# Patient Record
Sex: MALE | Race: WHITE | NOT HISPANIC OR LATINO | Employment: UNEMPLOYED | URBAN - METROPOLITAN AREA
[De-identification: names, ages, dates, MRNs, and addresses within clinical notes are randomized per-mention and may not be internally consistent; named-entity substitution may affect disease eponyms.]

---

## 2017-01-23 ENCOUNTER — APPOINTMENT (OUTPATIENT)
Dept: CT IMAGING | Facility: CLINIC | Age: 37
End: 2017-01-23
Attending: EMERGENCY MEDICINE
Payer: COMMERCIAL

## 2017-01-23 ENCOUNTER — HOSPITAL ENCOUNTER (EMERGENCY)
Facility: CLINIC | Age: 37
Discharge: OTHER INSTITUTION | End: 2017-01-23
Attending: EMERGENCY MEDICINE | Admitting: EMERGENCY MEDICINE
Payer: COMMERCIAL

## 2017-01-23 VITALS
DIASTOLIC BLOOD PRESSURE: 95 MMHG | BODY MASS INDEX: 20.44 KG/M2 | RESPIRATION RATE: 16 BRPM | HEIGHT: 69 IN | WEIGHT: 138 LBS | TEMPERATURE: 97.6 F | SYSTOLIC BLOOD PRESSURE: 122 MMHG | OXYGEN SATURATION: 99 %

## 2017-01-23 DIAGNOSIS — R41.82 ALTERED MENTAL STATUS, UNSPECIFIED ALTERED MENTAL STATUS TYPE: ICD-10-CM

## 2017-01-23 DIAGNOSIS — F19.10 POLYSUBSTANCE ABUSE (H): ICD-10-CM

## 2017-01-23 LAB
ALBUMIN SERPL-MCNC: 3.2 G/DL (ref 3.4–5)
ALP SERPL-CCNC: 84 U/L (ref 40–150)
ALT SERPL W P-5'-P-CCNC: 97 U/L (ref 0–70)
AMPHETAMINES UR QL SCN: ABNORMAL
ANION GAP SERPL CALCULATED.3IONS-SCNC: 4 MMOL/L (ref 3–14)
AST SERPL W P-5'-P-CCNC: 71 U/L (ref 0–45)
BARBITURATES UR QL: ABNORMAL
BASOPHILS # BLD AUTO: 0 10E9/L (ref 0–0.2)
BASOPHILS NFR BLD AUTO: 0.4 %
BENZODIAZ UR QL: ABNORMAL
BILIRUB SERPL-MCNC: 0.3 MG/DL (ref 0.2–1.3)
BUN SERPL-MCNC: 8 MG/DL (ref 7–30)
CALCIUM SERPL-MCNC: 8.8 MG/DL (ref 8.5–10.1)
CANNABINOIDS UR QL SCN: ABNORMAL
CHLORIDE SERPL-SCNC: 102 MMOL/L (ref 94–109)
CO2 SERPL-SCNC: 32 MMOL/L (ref 20–32)
COCAINE UR QL: ABNORMAL
CREAT SERPL-MCNC: 0.91 MG/DL (ref 0.66–1.25)
DIFFERENTIAL METHOD BLD: ABNORMAL
EOSINOPHIL # BLD AUTO: 0.2 10E9/L (ref 0–0.7)
EOSINOPHIL NFR BLD AUTO: 2.6 %
ERYTHROCYTE [DISTWIDTH] IN BLOOD BY AUTOMATED COUNT: 15.1 % (ref 10–15)
ETHANOL SERPL-MCNC: <0.01 G/DL
GFR SERPL CREATININE-BSD FRML MDRD: ABNORMAL ML/MIN/1.7M2
GLUCOSE SERPL-MCNC: 74 MG/DL (ref 70–99)
HCT VFR BLD AUTO: 39.2 % (ref 40–53)
HGB BLD-MCNC: 12.7 G/DL (ref 13.3–17.7)
IMM GRANULOCYTES # BLD: 0.1 10E9/L (ref 0–0.4)
IMM GRANULOCYTES NFR BLD: 0.6 %
LYMPHOCYTES # BLD AUTO: 2.2 10E9/L (ref 0.8–5.3)
LYMPHOCYTES NFR BLD AUTO: 25.8 %
MCH RBC QN AUTO: 26.8 PG (ref 26.5–33)
MCHC RBC AUTO-ENTMCNC: 32.4 G/DL (ref 31.5–36.5)
MCV RBC AUTO: 83 FL (ref 78–100)
MONOCYTES # BLD AUTO: 0.7 10E9/L (ref 0–1.3)
MONOCYTES NFR BLD AUTO: 8.6 %
NEUTROPHILS # BLD AUTO: 5.3 10E9/L (ref 1.6–8.3)
NEUTROPHILS NFR BLD AUTO: 62 %
NRBC # BLD AUTO: 0 10*3/UL
NRBC BLD AUTO-RTO: 0 /100
OPIATES UR QL SCN: ABNORMAL
PCP UR QL SCN: ABNORMAL
PLATELET # BLD AUTO: 275 10E9/L (ref 150–450)
POTASSIUM SERPL-SCNC: 3.8 MMOL/L (ref 3.4–5.3)
PROT SERPL-MCNC: 7.4 G/DL (ref 6.8–8.8)
RBC # BLD AUTO: 4.74 10E12/L (ref 4.4–5.9)
SODIUM SERPL-SCNC: 138 MMOL/L (ref 133–144)
WBC # BLD AUTO: 8.5 10E9/L (ref 4–11)

## 2017-01-23 PROCEDURE — 80307 DRUG TEST PRSMV CHEM ANLYZR: CPT | Performed by: EMERGENCY MEDICINE

## 2017-01-23 PROCEDURE — 85025 COMPLETE CBC W/AUTO DIFF WBC: CPT | Performed by: EMERGENCY MEDICINE

## 2017-01-23 PROCEDURE — 99285 EMERGENCY DEPT VISIT HI MDM: CPT | Mod: 25

## 2017-01-23 PROCEDURE — 72125 CT NECK SPINE W/O DYE: CPT

## 2017-01-23 PROCEDURE — 70450 CT HEAD/BRAIN W/O DYE: CPT

## 2017-01-23 PROCEDURE — 80320 DRUG SCREEN QUANTALCOHOLS: CPT | Performed by: EMERGENCY MEDICINE

## 2017-01-23 PROCEDURE — 80053 COMPREHEN METABOLIC PANEL: CPT | Performed by: EMERGENCY MEDICINE

## 2017-01-23 NOTE — PROGRESS NOTES
I called Lisa at [facility] and left a VM for her to call me re the discharge of this pt.

## 2017-01-23 NOTE — ED NOTES
Bed: [facility]  Expected date:   Expected time:   Means of arrival:   Comments:  419  Poss heroin abuse/hold  1139

## 2017-01-23 NOTE — DISCHARGE INSTRUCTIONS
"You can go to [facility] and get registered for a homeless shelter for tonight.  You need to get there before 5 pm.  [facility]  [address] [address]  "

## 2017-01-23 NOTE — ED PROVIDER NOTES
CHIEF COMPLAINT:  Found down.      HISTORY OF PRESENT ILLNESS:  Bc German is a 36-year-old with a long history of chemical abuse who was found on the bathroom floor passed out apparently with a pipe and foil near him.  He was brought in here.  He thinks he hit his head.  He really is unable to give any history due to his condition.  He has had a history of heroin use, although he said not for the last year.  Meth he initially said last week.  He initially denied cocaine, then said he had used cocaine.  He admits to marijuana use.  He has also had benzodiazepine abuse, although he denies that currently.However, he denied that last time I saw him and pills were missing. He has prescribed benzodiazepines,   Patient has no complaints.  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  He has been living with parents, but they have just signed a restraining order and stated that he cannot come back home.      PAST MEDICAL HISTORY:  Anxiety, chemical dependency.      HABITS:  Tobacco, positive.  Alcohol, denies.  Drug use as above.      SOCIAL HISTORY:  Not currently employed.      MEDICATIONS AND ALLERGIES:  Per review.      REVIEW OF SYSTEMS:  Limited due to status.      PHYSICAL EXAMINATION:   VITAL SIGNS:  Blood pressure 140/108, heart rate 108, respirations 16, O2 sat 98% on room air, temp 97.6.   GENERAL:  Patient has slurred speech.  Drifts off to sleep at times.  He knows the month and year.  He gives varying stories as to medications that have been taken.   HEENT:  Head shows no area of soft tissue swelling.  TMs gray.  Pupils small and equal.  Pharynx normal.   NECK:  Supple, no tenderness.   CARDIOVASCULAR:  S1, S2.  Mildly tachycardic.   LUNGS:  Clear to auscultation.   ABDOMEN:  Soft, nontender.   NEUROLOGIC:  Cranial nerves II-XII intact.  Motor 5/5, reflexes symmetric, Babinskis downgoing, finger-to-nose intact.   PSYCHIATRIC:  He denies  suicidal ideation.      LABORATORY WORK:  CBC normal with white count 8.5, hemoglobin 12.7.  Comprehensive remarkable for ALT elevated at 97, AST at 71.  Alcohol level 0.  Drug screen positive for amphetamines, benzodiazepines, cannabinoids, cocaine and opiates.  CT C-spine shows no acute changes.  CT head shows no acute changes.      EMERGENCY DEPARTMENT COURSE:  I did have the pharmacist look at his last fills.  On , he had alprazolam 2 mg b.i.d., #60, Chantix 1 mg b.i.d., Sonata 10 mg at bedtime, #30, chlorzoxazone 500 mg 1-2 at bedtime p.r.n. sleep, #60, and Adderall 20 mg 2 daily, #60.  Alisha, the care coordinator, has been involved with the parents and disposition is they would like him to go to treatment.  They initially wanted him to go to a place in [place] that they thought was [facility], who would take him tonight and they would fly him down; however, they found out it was not actually associated with [facility] and they now asked Alisha to try and find a placement within the [facility] system, which she is doing.  Barring that, she will try [place] to see if they have a detox inpatient treatment bed, and barring that, she will see if she can find a detox bed at [address] [place] [address] or [facility].  After the patient esa up, I do not think he is holdable unless his condition should change or he says he is suicidal or homicidal.  He certainly has ongoing chemical dependency issues that need addressing.  Patient will be signed out to Dr. Ding for disposition.      ASSESSMENT:  Chemical dependency with acute ingestion.  Unclear all that he took today.      DISPOSITION:  To be determined.         TC DÍAZ MD             D: 2017 14:51   T: 2017 15:18   MT: TS      Name:     AMBREEN PYLE   MRN:      [id number]        Account:      [id number]   :      1980           Visit Date:   2017      Document: [id number]

## 2017-01-23 NOTE — PROGRESS NOTES
I was asked to see this pt and his parents re: discharge planning. I started earlier in the day and this pt's mom wanted this pt to go to a [facility] facility in [place].  I contacted Xuan who was assisting this pt's mother to get this pt to [place].  I found out this place is called [facility] and is not part of [facility]. This pt's mom didn't want this pt to go to [place]. I contacted the  to assist with detox placement for this pt.  Once we got a bed to send this pt to [address] [place] [address] I rec'd a call from [facility] that they can possibly take this pt tonight but they need to talk to the pt first. I am waiting for Lisa from [facility] to call me back so she can assess this pt to get him there tonight.  I have updated this pt's parents on all occurences today.  I did ask this pt if he wanted to go to [facility] to get help.  He slowly nodded yes but added he needed more time. I have updated staff and we will plan for a stretcher ride with a transport hold to [facility] if they accept or to [address] [place] [address] if [facility] declines.

## 2017-01-23 NOTE — PROGRESS NOTES
JAREN  I: JAREN met with CC, Alisha, to discuss possible treatment plan for patient. Patient is a 36 year old male who was admitted to ED for chemical abuse. Prior to hospitalization patient was at home and found unresponsive on floor. Patient and family would like patient to go to  inpatient rehab. CC called [facility] and they may be able to accept patient but they need to get prior auth and bed availability is still unclear. Patient's family is willing to private pay if necessary. SW called [facility] and they stated that patient would need a Rule 25 prior to admitting and they don't anticipate a bed for at least a week. SW called [facility] and they stated patient would need a Rule 25 prior to admitting to their facility. SW called [facility] and they have no beds available. SW called [facility] and they stated they have no beds available. SW and CC await a call from [facility] to confirm whether or not they can accept patient. SW called [facility]. Staff stated SW needed to call [facility]. Sw spoke with Detox and they can accept patient between [phone]. If patient is ready before that staff will need to contact [phone] to inform them. SW and CC will update MD and family.      [facility]: [address] [place] [address]

## 2017-01-24 NOTE — PROGRESS NOTES
With many phone calls this pt's parents have agreed to private pay for [facility].  Lisa will call the bedside RN with the final OK.  This pt will go via stretcher and transport hold. Family has been updated and has been contacted by [facility].

## 2017-01-24 NOTE — ED PROVIDER NOTES
Addendum to Dr. Gann's ED H&P has been dictated by Shaquille Ding MD.  Job # [id number].    Ultimately transferred to [address] [place] [address] by EMS for detox, after extensive efforts to secure chemical dependency treatment at [facility] were unsuccessful.          Shaquille Ding MD  01/23/17 0451

## 2017-01-24 NOTE — ED PROVIDER NOTES
ADDENDUM      This is an addendum to the Emergency Department History and Physical initially documented by my partner, Dr. Savita Gann.  I took over care of cB Pyle from Dr. Gann at approximately 1500 around time of shift change.  I was asked to follow up by reexamining the patient and coordinating disposition with Alisha Peña, the ED care manager who was significantly involved in his care and seeking placement for chemical dependency treatment and/or detox.  Bc had been found down with drug paraphernalia and was suspected of having used multiple drugs in the recent hours.  Testing in the ED was notable primarily for multiple illicit substances detectable in his urine and negative CT imaging of head and neck.      I spoke with Alisha Peña on multiple occasions.  At 1900 approximately, I examined the patient in [facility].  He was sitting upright, oriented with clear speech and ambulatory.  He is interested in help for his drug problem.  Extensive efforts were made by Alisha Peña, who stayed several hours after her shift usually ends in an attempt to secure placement at [facility], though ultimately this could not be secured, and so he was sent instead to [address] [place] [address] for detoxification by EMS.  He was placed on a transport hold for that transfer.  The patient's parents were updated multiple times by Alisha Peña as well as ED nurses by phone.      DIAGNOSIS:  Polysubstance abuse causing altered mental status.          PIETRO TAYLOR MD             D: 2017 21:16   T: 2017 23:16   MT: EM#[id number]      Name:     BC PYLE   MRN:      [id number]        Account:      [id number]   :      1980           Visit Date:   2017      Document: [id number]

## 2017-02-02 DIAGNOSIS — Z79.899 ENCOUNTER FOR LONG-TERM (CURRENT) USE OF MEDICATIONS: Primary | ICD-10-CM

## 2017-02-02 DIAGNOSIS — Z11.3 SCREENING EXAMINATION FOR VENEREAL DISEASE: ICD-10-CM

## 2017-02-02 DIAGNOSIS — Z79.899 ENCOUNTER FOR LONG-TERM (CURRENT) USE OF MEDICATIONS: ICD-10-CM

## 2017-02-02 LAB
ALT SERPL W P-5'-P-CCNC: 135 U/L (ref 0–70)
HBV SURFACE AG SERPL QL IA: NONREACTIVE

## 2017-02-02 PROCEDURE — 36415 COLL VENOUS BLD VENIPUNCTURE: CPT | Performed by: FAMILY MEDICINE

## 2017-02-02 PROCEDURE — 87340 HEPATITIS B SURFACE AG IA: CPT | Performed by: FAMILY MEDICINE

## 2017-02-02 PROCEDURE — 87522 HEPATITIS C REVRS TRNSCRPJ: CPT | Performed by: FAMILY MEDICINE

## 2017-02-02 PROCEDURE — 87389 HIV-1 AG W/HIV-1&-2 AB AG IA: CPT | Performed by: FAMILY MEDICINE

## 2017-02-02 PROCEDURE — 86592 SYPHILIS TEST NON-TREP QUAL: CPT | Performed by: FAMILY MEDICINE

## 2017-02-02 PROCEDURE — 86702 HIV-2 ANTIBODY: CPT | Mod: 59 | Performed by: FAMILY MEDICINE

## 2017-02-02 PROCEDURE — 86701 HIV-1ANTIBODY: CPT | Mod: 59 | Performed by: FAMILY MEDICINE

## 2017-02-02 PROCEDURE — 84460 ALANINE AMINO (ALT) (SGPT): CPT | Performed by: FAMILY MEDICINE

## 2017-02-03 LAB
HIV 1 & 2 AB SERPL IA.RAPID: ABNORMAL
HIV 1 & 2 AB SERPL IA.RAPID: NEGATIVE
HIV 1+2 AB+HIV1 P24 AG SERPL QL IA: ABNORMAL
HIV INTERPRETATION: ABNORMAL
RPR SER QL: NEGATIVE

## 2017-02-04 DIAGNOSIS — Z11.3 SCREEN FOR STD (SEXUALLY TRANSMITTED DISEASE): Primary | ICD-10-CM

## 2017-02-04 DIAGNOSIS — Z79.899 ENCOUNTER FOR LONG-TERM (CURRENT) USE OF MEDICATIONS: ICD-10-CM

## 2017-02-06 LAB
HCV RNA SERPL NAA+PROBE-ACNC: ABNORMAL [IU]/ML
HCV RNA SERPL NAA+PROBE-LOG IU: 7 LOG IU/ML

## 2017-02-23 DIAGNOSIS — B20 HUMAN IMMUNODEFICIENCY VIRUS (HIV) DISEASE (H): Primary | ICD-10-CM

## 2017-02-24 DIAGNOSIS — Z11.3 SCREEN FOR STD (SEXUALLY TRANSMITTED DISEASE): ICD-10-CM

## 2017-02-24 DIAGNOSIS — B20 HUMAN IMMUNODEFICIENCY VIRUS (HIV) DISEASE (H): ICD-10-CM

## 2017-02-24 DIAGNOSIS — Z79.899 ENCOUNTER FOR LONG-TERM (CURRENT) USE OF MEDICATIONS: ICD-10-CM

## 2017-02-24 LAB
ALBUMIN SERPL-MCNC: 4.3 G/DL (ref 3.4–5)
ALP SERPL-CCNC: 96 U/L (ref 40–150)
ALT SERPL W P-5'-P-CCNC: 262 U/L (ref 0–70)
AMYLASE SERPL-CCNC: 74 U/L (ref 30–110)
ANION GAP SERPL CALCULATED.3IONS-SCNC: 7 MMOL/L (ref 3–14)
AST SERPL W P-5'-P-CCNC: 94 U/L (ref 0–45)
BASOPHILS # BLD AUTO: 0.1 10E9/L (ref 0–0.2)
BASOPHILS NFR BLD AUTO: 0.7 %
BILIRUB SERPL-MCNC: 0.3 MG/DL (ref 0.2–1.3)
BUN SERPL-MCNC: 12 MG/DL (ref 7–30)
CALCIUM SERPL-MCNC: 9.2 MG/DL (ref 8.5–10.1)
CD3 CELLS # BLD: 2118 CELLS/UL (ref 603–2990)
CD3 CELLS NFR BLD: 73 % (ref 49–84)
CD3+CD4+ CELLS # BLD: 825 CELLS/UL (ref 441–2156)
CD3+CD4+ CELLS NFR BLD: 28 % (ref 28–63)
CD3+CD4+ CELLS/CD3+CD8+ CLL BLD: 0.68 % (ref 1.4–2.6)
CD3+CD8+ CELLS # BLD: 1211 CELLS/UL (ref 125–1312)
CD3+CD8+ CELLS NFR BLD: 41 % (ref 10–40)
CHLORIDE SERPL-SCNC: 103 MMOL/L (ref 94–109)
CO2 SERPL-SCNC: 28 MMOL/L (ref 20–32)
CREAT SERPL-MCNC: 0.85 MG/DL (ref 0.66–1.25)
DIFFERENTIAL METHOD BLD: ABNORMAL
EOSINOPHIL # BLD AUTO: 0.2 10E9/L (ref 0–0.7)
EOSINOPHIL NFR BLD AUTO: 2.5 %
ERYTHROCYTE [DISTWIDTH] IN BLOOD BY AUTOMATED COUNT: 14.8 % (ref 10–15)
GFR SERPL CREATININE-BSD FRML MDRD: ABNORMAL ML/MIN/1.7M2
GLUCOSE SERPL-MCNC: 97 MG/DL (ref 70–99)
HCT VFR BLD AUTO: 46.6 % (ref 40–53)
HGB BLD-MCNC: 14.9 G/DL (ref 13.3–17.7)
IFC SPECIMEN: ABNORMAL
IMM GRANULOCYTES # BLD: 0.1 10E9/L (ref 0–0.4)
IMM GRANULOCYTES NFR BLD: 0.9 %
IMMUNODEFICIENCY MARKERS SPEC-IMP: ABNORMAL
LIPASE SERPL-CCNC: 222 U/L (ref 73–393)
LYMPHOCYTES # BLD AUTO: 2.8 10E9/L (ref 0.8–5.3)
LYMPHOCYTES NFR BLD AUTO: 36.3 %
MCH RBC QN AUTO: 26.2 PG (ref 26.5–33)
MCHC RBC AUTO-ENTMCNC: 32 G/DL (ref 31.5–36.5)
MCV RBC AUTO: 82 FL (ref 78–100)
MONOCYTES # BLD AUTO: 0.7 10E9/L (ref 0–1.3)
MONOCYTES NFR BLD AUTO: 8.7 %
NEUTROPHILS # BLD AUTO: 3.9 10E9/L (ref 1.6–8.3)
NEUTROPHILS NFR BLD AUTO: 50.9 %
NRBC # BLD AUTO: 0 10*3/UL
NRBC BLD AUTO-RTO: 0 /100
PLATELET # BLD AUTO: 276 10E9/L (ref 150–450)
POTASSIUM SERPL-SCNC: 4 MMOL/L (ref 3.4–5.3)
PROT SERPL-MCNC: 8.8 G/DL (ref 6.8–8.8)
RBC # BLD AUTO: 5.69 10E12/L (ref 4.4–5.9)
SODIUM SERPL-SCNC: 138 MMOL/L (ref 133–144)
WBC # BLD AUTO: 7.6 10E9/L (ref 4–11)

## 2017-02-25 LAB — T PALLIDUM IGG+IGM SER QL: NEGATIVE

## 2017-02-27 ENCOUNTER — TELEPHONE (OUTPATIENT)
Dept: INFECTIOUS DISEASES | Facility: CLINIC | Age: 37
End: 2017-02-27

## 2017-02-27 LAB
CMV IGG SERPL QL IA: 6.9 AI (ref 0–0.8)
HAV IGG SER QL IA: ABNORMAL
HBV CORE AB SERPL QL IA: ABNORMAL
HBV SURFACE AB SERPL IA-ACNC: 47.58 M[IU]/ML
HBV SURFACE AG SERPL QL IA: NONREACTIVE
HCV AB SERPL QL IA: ABNORMAL
T GONDII IGG SER QL: NORMAL IU/ML (ref 0–7.1)

## 2017-02-28 ENCOUNTER — OFFICE VISIT (OUTPATIENT)
Dept: PHARMACY | Facility: CLINIC | Age: 37
End: 2017-02-28
Payer: COMMERCIAL

## 2017-02-28 ENCOUNTER — TELEPHONE (OUTPATIENT)
Dept: INFECTIOUS DISEASES | Facility: CLINIC | Age: 37
End: 2017-02-28

## 2017-02-28 ENCOUNTER — OFFICE VISIT (OUTPATIENT)
Dept: INFECTIOUS DISEASES | Facility: CLINIC | Age: 37
End: 2017-02-28
Attending: INTERNAL MEDICINE
Payer: COMMERCIAL

## 2017-02-28 ENCOUNTER — RESULTS ONLY (OUTPATIENT)
Dept: OTHER | Facility: CLINIC | Age: 37
End: 2017-02-28

## 2017-02-28 VITALS
TEMPERATURE: 98 F | WEIGHT: 151.1 LBS | SYSTOLIC BLOOD PRESSURE: 150 MMHG | HEART RATE: 100 BPM | HEIGHT: 71 IN | DIASTOLIC BLOOD PRESSURE: 100 MMHG | BODY MASS INDEX: 21.15 KG/M2

## 2017-02-28 DIAGNOSIS — Z21 ASYMPTOMATIC HUMAN IMMUNODEFICIENCY VIRUS (HIV) INFECTION STATUS (H): Primary | ICD-10-CM

## 2017-02-28 DIAGNOSIS — F41.1 GAD (GENERALIZED ANXIETY DISORDER): ICD-10-CM

## 2017-02-28 DIAGNOSIS — B20 HUMAN IMMUNODEFICIENCY VIRUS (HIV) DISEASE (H): ICD-10-CM

## 2017-02-28 DIAGNOSIS — Z21 ASYMPTOMATIC HUMAN IMMUNODEFICIENCY VIRUS (HIV) INFECTION STATUS (H): ICD-10-CM

## 2017-02-28 DIAGNOSIS — F19.10 INTRAVENOUS DRUG ABUSE (H): ICD-10-CM

## 2017-02-28 DIAGNOSIS — B17.10 ACUTE HEPATITIS C VIRUS INFECTION WITHOUT HEPATIC COMA: ICD-10-CM

## 2017-02-28 LAB
ALBUMIN UR-MCNC: 30 MG/DL
APPEARANCE UR: ABNORMAL
BACTERIA #/AREA URNS HPF: ABNORMAL /HPF
BILIRUB UR QL STRIP: NEGATIVE
COLOR UR AUTO: YELLOW
GLUCOSE UR STRIP-MCNC: NEGATIVE MG/DL
HGB UR QL STRIP: NEGATIVE
HIV1 RNA # PLAS NAA DL=20: ABNORMAL {COPIES}/ML
HIV1 RNA SERPL NAA+PROBE-LOG#: 5 {LOG_COPIES}/ML
KETONES UR STRIP-MCNC: 5 MG/DL
LEUKOCYTE ESTERASE UR QL STRIP: ABNORMAL
MUCOUS THREADS #/AREA URNS LPF: PRESENT /LPF
NITRATE UR QL: NEGATIVE
PH UR STRIP: 7 PH (ref 5–7)
RBC #/AREA URNS AUTO: 4 /HPF (ref 0–2)
SP GR UR STRIP: 1.02 (ref 1–1.03)
SPERM #/AREA URNS HPF: PRESENT /HPF
SQUAMOUS #/AREA URNS AUTO: <1 /HPF (ref 0–1)
T PALLIDUM IGG+IGM SER QL: NEGATIVE
URN SPEC COLLECT METH UR: ABNORMAL
UROBILINOGEN UR STRIP-MCNC: 0 MG/DL (ref 0–2)
WBC #/AREA URNS AUTO: 20 /HPF (ref 0–2)

## 2017-02-28 PROCEDURE — 87591 N.GONORRHOEAE DNA AMP PROB: CPT | Mod: XU | Performed by: INTERNAL MEDICINE

## 2017-02-28 PROCEDURE — 87086 URINE CULTURE/COLONY COUNT: CPT | Performed by: INTERNAL MEDICINE

## 2017-02-28 PROCEDURE — 99207 ZZC NO CHARGE LOS: CPT | Performed by: PHARMACIST

## 2017-02-28 PROCEDURE — 86780 TREPONEMA PALLIDUM: CPT | Performed by: INTERNAL MEDICINE

## 2017-02-28 PROCEDURE — 87536 HIV-1 QUANT&REVRSE TRNSCRPJ: CPT | Performed by: INTERNAL MEDICINE

## 2017-02-28 PROCEDURE — 36415 COLL VENOUS BLD VENIPUNCTURE: CPT | Performed by: INTERNAL MEDICINE

## 2017-02-28 PROCEDURE — 81381 HLA I TYPING 1 ALLELE HR: CPT | Performed by: INTERNAL MEDICINE

## 2017-02-28 PROCEDURE — 84403 ASSAY OF TOTAL TESTOSTERONE: CPT | Performed by: INTERNAL MEDICINE

## 2017-02-28 PROCEDURE — 87491 CHLMYD TRACH DNA AMP PROBE: CPT | Mod: XU | Performed by: INTERNAL MEDICINE

## 2017-02-28 PROCEDURE — 99213 OFFICE O/P EST LOW 20 MIN: CPT | Mod: ZF

## 2017-02-28 PROCEDURE — 81001 URINALYSIS AUTO W/SCOPE: CPT | Performed by: INTERNAL MEDICINE

## 2017-02-28 ASSESSMENT — PAIN SCALES - GENERAL: PAINLEVEL: NO PAIN (0)

## 2017-02-28 NOTE — PROGRESS NOTES
[facility]  Infectious Disease Clinic Note     Date of Visit:  February 28, 2017  Patient:  Bc German  Medical record number [id number]    History of Present Illness  Bc German is a 36 year old male who is here to establish care. Walt is in treatment at [facility] for intravenous drug use.  He has been using intravenous drugs for many, many years.  He has a long complicated history of multiple infectious complications of injection drug use including osteomyelitis of the vertebral spine, endocarditis and soft tissue abscesses.  He found out that 2 weeks ago on routine testing that he was HIV infected.  He also has hepatitis C.  His last negative HIV test was 6 months prior.  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  He also has a generalized anxiety disorder.  He has an addiction psychiatrist that he has been working who was managing his antidepressants and other medications for addiction.      PAST MEDICAL HISTORY:  Other than noted above is generally benign.       FAMILY HISTORY:  Benign.  His mother lives in [place] and is generally supportive.  His brother is also quite supportive and knows the full extent of his diagnoses.      ALLERGIES:  He has no drug allergies or other allergies.         Problem List  1. Asymptomatic human immunodeficiency virus (HIV) infection status (H)    2. Acute hepatitis C virus infection without hepatic coma    3. Intravenous drug abuse    4. JASSI (generalized anxiety disorder)         Review of Systems  Twelve point review of systems is otherwise normal.    Current Medications  Current Outpatient Prescriptions   Medication     PREGABALIN PO     Levomilnacipran HCl (FETZIMA PO)     No current facility-administered medications for this visit.        Family/Social History  History reviewed. No pertinent family history.    Physical Exam  HEENT: Normal  Neck: Supple  Lungs: CTA  CV: RRR, no gallops,  murmurs  Abdomen: Soft and nontender.  No masses noted.  :nl penis and testes, external rectal nl  Extremities: Normal  Skin: Normal  Neuro: Grossly normal  Lymphadenopathy:  Cervical 2+      Axillary:0     Inguinal:2+    Recent Laboratory Values    Routine Labs  Hemoglobin   Date Value Ref Range Status   02/24/2017 14.9 13.3 - 17.7 g/dL Final     MCV   Date Value Ref Range Status   02/24/2017 82 78 - 100 fl Final     Platelet Count   Date Value Ref Range Status   02/24/2017 276 150 - 450 10e9/L Final     Creatinine   Date Value Ref Range Status   02/24/2017 0.85 0.66 - 1.25 mg/dL Final     AST   Date Value Ref Range Status   02/24/2017 94 (H) 0 - 45 U/L Final     ALT   Date Value Ref Range Status   02/24/2017 262 (H) 0 - 70 U/L Final     Bilirubin Total   Date Value Ref Range Status   02/24/2017 0.3 0.2 - 1.3 mg/dL Final       T Cell Subset:  CD3 Mature T   Date Value Ref Range Status   02/24/2017 73 49 - 84 % Final     CD4 [place] T   Date Value Ref Range Status   02/24/2017 28 28 - 63 % Final     CD8 Suppressor T   Date Value Ref Range Status   02/24/2017 41 (H) 10 - 40 % Final     CD4:CD8 Ratio   Date Value Ref Range Status   02/24/2017 0.68 (L) 1.40 - 2.60 Final     WBC   Date Value Ref Range Status   02/24/2017 7.6 4.0 - 11.0 10e9/L Final     % Lymphocytes   Date Value Ref Range Status   02/24/2017 36.3 % Final     Absolute CD3   Date Value Ref Range Status   02/24/2017 2118 603 - 2990 cells/uL Final     Absolute CD4   Date Value Ref Range Status   02/24/2017 825 441 - 2156 cells/uL Final     Absolute CD8   Date Value Ref Range Status   02/24/2017 1211 125 - 1312 cells/uL Final       HIV-1 RNA Quantitative:  No results found for: HIQT     Assessment and Plan  (Z21) Asymptomatic human immunodeficiency virus (HIV) infection status (H)  (primary encounter diagnosis)  Comment: Walt is early in his HIV infection.  His CD4 count is 845 and a viral load was not done.  We had a lengthy discussion (30 minutes) about  the risks and benefits of antiretroviral therapy.  I stressed that we did not need to start today, but I would like him to will be further out on his recovery before prescribing antiretrovirals.  We discussed what the options were and what we might use.  I told him to come back in 4 weeks when he is through at [facility] and will talk about antiretroviral therapy again at that time.  I have ordered a viral load so that we know where he is with respect to that.  It is entirely possible the viral load will be either undetectable or very low and with a CD4 count of 845 that may put him into the category of long-term nonprogressor.       Plan: Testosterone total, HIV-1 RNA quantitative,         Anti Treponema (VOR6366), Gonorrhea PCR (set at        Throat) (SEZ7669), Gonorrhea PCR (set at Urine)        (MYN3015), Chlamydia PCR (set at Throat)         (SSR373), Chlamydia PCR (set at Urine)         (YUB640), HLA Typing Single Antigen            (B17.10) Acute hepatitis C virus infection without hepatic coma  Plan: US abdomen complete            (F19.10) Intravenous drug use  Plan: in treatment    (F41.1) JASSI (generalized anxiety disorder)    Plan: He will continue current meds as managed by doctor at [facility].

## 2017-02-28 NOTE — MR AVS SNAPSHOT
After Visit Summary   2/28/2017    Bc German    MRN: [id number]           Patient Information     Date Of Birth          1980        Visit Information        Provider Department      2/28/2017 3:30 PM Mackenzie Doherty RPH [facility] and Infectious Diseases [place]         Follow-ups after your visit        Your next 10 appointments already scheduled     Mar 28, 2017  7:30 AM CDT   US ABDOMEN COMPLETE with UCUS2   [facility] US ([facility])    [address]  [address] [facility] [address] [phone]           Please bring a list of your medicines (including vitamins, minerals and over-the-counter drugs). Also, tell your doctor about any allergies you may have. Wear comfortable clothes and leave your valuables at home.  Adults: No eating or drinking for 8 hours before the exam. You may take medicine with a small sip of water.  Children: - Children 6+ years: No food or drink for 6 hours before exam. - Children 1-5 years: No food or drink for 4 hours before exam. - Infants, breast-fed: may have breast milk up to 2 hours before exam. - Infants, formula: may have bottle until 4 hours before exam.  Please call the Imaging Department at your exam site with any questions.            Mar 28, 2017  8:30 AM CDT   (Arrive by 8:15 AM)   Return Visit with Damir Cisneros MD   [facility] and Infectious Diseases ([facility])    [address] [place] [address]  [address] [facility] [address] [phone]            Mar 28, 2017  9:00 AM CDT   (Arrive by 8:45 AM)   SHORT with Mackenzie Doherty RPH   [facility] Infectious Diseases [place] ([facility])    [address] [place] [address]  [address] [facility] [address]                 Who to contact     If you have questions or need follow up information about today's clinic visit or your schedule please contact [facility]  "STREET AND INFECTIOUS DISEASES [facility] directly at No information on file..  Normal or non-critical lab and imaging results will be communicated to you by MyChart, letter or phone within 4 business days after the clinic has received the results. If you do not hear from us within 7 days, please contact the clinic through [employer] or phone. If you have a critical or abnormal lab result, we will notify you by phone as soon as possible.  Submit refill requests through [employer] or call your pharmacy and they will forward the refill request to us. Please allow 3 business days for your refill to be completed.          Additional Information About Your Visit        [employer]EDhar[employer]akos Information     [employer] lets you send messages to your doctor, view your test results, renew your prescriptions, schedule appointments and more. To sign up, go to [email or web address] . Click on \"Log in\" on the left side of the screen, which will take you to the Welcome page. Then click on \"Sign up Now\" on the right side of the page.     You will be asked to enter the access code listed below, as well as some personal information. Please follow the directions to create your username and password.     Your access code is: [email or web address]  Expires: 2017  2:44 PM     Your access code will  in 90 days. If you need help or a new code, please call your [place] clinic or [phone].        Care EveryWhere ID     This is your Care EveryWhere ID. This could be used by other organizations to access your [place] medical records  [id number]         Blood Pressure from Last 3 Encounters:   17 (!) 150/100   17 (!) 122/95   16 (!) 131/95    Weight from Last 3 Encounters:   17 151 lb 1.6 oz (68.5 kg)   17 138 lb (62.6 kg)   16 156 lb (70.8 kg)              Today, you had the following     No orders found for display         Today's Medication Changes          These changes are accurate as of: 17 11:59 PM.  If you have " any questions, ask your nurse or doctor.               Stop taking these medicines if you haven't already. Please contact your care team if you have questions.     ADDERALL XR PO   Stopped by:  Damir Cisneros MD           clonazePAM 0.5 MG tablet   Commonly known as:  klonoPIN   Stopped by:  Damir Cisneros MD           guaiFENesin 20 mg/mL Soln solution   Commonly known as:  ROBITUSSIN   Stopped by:  Damir Cisneros MD           KETOPROFEN PO   Stopped by:  Damir Cisneros MD           VITAMIN D3 PO   Stopped by:  Damir Cisneros MD                    Primary Care Provider Office Phone # Fax #    [facility] [phone]       [address] [place] [address]        Thank you!     Thank you for choosing [facility] AND INFECTIOUS DISEASES [place]  for your care. Our goal is always to provide you with excellent care. Hearing back from our patients is one way we can continue to improve our services. Please take a few minutes to complete the written survey that you may receive in the mail after your visit with us. Thank you!             Your Updated Medication List - Protect others around you: Learn how to safely use, store and throw away your medicines at www.disposemymeds.org.          This list is accurate as of: 2/28/17 11:59 PM.  Always use your most recent med list.                   Brand Name Dispense Instructions for use    FETZIMA PO      Take 80 mg by mouth daily       PREGABALIN PO      Take 50 mg by mouth 3 times daily

## 2017-02-28 NOTE — MR AVS SNAPSHOT
After Visit Summary   2/28/2017    Bc German    MRN: [id number]           Patient Information     Date Of Birth          1980        Visit Information        Provider Department      2/28/2017 8:00 AM Damir Cisneros MD [facility] and Infectious Diseases        Today's Diagnoses     Asymptomatic human immunodeficiency virus (HIV) infection status (H)    -  1    Acute hepatitis C virus infection without hepatic coma        Intravenous drug abuse        JASSI (generalized anxiety disorder)           Follow-ups after your visit        Follow-up notes from your care team     Return in about 4 weeks (around 3/28/2017).      Your next 10 appointments already scheduled     Feb 28, 2017  9:30 AM CST   Lab with  LAB   [facility] ([facility])    [address] [place] [address] [phone]            Mar 28, 2017  7:30 AM CDT   US ABDOMEN COMPLETE with US2   [facility] ([facility])    [address] [place] [address] [phone]           Please bring a list of your medicines (including vitamins, minerals and over-the-counter drugs). Also, tell your doctor about any allergies you may have. Wear comfortable clothes and leave your valuables at home.  Adults: No eating or drinking for 8 hours before the exam. You may take medicine with a small sip of water.  Children: - Children 6+ years: No food or drink for 6 hours before exam. - Children 1-5 years: No food or drink for 4 hours before exam. - Infants, breast-fed: may have breast milk up to 2 hours before exam. - Infants, formula: may have bottle until 4 hours before exam.  Please call the Imaging Department at your exam site with any questions.            Mar 28, 2017  8:30 AM CDT   (Arrive by 8:15 AM)   Return Visit with Damir Cisneros MD   [facility] and Infectious Diseases ([facility]  "Cl[facility] and [facility])    [address]  3rd Floor  [facility] [id number] [phone]              Future tests that were ordered for you today     Open Future Orders        Priority Expected Expires Ordered    Testosterone total Routine  2/28/2018 2/28/2017    Anti Treponema (PHG4484) Routine  2/28/2018 2/28/2017    Gonorrhea PCR (set at Urine) (BZD2371) Routine  2/28/2018 2/28/2017    Chlamydia PCR (set at Urine) (ZUP142) Routine  8/27/2017 2/28/2017    US abdomen complete Routine 5/29/2017 2/28/2018 2/28/2017            Who to contact     If you have questions or need follow up information about today's clinic visit or your schedule please contact [facility] AND INFECTIOUS DISEASES directly at [phone].  Normal or non-critical lab and imaging results will be communicated to you by MyChart, letter or phone within 4 business days after the clinic has received the results. If you do not hear from us within 7 days, please contact the clinic through [employer] or phone. If you have a critical or abnormal lab result, we will notify you by phone as soon as possible.  Submit refill requests through [employer] or call your pharmacy and they will forward the refill request to us. Please allow 3 business days for your refill to be completed.          Additional Information About Your Visit        [employer] Information     [employer] lets you send messages to your doctor, view your test results, renew your prescriptions, schedule appointments and more. To sign up, go to [email or web address] . Click on \"Log in\" on the left side of the screen, which will take you to the Welcome page. Then click on \"Sign up Now\" on the right side of the page.     You will be asked to enter the access code listed below, as well as some personal information. Please follow the directions to create your username and password.     Your access code is: [email or web address]  Expires: 4/23/2017  2:44 PM     Your access code will " " in 90 days. If you need help or a new code, please call your [facility] clinic or [phone].        Care EveryWhere ID     This is your Care EveryWhere ID. This could be used by other organizations to access your [facility] medical records  [id number]        Your Vitals Were     Pulse Temperature Height BMI (Body Mass Index)          100 98  F (36.7  C) (Oral) 1.791 m (5' 10.5\") 21.37 kg/m2         Blood Pressure from Last 3 Encounters:   17 (!) 150/100   17 (!) 122/95   16 (!) 131/95    Weight from Last 3 Encounters:   17 68.5 kg (151 lb 1.6 oz)   17 62.6 kg (138 lb)   16 70.8 kg (156 lb)              We Performed the Following     Chlamydia PCR (set at Throat) ([id number])     Gonorrhea PCR (set at Throat) ([id number])     HIV-1 RNA quantitative     HLA Typing Single Antigen          Today's Medication Changes          These changes are accurate as of: 17  9:21 AM.  If you have any questions, ask your nurse or doctor.               Stop taking these medicines if you haven't already. Please contact your care team if you have questions.     ADDERALL XR PO   Stopped by:  Damir Cisneros MD           clonazePAM 0.5 MG tablet   Commonly known as:  klonoPIN   Stopped by:  Damir Cisneros MD           guaiFENesin 20 mg/mL Soln solution   Commonly known as:  ROBITUSSIN   Stopped by:  Damir Cisneros MD           KETOPROFEN PO   Stopped by:  Damir Cisneros MD           VITAMIN D3 PO   Stopped by:  Damir Cisneros MD                    Primary Care Provider Office Phone # Fax #    [facility] [phone]       [address] [place] [address]        Thank you!     Thank you for choosing [facility] AND INFECTIOUS DISEASES  for your care. Our goal is always to provide you with excellent care. Hearing back from our patients is one way we can continue to improve our services. Please take a few minutes to " complete the written survey that you may receive in the mail after your visit with us. Thank you!             Your Updated Medication List - Protect others around you: Learn how to safely use, store and throw away your medicines at www.disposemymeds.org.          This list is accurate as of: 2/28/17  9:21 AM.  Always use your most recent med list.                   Brand Name Dispense Instructions for use    FETZIMA PO      Take 80 mg by mouth daily       PREGABALIN PO      Take 50 mg by mouth 3 times daily

## 2017-02-28 NOTE — LETTER
2/28/2017       RE: Bc German  [address]   [place] [address]     Dear Colleague,    Thank you for referring your patient, Bc German, to the [facility] AND INFECTIOUS DISEASES at [facility]. Please see a copy of my visit note below.    [facility]  Infectious Disease Clinic Note     Date of Visit:  February 28, 2017  Patient:  Bc German  Medical record number [id number]    History of Present Illness  Bc German is a 36 year old male who is here to establish care. Walt is in treatment at [facility] for intravenous drug use.  He has been using intravenous drugs for many, many years.  He has a long complicated history of multiple infectious complications of injection drug use including osteomyelitis of the vertebral spine, endocarditis and soft tissue abscesses.  He found out that 2 weeks ago on routine testing that he was HIV infected.  He also has hepatitis C.  His last negative HIV test was 6 months prior.  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  He also has a generalized anxiety disorder.  He has an addiction psychiatrist that he has been working who was managing his antidepressants and other medications for addiction.      PAST MEDICAL HISTORY:  Other than noted above is generally benign.       FAMILY HISTORY:  Benign.  His mother lives in [place] and is generally supportive.  His brother is also quite supportive and knows the full extent of his diagnoses.      ALLERGIES:  He has no drug allergies or other allergies.         Problem List  1. Asymptomatic human immunodeficiency virus (HIV) infection status (H)    2. Acute hepatitis C virus infection without hepatic coma    3. Intravenous drug abuse    4. JASSI (generalized anxiety disorder)         Review of Systems  Twelve point review of systems is otherwise normal.    Current Medications  Current  Outpatient Prescriptions   Medication     PREGABALIN PO     Levomilnacipran HCl (FETZIMA PO)     No current facility-administered medications for this visit.        Family/Social History  History reviewed. No pertinent family history.    Physical Exam  HEENT: Normal  Neck: Supple  Lungs: CTA  CV: RRR, no gallops, murmurs  Abdomen: Soft and nontender.  No masses noted.  :nl penis and testes, external rectal nl  Extremities: Normal  Skin: Normal  Neuro: Grossly normal  Lymphadenopathy:  Cervical 2+      Axillary:0     Inguinal:2+    Recent Laboratory Values    Routine Labs  Hemoglobin   Date Value Ref Range Status   02/24/2017 14.9 13.3 - 17.7 g/dL Final     MCV   Date Value Ref Range Status   02/24/2017 82 78 - 100 fl Final     Platelet Count   Date Value Ref Range Status   02/24/2017 276 150 - 450 10e9/L Final     Creatinine   Date Value Ref Range Status   02/24/2017 0.85 0.66 - 1.25 mg/dL Final     AST   Date Value Ref Range Status   02/24/2017 94 (H) 0 - 45 U/L Final     ALT   Date Value Ref Range Status   02/24/2017 262 (H) 0 - 70 U/L Final     Bilirubin Total   Date Value Ref Range Status   02/24/2017 0.3 0.2 - 1.3 mg/dL Final       T Cell Subset:  CD3 Mature T   Date Value Ref Range Status   02/24/2017 73 49 - 84 % Final     CD4 Lackey T   Date Value Ref Range Status   02/24/2017 28 28 - 63 % Final     CD8 Suppressor T   Date Value Ref Range Status   02/24/2017 41 (H) 10 - 40 % Final     CD4:CD8 Ratio   Date Value Ref Range Status   02/24/2017 0.68 (L) 1.40 - 2.60 Final     WBC   Date Value Ref Range Status   02/24/2017 7.6 4.0 - 11.0 10e9/L Final     % Lymphocytes   Date Value Ref Range Status   02/24/2017 36.3 % Final     Absolute CD3   Date Value Ref Range Status   02/24/2017 2118 603 - 2990 cells/uL Final     Absolute CD4   Date Value Ref Range Status   02/24/2017 825 441 - 2156 cells/uL Final     Absolute CD8   Date Value Ref Range Status   02/24/2017 1211 125 - 1312 cells/uL Final       HIV-1 RNA  Quantitative:  No results found for: HIQT     Assessment and Plan  (Z21) Asymptomatic human immunodeficiency virus (HIV) infection status (H)  (primary encounter diagnosis)  Comment: Walt is early in his HIV infection.  His CD4 count is 845 and a viral load was not done.  We had a lengthy discussion (30 minutes) about the risks and benefits of antiretroviral therapy.  I stressed that we did not need to start today, but I would like him to will be further out on his recovery before prescribing antiretrovirals.  We discussed what the options were and what we might use.  I told him to come back in 4 weeks when he is through at [facility] and will talk about antiretroviral therapy again at that time.  I have ordered a viral load so that we know where he is with respect to that.  It is entirely possible the viral load will be either undetectable or very low and with a CD4 count of 845 that may put him into the category of long-term nonprogressor.       Plan: Testosterone total, HIV-1 RNA quantitative,         Anti Treponema (FPN4741), Gonorrhea PCR (set at        Throat) (GRG7753), Gonorrhea PCR (set at Urine)        (ATE3209), Chlamydia PCR (set at Throat)         (KHM748), Chlamydia PCR (set at Urine)         (LDP605), HLA Typing Single Antigen            (B17.10) Acute hepatitis C virus infection without hepatic coma  Plan: US abdomen complete            (F19.10) Intravenous drug use  Plan: in treatment    (F41.1) JASSI (generalized anxiety disorder)    Plan: He will continue current meds as managed by doctor at [facility].    Again, thank you for allowing me to participate in the care of your patient.      Sincerely,    Damir Cisneros MD

## 2017-02-28 NOTE — NURSING NOTE
"Chief Complaint   Patient presents with     Consult     establish care with new B20, tzimmer cma       Initial BP (!) 150/100  Pulse 100  Temp 98  F (36.7  C) (Oral)  Ht 1.791 m (5' 10.5\")  Wt 68.5 kg (151 lb 1.6 oz)  BMI 21.37 kg/m2 Estimated body mass index is 21.37 kg/(m^2) as calculated from the following:    Height as of this encounter: 1.791 m (5' 10.5\").    Weight as of this encounter: 68.5 kg (151 lb 1.6 oz).  Medication Reconciliation: complete    "

## 2017-03-01 LAB
BACTERIA SPEC CULT: NORMAL
C TRACH DNA SPEC QL NAA+PROBE: NORMAL
C TRACH DNA SPEC QL NAA+PROBE: NORMAL
HIV1 RNA # PLAS NAA DL=20: ABNORMAL {COPIES}/ML
HIV1 RNA SERPL NAA+PROBE-LOG#: 4.7 {LOG_COPIES}/ML
HLA SINGLE ANTIGEN ALLELE TYPE: NORMAL
Lab: NORMAL
MICRO REPORT STATUS: NORMAL
N GONORRHOEA DNA SPEC QL NAA+PROBE: NORMAL
N GONORRHOEA DNA SPEC QL NAA+PROBE: NORMAL
SPECIMEN SOURCE: NORMAL
TESTOST SERPL-MCNC: 1394 NG/DL (ref 240–950)

## 2017-03-02 NOTE — PROGRESS NOTES
"Clinical Consult:                                                    Bc German is a 36 year old male coming in for a clinical pharmacist consult.  He was referred to me from Dr. Cisneros.     Reason for Consult: New patient.    Discussion: Bc reports he was diagnosed HIV+ a few weeks ago. Says he is in-patient right now at [facility] for drug/alcohol use. Reports basic questions about HIV, and medication. Says he will be back to see Dr. Cisneros on 03/28 for a possible medication start.    Plan:  1. Discussed basic/general HIV education, CD4 count and viral load numbers, being \"undetectable\" and what they mean.   2. Talked about the importance of strict medication adherence.   3. Pt to return on 03/28 for MD/MTM appointment.     Mackenzie Doherty, TRESSA Pharmacist.   [phone]      "

## 2017-03-03 NOTE — TELEPHONE ENCOUNTER
Called pt and discussed labs from earlier this week. Pt verbalized understanding.  Lacey Watson RN

## 2017-03-03 NOTE — TELEPHONE ENCOUNTER
----- Message from Hayley Matthew sent at 3/2/2017  4:20 PM CST -----  Regarding: Lab results  Contact: [phone]  Pt would like a call back with lab results from 2/28.    Thanks     Hayley MARKS    Please DO NOT send this message and/or reply back to sender.  Call Center Representatives DO NOT respond to messages.

## 2017-03-07 LAB
HLA TYPE SA METHOD: NORMAL
HLA TYPE SA RESULT: NORMAL

## 2017-03-27 ENCOUNTER — TELEPHONE (OUTPATIENT)
Dept: INFECTIOUS DISEASES | Facility: CLINIC | Age: 37
End: 2017-03-27

## 2017-03-28 ENCOUNTER — OFFICE VISIT (OUTPATIENT)
Dept: INFECTIOUS DISEASES | Facility: CLINIC | Age: 37
End: 2017-03-28
Attending: INTERNAL MEDICINE
Payer: COMMERCIAL

## 2017-03-28 ENCOUNTER — OFFICE VISIT (OUTPATIENT)
Dept: PHARMACY | Facility: CLINIC | Age: 37
End: 2017-03-28
Payer: COMMERCIAL

## 2017-03-28 VITALS
DIASTOLIC BLOOD PRESSURE: 101 MMHG | BODY MASS INDEX: 20.65 KG/M2 | WEIGHT: 147.5 LBS | HEART RATE: 112 BPM | TEMPERATURE: 97.5 F | HEIGHT: 71 IN | SYSTOLIC BLOOD PRESSURE: 156 MMHG

## 2017-03-28 DIAGNOSIS — I10 ESSENTIAL HYPERTENSION: ICD-10-CM

## 2017-03-28 DIAGNOSIS — Z21 ASYMPTOMATIC HUMAN IMMUNODEFICIENCY VIRUS (HIV) INFECTION STATUS (H): Primary | ICD-10-CM

## 2017-03-28 DIAGNOSIS — E34.9 HYPOTESTOSTERONEMIA: ICD-10-CM

## 2017-03-28 DIAGNOSIS — F41.8 DEPRESSION WITH ANXIETY: ICD-10-CM

## 2017-03-28 DIAGNOSIS — B20 HUMAN IMMUNODEFICIENCY VIRUS (HIV) DISEASE (H): Primary | ICD-10-CM

## 2017-03-28 PROCEDURE — 99607 MTMS BY PHARM ADDL 15 MIN: CPT | Performed by: PHARMACIST

## 2017-03-28 PROCEDURE — 99605 MTMS BY PHARM NP 15 MIN: CPT | Performed by: PHARMACIST

## 2017-03-28 PROCEDURE — 99213 OFFICE O/P EST LOW 20 MIN: CPT | Mod: ZF

## 2017-03-28 RX ORDER — DEXTROAMPHETAMINE SACCHARATE, AMPHETAMINE ASPARTATE MONOHYDRATE, DEXTROAMPHETAMINE SULFATE AND AMPHETAMINE SULFATE 5; 5; 5; 5 MG/1; MG/1; MG/1; MG/1
20 CAPSULE, EXTENDED RELEASE ORAL 2 TIMES DAILY
COMMUNITY
End: 2017-08-01

## 2017-03-28 RX ORDER — LISINOPRIL/HYDROCHLOROTHIAZIDE 10-12.5 MG
1 TABLET ORAL DAILY
Qty: 30 TABLET | Refills: 3 | Status: SHIPPED | OUTPATIENT
Start: 2017-03-28 | End: 2017-05-23

## 2017-03-28 RX ORDER — TESTOSTERONE CYPIONATE 200 MG/ML
INJECTION, SOLUTION INTRAMUSCULAR
Refills: 5 | COMMUNITY
Start: 2017-03-19 | End: 2017-08-02

## 2017-03-28 ASSESSMENT — PAIN SCALES - GENERAL: PAINLEVEL: NO PAIN (0)

## 2017-03-28 NOTE — MR AVS SNAPSHOT
"              After Visit Summary   3/28/2017    Bc German    MRN: [id number]           Patient Information     Date Of Birth          1980        Visit Information        Provider Department      3/28/2017 9:00 AM Damir Cisneros MD [facility] and Infectious Diseases        Today's Diagnoses     Asymptomatic human immunodeficiency virus (HIV) infection status (H)    -  1    Essential hypertension           Follow-ups after your visit        Follow-up notes from your care team     Return in about 4 weeks (around 4/25/2017).      Your next 10 appointments already scheduled     Apr 11, 2017  3:00 PM CDT   (Arrive by 2:45 PM)   SHORT with Mackenzie Doherty RPH   [facility] and Infectious Diseases [facility] ([facility])    [address]  [address] [place] [address]                 Who to contact     If you have questions or need follow up information about today's clinic visit or your schedule please contact [facility] AND INFECTIOUS DISEASES directly at [phone].  Normal or non-critical lab and imaging results will be communicated to you by [employer], letter or phone within 4 business days after the clinic has received the results. If you do not hear from us within 7 days, please contact the clinic through [employer] or phone. If you have a critical or abnormal lab result, we will notify you by phone as soon as possible.  Submit refill requests through [employer] or call your pharmacy and they will forward the refill request to us. Please allow 3 business days for your refill to be completed.          Additional Information About Your Visit        [employer] Information     [employer] lets you send messages to your doctor, view your test results, renew your prescriptions, schedule appointments and more. To sign up, go to [email or web address] . Click on \"Log in\" on the left side of the screen, which will take you to the Welcome page. " "Then click on \"Sign up Now\" on the right side of the page.     You will be asked to enter the access code listed below, as well as some personal information. Please follow the directions to create your username and password.     Your access code is: [email or web address]  Expires: 2017  3:44 PM     Your access code will  in 90 days. If you need help or a new code, please call your [facility] clinic or [phone].        Care EveryWhere ID     This is your Care EveryWhere ID. This could be used by other organizations to access your [facility] medical records  [id number]        Your Vitals Were     Pulse Temperature Height BMI (Body Mass Index)          112 97.5  F (36.4  C) (Oral) 1.803 m (5' 11\") 20.57 kg/m2         Blood Pressure from Last 3 Encounters:   17 (!) 156/101   17 (!) 150/100   17 (!) 122/95    Weight from Last 3 Encounters:   17 66.9 kg (147 lb 8 oz)   17 68.5 kg (151 lb 1.6 oz)   17 62.6 kg (138 lb)              Today, you had the following     No orders found for display         Today's Medication Changes          These changes are accurate as of: 3/28/17 11:59 PM.  If you have any questions, ask your nurse or doctor.               Start taking these medicines.        Dose/Directions    abacavir-dolutegravir-LamiVUDine 600- MG per tablet   Commonly known as:  TRIUMEQ   Used for:  Asymptomatic human immunodeficiency virus (HIV) infection status (H)   Started by:  Damir Cisneros MD        Dose:  1 tablet   Take 1 tablet by mouth daily   Quantity:  30 tablet   Refills:  3       lisinopril-hydrochlorothiazide 10-12.5 MG per tablet   Commonly known as:  PRINZIDE/ZESTORETIC   Used for:  Essential hypertension   Started by:  Damir Cisneros MD        Dose:  1 tablet   Take 1 tablet by mouth daily   Quantity:  30 tablet   Refills:  3            Where to get your medicines      These medications were sent to [facility] - " [place], [place] - [address]  [address], [facility] [address]    Hours:  TRANSPLANT PHONE NUMBER [phone] Phone:  [phone]     abacavir-dolutegravir-LamiVUDine 600- MG per tablet    lisinopril-hydrochlorothiazide 10-12.5 MG per tablet                Primary Care Provider Office Phone # Fax #    [facility] [phone]       [address] [place] [address]        Thank you!     Thank you for choosing [facility] AND INFECTIOUS DISEASES  for your care. Our goal is always to provide you with excellent care. Hearing back from our patients is one way we can continue to improve our services. Please take a few minutes to complete the written survey that you may receive in the mail after your visit with us. Thank you!             Your Updated Medication List - Protect others around you: Learn how to safely use, store and throw away your medicines at www.disposemymeds.org.          This list is accurate as of: 3/28/17 11:59 PM.  Always use your most recent med list.                   Brand Name Dispense Instructions for use    abacavir-dolutegravir-LamiVUDine 600- MG per tablet    TRIUMEQ    30 tablet    Take 1 tablet by mouth daily       amphetamine-dextroamphetamine 20 MG per 24 hr capsule    ADDERALL XR     Take 20 mg by mouth 2 times daily       FETZIMA PO      Take 80 mg by mouth daily       lisinopril-hydrochlorothiazide 10-12.5 MG per tablet    PRINZIDE/ZESTORETIC    30 tablet    Take 1 tablet by mouth daily       PREGABALIN PO      Take 50 mg by mouth 3 times daily       testosterone cypionate 200 MG/ML injection    DEPOTESTOTERONE CYPIONATE     INJECT 1 ML INTO MUSCLE ONCE WEEKLY TO CORRECT LOW TESTOSTERONE

## 2017-03-28 NOTE — Clinical Note
Pt liver fxn was abnormal. Testosterone level high. Please see note. Thank you! Mackenzie Doherty, TRESSA Pharmacist.  [phone]

## 2017-03-28 NOTE — NURSING NOTE
"Chief Complaint   Patient presents with     RECHECK     follow up with carol IBARRA cma       Initial BP (!) 156/101  Pulse 112  Temp 97.5  F (36.4  C) (Oral)  Ht 1.803 m (5' 11\")  Wt 66.9 kg (147 lb 8 oz)  BMI 20.57 kg/m2 Estimated body mass index is 20.57 kg/(m^2) as calculated from the following:    Height as of this encounter: 1.803 m (5' 11\").    Weight as of this encounter: 66.9 kg (147 lb 8 oz).  Medication Reconciliation: complete    "

## 2017-03-28 NOTE — PROGRESS NOTES
"SUBJECTIVE/OBJECTIVE:                                                    Bc Gemran is a 36 year old male coming in for an initial visit for Medication Therapy Management.  He was referred to me from Dr. Cisneros.     Chief Complaint: Wants to start on HIV meds.  Personal Healthcare Goals: Stay sober, and quit smoking.     Allergies/ADRs: Reviewed in Epic  Tobacco: 0-1 pack per day - is interested in quitting   Alcohol: none  Caffeine: 3 to 4 cups/day of coffee  Activity: goes to the gym and does cardio  PMH: Reviewed in Epic    Medication Adherence: no issues reported    HIV: On 02/24/17 CD4 was 825 (28%), and viral load was 44,715 copies/ml. Patient reports he is happy to be starting Triumeq (abacavir-dolutegravir-lamivudine) 600- mg, once daily. Reports he will take it in the morning.    Hypertension: Current medications include NONE.  Patient reports he will be starting Lisinopril-hydrochlorthiazide 10/12.5 mg, once daily. Reports his blood pressure has been high because he is stressed out. Does not self-monitor BP.    Depression/JASSI generalized anxiety disorder:  Current medications include: Amphetamine-dextroamphetamine 20 mg, twice daily and takes this only as needed. Reports it helps him concentrate. Reports he takes Levomilnacipran (Fetzima) 80 mg, once daily, and takes Pregabalin 50 mg, three times daily. Pt reports that anxiety symptoms are worsened since he is sober. Reports everyday he wishes he would die, but says he would NEVER kill himself, just wishes his \"heart would stop beating or something like that\". Reports he has too many people he would disappoint if he killed himself. Reports he is in a day program for sobriety and mood and feels this is helping. Reports he is working with his MD at [facility].   PHQ-9 SCORE 3/28/2017   Total Score 18     Hypo testosterone: On 02/28/17, Testosterone level was 1394 ng/dl. Reports, because he used all kinds of drugs, every day, for >20 years, he " now has low testosterone. Reports gets Testosterone Cypionate 200mg/ml, injection, once per week.    Current labs include:  BP Readings from Last 3 Encounters:   03/28/17 (!) 156/101   02/28/17 (!) 150/100   01/23/17 (!) 122/95     Liver Function Studies -   Recent Labs   Lab Test  02/24/17   1336   PROTTOTAL  8.8   ALBUMIN  4.3   BILITOTAL  0.3   ALKPHOS  96   AST  94*   ALT  262*         Last Basic Metabolic Panel:  Lab Results   Component Value Date     02/24/2017      Lab Results   Component Value Date    POTASSIUM 4.0 02/24/2017     Lab Results   Component Value Date    CHLORIDE 103 02/24/2017     Lab Results   Component Value Date    BUN 12 02/24/2017     Lab Results   Component Value Date    CR 0.85 02/24/2017     GFR Estimate   Date Value Ref Range Status   02/24/2017 >90  Non  GFR Calc   >60 mL/min/1.7m2 Final   01/23/2017 >90  Non  GFR Calc   >60 mL/min/1.7m2 Final   09/25/2016 >90  Non  GFR Calc   >60 mL/min/1.7m2 Final     GFR Estimate If Black   Date Value Ref Range Status   02/24/2017 >90   GFR Calc   >60 mL/min/1.7m2 Final   01/23/2017 >90   GFR Calc   >60 mL/min/1.7m2 Final   09/25/2016 >90   GFR Calc   >60 mL/min/1.7m2 Final     TSH   Date Value Ref Range Status   05/16/2006 2.30 0.4 - 5.0 mU/L Final   ]      There is no immunization history on file for this patient.  ASSESSMENT:                                                       Current medications were reviewed today.     We did not discuss, however pt's liver fxn results are abnormal. I will send a note to Dr. Cisneros.     Medication Adherence: no issues identified    HIV: Needs improvement. Pt will be starting therapy with Triumeq (abacavir-dolutegravir-lamivudine) 600- mg, once daily, per Dr. Cisneros. Pt is HLA negative, and this is an appropriate 3 drug regimen. Reviewed dosing, what to do if a dose is missed, possible side effects,  including rash, which he was asked to report.    Hypertension: Needs Improvement. Patient is not meeting BP goal of < 140/90mmHg. Per Dr. Cisneros, pt will start Lisinopril-hydrochlorthiazide 10/12.5 mg, once daily. Reviewed dosing and possible side effects. This is an appropriate medication to treat bp.     Depression/JASSI generalized anxiety disorder: Needs Improvement. Pt will meet with [facility] today to get set up with additional therapy/phych if pt agrees. Pt working with MD at [facility].     Hypo testosterone:  Testosterone level high. MD may consider lowering dose/discontinuing medication.       PLAN:                                                      1) Will send doctor Blayne a note regarding pt's liver fxn results.     2) Pt will start Triumeq, once daily.    3) Pt will start Lisinopril-hydrochlorthiazide 10/12.5 mg, once daily.     4) MD may consider lowering dose/stopping Testosterone Cypionate 200mg/ml, injection, once per week.    I spent 60 minutes with this patient today.  I offer these suggestions with the understanding that I don't fully understand Bc's past medical history and the complexity of his health conditions. Bc should make no changes without the approval of his physician. A copy of the visit note was provided to the patient's primary care provider.    Will follow up on 04/11 for a blood pressure check.    The patient was given a summary of these recommendations as an after visit summary.     Mackenzie Doherty, [facility] Pharmacist.   [phone]    Note faxed to Dr. Bowen at [facility]: [phone].    Current Outpatient Prescriptions   Medication     amphetamine-dextroamphetamine (ADDERALL XR) 20 MG per 24 hr capsule     testosterone cypionate (DEPOTESTOTERONE CYPIONATE) 200 MG/ML injection     abacavir-dolutegravir-LamiVUDine (TRIUMEQ) 600- MG per tablet     lisinopril-hydrochlorothiazide (PRINZIDE/ZESTORETIC) 10-12.5 MG per tablet     PREGABALIN PO      Levomilnacipran HCl (FETZIMA PO)     No current facility-administered medications for this visit.

## 2017-03-28 NOTE — LETTER
3/28/2017       RE: Bc German  [address]   [place] [facility] [address]     Dear Colleague,    Thank you for referring your patient, Bc Gemran, to the [facility] AND INFECTIOUS DISEASES at [facility]. Please see a copy of my visit note below.    [facility]  Infectious Disease Clinic Note     Date of Visit:  March 28, 2017  Patient:  Bc German  Medical record number [id number]    History of Present Illness  Bc German is a 36 year old male who returns for routine follow-up.  He was recently discharged from [facility] and is now living in a sober house.  He reports that he is still having feelings of increased anxiety and sleeplessness.  He discussed at length the challenges that he feels he is facing being HIV infected and having hepatitis C.  He did not get his ultrasound but will do that  later today.  His review of systems is otherwise negative.         Problem List  1. Asymptomatic human immunodeficiency virus (HIV) infection status (H)    2. Essential hypertension         Review of Systems  Twelve point review of systems is otherwise normal.    Current Medications  Current Outpatient Prescriptions   Medication     amphetamine-dextroamphetamine (ADDERALL XR) 20 MG per 24 hr capsule     testosterone cypionate (DEPOTESTOTERONE CYPIONATE) 200 MG/ML injection     abacavir-dolutegravir-LamiVUDine (TRIUMEQ) 600- MG per tablet     lisinopril-hydrochlorothiazide (PRINZIDE/ZESTORETIC) 10-12.5 MG per tablet     PREGABALIN PO     Levomilnacipran HCl (FETZIMA PO)     No current facility-administered medications for this visit.        Family/Social History  History reviewed. No pertinent family history.    Physical Exam  HEENT: Normal  Neck: Supple  Lungs: CTA  CV: RRR, no gallops, murmurs  Abdomen: Soft and nontender.  No masses noted.  :ND  Extremities: Normal  Skin: Normal  Neuro: Grossly  normal  Lymphadenopathy:  Cervical 1+     Axillary:1+     Inguinal:1+    Recent Laboratory Values    Routine Labs  Hemoglobin   Date Value Ref Range Status   02/24/2017 14.9 13.3 - 17.7 g/dL Final     MCV   Date Value Ref Range Status   02/24/2017 82 78 - 100 fl Final     Platelet Count   Date Value Ref Range Status   02/24/2017 276 150 - 450 10e9/L Final     Creatinine   Date Value Ref Range Status   02/24/2017 0.85 0.66 - 1.25 mg/dL Final     AST   Date Value Ref Range Status   02/24/2017 94 (H) 0 - 45 U/L Final     ALT   Date Value Ref Range Status   02/24/2017 262 (H) 0 - 70 U/L Final     Bilirubin Total   Date Value Ref Range Status   02/24/2017 0.3 0.2 - 1.3 mg/dL Final       T Cell Subset:  CD3 Mature T   Date Value Ref Range Status   02/24/2017 73 49 - 84 % Final     CD4 [place] T   Date Value Ref Range Status   02/24/2017 28 28 - 63 % Final     CD8 Suppressor T   Date Value Ref Range Status   02/24/2017 41 (H) 10 - 40 % Final     CD4:CD8 Ratio   Date Value Ref Range Status   02/24/2017 0.68 (L) 1.40 - 2.60 Final     WBC   Date Value Ref Range Status   02/24/2017 7.6 4.0 - 11.0 10e9/L Final     % Lymphocytes   Date Value Ref Range Status   02/24/2017 36.3 % Final     Absolute CD3   Date Value Ref Range Status   02/24/2017 2118 603 - 2990 cells/uL Final     Absolute CD4   Date Value Ref Range Status   02/24/2017 825 441 - 2156 cells/uL Final     Absolute CD8   Date Value Ref Range Status   02/24/2017 1211 125 - 1312 cells/uL Final       HIV-1 RNA Quantitative:  HIV-1 RNA Quant Result   Date Value Ref Range Status   02/28/2017 20676 (A) HIVND [Copies]/mL Final     Comment:     The MARLEN AmpliPrep/MARLEN TaqMan HIV-1 test is an FDA-approved in vitro   nucleic   acid amplification test for the quantitation of HIV-1 RNA in human plasma   (EDTA   plasma) using the MARLEN AmpliPrep instrument for automated viral nucleic acid   extraction and the MARLEN TaqMan Analyzer or MARLEN TaqMan for automated Real   Time PCR  amplification and detection of the viral nucleic acid target.   Titer results are reported in copies/ml. This assay is intended for use in   conjunction with clinical presentation and other laboratory markers of   disease   prognosis and for use as an aid in assessing viral response to antiretroviral   treatment as measured by changes in plasma HIV-1 RNA levels. This test should   not be used as a donor screening test to confirm the presence of HIV-1   infection.          Assessment and Plan  (Z21) Asymptomatic human immunodeficiency virus (HIV) infection status (H)  (primary encounter diagnosis)  Comment: Will plan to start triumeq today. We discussed the risk/benefit of ART and also what the potential side effects are. He will return in 1 month and we will get labs just before  Plan: abacavir-dolutegravir-LamiVUDine (TRIUMEQ)         600- MG per tablet            (I10) Essential hypertension  Comment: Will start lisinopril/hctz  Plan: lisinopril-hydrochlorothiazide         (PRINZIDE/ZESTORETIC) 10-12.5 MG per tablet              Again, thank you for allowing me to participate in the care of your patient.      Sincerely,    Damir Cisneros MD

## 2017-03-28 NOTE — MR AVS SNAPSHOT
After Visit Summary   3/28/2017    Bc German    MRN: [id number]           Patient Information     Date Of Birth          1980        Visit Information        Provider Department      3/28/2017 9:00 AM Mackenzie Doherty, [facility] and Infectious Diseases MTM        Care Instructions    Recommendations from today's MTM visit:                                                    MTM (medication therapy management) is a service provided by a clinical pharmacist designed to help you get the most of out of your medicines.   Today we reviewed what your medicines are for, how to know if they are working, that your medicines are safe and how to make your medicine regimen as easy as possible.     1. Start Triumeq, take one pill once daily, take it around the same time every day.    2. Start taking Lisinopril-hydrochorothiazide  10-12.5 mg, once daily in the morning.       Next MTM visit: Blood pressure check on 04/11/17 at 3:00.     To schedule another MTM appointment, please call the clinic directly or you may call the MTM scheduling line at [phone] or toll-free at [phone].     My Clinical Pharmacist's contact information:                                                      It was a pleasure seeing you today!  Please feel free to contact me with any questions or concerns you have.      Mackenzie Doherty, [facility] Pharmacist.   [phone]      You may receive a survey about the MTM services you received.  I would appreciate your feedback to help me serve you better in the future. Please fill it out and return it when you can. Your comments will be anonymous.      My healthcare goals:                                                      Quit smoking, stay sober.               Follow-ups after your visit        Your next 10 appointments already scheduled     Mar 28, 2017  1:00 PM CDT   US ABDOMEN COMPLETE with [facility]   [facility] US ([facility])  "   [address] [address] [phone]           Please bring a list of your medicines (including vitamins, minerals and over-the-counter drugs). Also, tell your doctor about any allergies you may have. Wear comfortable clothes and leave your valuables at home.  Adults: No eating or drinking for 8 hours before the exam. You may take medicine with a small sip of water.  Children: - Children 6+ years: No food or drink for 6 hours before exam. - Children 1-5 years: No food or drink for 4 hours before exam. - Infants, breast-fed: may have breast milk up to 2 hours before exam. - Infants, formula: may have bottle until 4 hours before exam.  Please call the Imaging Department at your exam site with any questions.              Who to contact     If you have questions or need follow up information about today's clinic visit or your schedule please contact [facility] AND INFECTIOUS DISEASES [facility] directly at No information on file..  Normal or non-critical lab and imaging results will be communicated to you by [employer], letter or phone within 4 business days after the clinic has received the results. If you do not hear from us within 7 days, please contact the clinic through [employer] or phone. If you have a critical or abnormal lab result, we will notify you by phone as soon as possible.  Submit refill requests through [employer] or call your pharmacy and they will forward the refill request to us. Please allow 3 business days for your refill to be completed.          Additional Information About Your Visit        [employer] Information     [employer] lets you send messages to your doctor, view your test results, renew your prescriptions, schedule appointments and more. To sign up, go to [email or web address] . Click on \"Log in\" on the left side of the screen, which will take you to the Welcome page. Then click on \"Sign up Now\" on the right side of the page.     You will be asked to " enter the access code listed below, as well as some personal information. Please follow the directions to create your username and password.     Your access code is: [email or web address]  Expires: 2017  3:44 PM     Your access code will  in 90 days. If you need help or a new code, please call your [facility] clinic or [phone].        Care EveryWhere ID     This is your Care EveryWhere ID. This could be used by other organizations to access your [facility] medical records  [id number]         Blood Pressure from Last 3 Encounters:   17 (!) 156/101   17 (!) 150/100   17 (!) 122/95    Weight from Last 3 Encounters:   17 66.9 kg (147 lb 8 oz)   17 68.5 kg (151 lb 1.6 oz)   17 62.6 kg (138 lb)              Today, you had the following     No orders found for display         Today's Medication Changes          These changes are accurate as of: 3/28/17  9:56 AM.  If you have any questions, ask your nurse or doctor.               Start taking these medicines.        Dose/Directions    abacavir-dolutegravir-LamiVUDine 600- MG per tablet   Commonly known as:  TRIUMEQ   Used for:  Asymptomatic human immunodeficiency virus (HIV) infection status (H)   Started by:  Damir Cisneros MD        Dose:  1 tablet   Take 1 tablet by mouth daily   Quantity:  30 tablet   Refills:  3       lisinopril-hydrochlorothiazide 10-12.5 MG per tablet   Commonly known as:  PRINZIDE/ZESTORETIC   Used for:  Essential hypertension   Started by:  Damir Cisneros MD        Dose:  1 tablet   Take 1 tablet by mouth daily   Quantity:  30 tablet   Refills:  3            Where to get your medicines      These medications were sent to [address] [place] [address] [address] [address]    Hours:  TRANSPLANT PHONE NUMBER [phone] Phone:  [phone]     abacavir-dolutegravir-LamiVUDine 600- MG per tablet     lisinopril-hydrochlorothiazide 10-12.5 MG per tablet                Primary Care Provider Office Phone # Fax #    [facility] [phone]       [address] [place] [address]        Thank you!     Thank you for choosing [facility] AND INFECTIOUS DISEASES [facility]  for your care. Our goal is always to provide you with excellent care. Hearing back from our patients is one way we can continue to improve our services. Please take a few minutes to complete the written survey that you may receive in the mail after your visit with us. Thank you!             Your Updated Medication List - Protect others around you: Learn how to safely use, store and throw away your medicines at www.disposemymeds.org.          This list is accurate as of: 3/28/17  9:56 AM.  Always use your most recent med list.                   Brand Name Dispense Instructions for use    abacavir-dolutegravir-LamiVUDine 600- MG per tablet    TRIUMEQ    30 tablet    Take 1 tablet by mouth daily       amphetamine-dextroamphetamine 20 MG per 24 hr capsule    ADDERALL XR     Take 20 mg by mouth 2 times daily       FETZIMA PO      Take 80 mg by mouth daily       lisinopril-hydrochlorothiazide 10-12.5 MG per tablet    PRINZIDE/ZESTORETIC    30 tablet    Take 1 tablet by mouth daily       PREGABALIN PO      Take 50 mg by mouth 3 times daily       testosterone cypionate 200 MG/ML injection    DEPOTESTOTERONE CYPIONATE     INJECT 1 ML INTO MUSCLE ONCE WEEKLY TO CORRECT LOW TESTOSTERONE

## 2017-03-28 NOTE — PATIENT INSTRUCTIONS
Recommendations from today's MTM visit:                                                    MTM (medication therapy management) is a service provided by a clinical pharmacist designed to help you get the most of out of your medicines.   Today we reviewed what your medicines are for, how to know if they are working, that your medicines are safe and how to make your medicine regimen as easy as possible.     1. Start Triumeq, take one pill once daily, take it around the same time every day.    2. Start taking Lisinopril-hydrochorothiazide  10-12.5 mg, once daily in the morning.       Next MTM visit: Blood pressure check on 04/11/17 at 3:00.     To schedule another MTM appointment, please call the clinic directly or you may call the MTM scheduling line at [phone] or toll-free at [phone].     My Clinical Pharmacist's contact information:                                                      It was a pleasure seeing you today!  Please feel free to contact me with any questions or concerns you have.      Mackenzie Doherty, MTM Pharmacist.   [phone]      You may receive a survey about the MTM services you received.  I would appreciate your feedback to help me serve you better in the future. Please fill it out and return it when you can. Your comments will be anonymous.      My healthcare goals:                                                      Quit smoking, stay sober.

## 2017-03-28 NOTE — PROGRESS NOTES
[facility]  Infectious Disease Clinic Note     Date of Visit:  March 28, 2017  Patient:  Bc German  Medical record number [id number]    History of Present Illness  Bc German is a 36 year old male who returns for routine follow-up.  He was recently discharged from [facility] and is now living in a sober house.  He reports that he is still having feelings of increased anxiety and sleeplessness.  He discussed at length the challenges that he feels he is facing being HIV infected and having hepatitis C.  He did not get his ultrasound but will do that  later today.  His review of systems is otherwise negative.         Problem List  1. Asymptomatic human immunodeficiency virus (HIV) infection status (H)    2. Essential hypertension         Review of Systems  Twelve point review of systems is otherwise normal.    Current Medications  Current Outpatient Prescriptions   Medication     amphetamine-dextroamphetamine (ADDERALL XR) 20 MG per 24 hr capsule     testosterone cypionate (DEPOTESTOTERONE CYPIONATE) 200 MG/ML injection     abacavir-dolutegravir-LamiVUDine (TRIUMEQ) 600- MG per tablet     lisinopril-hydrochlorothiazide (PRINZIDE/ZESTORETIC) 10-12.5 MG per tablet     PREGABALIN PO     Levomilnacipran HCl (FETZIMA PO)     No current facility-administered medications for this visit.        Family/Social History  History reviewed. No pertinent family history.    Physical Exam  HEENT: Normal  Neck: Supple  Lungs: CTA  CV: RRR, no gallops, murmurs  Abdomen: Soft and nontender.  No masses noted.  :ND  Extremities: Normal  Skin: Normal  Neuro: Grossly normal  Lymphadenopathy:  Cervical 1+     Axillary:1+     Inguinal:1+    Recent Laboratory Values    Routine Labs  Hemoglobin   Date Value Ref Range Status   02/24/2017 14.9 13.3 - 17.7 g/dL Final     MCV   Date Value Ref Range Status   02/24/2017 82 78 - 100 fl Final     Platelet Count   Date Value Ref Range Status   02/24/2017 276  150 - 450 10e9/L Final     Creatinine   Date Value Ref Range Status   02/24/2017 0.85 0.66 - 1.25 mg/dL Final     AST   Date Value Ref Range Status   02/24/2017 94 (H) 0 - 45 U/L Final     ALT   Date Value Ref Range Status   02/24/2017 262 (H) 0 - 70 U/L Final     Bilirubin Total   Date Value Ref Range Status   02/24/2017 0.3 0.2 - 1.3 mg/dL Final       T Cell Subset:  CD3 Mature T   Date Value Ref Range Status   02/24/2017 73 49 - 84 % Final     CD4 [place] T   Date Value Ref Range Status   02/24/2017 28 28 - 63 % Final     CD8 Suppressor T   Date Value Ref Range Status   02/24/2017 41 (H) 10 - 40 % Final     CD4:CD8 Ratio   Date Value Ref Range Status   02/24/2017 0.68 (L) 1.40 - 2.60 Final     WBC   Date Value Ref Range Status   02/24/2017 7.6 4.0 - 11.0 10e9/L Final     % Lymphocytes   Date Value Ref Range Status   02/24/2017 36.3 % Final     Absolute CD3   Date Value Ref Range Status   02/24/2017 2118 603 - 2990 cells/uL Final     Absolute CD4   Date Value Ref Range Status   02/24/2017 825 441 - 2156 cells/uL Final     Absolute CD8   Date Value Ref Range Status   02/24/2017 1211 125 - 1312 cells/uL Final       HIV-1 RNA Quantitative:  HIV-1 RNA Quant Result   Date Value Ref Range Status   02/28/2017 94349 (A) HIVND [Copies]/mL Final     Comment:     The MARLEN AmpliPrep/MARLEN TaqMan HIV-1 test is an FDA-approved in vitro   nucleic   acid amplification test for the quantitation of HIV-1 RNA in human plasma   (EDTA   plasma) using the MARLEN AmpliPrep instrument for automated viral nucleic acid   extraction and the MARLEN TaqMan Analyzer or MARLEN TaqMan for automated Real   Time PCR amplification and detection of the viral nucleic acid target.   Titer results are reported in copies/ml. This assay is intended for use in   conjunction with clinical presentation and other laboratory markers of   disease   prognosis and for use as an aid in assessing viral response to antiretroviral   treatment as measured by changes in  plasma HIV-1 RNA levels. This test should   not be used as a donor screening test to confirm the presence of HIV-1   infection.          Assessment and Plan  (Z21) Asymptomatic human immunodeficiency virus (HIV) infection status (H)  (primary encounter diagnosis)  Comment: Will plan to start triumeq today. We discussed the risk/benefit of ART and also what the potential side effects are. He will return in 1 month and we will get labs just before  Plan: abacavir-dolutegravir-LamiVUDine (TRIUMEQ)         600- MG per tablet            (I10) Essential hypertension  Comment: Will start lisinopril/hctz  Plan: lisinopril-hydrochlorothiazide         (PRINZIDE/ZESTORETIC) 10-12.5 MG per tablet

## 2017-03-29 ASSESSMENT — PATIENT HEALTH QUESTIONNAIRE - PHQ9: SUM OF ALL RESPONSES TO PHQ QUESTIONS 1-9: 18

## 2017-04-18 NOTE — PROGRESS NOTES
Spoke with Peyman from [facility] HIV surveillance unit and updated on new medication therapy.   Maribell Neff 4/18/2017   [facility] Infection Prevention  [phone]

## 2017-04-20 ENCOUNTER — HOSPITAL ENCOUNTER (EMERGENCY)
Facility: CLINIC | Age: 37
Discharge: HOME | End: 2017-04-20
Attending: EMERGENCY MEDICINE | Admitting: EMERGENCY MEDICINE
Payer: COMMERCIAL

## 2017-04-20 VITALS
TEMPERATURE: 96.5 F | WEIGHT: 140 LBS | BODY MASS INDEX: 20.73 KG/M2 | SYSTOLIC BLOOD PRESSURE: 136 MMHG | HEIGHT: 69 IN | OXYGEN SATURATION: 100 % | DIASTOLIC BLOOD PRESSURE: 91 MMHG | RESPIRATION RATE: 16 BRPM

## 2017-04-20 DIAGNOSIS — R41.82 ALTERED MENTAL STATUS, UNSPECIFIED ALTERED MENTAL STATUS TYPE: ICD-10-CM

## 2017-04-20 DIAGNOSIS — L03.114 CELLULITIS OF LEFT UPPER EXTREMITY: ICD-10-CM

## 2017-04-20 DIAGNOSIS — F19.10 POLYSUBSTANCE ABUSE (H): ICD-10-CM

## 2017-04-20 LAB
ALBUMIN SERPL-MCNC: 4 G/DL (ref 3.4–5)
ALBUMIN UR-MCNC: NEGATIVE MG/DL
ALP SERPL-CCNC: 99 U/L (ref 40–150)
ALT SERPL W P-5'-P-CCNC: 149 U/L (ref 0–70)
AMPHETAMINES UR QL SCN: ABNORMAL
ANION GAP SERPL CALCULATED.3IONS-SCNC: 6 MMOL/L (ref 3–14)
APAP SERPL-MCNC: NORMAL MG/L (ref 10–20)
APPEARANCE UR: CLEAR
AST SERPL W P-5'-P-CCNC: 98 U/L (ref 0–45)
BARBITURATES UR QL: ABNORMAL
BASOPHILS # BLD AUTO: 0 10E9/L (ref 0–0.2)
BASOPHILS NFR BLD AUTO: 0.5 %
BENZODIAZ UR QL: ABNORMAL
BILIRUB SERPL-MCNC: 0.5 MG/DL (ref 0.2–1.3)
BILIRUB UR QL STRIP: NEGATIVE
BUN SERPL-MCNC: 15 MG/DL (ref 7–30)
CALCIUM SERPL-MCNC: 9.7 MG/DL (ref 8.5–10.1)
CANNABINOIDS UR QL SCN: ABNORMAL
CHLORIDE SERPL-SCNC: 101 MMOL/L (ref 94–109)
CO2 SERPL-SCNC: 30 MMOL/L (ref 20–32)
COCAINE UR QL: ABNORMAL
COLOR UR AUTO: ABNORMAL
CREAT SERPL-MCNC: 0.91 MG/DL (ref 0.66–1.25)
DIFFERENTIAL METHOD BLD: ABNORMAL
EOSINOPHIL # BLD AUTO: 0.2 10E9/L (ref 0–0.7)
EOSINOPHIL NFR BLD AUTO: 2 %
ERYTHROCYTE [DISTWIDTH] IN BLOOD BY AUTOMATED COUNT: 15.5 % (ref 10–15)
ETHANOL SERPL-MCNC: <0.01 G/DL
GFR SERPL CREATININE-BSD FRML MDRD: ABNORMAL ML/MIN/1.7M2
GLUCOSE SERPL-MCNC: 98 MG/DL (ref 70–99)
GLUCOSE UR STRIP-MCNC: NEGATIVE MG/DL
HCT VFR BLD AUTO: 41.4 % (ref 40–53)
HGB BLD-MCNC: 13.8 G/DL (ref 13.3–17.7)
HGB UR QL STRIP: NEGATIVE
IMM GRANULOCYTES # BLD: 0 10E9/L (ref 0–0.4)
IMM GRANULOCYTES NFR BLD: 0.5 %
INTERPRETATION ECG - MUSE: NORMAL
KETONES UR STRIP-MCNC: NEGATIVE MG/DL
LEUKOCYTE ESTERASE UR QL STRIP: NEGATIVE
LYMPHOCYTES # BLD AUTO: 1.7 10E9/L (ref 0.8–5.3)
LYMPHOCYTES NFR BLD AUTO: 20.1 %
MAGNESIUM SERPL-MCNC: 2.2 MG/DL (ref 1.6–2.3)
MCH RBC QN AUTO: 27.2 PG (ref 26.5–33)
MCHC RBC AUTO-ENTMCNC: 33.3 G/DL (ref 31.5–36.5)
MCV RBC AUTO: 82 FL (ref 78–100)
MONOCYTES # BLD AUTO: 0.8 10E9/L (ref 0–1.3)
MONOCYTES NFR BLD AUTO: 9.9 %
MUCOUS THREADS #/AREA URNS LPF: PRESENT /LPF
NEUTROPHILS # BLD AUTO: 5.7 10E9/L (ref 1.6–8.3)
NEUTROPHILS NFR BLD AUTO: 67 %
NITRATE UR QL: NEGATIVE
NRBC # BLD AUTO: 0 10*3/UL
NRBC BLD AUTO-RTO: 0 /100
OPIATES UR QL SCN: ABNORMAL
PCP UR QL SCN: ABNORMAL
PH UR STRIP: 7.5 PH (ref 5–7)
PHOSPHATE SERPL-MCNC: 2.9 MG/DL (ref 2.5–4.5)
PLATELET # BLD AUTO: 397 10E9/L (ref 150–450)
POTASSIUM SERPL-SCNC: 3.8 MMOL/L (ref 3.4–5.3)
PROT SERPL-MCNC: 8.7 G/DL (ref 6.8–8.8)
RBC # BLD AUTO: 5.08 10E12/L (ref 4.4–5.9)
RBC #/AREA URNS AUTO: 1 /HPF (ref 0–2)
SALICYLATES SERPL-MCNC: 2 MG/DL
SODIUM SERPL-SCNC: 137 MMOL/L (ref 133–144)
SP GR UR STRIP: 1.01 (ref 1–1.03)
SPERM #/AREA URNS HPF: PRESENT /HPF
URN SPEC COLLECT METH UR: ABNORMAL
UROBILINOGEN UR STRIP-MCNC: NORMAL MG/DL (ref 0–2)
WBC # BLD AUTO: 8.1 10E9/L (ref 4–11)
WBC #/AREA URNS AUTO: 0 /HPF (ref 0–2)

## 2017-04-20 PROCEDURE — 83735 ASSAY OF MAGNESIUM: CPT | Performed by: EMERGENCY MEDICINE

## 2017-04-20 PROCEDURE — 80329 ANALGESICS NON-OPIOID 1 OR 2: CPT | Performed by: EMERGENCY MEDICINE

## 2017-04-20 PROCEDURE — 25000128 H RX IP 250 OP 636: Performed by: EMERGENCY MEDICINE

## 2017-04-20 PROCEDURE — 80053 COMPREHEN METABOLIC PANEL: CPT | Performed by: EMERGENCY MEDICINE

## 2017-04-20 PROCEDURE — 25000134 H RX MED IP 250 OP 636 PS 250: Performed by: EMERGENCY MEDICINE

## 2017-04-20 PROCEDURE — 80320 DRUG SCREEN QUANTALCOHOLS: CPT | Performed by: EMERGENCY MEDICINE

## 2017-04-20 PROCEDURE — 99284 EMERGENCY DEPT VISIT MOD MDM: CPT | Mod: 25

## 2017-04-20 PROCEDURE — 96361 HYDRATE IV INFUSION ADD-ON: CPT

## 2017-04-20 PROCEDURE — 81001 URINALYSIS AUTO W/SCOPE: CPT | Mod: XU | Performed by: EMERGENCY MEDICINE

## 2017-04-20 PROCEDURE — 93005 ELECTROCARDIOGRAM TRACING: CPT

## 2017-04-20 PROCEDURE — 80307 DRUG TEST PRSMV CHEM ANLYZR: CPT | Performed by: EMERGENCY MEDICINE

## 2017-04-20 PROCEDURE — 25000132 ZZH RX MED GY IP 250 OP 250 PS 637: Performed by: EMERGENCY MEDICINE

## 2017-04-20 PROCEDURE — 84100 ASSAY OF PHOSPHORUS: CPT | Performed by: EMERGENCY MEDICINE

## 2017-04-20 PROCEDURE — 96374 THER/PROPH/DIAG INJ IV PUSH: CPT

## 2017-04-20 PROCEDURE — 85025 COMPLETE CBC W/AUTO DIFF WBC: CPT | Performed by: EMERGENCY MEDICINE

## 2017-04-20 RX ORDER — NALOXONE HYDROCHLORIDE 1 MG/ML
1 INJECTION INTRAMUSCULAR; INTRAVENOUS; SUBCUTANEOUS ONCE
Status: COMPLETED | OUTPATIENT
Start: 2017-04-20 | End: 2017-04-20

## 2017-04-20 RX ORDER — SODIUM CHLORIDE 9 MG/ML
1000 INJECTION, SOLUTION INTRAVENOUS CONTINUOUS
Status: DISCONTINUED | OUTPATIENT
Start: 2017-04-20 | End: 2017-04-20 | Stop reason: HOSPADM

## 2017-04-20 RX ORDER — DOXYCYCLINE 100 MG/1
100 CAPSULE ORAL ONCE
Status: COMPLETED | OUTPATIENT
Start: 2017-04-20 | End: 2017-04-20

## 2017-04-20 RX ORDER — DOXYCYCLINE 100 MG/1
100 CAPSULE ORAL 2 TIMES DAILY
Qty: 20 CAPSULE | Refills: 0 | Status: SHIPPED | OUTPATIENT
Start: 2017-04-20 | End: 2017-04-26

## 2017-04-20 RX ADMIN — SODIUM CHLORIDE 1000 ML: 9 INJECTION, SOLUTION INTRAVENOUS at 11:59

## 2017-04-20 RX ADMIN — NALOXONE HYDROCHLORIDE 1 MG: 1 INJECTION PARENTERAL at 11:59

## 2017-04-20 RX ADMIN — DOXYCYCLINE HYCLATE 100 MG: 100 CAPSULE ORAL at 21:18

## 2017-04-20 ASSESSMENT — ENCOUNTER SYMPTOMS
HEADACHES: 0
CONFUSION: 1
AGITATION: 0

## 2017-04-20 NOTE — DISCHARGE INSTRUCTIONS
Discharge Instructions  Cellulitis    Cellulitis is an infection of the skin that occurs when bacteria enter the skin.   Symptoms are generally redness, swelling, warmth and pain.  Your infection appeared to be appropriate to treat at home with antibiotics.  However, sometimes your infection may be worse than it seemed at first, or may worsen with time. If you have new or worse symptoms, you may need to be seen again in the Emergency Department or by your primary doctor.    Return to the Emergency Department if:    The redness, pain, or swelling gets a lot worse.  If the red area was marked, return if it is red beyond the marked area.    You are unable to get your antibiotics, or are vomiting them up or you can t take them.    You are feeling more ill, weak or lightheaded.    You start to run a new fever (temperature >101).    Anything else about the infection worries or concerns you.  Treatment:    Start your antibiotics right away, and take them as prescribed. Be sure to finish the whole prescription, even if you are better.    Apply a heating pad, warm packs, or warm water soaks to the infected area for 15 minutes at a time, at least 3 times a day. Do not use a heating pad on your feet or legs if you have diabetes. Do not sleep with a heating pad on, since this can cause burns or skin injury.    Rest your injured area for at least 1-2 days. After that you may start using your extremity again as long as there is not too much pain.     Raise the injured area above the level of your heart as much as possible in the first 1-2 days.    Tylenol  (acetaminophen), Motrin  (ibuprofen), or Advil  (ibuprofen) may help may help reduce pain and fever and may help you feel more comfortable. Be sure to read and follow the package directions, and ask your doctor if you have questions.    Follow-up with your doctor:    Re-check in clinic within 2-3 days.  Probiotics: If you have been given an antibiotic, you may want to also  "take a probiotic pill or eat yogurt with live cultures. Probiotics have \"good bacteria\" to help your intestines stay healthy. Studies have shown that probiotics help prevent diarrhea and other intestine problems (including C. diff infection) when you take antibiotics. You can buy these without a prescription in the pharmacy section of the store.     If you were given a prescription for medicine here today, be sure to read all of the information (including the package insert) that comes with your prescription.  This will include important information about the medicine, its side effects, and any warnings that you need to know about.  The pharmacist who fills the prescription can provide more information and answer questions you may have about the medicine.  If you have questions or concerns that the pharmacist cannot address, please call or return to the Emergency Department.     Opioid Medication Information    Pain medications are among the most commonly prescribed medicines, so we are including this information for all our patients. If you did not receive pain medication or get a prescription for pain medicine, you can ignore it.     You may have been given a prescription for an opioid (narcotic) pain medicine and/or have received a pain medicine while here in the Emergency Department. These medicines can make you drowsy or impaired. You must not drive, operate dangerous equipment, or engage in any other dangerous activities while taking these medications. If you drive while taking these medications, you could be arrested for DUI, or driving under the influence. Do not drink any alcohol while you are taking these medications.     Opioid pain medications can cause addiction. If you have a history of chemical dependency of any type, you are at a higher risk of becoming addicted to pain medications.  Only take these prescribed medications to treat your pain when all other options have been tried. Take it for as short " a time and as few doses as possible. Store your pain pills in a secure place, as they are frequently stolen and provide a dangerous opportunity for children or visitors in your house to start abusing these powerful medications. We will not replace any lost or stolen medicine.  As soon as your pain is better, you should flush all your remaining medication.     Many prescription pain medications contain Tylenol  (acetaminophen), including Vicodin , Tylenol #3 , Norco , Lortab , and Percocet .  You should not take any extra pills of Tylenol  if you are using these prescription medications or you can get very sick.  Do not ever take more than 3000 mg of acetaminophen in any 24 hour period.    All opioids tend to cause constipation. Drink plenty of water and eat foods that have a lot of fiber, such as fruits, vegetables, prune juice, apple juice and high fiber cereal.  Take a laxative if you don t move your bowels at least every other day. Miralax , Milk of Magnesia, Colace , or Senna  can be used to keep you regular.      Remember that you can always come back to the Emergency Department if you are not able to see your regular doctor in the amount of time listed above, if you get any new symptoms, or if there is anything that worries you.

## 2017-04-20 NOTE — ED AVS SNAPSHOT
Emergency Department    [address] [place] [id number]    Phone:  [phone]    Fax:  [phone]                                       Walt German   MRN: [id number]    Department:   Emergency Department   Date of Visit:  4/20/2017           After Visit Summary Signature Page     I have received my discharge instructions, and my questions have been answered. I have discussed any challenges I see with this plan with the nurse or doctor.    ..........................................................................................................................................  Patient/Patient Representative Signature      ..........................................................................................................................................  Patient Representative Print Name and Relationship to Patient    ..................................................               ................................................  Date                                            Time    ..........................................................................................................................................  Reviewed by Signature/Title    ...................................................              ..............................................  Date                                                            Time

## 2017-04-20 NOTE — ED AVS SNAPSHOT
Emergency Department    [address] [facility] [address]    Phone:  [phone]    Fax:  [phone]                                       Walt German   MRN: [id number]    Department:   Emergency Department   Date of Visit:  4/20/2017           Patient Information     Date Of Birth          1980        Your diagnoses for this visit were:     Drug ingestion, undetermined intent, initial encounter     Cellulitis of left upper extremity     Polysubstance abuse     Altered mental status, unspecified altered mental status type        You were seen by Jared Escalera MD.      Follow-up Information     Schedule an appointment as soon as possible for a visit with [facility].    Contact information:    [address] [address] [phone]          Follow up with  Emergency Department.    Specialty:  EMERGENCY MEDICINE    Why:  If symptoms worsen    Contact information:    [address] [facility] [address] [phone]        Discharge Instructions         Discharge Instructions  Cellulitis    Cellulitis is an infection of the skin that occurs when bacteria enter the skin.   Symptoms are generally redness, swelling, warmth and pain.  Your infection appeared to be appropriate to treat at home with antibiotics.  However, sometimes your infection may be worse than it seemed at first, or may worsen with time. If you have new or worse symptoms, you may need to be seen again in the Emergency Department or by your primary doctor.    Return to the Emergency Department if:    The redness, pain, or swelling gets a lot worse.  If the red area was marked, return if it is red beyond the marked area.    You are unable to get your antibiotics, or are vomiting them up or you can t take them.    You are feeling more ill, weak or lightheaded.    You start to run a new fever (temperature >101).    Anything else about the infection worries or concerns  "you.  Treatment:    Start your antibiotics right away, and take them as prescribed. Be sure to finish the whole prescription, even if you are better.    Apply a heating pad, warm packs, or warm water soaks to the infected area for 15 minutes at a time, at least 3 times a day. Do not use a heating pad on your feet or legs if you have diabetes. Do not sleep with a heating pad on, since this can cause burns or skin injury.    Rest your injured area for at least 1-2 days. After that you may start using your extremity again as long as there is not too much pain.     Raise the injured area above the level of your heart as much as possible in the first 1-2 days.    Tylenol  (acetaminophen), Motrin  (ibuprofen), or Advil  (ibuprofen) may help may help reduce pain and fever and may help you feel more comfortable. Be sure to read and follow the package directions, and ask your doctor if you have questions.    Follow-up with your doctor:    Re-check in clinic within 2-3 days.  Probiotics: If you have been given an antibiotic, you may want to also take a probiotic pill or eat yogurt with live cultures. Probiotics have \"good bacteria\" to help your intestines stay healthy. Studies have shown that probiotics help prevent diarrhea and other intestine problems (including C. diff infection) when you take antibiotics. You can buy these without a prescription in the pharmacy section of the store.     If you were given a prescription for medicine here today, be sure to read all of the information (including the package insert) that comes with your prescription.  This will include important information about the medicine, its side effects, and any warnings that you need to know about.  The pharmacist who fills the prescription can provide more information and answer questions you may have about the medicine.  If you have questions or concerns that the pharmacist cannot address, please call or return to the Emergency Department. "     Opioid Medication Information    Pain medications are among the most commonly prescribed medicines, so we are including this information for all our patients. If you did not receive pain medication or get a prescription for pain medicine, you can ignore it.     You may have been given a prescription for an opioid (narcotic) pain medicine and/or have received a pain medicine while here in the Emergency Department. These medicines can make you drowsy or impaired. You must not drive, operate dangerous equipment, or engage in any other dangerous activities while taking these medications. If you drive while taking these medications, you could be arrested for DUI, or driving under the influence. Do not drink any alcohol while you are taking these medications.     Opioid pain medications can cause addiction. If you have a history of chemical dependency of any type, you are at a higher risk of becoming addicted to pain medications.  Only take these prescribed medications to treat your pain when all other options have been tried. Take it for as short a time and as few doses as possible. Store your pain pills in a secure place, as they are frequently stolen and provide a dangerous opportunity for children or visitors in your house to start abusing these powerful medications. We will not replace any lost or stolen medicine.  As soon as your pain is better, you should flush all your remaining medication.     Many prescription pain medications contain Tylenol  (acetaminophen), including Vicodin , Tylenol #3 , Norco , Lortab , and Percocet .  You should not take any extra pills of Tylenol  if you are using these prescription medications or you can get very sick.  Do not ever take more than 3000 mg of acetaminophen in any 24 hour period.    All opioids tend to cause constipation. Drink plenty of water and eat foods that have a lot of fiber, such as fruits, vegetables, prune juice, apple juice and high fiber cereal.  Take a  laxative if you don t move your bowels at least every other day. Miralax , Milk of Magnesia, Colace , or Senna  can be used to keep you regular.      Remember that you can always come back to the Emergency Department if you are not able to see your regular doctor in the amount of time listed above, if you get any new symptoms, or if there is anything that worries you.        Discharge References/Attachments     DRUG ABUSE (ENGLISH)      24 Hour Appointment Hotline       To make an appointment at any [facility], call [phone] ([phone]). If you don't have a family doctor or clinic, we will help you find one. [facility] clinics are conveniently located to serve the needs of you and your family.             Review of your medicines      START taking        Dose / Directions Last dose taken    doxycycline 100 MG capsule   Commonly known as:  VIBRAMYCIN   Dose:  100 mg   Quantity:  20 capsule        Take 1 capsule (100 mg) by mouth 2 times daily for 10 days   Refills:  0                Prescriptions were sent or printed at these locations (1 Prescription)                   Other Prescriptions                Printed at Department/Unit printer (1 of 1)         doxycycline (VIBRAMYCIN) 100 MG capsule                Procedures and tests performed during your visit     Acetaminophen level    Alcohol ethyl    CBC with platelets differential    Comprehensive metabolic panel    Drug abuse screen 77 urine ([place],[facility])    EKG 12-lead, tracing only    Magnesium    Peripheral IV catheter    Phosphorus    Salicylate level    UA with Microscopic      Orders Needing Specimen Collection     None      Pending Results     No orders found from 4/18/2017 to 4/21/2017.            Pending Culture Results     No orders found from 4/18/2017 to 4/21/2017.            Test Results From Your Hospital Stay        4/20/2017 12:18 PM      Component Results     Component Value Ref Range & Units Status    WBC 8.1 4.0 - 11.0 10e9/L Final     RBC Count 5.08 4.4 - 5.9 10e12/L Final    Hemoglobin 13.8 13.3 - 17.7 g/dL Final    Hematocrit 41.4 40.0 - 53.0 % Final    MCV 82 78 - 100 fl Final    MCH 27.2 26.5 - 33.0 pg Final    MCHC 33.3 31.5 - 36.5 g/dL Final    RDW 15.5 (H) 10.0 - 15.0 % Final    Platelet Count 397 150 - 450 10e9/L Final    Diff Method Automated Method  Final    % Neutrophils 67.0 % Final    % Lymphocytes 20.1 % Final    % Monocytes 9.9 % Final    % Eosinophils 2.0 % Final    % Basophils 0.5 % Final    % Immature Granulocytes 0.5 % Final    Nucleated RBCs 0 0 /100 Final    Absolute Neutrophil 5.7 1.6 - 8.3 10e9/L Final    Absolute Lymphocytes 1.7 0.8 - 5.3 10e9/L Final    Absolute Monocytes 0.8 0.0 - 1.3 10e9/L Final    Absolute Eosinophils 0.2 0.0 - 0.7 10e9/L Final    Absolute Basophils 0.0 0.0 - 0.2 10e9/L Final    Abs Immature Granulocytes 0.0 0 - 0.4 10e9/L Final    Absolute Nucleated RBC 0.0  Final         4/20/2017 12:23 PM      Component Results     Component Value Ref Range & Units Status    Sodium 137 133 - 144 mmol/L Final    Potassium 3.8 3.4 - 5.3 mmol/L Final    Chloride 101 94 - 109 mmol/L Final    Carbon Dioxide 30 20 - 32 mmol/L Final    Anion Gap 6 3 - 14 mmol/L Final    Glucose 98 70 - 99 mg/dL Final    Urea Nitrogen 15 7 - 30 mg/dL Final    Creatinine 0.91 0.66 - 1.25 mg/dL Final    GFR Estimate >90  Non  GFR Calc   >60 mL/min/1.7m2 Final    GFR Estimate If Black >90   GFR Calc   >60 mL/min/1.7m2 Final    Calcium 9.7 8.5 - 10.1 mg/dL Final    Bilirubin Total 0.5 0.2 - 1.3 mg/dL Final    Albumin 4.0 3.4 - 5.0 g/dL Final    Protein Total 8.7 6.8 - 8.8 g/dL Final    Alkaline Phosphatase 99 40 - 150 U/L Final     (H) 0 - 70 U/L Final    AST 98 (H) 0 - 45 U/L Final         4/20/2017 12:21 PM      Component Results     Component Value Ref Range & Units Status    Magnesium 2.2 1.6 - 2.3 mg/dL Final         4/20/2017 12:23 PM      Component Results     Component Value Ref Range & Units  Status    Phosphorus 2.9 2.5 - 4.5 mg/dL Final         4/20/2017 12:43 PM      Component Results     Component Value Ref Range & Units Status    Acetaminophen Level <2  Therapeutic range: 10-20 mg/L   mg/L Final         4/20/2017 12:26 PM      Component Results     Component Value Ref Range & Units Status    Salicylate Level 2 mg/dL Final    Therapeutic:        <20   Anti inflammatory:  15-30           4/20/2017 12:21 PM      Component Results     Component Value Ref Range & Units Status    Ethanol g/dL <0.01 <0.01 g/dL Final         4/20/2017 12:29 PM      Component Results     Component Value Ref Range & Units Status    Amphetamine Qual Urine  NEG Final    Positive   Cutoff for a positive amphetamine is greater than 500 ng/mL. This is an   unconfirmed screening result to be used for medical purposes only.   (A)    Barbiturates Qual Urine  NEG Final    Negative   Cutoff for a negative barbiturate is 200 ng/mL or less.      Benzodiazepine Qual Urine  NEG Final    Positive   Cutoff for a positive benzodiazepine is greater than 200 ng/mL. This is an   unconfirmed screening result to be used for medical purposes only.   (A)    Cannabinoids Qual Urine  NEG Final    Positive   Cutoff for a positive cannabinoid is greater than 50 ng/mL. This is an   unconfirmed screening result to be used for medical purposes only.   (A)    Cocaine Qual Urine  NEG Final    Negative   Cutoff for a negative cocaine is 300 ng/mL or less.      Opiates Qualitative Urine  NEG Final    Positive   Cutoff for a positive opiate is greater than 300 ng/mL. This is an unconfirmed   screening result to be used for medical purposes only.   (A)    PCP Qual Urine  NEG Final    Negative   Cutoff for a negative PCP is 25 ng/mL or less.           4/20/2017 12:13 PM      Component Results     Component Value Ref Range & Units Status    Color Urine Light Yellow  Final    Appearance Urine Clear  Final    Glucose Urine Negative NEG mg/dL Final    Bilirubin Urine  Negative NEG Final    Ketones Urine Negative NEG mg/dL Final    Specific Gravity Urine 1.007 1.003 - 1.035 Final    Blood Urine Negative NEG Final    pH Urine 7.5 (H) 5.0 - 7.0 pH Final    Protein Albumin Urine Negative NEG mg/dL Final    Urobilinogen mg/dL Normal 0.0 - 2.0 mg/dL Final    Nitrite Urine Negative NEG Final    Leukocyte Esterase Urine Negative NEG Final    Source Catheterized Urine  Final    WBC Urine 0 0 - 2 /HPF Final    RBC Urine 1 0 - 2 /HPF Final    Mucous Urine Present (A) NEG /LPF Final    sperm Present (A) NEG /HPF Final                Clinical Quality Measure: Blood Pressure Screening     Your blood pressure was checked while you were in the emergency department today. The last reading we obtained was  BP: (!) 136/91 . Please read the guidelines below about what these numbers mean and what you should do about them.  If your systolic blood pressure (the top number) is less than 120 and your diastolic blood pressure (the bottom number) is less than 80, then your blood pressure is normal. There is nothing more that you need to do about it.  If your systolic blood pressure (the top number) is 120-139 or your diastolic blood pressure (the bottom number) is 80-89, your blood pressure may be higher than it should be. You should have your blood pressure rechecked within a year by a primary care provider.  If your systolic blood pressure (the top number) is 140 or greater or your diastolic blood pressure (the bottom number) is 90 or greater, you may have high blood pressure. High blood pressure is treatable, but if left untreated over time it can put you at risk for heart attack, stroke, or kidney failure. You should have your blood pressure rechecked by a primary care provider within the next 4 weeks.  If your provider in the emergency department today gave you specific instructions to follow-up with your doctor or provider even sooner than that, you should follow that instruction and not wait for up  "to 4 weeks for your follow-up visit.        Thank you for choosing [facility]       Thank you for choosing [facility] for your care. Our goal is always to provide you with excellent care. Hearing back from our patients is one way we can continue to improve our services. Please take a few minutes to complete the written survey that you may receive in the mail after you visit with us. Thank you!        [facility] Information     [facility] lets you send messages to your doctor, view your test results, renew your prescriptions, schedule appointments and more. To sign up, go to [email or web address] . Click on \"Log in\" on the left side of the screen, which will take you to the Welcome page. Then click on \"Sign up Now\" on the right side of the page.     You will be asked to enter the access code listed below, as well as some personal information. Please follow the directions to create your username and password.     Your access code is: [email or web address]  Expires: 2017  9:24 PM     Your access code will  in 90 days. If you need help or a new code, please call your [facility] clinic or [phone].        Care EveryWhere ID     This is your Care EveryWhere ID. This could be used by other organizations to access your [facility] medical records  [id number]        After Visit Summary       This is your record. Keep this with you and show to your community pharmacist(s) and doctor(s) at your next visit.                  "

## 2017-04-20 NOTE — ED PROVIDER NOTES
History     Chief Complaint:  Drug Ingestion      The history is provided by the patient and the EMS personnel. The history is limited by the condition of the patient.      Walt German is a 36 year old male who presents via EMS due to drug ingestion.  EMS was contacted by a bystander due to the patient wandering aimlessly.  On their arrival he was drooling and somnolent but alert and oriented x 3 when forced to concentrate with HR of 106 and stable BP.  He reported to EMS that he walked out of treatment recently and spent last night outside.  He could not provide a date here in the emergency department.  He reports using Xanax yesterday and marijuana today.  He denies other drug ingestion.  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  He has an abrasion to his forehead and reports he got that from walking through some trees.  He denies headache, homicidal or suicidal ideation, or other complaints.     A used needle fell out of the patient's pants when being changed into scrubs.  He denies this was his or that he used anything with it.      Allergies:  No known drug allergies      Medications:    The patient is not currently taking any prescribed medications.     Past Medical History:    Drug abuse  HIV from IV drug use    Past Surgical History:    History reviewed. No pertinent surgical history.     Family History:    History reviewed. No pertinent family history.      Social History:  Presents via EMS   Tobacco use: Unknown  Alcohol use: Unknown  PCP: No primary care provider on file.         Review of Systems   Neurological: Negative for headaches.   Psychiatric/Behavioral: Positive for confusion. Negative for agitation and suicidal ideas.   All other systems reviewed and are negative.      Physical Exam     Patient Vitals for the past 24 hrs:   BP Temp Temp src Heart Rate Resp SpO2 Height Weight   04/20/17 1345 - - - - - 99 % - -   04/20/17 1330 - - - - - 99 % - -   04/20/17  "1315 - - - - - 98 % - -   04/20/17 1300 - - - - - 100 % - -   04/20/17 1245 146/90 - - - - 100 % - -   04/20/17 1230 (!) 139/91 - - - - 100 % - -   04/20/17 1215 (!) 138/101 - - - - 100 % - -   04/20/17 1200 122/85 - - - - 100 % - -   04/20/17 1145 (!) 134/104 - - - - 100 % - -   04/20/17 1134 - - - - - 90 % - -   04/20/17 1130 123/87 - - - - 93 % - -   04/20/17 1122 129/87 96.5  F (35.8  C) Oral 84 16 93 % 1.753 m (5' 9\") 63.5 kg (140 lb)      Physical Exam  Nursing note and vitals reviewed.  Constitutional:  Awake but drooling with slurred speech.  Disheveled and malodorous.    HENT:   Nose:    Nose normal.   Mouth/Throat:   Mucous membranes are normal.   Eyes:    Conjunctivae normal and EOM are normal.      Pupils are equal, round, and reactive to light.   Neck:    Trachea normal.   Cardiovascular:  Normal rate, regular rhythm, normal heart sounds and normal pulses. No murmur heard.  Pulmonary/Chest:  Effort normal and breath sounds normal.   Abdominal:   Soft. Normal appearance and bowel sounds are normal.      There is no tenderness.      There is no rebound and no CVA tenderness.   Musculoskeletal:  Extremities atraumatic x 4.   Lymphadenopathy:  No cervical adenopathy.   Neurological:   Awake but slurring words.  Normal strength.      No cranial nerve deficit or sensory deficit. GCS eye subscore is 4. GCS verbal subscore is 5. GCS motor subscore is 6.   Skin:    Dorsal aspect of left hand erythematous, warm, and swollen.  Diffuse scattered abrasions.      Psychiatric:   Slurring words and slightly obtunded but denies suicidal ideation.      Emergency Department Course   ECG (12:35:13):  Rate 81 bpm. KY interval 122. QRS duration 96. QT/QTc 378/439. P-R-T axes 47 67 39.  NSR.  Nonspecific T wave abnormality.  Abnormal ECG.  Agree with computer interpretation.  Interpreted at 1240 by Jared Escalera MD.     Laboratory:  CBC: WNL (WBC 8.1, HGB 13.8, )   CMP:  (H), AST 98 (H) ow WNL (Creatinine " 0.91)   Magnesium: 2.2  Phosphorous: 2.9    UA: pH 7.5 (H), Mucous present, sperm present, o/w Negative     Acetaminophen level: <2  Salicylate level: 2  EtOH: <0.01  UDS: Amphetamine positive, Benzodiazepine positive, Cannabinoids positive, opiates positive o/w None detected     Interventions:  1159: NS 1L IV Bolus   1159: Narcan 1 mg IV   Doxycycline 100 mg PO    Emergency Department Course:  Past medical records, nursing notes, and vitals reviewed.  1110: I performed an exam of the patient and obtained history as documented above.   1117: Kristin Karma put in place.   1130: Patient not able to be roused to be registered thus Care Everywhere not able to be obtained at this time.    Above workup undertaken.  1205: I rechecked the patient. No change after Narcan.   1330: I rechecked the patient. Patient remains somnolent.  Signed out to my partner Dr. Ochoa pending ultimate disposition.       Impression & Plan    Medical Decision Making:  Walt German is a 36 year old male who came in by EMS for what appears to be altered mental status and likely polysubstance use and abuse.  He clearly was quite altered and under the influence of at least one drug.  I felt it was reasonable to provide him Narcan to see if that might help, but unfortunately it did not.  I proceeded with the above workup here including blood work, EKG, and urine.  His urine drug screen came back with multiple substances including amphetamines, opiates, benzodiazepines, and marijuana.  I also provided him oral Doxycyline, as I believe he likely has a cellulitis to his left hand.  I am concerned that this could be MRSA in addition to the typical pathogens given his picking and IV drug use history.  At this point he is still altered and somnolent. Therefore he cannot be sent to a detox facility or discharged.  I have placed him on a health officer hold.  I will be signing him out to my partner Dr. Ochoa, who will be assuming his care and determining  his appropriate disposition.     Diagnosis:    ICD-10-CM   1. Drug ingestion, undetermined intent, initial encounter T50.904A   2. Cellulitis of left upper extremity L03.114   3. Polysubstance abuse F19.10   4. Altered mental status, unspecified altered mental status type R41.82       Disposition:  Signed out to my partner Dr. Ochoa pending ultimate disposition.     Discharge Medications:  New Prescriptions    DOXYCYCLINE (VIBRAMYCIN) 100 MG CAPSULE    Take 1 capsule (100 mg) by mouth 2 times daily for 10 days         José Gauthier  4/20/2017    EMERGENCY DEPARTMENT    I, José Gauthier, meghann serving as a scribe at 11:21 AM on 4/20/2017 to document services personally performed by Jared Escalera MD based on my observations and the provider's statements to me.       Jared Escalera MD  04/20/17 1969

## 2017-04-20 NOTE — ED NOTES
Bed: ED17  Expected date:   Expected time:   Means of arrival:   Comments:  [facility] [place] - [phone] 36 M substance abuse eta 1100

## 2017-04-21 NOTE — ED NOTES
RN noted HR elevated, patient ambulating in room & urinating in sink. Patient safely got back in to bed and was conversational with RN.     Reports he is here because has had mental issues since a child & his medcations (adderal & something else) make him messed up. He was at [facility] back in January and then living in a Sober house in [place] recently but he left it because it was too strict. He now realizes that it was something he needed & is open to the idea of returning there. Disucssed options with patient for d/c - states he would go to a sober house or visit a friend from [facility] until he can get in.    MD (Gabriela) updated that patient is awake.

## 2017-04-21 NOTE — ED NOTES
ERT brought pt courtesy meal and warm blankets. Pt stated not hungry and requested for belongings back. RN notified.

## 2017-04-26 ENCOUNTER — HOSPITAL ENCOUNTER (EMERGENCY)
Facility: CLINIC | Age: 37
Discharge: HOME | End: 2017-04-26
Attending: EMERGENCY MEDICINE | Admitting: EMERGENCY MEDICINE
Payer: COMMERCIAL

## 2017-04-26 VITALS
HEART RATE: 78 BPM | RESPIRATION RATE: 16 BRPM | OXYGEN SATURATION: 100 % | SYSTOLIC BLOOD PRESSURE: 134 MMHG | DIASTOLIC BLOOD PRESSURE: 94 MMHG | TEMPERATURE: 98.3 F

## 2017-04-26 DIAGNOSIS — Z76.0 ENCOUNTER FOR MEDICATION REFILL: ICD-10-CM

## 2017-04-26 DIAGNOSIS — Z59.00 HOMELESSNESS: ICD-10-CM

## 2017-04-26 DIAGNOSIS — F19.10 POLYSUBSTANCE ABUSE (H): ICD-10-CM

## 2017-04-26 PROCEDURE — 99283 EMERGENCY DEPT VISIT LOW MDM: CPT | Mod: Z6 | Performed by: EMERGENCY MEDICINE

## 2017-04-26 PROCEDURE — 99283 EMERGENCY DEPT VISIT LOW MDM: CPT

## 2017-04-26 SDOH — ECONOMIC STABILITY - HOUSING INSECURITY: HOMELESSNESS UNSPECIFIED: Z59.00

## 2017-04-26 ASSESSMENT — ENCOUNTER SYMPTOMS
COLOR CHANGE: 0
SLEEP DISTURBANCE: 1
NAUSEA: 0
ARTHRALGIAS: 0
HALLUCINATIONS: 0
DYSPHORIC MOOD: 1
DIFFICULTY URINATING: 0
ABDOMINAL PAIN: 0
EYE REDNESS: 0
FEVER: 0
CONFUSION: 0
VOMITING: 0
SORE THROAT: 0
SHORTNESS OF BREATH: 0
HEADACHES: 0
APPETITE CHANGE: 0

## 2017-04-26 NOTE — ED AVS SNAPSHOT
[facility], Emergency Department    [address]    [place] [address]    Phone:  [phone]    Fax:  [phone]                                       Bc German   MRN: [id number]    Department:  [facility], Emergency Department   Date of Visit:  4/26/2017           Patient Information     Date Of Birth          1980        Your diagnoses for this visit were:     Polysubstance abuse     Encounter for medication refill        You were seen by Gardenia Alvarez MD.        Discharge Instructions       You have been seen for a medication refill.  It is critically important that you follow up with your infectious disease clinic to get further refills of your HIV medications.    We do not have any detox beds available here at [facility] right now.     We recommend that you call [facility] in the morning to find out if there are any detox beds.     [facility] Intake:  [phone]          24 Hour Appointment Hotline       To make an appointment at any [facility] clinic, call [phone] ([phone]). If you don't have a family doctor or clinic, we will help you find one. [facility] clinics are conveniently located to serve the needs of you and your family.             Review of your medicines      Our records show that you are taking the medicines listed below. If these are incorrect, please call your family doctor or clinic.        Dose / Directions Last dose taken    amphetamine-dextroamphetamine 20 MG per 24 hr capsule   Commonly known as:  ADDERALL XR   Dose:  20 mg        Take 20 mg by mouth 2 times daily   Refills:  0        FETZIMA PO   Dose:  80 mg        Take 80 mg by mouth daily   Refills:  0        lisinopril-hydrochlorothiazide 10-12.5 MG per tablet   Commonly known as:  PRINZIDE/ZESTORETIC   Dose:  1 tablet   Quantity:  30 tablet        Take 1 tablet by mouth daily   Refills:  3        PREGABALIN PO   Dose:  50 mg        Take 50 mg by mouth 3  times daily   Refills:  0        testosterone cypionate 200 MG/ML injection   Commonly known as:  DEPOTESTOTERONE        INJECT 1 ML INTO MUSCLE ONCE WEEKLY TO CORRECT LOW TESTOSTERONE   Refills:  5          ASK your doctor about these medications        Dose / Directions Last dose taken    * abacavir-dolutegravir-LamiVUDine 600- MG per tablet   Commonly known as:  TRIUMEQ   Dose:  1 tablet   What changed:  Another medication with the same name was added. Make sure you understand how and when to take each.   Quantity:  30 tablet   Ask about: Which instructions should I use?        Take 1 tablet by mouth daily   Refills:  3        * abacavir-dolutegravir-LamiVUDine 600- MG per tablet   Commonly known as:  TRIUMEQ   Dose:  1 tablet   What changed:  You were already taking a medication with the same name, and this prescription was added. Make sure you understand how and when to take each.   Quantity:  7 tablet   Ask about: Which instructions should I use?        Take 1 tablet by mouth daily   Refills:  0        * Notice:  This list has 2 medication(s) that are the same as other medications prescribed for you. Read the directions carefully, and ask your doctor or other care provider to review them with you.            Prescriptions were sent or printed at these locations (1 Prescription)                   Other Prescriptions                Printed at Department/Unit printer (1 of 1)         abacavir-dolutegravir-LamiVUDine (TRIUMEQ) 600- MG per tablet                Orders Needing Specimen Collection     None      Pending Results     No orders found from 4/24/2017 to 4/27/2017.            Pending Culture Results     No orders found from 4/24/2017 to 4/27/2017.            Thank you for choosing [facility]       Thank you for choosing [facility] for your care. Our goal is always to provide you with excellent care. Hearing back from our patients is one way we can continue to improve our services. Please take  "a few minutes to complete the written survey that you may receive in the mail after you visit with us. Thank you!        [facility] Information     [email or web address] lets you send messages to your doctor, view your test results, renew your prescriptions, schedule appointments and more. To sign up, go to [email or web address] . Click on \"Log in\" on the left side of the screen, which will take you to the Welcome page. Then click on \"Sign up Now\" on the right side of the page.     You will be asked to enter the access code listed below, as well as some personal information. Please follow the directions to create your username and password.     Your access code is: [email or web address]  Expires: 2017  7:06 PM     Your access code will  in 90 days. If you need help or a new code, please call your [facility] clinic or [phone].        Care EveryWhere ID     This is your Care EveryWhere ID. This could be used by other organizations to access your [facility] medical records  [id number]        After Visit Summary       This is your record. Keep this with you and show to your community pharmacist(s) and doctor(s) at your next visit.                  "

## 2017-04-26 NOTE — ED AVS SNAPSHOT
[facility], [place], Emergency Department    [address]    [facility] [address]    Phone:  [phone]    Fax:  [phone]                                       Bc German   MRN: [id number]    Department:  [facility], Emergency Department   Date of Visit:  4/26/2017           After Visit Summary Signature Page     I have received my discharge instructions, and my questions have been answered. I have discussed any challenges I see with this plan with the nurse or doctor.    ..........................................................................................................................................  Patient/Patient Representative Signature      ..........................................................................................................................................  Patient Representative Print Name and Relationship to Patient    ..................................................               ................................................  Date                                            Time    ..........................................................................................................................................  Reviewed by Signature/Title    ...................................................              ..............................................  Date                                                            Time

## 2017-04-26 NOTE — ED NOTES
homeless, jan 23-march 20 at [facility], two weeks ago re-lapsed on heroin, meth, marijuana, and alcohol, 7-8 weeks ago he found out that he is HIV positive, needs HIV meds as he lost his, seeking detox

## 2017-04-26 NOTE — ED PROVIDER NOTES
"  History     Chief Complaint   Patient presents with     Addiction Problem     homeless, jan 23-march 20 at [facility], two weeks ago re-lapsed on heroin, 7-8 weeks ago he found out that he is HIV positive, needs HIV meds     HPI  Bc German is a 36 year old male with a history of HIV, hypertension, hepatitis C., depression, and generalized anxiety disorder who presents to the Emergency Department seeking detox from multiple substances in addition to requesting refills of his HIV medications. Patient states that he has been trying to be more compliant with his HIV medication Triumeq which he started a month ago. He took the medication for two weeks but lost the prescription so hasn't taken it for about a week. He states he didn't call his clinic because \"(he) has been passive\" and he \"hasn't dealt with (his) medical issues before\".     Regarding patient's chemical substance use, he has been using heroin, methamphetamines, marijuana and benzodiazapines. He reports using 1/2 gram of IV heroin daily which he has been doing for about 10 years. He has been using methamphetamines for about 15 years at about a gram a day, which he administers via IV or smoking. He smokes \"as much marijuana as possible\" daily. He also takes the benzodiazepines Atavan and Klonopin, using \"whatever is there\" with an average of a few milligrams of each pill which he swallows. He uses these daily as well. He denies using other street drugs. He reports participating in detox/treatment programs in the past with his most recent program taking place this last January at [facility], which he states was a 60 day program. He reports being clean until March 20th when he relapsed. He denies suicidal ideation and states he doesn't currently have a clinic that he goes to for mental health issues. He denies cough, cold, rhinorrhea, sore throat, vomiting or diarrhea but he does state that he has been feeling more weak lately.     PAST MEDICAL " HISTORY  Past Medical History:   Diagnosis Date     Anxiety      Depressive disorder      Drug abuse, IV      Group A streptococcal infection 11/2014    Bacteremia/cellulitis     HCV antibody positive      HIV (human immunodeficiency virus infection) (H)      HIV (human immunodeficiency virus infection) (H)      IVDU (intravenous drug user)      Osteomyelitis of vertebra of lumbosacral region (H) 3/2011    L3, left psoas abscess     PAST SURGICAL HISTORY  Past Surgical History:   Procedure Laterality Date     EYE SURGERY  1 year ago    pins to left eye after socket fracture     ORTHOPEDIC SURGERY      Arm Surgery     TRANSESOPHAGEAL ECHOCARDIOGRAM INTRAOPERATIVE N/A 3/17/2015    Procedure: TRANSESOPHAGEAL ECHOCARDIOGRAM INTRAOPERATIVE;  Surgeon: Generic Anesthesia Provider;  Location: [facility] OR     FAMILY HISTORY  No family history on file.  SOCIAL HISTORY  Social History   Substance Use Topics     Smoking status: Current Every Day Smoker     Smokeless tobacco: Not on file     Alcohol use Yes      Comment: occasional     MEDICATIONS  No current facility-administered medications for this encounter.      Current Outpatient Prescriptions   Medication     abacavir-dolutegravir-LamiVUDine (TRIUMEQ) 600- MG per tablet     amphetamine-dextroamphetamine (ADDERALL XR) 20 MG per 24 hr capsule     testosterone cypionate (DEPOTESTOTERONE CYPIONATE) 200 MG/ML injection     abacavir-dolutegravir-LamiVUDine (TRIUMEQ) 600- MG per tablet     lisinopril-hydrochlorothiazide (PRINZIDE/ZESTORETIC) 10-12.5 MG per tablet     PREGABALIN PO     Levomilnacipran HCl (FETZIMA PO)     ALLERGIES  Allergies   Allergen Reactions     Doxycycline GI Disturbance         I have reviewed the Medications, Allergies, Past Medical and Surgical History, and Social History in the Epic system.    Review of Systems   Constitutional: Negative for appetite change and fever.   HENT: Negative for congestion and sore throat.    Eyes: Negative for redness.    Respiratory: Negative for shortness of breath.    Cardiovascular: Negative for chest pain.   Gastrointestinal: Negative for abdominal pain, nausea and vomiting.   Genitourinary: Negative for difficulty urinating.   Musculoskeletal: Negative for arthralgias.   Skin: Negative for color change.   Neurological: Negative for headaches.   Psychiatric/Behavioral: Positive for dysphoric mood and sleep disturbance. Negative for confusion, hallucinations and suicidal ideas.   All other systems reviewed and are negative.      Physical Exam   BP: 126/79  Heart Rate: 89  Temp: 98.3  F (36.8  C)  Resp: 18  SpO2: 99 %  Physical Exam   Constitutional: No distress.   Adult male, unkempt appearance, malodorous   HENT:   Head: Atraumatic.   Mouth/Throat: Oropharynx is clear and moist. No oropharyngeal exudate.   Eyes: Pupils are equal, round, and reactive to light. No scleral icterus.   Cardiovascular: Normal rate, normal heart sounds and intact distal pulses.    Pulmonary/Chest: Effort normal and breath sounds normal. No respiratory distress. He has no wheezes. He has no rales.   Abdominal: Soft. Bowel sounds are normal. There is no tenderness.   Musculoskeletal: He exhibits no edema or tenderness.   Skin: Skin is warm. No rash noted. He is not diaphoretic.   Multiple injection sites/track marks. No sign of abscess or cellulitis   Psychiatric:   Adult male, unkempt appearance. Reactive affect, tearful at times. Depressed mood. Cooperative. No SI. Insight fair.   Nursing note and vitals reviewed.      ED Course     ED Course   6:49 PM  The patient was seen and examined by Gardenia Alvarez MD in Room 2.     Procedures             Critical Care time:  none               Labs Ordered and Resulted from Time of ED Arrival Up to the Time of Departure from the ED - No data to display         Assessments & Plan (with Medical Decision Making)   This is a 36-year-old HIV positive chemically dependent patient who presents to the Emergency  Department today first of all requesting detox and second of all requesting a refill of his HIV medications. He was recently diagnosed with HIV and is on Triumeq. Patient reports that he lost his medications and has not been compliant with them for a week. I explained to the patient that normally we do not refill HIV medications from the Emergency Department. I will give him one weeks  worth of HIV medications but strongly advised him to follow up with his infectious disease clinic which he has already established. They should be able to help him with additional refills as well as helping with issues such as medication compliance.    With regard to chemical dependency, the patient is using IV heroin, methamphetamines, benzodiazepines, and marijuana. It appears the chemical dependency has been a long standing issue for him and may indeed be the route by which he contracted HIV in the first place. I did check with intake and we do not have a detox bed here today. I will give him chemical dependency resources. I did tell him how to go about getting a bed here at [facility]. I will give him the phone number to [facility] intake and advise that he call in the morning. He should definitely keep calling until he is able to procure a bed.     This part of the medical record was transcribed by Kaitlin Devine Medical Scribe, from a dictation done by Gardenia Alvarez MD.     I have reviewed the nursing notes.    I have reviewed the findings, diagnosis, plan and need for follow up with the patient.    Discharge Medication List as of 4/26/2017  7:13 PM      START taking these medications    Details   !! abacavir-dolutegravir-LamiVUDine (TRIUMEQ) 600- MG per tablet Take 1 tablet by mouth daily, Disp-7 tablet, R-0, Local Print       !! - Potential duplicate medications found. Please discuss with provider.          Final diagnoses:   Polysubstance abuse   Encounter for medication refill   Homelessness   Kaitlin ECHOLS  Nilson, am serving as a trained medical scribe to document services personally performed by Gardenia Alvarez MD, based on the provider's statements to me.   I, Gardenia Alvarez MD, was physically present and have reviewed and verified the accuracy of this note documented by Kaitlin Devine.      4/26/2017   [facility] [place], EMERGENCY DEPARTMENT     Gardenia Alvarez MD  04/27/17 0020

## 2017-04-27 NOTE — DISCHARGE INSTRUCTIONS
You have been seen for a medication refill.  It is critically important that you follow up with your infectious disease clinic to get further refills of your HIV medications.    We do not have any detox beds available here at [facility] right now.     We recommend that you call [facility] Intake in the morning to find out if there are any detox beds.     [facility] Intake:  [phone]

## 2017-05-06 ENCOUNTER — HOSPITAL ENCOUNTER (EMERGENCY)
Facility: CLINIC | Age: 37
Discharge: HOME | End: 2017-05-06
Attending: EMERGENCY MEDICINE | Admitting: EMERGENCY MEDICINE
Payer: COMMERCIAL

## 2017-05-06 VITALS
TEMPERATURE: 96 F | BODY MASS INDEX: 20.31 KG/M2 | DIASTOLIC BLOOD PRESSURE: 86 MMHG | SYSTOLIC BLOOD PRESSURE: 134 MMHG | WEIGHT: 137.5 LBS | HEART RATE: 73 BPM | RESPIRATION RATE: 16 BRPM | OXYGEN SATURATION: 97 %

## 2017-05-06 DIAGNOSIS — F25.9 SCHIZOAFFECTIVE DISORDER (H): ICD-10-CM

## 2017-05-06 DIAGNOSIS — F19.10 POLYSUBSTANCE ABUSE (H): ICD-10-CM

## 2017-05-06 DIAGNOSIS — F32.89 OTHER DEPRESSION: ICD-10-CM

## 2017-05-06 LAB
AMPHETAMINES UR QL SCN: ABNORMAL
BARBITURATES UR QL: ABNORMAL
BENZODIAZ UR QL: ABNORMAL
CANNABINOIDS UR QL SCN: ABNORMAL
COCAINE UR QL: ABNORMAL
ETHANOL UR QL SCN: ABNORMAL
OPIATES UR QL SCN: ABNORMAL

## 2017-05-06 PROCEDURE — 90791 PSYCH DIAGNOSTIC EVALUATION: CPT

## 2017-05-06 PROCEDURE — 99284 EMERGENCY DEPT VISIT MOD MDM: CPT | Mod: Z6 | Performed by: EMERGENCY MEDICINE

## 2017-05-06 PROCEDURE — 99285 EMERGENCY DEPT VISIT HI MDM: CPT | Mod: 25

## 2017-05-06 PROCEDURE — 80320 DRUG SCREEN QUANTALCOHOLS: CPT | Performed by: EMERGENCY MEDICINE

## 2017-05-06 PROCEDURE — 80307 DRUG TEST PRSMV CHEM ANLYZR: CPT | Performed by: EMERGENCY MEDICINE

## 2017-05-06 RX ORDER — PREGABALIN 150 MG/1
300 CAPSULE ORAL 2 TIMES DAILY
Qty: 6 CAPSULE | Refills: 0 | Status: ON HOLD | OUTPATIENT
Start: 2017-05-06 | End: 2017-09-21

## 2017-05-06 ASSESSMENT — ENCOUNTER SYMPTOMS
ABDOMINAL PAIN: 0
FEVER: 0
NECK PAIN: 0
NECK STIFFNESS: 0
POLYDIPSIA: 0
DIFFICULTY URINATING: 0
BACK PAIN: 0
VOMITING: 0
COLOR CHANGE: 0
SLEEP DISTURBANCE: 1
LIGHT-HEADEDNESS: 0
NAUSEA: 0
CHILLS: 0
ARTHRALGIAS: 0
SHORTNESS OF BREATH: 0

## 2017-05-06 NOTE — ED AVS SNAPSHOT
[facility], Emergency Department    [address]    [place] [address]    Phone:  [phone]    Fax:  [phone]                                       Bc German   MRN: [id number]    Department:  [facility], Emergency Department   Date of Visit:  5/6/2017           Patient Information     Date Of Birth          1980        Your diagnoses for this visit were:     Polysubstance abuse     Other depression        You were seen by Farideh Johnson MD and Diaz Tucker MD.        Discharge Instructions         Understanding the Disease of Addiction  What is addiction?  Addiction is a chronic disease of the brain. It affects how your brain learns and functions.  Your genes and your environment can affect your risk for addiction. A family history of addiction also raises your risk, but anyone can have an addiction.  Unfortunately, many people falsely believe that addiction is a moral weakness. They think that people addicted to drugs or alcohol are simply behaving badly or making poor choices.    How does addiction affect my brain?  Whether you start using drugs or alcohol is your choice. But once your brain is exposed to the addictive substance, your brain begins to change. This is especially true if you are vulnerable to addiction. These brain changes override self-control. This happens because the substance overexcites the reward center in the brain. The substance mimics the brain's own natural feel-good chemicals. The brain is rewired into believing that the substance is a good thing and that you need it to survive. So strong is this rewiring that over time, you no longer find pleasure in other things. The addiction trumps all other motivations.  If you continue to use the substance, your brain makes less and less of its own feel-good chemicals. You then must keep using drugs or alcohol to try to make up for the low levels of the brain chemicals. Your brain eventually needs more and  more of the drug to achieve this. You need the drug without regard to the physical, emotional, and social costs.  Can you become addicted to things other than drugs or alcohol?  Addiction can develop in response to other pleasurable activities that stimulate the reward center of the brain. These activities include eating, having sex, gambling, using tobacco, and even using the internet.  Can you get control over a brain disease?  The only way to get over an addiction is to stop using the substance. By not using it, your brain can recover and return to its normal functioning. You can relearn how to find pleasure in other things. But, your brain will always be at risk for addiction. Because addiction is so powerful, you usually will need medical help and social support for long-term success.    0427-8794 [employer]. [address] [place] [address], [place] [address]. All rights reserved. This information is not intended as a substitute for professional medical care. Always follow your healthcare professional's instructions.          Treating Drug Abuse and Addiction  Treatment for addiction to drugs varies with your needs. Some people go through treatment only once. Others return to it off and on throughout their lives.    Recovery is a lifelong process  Recovery begins when you seek help for your drug abuse or addiction. Then, you ll slowly start to build a new life. It may not always be easy. But with the support of others, you can succeed. During recovery, you ll go through three stages. How long each one lasts varies with each person.  Early recovery  During this stage, you ll focus on stopping your drug abuse or addiction. Most likely, you ll receive help from a therapist, addiction counselor, or doctor. You may also go to self-help groups on a regular basis. You ll avoid people or places that might tempt you to use drugs.  Middle recovery  During this time, you ll work on changing your life. You may change  your values, move, or go back to school. You might start new, healthy relationships. And you might end ones that aren t as healthy. You may even try to make up for harm you caused others while using drugs.  Late recovery  This stage will last for the rest of your life. You re feeling stronger and healthier. Now, you may look for a greater sense of purpose. You may focus on the things that matter to you most. These may include your family, your beliefs, or lending a hand to others.  Types of drug treatment    Residential treatment. You live in a drug-free setting with others who have the same problem. Often, your stay in community residential treatment lasts about a month, but it could last up to 6 months. During this time, you see a therapist or addiction counselor.    Outpatient therapy. You see a therapist, or addiction counselor while living your normal life. You may see your therapist by yourself. Or you may be part of a group. In some cases, your family may see your therapist too.    Self-help groups. These offer you support and encouragement. There are also support groups for the loved ones of people addicted to drugs.    Medications. Your treatment may include certain medications, such as methadone, disulfiram, buprenorphine, or naltrexone.    Alternative treatments. These may include acupuncture, hypnosis, or biofeedback. Ask your health care provider about them.  When times get tough  Drug addiction is never really cured. Sometimes, no matter how well you re doing, you may be tempted. If so, you can:    Call your sponsor. This is someone in your self-help group who watches out for you.    Talk to your therapist, health care provider, or someone else you trust.    Make a list of how much you ve achieved.    Find something to distract you. Go to a movie, go out for a walk, or call a friend.    3621-9726 [employer]. [address] [place] [address], [place] [address]. All rights reserved. This information  is not intended as a substitute for professional medical care. Always follow your healthcare professional's instructions.          Future Appointments        Provider Department Dept Phone Center    5/23/2017 11:00 AM Damir Cisneros MD [facility] [phone] [facility]      24 Hour Appointment Hotline       To make an appointment at any [facility], call [phone] ([phone]). If you don't have a family doctor or clinic, we will help you find one. [facility] are conveniently located to serve the needs of you and your family.             Review of your medicines      CONTINUE these medicines which may have CHANGED, or have new prescriptions. If we are uncertain of the size of tablets/capsules you have at home, strength may be listed as something that might have changed.        Dose / Directions Last dose taken    abacavir-dolutegravir-LamiVUDine 600- MG per tablet   Commonly known as:  TRIUMEQ   Dose:  1 tablet   What changed:  Another medication with the same name was removed. Continue taking this medication, and follow the directions you see here.   Quantity:  3 tablet        Take 1 tablet by mouth daily   Refills:  0        levomilnacipran 80 MG 24 hr capsule   Commonly known as:  FETZIMA   Dose:  80 mg   What changed:  medication strength   Quantity:  3 capsule        Take 1 capsule (80 mg) by mouth daily   Refills:  0        pregabalin 150 MG capsule   Commonly known as:  LYRICA   Dose:  300 mg   What changed:  medication strength   Quantity:  6 capsule        Take 2 capsules (300 mg) by mouth 2 times daily   Refills:  0          Our records show that you are taking the medicines listed below. If these are incorrect, please call your family doctor or clinic.        Dose / Directions Last dose taken    amphetamine-dextroamphetamine 20 MG per 24 hr capsule   Commonly known as:  ADDERALL XR   Dose:  20 mg        Take 20 mg by mouth 2 times daily    Refills:  0        lisinopril-hydrochlorothiazide 10-12.5 MG per tablet   Commonly known as:  PRINZIDE/ZESTORETIC   Dose:  1 tablet   Quantity:  30 tablet        Take 1 tablet by mouth daily   Refills:  3        testosterone cypionate 200 MG/ML injection   Commonly known as:  DEPOTESTOTERONE        INJECT 1 ML INTO MUSCLE ONCE WEEKLY TO CORRECT LOW TESTOSTERONE   Refills:  5                Prescriptions were sent or printed at these locations (3 Prescriptions)                   Other Prescriptions                Printed at Department/Unit printer (3 of 3)         abacavir-dolutegravir-LamiVUDine (TRIUMEQ) 600- MG per tablet               levomilnacipran (FETZIMA) 80 MG 24 hr capsule               pregabalin (LYRICA) 150 MG capsule                Procedures and tests performed during your visit     Drug abuse screen 6 urine (tox)      Orders Needing Specimen Collection     None      Pending Results     No orders found from 5/4/2017 to 5/7/2017.            Pending Culture Results     No orders found from 5/4/2017 to 5/7/2017.            Pending Results Instructions     If you had any lab results that were not finalized at the time of your Discharge, you can call the ED Lab Result RN at [phone]. You will be contacted by this team for any positive Lab results or changes in treatment. The nurses are available 7 days a week from 10A to 6:30P.  You can leave a message 24 hours per day and they will return your call.        Thank you for choosing [facility]       Thank you for choosing [facility] for your care. Our goal is always to provide you with excellent care. Hearing back from our patients is one way we can continue to improve our services. Please take a few minutes to complete the written survey that you may receive in the mail after you visit with us. Thank you!        [facility] [employer] Information     [employer] lets you send messages to your doctor, view your test results, renew your prescriptions, schedule  "appointments and more. To sign up, go to [email or web address] . Click on \"Log in\" on the left side of the screen, which will take you to the Welcome page. Then click on \"Sign up Now\" on the right side of the page.     You will be asked to enter the access code listed below, as well as some personal information. Please follow the directions to create your username and password.     Your access code is: [email or web address]  Expires: 2017  7:06 PM     Your access code will  in 90 days. If you need help or a new code, please call your [facility] clinic or [phone].        Care EveryWhere ID     This is your Care EveryWhere ID. This could be used by other organizations to access your [facility] medical records  [id number]        After Visit Summary       This is your record. Keep this with you and show to your community pharmacist(s) and doctor(s) at your next visit.                  "

## 2017-05-06 NOTE — ED AVS SNAPSHOT
[facility], [place], Emergency Department    [address]    [facility] [place] [address]    Phone:  [phone]    Fax:  [phone]                                       Bc German   MRN: [id number]    Department:  [facility], Emergency Department   Date of Visit:  5/6/2017           After Visit Summary Signature Page     I have received my discharge instructions, and my questions have been answered. I have discussed any challenges I see with this plan with the nurse or doctor.    ..........................................................................................................................................  Patient/Patient Representative Signature      ..........................................................................................................................................  Patient Representative Print Name and Relationship to Patient    ..................................................               ................................................  Date                                            Time    ..........................................................................................................................................  Reviewed by Signature/Title    ...................................................              ..............................................  Date                                                            Time

## 2017-05-06 NOTE — DISCHARGE INSTRUCTIONS
Understanding the Disease of Addiction  What is addiction?  Addiction is a chronic disease of the brain. It affects how your brain learns and functions.  Your genes and your environment can affect your risk for addiction. A family history of addiction also raises your risk, but anyone can have an addiction.  Unfortunately, many people falsely believe that addiction is a moral weakness. They think that people addicted to drugs or alcohol are simply behaving badly or making poor choices.    How does addiction affect my brain?  Whether you start using drugs or alcohol is your choice. But once your brain is exposed to the addictive substance, your brain begins to change. This is especially true if you are vulnerable to addiction. These brain changes override self-control. This happens because the substance overexcites the reward center in the brain. The substance mimics the brain's own natural feel-good chemicals. The brain is rewired into believing that the substance is a good thing and that you need it to survive. So strong is this rewiring that over time, you no longer find pleasure in other things. The addiction trumps all other motivations.  If you continue to use the substance, your brain makes less and less of its own feel-good chemicals. You then must keep using drugs or alcohol to try to make up for the low levels of the brain chemicals. Your brain eventually needs more and more of the drug to achieve this. You need the drug without regard to the physical, emotional, and social costs.  Can you become addicted to things other than drugs or alcohol?  Addiction can develop in response to other pleasurable activities that stimulate the reward center of the brain. These activities include eating, having sex, gambling, using tobacco, and even using the internet.  Can you get control over a brain disease?  The only way to get over an addiction is to stop using the substance. By not using it, your brain can recover  and return to its normal functioning. You can relearn how to find pleasure in other things. But, your brain will always be at risk for addiction. Because addiction is so powerful, you usually will need medical help and social support for long-term success.    0879-8393 [employer]. [address] [place] [address]. All rights reserved. This information is not intended as a substitute for professional medical care. Always follow your healthcare professional's instructions.          Treating Drug Abuse and Addiction  Treatment for addiction to drugs varies with your needs. Some people go through treatment only once. Others return to it off and on throughout their lives.    Recovery is a lifelong process  Recovery begins when you seek help for your drug abuse or addiction. Then, you ll slowly start to build a new life. It may not always be easy. But with the support of others, you can succeed. During recovery, you ll go through three stages. How long each one lasts varies with each person.  Early recovery  During this stage, you ll focus on stopping your drug abuse or addiction. Most likely, you ll receive help from a therapist, addiction counselor, or doctor. You may also go to self-help groups on a regular basis. You ll avoid people or places that might tempt you to use drugs.  Middle recovery  During this time, you ll work on changing your life. You may change your values, move, or go back to school. You might start new, healthy relationships. And you might end ones that aren t as healthy. You may even try to make up for harm you caused others while using drugs.  Late recovery  This stage will last for the rest of your life. You re feeling stronger and healthier. Now, you may look for a greater sense of purpose. You may focus on the things that matter to you most. These may include your family, your beliefs, or lending a hand to others.  Types of drug treatment    Residential treatment. You  live in a drug-free setting with others who have the same problem. Often, your stay in community residential treatment lasts about a month, but it could last up to 6 months. During this time, you see a therapist or addiction counselor.    Outpatient therapy. You see a therapist, or addiction counselor while living your normal life. You may see your therapist by yourself. Or you may be part of a group. In some cases, your family may see your therapist too.    Self-help groups. These offer you support and encouragement. There are also support groups for the loved ones of people addicted to drugs.    Medications. Your treatment may include certain medications, such as methadone, disulfiram, buprenorphine, or naltrexone.    Alternative treatments. These may include acupuncture, hypnosis, or biofeedback. Ask your health care provider about them.  When times get tough  Drug addiction is never really cured. Sometimes, no matter how well you re doing, you may be tempted. If so, you can:    Call your sponsor. This is someone in your self-help group who watches out for you.    Talk to your therapist, health care provider, or someone else you trust.    Make a list of how much you ve achieved.    Find something to distract you. Go to a movie, go out for a walk, or call a friend.    5193-9706 [employer]. [address] [place] [address], [place] [address]. All rights reserved. This information is not intended as a substitute for professional medical care. Always follow your healthcare professional's instructions.

## 2017-05-06 NOTE — ED NOTES
On cab arrival, transport plan changed by Dr. Tucker.  Patient will be transported by [facility] on Transport Hold.

## 2017-05-06 NOTE — ED NOTES
Prepared for discharge.  Per MD request, Dr. Tucker,  patient is being transported to [facility] via cab voucher.  Patient has refused discharge VS.

## 2017-05-06 NOTE — ED PROVIDER NOTES
"    [facility] EMERGENCY DEPARTMENT ([facility])    May 6, 2017    [address]   History     Chief Complaint   Patient presents with     Rash     started less than a week ago, pt noted rash upper chest, neck area and upper back. Was recently dx HIV and was started on meds, per pt rash seemed a little bit worse after starting the meds. + MRSA     Suicidal     \"just feels overwhelmed with my new dx of HIV\"     Addiction Problem     using heroin, meth and marijuana. Heroin IV used it last yesterday 10th of a grm. Meth (smoked) last used day before, unable to quantify amt of meth used. Marijuana last used this AM.     Leg Swelling     R lower leg swelling was noted after starting HIV med.      HPI  Bc German is a 36 year old male who presents with depression, suicidal ideation in setting of recent HIV diagnosis, polysubstance IV drug use, rash to upper chest, suicidal thoughts and lower extremity swelling. No pain. No fevers.    When I inquired what the primary reason for emergency department visit is patient stated that he would like to talk to psychiatrist about his mental health. He has passive suicidal thoughts but no suicidal plan. No homicidal ideations.  He denies hallucinations. He does admit to heroin use intravenously. He is not interested in detox.    I have reviewed the Medications, Allergies, Past Medical and Surgical History, and Social History in the Epic system.    Review of Systems   Constitutional: Negative for chills and fever.   HENT: Negative for congestion.    Eyes: Negative for visual disturbance.   Respiratory: Negative for shortness of breath.    Cardiovascular: Negative for chest pain.   Gastrointestinal: Negative for abdominal pain, nausea and vomiting.   Endocrine: Negative for polydipsia and polyuria.   Genitourinary: Negative for difficulty urinating.   Musculoskeletal: Negative for arthralgias, back pain, neck pain and neck stiffness.   Skin: Negative for color change. "   Allergic/Immunologic: Positive for immunocompromised state.   Neurological: Negative for light-headedness.   Psychiatric/Behavioral: Positive for sleep disturbance and suicidal ideas ( passive, no plan).       Physical Exam   BP: 141/80  Pulse: 85  Temp: 97.6  F (36.4  C)  Resp: 16  Weight: 62.4 kg (137 lb 8 oz)  SpO2: 99 %  Physical Exam   Constitutional: He is oriented to person, place, and time. He appears well-developed and well-nourished. No distress.   HENT:   Head: Normocephalic and atraumatic.   Mouth/Throat: Oropharynx is clear and moist. No oropharyngeal exudate.   Eyes: Conjunctivae and EOM are normal. Pupils are equal, round, and reactive to light. No scleral icterus.   Neck: Normal range of motion. Neck supple.   Cardiovascular: Normal rate, normal heart sounds and intact distal pulses.    Pulses:       Dorsalis pedis pulses are 2+ on the right side, and 2+ on the left side.        Posterior tibial pulses are 2+ on the right side, and 2+ on the left side.   Pulmonary/Chest: Effort normal and breath sounds normal. No respiratory distress. He has no wheezes. He has no rales.   Abdominal: Soft. Bowel sounds are normal. There is no tenderness.   Musculoskeletal: Normal range of motion. He exhibits edema ( 1+ b/l LEs). He exhibits no tenderness.   Negative Beatriz's sign. Full range of motion, active and passive, in hips, knees, and ankles without pain. Compartments in right lower extremity are soft. No skin color changes in lower extremities.   Neurological: He is alert and oriented to person, place, and time. No cranial nerve deficit. He exhibits normal muscle tone. Coordination normal.   Skin: Skin is warm. Rash noted. He is not diaphoretic. No erythema.   Diffuse body skin excoriations without any drainage or evidence of erythema or induration.   Psychiatric:   Patient slightly withdrawn with somewhat of a flat affect. No signs of active hallucinations. He does have passive suicidal thoughts, but he  denies a plan. No homicidal thoughts.   Nursing note and vitals reviewed.      ED Course     ED Course     Procedures             Critical Care time:  none               Labs Ordered and Resulted from Time of ED Arrival Up to the Time of Departure from the ED   DRUG ABUSE SCREEN 6 CHEM DEP URINE ([facility]) - Abnormal; Notable for the following:        Result Value    Amphetamine Qual Urine   (*)     Value: Positive   Cutoff for a positive amphetamine is greater than 500 ng/mL. This is an   unconfirmed screening result to be used for medical purposes only.      Cannabinoids Qual Urine   (*)     Value: Positive   Cutoff for a positive cannabinoid is greater than 50 ng/mL. This is an   unconfirmed screening result to be used for medical purposes only.      Opiates Qualitative Urine   (*)     Value: Positive   Cutoff for a positive opiate is greater than 300 ng/mL. This is an unconfirmed   screening result to be used for medical purposes only.      All other components within normal limits            Assessments & Plan (with Medical Decision Making)   36-year-old man presenting with multiple complaints. Differential diagnosis: polysubstance abuse, major depressive disorder, schizophrenia, schizoaffective disorder, dependent edema, skin excoriations, elective cellulitis.    After thorough history and physical examination patient appears to be no acute distress.I do not see any acute medical issues that are life threatening and I do not believe that he is any further medical evaluation. He will be evaluated by a psychiatric .    6:43 AM  Patient will be signed out to my partner pending psychiatric evaluation and disposition.    I have reviewed the nursing notes.    I have reviewed the findings, diagnosis, plan and need for follow up with the patient.    New Prescriptions    No medications on file       Final diagnoses:   Polysubstance abuse       5/6/2017   [facility], [place], EMERGENCY DEPARTMENT     Farideh Johnson,  MD  05/06/17 0644

## 2017-05-23 ENCOUNTER — OFFICE VISIT (OUTPATIENT)
Dept: INFECTIOUS DISEASES | Facility: CLINIC | Age: 37
End: 2017-05-23
Attending: INTERNAL MEDICINE
Payer: COMMERCIAL

## 2017-05-23 VITALS
DIASTOLIC BLOOD PRESSURE: 99 MMHG | SYSTOLIC BLOOD PRESSURE: 135 MMHG | HEIGHT: 70 IN | BODY MASS INDEX: 19.66 KG/M2 | HEART RATE: 112 BPM | TEMPERATURE: 98.1 F | WEIGHT: 137.3 LBS

## 2017-05-23 DIAGNOSIS — Z21 ASYMPTOMATIC HUMAN IMMUNODEFICIENCY VIRUS (HIV) INFECTION STATUS (H): Primary | ICD-10-CM

## 2017-05-23 PROCEDURE — 99213 OFFICE O/P EST LOW 20 MIN: CPT | Mod: ZF

## 2017-05-23 ASSESSMENT — PAIN SCALES - GENERAL: PAINLEVEL: NO PAIN (0)

## 2017-05-23 NOTE — PROGRESS NOTES
"Infectious Disease Clinic  HIV Follow Up Visit    Chief Complaint:  RECHECK (follow up with carol IBARRA cma)    HPI:  Bc German is a 36 year old male who I follow for HIV.  I last saw the patient on 3/28/17 and started him on Triumeq.     Since that time, patient has left the sober house where he was staying after leaving [facility]. States he left because there was too much \"structure\" and \"rules\" and he didn't like the other men staying there. Started smoking marijuana again daily on 4/10 to help cope with his benzodiazepine withdrawal and started to drink alcohol daily about 1 week ago to further help with his \"withdrawal symptoms\". He also endorses IVDU one time in left hand a week ago. Injected meth that way but nothing since then.     Patient has relatively good adherence with the Triumeq and has only missed 2 doses in a row, 2 times. He has felt more fatigued, \"crappy\" and depressed since starting the Triumeq, but he also doesn't know how much of this is related his drug abuse currently. He states he feels uncomfortable \"mentally\". Has issues sleeping, poor motivation, stating \"I don't give a shit anymore\". Denies SI, plan for suicide, HI. Currently staying in homeless shelters and with friends who are trying to get him into the [employer] or established with transitional housing. Has a dry cough and runny nose. Palpitations all the time related to anxiety. Night sweats the first time he took Triumeq that thinks is withdrawal and has since resolved. Chronic nausea that relieved by marijuana. Non-bloody emesis yesterday from drinking. Feels dehydrated on Triumeq.    He has been sexually active with women only engaging in oral and vaginal sex. Endorses intermittent condom usage. Denies dysuria, hematuria, constipation or diarrhea. No concerned about STI today.     He is also no longer taking his BP meds because he is concerned about dehydration with the diuretic and hasn't taken his testosterone in " weeks and is selling the medication.     ROS: Complete 12-point ROS is negative except as noted above.    Past Medical History:  Past Medical History:   Diagnosis Date     Anxiety      Depressive disorder      Drug abuse, IV      Group A streptococcal infection 11/2014    Bacteremia/cellulitis     HCV antibody positive      HIV (human immunodeficiency virus infection) (H)      HIV (human immunodeficiency virus infection) (H)      IVDU (intravenous drug user)      Osteomyelitis of vertebra of lumbosacral region (H) 3/2011    L3, left psoas abscess       Social History:  Social History     Social History     Marital status: Single     Spouse name: N/A     Number of children: N/A     Years of education: N/A     Occupational History     Not on file.     Social History Main Topics     Smoking status: Current Every Day Smoker     Smokeless tobacco: Not on file     Alcohol use Yes      Comment: daily     Drug use: Yes     Special: Methamphetamines, Marijuana, IV      Comment: Heroine--IV, benzo's     Sexual activity: Yes     Partners: Female, Male     Other Topics Concern     Not on file     Social History Narrative    ** Merged History Encounter **            Family Medical History:  Reviewed and unchanged from prior.    Allergies:     Allergies   Allergen Reactions     Doxycycline GI Disturbance       Medications:  Current Outpatient Prescriptions   Medication Sig Dispense Refill     abacavir-dolutegravir-LamiVUDine (TRIUMEQ) 600- MG per tablet Take 1 tablet by mouth daily 3 tablet 0     levomilnacipran (FETZIMA) 80 MG 24 hr capsule Take 1 capsule (80 mg) by mouth daily 3 capsule 0     pregabalin (LYRICA) 150 MG capsule Take 2 capsules (300 mg) by mouth 2 times daily 6 capsule 0     amphetamine-dextroamphetamine (ADDERALL XR) 20 MG per 24 hr capsule Take 20 mg by mouth 2 times daily       testosterone cypionate (DEPOTESTOTERONE CYPIONATE) 200 MG/ML injection Reported on 5/23/2017  5       Immunizations:    There is  "no immunization history on file for this patient.    Exam:  BP (!) 135/99  Pulse 112  Temp 98.1  F (36.7  C) (Oral)  Ht 1.778 m (5' 10\")  Wt 62.3 kg (137 lb 4.8 oz)  BMI 19.7 kg/m2    Wt Readings from Last 2 Encounters:   05/23/17 62.3 kg (137 lb 4.8 oz)   05/06/17 62.4 kg (137 lb 8 oz)   General: Patient is visibly intoxicated, alert, NAD, dysthymic and flat affect  HEENT: NCAT, anicteric sclera, PERRL, EOMI, OP clear and MMM, neck is supple and no LAD  CV: tachycardic, nl S1/S2, no S3/S4 appreciated, no m/r/g; intact & symmetric 2+ pulses (radial)  Lungs: equal b/l air entry, no wheezing/crackles, no cough  Abd: soft, NT, ND, normal bowel sounds, no HSM or masses palpable  Ext: WWP, no BLE edema  Skin: track marks throughout arms bilaterally, several healing excoriations on face and arms. Puncture site on left hand between 2nd and 3rd knuckle is nonerythematous, no fluctuance or warmth.   Neuro: AOx3, CN 2-12 intact b/l, strength is 5/5 in UE and LE b/l, normal sensation throughout.     Labs:  T Cell Subset:  Absolute CD4   Date Value Ref Range Status   02/24/2017 825 441 - 2156 cells/uL Final     CD4 [place] T   Date Value Ref Range Status   02/24/2017 28 28 - 63 % Final     CD8 Suppressor T   Date Value Ref Range Status   02/24/2017 41 (H) 10 - 40 % Final     CD3 Mature T   Date Value Ref Range Status   02/24/2017 73 49 - 84 % Final     CD4:CD8 Ratio   Date Value Ref Range Status   02/24/2017 0.68 (L) 1.40 - 2.60 Final     WBC   Date Value Ref Range Status   04/20/2017 8.1 4.0 - 11.0 10e9/L Final     % Lymphocytes   Date Value Ref Range Status   04/20/2017 20.1 % Final     Absolute CD3   Date Value Ref Range Status   02/24/2017 2118 603 - 2990 cells/uL Final     Absolute CD8   Date Value Ref Range Status   02/24/2017 1211 125 - 1312 cells/uL Final       HIV-1 RNA Quantitative:  HIV-1 RNA Quant Result   Date Value Ref Range Status   02/28/2017 01122 (A) HIVND [Copies]/mL Final     Comment:     The MARLEN " AmpliPrep/MARLEN TaqMan HIV-1 test is an FDA-approved in vitro   nucleic   acid amplification test for the quantitation of HIV-1 RNA in human plasma   (EDTA   plasma) using the MARLEN AmpliPrep instrument for automated viral nucleic acid   extraction and the MARLEN TaqMan Analyzer or MARLEN TaqMan for automated Real   Time PCR amplification and detection of the viral nucleic acid target.   Titer results are reported in copies/ml. This assay is intended for use in   conjunction with clinical presentation and other laboratory markers of   disease   prognosis and for use as an aid in assessing viral response to antiretroviral   treatment as measured by changes in plasma HIV-1 RNA levels. This test should   not be used as a donor screening test to confirm the presence of HIV-1   infection.           Assessment and Plan:  Bc German is a 36 year old male who I follow for HIV.  I last saw the patient on 3/28/17 and started him on Triumeq.    #. HIV  Patient previously had high VL with normal CD4 count. No repeat labs since starting on Triumeq. Went to obtain routine HIV labs today, but patient left prior to getting labs as he had to catch his ride. Patient stated he would follow up next week or so. Counseled patient extensively on the importance of taking his Triumeq everyday no matter what and to call in if he wants to change medications as opposed to just stopping them. He verbalized understanding of this.  -- RTC within 1 month (early in the morning is better)  -- routine HIV labs placed in the system to be drawn whenever the patient comes back in  -- continue triumeq daily    #. Depression/Anxiety  #. Polysubstance Abuse - MJ, Meth, EtOH  #. Homelessness  Since last being seen, patient has relapsed and currently back to using IVDU, marijuana and alcohol and was intoxicated in clinic today. He is currently homeless, relying on shelters and friends. His friends are trying to get him assistance through several HIV/AIDS  charities. Patient would benefit from stable living situation and then getting him set up with MH provider, PCP, and our HIV clinic. He is willing to talk to our  and I will reach out to him for follow up. The patient's cell phone is [phone].     Follow up will be within the next month.     Unable to staff patient with my attending today as he arrived very late to clinic. Was just seen by resident and staffed over the phone with provider who agrees with my assessment and plan as outlined above.     Neno Lopez MD  PGY-2 Internal Medicine  P: [phone]

## 2017-05-23 NOTE — LETTER
"5/23/2017       RE: Bc German  [address]  [place] [address]     Dear Colleague,    Thank you for referring your patient, Bc German, to the [facility] AND INFECTIOUS DISEASES at [facility]. Please see a copy of my visit note below.    Infectious Disease Clinic  HIV Follow Up Visit    Chief Complaint:  RECHECK (follow up with carol IBARRA cma)    HPI:  Bc German is a 36 year old male who I follow for HIV.  I last saw the patient on 3/28/17 and started him on Triumeq.     Since that time, patient has left the sober house where he was staying after leaving [facility]. States he left because there was too much \"structure\" and \"rules\" and he didn't like the other men staying there. Started smoking marijuana again daily on 4/10 to help cope with his benzodiazepine withdrawal and started to drink alcohol daily about 1 week ago to further help with his \"withdrawal symptoms\". He also endorses IVDU one time in left hand a week ago. Injected meth that way but nothing since then.     Patient has relatively good adherence with the Triumeq and has only missed 2 doses in a row, 2 times. He has felt more fatigued, \"crappy\" and depressed since starting the Triumeq, but he also doesn't know how much of this is related his drug abuse currently. He states he feels uncomfortable \"mentally\". Has issues sleeping, poor motivation, stating \"I don't give a shit anymore\". Denies SI, plan for suicide, HI. Currently staying in homeless shelters and with friends who are trying to get him into the [employer] or established with transitional housing. Has a dry cough and runny nose. Palpitations all the time related to anxiety. Night sweats the first time he took Triumeq that thinks is withdrawal and has since resolved. Chronic nausea that relieved by marijuana. Non-bloody emesis yesterday from drinking. Feels dehydrated on Triumeq.    He has been sexually " active with women only engaging in oral and vaginal sex. Endorses intermittent condom usage. Denies dysuria, hematuria, constipation or diarrhea. No concerned about STI today.     He is also no longer taking his BP meds because he is concerned about dehydration with the diuretic and hasn't taken his testosterone in weeks and is selling the medication.     ROS: Complete 12-point ROS is negative except as noted above.    Past Medical History:  Past Medical History:   Diagnosis Date     Anxiety      Depressive disorder      Drug abuse, IV      Group A streptococcal infection 11/2014    Bacteremia/cellulitis     HCV antibody positive      HIV (human immunodeficiency virus infection) (H)      HIV (human immunodeficiency virus infection) (H)      IVDU (intravenous drug user)      Osteomyelitis of vertebra of lumbosacral region (H) 3/2011    L3, left psoas abscess       Social History:  Social History     Social History     Marital status: Single     Spouse name: N/A     Number of children: N/A     Years of education: N/A     Occupational History     Not on file.     Social History Main Topics     Smoking status: Current Every Day Smoker     Smokeless tobacco: Not on file     Alcohol use Yes      Comment: daily     Drug use: Yes     Special: Methamphetamines, Marijuana, IV      Comment: Heroine--IV, benzo's     Sexual activity: Yes     Partners: Female, Male     Other Topics Concern     Not on file     Social History Narrative    ** Merged History Encounter **            Family Medical History:  Reviewed and unchanged from prior.    Allergies:     Allergies   Allergen Reactions     Doxycycline GI Disturbance       Medications:  Current Outpatient Prescriptions   Medication Sig Dispense Refill     abacavir-dolutegravir-LamiVUDine (TRIUMEQ) 600- MG per tablet Take 1 tablet by mouth daily 3 tablet 0     levomilnacipran (FETZIMA) 80 MG 24 hr capsule Take 1 capsule (80 mg) by mouth daily 3 capsule 0     pregabalin  "(LYRICA) 150 MG capsule Take 2 capsules (300 mg) by mouth 2 times daily 6 capsule 0     amphetamine-dextroamphetamine (ADDERALL XR) 20 MG per 24 hr capsule Take 20 mg by mouth 2 times daily       testosterone cypionate (DEPOTESTOTERONE CYPIONATE) 200 MG/ML injection Reported on 5/23/2017  5       Immunizations:    There is no immunization history on file for this patient.    Exam:  BP (!) 135/99  Pulse 112  Temp 98.1  F (36.7  C) (Oral)  Ht 1.778 m (5' 10\")  Wt 62.3 kg (137 lb 4.8 oz)  BMI 19.7 kg/m2    Wt Readings from Last 2 Encounters:   05/23/17 62.3 kg (137 lb 4.8 oz)   05/06/17 62.4 kg (137 lb 8 oz)   General: Patient is visibly intoxicated, alert, NAD, dysthymic and flat affect  HEENT: NCAT, anicteric sclera, PERRL, EOMI, OP clear and MMM, neck is supple and no LAD  CV: tachycardic, nl S1/S2, no S3/S4 appreciated, no m/r/g; intact & symmetric 2+ pulses (radial)  Lungs: equal b/l air entry, no wheezing/crackles, no cough  Abd: soft, NT, ND, normal bowel sounds, no HSM or masses palpable  Ext: WWP, no BLE edema  Skin: track marks throughout arms bilaterally, several healing excoriations on face and arms. Puncture site on left hand between 2nd and 3rd knuckle is nonerythematous, no fluctuance or warmth.   Neuro: AOx3, CN 2-12 intact b/l, strength is 5/5 in UE and LE b/l, normal sensation throughout.     Labs:  T Cell Subset:  Absolute CD4   Date Value Ref Range Status   02/24/2017 825 441 - 2156 cells/uL Final     CD4 [place] T   Date Value Ref Range Status   02/24/2017 28 28 - 63 % Final     CD8 Suppressor T   Date Value Ref Range Status   02/24/2017 41 (H) 10 - 40 % Final     CD3 Mature T   Date Value Ref Range Status   02/24/2017 73 49 - 84 % Final     CD4:CD8 Ratio   Date Value Ref Range Status   02/24/2017 0.68 (L) 1.40 - 2.60 Final     WBC   Date Value Ref Range Status   04/20/2017 8.1 4.0 - 11.0 10e9/L Final     % Lymphocytes   Date Value Ref Range Status   04/20/2017 20.1 % Final     Absolute CD3 "   Date Value Ref Range Status   02/24/2017 2118 603 - 2990 cells/uL Final     Absolute CD8   Date Value Ref Range Status   02/24/2017 1211 125 - 1312 cells/uL Final       HIV-1 RNA Quantitative:  HIV-1 RNA Quant Result   Date Value Ref Range Status   02/28/2017 68937 (A) HIVND [Copies]/mL Final     Comment:     The MARLEN AmpliPrep/MARLEN TaqMan HIV-1 test is an FDA-approved in vitro   nucleic   acid amplification test for the quantitation of HIV-1 RNA in human plasma   (EDTA   plasma) using the MARLEN AmpliPrep instrument for automated viral nucleic acid   extraction and the MARLEN TaqMan Analyzer or MARLEN TaqMan for automated Real   Time PCR amplification and detection of the viral nucleic acid target.   Titer results are reported in copies/ml. This assay is intended for use in   conjunction with clinical presentation and other laboratory markers of   disease   prognosis and for use as an aid in assessing viral response to antiretroviral   treatment as measured by changes in plasma HIV-1 RNA levels. This test should   not be used as a donor screening test to confirm the presence of HIV-1   infection.           Assessment and Plan:  Bc German is a 36 year old male who I follow for HIV.  I last saw the patient on 3/28/17 and started him on Triumeq.    #. HIV  Patient previously had high VL with normal CD4 count. No repeat labs since starting on Triumeq. Went to obtain routine HIV labs today, but patient left prior to getting labs as he had to catch his ride. Patient stated he would follow up next week or so. Counseled patient extensively on the importance of taking his Triumeq everyday no matter what and to call in if he wants to change medications as opposed to just stopping them. He verbalized understanding of this.  -- RTC within 1 month (early in the morning is better)  -- routine HIV labs placed in the system to be drawn whenever the patient comes back in  -- continue triumeq daily    #.  Depression/Anxiety  #. Polysubstance Abuse - MJ, Meth, EtOH  #. Homelessness  Since last being seen, patient has relapsed and currently back to using IVDU, marijuana and alcohol and was intoxicated in clinic today. He is currently homeless, relying on shelters and friends. His friends are trying to get him assistance through several HIV/AIDS charities. Patient would benefit from stable living situation and then getting him set up with MH provider, PCP, and our HIV clinic. He is willing to talk to our  and I will reach out to him for follow up. The patient's cell phone is [phone].     Follow up will be within the next month.     Unable to staff patient with my attending today as he arrived very late to clinic. Was just seen by resident and staffed over the phone with provider who agrees with my assessment and plan as outlined above.     Neno Lopez MD  PGY-2 Internal Medicine  P: [phone]        Damir Cisneros MD

## 2017-05-23 NOTE — MR AVS SNAPSHOT
After Visit Summary   5/23/2017    Bc German    MRN: [id number]           Patient Information     Date Of Birth          1980        Visit Information        Provider Department      5/23/2017 11:00 AM Damir Cisneros MD [facility] and Infectious Diseases        Today's Diagnoses     Asymptomatic human immunodeficiency virus (HIV) infection status (H)    -  1       Follow-ups after your visit        Follow-up notes from your care team     Return in about 1 week (around 5/30/2017) for Physical Exam, Lab Work, Routine Visit.      Your next 10 appointments already scheduled     May 30, 2017 10:00 AM CDT   (Arrive by 9:45 AM)   Return Visit with Damir Cisneros MD   [facility] and Infectious Diseases ([facility])    [address] [place] [address]  [address] [facility] [address] [phone]            May 30, 2017 10:30 AM CDT   Lab with  LAB   [facility] Lab ([facility])    [address] [place] [address]  [address] [facility] [address] [phone]              Who to contact     If you have questions or need follow up information about today's clinic visit or your schedule please contact [facility] AND INFECTIOUS DISEASES directly at [phone].  Normal or non-critical lab and imaging results will be communicated to you by RealRiderhart, letter or phone within 4 business days after the clinic has received the results. If you do not hear from us within 7 days, please contact the clinic through RealRiderhart or phone. If you have a critical or abnormal lab result, we will notify you by phone as soon as possible.  Submit refill requests through Fluidinfo or call your pharmacy and they will forward the refill request to us. Please allow 3 business days for your refill to be completed.          Additional Information About Your Visit        Fluidinfo Information     Fluidinfo lets you send messages to your doctor, view  "your test results, renew your prescriptions, schedule appointments and more. To sign up, go to [email or web address] . Click on \"Log in\" on the left side of the screen, which will take you to the Welcome page. Then click on \"Sign up Now\" on the right side of the page.     You will be asked to enter the access code listed below, as well as some personal information. Please follow the directions to create your username and password.     Your access code is: [email or web address]  Expires: 2017  7:06 PM     Your access code will  in 90 days. If you need help or a new code, please call your [facility] clinic or [phone].        Care EveryWhere ID     This is your Care EveryWhere ID. This could be used by other organizations to access your [facility] medical records  [id number]        Your Vitals Were     Pulse Temperature Height BMI (Body Mass Index)          112 98.1  F (36.7  C) (Oral) 1.778 m (5' 10\") 19.7 kg/m2         Blood Pressure from Last 3 Encounters:   17 (!) 135/99   17 134/86   17 (!) 134/94    Weight from Last 3 Encounters:   17 62.3 kg (137 lb 4.8 oz)   17 62.4 kg (137 lb 8 oz)   17 63.5 kg (140 lb)                 Today's Medication Changes          These changes are accurate as of: 17 11:59 PM.  If you have any questions, ask your nurse or doctor.               Stop taking these medicines if you haven't already. Please contact your care team if you have questions.     lisinopril-hydrochlorothiazide 10-12.5 MG per tablet   Commonly known as:  PRINZIDE/ZESTORETIC   Stopped by:  Damir Cisneros MD                    Primary Care Provider    Physician No Ref-Primary       No address on file        Thank you!     Thank you for choosing [facility] AND INFECTIOUS DISEASES  for your care. Our goal is always to provide you with excellent care. Hearing back from our patients is one way we can continue to improve our services. Please take a few " minutes to complete the written survey that you may receive in the mail after your visit with us. Thank you!             Your Updated Medication List - Protect others around you: Learn how to safely use, store and throw away your medicines at www.disposemymeds.org.          This list is accurate as of: 5/23/17 11:59 PM.  Always use your most recent med list.                   Brand Name Dispense Instructions for use    abacavir-dolutegravir-LamiVUDine 600- MG per tablet    TRIUMEQ    3 tablet    Take 1 tablet by mouth daily       amphetamine-dextroamphetamine 20 MG per 24 hr capsule    ADDERALL XR     Take 20 mg by mouth 2 times daily       levomilnacipran 80 MG 24 hr capsule    FETZIMA    3 capsule    Take 1 capsule (80 mg) by mouth daily       pregabalin 150 MG capsule    LYRICA    6 capsule    Take 2 capsules (300 mg) by mouth 2 times daily       testosterone cypionate 200 MG/ML injection    DEPOTESTOTERONE     Reported on 5/23/2017

## 2017-05-23 NOTE — NURSING NOTE
"Chief Complaint   Patient presents with     RECHECK     follow up with berhane IBARRAimdaniella cma       Initial BP (!) 135/99  Pulse 112  Temp 98.1  F (36.7  C) (Oral)  Ht 1.778 m (5' 10\")  Wt 62.3 kg (137 lb 4.8 oz)  BMI 19.7 kg/m2 Estimated body mass index is 19.7 kg/(m^2) as calculated from the following:    Height as of this encounter: 1.778 m (5' 10\").    Weight as of this encounter: 62.3 kg (137 lb 4.8 oz).  Medication Reconciliation: complete    "

## 2017-05-25 ENCOUNTER — TELEPHONE (OUTPATIENT)
Dept: INFECTIOUS DISEASES | Facility: CLINIC | Age: 37
End: 2017-05-25

## 2017-05-25 NOTE — TELEPHONE ENCOUNTER
Phone call to pt to followup on some issues raised during his recent clinic appt related to chemical/mental hlth care and housing. He describes ongoing stress and is frustrated with his previous mental hlth and cd treatment attempts have not provided stability. I gave him info about case mgmt with [facility]. I encouraged him to utilize the behavioral walk in site at [facility]. I also encouraged him to call or come here to see me anytime.

## 2017-07-24 ENCOUNTER — HOSPITAL ENCOUNTER (EMERGENCY)
Facility: CLINIC | Age: 37
Discharge: HOME | End: 2017-07-24
Attending: EMERGENCY MEDICINE | Admitting: EMERGENCY MEDICINE
Payer: COMMERCIAL

## 2017-07-24 VITALS
RESPIRATION RATE: 16 BRPM | TEMPERATURE: 98.7 F | SYSTOLIC BLOOD PRESSURE: 124 MMHG | WEIGHT: 129.2 LBS | DIASTOLIC BLOOD PRESSURE: 97 MMHG | OXYGEN SATURATION: 100 % | HEART RATE: 103 BPM | BODY MASS INDEX: 18.54 KG/M2

## 2017-07-24 DIAGNOSIS — B20 HUMAN IMMUNODEFICIENCY VIRUS (HIV) DISEASE (H): Primary | ICD-10-CM

## 2017-07-24 DIAGNOSIS — Z21 ASYMPTOMATIC HUMAN IMMUNODEFICIENCY VIRUS (HIV) INFECTION STATUS (H): ICD-10-CM

## 2017-07-24 DIAGNOSIS — Z76.0 ENCOUNTER FOR MEDICATION REFILL: ICD-10-CM

## 2017-07-24 LAB
ALBUMIN SERPL-MCNC: 3.3 G/DL (ref 3.4–5)
ALP SERPL-CCNC: 143 U/L (ref 40–150)
ALT SERPL W P-5'-P-CCNC: 69 U/L (ref 0–70)
ANION GAP SERPL CALCULATED.3IONS-SCNC: 8 MMOL/L (ref 3–14)
ANISOCYTOSIS BLD QL SMEAR: SLIGHT
AST SERPL W P-5'-P-CCNC: 29 U/L (ref 0–45)
BASOPHILS # BLD AUTO: 0.3 10E9/L (ref 0–0.2)
BASOPHILS NFR BLD AUTO: 1.7 %
BILIRUB SERPL-MCNC: 0.3 MG/DL (ref 0.2–1.3)
BUN SERPL-MCNC: 13 MG/DL (ref 7–30)
CALCIUM SERPL-MCNC: 9.6 MG/DL (ref 8.5–10.1)
CD3 CELLS # BLD: 1831 CELLS/UL (ref 603–2990)
CD3 CELLS NFR BLD: 71 % (ref 49–84)
CD3+CD4+ CELLS # BLD: 782 CELLS/UL (ref 441–2156)
CD3+CD4+ CELLS NFR BLD: 30 % (ref 28–63)
CD3+CD4+ CELLS/CD3+CD8+ CLL BLD: 0.81 % (ref 1.4–2.6)
CD3+CD8+ CELLS # BLD: 962 CELLS/UL (ref 125–1312)
CD3+CD8+ CELLS NFR BLD: 37 % (ref 10–40)
CHLORIDE SERPL-SCNC: 103 MMOL/L (ref 94–109)
CO2 SERPL-SCNC: 28 MMOL/L (ref 20–32)
CREAT SERPL-MCNC: 0.67 MG/DL (ref 0.66–1.25)
DIFFERENTIAL METHOD BLD: ABNORMAL
EOSINOPHIL # BLD AUTO: 0 10E9/L (ref 0–0.7)
EOSINOPHIL NFR BLD AUTO: 0 %
ERYTHROCYTE [DISTWIDTH] IN BLOOD BY AUTOMATED COUNT: 17 % (ref 10–15)
GFR SERPL CREATININE-BSD FRML MDRD: ABNORMAL ML/MIN/1.7M2
GLUCOSE SERPL-MCNC: 85 MG/DL (ref 70–99)
HCT VFR BLD AUTO: 45.2 % (ref 40–53)
HGB BLD-MCNC: 14.5 G/DL (ref 13.3–17.7)
IFC SPECIMEN: ABNORMAL
IMMUNODEFICIENCY MARKERS SPEC-IMP: ABNORMAL
LYMPHOCYTES # BLD AUTO: 2.4 10E9/L (ref 0.8–5.3)
LYMPHOCYTES NFR BLD AUTO: 15.8 %
MCH RBC QN AUTO: 27.4 PG (ref 26.5–33)
MCHC RBC AUTO-ENTMCNC: 32.1 G/DL (ref 31.5–36.5)
MCV RBC AUTO: 85 FL (ref 78–100)
METAMYELOCYTES # BLD: 0.1 10E9/L
METAMYELOCYTES NFR BLD MANUAL: 0.9 %
MONOCYTES # BLD AUTO: 1.2 10E9/L (ref 0–1.3)
MONOCYTES NFR BLD AUTO: 7.9 %
MYELOCYTES # BLD: 0.4 10E9/L
MYELOCYTES NFR BLD MANUAL: 2.6 %
NEUTROPHILS # BLD AUTO: 10.7 10E9/L (ref 1.6–8.3)
NEUTROPHILS NFR BLD AUTO: 70.2 %
PLATELET # BLD AUTO: 386 10E9/L (ref 150–450)
POTASSIUM SERPL-SCNC: 4.3 MMOL/L (ref 3.4–5.3)
PROMYELOCYTES # BLD MANUAL: 0.1 10E9/L
PROMYELOCYTES NFR BLD MANUAL: 0.9 %
PROT SERPL-MCNC: 8.6 G/DL (ref 6.8–8.8)
RBC # BLD AUTO: 5.3 10E12/L (ref 4.4–5.9)
SODIUM SERPL-SCNC: 139 MMOL/L (ref 133–144)
WBC # BLD AUTO: 15.2 10E9/L (ref 4–11)

## 2017-07-24 PROCEDURE — 99282 EMERGENCY DEPT VISIT SF MDM: CPT | Performed by: EMERGENCY MEDICINE

## 2017-07-24 PROCEDURE — 99282 EMERGENCY DEPT VISIT SF MDM: CPT | Mod: Z6 | Performed by: EMERGENCY MEDICINE

## 2017-07-24 ASSESSMENT — ENCOUNTER SYMPTOMS
ABDOMINAL PAIN: 0
SLEEP DISTURBANCE: 0
DYSPHORIC MOOD: 0
FEVER: 0
NERVOUS/ANXIOUS: 0
AGITATION: 0
SHORTNESS OF BREATH: 0

## 2017-07-24 NOTE — ED AVS SNAPSHOT
[facility], Emergency Department    [address]    [place] [address]    Phone:  [phone]    Fax:  [phone]                                       Bc German   MRN: [id number]    Department:  [facility], Emergency Department   Date of Visit:  7/24/2017           Patient Information     Date Of Birth          1980        Your diagnoses for this visit were:     Encounter for medication refill        You were seen by Farideh Johnson MD.        Discharge Instructions       Please make an appointment to follow up with [facility] (phone: [phone]) and [facility]iatric Clinic (phone: [phone]) as soon as possible in order to establish primary care, obtain refills for your medications, and for further evaluation.      Future Appointments        Provider Department Dept Phone Center    8/1/2017 10:30 AM Damir Cisneros MD [facility] and Infectious Diseases [phone] [facility]      24 Hour Appointment Hotline       To make an appointment at any [facility], call [phone] ([phone]). If you don't have a family doctor or clinic, we will help you find one. [facility] clinics are conveniently located to serve the needs of you and your family.             Review of your medicines      Our records show that you are taking the medicines listed below. If these are incorrect, please call your family doctor or clinic.        Dose / Directions Last dose taken    abacavir-dolutegravir-LamiVUDine 600- MG per tablet   Commonly known as:  TRIUMEQ   Dose:  1 tablet   Quantity:  30 tablet        Take 1 tablet by mouth daily   Refills:  0        amphetamine-dextroamphetamine 20 MG per 24 hr capsule   Commonly known as:  ADDERALL XR   Dose:  20 mg        Take 20 mg by mouth 2 times daily   Refills:  0        levomilnacipran 80 MG 24 hr capsule   Commonly known as:  FETZIMA   Dose:  80 mg   Quantity:  3 capsule        Take 1 capsule (80 mg) by mouth  "daily   Refills:  0        pregabalin 150 MG capsule   Commonly known as:  LYRICA   Dose:  300 mg   Quantity:  6 capsule        Take 2 capsules (300 mg) by mouth 2 times daily   Refills:  0        testosterone cypionate 200 MG/ML injection   Commonly known as:  DEPOTESTOTERONE        Reported on 5/23/2017   Refills:  5                Orders Needing Specimen Collection     None      Pending Results     Date and Time Order Name Status Description    7/24/2017 1123 HLA SINGLE ANTIGEN ALLELE TYPE In process     7/24/2017 1115 T CELL SUBSET PROFILE In process     7/24/2017 1115 HIV-1 RNA QUANTITATIVE In process             Pending Culture Results     No orders found from 7/22/2017 to 7/25/2017.            Pending Results Instructions     If you had any lab results that were not finalized at the time of your Discharge, you can call the ED Lab Result RN at [phone]. You will be contacted by this team for any positive Lab results or changes in treatment. The nurses are available 7 days a week from 10A to 6:30P.  You can leave a message 24 hours per day and they will return your call.        Thank you for choosing [facility]       Thank you for choosing [facility] for your care. Our goal is always to provide you with excellent care. Hearing back from our patients is one way we can continue to improve our services. Please take a few minutes to complete the written survey that you may receive in the mail after you visit with us. Thank you!        [employer] Information     [employer] lets you send messages to your doctor, view your test results, renew your prescriptions, schedule appointments and more. To sign up, go to [email or web address] . Click on \"Log in\" on the left side of the screen, which will take you to the Welcome page. Then click on \"Sign up Now\" on the right side of the page.     You will be asked to enter the access code listed below, as well as some personal information. Please follow the directions to create " your username and password.     Your access code is: [email or web address]  Expires: 2017  7:06 PM     Your access code will  in 90 days. If you need help or a new code, please call your [facility] clinic or [phone].        Care EveryWhere ID     This is your Care EveryWhere ID. This could be used by other organizations to access your [facility] medical records  [id number]        Equal Access to Services     [facility] CenterSELINA : Hadii aad ku hadasho Soflashali, waaxda luqadaha, qaybta kaalmada adeegyada, tawanda parnell . So [facility] [phone].    ATENCIÓN: Si habla español, tiene a chatterjee disposición servicios gratuitos de asistencia lingüística. Llame al [phone].    We comply with applicable federal civil rights laws and [place] laws. We do not discriminate on the basis of race, color, national origin, age, disability sex, sexual orientation or gender identity.            After Visit Summary       This is your record. Keep this with you and show to your community pharmacist(s) and doctor(s) at your next visit.

## 2017-07-24 NOTE — ED PROVIDER NOTES
History     Chief Complaint   Patient presents with     Medication Refill     pt is now homeless and is unable to refill a vatiety of meds that are rx'ed by dr bossman LOTT  Bc German is a 37 year old male with a history of HIV, drug abuse, depressive disorder, and anxiety who presents to the Emergency Department for a medication refill. Patient reports that his PCP recently had a heart attack and is now on leave, due to this he is unable to get his medications. A  at [facility] told him that he needs to come to the ED in order to receive his refill. He was told that he will need to see a psychiatrist. He denies suicidal ideations. He denies hallucinations. He denies depression. No homicidal thoughts either. Patient was diagnosed with HIV in January. No fevers. No pain.    Past Medical History:   Diagnosis Date     Anxiety      Depressive disorder      Drug abuse, IV      Group A streptococcal infection 11/2014    Bacteremia/cellulitis     HCV antibody positive      HIV (human immunodeficiency virus infection) (H)      HIV (human immunodeficiency virus infection) (H)      IVDU (intravenous drug user)      Osteomyelitis of vertebra of lumbosacral region (H) 3/2011    L3, left psoas abscess       Past Surgical History:   Procedure Laterality Date     EYE SURGERY  1 year ago    pins to left eye after socket fracture     ORTHOPEDIC SURGERY      Arm Surgery     TRANSESOPHAGEAL ECHOCARDIOGRAM INTRAOPERATIVE N/A 3/17/2015    Procedure: TRANSESOPHAGEAL ECHOCARDIOGRAM INTRAOPERATIVE;  Surgeon: Generic Anesthesia Provider;  Location: [facility]       No family history on file.    Social History   Substance Use Topics     Smoking status: Current Every Day Smoker     Smokeless tobacco: Not on file     Alcohol use Yes      Comment: daily       No current facility-administered medications for this encounter.      Current Outpatient Prescriptions   Medication     abacavir-dolutegravir-LamiVUDine (TRIUMEQ)  600- MG per tablet     levomilnacipran (FETZIMA) 80 MG 24 hr capsule     pregabalin (LYRICA) 150 MG capsule     amphetamine-dextroamphetamine (ADDERALL XR) 20 MG per 24 hr capsule     testosterone cypionate (DEPOTESTOTERONE CYPIONATE) 200 MG/ML injection        Allergies   Allergen Reactions     Doxycycline GI Disturbance       I have reviewed the Medications, Allergies, Past Medical and Surgical History, and Social History in the Epic system.    Review of Systems   Constitutional: Negative for fever.   Respiratory: Negative for shortness of breath.    Cardiovascular: Negative for chest pain.   Gastrointestinal: Negative for abdominal pain.   Psychiatric/Behavioral: Negative for agitation, behavioral problems, dysphoric mood, sleep disturbance and suicidal ideas. The patient is not nervous/anxious.    All other systems reviewed and are negative.      Physical Exam   BP: (!) 135/94  Pulse: 110  Temp: 97  F (36.1  C)  Resp: 16  Weight: 58.6 kg (129 lb 3.2 oz)  SpO2: 97 %  Physical Exam   Constitutional: He is oriented to person, place, and time. No distress.   HENT:   Head: Normocephalic and atraumatic.   Eyes: Conjunctivae and EOM are normal. No scleral icterus.   Neck: Normal range of motion.   Cardiovascular: Normal heart sounds and intact distal pulses.    tachycardia   Pulmonary/Chest: Effort normal. No respiratory distress.   Musculoskeletal: Normal range of motion.   Neurological: He is alert and oriented to person, place, and time. No cranial nerve deficit. He exhibits normal muscle tone. Coordination normal.   Skin: Skin is warm. No rash noted. He is not diaphoretic.   Psychiatric: He has a normal mood and affect. His behavior is normal. Judgment and thought content normal.   No suicidal or homicidal ideations. No signs of active hallucinations. Linear thought process. Calm and co-operative.       ED Course   1:59 PM  The patient was seen and examined by Jose Alberto Johnson MD in Room 7.     ED Course      Procedures             Critical Care time:  none               Labs Ordered and Resulted from Time of ED Arrival Up to the Time of Departure from the ED - No data to display         Assessments & Plan (with Medical Decision Making)   37-year-old man presents to the Emergency Department requesting refills on his Lyrica and Adderall. Denies any active suicidal or homicidal thoughts. He denies depression. He says that his primary care physician had a heart attack and is on a medical leave for 6 months.    I informed the patient that I do not feel comfortable prescribing his medications and that I would prefer that he sees a psychiatrist for further evaluation. He agrees to do so as an outpatient. He also wanted the information for a new primary care clinic which I will provide to him. At this point he is stable for discharge.    This part of the document was transcribed by Laura Martinez Medical Scribe.      I have reviewed the nursing notes.    I have reviewed the findings, diagnosis, plan and need for follow up with the patient.    Discharge Medication List as of 7/24/2017  2:48 PM          Final diagnoses:   Encounter for medication refill   ILaura, am serving as a trained medical scribe to document services personally performed by Jose Alberto Johnson MD, based on the provider's statements to me.      Jose Alberto ECHOLS MD, was physically present and have reviewed and verified the accuracy of this note documented by Laura Martinez.       7/24/2017   [facility], [place], EMERGENCY DEPARTMENT     Farideh Johnson MD  07/24/17 0387

## 2017-07-24 NOTE — ED AVS SNAPSHOT
[facility], [place], Emergency Department    [address]    [facility] [address]    Phone:  [phone]    Fax:  [phone]                                       Bc German   MRN: [id number]    Department:  [facility], Emergency Department   Date of Visit:  7/24/2017           After Visit Summary Signature Page     I have received my discharge instructions, and my questions have been answered. I have discussed any challenges I see with this plan with the nurse or doctor.    ..........................................................................................................................................  Patient/Patient Representative Signature      ..........................................................................................................................................  Patient Representative Print Name and Relationship to Patient    ..................................................               ................................................  Date                                            Time    ..........................................................................................................................................  Reviewed by Signature/Title    ...................................................              ..............................................  Date                                                            Time

## 2017-07-25 LAB
HIV1 RNA # PLAS NAA DL=20: ABNORMAL {COPIES}/ML
HIV1 RNA SERPL NAA+PROBE-LOG#: 5.1 {LOG_COPIES}/ML
HLA SINGLE ANTIGEN ALLELE TYPE: NORMAL

## 2017-07-26 ENCOUNTER — TELEPHONE (OUTPATIENT)
Dept: INFECTIOUS DISEASES | Facility: CLINIC | Age: 37
End: 2017-07-26

## 2017-07-26 NOTE — TELEPHONE ENCOUNTER
I informed pt today that I was able to schedule a Psychiatry appt for him with [facility] in [place] for next Wed, Aug 2. He's been unsuccessful finding a Psychiatrist to refill his expried prescriptions  prescribed by his previous Psychiatrist, Dr Wallis who is currently on medical leave. Pt is in agreement with this plan.

## 2017-07-26 NOTE — PROGRESS NOTES
Submitted case report form to MD for HIV labs collected on 07/24/17.  Maribell Neff 7/26/2017   [facility] Infection Prevention  [phone]

## 2017-07-31 ENCOUNTER — HOSPITAL ENCOUNTER (EMERGENCY)
Facility: CLINIC | Age: 37
Discharge: HOME | End: 2017-08-01
Attending: EMERGENCY MEDICINE | Admitting: EMERGENCY MEDICINE
Payer: COMMERCIAL

## 2017-07-31 ENCOUNTER — TELEPHONE (OUTPATIENT)
Dept: INFECTIOUS DISEASES | Facility: CLINIC | Age: 37
End: 2017-07-31

## 2017-07-31 VITALS
RESPIRATION RATE: 18 BRPM | WEIGHT: 129 LBS | BODY MASS INDEX: 18.51 KG/M2 | SYSTOLIC BLOOD PRESSURE: 129 MMHG | DIASTOLIC BLOOD PRESSURE: 85 MMHG | OXYGEN SATURATION: 100 % | TEMPERATURE: 97.8 F

## 2017-07-31 DIAGNOSIS — M54.2 NECK PAIN: ICD-10-CM

## 2017-07-31 DIAGNOSIS — J18.9 PNEUMONIA OF LEFT LUNG DUE TO INFECTIOUS ORGANISM, UNSPECIFIED PART OF LUNG: ICD-10-CM

## 2017-07-31 DIAGNOSIS — S00.03XA CONTUSION OF FACE, SCALP AND NECK, INITIAL ENCOUNTER: ICD-10-CM

## 2017-07-31 DIAGNOSIS — S00.83XA CONTUSION OF FACE, SCALP AND NECK, INITIAL ENCOUNTER: ICD-10-CM

## 2017-07-31 DIAGNOSIS — T07.XXXA ABRASIONS OF MULTIPLE SITES: ICD-10-CM
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
## 2017-07-31 DIAGNOSIS — S10.93XA CONTUSION OF FACE, SCALP AND NECK, INITIAL ENCOUNTER: ICD-10-CM

## 2017-07-31 PROCEDURE — 99285 EMERGENCY DEPT VISIT HI MDM: CPT | Mod: 25

## 2017-07-31 NOTE — ED AVS SNAPSHOT
Emergency Department    [address] [place] [address]    Phone:  [phone]    Fax:  [phone]                                       Bc German   MRN: [id number]    Department:   Emergency Department   Date of Visit:  7/31/2017           After Visit Summary Signature Page     I have received my discharge instructions, and my questions have been answered. I have discussed any challenges I see with this plan with the nurse or doctor.    ..........................................................................................................................................  Patient/Patient Representative Signature      ..........................................................................................................................................  Patient Representative Print Name and Relationship to Patient    ..................................................               ................................................  Date                                            Time    ..........................................................................................................................................  Reviewed by Signature/Title    ...................................................              ..............................................  Date                                                            Time

## 2017-07-31 NOTE — ED AVS SNAPSHOT
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 please call your [place] clinic or [phone].        Care EveryWhere ID     This is your Care EveryWhere ID. This could be used by other organizations to access your [place] medical records  [id number]        Equal Access to Services     [facility] Center[facility] : Hadii babatunde Welch, wajulia cruz, qalennox zhangalparish rodrigues, tawanda kramer. So [facility] [phone].    ATENCIÓN: Si habla español, tiene a chatterjee disposición servicios gratuitos de asistencia lingüística. Eusebio al [phone].    We comply with applicable federal civil rights laws and [place] laws. We do not discriminate on the basis of race, color, national origin, age, disability sex, sexual orientation or gender identity.            After Visit Summary       This is your record. Keep this with you and show to your community pharmacist(s) and doctor(s) at your next visit.                  "

## 2017-08-01 ENCOUNTER — APPOINTMENT (OUTPATIENT)
Dept: GENERAL RADIOLOGY | Facility: CLINIC | Age: 37
End: 2017-08-01
Attending: EMERGENCY MEDICINE
Payer: COMMERCIAL

## 2017-08-01 ENCOUNTER — APPOINTMENT (OUTPATIENT)
Dept: CT IMAGING | Facility: CLINIC | Age: 37
End: 2017-08-01
Attending: EMERGENCY MEDICINE
Payer: COMMERCIAL

## 2017-08-01 ENCOUNTER — OFFICE VISIT (OUTPATIENT)
Dept: INFECTIOUS DISEASES | Facility: CLINIC | Age: 37
End: 2017-08-01
Attending: INTERNAL MEDICINE
Payer: COMMERCIAL

## 2017-08-01 VITALS
SYSTOLIC BLOOD PRESSURE: 153 MMHG | TEMPERATURE: 98.2 F | HEART RATE: 120 BPM | WEIGHT: 131.5 LBS | HEIGHT: 71 IN | DIASTOLIC BLOOD PRESSURE: 94 MMHG | BODY MASS INDEX: 18.41 KG/M2

## 2017-08-01 DIAGNOSIS — Z21 ASYMPTOMATIC HUMAN IMMUNODEFICIENCY VIRUS (HIV) INFECTION STATUS (H): Primary | ICD-10-CM

## 2017-08-01 PROCEDURE — 72040 X-RAY EXAM NECK SPINE 2-3 VW: CPT

## 2017-08-01 PROCEDURE — 99212 OFFICE O/P EST SF 10 MIN: CPT | Mod: ZF

## 2017-08-01 PROCEDURE — 72072 X-RAY EXAM THORAC SPINE 3VWS: CPT

## 2017-08-01 PROCEDURE — 70450 CT HEAD/BRAIN W/O DYE: CPT

## 2017-08-01 PROCEDURE — 70486 CT MAXILLOFACIAL W/O DYE: CPT

## 2017-08-01 PROCEDURE — 71020 XR CHEST 2 VW: CPT

## 2017-08-01 PROCEDURE — 25000132 ZZH RX MED GY IP 250 OP 250 PS 637: Performed by: EMERGENCY MEDICINE

## 2017-08-01 RX ORDER — PREGABALIN 150 MG/1
300 CAPSULE ORAL 2 TIMES DAILY
Qty: 6 CAPSULE | Refills: 0 | Status: CANCELLED | OUTPATIENT
Start: 2017-08-01

## 2017-08-01 RX ORDER — CYCLOBENZAPRINE HCL 10 MG
10 TABLET ORAL ONCE
Status: COMPLETED | OUTPATIENT
Start: 2017-08-01 | End: 2017-08-01

## 2017-08-01 RX ORDER — IBUPROFEN 600 MG/1
600 TABLET, FILM COATED ORAL ONCE
Status: COMPLETED | OUTPATIENT
Start: 2017-08-01 | End: 2017-08-01

## 2017-08-01 RX ORDER — TESTOSTERONE CYPIONATE 200 MG/ML
INJECTION, SOLUTION INTRAMUSCULAR
Refills: 5 | Status: CANCELLED | OUTPATIENT
Start: 2017-08-01

## 2017-08-01 RX ORDER — CYCLOBENZAPRINE HCL 10 MG
10 TABLET ORAL 3 TIMES DAILY PRN
Qty: 15 TABLET | Refills: 0 | Status: SHIPPED | OUTPATIENT
Start: 2017-08-01 | End: 2017-09-09

## 2017-08-01 RX ORDER — DEXTROAMPHETAMINE SACCHARATE, AMPHETAMINE ASPARTATE MONOHYDRATE, DEXTROAMPHETAMINE SULFATE AND AMPHETAMINE SULFATE 5; 5; 5; 5 MG/1; MG/1; MG/1; MG/1
20 CAPSULE, EXTENDED RELEASE ORAL
COMMUNITY
End: 2017-08-02

## 2017-08-01 RX ORDER — IBUPROFEN 600 MG/1
600 TABLET, FILM COATED ORAL EVERY 6 HOURS PRN
Qty: 30 TABLET | Refills: 0 | Status: SHIPPED | OUTPATIENT
Start: 2017-08-01 | End: 2018-05-04

## 2017-08-01 RX ADMIN — IBUPROFEN 600 MG: 600 TABLET ORAL at 00:33

## 2017-08-01 RX ADMIN — AMOXICILLIN AND CLAVULANATE POTASSIUM 1 TABLET: 875; 125 TABLET, FILM COATED ORAL at 02:53

## 2017-08-01 RX ADMIN — CYCLOBENZAPRINE HYDROCHLORIDE 10 MG: 10 TABLET, FILM COATED ORAL at 00:33

## 2017-08-01 ASSESSMENT — ENCOUNTER SYMPTOMS
NECK PAIN: 1
COUGH: 1

## 2017-08-01 ASSESSMENT — PAIN SCALES - GENERAL: PAINLEVEL: SEVERE PAIN (7)
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
## 2017-08-01 NOTE — MR AVS SNAPSHOT
"              After Visit Summary   2017    Bc German    MRN: [id number]           Patient Information     Date Of Birth          1980        Visit Information        Provider Department      2017 10:30 AM Damir Cisneros MD [facility] Infectious Diseases        Today's Diagnoses     Asymptomatic human immunodeficiency virus (HIV) infection status (H)    -  1       Follow-ups after your visit        Who to contact     If you have questions or need follow up information about today's clinic visit or your schedule please contact [facility] AND INFECTIOUS DISEASES directly at [phone].  Normal or non-critical lab and imaging results will be communicated to you by MyChart, letter or phone within 4 business days after the clinic has received the results. If you do not hear from us within 7 days, please contact the clinic through Tachyushart or phone. If you have a critical or abnormal lab result, we will notify you by phone as soon as possible.  Submit refill requests through Findline or call your pharmacy and they will forward the refill request to us. Please allow 3 business days for your refill to be completed.          Additional Information About Your Visit        MyChart Information     Findline lets you send messages to your doctor, view your test results, renew your prescriptions, schedule appointments and more. To sign up, go to [email or web address] . Click on \"Log in\" on the left side of the screen, which will take you to the Welcome page. Then click on \"Sign up Now\" on the right side of the page.     You will be asked to enter the access code listed below, as well as some personal information. Please follow the directions to create your username and password.     Your access code is: [email or web address]  Expires: 10/30/2017  2:49 AM     Your access code will  in 90 days. If you need help or a new code, please call your [place] clinic or [phone].   " "     Care EveryWhere ID     This is your Care EveryWhere ID. This could be used by other organizations to access your [place] medical records  [id number]        Your Vitals Were     Pulse Temperature Height BMI (Body Mass Index)          120 98.2  F (36.8  C) (Oral) 1.803 m (5' 11\") 18.34 kg/m2         Blood Pressure from Last 3 Encounters:   08/02/17 133/89   08/01/17 (!) 153/94   07/31/17 129/85    Weight from Last 3 Encounters:   08/01/17 59.6 kg (131 lb 8 oz)   07/31/17 58.5 kg (129 lb)   07/24/17 58.6 kg (129 lb 3.2 oz)              Today, you had the following     No orders found for display       Primary Care Provider Office Phone # Fax #    Damir Cisneros [phone]       [address] [place] [id number]        Equal Access to Services     [facility] : Hadii babatunde ku hadasho Soomaali, waaxda luqadaha, qaybta kaalmada adeegyada, tawanda parnell . So [facility] [phone].    ATENCIÓN: Si habla español, tiene a chatterjee disposición servicios gratuitos de asistencia lingüística. Llame al [phone].    We comply with applicable federal civil rights laws and [place] laws. We do not discriminate on the basis of race, color, national origin, age, disability sex, sexual orientation or gender identity.            Thank you!     Thank you for choosing [facility] AND INFECTIOUS DISEASES  for your care. Our goal is always to provide you with excellent care. Hearing back from our patients is one way we can continue to improve our services. Please take a few minutes to complete the written survey that you may receive in the mail after your visit with us. Thank you!             Your Updated Medication List - Protect others around you: Learn how to safely use, store and throw away your medicines at www.disposemymeds.org.          This list is accurate as of: 8/1/17 11:59 PM.  Always use your most recent med list.                   Brand Name Dispense " Instructions for use Diagnosis    abacavir-dolutegravir-LamiVUDine 600- MG per tablet    TRIUMEQ    30 tablet    Take 1 tablet by mouth daily    Human immunodeficiency virus (HIV) disease (H)       amoxicillin-clavulanate 875-125 MG per tablet    AUGMENTIN    14 tablet    Take 1 tablet by mouth 2 times daily for 7 days        cyclobenzaprine 10 MG tablet    FLEXERIL    15 tablet    Take 1 tablet (10 mg) by mouth 3 times daily as needed for muscle spasms        ibuprofen 600 MG tablet    ADVIL/MOTRIN    30 tablet    Take 1 tablet (600 mg) by mouth every 6 hours as needed for moderate pain        levomilnacipran 80 MG 24 hr capsule    FETZIMA    3 capsule    Take 1 capsule (80 mg) by mouth daily        pregabalin 150 MG capsule    LYRICA    6 capsule    Take 2 capsules (300 mg) by mouth 2 times daily
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
## 2017-08-01 NOTE — DISCHARGE INSTRUCTIONS
Pneumonia (Adult)  Pneumonia is an infection deep within the lungs. It is in the small air sacs (alveoli). Pneumonia may be caused by a virus or bacteria. Pneumonia caused by bacteria is usually treated with an antibiotic. Severe cases may need to be treated in the hospital. Milder cases can be treated at home. Symptoms usually start to get better during the first 2 days of treatment.    Home care  Follow these guidelines when caring for yourself at home:    Rest at home for the first 2 to 3 days, or until you feel stronger. Don t let yourself get overly tired when you go back to your activities.    Stay away from cigarette smoke - yours or other people s.    You may use acetaminophen or ibuprofen to control fever or pain, unless another medicine was prescribed. If you have chronic liver or kidney disease, talk with your healthcare provider before using these medicines. Also talk with your provider if you ve had a stomach ulcer or gastrointestinal bleeding. Don t give aspirin to anyone younger than 18 years of age who is ill with a fever. It may cause severe liver damage.    Your appetite may be poor, so a light diet is fine.    Drink 6 to 8 glasses of fluids every day to make sure you are getting enough fluids. Beverages can include water, sport drinks, sodas without caffeine, juices, tea, or soup. Fluids will help loosen secretions in the lung. This will make it easier for you to cough up the phlegm (sputum). If you also have heart or kidney disease, check with your healthcare provider before you drink extra fluids.    Take antibiotic medicine prescribed until it is all gone, even if you are feeling better after a few days.  Follow-up care  Follow up with your healthcare provider in the next 2 to 3 days, or as advised. This is to be sure the medicine is helping you get better.  If you are 65 or older, you should get a pneumococcal vaccine and a yearly flu (influenza) shot. You should also get these vaccines if  you have chronic lung disease like asthma, emphysema, or COPD. Recently, a second type of pneumonia vaccine has become available for everyone over 65 years old. This is in addition to the previous vaccine. Ask your provider about this.  When to seek medical advice  Call your healthcare provider right away if any of these occur:    You don t get better within the first 48 hours of treatment    Shortness of breath gets worse    Rapid breathing (more than 25 breaths per minute)    Coughing up blood    Chest pain gets worse with breathing    Fever of 100.4 F (38 C) or higher that doesn t get better with fever medicine    Weakness, dizziness, or fainting that gets worse    Thirst or dry mouth that gets worse    Sinus pain, headache, or a stiff neck    Chest pain not caused by coughing  Date Last Reviewed: 1/1/2017 2000-2017 [employer]. [address] [place] [address], [place] [address]. All rights reserved. This information is not intended as a substitute for professional medical care. Always follow your healthcare professional's instructions.          Bruises (Contusions)    A contusion is a bruise. A bruise happens when a blow to your body doesn't break the skin but does break blood vessels beneath the skin. Blood leaking from the broken vessels causes redness and swelling. As it heals, your bruise is likely to turn colors like purple, green, and yellow. This is normal. The bruise should fade in 2 or 3 weeks.  Factors that make you more likely to bruise  Almost everyone bruises now and then. Certain people do bruise more easily than others. You're more prone to bruising as you get older. That's because blood vessels become more fragile with age. You're also more likely to bruise if you have a clotting disorder such as hemophilia or take medications that reduce clotting, including aspirin, coumadin, newer agents.  When to go to the emergency room (ER)  Bruises almost always heal on their own without special  treatment. But for some people, a bad bruise can be serious. Seek medical care if you:    Have a clotting disorder such as hemophilia.    Have cirrhosis or other serious liver disease.    Take blood-thinning medications such as warfarin (Coumadin).  What to expect in the ER  A doctor will examine your bruise and ask about any health conditions you have. In some cases, you may have a test to check how well your blood clots. Other treatment will depend on your needs.  Follow-up care  Sometimes a bruise gets worse instead of better. It may become larger and more swollen. This can occur when your body walls off a small pool of blood under the skin (hematoma). In very rare cases, your doctor may need to drain excess blood from the area.  Tip:  Apply an ice pack or bag of frozen peas to a bruise (keep a thin cloth between the cold source and your skin). This can help reduce redness and swelling.   Date Last Reviewed: 12/1/2016 2000-2017 [employer]. [address] [place] [address]. All rights reserved. This information is not intended as a substitute for professional medical care. Always follow your healthcare professional's instructions.          Facial Contusion  A contusion is another word for a bruise. It happens when small blood vessels break open and leak blood into the nearby area. A facial contusion can result from a bump, hit, or fall. This may happen during sports or an accident. Symptoms of a contusion often include changes in skin color (bruising), swelling, and pain.   The swelling from the contusion should decrease in a few days. Bruising and pain may take several weeks to go away.   Home care    If you have been prescribed medicines for pain, take them as directed.    To help reduce swelling and pain, wrap a cold pack or bag of frozen peas in a thin towel. Put it on the injured area for up to 20 minutes. Do this a few times a day until the swelling goes down.     If you have  scrapes or cuts on your face requiring stiches or other closures, care for them as directed.    For the next 24 hours (or longer if instructed):    Don t drink alcohol, or use sedatives or medicines that make you sleepy.    Don t drive or operate machinery.    Avoid doing anything strenuous. Don t lift or strain.    Do not return to sports or other activity that could result in another head injury.  Note about concussion  Because the injury was to your head, it is possible that a concussion (mild brain injury) could result. You don't have signs of a concussion at this time. But symptoms can show up later. Be alert for signs and symptoms of a concussion. Seek emergency medical care if any of these develop over the next hours to days:    Headache    Nausea or vomiting    Dizziness    Sensitivity to light or noise    Unusual sleepiness or grogginess    Trouble falling asleep    Personality changes    Vision changes    Memory loss    Confusion    Trouble walking or clumsiness    Loss of consciousness (even for a short time)    Inability to be awakened   Follow-up care  Follow up with your healthcare provider or our staff as directed.  When to seek medical advice  Call your healthcare provider right away if any of these occur:    Swelling or pain that gets worse, not better    New swelling or pain    Warmth or drainage from the swollen area or from cuts or scrapes    Fluid drainage or bleeding from the nose or ears    Fever of 100.4 F (38 C) or higher, or as directed by your healthcare provider  Date Last Reviewed: 5/7/2015 2000-2017 [employer]. [address] [place] [address], [place] [id number]. All rights reserved. This information is not intended as a substitute for professional medical care. Always follow your healthcare professional's instructions.          Neck Pain    There are several possible causes of neck pain when there is no injury:    You can get a minor ligament sprain or muscle strain from a  sudden minor neck movement. Sleeping with your neck in an awkward position can also cause this.    Some people respond to emotional stress by tensing the muscles of their neck, shoulders, and upper back. Chronic spasm in these muscles can cause neck pain and sometimes headaches.    Gradual wear and tear of the joints in the spine can cause degenerative arthritis. This can be a source of occasional or chronic neck pain.    The spinal disks may bulge and put pressure on a nearby spinal nerve. This can happen as a natural result of aging or repeated small injuries to the neck. The spinal disks are the cushions between each spinal bone. This causes tingling, pain, or numbness that spreads from the neck to the shoulder, arm, or hand on one side.  Acute neck pain usually gets better in 1 to 2 weeks. Neck pain related to disk disease, arthritis in the spinal joints, or spinal stenosis can become chronic and last for months or years. Spinal stenosis is narrowing of the spinal canal.  X-rays are usually not ordered for the initial evaluation of neck pain. However, X-rays may be done if you had a forceful physical injury, such as a car accident or fall. If pain continues and doesn t respond to medical treatment, X-rays and other tests may be done at a later time.  Home care    Rest and relax the muscles. Use a comfortable pillow that supports the head. It should also help keep the spine in a neutral position. The position of the head should not be tilted forward or backward. A rolled up towel may help for a custom fit.    Some people find relief with heat. Heat can be applied with either a warm shower or bath or a moist towel heated in the microwave and massage. Others prefer cold packs. You can make an ice pack by filling a plastic bag that seals at the top with ice cubes or crushed ice and then wrapping it with a thin towel. Try both and use the method that feels best for 15 to 20 minutes, several times a day.    Whether  using ice or heat, be careful that you do not injure your skin. Never put ice directly on the skin. Always wrap the ice in a towel or other type of cloth.This is very important, especially in people with poor skin sensations.     Try to reduce your stress level. Emotional stress can lead to neck muscle tension and get in the way of or delay the healing process.    You may use over-the-counter pain medicine to control pain, unless another medicine was prescribed. If you have chronic liver or kidney disease or ever had a stomach ulcer or GI bleeding, talk with your healthcare provider before using these medicines.  Follow-up care  Follow up with your healthcare provider if your symptoms do not show signs of improvement after one week. Physical therapy or further tests may be needed.  If X-rays, CT scans, or MRI scans were taken, you will be told of any new findings that may affect your care.  Call 911  Call 911 if you have:    Sudden weakness or numbness in one or both arms    Neck swelling, difficulty or painful swallowing    Difficulty breathing    Chest pain  When to seek medical advice  Call your healthcare provider right away if any of these occur:    Pain becomes worse or spreads into one or both arm    Increasing headache    Fever of 100.4 F (38 C) or above lasting for 24 to 48 hours  Date Last Reviewed: 7/1/2016 2000-2017 [employer]. [address] [place] [address], [place] [address]. All rights reserved. This information is not intended as a substitute for professional medical care. Always follow your healthcare professional's instructions.          Scalp Contusion  A contusion is another word for bruise. It develops when small blood vessels break open and leak blood into the nearby area. A scalp contusion can result from a bump, hit, or fall. Symptoms can include changes in skin color (bruising). For instance, the skin may turn blue or black. Swelling and pain may also occur.  The swelling from the  contusion should go down in a few days. Bruising and pain may take longer to go away.  Home care  General care    You may use acetaminophen to control pain, unless another pain medicine was prescribed. Don t take aspirin or NSAIDs (nonsteroidal anti-inflammatory drugs) without talking to your provider first. These medicines increase the risk of bleeding.    To help reduce swelling and pain, apply a cold source to the injured area for up to 20 minutes at a time. Do this as often as directed. Use a cold pack or bag of ice wrapped in a thin towel. Never put a cold source directly on your skin.    If you have cuts or scrapes around the site of the contusion, be sure to care for them as directed.  Note about concussion  Because the injury was to your head, it is possible that a concussion (mild brain injury) could result. You don t have symptoms of a concussion at this time. But these can show up later. For this reason, you may be told to watch for symptoms of concussion once you re home. Seek emergency medical care if you have any of the symptoms below over the next hours to days:    Headache    Nausea or vomiting    Dizziness    Sensitivity to light or noise    Unusual sleepiness or grogginess    Trouble falling asleep    Personality changes    Vision changes    Memory loss    Confusion    Trouble walking or clumsiness    Loss of consciousness (even for a short time)    Inability to be awakened  During the time period that you re watching for concussion symptoms:    Don t drink alcohol or use sedatives or medicines that make you sleepy.    Don t drive or operate machinery.    Don t do anything strenuous, such as heavy lifting or straining.    Limit tasks that require concentration. This includes reading, watching TV, using a smartphone or computer, and playing video games.    Don t return to sports, exercise, or other activity that could result in another injury.  Ask your healthcare provider when you can safely resume  these activities.   Follow-up care  Follow up with your healthcare provider, or as directed. If imaging tests were done, they will be reviewed by a doctor. You will be told the results and any new findings that may affect your care.  When to seek medical advice  Call your healthcare provider right away if any of these occur:    Pain that worsens or that can t be relieved with medicines    New or increased swelling or bruising    Fever of 100.4 F (38 C) or higher, or as directed by your provider    Redness, warmth, or drainage from the injured area    Any depression or bony abnormality in the injured area    Fluid drainage or bleeding from the nose or ears  Call 911  Call 911 right away if any of these occur:    Stiff neck    Weakness or numbness in any part of the body    Seizures  Date Last Reviewed: 5/7/2015 2000-2017 [employer]. [address] [place] [address]. All rights reserved. This information is not intended as a substitute for professional medical care. Always follow your healthcare professional's instructions.

## 2017-08-01 NOTE — NURSING NOTE
"Chief Complaint   Patient presents with     RECHECK     follow up B20       Initial BP (!) 153/94  Pulse 120  Temp 98.2  F (36.8  C) (Oral)  Ht 1.803 m (5' 11\")  Wt 59.6 kg (131 lb 8 oz)  BMI 18.34 kg/m2 Estimated body mass index is 18.34 kg/(m^2) as calculated from the following:    Height as of this encounter: 1.803 m (5' 11\").    Weight as of this encounter: 59.6 kg (131 lb 8 oz).  Medication Reconciliation: complete  "

## 2017-08-02 ENCOUNTER — OFFICE VISIT (OUTPATIENT)
Dept: PHARMACY | Facility: CLINIC | Age: 37
End: 2017-08-02
Payer: COMMERCIAL

## 2017-08-02 DIAGNOSIS — F41.8 DEPRESSION WITH ANXIETY: ICD-10-CM

## 2017-08-02 DIAGNOSIS — Z21 ASYMPTOMATIC HUMAN IMMUNODEFICIENCY VIRUS (HIV) INFECTION STATUS (H): Primary | ICD-10-CM

## 2017-08-02 DIAGNOSIS — R79.89 LOW TESTOSTERONE: ICD-10-CM

## 2017-08-02 DIAGNOSIS — J18.9 PNEUMONIA DUE TO INFECTIOUS ORGANISM, UNSPECIFIED LATERALITY, UNSPECIFIED PART OF LUNG: ICD-10-CM

## 2017-08-02 DIAGNOSIS — R52 PAIN: ICD-10-CM

## 2017-08-02 PROCEDURE — 99606 MTMS BY PHARM EST 15 MIN: CPT | Performed by: PHARMACIST

## 2017-08-02 PROCEDURE — 99607 MTMS BY PHARM ADDL 15 MIN: CPT | Performed by: PHARMACIST

## 2017-08-02 NOTE — MR AVS SNAPSHOT
After Visit Summary   8/2/2017    Bc German    MRN: [id number]           Patient Information     Date Of Birth          1980        Visit Information        Provider Department      8/2/2017 12:00 PM Mackenzie Doherty, [facility] Infectious Diseases [facility]        Care Instructions    Recommendations from today's MTM visit:                                                      1) Repeat viral load at the end of August.      Next MTM visit: one month.     To schedule another MTM appointment, please call the clinic directly or you may call the MTM scheduling line at [phone] or toll-free at [phone].     My Clinical Pharmacist's contact information:                                                      It was a pleasure seeing you today!  Please feel free to contact me with any questions or concerns you have.      Mackenzie Doherty, [facility] Pharmacist.   [phone]      You may receive a survey about the MTM services you received.  I would appreciate your feedback to help me serve you better in the future. Please fill it out and return it when you can. Your comments will be anonymous.      My healthcare goals:                                                      Stay sober. Become undetectable and remain that way.                 Follow-ups after your visit        Who to contact     If you have questions or need follow up information about today's clinic visit or your schedule please contact [facility] AND INFECTIOUS DISEASES [facility] directly at No information on file..  Normal or non-critical lab and imaging results will be communicated to you by MyChart, letter or phone within 4 business days after the clinic has received the results. If you do not hear from us within 7 days, please contact the clinic through MyChart or phone. If you have a critical or abnormal lab result, we will notify you by phone as soon as possible.  Submit refill requests through [employer] or  "call your pharmacy and they will forward the refill request to us. Please allow 3 business days for your refill to be completed.          Additional Information About Your Visit        MyChart Information     [employer]. [employer] lets you send messages to your doctor, view your test results, renew your prescriptions, schedule appointments and more. To sign up, go to [email or web address] . Click on \"Log in\" on the left side of the screen, which will take you to the Welcome page. Then click on \"Sign up Now\" on the right side of the page.     You will be asked to enter the access code listed below, as well as some personal information. Please follow the directions to create your username and password.     Your access code is: [email or web address]  Expires: 10/30/2017  2:49 AM     Your access code will  in 90 days. If you need help or a new code, please call your [place] clinic or [phone].        Care EveryWhere ID     This is your Care EveryWhere ID. This could be used by other organizations to access your [place] medical records  [id number]        Your Vitals Were     Pulse                   61            Blood Pressure from Last 3 Encounters:   17 133/89   17 (!) 153/94   17 129/85    Weight from Last 3 Encounters:   17 131 lb 8 oz (59.6 kg)   17 129 lb (58.5 kg)   17 129 lb 3.2 oz (58.6 kg)              Today, you had the following     No orders found for display       Primary Care Provider Office Phone # Fax #    Damir Cisneros [phone]        [facility] [address] [facility] [address]  [facility] [address]        Equal Access to Services     ALMA MERCEDES : Hadii babatunde Welch, patti cruz, nelson zhagnaltawanda bhatia. So [facility] [phone].    ATENCIÓN: Si habla español, tiene a chatterjee disposición servicios gratuitos de asistencia lingüística. Llame al [phone].    We comply with applicable federal civil " rights laws and [place] laws. We do not discriminate on the basis of race, color, national origin, age, disability sex, sexual orientation or gender identity.            Thank you!     Thank you for choosing [facility] AND INFECTIOUS DISEASES [facility]  for your care. Our goal is always to provide you with excellent care. Hearing back from our patients is one way we can continue to improve our services. Please take a few minutes to complete the written survey that you may receive in the mail after your visit with us. Thank you!             Your Updated Medication List - Protect others around you: Learn how to safely use, store and throw away your medicines at www.disposemymeds.org.          This list is accurate as of: 8/2/17 11:59 PM.  Always use your most recent med list.                   Brand Name Dispense Instructions for use Diagnosis    abacavir-dolutegravir-LamiVUDine 600- MG per tablet    TRIUMEQ    30 tablet    Take 1 tablet by mouth daily    Human immunodeficiency virus (HIV) disease (H)       amoxicillin-clavulanate 875-125 MG per tablet    AUGMENTIN    14 tablet    Take 1 tablet by mouth 2 times daily for 7 days        clonazePAM 0.5 MG tablet    klonoPIN    180 tablet    Take 1 tablet (0.5 mg) by mouth 2 times daily as needed for anxiety        cyclobenzaprine 10 MG tablet    FLEXERIL    15 tablet    Take 1 tablet (10 mg) by mouth 3 times daily as needed for muscle spasms        ibuprofen 600 MG tablet    ADVIL/MOTRIN    30 tablet    Take 1 tablet (600 mg) by mouth every 6 hours as needed for moderate pain        levomilnacipran 80 MG 24 hr capsule    FETZIMA    3 capsule    Take 1 capsule (80 mg) by mouth daily        pregabalin 150 MG capsule    LYRICA    6 capsule    Take 2 capsules (300 mg) by mouth 2 times daily
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
## 2017-08-03 VITALS — SYSTOLIC BLOOD PRESSURE: 133 MMHG | HEART RATE: 61 BPM | DIASTOLIC BLOOD PRESSURE: 89 MMHG

## 2017-08-03 RX ORDER — CLONAZEPAM 0.5 MG/1
0.5 TABLET ORAL 2 TIMES DAILY PRN
Qty: 180 TABLET | COMMUNITY
Start: 2017-08-03 | End: 2017-09-09

## 2017-08-03 NOTE — PATIENT INSTRUCTIONS
Recommendations from today's MTM visit:                                                      1) Repeat viral load at the end of August.      Next MTM visit: one month.     To schedule another MTM appointment, please call the clinic directly or you may call the MTM scheduling line at [phone] or toll-free at [phone].     My Clinical Pharmacist's contact information:                                                      It was a pleasure seeing you today!  Please feel free to contact me with any questions or concerns you have.      Mackenzie Doherty, MTM Pharmacist.   [phone]      You may receive a survey about the MTM services you received.  I would appreciate your feedback to help me serve you better in the future. Please fill it out and return it when you can. Your comments will be anonymous.      My healthcare goals:                                                      Stay sober. Become undetectable and remain that way.

## 2017-08-10 ENCOUNTER — HOSPITAL ENCOUNTER (EMERGENCY)
Facility: CLINIC | Age: 37
Discharge: HOME | End: 2017-08-11
Attending: EMERGENCY MEDICINE | Admitting: EMERGENCY MEDICINE
Payer: COMMERCIAL

## 2017-08-10 ENCOUNTER — APPOINTMENT (OUTPATIENT)
Dept: GENERAL RADIOLOGY | Facility: CLINIC | Age: 37
End: 2017-08-10
Attending: EMERGENCY MEDICINE
Payer: COMMERCIAL

## 2017-08-10 DIAGNOSIS — R09.2 RESPIRATORY ARREST (H): ICD-10-CM

## 2017-08-10 DIAGNOSIS — T40.1X1A HEROIN OVERDOSE, ACCIDENTAL OR UNINTENTIONAL, INITIAL ENCOUNTER (H): ICD-10-CM

## 2017-08-10 DIAGNOSIS — Z21 ASYMPTOMATIC HUMAN IMMUNODEFICIENCY VIRUS (HIV) INFECTION STATUS (H): ICD-10-CM

## 2017-08-10 DIAGNOSIS — J69.0 ASPIRATION PNEUMONIA, UNSPECIFIED ASPIRATION PNEUMONIA TYPE, UNSPECIFIED LATERALITY, UNSPECIFIED PART OF LUNG (H): ICD-10-CM

## 2017-08-10 LAB
BASOPHILS # BLD AUTO: 0.1 10E9/L (ref 0–0.2)
BASOPHILS NFR BLD AUTO: 0.3 %
DIFFERENTIAL METHOD BLD: ABNORMAL
EOSINOPHIL # BLD AUTO: 0.6 10E9/L (ref 0–0.7)
EOSINOPHIL NFR BLD AUTO: 3.3 %
ERYTHROCYTE [DISTWIDTH] IN BLOOD BY AUTOMATED COUNT: 15.7 % (ref 10–15)
HCT VFR BLD AUTO: 35.6 % (ref 40–53)
HGB BLD-MCNC: 11.2 G/DL (ref 13.3–17.7)
IMM GRANULOCYTES # BLD: 0.3 10E9/L (ref 0–0.4)
IMM GRANULOCYTES NFR BLD: 1.4 %
LYMPHOCYTES # BLD AUTO: 1.2 10E9/L (ref 0.8–5.3)
LYMPHOCYTES NFR BLD AUTO: 6.6 %
MCH RBC QN AUTO: 27.1 PG (ref 26.5–33)
MCHC RBC AUTO-ENTMCNC: 31.5 G/DL (ref 31.5–36.5)
MCV RBC AUTO: 86 FL (ref 78–100)
MONOCYTES # BLD AUTO: 0.9 10E9/L (ref 0–1.3)
MONOCYTES NFR BLD AUTO: 5 %
NEUTROPHILS # BLD AUTO: 15.3 10E9/L (ref 1.6–8.3)
NEUTROPHILS NFR BLD AUTO: 83.4 %
NRBC # BLD AUTO: 0 10*3/UL
NRBC BLD AUTO-RTO: 0 /100
PLATELET # BLD AUTO: 384 10E9/L (ref 150–450)
RBC # BLD AUTO: 4.13 10E12/L (ref 4.4–5.9)
WBC # BLD AUTO: 18.3 10E9/L (ref 4–11)

## 2017-08-10 PROCEDURE — 40000556 ZZH STATISTIC PERIPHERAL IV START W US GUIDANCE

## 2017-08-10 PROCEDURE — 99285 EMERGENCY DEPT VISIT HI MDM: CPT | Mod: 25 | Performed by: EMERGENCY MEDICINE

## 2017-08-10 PROCEDURE — 40000275 ZZH STATISTIC RCP TIME EA 10 MIN

## 2017-08-10 PROCEDURE — 80053 COMPREHEN METABOLIC PANEL: CPT | Performed by: EMERGENCY MEDICINE

## 2017-08-10 PROCEDURE — 99291 CRITICAL CARE FIRST HOUR: CPT | Mod: Z6 | Performed by: EMERGENCY MEDICINE

## 2017-08-10 PROCEDURE — 85025 COMPLETE CBC W/AUTO DIFF WBC: CPT | Performed by: EMERGENCY MEDICINE

## 2017-08-10 PROCEDURE — 71010 XR CHEST PORT 1 VW: CPT

## 2017-08-10 PROCEDURE — 99207 ZZC APP CREDIT; MD BILLING SHARED VISIT: CPT | Mod: Z6 | Performed by: EMERGENCY MEDICINE

## 2017-08-10 NOTE — ED AVS SNAPSHOT
[facility], [place], Emergency Department    [address] [place] [id number]    Phone:  [phone]                                       Bc German   MRN: [id number]    Department:  [facility], Emergency Department   Date of Visit:  8/10/2017           After Visit Summary Signature Page     I have received my discharge instructions, and my questions have been answered. I have discussed any challenges I see with this plan with the nurse or doctor.    ..........................................................................................................................................  Patient/Patient Representative Signature      ..........................................................................................................................................  Patient Representative Print Name and Relationship to Patient    ..................................................               ................................................  Date                                            Time    ..........................................................................................................................................  Reviewed by Signature/Title    ...................................................              ..............................................  Date                                                            Time

## 2017-08-10 NOTE — ED AVS SNAPSHOT
[facility], Emergency Department    [address] [facility] [address]    Phone:  [phone]                                       Bc German   MRN: [id number]    Department:  [facility], Emergency Department   Date of Visit:  8/10/2017           Patient Information     Date Of Birth          1980        Your diagnoses for this visit were:     Respiratory arrest (H)     Heroin overdose, accidental or unintentional, initial encounter     Aspiration pneumonia, unspecified aspiration pneumonia type, unspecified laterality, unspecified part of lung (H)     Asymptomatic human immunodeficiency virus (HIV) infection status (H)        You were seen by Xuan Patrick MD and Armin Jain MD.        Discharge Instructions       Follow up with your clinic doctor as soon as possible.    Discharge References/Attachments     OVERDOSE, OPIATE (ENGLISH)    ADULT, PNEUMONIA (ENGLISH)      24 Hour Appointment Hotline       To make an appointment at any [facility], call [phone] ([phone]). If you don't have a family doctor or clinic, we will help you find one. [facility] clinics are conveniently located to serve the needs of you and your family.             Review of your medicines      START taking        Dose / Directions Last dose taken    clindamycin 300 MG capsule   Commonly known as:  CLEOCIN   Dose:  600 mg   Quantity:  80 capsule        Take 2 capsules (600 mg) by mouth 4 times daily for 10 days   Refills:  0        levofloxacin 750 MG tablet   Commonly known as:  LEVAQUIN   Dose:  750 mg   Quantity:  7 tablet        Take 1 tablet (750 mg) by mouth daily for 7 days   Refills:  0        naloxone nasal spray   Commonly known as:  NARCAN   Dose:  4 mg   Quantity:  0.2 mL        Spray 1 spray (4 mg) into one nostril alternating nostrils as needed for opioid reversal (every 2-3 minutes until assistance arrives.)   Refills:  11                Prescriptions were sent or printed at  "these locations (3 Prescriptions)                   Other Prescriptions                Printed at Department/Unit printer (3 of 3)         naloxone (NARCAN) nasal spray               levofloxacin (LEVAQUIN) 750 MG tablet               clindamycin (CLEOCIN) 300 MG capsule                Procedures and tests performed during your visit     CBC with platelets differential    Chest  XR, 1 view portable    Comprehensive metabolic panel    Vascular Access Care Adult IP Consult      Orders Needing Specimen Collection     None      Pending Results     Date and Time Order Name Status Description    8/10/2017 2248 Chest  XR, 1 view portable Preliminary             Pending Culture Results     No orders found for last 3 day(s).            Pending Results Instructions     If you had any lab results that were not finalized at the time of your Discharge, you can call the ED Lab Result RN at [phone]. You will be contacted by this team for any positive Lab results or changes in treatment. The nurses are available 7 days a week from 10A to 6:30P.  You can leave a message 24 hours per day and they will return your call.        Thank you for choosing [facility]       Thank you for choosing [facility] for your care. Our goal is always to provide you with excellent care. Hearing back from our patients is one way we can continue to improve our services. Please take a few minutes to complete the written survey that you may receive in the mail after you visit with us. Thank you!        [employer] Information     [employer] lets you send messages to your doctor, view your test results, renew your prescriptions, schedule appointments and more. To sign up, go to [email or web address] . Click on \"Log in\" on the left side of the screen, which will take you to the Welcome page. Then click on \"Sign up Now\" on the right side of the page.     You will be asked to enter the access code listed below, as well as some personal information. Please follow " the directions to create your username and password.     Your access code is: [email or web address]  Expires: 2017  7:33 AM     Your access code will  in 90 days. If you need help or a new code, please call your [facility] clinic or [phone].        Care EveryWhere ID     This is your Care EveryWhere ID. This could be used by other organizations to access your [facility] medical records  [id number]        Equal Access to Services     [facility] HospitalSELINA : Hadii aad ku hadasho Soflashali, waaxda luqadaha, qaybta kaalmada adeegyafrankie, tawanda parnell . So [facility] [phone].    ATENCIÓN: Si habla español, tiene a chatterjee disposición servicios gratuitos de asistencia lingüística. Llame al [phone].    We comply with applicable federal civil rights laws and [place] laws. We do not discriminate on the basis of race, color, national origin, age, disability sex, sexual orientation or gender identity.            After Visit Summary       This is your record. Keep this with you and show to your community pharmacist(s) and doctor(s) at your next visit.

## 2017-08-11 VITALS
DIASTOLIC BLOOD PRESSURE: 77 MMHG | OXYGEN SATURATION: 97 % | SYSTOLIC BLOOD PRESSURE: 116 MMHG | HEART RATE: 95 BPM | RESPIRATION RATE: 16 BRPM | TEMPERATURE: 99.5 F

## 2017-08-11 LAB
ALBUMIN SERPL-MCNC: 2.9 G/DL (ref 3.4–5)
ALP SERPL-CCNC: 113 U/L (ref 40–150)
ALT SERPL W P-5'-P-CCNC: 80 U/L (ref 0–70)
ANION GAP SERPL CALCULATED.3IONS-SCNC: 8 MMOL/L (ref 3–14)
AST SERPL W P-5'-P-CCNC: 58 U/L (ref 0–45)
BILIRUB SERPL-MCNC: 0.2 MG/DL (ref 0.2–1.3)
BUN SERPL-MCNC: 10 MG/DL (ref 7–30)
CALCIUM SERPL-MCNC: 8.7 MG/DL (ref 8.5–10.1)
CHLORIDE SERPL-SCNC: 104 MMOL/L (ref 94–109)
CO2 SERPL-SCNC: 29 MMOL/L (ref 20–32)
CREAT SERPL-MCNC: 0.86 MG/DL (ref 0.66–1.25)
GFR SERPL CREATININE-BSD FRML MDRD: ABNORMAL ML/MIN/1.7M2
GLUCOSE SERPL-MCNC: 176 MG/DL (ref 70–99)
POTASSIUM SERPL-SCNC: 3.3 MMOL/L (ref 3.4–5.3)
PROT SERPL-MCNC: 8 G/DL (ref 6.8–8.8)
SODIUM SERPL-SCNC: 141 MMOL/L (ref 133–144)

## 2017-08-11 PROCEDURE — 25000128 H RX IP 250 OP 636: Performed by: EMERGENCY MEDICINE

## 2017-08-11 PROCEDURE — S0077 INJECTION, CLINDAMYCIN PHOSP: HCPCS | Performed by: EMERGENCY MEDICINE

## 2017-08-11 PROCEDURE — 25000125 ZZHC RX 250: Performed by: EMERGENCY MEDICINE

## 2017-08-11 PROCEDURE — 96368 THER/DIAG CONCURRENT INF: CPT | Performed by: EMERGENCY MEDICINE

## 2017-08-11 PROCEDURE — 96365 THER/PROPH/DIAG IV INF INIT: CPT | Performed by: EMERGENCY MEDICINE

## 2017-08-11 PROCEDURE — 96366 THER/PROPH/DIAG IV INF ADDON: CPT | Performed by: EMERGENCY MEDICINE

## 2017-08-11 RX ORDER — LEVOFLOXACIN 750 MG/1
750 TABLET, FILM COATED ORAL DAILY
Qty: 7 TABLET | Refills: 0 | Status: SHIPPED | OUTPATIENT
Start: 2017-08-11 | End: 2017-08-18

## 2017-08-11 RX ORDER — CLINDAMYCIN HCL 300 MG
600 CAPSULE ORAL 4 TIMES DAILY
Qty: 80 CAPSULE | Refills: 0 | Status: SHIPPED | OUTPATIENT
Start: 2017-08-11 | End: 2017-08-21

## 2017-08-11 RX ORDER — LEVOFLOXACIN 5 MG/ML
750 INJECTION, SOLUTION INTRAVENOUS ONCE
Status: COMPLETED | OUTPATIENT
Start: 2017-08-11 | End: 2017-08-11

## 2017-08-11 RX ORDER — CLINDAMYCIN PHOSPHATE 600 MG/50ML
600 INJECTION, SOLUTION INTRAVENOUS ONCE
Status: COMPLETED | OUTPATIENT
Start: 2017-08-11 | End: 2017-08-11

## 2017-08-11 RX ADMIN — CLINDAMYCIN PHOSPHATE 600 MG: 12 INJECTION, SOLUTION INTRAVENOUS at 01:26

## 2017-08-11 RX ADMIN — LEVOFLOXACIN 750 MG: 5 INJECTION, SOLUTION INTRAVENOUS at 01:06

## 2017-08-11 NOTE — ED PROVIDER NOTES
History     Chief Complaint   Patient presents with     Drug Overdose     The history is provided by a friend. The history is limited by the condition of the patient.     Bc German is a 37 year old male with HIV and hepatitis C was brought in by a friend him unconscious and apneic with reports that he likely overdosed on heroin.  The friend had indicated to the nurse that there wasn t a needle or syringe at the scene.  They stated the patient is homeless, and they came upon him in this condition.  Patient was unable to provide any history.      This part of the medical record was transcribed by Alana Harper Scribshaheed, from a dictation done by Xuan Patrick MD.     PAST MEDICAL HISTORY  History reviewed. No pertinent past medical history.  PAST SURGICAL HISTORY  No past surgical history on file.  FAMILY HISTORY  No family history on file.  SOCIAL HISTORY  Social History   Substance Use Topics     Smoking status: Not on file     Smokeless tobacco: Not on file     Alcohol use Not on file     MEDICATIONS  No current facility-administered medications for this encounter.      Current Outpatient Prescriptions   Medication     naloxone (NARCAN) nasal spray     levofloxacin (LEVAQUIN) 750 MG tablet     clindamycin (CLEOCIN) 300 MG capsule     ALLERGIES  No Known Allergies    I have reviewed the Medications, Allergies, Past Medical and Surgical History, and Social History in the Epic system.    Review of Systems   Unable to perform ROS: Mental status change       Physical Exam   BP: (!) 177/106  Heart Rate: 125  Temp: 99.5  F (37.5  C)  Resp: (!) 6  SpO2: (!) 85 %  Physical Exam   Constitutional: No distress.   HENT:   Head: Atraumatic.   Mouth/Throat: Oropharynx is clear and moist. No oropharyngeal exudate.   Eyes: Pupils are equal, round, and reactive to light. No scleral icterus.   Pinpoint pupils   Cardiovascular: Normal heart sounds and intact distal pulses.    tachy   Pulmonary/Chest:   Apneic. Gurgling  breath sounds with bagging.   Abdominal: Soft. There is no tenderness.   Musculoskeletal: He exhibits no deformity.   Skin: Skin is warm. No rash noted. He is not diaphoretic.       ED Course     ED Course     Procedures             Critical Care time:  was 30 minutes for this patient excluding procedures.               Labs Ordered and Resulted from Time of ED Arrival Up to the Time of Departure from the ED   CBC WITH PLATELETS DIFFERENTIAL - Abnormal; Notable for the following:        Result Value    WBC 18.3 (*)     RBC Count 4.13 (*)     Hemoglobin 11.2 (*)     Hematocrit 35.6 (*)     RDW 15.7 (*)     Absolute Neutrophil 15.3 (*)     All other components within normal limits   COMPREHENSIVE METABOLIC PANEL - Abnormal; Notable for the following:     Potassium 3.3 (*)     Glucose 176 (*)     Albumin 2.9 (*)     ALT 80 (*)     AST 58 (*)     All other components within normal limits            Assessments & Plan (with Medical Decision Making)   Patient was immediately given 4 mg of intranasal Narcan with minimal response.  He was initially completely apneic though had a pulse.  After the intranasal Narcan, we started to bag immediately.  After the Narcan, he was noted to have very minimal respiratory drive, making gas approximately once every 20 seconds.  Immediate IV access was not able to be obtained given the scarring of his blood vessels, so IO was used.  He was given 0.4 mg of Narcan, and after a of couple minutes he did rouse and start breathing independently.  He does admit to using a  heroin tonight.     He was noted to have some gurgling respirations.  It was noted that he did have some small amount of emesis.  Chest x-ray was done which does show some concern for aspiration pneumonia.  The patient tells me that he is currently being treated with Augmentin for pneumonia and has been on this for several days.  He states he still has a few days left.    I did strongly recommend admission to the patient for  close monitoring and antibiotics for his pneumonia.  He declines.  He states he is going to stay in the ER for a little while longer to be monitored, but does not wish to be admitted.  He does verbalize understanding that he had a near death experience tonight and failure to be monitored for an appropriate amount time or be treated appropriately for what appears to be aspiration pneumonia could lead to death or permanent disability.  I agree for him to stay for little bit longer, and I will give him a dose of clindamycin and Levaquin IV. I will give a prescription for this at discharge as well as nasal Narcan.  Patient will be signed off to the oncoming physician at this time with the above plan.    This part of the medical record was transcribed by Mal Shea Medical Scribe, from a dictation done by Xuan Patrick MD.     ADDENDUM: Pt slept the night in the ER, and accepted at shift change sign out in the am. He reports that sputum has turned from brown to yellow overnight. Sats maintained on RA overnight. He reports that he is not a chronic heroin user - this was the first use in a while. He is not suicidal, and he denies that this was a drug overdose. He typically uses marijuana. He is not interested in detox or treatment. He continues to decline admission. Encouraged to use the abx prescribed, fill and carry the nasal narcan, and to f/u asap with PMD.      I have reviewed the nursing notes.    I have reviewed the findings, diagnosis, plan and need for follow up with the patient.    New Prescriptions    CLINDAMYCIN (CLEOCIN) 300 MG CAPSULE    Take 2 capsules (600 mg) by mouth 4 times daily for 10 days    LEVOFLOXACIN (LEVAQUIN) 750 MG TABLET    Take 1 tablet (750 mg) by mouth daily for 7 days    NALOXONE (NARCAN) NASAL SPRAY    Spray 1 spray (4 mg) into one nostril alternating nostrils as needed for opioid reversal (every 2-3 minutes until assistance arrives.)       Final diagnoses:   Respiratory arrest (H)    Heroin overdose, accidental or unintentional, initial encounter   Aspiration pneumonia, unspecified aspiration pneumonia type, unspecified laterality, unspecified part of lung (H)   Asymptomatic human immunodeficiency virus (HIV) infection status (H)       8/10/2017   [facility], [place], EMERGENCY DEPARTMENT     Xuan Patrick MD  08/11/17 0144       Xuan Patrick MD  08/11/17 0770

## 2017-08-11 NOTE — ED NOTES
Patient was signed out to me by Dr. Patrick at 2:00 AM.  Please see their note for full details and history.  Patient has a history of HIV and opiate dependence.  Presents with apnea likely due to opioid overdose.  Given 4 mg of intranasal Narcan in addition to 0.4 mg of interosseous Narcan patient significantly improved.  Did have some concern for possible aspiration pneumonia.  Given IV Levaquin and clindamycin here.  Did offer admission to the patient however refused.  Dr. Patrick did discuss risk of death.  Plan at time of sign out is to monitor in the ER.  Will re-offer admission.  Patient be sent home with oral anabiotic for possible aspiration.  Likely discharge in the morning..       Course in the ED:  Patient slept through the night.  Patient signed back out to Dr. Patrick for repeat evaluation.  Likely will be discharged.      Armin Jain MD  08/11/17 0731

## 2017-08-11 NOTE — ED NOTES
Pt brought in by car to ambulance. Friend states patient overdosed on heroine. Patient was not breathing at time of arrival 4 mg intranasal narcan given with no response. IO placed in right shin, 0.4 mg IV narcan given. Patient responds to voice and is breathing on his own. MD at bedside using BVM

## 2017-08-11 NOTE — ED NOTES
"Pt at one point was crying and stating that he needs help, he wants a normal life, be able to date, and so fourth. But he can't because of the chronicity of his heroine abuse and use. Also stated, \" I am mentally ill with HIV and hep B who wants to date me.\" Talked to him about treatment, But he states, \" treatments are just for money making, they don't  help\". Pt going to back and forth with wanting help and not wanting help.  "

## 2017-08-25 DIAGNOSIS — B20 HUMAN IMMUNODEFICIENCY VIRUS (HIV) DISEASE (H): ICD-10-CM

## 2017-08-25 RX ORDER — ABACAVIR SULFATE, DOLUTEGRAVIR SODIUM, LAMIVUDINE 600; 50; 300 MG/1; MG/1; MG/1
TABLET, FILM COATED ORAL
Qty: 30 TABLET | Refills: 0 | Status: SHIPPED | OUTPATIENT
Start: 2017-08-25 | End: 2017-10-04

## 2017-09-05 ENCOUNTER — TELEPHONE (OUTPATIENT)
Dept: PHARMACY | Facility: CLINIC | Age: 37
End: 2017-09-05

## 2017-09-05 NOTE — TELEPHONE ENCOUNTER
WAYNE for pt to call me. (due for viral load, check on medication adherence).  Mackenzie Doherty, [place] Pharmacist.   [phone]

## 2017-09-07 ENCOUNTER — TELEPHONE (OUTPATIENT)
Dept: PHARMACY | Facility: CLINIC | Age: 37
End: 2017-09-07

## 2017-09-07 NOTE — TELEPHONE ENCOUNTER
"Clinical Consult:                                                    Bc German is a 37 year old male called for a clinical pharmacist consult.     Reason for Consult: Possible side effects.    Discussion: Bc reports he believes he is experiencing side effects from Seroquel. Reports he was just started on this from his new psychiatrist, Dinesh Fischer at [facility]. Reports he was also told to discontinue clonazepam. Bc reports he is feeling very tired, and feels his \"muscles are very weak\". Reports he is having a \"hard time walking\", and feels pain in his hips. Pt reports he is in a safe place at his father's house.     Plan:  1. Reviewed for drug interactions and found that pregabalin (Lyrica) (pt takes a high dose of 300 mg twice daily), in combo with seroquel may increase risk of CNS depression, psychomotor impairment, adverse effects.  2. I called Pt's psychiatrist, at [phone] and left a message on the nurse line.  3. Pt also asked to call to talk to MD.  4. Reminded patient he is due for B20 viral load.     Mackenzie Doherty, [facility] Pharmacist.   [phone]      "

## 2017-09-08 ENCOUNTER — TELEPHONE (OUTPATIENT)
Dept: PHARMACY | Facility: CLINIC | Age: 37
End: 2017-09-08

## 2017-09-08 NOTE — TELEPHONE ENCOUNTER
Bc called to report he spoke to his psychiatrist today who told him to continue taking Seroquel. Bc said the doctor told him there were no drug interactions between Lyrica and Seroquel. Bc reports he would like me to contact Dr. Cisneros to see if he would prescribe clonazepam, which he has been off of and he believes he is having withdrawal symptoms (Pain) from. Pt reports he continues to take Triumeq everyday, with no missed doses and no known side effects.    I explained to Bc that Dr. Cisneros is his ID doctor and does not get involved with psychiatry. Pt is encouraged to continue to speak to Dr. Fischer, continue to build a relationship with him and work on medication changes/adjustments. Pt is instructed to go to the ED if needed.    Mackenzie Doherty, [facility] Pharmacist.   [phone]

## 2017-09-09 ENCOUNTER — HOSPITAL ENCOUNTER (INPATIENT)
Facility: CLINIC | Age: 37
LOS: 11 days | Discharge: NURSING, REHAB OR LONG-TERM CARE | DRG: 471 | End: 2017-09-21
Attending: EMERGENCY MEDICINE | Admitting: NEUROLOGICAL SURGERY
Payer: COMMERCIAL

## 2017-09-09 ENCOUNTER — APPOINTMENT (OUTPATIENT)
Dept: MRI IMAGING | Facility: CLINIC | Age: 37
DRG: 471 | End: 2017-09-09
Attending: EMERGENCY MEDICINE
Payer: COMMERCIAL

## 2017-09-09 DIAGNOSIS — R27.0 ATAXIA: ICD-10-CM

## 2017-09-09 DIAGNOSIS — F51.01 PRIMARY INSOMNIA: Primary | ICD-10-CM

## 2017-09-09 DIAGNOSIS — A41.01 SEPSIS DUE TO METHICILLIN SUSCEPTIBLE STAPHYLOCOCCUS AUREUS (H): ICD-10-CM

## 2017-09-09 DIAGNOSIS — T50.905D MEDICATION SIDE EFFECT, SUBSEQUENT ENCOUNTER: ICD-10-CM

## 2017-09-09 DIAGNOSIS — T50.905A MEDICATION SIDE EFFECT, INITIAL ENCOUNTER: ICD-10-CM

## 2017-09-09 DIAGNOSIS — G06.1 ABSCESS IN EPIDURAL SPACE OF CERVICAL SPINE: ICD-10-CM

## 2017-09-09 DIAGNOSIS — F19.10 INTRAVENOUS DRUG ABUSE (H): ICD-10-CM

## 2017-09-09 DIAGNOSIS — Z21 ASYMPTOMATIC HUMAN IMMUNODEFICIENCY VIRUS (HIV) INFECTION STATUS (H): ICD-10-CM

## 2017-09-09 LAB
ALBUMIN SERPL-MCNC: 3.8 G/DL (ref 3.4–5)
ALBUMIN UR-MCNC: 10 MG/DL
ALP SERPL-CCNC: 105 U/L (ref 40–150)
ALT SERPL W P-5'-P-CCNC: 107 U/L (ref 0–70)
AMPHETAMINES UR QL SCN: POSITIVE
ANION GAP SERPL CALCULATED.3IONS-SCNC: 10 MMOL/L (ref 3–14)
APPEARANCE UR: CLEAR
AST SERPL W P-5'-P-CCNC: 42 U/L (ref 0–45)
BACTERIA #/AREA URNS HPF: ABNORMAL /HPF
BARBITURATES UR QL: NEGATIVE
BASOPHILS # BLD AUTO: 0.1 10E9/L (ref 0–0.2)
BASOPHILS NFR BLD AUTO: 0.9 %
BENZODIAZ UR QL: NEGATIVE
BILIRUB SERPL-MCNC: 0.2 MG/DL (ref 0.2–1.3)
BILIRUB UR QL STRIP: NEGATIVE
BUN SERPL-MCNC: 18 MG/DL (ref 7–30)
CALCIUM SERPL-MCNC: 9.7 MG/DL (ref 8.5–10.1)
CANNABINOIDS UR QL SCN: POSITIVE
CHLORIDE SERPL-SCNC: 107 MMOL/L (ref 94–109)
CK SERPL-CCNC: 97 U/L (ref 30–300)
CO2 SERPL-SCNC: 27 MMOL/L (ref 20–32)
COCAINE UR QL: NEGATIVE
COLOR UR AUTO: YELLOW
CREAT SERPL-MCNC: 0.93 MG/DL (ref 0.66–1.25)
DIFFERENTIAL METHOD BLD: ABNORMAL
EOSINOPHIL # BLD AUTO: 0.1 10E9/L (ref 0–0.7)
EOSINOPHIL NFR BLD AUTO: 0.8 %
ERYTHROCYTE [DISTWIDTH] IN BLOOD BY AUTOMATED COUNT: 15.6 % (ref 10–15)
ETHANOL UR QL SCN: NEGATIVE
GFR SERPL CREATININE-BSD FRML MDRD: >90 ML/MIN/1.7M2
GLUCOSE SERPL-MCNC: 95 MG/DL (ref 70–99)
GLUCOSE UR STRIP-MCNC: NEGATIVE MG/DL
HCT VFR BLD AUTO: 37.6 % (ref 40–53)
HGB BLD-MCNC: 12.1 G/DL (ref 13.3–17.7)
HGB UR QL STRIP: NEGATIVE
KETONES UR STRIP-MCNC: 5 MG/DL
LEUKOCYTE ESTERASE UR QL STRIP: NEGATIVE
LYMPHOCYTES # BLD AUTO: 3.9 10E9/L (ref 0.8–5.3)
LYMPHOCYTES NFR BLD AUTO: 30.2 %
MAGNESIUM SERPL-MCNC: 2.2 MG/DL (ref 1.6–2.3)
MCH RBC QN AUTO: 27.5 PG (ref 26.5–33)
MCHC RBC AUTO-ENTMCNC: 32.2 G/DL (ref 31.5–36.5)
MCV RBC AUTO: 86 FL (ref 78–100)
MONOCYTES # BLD AUTO: 0.6 10E9/L (ref 0–1.3)
MONOCYTES NFR BLD AUTO: 4.3 %
MUCOUS THREADS #/AREA URNS LPF: PRESENT /LPF
MYELOCYTES # BLD: 0.2 10E9/L
MYELOCYTES NFR BLD MANUAL: 1.7 %
NEUTROPHILS # BLD AUTO: 7.9 10E9/L (ref 1.6–8.3)
NEUTROPHILS NFR BLD AUTO: 62.1 %
NITRATE UR QL: NEGATIVE
OPIATES UR QL SCN: POSITIVE
PH UR STRIP: 6 PH (ref 5–7)
PHOSPHATE SERPL-MCNC: 3.5 MG/DL (ref 2.5–4.5)
PLATELET # BLD AUTO: 375 10E9/L (ref 150–450)
PLATELET # BLD EST: NORMAL 10*3/UL
POTASSIUM SERPL-SCNC: 4 MMOL/L (ref 3.4–5.3)
PROT SERPL-MCNC: 9.2 G/DL (ref 6.8–8.8)
RBC # BLD AUTO: 4.4 10E12/L (ref 4.4–5.9)
RBC #/AREA URNS AUTO: 7 /HPF (ref 0–2)
RBC MORPH BLD: NORMAL
SODIUM SERPL-SCNC: 144 MMOL/L (ref 133–144)
SOURCE: ABNORMAL
SP GR UR STRIP: 1.02 (ref 1–1.03)
UROBILINOGEN UR STRIP-MCNC: NORMAL MG/DL (ref 0–2)
WBC # BLD AUTO: 12.8 10E9/L (ref 4–11)
WBC #/AREA URNS AUTO: 2 /HPF (ref 0–2)

## 2017-09-09 PROCEDURE — 25000128 H RX IP 250 OP 636: Performed by: EMERGENCY MEDICINE

## 2017-09-09 PROCEDURE — 70553 MRI BRAIN STEM W/O & W/DYE: CPT

## 2017-09-09 PROCEDURE — 84100 ASSAY OF PHOSPHORUS: CPT | Performed by: EMERGENCY MEDICINE

## 2017-09-09 PROCEDURE — 70549 MR ANGIOGRAPH NECK W/O&W/DYE: CPT

## 2017-09-09 PROCEDURE — 82550 ASSAY OF CK (CPK): CPT | Performed by: EMERGENCY MEDICINE

## 2017-09-09 PROCEDURE — 81001 URINALYSIS AUTO W/SCOPE: CPT | Performed by: EMERGENCY MEDICINE

## 2017-09-09 PROCEDURE — A9585 GADOBUTROL INJECTION: HCPCS | Performed by: EMERGENCY MEDICINE

## 2017-09-09 PROCEDURE — 96361 HYDRATE IV INFUSION ADD-ON: CPT | Performed by: EMERGENCY MEDICINE

## 2017-09-09 PROCEDURE — 70544 MR ANGIOGRAPHY HEAD W/O DYE: CPT | Mod: XS

## 2017-09-09 PROCEDURE — 85025 COMPLETE CBC W/AUTO DIFF WBC: CPT | Performed by: EMERGENCY MEDICINE

## 2017-09-09 PROCEDURE — 80320 DRUG SCREEN QUANTALCOHOLS: CPT | Performed by: EMERGENCY MEDICINE

## 2017-09-09 PROCEDURE — 83735 ASSAY OF MAGNESIUM: CPT | Performed by: EMERGENCY MEDICINE

## 2017-09-09 PROCEDURE — 80053 COMPREHEN METABOLIC PANEL: CPT | Performed by: EMERGENCY MEDICINE

## 2017-09-09 PROCEDURE — 99285 EMERGENCY DEPT VISIT HI MDM: CPT | Mod: Z6 | Performed by: EMERGENCY MEDICINE

## 2017-09-09 PROCEDURE — 96374 THER/PROPH/DIAG INJ IV PUSH: CPT | Mod: 59 | Performed by: EMERGENCY MEDICINE

## 2017-09-09 PROCEDURE — 99285 EMERGENCY DEPT VISIT HI MDM: CPT | Mod: 25 | Performed by: EMERGENCY MEDICINE

## 2017-09-09 PROCEDURE — 80307 DRUG TEST PRSMV CHEM ANLYZR: CPT | Performed by: EMERGENCY MEDICINE

## 2017-09-09 PROCEDURE — 87040 BLOOD CULTURE FOR BACTERIA: CPT | Performed by: EMERGENCY MEDICINE

## 2017-09-09 RX ORDER — KETOROLAC TROMETHAMINE 30 MG/ML
30 INJECTION, SOLUTION INTRAMUSCULAR; INTRAVENOUS ONCE
Status: COMPLETED | OUTPATIENT
Start: 2017-09-09 | End: 2017-09-09

## 2017-09-09 RX ORDER — GADOBUTROL 604.72 MG/ML
7.5 INJECTION INTRAVENOUS ONCE
Status: COMPLETED | OUTPATIENT
Start: 2017-09-09 | End: 2017-09-09

## 2017-09-09 RX ADMIN — GADOBUTROL 6.3 ML: 604.72 INJECTION INTRAVENOUS at 19:34

## 2017-09-09 RX ADMIN — KETOROLAC TROMETHAMINE 30 MG: 30 INJECTION, SOLUTION INTRAMUSCULAR at 20:36

## 2017-09-09 RX ADMIN — SODIUM CHLORIDE 40 ML: 9 INJECTION, SOLUTION INTRAVENOUS at 19:34

## 2017-09-09 ASSESSMENT — ENCOUNTER SYMPTOMS
NECK PAIN: 1
VOMITING: 0
NAUSEA: 1
FEVER: 0
FATIGUE: 1
SHORTNESS OF BREATH: 1
ABDOMINAL PAIN: 0
HEADACHES: 1
DIARRHEA: 0
LIGHT-HEADEDNESS: 1
CONSTIPATION: 0

## 2017-09-09 NOTE — ED NOTES
"Pt has leg weakness and arm weakness for last 2 weeks, also \"muscle tearing\".  Pt gait very unstable.    "
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
## 2017-09-09 NOTE — LETTER
Transition Communication Hand-off for Care Transitions to Next Level of Care Provider    Name: Bc German  MRN #: [id number]  Primary Care Provider: Damir Cisneros     Primary Clinic: [address] [place] [address]  [facility] [address]     Reason for Hospitalization:  Ataxia [R27.0]  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 [G06.1]  Admit Date/Time: 9/9/2017  5:31 PM  Discharge Date:  9/21/17  Payor Source: Payor: [employer] / Plan: [employer] FULLY INSURED / Product Type: HMO /              Reason for Communication Hand-off Referral:     Social Work Services Discharge Note     Patient Name:  Bc German     Anticipated Discharge Date:  9/21/17     Discharge Disposition:   Estates at  [facility]  ([phone])     Following MD:  Facility Assignment     Pre-Admission Screening (PAS) online form has been completed.  The Level of Care (LOC) is:  Determined  Confirmation Code is:  [id number].  Patient/caregiver informed of referral to [facility] Line for Pre-Admission Screening for skilled nursing facility (SNF) placement and to expect a phone call post discharge from SNF.     Additional Services/Equipment Arranged:  Dr. Justin has confirmed readiness for discharge.  Xuan Cárdenas, Admissions for [facility], has confirmed acceptance.  Xuan states that she spoke with Juani at [facility] who will be sending a Suboxone script to her.  Xuan also indicates that pt has an appt at [facility] on 9/22/17.  Xuan indicates that they will accept pt for admit today, however, pt needs to receive today's dose of Suboxone, prior to discharge.  JAREN spoke with pt's floor nurse (Emily) who indicates that pt received Suboxone today at 8:30am.  Per discussion with Emily, pt will receive IV antibiotic today between 2 and 2:30 pm and it will take 1 hour for it to run.  Thus, JAREN arranged for [employer] (Chris [phone]) to provide w/c transport at 4pm.  JAREN phoned nurse Gloria at [facility] to inform of the discharge plan.  Juani  "confirmed pt's 9/22/17, 1pm appt with Dr. Barrientos (JAREN added this appt to the discharge orders.  Juani also indicates that Dr. Barrientos has placed an order for pt to be discharged with a dose of Suboxone that pt can use tomorrow am.  JAREN has informed pt's floor nurse (Emily) of this and Emily indicates that she will get the order to the discharge pharmacy so that the single dose of Suboxone is available by 4pm to be sent with pt.  Pt is also having a picc line placed today and Emily is aware that this needs to be completed prior to 4pm.     Patient / Family response to discharge plan:  Pt voices understanding of the discharge plan and agreement with the discharge plan.  Pt has been informed that his roommate at [facility] has reported inappropriate verbal outbursts.   Pt's response, \"I'm a recovering heroin addict, I've been through worse, sounds like most of my friends, sounds fun.\"     Persons notified of above discharge plan:  Pt, 6A nursing and Dr. Justin.     Staff Discharge Instructions:  Please fax discharge orders and signed hard scripts for any controlled substances (SW will complete this task).  Please print a packet and send with patient.      CTS Handoff completed:  YES     Medicare Notice of Rights provided to the patient/family:  NO, as per Admissions Face Sheet, pt is not on Medicare.     HODAN Wen  Social Work, 6A  Phone:  [phone]  Pager:  [phone]  9/21/2017  "

## 2017-09-09 NOTE — IP AVS SNAPSHOT
"    [address] [facility]: [phone]                                              INTERAGENCY TRANSFER FORM - PHYSICIAN ORDERS   2017                    Hospital Admission Date: 2017  AMBREEN PYLE   : 1980  Sex: Male        Attending Provider: Ramon Justin MD     Allergies:  Doxycycline    Infection:  None   Service:  NEUROSURGERY    Ht:  1.803 m (5' 11\")   Wt:  63.5 kg (140 lb)   Admission Wt:  63.5 kg (140 lb)    BMI:  19.53 kg/m 2   BSA:  1.78 m 2            Patient PCP Information     Provider PCP Type    Damir Cisneros MD General      ED Clinical Impression     Diagnosis Description Comment Added By Time Added    Ataxia [R27.0] Ataxia [R27.0]  Mari York MD 2017 11:30 PM      Hospital Problems as of 2017              Priority Class Noted POA    Balance problem Medium  9/10/2017 Yes    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 Medium  2017 Yes      Non-Hospital Problems as of 2017              Priority Class Noted    Cellulitis Medium  10/23/2014    Sepsis (H) Medium  3/7/2015    JASSI (generalized anxiety disorder) Medium  2015    Non-compliant patient Medium  2015    Intravenous drug abuse Medium  2015    Medication side effect Medium  2016    Asymptomatic human immunodeficiency virus (HIV) infection status (H) Medium  2017      Code Status History     Date Active Date Inactive Code Status Order ID Comments User Context    2017 11:20 AM  Full Code [id number]  Ramon Justin MD Outpatient    9/10/2017  2:01 AM 2017 11:20 AM Full Code [id number]  Joseline Mujica MD Inpatient    2016  3:06 PM 9/10/2017  2:01 AM Full Code [id number]  Alexx Angelo PA-C Outpatient    2016 12:03 AM 2016  3:06 PM Full Code [id number]  Gino Fonseca MD ED    3/25/2015 12:12 PM 2016 12:03 AM Full Code [id number]  Marv Marie APRN CNP Outpatient    3/7/2015  5:55 AM 3/17/2015 12:02 PM Full Code " [id number]  Marilyn Ervin,  Inpatient    10/23/2014 11:19 PM 11/6/2014 11:10 PM Full Code [id number]  Armin Juares PA-C Inpatient         Medication Review      START taking        Dose / Directions Comments    acetaminophen 325 MG tablet   Commonly known as:  TYLENOL   Used for:  Medication side effect, initial encounter        Dose:  650 mg   Take 2 tablets (650 mg) by mouth every 6 hours   Quantity:  100 tablet   Refills:  0        buprenorphine HCl-naloxone HCl 8-2 MG per film   Commonly known as:  SUBOXONE   Used for:  Intravenous drug abuse        Dose:  1 Film   Place 1 Film under the tongue daily   Quantity:  4 each   Refills:  0        ceFAZolin sodium-dextrose 2-4 GM/100ML-% Soln infusion   Commonly known as:  ANCEF   Indication:  Infection of Bone and Bone Marrow   Used for:  Sepsis due to methicillin susceptible Staphylococcus aureus (H)        Dose:  2 g   Inject 100 mLs (2 g) into the vein every 8 hours   Quantity:  9000 mL   Refills:  0        clonazePAM 0.5 MG tablet   Commonly known as:  klonoPIN   Used for:  Ataxia        Dose:  0.5 mg   Take 1 tablet (0.5 mg) by mouth 2 times daily   Quantity:  30 tablet   Refills:  0        lidocaine 5 % Patch   Commonly known as:  LIDODERM        Dose:  1 patch   Place 1 patch onto the skin every 24 hours   Quantity:  30 patch   Refills:  0        melatonin 1 MG Tabs tablet   Used for:  Primary insomnia        Dose:  1 mg   Take 1 tablet (1 mg) by mouth nightly as needed for sleep   Quantity:  30 tablet   Refills:  0          CONTINUE these medications which may have CHANGED, or have new prescriptions. If we are uncertain of the size of tablets/capsules you have at home, strength may be listed as something that might have changed.        Dose / Directions Comments    * pregabalin 150 MG capsule   Commonly known as:  LYRICA   This may have changed:  Another medication with the same name was added. Make sure you understand how and when to take  each.   Used for:  Medication side effect, initial encounter        Dose:  300 mg   Take 2 capsules (300 mg) by mouth 2 times daily   Quantity:  6 capsule   Refills:  0        * pregabalin 150 MG capsule   Commonly known as:  LYRICA   This may have changed:  You were already taking a medication with the same name, and this prescription was added. Make sure you understand how and when to take each.   Used for:  Medication side effect, initial encounter        Dose:  150 mg   Take 1 capsule (150 mg) by mouth 2 times daily   Quantity:  90 capsule   Refills:  0        * Notice:  This list has 2 medication(s) that are the same as other medications prescribed for you. Read the directions carefully, and ask your doctor or other care provider to review them with you.      CONTINUE these medications which have NOT CHANGED        Dose / Directions Comments    ibuprofen 600 MG tablet   Commonly known as:  ADVIL/MOTRIN        Dose:  600 mg   Take 1 tablet (600 mg) by mouth every 6 hours as needed for moderate pain   Quantity:  30 tablet   Refills:  0        levomilnacipran 80 MG 24 hr capsule   Commonly known as:  FETZIMA        Dose:  80 mg   Take 1 capsule (80 mg) by mouth daily   Quantity:  3 capsule   Refills:  0        naloxone nasal spray   Commonly known as:  NARCAN        Dose:  4 mg   Spray 1 spray (4 mg) into one nostril alternating nostrils as needed for opioid reversal (every 2-3 minutes until assistance arrives.)   Quantity:  0.2 mL   Refills:  11        SEROQUEL PO        Refills:  0        TRIUMEQ 600- MG per tablet   Used for:  Human immunodeficiency virus (HIV) disease (H)   Generic drug:  abacavir-dolutegravir-LamiVUDine        TAKE ONE TABLET BY MOUTH EVERY DAY   Quantity:  30 tablet   Refills:  0                  Further instructions from your care team       You have an appointment with Dr. Barrientos at [facility] on 9/22/17 at 1pm.  The clinic is located at [address] [place] [address]  [facility], [place], [place] , phone:  [phone].    Summary of Visit     Reason for your hospital stay       You were admitted with epidural abscess collection in cervical region; you needed to have surgery for that and now continue to be on IV anitibiotic which we need to continue till 10/24             After Care     Activity - Up ad gene           Additional Discharge Instructions       Please follow with Dr Barrientos in 1 week; please follow with ID doctor  in 3 weeks       Advance Diet as Tolerated       Follow this diet upon discharge: Orders Placed This Encounter      Regular Diet Adult       General info for SNF       Length of Stay Estimate: Short Term Care: Estimated # of Days 31-90  Condition at Discharge: Improving  Level of care:skilled   Rehabilitation Potential: Good  Admission H&P remains valid and up-to-date: Yes  Recent Chemotherapy: N/A  Use Nursing Home Standing Orders: Yes       Glucose monitor nursing POCT       Before meals and at bedtime       IV access       PICC.       Intake and output       Every shift       Mantoux instructions       Give two-step Mantoux (PPD) Per Facility Policy Yes             Referrals     Infectious Disease Referral           NEUROSURGERY REFERRAL       Your provider has referred you to: follow up with Carline Dial on 9/27 for wound check  Follow with Dr Marv Ambrose in 6 weeks     Please be aware that coverage of these services is subject to the terms and limitations of your health insurance plan.  Call member services at your health plan with any benefit or coverage questions.      Please bring the following with you to your appointment:    (1) Any X-Rays, CTs or MRIs which have been performed.  Contact the facility where they were done to arrange for  prior to your scheduled appointment.   (2) List of current medications  (3) This referral request   (4) Any documents/labs given to you for this referral       Occupational Therapy Adult Consult        Evaluate and treat as clinically indicated.    Reason:  Evaluate and treat for s/p corpectomy for epidural abscess       Physical Therapy Adult Consult       Evaluate and treat as clinically indicated.    Reason:  Continue s/p cervical epidural abscess and corpectomy             Lab Orders     CBC with platelets differential       Last Lab Result: Hemoglobin (g/dL)       Date                     Value                 09/20/2017               11.3 (L)         ----------   Please perform every week until 10/24       Comprehensive metabolic panel       Will need follow up till 10/24 every week with CBC, CMP             Your next 10 appointments already scheduled     Sep 21, 2017  3:00 PM CDT   IR PICC VASCULAR with [id number]   [facility], [place], Vascular Access ([facility])    [address] [address]              1. You will need to have had a history and physical exam within 7 days of the procedure. 2. Laboratory test are to be obtained by your doctor prior to the exam (CBCP, INR and PTT) 3. Someone will need to drive you to and from the hospital. 4. If you are or may be pregnant, contact your doctor or a Radiology nurse prior to the day of the exam. 5. If you have diabetes, check with your doctor or a Radiology nurse to see if your insulin needs to be adjusted for the exam. 6. If you are taking Coumadin (to thin you blood) please contact your doctor or a Radiology nurse at least 3 days before the exam for special instructions. 7. The day before your exam you may eat your regular diet and are encouraged to drink at least 2 quarts of clear liquids. Drink no alcoholic beverages for 24 hours prior to the exam. 8. Do not eat any solid food or milk products for 6 hours prior to the exam. You may drink clear liquids until 2 hours prior to the exam. Clear liquids include the following: water, Jell-O, clear broth, apple juice or any noncarbonated drink that you can  see through (no pop!) 9. The morning of the exam you may brush your teeth and take medications as directed with a sip of water. 10. Tell the Radiology nurse if you have any allergies.            Oct 03, 2017  9:30 AM CDT   (Arrive by 9:15 AM)   Return Visit with Damir Cisneros MD   [facility] ([facility] [place])    [address] [place] [address]  [address] [facility] [address] [phone]              Follow-Up Appointment Instructions     Future Labs/Procedures    Follow Up and recommended labs and tests     Comments:    Follow up with primary care provider in 1 week.  The following labs/tests are recommended: CBC, CMP IN 1 WEEK EVERY WEEK TILL 10/24  Follow up with infectious disease in 3 weeks      Follow-Up Appointment Instructions     Follow Up and recommended labs and tests       Follow up with primary care provider in 1 week.  The following labs/tests are recommended: CBC, CMP IN 1 WEEK EVERY WEEK TILL 10/24  Follow up with infectious disease in 3 weeks             Statement of Approval     Ordered          09/21/17 1200  I have reviewed and agree with all the recommendations and orders detailed in this document.  EFFECTIVE NOW     Approved and electronically signed by:  Ramon Justin MD

## 2017-09-09 NOTE — IP AVS SNAPSHOT
` ` Patient Information     Patient Name Sex     Bc Pyle ([id number]) Male 1980       Room Bed    6215 6215-02      Patient Demographics     Address Phone    [address] [place] [address] [phone] (Home)  [phone] (Mobile) *Preferred*      Patient Ethnicity & Race     Ethnic Group Patient Race    American White      Emergency Contact(s)     Name Relation Home Work Mobile    BARRINGTON PYLE Father [phone] none [phone]    Ignacia Churchill  Mother [phone]    BARRINGTON PYLE Father [phone]    Barrington Pyle Father [phone]      Documents on File        Status Date Received Description       Documents for the Patient    Face Sheet Received () 11     Insurance Card Received 08/13/10     Face Sheet Received () 08/20/10     Privacy Notice - [place] Received 11     External Medication Information Consent  () 09/29/10     Patient ID Received 11     Consent for Services - Hospital/Clinic  () 09/29/10     Consent for Services - Hospital/Clinic Received () 11     Consent for Services - Hospital/Clinic Received () 10/23/14     Consent for Services - Hospital/Clinic  () 11 CONSENT FOR SERVICE    Consent for Services - Hospital/Clinic Received () 11     HIM OMID Authorization   [employer]    [facility] OMID Authorization   [employer] [facility] OMID Authorization   Bc Pyle    Consent for EHR Access  13 Copied from existing Consent for services - C/HOD collected on 2011    [facility] Specified Other       HIM OMID Authorization - File Only Received 10/24/14 [employer]    HIM OMID Authorization - File Only Received 10/24/14 Bc Pyle    HIM OMID Authorization  14 patient    HIM OMID Authorization - File Only  14 TU PERALES-10/31/14    HIM OMID Authorization  03/26/15 SOAR    HIM OMID Authorization  07/13/15 JOSE &  RENATA    HIM OMID Authorization  () 07/17/15 [employer]LE[employer],LTD    HIM OMID Authorization  07/20/15 [facility]    HIM OMID Authorization  07/22/15 [facility] OMID Authorization  07/24/15 [facility]    External Medication Information Consent Accepted 12/23/15     Consent for Services - Hospital/Clinic Received () 12/23/15     HIM OMID Authorization  16 [employer]    Consent for Services/Privacy Notice - Hospital/Clinic Received 16     HIM OMID Authorization  10/12/16 [facility] OMID Authorization  17     HIM OMID Authorization - File Only  17 SEVERO  17    Consent for Services - [facility]       Consent for EHR Access       Insurance Card       External Medication Information Consent       Patient ID       [facility] Specified Other       Consent for Services/Privacy Notice - Hospital/Clinic       Privacy Notice - [place]       Consent for EHR Access       Insurance Card       External Medication Information Consent       Patient ID       [facility] Specified Other       Consent for Services/Privacy Notice - Hospital/Clinic Received 17     Privacy Notice - [place] Received 17        Documents for the Encounter    CMS IM for Patient Signature       Assessment/Questionnaire  17 MRI HISTORY QUESTIONNAIRE AND CONTRAST NOTICE      Admission Information     Attending Provider Admitting Provider Admission Type Admission Date/Time    Ramon Justin MD [place], Marv Francisco MD Emergency 17  1731    Discharge Date Hospital Service Auth/Cert Status Service Area     [facility]uro[facility] HEALTH SERVICES    Unit Room/Bed Admission Status       UU U6A 6215/6215-02 Admission (Confirmed)       Admission     Complaint    Balance problem, Cervical Abscess, 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, 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      Hospital Account     Name Acct ID Class Status Primary  Coverage    Bc German [id number] Inpatient Open [employer] - [employer]            Guarantor Account (for Hospital Account #[id number])     Name Relation to Pt Service Area Active? Acct Type    Bc German  FCS Yes Personal/Family    Address Phone          [address] [place] [address] [phone](H)              Coverage Information (for Hospital Account #[id number])     F/O Payor/Plan Precert #    [employer] INSURED     Subscriber Subscriber #    Bc German [id number]    Address Phone    [address]  [place] [address] [phone]

## 2017-09-09 NOTE — IP AVS SNAPSHOT
Bc Pyle #[id number] (CSN: [id number])  (37 year old M)  (Adm: 17)     [id number]               [address] [facility]: [phone]            Patient Demographics     Patient Name Sex          Age SSN Address Phone    Bc Pyle Male 1980 (37 year old) [id number] [facility] [address] [phone] (Home)  [phone] (Mobile) *Preferred*      Emergency Contact(s)     Name Relation Home Work Mobile    BARRINGTON PYLE Father [phone] none [phone]    Ignacia Churchill  Mother [phone]    BARRINGTON PYLE Father [phone]    Barrington Pyle Father [phone]      Admission Information     Attending Provider Admitting Provider Admission Type Admission Date/Time    Ramon Justin MD [place], Marv Francisco MD Emergency 17  1731    Discharge Date Hospital Service Auth/Cert Status Service Area     Neurosurgery [facility]    Unit Room/Bed Admission Status        U6A 6215/6215-02 Admission (Confirmed)       Admission     Complaint    Balance problem, Cervical Abscess, 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, 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      Hospital Account     Name Acct ID Class Status Primary Coverage    Bc Pyle [id number] Inpatient Open [employer] - [employer]ED            Guarantor Account (for Hospital Account #[id number])     Name Relation to Pt Service Area Active? Acct Type    Bc Pyle  FCS Yes Personal/Family    Address Phone          [address] [place] [address] [phone](H)              Coverage Information (for Hospital Account #[id number])     F/O Payor/Plan Precert #    [employer] F[employer]Y INSURED     Subscriber Subscriber #    Bc Pyle [id number]    Address Phone    [address]  [place] [address] [phone]                                                 "INTERAGENCY TRANSFER FORM - PHYSICIAN ORDERS   9/9/2017                       [address] [facility] BANK: [phone]            Attending Provider: Ramon Justin MD     Allergies:  Doxycycline    Infection:  None   Service:  NEUROSURGERY    Ht:  1.803 m (5' 11\")   Wt:  63.5 kg (140 lb)   Admission Wt:  63.5 kg (140 lb)    BMI:  19.53 kg/m 2   BSA:  1.78 m 2            ED Clinical Impression     Diagnosis Description Comment Added By Time Added    Ataxia [R27.0] Ataxia [R27.0]  Mari York MD 9/9/2017 11:30 PM      Hospital Problems as of 9/21/2017              Priority Class Noted POA    Balance problem Medium  9/10/2017 Yes    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 Medium  9/11/2017 Yes      Non-Hospital Problems as of 9/21/2017              Priority Class Noted    Cellulitis Medium  10/23/2014    Sepsis (H) Medium  3/7/2015    JASSI (generalized anxiety disorder) Medium  12/23/2015    Non-compliant patient Medium  12/23/2015    Intravenous drug abuse Medium  12/23/2015    Medication side effect Medium  9/25/2016    Asymptomatic human immunodeficiency virus (HIV) infection status (H) Medium  2/28/2017      Code Status History     Date Active Date Inactive Code Status Order ID Comments User Context    9/21/2017 11:20 AM  Full Code [id number]  Ramon Justin MD Outpatient    9/10/2017  2:01 AM 9/21/2017 11:20 AM Full Code [id number]  Joseline Mujica MD Inpatient    9/25/2016  3:06 PM 9/10/2017  2:01 AM Full Code [id number]  Alexx Angelo PA-C Outpatient    9/25/2016 12:03 AM 9/25/2016  3:06 PM Full Code [id number]  Gino Fonseca MD ED    3/25/2015 12:12 PM 9/25/2016 12:03 AM Full Code [id number]  Marv Marie APRN CNP Outpatient    3/7/2015  5:55 AM 3/17/2015 12:02 PM Full Code [id number]  Marilyn Ervin DO Inpatient    10/23/2014 11:19 PM 11/6/2014 11:10 PM Full Code [id number]  Armin Juares PA-C Inpatient      Current Code Status     Date Active Code Status Order " ID Comments User Context       Prior      Summary of Visit     Reason for your hospital stay       You were admitted with epidural abscess collection in cervical region; you needed to have surgery for that and now continue to be on IV anitibiotic which we need to continue till 10/24                Medication Review      START taking        Dose / Directions Comments    acetaminophen 325 MG tablet   Commonly known as:  TYLENOL   Used for:  Medication side effect, initial encounter        Dose:  650 mg   Take 2 tablets (650 mg) by mouth every 6 hours   Quantity:  100 tablet   Refills:  0        buprenorphine HCl-naloxone HCl 8-2 MG per film   Commonly known as:  SUBOXONE   Used for:  Intravenous drug abuse        Dose:  1 Film   Place 1 Film under the tongue daily   Quantity:  4 each   Refills:  0        ceFAZolin sodium-dextrose 2-4 GM/100ML-% Soln infusion   Commonly known as:  ANCEF   Indication:  Infection of Bone and Bone Marrow   Used for:  Sepsis due to methicillin susceptible Staphylococcus aureus (H)        Dose:  2 g   Inject 100 mLs (2 g) into the vein every 8 hours   Quantity:  9000 mL   Refills:  0        clonazePAM 0.5 MG tablet   Commonly known as:  klonoPIN   Used for:  Ataxia        Dose:  0.5 mg   Take 1 tablet (0.5 mg) by mouth 2 times daily   Quantity:  30 tablet   Refills:  0        lidocaine 5 % Patch   Commonly known as:  LIDODERM        Dose:  1 patch   Place 1 patch onto the skin every 24 hours   Quantity:  30 patch   Refills:  0        melatonin 1 MG Tabs tablet   Used for:  Primary insomnia        Dose:  1 mg   Take 1 tablet (1 mg) by mouth nightly as needed for sleep   Quantity:  30 tablet   Refills:  0          CONTINUE these medications which may have CHANGED, or have new prescriptions. If we are uncertain of the size of tablets/capsules you have at home, strength may be listed as something that might have changed.        Dose / Directions Comments    * pregabalin 150 MG capsule    Commonly known as:  LYRICA   This may have changed:  Another medication with the same name was added. Make sure you understand how and when to take each.   Used for:  Medication side effect, initial encounter        Dose:  300 mg   Take 2 capsules (300 mg) by mouth 2 times daily   Quantity:  6 capsule   Refills:  0        * pregabalin 150 MG capsule   Commonly known as:  LYRICA   This may have changed:  You were already taking a medication with the same name, and this prescription was added. Make sure you understand how and when to take each.   Used for:  Medication side effect, initial encounter        Dose:  150 mg   Take 1 capsule (150 mg) by mouth 2 times daily   Quantity:  90 capsule   Refills:  0        * Notice:  This list has 2 medication(s) that are the same as other medications prescribed for you. Read the directions carefully, and ask your doctor or other care provider to review them with you.      CONTINUE these medications which have NOT CHANGED        Dose / Directions Comments    ibuprofen 600 MG tablet   Commonly known as:  ADVIL/MOTRIN        Dose:  600 mg   Take 1 tablet (600 mg) by mouth every 6 hours as needed for moderate pain   Quantity:  30 tablet   Refills:  0        levomilnacipran 80 MG 24 hr capsule   Commonly known as:  FETZIMA        Dose:  80 mg   Take 1 capsule (80 mg) by mouth daily   Quantity:  3 capsule   Refills:  0        naloxone nasal spray   Commonly known as:  NARCAN        Dose:  4 mg   Spray 1 spray (4 mg) into one nostril alternating nostrils as needed for opioid reversal (every 2-3 minutes until assistance arrives.)   Quantity:  0.2 mL   Refills:  11        SEROQUEL PO        Refills:  0        TRIUMEQ 600- MG per tablet   Used for:  Human immunodeficiency virus (HIV) disease (H)   Generic drug:  abacavir-dolutegravir-LamiVUDine        TAKE ONE TABLET BY MOUTH EVERY DAY   Quantity:  30 tablet   Refills:  0                After Care     Activity - Up ad gene            Additional Discharge Instructions       Please follow with Dr Barrientos in 1 week; please follow with ID doctor  in 3 weeks       Advance Diet as Tolerated       Follow this diet upon discharge: Orders Placed This Encounter      Regular Diet Adult       General info for SNF       Length of Stay Estimate: Short Term Care: Estimated # of Days 31-90  Condition at Discharge: Improving  Level of care:skilled   Rehabilitation Potential: Good  Admission H&P remains valid and up-to-date: Yes  Recent Chemotherapy: N/A  Use Nursing Home Standing Orders: Yes       Glucose monitor nursing POCT       Before meals and at bedtime       IV access       PICC.       Intake and output       Every shift       Mantoux instructions       Give two-step Mantoux (PPD) Per Facility Policy Yes               Further instructions from your care team       You have an appointment with Dr. Barrientos at [facility] on 9/22/17 at 1pm.  The clinic is located at [address] [place] [address] , phone:  [phone].    Lab Orders     CBC with platelets differential       Last Lab Result: Hemoglobin (g/dL)       Date                     Value                 09/20/2017               11.3 (L)         ----------   Please perform every week until 10/24       CRP inflammation       Continue weekly till 10/24       Comprehensive metabolic panel       Will need follow up till 10/24 every week with CBC, CMP             Referrals     Infectious Disease Referral           NEUROSURGERY REFERRAL       Your provider has referred you to: follow up with Carline Dial on 9/27 for wound check  Follow with Dr Marv Ambrose in 6 weeks     Please be aware that coverage of these services is subject to the terms and limitations of your health insurance plan.  Call member services at your health plan with any benefit or coverage questions.      Please bring the following with you to your appointment:    (1) Any X-Rays, CTs or MRIs which  "have been performed.  Contact the facility where they were done to arrange for  prior to your scheduled appointment.   (2) List of current medications  (3) This referral request   (4) Any documents/labs given to you for this referral       Occupational Therapy Adult Consult       Evaluate and treat as clinically indicated.    Reason:  Evaluate and treat for s/p corpectomy for epidural abscess       Physical Therapy Adult Consult       Evaluate and treat as clinically indicated.    Reason:  Continue s/p cervical epidural abscess and corpectomy             Follow-Up Appointment Instructions     Follow Up and recommended labs and tests       Follow up with primary care provider in 1 week.  The following labs/tests are recommended: CBC, CMP IN 1 WEEK EVERY WEEK TILL 10/24  Follow up with infectious disease in 3 weeks             Your next 10 appointments already scheduled     Oct 03, 2017  9:30 AM CDT   (Arrive by 9:15 AM)   Return Visit with Damir Cisneros MD   [facility] ([facility] [place] [facility])    [address] [place] [address]  3rd [facility] [address] [phone]              Statement of Approval     Ordered          09/21/17 1200  I have reviewed and agree with all the recommendations and orders detailed in this document.  EFFECTIVE NOW     Approved and electronically signed by:  Ramon Justin MD                                                 INTERAGENCY TRANSFER FORM - NURSING   9/9/2017                       [address] [facility] BANK: [phone]            Attending Provider: Ramon Justin MD     Allergies:  Doxycycline    Infection:  None   Service:  NEUROSURGERY    Ht:  1.803 m (5' 11\")   Wt:  63.5 kg (140 lb)   Admission Wt:  63.5 kg (140 lb)    BMI:  19.53 kg/m 2   BSA:  1.78 m 2            Advance Directives        Does patient have a scanned Advance Directive/ACP document in EPIC?           No        Immunizations     None    " "  ASSESSMENT     Discharge Profile Flowsheet     EXPECTED DISCHARGE     Full except areas not inspected   Hip, left;Spine;Hip, right;Buttock, left;Buttock, right;Sacrum;Coccyx 09/21/17 0847    Expected Discharge Date  -- (awaiting placement) 09/21/17 0446   Skin WDL  ex 09/21/17 0847    DISCHARGE NEEDS ASSESSMENT     Skin Color/Characteristics  bruised (ecchymotic) 09/21/17 0847    Concerns Comments  abx regimen- IV drug user 09/12/17 1448   Skin Temperature  warm 09/21/17 0847    Equipment Used at Home  none 03/17/15 1537   Skin Moisture  dry 09/21/17 0847    GASTROINTESTINAL (ADULT,PEDIATRIC,OB)     Skin Elasticity  quick return to original state 09/20/17 0117    GI WDL  WDL 09/21/17 0847   Skin Integrity  abrasion(s);incision(s);drain/device(s);scab(s);scar(s);skin tear(s) 09/21/17 0847    Last Bowel Movement  09/19/17 09/20/17 0749   Inspection under devices  Full 09/21/17 0847    Passing flatus  yes 09/21/17 0948   SAFETY      COMMUNICATION ASSESSMENT     Safety WDL  WDL 09/21/17 0847    Patient's communication style  spoken language (English or Bilingual) 09/09/17 1732   Safety Factors  ID band on;upper side rails raised x 2;call light in reach;wheels locked;bed in low position 09/21/17 0847    SKIN     All Alarms  none present 09/20/17 0117    Inspection of bony prominences  Full except (identify areas not inspected) 09/21/17 0847                      Assessment WDL (Within Defined Limits) Definitions           Safety WDL     Effective: 09/28/15    Row Information: <b>WDL Definition:</b> Bed in low position, wheels locked; call light in reach; upper side rails up x 2; ID band on<br> <font color=\"gray\"><i>Item=AS safety wdl>>List=AS safety wdl>>Version=F14</i></font>      Skin WDL     Effective: 09/28/15    Row Information: <b>WDL Definition:</b> Warm; dry; intact; elastic; without discoloration; pressure points without redness<br> <font color=\"gray\"><i>Item=AS skin wdl>>List=AS skin " "wdl>>Version=F14</i></font>      Vitals     Vital Signs Flowsheet     VITAL SIGNS     Functioning  can do most things, but pain gets in the way of some 09/21/17 0846    Temp  96.3  F (35.7  C) 09/21/17 1245   Sleep  normal sleep 09/21/17 0846    Temp src  Oral 09/21/17 1245   ANALGESIA SIDE EFFECTS MONITORING      Resp  16 09/21/17 1245   Side Effects Monitoring: Respiratory Quality  R 09/21/17 0846    Pulse  128 09/19/17 1128   Side Effects Monitoring: Respiratory Depth  N 09/21/17 0846    Heart Rate  105 09/21/17 1245   Side Effects Monitoring: Sedation Level  1 09/21/17 0846    Pulse/Heart Rate Source  Monitor 09/20/17 2256   HEIGHT AND WEIGHT      BP  156/80 09/21/17 1245   Height  1.803 m (5' 11\") 09/09/17 1735    BP Location  Left arm 09/20/17 2256   Height Method  Stated 09/09/17 1735    Patient Position  Lying 09/11/17 0023   Weight  63.5 kg (140 lb) 09/09/17 1735    OXYGEN THERAPY     BSA (Calculated - sq m)  1.78 09/09/17 1735    SpO2  96 % 09/21/17 1245   BMI (Calculated)  19.57 09/09/17 1735    O2 Device  None (Room air) 09/21/17 1245   JUAN COMA SCALE      Oxygen Delivery  6 LPM 09/11/17 0023   Best Eye Response  4-->(E4) spontaneous 09/21/17 0847    RESPIRATORY MONITORING     Best Motor Response  6-->(M6) obeys commands 09/21/17 0847    Respiratory Monitoring (EtCO2)  51 mmHg 09/11/17 0125   Best Verbal Response  5-->(V5) oriented 09/21/17 0847    Integrated Pulmonary Index (IPI)  7 09/11/17 0125   [place] Coma Scale Score  15 09/21/17 0847    PAIN/COMFORT     POSITIONING      Patient Currently in Pain  yes 09/21/17 1209   Body Position  independently positioning 09/21/17 0948    Preferred Pain Scale  CAPA (Clinically Aligned Pain Assessment) ([facility], [facility] and [facility] Adults Only) 09/21/17 1209   Head of Bed (HOB)  HOB at 30-45 degrees 09/21/17 0948    Pain Location  Neck 09/21/17 1209   Chair  Upright in chair 09/21/17 0847    Pain Orientation  Anterior 09/21/17 1209   Positioning/Transfer Devices "  pillows;in use 09/21/17 0948    Pain Descriptors  Aching 09/21/17 1209   DAILY CARE      Pain Management Interventions  analgesia administered;pain plan reviewed with patient/caregiver;relaxation techniques promoted;unnecessary movement minimized 09/18/17 0850   Activity Management  activity encouraged;activity adjusted per tolerance 09/21/17 0948    Pain Intervention(s)  Medication (See eMAR) 09/21/17 1209   Activity Assistance Provided  assistance, stand-by 09/21/17 0948    Response to Interventions  Decrease in pain 09/21/17 0846   ECG      CLINICALLY ALIGNED PAIN ASSESSMENT (CAPA) ([facility], [facility] AND [facility] ADULTS ONLY)     ECG Rhythm  Sinus tachycardia 09/11/17 0023    Comfort  tolerable with discomfort 09/21/17 0846   Ectopy  None 09/11/17 0023    Change in Pain  about the same 09/21/17 0846   Lead Monitored  Lead II;V 5 09/11/17 0023    Pain Control  partially effective 09/21/17 0846                 Patient Lines/Drains/Airways Status    Active LINES/DRAINS/AIRWAYS     Name: Placement date: Placement time: Site: Days: Last dressing change:    Incision/Surgical Site 09/10/17 Right Neck 09/10/17   2029    10             Patient Lines/Drains/Airways Status    Active PICC/CVC     Name: Placement date: Placement time: Site: Days: Additional Info Last dressing change:    PICC Double Lumen 09/21/17 Left Basilic 09/21/17   1500   Basilic   less than 1 External Cath Length (cm): 1 cm   09/21/17 1500 (0.85 hrs)         Size (Fr): 5 Fr            Orientation: Left            Extremity Circumference (cm): 26 cm            Catheter Brand: Kiddify            Dressing Intervention: Chlorhexidine patch;Transparent;Securing device;New dressing            Description: Valved;Power PICC            Total Catheter Length (cm) Trimmed: 42 cm            Site Prep: Chlorhexidine;Alcohol            Local Anesthetic: Injectable            Inserted by: NEO Arenas, RN [facility]            Insertion attempts with  ultrasound: 1            Patient Tolerance: Tolerated well            Placement Verification: Blood Return;Flouroscopy            Difficulty with threading line: No            Tip location: SVC            Full barrier precautions done: Yes            Consent Signed: Yes            Time Out performed: Yes            Lot #: [id number]            Use for : Access. PICC is okay to use.               Intake/Output Detail Report     Date Intake     Output   Net    Shift P.O. I.V. IV Piggyback Total Urine Blood Total       Yadira 09/20/17 0700 - 09/20/17 1459 360 20 -- 380 -- -- -- 380    Noc 09/20/17 1500 - 09/20/17 2359 -- -- -- -- -- -- -- 0    Day 09/21/17 0000 - 09/21/17 0659 -- -- -- -- -- -- -- 0    Yadira 09/21/17 0700 - 09/21/17 1459 -- -- -- -- -- -- -- 0    Noc 09/21/17 1500 - 09/21/17 2359 -- -- -- -- -- -- -- 0      Last Void/BM       Most Recent Value    Urine Occurrence 1 at 09/20/2017 1300    Stool Occurrence       Case Management/Discharge Planning     Case Management/Discharge Planning Flowsheet     LIVING ENVIRONMENT     Equipment Used at Home  none 03/17/15 1537    Lives With  parent(s);sibling(s) (currently living with family, formally homeless) 09/12/17 1443   / CAREGIVER      Living Arrangements  condominium 09/12/17 1443   Filed Complexity Screen Score  9 09/12/17 1450    COPING/STRESS     ABUSE RISK SCREEN      Major Change/Loss/Stressor  denies;chemical dependency/abuse 09/10/17 0241   QUESTION TO PATIENT:  Has a member of your family or a partner(now or in the past) intimidated, hurt, manipulated, or controlled you in any way?  patient declined to answer or is unable to answer 09/09/17 1739    EXPECTED DISCHARGE     QUESTION TO PATIENT: Do you feel safe going back to the place where you are living?  patient declined to answer or is unable to answer 09/09/17 1739    Expected Discharge Date  -- (awaiting placement) 09/21/17 5621   (R) MENTAL HEALTH SUICIDE RISK      ASSESSMENT/CONCERNS TO BE ADDRESSED      Are you depressed or being treated for depression?  No 09/10/17 0235    Concerns Comments  abx regimen- IV drug user 09/12/17 1448   HOMICIDE RISK      DISCHARGE PLANNING     Feels Like Hurting Others  no 09/09/17 1739                  [address] [facility] BANK: [phone]            Medication Administration Report for Bc German as of 09/21/17 1551   Legend:    Given Hold Not Given Due Canceled Entry Other Actions    Time Time (Time) Time  Time-Action       Inactive    Active    Linked        Medications 09/15/17 09/16/17 09/17/17 09/18/17 09/19/17 09/20/17 09/21/17    abacavir-dolutegravir-LamiVUDine (TRIUMEQ) 600- MG per tablet 1 tablet  Dose: 1 tablet Freq: DAILY Route: PO  Indications of Use: HUMAN IMMUNODEFICIENCY VIRUS DISEASE  Start: 09/10/17 1100    0850 (1 tablet)-Given        0805 (1 tablet)-Given        0835 (1 tablet)-Given        0750 (1 tablet)-Given        0851 (1 tablet)-Given        0738 (1 tablet)-Given        0840 (1 tablet)-Given           acetaminophen (TYLENOL) tablet 650 mg  Dose: 650 mg Freq: EVERY 6 HOURS Route: PO  Start: 09/20/17 1545   Admin Instructions: Maximum acetaminophen dose from all sources = 75 mg/kg/day not to exceed 4 grams/day.          1709 (650 mg)-Given       2350 (650 mg)-Given        0623 (650 mg)-Given       1209 (650 mg)-Given       [ ] 1800           benzocaine-menthol (CEPACOL) 15-3.6 MG lozenge 1-2 lozenge  Dose: 1-2 lozenge Freq: EVERY 1 HOUR PRN Route: BU  PRN Reason: sore throat  Start: 09/11/17 0218   Admin Instructions: For sore throat without fever.     1505 (1 lozenge)-Given       1712 (1 lozenge)-Given                 buprenorphine HCl-naloxone HCl (SUBOXONE) 8-2 MG per film 1 Film  Dose: 1 Film Freq: DAILY Route: SL  Start: 09/16/17 0800     0804 (1 Film)-Given        0835 (1 Film)-Given        0803 (1 Film)-Given        0851 (1 Film)-Given        0739 (1 Film)-Given        0839 (1 Film)-Given           ceFAZolin sodium-dextrose (ANCEF)  infusion 2 g  Dose: 2 g Freq: EVERY 8 HOURS Route: IV  Indications of Use: OSTEOMYELITIS  Start: 09/13/17 1430    0555 (2 g)-Given       1505 (2 g)-Given       2202 (2 g)-Given        0545 (2 g)-Given       1530 (2 g)-Given       2314 (2 g)-Given        0533 (2 g)-Given       1344 (2 g)-Given       2230 (2 g)-Given        0616 (2 g)-Given       1440 (2 g)-Given       2214 (2 g)-Given        0616 (2 g)-Given       1341 (2 g)-Given       2133 (2 g)-Given        0615 (2 g)-Given       1455 (2 g)-Given       2241 (2 g)-Given        0623 (2 g)-Given       1522 (2 g)-Given       [ ] 2230           clonazePAM (klonoPIN) tablet 0.5 mg  Dose: 0.5 mg Freq: 2 TIMES DAILY Route: PO  Start: 09/18/17 2000       1933 (0.5 mg)-Given        0851 (0.5 mg)-Given       2015 (0.5 mg)-Given        0738 (0.5 mg)-Given       2107 (0.5 mg)-Given        0839 (0.5 mg)-Given       [ ] 2000           cloNIDine (CATAPRES) tablet 0.1 mg  Dose: 0.1 mg Freq: ONCE PRN Route: PO  PRN Comment: anxiety  Start: 09/15/17 2129              heparin lock flush 10 UNIT/ML injection 2-5 mL  Dose: 2-5 mL Freq: ONCE PRN Route: IK  PRN Reason: line flush  PRN Comment: for locking each dormant lumen with line placement  Start: 09/20/17 1423   Admin Instructions: May repeat x 1               hydrALAZINE (APRESOLINE) injection 10-20 mg  Dose: 10-20 mg Freq: EVERY 30 MIN PRN Route: IV  PRN Reason: high blood pressure  Start: 09/11/17 0218   Admin Instructions: IF Heart Rate less than 60 initiate hydrALAZINE (APRESOLINE) for hypertension. For Systolic Blood Pressure greater than 170 mmHg. Give 10 mg, wait 30 minutes. If not effective then repeat 10 mg. Wait 30 minutes. If not effective then give 20 mg. If still not effective then start niCARdipine (CARDENE) IV infusion IF ORDERED. Notify provider within 1 hour if Blood Pressure parameters are not met.               labetalol (NORMODYNE/TRANDATE) injection 10-40 mg  Dose: 10-40 mg Freq: EVERY 10 MIN PRN Route: IV  PRN  Reason: high blood pressure  Start: 09/11/17 0218   Admin Instructions: IF Heart Rate 60 beats per minute or greater initiate labetalol (NORMODYNE,TRANDATE) for hypertension. For Systolic Blood Pressure greater than 170 mmHg. Hold if Heart Rate less than 60 beats per minute. Give dose over 1-2 minutes. Increase or repeat the dose if Blood Pressure goal not met. Give 10 mg, wait 10 minutes.  If not effective then give 20 mg. Wait 10 minutes.  If not effective then give 40 mg. If still not effective then start niCARdipine (CARDENE) IV infusion IF ORDERED. Notify provider within 1 hour if Blood Pressure parameters are not met.               levomilnacipran (FETZIMA ER) 24 hr capsule 80 mg  Dose: 80 mg Freq: DAILY Route: PO  Start: 09/10/17 1100   Admin Instructions: Do not open, chew or crush capsule.     0851 (80 mg)-Given        0805 (80 mg)-Given        0835 (80 mg)-Given        0750 (80 mg)-Given        0851 (80 mg)-Given        0739 (80 mg)-Given        0840 (80 mg)-Given           lidocaine (LIDODERM) 5 % Patch 1-3 patch  Dose: 1-3 patch Freq: EVERY 24 HOURS 0800 Route: TD  Start: 09/13/17 0800   Admin Instructions: Apply patch over slower cervical and upper thoracic region. To prevent lidocaine toxicity, patient should be patch free for 12 hrs daily. Patches may be cut to smaller size prior to removing release liner.  NEVER APPLY HEAT OVER PATCH which will increase absorption and may lead to risk of local anesthetic toxicity. Do not apply over area where liposomal bupivacaine was injected for 96 hours post injection.     (0852)-Not Given        (0813)-Not Given        (0831)-Not Given        (0751)-Not Given        (1243)-Not Given        (0739)-Not Given        (0840)-Not Given           lidocaine (LIDODERM) patch in PLACE  Freq: EVERY 8 HOURS Route: TD  Start: 09/10/17 1145   Admin Instructions: Chart every shift, confirming that patch is still in place on patient (no barcode scan needed). See patch order for  dose information.  NEVER APPLY HEAT OVER PATCH which will increase absorption and may lead to risk of local anesthetic toxicity. Do not apply over area where liposomal bupivacaine injected for 96 hours.            (1154)-Not Given       1900 ( )-Negative        0345 ( )-Negative              2000 ( )-Negative        0313 ( )-Negative              1947 ( )-Negative        0600 ( )-Negative       (1256)-Not Given [C]                             (2014)-Not Given [C]               (1106)-Not Given                      1049 ( )-Negative       [ ] 1945           lidocaine (LIDODERM) patch REMOVAL  Freq: EVERY 24 HOURS 2000 Route: TD  Start: 09/10/17 2000   Admin Instructions: Patient should have a 12 hour patch free interval     1957 ( )-Negative        2001 ( )-Negative        1947 ( )-Negative                2014 ( )-Patch Removed                [ ] 2000           lidocaine (LMX4) kit  Freq: ONCE PRN Route: Top  PRN Reason: moderate pain  PRN Comment: for local anesthetic during PICC insertion  Start: 09/20/17 1423   Admin Instructions: Apply to PICC Insertion Site. Apply 30 minutes prior to procedure. MAX Dose: 2.5 gm  (1/2 of 5 gm tube)                 lidocaine (LMX4) kit  Freq: ONCE PRN Route: Top  PRN Reason: moderate pain  PRN Comment: for local anesthetic during PICC insertion  Start: 09/18/17 1346   Admin Instructions: Apply to PICC Insertion Site. Apply 30 minutes prior to procedure. MAX Dose: 2.5 gm  (1/2 of 5 gm tube)                 lidocaine 1 % 0.5-5 mL  Dose: 0.5-5 mL Freq: ONCE PRN Route: OTHER  PRN Comment: mild pain For local anesthetic during PICC insertion.  Start: 09/20/17 1423   Admin Instructions: Give Sub-Q/Intradermal.  Give in divided doses as needed.               lidocaine 1 % 0.5-5 mL  Dose: 0.5-5 mL Freq: ONCE PRN Route: OTHER  PRN Comment: mild pain For local anesthetic during PICC insertion.  Start: 09/18/17 1346   Admin Instructions: Give Sub-Q/Intradermal.  Give in divided doses as  needed.               magnesium sulfate 4 g in 100 mL sterile water (premade)  Dose: 4 g Freq: EVERY 4 HOURS PRN Route: IV  PRN Reason: magnesium supplementation  Start: 09/11/17 0935   Admin Instructions: For serum Mg++ less than 1.6 mg/dL  Give 4 g and recheck magnesium level 2 hours after dose, and next AM.               melatonin tablet 1 mg  Dose: 1 mg Freq: AT BEDTIME PRN Route: PO  PRN Reason: sleep  Start: 09/19/17 2311 4923 (1 mg)-Given            naloxone (NARCAN) injection 0.1-0.4 mg  Dose: 0.1-0.4 mg Freq: EVERY 2 MIN PRN Route: IV  PRN Reason: opioid reversal  Start: 09/11/17 0218   Admin Instructions: For respiratory rate LESS than or EQUAL to 8.  Partial reversal dose:  0.1 mg titrated q 2 minutes for Analgesia Side Effects Monitoring Sedation Level of 3 (frequently drowsy, arousable, drifts to sleep during conversation).Full reversal dose:  0.4 mg bolus for Analgesia Side Effects Monitoring Sedation Level of 4 (somnolent, minimal or no response to stimulation).               ondansetron (ZOFRAN-ODT) ODT tab 4 mg  Dose: 4 mg Freq: EVERY 6 HOURS PRN Route: PO  PRN Reasons: nausea,vomiting  Start: 09/11/17 0218   Admin Instructions: This is Step 1 of nausea and vomiting management.  If nausea not resolved in 15 minutes, go to Step 2 prochlorperazine (COMPAZINE). Do not push through foil backing. Peel back foil and gently remove. Place on tongue immediately. Administration with liquid unnecessary              Or  ondansetron (ZOFRAN) injection 4 mg  Dose: 4 mg Freq: EVERY 6 HOURS PRN Route: IV  PRN Reasons: nausea,vomiting  Start: 09/11/17 0218   Admin Instructions: This is Step 1 of nausea and vomiting management.  If nausea not resolved in 15 minutes, go to Step 2 prochlorperazine (COMPAZINE).  Irritant.               polyethylene glycol (MIRALAX/GLYCOLAX) Packet 17 g  Dose: 17 g Freq: DAILY Route: PO  Start: 09/11/17 1645   Admin Instructions: 1 Packet = 17 grams. Mixed prescribed dose in 8  ounces of water. Follow with 8 oz. of water.     (0851)-Not Given        (0813)-Not Given        (0831)-Not Given        (0751)-Not Given        (0853)-Not Given        (0739)-Not Given        (0840)-Not Given           potassium chloride (KLOR-CON) Packet 20-40 mEq  Dose: 20-40 mEq Freq: EVERY 2 HOURS PRN Route: ORAL OR FEED  PRN Reason: potassium supplementation  Start: 09/11/17 0935   Admin Instructions: Use if unable to tolerate tablets.  If Serum K+ 3.0-3.3, dose = 60 mEq po total dose (40 mEq x1 followed in 2 hours by 20 mEq x1). Recheck K+ level 4 hours after dose and the next AM.  If Serum K+ 2.5-2.9, dose = 80 mEq po total dose (40 mEq Q2H x2). Recheck K+ level 4 hours after dose and the next AM.  If Serum K+ less than 2.5, See IV order.  Dissolve packet contents in 4-8 ounces of cold water or juice.               potassium chloride 10 mEq in 100 mL intermittent infusion  Dose: 10 mEq Freq: EVERY 1 HOUR PRN Route: IV  PRN Reason: potassium supplementation  Start: 09/11/17 0935   Admin Instructions: Infuse via PERIPHERAL LINE or CENTRAL LINE. Use for central line replacement if patient weight less than 65 kg, if patient is on TPN with high potassium content or if unit does not stock 20 mEq bags.   If Serum K+ 3.0-3.3, dose = 10 mEq/hr x4 doses (40 mEq IV total dose). Recheck K+ level 2 hours after dose and the next AM.   If Serum K+ less than 3.0, dose = 10 mEq/hr x6 doses (60 mEq IV total dose). Recheck K+ level 2 hours after dose and the next AM.               potassium chloride 10 mEq in 100 mL intermittent infusion with 10 mg lidocaine  Dose: 10 mEq Freq: EVERY 1 HOUR PRN Route: IV  PRN Reason: potassium supplementation  Start: 09/11/17 0935   Admin Instructions: Infuse via PERIPHERAL LINE. Use potassium with lidocaine for pain with peripheral administration.  If Serum K+ 3.0-3.3, dose = 10 mEq/hr x4 doses (40 mEq IV total dose). Recheck K+ level 2 hours after dose and the next AM.  If Serum K+ less than  3.0, dose = 10 mEq/hr x6 doses (60 mEq IV total dose). Recheck K+ level 2 hours after dose and the next AM.               potassium chloride SA (K-DUR/KLOR-CON M) CR tablet 20-40 mEq  Dose: 20-40 mEq Freq: EVERY 2 HOURS PRN Route: PO  PRN Reason: potassium supplementation  Start: 09/11/17 0935   Admin Instructions: Use if able to take PO.   If Serum K+ 3.0-3.3, dose = 60 mEq po total dose (40 mEq x1 followed in 2 hours by 20 mEq x1). Recheck K+ level 4 hours after dose and the next AM.  If Serum K+ 2.5-2.9, dose = 80 mEq po total dose (40 mEq Q2H x2). Recheck K+ level 4 hours after dose and the next AM.  If Serum K+ less than 2.5, See IV order.  DO NOT CRUSH.               potassium phosphate 15 mmol in D5W 250 mL intermittent infusion  Dose: 15 mmol Freq: DAILY PRN Route: IV  PRN Reason: phosphorous supplementation  Start: 09/11/17 0935   Admin Instructions: For serum phosphorus level 2-2.4  Do not infuse Phosphorus in the same line as TPN.   Give 15 mmol and recheck phosphorus level next AM.               potassium phosphate 20 mmol in D5W 250 mL intermittent infusion  Dose: 20 mmol Freq: EVERY 6 HOURS PRN Route: IV  PRN Reason: phosphorous supplementation  Start: 09/11/17 0935   Admin Instructions: For serum phosphorus level 1.1-1.9  For CENTRAL Line ONLY  Do not infuse Phosphorus in the same line as TPN.   Give 20 mmol and recheck phosphorus level 2 hours after last dose and next AM.               potassium phosphate 20 mmol in D5W 500 mL intermittent infusion  Dose: 20 mmol Freq: EVERY 6 HOURS PRN Route: IV  PRN Reason: phosphorous supplementation  Start: 09/11/17 0935   Admin Instructions: For serum phosphorus level 1.1-1.9  For Peripheral Line  Do not infuse Phosphorus in the same line as TPN.   Give 20 mmol and recheck phosphorus level 2 hours after last dose and next AM.               potassium phosphate 25 mmol in D5W 500 mL intermittent infusion  Dose: 25 mmol Freq: EVERY 8 HOURS PRN Route: IV  PRN  Reason: phosphorous supplementation  Start: 09/11/17 0935   Admin Instructions: For serum phosphorus level less than 1.1  Do not infuse Phosphorus in the same line as TPN.   Give 25 mmol and recheck phosphorus level 2 hours after last dose and next AM.               pregabalin (LYRICA) capsule 150 mg  Dose: 150 mg Freq: 2 TIMES DAILY Route: PO  Start: 09/18/17 2000       1932 (150 mg)-Given        0851 (150 mg)-Given       2015 (150 mg)-Given        0738 (150 mg)-Given       2107 (150 mg)-Given        0839 (150 mg)-Given       [ ] 2000           prochlorperazine (COMPAZINE) injection 5-10 mg  Dose: 5-10 mg Freq: EVERY 6 HOURS PRN Route: IV  PRN Reasons: nausea,vomiting  Start: 09/11/17 0218   Admin Instructions: This is Step 2 of nausea and vomiting management.   If nausea not resolved in 15 minutes, give metoclopramide (REGLAN), if ordered (step 3 of nausea and vomiting management)              Or  prochlorperazine (COMPAZINE) tablet 5-10 mg  Dose: 5-10 mg Freq: EVERY 6 HOURS PRN Route: PO  PRN Reasons: nausea,vomiting  Start: 09/11/17 0218   Admin Instructions: This is Step 2 of nausea and vomiting management.   If nausea not resolved in 15 minutes, give metoclopramide (REGLAN), if ordered (step 3 of nausea and vomiting management)               senna-docusate (SENOKOT-S;PERICOLACE) 8.6-50 MG per tablet 1-2 tablet  Dose: 1-2 tablet Freq: 2 TIMES DAILY Route: PO  Start: 09/11/17 0800   Admin Instructions: Start with 1 tablet PO BID, If no bowel movement in 24 hours, increase to 2 tablets PO BID.  Hold for loose stools.     (0851)-Not Given       (1958)-Not Given        0805 (2 tablet)-Given       (2001)-Not Given        0835 (2 tablet)-Given       (1947)-Not Given        (0751)-Not Given       (1932)-Not Given        (1243)-Not Given       (2014)-Not Given        (0739)-Not Given       (1909)-Not Given        0840 (1 tablet)-Given       [ ] 2000           sodium chloride (PF) 0.9% PF flush 3 mL  Dose: 3 mL Freq:  EVERY 8 HOURS Route: IK  Start: 09/11/17 0230   Admin Instructions: And Q1H PRN, to lock peripheral IV dormant line.     0408 (3 mL)-Given       (1154)-Not Given       (1758)-Not Given [C]        0230 (3 mL)-Given       0814 (3 mL)-Given       (1547)-Not Given        (0020)-Not Given       0830 (3 mL)-Given       1608 (3 mL)-Given        0022 (3 mL)-Given       (0751)-Not Given       1605 (3 mL)-Given        0020 (3 mL)-Given       (0800)-Not Given [C]       1613 (3 mL)-Given        0003 (3 mL)-Given       0742 (3 mL)-Given       0752 (20 mL)-Given       1709 (3 mL)-Given       2350 (3 mL)-Given        0841 (3 mL)-Given       [ ] 1615           sodium chloride (PF) 0.9% PF flush 3 mL  Dose: 3 mL Freq: EVERY 1 HOUR PRN Route: IK  PRN Reason: line flush  PRN Comment: for peripheral IV flush post IV meds  Start: 09/11/17 0218     0731 (20 mL)-Given          0747 (20 mL)-Given             sodium chloride (PF) 0.9% PF flush 5-50 mL  Dose: 5-50 mL Freq: ONCE PRN Route: IK  PRN Reason: line flush  PRN Comment: to flush each lumen with line placement  Start: 09/20/17 1423   Admin Instructions: May repeat x 1               sodium chloride (PF) 0.9% PF flush 5-50 mL  Dose: 5-50 mL Freq: ONCE PRN Route: IK  PRN Reason: line flush  PRN Comment: to flush each lumen with line placement  Start: 09/18/17 1346   Admin Instructions: May repeat x 1               traZODone (DESYREL) tablet 50 mg  Dose: 50 mg Freq: AT BEDTIME Route: PO  Start: 09/11/17 2200    2156 (50 mg)-Given        2149 (50 mg)-Given        2230 (50 mg)-Given        2214 (50 mg)-Given        2133 (50 mg)-Given        2107 (50 mg)-Given        [ ] 2200          Future Medications  Medications 09/15/17 09/16/17 09/17/17 09/18/17 09/19/17 09/20/17 09/21/17       influenza quadrivalent (PF) vacc age 3 yrs and older (FLUZONE or Flulaval) injection 0.5 mL  Dose: 0.5 mL Freq: PRIOR TO DISCHARGE Route: IM  Start: 09/22/17 1000   Admin Instructions: Administer when afebrile  (less than 100.4 F) x 24 hours, or Prior to Discharge. If not administering when scheduled , change the due time by following the instructions in the reference link below. If patient refuses vaccine, chart as Vaccine Refused.              Completed Medications  Medications 09/15/17 09/16/17 09/17/17 09/18/17 09/19/17 09/20/17 09/21/17         Dose: 2-5 mL Freq: ONCE PRN Route: IK  PRN Reason: line flush  PRN Comment: for locking each dormant lumen with line placement  Start: 09/18/17 1346   End: 09/19/17 0748   Admin Instructions: May repeat x 1         0748 (5 mL)-Given            Discontinued Medications  Medications 09/15/17 09/16/17 09/17/17 09/18/17 09/19/17 09/20/17 09/21/17         Dose: 325 mg Freq: EVERY 6 HOURS PRN Route: PO  PRN Reasons: mild pain,fever  Start: 09/19/17 1149   End: 09/19/17 1444   Admin Instructions: Maximum acetaminophen dose from all sources = 75 mg/kg/day not to exceed 4 grams/day.         1227 (325 mg)-Given       1444-Med Discontinued           Dose: 650 mg Freq: EVERY 6 HOURS PRN Route: PO  PRN Reasons: mild pain,fever  Start: 09/19/17 1444   End: 09/20/17 1533   Admin Instructions: Maximum acetaminophen dose from all sources = 75 mg/kg/day not to exceed 4 grams/day.         2154 (650 mg)-Given        1533-Med Discontinued                 INTERAGENCY TRANSFER FORM - NOTES (H&P, Discharge Summary, Consults, Procedures, Therapies)   9/9/2017                       [address] [facility] BANK: [phone]               History & Physicals      H&P by Marcelo Lara MD at 9/10/2017  1:55 PM     Author:  Marcelo Lara MD Service:  General Medicine Author Type:  Resident    Filed:  9/10/2017 10:46 PM Date of Service:  9/10/2017  1:55 PM Creation Time:  9/10/2017  1:55 PM    Status:  Attested :  Marcelo Lara MD (Resident)    Cosigner:  Armin Stiles MD at 9/19/2017  7:40 AM        Attestation signed by Armin Stiles MD at 9/19/2017  7:40 AM  (Updated)        Attestation:  Physician Attestation   I, Armin Stiles, saw this patient with the resident and agree with the resident s findings and plan of care as documented in the resident s note.      I personally reviewed vital signs, medications, labs and imaging.    Key findings: 37 year old man with history of HIV and IV drug use who initially presented with increasing weakness and difficulty with balance. Emergent MRI obtained and concerning for vertebral osteomyelitis vs epidural abscess. Will obtain emergent Neurosurgery Consultation. Appreciate assistance. Will likely also discuss case with Infectious Disease. Hold on antibiotics pending surgical plan. Family history reviewed with patient and non-contributory to current hospitalization.     Armin Stiles  Date of Service (when I saw the patient): 9/10/17                                 History and Physical  Internal Medicine      Date of Service: 09/10/17  Date of Admission: 2017  Patient Name: Bc German  : 1980  MRN: [id number]  Att Prov:[IE1.1] Bk Stiles[IE1.2]      Chief complaint:[IE1.1]   Weakness, ataxia, neck pain[IE1.3]      History of Present Illness:   Bc German is a 37 year old year old with PMH[IE1.1] HIV, IVDU, anxiety/depression[IE1.4] who presents with[IE1.1] weakness, ataxia, neck pain.     Patient was seen at [facility] for complaints of weakness and ataxia. Neurology was consulted and patient with MR cervical spine notable for probable disc space infection with epidural effacement of spinal cord at C6-C7 and was transferred to here for further management and neurosurgical evaluation.    On interview, patient was found to be afebrile and hemodynamically stable.     Patient reports 3 months of progressive numbness, tingling, loss of sensation, and weakness of bilateral upper and lower extremities.  Patient reports he initially noticed numbness and tingling of legs and tripping and difficulty of walking.  "He endorse loss of sensation of toes and falling a few times. This progressed to hand numbness and tingling and eventually hand weakness. He reports difficulty opening hands, getting phone out of his pocket.  He reports cervical spine pain for the past one and a half weeks.  He first noticed it when he woke up sleeping on his side due to pain and this is unusual for him. He endorses difficulty with other activities of daily living such as showering.  Patient is homeless at baseline and sometimes lives with his dad, however has been living with his dad this past week due to difficulty functio ng on his own due to his symptoms.     This week he reports 2 separate episodes of bladder incontinence.  Denies incontinence of stools. He reports increased appeptite and feels as though  his stomach is \"getting fat\" over the last 8-9 days. He also has reduced ability to bear down appropriately and so does not feel like he can empty completely when he defecates. Reports decreased sensation from level of mid abdomen down with \"weird sensation\" near R testicle that feels as though his \"undewear is bunching up.\"   Denies saddle anesthesia.[IE1.3]     Endorses fevers/chills. No chest pain/SOB/difficulty breathing.[IE1.5] Has longstanding history of nausea, denies vomiting. Endorses longstanding anxiety. Reports drinks and uses illicit drugs to mask anxiety.[IE1.3]       Review of Symptoms:     Comprehensive 10 point review of systems was negative unless otherwise noted in the HPI.     Past Medical History:[IE1.1]     Past Medical History:   Diagnosis Date     Anxiety      Depressive disorder      Drug abuse, IV      Group A streptococcal infection 11/2014    Bacteremia/cellulitis     HCV antibody positive      HIV (human immunodeficiency virus infection) (H)      HIV (human immunodeficiency virus infection) (H)      HIV (human immunodeficiency virus infection) (H)      IVDU (intravenous drug user)      Osteomyelitis of vertebra of " "lumbosacral region (H) 3/2011    L3, left psoas abscess       Past Surgical History:   Procedure Laterality Date     EYE SURGERY  1 year ago    pins to left eye after socket fracture     ORTHOPEDIC SURGERY      Arm Surgery     TRANSESOPHAGEAL ECHOCARDIOGRAM INTRAOPERATIVE N/A 3/17/2015    Procedure: TRANSESOPHAGEAL ECHOCARDIOGRAM INTRAOPERATIVE;  Surgeon: Generic Anesthesia Provider;  Location: [facility] OR[IE1.6]      Allergies:[IE1.1]     Allergies   Allergen Reactions     Doxycycline GI Disturbance[IE1.6]      Outpatient Medications:[IE1.1]       No current facility-administered medications on file prior to encounter.   Current Outpatient Prescriptions on File Prior to Encounter:  TRIUMEQ 600- MG per tablet TAKE ONE TABLET BY MOUTH EVERY DAY   levomilnacipran (FETZIMA) 80 MG 24 hr capsule Take 1 capsule (80 mg) by mouth daily   pregabalin (LYRICA) 150 MG capsule Take 2 capsules (300 mg) by mouth 2 times daily   naloxone (NARCAN) nasal spray Spray 1 spray (4 mg) into one nostril alternating nostrils as needed for opioid reversal (every 2-3 minutes until assistance arrives.)   ibuprofen (ADVIL/MOTRIN) 600 MG tablet Take 1 tablet (600 mg) by mouth every 6 hours as needed for moderate pain[IE1.6]        Family History:[IE1.1]   No family history on file.[IE1.6]     Social History:   Tobacco Use:[IE1.1] Endorses[IE1.3]  Illicit Drug Use:[IE1.1] Endorses meth, cocaine, marijuana. 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 rate 16, height 1.803 m (5' 11\"), weight 63.5 kg (140 lb), SpO2 100 %.[IE1.6]  Temp (24hrs), Av.4  F (36.3  C), Min:96.4  F (35.8  C), Max:98.5  F (36.9 "  C)    Gen: In no acute distress. Patient[IE1.1] sitting up in bed with cervical collar in place.[IE1.3]   HEENT: No scleral icterus, Moist mucous membranes.   CV: Normal rate, regular rhythm. S1/S2 normal.    Resp: Clear to auscultation bilaterally.[IE1.1] Good air movement.[IE1.3]   Abdomen: Soft, non tender abdomen, normal active bowel sounds   Extremities: No peripheral edema, warm, capillary refill < 2 seconds  Skin:[IE1.1] Dry and warm. Multiple tattoos.[IE1.3]   Neuro:[IE1.1]   Alert and oriented[IE1.3]   Pupils are equal and reactive to light[IE1.1] but slightly dilated,[IE1.3] EOMI,[IE1.1] no nystagmus[IE1.3]  Tongue protrudes midline   Shoulder shrug symmetric BL with 5/5 strength   5/5 strength in the proximal[IE1.1] upper extremities, 4/5 distal upper extremities  5/5 strength bilateral lower extremities   Intact but slow rapid alternating movements, proprioception intact, negative Romberg   Wandering, unsteady gait, unable to walk in straight line[IE1.3]      Data:     CMP[IE1.1]  Recent Labs  Lab 09/09/17  1859      POTASSIUM 4.0   CHLORIDE 107   CO2 27   ANIONGAP 10   GLC 95   BUN 18   CR 0.93   GFRESTIMATED >90   GFRESTBLACK >90   SANDRA 9.7   MAG 2.2   PHOS 3.5   PROTTOTAL 9.2*   ALBUMIN 3.8   BILITOTAL 0.2   ALKPHOS 105   AST 42   *[IE1.6]     CBC[IE1.1]  Recent Labs  Lab 09/09/17  1859   WBC 12.8*   RBC 4.40   HGB 12.1*   HCT 37.6*   MCV 86   MCH 27.5   MCHC 32.2   RDW 15.6*   [IE1.6]     I[IE1.3]maging:    MRI brain: The brain parenchyma, ventricles and subarachnoid spaces  appear normal.  There is no evidence of hemorrhage, mass, acute  infarct, or anomaly.  There are no gadolinium enhancing lesions.     The facial structures appear normal. The arteries at the base of the  brain and the dural venous sinuses appear patent.     MRI angio of brain: The visualized portions of the distal internal carotid and  vertebral arteries, the basilar artery, Ramona of Oneil, and the  proximal  anterior, middle and posterior cerebral arteries all appear  normal.  There is no evidence of aneurysm or vascular stenosis or  Occlusion.    MRI angio of neck:  Normal MR angiogram of the neck.    MRI cervical spine: 1. Pathologic process C6-C7 and intervening disc space and anterior  epidural involvement. This results in effacement of the spinal cord at  this level 2.[Critical Result: Probable disc space infection with epidural  effacement of the spinal cord at C6-C7.]    MRI thoracic spine: Thoracic spine MR is normal. Vertebral bodies are normal.  Disc spaces are normal. Alignment is normal. Thoracic spinal cord  appears normal with the conus at T12-L1.    MRI lumbar: 1. Chronic deformity at the L2-L3 level which was the site of disc  space infection most recently evaluated on 3/7/2015. No phlegmon or  paraspinous abscess is identified at this time. Findings may represent  the chronic residual of the prior disc space infection. A new  infection at the same level cannot be entirely excluded.   2. No disc protrusions at the remaining levels. No central or lateral  stenosis.     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[IE1.1]   Patient with history and physical concerning for spinal cord compression with MRI evidence of discitis with concern for epidural abscess[IE1.3]   -[IE1.1] Urgent neurosurgery consultation   - Consider infectious disease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  31    BUN 13  --  12   CR 0.70 0.73 0.74   ANIONGAP 5  --  6   SANDRA 9.5  --  9.2   GLC 85  --  104*   ALBUMIN 3.1*  --   --    PROTTOTAL 8.0  --   --    BILITOTAL 0.3  --   --    ALKPHOS 113  --   --    ALT 96*  --   --    *  --   --[SS1.1]           Revision History        User Key Date/Time User Provider Type Action    > SS1.1 9/20/2017  6:09 PM Ramon Justin MD Physician Sign                  Procedure Notes     No notes of this type exist for this encounter.      Progress Notes - Therapies (Notes from 09/18/17 through 09/21/17)     No notes of this type exist for this encounter.                                          INTERAGENCY TRANSFER FORM - LAB / IMAGING / EKG / EMG RESULTS   9/9/2017                       [facility]: [phone]            Unresulted Labs (24h ago through future)    Start       Ordered    09/21/17 0000  CBC with platelets differential  Routine     Comments:  Last Lab Result: Hemoglobin (g/dL)       Date                     Value                 09/20/2017               11.3 (L)         ----------    09/21/17 1120    09/21/17 0000  Comprehensive metabolic panel  Routine     Comments:  Will need follow up till 10/24 every week with CBC, CMP    09/21/17 1120    09/21/17 0000  CRP inflammation  Routine     Comments:  Continue weekly till 10/24    09/21/17 1433    09/13/17 0600  Platelet count  (Pharmacological Prophylaxis - enoxaparin (LOVENOX) *Use only if creatinine clearance is greater than 30 mL/min)  EVERY THREE DAYS,   Routine     Comments:  Repeat every 3 days while on VTE prophylaxis.  Notify provider and hold enoxaparin if platelet count falls by 50% of baseline. If no result is listed, this lab has not been done the past 365 days. LATEST LAB RESULT: Platelet Count (10e9/L)       Date                     Value                 09/09/2017               375              ----------    09/10/17 0201    09/13/17 0000  Creatinine  (Pharmacological Prophylaxis - enoxaparin  "(LOVENOX) *Use only if creatinine clearance is greater than 30 mL/min)  EVERY THREE DAYS,   Routine     Comments:  Repeat every 3 days while on VTE prophylaxis.    09/10/17 0201    Unscheduled  Magnesium  (Magnesium Replacement -  Adult - \"Standard\" - Replacement for all levels less than 1.6 mg/dL )  CONDITIONAL (SPECIFY),   Routine     Comments:  Obtain Magnesium Level for these conditions:  *IF no magnesium result within 24 hrs before initiation of order set, draw magnesium level with next lab collect.    *2 HOURS AFTER last magnesium replacement dose when magnesium replacement given for level less than 1.6   *Next morning after magnesium dose.     Repeat Magnesium Replacement if necessary.    09/11/17 0935    Unscheduled  Phosphorus  (POTASSIUM Phosphate - \"Standard\" - Replacement for levels less than or equal to 2.4 mg/dL )  CONDITIONAL (SPECIFY),   Routine     Comments:  Obtain Phosphorus Level for these conditions:  *IF no phosphorus result within 24 hrs before initiation of order set, draw phosphorus level with next lab collect.    *2 HOURS AFTER last phosphorus replacement dose for levels less than 2.0.  *Next morning after phosphorus dose.     Repeat Phosphorus Replacement if necessary.    09/11/17 0935    Unscheduled  Potassium  (Potassium Replacement - \"Standard\" - For K levels less than 3.4 mmol/L - UU,UR,UA,RH,SH,PH,WY )  CONDITIONAL (SPECIFY),   Routine     Comments:  Obtain Potassium Level for these conditions:  *IF no potassium result within 24 hours before initiation of order set, draw potassium level with next lab collect.    *2 HOURS AFTER last IV potassium replacement dose and 4 hours after an oral replacement dose.  *Next morning after potassium dose.     Repeat Potassium Replacement if necessary.    09/11/17 0935         Lab Results - 3 Days      Comprehensive metabolic panel [[id number]] (Abnormal)  Resulted: 09/20/17 0925, Result status: Final result    Ordering provider: Ramon Justin MD  " 09/20/17 0001 Resulting lab: [facility]    Specimen Information    Type Source Collected On   Blood  09/20/17 0755          Components       Value Reference Range Flag Lab   Sodium 135 133 - 144 mmol/L  51   Potassium 3.9 3.4 - 5.3 mmol/L  51   Chloride 99 94 - 109 mmol/L  51   Carbon Dioxide 31 20 - 32 mmol/L  51   Anion Gap 5 3 - 14 mmol/L  51   Glucose 85 70 - 99 mg/dL  51   Urea Nitrogen 13 7 - 30 mg/dL  51   Creatinine 0.70 0.66 - 1.25 mg/dL  51   GFR Estimate >90 >60 mL/min/1.7m2  51   Comment:  Non  GFR Calc   GFR Estimate If Black >90 >60 mL/min/1.7m2  51   Comment:  African American GFR Calc   Calcium 9.5 8.5 - 10.1 mg/dL  51   Bilirubin Total 0.3 0.2 - 1.3 mg/dL  51   Albumin 3.1 3.4 - 5.0 g/dL L 51   Protein Total 8.0 6.8 - 8.8 g/dL  51   Alkaline Phosphatase 113 40 - 150 U/L  51   ALT 96 0 - 70 U/L H 51    0 - 45 U/L H 51            CBC (AM Draw) [[id number]] (Abnormal)  Resulted: 09/20/17 0908, Result status: Final result    Ordering provider: Ramon Justin MD  09/20/17 0001 Resulting lab: [facility]    Specimen Information    Type Source Collected On   Blood  09/20/17 0755          Components       Value Reference Range Flag Lab   WBC 8.0 4.0 - 11.0 10e9/L  51   RBC Count 4.15 4.4 - 5.9 10e12/L L 51   Hemoglobin 11.3 13.3 - 17.7 g/dL L 51   Hematocrit 36.5 40.0 - 53.0 % L 51   MCV 88 78 - 100 fl  51   MCH 27.2 26.5 - 33.0 pg  51   MCHC 31.0 31.5 - 36.5 g/dL L 51   RDW 15.0 10.0 - 15.0 %  51   Platelet Count 412 150 - 450 10e9/L  51            Platelet count [[id number]]  Resulted: 09/19/17 0847, Result status: Final result    Ordering provider: Joseline Mujica MD  09/19/17 0000 Resulting lab: [facility]    Specimen Information    Type Source Collected On   Blood  09/19/17 0752          Components       Value Reference Range Flag Lab   Platelet Count 385 150 - 450 10e9/L   51            Creatinine [[id number]]  Resulted: 09/19/17 0835, Result status: Final result    Ordering provider: Joseline Mujica MD  09/18/17 1800 Resulting lab: [facility]    Specimen Information    Type Source Collected On   Blood  09/19/17 0752          Components       Value Reference Range Flag Lab   Creatinine 0.73 0.66 - 1.25 mg/dL  51   GFR Estimate >90 >60 mL/min/1.7m2  51   Comment:  Non  GFR Calc   GFR Estimate If Black >90 >60 mL/min/1.7m2  51   Comment:   GFR Calc            Anaerobic bacterial culture [[id number]]  Resulted: 09/18/17 1539, Result status: Final result    Ordering provider: Marv Ambrose MD  09/10/17 2115 Resulting lab: [facility]    Specimen Information    Type Source Collected On   Abscess Other 09/10/17 2110          Components       Value Reference Range Flag Lab   Specimen Description Cervical Abscess EPIDURAL PHLEGMON SPECIMEN 1      Special Requests Received in anaerobic tubes.   75   Culture Micro No anaerobes isolated   225            Anaerobic bacterial culture [[id number]]  Resulted: 09/18/17 1539, Result status: Final result    Ordering provider: Marv Ambrose MD  09/10/17 2204 Resulting lab: [facility]    Specimen Information    Type Source Collected On   Tissue Other 09/10/17 2201          Components       Value Reference Range Flag Lab   Specimen Description Tissue VERTEBRAL BODY SPECIMEN 2      Special Requests Received in anaerobic tubes.   75   Culture Micro No anaerobes isolated   225            Fungus Culture, non-blood [[id number]]  Resulted: 09/18/17 1426, Result status: Preliminary result    Ordering provider: Marv Ambrose MD  09/10/17 2115 Resulting lab: [facility]    Specimen Information    Type Source Collected On   Abscess Other 09/10/17 2110          Components        Value Reference Range Flag Lab   Specimen Description Cervical Abscess EPIDURAL PHLEGMON SPECIMEN 1      Culture Micro Culture negative after 1 week   75            Fungus Culture, non-blood [[id number]]  Resulted: 09/18/17 1426, Result status: Preliminary result    Ordering provider: Marv Ambrose MD  09/10/17 2204 Resulting lab: [facility]    Specimen Information    Type Source Collected On   Tissue Other 09/10/17 2201          Components       Value Reference Range Flag Lab   Specimen Description Tissue VERTEBRAL BODY SPECIMEN 2      Culture Micro Culture negative after 1 week   75            Basic metabolic panel [[id number]] (Abnormal)  Resulted: 09/18/17 0813, Result status: Final result    Ordering provider: Joseline Mujica MD  09/18/17 0000 Resulting lab: [facility]    Specimen Information    Type Source Collected On   Blood  09/18/17 0728          Components       Value Reference Range Flag Lab   Sodium 139 133 - 144 mmol/L  51   Potassium 3.5 3.4 - 5.3 mmol/L  51   Chloride 102 94 - 109 mmol/L  51   Carbon Dioxide 31 20 - 32 mmol/L  51   Anion Gap 6 3 - 14 mmol/L  51   Glucose 104 70 - 99 mg/dL H 51   Urea Nitrogen 12 7 - 30 mg/dL  51   Creatinine 0.74 0.66 - 1.25 mg/dL  51   GFR Estimate >90 >60 mL/min/1.7m2  51   Comment:  Non  GFR Calc   GFR Estimate If Black >90 >60 mL/min/1.7m2  51   Comment:  African American GFR Calc   Calcium 9.2 8.5 - 10.1 mg/dL  51            CBC with platelets [[id number]] (Abnormal)  Resulted: 09/18/17 0756, Result status: Final result    Ordering provider: Joseline Mujica MD  09/18/17 0000 Resulting lab: [facility]    Specimen Information    Type Source Collected On   Blood  09/18/17 0728          Components       Value Reference Range Flag Lab   WBC 7.5 4.0 - 11.0 10e9/L  51   RBC Count 3.86 4.4 - 5.9 10e12/L L 51   Hemoglobin 10.5 13.3 -  17.7 g/dL L 51   Hematocrit 33.9 40.0 - 53.0 % L 51   MCV 88 78 - 100 fl  51   MCH 27.2 26.5 - 33.0 pg  51   MCHC 31.0 31.5 - 36.5 g/dL L 51   RDW 15.0 10.0 - 15.0 %  51   Platelet Count 376 150 - 450 10e9/L  51            Blood culture [[id number]]  Resulted: 09/18/17 0427, Result status: Final result    Ordering provider: Joseline Mujica MD  09/12/17 1638 Resulting lab: [facility]    Specimen Information    Type Source Collected On   Blood  09/12/17 1654   Comment:  White port          Components       Value Reference Range Flag Lab   Specimen Description Blood White port      Culture Micro No growth   75            Blood culture [[id number]]  Resulted: 09/18/17 0427, Result status: Final result    Ordering provider: Joseline Mujica MD  09/12/17 1638 Resulting lab: [facility]    Specimen Information    Type Source Collected On   Blood  09/12/17 1701   Comment:  Left Hand          Components       Value Reference Range Flag Lab   Specimen Description Blood Left Hand      Culture Micro No growth   75            Testing Performed By     Lab - Abbreviation Name Director Address Valid Date Range    51 - Unknown [facility] Unknown [address] [facility] [address] 12/31/14 1010 - Present    75 - Unknown [facility] Unknown [address] [facility] [address] 01/15/15 1019 - Present    225 - Unknown [facility] Unknown [address] [facility] [address] 12/19/14 0954 - Present               Imaging Results - 3 Days      IR PICC Vascular [[id number]]  Resulted: 09/21/17 1514, Result status: In process    Ordering provider: Ramon Justin MD  09/21/17 1227 Performed: 09/21/17 1514 - 09/21/17 1514    Resulting lab: [facility]             Testing Performed By     Lab - Abbreviation Name Director Address Valid Date Range    178 - [facility]  Unknown Unknown 12 1135 - Present               ECG/EMG Results      Echocardiogram Complete [[id number]]  Resulted: 17 0934, Result status: Edited Result - FINAL    Ordering provider: Lee Jain MD  17 1805 Resulted by: RAINE Patten MD    Performed: 17 1100 - 17 1101 Resulting lab: [facility]    Narrative:       [id number]  [id number]  [id number]  [id number]^LORENA^LEE^LIZZIE           [facility],[place]  Echocardiography Laboratory  [address] [place] [id number]     Name: AMBREEN PYLE  MRN: [id number]  : 1980  Study Date: 2017 09:34 AM  Age: 37 yrs  Gender: Male  Patient Location: Replaced by [facility]  Reason For Study: Endocarditis  Ordering Physician: LEE JAIN  Performed By: RUPINDER Perkins     BSA: 1.8 m2  Height: 71 in  Weight: 140 lb  BP: 119/78 mmHg  _____________________________________________________________________________  __        Procedure  Complete Portable Echo Adult. Technically difficult study.  _____________________________________________________________________________  __        Interpretation Summary  No vegetation or mass identified, however this does not exclude endocarditis.  Technically difficult study.  There has been no change.  _____________________________________________________________________________  __        Left Ventricle  Global and regional left ventricular function is normal with an EF of 60-65%.  Left ventricular wall thickness is normal. Left ventricular size is normal.  Normal left ventricular filling for age. No regional wall motion abnormalities  are seen.     Right Ventricle  Right ventricular function, chamber size, wall motion, and thickness are  normal.     Atria  Both atria appear normal. The atrial septum is intact as assessed by color  Doppler .        Mitral Valve  The mitral valve is normal.     Aortic Valve  Aortic valve is normal in structure and function.     Tricuspid  Valve  The tricuspid valve is normal. Trace tricuspid insufficiency is present.  Pulmonary artery systolic pressure cannot be assessed.     Pulmonic Valve  The pulmonic valve is normal. RVOT echodensity remain unchanged. Trace  pulmonic insufficiency is present.     Vessels  The aorta root is normal. The pulmonary artery is normal. The inferior vena  cava is normal.     Pericardium  No pericardial effusion is present.        Compared to Previous Study  There has been no change.  _____________________________________________________________________________  __     MMode/2D Measurements & Calculations  IVSd: 0.76 cm  LVIDd: 4.7 cm  LVIDs: 3.2 cm  LVPWd: 0.86 cm  FS: 32.7 %  EDV(Teich): 101.3 ml  ESV(Teich): 39.4 ml  LV mass(C)d: 123.6 grams  LV mass(C)dI: 68.2 grams/m2  Ao root diam: 3.2 cm  asc Aorta Diam: 3.1 cm  LVOT diam: 2.3 cm  LVOT area: 4.2 cm2  LA Volume (BP): 32.1 ml  LA Volume Index (BP): 17.7 ml/m2           Doppler Measurements & Calculations  MV E max moises: 82.9 cm/sec  MV A max moises: 75.7 cm/sec  MV E/A: 1.1  MV dec time: 0.12 sec  PA V2 max: 116.0 cm/sec  PA max P.4 mmHg  PA acc time: 0.09 sec  Lateral E/e': 7.6  Medial E/e': 8.6           _____________________________________________________________________________  __           Report approved by: Lili Barrett 2017 11:14 AM       1    Type Source Collected On     17 0934          View Image (below)              Encounter-Level Documents:     There are no encounter-level documents.      Order-Level Documents:     There are no order-level documents.

## 2017-09-09 NOTE — IP AVS SNAPSHOT
MRN:[id number]                      After Visit Summary   9/9/2017    Bc German    MRN: [id number]           Thank you!     Thank you for choosing [facility] for your care. Our goal is always to provide you with excellent care. Hearing back from our patients is one way we can continue to improve our services. Please take a few minutes to complete the written survey that you may receive in the mail after you visit with us. Thank you!        Patient Information     Date Of Birth          1980        Designated Caregiver       Most Recent Value    Caregiver    Will someone help with your care after discharge? yes    Name of designated caregiver Barrington [Father]    Phone number of caregiver [phone]    Caregiver address [address], [place], [place]      About your hospital stay     You were admitted on:  September 10, 2017 You last received care in the:  [address] [facility]    You were discharged on:  September 21, 2017        Reason for your hospital stay       You were admitted with epidural abscess collection in cervical region; you needed to have surgery for that and now continue to be on IV anitibiotic which we need to continue till 10/24                  Who to Call     For medical emergencies, please call 911.  For non-urgent questions about your medical care, please call your primary care provider or clinic, [phone]  For questions related to your surgery, please call your surgery clinic        Attending Provider     Provider Specialty    Mari York MD Emergency Medicine    [facility]    Linsey, Armin Wilde MD Internal Medicine    Halie, Marv Francisco MD Neurosurgery    [facility], Joseline Langford MD Internal Medicine    Ramon Justin MD Internal Medicine       Primary Care Provider Office Phone # Fax #    Damir SHANTELL Cisneros [phone]      After Care Instructions     Activity - Up ad gene            Additional Discharge Instructions       Please follow with Dr Barrientos in 1 week; please follow with ID doctor  in 3 weeks            Advance Diet as Tolerated       Follow this diet upon discharge: Orders Placed This Encounter      Regular Diet Adult            General info for SNF       Length of Stay Estimate: Short Term Care: Estimated # of Days 31-90  Condition at Discharge: Improving  Level of care:skilled   Rehabilitation Potential: Good  Admission H&P remains valid and up-to-date: Yes  Recent Chemotherapy: N/A  Use Nursing Home Standing Orders: Yes            Glucose monitor nursing POCT       Before meals and at bedtime            IV access       PICC.            Intake and output       Every shift            Mantoux instructions       Give two-step Mantoux (PPD) Per Facility Policy Yes                  Follow-up Appointments     Follow Up and recommended labs and tests       Follow up with primary care provider in 1 week.  The following labs/tests are recommended: CBC, CMP IN 1 WEEK EVERY WEEK TILL 10/24  Follow up with infectious disease in 3 weeks                  Your next 10 appointments already scheduled     Oct 03, 2017  9:30 AM CDT   (Arrive by 9:15 AM)   Return Visit with Damir Cisneros MD   [facility] ([facility])    [address] [place] [id number] [phone]              Additional Services     Infectious Disease Referral           NEUROSURGERY REFERRAL       Your provider has referred you to: follow up with Carline Dial on 9/27 for wound check  Follow with Dr Marv Ambrose in 6 weeks     Please be aware that coverage of these services is subject to the terms and limitations of your health insurance plan.  Call member services at your health plan with any benefit or coverage questions.      Please bring the following with you to your appointment:    (1) Any X-Rays, CTs or MRIs which have been  performed.  Contact the facility where they were done to arrange for  prior to your scheduled appointment.   (2) List of current medications  (3) This referral request   (4) Any documents/labs given to you for this referral            Occupational Therapy Adult Consult       Evaluate and treat as clinically indicated.    Reason:  Evaluate and treat for s/p corpectomy for epidural abscess            Physical Therapy Adult Consult       Evaluate and treat as clinically indicated.    Reason:  Continue s/p cervical epidural abscess and corpectomy                  Future tests that were ordered for you     CBC with platelets differential       Last Lab Result: Hemoglobin (g/dL)       Date                     Value                 09/20/2017               11.3 (L)         ----------            CRP inflammation       Continue weekly till 10/24            Comprehensive metabolic panel       Will need follow up till 10/24 every week with CBC, CMP                  Further instructions from your care team       You have an appointment with Dr. Barrientos at [facility] on 9/22/17 at 1pm.  The clinic is located at [address] [place] [address] , phone:  [phone].    Pending Results     Date and Time Order Name Status Description    9/21/2017 1227 IR PICC Vascular In process     9/10/2017 2204 Fungus Culture, non-blood Preliminary     9/10/2017 2201 AFB Culture Non Blood Preliminary     9/10/2017 2115 Fungus Culture, non-blood Preliminary     9/10/2017 2110 AFB Culture Non Blood Preliminary             Statement of Approval     Ordered          09/21/17 1200  I have reviewed and agree with all the recommendations and orders detailed in this document.  EFFECTIVE NOW     Approved and electronically signed by:  Ramon Justin MD             Admission Information     Date & Time Provider Department Dept. Phone    9/9/2017 Ramon Justin MD [address] [facility] [phone]  "     Your Vitals Were     Blood Pressure Pulse Temperature Respirations Height Weight    156/80 128 96.3  F (35.7  C) (Oral) 16 1.803 m (5' 11\") 63.5 kg (140 lb)    Pulse Oximetry BMI (Body Mass Index)                96% 19.53 kg/m2          [facility] Information     [employer] lets you send messages to your doctor, view your test results, renew your prescriptions, schedule appointments and more. To sign up, go to [email or web address] . Click on \"Log in\" on the left side of the screen, which will take you to the Welcome page. Then click on \"Sign up Now\" on the right side of the page.     You will be asked to enter the access code listed below, as well as some personal information. Please follow the directions to create your username and password.     Your access code is: [email or web address]  Expires: 10/30/2017  2:49 AM     Your access code will  in 90 days. If you need help or a new code, please call your [place] clinic or [phone].        Care EveryWhere ID     This is your Care EveryWhere ID. This could be used by other organizations to access your [place] medical records  [id number]        Equal Access to Services     ALMA MERCEDES AH: Félix Welch, wavanessada anthony, qaybta kaalmada adekamida, tawanda kramer. So [facility] [phone].    ATENCIÓN: Si habla español, tiene a chatterjee disposición servicios gratuitos de asistencia lingüística. Llame al [phone].    We comply with applicable federal civil rights laws and [place] laws. We do not discriminate on the basis of race, color, national origin, age, disability sex, sexual orientation or gender identity.               Review of your medicines      START taking        Dose / Directions    acetaminophen 325 MG tablet   Commonly known as:  TYLENOL   Used for:  Medication side effect, initial encounter        Dose:  650 mg   Take 2 tablets (650 mg) by mouth every 6 hours   Quantity:  100 tablet   Refills:  0       " buprenorphine HCl-naloxone HCl 8-2 MG per film   Commonly known as:  SUBOXONE   Used for:  Intravenous drug abuse        Dose:  1 Film   Place 1 Film under the tongue daily   Quantity:  4 each   Refills:  0       ceFAZolin sodium-dextrose 2-4 GM/100ML-% Soln infusion   Commonly known as:  ANCEF   Indication:  Infection of Bone and Bone Marrow   Used for:  Sepsis due to methicillin susceptible Staphylococcus aureus (H)        Dose:  2 g   Inject 100 mLs (2 g) into the vein every 8 hours   Quantity:  9000 mL   Refills:  0       clonazePAM 0.5 MG tablet   Commonly known as:  klonoPIN   Used for:  Ataxia        Dose:  0.5 mg   Take 1 tablet (0.5 mg) by mouth 2 times daily   Quantity:  30 tablet   Refills:  0       lidocaine 5 % Patch   Commonly known as:  LIDODERM        Dose:  1 patch   Place 1 patch onto the skin every 24 hours   Quantity:  30 patch   Refills:  0       melatonin 1 MG Tabs tablet   Used for:  Primary insomnia        Dose:  1 mg   Take 1 tablet (1 mg) by mouth nightly as needed for sleep   Quantity:  30 tablet   Refills:  0         CONTINUE these medicines which may have CHANGED, or have new prescriptions. If we are uncertain of the size of tablets/capsules you have at home, strength may be listed as something that might have changed.        Dose / Directions    * pregabalin 150 MG capsule   Commonly known as:  LYRICA   This may have changed:  Another medication with the same name was added. Make sure you understand how and when to take each.   Used for:  Medication side effect, initial encounter        Dose:  300 mg   Take 2 capsules (300 mg) by mouth 2 times daily   Quantity:  6 capsule   Refills:  0       * pregabalin 150 MG capsule   Commonly known as:  LYRICA   This may have changed:  You were already taking a medication with the same name, and this prescription was added. Make sure you understand how and when to take each.   Used for:  Medication side effect, initial encounter        Dose:  150 mg    Take 1 capsule (150 mg) by mouth 2 times daily   Quantity:  90 capsule   Refills:  0       * Notice:  This list has 2 medication(s) that are the same as other medications prescribed for you. Read the directions carefully, and ask your doctor or other care provider to review them with you.      CONTINUE these medicines which have NOT CHANGED        Dose / Directions    ibuprofen 600 MG tablet   Commonly known as:  ADVIL/MOTRIN        Dose:  600 mg   Take 1 tablet (600 mg) by mouth every 6 hours as needed for moderate pain   Quantity:  30 tablet   Refills:  0       levomilnacipran 80 MG 24 hr capsule   Commonly known as:  FETZIMA        Dose:  80 mg   Take 1 capsule (80 mg) by mouth daily   Quantity:  3 capsule   Refills:  0       naloxone nasal spray   Commonly known as:  NARCAN        Dose:  4 mg   Spray 1 spray (4 mg) into one nostril alternating nostrils as needed for opioid reversal (every 2-3 minutes until assistance arrives.)   Quantity:  0.2 mL   Refills:  11       SEROQUEL PO        Refills:  0       TRIUMEQ 600- MG per tablet   Used for:  Human immunodeficiency virus (HIV) disease (H)   Generic drug:  abacavir-dolutegravir-LamiVUDine        TAKE ONE TABLET BY MOUTH EVERY DAY   Quantity:  30 tablet   Refills:  0            Where to get your medicines      Some of these will need a paper prescription and others can be bought over the counter. Ask your nurse if you have questions.     Bring a paper prescription for each of these medications     buprenorphine HCl-naloxone HCl 8-2 MG per film    clonazePAM 0.5 MG tablet    pregabalin 150 MG capsule       You don't need a prescription for these medications     acetaminophen 325 MG tablet    ceFAZolin sodium-dextrose 2-4 GM/100ML-% Soln infusion    lidocaine 5 % Patch    melatonin 1 MG Tabs tablet         Information about where to get these medications is not yet available     ! Ask your nurse or doctor about these medications     pregabalin 150 MG capsule                ANTIBIOTIC INSTRUCTION     You've Been Prescribed an Antibiotic - Now What?  Your healthcare team thinks that you or your loved one might have an infection. Some infections can be treated with antibiotics, which are powerful, life-saving drugs. Like all medications, antibiotics have side effects and should only be used when necessary. There are some important things you should know about your antibiotic treatment.      Your healthcare team may run tests before you start taking an antibiotic.    Your team may take samples (e.g., from your blood, urine or other areas) to run tests to look for bacteria. These test can be important to determine if you need an antibiotic at all and, if you do, which antibiotic will work best.      Within a few days, your healthcare team might change or even stop your antibiotic.    Your team may start you on an antibiotic while they are working to find out what is making you sick.    Your team might change your antibiotic because test results show that a different antibiotic would be better to treat your infection.    In some cases, once your team has more information, they learn that you do not need an antibiotic at all. They may find out that you don't have an infection, or that the antibiotic you're taking won't work against your infection. For example, an infection caused by a virus can't be treated with antibiotics. Staying on an antibiotic when you don't need it is more likely to be harmful than helpful.      You may experience side effects from your antibiotic.    Like all medications, antibiotics have side effects. Some of these can be serious.    Let you healthcare team know if you have any known allergies when you are admitted to the hospital.    One significant side effect of nearly all antibiotics is the risk of severe and sometimes deadly diarrhea caused by Clostridium difficile (C. Difficile). This occurs when a person takes antibiotics because some good germs  are destroyed. Antibiotic use allows C. diificile to take over, putting patients at high risk for this serious infection.    As a patient or caregiver, it is important to understand your or your loved one's antibiotic treatment. It is especially important for caregivers to speak up when patients can't speak for themselves. Here are some important questions to ask your healthcare team.    What infection is this antibiotic treating and how do you know I have that infection?    What side effects might occur from this antibiotic?    How long will I need to take this antibiotic?    Is it safe to take this antibiotic with other medications or supplements (e.g., vitamins) that I am taking?     Are there any special directions I need to know about taking this antibiotic? For example, should I take it with food?    How will I be monitored to know whether my infection is responding to the antibiotic?    What tests may help to make sure the right antibiotic is prescribed for me?      Information provided by:  www.cdc.gov/getsmart  U.S. Department of Health and Human Services  Centers for disease Control and Prevention  National Center for Emerging and Zoonotic Infectious Diseases  Division of Healthcare Quality Promotion         Protect others around you: Learn how to safely use, store and throw away your medicines at www.disposemymeds.org.             Medication List: This is a list of all your medications and when to take them. Check marks below indicate your daily home schedule. Keep this list as a reference.      Medications           Morning Afternoon Evening Bedtime As Needed    acetaminophen 325 MG tablet   Commonly known as:  TYLENOL   Take 2 tablets (650 mg) by mouth every 6 hours   Last time this was given:  650 mg on 9/21/2017 12:09 PM                                buprenorphine HCl-naloxone HCl 8-2 MG per film   Commonly known as:  SUBOXONE   Place 1 Film under the tongue daily   Last time this was given:  1 Film  on 9/21/2017  8:39 AM                                ceFAZolin sodium-dextrose 2-4 GM/100ML-% Soln infusion   Commonly known as:  ANCEF   Inject 100 mLs (2 g) into the vein every 8 hours   Last time this was given:  2 g on 9/21/2017  3:22 PM                                clonazePAM 0.5 MG tablet   Commonly known as:  klonoPIN   Take 1 tablet (0.5 mg) by mouth 2 times daily   Last time this was given:  0.5 mg on 9/21/2017  8:39 AM                                ibuprofen 600 MG tablet   Commonly known as:  ADVIL/MOTRIN   Take 1 tablet (600 mg) by mouth every 6 hours as needed for moderate pain                                levomilnacipran 80 MG 24 hr capsule   Commonly known as:  FETZIMA   Take 1 capsule (80 mg) by mouth daily   Last time this was given:  80 mg on 9/21/2017  8:40 AM                                lidocaine 5 % Patch   Commonly known as:  LIDODERM   Place 1 patch onto the skin every 24 hours   Last time this was given:  2 patches on 9/13/2017  8:36 AM                                melatonin 1 MG Tabs tablet   Take 1 tablet (1 mg) by mouth nightly as needed for sleep   Last time this was given:  1 mg on 9/20/2017 11:53 PM                                naloxone nasal spray   Commonly known as:  NARCAN   Spray 1 spray (4 mg) into one nostril alternating nostrils as needed for opioid reversal (every 2-3 minutes until assistance arrives.)                                * pregabalin 150 MG capsule   Commonly known as:  LYRICA   Take 2 capsules (300 mg) by mouth 2 times daily   Last time this was given:  150 mg on 9/21/2017  8:39 AM                                * pregabalin 150 MG capsule   Commonly known as:  LYRICA   Take 1 capsule (150 mg) by mouth 2 times daily   Last time this was given:  150 mg on 9/21/2017  8:39 AM                                SEROQUEL PO                                TRIUMEQ 600- MG per tablet   TAKE ONE TABLET BY MOUTH EVERY DAY   Last time this was given:  1 tablet  on 9/21/2017  8:40 AM   Generic drug:  abacavir-dolutegravir-LamiVUDine                                * Notice:  This list has 2 medication(s) that are the same as other medications prescribed for you. Read the directions carefully, and ask your doctor or other care provider to review them with you.

## 2017-09-09 NOTE — IP AVS SNAPSHOT
[address] [address]    Phone:  [phone]                                       After Visit Summary   9/9/2017    Bc German    MRN: [id number]           After Visit Summary Signature Page     I have received my discharge instructions, and my questions have been answered. I have discussed any challenges I see with this plan with the nurse or doctor.    ..........................................................................................................................................  Patient/Patient Representative Signature      ..........................................................................................................................................  Patient Representative Print Name and Relationship to Patient    ..................................................               ................................................  Date                                            Time    ..........................................................................................................................................  Reviewed by Signature/Title    ...................................................              ..............................................  Date                                                            Time

## 2017-09-10 ENCOUNTER — APPOINTMENT (OUTPATIENT)
Dept: PHYSICAL THERAPY | Facility: CLINIC | Age: 37
DRG: 471 | End: 2017-09-10
Attending: HOSPITALIST
Payer: COMMERCIAL

## 2017-09-10 ENCOUNTER — APPOINTMENT (OUTPATIENT)
Dept: GENERAL RADIOLOGY | Facility: CLINIC | Age: 37
DRG: 471 | End: 2017-09-10
Attending: NEUROLOGICAL SURGERY
Payer: COMMERCIAL

## 2017-09-10 ENCOUNTER — APPOINTMENT (OUTPATIENT)
Dept: MRI IMAGING | Facility: CLINIC | Age: 37
DRG: 471 | End: 2017-09-10
Attending: INTERNAL MEDICINE
Payer: COMMERCIAL

## 2017-09-10 ENCOUNTER — APPOINTMENT (OUTPATIENT)
Dept: MRI IMAGING | Facility: CLINIC | Age: 37
DRG: 471 | End: 2017-09-10
Attending: HOSPITALIST
Payer: COMMERCIAL

## 2017-09-10 ENCOUNTER — APPOINTMENT (OUTPATIENT)
Dept: CT IMAGING | Facility: CLINIC | Age: 37
DRG: 471 | End: 2017-09-10
Attending: INTERNAL MEDICINE
Payer: COMMERCIAL

## 2017-09-10 ENCOUNTER — ANESTHESIA EVENT (OUTPATIENT)
Dept: SURGERY | Facility: CLINIC | Age: 37
DRG: 471 | End: 2017-09-10
Payer: COMMERCIAL

## 2017-09-10 ENCOUNTER — ANESTHESIA (OUTPATIENT)
Dept: SURGERY | Facility: CLINIC | Age: 37
DRG: 471 | End: 2017-09-10
Payer: COMMERCIAL

## 2017-09-10 PROBLEM — R26.89 BALANCE PROBLEM: Status: ACTIVE | Noted: 2017-09-10

## 2017-09-10 LAB
ALBUMIN SERPL-MCNC: 3.1 G/DL (ref 3.4–5)
ALP SERPL-CCNC: 88 U/L (ref 40–150)
ALT SERPL W P-5'-P-CCNC: 87 U/L (ref 0–70)
ANION GAP SERPL CALCULATED.3IONS-SCNC: 8 MMOL/L (ref 3–14)
AST SERPL W P-5'-P-CCNC: 42 U/L (ref 0–45)
BACTERIA SPEC CULT: NORMAL
BILIRUB SERPL-MCNC: 0.3 MG/DL (ref 0.2–1.3)
BUN SERPL-MCNC: 17 MG/DL (ref 7–30)
CALCIUM SERPL-MCNC: 8.7 MG/DL (ref 8.5–10.1)
CHLORIDE SERPL-SCNC: 105 MMOL/L (ref 94–109)
CO2 SERPL-SCNC: 24 MMOL/L (ref 20–32)
CREAT SERPL-MCNC: 0.76 MG/DL (ref 0.66–1.25)
CRP SERPL-MCNC: 47 MG/L (ref 0–8)
ERYTHROCYTE [DISTWIDTH] IN BLOOD BY AUTOMATED COUNT: 16 % (ref 10–15)
ERYTHROCYTE [SEDIMENTATION RATE] IN BLOOD BY WESTERGREN METHOD: 46 MM/H (ref 0–15)
GFR SERPL CREATININE-BSD FRML MDRD: >90 ML/MIN/1.7M2
GLUCOSE SERPL-MCNC: 98 MG/DL (ref 70–99)
HCT VFR BLD AUTO: 36.1 % (ref 40–53)
HGB BLD-MCNC: 11.7 G/DL (ref 13.3–17.7)
LACTATE BLD-SCNC: 1 MMOL/L (ref 0.7–2)
MCH RBC QN AUTO: 27.8 PG (ref 26.5–33)
MCHC RBC AUTO-ENTMCNC: 32.4 G/DL (ref 31.5–36.5)
MCV RBC AUTO: 86 FL (ref 78–100)
PLATELET # BLD AUTO: 412 10E9/L (ref 150–450)
POTASSIUM SERPL-SCNC: 3.8 MMOL/L (ref 3.4–5.3)
PROT SERPL-MCNC: 7.8 G/DL (ref 6.8–8.8)
RADIOLOGIST FLAGS: ABNORMAL
RADIOLOGIST FLAGS: ABNORMAL
RBC # BLD AUTO: 4.21 10E12/L (ref 4.4–5.9)
SODIUM SERPL-SCNC: 137 MMOL/L (ref 133–144)
SPECIMEN SOURCE: NORMAL
WBC # BLD AUTO: 12.7 10E9/L (ref 4–11)

## 2017-09-10 PROCEDURE — 25000128 H RX IP 250 OP 636: Performed by: ANESTHESIOLOGY

## 2017-09-10 PROCEDURE — 40000193 ZZH STATISTIC PT WARD VISIT: Performed by: PHYSICAL THERAPIST

## 2017-09-10 PROCEDURE — 0RB50ZZ EXCISION OF CERVICOTHORACIC VERTEBRAL DISC, OPEN APPROACH: ICD-10-PCS | Performed by: NEUROLOGICAL SURGERY

## 2017-09-10 PROCEDURE — 87070 CULTURE OTHR SPECIMN AEROBIC: CPT | Performed by: NEUROLOGICAL SURGERY

## 2017-09-10 PROCEDURE — 87186 SC STD MICRODIL/AGAR DIL: CPT | Performed by: STUDENT IN AN ORGANIZED HEALTH CARE EDUCATION/TRAINING PROGRAM

## 2017-09-10 PROCEDURE — 27210995 ZZH RX 272: Performed by: NEUROLOGICAL SURGERY

## 2017-09-10 PROCEDURE — 92200047 ZZHC NEURO MONITORING SERVICE, UP TO 7 HOURS (T1FEE): Performed by: NEUROLOGICAL SURGERY

## 2017-09-10 PROCEDURE — 85027 COMPLETE CBC AUTOMATED: CPT | Performed by: HOSPITALIST

## 2017-09-10 PROCEDURE — 37000008 ZZH ANESTHESIA TECHNICAL FEE, 1ST 30 MIN: Performed by: NEUROLOGICAL SURGERY

## 2017-09-10 PROCEDURE — 97116 GAIT TRAINING THERAPY: CPT | Mod: GP | Performed by: PHYSICAL THERAPIST

## 2017-09-10 PROCEDURE — 87800 DETECT AGNT MULT DNA DIREC: CPT | Performed by: STUDENT IN AN ORGANIZED HEALTH CARE EDUCATION/TRAINING PROGRAM

## 2017-09-10 PROCEDURE — 25000132 ZZH RX MED GY IP 250 OP 250 PS 637: Performed by: HOSPITALIST

## 2017-09-10 PROCEDURE — 37000009 ZZH ANESTHESIA TECHNICAL FEE, EACH ADDTL 15 MIN: Performed by: NEUROLOGICAL SURGERY

## 2017-09-10 PROCEDURE — 87206 SMEAR FLUORESCENT/ACID STAI: CPT | Performed by: NEUROLOGICAL SURGERY

## 2017-09-10 PROCEDURE — 80053 COMPREHEN METABOLIC PANEL: CPT | Performed by: HOSPITALIST

## 2017-09-10 PROCEDURE — 72156 MRI NECK SPINE W/O & W/DYE: CPT

## 2017-09-10 PROCEDURE — 87077 CULTURE AEROBIC IDENTIFY: CPT | Performed by: STUDENT IN AN ORGANIZED HEALTH CARE EDUCATION/TRAINING PROGRAM

## 2017-09-10 PROCEDURE — 40000277 XR SURGERY CARM FLUORO LESS THAN 5 MIN W STILLS: Mod: TC

## 2017-09-10 PROCEDURE — 0RB30ZZ EXCISION OF CERVICAL VERTEBRAL DISC, OPEN APPROACH: ICD-10-PCS | Performed by: NEUROLOGICAL SURGERY

## 2017-09-10 PROCEDURE — 72157 MRI CHEST SPINE W/O & W/DYE: CPT

## 2017-09-10 PROCEDURE — 72158 MRI LUMBAR SPINE W/O & W/DYE: CPT

## 2017-09-10 PROCEDURE — 87116 MYCOBACTERIA CULTURE: CPT | Performed by: NEUROLOGICAL SURGERY

## 2017-09-10 PROCEDURE — 25000128 H RX IP 250 OP 636: Performed by: STUDENT IN AN ORGANIZED HEALTH CARE EDUCATION/TRAINING PROGRAM

## 2017-09-10 PROCEDURE — 99207 ZZC MOONLIGHTING INDICATOR: CPT | Performed by: HOSPITALIST

## 2017-09-10 PROCEDURE — 87040 BLOOD CULTURE FOR BACTERIA: CPT | Performed by: STUDENT IN AN ORGANIZED HEALTH CARE EDUCATION/TRAINING PROGRAM

## 2017-09-10 PROCEDURE — 87186 SC STD MICRODIL/AGAR DIL: CPT | Performed by: NEUROLOGICAL SURGERY

## 2017-09-10 PROCEDURE — 97162 PT EVAL MOD COMPLEX 30 MIN: CPT | Mod: GP | Performed by: PHYSICAL THERAPIST

## 2017-09-10 PROCEDURE — 85652 RBC SED RATE AUTOMATED: CPT | Performed by: HOSPITALIST

## 2017-09-10 PROCEDURE — A9585 GADOBUTROL INJECTION: HCPCS | Performed by: INTERNAL MEDICINE

## 2017-09-10 PROCEDURE — 25000565 ZZH ISOFLURANE, EA 15 MIN: Performed by: NEUROLOGICAL SURGERY

## 2017-09-10 PROCEDURE — 36000074 ZZH SURGERY LEVEL 6 1ST 30 MIN - UMMC: Performed by: NEUROLOGICAL SURGERY

## 2017-09-10 PROCEDURE — 99223 1ST HOSP IP/OBS HIGH 75: CPT | Mod: AI | Performed by: INTERNAL MEDICINE

## 2017-09-10 PROCEDURE — 25000125 ZZHC RX 250: Performed by: ANESTHESIOLOGY

## 2017-09-10 PROCEDURE — 25000128 H RX IP 250 OP 636

## 2017-09-10 PROCEDURE — 83605 ASSAY OF LACTIC ACID: CPT | Performed by: INTERNAL MEDICINE

## 2017-09-10 PROCEDURE — 0RG20A0 FUSION OF 2 OR MORE CERVICAL VERTEBRAL JOINTS WITH INTERBODY FUSION DEVICE, ANTERIOR APPROACH, ANTERIOR COLUMN, OPEN APPROACH: ICD-10-PCS | Performed by: NEUROLOGICAL SURGERY

## 2017-09-10 PROCEDURE — 0RG40A0 FUSION OF CERVICOTHORACIC VERTEBRAL JOINT WITH INTERBODY FUSION DEVICE, ANTERIOR APPROACH, ANTERIOR COLUMN, OPEN APPROACH: ICD-10-PCS | Performed by: NEUROLOGICAL SURGERY

## 2017-09-10 PROCEDURE — 25000128 H RX IP 250 OP 636: Performed by: INTERNAL MEDICINE

## 2017-09-10 PROCEDURE — 87015 SPECIMEN INFECT AGNT CONCNTJ: CPT | Performed by: NEUROLOGICAL SURGERY

## 2017-09-10 PROCEDURE — 25000125 ZZHC RX 250: Performed by: NEUROLOGICAL SURGERY

## 2017-09-10 PROCEDURE — 25000132 ZZH RX MED GY IP 250 OP 250 PS 637: Performed by: INTERNAL MEDICINE

## 2017-09-10 PROCEDURE — 36415 COLL VENOUS BLD VENIPUNCTURE: CPT | Performed by: INTERNAL MEDICINE

## 2017-09-10 PROCEDURE — 12000001 ZZH R&B MED SURG/OB UMMC

## 2017-09-10 PROCEDURE — 36000076 ZZH SURGERY LEVEL 6 EA 15 ADDTL MIN - UMMC: Performed by: NEUROLOGICAL SURGERY

## 2017-09-10 PROCEDURE — 72125 CT NECK SPINE W/O DYE: CPT

## 2017-09-10 PROCEDURE — 87205 SMEAR GRAM STAIN: CPT | Performed by: NEUROLOGICAL SURGERY

## 2017-09-10 PROCEDURE — 27210794 ZZH OR GENERAL SUPPLY STERILE: Performed by: NEUROLOGICAL SURGERY

## 2017-09-10 PROCEDURE — 86140 C-REACTIVE PROTEIN: CPT | Performed by: HOSPITALIST

## 2017-09-10 PROCEDURE — 87077 CULTURE AEROBIC IDENTIFY: CPT | Performed by: NEUROLOGICAL SURGERY

## 2017-09-10 PROCEDURE — C1713 ANCHOR/SCREW BN/BN,TIS/BN: HCPCS | Performed by: NEUROLOGICAL SURGERY

## 2017-09-10 PROCEDURE — 87176 TISSUE HOMOGENIZATION CULTR: CPT | Performed by: NEUROLOGICAL SURGERY

## 2017-09-10 PROCEDURE — 27211024 ZZHC OR SUPPLY OTHER OPNP: Performed by: NEUROLOGICAL SURGERY

## 2017-09-10 PROCEDURE — 87075 CULTR BACTERIA EXCEPT BLOOD: CPT | Performed by: NEUROLOGICAL SURGERY

## 2017-09-10 PROCEDURE — 25000128 H RX IP 250 OP 636: Performed by: HOSPITALIST

## 2017-09-10 PROCEDURE — 4A11X4G MONITORING OF PERIPHERAL NERVOUS ELECTRICAL ACTIVITY, INTRAOPERATIVE, EXTERNAL APPROACH: ICD-10-PCS | Performed by: NEUROLOGICAL SURGERY

## 2017-09-10 PROCEDURE — 71000014 ZZH RECOVERY PHASE 1 LEVEL 2 FIRST HR: Performed by: NEUROLOGICAL SURGERY

## 2017-09-10 PROCEDURE — 87102 FUNGUS ISOLATION CULTURE: CPT | Performed by: NEUROLOGICAL SURGERY

## 2017-09-10 PROCEDURE — 25000125 ZZHC RX 250

## 2017-09-10 DEVICE — IMPLANTABLE DEVICE: Type: IMPLANTABLE DEVICE | Site: SPINE CERVICAL | Status: FUNCTIONAL

## 2017-09-10 RX ORDER — EPHEDRINE SULFATE 50 MG/ML
INJECTION, SOLUTION INTRAMUSCULAR; INTRAVENOUS; SUBCUTANEOUS PRN
Status: DISCONTINUED | OUTPATIENT
Start: 2017-09-10 | End: 2017-09-11

## 2017-09-10 RX ORDER — GADOBUTROL 604.72 MG/ML
7.5 INJECTION INTRAVENOUS ONCE
Status: COMPLETED | OUTPATIENT
Start: 2017-09-10 | End: 2017-09-10

## 2017-09-10 RX ORDER — NICOTINE 21 MG/24HR
1 PATCH, TRANSDERMAL 24 HOURS TRANSDERMAL DAILY
Status: DISCONTINUED | OUTPATIENT
Start: 2017-09-10 | End: 2017-09-11

## 2017-09-10 RX ORDER — PROPOFOL 10 MG/ML
INJECTION, EMULSION INTRAVENOUS PRN
Status: DISCONTINUED | OUTPATIENT
Start: 2017-09-10 | End: 2017-09-11

## 2017-09-10 RX ORDER — FENTANYL CITRATE 50 UG/ML
INJECTION, SOLUTION INTRAMUSCULAR; INTRAVENOUS PRN
Status: DISCONTINUED | OUTPATIENT
Start: 2017-09-10 | End: 2017-09-11

## 2017-09-10 RX ORDER — ONDANSETRON 4 MG/1
4 TABLET, ORALLY DISINTEGRATING ORAL EVERY 6 HOURS PRN
Status: DISCONTINUED | OUTPATIENT
Start: 2017-09-10 | End: 2017-09-11

## 2017-09-10 RX ORDER — SODIUM CHLORIDE, SODIUM LACTATE, POTASSIUM CHLORIDE, CALCIUM CHLORIDE 600; 310; 30; 20 MG/100ML; MG/100ML; MG/100ML; MG/100ML
INJECTION, SOLUTION INTRAVENOUS CONTINUOUS PRN
Status: DISCONTINUED | OUTPATIENT
Start: 2017-09-10 | End: 2017-09-11

## 2017-09-10 RX ORDER — LORAZEPAM 2 MG/ML
0.5 INJECTION INTRAMUSCULAR EVERY 4 HOURS PRN
Status: DISCONTINUED | OUTPATIENT
Start: 2017-09-10 | End: 2017-09-11

## 2017-09-10 RX ORDER — ONDANSETRON 2 MG/ML
4 INJECTION INTRAMUSCULAR; INTRAVENOUS EVERY 6 HOURS PRN
Status: DISCONTINUED | OUTPATIENT
Start: 2017-09-10 | End: 2017-09-11

## 2017-09-10 RX ORDER — ONDANSETRON 2 MG/ML
INJECTION INTRAMUSCULAR; INTRAVENOUS PRN
Status: DISCONTINUED | OUTPATIENT
Start: 2017-09-10 | End: 2017-09-11

## 2017-09-10 RX ORDER — PREGABALIN 75 MG/1
75 CAPSULE ORAL 2 TIMES DAILY
Status: DISCONTINUED | OUTPATIENT
Start: 2017-09-10 | End: 2017-09-10

## 2017-09-10 RX ORDER — LIDOCAINE HYDROCHLORIDE 20 MG/ML
INJECTION, SOLUTION INFILTRATION; PERINEURAL PRN
Status: DISCONTINUED | OUTPATIENT
Start: 2017-09-10 | End: 2017-09-11

## 2017-09-10 RX ORDER — ACETAMINOPHEN 325 MG/1
650 TABLET ORAL EVERY 4 HOURS PRN
Status: DISCONTINUED | OUTPATIENT
Start: 2017-09-10 | End: 2017-09-12

## 2017-09-10 RX ORDER — NALOXONE HYDROCHLORIDE 0.4 MG/ML
.1-.4 INJECTION, SOLUTION INTRAMUSCULAR; INTRAVENOUS; SUBCUTANEOUS
Status: DISCONTINUED | OUTPATIENT
Start: 2017-09-10 | End: 2017-09-11

## 2017-09-10 RX ORDER — HYDROMORPHONE HYDROCHLORIDE 1 MG/ML
0.5 INJECTION, SOLUTION INTRAMUSCULAR; INTRAVENOUS; SUBCUTANEOUS ONCE
Status: COMPLETED | OUTPATIENT
Start: 2017-09-10 | End: 2017-09-10

## 2017-09-10 RX ORDER — LIDOCAINE 50 MG/G
1 PATCH TOPICAL
Status: DISCONTINUED | OUTPATIENT
Start: 2017-09-10 | End: 2017-09-12

## 2017-09-10 RX ORDER — REMIFENTANIL HYDROCHLORIDE 1 MG/ML
2000 INJECTION, POWDER, LYOPHILIZED, FOR SOLUTION INTRAVENOUS ONCE
Status: DISCONTINUED | OUTPATIENT
Start: 2017-09-10 | End: 2017-09-11

## 2017-09-10 RX ORDER — CEFAZOLIN SODIUM 1 G/3ML
INJECTION, POWDER, FOR SOLUTION INTRAMUSCULAR; INTRAVENOUS PRN
Status: DISCONTINUED | OUTPATIENT
Start: 2017-09-10 | End: 2017-09-11

## 2017-09-10 RX ADMIN — Medication 100 MG: at 18:54

## 2017-09-10 RX ADMIN — HYDROMORPHONE HYDROCHLORIDE 1 MG: 1 INJECTION, SOLUTION INTRAMUSCULAR; INTRAVENOUS; SUBCUTANEOUS at 23:46

## 2017-09-10 RX ADMIN — PREGABALIN 75 MG: 75 CAPSULE ORAL at 10:41

## 2017-09-10 RX ADMIN — Medication 20 MG: at 21:36

## 2017-09-10 RX ADMIN — LEVOMILNACIPRAN HYDROCHLORIDE 80 MG: 80 CAPSULE, EXTENDED RELEASE ORAL at 11:41

## 2017-09-10 RX ADMIN — ENOXAPARIN SODIUM 40 MG: 40 INJECTION SUBCUTANEOUS at 02:27

## 2017-09-10 RX ADMIN — Medication 15 MG: at 19:40

## 2017-09-10 RX ADMIN — PROPOFOL 50 MG: 10 INJECTION, EMULSION INTRAVENOUS at 20:07

## 2017-09-10 RX ADMIN — PROPOFOL 50 MG: 10 INJECTION, EMULSION INTRAVENOUS at 21:29

## 2017-09-10 RX ADMIN — PHENYLEPHRINE HYDROCHLORIDE 50 MCG: 10 INJECTION, SOLUTION INTRAMUSCULAR; INTRAVENOUS; SUBCUTANEOUS at 22:07

## 2017-09-10 RX ADMIN — PHENYLEPHRINE HYDROCHLORIDE 100 MCG: 10 INJECTION, SOLUTION INTRAMUSCULAR; INTRAVENOUS; SUBCUTANEOUS at 18:50

## 2017-09-10 RX ADMIN — ACETAMINOPHEN 650 MG: 325 TABLET ORAL at 04:39

## 2017-09-10 RX ADMIN — LORAZEPAM 0.5 MG: 2 INJECTION INTRAMUSCULAR; INTRAVENOUS at 17:08

## 2017-09-10 RX ADMIN — PHENYLEPHRINE HYDROCHLORIDE 50 MCG: 10 INJECTION, SOLUTION INTRAMUSCULAR; INTRAVENOUS; SUBCUTANEOUS at 21:40

## 2017-09-10 RX ADMIN — FENTANYL CITRATE 100 MCG: 50 INJECTION, SOLUTION INTRAMUSCULAR; INTRAVENOUS at 18:25

## 2017-09-10 RX ADMIN — CEFAZOLIN 2 G: 1 INJECTION, POWDER, FOR SOLUTION INTRAMUSCULAR; INTRAVENOUS at 19:52

## 2017-09-10 RX ADMIN — NICOTINE 1 PATCH: 21 PATCH, EXTENDED RELEASE TRANSDERMAL at 14:32

## 2017-09-10 RX ADMIN — Medication 80 MG: at 19:11

## 2017-09-10 RX ADMIN — PHENYLEPHRINE HYDROCHLORIDE 50 MCG: 10 INJECTION, SOLUTION INTRAMUSCULAR; INTRAVENOUS; SUBCUTANEOUS at 21:18

## 2017-09-10 RX ADMIN — PHENYLEPHRINE HYDROCHLORIDE 200 MCG: 10 INJECTION, SOLUTION INTRAMUSCULAR; INTRAVENOUS; SUBCUTANEOUS at 18:25

## 2017-09-10 RX ADMIN — PHENYLEPHRINE HYDROCHLORIDE 100 MCG: 10 INJECTION, SOLUTION INTRAMUSCULAR; INTRAVENOUS; SUBCUTANEOUS at 20:46

## 2017-09-10 RX ADMIN — LIDOCAINE HYDROCHLORIDE 100 MG: 20 INJECTION, SOLUTION INFILTRATION; PERINEURAL at 18:25

## 2017-09-10 RX ADMIN — FENTANYL CITRATE 50 MCG: 50 INJECTION, SOLUTION INTRAMUSCULAR; INTRAVENOUS at 18:50

## 2017-09-10 RX ADMIN — HYDROMORPHONE HYDROCHLORIDE 0.5 MG: 1 INJECTION, SOLUTION INTRAMUSCULAR; INTRAVENOUS; SUBCUTANEOUS at 14:33

## 2017-09-10 RX ADMIN — Medication 100 MG: at 18:25

## 2017-09-10 RX ADMIN — CEFAZOLIN 1 G: 1 INJECTION, POWDER, FOR SOLUTION INTRAMUSCULAR; INTRAVENOUS at 21:51

## 2017-09-10 RX ADMIN — MIDAZOLAM HYDROCHLORIDE 2 MG: 1 INJECTION, SOLUTION INTRAMUSCULAR; INTRAVENOUS at 18:17

## 2017-09-10 RX ADMIN — PROPOFOL 50 MG: 10 INJECTION, EMULSION INTRAVENOUS at 20:15

## 2017-09-10 RX ADMIN — PROPOFOL 50 MG: 10 INJECTION, EMULSION INTRAVENOUS at 21:14

## 2017-09-10 RX ADMIN — FENTANYL CITRATE 50 MCG: 50 INJECTION, SOLUTION INTRAMUSCULAR; INTRAVENOUS at 20:07

## 2017-09-10 RX ADMIN — ONDANSETRON 4 MG: 2 INJECTION INTRAMUSCULAR; INTRAVENOUS at 23:54

## 2017-09-10 RX ADMIN — GADOBUTROL 6.3 ML: 604.72 INJECTION INTRAVENOUS at 12:59

## 2017-09-10 RX ADMIN — ABACAVIR SULFATE, DOLUTEGRAVIR SODIUM, LAMIVUDINE 1 TABLET: 600; 50; 300 TABLET, FILM COATED ORAL at 11:41

## 2017-09-10 RX ADMIN — REMIFENTANIL HYDROCHLORIDE 0.1 MCG/KG/MIN: 1 INJECTION, POWDER, LYOPHILIZED, FOR SOLUTION INTRAVENOUS at 18:58

## 2017-09-10 RX ADMIN — SODIUM CHLORIDE, POTASSIUM CHLORIDE, SODIUM LACTATE AND CALCIUM CHLORIDE: 600; 310; 30; 20 INJECTION, SOLUTION INTRAVENOUS at 18:11

## 2017-09-10 ASSESSMENT — ACTIVITIES OF DAILY LIVING (ADL)
RETIRED_COMMUNICATION: 0-->UNDERSTANDS/COMMUNICATES WITHOUT DIFFICULTY
TOILETING: 0-->INDEPENDENT
TRANSFERRING: 0-->INDEPENDENT
COGNITION: 0 - NO COGNITION ISSUES REPORTED
AMBULATION: 0-->INDEPENDENT
DRESS: 0-->INDEPENDENT
SWALLOWING: 0-->SWALLOWS FOODS/LIQUIDS WITHOUT DIFFICULTY
FALL_HISTORY_WITHIN_LAST_SIX_MONTHS: YES
NUMBER_OF_TIMES_PATIENT_HAS_FALLEN_WITHIN_LAST_SIX_MONTHS: 3
BATHING: 0-->INDEPENDENT
RETIRED_EATING: 0-->INDEPENDENT
WHICH_OF_THE_ABOVE_FUNCTIONAL_RISKS_HAD_A_RECENT_ONSET_OR_CHANGE?: AMBULATION;TRANSFERRING;TOILETING;BATHING;DRESSING

## 2017-09-10 NOTE — PLAN OF CARE
Problem: Goal Outcome Summary  Goal: Goal Outcome Summary  Outcome: No Change  [id number]:     Pt here for numbness/weakness in bilateral extremities and below waist, changes in gait. Pt admit from [place]. Hx HIV, IV drug use. Pt A&Ox4, anxious, VSS on RA, c/o pain in upper arms. PRN IV Ativan given x1 for anxiety related to procedure. [place] J collar on. Neuro consulted - plan for CT and immediate surgery related to epidural abscess/cervical discitis. Pt NPO since 1130. R PIV SL. Up assist x1 for unsteady gait. Nicotine patch in place on R deltoid.      Plan: CT, immediate surgery, transfer to [facility]A/post-op.      Continue to monitor and follow POC

## 2017-09-10 NOTE — PROGRESS NOTES
D/w Radiologist regarding MRI abnormalities at C6-7 and L2-3 levels  -informed Neurology and Medicine teams   - placed [place] J collar.  - need to be seen by neurosurgery after going to [place].  -will keep NPO until seen by neurosurgery.    Claude Quintana MD  Hospitalist
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
## 2017-09-10 NOTE — H&P
"  History and Physical  Internal Medicine      Date of Service: 09/10/17  Date of Admission: 2017  Patient Name: Bc German  : 1980  MRN: [id number]  Att Prov: Bk Stiles      Chief complaint:   Weakness, ataxia, neck pain      History of Present Illness:   Bc German is a 37 year old year old with PMH HIV, IVDU, anxiety/depression who presents with weakness, ataxia, neck pain.     Patient was seen at [facility] for complaints of weakness and ataxia. Neurology was consulted and patient with MR cervical spine notable for probable disc space infection with epidural effacement of spinal cord at C6-C7 and was transferred to here for further management and neurosurgical evaluation.    On interview, patient was found to be afebrile and hemodynamically stable.     Patient reports 3 months of progressive numbness, tingling, loss of sensation, and weakness of bilateral upper and lower extremities.  Patient reports he initially noticed numbness and tingling of legs and tripping and difficulty of walking. He endorse loss of sensation of toes and falling a few times. This progressed to hand numbness and tingling and eventually hand weakness. He reports difficulty opening hands, getting phone out of his pocket.  He reports cervical spine pain for the past one and a half weeks.  He first noticed it when he woke up sleeping on his side due to pain and this is unusual for him. He endorses difficulty with other activities of daily living such as showering.  Patient is homeless at baseline and sometimes lives with his dad, however has been living with his dad this past week due to difficulty functio ng on his own due to his symptoms.     This week he reports 2 separate episodes of bladder incontinence.  Denies incontinence of stools. He reports increased appeptite and feels as though  his stomach is \"getting fat\" over the last 8-9 days. He also has reduced ability to bear down appropriately and so does not feel " "like he can empty completely when he defecates. Reports decreased sensation from level of mid abdomen down with \"weird sensation\" near R testicle that feels as though his \"undewear is bunching up.\"   Denies saddle anesthesia.     Endorses fevers/chills. No chest pain/SOB/difficulty breathing. Has longstanding history of nausea, denies vomiting. Endorses longstanding anxiety. Reports drinks and uses illicit drugs to mask anxiety.       Review of Symptoms:     Comprehensive 10 point review of systems was negative unless otherwise noted in the HPI.     Past Medical History:     Past Medical History:   Diagnosis Date     Anxiety      Depressive disorder      Drug abuse, IV      Group A streptococcal infection 11/2014    Bacteremia/cellulitis     HCV antibody positive      HIV (human immunodeficiency virus infection) (H)      HIV (human immunodeficiency virus infection) (H)      HIV (human immunodeficiency virus infection) (H)      IVDU (intravenous drug user)      Osteomyelitis of vertebra of lumbosacral region (H) 3/2011    L3, left psoas abscess       Past Surgical History:   Procedure Laterality Date     EYE SURGERY  1 year ago    pins to left eye after socket fracture     ORTHOPEDIC SURGERY      Arm Surgery     TRANSESOPHAGEAL ECHOCARDIOGRAM INTRAOPERATIVE N/A 3/17/2015    Procedure: TRANSESOPHAGEAL ECHOCARDIOGRAM INTRAOPERATIVE;  Surgeon: Generic Anesthesia Provider;  Location: [facility] OR      Allergies:     Allergies   Allergen Reactions     Doxycycline GI Disturbance      Outpatient Medications:       No current facility-administered medications on file prior to encounter.   Current Outpatient Prescriptions on File Prior to Encounter:  TRIUMEQ 600- MG per tablet TAKE ONE TABLET BY MOUTH EVERY DAY   levomilnacipran (FETZIMA) 80 MG 24 hr capsule Take 1 capsule (80 mg) by mouth daily   pregabalin (LYRICA) 150 MG capsule Take 2 capsules (300 mg) by mouth 2 times daily   naloxone (NARCAN) nasal spray Spray 1 spray (4 " "mg) into one nostril alternating nostrils as needed for opioid reversal (every 2-3 minutes until assistance arrives.)   ibuprofen (ADVIL/MOTRIN) 600 MG tablet Take 1 tablet (600 mg) by mouth every 6 hours as needed for moderate pain        Family History:   No family history on file.     Social History:   Tobacco Use: Endorses  Illicit Drug Use: Endorses meth, cocaine, marijuana. 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 rate 16, height 1.803 m (5' 11\"), weight 63.5 kg (140 lb), SpO2 100 %.  Temp (24hrs), Av.4  F (36.3  C), Min:96.4  F (35.8  C), Max:98.5  F (36.9  C)    Gen: In no acute distress. Patient sitting up in bed with cervical collar in place.   HEENT: No scleral icterus, Moist mucous membranes.   CV: Normal rate, regular rhythm. S1/S2 normal.    Resp: Clear to auscultation bilaterally. Good air movement.   Abdomen: Soft, non tender abdomen, normal active bowel sounds   Extremities: No peripheral edema, warm, capillary refill < 2 seconds  Skin: Dry and warm. Multiple tattoos.   Neuro:   Alert and oriented   Pupils are equal and reactive to light but slightly dilated, EOMI, no nystagmus  Tongue protrudes midline   Shoulder shrug symmetric BL with 5/5 strength   5/5 strength in the proximal upper extremities, 4/5 distal upper extremities  5/5 strength bilateral lower extremities   Intact but slow rapid alternating movements, proprioception intact, negative Romberg   Wandering, unsteady gait, unable to walk in straight line      Data:     CMP  Recent Labs  Lab 17  1859      POTASSIUM 4.0   CHLORIDE 107   CO2 27   ANIONGAP 10   GLC 95   BUN 18   CR 0.93   GFRESTIMATED >90   GFRESTBLACK >90   SANDRA 9.7   MAG 2.2   PHOS 3.5 "   PROTTOTAL 9.2*   ALBUMIN 3.8   BILITOTAL 0.2   ALKPHOS 105   AST 42   *     CBC  Recent Labs  Lab 09/09/17  1859   WBC 12.8*   RBC 4.40   HGB 12.1*   HCT 37.6*   MCV 86   MCH 27.5   MCHC 32.2   RDW 15.6*        Imaging:    MRI brain: The brain parenchyma, ventricles and subarachnoid spaces  appear normal.  There is no evidence of hemorrhage, mass, acute  infarct, or anomaly.  There are no gadolinium enhancing lesions.     The facial structures appear normal. The arteries at the base of the  brain and the dural venous sinuses appear patent.     MRI angio of brain: The visualized portions of the distal internal carotid and  vertebral arteries, the basilar artery, [place], and the  proximal anterior, middle and posterior cerebral arteries all appear  normal.  There is no evidence of aneurysm or vascular stenosis or  Occlusion.    MRI angio of neck:  Normal MR angiogram of the neck.    MRI cervical spine: 1. Pathologic process C6-C7 and intervening disc space and anterior  epidural involvement. This results in effacement of the spinal cord at  this level 2.[Critical Result: Probable disc space infection with epidural  effacement of the spinal cord at C6-C7.]    MRI thoracic spine: Thoracic spine MR is normal. Vertebral bodies are normal.  Disc spaces are normal. Alignment is normal. Thoracic spinal cord  appears normal with the conus at T12-L1.    MRI lumbar: 1. Chronic deformity at the L2-L3 level which was the site of disc  space infection most recently evaluated on 3/7/2015. No phlegmon or  paraspinous abscess is identified at this time. Findings may represent  the chronic residual of the prior disc space infection. A new  infection at the same level cannot be entirely excluded.   2. No disc protrusions at the remaining levels. No central or lateral  stenosis.     Assessment/Plan:     Bc German is a 37 year old year old with PMH HIV, IVDU, anxiety/depression who presents with weakness,  ataxia, neck pain and MRI concerning for discitis/epidural abscess at level of C6-C7.     # Vertebral discitis w/epidural abscess   Patient with history and physical concerning for spinal cord compression with MRI evidence of discitis with concern for epidural abscess   - Urgent neurosurgery consultation   - Consider infectious disease consult  - Consider antibiotics pending spinal fluid collection/cultures   - Folate, B12, Lyme, HIV RNA pending  - Blood cultures pending     # HIV on HAART  Most recent labs on 7/27/2017 show CD4 count 784 and elevated viral load  - Continue abacavir/dolutegranvir/lamivudine  - Infectious disease consult    # Anxiety  # Depression   # Substance use  Utox positive for opiods, amphetamine, marijuana, patient endorses drug use due to longstanding anxiety and depression  - Continue fetzima  - Hold seroquel   - Hold lyrica   - Consider chem dep consultant    DIET: NPO pending surgery evaluation   PPX: DVT: None pending surgery   Bowel: PRN Bowel reg   DISPO: Pending stabilization, likely > 3 or 4 days   CODE:  Full Code    Patient seen and discussed with Dr. Stiles who agrees with the plan detailed above. Please see attending addendum for changes to plan.     Shereen Lara  Internal Medicine PGY1  Pager: [phone]

## 2017-09-10 NOTE — H&P
[facility], [place]    Internal Medicine History and Physical - [facility] Service       Date of Admission:  9/9/2017    Chief Complaint   Gait instability, neck pain, extremity dysesthesias    History is obtained from the patient and chart review    History of Present Illness   Bc German is a 37 year old male w/ hx of HIV+, polysubstance abuse, IVDU, depression/anxiety, previous osteomyelitis L3 w/ psoas abscess admitted on 9/9/2017 with a 3 week history of progressive gait instability, upper and lower extremity dysesthesias, neck pain.     Gait instability started 3 weeks ago, with LE dysesthesias, subsequently developed UE dysesthesias, upper extremity weakness and poor dexterity. Reports a couple episodes of tripping and falling, instability in the shower, trouble rising from a seated position, and [place] to the [place]. His upper extremities burn, he is unable to get his cell phone out of his pocket or open a soda can. Neck pain started 1.5 weeks ago with difficulty sleeping, and sleeping on side to compensate.     Urinary incontinence x2 over 1 week. Incomplete evacuation of bowels, no incontinence. Increased appetite x1 week with more abdominal girth. Some f/c symptoms.    Anxiety, often self treated with alcohol and drugs.   Nausea he associates with his HAART medication.    Review of Systems   Denies CP, SOB, abd pain.  Comprehensive 10 point review of systems was negative unless otherwise noted in the HPI.    Past Medical History    I have reviewed this patient's medical history and updated it with pertinent information if needed.   Past Medical History:   Diagnosis Date     Anxiety      Depressive disorder      Drug abuse, IV      Group A streptococcal infection 11/2014    Bacteremia/cellulitis     HCV antibody positive      HIV (human immunodeficiency virus infection) (H)      HIV  (human immunodeficiency virus infection) (H)      HIV (human immunodeficiency virus infection) (H)      IVDU (intravenous drug user)      Osteomyelitis of vertebra of lumbosacral region (H) 3/2011    L3, left psoas abscess       Past Surgical History   I have reviewed this patient's surgical history and updated it with pertinent information if needed.  Past Surgical History:   Procedure Laterality Date     EYE SURGERY  1 year ago    pins to left eye after socket fracture     ORTHOPEDIC SURGERY      Arm Surgery     TRANSESOPHAGEAL ECHOCARDIOGRAM INTRAOPERATIVE N/A 3/17/2015    Procedure: TRANSESOPHAGEAL ECHOCARDIOGRAM INTRAOPERATIVE;  Surgeon: Generic Anesthesia Provider;  Location:  OR        Social History   - Homeless, but living with his dad for the last week.  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    Prior to Admission Medications   Prior to Admission Medications   Prescriptions Last Dose Informant Patient Reported? Taking?   QUEtiapine Fumarate (SEROQUEL PO) Past Week at Unknown time  Yes Yes   TRIUMEQ 600- MG per tablet 9/9/2017 at Unknown time  No Yes   Sig: TAKE ONE TABLET BY MOUTH EVERY DAY   ibuprofen (ADVIL/MOTRIN) 600 MG tablet More than a month at Unknown time  No No   Sig: Take 1 tablet (600 mg) by mouth every 6 hours as needed for moderate pain   levomilnacipran (FETZIMA) 80 MG 24 hr capsule 9/9/2017 at Unknown time  No Yes   Sig: Take 1 capsule (80 mg) by mouth daily   naloxone (NARCAN) nasal spray Unknown at Unknown time  No No   Sig: Spray 1 spray (4 mg) into one nostril alternating nostrils as needed for opioid reversal (every 2-3 minutes until assistance arrives.)   pregabalin (LYRICA) 150 MG capsule 9/8/2017 at Unknown time  No Yes   Sig: Take 2 capsules (300 mg) by mouth 2 times daily      Facility-Administered Medications: None      Allergies   Allergies   Allergen Reactions     Doxycycline GI Disturbance       Physical Exam   Vital Signs: Temp: 99.3  F (37.4  C) Temp src: Oral BP: 131/81 Pulse: 102 Heart Rate: 88 Resp: 18 SpO2: 100 % O2 Device: None (Room air)    Weight: 140 lbs 0 oz    Physical Exam   Vital Signs: Temp: 99.3  F (37.4  C) Temp src: Oral BP: 131/81 Pulse: 102 Heart Rate: 88 Resp: 18 SpO2: 100 % O2 Device: None (Room air)    Weight: 140 lbs 0 oz  General Appearance: NAD, sitting upright comfortably on edge of bed.  HEENT: Wearing neck brace, PERRL but dilated 6mm under room lighting, EOMI, facial muscular symmetry, good sensation, uvula midline, speech clear and coherent. No presence of oral thrush.  Respiratory: Bilateral breath sounds upper and lower lobes  Cardiovascular: Mildly tachycardic, regular rhythm, S1,S2 no murmurs  GI: Abdomen distended, non-tender to palpation  Skin: multiple tattoos, acne, scars on arms, not diaphoretic  Neuro: Strength:  4+/5 bilaterally, triceps 4/5 Lt, 4+/5 Rt. Hip flexion 4/5, distal LE strength intact. Rapid finger tapping slowed, no other dysdiadochokinesia. Stiff gait.     Assessment & Plan   Bc German is a 37 year old male w/ hx of HIV+, polysubstance abuse, IVDU, depression/anxiety, previous osteomyelitis L3 w/ psoas abscess admitted on 9/9/2017 with a 3 week history of progressive gait instability, upper and lower extremity dysesthesias, neck pain. MRI neck shows C6-7 contrast enhancing lesion.    #Epidural abscess  #Osteomyelitis  3 week history of progressive gait instability, UE and LE dysesthesias, neck pain IVDU, HIV+ status, with contrast enhancing lesion C6-7 on MRI concern for infection.   - Neurosurgery consult, they rec surgery  - ID consult in AM  - Pending blood cultures    # HIV  CD4 count 784 on 7/24/2017.  viral load [id number] elevated)  - Continue abacavir/dolutegranvir/lamivudine- Infectious disease consult    # Substance use  Utox positive for opiods,  amphetamine, marijuana  Chem dep consultant    # Depression  - Continue fetzima  - Hold seroquel    Diet: NPO per Anesthesia Guidelines for Procedure/Surgery Except for: Meds  Fluids: None  DVT Prophylaxis: VTE Prophylaxis contraindicated due to surgery  Code Status: Full Code    Disposition Plan   Expected discharge: 4 - 7 days; recommended to prior living arrangement once post-operatively stable.     Entered: Rafa Fan 09/10/2017, 5:25 PM   Information in the above section will display in the discharge planner report.    Scribed by Rafa Fan, MS3, for attending Dr. Linsey Fan  [address]   Pager: [phone]  Please see sticky note for cross cover information

## 2017-09-10 NOTE — PLAN OF CARE
Problem: Goal Outcome Summary  Goal: Goal Outcome Summary  PT: Evaluation complete, treatment initiated. Pt demonstrated bed mobility IND, STS with SBA. Pt unsteady with static tandem stance and romberg EC, requiring intermittent min A to prevent LOB. Pt amb with and without AD; demonstrated improved stability, endurance, and independence with FWW. Pt diaphoretic and reporting fatigue following 200 ft amb with FWW, required CGA-min A for stability and demonstrating ataxic gait pattern. Currently recommend disch to rehab vs CD, pending progress with therapy may be appropriate for disch home with AD.

## 2017-09-10 NOTE — PLAN OF CARE
Problem: Goal Outcome Summary  Goal: Goal Outcome Summary  OT: Attempted OT, but patient reports he is in too much pain and he is going to try to sleep and meditate to help the pain and requests OT hold off until tomorrow. MD just ordered pain patch for him, so RN will try that to help decrease pain. MRI also ordered for later today to help determine cause of extremity pain/weakness/sensory changes. He did participate in PT earlier today.
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
## 2017-09-10 NOTE — PLAN OF CARE
Pt. transferred from ED at 0200. VS stable; orientated patient to room and call light system. Patient has belongings with him at bedside. Bed alarm on. Continue to monitor.

## 2017-09-10 NOTE — PROGRESS NOTES
I reviewed MRI C/T/L spine as well as the read:  At C6-7, there is abnormal signal at the disk space with extension into the epidural space which is abuting the cord at that level and is contrast enhancing with possibly slightly increased cord signal near that level.     This is concerning for possible vertebral osteomyelitis with epidural abscess.    I did speak with the hospitalist taking care of this patient and he is transferring to the [facility] for urgent neurosurgerical consultation.

## 2017-09-10 NOTE — ED NOTES
Pt to be admitted to 10A. Report called to DREA Renner. House Officer speaking with pt. Will be transported by ERT when MD is finished.

## 2017-09-10 NOTE — PLAN OF CARE
Problem: Goal Outcome Summary  Goal: Goal Outcome Summary  Outcome: No Change  VSS. Alert and oriented. LS clear. BS active. Pt c/o pain in neck. Numbness and tingling in extremities, and numbness from waist down. Ambulating with SBA and walker/gait belt. Tolerated lunch. Pt had MRI done. IV dilaudid dose given x1 prior to dc via [facility]. Nicotine patch on right arm. PIV SL. Pt has mi[place] j collar on. Pt transferred to  via [facility] at 1445 via stretcher.

## 2017-09-10 NOTE — PLAN OF CARE
Problem: Goal Outcome Summary  Goal: Goal Outcome Summary  Pt A/O X 4. VSS. Sleep: awake with muscle pain in neck, PRN Tylenol given and hot packs applied. Lungs- clear/equal bilaterally. IS encouraged.  PP2+ DP2+. CMS and Neuro's are intact. Patient reports numbness and tingling in all extremities- bilateral hands, bilateral feet and bilateral lower legs. Denies nausea, shortness of breath, and chest pain. Voids spontaneously without difficulty in the urinal. Is on a regular diet, no meal this shift. Bowels- audible/active in all four quadrants, is passing flatus, last BM 9/8, ambulated to bathroom and attempted to have BM this AM without success. Pt up assist of 1 with gait belt, very unsteady gait. PIV is patent in the right arm and saline locked. Bed alarm on for safety overnight. Pt is able to make needs known and the call light is within the pt's reach. Continue to monitor.

## 2017-09-10 NOTE — PROGRESS NOTES
Consult note dictated. Dictation #[id number]    37 year-old male with IV drug use, HIV, Hep C, previous lumbar osteomyelitis presenting for myelopathy and increasing UMN signs found on imaging to have cervical stenosis with compression of spinal cord at C6-C7 level secondary to epidural phlegmon and abscess at this level. Plan for surgery this evening after obtaining labs, CT Cspine.     Parrish Hitchcock, Neurosurgery, PGY-4  Please contact the neurosurgery resident on call with questions by dialing [phone], then entering [phone] when prompted

## 2017-09-11 ENCOUNTER — APPOINTMENT (OUTPATIENT)
Dept: PHYSICAL THERAPY | Facility: CLINIC | Age: 37
DRG: 471 | End: 2017-09-11
Attending: INTERNAL MEDICINE
Payer: COMMERCIAL

## 2017-09-11 ENCOUNTER — APPOINTMENT (OUTPATIENT)
Dept: GENERAL RADIOLOGY | Facility: CLINIC | Age: 37
DRG: 471 | End: 2017-09-11
Attending: INTERNAL MEDICINE
Payer: COMMERCIAL

## 2017-09-11 ENCOUNTER — APPOINTMENT (OUTPATIENT)
Dept: CT IMAGING | Facility: CLINIC | Age: 37
DRG: 471 | End: 2017-09-11
Attending: STUDENT IN AN ORGANIZED HEALTH CARE EDUCATION/TRAINING PROGRAM
Payer: COMMERCIAL

## 2017-09-11 PROBLEM — G06.1 ABSCESS IN EPIDURAL SPACE OF CERVICAL SPINE: Status: ACTIVE | Noted: 2017-09-11

## 2017-09-11 LAB
ANION GAP SERPL CALCULATED.3IONS-SCNC: 5 MMOL/L (ref 3–14)
B BURGDOR IGG+IGM SER QL: 0.06 (ref 0–0.89)
BASOPHILS # BLD AUTO: 0 10E9/L (ref 0–0.2)
BASOPHILS NFR BLD AUTO: 0.2 %
BUN SERPL-MCNC: 12 MG/DL (ref 7–30)
CALCIUM SERPL-MCNC: 8.6 MG/DL (ref 8.5–10.1)
CHLORIDE SERPL-SCNC: 101 MMOL/L (ref 94–109)
CO2 SERPL-SCNC: 28 MMOL/L (ref 20–32)
CREAT SERPL-MCNC: 0.66 MG/DL (ref 0.66–1.25)
DIFFERENTIAL METHOD BLD: ABNORMAL
EOSINOPHIL # BLD AUTO: 0.1 10E9/L (ref 0–0.7)
EOSINOPHIL NFR BLD AUTO: 0.7 %
ERYTHROCYTE [DISTWIDTH] IN BLOOD BY AUTOMATED COUNT: 16 % (ref 10–15)
FOLATE SERPL-MCNC: 18.4 NG/ML
GFR SERPL CREATININE-BSD FRML MDRD: >90 ML/MIN/1.7M2
GLUCOSE SERPL-MCNC: 148 MG/DL (ref 70–99)
GRAM STN SPEC: ABNORMAL
GRAM STN SPEC: ABNORMAL
GRAM STN SPEC: NORMAL
GRAM STN SPEC: NORMAL
HCT VFR BLD AUTO: 34.2 % (ref 40–53)
HGB BLD-MCNC: 10.9 G/DL (ref 13.3–17.7)
IMM GRANULOCYTES # BLD: 0.1 10E9/L (ref 0–0.4)
IMM GRANULOCYTES NFR BLD: 1.1 %
LYMPHOCYTES # BLD AUTO: 1.5 10E9/L (ref 0.8–5.3)
LYMPHOCYTES NFR BLD AUTO: 14.3 %
MCH RBC QN AUTO: 27.5 PG (ref 26.5–33)
MCHC RBC AUTO-ENTMCNC: 31.9 G/DL (ref 31.5–36.5)
MCV RBC AUTO: 86 FL (ref 78–100)
MONOCYTES # BLD AUTO: 0.7 10E9/L (ref 0–1.3)
MONOCYTES NFR BLD AUTO: 6.5 %
NEUTROPHILS # BLD AUTO: 8.3 10E9/L (ref 1.6–8.3)
NEUTROPHILS NFR BLD AUTO: 77.2 %
NRBC # BLD AUTO: 0 10*3/UL
NRBC BLD AUTO-RTO: 0 /100
PLATELET # BLD AUTO: 315 10E9/L (ref 150–450)
POTASSIUM SERPL-SCNC: 3.3 MMOL/L (ref 3.4–5.3)
POTASSIUM SERPL-SCNC: 3.8 MMOL/L (ref 3.4–5.3)
RBC # BLD AUTO: 3.96 10E12/L (ref 4.4–5.9)
SODIUM SERPL-SCNC: 134 MMOL/L (ref 133–144)
SPECIMEN SOURCE: ABNORMAL
SPECIMEN SOURCE: NORMAL
VIT B12 SERPL-MCNC: 483 PG/ML (ref 193–986)
WBC # BLD AUTO: 10.7 10E9/L (ref 4–11)

## 2017-09-11 PROCEDURE — 97530 THERAPEUTIC ACTIVITIES: CPT | Mod: GP

## 2017-09-11 PROCEDURE — 25000132 ZZH RX MED GY IP 250 OP 250 PS 637: Performed by: HOSPITALIST

## 2017-09-11 PROCEDURE — 40000193 ZZH STATISTIC PT WARD VISIT

## 2017-09-11 PROCEDURE — 25000128 H RX IP 250 OP 636: Performed by: ANESTHESIOLOGY

## 2017-09-11 PROCEDURE — 25000128 H RX IP 250 OP 636: Performed by: NEUROLOGICAL SURGERY

## 2017-09-11 PROCEDURE — 86618 LYME DISEASE ANTIBODY: CPT | Performed by: HOSPITALIST

## 2017-09-11 PROCEDURE — 72125 CT NECK SPINE W/O DYE: CPT

## 2017-09-11 PROCEDURE — 99222 1ST HOSP IP/OBS MODERATE 55: CPT | Performed by: PSYCHIATRY & NEUROLOGY

## 2017-09-11 PROCEDURE — 87536 HIV-1 QUANT&REVRSE TRNSCRPJ: CPT | Performed by: HOSPITALIST

## 2017-09-11 PROCEDURE — 85025 COMPLETE CBC W/AUTO DIFF WBC: CPT | Performed by: STUDENT IN AN ORGANIZED HEALTH CARE EDUCATION/TRAINING PROGRAM

## 2017-09-11 PROCEDURE — 84132 ASSAY OF SERUM POTASSIUM: CPT | Performed by: INTERNAL MEDICINE

## 2017-09-11 PROCEDURE — 82607 VITAMIN B-12: CPT | Performed by: HOSPITALIST

## 2017-09-11 PROCEDURE — 82746 ASSAY OF FOLIC ACID SERUM: CPT | Performed by: HOSPITALIST

## 2017-09-11 PROCEDURE — 72050 X-RAY EXAM NECK SPINE 4/5VWS: CPT

## 2017-09-11 PROCEDURE — 80048 BASIC METABOLIC PNL TOTAL CA: CPT | Performed by: STUDENT IN AN ORGANIZED HEALTH CARE EDUCATION/TRAINING PROGRAM

## 2017-09-11 PROCEDURE — 25000132 ZZH RX MED GY IP 250 OP 250 PS 637: Performed by: INTERNAL MEDICINE

## 2017-09-11 PROCEDURE — 36592 COLLECT BLOOD FROM PICC: CPT | Performed by: HOSPITALIST

## 2017-09-11 PROCEDURE — 25000132 ZZH RX MED GY IP 250 OP 250 PS 637: Performed by: STUDENT IN AN ORGANIZED HEALTH CARE EDUCATION/TRAINING PROGRAM

## 2017-09-11 PROCEDURE — 97116 GAIT TRAINING THERAPY: CPT | Mod: GP

## 2017-09-11 PROCEDURE — 25000132 ZZH RX MED GY IP 250 OP 250 PS 637: Performed by: NURSE PRACTITIONER

## 2017-09-11 PROCEDURE — 12000008 ZZH R&B INTERMEDIATE UMMC

## 2017-09-11 PROCEDURE — 25000128 H RX IP 250 OP 636: Performed by: STUDENT IN AN ORGANIZED HEALTH CARE EDUCATION/TRAINING PROGRAM

## 2017-09-11 PROCEDURE — 36592 COLLECT BLOOD FROM PICC: CPT | Performed by: INTERNAL MEDICINE

## 2017-09-11 RX ORDER — HYDROMORPHONE HYDROCHLORIDE 2 MG/1
2-4 TABLET ORAL
Status: DISCONTINUED | OUTPATIENT
Start: 2017-09-11 | End: 2017-09-12

## 2017-09-11 RX ORDER — POTASSIUM CHLORIDE 29.8 MG/ML
20 INJECTION INTRAVENOUS
Status: DISCONTINUED | OUTPATIENT
Start: 2017-09-11 | End: 2017-09-15

## 2017-09-11 RX ORDER — LABETALOL HYDROCHLORIDE 5 MG/ML
10-40 INJECTION, SOLUTION INTRAVENOUS EVERY 10 MIN PRN
Status: DISCONTINUED | OUTPATIENT
Start: 2017-09-11 | End: 2017-09-21 | Stop reason: HOSPADM

## 2017-09-11 RX ORDER — AMOXICILLIN 250 MG
1-2 CAPSULE ORAL 2 TIMES DAILY
Status: DISCONTINUED | OUTPATIENT
Start: 2017-09-11 | End: 2017-09-21 | Stop reason: HOSPADM

## 2017-09-11 RX ORDER — POTASSIUM CHLORIDE 1.5 G/1.58G
20-40 POWDER, FOR SOLUTION ORAL
Status: DISCONTINUED | OUTPATIENT
Start: 2017-09-11 | End: 2017-09-21 | Stop reason: HOSPADM

## 2017-09-11 RX ORDER — TRAZODONE HYDROCHLORIDE 50 MG/1
50 TABLET, FILM COATED ORAL AT BEDTIME
Status: DISCONTINUED | OUTPATIENT
Start: 2017-09-11 | End: 2017-09-21 | Stop reason: HOSPADM

## 2017-09-11 RX ORDER — LABETALOL HYDROCHLORIDE 5 MG/ML
10 INJECTION, SOLUTION INTRAVENOUS
Status: DISCONTINUED | OUTPATIENT
Start: 2017-09-11 | End: 2017-09-11 | Stop reason: HOSPADM

## 2017-09-11 RX ORDER — OXYCODONE HYDROCHLORIDE 5 MG/1
5-10 TABLET ORAL
Status: DISCONTINUED | OUTPATIENT
Start: 2017-09-11 | End: 2017-09-11

## 2017-09-11 RX ORDER — ONDANSETRON 4 MG/1
4 TABLET, ORALLY DISINTEGRATING ORAL EVERY 30 MIN PRN
Status: DISCONTINUED | OUTPATIENT
Start: 2017-09-11 | End: 2017-09-11 | Stop reason: HOSPADM

## 2017-09-11 RX ORDER — HYDROMORPHONE HYDROCHLORIDE 1 MG/ML
.5-1 INJECTION, SOLUTION INTRAMUSCULAR; INTRAVENOUS; SUBCUTANEOUS
Status: DISCONTINUED | OUTPATIENT
Start: 2017-09-11 | End: 2017-09-11

## 2017-09-11 RX ORDER — PROCHLORPERAZINE MALEATE 5 MG
5-10 TABLET ORAL EVERY 6 HOURS PRN
Status: DISCONTINUED | OUTPATIENT
Start: 2017-09-11 | End: 2017-09-21 | Stop reason: HOSPADM

## 2017-09-11 RX ORDER — LIDOCAINE 40 MG/G
CREAM TOPICAL
Status: DISCONTINUED | OUTPATIENT
Start: 2017-09-11 | End: 2017-09-11

## 2017-09-11 RX ORDER — PIPERACILLIN SODIUM, TAZOBACTAM SODIUM 3; .375 G/15ML; G/15ML
3.38 INJECTION, POWDER, LYOPHILIZED, FOR SOLUTION INTRAVENOUS EVERY 6 HOURS
Status: DISCONTINUED | OUTPATIENT
Start: 2017-09-11 | End: 2017-09-11

## 2017-09-11 RX ORDER — ONDANSETRON 2 MG/ML
4 INJECTION INTRAMUSCULAR; INTRAVENOUS EVERY 30 MIN PRN
Status: DISCONTINUED | OUTPATIENT
Start: 2017-09-11 | End: 2017-09-11 | Stop reason: HOSPADM

## 2017-09-11 RX ORDER — FENTANYL CITRATE 50 UG/ML
25-50 INJECTION, SOLUTION INTRAMUSCULAR; INTRAVENOUS
Status: DISCONTINUED | OUTPATIENT
Start: 2017-09-11 | End: 2017-09-11 | Stop reason: HOSPADM

## 2017-09-11 RX ORDER — ALBUTEROL SULFATE 0.83 MG/ML
2.5 SOLUTION RESPIRATORY (INHALATION) EVERY 4 HOURS PRN
Status: DISCONTINUED | OUTPATIENT
Start: 2017-09-11 | End: 2017-09-11 | Stop reason: HOSPADM

## 2017-09-11 RX ORDER — CALCIUM CARBONATE 500 MG/1
1-2 TABLET, CHEWABLE ORAL 4 TIMES DAILY PRN
Status: DISCONTINUED | OUTPATIENT
Start: 2017-09-11 | End: 2017-09-11

## 2017-09-11 RX ORDER — POTASSIUM CHLORIDE 7.45 MG/ML
10 INJECTION INTRAVENOUS
Status: DISCONTINUED | OUTPATIENT
Start: 2017-09-11 | End: 2017-09-21 | Stop reason: HOSPADM

## 2017-09-11 RX ORDER — NALOXONE HYDROCHLORIDE 0.4 MG/ML
.1-.4 INJECTION, SOLUTION INTRAMUSCULAR; INTRAVENOUS; SUBCUTANEOUS
Status: DISCONTINUED | OUTPATIENT
Start: 2017-09-11 | End: 2017-09-21 | Stop reason: HOSPADM

## 2017-09-11 RX ORDER — ACETAMINOPHEN 325 MG/1
975 TABLET ORAL EVERY 8 HOURS
Status: DISPENSED | OUTPATIENT
Start: 2017-09-11 | End: 2017-09-14

## 2017-09-11 RX ORDER — POTASSIUM CL/LIDO/0.9 % NACL 10MEQ/0.1L
10 INTRAVENOUS SOLUTION, PIGGYBACK (ML) INTRAVENOUS
Status: DISCONTINUED | OUTPATIENT
Start: 2017-09-11 | End: 2017-09-21 | Stop reason: HOSPADM

## 2017-09-11 RX ORDER — VANCOMYCIN HYDROCHLORIDE 1 G/200ML
1000 INJECTION, SOLUTION INTRAVENOUS EVERY 12 HOURS
Status: DISCONTINUED | OUTPATIENT
Start: 2017-09-11 | End: 2017-09-11

## 2017-09-11 RX ORDER — VANCOMYCIN HYDROCHLORIDE 1 G/200ML
1000 INJECTION, SOLUTION INTRAVENOUS EVERY 24 HOURS
Status: DISCONTINUED | OUTPATIENT
Start: 2017-09-11 | End: 2017-09-11

## 2017-09-11 RX ORDER — HYDRALAZINE HYDROCHLORIDE 20 MG/ML
2.5-5 INJECTION INTRAMUSCULAR; INTRAVENOUS EVERY 10 MIN PRN
Status: DISCONTINUED | OUTPATIENT
Start: 2017-09-11 | End: 2017-09-11 | Stop reason: HOSPADM

## 2017-09-11 RX ORDER — ACETAMINOPHEN 325 MG/1
650 TABLET ORAL EVERY 4 HOURS PRN
Status: DISCONTINUED | OUTPATIENT
Start: 2017-09-14 | End: 2017-09-11

## 2017-09-11 RX ORDER — SODIUM CHLORIDE, SODIUM LACTATE, POTASSIUM CHLORIDE, CALCIUM CHLORIDE 600; 310; 30; 20 MG/100ML; MG/100ML; MG/100ML; MG/100ML
INJECTION, SOLUTION INTRAVENOUS CONTINUOUS
Status: DISCONTINUED | OUTPATIENT
Start: 2017-09-11 | End: 2017-09-11 | Stop reason: HOSPADM

## 2017-09-11 RX ORDER — TRAZODONE HYDROCHLORIDE 50 MG/1
50 TABLET, FILM COATED ORAL
Status: DISCONTINUED | OUTPATIENT
Start: 2017-09-11 | End: 2017-09-11

## 2017-09-11 RX ORDER — POLYETHYLENE GLYCOL 3350 17 G/17G
17 POWDER, FOR SOLUTION ORAL DAILY
Status: DISCONTINUED | OUTPATIENT
Start: 2017-09-11 | End: 2017-09-21 | Stop reason: HOSPADM

## 2017-09-11 RX ORDER — MAGNESIUM SULFATE HEPTAHYDRATE 40 MG/ML
4 INJECTION, SOLUTION INTRAVENOUS EVERY 4 HOURS PRN
Status: DISCONTINUED | OUTPATIENT
Start: 2017-09-11 | End: 2017-09-21 | Stop reason: HOSPADM

## 2017-09-11 RX ORDER — HYDRALAZINE HYDROCHLORIDE 20 MG/ML
10-20 INJECTION INTRAMUSCULAR; INTRAVENOUS EVERY 30 MIN PRN
Status: DISCONTINUED | OUTPATIENT
Start: 2017-09-11 | End: 2017-09-21 | Stop reason: HOSPADM

## 2017-09-11 RX ORDER — POTASSIUM CHLORIDE 750 MG/1
20-40 TABLET, EXTENDED RELEASE ORAL
Status: DISCONTINUED | OUTPATIENT
Start: 2017-09-11 | End: 2017-09-21 | Stop reason: HOSPADM

## 2017-09-11 RX ORDER — SODIUM CHLORIDE 9 MG/ML
INJECTION, SOLUTION INTRAVENOUS CONTINUOUS
Status: DISCONTINUED | OUTPATIENT
Start: 2017-09-11 | End: 2017-09-12

## 2017-09-11 RX ORDER — PREGABALIN 75 MG/1
300 CAPSULE ORAL 2 TIMES DAILY
Status: DISCONTINUED | OUTPATIENT
Start: 2017-09-11 | End: 2017-09-18

## 2017-09-11 RX ORDER — ONDANSETRON 2 MG/ML
4 INJECTION INTRAMUSCULAR; INTRAVENOUS EVERY 6 HOURS PRN
Status: DISCONTINUED | OUTPATIENT
Start: 2017-09-11 | End: 2017-09-21 | Stop reason: HOSPADM

## 2017-09-11 RX ORDER — ONDANSETRON 4 MG/1
4 TABLET, ORALLY DISINTEGRATING ORAL EVERY 6 HOURS PRN
Status: DISCONTINUED | OUTPATIENT
Start: 2017-09-11 | End: 2017-09-21 | Stop reason: HOSPADM

## 2017-09-11 RX ADMIN — LORAZEPAM 0.5 MG: 2 INJECTION INTRAMUSCULAR; INTRAVENOUS at 16:11

## 2017-09-11 RX ADMIN — HYDROMORPHONE HYDROCHLORIDE 4 MG: 2 TABLET ORAL at 18:27

## 2017-09-11 RX ADMIN — LEVOMILNACIPRAN HYDROCHLORIDE 80 MG: 80 CAPSULE, EXTENDED RELEASE ORAL at 12:40

## 2017-09-11 RX ADMIN — HYDROMORPHONE HYDROCHLORIDE 0.5 MG: 1 INJECTION, SOLUTION INTRAMUSCULAR; INTRAVENOUS; SUBCUTANEOUS at 01:05

## 2017-09-11 RX ADMIN — POTASSIUM CHLORIDE 40 MEQ: 1.5 POWDER, FOR SOLUTION ORAL at 10:33

## 2017-09-11 RX ADMIN — PREGABALIN 300 MG: 75 CAPSULE ORAL at 20:22

## 2017-09-11 RX ADMIN — LIDOCAINE 1 PATCH: 50 PATCH CUTANEOUS at 07:53

## 2017-09-11 RX ADMIN — ACETAMINOPHEN 975 MG: 325 TABLET ORAL at 18:27

## 2017-09-11 RX ADMIN — TRAZODONE HYDROCHLORIDE 50 MG: 50 TABLET ORAL at 22:17

## 2017-09-11 RX ADMIN — HYDROMORPHONE HYDROCHLORIDE 1 MG: 1 INJECTION, SOLUTION INTRAMUSCULAR; INTRAVENOUS; SUBCUTANEOUS at 08:43

## 2017-09-11 RX ADMIN — SENNOSIDES AND DOCUSATE SODIUM 2 TABLET: 8.6; 5 TABLET ORAL at 20:20

## 2017-09-11 RX ADMIN — ABACAVIR SULFATE, DOLUTEGRAVIR SODIUM, LAMIVUDINE 1 TABLET: 600; 50; 300 TABLET, FILM COATED ORAL at 12:01

## 2017-09-11 RX ADMIN — LORAZEPAM 0.5 MG: 2 INJECTION INTRAMUSCULAR; INTRAVENOUS at 04:14

## 2017-09-11 RX ADMIN — OXYCODONE HYDROCHLORIDE 10 MG: 5 TABLET ORAL at 06:43

## 2017-09-11 RX ADMIN — VANCOMYCIN HYDROCHLORIDE 1250 MG: 10 INJECTION, POWDER, LYOPHILIZED, FOR SOLUTION INTRAVENOUS at 12:01

## 2017-09-11 RX ADMIN — LORAZEPAM 0.5 MG: 2 INJECTION INTRAMUSCULAR; INTRAVENOUS at 10:28

## 2017-09-11 RX ADMIN — SENNOSIDES AND DOCUSATE SODIUM 2 TABLET: 8.6; 5 TABLET ORAL at 07:53

## 2017-09-11 RX ADMIN — HYDROMORPHONE HYDROCHLORIDE 1 MG: 1 INJECTION, SOLUTION INTRAMUSCULAR; INTRAVENOUS; SUBCUTANEOUS at 13:38

## 2017-09-11 RX ADMIN — HYDROMORPHONE HYDROCHLORIDE 4 MG: 2 TABLET ORAL at 22:17

## 2017-09-11 RX ADMIN — FENTANYL CITRATE 25 MCG: 50 INJECTION, SOLUTION INTRAMUSCULAR; INTRAVENOUS at 00:35

## 2017-09-11 RX ADMIN — OXYCODONE HYDROCHLORIDE 10 MG: 5 TABLET ORAL at 16:12

## 2017-09-11 RX ADMIN — HYDROMORPHONE HYDROCHLORIDE 4 MG: 2 TABLET ORAL at 20:22

## 2017-09-11 RX ADMIN — POLYETHYLENE GLYCOL 3350 17 G: 17 POWDER, FOR SOLUTION ORAL at 18:28

## 2017-09-11 RX ADMIN — HYDROMORPHONE HYDROCHLORIDE 0.5 MG: 1 INJECTION, SOLUTION INTRAMUSCULAR; INTRAVENOUS; SUBCUTANEOUS at 03:04

## 2017-09-11 RX ADMIN — OXYCODONE HYDROCHLORIDE 10 MG: 5 TABLET ORAL at 12:51

## 2017-09-11 RX ADMIN — FENTANYL CITRATE 25 MCG: 50 INJECTION, SOLUTION INTRAMUSCULAR; INTRAVENOUS at 00:42

## 2017-09-11 RX ADMIN — VANCOMYCIN HYDROCHLORIDE 1250 MG: 10 INJECTION, POWDER, LYOPHILIZED, FOR SOLUTION INTRAVENOUS at 22:18

## 2017-09-11 RX ADMIN — POTASSIUM CHLORIDE 20 MEQ: 1.5 POWDER, FOR SOLUTION ORAL at 12:51

## 2017-09-11 RX ADMIN — ACETAMINOPHEN 975 MG: 325 TABLET ORAL at 12:00

## 2017-09-11 ASSESSMENT — LIFESTYLE VARIABLES: TOBACCO_USE: 1

## 2017-09-11 ASSESSMENT — VISUAL ACUITY
OU: NORMAL ACUITY
OU: NORMAL ACUITY

## 2017-09-11 NOTE — ANESTHESIA PROCEDURE NOTES
Central Line Procedure Note  Staff:     Anesthesiologist:  VIVIANE CARRION    Resident/CRNA:  HELEN WOODS    Central line placed by Resident/CRNA in the presence of a teaching physician    Location: In OR after induction  Procedure Start/Stop Times:     patient identified, IV checked, site marked, risks and benefits discussed, informed consent, monitors and equipment checked, pre-op evaluation and at physician/surgeon's request      Correct Patient: Yes      Correct Position: Yes      Correct Site: Yes      Correct Procedure: Yes      Correct Laterality:  Yes    Site Marked:  Yes  Line Placement:     Procedure:  Central Line    Insertion laterality:  Left    Insertion site:  Internal Jugular    Position:  Trendelenburg      Maximal Sterile Barriers: All elements of maximal sterile barrier technique followed      (Maximal sterile barriers include:   Sterile gown, Sterile Gloves, Mask, Cap, Whole body draped, hand hygiene and acceptable skin prep).Skin Prep: Chloraprep         Injection Technique:  Ultrasound guided    Sterile Ultrasound Technique:  Sterile probe cover and Sterile gel    Vein evaluated via U/S for patency/adequacy of catheter insertion and is adequate.  Using realtime U/S imaging the vein was punctured, and needle was observed entering vein on U/S      Permanent Image entered into patient's record      Catheter size:  7 Fr, 3 lumen, 20 cm    : 18.    Cath secured with: suture      Dressing:  Tegaderm and Biopatch    Complications:  None obvious    Blood aspirated all lumens: Yes      All Lumens Flushed: Yes      Verification method:  Placement to be verified post-op

## 2017-09-11 NOTE — ANESTHESIA POSTPROCEDURE EVALUATION
Patient: Bc German    Procedure(s):  Stealth C6-C7 Anterior Cervical Corpectomy and C5-T1 Fusion - Wound Class: I-Clean    Diagnosis:Cervical Abscess  Diagnosis Additional Information: No value filed.    Anesthesia Type:  General, ETT    Note:  Anesthesia Post Evaluation    Patient location during evaluation: PACU  Patient participation: Able to fully participate in evaluation  Level of consciousness: awake and alert  Pain control: improving with prn medications.  Airway patency: patent  Cardiovascular status: blood pressure returned to baseline  Respiratory status: acceptable  Hydration status: acceptable  PONV: none             Last vitals:  Vitals:    09/11/17 0030 09/11/17 0045 09/11/17 0100   BP: 133/86 133/86 138/89   Pulse:      Resp: 14 16 12   Temp: 36.4  C (97.6  F)  36.6  C (97.8  F)   SpO2: 99% 97% 98%         Electronically Signed By: Keiry Hamilton MD  September 11, 2017  1:24 AM

## 2017-09-11 NOTE — ANESTHESIA PROCEDURE NOTES
Arterial Line Procedure Note  Staff:     Anesthesiologist:  ELENA SHERMAN    Resident/CRNA:  HELEN WOODS    Arterial line performed by resident/CRNA in presence of a teaching physician    Location: In OR After Induction  Procedure Start/Stop Times:     patient identified, IV checked, site marked, risks and benefits discussed, informed consent, monitors and equipment checked, pre-op evaluation and at physician/surgeon's request      Correct Patient: Yes      Correct Position: Yes      Correct Site: Yes      Correct Procedure: Yes      Correct Laterality:  Yes    Site Marked:  Yes  Line Placement:     Procedure:  Arterial Line    Insertion Site:  Radial    Insertion laterality:  Left    Skin Prep: Chloraprep      Patient Prep: patient draped, mask, sterile gloves, hat and hand hygiene      Local skin infiltration:  1% lidocaine    Ultrasound Guided?: No      Catheter size:  20 gauge, Quick cath    Cath secured with: anchor securement device      Cath secured with comment:  Benzoin    Dressing:  Tegaderm    Complications:  None obvious    Arterial waveform: Yes      IBP within 10% of NIBP: Yes

## 2017-09-11 NOTE — PLAN OF CARE
Problem: Goal Outcome Summary  Goal: Goal Outcome Summary  Outcome: No Change  POD #1 C5-T1 anterior fusion and corpectomy, drainage of epidural abscess. C collar on at all times. Incision LISSETH. AOX4. VSS. Refusing capnography. Pt up w A1 walker, GB. Pain control has been an issue; being followed by pain team. Getting Lidocaine patches, Prn Oxy and Dilaudid. Pt has central and PIV. MIVFs TKO between abx. Poor appetite. Voiding spont, low PVRs. No BM. Xray and CT done. Worked w PT. Potassium (3.3) replaced. Redraw scheduled for 1700. C/t monitor.

## 2017-09-11 NOTE — ANESTHESIA PREPROCEDURE EVALUATION
Anesthesia Evaluation     . Pt has had prior anesthetic.     No history of anesthetic complications          ROS/MED HX    ENT/Pulmonary:     (+)tobacco use, , . .    Neurologic:     (+)Spinal cord injury     Cardiovascular:     (+) ----. : . . . :. . Previous cardiac testing Echodate:results:date: results: date: results: date: results:          METS/Exercise Tolerance:     Hematologic:         Musculoskeletal:         GI/Hepatic:     (+) liver disease,       Renal/Genitourinary:         Endo:         Psychiatric:     (+) psychiatric history       Infectious Disease:   (+) HIV,       Malignancy:         Other:    (+) other significant disability                    Physical Exam      Airway   Mallampati: II  TM distance: >3 FB  Neck ROM: full    Dental   (+) missing    Cardiovascular   Rhythm and rate: regular      Pulmonary    breath sounds clear to auscultation                    Anesthesia Plan      History & Physical Review  History and physical reviewed and following examination; no interval change.    ASA Status:  3 emergent.    NPO Status:  > 8 hours    Plan for General and ETT with Intravenous induction. Maintenance will be Balanced.    PONV prophylaxis:  Ondansetron (or other 5HT-3) and Dexamethasone or Solumedrol  Additional equipment: 2nd IV, Arterial Line and Videolaryngoscope      Postoperative Care  Postoperative pain management:  IV analgesics.      Consents  Anesthetic plan, risks, benefits and alternatives discussed with:  Patient..

## 2017-09-11 NOTE — PHARMACY-VANCOMYCIN DOSING SERVICE
Pharmacy Vancomycin Initial Note  Date of Service 2017  Patient's  1980  37 year old, male    Indication: Osteomyelitis/Cervical Spine Osteomyelitis    Current estimated CrCl = Estimated Creatinine Clearance: 140.3 mL/min (based on Cr of 0.66).    Creatinine for last 3 days  2017:  6:59 PM Creatinine 0.93 mg/dL  9/10/2017:  7:46 PM Creatinine 0.76 mg/dL  2017:  7:03 AM Creatinine 0.66 mg/dL    Recent Vancomycin Level(s) for last 3 days  No results found for requested labs within last 72 hours.      Vancomycin IV Administrations (past 72 hours)      No vancomycin orders with administrations in past 72 hours.                Nephrotoxins and other renal medications (Future)    Start     Dose/Rate Route Frequency Ordered Stop    17 1100  vancomycin (VANCOCIN) 1,250 mg in NaCl 0.9 % 250 mL intermittent infusion      1,250 mg Intravenous EVERY 12 HOURS 17 1038            Contrast Orders - past 72 hours (72h ago through future)    Start     Dose/Rate Route Frequency Ordered Stop    09/10/17 1230  gadobutrol (GADAVIST) injection 7.5 mL      7.5 mL Intravenous ONCE 09/10/17 1227 09/10/17 1259    17 1917  gadobutrol (GADAVIST) injection 7.5 mL      7.5 mL Intravenous ONCE 17 1917 17 1934                Plan:  1.  Start vancomycin  1250 mg IV q12h.   2.  Goal Trough Level: 15-20 mg/L   3.  Pharmacy will check trough levels as appropriate in 1-3 Days.    4.  Serum creatinine levels will be ordered daily for the first week of therapy and at least twice weekly for subsequent weeks.    5.  Clayton method utilized to dose vancomycin therapy: Method 2      Jonnie Obrien, PharmD, BCPS  2017

## 2017-09-11 NOTE — PROGRESS NOTES
[facility], [place]   Neurosurgery Progress Note:    Assessment: Bc German is a 37 year old male postoperative day # 0 from C5-T1 anterior instrumented fusion and C6/7 corpectomy for vertebral osteomyelitis and epidural abscess causing cervical spinal cord compression.     Plan:  - Serial neuro exams  - Pain control, consider pain consult today given extensive history of opioid abuse  - Post-op upright cervical Xrays  - Cervical collar on at all times, will consider  brace   - Vanc/Zosyn, narrow as able pending OR cultures  - PTA anti-retrovirals   - CT C/T-spine  - HOB > 30 degrees  - Advance diet as tolerates  - Bowel regimen  - PRN antiemetics  - IVF until taking adequate PO  - PT/OT  - SCDs for DVT ppx  - Dispo: Stable on 6A pending therapy evaluation     Discussed with Neurosurgery chief, who agrees.    Interval History: mild neck pain in post-operative period.      Objective:   Temp:  [97.4  F (36.3  C)-99.3  F (37.4  C)] 97.8  F (36.6  C)  Pulse:  [102] 102  Heart Rate:  [] 99  Resp:  [12-18] 12  BP: (124-138)/(79-91) 132/87  MAP:  [94 mmHg-98 mmHg] 94 mmHg  Arterial Line BP: (139-147)/(67-73) 139/67  SpO2:  [97 %-100 %] 97 %  I/O last 3 completed shifts:  In: 1440 [P.O.:240; I.V.:1200]  Out: 655 [Urine:625; Blood:30]    Gen: mild discomfort, cervical collar in place   Wound: right neck incision c/d/i, no significant erythema, swelling or drainage  Neurologic:  - Alert & Oriented to person, place, time, and situation  - Follows commands briskly  - Speech fluent, spontaneous. No aphasia or dysarthria.  - No gaze preference. No apparent hemineglect.  - PERRL, EOMI  - Strong eye closure, jaw clench, and cheek puff  - Face symmetric with sensation intact to light touch  - Palate elevates symmetrically, uvula midline, tongue protrudes midline  - Trapezii and sternocleidomastoid muscles 5/5 bilaterally  - No pronator drift     Del Tr Bi WE WF Gr   R 5 5 5 5 5 5   L 5 5 5  5 5 5    HF KE KF DF PF EHL   R 5 5 5 5 5 5   L 5 5 5 5 5 5     Reflexes 2+ throughout except for 3+ b/l patella and continuous clonus in LLE, upgoing toes b/l   Sensation intact and symmetric to light touch throughout      Please contact neurosurgery resident on call with questions.    Dial * * [phone], enter [phone] when prompted.

## 2017-09-11 NOTE — PLAN OF CARE
"Problem: Goal Outcome Summary  Goal: Goal Outcome Summary  Outcome: No Change  POD #1 C5-T1 anterior fusion and corpectomy, drainage of epidural abscess.  Pt arrived to 6A from PACU at 0145. Sleepy but oriented x4, able to make needs known and follow all commands. Neuros WNL with strengths 4/5 in BLE. Denies N/T. VSS, 98% RA. Capnography off part of the night, as pt removed it and stated he could not tolerate \"that piece under my nose.\" Charge nurse aware. He also stated that he has \"had terrible anxiety his whole life.\" He stated that he needed a break from the pneumo boots and had removed his c-collar. Educated pt on the importance of SCD's and that he could have them off for a while, but that the c-collar needed to be put back on. Ativan 0.5mg given and pt agreed to wear collar. Anterior neck incision LISSETH with durmabond. Pain controlled with Dilaudid IV. Marcos in place, offered to remove to decrease anxiety, but pt stated he would like to wait until morning. Left neck central line and RUE PIV x1 in place. IVF via c-line infusing without difficulty.  Plan: Continue to monitor, per orders pt is to wear Stebbins J collar, so he will need to be fitted by orthotics.     Addendum: Per pt request, please note he has significant claustrophobia-- he would prefer curtain in his room open if he does not have a room mate. Neck brace is difficult for him to tolerate, but is doing his best. Also marcos was removed at 0645 without difficulty. Urinal at bedside.      "

## 2017-09-11 NOTE — ANESTHESIA CARE TRANSFER NOTE
Patient: Bc German    Procedure(s):  Stealth C6-C7 Anterior Cervical Corpectomy and C5-T1 Fusion - Wound Class: I-Clean    Diagnosis: Cervical Abscess  Diagnosis Additional Information: No value filed.    Anesthesia Type:   No value filed.     Note:  Airway :Face Mask  Patient transferred to:PACU  Comments: Patient transferred to PACU breathing spontaneously. Appears comfortable. HDS in PACU, report given to RN.       Vitals: (Last set prior to Anesthesia Care Transfer)    CRNA VITALS  9/10/2017 2336 - 9/11/2017 0013      9/11/2017             Pulse: 117    ART BP: 152/76    ART Mean: 102    SpO2: 100 %    Resp Rate (observed): (!)  2                Electronically Signed By: Mari Blakely MD  September 11, 2017  12:13 AM
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
## 2017-09-11 NOTE — PROGRESS NOTES
S: order received to see patient on u6A for eval/fitting of a cervical thoracic orthosis () as ordered by Dr Olvera.  O/G: support, stabilize and immobilize spine  A: patient seen for eval.  Patient was fit with an [place] .  Patient was logrolled and posterior section placed behind the patient.  Anterior section was then placed on patient and adjusted to achieve a customized/optimal fit.   Instructed on donning, doffing, use and care.  Patient was provided with extra pad set as well as directions that came with the brace.    P: provided our number to contact if any issues arise    [facility]  Instruction Sheet

## 2017-09-11 NOTE — PLAN OF CARE
Problem: Goal Outcome Summary  Goal: Goal Outcome Summary  PT 6A: Barriers to independence include pain with movement limiting independent mobility and unsteady gait 2/2 weakness, decreased coordination, and impaired sensation. Pt amb 200 ft with FWW CGA with no LOB noted. Cervical collar must be on at all times: pt was found to have doffed upon start of therapy.     Rec: TCU to improve strength, coordination, and gait.

## 2017-09-11 NOTE — CONSULTS
Patient seen and chart reviewed. Note dictated (initial)   RECOMMENDATIONS:  Continue Fetzima 80 mg per day   Start trazodone 50 mg HS, increase to 100 to 150 mg as needed for sleep  When done with post op opioids, he should be transitioned to Suboxone. Dr Khari Barrientos from [facility] is an resource in the regard. I have spoken with him and he will be following up with Mr Greman.   page me at [phone] as needed

## 2017-09-11 NOTE — BRIEF OP NOTE
Brief Op Note  Pre-operative diagnosis: Cervical osteomyelitis with epidural abscess and cervical myelopathy  Post-operative diagnosis: same   Procedure:   1. C6 and C7 corpectomy  2. C5-T1 anterior instrumented fusion  3. Use of neuromonitoring  4. Use of intraoperative microscope  5. Use of intraoperative fluoroscopy  Surgeon:  MD Halie  Assistant(s): MD Jovanna; MD Diana   Anesthesia: GETA  EBL: 30 cc  Fluids: 1500 cc crystalloid  UOP: 700 cc  Drains: none   Specimens: phlegmon for micro  Implants:   Medtronic ACDF C5-T1  [place] Vision Elite  - Self tapping fixed screws on bottom (16 mm, 15 mm on right)  - Self tapping variable screws on top (16 mm)  - Altitude [id number] cage 13x (32-46)  - 55 mm plate  Findings:   Phlegmon anterior to dura  Complications: None.  Condition: stable   Post op location:PACU  Comments: See dictated report for full details.    Plan:  - admit to 6 A post op  - XR c spine tomorrow  - C collar on at all times  - Broad spectrum antibiotics  - OK for diet after bedside swallow  - PT/OT  - OOB with assist  - Pain control   Page [phone] with questions

## 2017-09-11 NOTE — PLAN OF CARE
Disposition Plan     Neurosurgery Discharge Planning:    (Please see the Neurosurgery Daily Progress Note for a detailed Plan of Care)    The patient has met the following criteria in preparation for discharge:    1) Voiding without difficulty and no urinary retention -- No   Date Tirado Removed: 9/11/2017   PLAN: Obtain Post-Void Residuals    2) Passing Flatus or had a Bowel Movement -- No   PLAN: Continue Bowel Regimen w/ Senna and Miralax    3) Pain is well-controlled with Oral Analagesia -- No   PLAN: Pain Management Consultation Ordered    4) Eating and Drinking without difficulty -- No   PLAN: Reports difficulty and pain w/ swallowing; Possible Swallow Evaluation    5) Neurologically Stable -- No   PLAN: Improved LUE strength post-operatively; Obtain CT Cervical Spine-T3 today; Cervical Collar at all times; ID Consultation for antibiotics for cervical osteomyelitis (Recommendations pending)    6) Surgical Drains Removed -- N/A    7) Multidisciplinary Therapy Assessments and Recommendations have been completed --  No   Physical Therapy Recommendation: Pending   Occupational Therapy Recommendation: Pending   Speech Therapy Recommendation: Pending    8) Anticipated Date of Discharge and Disposition: Anticipate readiness for D/C 9/14/2017        Entered: Kim Curtis 09/11/2017, 10:17 AM

## 2017-09-11 NOTE — PLAN OF CARE
Problem: Goal Outcome Summary  Goal: Goal Outcome Summary  OT 6A: Cancel - pt anxious upon arrival, refusing therapy and diaphoretic. Systolic , RN notified. Will reschedule for 9/12/17.
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
## 2017-09-12 ENCOUNTER — APPOINTMENT (OUTPATIENT)
Dept: OCCUPATIONAL THERAPY | Facility: CLINIC | Age: 37
DRG: 471 | End: 2017-09-12
Attending: INTERNAL MEDICINE
Payer: COMMERCIAL

## 2017-09-12 ENCOUNTER — APPOINTMENT (OUTPATIENT)
Dept: PHYSICAL THERAPY | Facility: CLINIC | Age: 37
DRG: 471 | End: 2017-09-12
Attending: INTERNAL MEDICINE
Payer: COMMERCIAL

## 2017-09-12 LAB
GLUCOSE BLDC GLUCOMTR-MCNC: 105 MG/DL (ref 70–99)
GLUCOSE BLDC GLUCOMTR-MCNC: 147 MG/DL (ref 70–99)
HIV1 RNA # PLAS NAA DL=20: NORMAL {COPIES}/ML
HIV1 RNA SERPL NAA+PROBE-LOG#: NORMAL {LOG_COPIES}/ML

## 2017-09-12 PROCEDURE — 97116 GAIT TRAINING THERAPY: CPT | Mod: GP

## 2017-09-12 PROCEDURE — 97165 OT EVAL LOW COMPLEX 30 MIN: CPT | Mod: GO | Performed by: OCCUPATIONAL THERAPIST

## 2017-09-12 PROCEDURE — 40000133 ZZH STATISTIC OT WARD VISIT: Performed by: OCCUPATIONAL THERAPIST

## 2017-09-12 PROCEDURE — 25000132 ZZH RX MED GY IP 250 OP 250 PS 637: Performed by: NURSE PRACTITIONER

## 2017-09-12 PROCEDURE — 97535 SELF CARE MNGMENT TRAINING: CPT | Mod: GO | Performed by: OCCUPATIONAL THERAPIST

## 2017-09-12 PROCEDURE — 87040 BLOOD CULTURE FOR BACTERIA: CPT | Performed by: HOSPITALIST

## 2017-09-12 PROCEDURE — 25000128 H RX IP 250 OP 636: Performed by: NEUROLOGICAL SURGERY

## 2017-09-12 PROCEDURE — 25000132 ZZH RX MED GY IP 250 OP 250 PS 637: Performed by: STUDENT IN AN ORGANIZED HEALTH CARE EDUCATION/TRAINING PROGRAM

## 2017-09-12 PROCEDURE — 12000008 ZZH R&B INTERMEDIATE UMMC

## 2017-09-12 PROCEDURE — 00000146 ZZHCL STATISTIC GLUCOSE BY METER IP

## 2017-09-12 PROCEDURE — 25000132 ZZH RX MED GY IP 250 OP 250 PS 637: Performed by: INTERNAL MEDICINE

## 2017-09-12 PROCEDURE — 99207 ZZC NO CHARGE FOLLOW UP PS: CPT

## 2017-09-12 PROCEDURE — 25000128 H RX IP 250 OP 636: Performed by: STUDENT IN AN ORGANIZED HEALTH CARE EDUCATION/TRAINING PROGRAM

## 2017-09-12 PROCEDURE — 99232 SBSQ HOSP IP/OBS MODERATE 35: CPT | Performed by: HOSPITALIST

## 2017-09-12 PROCEDURE — 40000193 ZZH STATISTIC PT WARD VISIT

## 2017-09-12 PROCEDURE — 97530 THERAPEUTIC ACTIVITIES: CPT | Mod: GP

## 2017-09-12 PROCEDURE — 36592 COLLECT BLOOD FROM PICC: CPT | Performed by: HOSPITALIST

## 2017-09-12 PROCEDURE — 25000132 ZZH RX MED GY IP 250 OP 250 PS 637: Performed by: HOSPITALIST

## 2017-09-12 RX ORDER — LIDOCAINE 50 MG/G
1-3 PATCH TOPICAL
Status: DISCONTINUED | OUTPATIENT
Start: 2017-09-13 | End: 2017-09-21 | Stop reason: HOSPADM

## 2017-09-12 RX ORDER — HYDROMORPHONE HYDROCHLORIDE 2 MG/1
2-4 TABLET ORAL
Status: DISCONTINUED | OUTPATIENT
Start: 2017-09-12 | End: 2017-09-14

## 2017-09-12 RX ADMIN — HYDROMORPHONE HYDROCHLORIDE 4 MG: 2 TABLET ORAL at 10:05

## 2017-09-12 RX ADMIN — HYDROMORPHONE HYDROCHLORIDE 4 MG: 2 TABLET ORAL at 16:02

## 2017-09-12 RX ADMIN — ACETAMINOPHEN 975 MG: 325 TABLET ORAL at 03:09

## 2017-09-12 RX ADMIN — LIDOCAINE 1 PATCH: 50 PATCH CUTANEOUS at 07:53

## 2017-09-12 RX ADMIN — ACETAMINOPHEN 975 MG: 325 TABLET ORAL at 18:57

## 2017-09-12 RX ADMIN — HYDROMORPHONE HYDROCHLORIDE 4 MG: 2 TABLET ORAL at 13:24

## 2017-09-12 RX ADMIN — HYDROMORPHONE HYDROCHLORIDE 4 MG: 2 TABLET ORAL at 18:57

## 2017-09-12 RX ADMIN — SODIUM CHLORIDE 1000 ML: 9 INJECTION, SOLUTION INTRAVENOUS at 07:09

## 2017-09-12 RX ADMIN — ACETAMINOPHEN 975 MG: 325 TABLET ORAL at 10:04

## 2017-09-12 RX ADMIN — PREGABALIN 300 MG: 75 CAPSULE ORAL at 20:27

## 2017-09-12 RX ADMIN — HYDROMORPHONE HYDROCHLORIDE 4 MG: 2 TABLET ORAL at 22:48

## 2017-09-12 RX ADMIN — VANCOMYCIN HYDROCHLORIDE 1250 MG: 10 INJECTION, POWDER, LYOPHILIZED, FOR SOLUTION INTRAVENOUS at 10:55

## 2017-09-12 RX ADMIN — POLYETHYLENE GLYCOL 3350 17 G: 17 POWDER, FOR SOLUTION ORAL at 07:53

## 2017-09-12 RX ADMIN — PREGABALIN 300 MG: 75 CAPSULE ORAL at 07:52

## 2017-09-12 RX ADMIN — ACETAMINOPHEN 650 MG: 325 TABLET ORAL at 13:24

## 2017-09-12 RX ADMIN — HYDROMORPHONE HYDROCHLORIDE 2 MG: 2 TABLET ORAL at 07:07

## 2017-09-12 RX ADMIN — ABACAVIR SULFATE, DOLUTEGRAVIR SODIUM, LAMIVUDINE 1 TABLET: 600; 50; 300 TABLET, FILM COATED ORAL at 07:54

## 2017-09-12 RX ADMIN — SENNOSIDES AND DOCUSATE SODIUM 2 TABLET: 8.6; 5 TABLET ORAL at 20:28

## 2017-09-12 RX ADMIN — LEVOMILNACIPRAN HYDROCHLORIDE 80 MG: 80 CAPSULE, EXTENDED RELEASE ORAL at 11:54

## 2017-09-12 RX ADMIN — HYDROMORPHONE HYDROCHLORIDE 4 MG: 2 TABLET ORAL at 03:09

## 2017-09-12 RX ADMIN — VANCOMYCIN HYDROCHLORIDE 1250 MG: 10 INJECTION, POWDER, LYOPHILIZED, FOR SOLUTION INTRAVENOUS at 22:48

## 2017-09-12 RX ADMIN — TRAZODONE HYDROCHLORIDE 50 MG: 50 TABLET ORAL at 21:49

## 2017-09-12 RX ADMIN — SENNOSIDES AND DOCUSATE SODIUM 2 TABLET: 8.6; 5 TABLET ORAL at 07:54

## 2017-09-12 ASSESSMENT — VISUAL ACUITY
OU: NORMAL ACUITY

## 2017-09-12 ASSESSMENT — ACTIVITIES OF DAILY LIVING (ADL): PREVIOUS_RESPONSIBILITIES: MEAL PREP;LAUNDRY;MEDICATION MANAGEMENT;FINANCES

## 2017-09-12 NOTE — PLAN OF CARE
Problem: Goal Outcome Summary  Goal: Goal Outcome Summary  OT 6A: OT evaluation completed and treatment initiated. Educated pt on precautions and need to wear  collar. Pt agreeable to don collar for all activity, stating claustrophobia affecting ability to wear collar at rest. Pt performed toilet transfer grab bars and SBA. Pt stood at sink SBA to complete g/h tasks ~10 minutes. To increase functional endurance pt ambulated ~200ft SBA and FWW. Pt completed LB dressing SBA within precautions. Pt limited by pain and post surgical precautions.     REC: Home with assist from family as needed and for donning collar

## 2017-09-12 NOTE — PLAN OF CARE
"Problem: Goal Outcome Summary  Goal: Goal Outcome Summary  Outcome: No Change  Pt POD #1 C5-T1 anterior fusion w/corpectomy, vs ex HTN (w/in parameters), tachy (MDs aware), neuros include: a/o x4, 5/5 throughout w/generalized weakness d/t pain, otherwise intact. R anterior neck incision is LISSETH w/no drainage, swelling and redness (MD notified), PIV running @ 100ml/hr (d/t poor PO intake), voiding spont, no BM, hypoactive bowel sounds, passing a little gas. Pt is up AO1 w/GB and walker, C-collar is ordered to be on @ all times, but pt intermittently refusing d/t \"clausterphobia\" RN provided extensive education, NSG NP came on spoke w/pt, but behavior has yet to change. He went down to his CT, lab called and stated pt positive for staphlococcus aureus but not positive for MRSA (MD notified). Pt is on a regular diet w/poor PO intake d/t pain and difficulty swallowing, no evidence of coughing/clearing throat, but takes pills 1-2 @ time and w/plenty of water. He has refused to get cleaned up, pt is very sweaty, would like to shower tomorrow (if possible), refusing capnography as well, education provided, willing to keep continuous pulse ox. Continue to monitor per MD orders.       "

## 2017-09-12 NOTE — PROGRESS NOTES
09/12/17 1251   Quick Adds   Type of Visit Initial Occupational Therapy Evaluation   Living Environment   Lives With parent(s);sibling(s)  (currently living with family, formally homeless)   Living Arrangements condominium   Home Accessibility bed and bath on same level;grab bars present (toilet)   Number of Stairs to Enter Home 0   Number of Stairs Within Home 0   Living Environment Comment Living enviornment based on father's home   Functional Level Prior   Ambulation 0-->independent   Transferring 0-->independent   Toileting 0-->independent   Bathing 0-->independent   Dressing 0-->independent   Eating 0-->independent   Communication 0-->understands/communicates without difficulty   Swallowing 0-->swallows foods/liquids without difficulty   Cognition 0 - no cognition issues reported   Fall history within last six months no   Which of the above functional risks had a recent onset or change? ambulation;transferring;toileting;bathing;dressing;swallowing   General Information   Onset of Illness/Injury or Date of Surgery - Date 09/09/17   Referring Physician Terence Ortiz MD   Additional Occupational Profile Info/Pertinent History of Current Problem Bc German is a 37 year old male postoperative day # 1 from C5-T1 anterior instrumented fusion and C6/7 corpectomy for vertebral osteomyelitis and epidural abscess causing cervical spinal cord compression.   Precautions/Limitations fall precautions;spinal precautions   Cognitive Status Examination   Orientation orientation to person, place and time   Level of Consciousness alert   Able to Follow Commands WNL/WFL   Personal Safety (Cognitive) WNL/WFL   Visual Perception   Visual Perception No deficits were identified   Range of Motion (ROM)   ROM Comment WFL shoulder flexion, internal/external rotation, elbow flexion, and grasp   Transfer Skill: Sit to Stand   Level of [place]: Sit/Stand stand-by assist   Physical Assist/Nonphysical Assist: Sit/Stand  "supervision;verbal cues   Assistive Device for Transfer: Sit/Stand standard walker   Transfer Skill: Toilet Transfer   Level of [place]: Toilet stand-by assist   Physical Assist/Nonphysical Assist: Toilet supervision   Assistive Device standard walker;grab bars   Balance   Sitting Balance: Static good balance   Sit-to-Stand Balance good balance   Standing Balance: Dynamic good balance   Lower Body Dressing   Level of [place]: Dress Lower Body stand-by assist   Physical Assist/Nonphysical Assist: Dress Lower Body verbal cues;supervision   Toileting   Level of [place]: Toilet stand-by assist   Physical Assist/Nonphysical Assist: Toilet supervision   Assistive Device grab bars;standard walker   Instrumental Activities of Daily Living (IADL)   Previous Responsibilities meal prep;laundry;medication management;finances   Activities of Daily Living Analysis   Impairments Contributing to Impaired Activities of Daily Living balance impaired;pain;post surgical precautions;strength decreased   General Therapy Interventions   Planned Therapy Interventions ADL retraining;IADL retraining;balance training;strengthening   Clinical Impression   Criteria for Skilled Therapeutic Interventions Met yes, treatment indicated   OT Diagnosis decreased IND with ADLs   Influenced by the following impairments pain, medical complexity, decreased activity tolerance   Assessment of Occupational Performance 1-3 Performance Deficits   Identified Performance Deficits bathing, functional transfers, home management    Clinical Decision Making (Complexity) Low complexity   Therapy Frequency daily   Predicted Duration of Therapy Intervention (days/wks) 1 week   Anticipated Equipment Needs at Discharge (TBD)   Anticipated Discharge Disposition Home with Assist   Risks and Benefits of Treatment have been explained. Yes   Patient, Family & other staff in agreement with plan of care Yes   [facility] AM-PAC  \"6 Clicks\" Daily Activity " Inpatient Short Form   1. Putting on and taking off regular lower body clothing? 4 - None   2. Bathing (including washing, rinsing, drying)? 3 - A Little   3. Toileting, which includes using toilet, bedpan or urinal? 3 - A Little   4. Putting on and taking off regular upper body clothing? 4 - None   5. Taking care of personal grooming such as brushing teeth? 4 - None   6. Eating meals? 4 - None   Daily Activity Raw Score (Score out of 24.Lower scores equate to lower levels of function) 22   Total Evaluation Time   Total Evaluation Time (Minutes) 10

## 2017-09-12 NOTE — PLAN OF CARE
Problem: Goal Outcome Summary  Goal: Goal Outcome Summary  Outcome: No Change  POD #2 C5-T1 anterior fusion and corpectomy, drainage of epidural abscess.   Alert oriented x4, calm and cooperative. Able to make needs known and follow all commands. Neuros WNL with strengths 4/5 in BLE. Denies N/T. VSS, continues to refuse SCD's and [place] collar.  and pt agreed to wear collar. Anterior neck incision LISSETH with durmabond, reddened around site. Pain controlled with Dilaudid 4mg po and scheduled Tylenol. Left neck central line and RUE PIV x1 in place. IVF via c-line infusing at TKO without difficulty.  Plan: Continue to monitor pain control and enc increased po intake as pt states he did not eat anything yesterday.

## 2017-09-12 NOTE — PROGRESS NOTES
[facility] [place]    Hospitalist Progress Note    Date of Service (when I saw the patient): 09/12/2017    Assessment & Plan       1. Cervical osteomyelitis with Epidural abscess with compressive phlegmon and cervical myelopathy, s/p C6 and C7 corpectomy, C5-T1 anterior instrumented fusion     - Ho IV drug use, substance abuse, HIV positive  - vertebra tissue and abscess - positive for Staph aureus   - sensi pending      2. Bacteremia     - Staph aureus and Stre vestibularis  9/9 and 9/10  - repeat BC twice,   - cardiology consult for EDUARDO      3. HIV  (136057 (7/24/17) and CD4 782 (7/24/17)     - non compliant in the past, sharing IV needles.   - continue Triumeq   - HIV viral load undetectable, CD4 pending      4. History of osteomyelitis L3 and psoas abscess in the past      5. Hepatitis C       6. History of polysubstance abuse  - heroin, marijuana, alcohol, tobacco       7. Homelessness       8. Depression/anxiety     - seeing a psychiatrist     DVT Prophylaxis: SCDs    Code Status: Full Code    Disposition: Expected discharge in 2 days once infection under better control.    Joseline Mujica MD    Interval History : His balance and weakness are improving. Pian is controlled. No fevers.     -Data reviewed today: I reviewed all new labs and imaging results over the last 24 hours. I personally reviewed no images or EKG's today.    Physical Exam   Temp: 95.5  F (35.3  C) Temp src: Oral BP: 119/78   Heart Rate: 122 Resp: 16 SpO2: 98 % O2 Device: None (Room air)    Vitals:    09/09/17 1732   Weight: 63.5 kg (140 lb)     Vital Signs with Ranges  Temp:  [95.5  F (35.3  C)-99.6  F (37.6  C)] 95.5  F (35.3  C)  Heart Rate:  [107-122] 122  Resp:  [16-18] 16  BP: (119-140)/(70-89) 119/78  SpO2:  [95 %-98 %] 98 %  I/O last 3 completed shifts:  In: 960 [P.O.:480; I.V.:480]  Out: 1000 [Urine:1000]    Constitutional: Awake, alert, cooperative, no apparent distress  Respiratory: Clear to  auscultation bilaterally, no crackles or wheezing  Cardiovascular: Regular rate and rhythm, normal S1 and S2, and no murmur noted  GI: Normal bowel sounds, soft, non-distended, non-tender  Skin/Integumen: No rashes, no cyanosis, no edema    Medications        [START ON 9/13/2017] lidocaine  1-3 patch Transdermal Q24H     sodium chloride (PF)  3 mL Intracatheter Q8H     acetaminophen  975 mg Oral Q8H     senna-docusate  1-2 tablet Oral BID     vancomycin (VANCOCIN) IV  1,250 mg Intravenous Q12H     polyethylene glycol  17 g Oral Daily     pregabalin  300 mg Oral BID     traZODone  50 mg Oral At Bedtime     abacavir-dolutegravir-LamiVUDine  1 tablet Oral Daily     levomilnacipran  80 mg Oral Daily     lidocaine   Transdermal Q24h     lidocaine   Transdermal Q8H       Data     Recent Labs  Lab 09/11/17  1700 09/11/17  0703 09/10/17  1946 09/09/17  1859   WBC  --  10.7 12.7* 12.8*   HGB  --  10.9* 11.7* 12.1*   MCV  --  86 86 86   PLT  --  315 412 375   NA  --  134 137 144   POTASSIUM 3.8 3.3* 3.8 4.0   CHLORIDE  --  101 105 107   CO2  --  28 24 27   BUN  --  12 17 18   CR  --  0.66 0.76 0.93   ANIONGAP  --  5 8 10   SANDRA  --  8.6 8.7 9.7   GLC  --  148* 98 95   ALBUMIN  --   --  3.1* 3.8   PROTTOTAL  --   --  7.8 9.2*   BILITOTAL  --   --  0.3 0.2   ALKPHOS  --   --  88 105   ALT  --   --  87* 107*   AST  --   --  42 42       Recent Results (from the past 24 hour(s))   CT Cervical Spine w/o Contrast    Narrative    CT CERVICAL SPINE W/O CONTRAST 9/11/2017 6:28 PM    Provided History: post-op s/p C5-T1 fusion w/ C6/7 corpectomy; include  down to T3    Comparison: 9/10/2017 and earlier 9/11/2017    Technique: Using multidetector thin collimation helical acquisition  technique, axial, coronal and sagittal CT images through the cervical  spine were obtained without intravenous contrast.     Findings:  Postoperative changes of C6 and C7 corpectomy with anterior approach  spinal fusion hardware involving C5 and T1. Cage  device within the  space previously occupied by C6 and C7. Patency of the spinal canal is  difficult to evaluate given the adjacent metallic artifact. No  radiographic findings to suggest hardware failure. No acute fracture  or subluxation. No significant prevertebral edema. Expected soft  tissue gas anterior to the hardware from C5-T1.      Fluid within the azygous fissure. Lung apices are otherwise  unremarkable. Mucosal debris within the trachea. No abnormality of the  paraspinous soft tissues.      Impression    Impression:   New postoperative changes of C6-7 corpectomy with cage  device and anterior approach spinal fusion procedure C5 through T1.    I have personally reviewed the examination and initial interpretation  and I agree with the findings.    JULY AMEZCUA MD

## 2017-09-12 NOTE — CONSULTS
ADDENDUM:  As I was editing my consult, it occurred to me that there may be a better option than trazodone for sleep  cyproheptadine 4 mg; 1-2 HS may help with PTSD related nightmares.   Another option (with more data) is a trial of prazosin 1 mg at bedtime for 3 nights, then to 2-3 as tolerated. Although this is not as sedating as cyproheptadine   page me at [phone] as needed

## 2017-09-12 NOTE — PLAN OF CARE
Problem: Goal Outcome Summary  Goal: Goal Outcome Summary  PT 6A: Pt amb 100 & 250 feet with FWW CGA with improved gait mechanics. Pt completed 3 stairs x3 CGA with step-to pattern using strong R LE. Pt improving independence with functional mobility but continues to have balance and strength deficits, as well as impulsivity limiting total independence . Reminded pt of cervical precautions and discussed safety with all movement.     Rec: Home with family assist and Outpatient PT for strengthening, gait, and balance.
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
## 2017-09-12 NOTE — PROGRESS NOTES
Patient: Bc German  Date of Service: September 12, 2017 Admission Date:9/9/2017   2 Days Post-Op     Chief Pain Endorsement: neck incisional pain    Recommendations were discussed and relayed to Dr. Mujica  Plan was reviewed by the Inpatient Pain Service and staff attending, Dr. Nix      1. Change hydromorphone to 2-4 mg orally Q 3 hours PRN    Plan to taper off completely prior to discharge and start Suboxone per Psych  2. Continue Lyrica 300 mg orally BID  3. Continue acetaminophen 975 mg orally Q 8 hours  4. Discontinue acetaminophen 650 mg Q 4 hours PRN order  5. Increase Lidoderm 5% patches to 1-3 patch(es) transdermal every 24 hours (12 hours on and 12 hours off). Place around and not over incision  6. Chemical dependency, anxiety, and depression management per Psych  7. Bowel regimen per primary team to prevent opioid induced constipation.    Pain Service will Continue to follow at this time.     Thank you for consulting the Inpatient Pain Management Service. The above recommendations are to be acted upon at the primary team s discretion.     To reach us:  Mon - Friday 8 AM - 3 PM: Pager [phone] (Text Page)  After hours, weekends and holidays: Primary service should call [phone] for the on-call pain specialist        1. S/p C5-T1 anterior instrumentated fusion and C6/C7 corpectomy for vertebral osteomyelitis and epidural abscess causing cervical spinal cord compression  2. History of HIV infection  3. History of MDD and anxiety  4. History of IV drug abuse (heroin), occasional methamphetamine and cannabis use    -- Outpatient opioid requirements prior to admission: None. IV heroin use    Primary Care Provider: Damir Cinseros  Chronic Pain Provider: None    Interval History:  Bc German was seen today (September 12, 2017) and he reports that his pain was well controlled overnight and was able to sleep sitting up without his C-collar. C-collar causes him significant claustrophobia  and anxiety. He also reports bilateral numbness and tingling in his fingers and toes, which have improved since restarting his home Lyrica.      His last bowel movement was 3 days ago and it was Normal.     CAPA (Clinically Aligned Pain Assessment):    Comfort (How is your pain?): Tolerable with discomfort and Comfortably manageable  Change in Pain (Since your last medication/intervention?): Getting worse  Pain Control (How are your pain treatments working?):  Partially effective control  Functioning (Are you able to do activities to get better?) : Can do most things, but pain gets in the way of some   Sleep (Does your pain management allow you to sleep or rest?): Awake with occasional pain      FUNCTIONAL STATUS:  Change:      improving  Oral intake:     Regular (poor intake due to difficulty swallowing. Able to tolerate oral medications okay)  Activity level:     Ambulating in darling  Mood:      anxiety and adjusting to situation     -- Inpatient Medications Related to Pain Management:   Medications related to Pain Management (Future)    Start     Dose/Rate Route Frequency Ordered Stop    09/11/17 2000  pregabalin (LYRICA) capsule 300 mg      300 mg Oral 2 TIMES DAILY 09/11/17 1634      09/11/17 1645  polyethylene glycol (MIRALAX/GLYCOLAX) Packet 17 g      17 g Oral DAILY 09/11/17 1631      09/11/17 1631  HYDROmorphone (DILAUDID) tablet 2-4 mg      2-4 mg Oral EVERY 2 HOURS PRN 09/11/17 1634      09/11/17 0800  senna-docusate (SENOKOT-S;PERICOLACE) 8.6-50 MG per tablet 1-2 tablet      1-2 tablet Oral 2 TIMES DAILY 09/11/17 0218      09/11/17 0230  acetaminophen (TYLENOL) tablet 975 mg      975 mg Oral EVERY 8 HOURS 09/11/17 0218 09/14/17 0229    09/10/17 1145  lidocaine (LIDODERM) 5 % Patch 1 patch      1 patch Transdermal EVERY 24 HOURS 0800 09/10/17 1131      09/10/17 1145  lidocaine (LIDODERM) patch in PLACE       Transdermal EVERY 8 HOURS 09/10/17 1131      09/10/17 0201  acetaminophen (TYLENOL) tablet 650 mg       650 mg Oral EVERY 4 HOURS PRN 09/10/17 0201            LAB DATA:    Recent Labs  Lab 09/11/17  0703 09/10/17  1946 09/09/17  1859   CR 0.66 0.76 0.93   WBC 10.7 12.7* 12.8*   HGB 10.9* 11.7* 12.1*   AST  --  42 42   ALT  --  87* 107*         ----------------------------------------------------------------------------------  Conchis Flores [facility]  Inpatient Pain Service     To reach us:  Mon - Friday 8 AM - 3 PM: Pager [phone] (Text Page)  After hours, weekends and holidays: Primary service should call [phone] for the on-call pain specialist    Helpful Resources:  Getting Rid of Unwanted Medications (printable PDF for patients)   Opioid Overdose Prevention Toolkit (printable PDF for patients)

## 2017-09-12 NOTE — PLAN OF CARE
Problem: Goal Outcome Summary  Goal: Goal Outcome Summary  Outcome: Improving  Pt is A&O X4. VS WNL. POD #2 C5-T1 anterior fusion and corpectomy. Neck incision approximated, LISSETH. Brace on all the time, so far pt is compliant with brace. Pain managed with Dilaudid PO and Tylenol. On a regular diet,  good appetite. Transfer SBA, ambulated and sat in the chair. Bathroom voiding spontaneously and last BM 3 days ago, senna and miralax given with no result yet. Calm and cooperative. When medically stable, pt will be D/C to rehab. Triple lumen TKO

## 2017-09-12 NOTE — PROGRESS NOTES
"  [facility] ID SERVICE: PROGRESS NOTE  Bc German : 1980 Sex: male:   Medical record number [id number] Attending Physician: Joseline Mujica*  Date of Service: 2017         Assessment and Recommendations:   ASSESSMENT:  1. Cervical osteomyelitis with Epidural abscess with compressive phlegmon and cervical myelopathy. -   - IV drug use   - s/p C6 and C7 corpectomy  - C5-T1 anterior instrumented fusion   - vertebra tissue and abscess - positive for Staph aureus . sensi pending     2. Bacteremia   - Staph aureus and Stre vestibularis   and 9/10     3. HIV  (736821 (17) and CD4 782 (17)   - admits to missing a few doses , non compliant, sharing IV needles.   - strongly advised him to be compliant with HIV medication   - continue Triumeq   - recheck HIV, CD4      4. History of osteomyelitis L3 and psoas abscess     5. Hepatitis C      6. History of polysubstance abuse  - heroin, marijuana, alcohol, tobacco      7. Homelessness      8. Depression/anxiety   - seeing a psychiatrist      RECOMMENDATION:  1. Continue  IV Vancomycin.   2. Repeat 2 blood cultures today . Follow-up on blood cultures.   3. Recommend EDUARDO to evaluate for endocarditis   4. Social service consult     Plan discussed with patient     ID will continue to follow.   Suyz Roblero MD, M.Med.Sc.  Pager: [phone]     SUBJECTIVE:  Feels better, neck pain 6/10. Dexterity is better. No fever, chills, diarrhea. Appetite is ok. Breathing is fine.    A five-point review of systems was obtained and was negative with the exception of that which is described above.  Allergies   Allergen Reactions     Doxycycline GI Disturbance     CURRENT ANTI-INFECTIVES:   IV Vancomycin      EXAMINATION: (Recommend ? 5 systems)   Vital Signs: /78 (BP Location: Right arm)  Pulse 102  Temp 95.5  F (35.3  C) (Oral)  Resp 16  Ht 1.803 m (5' 11\")  Wt 63.5 kg (140 lb)  SpO2 98%  BMI 19.53 kg/m2   GENERAL:  Sitting in chair, " cervical collar, alert, oriented   HEENT:  Head is normocephalic, atraumatic , EOM intact NATALIA  EYES:  Eyes have anicteric sclerae without conjunctival injection or stigmata of endocarditis.    ENT:  Oropharynx is moist without exudates or ulcers. Tongue is midline  NECK:  Cervical collar +  LUNGS:  Clear to auscultation bilateral.   CARDIOVASCULAR:  Regular rate and rhythm with no murmurs, gallops or rubs.  ABDOMEN:  Normal bowel sounds, soft, nontender. No appreciable hepatosplenomegaly  SKIN:  No acute rashes.   No stigmata of endocarditis. Tattoos+   NEUROLOGIC:  Normal speech, moving all extremities dexterity - improving . Right handed   Extremities: no edema     NEW DATA/RESULTS:     Culture Micro   Date Value Ref Range Status   09/10/2017 Culture negative after 1 day  Preliminary   09/10/2017 Moderate growth  Staphylococcus aureus   (A)  Preliminary   09/10/2017 Susceptibility testing in progress  Preliminary   09/10/2017 PENDING  Preliminary   09/10/2017 Moderate growth  Staphylococcus aureus   (A)  Preliminary   09/10/2017 Susceptibility testing in progress  Preliminary   09/10/2017 PENDING  Preliminary   09/10/2017 Culture negative after 1 day  Preliminary       Recent Labs   Lab Test  09/10/17   1946  03/23/15   0837  03/19/15   0640  03/17/15   0748  03/16/15   0707  03/14/15   0905   CRP  47.0*  53.0*  50.0*  71.0*  82.0*  95.0*     Recent Labs   Lab Test  09/11/17   0703  09/10/17   1946  09/09/17   1859  08/10/17   2323  07/24/17   1154  04/20/17   1140   WBC  10.7  12.7*  12.8*  18.3*  15.2*  8.1     Recent Labs   Lab Test  09/11/17   0703  09/10/17   1946 09/09/17   1859  08/10/17   2315   CR  0.66  0.76  0.93  0.86   GFRESTIMATED  >90  >90  >90  >90  Non African American GFR Calc         Hematology Studies  Recent Labs   Lab Test  09/11/17   0703  09/10/17   1946  09/09/17   1859  08/10/17   2323  07/24/17   1154  04/20/17   1140  02/24/17   1336   WBC  10.7  12.7*  12.8*  18.3*  15.2*  8.1  7.6    ANEU  8.3   --   7.9  15.3*  10.7*  5.7  3.9   AEOS  0.1   --   0.1  0.6  0.0  0.2  0.2   HCT  34.2*  36.1*  37.6*  35.6*  45.2  41.4  46.6   PLT  315  412  375  384  386  397  276       Metabolic  Recent Labs   Lab Test  09/11/17   0703  09/10/17   1946  09/09/17   1859   NA  134  137  144   BUN  12  17  18   CO2  28  24  27   CR  0.66  0.76  0.93   GFRESTIMATED  >90  >90  >90       Hepatic Studies  Recent Labs   Lab Test  09/10/17   1946  09/09/17   1859  08/10/17   2315   BILITOTAL  0.3  0.2  0.2   ALKPHOS  88  105  113   ALBUMIN  3.1*  3.8  2.9*   AST  42  42  58*   ALT  87*  107*  80*     Immunologlobulins  Recent Labs   Lab Test  09/10/17   1946  03/09/15   0536  03/08/15   1235   SED  46*  72*  63*     Imaging:  CT cervical spine 9/10/17  . Soft tissue thickening of the prevertebral space at the C6-7 level with erosion of the endplates. Findings are better demonstrated on MR earlier 9/10/2017 and concerning for discitis/osteomyelitis.  2. No definite fracture identified.      [Urgent Result: Fracture of the right C6 lamina]    After staff reviewed the images, it was felt that there was no fracture of the right C6 lamina and rather there was volume averaging with the C7 lamina giving the impression of a pseudofracture. The image in question is series 3, image 45.     Finding was identified on 9/10/2017 6:29 PM.      MRI brain w,wo contrast 9/9/17   IMPRESSION: Normal MRI of the brain.     MRA brain wo contrast 9/9/17  IMPRESSION:  Normal MR angiogram of the head.     MRI cervcial spine 9/9/17   IMPRESSION:  1. Pathologic process C6-C7 and intervening disc space and anterior epidural involvement. This results in effacement of the spinal cord at  this level.  2.  [Critical Result: Probable disc space infection with epidural effacement of the spinal cord at C6-C7.]  Finding was identified on 9/10/2017 2:01 PM.      MRI thoracic spine 9/10/17   Thoracic spine MR is normal. Vertebral bodies are normal. Disc  spaces are normal. Alignment is normal. Thoracic spinal cord  appears normal with the conus at T12-L1.    IMPRESSION: Normal MR scan of the thoracic spine including gadolinium-enhanced study.

## 2017-09-12 NOTE — PROGRESS NOTES
[facility], [place]   Neurosurgery Progress Note:    Assessment: Bc German is a 37 year old male postoperative day # 1 from C5-T1 anterior instrumented fusion and C6/7 corpectomy for vertebral osteomyelitis and epidural abscess causing cervical spinal cord compression.     Plan:  - Serial neuro exams  - Appreciate pain and psychiatry recommendations   - Trazodone HS   - Post-op upright cervical Xrays and CT C/T-spine completed  -  on at all times   - Vanc for S. Aureus positive cultures form vertebral body and epidural phlegmon   - PTA anti-retrovirals   - Regular diet   - Bowel regimen  - PRN antiemetics  - IVF until taking adequate PO  - PT/OT  - SCDs for DVT ppx  - Transfer back to Medicine team today   - Dispo: Stable on 6A pending therapy evaluation     Discussed with Neurosurgery chief, who agrees.    Interval History: intermittent neck pain improved with pain medication.  Refuses to wear  as it provokes his claustrophobia.     Objective:   Temp:  [97.2  F (36.2  C)-99.6  F (37.6  C)] 97.2  F (36.2  C)  Heart Rate:  [] 109  Resp:  [14-18] 18  BP: (128-149)/(85-95) 128/89  SpO2:  [95 %-99 %] 96 %  I/O last 3 completed shifts:  In: 630 [I.V.:630]  Out: 2425 [Urine:2425]    Gen: mild discomfort, cervical collar in place   Wound: right neck incision c/d/i, no significant erythema, small brittany-incisional indurated mass  Neurologic:  - Alert & Oriented to person, place, time, and situation  - Follows commands briskly  - Speech fluent, spontaneous. No aphasia or dysarthria.  - No gaze preference. No apparent hemineglect.  - PERRL, EOMI  - Strong eye closure, jaw clench, and cheek puff  - Face symmetric with sensation intact to light touch  - Palate elevates symmetrically, uvula midline, tongue protrudes midline  - Trapezii and sternocleidomastoid muscles 5/5 bilaterally     Del Tr Bi WE WF Gr   R 5 4- 5 5 5 5   L 5 4- 5 5 5 5    HF KE KF DF PF EHL   R 5 5 5 5 5 5   L 5 5  5 5 5 5     Reflexes 2+ throughout and 3-4 beats of clonus in LLE, upgoing toes b/l   Sensation intact and symmetric to light touch throughout    Please contact neurosurgery resident on call with questions.    Dial * [phone], enter [phone] when prompted.

## 2017-09-13 ENCOUNTER — APPOINTMENT (OUTPATIENT)
Dept: CARDIOLOGY | Facility: CLINIC | Age: 37
DRG: 471 | End: 2017-09-13
Attending: HOSPITALIST
Payer: COMMERCIAL

## 2017-09-13 ENCOUNTER — APPOINTMENT (OUTPATIENT)
Dept: OCCUPATIONAL THERAPY | Facility: CLINIC | Age: 37
DRG: 471 | End: 2017-09-13
Attending: INTERNAL MEDICINE
Payer: COMMERCIAL

## 2017-09-13 ENCOUNTER — APPOINTMENT (OUTPATIENT)
Dept: SPEECH THERAPY | Facility: CLINIC | Age: 37
DRG: 471 | End: 2017-09-13
Attending: HOSPITALIST
Payer: COMMERCIAL

## 2017-09-13 ENCOUNTER — APPOINTMENT (OUTPATIENT)
Dept: PHYSICAL THERAPY | Facility: CLINIC | Age: 37
DRG: 471 | End: 2017-09-13
Attending: INTERNAL MEDICINE
Payer: COMMERCIAL

## 2017-09-13 LAB
ACID FAST STN SPEC QL: NORMAL
ANION GAP SERPL CALCULATED.3IONS-SCNC: 5 MMOL/L (ref 3–14)
BACTERIA SPEC CULT: ABNORMAL
BACTERIA SPEC CULT: ABNORMAL
BUN SERPL-MCNC: 9 MG/DL (ref 7–30)
CALCIUM SERPL-MCNC: 9.7 MG/DL (ref 8.5–10.1)
CHLORIDE SERPL-SCNC: 100 MMOL/L (ref 94–109)
CO2 SERPL-SCNC: 30 MMOL/L (ref 20–32)
CREAT SERPL-MCNC: 0.8 MG/DL (ref 0.66–1.25)
ERYTHROCYTE [DISTWIDTH] IN BLOOD BY AUTOMATED COUNT: 15.6 % (ref 10–15)
GFR SERPL CREATININE-BSD FRML MDRD: >90 ML/MIN/1.7M2
GLUCOSE SERPL-MCNC: 115 MG/DL (ref 70–99)
HCT VFR BLD AUTO: 36.2 % (ref 40–53)
HGB BLD-MCNC: 11.2 G/DL (ref 13.3–17.7)
LACTATE BLD-SCNC: 0.7 MMOL/L (ref 0.7–2)
MAGNESIUM SERPL-MCNC: 1.9 MG/DL (ref 1.6–2.3)
MCH RBC QN AUTO: 27.3 PG (ref 26.5–33)
MCHC RBC AUTO-ENTMCNC: 30.9 G/DL (ref 31.5–36.5)
MCV RBC AUTO: 88 FL (ref 78–100)
PLATELET # BLD AUTO: 363 10E9/L (ref 150–450)
POTASSIUM SERPL-SCNC: 3.4 MMOL/L (ref 3.4–5.3)
RBC # BLD AUTO: 4.1 10E12/L (ref 4.4–5.9)
SODIUM SERPL-SCNC: 135 MMOL/L (ref 133–144)
SPECIMEN SOURCE: ABNORMAL
SPECIMEN SOURCE: ABNORMAL
SPECIMEN SOURCE: NORMAL
SPECIMEN SOURCE: NORMAL
VANCOMYCIN SERPL-MCNC: 9.6 MG/L
WBC # BLD AUTO: 12.4 10E9/L (ref 4–11)

## 2017-09-13 PROCEDURE — 93005 ELECTROCARDIOGRAM TRACING: CPT

## 2017-09-13 PROCEDURE — 36592 COLLECT BLOOD FROM PICC: CPT

## 2017-09-13 PROCEDURE — 93306 TTE W/DOPPLER COMPLETE: CPT

## 2017-09-13 PROCEDURE — 25000128 H RX IP 250 OP 636: Performed by: NEUROLOGICAL SURGERY

## 2017-09-13 PROCEDURE — 85027 COMPLETE CBC AUTOMATED: CPT | Performed by: HOSPITALIST

## 2017-09-13 PROCEDURE — 97110 THERAPEUTIC EXERCISES: CPT | Mod: GP

## 2017-09-13 PROCEDURE — 25000132 ZZH RX MED GY IP 250 OP 250 PS 637: Performed by: STUDENT IN AN ORGANIZED HEALTH CARE EDUCATION/TRAINING PROGRAM

## 2017-09-13 PROCEDURE — 93010 ELECTROCARDIOGRAM REPORT: CPT | Performed by: INTERNAL MEDICINE

## 2017-09-13 PROCEDURE — 83605 ASSAY OF LACTIC ACID: CPT | Performed by: INTERNAL MEDICINE

## 2017-09-13 PROCEDURE — 97535 SELF CARE MNGMENT TRAINING: CPT | Mod: GO | Performed by: OCCUPATIONAL THERAPIST

## 2017-09-13 PROCEDURE — 97116 GAIT TRAINING THERAPY: CPT | Mod: GP

## 2017-09-13 PROCEDURE — 83735 ASSAY OF MAGNESIUM: CPT | Performed by: HOSPITALIST

## 2017-09-13 PROCEDURE — 99232 SBSQ HOSP IP/OBS MODERATE 35: CPT | Performed by: HOSPITALIST

## 2017-09-13 PROCEDURE — 97530 THERAPEUTIC ACTIVITIES: CPT | Mod: GP

## 2017-09-13 PROCEDURE — 40000193 ZZH STATISTIC PT WARD VISIT

## 2017-09-13 PROCEDURE — 80202 ASSAY OF VANCOMYCIN: CPT | Performed by: HOSPITALIST

## 2017-09-13 PROCEDURE — 99207 ZZC NO CHARGE FOLLOW UP PS: CPT

## 2017-09-13 PROCEDURE — 36592 COLLECT BLOOD FROM PICC: CPT | Performed by: HOSPITALIST

## 2017-09-13 PROCEDURE — 40000225 ZZH STATISTIC SLP WARD VISIT

## 2017-09-13 PROCEDURE — 83605 ASSAY OF LACTIC ACID: CPT

## 2017-09-13 PROCEDURE — 40000133 ZZH STATISTIC OT WARD VISIT: Performed by: OCCUPATIONAL THERAPIST

## 2017-09-13 PROCEDURE — 25000132 ZZH RX MED GY IP 250 OP 250 PS 637: Performed by: HOSPITALIST

## 2017-09-13 PROCEDURE — 12000008 ZZH R&B INTERMEDIATE UMMC

## 2017-09-13 PROCEDURE — 93306 TTE W/DOPPLER COMPLETE: CPT | Mod: 26 | Performed by: INTERNAL MEDICINE

## 2017-09-13 PROCEDURE — 80048 BASIC METABOLIC PNL TOTAL CA: CPT | Performed by: HOSPITALIST

## 2017-09-13 PROCEDURE — 25000128 H RX IP 250 OP 636: Performed by: HOSPITALIST

## 2017-09-13 PROCEDURE — 25000132 ZZH RX MED GY IP 250 OP 250 PS 637: Performed by: NURSE PRACTITIONER

## 2017-09-13 PROCEDURE — 92526 ORAL FUNCTION THERAPY: CPT | Mod: GN

## 2017-09-13 PROCEDURE — 92610 EVALUATE SWALLOWING FUNCTION: CPT | Mod: GN

## 2017-09-13 RX ORDER — CEFAZOLIN SODIUM 2 G/100ML
2 INJECTION, SOLUTION INTRAVENOUS EVERY 8 HOURS
Status: DISCONTINUED | OUTPATIENT
Start: 2017-09-13 | End: 2017-09-21 | Stop reason: HOSPADM

## 2017-09-13 RX ORDER — VANCOMYCIN HYDROCHLORIDE 1 G/200ML
1000 INJECTION, SOLUTION INTRAVENOUS EVERY 8 HOURS
Status: DISCONTINUED | OUTPATIENT
Start: 2017-09-13 | End: 2017-09-13

## 2017-09-13 RX ADMIN — ACETAMINOPHEN 975 MG: 325 TABLET ORAL at 08:35

## 2017-09-13 RX ADMIN — HYDROMORPHONE HYDROCHLORIDE 2 MG: 2 TABLET ORAL at 22:48

## 2017-09-13 RX ADMIN — LIDOCAINE 2 PATCH: 50 PATCH CUTANEOUS at 08:36

## 2017-09-13 RX ADMIN — HYDROMORPHONE HYDROCHLORIDE 4 MG: 2 TABLET ORAL at 16:39

## 2017-09-13 RX ADMIN — CEFAZOLIN SODIUM 2 G: 2 INJECTION, SOLUTION INTRAVENOUS at 16:26

## 2017-09-13 RX ADMIN — SENNOSIDES AND DOCUSATE SODIUM 2 TABLET: 8.6; 5 TABLET ORAL at 08:31

## 2017-09-13 RX ADMIN — PREGABALIN 300 MG: 75 CAPSULE ORAL at 20:54

## 2017-09-13 RX ADMIN — TRAZODONE HYDROCHLORIDE 50 MG: 50 TABLET ORAL at 22:48

## 2017-09-13 RX ADMIN — ABACAVIR SULFATE, DOLUTEGRAVIR SODIUM, LAMIVUDINE 1 TABLET: 600; 50; 300 TABLET, FILM COATED ORAL at 08:31

## 2017-09-13 RX ADMIN — ACETAMINOPHEN 975 MG: 325 TABLET ORAL at 17:54

## 2017-09-13 RX ADMIN — CEFAZOLIN SODIUM 2 G: 2 INJECTION, SOLUTION INTRAVENOUS at 22:44

## 2017-09-13 RX ADMIN — HYDROMORPHONE HYDROCHLORIDE 2 MG: 2 TABLET ORAL at 20:54

## 2017-09-13 RX ADMIN — SENNOSIDES AND DOCUSATE SODIUM 2 TABLET: 8.6; 5 TABLET ORAL at 20:54

## 2017-09-13 RX ADMIN — PREGABALIN 300 MG: 75 CAPSULE ORAL at 08:34

## 2017-09-13 RX ADMIN — HYDROMORPHONE HYDROCHLORIDE 2 MG: 2 TABLET ORAL at 11:48

## 2017-09-13 RX ADMIN — VANCOMYCIN HYDROCHLORIDE 1250 MG: 10 INJECTION, POWDER, LYOPHILIZED, FOR SOLUTION INTRAVENOUS at 11:44

## 2017-09-13 RX ADMIN — LEVOMILNACIPRAN HYDROCHLORIDE 80 MG: 80 CAPSULE, EXTENDED RELEASE ORAL at 08:31

## 2017-09-13 ASSESSMENT — VISUAL ACUITY
OU: NORMAL ACUITY

## 2017-09-13 NOTE — PHARMACY-VANCOMYCIN DOSING SERVICE
Pharmacy Vancomycin Note  Date of Service 2017  Patient's  1980   37 year old, male    Indication: Osteomyelitis/Cervical Spine Osteomyelitis  Goal Trough Level: 15-20 mg/L  Day of Therapy: 3  Current Vancomycin regimen:  1250 mg IV q12h    Current estimated CrCl = Estimated Creatinine Clearance: 115.8 mL/min (based on Cr of 0.8).    Creatinine for last 3 days  9/10/2017:  7:46 PM Creatinine 0.76 mg/dL  2017:  7:03 AM Creatinine 0.66 mg/dL  2017:  9:17 AM Creatinine 0.80 mg/dL    Recent Vancomycin Levels (past 3 days)  2017: 10:10 AM Vancomycin Level 9.6 mg/L (11.4 hr trough)    Vancomycin IV Administrations (past 72 hours)                   vancomycin (VANCOCIN) 1,250 mg in NaCl 0.9 % 250 mL intermittent infusion (mg) 1,250 mg New Bag 17 1144     1,250 mg New Bag 17 2248     1,250 mg New Bag  1055     1,250 mg New Bag 17 2218     1,250 mg New Bag  1201                Nephrotoxins and other renal medications     None             Contrast Orders - past 72 hours     None          Interpretation of levels and current regimen:  Trough level is  Subtherapeutic.    Has serum creatinine changed > 50% in last 72 hours: No    Urine output:  good urine output    Renal Function: Stable    Plan:  1.  Per ID recs, will d/c vancomycin and start Ancef 2 g IV every 8 hours for MSSA.  2.  Pharmacy will not follow vancomycin levels any longer upon d/c of vancomycin therapy.      Jonnie Obrien, PharmD, BCPS  2017            .

## 2017-09-13 NOTE — PLAN OF CARE
Problem: Goal Outcome Summary  Goal: Goal Outcome Summary  OT 6A: Educated pt on spinal precautions, safe home set up and energy conservation strategies to ensure safety with ADLs. Pt demonstrating good insight into current limitations and need for assist.     REC: Home with assist as needed for higher level ADLs

## 2017-09-13 NOTE — PLAN OF CARE
Problem: Goal Outcome Summary  Goal: Goal Outcome Summary  Outcome: No Change  POD #3 C5-T1 anterior fusion and corpectomy, drainage of epidural abscess.   Alert oriented x4, annoyed with staff and refused neuros tonight. Able to make needs known and follow all commands. Neuros WNL with strengths 4/5 in BLE. Reports tingling in B fingertips. VSS, wearing Vista collar. Anterior neck incision LISSETH with durmabond, reddened around site. Pain controlled with Dilaudid 4mg po and scheduled Tylenol. Left neck central line and RUE PIV x1 in place. IVF via c-line infusing at TKO without difficulty.  Plan: Continue to monitor.

## 2017-09-13 NOTE — PROGRESS NOTES
[facility], [place]    Hospitalist Progress Note    Date of Service (when I saw the patient): 09/13/2017    Assessment & Plan       1. Cervical osteomyelitis with Epidural abscess with compressive phlegmon and cervical myelopathy, s/p C6 and C7 corpectomy, C5-T1 anterior instrumented fusion     - Ho IV drug use, substance abuse, HIV positive  - vertebra tissue and abscess - positive for Staph aureus   - Its MSSA. Blood culture grew Strep  Vestibularis too.   - PT, OT, has brace in place     2. Bacteremia     - Staph aureus and Stre vestibularis  9/9 and 9/10  - FU repeat BC twice,   - cardiology consult for EDUARDO  - Neurosurgery was okay for him to do neck flexion to a degree required for intubation per report from the nursing. I paged Dr Miguel Ortiz but I did not hear from him directly.   - FU Surface ECHO  - He has Left IJ central line.   - Home Ab infusion may not accept him with PICC line.     3. HIV  (241667 (7/24/17) and CD4 782 (7/24/17)     - non compliant in the past, sharing IV needles.   - continue Triumeq   - HIV viral load undetectable, CD4 pending      4. History of osteomyelitis L3 and psoas abscess in the past      5. Hepatitis C       6. History of polysubstance abuse  - heroin, marijuana, alcohol, tobacco   - plan tos start suboxone for treatment.       7. Homelessness        8. Depression/anxiety     - seeing a psychiatrist     9. Tachycardia is chronic in nature. He is making good urine. BP is good.He is not in sepsis. No need to draw a LA      DVT Prophylaxis: SCDs    Code Status: Full Code    Disposition: Expected discharge in 2 days once infection under better control.    Joseline Mujica MD    Interval History :  No fevers. Pain is controlled. Ataxia is better.      -Data reviewed today: I reviewed all new labs and imaging results over the last 24 hours. I personally reviewed no images or EKG's today.    Physical Exam   Temp: 97.5  F (36.4  C) Temp src: Oral BP:  133/88   Heart Rate: 118 Resp: 16 SpO2: 95 % O2 Device: None (Room air)    Vitals:    09/09/17 1732   Weight: 63.5 kg (140 lb)     Vital Signs with Ranges  Temp:  [95.5  F (35.3  C)-99.6  F (37.6  C)] 97.5  F (36.4  C)  Heart Rate:  [109-130] 118  Resp:  [16] 16  BP: (119-133)/(73-88) 133/88  SpO2:  [95 %-98 %] 95 %  I/O last 3 completed shifts:  In: 960 [P.O.:960]  Out: 3050 [Urine:3050]    Constitutional: Awake, alert, cooperative, no apparent distress  Respiratory: Clear to auscultation bilaterally, no crackles or wheezing  Cardiovascular: Regular rate and rhythm, normal S1 and S2, and no murmur noted  GI: Normal bowel sounds, soft, non-distended, non-tender  Skin/Integumen: No rashes, no cyanosis, no edema    Medications        lidocaine  1-3 patch Transdermal Q24H     sodium chloride (PF)  3 mL Intracatheter Q8H     acetaminophen  975 mg Oral Q8H     senna-docusate  1-2 tablet Oral BID     vancomycin (VANCOCIN) IV  1,250 mg Intravenous Q12H     polyethylene glycol  17 g Oral Daily     pregabalin  300 mg Oral BID     traZODone  50 mg Oral At Bedtime     abacavir-dolutegravir-LamiVUDine  1 tablet Oral Daily     levomilnacipran  80 mg Oral Daily     lidocaine   Transdermal Q24h     lidocaine   Transdermal Q8H       Data     Recent Labs  Lab 09/13/17  0917 09/11/17  1700 09/11/17  0703 09/10/17  1946 09/09/17  1859   WBC 12.4*  --  10.7 12.7* 12.8*   HGB 11.2*  --  10.9* 11.7* 12.1*   MCV 88  --  86 86 86     --  315 412 375     --  134 137 144   POTASSIUM 3.4 3.8 3.3* 3.8 4.0   CHLORIDE 100  --  101 105 107   CO2 30  --  28 24 27   BUN 9  --  12 17 18   CR 0.80  --  0.66 0.76 0.93   ANIONGAP 5  --  5 8 10   SANDRA 9.7  --  8.6 8.7 9.7   *  --  148* 98 95   ALBUMIN  --   --   --  3.1* 3.8   PROTTOTAL  --   --   --  7.8 9.2*   BILITOTAL  --   --   --  0.3 0.2   ALKPHOS  --   --   --  88 105   ALT  --   --   --  87* 107*   AST  --   --   --  42 42       No results found for this or any previous visit  (from the past 24 hour(s)).

## 2017-09-13 NOTE — PLAN OF CARE
Problem: Goal Outcome Summary  Goal: Goal Outcome Summary  PT 6A: Pt functional mobility improving but continues to display strength and balance deficits, as well as impulsivity that limits his ability to be safe with independent mobility. Pt amb 120 feet with FWW CGA. Pt amb 280 feet w/o AD and Min Assist of 1 for balance. Completed standing LE exercise in gym, sit-to-stand x10, and navigated 3 stairs w/ L railing with CGA. Pt should use FWW when up walking with nursing.     Rec: Home with assist (24 hour supervision) from family and Outpatient PT services

## 2017-09-13 NOTE — PLAN OF CARE
Problem: Goal Outcome Summary  Goal: Goal Outcome Summary  Clinical swallow evaluation completed per MD order. Pt presents with minimal pharyngeal dysphagia in the setting of recent C5-T1 anterior instrumented fusion and C6/7 corpectomy. Pt initially demonstrating s/sx of aspiration with large, subsequent sips of thin liquids. following cues to take small, single sips of thin liquids tolerated without overt s/sx of aspiration. No difficulties observed with purees or solid textures, though Pt reports that chewing more advanced textures is difficult due to some missing dentition. Recommend continue regular diet with thin liquids as tolerated. Pt to sit upright for all PO intake, take small bites/sips and alternate consistencies. Pt is able to independently implement trained strategies. No ongoing ST needs indicated at this time. Will sign off

## 2017-09-13 NOTE — PLAN OF CARE
Problem: Goal Outcome Summary  Goal: Goal Outcome Summary  POD#3 C5   T1 anterior fusion and corpectomy, drainage of epidural abscess. Vista collar on at all times. AxOx4. Neuros intact, strengths 4-5/5 in BLE; reports tingling in bilat fingertips at times. VSS, except HR tachy. Pt c/o shoulder, and neck pain relieved w/oral oxy and Tylenol.  Voiding spont. No BM. Anterior neck incision LISSETH w/durmabond, reddened around site. L Neck Triple Lumen Central Line, dressing changed this evening; running TKO. Up SBA + gb. Continue to monitor and follow POC

## 2017-09-13 NOTE — PLAN OF CARE
Problem: Goal Outcome Summary  Goal: Goal Outcome Summary  Outcome: No Change  POD# 2 C5-T1 anterior fusion and corpectomy. VSS. A&Ox4. Neuros include generalized weakness, numbness and tingling in fingertips at baseline and in toes intermittently. Neck incision is approximated, LISSETH, CDI. Pt is to wear cervical collar at all times, pt has been agreeable to wear. Pain in throat and neck controlled with PRN dilaudid PO and tylenol. Central line saline locked. On regular diet with fair appetite, pt reports it hurts to swallow. Voiding spontaneously via bedside urinal, no BM this shift. Up with A1, GB, and walker. Worked with PT this shift, also took walk in halls x1. Uses call light appropriately. Will continue to monitor and follow POC.     Orders in for pt to be NPO at midnight.

## 2017-09-13 NOTE — PROGRESS NOTES
[facility] ID SERVICE: PROGRESS NOTE  Bc German : 1980 Sex: male:   Medical record number [id number] Attending Physician: Joseline Mujica*  Date of Service: 2017         Assessment and Recommendations:   ASSESSMENT:  1. Cervical osteomyelitis with Epidural abscess with compressive phlegmon and cervical myelopathy. -   - IV drug use   - s/p C6 and C7 corpectomy - C5-T1 anterior instrumented fusion (9/10/17)   - vertebra tissue and abscess - positive for Staph aureus MSSA    2. Bacteremia   - Staph aureus and Strep vestibularis   and 9/10  - repeat blood cx  - negative x 2 so far      3. HIV  (531772 (17) and CD4 782 (17)   - admits to missing a few doses , non compliant, sharing IV needles.   - strongly advised him to be compliant with HIV medication   - HIV - ND , CD4 - pending 17   - continue Triumeq      4. History of osteomyelitis L3 and psoas abscess     5. Hepatitis C      6. History of polysubstance abuse  - heroin, marijuana, alcohol, tobacco      7. Homelessness      8. Depression/anxiety   - seeing a psychiatrist      RECOMMENDATION:  1. Switch to cefazolin 2 gram IV q8 hr . History of IV drug abuse. Social service consult. May need temporary placement for IV antibiotic . Duration 6 weeks.   2. Repeat 2 blood  - negative so far . Follow-up on blood cultures.   3. Recommend EDUARDO to evaluate for endocarditis . TTE will be done today   4. Social service consult     Plan discussed with patient     ID will continue to follow.   Suzy Roblero MD, M.Med.Sc.  Pager: [phone]     SUBJECTIVE:  Feels better, neck pain is better 5/10. Dexterity is better. No fever, chills, diarrhea. Appetite is ok. Breathing is fine. sorethroat is better     A five-point review of systems was obtained and was negative with the exception of that which is described above.  Allergies   Allergen Reactions     Doxycycline GI Disturbance     CURRENT ANTI-INFECTIVES:   IV  "Vancomycin      EXAMINATION:   Vital Signs: /88  Pulse 102  Temp 97.5  F (36.4  C) (Oral)  Resp 16  Ht 1.803 m (5' 11\")  Wt 63.5 kg (140 lb)  SpO2 95%  BMI 19.53 kg/m2   GENERAL:  Sitting in chair, cervical collar, alert, oriented   HEENT:  Head is normocephalic, atraumatic , EOM intact NATALIA  EYES:  Eyes have anicteric sclerae without conjunctival injection or stigmata of endocarditis.    ENT:  Oropharynx is moist without exudates or ulcers. Tongue is midline  NECK:  Cervical collar +  LUNGS:  Clear to auscultation bilateral.   CARDIOVASCULAR:  Regular rate and rhythm with no murmurs, gallops or rubs.  ABDOMEN:  Normal bowel sounds, soft, nontender. No appreciable hepatosplenomegaly  SKIN:  No acute rashes.   No stigmata of endocarditis. Tattoos+   NEUROLOGIC:  Normal speech, moving all extremities dexterity - improving . Right handed   Extremities: no edema     NEW DATA/RESULTS:     Culture Micro   Date Value Ref Range Status   09/12/2017 No growth after 9 hours  Preliminary   09/12/2017 No growth after 9 hours  Preliminary   09/10/2017 Culture negative monitoring continues  Preliminary   09/10/2017 Culture negative after 2 days  Preliminary   09/10/2017 Moderate growth  Staphylococcus aureus   (A)  Final       Recent Labs   Lab Test  09/10/17   1946  03/23/15   0837  03/19/15   0640  03/17/15   0748  03/16/15   0707  03/14/15   0905   CRP  47.0*  53.0*  50.0*  71.0*  82.0*  95.0*     Recent Labs   Lab Test  09/13/17 0917 09/11/17   0703  09/10/17   1946  09/09/17   1859  08/10/17   2323  07/24/17   1154   WBC  12.4*  10.7  12.7*  12.8*  18.3*  15.2*     Recent Labs   Lab Test  09/13/17 0917 09/11/17   0703  09/10/17   1946  09/09/17   1859   CR  0.80  0.66  0.76  0.93   GFRESTIMATED  >90  >90  >90  >90       Hematology Studies  Recent Labs   Lab Test  09/13/17 0917 09/11/17   0703  09/10/17   1946  09/09/17   1859  08/10/17   2323  07/24/17   1154  04/20/17   1140  02/24/17   1336   WBC  " 12.4*  10.7  12.7*  12.8*  18.3*  15.2*  8.1  7.6   ANEU   --   8.3   --   7.9  15.3*  10.7*  5.7  3.9   AEOS   --   0.1   --   0.1  0.6  0.0  0.2  0.2   HCT  36.2*  34.2*  36.1*  37.6*  35.6*  45.2  41.4  46.6   PLT  363  315  412  375  384  386  397  276       Metabolic  Recent Labs   Lab Test  09/13/17 0917 09/11/17   0703  09/10/17   1946   NA  135  134  137   BUN  9  12  17   CO2  30  28  24   CR  0.80  0.66  0.76   GFRESTIMATED  >90  >90  >90       Hepatic Studies  Recent Labs   Lab Test  09/10/17   1946  09/09/17   1859  08/10/17   2315   BILITOTAL  0.3  0.2  0.2   ALKPHOS  88  105  113   ALBUMIN  3.1*  3.8  2.9*   AST  42  42  58*   ALT  87*  107*  80*     Immunologlobulins  Recent Labs   Lab Test  09/10/17   1946  03/09/15   0536  03/08/15   1235   SED  46*  72*  63*     Imaging:  CT cervical spine 9/10/17  . Soft tissue thickening of the prevertebral space at the C6-7 level with erosion of the endplates. Findings are better demonstrated on MR earlier 9/10/2017 and concerning for discitis/osteomyelitis.  2. No definite fracture identified.      [Urgent Result: Fracture of the right C6 lamina]    After staff reviewed the images, it was felt that there was no fracture of the right C6 lamina and rather there was volume averaging with the C7 lamina giving the impression of a pseudofracture. The image in question is series 3, image 45.     Finding was identified on 9/10/2017 6:29 PM.      MRI brain w,wo contrast 9/9/17   IMPRESSION: Normal MRI of the brain.     MRA brain wo contrast 9/9/17  IMPRESSION:  Normal MR angiogram of the head.     MRI cervcial spine 9/9/17   IMPRESSION:  1. Pathologic process C6-C7 and intervening disc space and anterior epidural involvement. This results in effacement of the spinal cord at  this level.  2.  [Critical Result: Probable disc space infection with epidural effacement of the spinal cord at C6-C7.]  Finding was identified on 9/10/2017 2:01 PM.      MRI thoracic spine  9/10/17   Thoracic spine MR is normal. Vertebral bodies are normal. Disc spaces are normal. Alignment is normal. Thoracic spinal cord  appears normal with the conus at T12-L1.    IMPRESSION: Normal MR scan of the thoracic spine including gadolinium-enhanced study.

## 2017-09-13 NOTE — CONSULTS
Cardiology Consultation     Bc German MRN# [id number]       Date of Admission:  9/9/2017    Reason for consult: Potential Endocarditis    HPI: Bc German is a 37 year old male with history including HIV, Hepatitis C, substance abuse with heroin, IVDU who presented with weakness, neck pain found to have diskitis and cervical spine abscess for which he underwent fusion on 9/11/2017 by neurosurgery.  He has since grown MSSA from the tissue, as well as Strep from a blood culture and staph from a blood culture that is mecA negative.  He is currently on vancomycin with ID following for his history of HIV and this new bacteremia, potentially polymicrobial with staph and strep and potentially concerning for endocarditis in the setting of IVDU.  He currently denies any fevers, chills, nausea, vomiting, chest pain, shortness of breath, abdominal pain or palpitations.    Past Medical History:  Past Medical History:   Diagnosis Date     Anxiety      Depressive disorder      Drug abuse, IV      Group A streptococcal infection 11/2014    Bacteremia/cellulitis     HCV antibody positive      HIV (human immunodeficiency virus infection) (H)      HIV (human immunodeficiency virus infection) (H)      HIV (human immunodeficiency virus infection) (H)      IVDU (intravenous drug user)      Osteomyelitis of vertebra of lumbosacral region (H) 3/2011    L3, left psoas abscess       Past Surgical History:  Past Surgical History:   Procedure Laterality Date     EYE SURGERY  1 year ago    pins to left eye after socket fracture     OPTICAL TRACKING SYSTEM FUSION CERVICAL ANTERIOR ONE LEVEL Right 9/10/2017    Procedure: OPTICAL TRACKING SYSTEM FUSION CERVICAL ANTERIOR ONE LEVEL;  Stealth C6-C7 Anterior Cervical Corpectomy and C5-T1 Fusion;  Surgeon: Marv Ambrose MD;  Location:  OR     ORTHOPEDIC SURGERY      Arm Surgery     TRANSESOPHAGEAL ECHOCARDIOGRAM INTRAOPERATIVE N/A 3/17/2015    Procedure:  TRANSESOPHAGEAL ECHOCARDIOGRAM INTRAOPERATIVE;  Surgeon: Generic Anesthesia Provider;  Location: [facility]       Allergies:     Allergies   Allergen Reactions     Doxycycline GI Disturbance       Medications:    No current facility-administered medications on file prior to encounter.   Current Outpatient Prescriptions on File Prior to Encounter:  TRIUMEQ 600- MG per tablet TAKE ONE TABLET BY MOUTH EVERY DAY   levomilnacipran (FETZIMA) 80 MG 24 hr capsule Take 1 capsule (80 mg) by mouth daily   pregabalin (LYRICA) 150 MG capsule Take 2 capsules (300 mg) by mouth 2 times daily   naloxone (NARCAN) nasal spray Spray 1 spray (4 mg) into one nostril alternating nostrils as needed for opioid reversal (every 2-3 minutes until assistance arrives.)   ibuprofen (ADVIL/MOTRIN) 600 MG tablet Take 1 tablet (600 mg) by mouth every 6 hours as needed for moderate pain       Social History:  Social History     Social History     Marital status: Single     Spouse name: N/A     Number of children: N/A     Years of education: N/A     Occupational History     Not on file.     Social History Main Topics     Smoking status: Current Every Day Smoker     Smokeless tobacco: Never Used     Alcohol use Yes      Comment: last drank was 4 days ago     Drug use: Yes     Special: Marijuana, IV      Comment: Heroine--IV, benzo's     Sexual activity: Yes     Partners: Female, Male     Other Topics Concern     Not on file     Social History Narrative    ** Merged History Encounter **         ** Merged History Encounter **            Family History:  Negative for family history of early coronary disease    ROS:  12 point review obtained and negative except as above in HPI    Exam:  Temp:  [95.5  F (35.3  C)-99.6  F (37.6  C)] 98  F (36.7  C)  Heart Rate:  [109-130] 112  Resp:  [16] 16  BP: (119-131)/(73-78) 120/73  SpO2:  [95 %-98 %] 95 %  General: Alert, interactive, NAD  Eyes: sclera anicteric, EOMI  Neck: JVP without edema, carotid 2+  bilaterally  Cardiovascular: regular rate and rhythm, normal S1 and S2, no murmurs, gallops, or rubs  Resp: clear to auscultation bilaterally, no rales, wheezes, or rhonchi  GI: Soft, nontender, nondistended. +BS.  No HSM or masses, no rebound or guarding.  Extremities: without edema, no cyanosis or clubbing, dorsalis pedis and posterior tibialis pulses 2+ bilaterally  Skin: Warm and dry, no jaundice or rash  Neuro: CN 2-12 intact, moves all extremities equally  Psych: Alert & oriented x 3    Data:  CMP  Recent Labs  Lab 09/11/17  1700 09/11/17  0703 09/10/17  1946 09/09/17  1859   NA  --  134 137 144   POTASSIUM 3.8 3.3* 3.8 4.0   CHLORIDE  --  101 105 107   CO2  --  28 24 27   ANIONGAP  --  5 8 10   GLC  --  148* 98 95   BUN  --  12 17 18   CR  --  0.66 0.76 0.93   GFRESTIMATED  --  >90 >90 >90   GFRESTBLACK  --  >90 >90 >90   SANDRA  --  8.6 8.7 9.7   MAG  --   --   --  2.2   PHOS  --   --   --  3.5   PROTTOTAL  --   --  7.8 9.2*   ALBUMIN  --   --  3.1* 3.8   BILITOTAL  --   --  0.3 0.2   ALKPHOS  --   --  88 105   AST  --   --  42 42   ALT  --   --  87* 107*     CBC  Recent Labs  Lab 09/11/17  0703 09/10/17  1946 09/09/17  1859   WBC 10.7 12.7* 12.8*   RBC 3.96* 4.21* 4.40   HGB 10.9* 11.7* 12.1*   HCT 34.2* 36.1* 37.6*   MCV 86 86 86   MCH 27.5 27.8 27.5   MCHC 31.9 32.4 32.2   RDW 16.0* 16.0* 15.6*    412 375       No results found for: TROPI, TROPONIN, TROPR, TROPN      ECG:  Inferior q waves in II, III, aVF, no ST changes    Transthoracic echocardiogram:   Pending    Coronary angiogram:  Not obtained    Assessment:   This is a 37 year old male with HIV, IVDU with heroin who presented to the hospital on 9/10 with weakness, falls, bowel dysfunction and neck pain.  He was found on imaging to have vertebral osteomyelitis and epidural abscess causing spinal cord compression for which he underwent C5-T1 fusion with C6/7 corpectomy.    Recommendations:  - Plan for a transthoracic echocardiogram this AM.  Given  IVDU tricuspid associated endocarditis is possible and could be seen on transthoracic echocardiogram  - If negative, consider transesophageal echocardiogram.  Would need neurosurgery to discuss how much neck flexion they would allow for performing a transesophageal echocardiogram  - ID management of long-term antibiotics and HIV medications  - Substance abuse counseling/social service consultation      If TTE is negative and it would , neurosurgery okay with doing EDUARDO given recent neck surgery.  Will defer to primary to order if they feel it is clinically indicated.    Patient seen and discussed with Dr. Tidwell.  We will sign off.    Lee Jain MD  Cardiology Fellow

## 2017-09-13 NOTE — PROGRESS NOTES
Patient: Bc German  Date of Service: September 13, 2017 Admission Date:9/9/2017   3 Days Post-Op     Chief Pain Endorsement: incisional pain (neck)    Recommendations were discussed and relayed to Dr. Mujica  Plan was reviewed by the Inpatient Pain Service and staff attending, Dr. Berry      1. Change hydromorphone to 2-4 mg orally Q 4 hours PRN. Use 2 mg dose first    9/14: Change to hydromorphone 1-2 mg orally Q 4 hours PRN, then discontinue upon discharge    Plan to taper off completely prior to discharge and start Suboxone per Psych recommendations (Pt to follow-up with Dr. Khari Barrientos outpatient)  2. Increase acetaminophen to 1000 mg orally QID    Change to acetaminophen 1000 mg orally TID x 1 week after discharge, then change to     Acetaminophen 650 mg orally Q 6 hours PRN  3. Continue pregabalin 300 mg orally BID (this is his home dose)  4. Continue Lidoderm 5% patches, 1-3 patch(es) transdermal every 24 hours (12 hours on and 12 hours off) around the incision and not over the incision    If patient continues to find benefit from this, continue x 1 week upon discharge, then change to PRN  5. Bowel regimen per primary team to prevent opioid induced constipation.  6. Chemical dependency, anxiety, and depression management per Psych    Pain Service will Continue to follow at this time.     Thank you for consulting the Inpatient Pain Management Service. The above recommendations are to be acted upon at the primary team s discretion.     To reach us:  Mon - Friday 8 AM - 3 PM: Pager [phone] (Text Page)  After hours, weekends and holidays: Primary service should call [phone] for the on-call pain specialist      1. S/p C5-T1 anterior instrumentated fusion and C6/C7 corpectomy for vertebral osteomyelitis and epidural abscess causing cervical spinal cord compression  2. History of HIV infection  3. History of MDD and anxiety  4. History of IV drug abuse (heroin), occasional methamphetamine and  cannabis use     -- Outpatient opioid requirements prior to admission: None. IV heroin use     Primary Care Provider: Damir Cisneros  Chronic Pain Provider: None    Interval History:  Bc German was seen today (September 13, 2017) and he reports that his pain is much improved but still painful with movement and activity. He is motivated to use less of the hydromorphone and focus more on adjuvants. Patient didn't request any PRN pain medication from 10pm last night to ~12pm today. Plan is to taper him off hydromorphone completely before he discharge. Per primary team, he'll likely discharge in 2 days.     His last bowel movement was 4 and it was Normal.     CAPA (Clinically Aligned Pain Assessment):    Comfort (How is your pain?): Tolerable with discomfort  Change in Pain (Since your last medication/intervention?): Getting better  Pain Control (How are your pain treatments working?):  Partially effective control  Functioning (Are you able to do activities to get better?) : Can do most things, but pain gets in the way of some   Sleep (Does your pain management allow you to sleep or rest?): Normal sleep      FUNCTIONAL STATUS:  Change:      improving  Oral intake:     Regular  Activity level:     Ambulating in darling  Mood:      happy, elevated and adjusting to situation     -- Inpatient Medications Related to Pain Management:   Medications related to Pain Management (Future)    Start     Dose/Rate Route Frequency Ordered Stop    09/13/17 0800  lidocaine (LIDODERM) 5 % Patch 1-3 patch      1-3 patch Transdermal EVERY 24 HOURS 0800 09/12/17 1638      09/12/17 1645  HYDROmorphone (DILAUDID) tablet 2-4 mg      2-4 mg Oral EVERY 3 HOURS PRN 09/12/17 1636      09/11/17 2000  pregabalin (LYRICA) capsule 300 mg      300 mg Oral 2 TIMES DAILY 09/11/17 1634      09/11/17 1645  polyethylene glycol (MIRALAX/GLYCOLAX) Packet 17 g      17 g Oral DAILY 09/11/17 1631      09/11/17 0800  senna-docusate (SENOKOT-S;PERICOLACE)  8.6-50 MG per tablet 1-2 tablet      1-2 tablet Oral 2 TIMES DAILY 09/11/17 0218 09/11/17 0230  acetaminophen (TYLENOL) tablet 975 mg      975 mg Oral EVERY 8 HOURS 09/11/17 0218 09/14/17 0229    09/10/17 1145  lidocaine (LIDODERM) patch in PLACE       Transdermal EVERY 8 HOURS 09/10/17 1131            LAB DATA:    Recent Labs  Lab 09/11/17  0703 09/10/17  1946 09/09/17  1859   CR 0.66 0.76 0.93   WBC 10.7 12.7* 12.8*   HGB 10.9* 11.7* 12.1*   AST  --  42 42   ALT  --  87* 107*         ----------------------------------------------------------------------------------  Conchis Flores [facility]  Inpatient Pain Service     To reach us:  Mon - Friday 8 AM - 3 PM: Pager [phone] (Text Page)  After hours, weekends and holidays: Primary service should call [phone] for the on-call pain specialist    Helpful Resources:  Getting Rid of Unwanted Medications (printable PDF for patients)   Opioid Overdose Prevention Toolkit (printable PDF for patients)

## 2017-09-13 NOTE — PROGRESS NOTES
[facility], [place]   Neurosurgery Progress Note:    Assessment: Bc German is a 37 year old male postoperative day # 2 from C5-T1 anterior instrumented fusion and C6/7 corpectomy for vertebral osteomyelitis and epidural abscess causing cervical spinal cord compression.     Plan:  - Serial neuro exams  - Post-op upright cervical Xrays and CT C/T-spine completed  -  on at all times   - Remainder of cares per primary team      Discussed with Neurosurgery chief, who agrees.    Interval History: agreeable to wearing .  Pain better controlled.      Objective:   Temp:  [95.5  F (35.3  C)-99.6  F (37.6  C)] 99.6  F (37.6  C)  Heart Rate:  [107-130] 109  Resp:  [16] 16  BP: (119-131)/(70-78) 131/76  SpO2:  [96 %-98 %] 98 %  I/O last 3 completed shifts:  In: 1440 [P.O.:960; I.V.:480]  Out: 1200 [Urine:1200]    Gen: mild discomfort,  in place   Wound: right neck incision c/d/i, no significant erythema, small brittany-incisional indurated mass  Neurologic:  - Alert & Oriented to person, place, time, and situation  - Follows commands briskly  - Speech fluent, spontaneous. No aphasia or dysarthria.  - No gaze preference. No apparent hemineglect.  - PERRL, EOMI  - Strong eye closure, jaw clench, and cheek puff  - Face symmetric with sensation intact to light touch  - Palate elevates symmetrically, uvula midline, tongue protrudes midline  - Trapezii and sternocleidomastoid muscles 5/5 bilaterally     Del Tr Bi WE WF Gr   R 5 4- 5 5 5 5   L 5 4- 5 5 5 5    HF KE KF DF PF EHL   R 5 5 5 5 5 5   L 5 5 5 5 5 5     Reflexes 2+ throughout and 3-4 beats of clonus in LLE, upgoing toes on right, mute toes on left  Sensation intact and symmetric to light touch throughout    Please contact neurosurgery resident on call with questions.    Dial * * [phone], enter [phone] when prompted.

## 2017-09-13 NOTE — PROGRESS NOTES
09/13/17 1445   General Information   Onset Date 09/09/17   Start of Care Date 09/13/17   Referring Physician Joseline Mujica MD   Patient Profile Review/OT: Additional Occupational Profile Info See Profile for full history and prior level of function   Patient/Family Goals Statement Pt wants to be able to eat and drink without difficulty   Swallowing Evaluation Bedside swallow evaluation   Behaviorial Observations WFL (within functional limits)  (Pt pleasant and cooperative)   Mode of current nutrition Oral diet   Type of oral diet Regular;Thin liquid   Respiratory Status Room air   Comments Orders received for swallow evaluation. Pt with with PMH HIV, IVDU, anxiety/depression who presents with weakness, ataxia, neck pain.  Is now postoperative day # 2 from C5-T1 anterior instrumented fusion and C6/7 corpectomy for vertebral osteomyelitis and epidural abscess causing cervical spinal cord compression.    Clinical Swallow Evaluation   Oral Musculature generally intact   Structural Abnormalities none present   Dentition present and adequate  (missing some teeth (molars))   Mucosal Quality good   Mandibular Strength and Mobility intact   Oral Labial Strength and Mobility WFL   Lingual Strength and Mobility WFL   Velar Elevation intact   Buccal Strength and Mobility intact   Laryngeal Function Cough;Throat clear;Swallow;Voicing initiated   Additional Documentation Yes   Swallow Eval   Feeding Assistance no assistance needed   Clinical Swallow Eval: Thin Liquid Texture Trial   Mode of Presentation, Thin Liquids straw;self-fed   Volume of Liquid or Food Presented 8oz   Oral Phase of Swallow WFL   Pharyngeal Phase of Swallow coughing/choking  (coughing initially due to large, subsequent sips)   Diagnostic Statement Pt tolerated small, single sips without overt s/sx of aspiration   Clinical Swallow Eval: Puree Solid Texture Trial   Mode of Presentation, Puree spoon;self-fed   Volume of Puree Presented tsp bites  x7   Oral Phase, Puree WFL   Pharyngeal Phase, Puree intact  (no overt s/sx of aspiration observed)   Diagnostic Statement swallow functional for puree textures   Clinical Swallow Eval: Solid Food Texture Trial   Mode of Presentation, Solid self-fed   Volume of Solid Food Presented 2 manoj crackers   Oral Phase, Solid WFL  (mastication/bolus manipulation adequate)   Pharyngeal Phase, Solid intact  (no s/sx of aspiration observed)   Diagnostic Statement swallow functional for solid textures   Swallow Compensations   Swallow Compensations Pacing;Reduce amounts   Esophageal Phase of Swallow   Patient reports or presents with symptoms of esophageal dysphagia No   Swallow Eval: Clinical Impressions   Skilled Criteria for Therapy Intervention No problems identified which require skilled intervention   Functional Assessment Scale (FAS) 6   Treatment Diagnosis minimal dysphagia    Diet texture recommendations Regular diet;Thin liquids   Recommended Feeding/Eating Techniques alternate between small bites and sips of food/liquid;maintain upright posture during/after eating for 30 mins;small sips/bites   Demonstrates Need for Referral to Another Service occupational therapy;physical therapy   Predicted Duration of Therapy Intervention (days/wks) evaluation only   Anticipated Discharge Disposition (will defer to PT/OT; no ongoing ST needs)   Risks and Benefits of Treatment have been explained. Yes   Patient, family and/or staff in agreement with Plan of Care Yes   Clinical Impression Comments Clinical swallow evaluation completed per MD order. Pt presents with minimal pharyngeal dysphagia in the setting of recent C5-T1 anterior instrumented fusion and C6/7 corpectomy. Pt initially demonstrating s/sx of aspiration with large, subsequent sips of thin liquids. following cues to take small, single sips of thin liquids tolerated without overt s/sx of aspiration. No difficulties observed with purees or solid textures, though Pt  reports that chewing more advanced textures is difficult due to some missing dentition. Recommend continue regular diet with thin liquids as tolerated. Pt to sit upright for all PO intake, take small bites/sips and alternate consistencies. Pt is able to independently implement trained strategies. No ongoing ST needs indicated at this time. Will sign off   Total Evaluation Time   Total Evaluation Time (Minutes) 18

## 2017-09-14 ENCOUNTER — APPOINTMENT (OUTPATIENT)
Dept: PHYSICAL THERAPY | Facility: CLINIC | Age: 37
DRG: 471 | End: 2017-09-14
Attending: INTERNAL MEDICINE
Payer: COMMERCIAL

## 2017-09-14 LAB
BACTERIA SPEC CULT: ABNORMAL
LACTATE BLD-SCNC: 1.1 MMOL/L (ref 0.7–2)
Lab: ABNORMAL
SPECIMEN SOURCE: ABNORMAL

## 2017-09-14 PROCEDURE — 25000132 ZZH RX MED GY IP 250 OP 250 PS 637: Performed by: STUDENT IN AN ORGANIZED HEALTH CARE EDUCATION/TRAINING PROGRAM

## 2017-09-14 PROCEDURE — 25000132 ZZH RX MED GY IP 250 OP 250 PS 637: Performed by: HOSPITALIST

## 2017-09-14 PROCEDURE — 97110 THERAPEUTIC EXERCISES: CPT | Mod: GP | Performed by: PHYSICAL THERAPIST

## 2017-09-14 PROCEDURE — 97116 GAIT TRAINING THERAPY: CPT | Mod: GP | Performed by: PHYSICAL THERAPIST

## 2017-09-14 PROCEDURE — 25000132 ZZH RX MED GY IP 250 OP 250 PS 637: Performed by: NURSE PRACTITIONER

## 2017-09-14 PROCEDURE — 12000008 ZZH R&B INTERMEDIATE UMMC

## 2017-09-14 PROCEDURE — 40000193 ZZH STATISTIC PT WARD VISIT: Performed by: PHYSICAL THERAPIST

## 2017-09-14 PROCEDURE — 99207 ZZC NO CHARGE SIGN-OFF PS: CPT

## 2017-09-14 PROCEDURE — 97530 THERAPEUTIC ACTIVITIES: CPT | Mod: GP | Performed by: PHYSICAL THERAPIST

## 2017-09-14 PROCEDURE — 36592 COLLECT BLOOD FROM PICC: CPT | Performed by: INTERNAL MEDICINE

## 2017-09-14 PROCEDURE — 99232 SBSQ HOSP IP/OBS MODERATE 35: CPT | Performed by: HOSPITALIST

## 2017-09-14 PROCEDURE — 99221 1ST HOSP IP/OBS SF/LOW 40: CPT | Mod: GC | Performed by: INTERNAL MEDICINE

## 2017-09-14 PROCEDURE — 83605 ASSAY OF LACTIC ACID: CPT | Performed by: INTERNAL MEDICINE

## 2017-09-14 PROCEDURE — 25000128 H RX IP 250 OP 636: Performed by: HOSPITALIST

## 2017-09-14 RX ORDER — HYDROMORPHONE HYDROCHLORIDE 2 MG/1
2 TABLET ORAL ONCE
Status: COMPLETED | OUTPATIENT
Start: 2017-09-14 | End: 2017-09-14

## 2017-09-14 RX ORDER — HYDROMORPHONE HYDROCHLORIDE 2 MG/1
2 TABLET ORAL EVERY 6 HOURS PRN
Status: DISCONTINUED | OUTPATIENT
Start: 2017-09-14 | End: 2017-09-14

## 2017-09-14 RX ORDER — ACETAMINOPHEN 325 MG/1
975 TABLET ORAL 3 TIMES DAILY
Status: DISPENSED | OUTPATIENT
Start: 2017-09-14 | End: 2017-09-17

## 2017-09-14 RX ORDER — CYCLOBENZAPRINE HCL 10 MG
10 TABLET ORAL 3 TIMES DAILY
Status: DISCONTINUED | OUTPATIENT
Start: 2017-09-14 | End: 2017-09-15

## 2017-09-14 RX ADMIN — HYDROMORPHONE HYDROCHLORIDE 4 MG: 2 TABLET ORAL at 06:04

## 2017-09-14 RX ADMIN — SENNOSIDES AND DOCUSATE SODIUM 2 TABLET: 8.6; 5 TABLET ORAL at 09:24

## 2017-09-14 RX ADMIN — LEVOMILNACIPRAN HYDROCHLORIDE 80 MG: 80 CAPSULE, EXTENDED RELEASE ORAL at 09:24

## 2017-09-14 RX ADMIN — TRAZODONE HYDROCHLORIDE 50 MG: 50 TABLET ORAL at 22:02

## 2017-09-14 RX ADMIN — HYDROMORPHONE HYDROCHLORIDE 2 MG: 2 TABLET ORAL at 12:27

## 2017-09-14 RX ADMIN — CEFAZOLIN SODIUM 2 G: 2 INJECTION, SOLUTION INTRAVENOUS at 22:02

## 2017-09-14 RX ADMIN — PREGABALIN 300 MG: 75 CAPSULE ORAL at 19:38

## 2017-09-14 RX ADMIN — ABACAVIR SULFATE, DOLUTEGRAVIR SODIUM, LAMIVUDINE 1 TABLET: 600; 50; 300 TABLET, FILM COATED ORAL at 09:25

## 2017-09-14 RX ADMIN — CEFAZOLIN SODIUM 2 G: 2 INJECTION, SOLUTION INTRAVENOUS at 06:04

## 2017-09-14 RX ADMIN — PREGABALIN 300 MG: 75 CAPSULE ORAL at 09:24

## 2017-09-14 RX ADMIN — HYDROMORPHONE HYDROCHLORIDE 4 MG: 2 TABLET ORAL at 02:14

## 2017-09-14 RX ADMIN — ACETAMINOPHEN 975 MG: 325 TABLET ORAL at 16:11

## 2017-09-14 RX ADMIN — CEFAZOLIN SODIUM 2 G: 2 INJECTION, SOLUTION INTRAVENOUS at 16:11

## 2017-09-14 RX ADMIN — ACETAMINOPHEN 975 MG: 325 TABLET ORAL at 19:38

## 2017-09-14 RX ADMIN — CYCLOBENZAPRINE HYDROCHLORIDE 10 MG: 10 TABLET, FILM COATED ORAL at 19:38

## 2017-09-14 ASSESSMENT — VISUAL ACUITY
OU: NORMAL ACUITY

## 2017-09-14 NOTE — PLAN OF CARE
Problem: Goal Outcome Summary  Goal: Goal Outcome Summary  OT/6A: Cancel- Pt declining attempts, requesting to rest

## 2017-09-14 NOTE — PROGRESS NOTES
Patient: Bc German  Date of Service: September 14, 2017 Admission Date:9/9/2017   4 Days Post-Op     Chief Pain Endorsement: incisional pain (neck)    Recommendations were discussed and relayed to Dr. Mujica  Plan was reviewed by the Inpatient Pain Service and staff attending, Dr. Marv Berry      1. Decrease HYDROmorphone to 2mg by mouth every 4 hour as needed, then discontinue upon discharge    Plan to taper off completley prior to discharge and start suboxone per psych recommendations (pt to follow up with Dr. Khari Barrientos outpatient)  2. Continue pregabalin 300mg by mouth BID (home regimen)  3. Continue Lidoderm 5% patches, 1-3 patches TD every 24 hours (12 hours on and 12 hours off)     At discharge, continue for 1 week then change to as needed x 4 days then discontinue.   4. Chemical dependency, anxiety and depression management per Psych.   5. Bowel regimen per primary team to prevent opioid induced constipation.    Pain Service will Sign Off at this time.     Thank you for consulting the Inpatient Pain Management Service. The above recommendations are to be acted upon at the primary team s discretion.     To reach us:  Mon - Friday 8 AM - 3 PM: Pager [phone] (Text Page)  After hours, weekends and holidays: Primary service should call [phone] for the on-call pain specialist        1. S/p C5-T1 anterior instrumentated fusion and C6/C7 corpectomy for vertebral osteomyelitis and epidural abscess causing cervical spinal cord compression  2. History of HIV infection  3. History of MDD and anxiety  4. History of IV drug abuse (heroin), occasional methamphetamine and cannabis use      -- Outpatient opioid requirements prior to admission: None. IV heroin use      Primary Care Provider: Damir Cisneros  Chronic Pain Provider: None    Interval History:  Patient was not seen today.     -- Inpatient Medications Related to Pain Management:   Medications related to Pain Management (Future)    Start      Dose/Rate Route Frequency Ordered Stop    09/13/17 0800  lidocaine (LIDODERM) 5 % Patch 1-3 patch      1-3 patch Transdermal EVERY 24 HOURS 0800 09/12/17 1638      09/12/17 1645  HYDROmorphone (DILAUDID) tablet 2-4 mg      2-4 mg Oral EVERY 3 HOURS PRN 09/12/17 1636      09/11/17 2000  pregabalin (LYRICA) capsule 300 mg      300 mg Oral 2 TIMES DAILY 09/11/17 1634      09/11/17 1645  polyethylene glycol (MIRALAX/GLYCOLAX) Packet 17 g      17 g Oral DAILY 09/11/17 1631 09/11/17 0800  senna-docusate (SENOKOT-S;PERICOLACE) 8.6-50 MG per tablet 1-2 tablet      1-2 tablet Oral 2 TIMES DAILY 09/11/17 0218      09/10/17 1145  lidocaine (LIDODERM) patch in PLACE       Transdermal EVERY 8 HOURS 09/10/17 1131            LAB DATA:    Recent Labs  Lab 09/13/17 0917 09/11/17  0703 09/10/17  1946 09/09/17  1859   CR 0.80 0.66 0.76 0.93   WBC 12.4* 10.7 12.7* 12.8*   HGB 11.2* 10.9* 11.7* 12.1*   AST  --   --  42 42   ALT  --   --  87* 107*         ----------------------------------------------------------------------------------  Fidencio Odonnell, Pharm.D, [facility]  Inpatient Pain Service     To reach us:  Mon - Friday 8 AM - 3 PM: Pager [phone] (Text Page)  After hours, weekends and holidays: Primary service should call [phone] for the on-call pain specialist    Helpful Resources:  Getting Rid of Unwanted Medications (printable PDF for patients)   Opioid Overdose Prevention Toolkit (printable PDF for patients)

## 2017-09-14 NOTE — PLAN OF CARE
Problem: Fall Risk (Adult)  Goal: Identify Related Risk Factors and Signs and Symptoms  Related risk factors and signs and symptoms are identified upon initiation of Human Response Clinical Practice Guideline (CPG)   Outcome: No Change  VSS. A&Ox4. Neuros unchanged with n/t to bilateral hands and BLE, decreased sensation in thighs. Pain managed with prn dilaudid. Neck incision intact, c-collar on at all time. Voiding via urinal. BS+. Triple lumen CVC . Up SBA/GB. Reg. diet. Cont to monitor and with POC.

## 2017-09-14 NOTE — PROGRESS NOTES
[facility], [place]    Hospitalist Progress Note    Date of Service (when I saw the patient): 09/14/2017    Assessment & Plan       1. Cervical osteomyelitis with Epidural abscess with compressive phlegmon and cervical myelopathy, s/p C6 and C7 corpectomy, C5-T1 anterior instrumented fusion     - Ho IV drug use, substance abuse, HIV positive  - vertebra tissue and abscess - positive for Staph aureus   - Its MSSA. Blood culture grew Strep  Vestibularis too.   - PT, OT, has brace in place  - pain control: Avodi use of dilaudid. Only sparingly we will give dilaudid. Start on flexeril for muscle spasms. Has tyleno and lyrica on board as well. No NSIDs. Can use lidocaine patches on the normal skin around the incision to block the nerves.  - plan to start him on Suboxone at DC      2. MSSA Bacteremia     - Staph aureus and Stre vestibularis  9/9 and 9/10  - FU repeat BC  - TTE negative for endocarditis.   - Neurosurgery was okay for him to do neck flexion to a degree required for intubation per report from the nursing. I paged Dr Miguel Ortiz but I did not hear from him directly.   - He has Left IJ central line.   - Home Ab infusion may not accept him with PICC line.   - plan for TCU with PICC and IV Ab per ID recs. Likely Ancef    3. HIV  (547346 (7/24/17) and CD4 782 (7/24/17)     - non compliant in the past, sharing IV needles.   - continue Triumeq   - HIV viral load undetectable, CD4 pending      4. History of osteomyelitis L3 and psoas abscess in the past      5. Hepatitis C       6. History of polysubstance abuse  - heroin, marijuana, alcohol, tobacco   - plan tos start suboxone for treatment.       7. Homelessness        8. Depression/anxiety     - seeing a psychiatrist     9. Tachycardia is chronic in nature. He is making good urine. BP is good.He is not in sepsis. No need to draw a LA      DVT Prophylaxis: SCDs    Code Status: Full Code    Disposition: Expected discharge in 2 days  once infection under better control.    Joseline Mujica MD    Interval History :   Feels better. Reports muscle spasms. No fevers. Doing okay otherwise.      -Data reviewed today: I reviewed all new labs and imaging results over the last 24 hours. I personally reviewed no images or EKG's today.    Physical Exam   Temp: 96.8  F (36  C) Temp src: Oral BP: 128/82   Heart Rate: 98 Resp: 16 SpO2: 98 % O2 Device: None (Room air)    Vitals:    09/09/17 1732   Weight: 63.5 kg (140 lb)     Vital Signs with Ranges  Temp:  [96.8  F (36  C)-98.6  F (37  C)] 96.8  F (36  C)  Heart Rate:  [] 98  Resp:  [16] 16  BP: (128-139)/(82-94) 128/82  SpO2:  [95 %-99 %] 98 %  I/O last 3 completed shifts:  In: 480 [P.O.:480]  Out: 2000 [Urine:2000]    Constitutional: Awake, alert, cooperative, no apparent distress  Respiratory: Clear to auscultation bilaterally, no crackles or wheezing  Cardiovascular: Regular rate and rhythm, normal S1 and S2, and no murmur noted  GI: Normal bowel sounds, soft, non-distended, non-tender  Skin/Integumen: No rashes, no cyanosis, no edema    Medications        acetaminophen  975 mg Oral TID     cyclobenzaprine  10 mg Oral TID     ceFAZolin  2 g Intravenous Q8H     lidocaine  1-3 patch Transdermal Q24H     sodium chloride (PF)  3 mL Intracatheter Q8H     senna-docusate  1-2 tablet Oral BID     polyethylene glycol  17 g Oral Daily     pregabalin  300 mg Oral BID     traZODone  50 mg Oral At Bedtime     abacavir-dolutegravir-LamiVUDine  1 tablet Oral Daily     levomilnacipran  80 mg Oral Daily     lidocaine   Transdermal Q24h     lidocaine   Transdermal Q8H       Data     Recent Labs  Lab 09/13/17  0917 09/11/17  1700 09/11/17  0703 09/10/17  1946 09/09/17  1859   WBC 12.4*  --  10.7 12.7* 12.8*   HGB 11.2*  --  10.9* 11.7* 12.1*   MCV 88  --  86 86 86     --  315 412 375     --  134 137 144   POTASSIUM 3.4 3.8 3.3* 3.8 4.0   CHLORIDE 100  --  101 105 107   CO2 30  --  28 24 27   BUN 9  --   12 17 18   CR 0.80  --  0.66 0.76 0.93   ANIONGAP 5  --  5 8 10   SANDRA 9.7  --  8.6 8.7 9.7   *  --  148* 98 95   ALBUMIN  --   --   --  3.1* 3.8   PROTTOTAL  --   --   --  7.8 9.2*   BILITOTAL  --   --   --  0.3 0.2   ALKPHOS  --   --   --  88 105   ALT  --   --   --  87* 107*   AST  --   --   --  42 42       No results found for this or any previous visit (from the past 24 hour(s)).

## 2017-09-14 NOTE — PLAN OF CARE
Problem: Goal Outcome Summary  Goal: Goal Outcome Summary  POD#4 C5   T1 anterior fusion and corpectomy, drainage of epidural abscess. collar on at all times. AxOx4. Neuros intact, strengths 4-5/5 in BLE; reports tingling in bilat fingertips at times. VSS, except HR tachy. Pt c/o shoulder, and neck pain relieved w/oral dilaudid and scheduled Tylenol; pt requested to be switched to a muscle relaxant, starting on flexeril 3x/day.  Voiding spont. Anterior neck incision LISSETH w/durmabond, reddened around site. L Neck Triple Lumen Central Line, dressing intact. Up SBA + gb. Continue to monitor and follow POC

## 2017-09-14 NOTE — PLAN OF CARE
Problem: Goal Outcome Summary  Goal: Goal Outcome Summary  PT 6A: Pt ambulates 1000ft without assistive device and CGA, pt experiences a few minor losses of balance needing min assist to correct. Pt needing cues for safety at times, easily distracted. Pt completes stairs and repeated sit to stand to improve eccentric control.  Anticipate discharge home with 24 hr assist when medically appropriate.

## 2017-09-15 ENCOUNTER — APPOINTMENT (OUTPATIENT)
Dept: PHYSICAL THERAPY | Facility: CLINIC | Age: 37
DRG: 471 | End: 2017-09-15
Attending: INTERNAL MEDICINE
Payer: COMMERCIAL

## 2017-09-15 ENCOUNTER — APPOINTMENT (OUTPATIENT)
Dept: OCCUPATIONAL THERAPY | Facility: CLINIC | Age: 37
DRG: 471 | End: 2017-09-15
Attending: INTERNAL MEDICINE
Payer: COMMERCIAL

## 2017-09-15 LAB
BACTERIA SPEC CULT: NO GROWTH
INTERPRETATION ECG - MUSE: NORMAL
Lab: NORMAL
SPECIMEN SOURCE: NORMAL

## 2017-09-15 PROCEDURE — 12000001 ZZH R&B MED SURG/OB UMMC

## 2017-09-15 PROCEDURE — 97116 GAIT TRAINING THERAPY: CPT | Mod: GP | Performed by: REHABILITATION PRACTITIONER

## 2017-09-15 PROCEDURE — 25000132 ZZH RX MED GY IP 250 OP 250 PS 637: Performed by: HOSPITALIST

## 2017-09-15 PROCEDURE — 25000132 ZZH RX MED GY IP 250 OP 250 PS 637: Performed by: NURSE PRACTITIONER

## 2017-09-15 PROCEDURE — 97530 THERAPEUTIC ACTIVITIES: CPT | Mod: GP | Performed by: REHABILITATION PRACTITIONER

## 2017-09-15 PROCEDURE — 97530 THERAPEUTIC ACTIVITIES: CPT | Mod: GO | Performed by: OCCUPATIONAL THERAPIST

## 2017-09-15 PROCEDURE — 25000132 ZZH RX MED GY IP 250 OP 250 PS 637: Performed by: STUDENT IN AN ORGANIZED HEALTH CARE EDUCATION/TRAINING PROGRAM

## 2017-09-15 PROCEDURE — 99232 SBSQ HOSP IP/OBS MODERATE 35: CPT | Performed by: HOSPITALIST

## 2017-09-15 PROCEDURE — 25000132 ZZH RX MED GY IP 250 OP 250 PS 637: Performed by: INTERNAL MEDICINE

## 2017-09-15 PROCEDURE — 97535 SELF CARE MNGMENT TRAINING: CPT | Mod: GO | Performed by: OCCUPATIONAL THERAPIST

## 2017-09-15 PROCEDURE — 25000128 H RX IP 250 OP 636: Performed by: HOSPITALIST

## 2017-09-15 PROCEDURE — 40000193 ZZH STATISTIC PT WARD VISIT: Performed by: REHABILITATION PRACTITIONER

## 2017-09-15 PROCEDURE — 40000133 ZZH STATISTIC OT WARD VISIT: Performed by: OCCUPATIONAL THERAPIST

## 2017-09-15 RX ORDER — CLONIDINE HYDROCHLORIDE 0.1 MG/1
0.1 TABLET ORAL ONCE
Status: DISCONTINUED | OUTPATIENT
Start: 2017-09-15 | End: 2017-09-15

## 2017-09-15 RX ORDER — BUPRENORPHINE AND NALOXONE 4; 1 MG/1; MG/1
1 FILM, SOLUBLE BUCCAL; SUBLINGUAL DAILY
Status: DISCONTINUED | OUTPATIENT
Start: 2017-09-15 | End: 2017-09-15

## 2017-09-15 RX ORDER — CLONIDINE HYDROCHLORIDE 0.1 MG/1
0.1 TABLET ORAL
Status: DISCONTINUED | OUTPATIENT
Start: 2017-09-15 | End: 2017-09-21 | Stop reason: HOSPADM

## 2017-09-15 RX ORDER — BUPRENORPHINE AND NALOXONE 4; 1 MG/1; MG/1
1 FILM, SOLUBLE BUCCAL; SUBLINGUAL ONCE
Status: COMPLETED | OUTPATIENT
Start: 2017-09-15 | End: 2017-09-15

## 2017-09-15 RX ORDER — BUPRENORPHINE AND NALOXONE 8; 2 MG/1; MG/1
1 FILM, SOLUBLE BUCCAL; SUBLINGUAL DAILY
Status: DISCONTINUED | OUTPATIENT
Start: 2017-09-16 | End: 2017-09-21 | Stop reason: HOSPADM

## 2017-09-15 RX ORDER — CYCLOBENZAPRINE HCL 10 MG
10 TABLET ORAL 3 TIMES DAILY
Status: DISCONTINUED | OUTPATIENT
Start: 2017-09-15 | End: 2017-09-18

## 2017-09-15 RX ADMIN — CEFAZOLIN SODIUM 2 G: 2 INJECTION, SOLUTION INTRAVENOUS at 22:02

## 2017-09-15 RX ADMIN — CEFAZOLIN SODIUM 2 G: 2 INJECTION, SOLUTION INTRAVENOUS at 05:55

## 2017-09-15 RX ADMIN — ACETAMINOPHEN 975 MG: 325 TABLET ORAL at 08:51

## 2017-09-15 RX ADMIN — BENZOCAINE AND MENTHOL 1 LOZENGE: 15; 3.6 LOZENGE ORAL at 15:05

## 2017-09-15 RX ADMIN — PREGABALIN 300 MG: 75 CAPSULE ORAL at 08:50

## 2017-09-15 RX ADMIN — CYCLOBENZAPRINE HYDROCHLORIDE 10 MG: 10 TABLET, FILM COATED ORAL at 13:43

## 2017-09-15 RX ADMIN — PREGABALIN 300 MG: 75 CAPSULE ORAL at 19:55

## 2017-09-15 RX ADMIN — CYCLOBENZAPRINE HYDROCHLORIDE 10 MG: 10 TABLET, FILM COATED ORAL at 08:53

## 2017-09-15 RX ADMIN — ACETAMINOPHEN 975 MG: 325 TABLET ORAL at 19:54

## 2017-09-15 RX ADMIN — BENZOCAINE AND MENTHOL 1 LOZENGE: 15; 3.6 LOZENGE ORAL at 17:12

## 2017-09-15 RX ADMIN — TRAZODONE HYDROCHLORIDE 50 MG: 50 TABLET ORAL at 21:56

## 2017-09-15 RX ADMIN — LEVOMILNACIPRAN HYDROCHLORIDE 80 MG: 80 CAPSULE, EXTENDED RELEASE ORAL at 08:51

## 2017-09-15 RX ADMIN — CYCLOBENZAPRINE HYDROCHLORIDE 10 MG: 10 TABLET, FILM COATED ORAL at 19:54

## 2017-09-15 RX ADMIN — CEFAZOLIN SODIUM 2 G: 2 INJECTION, SOLUTION INTRAVENOUS at 15:05

## 2017-09-15 RX ADMIN — ACETAMINOPHEN 975 MG: 325 TABLET ORAL at 13:43

## 2017-09-15 RX ADMIN — ABACAVIR SULFATE, DOLUTEGRAVIR SODIUM, LAMIVUDINE 1 TABLET: 600; 50; 300 TABLET, FILM COATED ORAL at 08:50

## 2017-09-15 RX ADMIN — BUPRENORPHINE HYDROCHLORIDE, NALOXONE HYDROCHLORIDE 1 FILM: 4; 1 FILM, SOLUBLE BUCCAL; SUBLINGUAL at 17:57

## 2017-09-15 ASSESSMENT — VISUAL ACUITY
OU: NORMAL ACUITY

## 2017-09-15 ASSESSMENT — PAIN DESCRIPTION - DESCRIPTORS: DESCRIPTORS: DISCOMFORT

## 2017-09-15 NOTE — PROGRESS NOTES
[facility]: PROGRESS NOTE  Bc German : 1980 Sex: male:   Medical record number [id number] Attending Physician: Joseline Mujica*  Date of Service: September 15, 2017         Assessment and Recommendations:   ASSESSMENT:  1. Cervical osteomyelitis with Epidural abscess with compressive phlegmon and cervical myelopathy. -   - IV drug use   - s/p C6 and C7 corpectomy - C5-T1 anterior instrumented fusion (9/10/17)   - vertebra tissue and abscess - positive for Staph aureus MSSA    2. Bacteremia   - Staph aureus and Strep vestibularis   and 9/10  - repeat blood cx  - negative x 2 so far   - TTE 17  - no vegetation seen      3. HIV  562482 (17) and CD4 782 (17)   - admits to missing a few doses , non compliant, sharing IV needles.   - strongly advised him to be compliant with HIV medication   - HIV - ND , CD4 - pending 17   - continue Triumeq      4. History of osteomyelitis L3 and psoas abscess     5. Hepatitis C      6. History of polysubstance abuse  - heroin, marijuana, alcohol, tobacco      7. Homelessness      8. Depression/anxiety   - seeing a psychiatrist      RECOMMENDATION:  1.continue cefazolin 2 gram IV q8 hr . History of IV drug abuse. Social service consult. May need temporary placement for IV antibiotic . Duration 6 weeks.   2. Repeat 2 blood  - negative so far . Follow-up on blood cultures.   3. Social service consult for placement  4. Advise on compliance with medications, stop alcohol, subtance abuse and IV drugs use      Plan discussed with patient and Dr Mujica    ID will continue to follow.   Suyz Roblero MD, M.Med.Sc.  Pager: [phone]     SUBJECTIVE:  Feels better, neck pain is better , ambulating well,  Dexterity is better. No fever, chills, diarrhea. Appetite is ok. Breathing is fine.    A five-point review of systems was obtained and was negative with the exception of that which is described above.  Allergies   Allergen Reactions  "    Doxycycline GI Disturbance     CURRENT ANTI-INFECTIVES:   IV Vancomycin      EXAMINATION:   Vital Signs: BP (!) 137/94 (BP Location: Right arm)  Pulse 117  Temp 97.4  F (36.3  C) (Oral)  Resp 16  Ht 1.803 m (5' 11\")  Wt 63.5 kg (140 lb)  SpO2 100%  BMI 19.53 kg/m2   GENERAL:  Sitting in chair, cervical collar, alert, oriented   HEENT:  Head is normocephalic, atraumatic , EOM intact NATALIA  EYES:  Eyes have anicteric sclerae without conjunctival injection or stigmata of endocarditis.    ENT:  Oropharynx is moist without exudates or ulcers. Tongue is midline  NECK:  Cervical collar +  LUNGS:  Clear to auscultation bilateral.   CARDIOVASCULAR:  Regular rate and rhythm with no murmurs, gallops or rubs.  ABDOMEN:  Normal bowel sounds, soft, nontender. No appreciable hepatosplenomegaly  SKIN:  No acute rashes.   No stigmata of endocarditis. Tattoos+   NEUROLOGIC:  Normal speech, moving all extremities dexterity - improving . Right handed   Extremities: no edema     NEW DATA/RESULTS:     Culture Micro   Date Value Ref Range Status   09/12/2017 No growth after 3 days  Preliminary   09/12/2017 No growth after 3 days  Preliminary   09/10/2017 Culture negative monitoring continues  Preliminary   09/10/2017 Culture negative after 2 days  Preliminary   09/10/2017 Moderate growth  Staphylococcus aureus   (A)  Final       Recent Labs   Lab Test  09/10/17   1946  03/23/15   0837  03/19/15   0640  03/17/15   0748  03/16/15   0707  03/14/15   0905   CRP  47.0*  53.0*  50.0*  71.0*  82.0*  95.0*     Recent Labs   Lab Test  09/13/17   0917  09/11/17   0703  09/10/17   1946  09/09/17   1859  08/10/17   2323  07/24/17   1154   WBC  12.4*  10.7  12.7*  12.8*  18.3*  15.2*     Recent Labs   Lab Test  09/13/17   0917  09/11/17   0703  09/10/17   1946  09/09/17   1859   CR  0.80  0.66  0.76  0.93   GFRESTIMATED  >90  >90  >90  >90       Hematology Studies  Recent Labs   Lab Test  09/13/17   0917  09/11/17   0703  09/10/17   1946  " 09/09/17   1859  08/10/17   2323  07/24/17   1154  04/20/17   1140  02/24/17   1336   WBC  12.4*  10.7  12.7*  12.8*  18.3*  15.2*  8.1  7.6   ANEU   --   8.3   --   7.9  15.3*  10.7*  5.7  3.9   AEOS   --   0.1   --   0.1  0.6  0.0  0.2  0.2   HCT  36.2*  34.2*  36.1*  37.6*  35.6*  45.2  41.4  46.6   PLT  363  315  412  375  384  386  397  276       Metabolic  Recent Labs   Lab Test  09/13/17 0917 09/11/17   0703  09/10/17   1946   NA  135  134  137   BUN  9  12  17   CO2  30  28  24   CR  0.80  0.66  0.76   GFRESTIMATED  >90  >90  >90       Hepatic Studies  Recent Labs   Lab Test  09/10/17   1946  09/09/17   1859  08/10/17   2315   BILITOTAL  0.3  0.2  0.2   ALKPHOS  88  105  113   ALBUMIN  3.1*  3.8  2.9*   AST  42  42  58*   ALT  87*  107*  80*     Immunologlobulins  Recent Labs   Lab Test  09/10/17   1946  03/09/15   0536  03/08/15   1235   SED  46*  72*  63*     Imaging:  CT cervical spine 9/10/17  . Soft tissue thickening of the prevertebral space at the C6-7 level with erosion of the endplates. Findings are better demonstrated on MR earlier 9/10/2017 and concerning for discitis/osteomyelitis.  2. No definite fracture identified.      [Urgent Result: Fracture of the right C6 lamina]    After staff reviewed the images, it was felt that there was no fracture of the right C6 lamina and rather there was volume averaging with the C7 lamina giving the impression of a pseudofracture. The image in question is series 3, image 45.     Finding was identified on 9/10/2017 6:29 PM.      MRI brain w,wo contrast 9/9/17   IMPRESSION: Normal MRI of the brain.     MRA brain wo contrast 9/9/17  IMPRESSION:  Normal MR angiogram of the head.     MRI cervcial spine 9/9/17   IMPRESSION:  1. Pathologic process C6-C7 and intervening disc space and anterior epidural involvement. This results in effacement of the spinal cord at  this level.  2.  [Critical Result: Probable disc space infection with epidural effacement of the spinal  cord at C6-C7.]  Finding was identified on 9/10/2017 2:01 PM.      MRI thoracic spine 9/10/17   Thoracic spine MR is normal. Vertebral bodies are normal. Disc spaces are normal. Alignment is normal. Thoracic spinal cord  appears normal with the conus at T12-L1.    IMPRESSION: Normal MR scan of the thoracic spine including gadolinium-enhanced study.                        TTE 9/13/17   Interpretation Summary  No vegetation or mass identified, however this does not exclude endocarditis. Technically difficult study. There has been no change.

## 2017-09-15 NOTE — PROGRESS NOTES
[facility], [place]   Neurosurgery Progress Note:    Assessment: Bc German is a 37 year old male postoperative day # 4 from C5-T1 anterior instrumented fusion and C6/7 corpectomy for vertebral osteomyelitis and epidural abscess causing cervical spinal cord compression.     Plan:  - Serial neuro exams  - Post-op upright cervical Xrays and CT C/T-spine completed  -  on at all times   - Follow up in Neurosurgery clinic in 2 weeks with Denise Dial for wound check  - Follow up in 6 weeks with Dr. Marv Ambrose in Neurosurgery clinic with MRI with and without contrast of the cervical spine and upright AP/Lateral Xrays of the cervical spine  - Will sign off at this time    Discussed with Neurosurgery chief, who agrees.    Interval History: pain is controlled.  Walking around the unit with therapies.      Objective:   Temp:  [96.8  F (36  C)-99.9  F (37.7  C)] 98.3  F (36.8  C)  Pulse:  [117] 117  Heart Rate:  [] 107  Resp:  [16] 16  BP: (128-156)/() 138/97  SpO2:  [95 %-100 %] 98 %  I/O last 3 completed shifts:  In: 240 [P.O.:240]  Out: 1575 [Urine:1575]    Gen: sitting in bed in no acute distress, C-collar in place   Wound: right neck incision c/d/i, no significant erythema, swelling or drainage   Neurologic:  - Alert & Oriented to person, place, time, and situation  - Follows commands briskly  - Speech fluent, spontaneous. No aphasia or dysarthria.  - No gaze preference. No apparent hemineglect.  - PERRL, EOMI  - Strong eye closure, jaw clench, and cheek puff  - Face symmetric with sensation intact to light touch  - Palate elevates symmetrically, uvula midline, tongue protrudes midline  - Trapezii and sternocleidomastoid muscles 5/5 bilaterally     Del Tr Bi WE WF Gr   R 5 5 5 5 5 5   L 5 5 5 5 5 5    HF KE KF DF PF EHL   R 5 5 5 5 5 5   L 5 5 5 5 5 5     Reflexes 2+ throughout and 3-4 beats of clonus in LLE, upgoing toes on right, mute toes on left  Sensation intact and  symmetric to light touch throughout    Please contact neurosurgery resident on call with questions.    Dial * * [phone], enter [phone] when prompted.

## 2017-09-15 NOTE — PROGRESS NOTES
CC: suboxone initiation    HPI:  37 year old male with history of HIV, IV opioid use disorder, amphetamine use disorder,  Active hepatitis C, Anxiety, Depression,  Admitted with sepsis and Cervical osteomyelitis with Epidural abscess with compressive phlegmon and cervical myelopathy, s/p C6 and C7 corpectomy, C5-T1 anterior instrumented fusion.  Has an extensive history of treatments for opioid use disorder with methadone and suboxone.      Motivated and interested in starting suboxone.      Opiate use history: has been using intravenous heroin for the last 10 years, but in recent months has been trying to avoid IV use but had a relapse and using this several weeks ago.  He has extensive use of cannabis, alcohol and tobacco.   Has been in treatments many times, most recently at [facility] last year.  Has been on suboxone in the past, usually at 16 mg.  Previously has also been in methadone programs.  Last relapse while on suboxone had to do with triggers that include being around people using.      Withdrawal history: many times    ROS: has muscle pains involving shoulders from his brace.  Denies diarrhea or constipation.  Complete ROS otherwise negative    PMHx  Reviewed, includes opioid use disorder, amphetamine use d/o, anxiety, depression    Meds:  reviewed    SHx  Lives with his dad, reports strong relationship. His father does not use.    FHx:  reviewed    Exam:  VSS  Gen: NAD  HEENT: PERRL, no sclera icterus  CV: RRR s1s2 no m  Lungs; CTA B  Ext: no edema    A/P:  38 yo Patient PMHx of depression and anxiety as well as history IV opiate dependence currently admitted with serious bacterial infection, being seen for suboxone induction.     Suboxone plan:  Suboxone dose:  -Start at low dose suboxone with 4 mg film; will uptitrate tomorrow to 8 mg film; further changes as needed, will reassess on Monday.    -Talked extensively that the patient abstain from alcohol, benzos while on suboxone which can cause  overdose.  -Other substances: has used meth in the past, will monitor in clinic once discharged  -Checked CMP and hepatitis labs, normal  - suboxone film, to prevent acute withdrawal from any potential opioids that could still be in her system.     If after starting the suboxone, the patient wishes to discontinue it, please page me at [phone] or contact me via suboxone clinic coordinator Juani Peguero, [phone] for taper plan.     Anxiety:  Avoid benzos: if continued anxiety: consider: atarax 25 mg TID prn, gabapentin 300 mg TID prn.    Hepatitis C: would benefit from treatment once stable for several mo on suboxone.    Disposition: Will need several weeks of IV antibiotics, and also appears to need rehab.  Would be a good candidate for TCU if available once stable on suboxone (1-2 days)   If suboxone is working well for the patient, outpatient follow up plan must be in place prior to discharge.  he is interested in following up at the [facility] ([address] [facility], [phone]) for outpatient suboxone treatment.  - His discharge will need to be coordinated with the clinic, in order for outpatient prior authorization to be completed.  Several days prior to discharge, please contact suboxone clinic coordinator Juani Peguero, [phone] to coordinate.      Additional meds:   Constipation:marcelino rowe MD  Internal Medicine/Pediatrics Hospitalist & Staff Physician   of Internal Medicine and Pediatrics  [facility]  Pager: [phone]    9/18 addendum:   Discussed with primary team, psych.  0.5 mg clonazepam BID with close monitoring is appropriate.  Would avoid short acting benzos.  If needed, can establish care with psychiatry at [facility] at discharge (I will help coordinate).  Khari Barrientos md

## 2017-09-15 NOTE — PROGRESS NOTES
[facility]: PROGRESS NOTE  Bc German : 1980 Sex: male:   Medical record number [id number] Attending Physician: Joseline Mujica*  Date of Service: 2017         Assessment and Recommendations:   ASSESSMENT:  1. Cervical osteomyelitis with Epidural abscess with compressive phlegmon and cervical myelopathy. -   - IV drug use   - s/p C6 and C7 corpectomy - C5-T1 anterior instrumented fusion (9/10/17)   - vertebra tissue and abscess - positive for Staph aureus MSSA    2. Bacteremia   - Staph aureus and Strep vestibularis   and 9/10  - repeat blood cx  - negative x 2 so far   - TTE 17  - no vegetation seen      3. HIV  (041205 (17) and CD4 782 (17)   - admits to missing a few doses , non compliant, sharing IV needles.   - strongly advised him to be compliant with HIV medication   - HIV - ND , CD4 - pending 17   - continue Triumeq      4. History of osteomyelitis L3 and psoas abscess     5. Hepatitis C      6. History of polysubstance abuse  - heroin, marijuana, alcohol, tobacco      7. Homelessness      8. Depression/anxiety   - seeing a psychiatrist      RECOMMENDATION:  1.continue cefazolin 2 gram IV q8 hr . History of IV drug abuse. Social service consult. May need temporary placement for IV antibiotic . Duration 6 weeks.   2. Repeat 2 blood  - negative so far . Follow-up on blood cultures.   3. Social service consult for placement  4. Advise on compliance with medications, stop alcohol, subtance abuse and IV drugs use      Plan discussed with patient and Dr Mujica    ID will continue to follow.   Suzy Roblero MD, M.Med.Sc.  Pager: [phone]     SUBJECTIVE:  Feels better, neck pain is better , ambulating well,  Dexterity is better. No fever, chills, diarrhea. Appetite is ok. Breathing is fine.    A five-point review of systems was obtained and was negative with the exception of that which is described above.  Allergies   Allergen  "Reactions     Doxycycline GI Disturbance     CURRENT ANTI-INFECTIVES:   IV Vancomycin      EXAMINATION:   Vital Signs: BP (!) 138/97 (BP Location: Right arm)  Pulse 117  Temp 98.3  F (36.8  C) (Oral)  Resp 16  Ht 1.803 m (5' 11\")  Wt 63.5 kg (140 lb)  SpO2 98%  BMI 19.53 kg/m2   GENERAL:  Sitting in chair, cervical collar, alert, oriented   HEENT:  Head is normocephalic, atraumatic , EOM intact NATALIA  EYES:  Eyes have anicteric sclerae without conjunctival injection or stigmata of endocarditis.    ENT:  Oropharynx is moist without exudates or ulcers. Tongue is midline  NECK:  Cervical collar +  LUNGS:  Clear to auscultation bilateral.   CARDIOVASCULAR:  Regular rate and rhythm with no murmurs, gallops or rubs.  ABDOMEN:  Normal bowel sounds, soft, nontender. No appreciable hepatosplenomegaly  SKIN:  No acute rashes.   No stigmata of endocarditis. Tattoos+   NEUROLOGIC:  Normal speech, moving all extremities dexterity - improving . Right handed   Extremities: no edema     NEW DATA/RESULTS:     Culture Micro   Date Value Ref Range Status   09/12/2017 No growth after 2 days  Preliminary   09/12/2017 No growth after 2 days  Preliminary   09/10/2017 Culture negative monitoring continues  Preliminary   09/10/2017 Culture negative after 2 days  Preliminary   09/10/2017 Moderate growth  Staphylococcus aureus   (A)  Final       Recent Labs   Lab Test  09/10/17   1946  03/23/15   0837  03/19/15   0640  03/17/15   0748  03/16/15   0707  03/14/15   0905   CRP  47.0*  53.0*  50.0*  71.0*  82.0*  95.0*     Recent Labs   Lab Test  09/13/17   0917  09/11/17   0703  09/10/17   1946  09/09/17   1859  08/10/17   2323  07/24/17   1154   WBC  12.4*  10.7  12.7*  12.8*  18.3*  15.2*     Recent Labs   Lab Test  09/13/17   0917  09/11/17   0703  09/10/17   1946  09/09/17   1859   CR  0.80  0.66  0.76  0.93   GFRESTIMATED  >90  >90  >90  >90       Hematology Studies  Recent Labs   Lab Test  09/13/17   0917  09/11/17   0703  " 09/10/17   1946  09/09/17   1859  08/10/17   2323  07/24/17   1154  04/20/17   1140  02/24/17   1336   WBC  12.4*  10.7  12.7*  12.8*  18.3*  15.2*  8.1  7.6   ANEU   --   8.3   --   7.9  15.3*  10.7*  5.7  3.9   AEOS   --   0.1   --   0.1  0.6  0.0  0.2  0.2   HCT  36.2*  34.2*  36.1*  37.6*  35.6*  45.2  41.4  46.6   PLT  363  315  412  375  384  386  397  276       Metabolic  Recent Labs   Lab Test  09/13/17 0917 09/11/17   0703  09/10/17   1946   NA  135  134  137   BUN  9  12  17   CO2  30  28  24   CR  0.80  0.66  0.76   GFRESTIMATED  >90  >90  >90       Hepatic Studies  Recent Labs   Lab Test  09/10/17   1946  09/09/17   1859  08/10/17   2315   BILITOTAL  0.3  0.2  0.2   ALKPHOS  88  105  113   ALBUMIN  3.1*  3.8  2.9*   AST  42  42  58*   ALT  87*  107*  80*     Immunologlobulins  Recent Labs   Lab Test  09/10/17   1946  03/09/15   0536  03/08/15   1235   SED  46*  72*  63*     Imaging:  CT cervical spine 9/10/17  . Soft tissue thickening of the prevertebral space at the C6-7 level with erosion of the endplates. Findings are better demonstrated on MR earlier 9/10/2017 and concerning for discitis/osteomyelitis.  2. No definite fracture identified.      [Urgent Result: Fracture of the right C6 lamina]    After staff reviewed the images, it was felt that there was no fracture of the right C6 lamina and rather there was volume averaging with the C7 lamina giving the impression of a pseudofracture. The image in question is series 3, image 45.     Finding was identified on 9/10/2017 6:29 PM.      MRI brain w,wo contrast 9/9/17   IMPRESSION: Normal MRI of the brain.     MRA brain wo contrast 9/9/17  IMPRESSION:  Normal MR angiogram of the head.     MRI cervcial spine 9/9/17   IMPRESSION:  1. Pathologic process C6-C7 and intervening disc space and anterior epidural involvement. This results in effacement of the spinal cord at  this level.  2.  [Critical Result: Probable disc space infection with epidural  effacement of the spinal cord at C6-C7.]  Finding was identified on 9/10/2017 2:01 PM.      MRI thoracic spine 9/10/17   Thoracic spine MR is normal. Vertebral bodies are normal. Disc spaces are normal. Alignment is normal. Thoracic spinal cord  appears normal with the conus at T12-L1.    IMPRESSION: Normal MR scan of the thoracic spine including gadolinium-enhanced study.                        TTE 9/13/17   Interpretation Summary  No vegetation or mass identified, however this does not exclude endocarditis. Technically difficult study. There has been no change.

## 2017-09-15 NOTE — PLAN OF CARE
Problem: Goal Outcome Summary  Goal: Goal Outcome Summary  PT - per plan established by the Physical Therapist, according to functional mobility the  discharge recommendation is TCU for cont skilled PT to progress safe functional mobility. Pt is limited by general weakness and fatigue.   Discharge Planner PT   Patient plan for discharge: home  Current status: see above  Barriers to return to prior living situation: weakness and fatigue.   Recommendations for discharge: TCU  Rationale for recommendations: pt is not safe for functional mobility due to weakness and fatigue. Pt benefit from skilled PT to progress pt to return to prior living.        Entered by: John Curtis 09/15/2017 12:04 PM

## 2017-09-15 NOTE — PLAN OF CARE
Problem: Goal Outcome Summary  Goal: Goal Outcome Summary  OT/6A: Pt with good participation during OT session.  To increase functional endurance for ADLs/IADLs, pt completing standing BUE aerobic conditioning exercise within precautions x14 minutes. Pt with no increase in pain with UE ROM. Pt ambulating 500 feet with SBA and use of fww with one seated rest break. While ambulating, educated in energy conservation techniques and gradual increase in activity to prevent extreme fatigue. Pt compliant with collar and following all precautions this session.     REC: TCU to increase strength and endurance for ADL/IADL performance

## 2017-09-15 NOTE — PLAN OF CARE
Problem: Mobility, Physical Impaired (Adult)  Goal: Identify Related Risk Factors and Signs and Symptoms  Related risk factors and signs and symptoms are identified upon initiation of Human Response Clinical Practice Guideline (CPG)   Outcome: No Change  VSS. A&Ox4. Neuros unchanged with n/t to bilateral hands and BLE, decreased sensation in thighs. Pain managed with flexeril. Neck incision intact, c-collar on at all time. Voiding via urinal. BS+. Triple lumen CVC . Up SBA/GB. Reg. diet. Cont to monitor and with POC.

## 2017-09-15 NOTE — PROGRESS NOTES
[facility], [place]    Hospitalist Progress Note    Date of Service (when I saw the patient): 09/15/2017    Assessment & Plan       1. Cervical osteomyelitis with Epidural abscess with compressive phlegmon and cervical myelopathy, s/p C6 and C7 corpectomy, C5-T1 anterior instrumented fusion     - Ho IV drug use, substance abuse, HIV positive  - vertebra tissue and abscess - positive for Staph aureus   - Its MSSA. Blood culture grew Strep  Vestibularis too.   - PT, OT, has brace in place  - pain control: Avodi use of dilaudid. Only sparingly we will give dilaudid. Start on flexeril for muscle spasms. Has tyleno and lyrica on board as well. No NSIDs. Can use lidocaine patches on the normal skin around the incision to block the nerves.  - plan to start him on Suboxone at DC   - heat pad     2. MSSA Bacteremia     - Staph aureus and Stre vestibularis  9/9 and 9/10  - FU repeat BC  - TTE negative for endocarditis.   - Neurosurgery was okay for him to do neck flexion to a degree required for intubation per report from the nursing. I paged Dr Miguel Ortiz but I did not hear from him directly.   - He has Left IJ central line.   - Home Ab infusion may not accept him with PICC line.   - plan for TCU with PICC and IV Ab per ID recs. Likely Ancef    3. HIV  (472349 (7/24/17) and CD4 782 (7/24/17)     - non compliant in the past, sharing IV needles.   - continue Triumeq   - HIV viral load undetectable, CD4 pending      4. History of osteomyelitis L3 and psoas abscess in the past      5. Hepatitis C       6. History of polysubstance abuse  - heroin, marijuana, alcohol, tobacco   - plan tos start suboxone for treatment.       7. Homelessness        8. Depression/anxiety     - seeing a psychiatrist     9. Tachycardia is chronic in nature. He is making good urine. BP is good.He is not in sepsis. No need to draw a LA      DVT Prophylaxis: SCDs    Code Status: Full Code    Disposition: Expected discharge  in 2 days once infection under better control.    Joseline Mujica MD    Interval History :    Improving every day. Pain is controlled. Co neck muscle being sore. Some spasms    -Data reviewed today: I reviewed all new labs and imaging results over the last 24 hours. I personally reviewed no images or EKG's today.    Physical Exam   Temp: 97.4  F (36.3  C) Temp src: Oral BP: (!) 137/94 Pulse: 117 Heart Rate: 91 Resp: 16 SpO2: 100 % O2 Device: None (Room air)    Vitals:    09/09/17 1732   Weight: 63.5 kg (140 lb)     Vital Signs with Ranges  Temp:  [96.8  F (36  C)-99.9  F (37.7  C)] 97.4  F (36.3  C)  Pulse:  [117] 117  Heart Rate:  [] 91  Resp:  [16] 16  BP: (121-156)/() 137/94  SpO2:  [98 %-100 %] 100 %  I/O last 3 completed shifts:  In: 660 [P.O.:660]  Out: 1375 [Urine:1375]    Constitutional: Awake, alert, cooperative, no apparent distress  Respiratory: Clear to auscultation bilaterally, no crackles or wheezing  Cardiovascular: Regular rate and rhythm, normal S1 and S2, and no murmur noted  GI: Normal bowel sounds, soft, non-distended, non-tender  Skin/Integumen: No rashes, no cyanosis, no edema    Medications        cyclobenzaprine  10 mg Oral TID     acetaminophen  975 mg Oral TID     ceFAZolin  2 g Intravenous Q8H     lidocaine  1-3 patch Transdermal Q24H     sodium chloride (PF)  3 mL Intracatheter Q8H     senna-docusate  1-2 tablet Oral BID     polyethylene glycol  17 g Oral Daily     pregabalin  300 mg Oral BID     traZODone  50 mg Oral At Bedtime     abacavir-dolutegravir-LamiVUDine  1 tablet Oral Daily     levomilnacipran  80 mg Oral Daily     lidocaine   Transdermal Q24h     lidocaine   Transdermal Q8H       Data     Recent Labs  Lab 09/13/17  0917 09/11/17  1700 09/11/17  0703 09/10/17  1946 09/09/17  1859   WBC 12.4*  --  10.7 12.7* 12.8*   HGB 11.2*  --  10.9* 11.7* 12.1*   MCV 88  --  86 86 86     --  315 412 375     --  134 137 144   POTASSIUM 3.4 3.8 3.3* 3.8 4.0    CHLORIDE 100  --  101 105 107   CO2 30  --  28 24 27   BUN 9  --  12 17 18   CR 0.80  --  0.66 0.76 0.93   ANIONGAP 5  --  5 8 10   SANDRA 9.7  --  8.6 8.7 9.7   *  --  148* 98 95   ALBUMIN  --   --   --  3.1* 3.8   PROTTOTAL  --   --   --  7.8 9.2*   BILITOTAL  --   --   --  0.3 0.2   ALKPHOS  --   --   --  88 105   ALT  --   --   --  87* 107*   AST  --   --   --  42 42       No results found for this or any previous visit (from the past 24 hour(s)).

## 2017-09-16 ENCOUNTER — APPOINTMENT (OUTPATIENT)
Dept: OCCUPATIONAL THERAPY | Facility: CLINIC | Age: 37
DRG: 471 | End: 2017-09-16
Attending: INTERNAL MEDICINE
Payer: COMMERCIAL

## 2017-09-16 LAB
CREAT SERPL-MCNC: 0.86 MG/DL (ref 0.66–1.25)
GFR SERPL CREATININE-BSD FRML MDRD: >90 ML/MIN/1.7M2
PLATELET # BLD AUTO: 386 10E9/L (ref 150–450)

## 2017-09-16 PROCEDURE — 82565 ASSAY OF CREATININE: CPT | Performed by: STUDENT IN AN ORGANIZED HEALTH CARE EDUCATION/TRAINING PROGRAM

## 2017-09-16 PROCEDURE — 25000128 H RX IP 250 OP 636: Performed by: HOSPITALIST

## 2017-09-16 PROCEDURE — 25000132 ZZH RX MED GY IP 250 OP 250 PS 637: Performed by: INTERNAL MEDICINE

## 2017-09-16 PROCEDURE — 85049 AUTOMATED PLATELET COUNT: CPT | Performed by: STUDENT IN AN ORGANIZED HEALTH CARE EDUCATION/TRAINING PROGRAM

## 2017-09-16 PROCEDURE — 97535 SELF CARE MNGMENT TRAINING: CPT | Mod: GO | Performed by: OCCUPATIONAL THERAPIST

## 2017-09-16 PROCEDURE — 40000133 ZZH STATISTIC OT WARD VISIT: Performed by: OCCUPATIONAL THERAPIST

## 2017-09-16 PROCEDURE — 25000132 ZZH RX MED GY IP 250 OP 250 PS 637: Performed by: STUDENT IN AN ORGANIZED HEALTH CARE EDUCATION/TRAINING PROGRAM

## 2017-09-16 PROCEDURE — 99232 SBSQ HOSP IP/OBS MODERATE 35: CPT | Performed by: HOSPITALIST

## 2017-09-16 PROCEDURE — 25000132 ZZH RX MED GY IP 250 OP 250 PS 637: Performed by: HOSPITALIST

## 2017-09-16 PROCEDURE — 25000132 ZZH RX MED GY IP 250 OP 250 PS 637: Performed by: NURSE PRACTITIONER

## 2017-09-16 PROCEDURE — 36415 COLL VENOUS BLD VENIPUNCTURE: CPT | Performed by: STUDENT IN AN ORGANIZED HEALTH CARE EDUCATION/TRAINING PROGRAM

## 2017-09-16 PROCEDURE — 97530 THERAPEUTIC ACTIVITIES: CPT | Mod: GO | Performed by: OCCUPATIONAL THERAPIST

## 2017-09-16 PROCEDURE — 12000001 ZZH R&B MED SURG/OB UMMC

## 2017-09-16 RX ADMIN — TRAZODONE HYDROCHLORIDE 50 MG: 50 TABLET ORAL at 21:49

## 2017-09-16 RX ADMIN — CEFAZOLIN SODIUM 2 G: 2 INJECTION, SOLUTION INTRAVENOUS at 05:45

## 2017-09-16 RX ADMIN — PREGABALIN 300 MG: 75 CAPSULE ORAL at 20:00

## 2017-09-16 RX ADMIN — CYCLOBENZAPRINE HYDROCHLORIDE 10 MG: 10 TABLET, FILM COATED ORAL at 08:05

## 2017-09-16 RX ADMIN — ABACAVIR SULFATE, DOLUTEGRAVIR SODIUM, LAMIVUDINE 1 TABLET: 600; 50; 300 TABLET, FILM COATED ORAL at 08:05

## 2017-09-16 RX ADMIN — CYCLOBENZAPRINE HYDROCHLORIDE 10 MG: 10 TABLET, FILM COATED ORAL at 13:43

## 2017-09-16 RX ADMIN — PREGABALIN 300 MG: 75 CAPSULE ORAL at 08:04

## 2017-09-16 RX ADMIN — SENNOSIDES AND DOCUSATE SODIUM 2 TABLET: 8.6; 5 TABLET ORAL at 08:05

## 2017-09-16 RX ADMIN — CEFAZOLIN SODIUM 2 G: 2 INJECTION, SOLUTION INTRAVENOUS at 15:30

## 2017-09-16 RX ADMIN — LEVOMILNACIPRAN HYDROCHLORIDE 80 MG: 80 CAPSULE, EXTENDED RELEASE ORAL at 08:05

## 2017-09-16 RX ADMIN — CYCLOBENZAPRINE HYDROCHLORIDE 10 MG: 10 TABLET, FILM COATED ORAL at 20:00

## 2017-09-16 RX ADMIN — BUPRENORPHINE HYDROCHLORIDE, NALOXONE HYDROCHLORIDE 1 FILM: 8; 2 FILM, SOLUBLE BUCCAL; SUBLINGUAL at 08:04

## 2017-09-16 RX ADMIN — CEFAZOLIN SODIUM 2 G: 2 INJECTION, SOLUTION INTRAVENOUS at 23:14

## 2017-09-16 ASSESSMENT — VISUAL ACUITY
OU: NORMAL ACUITY

## 2017-09-16 NOTE — PLAN OF CARE
Problem: Goal Outcome Summary  Goal: Goal Outcome Summary  PT 6A: Attempted to see pt this afternoon. Pt declined due to being too tired to participate.  Was upset this morning and wanted to get his anxiety under control this afternoon.  Pt scheduled next on 9/17/17.

## 2017-09-16 NOTE — PLAN OF CARE
Problem: Goal Outcome Summary  Goal: Goal Outcome Summary  Pt is A&O X4. POD#5 from C5 - T1 anterior fusion and corpectomy, drainage of epidural abscess. C-Collar on all the time, skin under the collar intact. Anterior neck incision with durmarbond LISSETH, no drainage noted. Neuro unchanged. Pain managed with Tylenol, subboxone and flexiril. On a regular diet, good appetite. Transfer SBA, ambulated with a walker. Urinal voiding spontaneously. Pt requested to have a counsellor for his depression, MD notified. Calm and cooperative. CVC triple lumen patent. When medically stable, pt will be D/C to TCU. Will continue to monitor and follow POC.

## 2017-09-16 NOTE — PROGRESS NOTES
[facility], [place]    Hospitalist Progress Note    Date of Service (when I saw the patient): 09/16/2017    Assessment & Plan       1. Cervical osteomyelitis with Epidural abscess with compressive phlegmon and cervical myelopathy, s/p C6 and C7 corpectomy, C5-T1 anterior instrumented fusion     - Ho IV drug use, substance abuse, HIV positive  - vertebra tissue and abscess - positive for Staph aureus   - Its MSSA. Blood culture grew Strep  Vestibularis too.   - PT, OT, has brace in place  - pain control: Avodi use of dilaudid. Only sparingly we will give dilaudid. Start on flexeril for muscle spasms. Has tyleno and lyrica on board as well. No NSIDs. Can use lidocaine patches on the normal skin around the incision to block the nerves.  - plan to start him on Suboxone at DC   - heat pad     2. MSSA Bacteremia     - Staph aureus and Stre vestibularis  9/9 and 9/10  - FU repeat BC  - TTE negative for endocarditis.   - Neurosurgery was okay for him to do neck flexion to a degree required for intubation per report from the nursing. I paged Dr Miguel Ortiz but I did not hear from him directly.   - He has Left IJ central line.   - Home Ab infusion may not accept him with PICC line.   - plan for TCU with PICC and IV Ab per ID recs. Likely Ancef    3. HIV  (273041 (7/24/17) and CD4 782 (7/24/17)     - non compliant in the past, sharing IV needles.   - continue Triumeq   - HIV viral load undetectable, CD4 pending      4. History of osteomyelitis L3 and psoas abscess in the past      5. Hepatitis C       6. History of polysubstance abuse  - heroin, marijuana, alcohol, tobacco   - plan tos start suboxone for treatment.       7. Homelessness        8. Depression/anxiety     - seeing a psychiatrist   - seen by psychiatry today, they feel that adding clonazepam may help. But he is on suboxone, we will leave to Dr Iliana Lucia to say okay for adding clonazepam    9. Tachycardia is chronic in nature. He is  making good urine. BP is good.He is not in sepsis. No need to draw a LA      DVT Prophylaxis: SCDs    Code Status: Full Code    Disposition: Expected discharge in 2 days once infection under better control.    Joseline Mujica MD    Interval History :    Reports that he is anxious and may slip back to drug use if he does not get something for anxiety. He does not want to kill himself though.       -Data reviewed today: I reviewed all new labs and imaging results over the last 24 hours. I personally reviewed no images or EKG's today.    Physical Exam   Temp: 99.3  F (37.4  C) Temp src: Oral BP: (!) 133/96 Pulse: 110   Resp: 18 SpO2: 97 % O2 Device: None (Room air)    Vitals:    09/09/17 1732   Weight: 63.5 kg (140 lb)     Vital Signs with Ranges  Temp:  [95.7  F (35.4  C)-99.3  F (37.4  C)] 99.3  F (37.4  C)  Pulse:  [] 110  Resp:  [16-18] 18  BP: (123-145)/(78-98) 133/96  SpO2:  [97 %-99 %] 97 %  I/O last 3 completed shifts:  In: 1100 [P.O.:1080; I.V.:20]  Out: 2000 [Urine:2000]    Constitutional: Awake, alert, cooperative, no apparent distress  Respiratory: Clear to auscultation bilaterally, no crackles or wheezing  Cardiovascular: Regular rate and rhythm, normal S1 and S2, and no murmur noted  GI: Normal bowel sounds, soft, non-distended, non-tender  Skin/Integumen: No rashes, no cyanosis, no edema    Medications        cyclobenzaprine  10 mg Oral TID     buprenorphine HCl-naloxone HCl  1 Film Sublingual Daily     acetaminophen  975 mg Oral TID     ceFAZolin  2 g Intravenous Q8H     lidocaine  1-3 patch Transdermal Q24H     sodium chloride (PF)  3 mL Intracatheter Q8H     senna-docusate  1-2 tablet Oral BID     polyethylene glycol  17 g Oral Daily     pregabalin  300 mg Oral BID     traZODone  50 mg Oral At Bedtime     abacavir-dolutegravir-LamiVUDine  1 tablet Oral Daily     levomilnacipran  80 mg Oral Daily     lidocaine   Transdermal Q24h     lidocaine   Transdermal Q8H       Data     Recent Labs  Lab  09/16/17  0736 09/13/17 0917 09/11/17  1700 09/11/17  0703 09/10/17  1946 09/09/17  1859   WBC  --  12.4*  --  10.7 12.7* 12.8*   HGB  --  11.2*  --  10.9* 11.7* 12.1*   MCV  --  88  --  86 86 86    363  --  315 412 375   NA  --  135  --  134 137 144   POTASSIUM  --  3.4 3.8 3.3* 3.8 4.0   CHLORIDE  --  100  --  101 105 107   CO2  --  30  --  28 24 27   BUN  --  9  --  12 17 18   CR 0.86 0.80  --  0.66 0.76 0.93   ANIONGAP  --  5  --  5 8 10   SANDRA  --  9.7  --  8.6 8.7 9.7   GLC  --  115*  --  148* 98 95   ALBUMIN  --   --   --   --  3.1* 3.8   PROTTOTAL  --   --   --   --  7.8 9.2*   BILITOTAL  --   --   --   --  0.3 0.2   ALKPHOS  --   --   --   --  88 105   ALT  --   --   --   --  87* 107*   AST  --   --   --   --  42 42       No results found for this or any previous visit (from the past 24 hour(s)).

## 2017-09-16 NOTE — CONSULTS
Dr Mujica approached me for an informal consultation. Mr Maxi had mentioned that he would not feel safe leaving the hospital at this time.   Denies suicidal thoughts, but would be afraid of relapse into drug use of he does not have something for anxiety. Mentions being tapered off Xanax with Klonopin. Was doing well on 0.5 mg BID.   I don't see a problem with using Klonopin 0.5 mg BID (but would d/c Flexeril, a bit redundant if on benzodiazepine), but will need to defer to Dr Barrientos since he is prescribing the Suboxone.   page me at [phone] as needed

## 2017-09-16 NOTE — PLAN OF CARE
Problem: Goal Outcome Summary  Goal: Goal Outcome Summary  OT 6A: Currently recommend discharge to TCU once medically appropriate to progress safety and independence with mobility and self cares.  Pt is progressing well with therapy regarding activity tolerance, ambulation remains mildly unsteady though beginning to want to leave walker behind.  Defer to PT for further gait assessment.  Initiated education on bathing strategies though did not complete shower this day.  Pt ambulated to gift/coffee shop in hospital lobby with SBA-CGA and FWW; became agitated when he was unable to purchase his desired items.  Increased impulsivity with agitation, makes statements regarding being fearful of what he will do and how he will cope once d/c'd from the hospital, lacks coping strategies and poor anger management.  Expresses feeling overwhelmed by his anxiety and depression and does not know how to cope outside of using drugs.  Pt is receptive to recommendation for additional psych visit; pt also states health psych may be helpful.

## 2017-09-16 NOTE — PLAN OF CARE
Problem: Goal Outcome Summary  Goal: Goal Outcome Summary  Outcome: No Change  POD#6 from C5 - T1 anterior fusion and corpectomy, drainage of epidural abscess. C-Collar on all the time, skin under the collar intact. Anterior neck incision LISSETH, no drainage noted. VSS, ex tachycardic at times. A&Ox4. Neuro's include numbness/ingling in bilaeral fingertips and bilateral feet w/ movement. Pain managed with subboxone and flexiril. Refusing Tylenol. On a regular diet, good appetite. Transfer SBA, ambulated with a walker. Urinal voiding spontaneously. Pt requested to have a counsellor for his depression, MD notified. Social work consult placed and pt is scared about what he will do after discharge, pt want to join a support/therapy group. Calm and cooperative. CVC triple lumen patent; SL. When medically stable, pt will be D/C to TCU. Will continue to monitor and follow POC.

## 2017-09-16 NOTE — PROGRESS NOTES
Problem: Goal Outcome Summary  Goal: Goal Outcome Summary  [id number] shift  VSS, afebrile, on room air.  A&O X4. Denies nausea. Denies need for pain medication. C-Collar on all the time, skin under the collar intact. Anterior neck incision  LISSETH, no drainage noted. Neuro unchanged. Slept most of night. Transfer SBA, ambulated with a walker. Voiding good volumes.  Last bowel movement 9/14.  Calm and cooperative. CVC triple lumen patent.  Will continue to monitor and follow POC.

## 2017-09-17 PROCEDURE — 25000132 ZZH RX MED GY IP 250 OP 250 PS 637: Performed by: NURSE PRACTITIONER

## 2017-09-17 PROCEDURE — 25000128 H RX IP 250 OP 636: Performed by: HOSPITALIST

## 2017-09-17 PROCEDURE — 25000132 ZZH RX MED GY IP 250 OP 250 PS 637: Performed by: INTERNAL MEDICINE

## 2017-09-17 PROCEDURE — 99232 SBSQ HOSP IP/OBS MODERATE 35: CPT | Performed by: HOSPITALIST

## 2017-09-17 PROCEDURE — 12000001 ZZH R&B MED SURG/OB UMMC

## 2017-09-17 PROCEDURE — 25000132 ZZH RX MED GY IP 250 OP 250 PS 637: Performed by: STUDENT IN AN ORGANIZED HEALTH CARE EDUCATION/TRAINING PROGRAM

## 2017-09-17 PROCEDURE — 25000132 ZZH RX MED GY IP 250 OP 250 PS 637: Performed by: HOSPITALIST

## 2017-09-17 RX ADMIN — CEFAZOLIN SODIUM 2 G: 2 INJECTION, SOLUTION INTRAVENOUS at 22:30

## 2017-09-17 RX ADMIN — PREGABALIN 300 MG: 75 CAPSULE ORAL at 19:46

## 2017-09-17 RX ADMIN — CYCLOBENZAPRINE HYDROCHLORIDE 10 MG: 10 TABLET, FILM COATED ORAL at 13:43

## 2017-09-17 RX ADMIN — PREGABALIN 300 MG: 75 CAPSULE ORAL at 08:35

## 2017-09-17 RX ADMIN — CEFAZOLIN SODIUM 2 G: 2 INJECTION, SOLUTION INTRAVENOUS at 05:33

## 2017-09-17 RX ADMIN — TRAZODONE HYDROCHLORIDE 50 MG: 50 TABLET ORAL at 22:30

## 2017-09-17 RX ADMIN — BUPRENORPHINE HYDROCHLORIDE, NALOXONE HYDROCHLORIDE 1 FILM: 8; 2 FILM, SOLUBLE BUCCAL; SUBLINGUAL at 08:35

## 2017-09-17 RX ADMIN — CYCLOBENZAPRINE HYDROCHLORIDE 10 MG: 10 TABLET, FILM COATED ORAL at 19:47

## 2017-09-17 RX ADMIN — CEFAZOLIN SODIUM 2 G: 2 INJECTION, SOLUTION INTRAVENOUS at 13:44

## 2017-09-17 RX ADMIN — ABACAVIR SULFATE, DOLUTEGRAVIR SODIUM, LAMIVUDINE 1 TABLET: 600; 50; 300 TABLET, FILM COATED ORAL at 08:35

## 2017-09-17 RX ADMIN — CYCLOBENZAPRINE HYDROCHLORIDE 10 MG: 10 TABLET, FILM COATED ORAL at 08:35

## 2017-09-17 RX ADMIN — SENNOSIDES AND DOCUSATE SODIUM 2 TABLET: 8.6; 5 TABLET ORAL at 08:35

## 2017-09-17 RX ADMIN — LEVOMILNACIPRAN HYDROCHLORIDE 80 MG: 80 CAPSULE, EXTENDED RELEASE ORAL at 08:35

## 2017-09-17 ASSESSMENT — VISUAL ACUITY
OU: NORMAL ACUITY

## 2017-09-17 NOTE — PROGRESS NOTES
[facility]: PROGRESS NOTE  Bc German : 1980 Sex: male:   Medical record number [id number] Attending Physician: Joseline Mujica*  Date of Service: 2017         Assessment and Recommendations:     Bc German is a 37 year old male with history of HIV on Triumeq ( abacavir/dolutegravir/lamivudine) with HIV 740553 (17) and CD4 782 (17) , hepatitis C, poly subtance abuse, IV drug ( heroin) use, history of osteomyelitis L3 and psoas abscess, homeless, depression, anxiety, tobacco, alcohol use, admitted on 9/10/10 with progressive gait unsteadiness , loss of dexterity , falls and neck pain since 1.5 weeks prior to admission    ASSESSMENT:  1. Cervical osteomyelitis with Epidural abscess with compressive phlegmon and cervical myelopathy. -   - IV drug use   - s/p C6 and C7 corpectomy - C5-T1 anterior instrumented fusion (9/10/17)   - vertebra tissue and abscess - positive for Staph aureus MSSA    2. Bacteremia   - Staph aureus and Strep vestibularis   and 9/10  - repeat blood cx  - negative x 2 so far   - TTE 17  - no vegetation seen      3. HIV  196239 (17) and CD4 782 (17)   - admits to missing a few doses , non compliant, sharing IV needles.   - strongly advised him to be compliant with HIV medication   - HIV - ND 17   - continue Triumeq      4. History of osteomyelitis L3 and psoas abscess     5. Hepatitis C      6. History of polysubstance abuse  - heroin, marijuana, alcohol, tobacco      7. Homelessness      8. Depression/anxiety   - seeing a psychiatrist      RECOMMENDATION:  1.continue cefazolin 2 gram IV q8 hr . History of IV drug abuse. Social service consult. May need temporary placement for IV antibiotic . Duration 6 weeks ( from 17)   2. Repeat 2 blood  - negative    3. Social service consult for placement  4. Advise on compliance with medications, stop alcohol, subtance abuse and IV drugs use   5. Psychiatry  "consult   6. Consider Removing left IJ central line and place PICC      ID will continue to follow this patient. This patient will be transferred to the [facility] ID Service on Monday for ongoing ID services. Please contact the [facility] ID Service faculty for any further questions on this patient after 8AM Monday morning.     Suzy Roblero MD, M.Med.Sc.  Pager: [phone]     SUBJECTIVE:  Feels better, neck pain is better , ambulating well,  Dexterity is better. No fever, chills, diarrhea. Appetite is ok. Breathing is fine. Calm today.       Allergies   Allergen Reactions     Doxycycline GI Disturbance     CURRENT ANTI-INFECTIVES:   IV Vancomycin      EXAMINATION:   Vital Signs: /82 (BP Location: Right arm)  Pulse 99  Temp 97  F (36.1  C) (Oral)  Resp 16  Ht 1.803 m (5' 11\")  Wt 63.5 kg (140 lb)  SpO2 99%  BMI 19.53 kg/m2   GENERAL:  Sitting in chair, cervical collar, alert, oriented , seems upset with a gift card and his antianxiety meds    HEENT:  Head is normocephalic, atraumatic , EOM intact NATALIA  EYES:  Eyes have anicteric sclerae without conjunctival injection or stigmata of endocarditis.    ENT:  Oropharynx is moist without exudates or ulcers. Tongue is midline  NECK:  Cervical collar +  LUNGS:  Clear to auscultation bilateral.   CARDIOVASCULAR:  Regular rate and rhythm with no murmurs, gallops or rubs.  ABDOMEN:  Normal bowel sounds, soft, nontender. No appreciable hepatosplenomegaly  SKIN:  No acute rashes.   No stigmata of endocarditis. Tattoos+   NEUROLOGIC:  Normal speech, moving all extremities dexterity - improving . Right handed   Extremities: no edema     NEW DATA/RESULTS:     Culture Micro   Date Value Ref Range Status   09/12/2017 No growth after 5 days  Preliminary   09/12/2017 No growth after 5 days  Preliminary   09/10/2017 Culture negative monitoring continues  Preliminary   09/10/2017 Culture negative after 4 days  Preliminary   09/10/2017 Moderate growth  Staphylococcus " aureus   (A)  Final       Recent Labs   Lab Test  09/10/17   1946  03/23/15   0837  03/19/15   0640  03/17/15   0748  03/16/15   0707  03/14/15   0905   CRP  47.0*  53.0*  50.0*  71.0*  82.0*  95.0*     Recent Labs   Lab Test  09/13/17 0917 09/11/17   0703  09/10/17   1946  09/09/17   1859  08/10/17   2323  07/24/17   1154   WBC  12.4*  10.7  12.7*  12.8*  18.3*  15.2*     Recent Labs   Lab Test  09/16/17   0736  09/13/17   0917  09/11/17   0703  09/10/17   1946   CR  0.86  0.80  0.66  0.76   GFRESTIMATED  >90  >90  >90  >90       Hematology Studies  Recent Labs   Lab Test  09/16/17   0736  09/13/17   0917  09/11/17   0703  09/10/17   1946  09/09/17   1859  08/10/17   2323  07/24/17   1154  04/20/17   1140  02/24/17   1336   WBC   --   12.4*  10.7  12.7*  12.8*  18.3*  15.2*  8.1  7.6   ANEU   --    --   8.3   --   7.9  15.3*  10.7*  5.7  3.9   AEOS   --    --   0.1   --   0.1  0.6  0.0  0.2  0.2   HCT   --   36.2*  34.2*  36.1*  37.6*  35.6*  45.2  41.4  46.6   PLT  386  363  315  412  375  384  386  397  276       Metabolic  Recent Labs   Lab Test  09/16/17   0736  09/13/17   0917  09/11/17   0703  09/10/17   1946   NA   --   135  134  137   BUN   --   9  12  17   CO2   --   30  28  24   CR  0.86  0.80  0.66  0.76   GFRESTIMATED  >90  >90  >90  >90       Hepatic Studies  Recent Labs   Lab Test  09/10/17   1946  09/09/17   1859  08/10/17   2315   BILITOTAL  0.3  0.2  0.2   ALKPHOS  88  105  113   ALBUMIN  3.1*  3.8  2.9*   AST  42  42  58*   ALT  87*  107*  80*     Immunologlobulins  Recent Labs   Lab Test  09/10/17   1946  03/09/15   0536  03/08/15   1235   SED  46*  72*  63*     Imaging:  CT cervical spine 9/10/17  . Soft tissue thickening of the prevertebral space at the C6-7 level with erosion of the endplates. Findings are better demonstrated on MR earlier 9/10/2017 and concerning for discitis/osteomyelitis.  2. No definite fracture identified.      [Urgent Result: Fracture of the right C6 lamina]    After  staff reviewed the images, it was felt that there was no fracture of the right C6 lamina and rather there was volume averaging with the C7 lamina giving the impression of a pseudofracture. The image in question is series 3, image 45.     Finding was identified on 9/10/2017 6:29 PM.      MRI brain w,wo contrast 9/9/17   IMPRESSION: Normal MRI of the brain.     MRA brain wo contrast 9/9/17  IMPRESSION:  Normal MR angiogram of the head.     MRI cervcial spine 9/9/17   IMPRESSION:  1. Pathologic process C6-C7 and intervening disc space and anterior epidural involvement. This results in effacement of the spinal cord at  this level.  2.  [Critical Result: Probable disc space infection with epidural effacement of the spinal cord at C6-C7.]  Finding was identified on 9/10/2017 2:01 PM.      MRI thoracic spine 9/10/17   Thoracic spine MR is normal. Vertebral bodies are normal. Disc spaces are normal. Alignment is normal. Thoracic spinal cord  appears normal with the conus at T12-L1.    IMPRESSION: Normal MR scan of the thoracic spine including gadolinium-enhanced study.                        TTE 9/13/17   Interpretation Summary  No vegetation or mass identified, however this does not exclude endocarditis. Technically difficult study. There has been no change.

## 2017-09-17 NOTE — PROGRESS NOTES
Social Work: Assessment with Discharge Plan    Patient Name:  Bc German  :  1980  Age:  37 year old  MRN:  [id number]  Risk/Complexity Score:  Filed Complexity Screen Score: 9  Completed assessment with:  Patient and chart review    Presenting Information   Reason for Referral:  Discharge plan and Substance abuse concerns  Date of Intake:  2017  Referral Source:  Patient and Chart Review  Decision Maker:  Patient  Alternate Decision Maker: Father  Health Care Directive:  Declined completing  Living Situation:  Homeless- however, patient was residing at his father's home prior to hospitalization and reported he is able to return to his father's home after a TCU stay.  Previous Functional Status:  Independent  Patient and family understanding of hospitalization:  Yes  Cultural/Language/Spiritual Considerations:  None identified at this time.  Adjustment to Illness:  Patient appears to have concerns regarding relapse and mental health. Patient requested to see psychiatry today regarding anxiety.     Physical Health  Reason for Admission:    1. Ataxia    2. Asymptomatic human immunodeficiency virus (HIV) infection status (H)      Services Needed/Recommended:  TCU    Mental Health/Chemical Dependency  Diagnosis:  Anxiety and Substance Use. Patient reported he has a history of heroin use (most recent use 5 days prior to hospitalization) and alcohol abuse.   Support/Services in Place:  Patient reported he has been to chemical dependency treatment 7 times in the past and does not feel it has been useful. Patient reported he recently started seeing an EMDR therapist, Hope Jacobs in [place] ([phone]) weekly for his mental health. Patient declined any other services at this time.   Services Needed/Recommended:  None identified at this time.     Support System  Significant relationship at present time:  Father and Brother  Family of origin is available for support:  Yes  Other support  available:  Family  Gaps in support system:  None identified  Patient is caregiver to:  None     Provider Information   Primary Care Physician:  Damir Cisneros   [phone]   Clinic:  [address] [place] [address] 250 / [facility] [address]      :  N/A    Financial   Income Source:  Patient is unemployed  Financial Concerns:  None identified  Insurance:    Payor/Plan Subscriber Name Rel Member # Group #   [employer] - [facility]* AMBREEN PYLE  [id number]      [address]       Discharge Plan   Patient and family discharge goal:  Patient would like to go to a TCU prior to returning to his father's home.   Provided education on discharge plan:  YES  Patient agreeable to discharge plan:  YES  A list of Medicare Certified Facilities was provided to the patient and/or family to encourage patient choice. Patient's choices for facility are:  [facility]  Will NH provide Skilled rehabilitation or complex medical:  YES  General information regarding anticipated insurance coverage and possible out of pocket cost was discussed. Patient and patient's family are aware patient may incur the cost of transportation to the facility, pending insurance payment: YES  Barriers to discharge:  History of homelessness, substance abuse history    Discharge Recommendations   Anticipated Disposition:  Facility:  TBD  Transportation Needs:  Other:  TBD  Name of Transportation Company and Phone:  n/a    Additional comments   Met with patient. Patient was open about his history of substance and alcohol abuse and stated he was living in a sober house until March of this year and then became homeless. Patient reported he is able to stay at his father's home as long as he is sober, which is patient's goal. Faxed referral to [facility]. Weekday SW to follow up.    Janeen Augustin [facility]  Weekend   [phone]

## 2017-09-17 NOTE — PROGRESS NOTES
[facility] ID SERVICE: PROGRESS NOTE  Bc German : 1980 Sex: male:   Medical record number [id number] Attending Physician: Joseline Mujica*  Date of Service: 2017         Assessment and Recommendations:   ASSESSMENT:  1. Cervical osteomyelitis with Epidural abscess with compressive phlegmon and cervical myelopathy. -   - IV drug use   - s/p C6 and C7 corpectomy - C5-T1 anterior instrumented fusion (9/10/17)   - vertebra tissue and abscess - positive for Staph aureus MSSA    2. Bacteremia   - Staph aureus and Strep vestibularis   and 9/10  - repeat blood cx  - negative x 2 so far   - TTE 17  - no vegetation seen      3. HIV  502113 (17) and CD4 782 (17)   - admits to missing a few doses , non compliant, sharing IV needles.   - strongly advised him to be compliant with HIV medication   - HIV - ND 17   - continue Triumeq      4. History of osteomyelitis L3 and psoas abscess     5. Hepatitis C      6. History of polysubstance abuse  - heroin, marijuana, alcohol, tobacco      7. Homelessness      8. Depression/anxiety   - seeing a psychiatrist      RECOMMENDATION:  1.continue cefazolin 2 gram IV q8 hr . History of IV drug abuse. Social service consult. May need temporary placement for IV antibiotic . Duration 6 weeks.   2. Repeat 2 blood  - negative    3. Social service consult for placement  4. Advise on compliance with medications, stop alcohol, subtance abuse and IV drugs use   5. Psychiatry consult      ID will continue to follow.     Suzy Roblero MD, M.Med.Sc.  Pager: [phone]     SUBJECTIVE:  Feels better, neck pain is better , ambulating well,  Dexterity is better. No fever, chills, diarrhea. Appetite is ok. Breathing is fine. Feels upset about a gift card. Feels he should get his anti anxiety meds     A five-point review of systems was obtained and was negative with the exception of that which is described above.  Allergies   Allergen  "Reactions     Doxycycline GI Disturbance     CURRENT ANTI-INFECTIVES:   IV Vancomycin      EXAMINATION:   Vital Signs: BP (!) 136/94 (BP Location: Right arm)  Pulse 110  Temp 98.5  F (36.9  C) (Oral)  Resp 16  Ht 1.803 m (5' 11\")  Wt 63.5 kg (140 lb)  SpO2 99%  BMI 19.53 kg/m2   GENERAL:  Sitting in chair, cervical collar, alert, oriented , seems upset with a gift card and his antianxiety meds    HEENT:  Head is normocephalic, atraumatic , EOM intact NATALIA  EYES:  Eyes have anicteric sclerae without conjunctival injection or stigmata of endocarditis.    ENT:  Oropharynx is moist without exudates or ulcers. Tongue is midline  NECK:  Cervical collar +  LUNGS:  Clear to auscultation bilateral.   CARDIOVASCULAR:  Regular rate and rhythm with no murmurs, gallops or rubs.  ABDOMEN:  Normal bowel sounds, soft, nontender. No appreciable hepatosplenomegaly  SKIN:  No acute rashes.   No stigmata of endocarditis. Tattoos+   NEUROLOGIC:  Normal speech, moving all extremities dexterity - improving . Right handed   Extremities: no edema     NEW DATA/RESULTS:     Culture Micro   Date Value Ref Range Status   09/12/2017 No growth after 4 days  Preliminary   09/12/2017 No growth after 4 days  Preliminary   09/10/2017 Culture negative monitoring continues  Preliminary   09/10/2017 Culture negative after 4 days  Preliminary   09/10/2017 Moderate growth  Staphylococcus aureus   (A)  Final       Recent Labs   Lab Test  09/10/17   1946  03/23/15   0837  03/19/15   0640  03/17/15   0748  03/16/15   0707  03/14/15   0905   CRP  47.0*  53.0*  50.0*  71.0*  82.0*  95.0*     Recent Labs   Lab Test  09/13/17   0917  09/11/17   0703  09/10/17   1946  09/09/17   1859  08/10/17   2323  07/24/17   1154   WBC  12.4*  10.7  12.7*  12.8*  18.3*  15.2*     Recent Labs   Lab Test  09/16/17   0736  09/13/17   0917  09/11/17   0703  09/10/17   1946   CR  0.86  0.80  0.66  0.76   GFRESTIMATED  >90  >90  >90  >90       Hematology Studies  Recent " Labs   Lab Test  09/16/17 0736  09/13/17   0917  09/11/17   0703  09/10/17   1946 09/09/17   1859  08/10/17   2323  07/24/17   1154  04/20/17   1140  02/24/17   1336   WBC   --   12.4*  10.7  12.7*  12.8*  18.3*  15.2*  8.1  7.6   ANEU   --    --   8.3   --   7.9  15.3*  10.7*  5.7  3.9   AEOS   --    --   0.1   --   0.1  0.6  0.0  0.2  0.2   HCT   --   36.2*  34.2*  36.1*  37.6*  35.6*  45.2  41.4  46.6   PLT  386  363  315  412  375  384  386  397  276       Metabolic  Recent Labs   Lab Test  09/16/17 0736 09/13/17 0917 09/11/17 0703  09/10/17   1946   NA   --   135  134  137   BUN   --   9  12  17   CO2   --   30  28  24   CR  0.86  0.80  0.66  0.76   GFRESTIMATED  >90  >90  >90  >90       Hepatic Studies  Recent Labs   Lab Test  09/10/17   1946  09/09/17   1859  08/10/17   2315   BILITOTAL  0.3  0.2  0.2   ALKPHOS  88  105  113   ALBUMIN  3.1*  3.8  2.9*   AST  42  42  58*   ALT  87*  107*  80*     Immunologlobulins  Recent Labs   Lab Test  09/10/17   1946  03/09/15   0536  03/08/15   1235   SED  46*  72*  63*     Imaging:  CT cervical spine 9/10/17  . Soft tissue thickening of the prevertebral space at the C6-7 level with erosion of the endplates. Findings are better demonstrated on MR earlier 9/10/2017 and concerning for discitis/osteomyelitis.  2. No definite fracture identified.      [Urgent Result: Fracture of the right C6 lamina]    After staff reviewed the images, it was felt that there was no fracture of the right C6 lamina and rather there was volume averaging with the C7 lamina giving the impression of a pseudofracture. The image in question is series 3, image 45.     Finding was identified on 9/10/2017 6:29 PM.      MRI brain w,wo contrast 9/9/17   IMPRESSION: Normal MRI of the brain.     MRA brain wo contrast 9/9/17  IMPRESSION:  Normal MR angiogram of the head.     MRI cervcial spine 9/9/17   IMPRESSION:  1. Pathologic process C6-C7 and intervening disc space and anterior epidural  involvement. This results in effacement of the spinal cord at  this level.  2.  [Critical Result: Probable disc space infection with epidural effacement of the spinal cord at C6-C7.]  Finding was identified on 9/10/2017 2:01 PM.      MRI thoracic spine 9/10/17   Thoracic spine MR is normal. Vertebral bodies are normal. Disc spaces are normal. Alignment is normal. Thoracic spinal cord  appears normal with the conus at T12-L1.    IMPRESSION: Normal MR scan of the thoracic spine including gadolinium-enhanced study.                        TTE 9/13/17   Interpretation Summary  No vegetation or mass identified, however this does not exclude endocarditis. Technically difficult study. There has been no change.

## 2017-09-17 NOTE — PLAN OF CARE
Problem: Goal Outcome Summary  Goal: Goal Outcome Summary  Outcome: Improving  Pt is A&O X4. POD6 from C5 - T1 anterior fusion and corpectomy, drainage of epidural abscess. C-Collar on all the time, skin under the collar checked. Anterior neck incision with durmarbond LISSETH, no drainage noted. Neuro unchanged. Pain managed with Suboxone and flexiril, Pt refused Tylenol. On a regular diet, good appetite. Transfer SBA, ambulated with a walker. Urinal voiding spontaneously and last BM today. Pt requested to have a psychiatrist for his depression, MD is aware. Calm and cooperative. CVC triple lumen SL. When medically stable, pt will be D/C to TCU. Will continue to monitor and follow POC.

## 2017-09-17 NOTE — PROGRESS NOTES
[facility] [place]    Hospitalist Progress Note    Date of Service (when I saw the patient): 09/17/2017    Assessment & Plan       1. Cervical osteomyelitis with Epidural abscess with compressive phlegmon and cervical myelopathy, s/p C6 and C7 corpectomy, C5-T1 anterior instrumented fusion     - Ho IV drug use, substance abuse, HIV positive  - vertebra tissue and abscess - positive for Staph aureus   - Its MSSA. Blood culture grew Strep  Vestibularis too.   - PT, OT, has brace in place  - pain control: Avodi use of dilaudid. Only sparingly we will give dilaudid. Start on flexeril for muscle spasms. Has tyleno and lyrica on board as well. No NSIDs. Can use lidocaine patches on the normal skin around the incision to block the nerves.  - plan to start him on Suboxone at DC   - heat pad     2. MSSA Bacteremia     - Staph aureus and Stre vestibularis  9/9 and 9/10  - FU repeat BC  - TTE negative for endocarditis.   - Neurosurgery was okay for him to do neck flexion to a degree required for intubation per report from the nursing. I paged Dr Miguel Ortiz but I did not hear from him directly.   - He has Left IJ central line.   - Home Ab infusion may not accept him with PICC line.   - plan for TCU with PICC and IV Ab per ID recs. Likely An[facility]    3. HIV  (959060 (7/24/17) and CD4 782 (7/24/17)     - non compliant in the past, sharing IV needles.   - continue Triumeq   - HIV viral load undetectable, CD4 pending      4. History of osteomyelitis L3 and psoas abscess in the past      5. Hepatitis C       6. History of polysubstance abuse  - heroin, marijuana, alcohol, tobacco   - plan tos start suboxone for treatment.       7. Homelessness        8. Depression/anxiety     - seeing a psychiatrist   - seen by psychiatry, they feel that adding clonazepam may be okay as patient is asking about it. Every day he asks for new drug. But he is on suboxone, we will leave to Dr Iliana Lucia to say okay for  adding clonazepam    9. Tachycardia is chronic in nature. He is making good urine. BP is good.He is not in sepsis. No need to draw a LA      DVT Prophylaxis: SCDs    Code Status: Full Code    Disposition: Expected discharge tomorrow when TCU bed available for placement.     Joseline Mujica MD    Interval History :    Over all better, reports anxiety    -Data reviewed today: I reviewed all new labs and imaging results over the last 24 hours. I personally reviewed no images or EKG's today.    Physical Exam   Temp: 96.4  F (35.8  C) Temp src: Oral BP: (!) 137/96 Pulse: 116 Heart Rate: 90 Resp: 18 SpO2: 100 % O2 Device: None (Room air)    Vitals:    09/09/17 1732   Weight: 63.5 kg (140 lb)     Vital Signs with Ranges  Temp:  [95.5  F (35.3  C)-98.5  F (36.9  C)] 96.4  F (35.8  C)  Pulse:  [116] 116  Heart Rate:  [] 90  Resp:  [16-18] 18  BP: (118-137)/(81-96) 137/96  SpO2:  [98 %-100 %] 100 %  I/O last 3 completed shifts:  In: 1660 [P.O.:1440; I.V.:220]  Out: 1750 [Urine:1750]    Constitutional: Awake, alert, cooperative, no apparent distress  Respiratory: Clear to auscultation bilaterally, no crackles or wheezing  Cardiovascular: Regular rate and rhythm, normal S1 and S2, and no murmur noted  GI: Normal bowel sounds, soft, non-distended, non-tender  Skin/Integumen: No rashes, no cyanosis, no edema    Medications        cyclobenzaprine  10 mg Oral TID     buprenorphine HCl-naloxone HCl  1 Film Sublingual Daily     ceFAZolin  2 g Intravenous Q8H     lidocaine  1-3 patch Transdermal Q24H     sodium chloride (PF)  3 mL Intracatheter Q8H     senna-docusate  1-2 tablet Oral BID     polyethylene glycol  17 g Oral Daily     pregabalin  300 mg Oral BID     traZODone  50 mg Oral At Bedtime     abacavir-dolutegravir-LamiVUDine  1 tablet Oral Daily     levomilnacipran  80 mg Oral Daily     lidocaine   Transdermal Q24h     lidocaine   Transdermal Q8H       Data     Recent Labs  Lab 09/16/17  0736 09/13/17  0917  09/11/17  1700 09/11/17  0703 09/10/17  1946   WBC  --  12.4*  --  10.7 12.7*   HGB  --  11.2*  --  10.9* 11.7*   MCV  --  88  --  86 86    363  --  315 412   NA  --  135  --  134 137   POTASSIUM  --  3.4 3.8 3.3* 3.8   CHLORIDE  --  100  --  101 105   CO2  --  30  --  28 24   BUN  --  9  --  12 17   CR 0.86 0.80  --  0.66 0.76   ANIONGAP  --  5  --  5 8   SANDRA  --  9.7  --  8.6 8.7   GLC  --  115*  --  148* 98   ALBUMIN  --   --   --   --  3.1*   PROTTOTAL  --   --   --   --  7.8   BILITOTAL  --   --   --   --  0.3   ALKPHOS  --   --   --   --  88   ALT  --   --   --   --  87*   AST  --   --   --   --  42     No results found for this or any previous visit (from the past 24 hour(s)).

## 2017-09-17 NOTE — PLAN OF CARE
Problem: Goal Outcome Summary  Goal: Goal Outcome Summary  Outcome: No Change  Pt here s/p C5-T1 anterior instrumented fusion and C6/7 corpectomy for vertebral osteomyelitis and epidural abscess. VSS ex tachycardic at times, RA. Denies pain. Neuros intact ex baseline n/t fingers and feet. Voiding spontaneously via urinal. Reports having a BM 2 days ago. Tolerating regular diet. Up with SBA. C-collar on at all times. Pt showered this shift. TL IJ SL between abx. Continue to monitor. Pt requesting to talk with psych regarding depression, MDs aware. Pt anxious this afternoon, Dr. Barrientos to see pt tomorrow and update on POC. SW working on TCU placement. Cont to monitor and follow POC.

## 2017-09-17 NOTE — PLAN OF CARE
Problem: Goal Outcome Summary  Goal: Goal Outcome Summary  Outcome: No Change  s/p C5-T1 anterior instrumented fusion and C6/7 corpectomy for vertebral osteomyelitis and epidural abscess. VSS. Denies pain. Neuros intact. Voiding spontaneously via urinal. Tolerating regular diet. Up with SBA. C-collar on at all times. TL IJ SL between abx. Continue to monitor. Pt requesting to talk with psych regarding depression, MDs aware.

## 2017-09-17 NOTE — PLAN OF CARE
Problem: Goal Outcome Summary  Goal: Goal Outcome Summary  PT 6A: Attempted to see pt this afternoon.  Pt declined due to increased anxiety/depression today and feels he needs time to calm down. Pt scheduled next on 9/18/17.

## 2017-09-18 ENCOUNTER — APPOINTMENT (OUTPATIENT)
Dept: PHYSICAL THERAPY | Facility: CLINIC | Age: 37
DRG: 471 | End: 2017-09-18
Attending: INTERNAL MEDICINE
Payer: COMMERCIAL

## 2017-09-18 ENCOUNTER — APPOINTMENT (OUTPATIENT)
Dept: OCCUPATIONAL THERAPY | Facility: CLINIC | Age: 37
DRG: 471 | End: 2017-09-18
Attending: INTERNAL MEDICINE
Payer: COMMERCIAL

## 2017-09-18 LAB
ANION GAP SERPL CALCULATED.3IONS-SCNC: 6 MMOL/L (ref 3–14)
BACTERIA SPEC CULT: NO GROWTH
BACTERIA SPEC CULT: NO GROWTH
BACTERIA SPEC CULT: NORMAL
BACTERIA SPEC CULT: NORMAL
BUN SERPL-MCNC: 12 MG/DL (ref 7–30)
CALCIUM SERPL-MCNC: 9.2 MG/DL (ref 8.5–10.1)
CHLORIDE SERPL-SCNC: 102 MMOL/L (ref 94–109)
CO2 SERPL-SCNC: 31 MMOL/L (ref 20–32)
CREAT SERPL-MCNC: 0.74 MG/DL (ref 0.66–1.25)
ERYTHROCYTE [DISTWIDTH] IN BLOOD BY AUTOMATED COUNT: 15 % (ref 10–15)
GFR SERPL CREATININE-BSD FRML MDRD: >90 ML/MIN/1.7M2
GLUCOSE SERPL-MCNC: 104 MG/DL (ref 70–99)
HCT VFR BLD AUTO: 33.9 % (ref 40–53)
HGB BLD-MCNC: 10.5 G/DL (ref 13.3–17.7)
Lab: NORMAL
Lab: NORMAL
MCH RBC QN AUTO: 27.2 PG (ref 26.5–33)
MCHC RBC AUTO-ENTMCNC: 31 G/DL (ref 31.5–36.5)
MCV RBC AUTO: 88 FL (ref 78–100)
PLATELET # BLD AUTO: 376 10E9/L (ref 150–450)
POTASSIUM SERPL-SCNC: 3.5 MMOL/L (ref 3.4–5.3)
RBC # BLD AUTO: 3.86 10E12/L (ref 4.4–5.9)
SODIUM SERPL-SCNC: 139 MMOL/L (ref 133–144)
SPECIMEN SOURCE: NORMAL
WBC # BLD AUTO: 7.5 10E9/L (ref 4–11)

## 2017-09-18 PROCEDURE — 25000132 ZZH RX MED GY IP 250 OP 250 PS 637: Performed by: HOSPITALIST

## 2017-09-18 PROCEDURE — 12000008 ZZH R&B INTERMEDIATE UMMC

## 2017-09-18 PROCEDURE — 36592 COLLECT BLOOD FROM PICC: CPT | Performed by: HOSPITALIST

## 2017-09-18 PROCEDURE — 80048 BASIC METABOLIC PNL TOTAL CA: CPT | Performed by: HOSPITALIST

## 2017-09-18 PROCEDURE — 97530 THERAPEUTIC ACTIVITIES: CPT | Mod: GP

## 2017-09-18 PROCEDURE — 97530 THERAPEUTIC ACTIVITIES: CPT | Mod: GO

## 2017-09-18 PROCEDURE — 99232 SBSQ HOSP IP/OBS MODERATE 35: CPT | Performed by: HOSPITALIST

## 2017-09-18 PROCEDURE — 85027 COMPLETE CBC AUTOMATED: CPT | Performed by: HOSPITALIST

## 2017-09-18 PROCEDURE — 97110 THERAPEUTIC EXERCISES: CPT | Mod: GP

## 2017-09-18 PROCEDURE — 97116 GAIT TRAINING THERAPY: CPT | Mod: GP

## 2017-09-18 PROCEDURE — 40000133 ZZH STATISTIC OT WARD VISIT

## 2017-09-18 PROCEDURE — 25000132 ZZH RX MED GY IP 250 OP 250 PS 637: Performed by: NURSE PRACTITIONER

## 2017-09-18 PROCEDURE — 25000128 H RX IP 250 OP 636: Performed by: HOSPITALIST

## 2017-09-18 PROCEDURE — 25000132 ZZH RX MED GY IP 250 OP 250 PS 637: Performed by: INTERNAL MEDICINE

## 2017-09-18 PROCEDURE — 40000193 ZZH STATISTIC PT WARD VISIT

## 2017-09-18 RX ORDER — CLONAZEPAM 0.5 MG/1
0.5 TABLET ORAL 2 TIMES DAILY
Status: DISCONTINUED | OUTPATIENT
Start: 2017-09-18 | End: 2017-09-21 | Stop reason: HOSPADM

## 2017-09-18 RX ORDER — PREGABALIN 75 MG/1
150 CAPSULE ORAL 2 TIMES DAILY
Status: DISCONTINUED | OUTPATIENT
Start: 2017-09-18 | End: 2017-09-21 | Stop reason: HOSPADM

## 2017-09-18 RX ORDER — LIDOCAINE 40 MG/G
CREAM TOPICAL
Status: DISCONTINUED | OUTPATIENT
Start: 2017-09-18 | End: 2017-09-21 | Stop reason: HOSPADM

## 2017-09-18 RX ORDER — HEPARIN SODIUM,PORCINE 10 UNIT/ML
2-5 VIAL (ML) INTRAVENOUS
Status: COMPLETED | OUTPATIENT
Start: 2017-09-18 | End: 2017-09-19

## 2017-09-18 RX ADMIN — PREGABALIN 150 MG: 75 CAPSULE ORAL at 19:32

## 2017-09-18 RX ADMIN — CEFAZOLIN SODIUM 2 G: 2 INJECTION, SOLUTION INTRAVENOUS at 14:40

## 2017-09-18 RX ADMIN — CEFAZOLIN SODIUM 2 G: 2 INJECTION, SOLUTION INTRAVENOUS at 06:16

## 2017-09-18 RX ADMIN — CEFAZOLIN SODIUM 2 G: 2 INJECTION, SOLUTION INTRAVENOUS at 22:14

## 2017-09-18 RX ADMIN — ABACAVIR SULFATE, DOLUTEGRAVIR SODIUM, LAMIVUDINE 1 TABLET: 600; 50; 300 TABLET, FILM COATED ORAL at 07:50

## 2017-09-18 RX ADMIN — LEVOMILNACIPRAN HYDROCHLORIDE 80 MG: 80 CAPSULE, EXTENDED RELEASE ORAL at 07:50

## 2017-09-18 RX ADMIN — PREGABALIN 300 MG: 75 CAPSULE ORAL at 07:49

## 2017-09-18 RX ADMIN — CYCLOBENZAPRINE HYDROCHLORIDE 10 MG: 10 TABLET, FILM COATED ORAL at 07:50

## 2017-09-18 RX ADMIN — BUPRENORPHINE HYDROCHLORIDE, NALOXONE HYDROCHLORIDE 1 FILM: 8; 2 FILM, SOLUBLE BUCCAL; SUBLINGUAL at 08:03

## 2017-09-18 RX ADMIN — CLONAZEPAM 0.5 MG: 0.5 TABLET ORAL at 19:33

## 2017-09-18 RX ADMIN — TRAZODONE HYDROCHLORIDE 50 MG: 50 TABLET ORAL at 22:14

## 2017-09-18 ASSESSMENT — VISUAL ACUITY
OU: NORMAL ACUITY

## 2017-09-18 NOTE — PLAN OF CARE
Problem: Goal Outcome Summary  Goal: Goal Outcome Summary  Outcome: No Change  [id number]: No change from previous note.
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
## 2017-09-18 NOTE — PROGRESS NOTES
[facility], [place]    Hospitalist Progress Note    Date of Service (when I saw the patient): 09/18/2017    Assessment & Plan     Bc German is a 37 year old year old with PMH HIV, IVDU, anxiety/depression who presents with weakness, ataxia, neck pain.   Patient was seen at [facility] initially. MR cervical spine notable for Epidural abscess with compressive phlegmon and cervical myelopathy, s/p C6 and C7 corpectomy, C5-T1 anterior instrumented fusion  On 9/10.     1. Cervical osteomyelitis with Epidural abscess with compressive phlegmon and cervical myelopathy, s/p C6 and C7 corpectomy, C5-T1 anterior instrumented fusion     - POD 8. Doing well. Ataxia and weakness has improved significantly  - Ho IV drug use, substance abuse, HIV positive   - vertebra tissue and abscess - positive for Staph aureus (MSSA)  - Blood culture 9/10 grew MSSA and Strep  Vestibularis and staph Epidermidis. Operative culture grew MSSA. Negative BC from 9/12.    -initially treated with Vanc and zosyn, now switched to Cefazolin. Plan to continue for total 6 week of IV AB ie till Oct 24 per ID with weekly CBC and CMPs. Needs PICC line. Currently has central line. PICC ordered.   - pain control:  Weaned off dilaudid. Weaned off flexeril. Has tyleno. Back off  On lyrica (300 mg BID to 150 mg BiD). No NSIDs. Can use lidocaine patches on the normal skin around the incision to block the nerves.  - started him on Suboxone (opiod dependence) and Clonazepma (anxiety)  - heat pad PRN  - Difficult placement due to ho IV drug abuse. Current plan is to go for TCU with PICC placement.      2. MSSA Bacteremia     - Staph aureus and Stre vestibularis and Staph Epidermidis from  9/9 and 9/10  - FU repeat BC form 9/12 negative.   - TTE negative for endocarditis. We did not pursue TTE  - He has Left IJ central line for now. PICC ordered.     3. HIV  (354105 (7/24/17) and CD4 782 (7/24/17)     - non compliant in the past,  sharing IV needles.   - continue Triumeq   - HIV viral load undetectable during this admit, CD4  784 in July  - FU with DR Presley      4. History of osteomyelitis L3 and psoas abscess in the past      5. Hepatitis C       6. History of polysubstance abuse  - heroin, marijuana, alcohol, tobacco   - plan tos start suboxone for treatment.       7. Homelessness        8. Depression/anxiety   - seeing a psychiatrist   - seen by psychiatry, they feel that adding clonazepam may be okay. Every day he asks for new drug. But he is on suboxone,  Dr Iliana Lucia is okay adding clonazepam too    9. Tachycardia is chronic in nature. He is making good urine. BP is good.He is not in sepsis. No need to draw a LA      DVT Prophylaxis: SCDs    Code Status: Full Code    Disposition: Expected discharge when we have a safe disposition plan for IV antibiotics.  Looking for TCU bed for IV antibiotics and PT/OT    Joseline Mujica MD    Interval History :    Over all better, pain is controlled well    -Data reviewed today: I reviewed all new labs and imaging results over the last 24 hours. I personally reviewed no images or EKG's today.    Physical Exam   Temp: 98.1  F (36.7  C) Temp src: Oral BP: (!) 131/94 Pulse: 92 Heart Rate: 100 Resp: 16 SpO2: 96 % O2 Device: None (Room air)    Vitals:    09/09/17 1732   Weight: 63.5 kg (140 lb)     Vital Signs with Ranges  Temp:  [95.3  F (35.2  C)-98.1  F (36.7  C)] 98.1  F (36.7  C)  Pulse:  [] 92  Heart Rate:  [100-125] 100  Resp:  [16] 16  BP: (112-152)/() 131/94  SpO2:  [96 %-100 %] 96 %  I/O last 3 completed shifts:  In: 600 [P.O.:600]  Out: 700 [Urine:700]    Constitutional: Awake, alert, cooperative, no apparent distress  Respiratory: Clear to auscultation bilaterally, no crackles or wheezing  Cardiovascular: Regular rate and rhythm, normal S1 and S2, and no murmur noted  GI: Normal bowel sounds, soft, non-distended, non-tender  Skin/Integumen: No rashes, no cyanosis, no  edema    Medications        pregabalin  150 mg Oral BID     clonazePAM  0.5 mg Oral BID     buprenorphine HCl-naloxone HCl  1 Film Sublingual Daily     ceFAZolin  2 g Intravenous Q8H     lidocaine  1-3 patch Transdermal Q24H     sodium chloride (PF)  3 mL Intracatheter Q8H     senna-docusate  1-2 tablet Oral BID     polyethylene glycol  17 g Oral Daily     traZODone  50 mg Oral At Bedtime     abacavir-dolutegravir-LamiVUDine  1 tablet Oral Daily     levomilnacipran  80 mg Oral Daily     lidocaine   Transdermal Q24h     lidocaine   Transdermal Q8H     Data     Recent Labs  Lab 09/18/17  0728 09/16/17  0736 09/13/17  0917   WBC 7.5  --  12.4*   HGB 10.5*  --  11.2*   MCV 88  --  88    386 363     --  135   POTASSIUM 3.5  --  3.4   CHLORIDE 102  --  100   CO2 31  --  30   BUN 12  --  9   CR 0.74 0.86 0.80   ANIONGAP 6  --  5   SANDRA 9.2  --  9.7   *  --  115*     No results found for this or any previous visit (from the past 24 hour(s)).

## 2017-09-18 NOTE — PROGRESS NOTES
[facility] ID SERVICE: PROGRESS NOTE  Bc German : 1980 Sex: male:   Medical record number [id number] Attending Physician: Joseline Mjuica*  Date of Service: 2017         Assessment and Recommendations:     Bc German is a 37 year old male with history of HIV on Triumeq ( abacavir/dolutegravir/lamivudine) with HIV 679629 (17) and CD4 782 (17) , hepatitis C, poly subtance abuse, IV drug ( heroin) use, history of osteomyelitis L3 and psoas abscess, homeless, depression, anxiety, tobacco, alcohol use, admitted on 9/10/10 with progressive gait unsteadiness , loss of dexterity , falls and neck pain since 1.5 weeks prior to admission    ASSESSMENT:  1. Cervical osteomyelitis with Epidural abscess with compressive phlegmon and cervical myelopathy. -   - IV drug use   - s/p C6 and C7 corpectomy - C5-T1 anterior instrumented fusion (9/10/17)   - vertebra tissue and abscess - positive for Staph aureus MSSA    2. Bacteremia   - Staph aureus and Strep vestibularis   and 9/10  - repeat blood cx  - negative x 2   - TTE 17  - no vegetation seen      3. HIV  268182 (17) and CD4 782 (17)   - admits to missing a few doses , non compliant, sharing IV needles.   - strongly advised him to be compliant with HIV medication   - HIV - ND 17   - continue Triumeq      4. History of osteomyelitis L3 and psoas abscess     5. Hepatitis C      6. History of polysubstance abuse  - heroin, marijuana, alcohol, tobacco      7. Homelessness      8. Depression/anxiety   - seeing a psychiatrist      RECOMMENDATION:  1.continue cefazolin 2 gram IV q8 hr . History of IV drug abuse. Social service consult. May need temporary placement for IV antibiotic . Duration 6 weeks ( from 17) till Oct 24   - weekly lab: BMP, CRP   2. Repeat 2 blood  - negative    3. Social service consult for placement  4. Advise on compliance with medications, stop alcohol, subtance abuse and IV  "drugs use   5. Psychiatry consult   6. Consider Removing left IJ central line and place PICC     Follow-up with his Dr - Dr Cobb in 2-3 weeks, f/u with me if Dr Cobb is not available   Discussed plan with Dr Mujica     ID will sign off . Please call with questions.     Suzy Roblero MD, M.Med.Sc.  Pager: [phone]     SUBJECTIVE:  Feels better, no pain, ambulating well,  Dexterity is better. No fever, chills, diarrhea. Appetite is ok. Breathing is fine. Calm today.       Allergies   Allergen Reactions     Doxycycline GI Disturbance     CURRENT ANTI-INFECTIVES:   IV Cefazolin      EXAMINATION:   Vital Signs: BP (!) 136/103  Pulse 92  Temp 95.3  F (35.2  C) (Oral)  Resp 16  Ht 1.803 m (5' 11\")  Wt 63.5 kg (140 lb)  SpO2 100%  BMI 19.53 kg/m2   GENERAL:  no acute distress,a lert , oriented  HEENT:  Head is normocephalic, atraumatic , EOM intact NATALIA  EYES:  Eyes have anicteric sclerae without conjunctival injection or stigmata of endocarditis.    ENT:  Oropharynx is moist without exudates or ulcers. Tongue is midline  NECK:  Cervical collar +R IJ   LUNGS:  Clear to auscultation bilateral.   CARDIOVASCULAR:  Regular rate and rhythm with no murmurs, gallops or rubs.  ABDOMEN:  Normal bowel sounds, soft, nontender. No appreciable hepatosplenomegaly  SKIN:  No acute rashes.   No stigmata of endocarditis. Tattoos+   NEUROLOGIC:  Normal speech, moving all extremities dexterity - improving . Right handed   Extremities: no edema     NEW DATA/RESULTS:     Culture Micro   Date Value Ref Range Status   09/12/2017 No growth  Final   09/12/2017 No growth  Final   09/10/2017 Culture negative monitoring continues  Preliminary   09/10/2017 Culture negative after 4 days  Preliminary   09/10/2017 Moderate growth  Staphylococcus aureus   (A)  Final       Recent Labs   Lab Test  09/10/17   1946  03/23/15   0837  03/19/15   0640  03/17/15   0748  03/16/15   0707  03/14/15   0905   CRP  47.0*  53.0*  50.0*  71.0*  82.0*  95.0* "     Recent Labs   Lab Test  09/18/17 0728  09/13/17   0917  09/11/17   0703  09/10/17   1946  09/09/17   1859  08/10/17   2323   WBC  7.5  12.4*  10.7  12.7*  12.8*  18.3*     Recent Labs   Lab Test  09/18/17   0728 09/16/17   0736  09/13/17   0917  09/11/17   0703   CR  0.74  0.86  0.80  0.66   GFRESTIMATED  >90  >90  >90  >90       Hematology Studies  Recent Labs   Lab Test  09/18/17 0728 09/16/17   0736  09/13/17   0917 09/11/17   0703  09/10/17   1946  09/09/17   1859  08/10/17   2323  07/24/17   1154  04/20/17   1140  02/24/17   1336   WBC  7.5   --   12.4*  10.7  12.7*  12.8*  18.3*  15.2*  8.1  7.6   ANEU   --    --    --   8.3   --   7.9  15.3*  10.7*  5.7  3.9   AEOS   --    --    --   0.1   --   0.1  0.6  0.0  0.2  0.2   HCT  33.9*   --   36.2*  34.2*  36.1*  37.6*  35.6*  45.2  41.4  46.6   PLT  376  386  363  315  412  375  384  386  397  276       Metabolic  Recent Labs   Lab Test  09/18/17 0728 09/16/17 0736  09/13/17 0917 09/11/17   0703   NA  139   --   135  134   BUN  12   --   9  12   CO2  31   --   30  28   CR  0.74  0.86  0.80  0.66   GFRESTIMATED  >90  >90  >90  >90       Hepatic Studies  Recent Labs   Lab Test  09/10/17   1946  09/09/17   1859  08/10/17   2315   BILITOTAL  0.3  0.2  0.2   ALKPHOS  88  105  113   ALBUMIN  3.1*  3.8  2.9*   AST  42  42  58*   ALT  87*  107*  80*     Immunologlobulins  Recent Labs   Lab Test  09/10/17   1946  03/09/15   0536  03/08/15   1235   SED  46*  72*  63*     Imaging:  CT cervical spine 9/10/17  . Soft tissue thickening of the prevertebral space at the C6-7 level with erosion of the endplates. Findings are better demonstrated on MR earlier 9/10/2017 and concerning for discitis/osteomyelitis.  2. No definite fracture identified.      [Urgent Result: Fracture of the right C6 lamina]    After staff reviewed the images, it was felt that there was no fracture of the right C6 lamina and rather there was volume averaging with the C7 lamina giving  the impression of a pseudofracture. The image in question is series 3, image 45.     Finding was identified on 9/10/2017 6:29 PM.      MRI brain w,wo contrast 9/9/17   IMPRESSION: Normal MRI of the brain.     MRA brain wo contrast 9/9/17  IMPRESSION:  Normal MR angiogram of the head.     MRI cervcial spine 9/9/17   IMPRESSION:  1. Pathologic process C6-C7 and intervening disc space and anterior epidural involvement. This results in effacement of the spinal cord at  this level.  2.  [Critical Result: Probable disc space infection with epidural effacement of the spinal cord at C6-C7.]  Finding was identified on 9/10/2017 2:01 PM.      MRI thoracic spine 9/10/17   Thoracic spine MR is normal. Vertebral bodies are normal. Disc spaces are normal. Alignment is normal. Thoracic spinal cord  appears normal with the conus at T12-L1.    IMPRESSION: Normal MR scan of the thoracic spine including gadolinium-enhanced study.                        TTE 9/13/17   Interpretation Summary  No vegetation or mass identified, however this does not exclude endocarditis. Technically difficult study. There has been no change.

## 2017-09-18 NOTE — PLAN OF CARE
Problem: Goal Outcome Summary  Goal: Goal Outcome Summary  OT 6A: Patient and OT engaging in journaling and self-reflection to address underlying anxiety that patient perceives is impacting his performance in his daily activities. Patient very motivated to improve upon his addiction and manage his anxiety. Patient verbalizes interest in improving multiple areas including med management and other health choices. OT to continue to follow for anxiety management/coping and ADLs within precautions. Recommend: TCU to address balance deficits and safety concerns.

## 2017-09-18 NOTE — PLAN OF CARE
Problem: Goal Outcome Summary  Goal: Goal Outcome Summary  s/p C5-T1 anterior instrumented fusion and C6/7 corpectomy for vertebral osteomyelitis and epidural abscess. VSS. Denies pain. Neuros intact. Voiding spontaneously via urinal. Tolerating regular diet. Up with SBA. C-collar on at all times. TL IJ SL between abx. Continue to monitor. Pt had informal psych consult, would benefit from a formal consult or chem dep consult.

## 2017-09-18 NOTE — PLAN OF CARE
Problem: Goal Outcome Summary  Goal: Goal Outcome Summary  PT 6A: Pt continues to display deficits in balance, coordination, and strength limiting independence with functional mobility. Pt amb 180 ft w/o AD CGA but after losing balance, requested to amb w/ FWW SBA for 430 ft. Recommended using walker with all amb for stability.     Rec: TCU stay before going home to increase strength, balance, and coordination.

## 2017-09-18 NOTE — PLAN OF CARE
Problem: Goal Outcome Summary  Goal: Goal Outcome Summary  Outcome: Improving  [id number]  Pt s/p C6-7 corpectomy w/C5-T1 anterior fusion and instrumentation. VS ex HTN (pt refusing PRN BP meds, attributing it to anxiety/pain) and tachycardic. Neuros include: a/o x4, slight generalized weakness, N/T in bilater hands/feet. Pt up ad gene only in room, no bed alarm per pt request, RN provided education and pt understands risk but said being able to go and get up independently in room will help w/anxiety, otherwise SBA in hallways. C-collar on @ all times, pt has been compliant throughout shift, pain relatively managed w/scheduled pain meds, regular diet, voiding spont, had BM last evening, passing gas. Pt's incision LISSETH, no drainage/swelling, slight redness. Pt continues to have significant anxiety, would like to speak w/psychologist, hospitalist talked w/pt about medication changes. Continue to monitor per MD orders.

## 2017-09-18 NOTE — PROGRESS NOTES
Opioid Maintenance Therapy Follow up:  Suboxone follow up:    Addended initial note regarding benzodiazepines.  MPMP reviewed.  Due to long history of benzo scripts for anxiety, and recent transition as an outpatien from xanax to clonazepam, agree with psychiatry that  0.5 mg clonazepam BID with close monitoring is appropriate.  Would avoid short acting benzos.  If needed, can establish care with psychiatry at [facility] at discharge (I will help coordinate after discharge).  Khari Barrientos md

## 2017-09-19 ENCOUNTER — APPOINTMENT (OUTPATIENT)
Dept: PHYSICAL THERAPY | Facility: CLINIC | Age: 37
DRG: 471 | End: 2017-09-19
Attending: INTERNAL MEDICINE
Payer: COMMERCIAL

## 2017-09-19 ENCOUNTER — APPOINTMENT (OUTPATIENT)
Dept: OCCUPATIONAL THERAPY | Facility: CLINIC | Age: 37
DRG: 471 | End: 2017-09-19
Attending: INTERNAL MEDICINE
Payer: COMMERCIAL

## 2017-09-19 LAB
CREAT SERPL-MCNC: 0.73 MG/DL (ref 0.66–1.25)
GFR SERPL CREATININE-BSD FRML MDRD: >90 ML/MIN/1.7M2
PLATELET # BLD AUTO: 385 10E9/L (ref 150–450)

## 2017-09-19 PROCEDURE — 82565 ASSAY OF CREATININE: CPT | Performed by: STUDENT IN AN ORGANIZED HEALTH CARE EDUCATION/TRAINING PROGRAM

## 2017-09-19 PROCEDURE — 97535 SELF CARE MNGMENT TRAINING: CPT | Mod: GO

## 2017-09-19 PROCEDURE — 40000133 ZZH STATISTIC OT WARD VISIT

## 2017-09-19 PROCEDURE — 40000193 ZZH STATISTIC PT WARD VISIT

## 2017-09-19 PROCEDURE — 25000132 ZZH RX MED GY IP 250 OP 250 PS 637: Performed by: INTERNAL MEDICINE

## 2017-09-19 PROCEDURE — 25000132 ZZH RX MED GY IP 250 OP 250 PS 637: Performed by: NURSE PRACTITIONER

## 2017-09-19 PROCEDURE — 97530 THERAPEUTIC ACTIVITIES: CPT | Mod: GO

## 2017-09-19 PROCEDURE — 25000132 ZZH RX MED GY IP 250 OP 250 PS 637: Performed by: HOSPITALIST

## 2017-09-19 PROCEDURE — 85049 AUTOMATED PLATELET COUNT: CPT | Performed by: STUDENT IN AN ORGANIZED HEALTH CARE EDUCATION/TRAINING PROGRAM

## 2017-09-19 PROCEDURE — 99233 SBSQ HOSP IP/OBS HIGH 50: CPT | Performed by: INTERNAL MEDICINE

## 2017-09-19 PROCEDURE — 97530 THERAPEUTIC ACTIVITIES: CPT | Mod: GP

## 2017-09-19 PROCEDURE — 97116 GAIT TRAINING THERAPY: CPT | Mod: GP

## 2017-09-19 PROCEDURE — 12000008 ZZH R&B INTERMEDIATE UMMC

## 2017-09-19 PROCEDURE — 25000128 H RX IP 250 OP 636: Performed by: HOSPITALIST

## 2017-09-19 PROCEDURE — 36592 COLLECT BLOOD FROM PICC: CPT | Performed by: STUDENT IN AN ORGANIZED HEALTH CARE EDUCATION/TRAINING PROGRAM

## 2017-09-19 RX ORDER — ACETAMINOPHEN 325 MG/1
650 TABLET ORAL EVERY 6 HOURS PRN
Status: DISCONTINUED | OUTPATIENT
Start: 2017-09-19 | End: 2017-09-20

## 2017-09-19 RX ORDER — ACETAMINOPHEN 325 MG/1
325 TABLET ORAL EVERY 6 HOURS PRN
Status: DISCONTINUED | OUTPATIENT
Start: 2017-09-19 | End: 2017-09-19

## 2017-09-19 RX ADMIN — CLONAZEPAM 0.5 MG: 0.5 TABLET ORAL at 08:51

## 2017-09-19 RX ADMIN — CEFAZOLIN SODIUM 2 G: 2 INJECTION, SOLUTION INTRAVENOUS at 06:16

## 2017-09-19 RX ADMIN — CEFAZOLIN SODIUM 2 G: 2 INJECTION, SOLUTION INTRAVENOUS at 13:41

## 2017-09-19 RX ADMIN — TRAZODONE HYDROCHLORIDE 50 MG: 50 TABLET ORAL at 21:33

## 2017-09-19 RX ADMIN — SODIUM CHLORIDE, PRESERVATIVE FREE 5 ML: 5 INJECTION INTRAVENOUS at 07:48

## 2017-09-19 RX ADMIN — CLONAZEPAM 0.5 MG: 0.5 TABLET ORAL at 20:15

## 2017-09-19 RX ADMIN — ACETAMINOPHEN 325 MG: 325 TABLET, FILM COATED ORAL at 12:27

## 2017-09-19 RX ADMIN — ABACAVIR SULFATE, DOLUTEGRAVIR SODIUM, LAMIVUDINE 1 TABLET: 600; 50; 300 TABLET, FILM COATED ORAL at 08:51

## 2017-09-19 RX ADMIN — LEVOMILNACIPRAN HYDROCHLORIDE 80 MG: 80 CAPSULE, EXTENDED RELEASE ORAL at 08:51

## 2017-09-19 RX ADMIN — BUPRENORPHINE HYDROCHLORIDE, NALOXONE HYDROCHLORIDE 1 FILM: 8; 2 FILM, SOLUBLE BUCCAL; SUBLINGUAL at 08:51

## 2017-09-19 RX ADMIN — PREGABALIN 150 MG: 75 CAPSULE ORAL at 08:51

## 2017-09-19 RX ADMIN — CEFAZOLIN SODIUM 2 G: 2 INJECTION, SOLUTION INTRAVENOUS at 21:33

## 2017-09-19 RX ADMIN — PREGABALIN 150 MG: 75 CAPSULE ORAL at 20:15

## 2017-09-19 RX ADMIN — ACETAMINOPHEN 650 MG: 325 TABLET, FILM COATED ORAL at 21:54

## 2017-09-19 ASSESSMENT — VISUAL ACUITY
OU: NORMAL ACUITY

## 2017-09-19 ASSESSMENT — PAIN DESCRIPTION - DESCRIPTORS: DESCRIPTORS: ACHING

## 2017-09-19 NOTE — PROGRESS NOTES
[facility], [place]    Internal Medicine Progress Note - Gold Service      Assessment & Plan   Bc German is a 37 year old year old with PMH HIV, IVDU, anxiety/depression who presents with weakness, ataxia, neck pain.   Patient was seen at [facility] initially. MR cervical spine notable for Epidural abscess with compressive phlegmon and cervical myelopathy, s/p C6 and C7 corpectomy, C5-T1 anterior instrumented fusion  On 9/10.        1. Cervical osteomyelitis with Epidural abscess with compressive phlegmon and cervical myelopathy, s/p C6 and C7 corpectomy, C5-T1 anterior instrumented fusion      - POD 9. Doing well. Ataxia and weakness has improved significantly  - Ho IV drug use, substance abuse, HIV positive   - vertebra tissue and abscess - positive for Staph aureus (MSSA)  - Blood culture 9/10 grew MSSA and Strep  Vestibularis and staph Epidermidis. Operative culture grew MSSA. Negative BC from 9/12.    -initially treated with Vanc and zosyn, now switched to Cefazolin. Plan to continue for total 6 week of IV AB ie till Oct 24 per ID with weekly CBC and CMPs. Needs PICC line. Currently has central line. PICC ordered.   - pain control:  Weaned off dilaudid. Weaned off flexeril. Has tyleno. Back off  On lyrica (300 mg BID to 150 mg BiD). No NSIDs. Can use lidocaine patches on the normal skin around the incision to block the nerves.  - started him on Suboxone (opiod dependence) and Clonazepma (anxiety)  - heat pad PRN  - Difficult placement due to ho IV drug abuse. Current plan is to go for TCU with PICC placement.       2. MSSA Bacteremia      - Staph aureus and Stre vestibularis and Staph Epidermidis from  9/9 and 9/10  - FU repeat BC form 9/12 negative.   - TTE negative for endocarditis. We did not pursue TTE  - He has Left IJ central line for now. PICC ordered.      3. HIV  (077144 (7/24/17) and CD4 782 (7/24/17)      - non compliant in the past, sharing IV needles.   - continue  Triumeq   - HIV viral load undetectable during this admit, CD4  784 in July  - FU with Dr Presley      4. History of osteomyelitis L3 and psoas abscess in the past      5. Hepatitis C       6. History of polysubstance abuse  - heroin, marijuana, alcohol, tobacco   - plan tos start suboxone for treatment.       7. Homelessness       8. Depression/anxiety   - seeing a psychiatrist   - seen by psychiatry, they feel that adding clonazepam may be okay. Every day he asks for new drug. But he is on suboxone,  Dr Iliana Lucia is okay adding clonazepam too     9. Tachycardia is chronic in nature. He is making good urine. BP is good.He is not in sepsis. No need to draw a LA        DVT Prophylaxis: SCDs     Code Status: Full Code     Diet: Regular Diet Adult  Fluids: oral intake   DVT Prophylaxis: SCD  Code Status: Full Code    Disposition Plan   Expected discharge: > 7 days, recommended to transitional care unit once   Pending finding a TCU; difficult placement due to previous h/o drug use      Entered: Ramon Justin 09/19/2017, 2:35 PM   Information in the above section will display in the discharge planner report.      The patient's care was discussed with the Bedside Nurse.    Ramon Justin  Internal Medicine Staff Hospitalist Service  [facility]  Pager: [phone]  Please see sticky note for cross cover information    Interval History   No overnight events  Complaining of headache but denies any other symptoms  Shows motivation to get off opiates and want to be in subxone  Feels like his legs are still weak but denies dizziness or chest pain      Data reviewed today: I reviewed all medications, new labs and imaging results over the last 24 hours. I personally reviewed no images or EKG's today.    Physical Exam   Vital Signs: Temp: 98.5  F (36.9  C) Temp src: Oral BP: (!) 144/100 Pulse: 128 Heart Rate: 119 Resp: 18 SpO2: 94 % O2 Device: None (Room air)    Weight: 140 lbs 0 oz  General Appearance: Looks  comfortable on exam  HEENT: presence of left IJ and collar in place, decrease ROM  Respiratory: b/l equal breath sounds with no added sounds   Cardiovascular: s1s2 with no murmur   GI: soft, non tender, bowel sounds noted   Neuro: AAOx3, b/l UE and LE-5/5         Data   Data     Recent Labs  Lab 09/19/17  0752 09/18/17  0728 09/16/17  0736 09/13/17  0917   WBC  --  7.5  --  12.4*   HGB  --  10.5*  --  11.2*   MCV  --  88  --  88    376 386 363   NA  --  139  --  135   POTASSIUM  --  3.5  --  3.4   CHLORIDE  --  102  --  100   CO2  --  31  --  30   BUN  --  12  --  9   CR 0.73 0.74 0.86 0.80   ANIONGAP  --  6  --  5   SANDRA  --  9.2  --  9.7   GLC  --  104*  --  115*

## 2017-09-19 NOTE — PROGRESS NOTES
Social Work Services Progress Note    Hospital Day: 11  Date of Initial Social Work Evaluation:  9/18/17  Collaborated with:  SNF Admissions Coordinator's    Data:  Pt is awaiting discharge.  Pt will require IV antibiotic for an anticipated 5 more weeks.  JAREN is attempting to secure placement.    Intervention:  JAREN placed follow up calls to Admissions Coordinators at the following facilities:  1.  [facility] - spoke with Beverly in Admissions who indicates that they will not consider pt for admit due to recent IV drug use and need for IV antibiotics.  2.  [facility] - left a message for Admissions to call  3.  [facility] - spoke with Donna in Admissions who indicates that they will not consider pt for admit due to recent IV drug use and need for IV antibiotics.  4.  [facility] - spoke with Tonie in Admission who is open to assessing pt.  Referred pt and faxed assessment materials.  5.  [facility] - left a message for Admissions to call  6.  [facility] - spoke with Nona in Admissions who indicates that they will not consider pt for admit due to recent IV drug use and need for IV antibiotics.  Nona indicates that pt would need to be 6 months clean before they would consider pt for admit.  7.  [facility] - spoke with Candis in Admissions who indicates that they will not consider pt for admit due to recent IV drug use and need for IV antibiotics.    JAREN phoned [facility] and left a message for Admissions to call to discuss a referral.    Spoke with Ad[facility] at [facility] ([facility]) and higher level of review has been completed.  Per [facility], pt is again declined for admit ([facility] is aware that Dr. Barrientos started pt on Suboxone)     Text paged Dr. Mujica to call for an update, await return call.  Updated Gold 1 RN Care Coordinator.       Assessment:  Pt is agreeable to placement , however, at this time, there is no accepting facility.  Thus far, there are 11 [facility]'s  who have indicated that they will not consider pt for admit due to recent IV drug use and need for IV antibiotics.    Plan:    Anticipated Disposition:  SW is attempting to secure placement    Barriers to d/c plan:   recent IV drug use and need for IV antibiotics.    Follow Up:  Await decisions from [facility] and [facility] who are both assessing.  Await return calls from [facility] and [facility].    HODAN Wen  Social Work, 6A  Phone:  [phone]  Pager:  [phone]  9/19/2017

## 2017-09-19 NOTE — PLAN OF CARE
Problem: Individualization  Goal: Patient Preferences  Outcome: No Change  Hypertensive but within parameters, tachy at times. Headache controlled with tylenol. Neuros unchanged; tingling in fingers and toes. Anterior neck incision LISSETH, no drainage noted. Cervical collar on at all times. Good po intake. Voiding spontaneously, had BM yesterday. Up indep in room, SBA in halls. Unsure of DC plan. Refusing PICC until he knows her will be accepted into a rehab facility.

## 2017-09-19 NOTE — PLAN OF CARE
Problem: Goal Outcome Summary  Goal: Goal Outcome Summary  OT 6A: Patient seen for bathing, requires continuous cues for performing bathing tasks safely and within spinal precautions with brace. Patient engaging in brace training to increase adherence to spinal precautions. Patient continues to require safety training, continuing to recommend TCU stay at this time.

## 2017-09-19 NOTE — PROGRESS NOTES
Vascular Access Services Notes:    PICC insertion is placed on-hold as requested by 6A RN (Annita). Patient would like to talk with MD. RN will notify VAS if PICC is still needed.        Darius Darden, BSN, RN [facility]

## 2017-09-19 NOTE — PLAN OF CARE
Problem: Goal Outcome Summary  Goal: Goal Outcome Summary  Outcome: No Change  Pt s/p C6-7 corpectomy w/C5-T1 anterior fusion and instrumentation. Incision w/ liquid bandage, LISSETH, no drainage/swelling, slight redness. VSS ex HTN (pt refusing PRN BP meds, attributing it to anxiety/pain) and tachycardic. Neuros A&Ox4, slight generalized weakness and ataxia, N/T in bilateral hands/feet. Pt up ad gene only in room, SBA,GB in hallway. C-collar on @ all times. Denies need for PRN pain meds. Regular diet good intake. Voiding spont, had BM last evening, passing gas. L neck IJ triple lumen SL. Pt refused PICC this evening, agreeable to talk about it again tomorrow, PICC team will follow-up. Pt continues to have significant anxiety. Continue to monitor per MD orders.

## 2017-09-19 NOTE — PLAN OF CARE
Problem: Goal Outcome Summary  Goal: Goal Outcome Summary  Outcome: Improving  Patient is POD 9 C6 and C7 corpectomy, C5-T1 anterior instrumented fusion. VSS, intermittently tachy (up to 117 overnight). A&Ox4. Neuros intact other than numbness & tingling in fingers and feet. Anterior incision, approximated & closed with liquid bandage. C collar on at all times   patient states MD said he could have collar off while in bed, charge nurse notified. Up independently in room, SBA in halls. Has triple lumen in IJ in left side of neck. Attempted to place PICC yesterday, patient refused; hopefully he will be more agreeable today. Denied pain overnight. Will continue to monitor.

## 2017-09-19 NOTE — PLAN OF CARE
Problem: Goal Outcome Summary  Goal: Goal Outcome Summary  PT 6A: Discharge Planner PT   Patient plan for discharge: Pt wishes to go to TCU but does not think any facility will accept him.  Current status: Pt is SBA for all transfers and bed mobility. Pt amb distances of 500 feet+ with FWW SBA. Ok to amb with IV pole CGA. Pt slowly improving LE strength, balance, and activity tolerance.   Recommendations for discharge: TCU to improve strength, balance, and functional mobility necessary for independence.  Rationale for recommendations: Pt continues to display impulsivity and lack of judgement, decreased dynamic balance, decreased LE strength, and impaired gait without AD.       Entered by: Srinivasan Ge 09/19/2017 3:06 PM

## 2017-09-19 NOTE — PROGRESS NOTES
Social Work Services Progress Note     Hospital Day: 11  Date of Initial Social Work Evaluation:  9/18/17  Collaborated with:  SNF Admissions Coordinator's     Data:  Pt is awaiting discharge.  Pt will require IV antibiotic for an anticipated 5 more weeks.  SW is attempting to secure placement.     Intervention:   SW received calls from the Admissions Coordinators at [facility] and [facility].  Both Facility's have assessed and declined pt for admit.        Assessment:  Pt is agreeable to placement , however, at this time, there is no accepting facility.  Thus far, there are 13 SNF's who have indicated that they will not consider pt for admit due to recent IV drug use and need for IV antibiotics.     Plan:    Anticipated Disposition:  SW is attempting to secure placement    Barriers to d/c plan:   recent IV drug use and need for IV antibiotics.    Follow Up:    Await return calls from [facility] and [facility].     HODAN Wen  Social Work, 6A  Phone:  [phone]  Pager:  [phone]  9/19/2017

## 2017-09-20 ENCOUNTER — APPOINTMENT (OUTPATIENT)
Dept: OCCUPATIONAL THERAPY | Facility: CLINIC | Age: 37
DRG: 471 | End: 2017-09-20
Attending: INTERNAL MEDICINE
Payer: COMMERCIAL

## 2017-09-20 ENCOUNTER — APPOINTMENT (OUTPATIENT)
Dept: PHYSICAL THERAPY | Facility: CLINIC | Age: 37
DRG: 471 | End: 2017-09-20
Attending: INTERNAL MEDICINE
Payer: COMMERCIAL

## 2017-09-20 LAB
ALBUMIN SERPL-MCNC: 3.1 G/DL (ref 3.4–5)
ALP SERPL-CCNC: 113 U/L (ref 40–150)
ALT SERPL W P-5'-P-CCNC: 96 U/L (ref 0–70)
ANION GAP SERPL CALCULATED.3IONS-SCNC: 5 MMOL/L (ref 3–14)
AST SERPL W P-5'-P-CCNC: 120 U/L (ref 0–45)
BILIRUB SERPL-MCNC: 0.3 MG/DL (ref 0.2–1.3)
BUN SERPL-MCNC: 13 MG/DL (ref 7–30)
CALCIUM SERPL-MCNC: 9.5 MG/DL (ref 8.5–10.1)
CHLORIDE SERPL-SCNC: 99 MMOL/L (ref 94–109)
CO2 SERPL-SCNC: 31 MMOL/L (ref 20–32)
CREAT SERPL-MCNC: 0.7 MG/DL (ref 0.66–1.25)
ERYTHROCYTE [DISTWIDTH] IN BLOOD BY AUTOMATED COUNT: 15 % (ref 10–15)
GFR SERPL CREATININE-BSD FRML MDRD: >90 ML/MIN/1.7M2
GLUCOSE SERPL-MCNC: 85 MG/DL (ref 70–99)
HCT VFR BLD AUTO: 36.5 % (ref 40–53)
HGB BLD-MCNC: 11.3 G/DL (ref 13.3–17.7)
MCH RBC QN AUTO: 27.2 PG (ref 26.5–33)
MCHC RBC AUTO-ENTMCNC: 31 G/DL (ref 31.5–36.5)
MCV RBC AUTO: 88 FL (ref 78–100)
PLATELET # BLD AUTO: 412 10E9/L (ref 150–450)
POTASSIUM SERPL-SCNC: 3.9 MMOL/L (ref 3.4–5.3)
PROT SERPL-MCNC: 8 G/DL (ref 6.8–8.8)
RBC # BLD AUTO: 4.15 10E12/L (ref 4.4–5.9)
SODIUM SERPL-SCNC: 135 MMOL/L (ref 133–144)
WBC # BLD AUTO: 8 10E9/L (ref 4–11)

## 2017-09-20 PROCEDURE — 85027 COMPLETE CBC AUTOMATED: CPT | Performed by: INTERNAL MEDICINE

## 2017-09-20 PROCEDURE — 80053 COMPREHEN METABOLIC PANEL: CPT | Performed by: INTERNAL MEDICINE

## 2017-09-20 PROCEDURE — 25000132 ZZH RX MED GY IP 250 OP 250 PS 637: Performed by: INTERNAL MEDICINE

## 2017-09-20 PROCEDURE — 97530 THERAPEUTIC ACTIVITIES: CPT | Mod: GO

## 2017-09-20 PROCEDURE — 40000133 ZZH STATISTIC OT WARD VISIT

## 2017-09-20 PROCEDURE — 12000008 ZZH R&B INTERMEDIATE UMMC

## 2017-09-20 PROCEDURE — 40000193 ZZH STATISTIC PT WARD VISIT

## 2017-09-20 PROCEDURE — 25000132 ZZH RX MED GY IP 250 OP 250 PS 637: Performed by: NURSE PRACTITIONER

## 2017-09-20 PROCEDURE — 25000132 ZZH RX MED GY IP 250 OP 250 PS 637: Performed by: HOSPITALIST

## 2017-09-20 PROCEDURE — 97116 GAIT TRAINING THERAPY: CPT | Mod: GP

## 2017-09-20 PROCEDURE — 36592 COLLECT BLOOD FROM PICC: CPT | Performed by: INTERNAL MEDICINE

## 2017-09-20 PROCEDURE — 25000128 H RX IP 250 OP 636: Performed by: HOSPITALIST

## 2017-09-20 PROCEDURE — 99233 SBSQ HOSP IP/OBS HIGH 50: CPT | Performed by: INTERNAL MEDICINE

## 2017-09-20 PROCEDURE — 97535 SELF CARE MNGMENT TRAINING: CPT | Mod: GO

## 2017-09-20 RX ORDER — LIDOCAINE 40 MG/G
CREAM TOPICAL
Status: DISCONTINUED | OUTPATIENT
Start: 2017-09-20 | End: 2017-09-21 | Stop reason: HOSPADM

## 2017-09-20 RX ORDER — HEPARIN SODIUM,PORCINE 10 UNIT/ML
2-5 VIAL (ML) INTRAVENOUS
Status: DISCONTINUED | OUTPATIENT
Start: 2017-09-20 | End: 2017-09-21 | Stop reason: HOSPADM

## 2017-09-20 RX ORDER — ACETAMINOPHEN 325 MG/1
650 TABLET ORAL EVERY 6 HOURS
Status: DISCONTINUED | OUTPATIENT
Start: 2017-09-20 | End: 2017-09-21 | Stop reason: HOSPADM

## 2017-09-20 RX ADMIN — PREGABALIN 150 MG: 75 CAPSULE ORAL at 21:07

## 2017-09-20 RX ADMIN — PREGABALIN 150 MG: 75 CAPSULE ORAL at 07:38

## 2017-09-20 RX ADMIN — LEVOMILNACIPRAN HYDROCHLORIDE 80 MG: 80 CAPSULE, EXTENDED RELEASE ORAL at 07:39

## 2017-09-20 RX ADMIN — CEFAZOLIN SODIUM 2 G: 2 INJECTION, SOLUTION INTRAVENOUS at 22:41

## 2017-09-20 RX ADMIN — ABACAVIR SULFATE, DOLUTEGRAVIR SODIUM, LAMIVUDINE 1 TABLET: 600; 50; 300 TABLET, FILM COATED ORAL at 07:38

## 2017-09-20 RX ADMIN — TRAZODONE HYDROCHLORIDE 50 MG: 50 TABLET ORAL at 21:07

## 2017-09-20 RX ADMIN — BUPRENORPHINE HYDROCHLORIDE, NALOXONE HYDROCHLORIDE 1 FILM: 8; 2 FILM, SOLUBLE BUCCAL; SUBLINGUAL at 07:39

## 2017-09-20 RX ADMIN — CLONAZEPAM 0.5 MG: 0.5 TABLET ORAL at 07:38

## 2017-09-20 RX ADMIN — ACETAMINOPHEN 650 MG: 325 TABLET, FILM COATED ORAL at 23:50

## 2017-09-20 RX ADMIN — CEFAZOLIN SODIUM 2 G: 2 INJECTION, SOLUTION INTRAVENOUS at 06:15

## 2017-09-20 RX ADMIN — Medication 1 MG: at 23:53

## 2017-09-20 RX ADMIN — CLONAZEPAM 0.5 MG: 0.5 TABLET ORAL at 21:07

## 2017-09-20 RX ADMIN — ACETAMINOPHEN 650 MG: 325 TABLET, FILM COATED ORAL at 17:09

## 2017-09-20 RX ADMIN — CEFAZOLIN SODIUM 2 G: 2 INJECTION, SOLUTION INTRAVENOUS at 14:55

## 2017-09-20 ASSESSMENT — VISUAL ACUITY
OU: NORMAL ACUITY
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
## 2017-09-20 NOTE — PROGRESS NOTES
[facility] [place]    Internal Medicine Progress Note - Gold Service      Assessment & Plan   Bc German is a 37 year old year old with PMH HIV, IVDU, anxiety/depression who presents with weakness, ataxia, neck pain.   Patient was seen at [facility] initially. MR cervical spine notable for Epidural abscess with compressive phlegmon and cervical myelopathy, s/p C6 and C7 corpectomy, C5-T1 anterior instrumented fusion  On 9/10.        1. Cervical osteomyelitis with Epidural abscess with compressive phlegmon and cervical myelopathy, s/p C6 and C7 corpectomy, C5-T1 anterior instrumented fusion      - POD 10. Doing well. Ataxia and weakness has improved significantly  - Ho IV drug use, substance abuse, HIV positive   - vertebra tissue and abscess - positive for Staph aureus (MSSA)  - Blood culture 9/10 grew MSSA and Strep  Vestibularis and staph Epidermidis. Operative culture grew MSSA. Negative BC from 9/12.    -initially treated with Vanc and zosyn, now switched to Cefazolin. Plan to continue for total 6 week of IV AB ie till Oct 24 per ID with weekly CBC and CMPs. Needs PICC line. D/c central line. PICC ordered.   - pain control:  Weaned off dilaudid. Weaned off flexeril. Has tyleno. Back off  On lyrica (300 mg BID to 150 mg BiD). No NSIDs. Can use lidocaine patches on the normal skin around the incision to block the nerves.  - started him on Suboxone (opiod dependence) and Clonazepam (anxiety)  - heat pad PRN  - Difficult placement due to ho IV drug abuse. Current plan is to go for TCU with PICC placement.   -pt was requesting to increase suboxone today to 16 mg- had further discussion about this and acceptable for non medicine based management like upper body exercises with OT and if continues to be problem until Monday will increase dose then       2. MSSA Bacteremia      - Staph aureus and Stre vestibularis and Staph Epidermidis from  9/9 and 9/10  - FU repeat BC form 9/12  negative.   - TTE negative for endocarditis. We did not pursue TTE  - He has  PICC ordered and discontinue central line      3. HIV  (073975 (7/24/17) and CD4 782 (7/24/17)      - non compliant in the past, sharing IV needles.   - continue Triumeq   - HIV viral load undetectable during this admit, CD4  784 in July  - FU with Dr Presley      4. History of osteomyelitis L3 and psoas abscess in the past      5. Hepatitis C       6. History of polysubstance abuse  - heroin, marijuana, alcohol, tobacco   - plan tos start suboxone for treatment.       7. Homelessness       8. Depression/anxiety   - seeing a psychiatrist   - seen by psychiatry, they feel that adding clonazepam may be okay. Every day he asks for new drug. But he is on suboxone,  Dr Iliana Lucia is okay adding clonazepam too     9. Tachycardia is chronic in nature. He is making good urine. BP is good.He is not in sepsis. No need to draw a LA        DVT Prophylaxis: SCDs     Code Status: Full Code     Diet: Regular Diet Adult  Fluids: oral intake   DVT Prophylaxis: SCD  Code Status: Full Code    Disposition Plan   Expected discharge: > 7 days, recommended to transitional care unit once   Pending finding a TCU; difficult placement due to previous h/o drug use      Entered: Ramon Justin 09/20/2017, 6:03 PM   Information in the above section will display in the discharge planner report.      The patient's care was discussed with the Bedside Nurse.    Ramon Justin  Internal Medicine Staff Hospitalist Service  [facility]  Pager: [phone]  Please see sticky note for cross cover information    Interval History   No overnight events  Complaining of back pain and feels like he needs increased dose of suboxone    Data reviewed today: I reviewed all medications, new labs and imaging results over the last 24 hours. I personally reviewed no images or EKG's today.    Physical Exam   Vital Signs: Temp: 96.4  F (35.8  C) Temp src: Oral BP: 122/86   Heart  Rate: 107 Resp: 16 SpO2: 96 % O2 Device: None (Room air)    Weight: 140 lbs 0 oz  General Appearance: Looks comfortable on exam  HEENT: presence of left IJ and collar in place, decrease ROM  Respiratory: b/l equal breath sounds with no added sounds   Cardiovascular: s1s2 with no murmur   GI: soft, non tender, bowel sounds noted   Neuro: AAOx3, b/l UE and LE-5/5         Data   Data     Recent Labs  Lab 09/20/17  0755 09/19/17  0752 09/18/17  0728   WBC 8.0  --  7.5   HGB 11.3*  --  10.5*   MCV 88  --  88    385 376     --  139   POTASSIUM 3.9  --  3.5   CHLORIDE 99  --  102   CO2 31  --  31   BUN 13  --  12   CR 0.70 0.73 0.74   ANIONGAP 5  --  6   SANDRA 9.5  --  9.2   GLC 85  --  104*   ALBUMIN 3.1*  --   --    PROTTOTAL 8.0  --   --    BILITOTAL 0.3  --   --    ALKPHOS 113  --   --    ALT 96*  --   --    *  --   --

## 2017-09-20 NOTE — PLAN OF CARE
Problem: Goal Outcome Summary  Goal: Goal Outcome Summary  Outcome: No Change  VSS, neuros intact, A&Ox4, CO HA, gave PRN Tylenol. Triple lumen IJ SL between IV ABX. Up Ind in room, SBA in hallway. Voiding spontaneously, no BM this shift. Reg diet. Aspen Collar on at all times. R Neck incision approximated with Liquid bandage. No drainage. CDI. Awaiting placement to TCU. Continue with POC.

## 2017-09-20 NOTE — PLAN OF CARE
Problem: Goal Outcome Summary  Goal: Goal Outcome Summary  PT 6A: Discharge Planner PT   Patient plan for discharge: TCU stay  Current status: Pt amb 700 feet with IV Pole SBA. Pt improving balance with walking and completed side stepping and backwards walking at hand-rail. Pt ok to amb within room with IV Pole Mod(I). Ok to walk in halls with nursing with IV Pole and SBA of 1, gait belt on.     Barriers to return to prior living situation: Deficits in strength and balance, impulsivity and poor judgement limiting safe independence.  Recommendations for discharge: TCU for continued strengthening, balance, and gait training.  Rationale for recommendations: Pt continues to improve with walking but is limited by impaired balance/strength and judgement that limits his safety with functional mobility and his ability to adhere to his precautions.       Entered by: Srinivasan Ge 09/20/2017 9:57 AM

## 2017-09-20 NOTE — PLAN OF CARE
Problem: Goal Outcome Summary  Goal: Goal Outcome Summary  Discharge Planner OT   Patient plan for discharge: TCU  Current status: Patient re-educated on brace wearing and appropriate donning/doffing and uses of brace within precautions. OT also providing safety education and reiteration of PT safety guidelines for ambulation in room and in hallways. OT providing patient with resource for anxiety/stress management strategies, patient very grateful.  Barriers to return to prior living situation: Post-surgical precautions, mental health  Recommendations for discharge: TCU vs 24 hour supervision at home pending placement regarding IV abx ashley  Rationale for recommendations: Safety concerns, altered balance for continued work with PT       Entered by: Nabila Omalley 09/20/2017 10:54 AM

## 2017-09-20 NOTE — PROGRESS NOTES
Social Work Services Progress Note    Hospital Day: 12  Date of Initial Social Work Evaluation:  9/18/17  Collaborated with:  Admissions at hospitals at [facility] (Xuan [phone])    Data:  Pt is awaiting discharge.    Intervention:  JAREN  Received a call from Xuan Cárdenas, Admissions at [facility].  Xuan indicated that they may accept pt for admit.  Xuan inquired as to if pt is on isolation. JAREN informed Xuan that pt is not on isolation.  Xuan indicated that the available room would share with a roommate who can be verbally aggressive and has verbal out bursts that can be inappropriate.  Xuan indicates that she wants to ensure that pt is made aware of this.  Xuan than indicated that she will need to follow up in regards to the Suboxone before making a final decision.   JAREN phoneAndrés Barrientos's nurse, Juani, at [facility] ([phone]) who indicated that she would phone Xuan Cárdenas in regards to the Suboxone.      Assessment:  [facility] may approve pt for admit.    Plan:    Anticipated Disposition:SNF placement for IV antibiotic    Barriers to d/c plan:  Await final decision from [facility]    Follow Up:  JAREN will continue to follow.    HODAN Wen  Social Work, 6A  Phone:  [phone]  Pager:  [phone]  9/21/2017

## 2017-09-20 NOTE — PROGRESS NOTES
Social Work Services Progress Note    Hospital Day: 12  Date of Initial Social Work Evaluation:  9/18/17  Collaborated with:  SNF Admissions Coordinator    Data:  Pt is awaiting discharge.   SW will require IV antibiotic x 5 weeks.    Intervention:  SW received 9/19/17 follow up calls from Admissions Coordinators at the following facility's:  1.  [facility] at [facility] ([place]), they will likely not consider pt for admit stating that within the neighborhood, there is easy access to drugs and that they have a lot of pt's with picc lines within the facility.  2.  [facility] ([place]) - will not consider pt for admit.    SW phoned [facility] at [facility] and left a message for Admissions to call (no response from previous message).    Assessment: Ptt is agreeable to short term placement, however, there is no accepting SNF.    Plan:    Anticipated Disposition: It pt cannot discharge to home, pt may need to remain hospitalized throughout duration of IV antibiotic as at this time, there is no accepting SNF.    Barriers to d/c plan:  History of IV drug use (recent).    Follow Up:  SW will continue to follow.    HODAN Wen  Social Work, 6A  Phone:  [phone]  Pager:  [phone]  9/20/2017

## 2017-09-21 ENCOUNTER — HOSPITAL ENCOUNTER (INPATIENT)
Dept: VASCULAR ULTRASOUND | Facility: CLINIC | Age: 37
DRG: 471 | End: 2017-09-21
Attending: INTERNAL MEDICINE
Payer: COMMERCIAL

## 2017-09-21 ENCOUNTER — APPOINTMENT (OUTPATIENT)
Dept: PHYSICAL THERAPY | Facility: CLINIC | Age: 37
DRG: 471 | End: 2017-09-21
Attending: INTERNAL MEDICINE
Payer: COMMERCIAL

## 2017-09-21 VITALS
DIASTOLIC BLOOD PRESSURE: 80 MMHG | HEART RATE: 128 BPM | HEIGHT: 71 IN | BODY MASS INDEX: 19.6 KG/M2 | RESPIRATION RATE: 16 BRPM | SYSTOLIC BLOOD PRESSURE: 156 MMHG | TEMPERATURE: 96.3 F | WEIGHT: 140 LBS | OXYGEN SATURATION: 96 %

## 2017-09-21 PROCEDURE — 97116 GAIT TRAINING THERAPY: CPT | Mod: GP

## 2017-09-21 PROCEDURE — 90686 IIV4 VACC NO PRSV 0.5 ML IM: CPT | Performed by: INTERNAL MEDICINE

## 2017-09-21 PROCEDURE — 97112 NEUROMUSCULAR REEDUCATION: CPT | Mod: GP

## 2017-09-21 PROCEDURE — 25000132 ZZH RX MED GY IP 250 OP 250 PS 637: Performed by: HOSPITALIST

## 2017-09-21 PROCEDURE — 25000128 H RX IP 250 OP 636: Performed by: INTERNAL MEDICINE

## 2017-09-21 PROCEDURE — 25000128 H RX IP 250 OP 636: Performed by: HOSPITALIST

## 2017-09-21 PROCEDURE — 25000132 ZZH RX MED GY IP 250 OP 250 PS 637: Performed by: STUDENT IN AN ORGANIZED HEALTH CARE EDUCATION/TRAINING PROGRAM

## 2017-09-21 PROCEDURE — 25000132 ZZH RX MED GY IP 250 OP 250 PS 637: Performed by: INTERNAL MEDICINE

## 2017-09-21 PROCEDURE — 36569 INSJ PICC 5 YR+ W/O IMAGING: CPT

## 2017-09-21 PROCEDURE — 97530 THERAPEUTIC ACTIVITIES: CPT | Mod: GP

## 2017-09-21 PROCEDURE — 40000193 ZZH STATISTIC PT WARD VISIT

## 2017-09-21 PROCEDURE — C1751 CATH, INF, PER/CENT/MIDLINE: HCPCS

## 2017-09-21 RX ORDER — BUPRENORPHINE AND NALOXONE 8; 2 MG/1; MG/1
1 FILM, SOLUBLE BUCCAL; SUBLINGUAL DAILY
Qty: 30 EACH | Status: SHIPPED | DISCHARGE
Start: 2017-09-21 | End: 2017-09-21

## 2017-09-21 RX ORDER — ACETAMINOPHEN 325 MG/1
650 TABLET ORAL EVERY 6 HOURS
Qty: 100 TABLET | Refills: 0 | DISCHARGE
Start: 2017-09-21 | End: 2018-05-04

## 2017-09-21 RX ORDER — CLONAZEPAM 0.5 MG/1
0.5 TABLET ORAL 2 TIMES DAILY
Qty: 180 TABLET | Refills: 0 | Status: SHIPPED | DISCHARGE
Start: 2017-09-21 | End: 2017-09-21

## 2017-09-21 RX ORDER — LIDOCAINE 50 MG/G
1 PATCH TOPICAL EVERY 24 HOURS
Qty: 30 PATCH | Refills: 0 | Status: ON HOLD | DISCHARGE
Start: 2017-09-21 | End: 2017-10-12

## 2017-09-21 RX ORDER — BUPRENORPHINE AND NALOXONE 8; 2 MG/1; MG/1
1 FILM, SOLUBLE BUCCAL; SUBLINGUAL DAILY
Qty: 1 EACH | Refills: 0 | Status: SHIPPED | OUTPATIENT
Start: 2017-09-21 | End: 2017-09-21

## 2017-09-21 RX ORDER — CLONAZEPAM 0.5 MG/1
0.5 TABLET ORAL 2 TIMES DAILY
Qty: 30 TABLET | Refills: 0 | Status: SHIPPED | OUTPATIENT
Start: 2017-09-21 | End: 2018-05-04

## 2017-09-21 RX ORDER — BUPRENORPHINE AND NALOXONE 8; 2 MG/1; MG/1
1 FILM, SOLUBLE BUCCAL; SUBLINGUAL DAILY
Qty: 4 EACH | Refills: 0 | Status: SHIPPED | OUTPATIENT
Start: 2017-09-21 | End: 2018-05-04

## 2017-09-21 RX ORDER — BUPRENORPHINE AND NALOXONE 8; 2 MG/1; MG/1
1 FILM, SOLUBLE BUCCAL; SUBLINGUAL DAILY
Qty: 2 EACH | Refills: 0 | Status: SHIPPED | OUTPATIENT
Start: 2017-09-21 | End: 2017-09-21

## 2017-09-21 RX ORDER — TRAZODONE HYDROCHLORIDE 50 MG/1
50 TABLET, FILM COATED ORAL AT BEDTIME
Qty: 90 TABLET | Refills: 0 | DISCHARGE
Start: 2017-09-21 | End: 2017-09-21

## 2017-09-21 RX ORDER — PREGABALIN 150 MG/1
300 CAPSULE ORAL 2 TIMES DAILY
Qty: 6 CAPSULE | Refills: 0 | Status: ON HOLD | DISCHARGE
Start: 2017-09-21 | End: 2017-10-12

## 2017-09-21 RX ORDER — PREGABALIN 150 MG/1
150 CAPSULE ORAL 2 TIMES DAILY
Qty: 90 CAPSULE | Refills: 0 | Status: SHIPPED | OUTPATIENT
Start: 2017-09-21 | End: 2018-05-04

## 2017-09-21 RX ORDER — CEFAZOLIN SODIUM 2 G/100ML
2 INJECTION, SOLUTION INTRAVENOUS EVERY 8 HOURS
Qty: 9000 ML | Refills: 0 | DISCHARGE
Start: 2017-09-21 | End: 2017-10-21

## 2017-09-21 RX ADMIN — INFLUENZA A VIRUS A/MICHIGAN/45/2015 X-275 (H1N1) ANTIGEN (FORMALDEHYDE INACTIVATED), INFLUENZA A VIRUS A/HONG KONG/4801/2014 X-263B (H3N2) ANTIGEN (FORMALDEHYDE INACTIVATED), INFLUENZA B VIRUS B/PHUKET/3073/2013 ANTIGEN (FORMALDEHYDE INACTIVATED), AND INFLUENZA B VIRUS B/BRISBANE/60/2008 ANTIGEN (FORMALDEHYDE INACTIVATED) 0.5 ML: 15; 15; 15; 15 INJECTION, SUSPENSION INTRAMUSCULAR at 16:16

## 2017-09-21 RX ADMIN — ACETAMINOPHEN 650 MG: 325 TABLET, FILM COATED ORAL at 12:09

## 2017-09-21 RX ADMIN — PREGABALIN 150 MG: 75 CAPSULE ORAL at 08:39

## 2017-09-21 RX ADMIN — CEFAZOLIN SODIUM 2 G: 2 INJECTION, SOLUTION INTRAVENOUS at 15:22

## 2017-09-21 RX ADMIN — CLONAZEPAM 0.5 MG: 0.5 TABLET ORAL at 08:39

## 2017-09-21 RX ADMIN — CEFAZOLIN SODIUM 2 G: 2 INJECTION, SOLUTION INTRAVENOUS at 06:23

## 2017-09-21 RX ADMIN — LEVOMILNACIPRAN HYDROCHLORIDE 80 MG: 80 CAPSULE, EXTENDED RELEASE ORAL at 08:40

## 2017-09-21 RX ADMIN — BUPRENORPHINE HYDROCHLORIDE, NALOXONE HYDROCHLORIDE 1 FILM: 8; 2 FILM, SOLUBLE BUCCAL; SUBLINGUAL at 08:39

## 2017-09-21 RX ADMIN — ACETAMINOPHEN 650 MG: 325 TABLET, FILM COATED ORAL at 06:23

## 2017-09-21 RX ADMIN — ABACAVIR SULFATE, DOLUTEGRAVIR SODIUM, LAMIVUDINE 1 TABLET: 600; 50; 300 TABLET, FILM COATED ORAL at 08:40

## 2017-09-21 RX ADMIN — SENNOSIDES AND DOCUSATE SODIUM 1 TABLET: 8.6; 5 TABLET ORAL at 08:40

## 2017-09-21 ASSESSMENT — VISUAL ACUITY
OU: NORMAL ACUITY

## 2017-09-21 NOTE — PLAN OF CARE
Problem: Goal Outcome Summary  Goal: Goal Outcome Summary  Pt is A&O X4. POD#11 C6-C7 Corpectomy, C5-7 anterior instrumentation fusion. VS WNL except Tachycardia (baseline) Neuro unchanged: numbness and tingling Right fingertips and right toes. Triple lumen removed per ordered, pt tolerated. Pain managed with Tylenol, Suboxone and Lyrica. Awaiting for PICC placement, RN PICC was notified that pt is D/C at 4pm. Will continue to monitor and follow POC.  PS@ 1430, Pt went down for PICC Placement Antibiotic started and suboxone given to next RN.

## 2017-09-21 NOTE — PLAN OF CARE
Problem: Goal Outcome Summary  Goal: Goal Outcome Summary  Pt POD#10 C6-C7 corpectomy, C5-7 anterior instrumentation fusion. VSS. Neuros unchanged with mild tingling in R fingertips, R toes, pt states this is improving. C-collar on at all times.  brace on when HOB >30 or pt OOB. Pt up with SBA. Pain controlled with subaxone, tylenol, lyrica. Klonopin for anxiety. Voiding spontaneously with good UOP. L neck TLIJ, dressing changed this shift. Pt calls appropriately. IV SL between ABX. Reg diet, tolerating well. Cont with POC.

## 2017-09-21 NOTE — PLAN OF CARE
Problem: Goal Outcome Summary  Goal: Goal Outcome Summary  Pt POD#11 C6-C7 corpectomy, C5-7 anterior instrumentation fusion. VSS. Neuros unchanged with mild tingling in R fingertips, R toes, pt states this is improving. C-collar on at all times.  brace on when HOB >30 or pt OOB. Up with SBA. Pain controlled with subaxone, tylenol, lyrica. Klonopin for anxiety. Voiding spontaneously with good UOP. L neck TLIJ, dressing changed on evening shift last night. Pt calls appropriately. Reg diet, tolerating well. Cont with POC.

## 2017-09-21 NOTE — PROGRESS NOTES
Social Work Services Discharge Note      Patient Name:  Bc German     Anticipated Discharge Date:  9/21/17    Discharge Disposition:   [facility] at  [facility]  ([phone])    Following MD:  Facility Assignment     Pre-Admission Screening (PAS) online form has been completed.  The Level of Care (LOC) is:  Determined  Confirmation Code is:  [id number].  Patient/caregiver informed of referral to [facility] for Pre-Admission Screening for skilled nursing facility (SNF) placement and to expect a phone call post discharge from SNF.     Additional Services/Equipment Arranged:  Dr. Justin has confirmed readiness for discharge.  Xuan Cárdenas, Admissions for [facility], has confirmed acceptance.  Xuan states that she spoke with Juani at [facility] who will be sending a Suboxone script to her.  Xuan also indicates that pt has an appt at [facility] on 9/22/17.  Xuan indicates that they will accept pt for admit today, however, pt needs to receive today's dose of Suboxone, prior to discharge.  JAREN spoke with pt's floor nurse (Emily) who indicates that pt received Suboxone today at 8:30am.  Per discussion with [facility], pt will receive IV antibiotic today between 2 and 2:30 pm and it will take 1 hour for it to run.  Thus, JAREN arranged for [facility] ([facility] [phone]) to provide w/c transport at 4pm.  JAREN phoned nurse Juani at [facility] to inform of the discharge plan.  Juani confirmed pt's 9/22/17, 1pm appt with Dr. Barrientos (JAREN added this appt to the discharge orders.  Juani also indicates that Dr. Barrientos has placed an order for pt to be discharged with a dose of Suboxone that pt can use tomorrow am.  JAREN has informed pt's floor nurse (Emily) of this and Emily indicates that she will get the order to the discharge pharmacy so that the single dose of Suboxone is available by 4pm to be sent with pt.  Pt is also having a picc line placed today and Emily is aware that this needs to be completed prior to 4pm.     Patient / Family response  "to discharge plan:  Pt voices understanding of the discharge plan and agreement with the discharge plan.  Pt has been informed that his roommate at [facility] has reported inappropriate verbal outbursts.   Pt's response, \"I'm a recovering heroin addict, I've been through worse, sounds like most of my friends, sounds fun.\"     Persons notified of above discharge plan:  Pt, 6A nursing and Dr. Justin.    Staff Discharge Instructions:  Please fax discharge orders and signed hard scripts for any controlled substances (SW will complete this task).  Please print a packet and send with patient.     CTS Handoff completed:  YES    Medicare Notice of Rights provided to the patient/family:  NO, as per Admissions Face Sheet, pt is not on Medicare.    HODAN Wen  Social Work, 6A  Phone:  [phone]  Pager:  [phone]  9/21/2017        "

## 2017-09-21 NOTE — PLAN OF CARE
Problem: Goal Outcome Summary  Goal: Goal Outcome Summary  6A-PT: Pt performing functional transfers with supervision-SBA. He ambulated 500'+ without AD and contact-guard - stand-by A. Pt continues to display diogenes knee hyperext/dec quad control. Pt perseverating throughout session on inconsistencies with medical care - provided support and offered assistance in clarifying questions.To promote improvement in dynamic stability, pt completed dynamic balance drills. Also completed LE strengthening in seated and standing. Pt continues to be limited by dec balance and activity tolerance.      Continue to rec [facility]CU at [facility] for further progression of strength, activity tolerance and balance.  Encourage pt to ambulate 3x day outside of room with staff.

## 2017-09-21 NOTE — PROGRESS NOTES
Pt received last dose of Ancef before discharge. DL PICC sl'd. Flu shot administered to R deltoid. Subaxone prescriptions picked up from pharmacy and placed in packet sent with [facility] transport. Report given to nurse at [facility]. Pt left in wheelchair around 1620, accompanied by dad; took all belongings with.

## 2017-09-21 NOTE — PLAN OF CARE
Problem: Goal Outcome Summary  Goal: Goal Outcome Summary  OT 6A: Cancel. OT attempting to see patient earlier this day, patient feeling overwhelmed about discharge location and having PICC placed. Patient then leaving floor, unable to see patient prior to d/c to TCU for IV abx.      Occupational Therapy Discharge Summary     Reason for therapy discharge:    Discharged to transitional care facility.     Progress towards therapy goal(s). See goals on Care Plan in [facility] electronic health record for goal details.  Goals partially met.  Barriers to achieving goals:   discharge from facility.     Therapy recommendation(s):    Continued therapy is recommended.  Rationale/Recommendations:  Patient would benefit from conitnued PT at TCU to address balance deficits..

## 2017-09-21 NOTE — PROGRESS NOTES
Suboxone Follow up:    Per report, being discharged to TCU this afternoon.    Has follow up appointment with me at [facility] tomorrow at 1 pm.    Will Prescribe 4 films of suboxone, to last through Monday, just in case the ride from TCU tomorrow falls through.    Khari Barrientos MD  Internal Medicine/Pediatrics Hospitalist & Staff Physician   of Internal Medicine and Pediatrics  [facility]  Pager: [phone]

## 2017-09-21 NOTE — PLAN OF CARE
Problem: Goal Outcome Summary  Goal: Goal Outcome Summary  Physical Therapy Discharge Summary     Reason for therapy discharge:    Discharged to transitional care facility.     Progress towards therapy goal(s). See goals on Care Plan in [facility] electronic health record for goal details.  Goals not met.  Barriers to achieving goals:   discharge from facility.     Therapy recommendation(s):    Continued therapy is recommended.  Rationale/Recommendations:  for continued progression of functional mobility..

## 2017-09-21 NOTE — DISCHARGE SUMMARY
[facility], [place]    Internal Medicine Discharge Summary- Gold Service    Date of Admission:  9/9/2017  Date of Discharge:  9/21/2017  Discharging Provider: Ramon Justin  Discharge Team: Gold 1    Discharge Diagnoses   Cervical osteomyelitis with epidural abscess with compressive phlegmon and cervical myelopathy s/p C6 and C7 corpectomy, C5-T1 anterior instrumentation fusion    MSSA bacteremia  HIV  History of osteomyelitis, L3 and psoas abscess in the past  Depression/anxiety  Tachycardia    Follow-ups Needed After Discharge   Follow up with Dr Barrientos tomorrow; follow up CBC, BMP every week till 10/24   Follow up with ID in  3 weeks  Follow up with neurosurgery in 1 week and then again in 6 week with repeat MRI [facility]     Hospital Course   Bc German is a 37 year old year old with PMH HIV, IVDU, anxiety/depression who presents with weakness, ataxia, neck pain.   Patient was seen at [facility] initially. MR cervical spine notable for Epidural abscess with compressive phlegmon and cervical myelopathy, s/p C6 and C7 corpectomy, C5-T1 anterior instrumented fusion  On 9/10.   Continue to be on IV antibiotics-cefazolin 2gm TID till 10/24       1. Cervical osteomyelitis with Epidural abscess with compressive phlegmon and cervical myelopathy, s/p C6 and C7 corpectomy, C5-T1 anterior instrumented fusion   He was found to have epidural abscess with compression and cervical myelopathy. He had c6 and c7 corpectomy , c5-t1 anterior instrumentation. This was done on 9/10. Then has been doing well    Has been followed by neurosurgery  -plan to follow up with neurosurgery clinic in 2 weeks with Denise Dial for wound check  And follow up in 6 weeks with Dr Marv Ambrose in Neurosurgery clinic with MRI with and without contrast of the cervical spine     Also was being treated with IV antibiotics-  Grew staph aureus and staph vestibularis and vertebral tissue positive for staph aureus  MSSA  Continue with IV cefazolin for every 8 hrs to continue till 10/24  Will need follow up ID in 3 weeks and continue to follow CMP, CBC, CRP weekly  Blood culture on 9/12 was negative    2. MSSA Bacteremia       - Staph aureus and Stre vestibularis and Staph Epidermidis from  9/9 and 9/10  - FU repeat BC form 9/12 negative.   - TTE negative for endocarditis. We did not pursue EDUARDO  -rest management as above      3. HIV  (237067 (7/24/17) and CD4 782 (7/24/17)       - non compliant in the past, sharing IV needles.   - continue Triumeq   - HIV viral load undetectable during this admit, CD4  784 in July  - FU with Dr Presley and continue same regimen on d/c       4. History of osteomyelitis L3 and psoas abscess in the past      5. Hepatitis C       6. History of polysubstance abuse  - heroin, marijuana, alcohol, tobacco   - suboxone treament has been started as an inpatient  -suboxone 8-2mg per day ; will be following with Dr Barrientos tomorrow  -provided with 4 films of suboxone on discharge   -chronic pain management with tylenol pritesh, lyrica (300mg ) which was reduced from 600 mg , lidocaine patches  Clonazepam BID started for anxiety         7. Homelessness       8. Depression/anxiety   - seeing a psychiatrist   - seen by psychiatry, they feel that adding clonazepam may be okay. -this was prescribed on discharge   Also for insomnia prescribing melatonin   Discontinuing trazodone on d/c    Consultations This Hospital Stay   PHYSICAL THERAPY ADULT IP CONSULT  OCCUPATIONAL THERAPY ADULT IP CONSULT  NEUROLOGY GENERAL ADULT IP CONSULT  CHEMICAL DEPENDENCY IP CONSULT  NEUROSURGERY ADULT IP CONSULT  INFECTIOUS DISEASE GENERAL ADULT IP CONSULT  PHYSICAL THERAPY ADULT IP CONSULT  OCCUPATIONAL THERAPY ADULT IP CONSULT  PAIN MANAGEMENT ADULT IP CONSULT  PHARMACY TO DOSE VANCO  PHARMACY TO DOSE VANCO  ORTHOSIS CERVICAL COLLAR IP CONSULT  CARDIOLOGY GENERAL ADULT IP CONSULT  SPEECH LANGUAGE PATH ADULT IP CONSULT  VASCULAR ACCESS  CARE ADULT IP CONSULT  SOCIAL WORK IP CONSULT  VASCULAR ACCESS ADULT IP CONSULT  VASCULAR ACCESS ADULT IP CONSULT  PHYSICAL THERAPY ADULT IP CONSULT  OCCUPATIONAL THERAPY ADULT IP CONSULT   Code Status   Full Code    Time Spent on this Encounter   I, Ramon Justin, personally saw the patient today and spent greater than 30 minutes discharging this patient.       chinmay Contreraskota  Internal Medicine Staff Hospitalist Service  [facility]  Pager: [phone]  ______________________________________________________________________    Physical Exam   Vital Signs: Temp: 96.3  F (35.7  C) Temp src: Oral BP: 156/80   Heart Rate: 105 Resp: 16 SpO2: 96 % O2 Device: None (Room air)    Weight: 140 lbs 0 oz    General Appearance: Pt looks comfortable on exam   Respiratory:b/l equal breath sounds with no added sounds  Cardiovascular: s1s2 with no murmur   GI: soft, non tender, bowel sounds noted   Skin: no rash  Neuro: AAOx3, b/l UE and LE-5/5    Significant Results and Procedures   Most Recent 3 CBC's:  Recent Labs   Lab Test  09/20/17   0755  09/19/17   0752  09/18/17   0728   09/13/17   0917   WBC  8.0   --   7.5   --   12.4*   HGB  11.3*   --   10.5*   --   11.2*   MCV  88   --   88   --   88   PLT  412  385  376   < >  363    < > = values in this interval not displayed.       Pending Results   Unresulted Labs Ordered in the Past 30 Days of this Admission     Date and Time Order Name Status Description    9/10/2017 2204 Fungus Culture, non-blood Preliminary     9/10/2017 2201 AFB Culture Non Blood Preliminary     9/10/2017 2115 Fungus Culture, non-blood Preliminary     9/10/2017 2110 AFB Culture Non Blood Preliminary              Primary Care Physician   Damir Cisneros    Discharge Disposition   Discharged to short-term care facility  Condition at discharge: Stable    Discharge Orders     CBC with platelets differential   Last Lab Result: Hemoglobin (g/dL)      Date                     Value                 09/20/2017               11.3 (L)         ----------     Comprehensive metabolic panel   Will need follow up till 10/24 every week with CBC, CMP     Infectious Disease Referral     NEUROSURGERY REFERRAL     General info for SNF   Length of Stay Estimate: Short Term Care: Estimated # of Days 31-90  Condition at Discharge: Improving  Level of care:skilled   Rehabilitation Potential: Good  Admission H&P remains valid and up-to-date: Yes  Recent Chemotherapy: N/A  Use Nursing Home Standing Orders: Yes     Mantoux instructions   Give two-step Mantoux (PPD) Per Facility Policy Yes     Reason for your hospital stay   You were admitted with epidural abscess collection in cervical region; you needed to have surgery for that and now continue to be on IV anitibiotic which we need to continue till 10/24     Glucose monitor nursing POCT   Before meals and at bedtime     Intake and output   Every shift     IV access   PICC.     Follow Up and recommended labs and tests   Follow up with primary care provider in 1 week.  The following labs/tests are recommended: CBC, CMP IN 1 WEEK EVERY WEEK TILL 10/24  Follow up with infectious disease in 3 weeks     Additional Discharge Instructions   Please follow with Dr Barrientos in 1 week; please follow with ID doctor  in 3 weeks     Activity - Up ad gene     Full Code     Physical Therapy Adult Consult   Evaluate and treat as clinically indicated.    Reason:  Continue s/p cervical epidural abscess and corpectomy     Occupational Therapy Adult Consult   Evaluate and treat as clinically indicated.    Reason:  Evaluate and treat for s/p corpectomy for epidural abscess     Advance Diet as Tolerated   Follow this diet upon discharge: Orders Placed This Encounter     Regular Diet Adult       Discharge Medications   Current Discharge Medication List      START taking these medications    Details   melatonin 1 MG TABS tablet Take 1 tablet (1 mg) by mouth nightly as needed for sleep  Qty: 30 tablet,  Refills: 0    Associated Diagnoses: Primary insomnia      acetaminophen (TYLENOL) 325 MG tablet Take 2 tablets (650 mg) by mouth every 6 hours  Qty: 100 tablet, Refills: 0    Associated Diagnoses: Medication side effect, initial encounter      ceFAZolin sodium-dextrose (ANCEF) 2-4 GM/100ML-% SOLN infusion Inject 100 mLs (2 g) into the vein every 8 hours  Qty: 9000 mL, Refills: 0    Associated Diagnoses: Sepsis due to methicillin susceptible Staphylococcus aureus (H)      lidocaine (LIDODERM) 5 % Patch Place 1 patch onto the skin every 24 hours  Qty: 30 patch, Refills: 0    Associated Diagnoses: 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      clonazePAM (KLONOPIN) 0.5 MG tablet Take 1 tablet (0.5 mg) by mouth 2 times daily  Qty: 30 tablet, Refills: 0    Associated Diagnoses: Ataxia      buprenorphine HCl-naloxone HCl (SUBOXONE) 8-2 MG per film Place 1 Film under the tongue daily  Qty: 4 each, Refills: 0    Associated Diagnoses: Intravenous drug abuse         CONTINUE these medications which have CHANGED    Details   !! pregabalin (LYRICA) 150 MG capsule Take 2 capsules (300 mg) by mouth 2 times daily  Qty: 6 capsule, Refills: 0    Associated Diagnoses: Medication side effect, initial encounter      !! pregabalin (LYRICA) 150 MG capsule Take 1 capsule (150 mg) by mouth 2 times daily  Qty: 90 capsule, Refills: 0    Associated Diagnoses: Medication side effect, initial encounter       !! - Potential duplicate medications found. Please discuss with provider.      CONTINUE these medications which have NOT CHANGED    Details   QUEtiapine Fumarate (SEROQUEL PO)       TRIUMEQ 600- MG per tablet TAKE ONE TABLET BY MOUTH EVERY DAY  Qty: 30 tablet, Refills: 0    Associated Diagnoses: Human immunodeficiency virus (HIV) disease (H)      levomilnacipran (FETZIMA) 80 MG 24 hr capsule Take 1 capsule (80 mg) by mouth daily  Qty: 3 capsule, Refills: 0      naloxone (NARCAN) nasal spray Spray 1 spray (4 mg) into one nostril  alternating nostrils as needed for opioid reversal (every 2-3 minutes until assistance arrives.)  Qty: 0.2 mL, Refills: 11      ibuprofen (ADVIL/MOTRIN) 600 MG tablet Take 1 tablet (600 mg) by mouth every 6 hours as needed for moderate pain  Qty: 30 tablet, Refills: 0           Allergies   Allergies   Allergen Reactions     Doxycycline GI Disturbance

## 2017-09-21 NOTE — DISCHARGE INSTRUCTIONS
You have an appointment with Dr. Barrientos at [facility] on 9/22/17 at 1pm.  The clinic is located at [address] [place] [address], [place], [place] , phone:  [phone].

## 2017-09-21 NOTE — PROGRESS NOTES
"Social Work Services Progress Note    Hospital Day: 13  Date of Initial Social Work Evaluation:  9/18/17  Collaborated with:  Pt, pt's father    Data:  Pt is discharging to [facility] today at 4pm.      Intervention:  Met with pt and father.  Father inquires about the rating of [facility], information provided.  Father inquires as to why pt can't remain hospitalized \"like he did last time\" for the duration of his IV antibiotics.  Father states that \"they\" couldn't find a facility to accept pt last time so pt was allowed to remain in the hospital.  SW explained that pt is (per MD) medically ready for discharge, that their is an accepting facility and that insurance doesn't allow for pt's who are medically ready for discharge to remain in the hospital when there is an accepting facility that has indicated that they can meet with pt's needs.  SW explained that unfortunately, pt's history of IV drug use and need for IV antibiotics limits his choices as many facility's will not accept pt's with this scenario.    Assessment: Pt continues to voice agreement and satisfaction with the discharge plan.    Plan:    Anticipated Disposition:  Discharge to [facility], today at 4pm.    Barriers to d/c plan:  None identified at this time.    Follow Up:  SW available should add'l needs arise.    HODAN Wen  Social Work, 6A  Phone:  [phone]  Pager:  [phone]  9/21/2017        "

## 2017-09-22 ENCOUNTER — CARE COORDINATION (OUTPATIENT)
Dept: CARE COORDINATION | Facility: CLINIC | Age: 37
End: 2017-09-22

## 2017-09-22 NOTE — PROGRESS NOTES
"[facility]  \"Hello, my name is Dinah Portillo , and I am calling from the [facility].  I want to check in and see how you are doing, after leaving the hospital.  You may also receive a call from your Care Coordinator (care team), but I want to make sure you don t have any urgent needs.  I have a couple questions to review with you:     Post-Discharge Outreach                                                    Bc German is a 37 year old male     Follow-up Appointments           Follow Up and recommended labs and tests         Follow up with primary care provider in 1 week.  The following labs/tests are recommended: CBC, CMP IN 1 WEEK EVERY WEEK TILL 10/24  Follow up with infectious disease in 3 weeks                        Your next 10 appointments already scheduled            Oct 03, 2017  9:30 AM CDT   (Arrive by 9:15 AM)   Return Visit with Damir Cisneros MD   [facility] ([facility])     [address]  3rd Floor  [place] [address] [phone]              Care Team:    Patient Care Team       Relationship Specialty Notifications Start End    Damir Cisneros MD PCP - General Internal Medicine  8/10/17     Comment:  Merged    Phone: [phone] Fax: [phone]         [address] [place] [address]    Arya, [facility]     4/20/17     Comment:  Per pt (Merged)    Phone: [phone] Fax: [phone]         [address] [place] [address]    Damir Cisneros MD   Internal Medicine  8/10/17     Comment:  Merged    Phone: [phone] Fax: [phone]         [address] [place] [address]            Transition of Care Review                                                      Did you have a surgery or procedure during your hospital visit? Yes   If yes, do you have any of the following:     Signs of infection:  NO    Pain: "  Yes     Pain Scale (0-10) 4/10     Location: neck    Wound/incision concerns? no    Do you have all of your medications/refills?  Yes    Are you having any side effects or questions about your medication(s)? No    Do you have any new or worsening symptoms?  No    Do you have any future appointments scheduled?   Yes             Plan                                                      Thanks for your time.  Your Care Coordinator may follow-up within the next couple days.  In the meantime if you have questions, concerns or problems call your care team.        Dinah Portillo

## 2017-10-04 ENCOUNTER — TELEPHONE (OUTPATIENT)
Dept: PHARMACY | Facility: CLINIC | Age: 37
End: 2017-10-04

## 2017-10-04 DIAGNOSIS — B20 HUMAN IMMUNODEFICIENCY VIRUS (HIV) DISEASE (H): ICD-10-CM

## 2017-10-04 RX ORDER — ABACAVIR SULFATE, DOLUTEGRAVIR SODIUM, LAMIVUDINE 600; 50; 300 MG/1; MG/1; MG/1
TABLET, FILM COATED ORAL
Qty: 30 TABLET | Refills: 3 | Status: SHIPPED | OUTPATIENT
Start: 2017-10-04 | End: 2018-03-08

## 2017-10-04 NOTE — TELEPHONE ENCOUNTER
"Clinical Consult:                                                    Bc German is a 37 year old male called for a clinical pharmacist consult.     Reason for Consult: Medication adherence.    Discussion: Anibal reports he is still at the nursing home. Reports he missed 3 days of Triumeq because the nursing home staff, \"don't know what they're doing\". Reports his father brought him a supply from home and is taking it now, and wants to know if the pharmacy can mail more to his father's house and he will bring them to him. Reports he missed his appointment with Dr. Cisneros because the nursing home staff called the cab for the wrong time. Reports he is happy is last viral load lab showed he was undetectable.     Plan:  1. Encouraged continued medication adherence.  2. Pharmacy to mail meds to father's address today.  3. Appointments for MD and [facility] made for 10/24.    Mackenzie Doherty, TRES[facility] Pharmacist.   [phone]      "

## 2017-10-07 ENCOUNTER — HOSPITAL ENCOUNTER (EMERGENCY)
Facility: CLINIC | Age: 37
Discharge: HOME | End: 2017-10-07
Attending: EMERGENCY MEDICINE | Admitting: EMERGENCY MEDICINE
Payer: COMMERCIAL

## 2017-10-07 ENCOUNTER — APPOINTMENT (OUTPATIENT)
Dept: GENERAL RADIOLOGY | Facility: CLINIC | Age: 37
End: 2017-10-07
Attending: EMERGENCY MEDICINE
Payer: COMMERCIAL

## 2017-10-07 VITALS
BODY MASS INDEX: 21.33 KG/M2 | SYSTOLIC BLOOD PRESSURE: 142 MMHG | WEIGHT: 149 LBS | OXYGEN SATURATION: 99 % | TEMPERATURE: 98.7 F | RESPIRATION RATE: 16 BRPM | DIASTOLIC BLOOD PRESSURE: 88 MMHG | HEIGHT: 70 IN | HEART RATE: 128 BPM

## 2017-10-07 DIAGNOSIS — T82.528A: ICD-10-CM

## 2017-10-07 DIAGNOSIS — F41.9 ANXIETY: ICD-10-CM

## 2017-10-07 DIAGNOSIS — Z78.9 LACK OF INTRAVENOUS ACCESS: ICD-10-CM

## 2017-10-07 PROCEDURE — 71010 XR CHEST PORT 1 VW: CPT

## 2017-10-07 PROCEDURE — 25000132 ZZH RX MED GY IP 250 OP 250 PS 637: Performed by: EMERGENCY MEDICINE

## 2017-10-07 PROCEDURE — 99284 EMERGENCY DEPT VISIT MOD MDM: CPT | Mod: Z6 | Performed by: EMERGENCY MEDICINE

## 2017-10-07 PROCEDURE — 25000128 H RX IP 250 OP 636: Performed by: EMERGENCY MEDICINE

## 2017-10-07 PROCEDURE — 36569 INSJ PICC 5 YR+ W/O IMAGING: CPT

## 2017-10-07 PROCEDURE — 27210208 ZZH KIT VALVED DOUBLE LUMEN

## 2017-10-07 PROCEDURE — 25000125 ZZHC RX 250: Performed by: EMERGENCY MEDICINE

## 2017-10-07 PROCEDURE — 99284 EMERGENCY DEPT VISIT MOD MDM: CPT | Mod: 25 | Performed by: EMERGENCY MEDICINE

## 2017-10-07 RX ORDER — HEPARIN SODIUM,PORCINE 10 UNIT/ML
2-5 VIAL (ML) INTRAVENOUS
Status: COMPLETED | OUTPATIENT
Start: 2017-10-07 | End: 2017-10-07

## 2017-10-07 RX ORDER — LIDOCAINE 40 MG/G
CREAM TOPICAL
Status: DISCONTINUED | OUTPATIENT
Start: 2017-10-07 | End: 2017-10-07 | Stop reason: HOSPADM

## 2017-10-07 RX ORDER — CLONIDINE HYDROCHLORIDE 0.1 MG/1
0.1 TABLET ORAL AT BEDTIME
Qty: 10 TABLET | Refills: 1 | Status: SHIPPED | OUTPATIENT
Start: 2017-10-07 | End: 2017-10-24

## 2017-10-07 RX ORDER — TRAZODONE HYDROCHLORIDE 50 MG/1
50 TABLET, FILM COATED ORAL AT BEDTIME
Qty: 10 TABLET | Refills: 0 | Status: SHIPPED | OUTPATIENT
Start: 2017-10-07 | End: 2018-05-04

## 2017-10-07 RX ORDER — HYDROXYZINE HYDROCHLORIDE 25 MG/1
25 TABLET, FILM COATED ORAL ONCE
Status: COMPLETED | OUTPATIENT
Start: 2017-10-07 | End: 2017-10-07

## 2017-10-07 RX ADMIN — LIDOCAINE HYDROCHLORIDE 3 ML: 10 INJECTION, SOLUTION EPIDURAL; INFILTRATION; INTRACAUDAL; PERINEURAL at 17:49

## 2017-10-07 RX ADMIN — HYDROXYZINE HYDROCHLORIDE 25 MG: 25 TABLET ORAL at 18:44

## 2017-10-07 RX ADMIN — SODIUM CHLORIDE, PRESERVATIVE FREE 5 ML: 5 INJECTION INTRAVENOUS at 19:42

## 2017-10-07 ASSESSMENT — ENCOUNTER SYMPTOMS
WEAKNESS: 0
NUMBNESS: 0
FEVER: 0

## 2017-10-07 NOTE — ED NOTES
Pt's Picc line came out today while walking outside. The picc line got caught on a tree branch. Pt did bring the line with.

## 2017-10-07 NOTE — ED AVS SNAPSHOT
[facility], Emergency Department    [address] [address]    Phone:  [phone]                                       Bc German   MRN: [id number]    Department:  [facility], Emergency Department   Date of Visit:  10/7/2017           Patient Information     Date Of Birth          1980        Your diagnoses for this visit were:     Lack of intravenous access     Anxiety        You were seen by Isael Ness MD.        Discharge Instructions       Please make an appointment to follow up with [facility] (phone: [phone]) as soon as possible to establish a primary care clinic and go over your medicines.      Future Appointments        Provider Department Dept Phone Center    10/24/2017 9:00 AM Damir Cisneros MD [facility] [phone] [facility]    11/20/2017 8:00 AM Marv Ambrose MD [facility] [phone] [facility]      24 Hour Appointment Hotline       To make an appointment at any [facility], call [phone] ([phone]). If you don't have a family doctor or clinic, we will help you find one. [place] clinics are conveniently located to serve the needs of you and your family.             Review of your medicines      START taking        Dose / Directions Last dose taken    cloNIDine 0.1 MG tablet   Commonly known as:  CATAPRES   Dose:  0.1 mg   Quantity:  10 tablet        Take 1 tablet (0.1 mg) by mouth At Bedtime   Refills:  1        traZODone 50 MG tablet   Commonly known as:  DESYREL   Dose:  50 mg   Quantity:  10 tablet        Take 1 tablet (50 mg) by mouth At Bedtime   Refills:  0          Our records show that you are taking the medicines listed below. If these are incorrect, please call your family doctor or clinic.        Dose / Directions Last dose taken    acetaminophen 325 MG tablet   Commonly known as:  TYLENOL   Dose:  650 mg   Quantity:  100 tablet        Take 2  tablets (650 mg) by mouth every 6 hours   Refills:  0        buprenorphine HCl-naloxone HCl 8-2 MG per film   Commonly known as:  SUBOXONE   Dose:  1 Film   Quantity:  4 each        Place 1 Film under the tongue daily   Refills:  0        ceFAZolin sodium-dextrose 2-4 GM/100ML-% Soln infusion   Commonly known as:  ANCEF   Dose:  2 g   Indication:  Infection of Bone and Bone Marrow   Quantity:  9000 mL        Inject 100 mLs (2 g) into the vein every 8 hours   Refills:  0        clonazePAM 0.5 MG tablet   Commonly known as:  klonoPIN   Dose:  0.5 mg   Quantity:  30 tablet        Take 1 tablet (0.5 mg) by mouth 2 times daily   Refills:  0        ibuprofen 600 MG tablet   Commonly known as:  ADVIL/MOTRIN   Dose:  600 mg   Quantity:  30 tablet        Take 1 tablet (600 mg) by mouth every 6 hours as needed for moderate pain   Refills:  0        levomilnacipran 80 MG 24 hr capsule   Commonly known as:  FETZIMA   Dose:  80 mg   Quantity:  3 capsule        Take 1 capsule (80 mg) by mouth daily   Refills:  0        lidocaine 5 % Patch   Commonly known as:  LIDODERM   Dose:  1 patch   Quantity:  30 patch        Place 1 patch onto the skin every 24 hours   Refills:  0        melatonin 1 MG Tabs tablet   Dose:  1 mg   Quantity:  30 tablet        Take 1 tablet (1 mg) by mouth nightly as needed for sleep   Refills:  0        naloxone nasal spray   Commonly known as:  NARCAN   Dose:  4 mg   Quantity:  0.2 mL        Spray 1 spray (4 mg) into one nostril alternating nostrils as needed for opioid reversal (every 2-3 minutes until assistance arrives.)   Refills:  11        * pregabalin 150 MG capsule   Commonly known as:  LYRICA   Dose:  300 mg   Quantity:  6 capsule        Take 2 capsules (300 mg) by mouth 2 times daily   Refills:  0        * pregabalin 150 MG capsule   Commonly known as:  LYRICA   Dose:  150 mg   Quantity:  90 capsule        Take 1 capsule (150 mg) by mouth 2 times daily   Refills:  0        SEROQUEL PO         Refills:  0        TRIUMEQ 600- MG per tablet   Quantity:  30 tablet   Generic drug:  abacavir-dolutegravir-LamiVUDine        TAKE ONE TABLET BY MOUTH EVERY DAY   Refills:  3        * Notice:  This list has 2 medication(s) that are the same as other medications prescribed for you. Read the directions carefully, and ask your doctor or other care provider to review them with you.            Prescriptions were sent or printed at these locations (2 Prescriptions)                   Other Prescriptions                Printed at Department/Unit printer (2 of 2)         cloNIDine (CATAPRES) 0.1 MG tablet               traZODone (DESYREL) 50 MG tablet                Procedures and tests performed during your visit     XR Chest Port 1 View      Orders Needing Specimen Collection     None      Pending Results     Date and Time Order Name Status Description    10/7/2017 1913 XR Chest Port 1 View Preliminary             Pending Culture Results     No orders found from 10/5/2017 to 10/8/2017.            Pending Results Instructions     If you had any lab results that were not finalized at the time of your Discharge, you can call the ED Lab Result RN at [phone]. You will be contacted by this team for any positive Lab results or changes in treatment. The nurses are available 7 days a week from 10A to 6:30P.  You can leave a message 24 hours per day and they will return your call.        Thank you for choosing [facility]       Thank you for choosing [facility] for your care. Our goal is always to provide you with excellent care. Hearing back from our patients is one way we can continue to improve our services. Please take a few minutes to complete the written survey that you may receive in the mail after you visit with us. Thank you!        "[employer]hart Information     [employer] lets you send messages to your doctor, view your test results, renew your prescriptions, schedule appointments and more. To sign up, go to  [email or web address] . Click on \"Log in\" on the left side of the screen, which will take you to the Welcome page. Then click on \"Sign up Now\" on the right side of the page.     You will be asked to enter the access code listed below, as well as some personal information. Please follow the directions to create your username and password.     Your access code is: [email or web address]  Expires: 10/30/2017  2:49 AM     Your access code will  in 90 days. If you need help or a new code, please call your [place] clinic or [phone].        Care EveryWhere ID     This is your Care EveryWhere ID. This could be used by other organizations to access your [place] medical records  [id number]        Equal Access to Services     ALMA MERCEDES : Félix Welch, patti cruz, nelson rodrigues, tawanda parnell . [facility] [phone].    ATENCIÓN: Si habla español, tiene a chatterjee disposición servicios gratuitos de asistencia lingüística. Llame al [phone].    We comply with applicable federal civil rights laws and [place] laws. We do not discriminate on the basis of race, color, national origin, age, disability, sex, sexual orientation, or gender identity.            After Visit Summary       This is your record. Keep this with you and show to your community pharmacist(s) and doctor(s) at your next visit.                  "

## 2017-10-07 NOTE — ED AVS SNAPSHOT
[facility] [place], Emergency Department    [address] [place] [address]    Phone:  [phone]                                       Bc German   MRN: [id number]    Department:  [facility], Emergency Department   Date of Visit:  10/7/2017           After Visit Summary Signature Page     I have received my discharge instructions, and my questions have been answered. I have discussed any challenges I see with this plan with the nurse or doctor.    ..........................................................................................................................................  Patient/Patient Representative Signature      ..........................................................................................................................................  Patient Representative Print Name and Relationship to Patient    ..................................................               ................................................  Date                                            Time    ..........................................................................................................................................  Reviewed by Signature/Title    ...................................................              ..............................................  Date                                                            Time

## 2017-10-07 NOTE — ED PROVIDER NOTES
History     Chief Complaint   Patient presents with     Vascular Access Problem     HPI  Bc German is a 37 year old male with a history of PMH HIV, IVDU, anxiety/depression who presents with a vascular access problem. Patient is s/p C6 and C7 corpectomy, C5-T1 anterior instrumented fusion on 9/10/17. Patient had a PICC line placed in the left arm on 9/22/17 for MSSA treatment with IV antibiotics-cefazolin until 10/24/17. Today, patient reports that he got his PICC line caught on a tree and pulled the line out. He also notes that he missed his afternoon dose of antibiotics because of this. He denies new numbness or weakness. He denies recent fevers. Patient reports that he was at [facility] in January and has used substances 4 times since, and has been occasionally drinking alcohol since then as well. He admits injecting substances into his PICC line in 2011, but has not done so since.    Past Medical History:   Diagnosis Date     Anxiety      Depressive disorder      Drug abuse, IV      Group A streptococcal infection 11/2014    Bacteremia/cellulitis     HCV antibody positive      HIV (human immunodeficiency virus infection) (H)      HIV (human immunodeficiency virus infection) (H)      HIV (human immunodeficiency virus infection) (H)      IVDU (intravenous drug user)      Osteomyelitis of vertebra of lumbosacral region (H) 3/2011    L3, left psoas abscess       Past Surgical History:   Procedure Laterality Date     EYE SURGERY  1 year ago    pins to left eye after socket fracture     OPTICAL TRACKING SYSTEM FUSION CERVICAL ANTERIOR ONE LEVEL Right 9/10/2017    Procedure: OPTICAL TRACKING SYSTEM FUSION CERVICAL ANTERIOR ONE LEVEL;  Stealth C6-C7 Anterior Cervical Corpectomy and C5-T1 Fusion;  Surgeon: Marv Ambrose MD;  Location: [facility] OR     ORTHOPEDIC SURGERY      Arm Surgery     PICC INSERTION Left 09/21/2017    5fr DL BioFlo PICC, 42cm (1cm external) in the L basilic vein w/ tip in  "the low SVC.     TRANSESOPHAGEAL ECHOCARDIOGRAM INTRAOPERATIVE N/A 3/17/2015    Procedure: TRANSESOPHAGEAL ECHOCARDIOGRAM INTRAOPERATIVE;  Surgeon: Generic Anesthesia Provider;  Location: [facility] OR       No family history on file.    Social History   Substance Use Topics     Smoking status: Current Every Day Smoker     Types: Cigarettes     Smokeless tobacco: Never Used     Alcohol use No       Current Facility-Administered Medications   Medication     lidocaine (LMX4) kit     sodium chloride (PF) 0.9% PF flush 10-20 mL     sodium chloride (PF) 0.9% PF flush 10 mL     Current Outpatient Prescriptions   Medication     cloNIDine (CATAPRES) 0.1 MG tablet     traZODone (DESYREL) 50 MG tablet     TRIUMEQ 600- MG per tablet     melatonin 1 MG TABS tablet     acetaminophen (TYLENOL) 325 MG tablet     pregabalin (LYRICA) 150 MG capsule     ceFAZolin sodium-dextrose (ANCEF) 2-4 GM/100ML-% SOLN infusion     lidocaine (LIDODERM) 5 % Patch     clonazePAM (KLONOPIN) 0.5 MG tablet     pregabalin (LYRICA) 150 MG capsule     buprenorphine HCl-naloxone HCl (SUBOXONE) 8-2 MG per film     QUEtiapine Fumarate (SEROQUEL PO)     naloxone (NARCAN) nasal spray     ibuprofen (ADVIL/MOTRIN) 600 MG tablet     levomilnacipran (FETZIMA) 80 MG 24 hr capsule        Allergies   Allergen Reactions     Doxycycline GI Disturbance         I have reviewed the Medications, Allergies, Past Medical and Surgical History, and Social History in the Epic system.    Review of Systems   Constitutional: Negative for fever.   Neurological: Negative for weakness and numbness.       Physical Exam   BP: 142/88  Pulse: 128  Temp: 98.7  F (37.1  C)  Resp: 16  Height: 177.8 cm (5' 10\")  Weight: 67.6 kg (149 lb)  SpO2: 99 %  Physical Exam   Constitutional: No distress.   HENT:   Head: Atraumatic.   Mouth/Throat: Oropharynx is clear and moist. No oropharyngeal exudate.   Eyes: Pupils are equal, round, and reactive to light. No scleral icterus.   Cardiovascular: Normal " heart sounds and intact distal pulses.    Pulmonary/Chest: Breath sounds normal. No respiratory distress. He has no decreased breath sounds. He has no wheezes. He has no rales.   Abdominal: Soft. Bowel sounds are normal. There is no tenderness.   Musculoskeletal: He exhibits no edema or tenderness.        Arms:  Skin: Skin is warm. No rash noted. He is not diaphoretic.       ED Course   4:50 PM  The patient was seen and examined by Rip Ness MD in [facility].   ED Course     Procedures             Critical Care time:  none             Labs Ordered and Resulted from Time of ED Arrival Up to the Time of Departure from the ED - No data to display         Assessments & Plan (with Medical Decision Making)   The patient had a PICC line which pulled out accidentally.  We were able to have one reestablished.  Postplacement chest x-ray appears to have good position.  He understands that if he uses his PICC line for anything except for the prescribed medications that he will die.  He and his mother were very concerned about his ongoing itching as well as anxiety.  He denies any recent drug use or symptoms of withdrawal.  He states that he is not sleeping well at night and his current residence.  I spoke with the doctor on in the [facility] center and he recommended clonidine 0.1 mg at night as well as trazodone  mg.  The patient states that both of these worked quite well for the past.  He would benefit from having a primary care doctor.  I recommended he establish care with this [facility] family medicine clinic.    I have reviewed the nursing notes.    I have reviewed the findings, diagnosis, plan and need for follow up with the patient.    Discharge Medication List as of 10/7/2017  7:33 PM      START taking these medications    Details   cloNIDine (CATAPRES) 0.1 MG tablet Take 1 tablet (0.1 mg) by mouth At Bedtime, Disp-10 tablet, R-1, Local Print      traZODone (DESYREL) 50 MG tablet Take 1 tablet (50 mg)  by mouth At Bedtime, Disp-10 tablet, R-0, Local Print             Final diagnoses:   Lack of intravenous access   Anxiety   I, Laura Martinez, am serving as a trained medical scribe to document services personally performed by Rip Jain MD, based on the provider's statements to me.      I, Rip Jain MD, was physically present and have reviewed and verified the accuracy of this note documented by Laura Martinez.       10/7/2017   [facility] [place], EMERGENCY DEPARTMENT     Isael Ness MD  10/07/17 2021

## 2017-10-08 NOTE — DISCHARGE INSTRUCTIONS
Please make an appointment to follow up with [facility] (phone: [phone]) as soon as possible to establish a primary care clinic and go over your medicines.

## 2017-10-09 ENCOUNTER — HOSPITAL ENCOUNTER (INPATIENT)
Facility: CLINIC | Age: 37
LOS: 2 days | Discharge: NURSING, REHAB OR LONG-TERM CARE | DRG: 091 | End: 2017-10-12
Attending: EMERGENCY MEDICINE | Admitting: INTERNAL MEDICINE
Payer: COMMERCIAL

## 2017-10-09 ENCOUNTER — APPOINTMENT (OUTPATIENT)
Dept: GENERAL RADIOLOGY | Facility: CLINIC | Age: 37
DRG: 091 | End: 2017-10-09
Attending: EMERGENCY MEDICINE
Payer: COMMERCIAL

## 2017-10-09 DIAGNOSIS — F43.10 PTSD (POST-TRAUMATIC STRESS DISORDER): Primary | ICD-10-CM

## 2017-10-09 DIAGNOSIS — Z45.2 NEEDS PERIPHERALLY INSERTED CENTRAL CATHETER (PICC): ICD-10-CM

## 2017-10-09 DIAGNOSIS — T82.524A DISPLACEMENT OF INFUSION CATHETER, INITIAL ENCOUNTER (H): ICD-10-CM

## 2017-10-09 DIAGNOSIS — F33.2 SEVERE EPISODE OF RECURRENT MAJOR DEPRESSIVE DISORDER, WITHOUT PSYCHOTIC FEATURES (H): ICD-10-CM
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
## 2017-10-09 DIAGNOSIS — R44.3 HALLUCINATIONS: ICD-10-CM

## 2017-10-09 DIAGNOSIS — F12.10 CANNABIS ABUSE, CONTINUOUS: ICD-10-CM

## 2017-10-09 LAB
ALBUMIN SERPL-MCNC: 3.7 G/DL (ref 3.4–5)
ALP SERPL-CCNC: 132 U/L (ref 40–150)
ALT SERPL W P-5'-P-CCNC: 147 U/L (ref 0–70)
AMMONIA PLAS-SCNC: 29 UMOL/L (ref 10–50)
ANION GAP SERPL CALCULATED.3IONS-SCNC: 5 MMOL/L (ref 3–14)
AST SERPL W P-5'-P-CCNC: 192 U/L (ref 0–45)
BASOPHILS # BLD AUTO: 0 10E9/L (ref 0–0.2)
BASOPHILS NFR BLD AUTO: 0.4 %
BILIRUB SERPL-MCNC: 0.6 MG/DL (ref 0.2–1.3)
BUN SERPL-MCNC: 19 MG/DL (ref 7–30)
CALCIUM SERPL-MCNC: 9 MG/DL (ref 8.5–10.1)
CHLORIDE SERPL-SCNC: 104 MMOL/L (ref 94–109)
CO2 BLDCOV-SCNC: 29 MMOL/L (ref 21–28)
CO2 SERPL-SCNC: 29 MMOL/L (ref 20–32)
CREAT SERPL-MCNC: 0.93 MG/DL (ref 0.66–1.25)
CRP SERPL-MCNC: 44 MG/L (ref 0–8)
DIFFERENTIAL METHOD BLD: ABNORMAL
DIFFERENTIAL METHOD BLD: NORMAL
EOSINOPHIL # BLD AUTO: 0.4 10E9/L (ref 0–0.7)
EOSINOPHIL NFR BLD AUTO: 4.1 %
ERYTHROCYTE [DISTWIDTH] IN BLOOD BY AUTOMATED COUNT: 15.2 % (ref 10–15)
ERYTHROCYTE [DISTWIDTH] IN BLOOD BY AUTOMATED COUNT: NORMAL % (ref 10–15)
ERYTHROCYTE [SEDIMENTATION RATE] IN BLOOD BY WESTERGREN METHOD: NORMAL MM/H (ref 0–15)
GFR SERPL CREATININE-BSD FRML MDRD: >90 ML/MIN/1.7M2
GLUCOSE SERPL-MCNC: 110 MG/DL (ref 70–99)
HCT VFR BLD AUTO: 36.3 % (ref 40–53)
HCT VFR BLD AUTO: NORMAL % (ref 40–53)
HGB BLD-MCNC: 11.8 G/DL (ref 13.3–17.7)
HGB BLD-MCNC: NORMAL G/DL (ref 13.3–17.7)
IMM GRANULOCYTES # BLD: 0.1 10E9/L (ref 0–0.4)
IMM GRANULOCYTES NFR BLD: 1.4 %
INR PPP: 1.01 (ref 0.86–1.14)
LACTATE BLD-SCNC: 0.6 MMOL/L (ref 0.7–2.1)
LYMPHOCYTES # BLD AUTO: 3.4 10E9/L (ref 0.8–5.3)
LYMPHOCYTES NFR BLD AUTO: 37.4 %
MCH RBC QN AUTO: 27.1 PG (ref 26.5–33)
MCH RBC QN AUTO: NORMAL PG (ref 26.5–33)
MCHC RBC AUTO-ENTMCNC: 32.5 G/DL (ref 31.5–36.5)
MCHC RBC AUTO-ENTMCNC: NORMAL G/DL (ref 31.5–36.5)
MCV RBC AUTO: 83 FL (ref 78–100)
MCV RBC AUTO: NORMAL FL (ref 78–100)
MONOCYTES # BLD AUTO: 1 10E9/L (ref 0–1.3)
MONOCYTES NFR BLD AUTO: 11.2 %
NEUTROPHILS # BLD AUTO: 4.1 10E9/L (ref 1.6–8.3)
NEUTROPHILS NFR BLD AUTO: 45.5 %
NRBC # BLD AUTO: 0 10*3/UL
NRBC BLD AUTO-RTO: 0 /100
PCO2 BLDV: 48 MM HG (ref 40–50)
PH BLDV: 7.38 PH (ref 7.32–7.43)
PLATELET # BLD AUTO: 208 10E9/L (ref 150–450)
PLATELET # BLD AUTO: NORMAL 10E9/L (ref 150–450)
PO2 BLDV: 31 MM HG (ref 25–47)
POTASSIUM SERPL-SCNC: 3.7 MMOL/L (ref 3.4–5.3)
PROT SERPL-MCNC: 8.5 G/DL (ref 6.8–8.8)
RBC # BLD AUTO: 4.35 10E12/L (ref 4.4–5.9)
RBC # BLD AUTO: NORMAL 10E12/L (ref 4.4–5.9)
SAO2 % BLDV FROM PO2: 58 %
SODIUM SERPL-SCNC: 138 MMOL/L (ref 133–144)
TROPONIN I SERPL-MCNC: <0.015 UG/L (ref 0–0.04)
TSH SERPL DL<=0.005 MIU/L-ACNC: 1.88 MU/L (ref 0.4–4)
WBC # BLD AUTO: 9 10E9/L (ref 4–11)
WBC # BLD AUTO: NORMAL 10E9/L (ref 4–11)

## 2017-10-09 PROCEDURE — 82140 ASSAY OF AMMONIA: CPT | Performed by: EMERGENCY MEDICINE

## 2017-10-09 PROCEDURE — 86140 C-REACTIVE PROTEIN: CPT | Performed by: EMERGENCY MEDICINE

## 2017-10-09 PROCEDURE — 36415 COLL VENOUS BLD VENIPUNCTURE: CPT | Performed by: EMERGENCY MEDICINE

## 2017-10-09 PROCEDURE — 40000986 XR CHEST 1 VW

## 2017-10-09 PROCEDURE — 87181 SC STD AGAR DILUTION PER AGT: CPT | Performed by: EMERGENCY MEDICINE

## 2017-10-09 PROCEDURE — 87077 CULTURE AEROBIC IDENTIFY: CPT | Performed by: EMERGENCY MEDICINE

## 2017-10-09 PROCEDURE — 84443 ASSAY THYROID STIM HORMONE: CPT | Performed by: EMERGENCY MEDICINE

## 2017-10-09 PROCEDURE — 85652 RBC SED RATE AUTOMATED: CPT | Performed by: EMERGENCY MEDICINE

## 2017-10-09 PROCEDURE — 83605 ASSAY OF LACTIC ACID: CPT

## 2017-10-09 PROCEDURE — 85025 COMPLETE CBC W/AUTO DIFF WBC: CPT | Performed by: EMERGENCY MEDICINE

## 2017-10-09 PROCEDURE — 84484 ASSAY OF TROPONIN QUANT: CPT | Performed by: EMERGENCY MEDICINE

## 2017-10-09 PROCEDURE — 99285 EMERGENCY DEPT VISIT HI MDM: CPT | Mod: 25 | Performed by: EMERGENCY MEDICINE

## 2017-10-09 PROCEDURE — 80053 COMPREHEN METABOLIC PANEL: CPT | Performed by: EMERGENCY MEDICINE

## 2017-10-09 PROCEDURE — 87800 DETECT AGNT MULT DNA DIREC: CPT | Performed by: EMERGENCY MEDICINE

## 2017-10-09 PROCEDURE — 87040 BLOOD CULTURE FOR BACTERIA: CPT | Performed by: EMERGENCY MEDICINE

## 2017-10-09 PROCEDURE — 99285 EMERGENCY DEPT VISIT HI MDM: CPT | Mod: Z6 | Performed by: EMERGENCY MEDICINE

## 2017-10-09 PROCEDURE — 82803 BLOOD GASES ANY COMBINATION: CPT

## 2017-10-09 RX ORDER — HYDROXYZINE HYDROCHLORIDE 25 MG/1
25 TABLET, FILM COATED ORAL ONCE
Status: COMPLETED | OUTPATIENT
Start: 2017-10-09 | End: 2017-10-10

## 2017-10-09 RX ORDER — SODIUM CHLORIDE 9 MG/ML
1000 INJECTION, SOLUTION INTRAVENOUS CONTINUOUS
Status: DISCONTINUED | OUTPATIENT
Start: 2017-10-09 | End: 2017-10-10

## 2017-10-09 RX ORDER — CEFAZOLIN SODIUM 1 G/3ML
1 INJECTION, POWDER, FOR SOLUTION INTRAMUSCULAR; INTRAVENOUS ONCE
Status: COMPLETED | OUTPATIENT
Start: 2017-10-10 | End: 2017-10-10

## 2017-10-09 ASSESSMENT — ENCOUNTER SYMPTOMS
LIGHT-HEADEDNESS: 0
WEAKNESS: 0
DIZZINESS: 0
ADENOPATHY: 0
POLYDIPSIA: 0
CHILLS: 0
COUGH: 0
PALPITATIONS: 0
HEMATURIA: 0
SPEECH DIFFICULTY: 1
DIAPHORESIS: 0
FEVER: 0
SORE THROAT: 0
VOICE CHANGE: 0
BLOOD IN STOOL: 0
BRUISES/BLEEDS EASILY: 0
SHORTNESS OF BREATH: 0
DIARRHEA: 0
CONSTIPATION: 0
FATIGUE: 1
COLOR CHANGE: 0
VOMITING: 0
EYE PAIN: 0
TROUBLE SWALLOWING: 0
NERVOUS/ANXIOUS: 1
NUMBNESS: 0
DYSURIA: 0
DYSPHORIC MOOD: 0
HALLUCINATIONS: 1
FREQUENCY: 0
ABDOMINAL PAIN: 0
HEADACHES: 0
NAUSEA: 0
BACK PAIN: 0
NECK PAIN: 0

## 2017-10-09 NOTE — IP AVS SNAPSHOT
Bc German #[id number] (CSN: [id number])  (37 year old M)  (Adm: 10/09/17)     [id number]               [facility] [address]: [phone]            Patient Demographics     Patient Name Sex          Age SSN Address Phone    Bc German Male 1980 (37 year old) [id number] [facility]   [address]  [facility] [address] [phone] (Home)  [phone] (Mobile) *Preferred*      Emergency Contact(s)     Name Relation Home Work Mobile    MAG GERMAN Father [phone] none [phone]    Ignacia Churchill  Mother [phone]      Admission Information     Attending Provider Admitting Provider Admission Type Admission Date/Time    Claude Quintana MD Velaga, Rahul, MD Elective 10/09/17  1925    Discharge Date Hospital Service Auth/Cert Status Service Area     Internal Medicine Incomplete [facility]    Unit Room/Bed Admission Status       [facility] 1028/1028-02 Admission (Confirmed)       Admission     Complaint    Osteomyelitis (H), Osteomyelitis (H)      Hospital Account     Name Acct ID Class Status Primary Coverage    Bc German [id number] Inpatient Open [employer] - [employer] FULLY INSURED            Guarantor Account (for Hospital Account #[id number])     Name Relation to Pt Service Area Active? Acct Type    Bc German  FCS Yes Personal/Family    Address Phone          [facility]   [address] [place] [address] [phone](H)              Coverage Information (for Hospital Account #[id number])     F/O Payor/Plan Precert #    [employer] FULLY INSURED     Subscriber Subscriber #    Bc German [id number]    Address Phone    [address]  [place] [address] [phone]                                                INTERAGENCY TRANSFER FORM - PHYSICIAN ORDERS   10/9/2017                       [facility] [address]: [phone]            Attending Provider: Claude Quintana MD     Allergies:   "Doxycycline    Infection:  None   Service:  INTERNAL MED    Ht:  1.778 m (5' 10\")   Wt:  61.4 kg (135 lb 4.8 oz)   Admission Wt:  61.4 kg (135 lb 4.8 oz)    BMI:  19.41 kg/m 2   BSA:  1.74 m 2            ED Clinical Impression     Diagnosis Description Comment Added By Time Added    Needs peripherally inserted central catheter (PICC) [Z45.2] Needs peripherally inserted central catheter (PICC) [Z45.2]  Criselda Murcia MD 10/10/2017 12:08 AM    Hallucinations [R44.3] Hallucinations [R44.3]  Criselda Murcia MD 10/10/2017 12:08 AM      Hospital Problems as of 10/12/2017              Priority Class Noted POA    Osteomyelitis (H) Medium  10/10/2017 Yes      Non-Hospital Problems as of 10/12/2017              Priority Class Noted    Cellulitis Medium  10/23/2014    Sepsis (H) Medium  3/7/2015    JASSI (generalized anxiety disorder) Medium  12/23/2015    Non-compliant patient Medium  12/23/2015    Intravenous drug abuse Medium  12/23/2015    Medication side effect Medium  9/25/2016    Asymptomatic human immunodeficiency virus (HIV) infection status (H) Medium  2/28/2017    Balance problem Medium  9/10/2017    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 Medium  9/11/2017      Code Status History     Date Active Date Inactive Code Status Order ID Comments User Context    9/21/2017 11:20 AM 10/10/2017  5:50 AM Full Code [id number]  Ramon Justin MD Outpatient    9/10/2017  2:01 AM 9/21/2017 11:20 AM Full Code [id number]  Joseline Mujica MD Inpatient    9/25/2016  3:06 PM 9/10/2017  2:01 AM Full Code [id number]  Alexx Angelo PA-C Outpatient    9/25/2016 12:03 AM 9/25/2016  3:06 PM Full Code [id number]  Gino Fonseca MD ED    3/25/2015 12:12 PM 9/25/2016 12:03 AM Full Code [id number]  Marv Marie APRN CNP Outpatient    3/7/2015  5:55 AM 3/17/2015 12:02 PM Full Code [id number]  Marilyn Ervin, DO Inpatient    10/23/2014 11:19 PM 11/6/2014 11:10 PM Full Code [id number]  Armin Juares PA-C " Inpatient      Current Code Status     Date Active Code Status Order ID Comments User Context       10/10/2017  5:50 AM Full Code [id number]  Claude Quintana MD Inpatient       Summary of Visit     Reason for your hospital stay       Treatment Acute altered mental status that has since resolved. Also ongoing treatment of cervical osteomyelitis with IV abx's                Medication Review      CONTINUE these medications which may have CHANGED, or have new prescriptions. If we are uncertain of the size of tablets/capsules you have at home, strength may be listed as something that might have changed.        Dose / Directions Comments    pregabalin 150 MG capsule   Commonly known as:  LYRICA   This may have changed:  Another medication with the same name was removed. Continue taking this medication, and follow the directions you see here.   Used for:  Medication side effect, initial encounter        Dose:  150 mg   Take 1 capsule (150 mg) by mouth 2 times daily   Quantity:  90 capsule   Refills:  0        QUEtiapine 50 MG tablet   Commonly known as:  SEROquel   This may have changed:    - medication strength  - how much to take  - when to take this   Used for:  PTSD (post-traumatic stress disorder), Severe episode of recurrent major depressive disorder, without psychotic features (H)        Dose:  50 mg   Take 1 tablet (50 mg) by mouth 2 times daily   Quantity:  120 tablet   Refills:  0          CONTINUE these medications which have NOT CHANGED        Dose / Directions Comments    acetaminophen 325 MG tablet   Commonly known as:  TYLENOL   Used for:  Medication side effect, initial encounter        Dose:  650 mg   Take 2 tablets (650 mg) by mouth every 6 hours   Quantity:  100 tablet   Refills:  0        buprenorphine HCl-naloxone HCl 8-2 MG per film   Commonly known as:  SUBOXONE   Used for:  Intravenous drug abuse        Dose:  1 Film   Place 1 Film under the tongue daily   Quantity:  4 each   Refills:  0         ceFAZolin sodium-dextrose 2-4 GM/100ML-% Soln infusion   Commonly known as:  ANCEF   Indication:  Infection of Bone and Bone Marrow   Used for:  Sepsis due to methicillin susceptible Staphylococcus aureus (H)        Dose:  2 g   Inject 100 mLs (2 g) into the vein every 8 hours   Quantity:  9000 mL   Refills:  0        clonazePAM 0.5 MG tablet   Commonly known as:  klonoPIN   Used for:  Ataxia        Dose:  0.5 mg   Take 1 tablet (0.5 mg) by mouth 2 times daily   Quantity:  30 tablet   Refills:  0        cloNIDine 0.1 MG tablet   Commonly known as:  CATAPRES        Dose:  0.1 mg   Take 1 tablet (0.1 mg) by mouth At Bedtime   Quantity:  10 tablet   Refills:  1        ibuprofen 600 MG tablet   Commonly known as:  ADVIL/MOTRIN        Dose:  600 mg   Take 1 tablet (600 mg) by mouth every 6 hours as needed for moderate pain   Quantity:  30 tablet   Refills:  0        levomilnacipran 80 MG 24 hr capsule   Commonly known as:  FETZIMA        Dose:  80 mg   Take 1 capsule (80 mg) by mouth daily   Quantity:  3 capsule   Refills:  0        melatonin 1 MG Tabs tablet   Used for:  Primary insomnia        Dose:  1 mg   Take 1 tablet (1 mg) by mouth nightly as needed for sleep   Quantity:  30 tablet   Refills:  0        naloxone nasal spray   Commonly known as:  NARCAN        Dose:  4 mg   Spray 1 spray (4 mg) into one nostril alternating nostrils as needed for opioid reversal (every 2-3 minutes until assistance arrives.)   Quantity:  0.2 mL   Refills:  11        traZODone 50 MG tablet   Commonly known as:  DESYREL        Dose:  50 mg   Take 1 tablet (50 mg) by mouth At Bedtime   Quantity:  10 tablet   Refills:  0        TRIUMEQ 600- MG per tablet   Used for:  Human immunodeficiency virus (HIV) disease (H)   Generic drug:  abacavir-dolutegravir-LamiVUDine        TAKE ONE TABLET BY MOUTH EVERY DAY   Quantity:  30 tablet   Refills:  3          STOP taking     lidocaine 5 % Patch   Commonly known as:  LIDODERM                  "  After Care     Activity - Up ad gene           Advance Diet as Tolerated       Follow this diet upon discharge: Orders Placed This Encounter      Advance Diet as Tolerated: Regular Diet Adult       General info for SNF       Length of Stay Estimate: Short Term Care: Estimated # of Days <30  Condition at Discharge: Stable  Level of care:skilled   Rehabilitation Potential: Good  Admission H&P remains valid and up-to-date: Yes  Recent Chemotherapy: N/A  Use Nursing Home Standing Orders: Yes       Mantoux instructions       Give two-step Mantoux (PPD) Per Facility Policy Yes             Your next 10 appointments already scheduled     Oct 24, 2017  9:00 AM CDT   (Arrive by 8:45 AM)   Return Visit with Damir Cisneros MD   [facility] ([facility])    [address] [place] [address] [id number] [phone]            Nov 27, 2017  8:00 AM CST   (Arrive by 7:45 AM)   Return Visit with Marv Ambrose MD   [facility] ([facility])    [address] [place] [phone] [phone]              Statement of Approval     Ordered          10/12/17 1240  I have reviewed and agree with all the recommendations and orders detailed in this document.  EFFECTIVE NOW     Approved and electronically signed by:  Judah Mcneil PA-C                                                 INTERAGENCY TRANSFER FORM - NURSING   10/9/2017                       [facility] UN[address] 10[address]: [phone]            Attending Provider: Claude Quintana MD     Allergies:  Doxycycline    Infection:  None   Service:  INTERNAL MED    Ht:  1.778 m (5' 10\")   Wt:  61.4 kg (135 lb 4.8 oz)   Admission Wt:  61.4 kg (135 lb 4.8 oz)    BMI:  19.41 kg/m 2   BSA:  1.74 m 2            Advance Directives        Does patient have a scanned Advance Directive/ACP document in EPIC?           No        Immunizations     Name Date   " "   Influenza Vaccine IM 3yrs+ 4 Valent IIV4 09/21/17       ASSESSMENT     Discharge Profile Flowsheet     EXPECTED DISCHARGE     SKIN      Expected Discharge Date  10/11/17 10/10/17 1612   Inspection of bony prominences  Full except (identify areas not inspected) 10/11/17 2250    DISCHARGE NEEDS ASSESSMENT     Full except areas not inspected   Scapula, left;Scapula, right;Buttock, left;Buttock, right;Sacrum;Coccyx 10/11/17 2250    Concerns To Be Addressed  substance/tobacco abuse/use concerns 10/10/17 1612   Inspection under devices  Full 10/11/17 2250    Concerns Comments  abx regimen- IV drug user 09/12/17 1448   Skin WDL  color;characteristics 10/11/17 2250    Patient/family verbalizes understanding of discharge plan recommendations?  Yes 10/10/17 1612   Skin Color/Characteristics  bruised (ecchymotic) 10/11/17 2250    Medical Team notified of plan?  yes 10/10/17 1612   Skin Temperature  warm 10/11/17 2250    Equipment Used at Home  none 03/17/15 1537   Skin Moisture  dry 10/11/17 2250    GASTROINTESTINAL (ADULT,PEDIATRIC,OB)     Skin Elasticity  quick return to original state 10/10/17 1740    GI WDL  WDL 10/11/17 2250   Skin Integrity  scab(s) 10/11/17 2250    Last Bowel Movement  10/11/17 10/11/17 2250   SAFETY      Passing flatus  yes 10/11/17 2250   Safety WDL  WDL 10/11/17 2250    COMMUNICATION ASSESSMENT     All Alarms  none present 10/10/17 0437    Patient's communication style  spoken language (English or Bilingual) 10/09/17 1709                      Assessment WDL (Within Defined Limits) Definitions           Safety WDL     Effective: 09/28/15    Row Information: <b>WDL Definition:</b> Bed in low position, wheels locked; call light in reach; upper side rails up x 2; ID band on<br> <font color=\"gray\"><i>Item=AS safety wdl>>List=AS safety wdl>>Version=F14</i></font>      Skin WDL     Effective: 09/28/15    Row Information: <b>WDL Definition:</b> Warm; dry; intact; elastic; without discoloration; pressure " "points without redness<br> <font color=\"gray\"><i>Item=AS skin wdl>>List=AS skin wdl>>Version=F14</i></font>      Vitals     Vital Signs Flowsheet     VITAL SIGNS     HEIGHT AND WEIGHT      Temp  96.9  F (36.1  C) 10/12/17 0657   Weight  61.4 kg (135 lb 4.8 oz) 10/10/17 0437    Temp src  Oral 10/12/17 0657   RESPIRATORY MONITORING      Resp  16 10/12/17 0657   Respiratory Monitoring (EtCO2)  98 mmHg 10/10/17 0021    Pulse  82 10/11/17 0746   JUAN COMA SCALE      Heart Rate  80 10/12/17 0657   Best Eye Response  4-->(E4) spontaneous 10/11/17 1524    Pulse/Heart Rate Source  Monitor 10/12/17 0657   Best Motor Response  6-->(M6) obeys commands 10/11/17 1524    BP  113/55 10/12/17 0657   Best Verbal Response  5-->(V5) oriented 10/11/17 1524    BP Location  Left arm 10/12/17 0657   [place] Coma Scale Score  15 10/11/17 1524    OXYGEN THERAPY     POSITIONING      SpO2  97 % 10/12/17 0657   Body Position  independently positioning 10/12/17 0153    O2 Device  None (Room air) 10/12/17 0657   Head of Bed (HOB)  HOB at 20-30 degrees 10/12/17 0153    PAIN/COMFORT     DAILY CARE      Patient Currently in Pain  denies 10/12/17 0152   Activity Management  activity adjusted per tolerance 10/12/17 0153    Preferred Pain Scale  CAPA (Clinically Aligned Pain Assessment) ([facility] Adults Only) 10/12/17 0152   Activity Assistance Provided  independent 10/12/17 0153    CLINICALLY ALIGNED PAIN ASSESSMENT (CAPA) ([facility] ADULTS ONLY)     ANALGESIA SIDE EFFECTS MONITORING      Comfort  negligible pain 10/11/17 0001   Side Effects Monitoring: Respiratory Quality  R 10/11/17 1342    Change in Pain  about the same 10/10/17 0247   Side Effects Monitoring: Respiratory Depth  N 10/11/17 1342    Functioning  can do most things, but pain gets in the way of some 10/10/17 0247   Side Effects Monitoring: Sedation Level  1 10/11/17 1342    Sleep  normal sleep 10/10/17 0247                 Patient " Lines/Drains/Airways Status    Active LINES/DRAINS/AIRWAYS     Name: Placement date: Placement time: Site: Days: Last dressing change:    Wound 10/10/17 Face Other (comment) sores 10/10/17   0434   Face   2     Wound 10/10/17 Upper Back Other (comment) sores 10/10/17   0436   Back   2     Incision/Surgical Site 09/10/17 Right Neck 09/10/17   2029    31             Patient Lines/Drains/Airways Status    Active PICC/CVC     Name: Placement date: Placement time: Site: Days: Additional Info Last dressing change:    PICC Single Lumen 10/12/17 Left Basilic 10/12/17   1030   Basilic   less than 1 External Cath Length (cm): 0 cm            Size (Fr): 4 Fr            Orientation: Left            Extremity Circumference (cm): 26.5 cm            Catheter Brand: Bard            Dressing Intervention: Chlorhexidine patch;Securing device;Transparent            Description: Valved;Power PICC            Total Catheter Length (cm) Trimmed: 41 cm            Site Prep: Chlorhexidine            Local Anesthetic: Injectable            Inserted by: Neel SHEPARD            Insertion attempts with ultrasound: 1            Patient Tolerance: Tolerated well            Placement Verification: Other (Comment)            Difficulty with threading line: No            Tip location: SVC/RA Junction            Full barrier precautions done: Yes            Consent Signed: Yes            Time Out performed: Yes            Lot #: [id number]            Use for : IV antibiotics               Intake/Output Detail Report     Date Intake     Output   Net    Shift P.O. I.V. IV Piggyback Total Urine Blood Total       Day 10/11/17 0000 - 10/11/17 0659 -- -- -- -- 300 -- 300 -300    Yadira 10/11/17 0700 - 10/11/17 1459 -- 600 -- 600 -- -- -- 600    Noc 10/11/17 1500 - 10/11/17 2359 -- -- -- -- 700 -- 700 -700    Day 10/12/17 0000 - 10/12/17 0659 -- -- -- -- -- -- -- 0    Yadira 10/12/17 0700 - 10/12/17 1459 -- -- -- -- -- -- -- 0      Last Void/BM       Most Recent  Value    Urine Occurrence 1 at 10/11/2017 0729    Stool Occurrence 1 at 10/11/2017 0729      Case Management/Discharge Planning     Case Management/Discharge Planning Flowsheet     LIVING ENVIRONMENT     DISCHARGE PLANNING      Lives With  other (see comments) (TCU) 10/10/17 0452   Patient/family verbalizes understanding of discharge plan recommendations?  Yes 10/10/17 1612    Living Arrangements  condominium 09/12/17 1443   Medical Team notified of plan?  yes 10/10/17 1612    COPING/STRESS     Equipment Used at Home  none 03/17/15 1537    Major Change/Loss/Stressor  hospitalization;illness;chemical dependency/abuse;mental health condition 10/10/17 1612   MH/BH CAREGIVER      Patient Personal Strengths  expressive of emotions;expressive of needs;patient reports none;strong support system;successful coping history 10/10/17 1612   Filed Complexity Screen Score  9 10/10/17 1612    Sources Of Support  other family members;parent(s) 10/10/17 1612   ABUSE RISK SCREEN      Reaction To Health Status  accepting;adjusting;hopeful 10/10/17 1612   QUESTION TO PATIENT:  Has a member of your family or a partner(now or in the past) intimidated, hurt, manipulated, or controlled you in any way?  no 10/09/17 1712    Understanding Of Condition And Treatment  adequate understanding of medical condition;adequate understanding of treatment 10/10/17 1612   QUESTION TO PATIENT: Do you feel safe going back to the place where you are living?  yes 10/09/17 1712    EXPECTED DISCHARGE     OBSERVATION: Is there reason to believe there has been maltreatment of a vulnerable adult (ie. Physical/Sexual/Emotional abuse, self neglect, lack of adequate food, shelter, medical care, or financial exploitation)?  no 10/09/17 1712    Expected Discharge Date  10/11/17 10/10/17 1612   (R) MENTAL HEALTH SUICIDE RISK      ASSESSMENT/CONCERNS TO BE ADDRESSED     Are you depressed or being treated for depression?  Yes 10/10/17 0454    Concerns To Be Addressed   substance/tobacco abuse/use concerns 10/10/17 1612   HOMICIDE RISK      Concerns Comments  abx regimen- IV drug user 09/12/17 1448   Feels Like Hurting Others  no 10/09/17 1712                  [facility] UNIT 10A: [phone]            Medication Administration Report for Bc German as of 10/12/17 1249   Legend:    Given Hold Not Given Due Canceled Entry Other Actions    Time Time (Time) Time  Time-Action       Inactive    Active    Linked        Medications 10/06/17 10/07/17 10/08/17 10/09/17 10/10/17 10/11/17 10/12/17    abacavir-dolutegravir-LamiVUDine (TRIUMEQ) 600- MG per tablet 1 tablet  Dose: 1 tablet Freq: DAILY Route: PO  Indications of Use: HUMAN IMMUNODEFICIENCY VIRUS DISEASE  Start: 10/10/17 0800        0958 (1 tablet)-Given        0948 (1 tablet)-Given        0905 (1 tablet)-Given           buprenorphine HCl-naloxone HCl (SUBOXONE) 8-2 MG per film 1 Film  Dose: 1 Film Freq: DAILY Route: SL  Start: 10/10/17 0800        0959 (1 Film)-Given        0948 (1 Film)-Given        0905 (1 Film)-Given           ceFAZolin sodium-dextrose (ANCEF) infusion 2 g  Dose: 2 g Freq: EVERY 8 HOURS Route: IV  Indications of Use: OSTEOMYELITIS  Start: 10/10/17 0600        1000 (2 g)-Given              1826 (2 g)-Given        0222 (2 g)-Given       1021 (2 g)-Given       1738 (2 g)-Given        0232 (2 g)-Given       1135 (2 g)-Given       [ ] 1800           clonazePAM (klonoPIN) tablet 0.5 mg  Dose: 0.5 mg Freq: 2 TIMES DAILY Route: PO  Start: 10/10/17 0800        0959 (0.5 mg)-Given       2004 (0.5 mg)-Given        0949 (0.5 mg)-Given       2051 (0.5 mg)-Given        0906 (0.5 mg)-Given       [ ] 2000           cloNIDine (CATAPRES) half-tab 0.1 mg  Dose: 0.1 mg Freq: AT BEDTIME Route: PO  Start: 10/10/17 2200   Admin Instructions: Hold if SBP less than 110         2004 (0.1 mg)-Given               2127-Hold        [ ] 2200           ibuprofen (ADVIL/MOTRIN) tablet 600 mg  Dose: 600 mg Freq: EVERY 6 HOURS PRN Route:  PO  PRN Reason: moderate pain  Start: 10/10/17 0543              levomilnacipran (FETZIMA ER) 24 hr capsule 80 mg  Dose: 80 mg Freq: DAILY Route: PO  Start: 10/10/17 0800   Admin Instructions: Do not open, chew or crush capsule.         0959 (80 mg)-Given        0949 (80 mg)-Given        0906 (80 mg)-Given           melatonin tablet 1 mg  Dose: 1 mg Freq: AT BEDTIME PRN Route: PO  PRN Reason: sleep  Start: 10/10/17 0543              NaCl 0.9 % infusion  Start: 10/12/17 1123   End: 10/12/17 2329   Admin Instructions: Dayami Cox : cabinet override           [ ] 1567 8709-Med Discontinued       naloxone (NARCAN) injection 0.1-0.4 mg  Dose: 0.1-0.4 mg Freq: EVERY 2 MIN PRN Route: IV  PRN Reason: opioid reversal  Start: 10/10/17 0544   Admin Instructions: For respiratory rate LESS than or EQUAL to 8.  Partial reversal dose:  0.1 mg titrated q 2 minutes for Analgesia Side Effects Monitoring Sedation Level of 3 (frequently drowsy, arousable, drifts to sleep during conversation).Full reversal dose:  0.4 mg bolus for Analgesia Side Effects Monitoring Sedation Level of 4 (somnolent, minimal or no response to stimulation).               ondansetron (ZOFRAN-ODT) ODT tab 4 mg  Dose: 4 mg Freq: EVERY 6 HOURS PRN Route: PO  PRN Reasons: nausea,vomiting  Start: 10/10/17 0549   Admin Instructions: This is Step 1 of nausea and vomiting management.  If nausea not resolved in 15 minutes, go to Step 2 prochlorperazine (COMPAZINE). Do not push through foil backing. Peel back foil and gently remove. Place on tongue immediately. Administration with liquid unnecessary              Or  ondansetron (ZOFRAN) injection 4 mg  Dose: 4 mg Freq: EVERY 6 HOURS PRN Route: IV  PRN Reasons: nausea,vomiting  Start: 10/10/17 0549   Admin Instructions: This is Step 1 of nausea and vomiting management.  If nausea not resolved in 15 minutes, go to Step 2 prochlorperazine (COMPAZINE).  Irritant.               potassium chloride (KLOR-CON) Packet  20-40 mEq  Dose: 20-40 mEq Freq: EVERY 2 HOURS PRN Route: ORAL OR FEED  PRN Reason: potassium supplementation  Start: 10/10/17 0549   Admin Instructions: Use if unable to tolerate tablets.  If Serum K+ 3.0-3.3, dose = 60 mEq po total dose (40 mEq x1 followed in 2 hours by 20 mEq x1). Recheck K+ level 4 hours after dose and the next AM.  If Serum K+ 2.5-2.9, dose = 80 mEq po total dose (40 mEq Q2H x2). Recheck K+ level 4 hours after dose and the next AM.  If Serum K+ less than 2.5, See IV order.  Dissolve packet contents in 4-8 ounces of cold water or juice.               potassium chloride 10 mEq in 100 mL intermittent infusion  Dose: 10 mEq Freq: EVERY 1 HOUR PRN Route: IV  PRN Reason: potassium supplementation  Start: 10/10/17 0549   Admin Instructions: Infuse via PERIPHERAL LINE or CENTRAL LINE. Use for central line replacement if patient weight less than 65 kg, if patient is on TPN with high potassium content or if unit does not stock 20 mEq bags.   If Serum K+ 3.0-3.3, dose = 10 mEq/hr x4 doses (40 mEq IV total dose). Recheck K+ level 2 hours after dose and the next AM.   If Serum K+ less than 3.0, dose = 10 mEq/hr x6 doses (60 mEq IV total dose). Recheck K+ level 2 hours after dose and the next AM.               potassium chloride 10 mEq in 100 mL intermittent infusion with 10 mg lidocaine  Dose: 10 mEq Freq: EVERY 1 HOUR PRN Route: IV  PRN Reason: potassium supplementation  Start: 10/10/17 0549   Admin Instructions: Infuse via PERIPHERAL LINE. Use potassium with lidocaine for pain with peripheral administration.  If Serum K+ 3.0-3.3, dose = 10 mEq/hr x4 doses (40 mEq IV total dose). Recheck K+ level 2 hours after dose and the next AM.  If Serum K+ less than 3.0, dose = 10 mEq/hr x6 doses (60 mEq IV total dose). Recheck K+ level 2 hours after dose and the next AM.               potassium chloride 20 mEq in 50 mL intermittent infusion  Dose: 20 mEq Freq: EVERY 1 HOUR PRN Route: IV  PRN Reason: potassium  supplementation  Start: 10/10/17 0549   Admin Instructions: Infuse via CENTRAL LINE Only. May need EKG if less than 65 kg or on TPN - Max rate is 0.3 mEq/kg/hr for patients not on EKG monitoring.   If Serum K+ 3.0-3.3, dose = 20 mEq/hr x2 doses (40 mEq IV total dose). Recheck K+ level 2 hours after dose and the next AM.  If Serum K+ less than 3.0, dose = 20 mEq/hr x3 doses (60 mEq IV total dose). Recheck K+ level 2 hours after dose and the next AM.               potassium chloride SA (K-DUR/KLOR-CON M) CR tablet 20-40 mEq  Dose: 20-40 mEq Freq: EVERY 2 HOURS PRN Route: PO  PRN Reason: potassium supplementation  Start: 10/10/17 0549   Admin Instructions: Use if able to take PO.   If Serum K+ 3.0-3.3, dose = 60 mEq po total dose (40 mEq x1 followed in 2 hours by 20 mEq x1). Recheck K+ level 4 hours after dose and the next AM.  If Serum K+ 2.5-2.9, dose = 80 mEq po total dose (40 mEq Q2H x2). Recheck K+ level 4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                Social Work: Assessment with Discharge Plan    Patient Name:  Bc Pyle  :   1980  Age:  37 year old  MRN:  [id number]  Risk/Complexity Score:  Filed Complexity Screen Score: 9  Completed assessment with:  Pt, 10A IDT, Xuan Cárdenas [facility] Liaison [phone]    Presenting Information   Reason for Referral:  Discharge plan  Date of Intake:  October 10, 2017  Referral Source:  Physician  Decision Maker:  pt  Alternate Decision Maker:  father    Living Situation:  Long Term Care:  [facility] [phone]  Previous Functional Status:  Pt was in TCU at [facility] for medical monitoring  Patient and family understanding of hospitalization:  Seeking treatment for AMS and issues with PICC line  Cultural/Language/Spiritual Considerations:  , IVDU, mental health issues  Adjustment to Illness:   Accepting, wanting to return to [facility] when medically stable.    Physical Health  Reason for Admission:[MK1.1]    1. Needs peripherally inserted central catheter (PICC)    2. Hallucinations[MK1.2]      Services Needed/Recommended:  TCU    Mental Health/Chemical Dependency  Diagnosis:  Anxiety, Depression, IVDU, Insomnia  Support/Services in Place:  Medication management  Services Needed/Recommended:  Further support with behavioral health    Support System  Significant relationship at present time:   [facility]  Family of origin is available for support:  yes  Other support available:  Community support through [facility]  Gaps in support system:  None noted  Patient is caregiver to:  None     Provider Information   Primary Care Physician:  Damir Cisneros   [phone]   Clinic:  [address] [place] [address] 250 / [facility] [address]      :      Financial   Income Source:  [place]  Financial Concerns:  None noted  Insurance:    Payor/Plan Subscriber Name Rel Member # Group #   [employer] [facility]* AMBREEN PYLE  [id number]       [id number]       Discharge Plan   Patient and family discharge goal:  Return to [facility]  Provided education on  "discharge plan:  YES  Patient agreeable to discharge plan:  YES  A list of Medicare Certified Facilities was provided to the patient and/or family to encourage patient choice. Patient's choices for facility are:  Pt has bed hold at [facility]  Will NH provide Skilled rehabilitation or complex medical:  YES  General information regarding anticipated insurance coverage and possible out of pocket cost was discussed. Patient and patient's family are aware patient may incur the cost of transportation to the facility, pending insurance payment: YES  Barriers to discharge:  Medical stability, 72 hour hold    Discharge Recommendations   Anticipated Disposition:  return to [facility] when medically stable  Transportation Needs:  Other:  to be determined  Name of Transportation Company and Phone:  To be determined    Additional comments   Writer introduced role/reason for visit. Pt states he likes staying at [facility], said 6 A SW had told  Him she did not know what it is like but that it ended up being a good place. Pt provided verbal consent for writer to speak w/his father.  Pt is hoping to return to [facility] upon DC from hospital. Writer spoke w/Abdelrahman Cárdenas [facility] Liaison, who confirmed that pt has Bed hold at facility and that facility is willing to accept pt upon DC from hospital. Writer will con't to provide updates to Xuan on further DC planning. Writer spoke w/father Abiodun. Abiodun states preference for pt to remain hospitalized and plan b for to return to [facility]. Abiodun states pt and family have had some adjustment issues. Abiodun prefers pt to have a safe environment and manage medications. Abiodun believes pt is receptive to strategy of \"taking the lead\" and being able to make his own decisions.     Writer and Abiodun discussed difficulty with finding alternate placement as Abiodun is requesting (but only if pt agrees). JAREN offered support, reassurance and validation. SW con't to " follow[MK1.1]       Revision History        User Key Date/Time User Provider Type Action    > MK1.2 10/10/2017  4:32 PM Xuan Palacios LSW  Sign     MK1.1 10/10/2017  4:12 PM Xuan Palacios LSW              Consults by Pham Dash MD at 10/10/2017  1:00 PM     Author:  Pham Dash MD Service:  Mental Health Author Type:  Physician    Filed:  10/10/2017  2:50 PM Date of Service:  10/10/2017  1:00 PM Creation Time:  10/10/2017  2:49 PM    Status:  Signed :  Pham Dash MD (Physician)     Consult Orders:    1. Psychiatry IP Consult: hallucinations; Consultant may enter orders: Yes; Patient to be seen: Routine; Call back #: [phone] [[id number]] ordered by Claude Quintana MD at 10/10/17 0550                Pt seen for 60 min psych consult[SV1.1]     Revision History        User Key Date/Time User Provider Type Action    > SV1.1 10/10/2017  2:50 PM Pham Dash MD Physician Sign                     Progress Notes - Physician (Notes for yesterday and today)      ED Provider Notes by Criselda Murcia MD at 10/9/2017  4:53 PM     Author:  Criselda Murcia MD Service:  Emergency Medicine Author Type:  Physician    Filed:  10/11/2017  6:33 AM Date of Service:  10/9/2017  4:53 PM Creation Time:  10/9/2017  7:33 PM    Status:  Signed :  Criselda Murcia MD (Physician)           History[AB1.1]     Chief Complaint   Patient presents with     Vascular Access Problem[AB1.2]     The history is provided by the patient and a parent (father).[AB1.3]     Bc German is a 37 year old male with a history of HIV positive, IVDU, anxiety, depression, s/p C6 and C7 corpectomy, C5-T1 anterior instrumented fusion (09/10/17) who presents for evaluation of a vascular access problem. Per chart review, patient had a PICC line placed in the LUE on 09/22/17 for an MSSA infection w/IV cefazolin infusions scheduled through 10/24/17. Of note, patient was seen  "and evaluated here in the Emergency Department two days ago for PICC line dysfunction as well, at which time he reported getting his PICC line caught on a tree causing it to dislodge. His PICC line was replaced at that time. Additionally, per chart review, patient has admitted previously to injecting illicit substances into prior PICC lines in 2011, but has denied abusing his current PICC line.     Tonight, patient returns with request that his PICC line placement be checked as he was instructed to present here by his TCU. Patient presents with his father who reports the patient's TCU is concerned that the patient's PICC line is not functioning properly as they have only been able to access one of the two lines. Here, patient's PICC line seems to be showing approximately 2 cm. Patient states he did not snag his PICC line on anything and is unsure how his PICC line became dislodged today.      Additionally, patient complains he has felt \"sluggish\" since his surgery one month ago and is concerned that he has developed some sort of neurological condition because he has had slower speech and incoordination. He also complains of blurred vision, reports \"I know my nails are dirty, but I can see them\" while attempting to file his nails down on exam. He denies fever or chills.      Further, patient complains of increased anxiety over the past 2-3 weeks with increasing sensation that a dog may be attacking him. He describes today he was \"begging\" his roommate to help him while he was completely buried under the covers of his bed because he felt the \"pause of a dog getting ready to attack\". He attributes his fear of dogs to a childhood trauma during which he was bit and dragged by a neighbor's large black lab. He denies other auditory hallucinations such as human voices talking to him, or visual hallucinations. He denies suicidal or homicidal ideations. He has tried detraction techniques he was taught while at [facility] " "previously, but reports these have not been working for him over the past 2-3 weeks for his anxiety. He does smoke marijuana daily. He also drinks alcohol when he \"has money to drink\".[AB1.1] Additionally, patient reports history of body dysmorphia and states it is often difficult for him to leave his house because \"I don't want people to see me\". He is prescribed Fetzima for depression, Klonopin for anxiety, and did start suboxone five weeks ago. Patient states he is compliant with all of these medications as well as his HIV medications.[AB1.3] He does admit to using \"a couple\" hits of methamphetamine yesterday.[AB1.4]     Per ED  who spoke with the patient's TCU staff, at baseline the patient is alert and oriented \"you couldn't pick him from a line up if you tried\". TCU staff report they have never seen the patient act they way he was today, report he has never endorsed hallucinations to them in the past.[AB1.5] They reportedly allow the pt to go outside to smoke with the other residents, and pt can check out. They report that they have been ensuring that he has been present for his scheduled treatments.[SB1.1]     No other symptoms or complaints at this time. Please see ROS for further details.    I have reviewed the Medications, Allergies, Past Medical and Surgical History, and Social History in the Epic system.[AB1.1]  Past Medical History:   Diagnosis Date     Anxiety      Depressive disorder      Drug abuse, IV      Group A streptococcal infection 11/2014    Bacteremia/cellulitis     HCV antibody positive      HIV (human immunodeficiency virus infection) (H)      HIV (human immunodeficiency virus infection) (H)      HIV (human immunodeficiency virus infection) (H)      IVDU (intravenous drug user)      Osteomyelitis of vertebra of lumbosacral region (H) 3/2011    L3, left psoas abscess       Past Surgical History:   Procedure Laterality Date     EYE SURGERY  1 year ago    pins to left eye after " socket fracture     OPTICAL TRACKING SYSTEM FUSION CERVICAL ANTERIOR ONE LEVEL Right 9/10/2017    Procedure: OPTICAL TRACKING SYSTEM FUSION CERVICAL ANTERIOR ONE LEVEL;  Stealth C6-C7 Anterior Cervical Corpectomy and C5-T1 Fusion;  Surgeon: Marv Ambrose MD;  Location: [facility] OR     ORTHOPEDIC SURGERY      Arm Surgery     PICC INSERTION Left 09/21/2017    5fr DL BioFlo PICC, 42cm (1cm external) in the L basilic vein w/ tip in the low SVC.     TRANSESOPHAGEAL ECHOCARDIOGRAM INTRAOPERATIVE N/A 3/17/2015    Procedure: TRANSESOPHAGEAL ECHOCARDIOGRAM INTRAOPERATIVE;  Surgeon: Generic Anesthesia Provider;  Location: [facility] OR       No family history on file.    Social History   Substance Use Topics     Smoking status: Current Every Day Smoker     Types: Cigarettes     Smokeless tobacco: Never Used     Alcohol use No       Current Facility-Administered Medications   Medication     0.9% sodium chloride BOLUS    Followed by     0.9% sodium chloride infusion     hydrOXYzine (ATARAX) tablet 25 mg     ceFAZolin (ANCEF) 1 g vial to attach to  ml bag for ADULT or 50 ml bag for PEDS     Current Outpatient Prescriptions   Medication     cloNIDine (CATAPRES) 0.1 MG tablet     traZODone (DESYREL) 50 MG tablet     TRIUMEQ 600- MG per tablet     melatonin 1 MG TABS tablet     acetaminophen (TYLENOL) 325 MG tablet     pregabalin (LYRICA) 150 MG capsule     ceFAZolin sodium-dextrose (ANCEF) 2-4 GM/100ML-% SOLN infusion     lidocaine (LIDODERM) 5 % Patch     clonazePAM (KLONOPIN) 0.5 MG tablet     pregabalin (LYRICA) 150 MG capsule     buprenorphine HCl-naloxone HCl (SUBOXONE) 8-2 MG per film     QUEtiapine Fumarate (SEROQUEL PO)     naloxone (NARCAN) nasal spray     ibuprofen (ADVIL/MOTRIN) 600 MG tablet     levomilnacipran (FETZIMA) 80 MG 24 hr capsule        Allergies   Allergen Reactions     Doxycycline GI Disturbance[AB1.2]       Review of Systems   Constitutional: Positive for fatigue. Negative for chills, diaphoresis  "and fever.   HENT: Negative for ear pain, sore throat, tinnitus, trouble swallowing and voice change.    Eyes: Positive for visual disturbance (blurred). Negative for pain.   Respiratory: Negative for cough and shortness of breath.    Cardiovascular: Negative for chest pain, palpitations and leg swelling.   Gastrointestinal: Negative for abdominal pain, blood in stool, constipation, diarrhea, nausea and vomiting.   Endocrine: Negative for polydipsia and polyuria.   Genitourinary: Negative for dysuria, frequency, hematuria and urgency.   Musculoskeletal: Negative for back pain and neck pain.   Skin: Negative for color change and rash.        PICC line dysfunction   Allergic/Immunologic: Negative for immunocompromised state.   Neurological: Positive for speech difficulty (\"slowed\"). Negative for dizziness, weakness, light-headedness, numbness and headaches.   Hematological: Negative for adenopathy. Does not bruise/bleed easily.   Psychiatric/Behavioral: Positive for hallucinations (auditory & tactile ). Negative for dysphoric mood. The patient is nervous/anxious.[AB1.3]        Physical Exam[AB1.1]   BP: (!) 142/92  Pulse: 111  Temp: 96.7  F (35.9  C)  Resp: 16  SpO2: 99 %[AB1.2]     Physical Exam  CONSTITUTIONAL: Well-developed and well-nourished. Awake and alert. Non-toxic appearance. No acute distress.   HENT:   - Head: Normocephalic and atraumatic.   - Ears: Hearing and external ear grossly normal.   - Nose: Nose normal. No rhinorrhea. No epistaxis.   - Mouth/Throat: Oropharynx is clear and MMM  EYES: Conjunctivae and lids are normal. No scleral icterus.   NECK: Normal range of motion and phonation normal. Neck supple.  No tracheal deviation, no stridor. No edema or erythema noted.  CARDIOVASCULAR: Normal rate, regular rhythm and no appreciable abnormal heart sounds.  PULMONARY/CHEST: Effort normal. No accessory muscle usage or stridor. No respiratory distress.  No appreciable abnormal breath sounds.  ABDOMEN: " Soft, non-distended. No tenderness. No rigidity, rebound or guarding.   MUSCULOSKELETAL: Extremities warm and seemingly well perfused. No edema or calf tenderness.  NEUROLOGIC: Awake, alert.[AB1.1] Affect is off, appears almost slightly delirousH[SB1.1]e displays no atrophy and no tremor.  Normal tone. No seizure activity. Coordination normal. GCS 15  SKIN: Skin is warm and dry.[AB1.1] Multiple areas of dried skin lesions.[SB1.1]   PSYCHIATRIC:[AB1.1] Denies SI/HI. Does have different affect, seems somewhat fearful or anxious, intermittently almost agitated, but calms. Does not appear to be responding to any internal stimuli currently[SB1.1]    ED Course[AB1.1]     ED Course[AB1.2]     Procedures       7:33 PM  The patient was seen and examined by Dr. Murcia in Room [facility].[AB1.1]        Labs Ordered and Resulted from Time of ED Arrival Up to the Time of Departure from the ED   COMPREHENSIVE METABOLIC PANEL - Abnormal; Notable for the following:        Result Value    Glucose 110 (*)      (*)      (*)     All other components within normal limits   CRP INFLAMMATION - Abnormal; Notable for the following:     CRP Inflammation 44.0 (*)     All other components within normal limits   CBC WITH PLATELETS DIFFERENTIAL - Abnormal; Notable for the following:     RBC Count 4.35 (*)     Hemoglobin 11.8 (*)     Hematocrit 36.3 (*)     RDW 15.2 (*)     All other components within normal limits   ISTAT  GASES LACTATE NITIN POCT - Abnormal; Notable for the following:     Bicarbonate Venous 29 (*)     Lactic Acid 0.6 (*)     All other components within normal limits   CBC WITH PLATELETS DIFFERENTIAL   INR   AMMONIA   TSH WITH FREE T4 REFLEX   ERYTHROCYTE SEDIMENTATION RATE AUTO   TROPONIN I   ERYTHROCYTE SEDIMENTATION RATE AUTO   ROUTINE [facility] WITH MICROSCOPIC REFLEX TO CULTURE   DRUG ABUSE SCREEN 6 CHEM DEP URINE ([facility])   ISTAT CG4 GASES LACTATE NITIN NURSING POCT   BLOOD CULTURE   BLOOD CULTURE[AB1.2]            Assessments  "& Plan (with Medical Decision Making)[AB1.1]   IMPRESSION: 37-year-old male, with PMH notable for HIV, IV drug use, relatively recent cervical osteomyelitis and epidural abscess who is S/P operative intervention, previous lumbosacral osteomyelitis with psoas abscess, Hx of medical noncompliance, depression, and anxiety, et al. per patient he has been having at least monthly auditory hallucinations, but has reportedly never voiced this to the TCU per their report.  - Admitted 9/9 - 9/21: Cervical osteomyelitis without epidural abscess with compression phlegmon and cervical myelopathy protheses S/P C6 and C7 corpectomy, C5-T1 anterior instrumentation fusion, & MSSA bacteremia; started on suboxone and discharged with plan for IV Cefazolin via PICC until 10/24  - Seen in ED 10/7: Reportedly accidentally had his PICC line withdrawn, was replaced. Pt started on clonidine and trazodone for anxiety and sleep disturbance  - Pt does admit to daily marijuana use (including today), denies recent Etoh or other drugs. Per EMR has hx of heroin, marijuana, EtOH and tobacco    Patient presents today to confirm appropriate positioning of his LUE indwelling PICC line, which was reportedly accidently pulled out, at least a couple of centimeters, which is consistent w/ current exam and no other acute findings in that regard.     Perhaps of more concern is the pt's presenting appearance/behavior/mental status, and the pt himself voices concern for feeling more tired, and not himself, also voicing some severe paranoia and significant fearful auditory and tactile hallucinations, though thankfully is not having any SI or HI as described further above in HPI/ROS.  - With patient's history of daily marijuana use, perhaps I could describe some of his symptoms but not all.  Furthermore, when we discuss his mental status and these hallucinations with his usual TCU staff there report that this is new for them and that normally you \"wouldn't be " "able to pick him out of a line up\" with regards to any abnormal behavior or mental status and so this would be atypical for him.[SB1.2] He appears as though someone who has used meth, which could support some of the skin findings/scabs.[SB1.1] He is awake and alert, there does appear somewhat delirious[SB1.2] or anxious[SB1.1].  He moves his head and neck freely without any meningeal findings.    PLAN:[SB1.2] Labs, CXR to evaluate PICC position, JOAQUIN et al.[SB1.1]    RESULTS:  - Labs:[SB1.2] Blood cultures pending[SB1.1]  --- See ED Course section above for particular pertinent findings and comments  - Urine:[SB1.2] No sample yet provided[SB1.1]  - Imaging: Images and written preliminary reports reviewed by myself and revealed:  --- CXR:[SB1.2] HIgh SVC.[SB1.1]     INTERVENTIONS:   -[SB1.2] PICC Removed[SB1.3] for infectious/contamination concerns[SB1.1], PIV placed  - IV cephazolin  - PO hydroxyzine  - 72 Hr hold. Pt now admitting to meth use as well.[SB1.3]     RE-EVALUATION:  See ED Course section above for particular pertinent findings and comments  - The patient's symptoms were[SB1.2]  Grossly unchanged from previous  - Pt now admits to also using methamphetamines[SB1.1]  - Patient observed for[SB1.2] multiple[SB1.1] hours with multiple repeat exams and remains stable.    DISCUSSIONS:  - w/[SB1.2] IM[SB1.3]:[SB1.2] They will admit for further evaluation/management[SB1.3]  - w/ Patient: I have reviewed the available findings, plan with the patient.[SB1.2] He expressed understanding and agreement with this plan. All questions answered to the best of our ability at this time.   --- Pts father updated with his permission, as pt reports, \"He's my best friend.\"[SB1.3]     DISPOSITION/PLANNING:  - FINAL IMPRESSION:[SB1.2] Ongoing drug use in setting of recent significant infections/bacteremia requiring ongoing IV Abx and spinal surgeries that were 2/2 to drug use.[SB1.3]   - DISPOSITION:[SB1.2] Admit to [facility] " for further evaluation/management  --- Needs ongoing IV Abx for 2 more weeks as per previous hospital admission.[SB1.3]     ______________________________________________________________________________    - I have reviewed the available nursing notes.[SB1.2]      New Prescriptions    No medications on file       Final diagnoses:   None[AB1.2]   IKeyanna, am serving as a trained medical scribe to document services personally performed by Criselda Murcia MD, based on the provider's statements to me.   Criselda ECHOLS MD, was physically present and have reviewed and verified the accuracy of this note documented by Keyanna Marrero.      10/9/2017   [facility], [place], EMERGENCY DEPARTMENT[AB1.1]     Criselda Murcia MD  10/11/17 0633  [SB1.4]     Revision History        User Key Date/Time User Provider Type Action    > SB1.4 10/11/2017  6:33 AM Criselda Murcia MD Physician Sign     SB1.1 10/11/2017  6:14 AM Criselda Murcia MD Physician      SB1.3 10/10/2017  5:26 AM Criselda Murcia MD Physician Share     AB1.2 10/10/2017 12:06 AM Keyanna Marrero Share     AB1.4 10/10/2017 12:05 AM Keyanna Marrero      SB1.2 10/9/2017  8:57 PM Criselda Murcia MD Physician Share     AB1.5 10/9/2017  8:50 PM Keyanna Marrero Share     AB1.3 10/9/2017  8:25 PM Keyanna Marrero Share     AB1.1 10/9/2017  7:56 PM Keyanna Marrero Share                     Procedure Notes      Procedures by Tamie Huff, RN at 10/12/2017 10:30 AM     Author:  Tamie Huff, RN Service:  Infusion Services Author Type:  Registered Nurse    Filed:  10/12/2017 11:18 AM Date of Service:  10/12/2017 10:30 AM Creation Time:  10/12/2017 11:15 AM    Status:  Signed :  Tamie Huff RN (Registered Nurse)     Procedure Orders:    1. Vascular Access Adult IP Consult [[id number]] ordered by Judah Mcneil PA-C at 10/12/17 0823                Reviewed picc procedure with patient, he recently had one  "placed.  #4 single lumen solo picc placed in left basilic vein with 1 attempt.  Per 3CG technology tip terminates at the svc/ra junction.  Report called to Neli SHEPARD that picc line is good to use.  Flushing orders released from epic.    [MM1.1]     Revision History        User Key Date/Time User Provider Type Action    > MM1.1 10/12/2017 11:18 AM Tamie Huff RN Registered Nurse Sign                  Progress Notes - Therapies (Notes from 10/09/17 through 10/12/17)     No notes of this type exist for this encounter.                                          INTERAGENCY TRANSFER FORM - LAB / IMAGING / EKG / EMG RESULTS   10/9/2017                       [facility] [address]: [phone]            Unresulted Labs (24h ago through future)    Start       Ordered    10/14/17 0600  Creatinine  (Pharmacological Prophylaxis - enoxaparin (LOVENOX) *Use only if creatinine clearance is greater than 30 mL/min)  EVERY THREE DAYS,   Routine     Comments:  Last Lab Result: Creatinine (mg/dL)       Date                     Value                 10/11/2017               0.75             ----------    10/11/17 0855    10/14/17 0600  Platelet count  (Pharmacological Prophylaxis - enoxaparin (LOVENOX) *Use only if creatinine clearance is greater than 30 mL/min)  EVERY THREE DAYS,   Routine     Comments:  If no result is listed, this lab has not been done the past 365 days. LATEST LAB RESULT: Platelet Count (10e9/L)       Date                     Value                 10/11/2017               171              ----------    10/11/17 0858    Unscheduled  Potassium  (Potassium Replacement - \"Standard\" - For K levels less than 3.4 mmol/L - [facility] )  CONDITIONAL (SPECIFY),   Routine     Comments:  Obtain Potassium Level for these conditions:  *IF no potassium result within 24 hours before initiation of order set, draw potassium level with next lab collect.    *2 HOURS AFTER last IV potassium replacement dose and 4 hours after an " oral replacement dose.  *Next morning after potassium dose.     Repeat Potassium Replacement if necessary.    10/10/17 0550         Lab Results - 3 Days      Blood culture [[id number]] (Abnormal)  Resulted: 10/12/17 1041, Result status: Final result    Ordering provider: Criselda Murcia MD  10/09/17 1949 Resulting lab: MICRO RAPID TESTING LAB    Specimen Information    Type Source Collected On   Blood Purple Port 10/09/17 2015   Comment:  PICC          Components       Value Reference Range Flag Lab   Specimen Description Blood PICC      Culture Micro --  A 226   Result:         Cultured on the 1st day of incubation:  Bacillus species, not anthracis nor cereus group     Culture Micro --   226   Result:         Critical Value/Significant Value, preliminary result only, called to and read back by  Tonie Phan RN @163 10/10/17..     Culture Micro --   226   Result:         (Note)  NEGATIVE for the following: Staphylococcus spp., Staph aureus, Staph  epidermidis, Staph lugdunensis, Streptococcus spp., Strep pneumoniae,  Strep pyogenes, Strep agalactiae, Strep anginosus group, Enterococcus  faecalis, Enterococcus faecium, and Listeria spp. by [employer]  multiplex nucleic acid test. Final identification and antimicrobial  susceptibility testing will be verified by standard methods.    Critical Value/Significant Value called to and read back by Ambika Phan RN [id number] @ 1933 10/10/17                 Blood culture [[id number]]  Resulted: 10/12/17 0704, Result status: Preliminary result    Ordering provider: Judah Mcneil PA-C  10/10/17 1752 Resulting lab: INFECTIOUS DISEASE DIAGNOSTIC LABORATORY    Specimen Information    Type Source Collected On   Blood  10/10/17 1849   Comment:  HAND          Components       Value Reference Range Flag Lab   Specimen Description Blood HAND      Special Requests Aerobic and anaerobic bottles received   13   Culture Micro No growth after 2 days   225            Blood culture  [[id number]]  Resulted: 10/12/17 0704, Result status: Preliminary result    Ordering provider: Judah Mcneil PA-C  10/10/17 1759 Resulting lab: INFECTIOUS DISEASE DI[facility]NOSTIC LABORATORY    Specimen Information    Type Source Collected On   Blood  10/10/17 1901   Comment:  Right Arm          Components       Value Reference Range Flag Lab   Specimen Description Blood Right Arm      Special Requests Aerobic and anaerobic bottles received   13   Culture Micro No growth after 2 days   225            Blood culture [[id number]]  Resulted: 10/12/17 0703, Result status: Preliminary result    Ordering provider: Criselda Murcia MD  10/09/17 1949 Resulting lab: INFECTIOUS DISEASE DIAGNOSTIC LABORATORY    Specimen Information    Type Source Collected On   Blood White Port 10/09/17 2035   Comment:  White port          Components       Value Reference Range Flag Lab   Specimen Description Blood White port      Culture Micro No growth after 3 days   225            Drug screen comprehensive blood ([facility]) [[id number]]  Resulted: 10/11/17 0839, Result status: Final result    Ordering provider: Claude Quintana MD  10/10/17 0654 Resulting lab: [facility]    Specimen Information    Type Source Collected On     10/10/17 0654          Components       Value Reference Range Flag Lab   Acetaminophen Qual Negative NEG^Negative  51   Amobarbital Qual Negative NEG^Negative  51   Barbital Qual Negative NEG^Negative  51   Butabarbital Qual Negative NEG^Negative  51   Butalbital Qual Negative NEG^Negative  51   Caffeine Qual Negative NEG^Negative  51   Carbamazepine Qual Negative NEG^Negative  51   Carisoprodol Qual Negative NEG^Negative  51   Chlorpropamide Qual Negative NEG^Negative  51   Ethclorvynol Qual Negative NEG^Negative  51   Ethinamate Qual Negative NEG^Negative  51   Ethosuximide Qual Negative NEG^Negative  51   Ethotoin Qual Negative NEG^Negative  51   Glutethimide Qual Negative NEG^Negative  51    Ibuprofen Qual Negative NEG^Negative  51   Mephenytoin Qual Negative NEG^Negative  51   Mephobarbital Qual Negative NEG^Negative  51   Meprobamate Qual Negative NEG^Negative  51   Methaqualone Qual Negative NEG^Negative  51   Metharbital Qual Negative NEG^Negative  51   Methsuximide Qual Negative NEG^Negative  51   Methyprylon Qual Negative NEG^Negative  51   Pentobarbital Qual Negative NEG^Negative  51   Phenacetin Qual Negative NEG^Negative  51   Phenobarbital Qual Negative NEG^Negative  51   Phensuximide Qual Negative NEG^Negative  51   Phenytoin Qual Negative NEG^Negative  51   Primidone Qual Negative NEG^Negative  51   Salicylate Qual Negative NEG^Negative  51   Secobarbital Qual Negative NEG^Negative  51   Talbutal Qual Negative NEG^Negative  51   Theophylline Qual Negative NEG^Negative  51   Thiopental Qual Negative NEG^Negative  51   Trimethadidone Qual Negative NEG^Negative  51   Tybamate Qual Negative NEG^Negative  51   Valproic Acid Qual Negative NEG^Negative  51   Comment:         This test was developed and its performance characteristics determined by the   [facility],  Special Chemistry Laboratory. It has   not been cleared or approved by the FDA. The laboratory is regulated under   CLIA as qualified to perform high-complexity testing. This test is used for   clinical purposes. It should not be regarded as investigational or for   research.              Comprehensive metabolic panel [[id number]] (Abnormal)  Resulted: 10/11/17 0812, Result status: Final result    Ordering provider: Claude Quintana MD  10/11/17 0000 Resulting lab: [facility]    Specimen Information    Type Source Collected On   Blood  10/11/17 0745          Components       Value Reference Range Flag Lab   Sodium 143 133 - 144 mmol/L  13   Potassium 4.1 3.4 - 5.3 mmol/L  13   Chloride 114 94 - 109 mmol/L H 13   Carbon Dioxide 26 20 - 32 mmol/L  13   Anion Gap 3 3 - 14 mmol/L  13    Glucose 88 70 - 99 mg/dL  13   Urea Nitrogen 9 7 - 30 mg/dL  13   Creatinine 0.75 0.66 - 1.25 mg/dL  13   GFR Estimate >90 >60 mL/min/1.7m2  13   Comment:  Non  GFR Calc   GFR Estimate If Black >90 >60 mL/min/1.7m2  13   Comment:  African American GFR Calc   Calcium 8.1 8.5 - 10.1 mg/dL L 13   Bilirubin Total 0.3 0.2 - 1.3 mg/dL  13   Albumin 2.7 3.4 - 5.0 g/dL L 13   Protein Total 6.8 6.8 - 8.8 g/dL  13   Alkaline Phosphatase 107 40 - 150 U/L  13    0 - 70 U/L H 13    0 - 45 U/L H 13            CBC with platelets differential [[id number]] (Abnormal)  Resulted: 10/11/17 0805, Result status: Final result    Ordering provider: Claude Quintana MD  10/11/17 0000 Resulting lab: [facility]    Specimen Information    Type Source Collected On   Blood  10/11/17 0745          Components       Value Reference Range Flag Lab   WBC 3.7 4.0 - 11.0 10e9/L L 13   RBC Count 4.21 4.4 - 5.9 10e12/L L 13   Hemoglobin 11.3 13.3 - 17.7 g/dL L 13   Hematocrit 36.1 40.0 - 53.0 % L 13   MCV 86 78 - 100 fl  13   MCH 26.8 26.5 - 33.0 pg  13   MCHC 31.3 31.5 - 36.5 g/dL L 13   RDW 14.9 10.0 - 15.0 %  13   Platelet Count 171 150 - 450 10e9/L  13   Diff Method Automated Method   13   % Neutrophils 24.2 %  13   % Lymphocytes 53.1 %  13   % Monocytes 15.0 %  13   % Eosinophils 5.9 %  13   % Basophils 0.5 %  13   % Immature Granulocytes 1.3 %  13   Nucleated RBCs 0 0 /100  13   Absolute Neutrophil 0.9 1.6 - 8.3 10e9/L L 13   Absolute Lymphocytes 2.0 0.8 - 5.3 10e9/L  13   Absolute Monocytes 0.6 0.0 - 1.3 10e9/L  13   Absolute Eosinophils 0.2 0.0 - 0.7 10e9/L  13   Absolute Basophils 0.0 0.0 - 0.2 10e9/L  13   Abs Immature Granulocytes 0.1 0 - 0.4 10e9/L  13   Absolute Nucleated RBC 0.0   13            UA reflex to Microscopic and Culture [[id number]] (Abnormal)  Resulted: 10/10/17 1317, Result status: Final result    Ordering provider: Claude Quintana MD  10/10/17 6797 Resulting lab:  [facility]    Specimen Information    Type Source Collected On   Urine  10/10/17 1150          Components       Value Reference Range Flag Lab   Color Urine Duplicate request   13   Appearance Urine Duplicate request   13   Glucose Urine Duplicate request NEG^Negative mg/dL A 13   Bilirubin Urine Duplicate request NEG^Negative A 13   Ketones Urine Duplicate request NEG^Negative mg/dL A 13   Specific [place]o Urine Duplicate request 1.003 - 1.035  13   Blood Urine Duplicate request NEG^Negative A 13   pH Urine Duplicate request 5.0 - 7.0 pH  13   Protein Albumin Urine Duplicate request NEG^Negative mg/dL A 13   Urobilinogen mg/dL Duplicate request 0.0 - 2.0 mg/dL  13   Nitrite Urine Duplicate request NEG^Negative A 13   Leukocyte Esterase Urine Duplicate request NEG^Negative A 13   Source Urine   13            Drug abuse screen 1 urine (ED) [[id number]] (Abnormal)  Resulted: 10/10/17 1252, Result status: Final result    Ordering provider: Claude Quintana MD  10/10/17 0551 Resulting lab: [facility]    Specimen Information    Type Source Collected On   Urine  10/10/17 1150          Components       Value Reference Range Flag Lab   Amphetamine Qual Urine Positive NEG^Negative A 153   Comment:         Cutoff for a positive amphetamine is greater than 500 ng/mL. This is an   unconfirmed screening result to be used for medical purposes only.     Barbiturates Qual Urine Negative NEG^Negative  153   Comment:  Cutoff for a negative barbiturate is 200 ng/mL or less.   Benzodiazepine Qual Urine Negative NEG^Negative  153   Comment:  Cutoff for a negative benzodiazepine is 200 ng/mL or less.   Cannabinoids Qual Urine Positive NEG^Negative A 153   Comment:         Cutoff for a positive cannabinoid is greater than 50 ng/mL. This is an   unconfirmed screening result to be used for medical purposes only.     Cocaine Qual Urine Negative NEG^Negative  153   Comment:  Cutoff for a  negative cocaine is 300 ng/mL or less.   Opiates Qualitative Urine Negative NEG^Negative  153   Comment:  Cutoff for a negative opiate is 300 ng/mL or less.            UA with Microscopic reflex to Culture [[id number]] (Abnormal)  Resulted: 10/10/17 1222, Result status: Final result    Ordering provider: Criselda Murcia MD  10/09/17 1949 Resulting lab: [facility]    Specimen Information    Type Source Collected On   Urine  10/10/17 1150          Components       Value Reference Range Flag Lab   Color Urine Yellow   13   Appearance Urine Clear   13   Glucose Urine Negative NEG^Negative mg/dL  13   Bilirubin Urine Negative NEG^Negative  13   Ketones Urine 10 NEG^Negative mg/dL A 13   Specific Gravity Urine 1.013 1.003 - 1.035  13   Blood Urine Negative NEG^Negative  13   pH Urine 6.0 5.0 - 7.0 pH  13   Protein Albumin Urine Negative NEG^Negative mg/dL  13   Urobilinogen mg/dL Normal 0.0 - 2.0 mg/dL  13   Nitrite Urine Negative NEG^Negative  13   Leukocyte Esterase Urine Negative NEG^Negative  13   Source Urine   13   WBC Urine 3 0 - 2 /HPF H 13   RBC Urine 2 0 - 2 /HPF  13   Bacteria Urine Few NEG^Negative /HPF A 13   Squamous Epithelial /HPF Urine <1 0 - 1 /HPF  13   Mucous Urine Present NEG^Negative /LPF A 13            Creatinine [[id number]]  Resulted: 10/10/17 0729, Result status: Final result    Ordering provider: Claude Quintana MD  10/10/17 0550 Resulting lab: [facility]    Specimen Information    Type Source Collected On   Blood  10/10/17 0654          Components       Value Reference Range Flag Lab   Creatinine 0.78 0.66 - 1.25 mg/dL  FrStHsLb   GFR Estimate >90 >60 mL/min/1.7m2  FrStHsLb   Comment:  Non  GFR Calc   GFR Estimate If Black >90 >60 mL/min/1.7m2  FrStHsLb   Comment:   GFR Calc            Platelet count [[id number]]  Resulted: 10/10/17 0717, Result status: Final result    Ordering provider: Claude Quintana MD  10/10/17  0550 Resulting lab: [facility]    Specimen Information    Type Source Collected On   Blood  10/10/17 0654          Components       Value Reference Range Flag Lab   Platelet Count 204 150 - 450 10e9/L  13            Erythrocyte sedimentation rate auto [[id number]] (Abnormal)  Resulted: 10/10/17 0136, Result status: Final result    Ordering provider: Criselda Murcia MD  10/09/17 2149 Resulting lab: [facility]    Specimen Information    Type Source Collected On     10/09/17 2149          Components       Value Reference Range Flag Lab   Sed Rate 29 0 - 15 mm/h H 51            CBC with platelets differential [[id number]] (Abnormal)  Resulted: 10/09/17 2213, Result status: Final result    Ordering provider: Criselda Murcia MD  10/09/17 2138 Resulting lab: [facility]    Specimen Information    Type Source Collected On   Blood  10/09/17 2149          Components       Value Reference Range Flag Lab   WBC 9.0 4.0 - 11.0 10e9/L  51   RBC Count 4.35 4.4 - 5.9 10e12/L L 51   Hemoglobin 11.8 13.3 - 17.7 g/dL L 51   Hematocrit 36.3 40.0 - 53.0 % L 51   MCV 83 78 - 100 fl  51   MCH 27.1 26.5 - 33.0 pg  51   MCHC 32.5 31.5 - 36.5 g/dL  51   RDW 15.2 10.0 - 15.0 % H 51   Platelet Count 208 150 - 450 10e9/L  51   Diff Method Automated Method   51   % Neutrophils 45.5 %  51   % Lymphocytes 37.4 %  51   % Monocytes 11.2 %  51   % Eosinophils 4.1 %  51   % Basophils 0.4 %  51   % Immature Granulocytes 1.4 %  51   Nucleated RBCs 0 0 /100  51   Absolute Neutrophil 4.1 1.6 - 8.3 10e9/L  51   Absolute Lymphocytes 3.4 0.8 - 5.3 10e9/L  51   Absolute Monocytes 1.0 0.0 - 1.3 10e9/L  51   Absolute Eosinophils 0.4 0.0 - 0.7 10e9/L  51   Absolute Basophils 0.0 0.0 - 0.2 10e9/L  51   Abs Immature Granulocytes 0.1 0 - 0.4 10e9/L  51   Absolute Nucleated RBC 0.0   51            CBC with platelets differential [[id number]]  Resulted: 10/09/17 2119,  Result status: Final result    Ordering provider: Criselda Murcia MD  10/09/17 1949 Resulting lab: [facility]    Specimen Information    Type Source Collected On   Blood  10/09/17 2015          Components       Value Reference Range Flag Lab   WBC KUMAR BEAVER 2115 BY MJK 4.0 - 11.0 10e9/L  51   RBC Count KUMAR U 2115 BY MJK 4.4 - 5.9 10e12/L  51   Hemoglobin KUMAR ER 2115 BY MJK 13.3 - 17.7 g/dL  51   Hematocrit KUMAR ER 2115 BY MJK 40.0 - 53.0 %  51   MCV KUMAR [facility] 2115 BY MJK 78 - 100 fl  51   MCH KUMAR [facility] 2115 BY MJK 26.5 - 33.0 pg  51   MCHC KUMAR [facility] 2115 BY MJK 31.5 - 36.5 g/dL  51   RDW KUMAR [facility] 2115 BY MJK 10.0 - 15.0 %  51   Platelet Count KUMAR UER 2115 BY  - 450 10e9/L  51   Diff Method KUMAR UER 2115 BY MJK   51            Erythrocyte sedimentation rate auto [[id number]]  Resulted: 10/09/17 2119, Result status: Final result    Ordering provider: Criselda Murcia MD  10/09/17 1949 Resulting lab: [facility]    Specimen Information    Type Source Collected On   Blood  10/09/17 2015          Components       Value Reference Range Flag Lab   Sed Rate KUMAR GUADALUPE 2115 BY MJK 0 - 15 mm/h  51            TSH with free T4 reflex [[id number]]  Resulted: 10/09/17 2107, Result status: Final result    Ordering provider: Criselda Murcia MD  10/09/17 1949 Resulting lab: [facility]    Specimen Information    Type Source Collected On   Blood  10/09/17 2015          Components       Value Reference Range Flag Lab   TSH 1.88 0.40 - 4.00 mU/L  51            Comprehensive metabolic panel [[id number]] (Abnormal)  Resulted: 10/09/17 2102, Result status: Final result    Ordering provider: Criselda Murcia MD  10/09/17 1949 Resulting lab: [facility]    Specimen Information    Type Source Collected On   Blood  10/09/17 2015          Components       Value Reference  Range Flag Lab   Sodium 138 133 - 144 mmol/L  51   Potassium 3.7 3.4 - 5.3 mmol/L  51   Chloride 104 94 - 109 mmol/L  51   Carbon Dioxide 29 20 - 32 mmol/L  51   Anion Gap 5 3 - 14 mmol/L  51   Glucose 110 70 - 99 mg/dL H 51   Urea Nitrogen 19 7 - 30 mg/dL  51   Creatinine 0.93 0.66 - 1.25 mg/dL  51   GFR Estimate >90 >60 mL/min/1.7m2  51   Comment:  Non  GFR Calc   GFR Estimate If Black >90 >60 mL/min/1.7m2  51   Comment:  African American GFR Calc   Calcium 9.0 8.5 - 10.1 mg/dL  51   Bilirubin Total 0.6 0.2 - 1.3 mg/dL  51   Albumin 3.7 3.4 - 5.0 g/dL  51   Protein Total 8.5 6.8 - 8.8 g/dL  51   Alkaline Phosphatase 132 40 - 150 U/L  51    0 - 70 U/L H 51    0 - 45 U/L H 51            CRP inflammation [[id number]] (Abnormal)  Resulted: 10/09/17 2102, Result status: Final result    Ordering provider: Criselda Murcia MD  10/09/17 1949 Resulting lab: [facility]    Specimen Information    Type Source Collected On   Blood  10/09/17 2015          Components       Value Reference Range Flag Lab   CRP Inflammation 44.0 0.0 - 8.0 mg/L H 51            Troponin I [[id number]]  Resulted: 10/09/17 2102, Result status: Final result    Ordering provider: Criselda Murcia MD  10/09/17 1949 Resulting lab: [facility]    Specimen Information    Type Source Collected On   Blood  10/09/17 2015          Components       Value Reference Range Flag Lab   Troponin I ES <0.015 0.000 - 0.045 ug/L  51   Comment:         The 99th percentile for upper reference range is 0.045 ug/L.  Troponin values   in the range of 0.045 - 0.120 ug/L may be associated with risks of adverse   clinical events.              INR [[id number]]  Resulted: 10/09/17 2100, Result status: Final result    Ordering provider: Criselda Murcia MD  10/09/17 1949 Resulting lab: [facility]    Specimen Information    Type Source Collected On   Blood   10/09/17 2015          Components       Value Reference Range Flag Lab   INR 1.01 0.86 - 1.14  51            Ammonia [[id number]]  Resulted: 10/09/17 2056, Result status: Final result    Ordering provider: Criselda Murcia MD  10/09/17 1949 Resulting lab: [facility]    Specimen Information    Type Source Collected On   Blood  10/09/17 2015          Components       Value Reference Range Flag Lab   Ammonia 29 10 - 50 umol/L  51            Lactic acid whole blood [[id number]]  Resulted: 10/09/17 2045, Result status: In process    Ordering provider: Criselda Murcia MD  10/09/17 2040 Resulting lab: [facility]    Specimen Information    Type Source Collected On     10/09/17 2040            Testing Performed By     Lab - Abbreviation Name Director Address Valid Date Range    13 - Unknown [facility] Unknown [address] [facility] [address] 01/15/15 0916 - Present    14 - [facility] Unknown [address] [facility] [address] 05/08/15 1057 - Present    45 - [id number] [facility] Unknown Unknown 01/28/02 0000 - Present    51 - Unknown [facility] Unknown [address] [facility] [address] 12/31/14 1010 - Present    153 - Unknown [facility] Unknown Unknown 04/28/11 1033 - Present    225 - Unknown INFECTIOUS DISEASE DIAGNOSTIC LABORATORY Unknown [address] [facility] [address] 12/19/14 0954 - Present    226 - Unknown MICRO RAPID TESTING LAB Unknown [address] [facility] [address] 12/19/14 0955 - Present               Imaging Results - 3 Days      XR Chest 1 View [[id number]]  Resulted: 10/09/17 2036, Result status: Final result    Ordering provider: Curry Lizarraga MD  10/09/17 1753 Resulted by: Faustina Hutton MD Evelsizer, Andrew    Performed: 10/09/17 1956 - 10/09/17 2007 Resulting lab: RADIOLOGY RESULTS    Narrative:       Exam:  XR CHEST 1 VW, 10/9/2017 8:13  PM    History: PICC placement    Comparison:  Chest radiograph from 10/7/2017.    Findings: PA view of the chest. Left upper extremity PICC tip projects  over the high SVC. The cardiomediastinal silhouette is within normal  limits. There is no pleural effusion or pneumothorax. Incidental right  azygos fissure. No focal pulmonary opacities. The visualized upper  abdomen is unremarkable. Anterior cervical plate and screw fusion  hardware.      Impression:       Impression:    1. Left upper extremity PICC tip projects over the high SVC.  2. No acute cardiopulmonary abnormality.    I have personally reviewed the examination and initial interpretation  and I agree with the findings.    MARIAM MACDONALD MD      Testing Performed By     Lab - Abbreviation Name Director Address Valid Date Range    104 - Rad Rslts RADIOLOGY RESULTS Unknown Unknown 02/16/05 1553 - Present            Encounter-Level Documents:     There are no encounter-level documents.      Order-Level Documents:     There are no order-level documents.

## 2017-10-09 NOTE — LETTER
Transition Communication Hand-off for Care Transitions to Next Level of Care Provider    Name: Bc German  MRN #: [id number]  Primary Care Provider: Damir Cisneros     Primary Clinic: [address] [place] [address]  [facility] [address]     Reason for Hospitalization:  Hallucinations [R44.3]  Needs peripherally inserted central catheter (PICC) [Z45.2]  Admit Date/Time: 10/9/2017  7:25 PM  Discharge Date: 10/12/17  Payor Source: Payor: [employer] / Plan: [employer] FULLY [employer]URED / Product Type: HMO /     Reason for Communication Hand-off Referral: Other DC plan    Discharge Plan: to return to [facility]. While there, will be followed by ZEFERINO Faye [phone]  Discharge Plan:       Most Recent Value    Concerns To Be Addressed substance/tobacco abuse/use concerns           Concern for non-adherence with plan of care:   Y/N Y  Discharge Needs Assessment:  Follow up w/Dr Khari Barrientos at [facility] clinic for subaxone maintenance    Follow-up plan:  Future Appointments  Date Time Provider Department Center   10/24/2017 9:00 AM Damir Cisneros MD [facility]   11/27/2017 8:00 AM Marv Ambrose MD [facility]       Any outstanding tests or procedures:              BRITTON Galvin  8A/10A Ortho/Med/Surg and [facility]  [address] [address]    [phone]  [email or web address]      AVS/Discharge Summary is the source of truth; this is a helpful guide for improved communication of patient story

## 2017-10-09 NOTE — ED NOTES
cB is currently residing in a TCU and receiving IV antibiotics via his PICC. Today, after he showered, the dressing was loose and the PICC was pulled and there appears to be some extra line out at the insertion site. The TCU needs confirmation that the PICC can be used. Dad also wants the patient to be seen for paranoia and anxiety.

## 2017-10-10 PROBLEM — M86.9 OSTEOMYELITIS (H): Status: ACTIVE | Noted: 2017-10-10

## 2017-10-10 LAB
ALBUMIN UR-MCNC: ABNORMAL MG/DL
ALBUMIN UR-MCNC: NEGATIVE MG/DL
AMPHETAMINES UR QL SCN: POSITIVE
APPEARANCE UR: ABNORMAL
APPEARANCE UR: CLEAR
BACTERIA #/AREA URNS HPF: ABNORMAL /HPF
BARBITURATES UR QL: NEGATIVE
BENZODIAZ UR QL: NEGATIVE
BILIRUB UR QL STRIP: ABNORMAL
BILIRUB UR QL STRIP: NEGATIVE
CANNABINOIDS UR QL SCN: POSITIVE
COCAINE UR QL: NEGATIVE
COLOR UR AUTO: ABNORMAL
COLOR UR AUTO: YELLOW
CREAT SERPL-MCNC: 0.78 MG/DL (ref 0.66–1.25)
ERYTHROCYTE [SEDIMENTATION RATE] IN BLOOD BY WESTERGREN METHOD: 29 MM/H (ref 0–15)
FUNGUS SPEC CULT: NORMAL
FUNGUS SPEC CULT: NORMAL
GFR SERPL CREATININE-BSD FRML MDRD: >90 ML/MIN/1.7M2
GLUCOSE UR STRIP-MCNC: ABNORMAL MG/DL
GLUCOSE UR STRIP-MCNC: NEGATIVE MG/DL
HGB UR QL STRIP: ABNORMAL
HGB UR QL STRIP: NEGATIVE
INTERPRETATION ECG - MUSE: NORMAL
KETONES UR STRIP-MCNC: 10 MG/DL
KETONES UR STRIP-MCNC: ABNORMAL MG/DL
LEUKOCYTE ESTERASE UR QL STRIP: ABNORMAL
LEUKOCYTE ESTERASE UR QL STRIP: NEGATIVE
MUCOUS THREADS #/AREA URNS LPF: PRESENT /LPF
NITRATE UR QL: ABNORMAL
NITRATE UR QL: NEGATIVE
OPIATES UR QL SCN: NEGATIVE
PH UR STRIP: 6 PH (ref 5–7)
PH UR STRIP: ABNORMAL PH (ref 5–7)
PLATELET # BLD AUTO: 204 10E9/L (ref 150–450)
RBC #/AREA URNS AUTO: 2 /HPF (ref 0–2)
SOURCE: ABNORMAL
SOURCE: ABNORMAL
SP GR UR STRIP: 1.01 (ref 1–1.03)
SP GR UR STRIP: ABNORMAL (ref 1–1.03)
SPECIMEN SOURCE: NORMAL
SPECIMEN SOURCE: NORMAL
SQUAMOUS #/AREA URNS AUTO: <1 /HPF (ref 0–1)
UROBILINOGEN UR STRIP-MCNC: ABNORMAL MG/DL (ref 0–2)
UROBILINOGEN UR STRIP-MCNC: NORMAL MG/DL (ref 0–2)
WBC #/AREA URNS AUTO: 3 /HPF (ref 0–2)

## 2017-10-10 PROCEDURE — 87040 BLOOD CULTURE FOR BACTERIA: CPT | Performed by: PHYSICIAN ASSISTANT

## 2017-10-10 PROCEDURE — 36415 COLL VENOUS BLD VENIPUNCTURE: CPT | Performed by: PHYSICIAN ASSISTANT

## 2017-10-10 PROCEDURE — 80307 DRUG TEST PRSMV CHEM ANLYZR: CPT | Performed by: INTERNAL MEDICINE

## 2017-10-10 PROCEDURE — 93010 ELECTROCARDIOGRAM REPORT: CPT | Performed by: INTERNAL MEDICINE

## 2017-10-10 PROCEDURE — 99207 ZZC MOONLIGHTING INDICATOR: CPT | Performed by: INTERNAL MEDICINE

## 2017-10-10 PROCEDURE — 81001 URINALYSIS AUTO W/SCOPE: CPT | Performed by: EMERGENCY MEDICINE

## 2017-10-10 PROCEDURE — 36415 COLL VENOUS BLD VENIPUNCTURE: CPT | Performed by: INTERNAL MEDICINE

## 2017-10-10 PROCEDURE — 85049 AUTOMATED PLATELET COUNT: CPT | Performed by: INTERNAL MEDICINE

## 2017-10-10 PROCEDURE — 25000132 ZZH RX MED GY IP 250 OP 250 PS 637: Performed by: EMERGENCY MEDICINE

## 2017-10-10 PROCEDURE — 93005 ELECTROCARDIOGRAM TRACING: CPT

## 2017-10-10 PROCEDURE — 99232 SBSQ HOSP IP/OBS MODERATE 35: CPT | Performed by: PHYSICIAN ASSISTANT

## 2017-10-10 PROCEDURE — 96365 THER/PROPH/DIAG IV INF INIT: CPT | Performed by: EMERGENCY MEDICINE

## 2017-10-10 PROCEDURE — 25000132 ZZH RX MED GY IP 250 OP 250 PS 637: Performed by: PHYSICIAN ASSISTANT

## 2017-10-10 PROCEDURE — 12000001 ZZH R&B MED SURG/OB UMMC

## 2017-10-10 PROCEDURE — 99221 1ST HOSP IP/OBS SF/LOW 40: CPT | Performed by: PSYCHIATRY & NEUROLOGY

## 2017-10-10 PROCEDURE — 25000132 ZZH RX MED GY IP 250 OP 250 PS 637: Performed by: INTERNAL MEDICINE

## 2017-10-10 PROCEDURE — 80307 DRUG TEST PRSMV CHEM ANLYZR: CPT | Performed by: EMERGENCY MEDICINE

## 2017-10-10 PROCEDURE — 25000128 H RX IP 250 OP 636: Performed by: EMERGENCY MEDICINE

## 2017-10-10 PROCEDURE — 82565 ASSAY OF CREATININE: CPT | Performed by: INTERNAL MEDICINE

## 2017-10-10 PROCEDURE — 25000128 H RX IP 250 OP 636: Performed by: INTERNAL MEDICINE

## 2017-10-10 RX ORDER — POTASSIUM CHLORIDE 750 MG/1
20-40 TABLET, EXTENDED RELEASE ORAL
Status: DISCONTINUED | OUTPATIENT
Start: 2017-10-10 | End: 2017-10-12 | Stop reason: HOSPADM

## 2017-10-10 RX ORDER — POTASSIUM CHLORIDE 1.5 G/1.58G
20-40 POWDER, FOR SOLUTION ORAL
Status: DISCONTINUED | OUTPATIENT
Start: 2017-10-10 | End: 2017-10-12 | Stop reason: HOSPADM

## 2017-10-10 RX ORDER — BUPRENORPHINE AND NALOXONE 8; 2 MG/1; MG/1
1 FILM, SOLUBLE BUCCAL; SUBLINGUAL DAILY
Status: DISCONTINUED | OUTPATIENT
Start: 2017-10-10 | End: 2017-10-12 | Stop reason: HOSPADM

## 2017-10-10 RX ORDER — POTASSIUM CL/LIDO/0.9 % NACL 10MEQ/0.1L
10 INTRAVENOUS SOLUTION, PIGGYBACK (ML) INTRAVENOUS
Status: DISCONTINUED | OUTPATIENT
Start: 2017-10-10 | End: 2017-10-12 | Stop reason: HOSPADM

## 2017-10-10 RX ORDER — QUETIAPINE FUMARATE 50 MG/1
50 TABLET, FILM COATED ORAL 2 TIMES DAILY
Status: DISCONTINUED | OUTPATIENT
Start: 2017-10-10 | End: 2017-10-12 | Stop reason: HOSPADM

## 2017-10-10 RX ORDER — ONDANSETRON 2 MG/ML
4 INJECTION INTRAMUSCULAR; INTRAVENOUS EVERY 6 HOURS PRN
Status: DISCONTINUED | OUTPATIENT
Start: 2017-10-10 | End: 2017-10-12 | Stop reason: HOSPADM

## 2017-10-10 RX ORDER — IBUPROFEN 600 MG/1
600 TABLET, FILM COATED ORAL EVERY 6 HOURS PRN
Status: DISCONTINUED | OUTPATIENT
Start: 2017-10-10 | End: 2017-10-12 | Stop reason: HOSPADM

## 2017-10-10 RX ORDER — TRAZODONE HYDROCHLORIDE 50 MG/1
50 TABLET, FILM COATED ORAL AT BEDTIME
Status: DISCONTINUED | OUTPATIENT
Start: 2017-10-10 | End: 2017-10-10

## 2017-10-10 RX ORDER — AMOXICILLIN 250 MG
1-2 CAPSULE ORAL
Status: DISCONTINUED | OUTPATIENT
Start: 2017-10-10 | End: 2017-10-12 | Stop reason: HOSPADM

## 2017-10-10 RX ORDER — CLONIDINE HYDROCHLORIDE 0.1 MG/1
0.1 TABLET ORAL AT BEDTIME
Status: DISCONTINUED | OUTPATIENT
Start: 2017-10-10 | End: 2017-10-12 | Stop reason: HOSPADM

## 2017-10-10 RX ORDER — POTASSIUM CHLORIDE 29.8 MG/ML
20 INJECTION INTRAVENOUS
Status: DISCONTINUED | OUTPATIENT
Start: 2017-10-10 | End: 2017-10-12 | Stop reason: HOSPADM

## 2017-10-10 RX ORDER — CEFAZOLIN SODIUM 2 G/100ML
2 INJECTION, SOLUTION INTRAVENOUS EVERY 8 HOURS
Status: DISCONTINUED | OUTPATIENT
Start: 2017-10-10 | End: 2017-10-12 | Stop reason: HOSPADM

## 2017-10-10 RX ORDER — ONDANSETRON 4 MG/1
4 TABLET, ORALLY DISINTEGRATING ORAL EVERY 6 HOURS PRN
Status: DISCONTINUED | OUTPATIENT
Start: 2017-10-10 | End: 2017-10-12 | Stop reason: HOSPADM

## 2017-10-10 RX ORDER — PREGABALIN 75 MG/1
150 CAPSULE ORAL 2 TIMES DAILY
Status: DISCONTINUED | OUTPATIENT
Start: 2017-10-11 | End: 2017-10-12 | Stop reason: HOSPADM

## 2017-10-10 RX ORDER — ACETAMINOPHEN 325 MG/1
650 TABLET ORAL EVERY 6 HOURS
Status: DISCONTINUED | OUTPATIENT
Start: 2017-10-10 | End: 2017-10-11

## 2017-10-10 RX ORDER — CLONAZEPAM 0.5 MG/1
0.5 TABLET ORAL 2 TIMES DAILY
Status: DISCONTINUED | OUTPATIENT
Start: 2017-10-10 | End: 2017-10-12 | Stop reason: HOSPADM

## 2017-10-10 RX ORDER — PREGABALIN 75 MG/1
150 CAPSULE ORAL 2 TIMES DAILY
Status: DISCONTINUED | OUTPATIENT
Start: 2017-10-10 | End: 2017-10-10

## 2017-10-10 RX ORDER — NALOXONE HYDROCHLORIDE 0.4 MG/ML
.1-.4 INJECTION, SOLUTION INTRAMUSCULAR; INTRAVENOUS; SUBCUTANEOUS
Status: DISCONTINUED | OUTPATIENT
Start: 2017-10-10 | End: 2017-10-12 | Stop reason: HOSPADM

## 2017-10-10 RX ORDER — SODIUM CHLORIDE 9 MG/ML
INJECTION, SOLUTION INTRAVENOUS CONTINUOUS
Status: DISCONTINUED | OUTPATIENT
Start: 2017-10-10 | End: 2017-10-12

## 2017-10-10 RX ORDER — POTASSIUM CHLORIDE 7.45 MG/ML
10 INJECTION INTRAVENOUS
Status: DISCONTINUED | OUTPATIENT
Start: 2017-10-10 | End: 2017-10-12 | Stop reason: HOSPADM

## 2017-10-10 RX ADMIN — SODIUM CHLORIDE: 9 INJECTION, SOLUTION INTRAVENOUS at 20:04

## 2017-10-10 RX ADMIN — ABACAVIR SULFATE, DOLUTEGRAVIR SODIUM, LAMIVUDINE 1 TABLET: 600; 50; 300 TABLET, FILM COATED ORAL at 09:58

## 2017-10-10 RX ADMIN — SODIUM CHLORIDE 1000 ML: 9 INJECTION, SOLUTION INTRAVENOUS at 02:19

## 2017-10-10 RX ADMIN — HYDROXYZINE HYDROCHLORIDE 25 MG: 25 TABLET ORAL at 02:18

## 2017-10-10 RX ADMIN — QUETIAPINE FUMARATE 50 MG: 50 TABLET, FILM COATED ORAL at 20:04

## 2017-10-10 RX ADMIN — CEFAZOLIN SODIUM 2 G: 2 INJECTION, SOLUTION INTRAVENOUS at 10:00

## 2017-10-10 RX ADMIN — CLONAZEPAM 0.5 MG: 0.5 TABLET ORAL at 20:04

## 2017-10-10 RX ADMIN — SODIUM CHLORIDE: 9 INJECTION, SOLUTION INTRAVENOUS at 10:00

## 2017-10-10 RX ADMIN — CEFAZOLIN SODIUM 2 G: 2 INJECTION, SOLUTION INTRAVENOUS at 18:26

## 2017-10-10 RX ADMIN — CEFAZOLIN 1 G: 330 INJECTION, POWDER, FOR SOLUTION INTRAMUSCULAR; INTRAVENOUS at 00:08

## 2017-10-10 RX ADMIN — CLONAZEPAM 0.5 MG: 0.5 TABLET ORAL at 09:59

## 2017-10-10 RX ADMIN — ENOXAPARIN SODIUM 40 MG: 40 INJECTION SUBCUTANEOUS at 07:18

## 2017-10-10 RX ADMIN — ACETAMINOPHEN 650 MG: 325 TABLET, FILM COATED ORAL at 23:51

## 2017-10-10 RX ADMIN — Medication 0.1 MG: at 20:04

## 2017-10-10 RX ADMIN — BUPRENORPHINE HYDROCHLORIDE, NALOXONE HYDROCHLORIDE 1 FILM: 8; 2 FILM, SOLUBLE BUCCAL; SUBLINGUAL at 09:59

## 2017-10-10 RX ADMIN — ACETAMINOPHEN 650 MG: 325 TABLET, FILM COATED ORAL at 09:59

## 2017-10-10 RX ADMIN — LEVOMILNACIPRAN HYDROCHLORIDE 80 MG: 80 CAPSULE, EXTENDED RELEASE ORAL at 09:59

## 2017-10-10 ASSESSMENT — ACTIVITIES OF DAILY LIVING (ADL)
COGNITION: 0 - NO COGNITION ISSUES REPORTED
ADLS_ACUITY_SCORE: 10
FALL_HISTORY_WITHIN_LAST_SIX_MONTHS: NO
BATHING: 0-->INDEPENDENT
TOILETING: 0-->INDEPENDENT
ADLS_ACUITY_SCORE: 10
RETIRED_EATING: 0-->INDEPENDENT
AMBULATION: 0-->INDEPENDENT
TRANSFERRING: 0-->INDEPENDENT
SWALLOWING: 0-->SWALLOWS FOODS/LIQUIDS WITHOUT DIFFICULTY
DRESS: 0-->INDEPENDENT
ADLS_ACUITY_SCORE: 10
RETIRED_COMMUNICATION: 0-->UNDERSTANDS/COMMUNICATES WITHOUT DIFFICULTY

## 2017-10-10 NOTE — PROGRESS NOTES
"Emergency Social Work Services Note    Date of  Intervention: 10/09/17  Last Emergency Department Visit:  10/7/17  Care Plan:  None  Collaborated with:  ED MD, Nursing Facility and chart review    Data:  Bc is a 37 year old male with a history of PMH HIV, IVDU, anxiety/depression who presents with a vascular access problem. He is unsure of what caused this. Two days ago he came in after his PICC line got caught in a tree. SW consulted to ask NH what their impression is.     Intervention:  JAREN spoke to nurse Daniels at [facility] [phone] and advised ED MD of his impression.     Assessment:  Nurse jeremiah advised he does not typically work with Bc but works with him occassionally and was working with him today. He states he typically acts \"normal;\" \"you wouldn't be able to pick him out from others.\" Today he wasn't making sense of things and endorsed hallucinations about a dog. He was scratching himself and under the covers of his bed. He admitted to smoking marijuana with the other residents. Jeremiah states at the [facility] is allowed to come and go as he pleases, and typically leaves to smoke outside with the residents. He advised he is at the home enough to meet the therapy requirements they have. Jeremiah advised they have no concerns taking him back.     Plan:    Anticipated Disposition:  Facility:  [facility] the [facility] [phone]    Barriers to d/c plan:  Pending medical and potentially psych clearance.    Follow Up:  TBD    ED MD notified of information from [place] campus.    Rebecca MARLEY, [facility]  ED   ED Pager: [phone]  On-call/After hours Pager: [phone]    "

## 2017-10-10 NOTE — PLAN OF CARE
"Pt. Arrived to the unit at 0420 via ED transport from [facility]. VSS. Denies SOB, CP, N/V/D. BM 10/9 per pt. Passing gas. Pt. Presents with moderate tremors and restlessness. Flight of ideas; hallucinations and delusions present. Pt. Has garbled illogical speech. Pt. Appears paranoid and states quote \"I need my phone there is some weird stuff going on around here.\" Pt. Arrived to the unit with a switch blade that was confiscated and given to hospital security. Pt. Belongings were removed from the room and placed in 72 hr hold storage area for the unit. Pt. Cell phone remains in his possession at his bedside. Pt. Oriented to the room. Bedside attendant present at the bedside 1:1. Scabs and sores are on pt. Upper back, chest, shoulders, and left face. Pt. Stated he had frost bite last winter. Denies pain. PIV infusing  mL/hr. Tolerating reg diet. Up with standby assist pt is unsteady on his feet with generalized weakness. Continue to monitor.   "

## 2017-10-10 NOTE — PROGRESS NOTES
"[facility] [place]    Internal Medicine Progress Note - Gold Service      Assessment & Plan   Bc German is a 37 year old year old with PMH HIV, IVDU, anxiety/depression, recent diagnosis of osteomyelitis with epidural abscess s/p spinal surgery ,now on IV ABx who  presents with weakness, ataxia, hallucinations and on going drug use at TCU.     Altered metal status with hallucination, hx of polysubstance dependence:  Etiology of AMS likely 2/2 substance abuse. Pt admits to recently smoking marijuana at his TCU but denies \"harder\" drug use. Denies injecting any drugs thru his PICC line. UDS ordered and pending. Doubt related to PTA use of Lyrica. Pt admits to past CD treatment and past meth, alcohol and opioid dependence, and he remains on Suboxone started approximately 5 weeks ago. Followed by Dr. Barrientos for outpatient Suboxone maintenance. Discharged from this past September hospitalization on Klonopin 0.5 mg BID as recommended by psychiatry. Denies head trauma and fever. Denies pain and other acute physical concerns at this time. Denies SI and HI. Denies currently having hallucinations. Pt otherwise currently poor historian at this time as he is making bizarre statements and hard to follow. No obvious medical cause for pt's decompensated mental state. Of note, brought to ED by his Dad out of concern for ongoing drug use despite his recent surgeries and was therefore put on 72 hold in ED.   - Psychiatry consulted and appreciate recommendations.   - F/u results of UDS   - Add-on to blood already drawn a comprehensive drug screen.   - Continue 72 hold and bedside sitter.   - Continue Suboxone and clonazepam for now until seen by psychiatry.      Cervical MSSA osteomyelitis and bacteremia: S/p C6 and C7 corpectomy, C5-T1 anterior instrumented fusion, 9/10, by neurosurgery during recent hospitalization (9/9-9/21) 2/2 cervical myelopathy and osteomyelitis w/ epidural abscess and " compressive phlegmon. Tissue cultures and BCs grew MSSA and started on IV cefazolin q8hrs to continue until 10/24 via PICC line, which was removed in ED. BCs x 2 obtained and NGTD.   - Continue cefazolin 2 g q8hrs   - F/u results of BCs     HIV+, HCV+:  Acquired from past IVDU. Followed by ID at [facility], Dr. Cisneros regarding his HIV and most recent viral count very high within the context of homeless and medication non-adherence. Most recent CD4 count, however, 782. Also with untreated Hep C and LFTs on admission mildly elevated but appear to be at BL of ~100-200: , .  - Continue Trimeq one tab daily.   - FU with Dr Presley after discharge       Depression, anxiety, and insomina:  PTA on Fetzima, and Klonopin, and melatonin for treatment, respectively.   - Continue PTA regimen until seen by psychiatry.      Full code  Regular diet  Lovenox      Disposition Plan   Expected discharge: possibly until IV abx complete (10/24) since pt likely using PICC line as access for illicit drug use, recommended to [facility] treatment facility once antibiotic plan established.     Entered: Judah Zapien Tarun 10/10/2017, 11:41 AM   Information in the above section will display in the discharge planner report.      The patient's care was discussed with the Attending Physician, Dr. Delgadillo and Bedside Nurse.    Noah Mcneil PA-C  Internal Medicine Hospitalist   [facility]  [phone]     Interval History   Pt is unreliable historian at this time as he appears to be under the influence of a particular substance however, pt denies recent use other than marijuana at his TCU.       Data reviewed today: I reviewed all medications, new labs and imaging results over the last 24 hours.    Physical Exam   Vital Signs: Temp: 97.1  F (36.2  C) Temp src: Axillary BP: 109/82 Pulse: 111 Heart Rate: 86 Resp: 16 SpO2: 99 % O2 Device: None (Room air)    Weight: 135 lbs 4.8 oz  GEN: In NAD  HEENT: NCAT; PERRL; sclerae  non-icteric  LUNGS: CTAB  CV: RRR  ABD: +BSs; SNTND  EXT: No BLE edema  SKIN: No acute rashes noted on exposed areas. Many scabbed over lesions on face without signs of infection.  NEURO: AAOx3; CNs grossly intact; No acute focal deficits noted.          Data   CMP  Recent Labs  Lab 10/10/17  0654 10/09/17  2015   NA  --  138   POTASSIUM  --  3.7   CHLORIDE  --  104   CO2  --  29   ANIONGAP  --  5   GLC  --  110*   BUN  --  19   CR 0.78 0.93   GFRESTIMATED >90 >90   GFRESTBLACK >90 >90   SANDRA  --  9.0   PROTTOTAL  --  8.5   ALBUMIN  --  3.7   BILITOTAL  --  0.6   ALKPHOS  --  132   AST  --  192*   ALT  --  147*     CBC  Recent Labs  Lab 10/10/17  0654 10/09/17  2149 10/09/17  2015   WBC  --  9.0 KUMAR UER 2115 BY [employer]   RBC  --  4.35* KUMAR UER 2115 BY [employer]   HGB  --  11.8* KUMAR UER 2115 BY [employer]   HCT  --  36.3* KUMAR UER 2115 BY [employer]   MCV  --  83 KUMAR UER 2115 BY [employer]   MCH  --  27.1 KUMAR UER 2115 BY [employer]   MCHC  --  32.5 KUMAR UER 2115 BY [employer]   RDW  --  15.2* KUMAR UER 2115 BY [employer]    208 KUMAR UER 2115 BY [employer]     INR  Recent Labs  Lab 10/09/17  2015   INR 1.01

## 2017-10-10 NOTE — PLAN OF CARE
Problem: Opioid Dependence/Withdrawal (Adult)  Goal: Identify Related Risk Factors and Signs and Symptoms  Related risk factors and signs and symptoms are identified upon initiation of Human Response Clinical Practice Guideline (CPG).   Pt woke, more alert. Ate 100% of meal. No hallucinations but pt is paranoid. See epic for urine tox screen. Voiding OK. No signs of withdrawal.

## 2017-10-10 NOTE — H&P
History and Physical     Bc German MRN# [id number]   YOB: 1980 Age: 37 year old      Date of Admission:  10/9/2017    Primary care provider: Damir Cisneros          Assessment and Plan:    Bc German is a 37 year old year old with PMH HIV, IVDU, anxiety/depression, recent diagnosis of osteomyelitis with epidural abscess s/p spinal surgery ,now on IV ABx who  presents with weakness, ataxia, hallucinations and on going drug use at TCU.   Altered metal status with hallucination:    Etiology is not clear . This could be due to his ongoing drug use as he is still actively smoking and using meth or could be due to sie effect of Lyrica.  He was discharge home to TCU with 300 mg BID dose, but his home medications also show additional 150 mg BID. So not sure if he is taking 450 mg BID at this time.  -Lyrica has side effect of blurred vision and impaired coordination.  -will hold lyrica for now , resume at 150 mg BID from tomorrow   -consult psychiatry to evaluate him.  - consider CT hear if his mental status does not improve     On going drug abuse at TCU:    urine drug screen ordered    patient admits using meth and smoking marijuana , denies injection any drugs through PICC line.  I do not think he is  Safe to return to the same TCU now  He was PUT on hold as his father was also concern for his drug use at TCU despite having recent surgery and osteomyelitis.  Consult psych/ SW in morning        bacteremia and Cervical osteomyelitis with Epidural abscess with compressive phlegmon and cervical myelopathy, s/p C6 and C7 corpectomy, C5-T1 anterior instrumented fusion   He was found to have epidural abscess with compression and cervical myelopathy. He had c6 and c7 corpectomy , c5-t1 anterior instrumentation. This was done on 9/10.    - followed by neurosurgery during previous hospitalization   -plan to follow up with neurosurgery clinic in 2 weeks with Denise Dial   And follow up in 6 weeks  with Dr Marv Ambrose in Neurosurgery clinic with MRI with and without contrast of the cervical spine    - Grew staph aureus and staph vestibularis and vertebral tissue positive for staph aureus MSSA  Continue with IV cefazolin for every 8 hrs to continue till 10/24          HIV  ([id number] (7/24/17) and CD4 782 (7/24/17)       - on Triumeq-continue the same    - HIV viral load undetectable during this admit, CD4  784 in July  - FU with Dr Presley after discharge        History of osteomyelitis L3 and psoas abscess in the past       Hepatitis C       History of polysubstance abuse  - heroin, marijuana, alcohol, tobacco   - suboxone treament has been started 5 weeks back , resumed the same dose for now   -suboxone 8-2mg per day ;  [facility]   -chronic pain management with tylenol pritesh, ,hold Lyrica and Lidocain patches in view of altered metal status   Clonazepam BID started for anxiety   - check EKG for QTc    Depression/anxiety   On Klonopin    insomnia - on melatonin   Discontinued  trazodone during last hospitalization      Full code  Regular diet    Claude Quintana MD  Hospitalist                 Chief Complaint:   Hallucinations, change in metal status ,PICC line malfunction          History of Present Illness:   Bc German is a 37 year old male with a history of HIV positive, IVDU, anxiety, depression, s/p C6 and C7 corpectomy, C5-T1 anterior instrumented fusion (09/10/17) who presents  to ED for evaluation of a vascular access problem.patient had a PICC line placed in the LUE on 09/22/17 for an MSSA infection w/IV cefazolin infusions scheduled through 10/24/17. Patient was seen and evaluated in the Emergency Department two days ago for PICC line dysfunction as well, at which time he reported getting his PICC line caught on a tree causing it to dislodge. His PICC line was replaced at that time.patient returns to ED with his father with request that his PICC line placement be checked as he was  "instructed to present here by his TCU.   patient complains he has felt \"sluggish\" since his surgery one month ago and is concerned that he has developed some sort of neurological condition because he has had slower speech and incoordination. He also complains of blurred vision.patient complains of increased anxiety over the past 2-3 weeks with increasing sensation that a dog may be attacking him. He describes today he was \"begging\" his roommate to help him while he was completely buried under the covers of his bed because he felt the \"paws of a dog getting ready to attack\". He attributes his fear of dogs to a childhood trauma during which he was bit and dragged by a neighbor's large black lab. He says that he can feel the dog's breath near his face though he was under blankets.  He admits smoking marijuana daily and  using \"a couple\" hits of methamphetamine yesterday.   Says people at his TCU smokes marijuana near [facility]. Says it is very odd,thoug he liked it.  He was started on suboxone five weeks ago.    ED  who spoke with the patient's TCU staff, at baseline the patient is alert and oriented \"you couldn't pick him from a line up if you tried\". TCU staff report they have never seen the patient act the way he was today, report he has never endorsed hallucinations to them in the past.   He denies any fever or chills .                    Past Medical History:     Past Medical History:   Diagnosis Date     Anxiety      Depressive disorder      Drug abuse, IV      Group A streptococcal infection 11/2014    Bacteremia/cellulitis     HCV antibody positive      HIV (human immunodeficiency virus infection) (H)      HIV (human immunodeficiency virus infection) (H)      HIV (human immunodeficiency virus infection) (H)      IVDU (intravenous drug user)      Osteomyelitis of vertebra of lumbosacral region (H) 3/2011    L3, left psoas abscess             Past Surgical History:     Past Surgical History: "   Procedure Laterality Date     EYE SURGERY  1 year ago    pins to left eye after socket fracture     OPTICAL TRACKING SYSTEM FUSION CERVICAL ANTERIOR ONE LEVEL Right 9/10/2017    Procedure: OPTICAL TRACKING SYSTEM FUSION CERVICAL ANTERIOR ONE LEVEL;  Stealth C6-C7 Anterior Cervical Corpectomy and C5-T1 Fusion;  Surgeon: Marv Ambrose MD;  Location:  OR     ORTHOPEDIC SURGERY      Arm Surgery     PICC INSERTION Left 09/21/2017    5fr DL BioFlo PICC, 42cm (1cm external) in the L basilic vein w/ tip in the low SVC.     TRANSESOPHAGEAL ECHOCARDIOGRAM INTRAOPERATIVE N/A 3/17/2015    Procedure: TRANSESOPHAGEAL ECHOCARDIOGRAM INTRAOPERATIVE;  Surgeon: Generic Anesthesia Provider;  Location: [facility]             Social History:     Social History   Substance Use Topics     Smoking status: Current Every Day Smoker     Types: Cigarettes     Smokeless tobacco: Never Used     Alcohol use No             Family History:   No family history on file.          Immunizations:     Immunization History   Administered Date(s) Administered     Influenza Vaccine IM 3yrs+ 4 Valent IIV4 09/21/2017            Allergies:     Allergies   Allergen Reactions     Doxycycline GI Disturbance             Medications:     Prescriptions Prior to Admission   Medication Sig Dispense Refill Last Dose     cloNIDine (CATAPRES) 0.1 MG tablet Take 1 tablet (0.1 mg) by mouth At Bedtime 10 tablet 1      traZODone (DESYREL) 50 MG tablet Take 1 tablet (50 mg) by mouth At Bedtime 10 tablet 0      TRIUMEQ 600- MG per tablet TAKE ONE TABLET BY MOUTH EVERY DAY 30 tablet 3      melatonin 1 MG TABS tablet Take 1 tablet (1 mg) by mouth nightly as needed for sleep 30 tablet 0      acetaminophen (TYLENOL) 325 MG tablet Take 2 tablets (650 mg) by mouth every 6 hours 100 tablet 0      pregabalin (LYRICA) 150 MG capsule Take 2 capsules (300 mg) by mouth 2 times daily 6 capsule 0      ceFAZolin sodium-dextrose (ANCEF) 2-4 GM/100ML-% SOLN infusion Inject 100  mLs (2 g) into the vein every 8 hours 9000 mL 0      lidocaine (LIDODERM) 5 % Patch Place 1 patch onto the skin every 24 hours 30 patch 0      clonazePAM (KLONOPIN) 0.5 MG tablet Take 1 tablet (0.5 mg) by mouth 2 times daily 30 tablet 0      pregabalin (LYRICA) 150 MG capsule Take 1 capsule (150 mg) by mouth 2 times daily 90 capsule 0      buprenorphine HCl-naloxone HCl (SUBOXONE) 8-2 MG per film Place 1 Film under the tongue daily 4 each 0      QUEtiapine Fumarate (SEROQUEL PO)    Past Week at Unknown time     naloxone (NARCAN) nasal spray Spray 1 spray (4 mg) into one nostril alternating nostrils as needed for opioid reversal (every 2-3 minutes until assistance arrives.) 0.2 mL 11 Unknown at Unknown time     ibuprofen (ADVIL/MOTRIN) 600 MG tablet Take 1 tablet (600 mg) by mouth every 6 hours as needed for moderate pain 30 tablet 0 More than a month at Unknown time     levomilnacipran (FETZIMA) 80 MG 24 hr capsule Take 1 capsule (80 mg) by mouth daily 3 capsule 0 9/9/2017 at Unknown time             Review of Systems:   The 10 point Review of Systems is negative other than noted in the HPI           Physical Exam:   BP (!) 138/93  Pulse 111  Temp 98.9  F (37.2  C) (Oral)  Resp 16  Wt 61.4 kg (135 lb 4.8 oz)  SpO2 99%  BMI 19.41 kg/m2  Gen- resting , not in distress   HEENT- NAD, NATALIA, reports blurred vision   Neck- supple, no JVD elevation, no thyromegaly  CVS- I+II+ no m/r/g  CHEST: Appears symmetrical  RES- CTABL, No wheeze, No rhonchi  GI- Abdomen soft, no tenderness . Non distended, BS+ no organomegaly   Ext- no edema, peripheral pulse intact,  CNS- did not test gait, slowed speech,  Oriented to placer person and time ,  Moving all extremities   SKIN: scratch marks and 2x2 cm superficial wounds noted over rt.shoulder posteriorly and over spine ( upper thoracic)              Data:     BMP  Recent Labs  Lab 10/09/17  2015      POTASSIUM 3.7   CHLORIDE 104   SANDRA 9.0   CO2 29   BUN 19   CR 0.93   GLC  110*     CBC  Recent Labs  Lab 10/09/17  2149 10/09/17  2015   WBC 9.0 KUMAR UER 2115 BY [facility]   RBC 4.35* KUMAR UER 2115 BY K   HGB 11.8* KUMAR UER 2115 BY K   HCT 36.3* KUMAR UER 2115 BY [facility]   MCV 83 KUMAR UER 2115 BY K   MCH 27.1 KUMAR UER 2115 BY K   MCHC 32.5 KUMAR UER 2115 BY K   RDW 15.2* KUMAR UER 2115 BY [facility]    KUMAR UER 2115 BY K     INR  Recent Labs  Lab 10/09/17  2015   INR 1.01     LFTs  Recent Labs  Lab 10/09/17  2015   ALKPHOS 132   *   *   BILITOTAL 0.6   PROTTOTAL 8.5   ALBUMIN 3.7      PANCNo lab results found in last 7 days.  CRP  Recent Labs  Lab 10/09/17  2015   CRP 44.0*       Results for orders placed or performed during the hospital encounter of 10/09/17   XR Chest 1 View    Narrative    Exam:  XR CHEST 1 VW, 10/9/2017 8:13 PM    History: PICC placement    Comparison:  Chest radiograph from 10/7/2017.    Findings: PA view of the chest. Left upper extremity PICC tip projects  over the high SVC. The cardiomediastinal silhouette is within normal  limits. There is no pleural effusion or pneumothorax. Incidental right  azygos fissure. No focal pulmonary opacities. The visualized upper  abdomen is unremarkable. Anterior cervical plate and screw fusion  hardware.      Impression    Impression:    1. Left upper extremity PICC tip projects over the high SVC.  2. No acute cardiopulmonary abnormality.    I have personally reviewed the examination and initial interpretation  and I agree with the findings.    MD Claude BETTS MD  Hospitalist - Internal Medicine
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
## 2017-10-10 NOTE — ED NOTES
Pt is placed on 72 hr hold by the MD. Rights read to the pt and pt vocalized understanding. A copy of 72hr hold and rights given to the pt.

## 2017-10-10 NOTE — PROVIDER NOTIFICATION
Text message sent to PEDRITO Lux culture  from  PICC on the 1st day of incubation:   Gram positive rods
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
## 2017-10-10 NOTE — ED PROVIDER NOTES
"  History     Chief Complaint   Patient presents with     Vascular Access Problem     The history is provided by the patient and a parent (father).     Bc German is a 37 year old male with a history of HIV positive, IVDU, anxiety, depression, s/p C6 and C7 corpectomy, C5-T1 anterior instrumented fusion (09/10/17) who presents for evaluation of a vascular access problem. Per chart review, patient had a PICC line placed in the LUE on 09/22/17 for an MSSA infection w/IV cefazolin infusions scheduled through 10/24/17. Of note, patient was seen and evaluated here in the Emergency Department two days ago for PICC line dysfunction as well, at which time he reported getting his PICC line caught on a tree causing it to dislodge. His PICC line was replaced at that time. Additionally, per chart review, patient has admitted previously to injecting illicit substances into prior PICC lines in 2011, but has denied abusing his current PICC line.     Tonight, patient returns with request that his PICC line placement be checked as he was instructed to present here by his TCU. Patient presents with his father who reports the patient's TCU is concerned that the patient's PICC line is not functioning properly as they have only been able to access one of the two lines. Here, patient's PICC line seems to be showing approximately 2 cm. Patient states he did not snag his PICC line on anything and is unsure how his PICC line became dislodged today.      Additionally, patient complains he has felt \"sluggish\" since his surgery one month ago and is concerned that he has developed some sort of neurological condition because he has had slower speech and incoordination. He also complains of blurred vision, reports \"I know my nails are dirty, but I can see them\" while attempting to file his nails down on exam. He denies fever or chills.      Further, patient complains of increased anxiety over the past 2-3 weeks with increasing sensation that " "a dog may be attacking him. He describes today he was \"begging\" his roommate to help him while he was completely buried under the covers of his bed because he felt the \"pause of a dog getting ready to attack\". He attributes his fear of dogs to a childhood trauma during which he was bit and dragged by a neighbor's large black lab. He denies other auditory hallucinations such as human voices talking to him, or visual hallucinations. He denies suicidal or homicidal ideations. He has tried detraction techniques he was taught while at [employer] previously, but reports these have not been working for him over the past 2-3 weeks for his anxiety. He does smoke marijuana daily. He also drinks alcohol when he \"has money to drink\". Additionally, patient reports history of body dysmorphia and states it is often difficult for him to leave his house because \"I don't want people to see me\". He is prescribed Fetzima for depression, Klonopin for anxiety, and did start suboxone five weeks ago. Patient states he is compliant with all of these medications as well as his HIV medications. He does admit to using \"a couple\" hits of methamphetamine yesterday.     Per ED  who spoke with the patient's TCU staff, at baseline the patient is alert and oriented \"you couldn't pick him from a line up if you tried\". TCU staff report they have never seen the patient act they way he was today, report he has never endorsed hallucinations to them in the past. They reportedly allow the pt to go outside to smoke with the other residents, and pt can check out. They report that they have been ensuring that he has been present for his scheduled treatments.     No other symptoms or complaints at this time. Please see ROS for further details.    I have reviewed the Medications, Allergies, Past Medical and Surgical History, and Social History in the [facility] system.  Past Medical History:   Diagnosis Date     Anxiety      Depressive disorder      Drug " abuse, IV      Group A streptococcal infection 11/2014    Bacteremia/cellulitis     HCV antibody positive      HIV (human immunodeficiency virus infection) (H)      HIV (human immunodeficiency virus infection) (H)      HIV (human immunodeficiency virus infection) (H)      IVDU (intravenous drug user)      Osteomyelitis of vertebra of lumbosacral region (H) 3/2011    L3, left psoas abscess       Past Surgical History:   Procedure Laterality Date     EYE SURGERY  1 year ago    pins to left eye after socket fracture     OPTICAL TRACKING SYSTEM FUSION CERVICAL ANTERIOR ONE LEVEL Right 9/10/2017    Procedure: OPTICAL TRACKING SYSTEM FUSION CERVICAL ANTERIOR ONE LEVEL;  Stealth C6-C7 Anterior Cervical Corpectomy and C5-T1 Fusion;  Surgeon: Marv Ambrose MD;  Location: [facility] OR     ORTHOPEDIC SURGERY      Arm Surgery     PICC INSERTION Left 09/21/2017    5fr DL BioFlo PICC, 42cm (1cm external) in the L basilic vein w/ tip in the low SVC.     TRANSESOPHAGEAL ECHOCARDIOGRAM INTRAOPERATIVE N/A 3/17/2015    Procedure: TRANSESOPHAGEAL ECHOCARDIOGRAM INTRAOPERATIVE;  Surgeon: Generic Anesthesia Provider;  Location:  OR       No family history on file.    Social History   Substance Use Topics     Smoking status: Current Every Day Smoker     Types: Cigarettes     Smokeless tobacco: Never Used     Alcohol use No       Current Facility-Administered Medications   Medication     0.9% sodium chloride BOLUS    Followed by     0.9% sodium chloride infusion     hydrOXYzine (ATARAX) tablet 25 mg     ceFAZolin (ANCEF) 1 g vial to attach to  ml bag for ADULT or 50 ml bag for PEDS     Current Outpatient Prescriptions   Medication     cloNIDine (CATAPRES) 0.1 MG tablet     traZODone (DESYREL) 50 MG tablet     TRIUMEQ 600- MG per tablet     melatonin 1 MG TABS tablet     acetaminophen (TYLENOL) 325 MG tablet     pregabalin (LYRICA) 150 MG capsule     ceFAZolin sodium-dextrose (ANCEF) 2-4 GM/100ML-% SOLN infusion      "lidocaine (LIDODERM) 5 % Patch     clonazePAM (KLONOPIN) 0.5 MG tablet     pregabalin (LYRICA) 150 MG capsule     buprenorphine HCl-naloxone HCl (SUBOXONE) 8-2 MG per film     QUEtiapine Fumarate (SEROQUEL PO)     naloxone (NARCAN) nasal spray     ibuprofen (ADVIL/MOTRIN) 600 MG tablet     levomilnacipran (FETZIMA) 80 MG 24 hr capsule        Allergies   Allergen Reactions     Doxycycline GI Disturbance       Review of Systems   Constitutional: Positive for fatigue. Negative for chills, diaphoresis and fever.   HENT: Negative for ear pain, sore throat, tinnitus, trouble swallowing and voice change.    Eyes: Positive for visual disturbance (blurred). Negative for pain.   Respiratory: Negative for cough and shortness of breath.    Cardiovascular: Negative for chest pain, palpitations and leg swelling.   Gastrointestinal: Negative for abdominal pain, blood in stool, constipation, diarrhea, nausea and vomiting.   Endocrine: Negative for polydipsia and polyuria.   Genitourinary: Negative for dysuria, frequency, hematuria and urgency.   Musculoskeletal: Negative for back pain and neck pain.   Skin: Negative for color change and rash.        PICC line dysfunction   Allergic/Immunologic: Negative for immunocompromised state.   Neurological: Positive for speech difficulty (\"slowed\"). Negative for dizziness, weakness, light-headedness, numbness and headaches.   Hematological: Negative for adenopathy. Does not bruise/bleed easily.   Psychiatric/Behavioral: Positive for hallucinations (auditory & tactile ). Negative for dysphoric mood. The patient is nervous/anxious.        Physical Exam   BP: (!) 142/92  Pulse: 111  Temp: 96.7  F (35.9  C)  Resp: 16  SpO2: 99 %     Physical Exam  CONSTITUTIONAL: Well-developed and well-nourished. Awake and alert. Non-toxic appearance. No acute distress.   HENT:   - Head: Normocephalic and atraumatic.   - Ears: Hearing and external ear grossly normal.   - Nose: Nose normal. No rhinorrhea. No " epistaxis.   - Mouth/Throat: Oropharynx is clear and MMM  EYES: Conjunctivae and lids are normal. No scleral icterus.   NECK: Normal range of motion and phonation normal. Neck supple.  No tracheal deviation, no stridor. No edema or erythema noted.  CARDIOVASCULAR: Normal rate, regular rhythm and no appreciable abnormal heart sounds.  PULMONARY/CHEST: Effort normal. No accessory muscle usage or stridor. No respiratory distress.  No appreciable abnormal breath sounds.  ABDOMEN: Soft, non-distended. No tenderness. No rigidity, rebound or guarding.   MUSCULOSKELETAL: Extremities warm and seemingly well perfused. No edema or calf tenderness.  NEUROLOGIC: Awake, alert. Affect is off, appears almost slightly delirousHe displays no atrophy and no tremor.  Normal tone. No seizure activity. Coordination normal. GCS 15  SKIN: Skin is warm and dry. Multiple areas of dried skin lesions.   PSYCHIATRIC: Denies SI/HI. Does have different affect, seems somewhat fearful or anxious, intermittently almost agitated, but calms. Does not appear to be responding to any internal stimuli currently    ED Course     ED Course     Procedures       7:33 PM  The patient was seen and examined by Dr. Murcia in Room [facility].        Labs Ordered and Resulted from Time of ED Arrival Up to the Time of Departure from the ED   COMPREHENSIVE METABOLIC PANEL - Abnormal; Notable for the following:        Result Value    Glucose 110 (*)      (*)      (*)     All other components within normal limits   CRP INFLAMMATION - Abnormal; Notable for the following:     CRP Inflammation 44.0 (*)     All other components within normal limits   CBC WITH PLATELETS DIFFERENTIAL - Abnormal; Notable for the following:     RBC Count 4.35 (*)     Hemoglobin 11.8 (*)     Hematocrit 36.3 (*)     RDW 15.2 (*)     All other components within normal limits   ISTAT  GASES LACTATE NITIN POCT - Abnormal; Notable for the following:     Bicarbonate Venous 29 (*)     Lactic  Acid 0.6 (*)     All other components within normal limits   CBC WITH PLATELETS DIFFERENTIAL   INR   AMMONIA   TSH WITH FREE T4 REFLEX   ERYTHROCYTE SEDIMENTATION RATE AUTO   TROPONIN I   ERYTHROCYTE SEDIMENTATION RATE AUTO   ROUTINE UA WITH MICROSCOPIC REFLEX TO CULTURE   DRUG ABUSE SCREEN 6 CHEM DEP URINE ([facility])   ISTAT CG4 GASES LACTATE NITIN NURSING POCT   BLOOD CULTURE   BLOOD CULTURE            Assessments & Plan (with Medical Decision Making)   IMPRESSION: 37-year-old male, with PMH notable for HIV, IV drug use, relatively recent cervical osteomyelitis and epidural abscess who is S/P operative intervention, previous lumbosacral osteomyelitis with psoas abscess, Hx of medical noncompliance, depression, and anxiety, et al. per patient he has been having at least monthly auditory hallucinations, but has reportedly never voiced this to the TCU per their report.  - Admitted 9/9 - 9/21: Cervical osteomyelitis without epidural abscess with compression phlegmon and cervical myelopathy protheses S/P C6 and C7 corpectomy, C5-T1 anterior instrumentation fusion, & MSSA bacteremia; started on suboxone and discharged with plan for IV Cefazolin via PICC until 10/24  - Seen in ED 10/7: Reportedly accidentally had his PICC line withdrawn, was replaced. Pt started on clonidine and trazodone for anxiety and sleep disturbance  - Pt does admit to daily marijuana use (including today), denies recent Etoh or other drugs. Per EMR has hx of heroin, marijuana, EtOH and tobacco    Patient presents today to confirm appropriate positioning of his LUE indwelling PICC line, which was reportedly accidently pulled out, at least a couple of centimeters, which is consistent w/ current exam and no other acute findings in that regard.     Perhaps of more concern is the pt's presenting appearance/behavior/mental status, and the pt himself voices concern for feeling more tired, and not himself, also voicing some severe paranoia and significant  "fearful auditory and tactile hallucinations, though thankfully is not having any SI or HI as described further above in HPI/ROS.  - With patient's history of daily marijuana use, perhaps I could describe some of his symptoms but not all.  Furthermore, when we discuss his mental status and these hallucinations with his usual TCU staff there report that this is new for them and that normally you \"wouldn't be able to pick him out of a line up\" with regards to any abnormal behavior or mental status and so this would be atypical for him. He appears as though someone who has used meth, which could support some of the skin findings/scabs. He is awake and alert, there does appear somewhat delirious or anxious.  He moves his head and neck freely without any meningeal findings.    PLAN: Labs, CXR to evaluate PICC position, JOAQUIN et al.    RESULTS:  - Labs: Blood cultures pending  --- See ED Course section above for particular pertinent findings and comments  - Urine: No sample yet provided  - Imaging: Images and written preliminary reports reviewed by myself and revealed:  --- CXR: HIgh SVC.     INTERVENTIONS:   - PICC Removed for infectious/contamination concerns, PIV placed  - IV cephazolin  - PO hydroxyzine  - 72 Hr hold. Pt now admitting to meth use as well.     RE-EVALUATION:  See ED Course section above for particular pertinent findings and comments  - The patient's symptoms were  Grossly unchanged from previous  - Pt now admits to also using methamphetamines  - Patient observed for multiple hours with multiple repeat exams and remains stable.    DISCUSSIONS:  - w/ IM: They will admit for further evaluation/management  - w/ Patient: I have reviewed the available findings, plan with the patient. He expressed understanding and agreement with this plan. All questions answered to the best of our ability at this time.   --- Pts father updated with his permission, as pt reports, \"He's my best friend.\" "     DISPOSITION/PLANNING:  - FINAL IMPRESSION: Ongoing drug use in setting of recent significant infections/bacteremia requiring ongoing IV Abx and spinal surgeries that were 2/2 to drug use.   - DISPOSITION: Admit to [facility] for further evaluation/management  --- Needs ongoing IV Abx for 2 more weeks as per previous hospital admission.     ______________________________________________________________________________    - I have reviewed the available nursing notes.      New Prescriptions    No medications on file       Final diagnoses:   None   Keyanna ECHOLS, am serving as a trained medical scribe to document services personally performed by Criselda Murcia MD, based on the provider's statements to me.   Criselda ECHOLS MD, was physically present and have reviewed and verified the accuracy of this note documented by Keyanna Marrero.      10/9/2017   [facility], [place], EMERGENCY DEPARTMENT     Criselda Murcia MD  10/11/17 0633

## 2017-10-10 NOTE — PROGRESS NOTES
Pt arrived via stretcher and 2 paramedics from [facility]. Pt was able to slide self over into bed.  Pt has a peripheral IV 20 g in right forearm. Vitals are stable. Tele monitor placed. Pt has scabbed over sores on chest, upper back, shoulders, and mostly left side of face. Pt is on a 72 hour hold and a 1:1 sitter was placed. Explained call light and use of bed and TV. Awaiting orders from the house physician, Dr. Quintana. Cont to assess.

## 2017-10-10 NOTE — CONSULTS
Social Work: Assessment with Discharge Plan    Patient Name:  Bc German  :  1980  Age:  37 year old  MRN:  [id number]  Risk/Complexity Score:  Filed Complexity Screen Score: 9  Completed assessment with:  Pt, 10A IDT, Xuan Cárdenas [place] Liaison [phone]    Presenting Information   Reason for Referral:  Discharge plan  Date of Intake:  October 10, 2017  Referral Source:  Physician  Decision Maker:  pt  Alternate Decision Maker:  father    Living Situation:  Long Term Care:  [facility] [phone]  Previous Functional Status:  Pt was in TCU at [facility] for medical monitoring  Patient and family understanding of hospitalization:  Seeking treatment for AMS and issues with PICC line  Cultural/Language/Spiritual Considerations:  , IVDU, mental health issues  Adjustment to Illness:   Accepting, wanting to return to [facility] when medically stable.    Physical Health  Reason for Admission:    1. Needs peripherally inserted central catheter (PICC)    2. Hallucinations      Services Needed/Recommended:  TCU    Mental Health/Chemical Dependency  Diagnosis:  Anxiety, Depression, IVDU, Insomnia  Support/Services in Place:  Medication management  Services Needed/Recommended:  Further support with behavioral health    Support System  Significant relationship at present time:   [facility]  Family of origin is available for support:  yes  Other support available:  Community support through [facility]  Gaps in support system:  None noted  Patient is caregiver to:  None     Provider Information   Primary Care Physician:  Damir Cisneros   [phone]   Clinic:  [address] [place] [address] 250 / [facility] [address]      :      Financial   Income Source:  [place]  Financial Concerns:  None noted  Insurance:    Payor/Plan Subscriber Name Rel Member # Group #   [employer] [facility]* BC GERMAN  [id number]      [address]       Discharge Plan   Patient and family  "discharge goal:  Return to [facility]  Provided education on discharge plan:  YES  Patient agreeable to discharge plan:  YES  A list of Medicare Certified Facilities was provided to the patient and/or family to encourage patient choice. Patient's choices for facility are:  Pt has bed hold at [facility]  Will NH provide Skilled rehabilitation or complex medical:  YES  General information regarding anticipated insurance coverage and possible out of pocket cost was discussed. Patient and patient's family are aware patient may incur the cost of transportation to the facility, pending insurance payment: YES  Barriers to discharge:  Medical stability, 72 hour hold    Discharge Recommendations   Anticipated Disposition:  return to [facility] when medically stable  Transportation Needs:  Other:  to be determined  Name of Transportation Company and Phone:  To be determined    Additional comments   Writer introduced role/reason for visit. Pt states he likes staying at [facility], said 6 A SW had told  Him she did not know what it is like but that it ended up being a good place. Pt provided verbal consent for writer to speak w/his father.  Pt is hoping to return to [facility] upon DC from hospital. Writer spoke w/Abdelrahman Cárdenas [facility] Liaison, who confirmed that pt has Bed hold at facility and that facility is willing to accept pt upon DC from hospital. Writer will con't to provide updates to Xuan on further DC planning. Writer spoke w/father Abiodun. Abiodun states preference for pt to remain hospitalized and plan b for to return to [facility]. Abiodun states pt and family have had some adjustment issues. Abiodun prefers pt to have a safe environment and manage medications. Abiodun believes pt is receptive to strategy of \"taking the lead\" and being able to make his own decisions.     Writer and Abiodun discussed difficulty with finding alternate placement as Abiodun is requesting (but only if pt agrees). JAREN " offered support, reassurance and validation. SW con't to follow

## 2017-10-11 LAB
ACETAMINOPHEN QUAL: NEGATIVE
ALBUMIN SERPL-MCNC: 2.7 G/DL (ref 3.4–5)
ALP SERPL-CCNC: 107 U/L (ref 40–150)
ALT SERPL W P-5'-P-CCNC: 138 U/L (ref 0–70)
AMOBARBITAL QUAL: NEGATIVE
ANION GAP SERPL CALCULATED.3IONS-SCNC: 3 MMOL/L (ref 3–14)
AST SERPL W P-5'-P-CCNC: 170 U/L (ref 0–45)
BARBITAL QUAL: NEGATIVE
BASOPHILS # BLD AUTO: 0 10E9/L (ref 0–0.2)
BASOPHILS NFR BLD AUTO: 0.5 %
BILIRUB SERPL-MCNC: 0.3 MG/DL (ref 0.2–1.3)
BUN SERPL-MCNC: 9 MG/DL (ref 7–30)
BUTABARBITAL QUAL: NEGATIVE
BUTALBITAL QUAL: NEGATIVE
CAFFEINE QUAL: NEGATIVE
CALCIUM SERPL-MCNC: 8.1 MG/DL (ref 8.5–10.1)
CARBAMAZEPINE QUAL: NEGATIVE
CARISOPRODOL QUAL: NEGATIVE
CHLORIDE SERPL-SCNC: 114 MMOL/L (ref 94–109)
CHLORPROPAMIDE UR-MCNC: NEGATIVE UG/ML
CO2 SERPL-SCNC: 26 MMOL/L (ref 20–32)
CREAT SERPL-MCNC: 0.75 MG/DL (ref 0.66–1.25)
DIFFERENTIAL METHOD BLD: ABNORMAL
DRUGS SERPL SCN: NEGATIVE
EOSINOPHIL # BLD AUTO: 0.2 10E9/L (ref 0–0.7)
EOSINOPHIL NFR BLD AUTO: 5.9 %
ERYTHROCYTE [DISTWIDTH] IN BLOOD BY AUTOMATED COUNT: 14.9 % (ref 10–15)
ETHCLORVYNOL QUAL: NEGATIVE
ETHINAMATE QUAL: NEGATIVE
ETHOSUXIMIDE QUAL: NEGATIVE
ETHOTOIN QUAL: NEGATIVE
GFR SERPL CREATININE-BSD FRML MDRD: >90 ML/MIN/1.7M2
GLUCOSE SERPL-MCNC: 88 MG/DL (ref 70–99)
GLUTETHIMIDE QUAL: NEGATIVE
HCT VFR BLD AUTO: 36.1 % (ref 40–53)
HGB BLD-MCNC: 11.3 G/DL (ref 13.3–17.7)
IBUPROFEN QUAL: NEGATIVE
IMM GRANULOCYTES # BLD: 0.1 10E9/L (ref 0–0.4)
IMM GRANULOCYTES NFR BLD: 1.3 %
LYMPHOCYTES # BLD AUTO: 2 10E9/L (ref 0.8–5.3)
LYMPHOCYTES NFR BLD AUTO: 53.1 %
MCH RBC QN AUTO: 26.8 PG (ref 26.5–33)
MCHC RBC AUTO-ENTMCNC: 31.3 G/DL (ref 31.5–36.5)
MCV RBC AUTO: 86 FL (ref 78–100)
MEPHENYTOIN QUAL: NEGATIVE
MEPHOBARBITAL QUAL: NEGATIVE
MEPROBAMATE QUAL: NEGATIVE
METHAQUALONE QUAL: NEGATIVE
METHARBITAL QUAL: NEGATIVE
METHSUXIMIDE QUAL: NEGATIVE
METHYPRYLON QUAL: NEGATIVE
MONOCYTES # BLD AUTO: 0.6 10E9/L (ref 0–1.3)
MONOCYTES NFR BLD AUTO: 15 %
NEUTROPHILS # BLD AUTO: 0.9 10E9/L (ref 1.6–8.3)
NEUTROPHILS NFR BLD AUTO: 24.2 %
NRBC # BLD AUTO: 0 10*3/UL
NRBC BLD AUTO-RTO: 0 /100
PENTOBARBITAL QUAL: NEGATIVE
PHENACETIN QUAL: NEGATIVE
PHENOBARBITAL QUAL: NEGATIVE
PHENSUXIMIDE QUAL: NEGATIVE
PHENYTOIN QUAL: NEGATIVE
PLATELET # BLD AUTO: 171 10E9/L (ref 150–450)
POTASSIUM SERPL-SCNC: 4.1 MMOL/L (ref 3.4–5.3)
PRIMIDONE QUAL: NEGATIVE
PROT SERPL-MCNC: 6.8 G/DL (ref 6.8–8.8)
RBC # BLD AUTO: 4.21 10E12/L (ref 4.4–5.9)
SALICYLATE QUAL: NEGATIVE
SECOBARBITAL QUAL: NEGATIVE
SODIUM SERPL-SCNC: 143 MMOL/L (ref 133–144)
TALBUTAL QUAL: NEGATIVE
THEOPHYLLINE QUAL: NEGATIVE
THIOPENTAL QUAL: NEGATIVE
TYBAMATE QUAL: NEGATIVE
VALPROIC ACID QUAL: NEGATIVE
WBC # BLD AUTO: 3.7 10E9/L (ref 4–11)

## 2017-10-11 PROCEDURE — 80053 COMPREHEN METABOLIC PANEL: CPT | Performed by: INTERNAL MEDICINE

## 2017-10-11 PROCEDURE — 99232 SBSQ HOSP IP/OBS MODERATE 35: CPT | Performed by: PHYSICIAN ASSISTANT

## 2017-10-11 PROCEDURE — 25000128 H RX IP 250 OP 636: Performed by: INTERNAL MEDICINE

## 2017-10-11 PROCEDURE — 85025 COMPLETE CBC W/AUTO DIFF WBC: CPT | Performed by: INTERNAL MEDICINE

## 2017-10-11 PROCEDURE — 25000132 ZZH RX MED GY IP 250 OP 250 PS 637: Performed by: INTERNAL MEDICINE

## 2017-10-11 PROCEDURE — 25000132 ZZH RX MED GY IP 250 OP 250 PS 637: Performed by: PHYSICIAN ASSISTANT

## 2017-10-11 PROCEDURE — 36416 COLLJ CAPILLARY BLOOD SPEC: CPT | Performed by: INTERNAL MEDICINE

## 2017-10-11 PROCEDURE — 12000001 ZZH R&B MED SURG/OB UMMC

## 2017-10-11 RX ADMIN — ACETAMINOPHEN 650 MG: 325 TABLET, FILM COATED ORAL at 09:50

## 2017-10-11 RX ADMIN — ENOXAPARIN SODIUM 40 MG: 40 INJECTION SUBCUTANEOUS at 09:47

## 2017-10-11 RX ADMIN — CEFAZOLIN SODIUM 2 G: 2 INJECTION, SOLUTION INTRAVENOUS at 17:38

## 2017-10-11 RX ADMIN — CLONAZEPAM 0.5 MG: 0.5 TABLET ORAL at 20:51

## 2017-10-11 RX ADMIN — CEFAZOLIN SODIUM 2 G: 2 INJECTION, SOLUTION INTRAVENOUS at 02:22

## 2017-10-11 RX ADMIN — SODIUM CHLORIDE: 9 INJECTION, SOLUTION INTRAVENOUS at 04:23

## 2017-10-11 RX ADMIN — BUPRENORPHINE HYDROCHLORIDE, NALOXONE HYDROCHLORIDE 1 FILM: 8; 2 FILM, SOLUBLE BUCCAL; SUBLINGUAL at 09:48

## 2017-10-11 RX ADMIN — QUETIAPINE FUMARATE 50 MG: 50 TABLET, FILM COATED ORAL at 20:50

## 2017-10-11 RX ADMIN — LEVOMILNACIPRAN HYDROCHLORIDE 80 MG: 80 CAPSULE, EXTENDED RELEASE ORAL at 09:49

## 2017-10-11 RX ADMIN — ABACAVIR SULFATE, DOLUTEGRAVIR SODIUM, LAMIVUDINE 1 TABLET: 600; 50; 300 TABLET, FILM COATED ORAL at 09:48

## 2017-10-11 RX ADMIN — CLONAZEPAM 0.5 MG: 0.5 TABLET ORAL at 09:49

## 2017-10-11 RX ADMIN — CEFAZOLIN SODIUM 2 G: 2 INJECTION, SOLUTION INTRAVENOUS at 10:21

## 2017-10-11 RX ADMIN — PREGABALIN 150 MG: 75 CAPSULE ORAL at 20:50

## 2017-10-11 RX ADMIN — PREGABALIN 150 MG: 75 CAPSULE ORAL at 09:49

## 2017-10-11 RX ADMIN — QUETIAPINE FUMARATE 50 MG: 50 TABLET, FILM COATED ORAL at 09:50

## 2017-10-11 ASSESSMENT — ACTIVITIES OF DAILY LIVING (ADL)
ADLS_ACUITY_SCORE: 10

## 2017-10-11 NOTE — PLAN OF CARE
Problem: Opioid Dependence/Withdrawal (Adult)  Goal: Identify Related Risk Factors and Signs and Symptoms  Related risk factors and signs and symptoms are identified upon initiation of Human Response Clinical Practice Guideline (CPG).   Outcome: Improving  VSS.  72 hour hold discontinued.  Patient IV is saline locked.  Sitter was discontinued.

## 2017-10-11 NOTE — PROGRESS NOTES
"[facility], [place]    Internal Medicine Progress Note      Assessment & Plan   Bc German is a 37 year old year old with PMH HIV, IVDU, anxiety/depression, recent diagnosis of osteomyelitis with epidural abscess s/p spinal surgery ,now on IV ABx who  presents with weakness, ataxia, hallucinations and on going drug use at TCU.     AMS, polysubstance dependence, depression:  Etiology of AMS likely 2/2 substance abuse. Resolved into today. Denies head trauma and fever. Denies pain and other acute physical concerns at this time. Denies SI and HI. Denies currently having hallucinations that he had on admission. No obvious medical cause for pt's decompensated mental state. Pt admits to recently smoking marijuana at his TCU but denies \"harder\" drug use. Denies injecting any drugs thru his PICC line. UDS ordered and positive for methamphetamine and TCH. Psychiatry saw pt yesterday and admitted to recent meth use. Continues to deny SI or HI. Pt admits to past CD treatment and past meth, alcohol and opioid dependence, and he remains on Suboxone started approximately 5 weeks ago. Followed by Dr. Barrientos for outpatient Suboxone maintenance. Discharged from this past September hospitalization on Klonopin 0.5 mg BID as recommended by psychiatry. Of note, brought to ED by his Dad out of concern for ongoing drug use despite his recent surgeries and placed on 72 hr hold, however, pt now agreeable to stay voluntarily.   - Psychiatry consulted and appreciate recommendations.   - Start Seroquel 50 mg BID.   - Continue Fetzima 80 mg daily.   - Continue Suboxone and clonazepam for now until seen by Dr. Barrientos after discharge for possible taper.      Cervical MSSA osteomyelitis and bacteremia: S/p C6 and C7 corpectomy, C5-T1 anterior instrumented fusion, 9/10, by neurosurgery during recent hospitalization (9/9-9/21) 2/2 cervical myelopathy and osteomyelitis w/ epidural abscess and compressive phlegmon. " Tissue cultures and BCs grew MSSA and started on IV cefazolin q8hrs to continue until 10/24 via PICC line. Last night 1 of 2 BC + for bacillus species not cereus or anthracis. The + BC was obtained from most recent PICC, which was removed in ED prior to transfer. Repeat BCs NGTD. Pt remains afebrile with no leukocytosis. Likely contaminant.   - Had curbside chat with Dr. Dowell with ID today and appreciated advice. +1 or 2 BCs from 10/9 likely contaminant and ok to replace PICC tomorrow if repeat peripheral BCs 10/10 continue with NG.  - Continue cefazolin 2 g IV q8hrs via PIV in interim. Last dose 10/24.     HIV+, HCV+:  Acquired from past IVDU. Followed by ID at [facility], Dr. Cisneros regarding his HIV and most recent viral count very high within the context of homeless and medication non-adherence. Most recent CD4 count, however, 782. Also with untreated Hep C and LFTs on admission mildly elevated but appear to be at BL of ~100-200: , .  - Continue Trimeq one tab daily.   - FU with Dr Presley after discharge       Full code  Regular diet  Lovenox      Disposition Plan   Expected discharge: possibly until IV abx complete (10/24) since pt likely using PICC line as access for illicit drug use, recommended to vs CD treatment facility once antibiotic plan established.     Entered: Judah Mcneil 10/11/2017, 4:21 PM   Information in the above section will display in the discharge planner report.      The patient's care was discussed with the Attending Physician, Dr. Delgadillo and Bedside Nurse.    Noah Mcneil PA-C  Internal Medicine Hospitalist   [facility]  [phone]     Interval History   No acute events overnight. Denies acute physical concerns at this time.       Data reviewed today: I reviewed all medications, new labs and imaging results over the last 24 hours.    Physical Exam   Vital Signs: Temp: 97.8  F (36.6  C) Temp src: Oral BP: 92/53 Pulse: 82 Heart Rate: 93 Resp: 18  SpO2: 98 % O2 Device: None (Room air)    Weight: 135 lbs 4.8 oz  GEN: In NAD  HEENT: NCAT; PERRL; sclerae non-icteric  LUNGS: CTAB  CV: RRR  ABD: +BSs; SNTND  EXT: No BLE edema  SKIN: No acute rashes noted on exposed areas. Many scabbed over lesions on face without signs of infection.  NEURO: AAOx3; CNs grossly intact; No acute focal deficits noted.          Data   CMP    Recent Labs  Lab 10/11/17  0745 10/10/17  0654 10/09/17  2015     --  138   POTASSIUM 4.1  --  3.7   CHLORIDE 114*  --  104   CO2 26  --  29   ANIONGAP 3  --  5   GLC 88  --  110*   BUN 9  --  19   CR 0.75 0.78 0.93   GFRESTIMATED >90 >90 >90   GFRESTBLACK >90 >90 >90   SANDRA 8.1*  --  9.0   PROTTOTAL 6.8  --  8.5   ALBUMIN 2.7*  --  3.7   BILITOTAL 0.3  --  0.6   ALKPHOS 107  --  132   *  --  192*   *  --  147*     CBC    Recent Labs  Lab 10/11/17  0745 10/10/17  0654 10/09/17  2149 10/09/17  2015   WBC 3.7*  --  9.0 [facility] BY K   RBC 4.21*  --  4.35* [facility] BY K   HGB 11.3*  --  11.8* [facility] BY K   HCT 36.1*  --  36.3* [facility] BY K   MCV 86  --  83 [facility] BY [facility]   MCH 26.8  --  27.1 [facility] BY [facility]   MCHC 31.3*  --  32.5 [facility] BY [facility]   RDW 14.9  --  15.2* [facility] BY K    204 208 [facility] 2[facility] BY K     INR    Recent Labs  Lab 10/09/17  2015   INR 1.01       Lab Results   Component Value Date    CRP 44.0 10/09/2017    CRP 47.0 09/10/2017

## 2017-10-11 NOTE — PLAN OF CARE
Problem: Opioid Dependence/Withdrawal (Adult)  Goal: Identify Related Risk Factors and Signs and Symptoms  Related risk factors and signs and symptoms are identified upon initiation of Human Response Clinical Practice Guideline (CPG).   Outcome: No Change  VSS and afebrile. Alert and oriented x4. Calm and cooperated.  No hallucination.  Denies nausea, SOB or CP.   on legal 72hr hold.  Bedside attendant monitoring pt in room.  Denies any pain.  Up independently uses bathroom.    Voiding spontaneously.  Abdomen soft and non-tender.  Last BM yesterday.  Good appetite with regular diet.    BC x2 done. Positive BC from PICC ( gram positive rods,still in process of culture- final result is pending)  IV hydration on ,ongoing IV abxs q8hrs.  Will continue to monior

## 2017-10-11 NOTE — PROGRESS NOTES
Social Work Services Progress Note    Hospital Day: 3    Collaborated with:  VIRGIL Duron, JOSE Zamora, Xuan NICHOLSON, [place] Liaison    Data:  DC plan    Intervention:  Writer attempted to meet w/pt and discuss if he would like referrals to other facilities. Pt was sleeping and did not waken to writer' voice or knock.  Writer did speak w/Veronica Castle Liaison. [facility] facility is the only option for pt within [facility] as pt has been there and other facilities will decline him.     Assessment:  pt has safe plan for DC and [facility]. has support and involvement of his family    Plan:    Anticipated Disposition:  Facility:  [facility] when medically stable.    Barriers to d/c plan:  + blood cultures    Follow Up:  JAREN con't to follow

## 2017-10-12 VITALS
HEART RATE: 82 BPM | RESPIRATION RATE: 16 BRPM | WEIGHT: 135.3 LBS | OXYGEN SATURATION: 97 % | TEMPERATURE: 96.9 F | DIASTOLIC BLOOD PRESSURE: 55 MMHG | BODY MASS INDEX: 19.41 KG/M2 | SYSTOLIC BLOOD PRESSURE: 113 MMHG

## 2017-10-12 LAB
BACTERIA SPEC CULT: ABNORMAL
SPECIMEN SOURCE: ABNORMAL

## 2017-10-12 PROCEDURE — 36569 INSJ PICC 5 YR+ W/O IMAGING: CPT

## 2017-10-12 PROCEDURE — 25000125 ZZHC RX 250: Performed by: PHYSICIAN ASSISTANT

## 2017-10-12 PROCEDURE — 25000128 H RX IP 250 OP 636: Performed by: INTERNAL MEDICINE

## 2017-10-12 PROCEDURE — 99239 HOSP IP/OBS DSCHRG MGMT >30: CPT | Performed by: PHYSICIAN ASSISTANT

## 2017-10-12 PROCEDURE — 25000132 ZZH RX MED GY IP 250 OP 250 PS 637: Performed by: PHYSICIAN ASSISTANT

## 2017-10-12 PROCEDURE — 25000132 ZZH RX MED GY IP 250 OP 250 PS 637: Performed by: INTERNAL MEDICINE

## 2017-10-12 RX ORDER — LIDOCAINE 40 MG/G
CREAM TOPICAL
Status: DISCONTINUED | OUTPATIENT
Start: 2017-10-12 | End: 2017-10-12 | Stop reason: ALTCHOICE

## 2017-10-12 RX ORDER — SODIUM CHLORIDE 9 MG/ML
INJECTION, SOLUTION INTRAVENOUS
Status: DISCONTINUED
Start: 2017-10-12 | End: 2017-10-12 | Stop reason: HOSPADM

## 2017-10-12 RX ORDER — HEPARIN SODIUM,PORCINE 10 UNIT/ML
2-5 VIAL (ML) INTRAVENOUS
Status: DISCONTINUED | OUTPATIENT
Start: 2017-10-12 | End: 2017-10-12 | Stop reason: ALTCHOICE

## 2017-10-12 RX ORDER — QUETIAPINE FUMARATE 50 MG/1
50 TABLET, FILM COATED ORAL 2 TIMES DAILY
Qty: 120 TABLET | DISCHARGE
Start: 2017-10-12 | End: 2018-05-04

## 2017-10-12 RX ADMIN — BUPRENORPHINE HYDROCHLORIDE, NALOXONE HYDROCHLORIDE 1 FILM: 8; 2 FILM, SOLUBLE BUCCAL; SUBLINGUAL at 09:05

## 2017-10-12 RX ADMIN — CLONAZEPAM 0.5 MG: 0.5 TABLET ORAL at 09:06

## 2017-10-12 RX ADMIN — CEFAZOLIN SODIUM 2 G: 2 INJECTION, SOLUTION INTRAVENOUS at 02:32

## 2017-10-12 RX ADMIN — QUETIAPINE FUMARATE 50 MG: 50 TABLET, FILM COATED ORAL at 09:05

## 2017-10-12 RX ADMIN — LEVOMILNACIPRAN HYDROCHLORIDE 80 MG: 80 CAPSULE, EXTENDED RELEASE ORAL at 09:06

## 2017-10-12 RX ADMIN — PREGABALIN 150 MG: 75 CAPSULE ORAL at 09:06

## 2017-10-12 RX ADMIN — ENOXAPARIN SODIUM 40 MG: 40 INJECTION SUBCUTANEOUS at 09:06

## 2017-10-12 RX ADMIN — Medication 1 ML: at 10:30

## 2017-10-12 RX ADMIN — ABACAVIR SULFATE, DOLUTEGRAVIR SODIUM, LAMIVUDINE 1 TABLET: 600; 50; 300 TABLET, FILM COATED ORAL at 09:05

## 2017-10-12 RX ADMIN — CEFAZOLIN SODIUM 2 G: 2 INJECTION, SOLUTION INTRAVENOUS at 11:35

## 2017-10-12 ASSESSMENT — ACTIVITIES OF DAILY LIVING (ADL)
ADLS_ACUITY_SCORE: 10

## 2017-10-12 NOTE — PROCEDURES
Reviewed picc procedure with patient, he recently had one placed.  #4 single lumen solo picc placed in left basilic vein with 1 attempt.  Per 3CG technology tip terminates at the svc/ra junction.  Report called to Neli SHEPARD that picc line is good to use.  Flushing orders released from [facility].

## 2017-10-12 NOTE — DISCHARGE SUMMARY
"[facility]    Internal Medicine Discharge Summary    Date of Admission:  10/9/2017  Date of Discharge:  10/12/2017  Discharging Provider: Noah Mcneil PA-C, Dr. Elie MD  Discharge Team: Hospitalist Service, [facility], [facility]    Discharge Diagnoses   Toxic encephalopathy due to methamphetamine, resolved.   Polysubstance dependence  Depression  Cervical MSSA osteomyelitis and bactermia  HIV+  HCV+    Follow-ups Needed After Discharge   Follow up with your family physician within 2 weeks for hospital follow up and to obtain repeat labs including CBC and CMP.     Hospital Course   Bc German is a 37 year old year old with PMH HIV, IVDU, anxiety/depression, recent diagnosis of osteomyelitis with epidural abscess s/p spinal surgery, now on IV ABx admitted to medicine for AMS noted while at his TCU.      Toxic encephalopathy due to methamphetamine, polysubstance dependence, depression:  Etiology of AMS likely 2/2 substance abuse. Resolved 10/11. Denied head trauma and fever. Denied pain and other acute physical concerns at this time. Denies currently having hallucinations that he had on admission. No obvious medical cause for pt's decompensated mental state. Pt admits to recently smoking marijuana at his TCU but denies \"harder\" drug use. Denies injecting any drugs thru his PICC line. UDS ordered and positive for methamphetamine and TCH. Psychiatry saw pt and he admitted to recent meth use. Continues to deny SI or HI. Pt admits to past CD treatment and past meth, alcohol and opioid dependence, and he remains on Suboxone started approximately 5 weeks ago. Followed by Dr. Barrientos for outpatient Suboxone maintenance. Discharged from this past September hospitalization on Klonopin 0.5 mg BID as recommended by psychiatry. Was on 72 hold on admission but this has been discontinued. Restarted on Seroquel 50 mg BID. Also continues on PTA Fetzima 80 mg daily.        Cervical MSSA " osteomyelitis and bacteremia: S/p C6 and C7 corpectomy, C5-T1 anterior instrumented fusion, 9/10, by neurosurgery during recent hospitalization (9/9-9/21) 2/2 cervical myelopathy and osteomyelitis w/ epidural abscess and compressive phlegmon. Tissue cultures and BCs grew MSSA and started on IV cefazolin q8hrs to continue until 10/24 via PICC line. 1 of 2 BC's from 10/9 + for bacillus species not cereus or anthracis. + BC was obtained from most recent PICC, which was removed in ED prior to transfer. Repeat BCs NGTD. Pt remains afebrile with no leukocytosis. Had curbside chat with ID (appreciated rec's) and likely contaminant. PICC line re-inserted this am.  - Continue cefazolin 2 g IV q8hrs via new PICC. Last dose 10/24.      HIV+, HCV+:  Acquired from past IVDU. [facility]. Cisneros regarding his HIV and most recent viral count very high within the context of homeless and medication non-adherence. Most recent CD4 count, however, 782. Also with untreated Hep C and LFTs on admission mildly elevated but appear to be at BL of ~100-200:  (147),  (192).  - Continue Trimeq one tab daily.   - FU with Dr Presley after discharge within two weeks for hospital follow up and to obtain repeat labs including CBC and CMP.       Consultations This Hospital Stay   VASCULAR ACCESS CARE ADULT IP CONSULT  SOCIAL WORK IP CONSULT  PSYCHIATRY IP CONSULT    Code Status   Full Code    Time Spent on this Encounter   I, Judah Mcneil, personally saw the patient today and spent greater than 30 minutes discharging this patient.     Noah Mcneil PA-C  Internal Medicine Hospitalist   [facility]  [phone]   ______________________________________________________________________    Physical Exam   Vital Signs: Temp: 96.9  F (36.1  C) Temp src: Oral BP: 113/55   Heart Rate: 80 Resp: 16 SpO2: 97 % O2 Device: None (Room air)    Weight: 135 lbs 4.8 oz  GEN: In NAD  HEENT: NCAT; PERRL; sclerae  non-icteric  LUNGS: CTAB  CV: RRR  ABD: +BSs; SNTND  EXT: No BLE edema; LUE PICC intact without signs of infection; Several facial scabbed over ulcerations without signs of infection.   SKIN: No acute rashes noted on other exposed areas.  NEURO: AAOx3; CNs grossly intact; No acute focal deficits noted.        Significant Results and Procedures   CMP  Recent Labs  Lab 10/11/17  0745 10/10/17  0654 10/09/17  2015     --  138   POTASSIUM 4.1  --  3.7   CHLORIDE 114*  --  104   CO2 26  --  29   ANIONGAP 3  --  5   GLC 88  --  110*   BUN 9  --  19   CR 0.75 0.78 0.93   GFRESTIMATED >90 >90 >90   GFRESTBLACK >90 >90 >90   SANDRA 8.1*  --  9.0   PROTTOTAL 6.8  --  8.5   ALBUMIN 2.7*  --  3.7   BILITOTAL 0.3  --  0.6   ALKPHOS 107  --  132   *  --  192*   *  --  147*     CBC  Recent Labs  Lab 10/11/17  0745 10/10/17  0654 10/09/17  2149 10/09/17  2015   WBC 3.7*  --  9.0 [facility] 2[facility] BY K   RBC 4.21*  --  4.35* [facility] BY [facility]   HGB 11.3*  --  11.8* [facility] BY [facility]   HCT 36.1*  --  36.3* [facility] BY [facility]   MCV 86  --  83 [facility] BY [facility]   MCH 26.8  --  27.1 [facility] BY [facility]   MCHC 31.3*  --  32.5 [facility] BY [facility]   RDW 14.9  --  15.2* [facility] BY [facility]    204 208 [facility] 2[facility] BY K     INR  Recent Labs  Lab 10/09/17  2015   INR 1.01       Pending Results   These results will be followed up by PCP  Unresulted Labs Ordered in the Past 30 Days of this Admission     Date and Time Order Name Status Description    10/10/2017 1759 Blood culture Preliminary     10/10/2017 1759 Blood culture Preliminary     10/9/2017 1949 Blood culture Preliminary     9/10/2017 2201 AFB Culture Non Blood Preliminary     9/10/2017 2110 AFB Culture Non Blood Preliminary              Primary Care Physician   Damir Cisneros    Discharge Disposition   Discharged back to [facility] at [facility]  Condition at discharge: Stable    Discharge Orders     Reason for  your hospital stay   Treatment Acute altered mental status that has since resolved. Also ongoing treatment of cervical osteomyelitis with IV abx's     Activity - Up ad gene     General info for SNF   Length of Stay Estimate: Short Term Care: Estimated # of Days <30  Condition at Discharge: Stable  Level of care:skilled   Rehabilitation Potential: Good  Admission H&P remains valid and up-to-date: Yes  Recent Chemotherapy: N/A  Use Nursing Home Standing Orders: Yes     Mantoux instructions   Give two-step Mantoux (PPD) Per Facility Policy Yes     Follow Up and recommended labs and tests   Follow up with primary care provider within 2 weeks.  The following labs/tests are recommended: CBC and CMP.     Advance Diet as Tolerated   Follow this diet upon discharge: Orders Placed This Encounter     Advance Diet as Tolerated: Regular Diet Adult       Discharge Medications   Discharge Medication List as of 10/12/2017 12:49 PM      CONTINUE these medications which have CHANGED    Details   QUEtiapine (SEROQUEL) 50 MG tablet Take 1 tablet (50 mg) by mouth 2 times daily, Disp-120 tablet, Transitional         CONTINUE these medications which have NOT CHANGED    Details   cloNIDine (CATAPRES) 0.1 MG tablet Take 1 tablet (0.1 mg) by mouth At Bedtime, Disp-10 tablet, R-1, Local Print      traZODone (DESYREL) 50 MG tablet Take 1 tablet (50 mg) by mouth At Bedtime, Disp-10 tablet, R-0, Local Print      TRIUMEQ 600- MG per tablet TAKE ONE TABLET BY MOUTH EVERY DAY, Disp-30 tablet, R-3, E-Prescribe      melatonin 1 MG TABS tablet Take 1 tablet (1 mg) by mouth nightly as needed for sleep, Disp-30 tablet, R-0, Transitional      acetaminophen (TYLENOL) 325 MG tablet Take 2 tablets (650 mg) by mouth every 6 hours, Disp-100 tablet, R-0, Transitional      ceFAZolin sodium-dextrose (ANCEF) 2-4 GM/100ML-% SOLN infusion Inject 100 mLs (2 g) into the vein every 8 hours, Disp-9000 mL, R-0, Transitional      clonazePAM (KLONOPIN) 0.5 MG tablet  Take 1 tablet (0.5 mg) by mouth 2 times daily, Disp-30 tablet, R-0, Local Print      pregabalin (LYRICA) 150 MG capsule Take 1 capsule (150 mg) by mouth 2 times daily, Disp-90 capsule, R-0, Local Print      buprenorphine HCl-naloxone HCl (SUBOXONE) 8-2 MG per film Place 1 Film under the tongue daily, Disp-4 each, R-0, Local Print      naloxone (NARCAN) nasal spray Spray 1 spray (4 mg) into one nostril alternating nostrils as needed for opioid reversal (every 2-3 minutes until assistance arrives.), Disp-0.2 mL, R-11, Local Print      ibuprofen (ADVIL/MOTRIN) 600 MG tablet Take 1 tablet (600 mg) by mouth every 6 hours as needed for moderate pain, Disp-30 tablet, R-0, Local Print      levomilnacipran (FETZIMA) 80 MG 24 hr capsule Take 1 capsule (80 mg) by mouth daily, 80 mg, Oral, DAILY Starting 5/6/2017, Until Discontinued, Disp-3 capsule, R-0, Local Print         STOP taking these medications       lidocaine (LIDODERM) 5 % Patch Comments:   Reason for Stopping:             Allergies   Allergies   Allergen Reactions     Doxycycline GI Disturbance

## 2017-10-12 NOTE — PLAN OF CARE
Problem: Opioid Dependence/Withdrawal (Adult)  Goal: Identify Related Risk Factors and Signs and Symptoms  Related risk factors and signs and symptoms are identified upon initiation of Human Response Clinical Practice Guideline (CPG).     Alert and oriented X4. No hallucinations and did not exhibit any paranoid thinking this shift. Up most of the night watching TV. Vitals are stable. Catapres help due to SBP less that 110. 72hr hold discontinued. Up independently in room. Lung sounds clear. Bowel sounds normoactive. Voiding spontaneously without difficulty- uses urinal at bedside. Tolerating regular diet. IV abx administered and PIV saline locked. Able to make needs known. Will continue to monitor.

## 2017-10-12 NOTE — PROGRESS NOTES
"Social Work Services Discharge Note      Patient Name:  Bc German     Anticipated Discharge Date:  10/12/17    Discharge Disposition:   TCU:  [facility] [phone] F: [phone]    Following MD:  NP: Ani Faye [phone]     Pre-Admission Screening (PAS) online form has been completed.  The Level of Care (LOC) is:  Determined  Confirmation Code is:  Not needed, pt is returning to facility  Patient/caregiver informed of referral to [facility] for Pre-Admission Screening for skilled nursing facility (SNF) placement and to expect a phone call post discharge from SNF.     Additional Services/Equipment Arranged:  [employer]     Patient / Family response to discharge plan:  agreeable. \"I like my roommate\"     Persons notified of above discharge plan:  Pt, JOSE Zamora, [facility] Liaison, 10A Charge RN, pt's bedside RN    Staff Discharge Instructions:  Please fax discharge orders and signed hard scripts for any controlled substances.  Please print a packet and send with patient.     CTS Handoff completed:  YES    Medicare Notice of Rights provided to the patient/family:  Pt does not have Medicare    "

## 2017-10-15 LAB
BACTERIA SPEC CULT: NO GROWTH
SPECIMEN SOURCE: NORMAL

## 2017-10-16 LAB
BACTERIA SPEC CULT: NO GROWTH
BACTERIA SPEC CULT: NO GROWTH
Lab: NORMAL
Lab: NORMAL
SPECIMEN SOURCE: NORMAL
SPECIMEN SOURCE: NORMAL

## 2017-10-19 ENCOUNTER — TRANSFERRED RECORDS (OUTPATIENT)
Dept: HEALTH INFORMATION MANAGEMENT | Facility: CLINIC | Age: 37
End: 2017-10-19

## 2017-10-24 ENCOUNTER — OFFICE VISIT (OUTPATIENT)
Dept: INFECTIOUS DISEASES | Facility: CLINIC | Age: 37
End: 2017-10-24
Attending: INTERNAL MEDICINE
Payer: COMMERCIAL

## 2017-10-24 ENCOUNTER — OFFICE VISIT (OUTPATIENT)
Dept: PHARMACY | Facility: CLINIC | Age: 37
End: 2017-10-24
Payer: COMMERCIAL

## 2017-10-24 VITALS
WEIGHT: 147.3 LBS | SYSTOLIC BLOOD PRESSURE: 139 MMHG | BODY MASS INDEX: 20.62 KG/M2 | HEART RATE: 92 BPM | DIASTOLIC BLOOD PRESSURE: 92 MMHG | HEIGHT: 71 IN

## 2017-10-24 DIAGNOSIS — Z21 ASYMPTOMATIC HUMAN IMMUNODEFICIENCY VIRUS (HIV) INFECTION STATUS (H): Primary | ICD-10-CM

## 2017-10-24 DIAGNOSIS — M86.00 ACUTE HEMATOGENOUS OSTEOMYELITIS, UNSPECIFIED SITE (H): ICD-10-CM

## 2017-10-24 DIAGNOSIS — F41.8 DEPRESSION WITH ANXIETY: ICD-10-CM

## 2017-10-24 DIAGNOSIS — F90.9 ATTENTION DEFICIT HYPERACTIVITY DISORDER (ADHD), UNSPECIFIED ADHD TYPE: ICD-10-CM

## 2017-10-24 DIAGNOSIS — F19.10 DRUG ABUSE (H): ICD-10-CM

## 2017-10-24 PROCEDURE — 99215 OFFICE O/P EST HI 40 MIN: CPT | Mod: ZF

## 2017-10-24 PROCEDURE — 99606 MTMS BY PHARM EST 15 MIN: CPT | Performed by: PHARMACIST

## 2017-10-24 PROCEDURE — 99607 MTMS BY PHARM ADDL 15 MIN: CPT | Performed by: PHARMACIST

## 2017-10-24 RX ORDER — SULFAMETHOXAZOLE/TRIMETHOPRIM 800-160 MG
2 TABLET ORAL 2 TIMES DAILY
Qty: 112 TABLET | Refills: 0 | Status: SHIPPED | OUTPATIENT
Start: 2017-10-24 | End: 2017-10-24

## 2017-10-24 RX ORDER — SULFAMETHOXAZOLE/TRIMETHOPRIM 800-160 MG
1 TABLET ORAL 2 TIMES DAILY
Qty: 60 TABLET | Refills: 0 | Status: SHIPPED | OUTPATIENT
Start: 2017-10-24 | End: 2018-05-04

## 2017-10-24 ASSESSMENT — PAIN SCALES - GENERAL: PAINLEVEL: NO PAIN (0)

## 2017-10-24 NOTE — PROGRESS NOTES
SUBJECTIVE/OBJECTIVE:                Bc German is a 37 year old male coming in for a follow-up visit for Medication Therapy Management.  He was referred to me from Dr. Cisneros. Bc is accompanied today by his father, [place].     Chief Complaint: Follow up from our visit on 08/02/17.  No complaints, except he is leaving the nursing home and is trying to find housing  Personal Healthcare Goals: stay healthy and slow down.   Tobacco: 0-1 pack per day - is interested in quittingTobacco Cessation Action Plan: after more stability will try and quit.   Alcohol: not currently using    Medication Adherence: no issues reported    HIV: On 07/24/17 CD4 was 782 (30%), and viral load was 117,179 copies/ml. Patient reports takes, Triumeq every day since July 28th, with no missed doses. Patient reports tolerates medication well.     Depression/Anxiety/ADHD:  Current medications include: Levomilnacipran ER 80 mg, once daily, clonazepam 0.5 mg, twice daily as needed. Reports he stopped taking Adderall XR 20 mg. Reports was started on Quetiapine 50 mg, twice daily and Trazodone 50 mg at bedtime and is not sure whether he likes the way it makes him feel, but he will continue on it and will follow up with psychiatry. Pt reports that depression symptoms are unchanged.  PHQ-9 SCORE 3/28/2017   Total Score 18     Drug abuse: Reports takes Suboxone 8 mg in the morning and 4 mg in the evening and feels this is helping. Reports he is only smoking pot, and is not using any other drugs.     Acute hematogenous osteomyelitis, unspecified site: Reports will no longer be taking IV antibiotics and will start taking Bactrim DS, twice daily for 3 weeks.       Current labs include:  BP Readings from Last 3 Encounters:   10/24/17 (!) 139/92   10/12/17 113/55   10/07/17 142/88       Liver Function Studies -   Recent Labs   Lab Test  10/11/17   0745   PROTTOTAL  6.8   ALBUMIN  2.7*   BILITOTAL  0.3   ALKPHOS  107   AST  170*   ALT  138*          Last Basic Metabolic Panel:  Lab Results   Component Value Date     10/11/2017      Lab Results   Component Value Date    POTASSIUM 4.1 10/11/2017     Lab Results   Component Value Date    CHLORIDE 114 10/11/2017     Lab Results   Component Value Date    BUN 9 10/11/2017     Lab Results   Component Value Date    CR 0.75 10/11/2017     GFR Estimate   Date Value Ref Range Status   10/11/2017 >90 >60 mL/min/1.7m2 Final     Comment:     Non  GFR Calc   10/10/2017 >90 >60 mL/min/1.7m2 Final     Comment:     Non  GFR Calc   10/09/2017 >90 >60 mL/min/1.7m2 Final     Comment:     Non  GFR Calc     GFR Estimate If Black   Date Value Ref Range Status   10/11/2017 >90 >60 mL/min/1.7m2 Final     Comment:      GFR Calc   10/10/2017 >90 >60 mL/min/1.7m2 Final     Comment:      GFR Calc   10/09/2017 >90 >60 mL/min/1.7m2 Final     Comment:      GFR Calc     TSH   Date Value Ref Range Status   10/09/2017 1.88 0.40 - 4.00 mU/L Final   ]    Most Recent Immunizations   Administered Date(s) Administered     Influenza Vaccine IM 3yrs+ 4 Valent IIV4 09/21/2017       ASSESSMENT:              Current medications were reviewed today as discussed above.        Medication Adherence: no issues identified    HIV: CD4 appears to be stable, and viral load is, detectable. Repeat B20 labs today. Continue current medication.     Depression/Anxiety/ADHD:  Unimproved. Pt following up with psychiatry and has recently started new medications. Continue current medications.     Drug abuse: Per patient account, this is stable. Continue current medication.     Acute hematogenous osteomyelitis, unspecified site: Pt will start Bactrim today, per Dr. Cobian. Reviewed dosing instructions/possible side effects.      PLAN:                  1) Continue mediations as prescribed.    2) B20 labs today.    3) Start Bactrim DS, one tablet twice daily, for a  total of 3 weeks.    I spent 30 minutes with this patient today. I offer these suggestions for consideration by the ID doctor. A copy of the visit note was provided to the patient's ID provider.     Will follow up in 3 months.    The patient was mailed a summary of these recommendations as an after visit summary.    Mackenzie Doherty, [place] Pharmacist.   [phone]

## 2017-10-24 NOTE — PROGRESS NOTES
[facility]  Infectious Disease Clinic Note     Date of Visit:  10/24/2017   Patient:  Bc German  Medical record number [id number]    History of Present Illness  Bc German is a 37 year old male who returns for follow-up.  Walt was admitted from 10/9-12 at the  for AMS thought to be 2/2 substance abuse that was noted at his TCU. He was at TCU for IV antibiotics for recent diagnosis of osteomyelitis with epidural abscess s/p spinal surgery. He says after his surgery, he's had to work hard to gain back his strength. He says the TCU has been a negative and stressful experience. He has been on antibiotics  cefazolin 2 g IV q8hrs via new PICC. Last dose scheduled for today, 10/24.    Reports muscle and joint aches at night. Fatigued. Night sweats (better than at the time of his admission). He says triumeq makes his skin dry, but he feels his klonopin distracts him from the itchyness and helps him tolerate the triumeq. Has not been using IV drugs, though he has been smoking marijuana.     He just got approved for housing. He's with dad today and they request to speak to .   Problem List  No diagnosis found.     Review of Systems  Twelve point review of systems is otherwise normal.    Current Medications  Current Outpatient Prescriptions   Medication     QUEtiapine (SEROQUEL) 50 MG tablet     traZODone (DESYREL) 50 MG tablet     TRIUMEQ 600- MG per tablet     clonazePAM (KLONOPIN) 0.5 MG tablet     pregabalin (LYRICA) 150 MG capsule     buprenorphine HCl-naloxone HCl (SUBOXONE) 8-2 MG per film     levomilnacipran (FETZIMA) 80 MG 24 hr capsule     acetaminophen (TYLENOL) 325 MG tablet     ibuprofen (ADVIL/MOTRIN) 600 MG tablet     No current facility-administered medications for this visit.        Family/Social History  History reviewed. No pertinent family history.    Physical Exam  HEENT: Normal  Neck: Supple  Lungs: CTA  CV: RRR, no gallops, murmurs appreciated,  no LE edema  Abdomen: Soft and nontender.  No masses noted.  Extremities: Normal  Skin: Normal  Neuro: Grossly normal       Recent Laboratory Values    Routine Labs  Hemoglobin   Date Value Ref Range Status   10/11/2017 11.3 (L) 13.3 - 17.7 g/dL Final     MCV   Date Value Ref Range Status   10/11/2017 86 78 - 100 fl Final     Platelet Count   Date Value Ref Range Status   10/11/2017 171 150 - 450 10e9/L Final     Creatinine   Date Value Ref Range Status   10/11/2017 0.75 0.66 - 1.25 mg/dL Final     AST   Date Value Ref Range Status   10/11/2017 170 (H) 0 - 45 U/L Final     ALT   Date Value Ref Range Status   10/11/2017 138 (H) 0 - 70 U/L Final     Bilirubin Total   Date Value Ref Range Status   10/11/2017 0.3 0.2 - 1.3 mg/dL Final       T Cell Subset:  CD3 Mature T   Date Value Ref Range Status   07/24/2017 71 49 - 84 % Final     CD4 [place] T   Date Value Ref Range Status   07/24/2017 30 28 - 63 % Final     CD8 Suppressor T   Date Value Ref Range Status   07/24/2017 37 10 - 40 % Final     CD4:CD8 Ratio   Date Value Ref Range Status   07/24/2017 0.81 (L) 1.40 - 2.60 Final     WBC   Date Value Ref Range Status   10/11/2017 3.7 (L) 4.0 - 11.0 10e9/L Final     % Lymphocytes   Date Value Ref Range Status   10/11/2017 53.1 % Final     Absolute CD3   Date Value Ref Range Status   07/24/2017 1831 603 - 2990 cells/uL Final     Absolute CD4   Date Value Ref Range Status   07/24/2017 782 441 - 2156 cells/uL Final     Absolute CD8   Date Value Ref Range Status   07/24/2017 962 125 - 1312 cells/uL Final       HIV-1 RNA Quantitative:  HIV-1 RNA Quant Result   Date Value Ref Range Status   09/11/2017 HIV-1 RNA Not Detected HIVND^HIV-1 RNA Not Detected [Copies]/mL Final     Comment:     The MARLEN AmpliPrep/MARLEN TaqMan HIV-1 test is an FDA-approved in vitro   nucleic acid amplification test for the quantitation of HIV-1 RNA in human   plasma (EDTA plasma) using the MARLEN AmpliPrep instrument for automated viral   nucleic acid  extraction and the Vendavo Analyzer or MARLEN TaqMan for   automated Real Time PCR amplification and detection of the viral nucleic acid   target.  Titer results are reported in copies/ml. This assay is intended for use in   conjunction with clinical presentation and other laboratory markers of disease   prognosis and for use as an aid in assessing viral response to antiretroviral   treatment as measured by changes in plasma HIV-1 RNA levels. This test should   not be used as a donor screening test to confirm the presence of HIV-1   infection.          Assessment and Plan  1. Cervical MSSA osteomyelitis and bacteremia: S/p C6 and C7 corpectomy, C5-T1 anterior instrumented fusion, 9/10, by neurosurgery during recent hospitalization (9/9-9/21) 2/2 cervical myelopathy and osteomyelitis w/ epidural abscess and compressive phlegmon. Tissue cultures and BCs grew MSSA and started on IV cefazolin q8hrs to continue until 10/24 via PICC line.Completing 6 weeks of IV antibiotics today. Given residual night sweats, and still elevated CRP, will plan for longer course of antibiotics with oral antibiotics.  - PICC line pulled today  - start Bactrim DS BID until seen in ~3 weeks. At that point will recheck CRP and reassess.    2.   Asymptomatic human immunodeficiency virus (HIV) infection status (H) - HIV with very high viral load now not detectable on therapy again.   We discussed the importance of adherence and the risks of not taking the mediciation every day as prescribed and he reassured that he was 100% compliant      Plan: Will see back in 3 weeks to followup osteomyelitis.    This patient was seen and staffed with Dr. Cisneros.     Lucero Lewis MD  Medicine/Dermatology, PGY-5  (p) [phone]

## 2017-10-24 NOTE — MR AVS SNAPSHOT
After Visit Summary   10/24/2017    Bc German    MRN: [id number]           Patient Information     Date Of Birth          1980        Visit Information        Provider Department      10/24/2017 9:30 AM Mackenzie Doherty [facility] and Infectious Diseases MTM        Today's Diagnoses     Asymptomatic human immunodeficiency virus (HIV) infection status (H)    -  1    Depression with anxiety        Attention deficit hyperactivity disorder (ADHD), unspecified ADHD type        Drug abuse        Acute hematogenous osteomyelitis, unspecified site (H)          Care Instructions    Recommendations from today's MTM visit:                                                      1) Continue mediations as prescribed.    2) B20 labs today.    3) Start Bactrim DS, one tablet twice daily, for a total of 3 weeks.      Next MTM visit: 3 Months    To schedule another MTM appointment, please call the clinic directly or you may call the MTM scheduling line at [phone] or toll-free at [phone].     My Clinical Pharmacist's contact information:                                                      It was a pleasure seeing you today!  Please feel free to contact me with any questions or concerns you have.      Mackenzie Doherty, [facility] Pharmacist.   [phone]      You may receive a survey about the MTM services you received.  I would appreciate your feedback to help me serve you better in the future. Please fill it out and return it when you can. Your comments will be anonymous.      My healthcare goals:                                                      Stay healthy and slow down.                 Follow-ups after your visit        Your next 10 appointments already scheduled     Nov 27, 2017  8:00 AM CST   (Arrive by 7:45 AM)   Return Visit with Marv Ambrose MD   [facility])    [address] [place] [id number]  "  [phone]              Who to contact     If you have questions or need follow up information about today's clinic visit or your schedule please contact [facility] directly at No information on file..  Normal or non-critical lab and imaging results will be communicated to you by MyChart, letter or phone within 4 business days after the clinic has received the results. If you do not hear from us within 7 days, please contact the clinic through MyChart or phone. If you have a critical or abnormal lab result, we will notify you by phone as soon as possible.  Submit refill requests through OpenStudy or call your pharmacy and they will forward the refill request to us. Please allow 3 business days for your refill to be completed.          Additional Information About Your Visit        [employer] Information     OpenStudy lets you send messages to your doctor, view your test results, renew your prescriptions, schedule appointments and more. To sign up, go to [email or web address] . Click on \"Log in\" on the left side of the screen, which will take you to the Welcome page. Then click on \"Sign up Now\" on the right side of the page.     You will be asked to enter the access code listed below, as well as some personal information. Please follow the directions to create your username and password.     Your access code is: [email or web address]  Expires: 10/30/2017  2:49 AM     Your access code will  in 90 days. If you need help or a new code, please call your [facility] clinic or [phone].        Care EveryWhere ID     This is your Care EveryWhere ID. This could be used by other organizations to access your [facility] medical records  [id number]         Blood Pressure from Last 3 Encounters:   10/24/17 (!) 139/92   10/12/17 113/55   10/07/17 142/88    Weight from Last 3 Encounters:   10/24/17 147 lb 4.8 oz (66.8 kg)   10/10/17 135 lb 4.8 oz (61.4 kg)   10/07/17 149 lb (67.6 kg)         "      Today, you had the following     No orders found for display         Today's Medication Changes          These changes are accurate as of: 10/24/17 11:59 PM.  If you have any questions, ask your nurse or doctor.               Start taking these medicines.        Dose/Directions    sulfamethoxazole-trimethoprim 800-160 MG per tablet   Commonly known as:  BACTRIM DS/SEPTRA DS   Used for:  Asymptomatic human immunodeficiency virus (HIV) infection status (H), Acute hematogenous osteomyelitis, unspecified site (H)   Started by:  Damir Cisneros MD        Dose:  1 tablet   Take 1 tablet by mouth 2 times daily   Quantity:  60 tablet   Refills:  0         Stop taking these medicines if you haven't already. Please contact your care team if you have questions.     cloNIDine 0.1 MG tablet   Commonly known as:  CATAPRES   Stopped by:  Damir Cisneros MD           melatonin 1 MG Tabs tablet   Stopped by:  Damir Cisneros MD           naloxone nasal spray   Commonly known as:  NARCAN   Stopped by:  Damir Cisneros MD                Where to get your medicines      These medications were sent to [place] [id number] [facility] [phone] [facility] [phone] [address]    Hours:  TRANSPLANT PHONE NUMBER [phone] Phone:  [phone]     sulfamethoxazole-trimethoprim 800-160 MG per tablet                Primary Care Provider Office Phone # Fax #    Damir Cisneros [phone]       [address] [place] [address] [facility] [address]  [facility] [address]        Equal Access to Services     ALMA MERCEDES AH: Félix milligano Sobernie, waaxda luqadaha, qaybta kaalmada adeegyafrankie, tawanda kramer. So [facility] [phone].    ATENCIÓN: Si habla español, tiene a chatterjee disposición servicios gratuitos de asistencia lingüística. Llame al [phone].    We comply with applicable federal civil rights laws and [place] laws. We do not  discriminate on the basis of race, color, national origin, age, disability, sex, sexual orientation, or gender identity.            Thank you!     Thank you for choosing [facility] [place]  for your care. Our goal is always to provide you with excellent care. Hearing back from our patients is one way we can continue to improve our services. Please take a few minutes to complete the written survey that you may receive in the mail after your visit with us. Thank you!             Your Updated Medication List - Protect others around you: Learn how to safely use, store and throw away your medicines at www.disposemymeds.org.          This list is accurate as of: 10/24/17 11:59 PM.  Always use your most recent med list.                   Brand Name Dispense Instructions for use Diagnosis    acetaminophen 325 MG tablet    TYLENOL    100 tablet    Take 2 tablets (650 mg) by mouth every 6 hours    Medication side effect, initial encounter       buprenorphine HCl-naloxone HCl 8-2 MG per film    SUBOXONE    4 each    Place 1 Film under the tongue daily    Intravenous drug abuse       clonazePAM 0.5 MG tablet    klonoPIN    30 tablet    Take 1 tablet (0.5 mg) by mouth 2 times daily    Ataxia       ibuprofen 600 MG tablet    ADVIL/MOTRIN    30 tablet    Take 1 tablet (600 mg) by mouth every 6 hours as needed for moderate pain        levomilnacipran 80 MG 24 hr capsule    FETZIMA    3 capsule    Take 1 capsule (80 mg) by mouth daily        pregabalin 150 MG capsule    LYRICA    90 capsule    Take 1 capsule (150 mg) by mouth 2 times daily    Medication side effect, initial encounter       QUEtiapine 50 MG tablet    SEROquel    120 tablet    Take 1 tablet (50 mg) by mouth 2 times daily    PTSD (post-traumatic stress disorder), Severe episode of recurrent major depressive disorder, without psychotic features (H)       sulfamethoxazole-trimethoprim 800-160 MG per tablet    BACTRIM DS/SEPTRA DS    60  tablet    Take 1 tablet by mouth 2 times daily    Asymptomatic human immunodeficiency virus (HIV) infection status (H), Acute hematogenous osteomyelitis, unspecified site (H)       traZODone 50 MG tablet    DESYREL    10 tablet    Take 1 tablet (50 mg) by mouth At Bedtime        TRIUMEQ 600- MG per tablet   Generic drug:  abacavir-dolutegravir-LamiVUDine     30 tablet    TAKE ONE TABLET BY MOUTH EVERY DAY    Human immunodeficiency virus (HIV) disease (H)

## 2017-10-24 NOTE — NURSING NOTE
"Chief Complaint   Patient presents with     RECHECK     follow up with hospital visit, carol salcedo       Initial BP (!) 139/92  Pulse 92  Ht 1.803 m (5' 11\")  Wt 66.8 kg (147 lb 4.8 oz)  BMI 20.54 kg/m2 Estimated body mass index is 20.54 kg/(m^2) as calculated from the following:    Height as of this encounter: 1.803 m (5' 11\").    Weight as of this encounter: 66.8 kg (147 lb 4.8 oz).  Medication Reconciliation: complete    "

## 2017-10-24 NOTE — LETTER
10/24/2017       RE: Bc Whitten  [address]  [facility] [address]     Dear Colleague,    Thank you for referring your patient, Bc German, to the [facility] AND INFECTIOUS DISEASES at General acute hospital. Please see a copy of my visit note below.    [facility]  Infectious Disease Clinic Note     Date of Visit:  10/24/2017   Patient:  Bc German  Medical record number [id number]    History of Present Illness  Bc German is a 37 year old male who returns for follow-up.  Walt was admitted from 10/9-12 at the  for AMS thought to be 2/2 substance abuse that was noted at his TCU. He was at TCU for IV antibiotics for recent diagnosis of osteomyelitis with epidural abscess s/p spinal surgery. He says after his surgery, he's had to work hard to gain back his strength. He says the TCU has been a negative and stressful experience. He has been on antibiotics  cefazolin 2 g IV q8hrs via new PICC. Last dose scheduled for today, 10/24.    Reports muscle and joint aches at night. Fatigued. Night sweats (better than at the time of his admission). He says triumeq makes his skin dry, but he feels his klonopin distracts him from the itchyness and helps him tolerate the triumeq. Has not been using IV drugs, though he has been smoking marijuana.     He just got approved for housing. He's with dad today and they request to speak to .   Problem List  No diagnosis found.     Review of Systems  Twelve point review of systems is otherwise normal.    Current Medications  Current Outpatient Prescriptions   Medication     QUEtiapine (SEROQUEL) 50 MG tablet     traZODone (DESYREL) 50 MG tablet     TRIUMEQ 600- MG per tablet     clonazePAM (KLONOPIN) 0.5 MG tablet     pregabalin (LYRICA) 150 MG capsule     buprenorphine HCl-naloxone HCl (SUBOXONE) 8-2 MG per film     levomilnacipran (FETZIMA) 80 MG 24 hr capsule      acetaminophen (TYLENOL) 325 MG tablet     ibuprofen (ADVIL/MOTRIN) 600 MG tablet     No current facility-administered medications for this visit.        Family/Social History  History reviewed. No pertinent family history.    Physical Exam  HEENT: Normal  Neck: Supple  Lungs: CTA  CV: RRR, no gallops, murmurs appreciated, no LE edema  Abdomen: Soft and nontender.  No masses noted.  Extremities: Normal  Skin: Normal  Neuro: Grossly normal       Recent Laboratory Values    Routine Labs  Hemoglobin   Date Value Ref Range Status   10/11/2017 11.3 (L) 13.3 - 17.7 g/dL Final     MCV   Date Value Ref Range Status   10/11/2017 86 78 - 100 fl Final     Platelet Count   Date Value Ref Range Status   10/11/2017 171 150 - 450 10e9/L Final     Creatinine   Date Value Ref Range Status   10/11/2017 0.75 0.66 - 1.25 mg/dL Final     AST   Date Value Ref Range Status   10/11/2017 170 (H) 0 - 45 U/L Final     ALT   Date Value Ref Range Status   10/11/2017 138 (H) 0 - 70 U/L Final     Bilirubin Total   Date Value Ref Range Status   10/11/2017 0.3 0.2 - 1.3 mg/dL Final       T Cell Subset:  CD3 Mature T   Date Value Ref Range Status   07/24/2017 71 49 - 84 % Final     CD4 [place] T   Date Value Ref Range Status   07/24/2017 30 28 - 63 % Final     CD8 Suppressor T   Date Value Ref Range Status   07/24/2017 37 10 - 40 % Final     CD4:CD8 Ratio   Date Value Ref Range Status   07/24/2017 0.81 (L) 1.40 - 2.60 Final     WBC   Date Value Ref Range Status   10/11/2017 3.7 (L) 4.0 - 11.0 10e9/L Final     % Lymphocytes   Date Value Ref Range Status   10/11/2017 53.1 % Final     Absolute CD3   Date Value Ref Range Status   07/24/2017 1831 603 - 2990 cells/uL Final     Absolute CD4   Date Value Ref Range Status   07/24/2017 782 441 - 2156 cells/uL Final     Absolute CD8   Date Value Ref Range Status   07/24/2017 962 125 - 1312 cells/uL Final       HIV-1 RNA Quantitative:  HIV-1 RNA Quant Result   Date Value Ref Range Status   09/11/2017 HIV-1 RNA  Not Detected HIVND^HIV-1 RNA Not Detected [Copies]/mL Final     Comment:     The MARLEN AmpliPrep/MARLEN TaqMan HIV-1 test is an FDA-approved in vitro   nucleic acid amplification test for the quantitation of HIV-1 RNA in human   plasma (EDTA plasma) using the MARLEN AmpliPrep instrument for automated viral   nucleic acid extraction and the MARLEN TaqMan Analyzer or MARLEN TaqMan for   automated Real Time PCR amplification and detection of the viral nucleic acid   target.  Titer results are reported in copies/ml. This assay is intended for use in   conjunction with clinical presentation and other laboratory markers of disease   prognosis and for use as an aid in assessing viral response to antiretroviral   treatment as measured by changes in plasma HIV-1 RNA levels. This test should   not be used as a donor screening test to confirm the presence of HIV-1   infection.          Assessment and Plan  1. Cervical MSSA osteomyelitis and bacteremia: S/p C6 and C7 corpectomy, C5-T1 anterior instrumented fusion, 9/10, by neurosurgery during recent hospitalization (9/9-9/21) 2/2 cervical myelopathy and osteomyelitis w/ epidural abscess and compressive phlegmon. Tissue cultures and BCs grew MSSA and started on IV cefazolin q8hrs to continue until 10/24 via PICC line.Completing 6 weeks of IV antibiotics today. Given residual night sweats, and still elevated CRP, will plan for longer course of antibiotics with oral antibiotics.  - PICC line pulled today  - start Bactrim DS BID until seen in ~3 weeks. At that point will recheck CRP and reassess.    2.   Asymptomatic human immunodeficiency virus (HIV) infection status (H) - HIV with very high viral load now not detectable on therapy again.   We discussed the importance of adherence and the risks of not taking the mediciation every day as prescribed and he reassured that he was 100% compliant      Plan: Will see back in 3 weeks to followup osteomyelitis.    This patient was seen and  staffed with Dr. Cisneros.     Lucero Lewis MD  Medicine/Dermatology, PGY-5  (p) [phone]

## 2017-10-24 NOTE — NURSING NOTE
PICC line pulled without complications, occlusive dressing and pressure wrap (coban) applied, site assessed within normal limits, no bleeding or infection observed, catheter intact, Pt left in supine position for 15 min following removal. Pt education given along with home care instructions. Pt left in care of father.

## 2017-10-24 NOTE — MR AVS SNAPSHOT
"              After Visit Summary   10/24/2017    Bc German    MRN: [id number]           Patient Information     Date Of Birth          1980        Visit Information        Provider Department      10/24/2017 9:00 AM Damir Cisneros MD [facility] and Infectious Diseases        Today's Diagnoses     Asymptomatic human immunodeficiency virus (HIV) infection status (H)    -  1    Acute hematogenous osteomyelitis, unspecified site (H)           Follow-ups after your visit        Your next 10 appointments already scheduled     Nov 27, 2017  8:00 AM CST   (Arrive by 7:45 AM)   Return Visit with Marv Ambrose MD   [facility] ([facility])    [address]  3rd Floor  [facility] [address] [phone]              Who to contact     If you have questions or need follow up information about today's clinic visit or your schedule please contact [facility] AND INFECTIOUS DISEASES directly at [phone].  Normal or non-critical lab and imaging results will be communicated to you by [employer], letter or phone within 4 business days after the clinic has received the results. If you do not hear from us within 7 days, please contact the clinic through [employer] or phone. If you have a critical or abnormal lab result, we will notify you by phone as soon as possible.  Submit refill requests through [employer] or call your pharmacy and they will forward the refill request to us. Please allow 3 business days for your refill to be completed.          Additional Information About Your Visit        [employer] Information     [employer] lets you send messages to your doctor, view your test results, renew your prescriptions, schedule appointments and more. To sign up, go to [email or web address] . Click on \"Log in\" on the left side of the screen, which will take you to the Welcome page. Then click on \"Sign up Now\" on the right side of the page. " "    You will be asked to enter the access code listed below, as well as some personal information. Please follow the directions to create your username and password.     Your access code is: [email or web address]  Expires: 10/30/2017  2:49 AM     Your access code will  in 90 days. If you need help or a new code, please call your [facility] clinic or [phone].        Care EveryWhere ID     This is your Care EveryWhere ID. This could be used by other organizations to access your [facility] medical records  [id number]        Your Vitals Were     Pulse Height BMI (Body Mass Index)             92 1.803 m (5' 11\") 20.54 kg/m2          Blood Pressure from Last 3 Encounters:   10/24/17 (!) 139/92   10/12/17 113/55   10/07/17 142/88    Weight from Last 3 Encounters:   10/24/17 66.8 kg (147 lb 4.8 oz)   10/10/17 61.4 kg (135 lb 4.8 oz)   10/07/17 67.6 kg (149 lb)                 Today's Medication Changes          These changes are accurate as of: 10/24/17 11:59 PM.  If you have any questions, ask your nurse or doctor.               Start taking these medicines.        Dose/Directions    sulfamethoxazole-trimethoprim 800-160 MG per tablet   Commonly known as:  BACTRIM DS/SEPTRA DS   Used for:  Asymptomatic human immunodeficiency virus (HIV) infection status (H), Acute hematogenous osteomyelitis, unspecified site (H)   Started by:  Damir Cisneros MD        Dose:  1 tablet   Take 1 tablet by mouth 2 times daily   Quantity:  60 tablet   Refills:  0         Stop taking these medicines if you haven't already. Please contact your care team if you have questions.     cloNIDine 0.1 MG tablet   Commonly known as:  CATAPRES   Stopped by:  Damir Cisneros MD           melatonin 1 MG Tabs tablet   Stopped by:  Damir Cisneros MD           naloxone nasal spray   Commonly known as:  NARCAN   Stopped by:  Damir Cisneros MD                Where to get your medicines      These medications were sent to [facility] " [facility] [place], [place] - [address]  [address], [facility] [address]    Hours:  TRANSPLANT PHONE NUMBER [phone] Phone:  [phone]     sulfamethoxazole-trimethoprim 800-160 MG per tablet                Primary Care Provider Office Phone # Fax #    Damir Cisneros -[phone]       [address] [place] [address] [address] [facility] [address]  [facility] [address]        Equal Access to Services     ALMA MERCEDES : Hadii aad ku hadasho Soomaali, waaxda luqadaha, qaybta kaalmada adeegyada, waxay idiin hayaan adeeg kharash lamillicent kramer. [facility] [phone].    ATENCIÓN: Si habla español, tiene a chatterjee disposición servicios gratuitos de asistencia lingüística. [facility] [phone].    We comply with applicable federal civil rights laws and [place] laws. We do not discriminate on the basis of race, color, national origin, age, disability, sex, sexual orientation, or gender identity.            Thank you!     Thank you for choosing [facility] AND INFECTIOUS DISEASES  for your care. Our goal is always to provide you with excellent care. Hearing back from our patients is one way we can continue to improve our services. Please take a few minutes to complete the written survey that you may receive in the mail after your visit with us. Thank you!             Your Updated Medication List - Protect others around you: Learn how to safely use, store and throw away your medicines at www.disposemymeds.org.          This list is accurate as of: 10/24/17 11:59 PM.  Always use your most recent med list.                   Brand Name Dispense Instructions for use Diagnosis    acetaminophen 325 MG tablet    TYLENOL    100 tablet    Take 2 tablets (650 mg) by mouth every 6 hours    Medication side effect, initial encounter       buprenorphine HCl-naloxone HCl 8-2 MG per film    SUBOXONE    4 each    Place 1 Film under the tongue daily    Intravenous drug abuse       clonazePAM 0.5 MG tablet     klonoPIN    30 tablet    Take 1 tablet (0.5 mg) by mouth 2 times daily    Ataxia       ibuprofen 600 MG tablet    ADVIL/MOTRIN    30 tablet    Take 1 tablet (600 mg) by mouth every 6 hours as needed for moderate pain        levomilnacipran 80 MG 24 hr capsule    FETZIMA    3 capsule    Take 1 capsule (80 mg) by mouth daily        pregabalin 150 MG capsule    LYRICA    90 capsule    Take 1 capsule (150 mg) by mouth 2 times daily    Medication side effect, initial encounter       QUEtiapine 50 MG tablet    SEROquel    120 tablet    Take 1 tablet (50 mg) by mouth 2 times daily    PTSD (post-traumatic stress disorder), Severe episode of recurrent major depressive disorder, without psychotic features (H)       sulfamethoxazole-trimethoprim 800-160 MG per tablet    BACTRIM DS/SEPTRA DS    60 tablet    Take 1 tablet by mouth 2 times daily    Asymptomatic human immunodeficiency virus (HIV) infection status (H), Acute hematogenous osteomyelitis, unspecified site (H)       traZODone 50 MG tablet    DESYREL    10 tablet    Take 1 tablet (50 mg) by mouth At Bedtime        TRIUMEQ 600- MG per tablet   Generic drug:  abacavir-dolutegravir-LamiVUDine     30 tablet    TAKE ONE TABLET BY MOUTH EVERY DAY    Human immunodeficiency virus (HIV) disease (H)

## 2017-10-27 NOTE — PATIENT INSTRUCTIONS
Recommendations from today's MTM visit:                                                      1) Continue mediations as prescribed.    2) B20 labs today.    3) Start Bactrim DS, one tablet twice daily, for a total of 3 weeks.      Next MTM visit: 3 Months    To schedule another MTM appointment, please call the clinic directly or you may call the MTM scheduling line at [phone] or toll-free at [phone].     My Clinical Pharmacist's contact information:                                                      It was a pleasure seeing you today!  Please feel free to contact me with any questions or concerns you have.      Macknezie Doherty, MTM Pharmacist.   [phone]      You may receive a survey about the MTM services you received.  I would appreciate your feedback to help me serve you better in the future. Please fill it out and return it when you can. Your comments will be anonymous.      My healthcare goals:                                                      Stay healthy and slow down.

## 2017-10-31 ENCOUNTER — TELEPHONE (OUTPATIENT)
Dept: INFECTIOUS DISEASES | Facility: CLINIC | Age: 37
End: 2017-10-31

## 2017-10-31 NOTE — TELEPHONE ENCOUNTER
Janay (nurse, [facility]) called re: Bactrim. Pt reporting RXN to med: elevated rash on face, neck. Denies itching, burning, SOB/trouble swallowing/breathing, swelling of face/hands. Pt otherwise asymptomatic. Stopped taking after AM dose on 10/29/17, taking since 10/25/17. Please advise at [phone]. Sent to [facility].

## 2017-11-01 NOTE — TELEPHONE ENCOUNTER
"Hope Millard MD Danielson, Megan, DREA; Irais Rosenberg MD                   I think that we could place him on clindamycin 450 MG PO QID X 2 weeks.   You could send that prescription to the pharmacy of his choice.   Thanks,   Hope            Previous Messages       ----- Message -----      From: Lacey Watson RN      Sent: 10/30/2017   4:14 PM        To: Irais Rosenberg MD, *   Subject: Alternative abx's                                 Hi Dr. Millard and Dr. Rosenberg- Dr. Cisneros started this pt on bactrim after his IV course of cefazolin was complete. However, he has developed a rash. Since Dr. Cisneros doesn't have Epic access, he was wondering if either of you could look at the sensitivities and recommend an alternative abx for an additional 2 wks of therapy. Thank you for your assistance! -Lacey     \"1. Cervical MSSA osteomyelitis and bacteremia: S/p C6 and C7 corpectomy, C5-T1 anterior instrumented fusion, 9/10, by neurosurgery during recent hospitalization (9/9-9/21) 2/2 cervical myelopathy and osteomyelitis w/ epidural abscess and compressive phlegmon. Tissue cultures and BCs grew MSSA and started on IV cefazolin q8hrs to continue until 10/24 via PICC line.Completing 6 weeks of IV antibiotics today. Given residual night sweats, and still elevated CRP, will plan for longer course of antibiotics with oral antibiotics.   - PICC line pulled today   - start Bactrim DS BID until seen in ~3 weeks. At that point will recheck CRP and reassess.\"            "

## 2017-11-01 NOTE — TELEPHONE ENCOUNTER
Called RN at the [facility] at [facility]. Instructed her to stop pt's bactrim and start clindamycin 450mg PO QID x2 weeks. Order read back to writer.  Lacey Watson RN

## 2017-11-06 LAB
MYCOBACTERIUM SPEC CULT: NORMAL
SPECIMEN SOURCE: NORMAL
SPECIMEN SOURCE: NORMAL

## 2017-11-20 ENCOUNTER — TELEPHONE (OUTPATIENT)
Dept: INFECTIOUS DISEASES | Facility: CLINIC | Age: 37
End: 2017-11-20

## 2017-11-20 NOTE — TELEPHONE ENCOUNTER
Pt called today concerned that he needed to be seen by Dr. Cisneros before Thanksgiving to follow up re: osteomyelitis. Discussed with Dr. Cisneros who said he can be seen as next available, which would be in January. He would like pt to get CRP done and depending on CRP result, pt may be able to stop PO abx. Called pt, LVM, and relayed message from Dr. Cisneros, instructed pt to get blood drawn for CPR and offered him appt on 1/16/18.  Lacey Watson RN

## 2017-11-21 ENCOUNTER — TELEPHONE (OUTPATIENT)
Dept: INFECTIOUS DISEASES | Facility: CLINIC | Age: 37
End: 2017-11-21

## 2017-11-21 DIAGNOSIS — Z21 ASYMPTOMATIC HUMAN IMMUNODEFICIENCY VIRUS (HIV) INFECTION STATUS (H): ICD-10-CM

## 2017-11-21 DIAGNOSIS — M86.00 ACUTE HEMATOGENOUS OSTEOMYELITIS, UNSPECIFIED SITE (H): ICD-10-CM

## 2017-11-21 LAB
ALBUMIN SERPL-MCNC: 4.2 G/DL (ref 3.4–5)
ALP SERPL-CCNC: 187 U/L (ref 40–150)
ALT SERPL W P-5'-P-CCNC: 702 U/L (ref 0–70)
ANION GAP SERPL CALCULATED.3IONS-SCNC: 6 MMOL/L (ref 3–14)
AST SERPL W P-5'-P-CCNC: 409 U/L (ref 0–45)
BASOPHILS # BLD AUTO: 0 10E9/L (ref 0–0.2)
BASOPHILS NFR BLD AUTO: 0.5 %
BILIRUB SERPL-MCNC: 0.5 MG/DL (ref 0.2–1.3)
BUN SERPL-MCNC: 11 MG/DL (ref 7–30)
CALCIUM SERPL-MCNC: 10.1 MG/DL (ref 8.5–10.1)
CD3 CELLS # BLD: 2110 CELLS/UL (ref 603–2990)
CD3 CELLS NFR BLD: 72 % (ref 49–84)
CD3+CD4+ CELLS # BLD: 934 CELLS/UL (ref 441–2156)
CD3+CD4+ CELLS NFR BLD: 32 % (ref 28–63)
CD3+CD4+ CELLS/CD3+CD8+ CLL BLD: 0.89 % (ref 1.4–2.6)
CD3+CD8+ CELLS # BLD: 1062 CELLS/UL (ref 125–1312)
CD3+CD8+ CELLS NFR BLD: 36 % (ref 10–40)
CHLORIDE SERPL-SCNC: 102 MMOL/L (ref 94–109)
CO2 SERPL-SCNC: 30 MMOL/L (ref 20–32)
CREAT SERPL-MCNC: 0.8 MG/DL (ref 0.66–1.25)
CRP SERPL-MCNC: 3.1 MG/L (ref 0–8)
DIFFERENTIAL METHOD BLD: ABNORMAL
EOSINOPHIL # BLD AUTO: 0.2 10E9/L (ref 0–0.7)
EOSINOPHIL NFR BLD AUTO: 3 %
ERYTHROCYTE [DISTWIDTH] IN BLOOD BY AUTOMATED COUNT: 15.5 % (ref 10–15)
GFR SERPL CREATININE-BSD FRML MDRD: >90 ML/MIN/1.7M2
GLUCOSE SERPL-MCNC: 84 MG/DL (ref 70–99)
HCT VFR BLD AUTO: 47.6 % (ref 40–53)
HGB BLD-MCNC: 14.9 G/DL (ref 13.3–17.7)
IFC SPECIMEN: ABNORMAL
IMM GRANULOCYTES # BLD: 0 10E9/L (ref 0–0.4)
IMM GRANULOCYTES NFR BLD: 0.3 %
LYMPHOCYTES # BLD AUTO: 2.7 10E9/L (ref 0.8–5.3)
LYMPHOCYTES NFR BLD AUTO: 43.7 %
MCH RBC QN AUTO: 26.6 PG (ref 26.5–33)
MCHC RBC AUTO-ENTMCNC: 31.3 G/DL (ref 31.5–36.5)
MCV RBC AUTO: 85 FL (ref 78–100)
MONOCYTES # BLD AUTO: 0.5 10E9/L (ref 0–1.3)
MONOCYTES NFR BLD AUTO: 8.2 %
NEUTROPHILS # BLD AUTO: 2.8 10E9/L (ref 1.6–8.3)
NEUTROPHILS NFR BLD AUTO: 44.3 %
NRBC # BLD AUTO: 0 10*3/UL
NRBC BLD AUTO-RTO: 0 /100
PLATELET # BLD AUTO: 303 10E9/L (ref 150–450)
POTASSIUM SERPL-SCNC: 3.8 MMOL/L (ref 3.4–5.3)
PROT SERPL-MCNC: 8.8 G/DL (ref 6.8–8.8)
RBC # BLD AUTO: 5.6 10E12/L (ref 4.4–5.9)
SODIUM SERPL-SCNC: 138 MMOL/L (ref 133–144)
WBC # BLD AUTO: 6.2 10E9/L (ref 4–11)

## 2017-11-22 LAB
HIV1 RNA # PLAS NAA DL=20: 46 {COPIES}/ML
HIV1 RNA SERPL NAA+PROBE-LOG#: 1.7 {LOG_COPIES}/ML

## 2017-11-22 NOTE — NURSING NOTE
Per Dr. Cisneros, pt needs CRP checked for osteomyelitis. Called and LVM with pt and encouraged him to come in for lab draw.  Lacey Watson RN

## 2017-11-27 DIAGNOSIS — Z21 HIV (HUMAN IMMUNODEFICIENCY VIRUS INFECTION) (H): Primary | ICD-10-CM

## 2017-11-27 NOTE — NURSING NOTE
Per Dr. Cisneros, pt needs CMP redrawn due to elevated liver enzymes. Will notify pt to have these redrawn today.  Lacey Watson RN

## 2017-11-27 NOTE — NURSING NOTE
Pt is currently not answering phone and not able to leave a voicemail. Will continue trying to reach pt.  Lacey Watson RN

## 2017-11-28 ENCOUNTER — TELEPHONE (OUTPATIENT)
Dept: INFECTIOUS DISEASES | Facility: CLINIC | Age: 37
End: 2017-11-28

## 2017-11-28 NOTE — TELEPHONE ENCOUNTER
Tried to reach pt today to ask him to come to lab to redo LFT's however no answer and unable to leave VM

## 2017-11-29 NOTE — TELEPHONE ENCOUNTER
DRAE Perez got a hold of pt through his father's number. Informed pt that he needs to get his liver enzymes rechecked soon. Pt agreed with plan. Pt said he would walk in to the lab.  Lacey Watson RN

## 2018-01-24 ENCOUNTER — TELEPHONE (OUTPATIENT)
Dept: PHARMACY | Facility: CLINIC | Age: 38
End: 2018-01-24

## 2018-01-24 NOTE — TELEPHONE ENCOUNTER
Called patient for refill reminder.  Pt has a deductible and a Program H application is being emailed to him today. He reports to have 10 days of medication left. We will recall him on 01/30/1  Mackenzie Doherty, [facility] Pharmacist.   [phone]

## 2018-01-29 ENCOUNTER — APPOINTMENT (OUTPATIENT)
Dept: CT IMAGING | Facility: CLINIC | Age: 38
End: 2018-01-29
Attending: EMERGENCY MEDICINE
Payer: COMMERCIAL

## 2018-01-29 ENCOUNTER — HOSPITAL ENCOUNTER (EMERGENCY)
Facility: CLINIC | Age: 38
Discharge: HOME | End: 2018-01-29
Attending: EMERGENCY MEDICINE | Admitting: EMERGENCY MEDICINE
Payer: COMMERCIAL

## 2018-01-29 VITALS
SYSTOLIC BLOOD PRESSURE: 142 MMHG | WEIGHT: 131 LBS | BODY MASS INDEX: 24.73 KG/M2 | DIASTOLIC BLOOD PRESSURE: 104 MMHG | TEMPERATURE: 98.1 F | OXYGEN SATURATION: 100 % | HEART RATE: 90 BPM | RESPIRATION RATE: 16 BRPM | HEIGHT: 61 IN

## 2018-01-29 DIAGNOSIS — F19.10 POLYSUBSTANCE ABUSE (H): ICD-10-CM

## 2018-01-29 DIAGNOSIS — S09.90XA CLOSED HEAD INJURY, INITIAL ENCOUNTER: ICD-10-CM

## 2018-01-29 DIAGNOSIS — F65.51: ICD-10-CM

## 2018-01-29 DIAGNOSIS — T14.8XXA ABRASION: ICD-10-CM

## 2018-01-29 LAB
AMPHETAMINES UR QL SCN: POSITIVE
ANION GAP SERPL CALCULATED.3IONS-SCNC: 5 MMOL/L (ref 3–14)
BARBITURATES UR QL: NEGATIVE
BASOPHILS # BLD AUTO: 0 10E9/L (ref 0–0.2)
BASOPHILS NFR BLD AUTO: 0.3 %
BENZODIAZ UR QL: POSITIVE
BUN SERPL-MCNC: 16 MG/DL (ref 7–30)
CALCIUM SERPL-MCNC: 9.4 MG/DL (ref 8.5–10.1)
CANNABINOIDS UR QL SCN: POSITIVE
CHLORIDE SERPL-SCNC: 104 MMOL/L (ref 94–109)
CO2 SERPL-SCNC: 27 MMOL/L (ref 20–32)
COCAINE UR QL: NEGATIVE
CREAT SERPL-MCNC: 0.77 MG/DL (ref 0.66–1.25)
DIFFERENTIAL METHOD BLD: ABNORMAL
EOSINOPHIL # BLD AUTO: 0.2 10E9/L (ref 0–0.7)
EOSINOPHIL NFR BLD AUTO: 2.2 %
ERYTHROCYTE [DISTWIDTH] IN BLOOD BY AUTOMATED COUNT: 16.9 % (ref 10–15)
GFR SERPL CREATININE-BSD FRML MDRD: >90 ML/MIN/1.7M2
GLUCOSE SERPL-MCNC: 83 MG/DL (ref 70–99)
HCT VFR BLD AUTO: 45.1 % (ref 40–53)
HGB BLD-MCNC: 15.6 G/DL (ref 13.3–17.7)
IMM GRANULOCYTES # BLD: 0 10E9/L (ref 0–0.4)
IMM GRANULOCYTES NFR BLD: 0.3 %
LYMPHOCYTES # BLD AUTO: 3.7 10E9/L (ref 0.8–5.3)
LYMPHOCYTES NFR BLD AUTO: 39.9 %
MCH RBC QN AUTO: 29.3 PG (ref 26.5–33)
MCHC RBC AUTO-ENTMCNC: 34.6 G/DL (ref 31.5–36.5)
MCV RBC AUTO: 85 FL (ref 78–100)
MONOCYTES # BLD AUTO: 1 10E9/L (ref 0–1.3)
MONOCYTES NFR BLD AUTO: 10.6 %
NEUTROPHILS # BLD AUTO: 4.3 10E9/L (ref 1.6–8.3)
NEUTROPHILS NFR BLD AUTO: 46.7 %
NRBC # BLD AUTO: 0 10*3/UL
NRBC BLD AUTO-RTO: 0 /100
OPIATES UR QL SCN: POSITIVE
PCP UR QL SCN: NEGATIVE
PLATELET # BLD AUTO: 249 10E9/L (ref 150–450)
POTASSIUM SERPL-SCNC: 4.2 MMOL/L (ref 3.4–5.3)
RBC # BLD AUTO: 5.32 10E12/L (ref 4.4–5.9)
SODIUM SERPL-SCNC: 136 MMOL/L (ref 133–144)
WBC # BLD AUTO: 9.3 10E9/L (ref 4–11)

## 2018-01-29 PROCEDURE — 85025 COMPLETE CBC W/AUTO DIFF WBC: CPT | Performed by: EMERGENCY MEDICINE

## 2018-01-29 PROCEDURE — 99284 EMERGENCY DEPT VISIT MOD MDM: CPT | Mod: 25

## 2018-01-29 PROCEDURE — 80048 BASIC METABOLIC PNL TOTAL CA: CPT | Performed by: EMERGENCY MEDICINE

## 2018-01-29 PROCEDURE — 70450 CT HEAD/BRAIN W/O DYE: CPT

## 2018-01-29 PROCEDURE — 80307 DRUG TEST PRSMV CHEM ANLYZR: CPT | Performed by: EMERGENCY MEDICINE

## 2018-01-29 RX ORDER — SULFAMETHOXAZOLE/TRIMETHOPRIM 800-160 MG
1 TABLET ORAL 2 TIMES DAILY
Qty: 20 TABLET | Refills: 0 | Status: SHIPPED | OUTPATIENT
Start: 2018-01-29 | End: 2018-02-08

## 2018-01-29 ASSESSMENT — ENCOUNTER SYMPTOMS
DYSPHORIC MOOD: 1
CONFUSION: 1
WOUND: 1
DECREASED CONCENTRATION: 1

## 2018-01-29 NOTE — ED AVS SNAPSHOT
Emergency Department    [address] [place] [address]    Phone:  [phone]    Fax:  [phone]                                       Bc German   MRN: [id number]    Department:   Emergency Department   Date of Visit:  1/29/2018           After Visit Summary Signature Page     I have received my discharge instructions, and my questions have been answered. I have discussed any challenges I see with this plan with the nurse or doctor.    ..........................................................................................................................................  Patient/Patient Representative Signature      ..........................................................................................................................................  Patient Representative Print Name and Relationship to Patient    ..................................................               ................................................  Date                                            Time    ..........................................................................................................................................  Reviewed by Signature/Title    ...................................................              ..............................................  Date                                                            Time

## 2018-01-29 NOTE — ED AVS SNAPSHOT
Emergency Department    [address] [facility] [address]    Phone:  [phone]    Fax:  [phone]                                       Bc German   MRN: [id number]    Department:   Emergency Department   Date of Visit:  1/29/2018           Patient Information     Date Of Birth          1980        Your diagnoses for this visit were:     Polysubstance abuse     Abrasion     Closed head injury, initial encounter     Sexual masochism disorder with asphyxiophilia        You were seen by Eliezer Yarbrough MD.      Follow-up Information     Follow up with Damir Cisneros MD.    Specialty:  Internal Medicine    Contact information:    [address] [facility] [address]  [facility] [address] [phone]          Discharge Instructions         Alcohol Addiction  Are you addicted to alcohol?    You may be addicted to alcohol if your drinking harms yourself or others or leads to other problems with your daily life.  The medical term for this is alcohol use disorder. Your healthcare provider may diagnose you with this disorder if you have at least two of the following problems within the span of a year:    You drink alcohol in larger amounts or for a longer period than you intended.    You frequently want to cut down or control alcohol use, or have frequently failed efforts to do so.    You spend a lot of time getting alcohol, using alcohol, or recovering from alcohol use.    You crave or have a strong desire or urge to drink alcohol.    Ongoing alcohol use makes it hard for you to be responsible at work, school, or home.    You continue to use alcohol even though you have had problems in relationships or social settings because of it.    You give up or miss important social, work, or recreational activities because of alcohol use.    You drink alcohol in situations in which it is physically unsafe, such as drinking then driving while intoxicated.    You continue to drink alcohol despite knowing  it has caused physical or emotional problems.    You need more and more alcohol to get the same effects.    You hide how much alcohol you drink from family and friends.    You have withdrawal symptoms or use alcohol to avoid withdrawal symptoms.  Date Last Reviewed: 2/1/2017 2000-2017 [employer]. [address] [place] [id number]. All rights reserved. This information is not intended as a substitute for professional medical care. Always follow your healthcare professional's instructions.        Wound Care    You have a break in the skin. This wound may be because of an injury. Or it may be the result of surgery. Closing the wound helps stop bleeding, protects the wound from infection, and speeds healing. The type of closure that is used depends on the size and location of the wound. Choices include stitches (sutures), strips of surgical tape, skin glue, or staples.  Home care  Your healthcare provider may prescribe medicines for pain. Or he or she may suggest an over-the-counter (OTC) pain reliever, such as ibuprofen. If you have chronic kidney disease, talk with your provider before taking any OTC medicines. Also talk with your provider if you've had a stomach ulcer or gastrointestinal bleeding. In certain cases, antibiotics may be prescribed to help prevent infection. If antibiotics are prescribed, take them exactly as directed for as long as directed. Do not stop taking your antibiotics until they are all gone, even if you feel better.  General care    Follow the healthcare provider s instructions on how to care for the wound.    Wash your hands with soap and warm water before and after caring for the wound. This helps prevent infection.    If a bandage was applied, change it once a day or as directed. If at any time the bandage becomes wet or dirty, replace it with a new one.    Unless told otherwise, avoid soaking the wound in water. Take showers or sponge baths instead of tub baths.  Don't scrub or pick at the wound.    Don't go swimming.    If you have a bandage and it gets wet, use a clean cloth to gently pat the wound dry. Then replace the bandage with a dry one.    Don't scratch, rub, or pick at the area.    Watch for the signs of infection listed below. Any wound can get infected, even if you are taking antibiotics. Seek care right away if you see any possible signs of infection.  Care for specific closures    Sutures. You may want to clean the wound daily after the first 2 to 3 days. To do this, remove the bandage and gently wash the area with soap and warm water. After cleaning, apply a thin layer of antibiotic ointment if recommended. Then apply a new bandage. Sutures on the outside of the skin usually need to be removed by your healthcare provider.    Surgical tape. Keep the area dry. If it gets wet, blot it dry with a towel. Surgical tape closures usually fall off within 7 to 10 days. If they have not fallen off after 10 days, you can remove them yourself. To remove the tape, use mineral oil or petroleum jelly on a cotton ball to gently rub the adhesive.    Skin glue. You may shower or bathe as usual, but do not use soaps, lotions, or ointments on the wound area. Do not scrub the wound. After bathing, pat the wound dry with a soft towel. Do not apply liquids like peroxide, ointments, or creams to the wound while the strips or film is in place. Don't scratch, rub, or pick at the strips or film. Don't put tape directly over the strips or film. Skin adhesive film will fall off naturally in 5 to 10 days. If it does not peel off in 10 days, gently rub petroleum jelly or an ointment onto the film.    Mahin. Take showers or sponge baths. Don't take tub baths. Don't use lotions on the wound area. The area may be cleaned with soap and water 2 to 3 days after the wound was stapled. Don't scrub the wound. Pat it dry with a clean soft cloth or towel. You can use antibiotic ointment if your  provider tells you to. Staples will need to be removed by your healthcare provider in 10 to 14 days.  Follow-up care  Follow up with your healthcare provider, or as directed. If you have sutures or staples, return for their removal as directed.  When to seek medical advice  Call your healthcare provider right away if you have signs of infection:    Fever of 100.4 F (38 C) or higher, or as directed by your healthcare provider    Increasing pain in the wound    Increasing redness or swelling    Pus or bad-smelling drainage from the wound  Also call your provider right away if any of these occur:    Wound bleeds more than a small amount or won t stop bleeding    Wound edges come apart    Numbness or weakness in the wound area that doesn t go away  Date Last Reviewed: 1/7/2016 2000-2017 [employer] [address] [place] [address]. All rights reserved. This information is not intended as a substitute for professional medical care. Always follow your healthcare professional's instructions.          24 Hour Appointment Hotline       To make an appointment at any [facility], call [phone] ([phone]). If you don't have a family doctor or clinic, we will help you find one. [facility] clinics are conveniently located to serve the needs of you and your family.             Review of your medicines      CONTINUE these medicines which may have CHANGED, or have new prescriptions. If we are uncertain of the size of tablets/capsules you have at home, strength may be listed as something that might have changed.        Dose / Directions Last dose taken    * sulfamethoxazole-trimethoprim 800-160 MG per tablet   Commonly known as:  BACTRIM DS/SEPTRA DS   Dose:  1 tablet   What changed:  Another medication with the same name was added. Make sure you understand how and when to take each.   Quantity:  60 tablet        Take 1 tablet by mouth 2 times daily   Refills:  0        *  sulfamethoxazole-trimethoprim 800-160 MG per tablet   Commonly known as:  BACTRIM DS   Dose:  1 tablet   What changed:  You were already taking a medication with the same name, and this prescription was added. Make sure you understand how and when to take each.   Quantity:  20 tablet        Take 1 tablet by mouth 2 times daily for 10 days   Refills:  0        * Notice:  This list has 2 medication(s) that are the same as other medications prescribed for you. Read the directions carefully, and ask your doctor or other care provider to review them with you.      Our records show that you are taking the medicines listed below. If these are incorrect, please call your family doctor or clinic.        Dose / Directions Last dose taken    acetaminophen 325 MG tablet   Commonly known as:  TYLENOL   Dose:  650 mg   Quantity:  100 tablet        Take 2 tablets (650 mg) by mouth every 6 hours   Refills:  0        buprenorphine HCl-naloxone HCl 8-2 MG per film   Commonly known as:  SUBOXONE   Dose:  1 Film   Quantity:  4 each        Place 1 Film under the tongue daily   Refills:  0        clonazePAM 0.5 MG tablet   Commonly known as:  klonoPIN   Dose:  0.5 mg   Quantity:  30 tablet        Take 1 tablet (0.5 mg) by mouth 2 times daily   Refills:  0        ibuprofen 600 MG tablet   Commonly known as:  ADVIL/MOTRIN   Dose:  600 mg   Quantity:  30 tablet        Take 1 tablet (600 mg) by mouth every 6 hours as needed for moderate pain   Refills:  0        levomilnacipran 80 MG 24 hr capsule   Commonly known as:  FETZIMA   Dose:  80 mg   Quantity:  3 capsule        Take 1 capsule (80 mg) by mouth daily   Refills:  0        pregabalin 150 MG capsule   Commonly known as:  LYRICA   Dose:  150 mg   Quantity:  90 capsule        Take 1 capsule (150 mg) by mouth 2 times daily   Refills:  0        QUEtiapine 50 MG tablet   Commonly known as:  SEROquel   Dose:  50 mg   Quantity:  120 tablet        Take 1 tablet (50 mg) by mouth 2 times daily    Refills:  0        traZODone 50 MG tablet   Commonly known as:  DESYREL   Dose:  50 mg   Quantity:  10 tablet        Take 1 tablet (50 mg) by mouth At Bedtime   Refills:  0        TRIUMEQ 600- MG per tablet   Quantity:  30 tablet   Generic drug:  abacavir-dolutegravir-LamiVUDine        TAKE ONE TABLET BY MOUTH EVERY DAY   Refills:  3                Prescriptions were sent or printed at these locations (1 Prescription)                   Other Prescriptions                Printed at Department/Unit printer (1 of 1)         sulfamethoxazole-trimethoprim (BACTRIM DS) 800-160 MG per tablet                Procedures and tests performed during your visit     Basic metabolic panel    CBC with platelets + differential    CT Head w/o Contrast    Drug abuse screen 77 urine ([place], [place], [facility])      Orders Needing Specimen Collection     None      Pending Results     Date and Time Order Name Status Description    1/29/2018 1922 CT Head w/o Contrast Preliminary             Pending Culture Results     No orders found from 1/27/2018 to 1/30/2018.            Pending Results Instructions     If you had any lab results that were not finalized at the time of your Discharge, you can call the ED Lab Result RN at [phone]. You will be contacted by this team for any positive Lab results or changes in treatment. The nurses are available 7 days a week from 10A to 6:30P.  You can leave a message 24 hours per day and they will return your call.        Test Results From Your Hospital Stay        1/29/2018  7:49 PM      Narrative     CT SCAN OF THE HEAD WITHOUT CONTRAST   1/29/2018 7:45 PM     HISTORY: trauma, confusion;     TECHNIQUE:  Axial images of the head and coronal reformations without  IV contrast material. Radiation dose for this scan was reduced using  automated exposure control, adjustment of the mA and/or kV according  to patient size, or iterative reconstruction technique.    COMPARISON: MR scan dated 9/9/2017.    FINDINGS:   The ventricles are normal in size, shape and configuration.   The brain parenchyma and subarachnoid spaces are normal. There is no  evidence of intracranial hemorrhage, mass, acute infarct or anomaly.  The visualized portions of the sinuses and mastoids appear normal.  Mild soft tissue swelling over the right frontal region. No  intracranial hemorrhage or skull fractures.        Impression     IMPRESSION: No bleed or skull fractures are identified.           1/29/2018  7:57 PM      Component Results     Component Value Ref Range & Units Status    WBC 9.3 4.0 - 11.0 10e9/L Final    RBC Count 5.32 4.4 - 5.9 10e12/L Final    Hemoglobin 15.6 13.3 - 17.7 g/dL Final    Hematocrit 45.1 40.0 - 53.0 % Final    MCV 85 78 - 100 fl Final    MCH 29.3 26.5 - 33.0 pg Final    MCHC 34.6 31.5 - 36.5 g/dL Final    RDW 16.9 (H) 10.0 - 15.0 % Final    Platelet Count 249 150 - 450 10e9/L Final    Diff Method Automated Method  Final    % Neutrophils 46.7 % Final    % Lymphocytes 39.9 % Final    % Monocytes 10.6 % Final    % Eosinophils 2.2 % Final    % Basophils 0.3 % Final    % Immature Granulocytes 0.3 % Final    Nucleated RBCs 0 0 /100 Final    Absolute Neutrophil 4.3 1.6 - 8.3 10e9/L Final    Absolute Lymphocytes 3.7 0.8 - 5.3 10e9/L Final    Absolute Monocytes 1.0 0.0 - 1.3 10e9/L Final    Absolute Eosinophils 0.2 0.0 - 0.7 10e9/L Final    Absolute Basophils 0.0 0.0 - 0.2 10e9/L Final    Abs Immature Granulocytes 0.0 0 - 0.4 10e9/L Final    Absolute Nucleated RBC 0.0  Final         1/29/2018  8:13 PM      Component Results     Component Value Ref Range & Units Status    Sodium 136 133 - 144 mmol/L Final    Potassium 4.2 3.4 - 5.3 mmol/L Final    Specimen slightly hemolyzed, potassium may be falsely elevated    Chloride 104 94 - 109 mmol/L Final    Carbon Dioxide 27 20 - 32 mmol/L Final    Anion Gap 5 3 - 14 mmol/L Final    Glucose 83 70 - 99 mg/dL Final    Urea Nitrogen 16 7 - 30 mg/dL Final    Creatinine 0.77 0.66 - 1.25 mg/dL  Final    GFR Estimate >90 >60 mL/min/1.7m2 Final    Non  GFR Calc    GFR Estimate If Black >90 >60 mL/min/1.7m2 Final    African American GFR Calc    Calcium 9.4 8.5 - 10.1 mg/dL Final         1/29/2018  8:13 PM      Component Results     Component Value Ref Range & Units Status    Amphetamine Qual Urine Positive (A) NEG^Negative Final    Cutoff for a positive amphetamine is greater than 500 ng/mL. This is an   unconfirmed screening result to be used for medical purposes only.      Barbiturates Qual Urine Negative NEG^Negative Final    Cutoff for a negative barbiturate is 200 ng/mL or less.    Benzodiazepine Qual Urine Positive (A) NEG^Negative Final    Cutoff for a positive benzodiazepine is greater than 200 ng/mL. This is an   unconfirmed screening result to be used for medical purposes only.      Cannabinoids Qual Urine Positive (A) NEG^Negative Final    Cutoff for a positive cannabinoid is greater than 50 ng/mL. This is an   unconfirmed screening result to be used for medical purposes only.      Cocaine Qual Urine Negative NEG^Negative Final    Cutoff for a negative cocaine is 300 ng/mL or less.    Opiates Qualitative Urine Positive (A) NEG^Negative Final    Cutoff for a positive opiate is greater than 300 ng/mL. This is an unconfirmed   screening result to be used for medical purposes only.      PCP Qual Urine Negative NEG^Negative Final    Cutoff for a negative PCP is 25 ng/mL or less.                Clinical Quality Measure: Blood Pressure Screening     Your blood pressure was checked while you were in the emergency department today. The last reading we obtained was  BP: (!) 168/115 . Please read the guidelines below about what these numbers mean and what you should do about them.  If your systolic blood pressure (the top number) is less than 120 and your diastolic blood pressure (the bottom number) is less than 80, then your blood pressure is normal. There is nothing more that you need to do  "about it.  If your systolic blood pressure (the top number) is 120-139 or your diastolic blood pressure (the bottom number) is 80-89, your blood pressure may be higher than it should be. You should have your blood pressure rechecked within a year by a primary care provider.  If your systolic blood pressure (the top number) is 140 or greater or your diastolic blood pressure (the bottom number) is 90 or greater, you may have high blood pressure. High blood pressure is treatable, but if left untreated over time it can put you at risk for heart attack, stroke, or kidney failure. You should have your blood pressure rechecked by a primary care provider within the next 4 weeks.  If your provider in the emergency department today gave you specific instructions to follow-up with your doctor or provider even sooner than that, you should follow that instruction and not wait for up to 4 weeks for your follow-up visit.        Thank you for choosing [facility]       Thank you for choosing [facility] for your care. Our goal is always to provide you with excellent care. Hearing back from our patients is one way we can continue to improve our services. Please take a few minutes to complete the written survey that you may receive in the mail after you visit with us. Thank you!        [facility] Information     [employer] lets you send messages to your doctor, view your test results, renew your prescriptions, schedule appointments and more. To sign up, go to [email or web address] . Click on \"Log in\" on the left side of the screen, which will take you to the Welcome page. Then click on \"Sign up Now\" on the right side of the page.     You will be asked to enter the access code listed below, as well as some personal information. Please follow the directions to create your username and password.     Your access code is: [email or web address]  Expires: 2018  8:31 PM     Your access code will  in 90 days. If you need help or a new code, please " call your [facility] clinic or [phone].        Care EveryWhere ID     This is your Care EveryWhere ID. This could be used by other organizations to access your [facility] medical records  [id number]        Equal Access to Services     ALMA MERCEDES : Félix Welch, waaxda luqadaha, qaybta kaalmada lilia, tawanda kramer. [facility] [phone].    ATENCIÓN: Si habla español, tiene a chatterjee disposición servicios gratuitos de asistencia lingüística. Llame al [phone].    We comply with applicable federal civil rights laws and [place] laws. We do not discriminate on the basis of race, color, national origin, age, disability, sex, sexual orientation, or gender identity.            After Visit Summary       This is your record. Keep this with you and show to your community pharmacist(s) and doctor(s) at your next visit.

## 2018-01-30 NOTE — DISCHARGE INSTRUCTIONS
Alcohol Addiction  Are you addicted to alcohol?    You may be addicted to alcohol if your drinking harms yourself or others or leads to other problems with your daily life.  The medical term for this is alcohol use disorder. Your healthcare provider may diagnose you with this disorder if you have at least two of the following problems within the span of a year:    You drink alcohol in larger amounts or for a longer period than you intended.    You frequently want to cut down or control alcohol use, or have frequently failed efforts to do so.    You spend a lot of time getting alcohol, using alcohol, or recovering from alcohol use.    You crave or have a strong desire or urge to drink alcohol.    Ongoing alcohol use makes it hard for you to be responsible at work, school, or home.    You continue to use alcohol even though you have had problems in relationships or social settings because of it.    You give up or miss important social, work, or recreational activities because of alcohol use.    You drink alcohol in situations in which it is physically unsafe, such as drinking then driving while intoxicated.    You continue to drink alcohol despite knowing it has caused physical or emotional problems.    You need more and more alcohol to get the same effects.    You hide how much alcohol you drink from family and friends.    You have withdrawal symptoms or use alcohol to avoid withdrawal symptoms.  Date Last Reviewed: 2/1/2017 2000-2017 [employer]. [address] [place] [address]. All rights reserved. This information is not intended as a substitute for professional medical care. Always follow your healthcare professional's instructions.        Wound Care    You have a break in the skin. This wound may be because of an injury. Or it may be the result of surgery. Closing the wound helps stop bleeding, protects the wound from infection, and speeds healing. The type of closure that is used  depends on the size and location of the wound. Choices include stitches (sutures), strips of surgical tape, skin glue, or staples.  Home care  Your healthcare provider may prescribe medicines for pain. Or he or she may suggest an over-the-counter (OTC) pain reliever, such as ibuprofen. If you have chronic kidney disease, talk with your provider before taking any OTC medicines. Also talk with your provider if you've had a stomach ulcer or gastrointestinal bleeding. In certain cases, antibiotics may be prescribed to help prevent infection. If antibiotics are prescribed, take them exactly as directed for as long as directed. Do not stop taking your antibiotics until they are all gone, even if you feel better.  General care    Follow the healthcare provider s instructions on how to care for the wound.    Wash your hands with soap and warm water before and after caring for the wound. This helps prevent infection.    If a bandage was applied, change it once a day or as directed. If at any time the bandage becomes wet or dirty, replace it with a new one.    Unless told otherwise, avoid soaking the wound in water. Take showers or sponge baths instead of tub baths. Don't scrub or pick at the wound.    Don't go swimming.    If you have a bandage and it gets wet, use a clean cloth to gently pat the wound dry. Then replace the bandage with a dry one.    Don't scratch, rub, or pick at the area.    Watch for the signs of infection listed below. Any wound can get infected, even if you are taking antibiotics. Seek care right away if you see any possible signs of infection.  Care for specific closures    Sutures. You may want to clean the wound daily after the first 2 to 3 days. To do this, remove the bandage and gently wash the area with soap and warm water. After cleaning, apply a thin layer of antibiotic ointment if recommended. Then apply a new bandage. Sutures on the outside of the skin usually need to be removed by your  healthcare provider.    Surgical tape. Keep the area dry. If it gets wet, blot it dry with a towel. Surgical tape closures usually fall off within 7 to 10 days. If they have not fallen off after 10 days, you can remove them yourself. To remove the tape, use mineral oil or petroleum jelly on a cotton ball to gently rub the adhesive.    Skin glue. You may shower or bathe as usual, but do not use soaps, lotions, or ointments on the wound area. Do not scrub the wound. After bathing, pat the wound dry with a soft towel. Do not apply liquids like peroxide, ointments, or creams to the wound while the strips or film is in place. Don't scratch, rub, or pick at the strips or film. Don't put tape directly over the strips or film. Skin adhesive film will fall off naturally in 5 to 10 days. If it does not peel off in 10 days, gently rub petroleum jelly or an ointment onto the film.    Mahin. Take showers or sponge baths. Don't take tub baths. Don't use lotions on the wound area. The area may be cleaned with soap and water 2 to 3 days after the wound was stapled. Don't scrub the wound. Pat it dry with a clean soft cloth or towel. You can use antibiotic ointment if your provider tells you to. Staples will need to be removed by your healthcare provider in 10 to 14 days.  Follow-up care  Follow up with your healthcare provider, or as directed. If you have sutures or staples, return for their removal as directed.  When to seek medical advice  Call your healthcare provider right away if you have signs of infection:    Fever of 100.4 F (38 C) or higher, or as directed by your healthcare provider    Increasing pain in the wound    Increasing redness or swelling    Pus or bad-smelling drainage from the wound  Also call your provider right away if any of these occur:    Wound bleeds more than a small amount or won t stop bleeding    Wound edges come apart    Numbness or weakness in the wound area that doesn t go away  Date Last  Reviewed: 1/7/2016 2000-2017 [email or web address]. [address] [place] [address], [place] [address]. All rights reserved. This information is not intended as a substitute for professional medical care. Always follow your healthcare professional's instructions.
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
## 2018-02-07 ENCOUNTER — TELEPHONE (OUTPATIENT)
Dept: PHARMACY | Facility: CLINIC | Age: 38
End: 2018-02-07

## 2018-02-07 NOTE — TELEPHONE ENCOUNTER
Father called to or refill for the patient. Has 7 days left.   Will mail 1 prescriptions address has been verified.     Follow up: 03/07/18    Ceci Chau, [facility]  [phone]

## 2018-03-07 ENCOUNTER — TELEPHONE (OUTPATIENT)
Dept: PHARMACY | Facility: CLINIC | Age: 38
End: 2018-03-07

## 2018-03-07 NOTE — TELEPHONE ENCOUNTER
Attempted to contact the patient to for refill reminder call,  Ceci texted patient.     Mackenzie Doherty, [facility] Pharmacist.   [phone]

## 2018-03-08 ENCOUNTER — TELEPHONE (OUTPATIENT)
Dept: PHARMACY | Facility: CLINIC | Age: 38
End: 2018-03-08

## 2018-03-08 DIAGNOSIS — B20 HUMAN IMMUNODEFICIENCY VIRUS (HIV) DISEASE (H): ICD-10-CM

## 2018-03-08 NOTE — TELEPHONE ENCOUNTER
Called patient for refill reminder.    Will mail 2 prescriptions address has been verified.     2 month of on time refill.    Follow up: 04/06/18    Ceci Chau, [facility]  [phone]

## 2018-03-12 RX ORDER — ABACAVIR SULFATE, DOLUTEGRAVIR SODIUM, LAMIVUDINE 600; 50; 300 MG/1; MG/1; MG/1
TABLET, FILM COATED ORAL
Qty: 90 TABLET | Refills: 0 | Status: SHIPPED | OUTPATIENT
Start: 2018-03-12 | End: 2018-05-04

## 2018-04-06 ENCOUNTER — TELEPHONE (OUTPATIENT)
Dept: PHARMACY | Facility: CLINIC | Age: 38
End: 2018-04-06

## 2018-04-06 NOTE — TELEPHONE ENCOUNTER
Attempted to contact the patient to for refill reminder call,  left message on voicemail  Sent message to Ceci to send a text message to Walt as corey    Follow up date: 4/10/18    Roxane

## 2018-04-10 ENCOUNTER — TELEPHONE (OUTPATIENT)
Dept: PHARMACY | Facility: CLINIC | Age: 38
End: 2018-04-10

## 2018-04-10 NOTE — TELEPHONE ENCOUNTER
Attempted to contact the patient to for refill reminder call,  left message on voicemail    TRESSA Hednrickson Pharmacist.   [phone]

## 2018-05-04 ENCOUNTER — HOSPITAL ENCOUNTER (INPATIENT)
Facility: CLINIC | Age: 38
LOS: 10 days | Discharge: HOME | DRG: 603 | End: 2018-05-14
Attending: EMERGENCY MEDICINE | Admitting: INTERNAL MEDICINE
Payer: COMMERCIAL

## 2018-05-04 ENCOUNTER — APPOINTMENT (OUTPATIENT)
Dept: GENERAL RADIOLOGY | Facility: CLINIC | Age: 38
DRG: 603 | End: 2018-05-04
Attending: ORTHOPAEDIC SURGERY
Payer: COMMERCIAL

## 2018-05-04 DIAGNOSIS — L03.114 CELLULITIS OF LEFT UPPER EXTREMITY: ICD-10-CM

## 2018-05-04 DIAGNOSIS — B20 HIV DISEASE (H): ICD-10-CM

## 2018-05-04 DIAGNOSIS — F41.1 GAD (GENERALIZED ANXIETY DISORDER): Primary | ICD-10-CM

## 2018-05-04 DIAGNOSIS — F19.90 IV DRUG USER: ICD-10-CM

## 2018-05-04 DIAGNOSIS — M70.22 OLECRANON BURSITIS OF LEFT ELBOW: ICD-10-CM

## 2018-05-04 LAB
ALBUMIN SERPL-MCNC: 3.3 G/DL (ref 3.4–5)
ALP SERPL-CCNC: 103 U/L (ref 40–150)
ALT SERPL W P-5'-P-CCNC: 142 U/L (ref 0–70)
ANION GAP SERPL CALCULATED.3IONS-SCNC: 6 MMOL/L (ref 3–14)
AST SERPL W P-5'-P-CCNC: 74 U/L (ref 0–45)
BASOPHILS # BLD AUTO: 0 10E9/L (ref 0–0.2)
BASOPHILS NFR BLD AUTO: 0.2 %
BILIRUB SERPL-MCNC: 0.4 MG/DL (ref 0.2–1.3)
BUN SERPL-MCNC: 7 MG/DL (ref 7–30)
CALCIUM SERPL-MCNC: 9.4 MG/DL (ref 8.5–10.1)
CHLORIDE SERPL-SCNC: 99 MMOL/L (ref 94–109)
CO2 SERPL-SCNC: 33 MMOL/L (ref 20–32)
CREAT SERPL-MCNC: 0.71 MG/DL (ref 0.66–1.25)
DIFFERENTIAL METHOD BLD: ABNORMAL
EOSINOPHIL # BLD AUTO: 0.2 10E9/L (ref 0–0.7)
EOSINOPHIL NFR BLD AUTO: 1.5 %
ERYTHROCYTE [DISTWIDTH] IN BLOOD BY AUTOMATED COUNT: 13.2 % (ref 10–15)
GFR SERPL CREATININE-BSD FRML MDRD: >90 ML/MIN/1.7M2
GLUCOSE SERPL-MCNC: 78 MG/DL (ref 70–99)
HCT VFR BLD AUTO: 40.4 % (ref 40–53)
HGB BLD-MCNC: 13.4 G/DL (ref 13.3–17.7)
IMM GRANULOCYTES # BLD: 0.1 10E9/L (ref 0–0.4)
IMM GRANULOCYTES NFR BLD: 0.6 %
LACTATE BLD-SCNC: 1.1 MMOL/L (ref 0.7–2)
LIPASE SERPL-CCNC: 93 U/L (ref 73–393)
LYMPHOCYTES # BLD AUTO: 2.4 10E9/L (ref 0.8–5.3)
LYMPHOCYTES NFR BLD AUTO: 18 %
MCH RBC QN AUTO: 29.4 PG (ref 26.5–33)
MCHC RBC AUTO-ENTMCNC: 33.2 G/DL (ref 31.5–36.5)
MCV RBC AUTO: 89 FL (ref 78–100)
MONOCYTES # BLD AUTO: 1.2 10E9/L (ref 0–1.3)
MONOCYTES NFR BLD AUTO: 8.8 %
NEUTROPHILS # BLD AUTO: 9.6 10E9/L (ref 1.6–8.3)
NEUTROPHILS NFR BLD AUTO: 70.9 %
NRBC # BLD AUTO: 0 10*3/UL
NRBC BLD AUTO-RTO: 0 /100
PLATELET # BLD AUTO: 318 10E9/L (ref 150–450)
POTASSIUM SERPL-SCNC: 3.2 MMOL/L (ref 3.4–5.3)
PROT SERPL-MCNC: 8.6 G/DL (ref 6.8–8.8)
RBC # BLD AUTO: 4.56 10E12/L (ref 4.4–5.9)
SODIUM SERPL-SCNC: 138 MMOL/L (ref 133–144)
WBC # BLD AUTO: 13.6 10E9/L (ref 4–11)

## 2018-05-04 PROCEDURE — 99222 1ST HOSP IP/OBS MODERATE 55: CPT | Mod: AI | Performed by: INTERNAL MEDICINE

## 2018-05-04 PROCEDURE — 25000128 H RX IP 250 OP 636: Performed by: INTERNAL MEDICINE

## 2018-05-04 PROCEDURE — 83605 ASSAY OF LACTIC ACID: CPT | Performed by: EMERGENCY MEDICINE

## 2018-05-04 PROCEDURE — 99285 EMERGENCY DEPT VISIT HI MDM: CPT | Mod: 25

## 2018-05-04 PROCEDURE — 25000128 H RX IP 250 OP 636: Performed by: EMERGENCY MEDICINE

## 2018-05-04 PROCEDURE — 80053 COMPREHEN METABOLIC PANEL: CPT | Performed by: EMERGENCY MEDICINE

## 2018-05-04 PROCEDURE — 83690 ASSAY OF LIPASE: CPT | Performed by: EMERGENCY MEDICINE

## 2018-05-04 PROCEDURE — 25000128 H RX IP 250 OP 636

## 2018-05-04 PROCEDURE — 96366 THER/PROPH/DIAG IV INF ADDON: CPT

## 2018-05-04 PROCEDURE — 29105 APPLICATION LONG ARM SPLINT: CPT | Mod: LT

## 2018-05-04 PROCEDURE — 25000132 ZZH RX MED GY IP 250 OP 250 PS 637: Performed by: EMERGENCY MEDICINE

## 2018-05-04 PROCEDURE — 85025 COMPLETE CBC W/AUTO DIFF WBC: CPT | Performed by: EMERGENCY MEDICINE

## 2018-05-04 PROCEDURE — 25000132 ZZH RX MED GY IP 250 OP 250 PS 637: Performed by: INTERNAL MEDICINE

## 2018-05-04 PROCEDURE — 12000000 ZZH R&B MED SURG/OB

## 2018-05-04 PROCEDURE — 96365 THER/PROPH/DIAG IV INF INIT: CPT

## 2018-05-04 PROCEDURE — 25000125 ZZHC RX 250: Performed by: EMERGENCY MEDICINE

## 2018-05-04 PROCEDURE — 87040 BLOOD CULTURE FOR BACTERIA: CPT | Performed by: EMERGENCY MEDICINE

## 2018-05-04 PROCEDURE — 73070 X-RAY EXAM OF ELBOW: CPT | Mod: LT

## 2018-05-04 PROCEDURE — 99207 ZZC CDG-MDM COMPONENT: MEETS LOW - DOWN CODED: CPT | Performed by: INTERNAL MEDICINE

## 2018-05-04 RX ORDER — BUPRENORPHINE AND NALOXONE 8; 2 MG/1; MG/1
1 FILM, SOLUBLE BUCCAL; SUBLINGUAL DAILY PRN
Status: ON HOLD | COMMUNITY
End: 2021-12-30

## 2018-05-04 RX ORDER — POTASSIUM CL/LIDO/0.9 % NACL 10MEQ/0.1L
10 INTRAVENOUS SOLUTION, PIGGYBACK (ML) INTRAVENOUS
Status: DISCONTINUED | OUTPATIENT
Start: 2018-05-04 | End: 2018-05-14 | Stop reason: HOSPADM

## 2018-05-04 RX ORDER — CLONAZEPAM 1 MG/1
1 TABLET ORAL DAILY
Status: DISCONTINUED | OUTPATIENT
Start: 2018-05-04 | End: 2018-05-14 | Stop reason: HOSPADM

## 2018-05-04 RX ORDER — SODIUM CHLORIDE 9 MG/ML
1000 INJECTION, SOLUTION INTRAVENOUS CONTINUOUS
Status: DISCONTINUED | OUTPATIENT
Start: 2018-05-04 | End: 2018-05-04

## 2018-05-04 RX ORDER — IBUPROFEN 600 MG/1
600 TABLET, FILM COATED ORAL ONCE
Status: COMPLETED | OUTPATIENT
Start: 2018-05-04 | End: 2018-05-04

## 2018-05-04 RX ORDER — PROMETHAZINE HYDROCHLORIDE 25 MG/1
25 TABLET ORAL AT BEDTIME
COMMUNITY
End: 2021-11-15

## 2018-05-04 RX ORDER — POTASSIUM CHLORIDE 29.8 MG/ML
20 INJECTION INTRAVENOUS
Status: DISCONTINUED | OUTPATIENT
Start: 2018-05-04 | End: 2018-05-14 | Stop reason: HOSPADM

## 2018-05-04 RX ORDER — POTASSIUM CHLORIDE 7.45 MG/ML
10 INJECTION INTRAVENOUS
Status: DISCONTINUED | OUTPATIENT
Start: 2018-05-04 | End: 2018-05-14 | Stop reason: HOSPADM

## 2018-05-04 RX ORDER — PROMETHAZINE HYDROCHLORIDE 25 MG/1
25 TABLET ORAL AT BEDTIME
Status: DISCONTINUED | OUTPATIENT
Start: 2018-05-04 | End: 2018-05-14 | Stop reason: HOSPADM

## 2018-05-04 RX ORDER — CEFAZOLIN SODIUM 1 G/50ML
1250 SOLUTION INTRAVENOUS ONCE
Status: COMPLETED | OUTPATIENT
Start: 2018-05-04 | End: 2018-05-04

## 2018-05-04 RX ORDER — IBUPROFEN 600 MG/1
600 TABLET, FILM COATED ORAL ONCE
Status: DISCONTINUED | OUTPATIENT
Start: 2018-05-04 | End: 2018-05-04

## 2018-05-04 RX ORDER — POTASSIUM CHLORIDE 1500 MG/1
20-40 TABLET, EXTENDED RELEASE ORAL
Status: DISCONTINUED | OUTPATIENT
Start: 2018-05-04 | End: 2018-05-14 | Stop reason: HOSPADM

## 2018-05-04 RX ORDER — CEFTRIAXONE 2 G/1
2 INJECTION, POWDER, FOR SOLUTION INTRAMUSCULAR; INTRAVENOUS ONCE
Status: COMPLETED | OUTPATIENT
Start: 2018-05-04 | End: 2018-05-04

## 2018-05-04 RX ORDER — MODAFINIL 100 MG/1
100 TABLET ORAL DAILY
Status: DISCONTINUED | OUTPATIENT
Start: 2018-05-05 | End: 2018-05-14 | Stop reason: HOSPADM

## 2018-05-04 RX ORDER — BUPRENORPHINE AND NALOXONE 8; 2 MG/1; MG/1
1 FILM, SOLUBLE BUCCAL; SUBLINGUAL 2 TIMES DAILY
Status: DISCONTINUED | OUTPATIENT
Start: 2018-05-04 | End: 2018-05-14 | Stop reason: HOSPADM

## 2018-05-04 RX ORDER — TESTOSTERONE CYPIONATE 200 MG/ML
200 INJECTION, SOLUTION INTRAMUSCULAR
COMMUNITY

## 2018-05-04 RX ORDER — CEFTRIAXONE 2 G/1
2 INJECTION, POWDER, FOR SOLUTION INTRAMUSCULAR; INTRAVENOUS EVERY 24 HOURS
Status: DISCONTINUED | OUTPATIENT
Start: 2018-05-05 | End: 2018-05-13

## 2018-05-04 RX ORDER — POTASSIUM CHLORIDE 1.5 G/1.58G
20-40 POWDER, FOR SOLUTION ORAL
Status: DISCONTINUED | OUTPATIENT
Start: 2018-05-04 | End: 2018-05-14 | Stop reason: HOSPADM

## 2018-05-04 RX ORDER — ACETAMINOPHEN 325 MG/1
650 TABLET ORAL EVERY 4 HOURS PRN
Status: DISCONTINUED | OUTPATIENT
Start: 2018-05-04 | End: 2018-05-14 | Stop reason: HOSPADM

## 2018-05-04 RX ORDER — PREGABALIN 75 MG/1
150 CAPSULE ORAL 2 TIMES DAILY
Status: DISCONTINUED | OUTPATIENT
Start: 2018-05-04 | End: 2018-05-14 | Stop reason: HOSPADM

## 2018-05-04 RX ORDER — VANCOMYCIN HYDROCHLORIDE 1 G/200ML
1000 INJECTION, SOLUTION INTRAVENOUS EVERY 12 HOURS
Status: DISCONTINUED | OUTPATIENT
Start: 2018-05-05 | End: 2018-05-06 | Stop reason: DRUGHIGH

## 2018-05-04 RX ORDER — NALOXONE HYDROCHLORIDE 0.4 MG/ML
.1-.4 INJECTION, SOLUTION INTRAMUSCULAR; INTRAVENOUS; SUBCUTANEOUS
Status: DISCONTINUED | OUTPATIENT
Start: 2018-05-04 | End: 2018-05-14 | Stop reason: HOSPADM

## 2018-05-04 RX ORDER — SODIUM CHLORIDE 9 MG/ML
INJECTION, SOLUTION INTRAVENOUS CONTINUOUS
Status: DISCONTINUED | OUTPATIENT
Start: 2018-05-04 | End: 2018-05-04

## 2018-05-04 RX ORDER — SODIUM CHLORIDE AND POTASSIUM CHLORIDE 150; 900 MG/100ML; MG/100ML
INJECTION, SOLUTION INTRAVENOUS CONTINUOUS
Status: DISCONTINUED | OUTPATIENT
Start: 2018-05-04 | End: 2018-05-06

## 2018-05-04 RX ADMIN — POTASSIUM CHLORIDE AND SODIUM CHLORIDE: 900; 150 INJECTION, SOLUTION INTRAVENOUS at 21:43

## 2018-05-04 RX ADMIN — VANCOMYCIN HYDROCHLORIDE 1250 MG: 5 INJECTION, POWDER, LYOPHILIZED, FOR SOLUTION INTRAVENOUS at 17:43

## 2018-05-04 RX ADMIN — POTASSIUM CHLORIDE 20 MEQ: 1500 TABLET, EXTENDED RELEASE ORAL at 21:44

## 2018-05-04 RX ADMIN — CLONAZEPAM 1 MG: 1 TABLET ORAL at 19:40

## 2018-05-04 RX ADMIN — BUPRENORPHINE HYDROCHLORIDE, NALOXONE HYDROCHLORIDE 1 FILM: 8; 2 FILM, SOLUBLE BUCCAL; SUBLINGUAL at 21:44

## 2018-05-04 RX ADMIN — PREGABALIN 150 MG: 75 CAPSULE ORAL at 21:44

## 2018-05-04 RX ADMIN — IBUPROFEN 600 MG: 600 TABLET ORAL at 17:45

## 2018-05-04 RX ADMIN — PROMETHAZINE HYDROCHLORIDE 25 MG: 25 TABLET ORAL at 21:50

## 2018-05-04 RX ADMIN — CEFTRIAXONE 2 G: 2 INJECTION, POWDER, FOR SOLUTION INTRAMUSCULAR; INTRAVENOUS at 17:05

## 2018-05-04 RX ADMIN — SODIUM CHLORIDE 1000 ML: 9 INJECTION, SOLUTION INTRAVENOUS at 17:06

## 2018-05-04 RX ADMIN — POTASSIUM CHLORIDE 40 MEQ: 1500 TABLET, EXTENDED RELEASE ORAL at 19:40

## 2018-05-04 RX ADMIN — LIDOCAINE HYDROCHLORIDE 10 ML: 20 SOLUTION ORAL; TOPICAL at 17:46

## 2018-05-04 RX ADMIN — ABACAVIR SULFATE, DOLUTEGRAVIR SODIUM, LAMIVUDINE 1 TABLET: 600; 50; 300 TABLET, FILM COATED ORAL at 21:44

## 2018-05-04 ASSESSMENT — ENCOUNTER SYMPTOMS
DIARRHEA: 0
ARTHRALGIAS: 1
VOMITING: 0
NAUSEA: 0
FEVER: 0

## 2018-05-04 ASSESSMENT — ACTIVITIES OF DAILY LIVING (ADL)
TRANSFERRING: 0-->INDEPENDENT
COGNITION: 0 - NO COGNITION ISSUES REPORTED
TOILETING: 0-->INDEPENDENT
AMBULATION: 0-->INDEPENDENT
SWALLOWING: 0-->SWALLOWS FOODS/LIQUIDS WITHOUT DIFFICULTY
DRESS: 0-->INDEPENDENT
NUMBER_OF_TIMES_PATIENT_HAS_FALLEN_WITHIN_LAST_SIX_MONTHS: 2
RETIRED_COMMUNICATION: 0-->UNDERSTANDS/COMMUNICATES WITHOUT DIFFICULTY
RETIRED_EATING: 0-->INDEPENDENT
FALL_HISTORY_WITHIN_LAST_SIX_MONTHS: YES
BATHING: 0-->INDEPENDENT

## 2018-05-04 NOTE — IP AVS SNAPSHOT
MRN:[id number]                      After Visit Summary   5/4/2018    Bc German    MRN: [id number]           Thank you!     Thank you for choosing [facility] for your care. Our goal is always to provide you with excellent care. Hearing back from our patients is one way we can continue to improve our services. Please take a few minutes to complete the written survey that you may receive in the mail after you visit with us. Thank you!        Patient Information     Date Of Birth          1980        About your hospital stay     You were admitted on:  May 4, 2018 You last received care in the:  [facility] [address] [facility]    You were discharged on:  May 14, 2018        Reason for your hospital stay       Management of the infection in your left elbow and arm.                  Who to Call     For medical emergencies, please call 911.  For non-urgent questions about your medical care, please call your primary care provider or clinic, [phone]          Attending Provider     Provider Specialty    Jared Escalera MD Emergency Medicine    PerryMari whitten MD Internal Medicine       Primary Care Provider Office Phone # Fax #    Khari Barrientos [phone]       When to contact your care team       Call your primary doctor if you have any of the following: increased swelling or increased pain or increasing redness.                  After Care Instructions     Activity       Your activity upon discharge: activity as tolerated            Diet       Follow this diet upon discharge: Regular            Discharge Instructions       Strongly consider drug use cessation/ Reassess need for suboxone, emphasised compliance to medications and follow up.            Wound care and dressings       Instructions to care for your wound at home: ice to area for comfort.                  Follow-up Appointments     Follow-up and recommended labs and tests        Follow up with  "Dr Barrientos as scheduled on 18 at 1:40pm for hospital follow up.            Follow-up and recommended labs and tests        Follow up with your PCP as scheduled.  Follow ALT / AST levels in 5 - 7 days or earlier if symptomatic                  Further instructions from your care team       Cigarettes/lighter and Wallet in security, Phone/clothes with patient.     Pending Results     No orders found from 2018 to 2018.            Statement of Approval     Ordered          18 1007  I have reviewed and agree with all the recommendations and orders detailed in this document.  EFFECTIVE NOW     Approved and electronically signed by:  Ale Bolanos MD             Admission Information     Date & Time Provider Department Dept. Phone    2018 Mari Hylton MD [facility] [address] [facility] [phone]      Your Vitals Were     Blood Pressure Pulse Temperature Respirations Height Weight    129/88 (BP Location: Left arm) 77 97.8  F (36.6  C) (Oral) 16 1.778 m (5' 10\") 64 kg (141 lb 1.5 oz)    Pulse Oximetry BMI (Body Mass Index)                98% 20.24 kg/m2          MyChart Information     [employer] lets you send messages to your doctor, view your test results, renew your prescriptions, schedule appointments and more. To sign up, go to [email or web address] . Click on \"Log in\" on the left side of the screen, which will take you to the Welcome page. Then click on \"Sign up Now\" on the right side of the page.     You will be asked to enter the access code listed below, as well as some personal information. Please follow the directions to create your username and password.     Your access code is: [email or web address]  Expires: 2018 10:55 AM     Your access code will  in 90 days. If you need help or a new code, please call your [facility] clinic or [phone].        Care EveryWhere ID     This is your Care EveryWhere ID. This could be used by other organizations to access " your [facility] medical records  [id number]        Equal Access to Services     ALMA MERCEDES : Hadii aad ku hadnataliiajere Welch, wavanessada anthony, qaaveryta kaalleyfrankie teaguehipolitofrankie, tawanda griffinlaithana paula kramer. So [facility] [phone].    ATENCIÓN: Si habla español, tiene a chatterjee disposición servicios gratuitos de asistencia lingüística. Llame al [phone].    We comply with applicable federal civil rights laws and [place] laws. We do not discriminate on the basis of race, color, national origin, age, disability, sex, sexual orientation, or gender identity.               Review of your medicines      START taking        Dose / Directions    cephalexin 250 MG capsule   Commonly known as:  KEFLEX   Indication:  Infection of the Skin and/or Related Soft Tissue   Used for:  Cellulitis of left upper extremity, Olecranon bursitis of left elbow        Dose:  250 mg   Take 1 capsule (250 mg) by mouth every 6 hours for 14 days   Quantity:  56 capsule   Refills:  0       minocycline 100 MG capsule   Commonly known as:  MINOCIN/DYNACIN   Indication:  Infection of the Skin and/or Related Soft Tissue   Used for:  Cellulitis of left upper extremity, Olecranon bursitis of left elbow        Dose:  100 mg   Take 1 capsule (100 mg) by mouth every 12 hours for 14 days   Quantity:  28 capsule   Refills:  0         CONTINUE these medicines which may have CHANGED, or have new prescriptions. If we are uncertain of the size of tablets/capsules you have at home, strength may be listed as something that might have changed.        Dose / Directions    abacavir-dolutegravir-LamiVUDine 600- MG per tablet   Commonly known as:  TRIUMEQ   This may have changed:  medication strength   Used for:  HIV disease (H)        Dose:  1 tablet   Take 1 tablet by mouth daily   Quantity:  5 tablet   Refills:  0       clonazePAM 1 MG tablet   Commonly known as:  klonoPIN   This may have changed:  medication strength   Used for:  JASSI (generalized anxiety  disorder)        Dose:  1 mg   Take 1 tablet (1 mg) by mouth daily for 5 days   Quantity:  5 tablet   Refills:  0       levomilnacipran 40 MG 24 hr capsule   Commonly known as:  FETZIMA   This may have changed:  medication strength   Used for:  HIV disease (H)        Dose:  40 mg   Take 1 capsule (40 mg) by mouth daily for 5 days   Quantity:  5 capsule   Refills:  0       modafinil 100 MG tablet   Commonly known as:  PROVIGIL   This may have changed:  how much to take   Used for:  JASSI (generalized anxiety disorder)        Dose:  50 mg   Take 0.5 tablets (50 mg) by mouth daily for 5 days   Quantity:  3 tablet   Refills:  0       pregabalin 150 MG capsule   Commonly known as:  LYRICA   This may have changed:  medication strength   Used for:  JASSI (generalized anxiety disorder)        Dose:  150 mg   Take 1 capsule (150 mg) by mouth 2 times daily for 5 days   Quantity:  10 capsule   Refills:  0         CONTINUE these medicines which have NOT CHANGED        Dose / Directions    buprenorphine HCl-naloxone HCl 8-2 MG per film   Commonly known as:  SUBOXONE        Dose:  1 Film   Place 1 Film under the tongue 2 times daily   Refills:  0       promethazine 25 MG tablet   Commonly known as:  PHENERGAN        Dose:  25 mg   Take 25 mg by mouth At Bedtime   Refills:  0       testosterone cypionate 200 MG/ML injection   Commonly known as:  DEPOTESTOTERONE        Dose:  0.0015 mg   Inject 0.0015 mg into the muscle once a week Inject 0.3 mL IM weekly   Refills:  0            Where to get your medicines      These medications were sent to [facility] - [address]  [address], [place] [address]     Phone:  [phone]     abacavir-dolutegravir-LamiVUDine 600- MG per tablet    cephalexin 250 MG capsule    levomilnacipran 40 MG 24 hr capsule    minocycline 100 MG capsule         Some of these will need a paper prescription and others can be bought over the counter. Ask your nurse  if you have questions.     Bring a paper prescription for each of these medications     clonazePAM 1 MG tablet    modafinil 100 MG tablet    pregabalin 150 MG capsule                Protect others around you: Learn how to safely use, store and throw away your medicines at www.disposemymeds.org.        ANTIBIOTIC INSTRUCTION     You've Been Prescribed an Antibiotic - Now What?  Your healthcare team thinks that you or your loved one might have an infection. Some infections can be treated with antibiotics, which are powerful, life-saving drugs. Like all medications, antibiotics have side effects and should only be used when necessary. There are some important things you should know about your antibiotic treatment.      Your healthcare team may run tests before you start taking an antibiotic.    Your team may take samples (e.g., from your blood, urine or other areas) to run tests to look for bacteria. These test can be important to determine if you need an antibiotic at all and, if you do, which antibiotic will work best.      Within a few days, your healthcare team might change or even stop your antibiotic.    Your team may start you on an antibiotic while they are working to find out what is making you sick.    Your team might change your antibiotic because test results show that a different antibiotic would be better to treat your infection.    In some cases, once your team has more information, they learn that you do not need an antibiotic at all. They may find out that you don't have an infection, or that the antibiotic you're taking won't work against your infection. For example, an infection caused by a virus can't be treated with antibiotics. Staying on an antibiotic when you don't need it is more likely to be harmful than helpful.      You may experience side effects from your antibiotic.    Like all medications, antibiotics have side effects. Some of these can be serious.    Let you healthcare team know if you  have any known allergies when you are admitted to the hospital.    One significant side effect of nearly all antibiotics is the risk of severe and sometimes deadly diarrhea caused by Clostridium difficile (C. Difficile). This occurs when a person takes antibiotics because some good germs are destroyed. Antibiotic use allows C. diificile to take over, putting patients at high risk for this serious infection.    As a patient or caregiver, it is important to understand your or your loved one's antibiotic treatment. It is especially important for caregivers to speak up when patients can't speak for themselves. Here are some important questions to ask your healthcare team.    What infection is this antibiotic treating and how do you know I have that infection?    What side effects might occur from this antibiotic?    How long will I need to take this antibiotic?    Is it safe to take this antibiotic with other medications or supplements (e.g., vitamins) that I am taking?     Are there any special directions I need to know about taking this antibiotic? For example, should I take it with food?    How will I be monitored to know whether my infection is responding to the antibiotic?    What tests may help to make sure the right antibiotic is prescribed for me?      Information provided by:  www.cdc.gov/getsmart  U.S. Department of Health and Human Services  Centers for disease Control and Prevention  National Center for Emerging and Zoonotic Infectious Diseases  Division of Healthcare Quality Promotion             Medication List: This is a list of all your medications and when to take them. Check marks below indicate your daily home schedule. Keep this list as a reference.      Medications           Morning Afternoon Evening Bedtime As Needed    abacavir-dolutegravir-LamiVUDine 600- MG per tablet   Commonly known as:  TRIUMEQ   Take 1 tablet by mouth daily   Last time this was given:  1 tablet on 5/14/2018  9:53 AM                                    buprenorphine HCl-naloxone HCl 8-2 MG per film   Commonly known as:  SUBOXONE   Place 1 Film under the tongue 2 times daily   Last time this was given:  1 Film on 5/14/2018  8:29 AM                                      cephalexin 250 MG capsule   Commonly known as:  KEFLEX   Take 1 capsule (250 mg) by mouth every 6 hours for 14 days   Last time this was given:  250 mg on 5/14/2018  8:28 AM                                clonazePAM 1 MG tablet   Commonly known as:  klonoPIN   Take 1 tablet (1 mg) by mouth daily for 5 days   Last time this was given:  1 mg on 5/14/2018  8:28 AM            By mouth, daily, for 5 days.                       levomilnacipran 40 MG 24 hr capsule   Commonly known as:  FETZIMA   Take 1 capsule (40 mg) by mouth daily for 5 days   Last time this was given:  40 mg on 5/14/2018  8:29 AM            By mouth, daily, for 5 days.                       minocycline 100 MG capsule   Commonly known as:  MINOCIN/DYNACIN   Take 1 capsule (100 mg) by mouth every 12 hours for 14 days   Last time this was given:  100 mg on 5/14/2018  9:52 AM                                modafinil 100 MG tablet   Commonly known as:  PROVIGIL   Take 0.5 tablets (50 mg) by mouth daily for 5 days   Last time this was given:  100 mg on 5/14/2018  8:29 AM            By mouth, daily, for 5 days.                       pregabalin 150 MG capsule   Commonly known as:  LYRICA   Take 1 capsule (150 mg) by mouth 2 times daily for 5 days   Last time this was given:  150 mg on 5/14/2018  8:28 AM            By mouth, 2 times daily, for 5 days.           By mouth, 2 times daily, for 5 days.               promethazine 25 MG tablet   Commonly known as:  PHENERGAN   Take 25 mg by mouth At Bedtime   Last time this was given:  25 mg on 5/13/2018  9:00 PM                                   testosterone cypionate 200 MG/ML injection   Commonly known as:  DEPOTESTOTERONE   Inject 0.0015 mg into the muscle once a week  Inject 0.3 mL IM weekly

## 2018-05-04 NOTE — PROGRESS NOTES
RECEIVING UNIT ED HANDOFF REVIEW    ED Nurse Handoff Report was reviewed by: Gadiel Cueto on May 4, 2018 at 6:13 PM

## 2018-05-04 NOTE — IP AVS SNAPSHOT
[facility] [address] [facility]    [address] [place] [address]    Phone:  [phone]                                       After Visit Summary   5/4/2018    Bc German    MRN: [id number]           After Visit Summary Signature Page     I have received my discharge instructions, and my questions have been answered. I have discussed any challenges I see with this plan with the nurse or doctor.    ..........................................................................................................................................  Patient/Patient Representative Signature      ..........................................................................................................................................  Patient Representative Print Name and Relationship to Patient    ..................................................               ................................................  Date                                            Time    ..........................................................................................................................................  Reviewed by Signature/Title    ...................................................              ..............................................  Date                                                            Time

## 2018-05-04 NOTE — ED NOTES
"[facility]  ED Nurse Handoff Report    ED Chief complaint: Arm Pain (left arm pain redness and swelling increasing over the past week. Has not checked temp at home. No nausea or vomiting)      ED Diagnosis:   Final diagnoses:   Cellulitis of left upper extremity   Olecranon bursitis of left elbow   HIV disease (H)   IV drug user       Code Status: Full Code    Allergies: No Known Allergies    Activity level - Baseline/Home:  Independent    Activity Level - Current:   Independent     Needed?: No    Isolation: No  Infection: Not Applicable    Bariatric?: No    Vital Signs:   Vitals:    05/04/18 1613 05/04/18 1708   BP: 133/86 133/85   Pulse: 91 89   Resp: 16 22   Temp: 98.2  F (36.8  C) 97.9  F (36.6  C)   TempSrc: Temporal Oral   SpO2: 99% 100%   Weight: 59 kg (130 lb)    Height: 1.778 m (5' 10\")        Cardiac Rhythm: ,   Cardiac  Cardiac Rhythm: Normal sinus rhythm    Pain level: 0-10 Pain Scale: 1    Is this patient confused?: No     Patient Report: Initial Complaint: left arm pain  Focused Assessment: left arm pain, swelling, redness and warmth  Tests Performed:   Results for orders placed or performed during the hospital encounter of 05/04/18   CBC with platelets differential   Result Value Ref Range    WBC 13.6 (H) 4.0 - 11.0 10e9/L    RBC Count 4.56 4.4 - 5.9 10e12/L    Hemoglobin 13.4 13.3 - 17.7 g/dL    Hematocrit 40.4 40.0 - 53.0 %    MCV 89 78 - 100 fl    MCH 29.4 26.5 - 33.0 pg    MCHC 33.2 31.5 - 36.5 g/dL    RDW 13.2 10.0 - 15.0 %    Platelet Count 318 150 - 450 10e9/L    Diff Method Automated Method     % Neutrophils 70.9 %    % Lymphocytes 18.0 %    % Monocytes 8.8 %    % Eosinophils 1.5 %    % Basophils 0.2 %    % Immature Granulocytes 0.6 %    Nucleated RBCs 0 0 /100    Absolute Neutrophil 9.6 (H) 1.6 - 8.3 10e9/L    Absolute Lymphocytes 2.4 0.8 - 5.3 10e9/L    Absolute Monocytes 1.2 0.0 - 1.3 10e9/L    Absolute Eosinophils 0.2 0.0 - 0.7 10e9/L    Absolute Basophils 0.0 " 0.0 - 0.2 10e9/L    Abs Immature Granulocytes 0.1 0 - 0.4 10e9/L    Absolute Nucleated RBC 0.0    Comprehensive metabolic panel   Result Value Ref Range    Sodium 138 133 - 144 mmol/L    Potassium 3.2 (L) 3.4 - 5.3 mmol/L    Chloride 99 94 - 109 mmol/L    Carbon Dioxide 33 (H) 20 - 32 mmol/L    Anion Gap 6 3 - 14 mmol/L    Glucose 78 70 - 99 mg/dL    Urea Nitrogen 7 7 - 30 mg/dL    Creatinine 0.71 0.66 - 1.25 mg/dL    GFR Estimate >90 >60 mL/min/1.7m2    GFR Estimate If Black >90 >60 mL/min/1.7m2    Calcium 9.4 8.5 - 10.1 mg/dL    Bilirubin Total 0.4 0.2 - 1.3 mg/dL    Albumin 3.3 (L) 3.4 - 5.0 g/dL    Protein Total 8.6 6.8 - 8.8 g/dL    Alkaline Phosphatase 103 40 - 150 U/L     (H) 0 - 70 U/L    AST 74 (H) 0 - 45 U/L   Lipase   Result Value Ref Range    Lipase 93 73 - 393 U/L   Lactic acid whole blood   Result Value Ref Range    Lactic Acid 1.1 0.7 - 2.0 mmol/L     Abnormal Results: see above  Treatments provided: see MAR    Family Comments: N/A- father took patient's medications home with him    OBS brochure/video discussed/provided to patient: N/A    ED Medications:   Medications   0.9% sodium chloride BOLUS (1,000 mLs Intravenous New Bag 5/4/18 1706)     Followed by   sodium chloride 0.9% infusion (not administered)   cefTRIAXone (ROCEPHIN) 2 g vial to attach to  ml bag for ADULTS or NS 50 ml bag for PEDS (2 g Intravenous New Bag 5/4/18 1705)   vancomycin (VANCOCIN) 1,250 mg in sodium chloride 0.9 % 250 mL intermittent infusion (not administered)       Drips infusing?:  Yes    For the majority of the shift this patient was Green.   Interventions performed were N/A.    Severe Sepsis OR Septic Shock Diagnosis Present: No      ED NURSE PHONE NUMBER: [phone]

## 2018-05-04 NOTE — PHARMACY-ADMISSION MEDICATION HISTORY
Admission medication history interview status for the 5/4/2018  admission is complete. See EPIC admission navigator for prior to admission medications     Medication history source reliability:Moderate    Actions taken by pharmacist (provider contacted, etc):  Spoke w/ patient and called [facility] pharmacy to verify dosages.      Additional medication history information not noted on PTA med list :   - Patient reports that he is taking Lyrica 200 mg PO BID; however, [facility] states that they last filled a prescription for Lyrica 150 mg PO BID on 4/4/18.    Medication reconciliation/reorder completed by provider prior to medication history? No    Time spent in this activity: 30 minutes    Prior to Admission medications    Medication Sig Last Dose Taking? Auth Provider   Abacavir-Dolutegravir-Lamivud (TRIUMEQ PO) Take 1 tablet by mouth daily 4/28/2018 at am Yes Unknown, Entered By History   buprenorphine HCl-naloxone HCl (SUBOXONE) 8-2 MG per film Place 1 Film under the tongue 2 times daily 5/3/2018 at pm Yes Unknown, Entered By History   CLONAZEPAM PO Take 1 mg by mouth daily 5/3/2018 Yes Unknown, Entered By History   Levomilnacipran HCl (FETZIMA PO) Take 40 mg by mouth daily 5/3/2018 at am Yes Unknown, Entered By History   MODAFINIL PO Take 100 mg by mouth daily ~ 3 weeks ago Yes Unknown, Entered By History   Pregabalin (LYRICA PO) Take 150 mg by mouth 2 times daily 5/3/2018 at pm Yes Unknown, Entered By History   promethazine (PHENERGAN) 25 MG tablet Take 25 mg by mouth At Bedtime 5/3/2018 at hs Yes Unknown, Entered By History   testosterone cypionate (DEPOTESTOTERONE) 200 MG/ML injection Inject 0.0015 mg into the muscle once a week Inject 0.3 mL IM weekly ~ 5 days ago Yes Unknown, Entered By History     Cheri Kelsey, PharmD, BCPS

## 2018-05-04 NOTE — ED PROVIDER NOTES
"  History     Chief Complaint:  Arm pain    HPI   Bc German is a right hand dominant 37 year old male with a medical history of polysubstance abuse, IV drug use, tobacco use and HIV who presents with left arm pain. Patient reports an onset of left arm pain for the last 3-4 days, which has persisted and increased in pain. No recent injuries or trauma. With patient's history IV drug use, he states he typically uses his right arm. He also reports an onset of abdominal pain 1 day ago. Patient thinks it is secondary to hunger, and states that \"food doesn't taste right.\" Of note, patient also has not been taking his HIV medication for the last week.  Denies nausea, vomiting, diarrhea, fever or any other complaints.     Allergies:  No known drug allergies     Medications:    Suboxone  Klonopin  Fetzima  Lyrica   Seroquel  Desyrel  Triumeq     Past Medical History:    Anxiety  Depressive disorder  Drug use, IV  HIV  Osteomyelitis  Substance abuse  Sepsis    Past Surgical History:    Eye surgery  Optical tracking system fusion  Orthopedic surgery  PICC insertion    Family History:    History reviewed. No pertinent family history.      Social History:  Smoking status: Current every day smoker  Alcohol use: Yes   Marital Status:  Single [1]     Review of Systems   Constitutional: Negative for fever.   Gastrointestinal: Negative for diarrhea, nausea and vomiting.   Musculoskeletal: Positive for arthralgias (Left arm pain).   All other systems reviewed and are negative.    Physical Exam   Patient Vitals for the past 24 hrs:   BP Temp Temp src Pulse Heart Rate Resp SpO2 Height Weight   05/04/18 1708 133/85 97.9  F (36.6  C) Oral 89 - 22 100 % - -   05/04/18 1613 133/86 98.2  F (36.8  C) Temporal 91 91 16 99 % 1.778 m (5' 10\") 59 kg (130 lb)      Physical Exam  Nursing note and vitals reviewed.  Constitutional:  Oriented to person, place, and time. Cooperative.   HENT:   Nose:    Nose normal.   Mouth/Throat:   Mucous " membranes are normal. Poor dentition.   Eyes:    Conjunctivae normal and EOM are normal.      Pupils are equal, round, and reactive to light.   Neck:    Trachea normal.   Cardiovascular:  Normal rate, regular rhythm, normal heart sounds and normal pulses. No murmur heard.  Pulmonary/Chest:  Effort normal and breath sounds normal.   Abdominal:   Soft. Normal appearance and bowel sounds are normal.      Tenderness to palpation in periumbilical region.     There is no rebound and no CVA tenderness.   Musculoskeletal:  Extremities atraumatic x 4.   Lymphadenopathy:  No cervical adenopathy.   Neurological:   Alert and oriented to person, place, and time. Normal strength.      No cranial nerve deficit or sensory deficit. GCS eye subscore is 4. GCS verbal subscore is 5. GCS motor subscore is 6.   Skin:    Swelling and fluctuance to left elbow, with significant erythema, warmth, and induration to the entire ulnar aspect of the left upper arm and forearm. Track marks present in the right upper extremity.   Psychiatric:   Normal mood and affect.     Emergency Department Course   Laboratory:  CBC: WBC 13.6 (H) WNL (HGB 13.4, )    CMP: Potassium 3.2 (L), Carbon dioxide 33 (H), Albumin 3.3 (L),  (H), AST 74 (H)WNL (Creatinine 0.71)   Lipase: 93  Lactic acid whole blood (1655): 1.1  Blood culture: Pending.     Procedures:     Narrative:        Splint was applied and after placement I checked and adjusted the fit to    ensure proper positioning. Patient was more comfortable with splint in    place. Sensation and circulation are intact after splint placement.     Interventions:  1705: Rocephin 2 g IV  1706: NS 1L IV Bolus   1745: Advil/Motrin 600 mg  1746: Xylocaine 10 mLs Mouth/Throat  1836: Vancocin 1250 mL IV    Emergency Department Course:  Past medical records, nursing notes, and vitals reviewed.  1624: I performed an exam of the patient and obtained history, as documented above.      1655: IV inserted and blood  drawn for basic laboratory. Lactic acid obtained. Results as noted above.      1705: Rocephin 2g IV given.     1734: I was updated by nurse, patient now has tooth pain and is requesting ibuprofen.     Patient was placed in a splint per the above procedure note.    1734: I discussed the case with Dr. Mari Hylton, hospitalist service who accepts the patient for further care, monitoring and treatment.    1836: Patient given 1250 mL IV Vancocin.     Impression & Plan    Medical Decision Making:  This is a 37-year-old male with a complicated past medical history, who came in with left arm swelling, pain, and redness.  There are no track marks present in his left upper extremity, however that was 1 of my concerns as a potential source or cause of his infection.  It appears that this is cellulitis in addition to olecranon bursitis.  I do not believe there is anything to drain at this time.  I am providing him IV ceftriaxone and vancomycin.  He does not appear to be septic, but I do feel that he warrants admission to the hospital for IV antibiotics, as well as orthopedic consultation.  With regards to his abdominal pain, I am not finding anything significant, which is reassuring.  Therefore do not feel that he warrants any imaging of his abdomen at this time.  We are also placing him in a long-arm splint to immobilize the arm for now.  I spoke with Mari Hylton MD, who will be admitting him.    Diagnosis:    ICD-10-CM   1. Cellulitis of left upper extremity L03.114   2. Olecranon bursitis of left elbow M70.22   3. HIV disease (H) B20   4. IV drug user F19.90     Disposition:  Admitted to the hospitalist service.    Manisha Harris  5/4/2018    EMERGENCY DEPARTMENT  I, Manisha Harris, am serving as a scribe at 4:24 PM on 5/4/2018 to document services personally performed by Jared Escalera MD based on my observations and the provider's statements to me.       Jared Escalera MD  05/04/18 [id number]

## 2018-05-05 LAB
ANION GAP SERPL CALCULATED.3IONS-SCNC: 4 MMOL/L (ref 3–14)
BUN SERPL-MCNC: 9 MG/DL (ref 7–30)
CALCIUM SERPL-MCNC: 8.7 MG/DL (ref 8.5–10.1)
CD3 CELLS # BLD: NORMAL CELLS/UL (ref 603–2990)
CD3 CELLS NFR BLD: NORMAL % (ref 49–84)
CD3+CD4+ CELLS # BLD: NORMAL CELLS/UL (ref 441–2156)
CD3+CD4+ CELLS NFR BLD: NORMAL % (ref 28–63)
CD3+CD4+ CELLS/CD3+CD8+ CLL BLD: NORMAL % (ref 1.4–2.6)
CD3+CD8+ CELLS # BLD: NORMAL CELLS/UL (ref 125–1312)
CD3+CD8+ CELLS NFR BLD: NORMAL % (ref 10–40)
CHLORIDE SERPL-SCNC: 109 MMOL/L (ref 94–109)
CO2 SERPL-SCNC: 29 MMOL/L (ref 20–32)
CREAT SERPL-MCNC: 0.67 MG/DL (ref 0.66–1.25)
CREAT SERPL-MCNC: 0.7 MG/DL (ref 0.66–1.25)
CRP SERPL-MCNC: 99 MG/L (ref 0–8)
ERYTHROCYTE [SEDIMENTATION RATE] IN BLOOD BY WESTERGREN METHOD: 49 MM/H (ref 0–15)
GFR SERPL CREATININE-BSD FRML MDRD: >90 ML/MIN/1.7M2
GFR SERPL CREATININE-BSD FRML MDRD: >90 ML/MIN/1.7M2
GLUCOSE SERPL-MCNC: 124 MG/DL (ref 70–99)
IFC SPECIMEN: NORMAL
IMMUNODEFICIENCY MARKERS SPEC-IMP: NORMAL
MAGNESIUM SERPL-MCNC: 2 MG/DL (ref 1.6–2.3)
POTASSIUM SERPL-SCNC: 3.9 MMOL/L (ref 3.4–5.3)
POTASSIUM SERPL-SCNC: 3.9 MMOL/L (ref 3.4–5.3)
SODIUM SERPL-SCNC: 142 MMOL/L (ref 133–144)

## 2018-05-05 PROCEDURE — 87040 BLOOD CULTURE FOR BACTERIA: CPT | Performed by: INTERNAL MEDICINE

## 2018-05-05 PROCEDURE — 80048 BASIC METABOLIC PNL TOTAL CA: CPT | Performed by: INTERNAL MEDICINE

## 2018-05-05 PROCEDURE — 83735 ASSAY OF MAGNESIUM: CPT | Performed by: INTERNAL MEDICINE

## 2018-05-05 PROCEDURE — 36415 COLL VENOUS BLD VENIPUNCTURE: CPT | Performed by: INTERNAL MEDICINE

## 2018-05-05 PROCEDURE — 85652 RBC SED RATE AUTOMATED: CPT | Performed by: INTERNAL MEDICINE

## 2018-05-05 PROCEDURE — 25000132 ZZH RX MED GY IP 250 OP 250 PS 637: Performed by: INTERNAL MEDICINE

## 2018-05-05 PROCEDURE — 99232 SBSQ HOSP IP/OBS MODERATE 35: CPT | Performed by: INTERNAL MEDICINE

## 2018-05-05 PROCEDURE — 25000128 H RX IP 250 OP 636: Performed by: INTERNAL MEDICINE

## 2018-05-05 PROCEDURE — 86359 T CELLS TOTAL COUNT: CPT | Performed by: INTERNAL MEDICINE

## 2018-05-05 PROCEDURE — 82565 ASSAY OF CREATININE: CPT | Performed by: INTERNAL MEDICINE

## 2018-05-05 PROCEDURE — 84132 ASSAY OF SERUM POTASSIUM: CPT | Performed by: INTERNAL MEDICINE

## 2018-05-05 PROCEDURE — 12000000 ZZH R&B MED SURG/OB

## 2018-05-05 PROCEDURE — 86360 T CELL ABSOLUTE COUNT/RATIO: CPT | Performed by: INTERNAL MEDICINE

## 2018-05-05 PROCEDURE — 86140 C-REACTIVE PROTEIN: CPT | Performed by: INTERNAL MEDICINE

## 2018-05-05 RX ADMIN — PREGABALIN 150 MG: 75 CAPSULE ORAL at 22:31

## 2018-05-05 RX ADMIN — VANCOMYCIN HYDROCHLORIDE 1000 MG: 1 INJECTION, SOLUTION INTRAVENOUS at 19:14

## 2018-05-05 RX ADMIN — CEFTRIAXONE SODIUM 2 G: 2 INJECTION, POWDER, FOR SOLUTION INTRAMUSCULAR; INTRAVENOUS at 17:48

## 2018-05-05 RX ADMIN — POTASSIUM CHLORIDE AND SODIUM CHLORIDE: 900; 150 INJECTION, SOLUTION INTRAVENOUS at 14:25

## 2018-05-05 RX ADMIN — ABACAVIR SULFATE, DOLUTEGRAVIR SODIUM, LAMIVUDINE 1 TABLET: 600; 50; 300 TABLET, FILM COATED ORAL at 10:06

## 2018-05-05 RX ADMIN — MODAFINIL 100 MG: 100 TABLET ORAL at 09:54

## 2018-05-05 RX ADMIN — CLONAZEPAM 1 MG: 1 TABLET ORAL at 09:54

## 2018-05-05 RX ADMIN — BUPRENORPHINE HYDROCHLORIDE, NALOXONE HYDROCHLORIDE 1 FILM: 8; 2 FILM, SOLUBLE BUCCAL; SUBLINGUAL at 22:31

## 2018-05-05 RX ADMIN — PROMETHAZINE HYDROCHLORIDE 25 MG: 25 TABLET ORAL at 22:30

## 2018-05-05 RX ADMIN — BUPRENORPHINE HYDROCHLORIDE, NALOXONE HYDROCHLORIDE 1 FILM: 8; 2 FILM, SOLUBLE BUCCAL; SUBLINGUAL at 09:53

## 2018-05-05 RX ADMIN — PREGABALIN 150 MG: 75 CAPSULE ORAL at 09:54

## 2018-05-05 RX ADMIN — LEVOMILNACIPRAN HYDROCHLORIDE 40 MG: 40 CAPSULE, EXTENDED RELEASE ORAL at 09:54

## 2018-05-05 RX ADMIN — VANCOMYCIN HYDROCHLORIDE 1000 MG: 1 INJECTION, SOLUTION INTRAVENOUS at 06:17

## 2018-05-05 NOTE — PROVIDER NOTIFICATION
MD Notification    Notified Person: MD    Notified Person Name: Dr. Chavez    Notification Date/Time: 5/5/18 @0347    Notification Interaction: Phone call    Purpose of Notification: Lab unable to process T-cell subset profile orders (only M-F), test cancelled, will need to be reordered for Monday 5/7/18.    Orders Received: T-cell subset profile draw on 5/7/18.    Comments:

## 2018-05-05 NOTE — H&P
Admitted:     05/04/2018      PRIMARY CARE PHYSICIAN:  Dr. Barrientos from [facility].      PRIMARY INFECTIOUS DISEASE:  Dr. Damir Cisneros from Infectious Disease Clinic at the [facility].      CHIEF COMPLAINT:  Left arm redness and swelling.      HISTORY OF PRESENT ILLNESS:  Bc German is a right hand dominant 37-year-old male with a medical history of HIV positivity, substance use, IV drug use, ongoing tobacco use, who also has a history of osteomyelitis.  He says that he has had left arm swelling and erythema for the last 3 or 4 days, although he told the ER physician he had pain.  When I discussed it with him, he said there was no pain, but because of the erythema and swelling, the patient came to the emergency room.  He denies any recent injuries or trauma.  The patient has a history of IV drug use, but says he typically uses his right arm.  He also feels like he has had some abdominal pain around 1 day ago but thinks this was secondary to hunger.  He feels like he is under a lot of stress lately with anxiety and depression.  Last night he had some chills and night sweats.      The patient has a long history of drug abuse.  He has been to treatment 7 times, [facility] twice, [facility] once, [facility] twice and also [facility].  He has a contract with his psychiatrist and his sister and is currently on Suboxone.  Apparently if he does not pass a drug test at the end of the month, he has to go back to treatment.  The patient on Care Everywhere was seen by [facility] on 10/24/2017.  He had a recent diagnosis of osteomyelitis with an epidural abscess, status post spinal surgery.  He was hospitalized at the [facility] 10/9-12/2017 for this that was thought secondary to substance use.  He was treated with cefazolin 2 grams IV q.8h.  The last dose was supposed to be that day in clinic; however, he completed 6 weeks of IV antibiotic therapy on that  day, but given his residual night sweats and an elevated CRP, they planned another 3 weeks of oral Bactrim twice a day.      The patient also says that he has not taking his HIV medications since Saturday.  Apparently he did not have his mailbox key and his HIV medications are delivered by mail.  Per his infectious disease office visit from 10/24/2017, the patient initially had a very high viral load, but now is not detectable, but they did discuss the importance with the patient of adherence and the risk of not taking the medication as prescribed.  The patient had assured the Infectious Disease doctor that he was 100% compliant.      PAST MEDICAL HISTORY:   1.  History of anxiety, depression and PTSD, followed by Dr. Savita Hassan, psychiatrist.   2.  History of IV drug use with heroin.  He has also injected crystal meth.   3.  Smoking of meth.  The patient says he last used on 05/01/2018.  He has been through treatment as above in the history of present illness 7 times.    4.  History of HIV as above.  The patient is followed in the Infectious Disease Clinic at the [facility].   5.  History of epidural osteomyelitis.   6.  History of sepsis.      PAST SURGICAL HISTORY:   1.  History of eye surgery.   2.  History of orthopedic surgery.   3.  History of a PICC which has been removed.   4.  History of optical tracking system fusion.   5.  History of C6-C7 corpectomy C5 through T1 anterior instrumentation fusion 09/10/2017 by Neurosurgery during recent hospitalization 09/09-9/21/2017 secondary to cervical myelopathy and osteomyelitis with epidural abscess and compressive phlegmon.  Tissue cultures grew MSSA.      ALLERGIES:  NO KNOWN DRUG ALLERGIES.      MEDICATIONS ON ADMISSION:   1.  Triumeq p.o. 1 tablet daily.   2.  Suboxone 8/2mg per film, 1 film under the tongue twice a day.   3.  Clonazepam 1 mg daily.   4.  Fetzima 40 mg daily.   5.  Modafinil 100 mg daily.   6.  Lyrica 150 mg twice a day.   7.   Phenergan 25 mg at bedtime.   8.  Testosterone cypionate 0.0015 mg IM once a week.      FAMILY HISTORY:  Father is obese and has type 2 diabetes.  Mother is alive and well.  One brother age 39.      SOCIAL HISTORY:  The patient is currently living in an apartment through [employer].  He has lost his keys twice.  This is why he has had trouble getting his HIV medications.  He now can get into his apartment but says he does not have a mailbox key.   He smokes every day.  He uses alcohol, also now smoking crystal meth, which he admits that he does once to twice a week, last use on 05/01/2018.      REVIEW OF SYSTEMS:   CONSTITUTIONAL:  The patient had night sweats last night.  Rest of the 10-point review of systems is negative except as in history of present illness.      PHYSICAL EXAMINATION:   VITAL SIGNS:  Blood pressure 133/86, pulse of 89, respiratory rate 16, temperature of 98.2.   HEENT:  Pupils are equal.  Extraocular movements are intact.  Pharynx without erythema.   NECK:  Supple.   CHEST:  Clear to auscultation.   CARDIOVASCULAR:  Regular rate and rhythm, normal S1, S2.   ABDOMEN:  Positive bowel sounds, soft and nontender.   EXTREMITIES:  No edema.   SKIN:  He has multiple areas where he has picked on his legs, his arms.  He has scars from picking on his back.  His left arm is swollen.  There is also erythema above and below his elbow.  He is able to flex and extend his elbow.  Olecranon process appears to have some soft tissue swelling.  There is some warmth and induration of the entire ulnar aspect of the left upper arm and forearm.  Track marks are present in the right upper extremity.      LABORATORY DATA:  Sodium 138, potassium 3.2, chloride 99, bicarbonate 33, BUN 7, creatinine 0.71, albumin 3.3, ALT of 142; it was 187 on 11/21/2017, and his AST is 74.  It was 409 on 11/21/2017.   Lactic acid 1.1, lipase 93.  Glucose 78.  White count 13.6, hemoglobin 13.4, platelet count of 318.  Blood  cultures were done, 1 set.       ASSESSMENT AND PLAN:  Bc German is a 37-year-old male with a complicated past medical history.  He has ongoing substance use, history of IV drug use, including heroin, ongoing tobacco use and has HIV.  He has not been taking his HIV medications since Saturday because of inability to get into his mailbox.  He now presents with 3-4 days of left arm swelling and erythema.  The patient denied pain to me, but he did tell the ER physician he had pain.  In addition, he has had a little bit of a periumbilical abdominal pain, but he is passing gas and normal bowel movements.  His abdomen on exam is soft and nontender.  He has had no vomiting, diarrhea.      1.  Left arm cellulitis in HIV positive male.  His white count is elevated.  He did have night sweats last night.  The patient was started on ceftriaxone and vancomycin in the emergency room and would like to continue this.  Blood culture only one set was drawn due to the fact that the patient is very difficult to get blood from due to his IV drug use.  Would like to have Infectious Disease and Orthopedics see this in the morning.   2.  History of HIV positivity.  The patient will be continued on his usual HIV medications.   3.  Hypokalemia.  The patient will be put on a potassium protocol.   4.  Ongoing drug use.  The patient is followed in a contract with his psychiatrist.  He is currently on Suboxone and this will be continued.   5.  History of anxiety, depression and post-traumatic stress disorder.  The patient is followed closely by his psychiatrist, Dr. Savita Hassan.  Will continue him on his usual medications.   6.  History of osteomyelitis.  He has been seen at the [facility].     7.  Deep venous thrombosis prophylaxis: The patient will be on SCDs.   8.  CODE STATUS:  Full code.      DISPOSITION:  The patient will be admitted under inpatient status.         HELEN SIMON MD              D: 2018   T: 2018   MT: KALANI      Name:     AMBREEN PYLE   MRN:      [id number]        Account:      [id number]   :      1980        Admitted:     2018                   Document: [id number]

## 2018-05-05 NOTE — CONSULTS
"BRIEF NUTRITION ASSESSMENT      REASON FOR ASSESSMENT:  Nutrition Admission Screen - Unintentional weight loss of 10# or more in past 2 months    NUTRITION HISTORY:  Visited with pt this morning  Was chatty, but pt a bit hard to keep on track  States that he does his own shopping and cooking  \"When I take testosterone I feel better and eat better\" (pt trying to explain various vial sizes of testosterone that his mom gives him)  Notes that he has never been a big eater - \"even if I play 3 sets of tennis and am dropping dead from exercise, I am not that hungry\"  Reports that his mom is a very healthy eater - \"she only buys organic and follows the Andrew Weil diet\"  He does note that he made an agreement with his mom and psychiatrist to stay clean from smoking meth for the next month - \"otherwise I have to go to treatment, and that is the last thing I want to do\"    CURRENT DIET AND INTAKE:  Diet:  Regular              Pt reports eating well this morning  Breakfast - omelet, cereal, pineapple, OJ, deli sandwich  \"I ate more than 75% of my breakfast\"  He is agreeable to taking a high nino/pro nutrition shake for added cals  \"Can you order me a shake right now?\"    ANTHROPOMETRICS:  Height: 5'10\"  Weight: (5/5) 63.8 kg  BMI: 20.1 kg/m2  IBW:  75.4 kg  Weight Status: Normal BMI  %IBW: 85%  Weight History: wt appears stable over the past year  Wt Readings from Last 10 Encounters:   05/05/18 63.8 kg (140 lb 10.5 oz)   01/29/18 59.4 kg (131 lb)   10/24/17 66.8 kg (147 lb 4.8 oz)   10/10/17 61.4 kg (135 lb 4.8 oz)   10/07/17 67.6 kg (149 lb)   09/09/17 63.5 kg (140 lb)   08/01/17 59.6 kg (131 lb 8 oz)   07/31/17 58.5 kg (129 lb)   07/24/17 58.6 kg (129 lb 3.2 oz)   05/23/17 62.3 kg (137 lb 4.8 oz)         LABS:  Labs noted    MALNUTRITION:  Patient does not meet two of the following criteria necessary for diagnosing malnutrition: significant weight loss, reduced intake, subcutaneous fat loss, muscle loss or fluid " retention    NUTRITION INTERVENTION:  Nutrition Diagnosis:  No nutrition diagnosis at this time.    Implementation:  Nutrition Education ---> Encouraged pt to eat small, nutrient dense meals to gain wt  Will send a PLUS2 milkshake at 10am and 2pm (each provides 625 cals, 19 gm pro)    FOLLOW UP/MONITORING:   Will re-evaluate in 7 - 10 days, or sooner, if re-consulted.

## 2018-05-05 NOTE — PROGRESS NOTES
"Writer read out patient's medications (x2) at bedside prior to administering this AM, pt acknowledged meds and agreeable. Student nurse present in room with writer. Immediately upon taking medications pt states, \"I didn't get my Modafinil.\" Writer showed pt empty medication packaging sitting on tray table explaining that pt got all medications but pt insistent stating, \"there were only 4 pills in the cup, but that's fine.\" Writer and student nurse left room and upon return for further cares 5 minutes later, pt states, \"you know what? You probably did give it to me, I'm just so out of it right now so I probably didn't see it sitting underneath another pill or something.\" Writer later notified by charge nurse that pt continues to verbalize concerns of not receiving medications this AM and that staff are taking his medications. Charge nurse spoke with pt and offered to notify supervisor but pt declined stating, \"no I don't want you to do that, it's fine.\" Nursing supervisor notified and approached pt to discuss concerns. Recommend 2 nurses at all times with medication administration and take pills one at a time. Nursing will continue to monitor.  "

## 2018-05-05 NOTE — CONSULTS
Consult Date:  05/05/2018      LOCATION:  Room 627, [facility].        REFERRING PHYSICIAN:  Dr. Bernard.        IMPRESSION:   1.  A 37-year-old male with acute left arm cellulitis and olecranon bursa infection, almost certainly the primary focus here is left olecranon bursa which has significant fluid present, probably a Staph aureus infection, mild systemic symptoms, blood cultures pending.   2.  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   3.  IV drug user, currently active.   4.  Chronic HIV infection, previously noncompliant, but in recent months compliant on meds, undetectable virus, but not checked in 6 months.      RECOMMENDATIONS:   1.  Check HIV numbers.   2.  Probably not resistant organism, but continue vancomycin and ceftriaxone for now.   3.  Follow the arm closely.  If not clinically improving, further imaging, orthopedic consultation, etc.  This will likely narrow down to being primarily an olecranon bursa infection; given he is an IV drug user, would think of soft tissue abscess or an injection site, but he insists he has not been injecting in this area, and the clinical appearance is compatible with a conventional olecranon bursa infection.      HISTORY:  This 37-year-old male is seen in consultation due to left arm cellulitis.  The patient has a complex history of long-term IV drug use with associated HIV infection.  His HIV has been relatively better controlled recently and has been undetectable and states he has been compliant with his current therapy.  He had a major IV drug use-related Staph aureus bacteremia last fall that resulted in an epidural abscess and required surgical intervention in his cervical and thoracic spine area.  That area got an extended course of antibiotics and resolved and has not been a problem since.  He  is actively using recently, but has not injected in the left arm site where he has had erythema and some mild systemic symptoms develop in the last several days.  He first noticed it starting in the elbow area and that area remains swollen.  He has normal range of motion of the elbow itself.  He does feel relatively better currently.  He has not had major fever since admission.      PAST MEDICAL HISTORY:  The prior major diskitis.  The IV drug use.  Long-term history of HIV infection.      ALLERGIES:  NO ANTIMICROBIAL ALLERGIES.       SOCIAL AND FAMILY HISTORY:  No recent travel or exposures, active ongoing IV drug use, primarily methamphetamine.      REVIEW OF SYSTEMS:  Largely as above, mild systemic symptoms that are improved.  Some pain in the arm, but is not particularly severe.      PHYSICAL EXAMINATION:   GENERAL:  The patient appears his stated age, does not particularly toxic or ill.   VITAL SIGNS:  Currently within normal limits.   HEENT:  No thrush or oropharyngeal lesions.  Pupils reactive.   NECK:  Supple and nontender, no lymphadenopathy.   HEART:  Regular rhythm, no murmur.   LUNGS:  Clear bilaterally.   ABDOMEN:  Soft and nontender.   EXTREMITIES:  The left arm has redness and swelling throughout the forearm up to the bursa area.  The bursa has significant fluid present.  This is almost certainly the main focus of infection, no obvious fluctuance at the other sites or entry site.      LABORATORY:  Cultures pending.  HIV numbers pending.  White blood cell count 13,600.    Inflammatory markers pending.      Thank you much for the consultation.  I will follow the patient with you.         LAKESHA MONROE MD             D: 2018   T: 2018   MT: KALANI      Name:     AMBREEN PYLE   MRN:      [id number]        Account:       [id number]   :      1980           Consult Date:  2018      Document: [id number]

## 2018-05-05 NOTE — PROGRESS NOTES
With the assistance of the nursing supervisor Marian we showed this pt what the meds that he is questioning look like.  We removed the meds so pt could see them and spoke with pt.  Pt will now take his meds one at a time so he can identify them prior to taking them.

## 2018-05-05 NOTE — CONSULTS
ID consult dictated IMP 1 36 yo male complex hx , but appears to have conventional L olecranon bursa infection, some systemic sxs    REC inappropriately 1 BC, get second, inflamm  Markers, follow elbow, same antibiotics, get HIV monitoring labs

## 2018-05-05 NOTE — CONSULTS
[facility] Orthopedic Consultation    Bc German MRN# [id number]   Age: 37 year old YOB: 1980     Date of Admission:  5/4/2018    Reason for consult: Left elbow cellulitis       Requesting physician: Dr. Hylton       Level of consult: Consult, follow and place orders           Assessment and Plan:   Assessment:   1. Left elbow cellulitis  2. Olecranon Bursitis.  No sign of septic olecranon bursitis. Cellulitis appears to be improving with IV abx      Plan:   - Recommend IV abx and ID consult for L elbow cellulitis  - with regards to bursitis, continue to monitor for resolution  - RICE therapy for L elbow  - No surgical intervention warranted at this time           Chief Complaint:   L elbow erythema and swelling         History of Present Illness:   This patient is a 37 year old male who presents with the following condition requiring a hospital admission:    38 yo RHD M who is an IV drug user, HIV positive and has a h/o osteomyelitis who noticed 2-3 days ago that he developed swelling and erythema about his left elbow. He denies using IV drugs in his L elbow (usually uses in his R). He denies any numbness/tingling. +erythema and swelling. +ttp over elbow. No loss of range of motion. +night sweats 2 nights ago but denies fevers or chills. He also notes some swelling over his olecranon.     He also notes that since starting IV abx the erythema is significantly improving. No radiating pain.          Past Medical History:     Past Medical History:   Diagnosis Date     Anxiety      Depressive disorder      Drug abuse, IV      Group A streptococcal infection 11/2014    Bacteremia/cellulitis     HCV antibody positive      HIV (human immunodeficiency virus infection) (H)      HIV (human immunodeficiency virus infection) (H)      HIV (human immunodeficiency virus infection) (H)      IVDU (intravenous drug user)      Osteomyelitis of vertebra of lumbosacral region (H) 3/2011    L3, left psoas  abscess     Substance abuse              Past Surgical History:     Past Surgical History:   Procedure Laterality Date     EYE SURGERY  1 year ago    pins to left eye after socket fracture     OPTICAL TRACKING SYSTEM FUSION CERVICAL ANTERIOR ONE LEVEL Right 9/10/2017    Procedure: OPTICAL TRACKING SYSTEM FUSION CERVICAL ANTERIOR ONE LEVEL;  Stealth C6-C7 Anterior Cervical Corpectomy and C5-T1 Fusion;  Surgeon: Marv Ambrose MD;  Location: [facility] OR     ORTHOPEDIC SURGERY      Arm Surgery     PICC INSERTION Left 09/21/2017    5fr DL BioFlo PICC, 42cm (1cm external) in the L basilic vein w/ tip in the low SVC.     TRANSESOPHAGEAL ECHOCARDIOGRAM INTRAOPERATIVE N/A 3/17/2015    Procedure: TRANSESOPHAGEAL ECHOCARDIOGRAM INTRAOPERATIVE;  Surgeon: Generic Anesthesia Provider;  Location: [facility] OR             Social History:     Social History   Substance Use Topics     Smoking status: Current Every Day Smoker     Packs/day: 0.25     Types: Cigarettes     Smokeless tobacco: Never Used     Alcohol use Yes             Family History:   No family history on file.          Immunizations:     VACCINE/DOSE   Diptheria   DPT   DTAP   HBIG   Hepatitis A   Hepatitis B   HIB   Influenza   Measles   Meningococcal   MMR   Mumps   Pneumococcal   Polio   Rubella   Small Pox   TDAP   Varicella   Zoster             Allergies:   No Known Allergies          Medications:     Current Facility-Administered Medications   Medication     0.9% sodium chloride + KCl 20 mEq/L infusion     abacavir-dolutegravir-LamiVUDine (TRIUMEQ) 600- MG per tablet 1 tablet     acetaminophen (TYLENOL) tablet 650 mg     buprenorphine HCl-naloxone HCl (SUBOXONE) 8-2 MG per film 1 Film     cefTRIAXone (ROCEPHIN) 2 g vial to attach to  ml bag for ADULTS or NS 50 ml bag for PEDS     clonazePAM (klonoPIN) tablet 1 mg     levomilnacipran (FETZIMA ER) 24 hr capsule 40 mg     melatonin tablet 1 mg     modafinil (PROVIGIL) tablet 100 mg     naloxone (NARCAN)  "injection 0.1-0.4 mg     potassium chloride (KLOR-CON) Packet 20-40 mEq     potassium chloride 10 mEq in 100 mL intermittent infusion with 10 mg lidocaine     potassium chloride 10 mEq in 100 mL sterile water intermittent infusion (premix)     potassium chloride 20 mEq in 50 mL intermittent infusion     potassium chloride SA (K-DUR/KLOR-CON M) CR tablet 20-40 mEq     pregabalin (LYRICA) capsule 150 mg     promethazine (PHENERGAN) tablet 25 mg     vancomycin (VANCOCIN) 1000 mg in dextrose 5% 200 mL PREMIX             Review of Systems:   All review of systems was performed and was negative except as per hpi            Physical Exam:   All vitals have been reviewed  Patient Vitals for the past 24 hrs:   BP Temp Temp src Pulse Heart Rate Resp SpO2 Height Weight   05/05/18 0750 121/84 97.4  F (36.3  C) Oral - 85 18 100 % - -   05/05/18 0616 - - - - - - - - 63.8 kg (140 lb 10.5 oz)   05/05/18 0235 118/83 97.7  F (36.5  C) Oral 84 - 18 98 % - -   05/04/18 1902 139/49 97.9  F (36.6  C) Oral 86 86 18 100 % - -   05/04/18 1708 133/85 97.9  F (36.6  C) Oral 89 - 22 100 % - -   05/04/18 1613 133/86 98.2  F (36.8  C) Temporal 91 91 16 99 % 1.778 m (5' 10\") 59 kg (130 lb)       Intake/Output Summary (Last 24 hours) at 05/05/18 1240  Last data filed at 05/05/18 0948   Gross per 24 hour   Intake              741 ml   Output                0 ml   Net              741 ml     NAD  nonlabored breathing  No peripharal edema distally  Skin intact except as noted below  White sclera  LUE:  ROM   Sup/pro 80/80  +erythema diffusely about the elbow and forearm  1+ pitting edema about the forearm  Hand wwp  +AIN/PIN/ulnar motor  Sensation intact in m/r/u dist      Imaging:  3 views of the left elbow were reviewed and demonstrate no fracture, dislocation, or subluxation. +soft tissue swelling            Data:   All laboratory data reviewed  Results for orders placed or performed during the hospital encounter of 05/04/18   XR Elbow Left " 2 Views    Narrative    ELBOW TWO VIEWS LEFT  5/4/2018 8:07 PM     HISTORY: Elbow pain;     COMPARISON: None.    FINDINGS:There is normal osseous alignment.  No fractures are  identified.  Soft tissue swelling is seen over the olecranon.      Impression    IMPRESSION:   1. Osseous structures appear intact.  2. Soft tissue swelling over the olecranon    INDIANA OSORIO MD   CBC with platelets differential   Result Value Ref Range    WBC 13.6 (H) 4.0 - 11.0 10e9/L    RBC Count 4.56 4.4 - 5.9 10e12/L    Hemoglobin 13.4 13.3 - 17.7 g/dL    Hematocrit 40.4 40.0 - 53.0 %    MCV 89 78 - 100 fl    MCH 29.4 26.5 - 33.0 pg    MCHC 33.2 31.5 - 36.5 g/dL    RDW 13.2 10.0 - 15.0 %    Platelet Count 318 150 - 450 10e9/L    Diff Method Automated Method     % Neutrophils 70.9 %    % Lymphocytes 18.0 %    % Monocytes 8.8 %    % Eosinophils 1.5 %    % Basophils 0.2 %    % Immature Granulocytes 0.6 %    Nucleated RBCs 0 0 /100    Absolute Neutrophil 9.6 (H) 1.6 - 8.3 10e9/L    Absolute Lymphocytes 2.4 0.8 - 5.3 10e9/L    Absolute Monocytes 1.2 0.0 - 1.3 10e9/L    Absolute Eosinophils 0.2 0.0 - 0.7 10e9/L    Absolute Basophils 0.0 0.0 - 0.2 10e9/L    Abs Immature Granulocytes 0.1 0 - 0.4 10e9/L    Absolute Nucleated RBC 0.0    Comprehensive metabolic panel   Result Value Ref Range    Sodium 138 133 - 144 mmol/L    Potassium 3.2 (L) 3.4 - 5.3 mmol/L    Chloride 99 94 - 109 mmol/L    Carbon Dioxide 33 (H) 20 - 32 mmol/L    Anion Gap 6 3 - 14 mmol/L    Glucose 78 70 - 99 mg/dL    Urea Nitrogen 7 7 - 30 mg/dL    Creatinine 0.71 0.66 - 1.25 mg/dL    GFR Estimate >90 >60 mL/min/1.7m2    GFR Estimate If Black >90 >60 mL/min/1.7m2    Calcium 9.4 8.5 - 10.1 mg/dL    Bilirubin Total 0.4 0.2 - 1.3 mg/dL    Albumin 3.3 (L) 3.4 - 5.0 g/dL    Protein Total 8.6 6.8 - 8.8 g/dL    Alkaline Phosphatase 103 40 - 150 U/L     (H) 0 - 70 U/L    AST 74 (H) 0 - 45 U/L   Lipase   Result Value Ref Range    Lipase 93 73 - 393 U/L   Lactic acid whole blood    Result Value Ref Range    Lactic Acid 1.1 0.7 - 2.0 mmol/L   Potassium   Result Value Ref Range    Potassium 3.9 3.4 - 5.3 mmol/L   Basic metabolic panel   Result Value Ref Range    Sodium 142 133 - 144 mmol/L    Potassium 3.9 3.4 - 5.3 mmol/L    Chloride 109 94 - 109 mmol/L    Carbon Dioxide 29 20 - 32 mmol/L    Anion Gap 4 3 - 14 mmol/L    Glucose 124 (H) 70 - 99 mg/dL    Urea Nitrogen 9 7 - 30 mg/dL    Creatinine 0.67 0.66 - 1.25 mg/dL    GFR Estimate >90 >60 mL/min/1.7m2    GFR Estimate If Black >90 >60 mL/min/1.7m2    Calcium 8.7 8.5 - 10.1 mg/dL   Magnesium   Result Value Ref Range    Magnesium 2.0 1.6 - 2.3 mg/dL   Blood culture   Result Value Ref Range    Specimen Description Blood Right Arm     Special Requests Aerobic and anaerobic bottles received     Culture Micro No growth after 11 hours            Jordan Ramos MD

## 2018-05-05 NOTE — PLAN OF CARE
Problem: Patient Care Overview  Goal: Plan of Care/Patient Progress Review  Outcome: No Change  Pt sleepy/lethargic most of shift, arouses spontaneously, oriented x4. VSS on RA. Denies pain. LUE redness/swelling noted, hot and firm to touch. No N/T. Maintains AROM. Up independently in room. Voiding adequately. Regular diet with good appetite. Continues on IV Abx. ID following. IV access lost, pending new placement. Flying squad paged. Nursing will continue to monitor.

## 2018-05-05 NOTE — PROGRESS NOTES
[facility]  Hospitalist Progress Note  Laure Bernard MD    Assessment & Plan   Bc German is a 37-year-old male with a PMHx significant for HIV, polysubstance abuse, including heroin, anxiety/depression/PTSD, osteomyelitis of lumbosacral vertebra, and active tobacco use who presented with 3-4 days of left arm swelling, erythema, and pain. He was admitted for further management.      Left olecranon bursitis/left forearm cellulitis:  Temp 98.2 and elevated WBC of 13.6 on admission. X-ray showed soft tissue swelling over the olecranon. Started on IV vancomycin and ceftriaxone upon admission.    Recent Labs  Lab 05/04/18  1655   WBC 13.6*     -Cont IV vancomycin and ceftriaxone  -PRN acetaminophen available  -Follow on blood cultures (NGTD)  -Monitor fever curve and CBC  -Follow up on CRP  -Ortho consult as patient might need I&D.  -ID is following and appreciate input  .   History of HIV:  Not been taking his HIV medication since 04/28 bacause of inability to get into his mailbox.    -Cont on PTA Triumeq  -HIV monitoring labs ordered per ID    Hypokalemia.    K 3.2 on admission. Replaced per protocol.    Substance abuse.    The patient is followed in a contract with his psychiatrist.  He is currently on Suboxone which will be continued.     Anxiety/depression/PTSD.   The patient is followed closely by psychiatrist, Dr. Savita Hassan.   -Cont PTA clonazepam and Lyrica      # Pain Assessment:  Current Pain Score 5/5/2018   Patient currently in pain? denies   Pain score (0-10) -   Pain location -   Pain descriptors -   Bc plasencia pain level was assessed and he currently denies pain.        Active Diet Order      Combination Diet Regular Diet Adult      DVT prophylaxis: SCDs, ambulation  CODE STATUS:  Full code.   Disposition: Pending ongoing management and hospital course. Anticipate 1-2 more days of inpatient management.    D/W RN.    Interval History   Patient continues to have swelling and  redness of left arm/forearm. He reports no fever, chills, cp, sob, or other complaints.    Data reviewed today: I reviewed all new labs and imaging over the last 24 hours.     Physical Exam   Temp: 97.4  F (36.3  C) Temp src: Oral BP: 121/84 Pulse: 84 Heart Rate: 85 Resp: 18 SpO2: 100 % O2 Device: None (Room air)    Vitals:    05/04/18 1613 05/05/18 0616   Weight: 59 kg (130 lb) 63.8 kg (140 lb 10.5 oz)     Vital Signs with Ranges  Temp:  [97.4  F (36.3  C)-98.2  F (36.8  C)] 97.4  F (36.3  C)  Pulse:  [84-91] 84  Heart Rate:  [85-91] 85  Resp:  [16-22] 18  BP: (118-139)/(49-86) 121/84  SpO2:  [98 %-100 %] 100 %  I/O's Last 24 hours       Constitutional: Comfortable, no acute distress  HEENT: No conjunctival pallor.  Neurologic: Awake, alert, fully oriented  Neck: Supple, no elevated JVP  Respiratory: Clear to auscultation  Cardiovascular: Normal S1 and S2. Regular rhythm and rate  GI: Abdomen soft, non tender, non distended  Extremities: Left lower arm, elbow, and forearm swelling  Skin/integument: Left upper extremity erythme    Medications   All medications were reviewed.    0.9% sodium chloride + KCl 20 mEq/L 75 mL/hr at 05/04/18 2143       abacavir-dolutegravir-LamiVUDine  1 tablet Oral Daily     buprenorphine HCl-naloxone HCl  1 Film Sublingual BID     cefTRIAXone  2 g Intravenous Q24H     clonazePAM (klonoPIN) tablet 1 mg  1 mg Oral Daily     levomilnacipran  40 mg Oral Daily     modafinil (PROVIGIL) tablet 100 mg  100 mg Oral Daily     pregabalin (LYRICA) capsule 150 mg  150 mg Oral BID     promethazine  25 mg Oral At Bedtime     vancomycin (VANCOCIN) IV  1,000 mg Intravenous Q12H        Data     Recent Labs  Lab 05/05/18  0200 05/04/18  1655   WBC  --  13.6*   HGB  --  13.4   MCV  --  89   PLT  --  318   NA  --  138   POTASSIUM 3.9 3.2*   CHLORIDE  --  99   CO2  --  33*   BUN  --  7   CR  --  0.71   ANIONGAP  --  6   SANDRA  --  9.4   GLC  --  78   ALBUMIN  --  3.3*   PROTTOTAL  --  8.6   BILITOTAL  --  0.4    ALKPHOS  --  103   ALT  --  142*   AST  --  74*   LIPASE  --  93       Recent Results (from the past 24 hour(s))   XR Elbow Left 2 Views    Narrative    ELBOW TWO VIEWS LEFT  5/4/2018 8:07 PM     HISTORY: Elbow pain;     COMPARISON: None.    FINDINGS:There is normal osseous alignment.  No fractures are  identified.  Soft tissue swelling is seen over the olecranon.      Impression    IMPRESSION:   1. Osseous structures appear intact.  2. Soft tissue swelling over the olecranon    INDIANA OSORIO MD

## 2018-05-05 NOTE — PLAN OF CARE
Problem: Patient Care Overview  Goal: Plan of Care/Patient Progress Review  Outcome: Improving  A&Ox4, VSS on RA. Independent, regular diet. 0200 K recheck 3.9 after K replacement. L arm cellulitis hot, red taught  with marked borders. On IV vanco. Fluids 0.9 NS with 20 KCl running. Ortho, ID following. Will continue to monitor.

## 2018-05-05 NOTE — PLAN OF CARE
Admission    Patient arrives to room 627 via cart from ED.  Care plan note:   Pt is A&Ox4, up independently, VSS on RA, denied pain, RUE red/hot/taught/edematous, boarders marked. HIV +, IV drug abuser. Xray done to L elbow, Ortho and ID to consult tomorrow. K 3.2, Replaced recheck 0200. Tolerating regular diet, IVF infusing @ 75ml/hr. D/c pending progress.    Inpatient nursing criteria listed below were met:    PCD's Documented: Yes  Skin issues/needs documented :Yes  Isolation education started/completed NA  Fall Prevention: Care plan updated, Education given and documented NA  Care Plan initiated: Yes  Home medications documented in belongings flowsheet: NA  Patient belongings documented in belongings flowsheet: Yes  Reminder note (belongings/ medications) placed in discharge instructions:Yes  Admission profile/ required documentation complete: Yes

## 2018-05-05 NOTE — PHARMACY-VANCOMYCIN DOSING SERVICE
Pharmacy Vancomycin Initial Note  Date of Service May 4, 2018  Patient's  1980  37 year old, male    Indication: Skin and Soft Tissue Infection    Current estimated CrCl = Estimated Creatinine Clearance: 121.2 mL/min (based on Cr of 0.71).    Creatinine for last 3 days  2018:  4:55 PM Creatinine 0.71 mg/dL    Recent Vancomycin Level(s) for last 3 days  No results found for requested labs within last 72 hours.      Vancomycin IV Administrations (past 72 hours)                   vancomycin (VANCOCIN) 1,250 mg in sodium chloride 0.9 % 250 mL intermittent infusion (mg) 1,250 mg New Bag 18 1743                Nephrotoxins and other renal medications (Future)    Start     Dose/Rate Route Frequency Ordered Stop    18 0600  vancomycin (VANCOCIN) 1000 mg in dextrose 5% 200 mL PREMIX      1,000 mg  200 mL/hr over 1 Hours Intravenous EVERY 12 HOURS 18 1942            Contrast Orders - past 72 hours     None                Plan:  1.  Start vancomycin 1250mg loading dose in ED then   1000 mg IV q12h.   2.  Goal Trough Level: 10-15 mg/L   3.  Pharmacy will check trough levels as appropriate in 1-3 Days.    4. Serum creatinine levels will be ordered a minimum of twice weekly.    5. [place] method utilized to dose vancomycin therapy: Method 1    Luis Sabillon

## 2018-05-06 LAB
CREAT SERPL-MCNC: 0.78 MG/DL (ref 0.66–1.25)
ERYTHROCYTE [DISTWIDTH] IN BLOOD BY AUTOMATED COUNT: 13.1 % (ref 10–15)
GFR SERPL CREATININE-BSD FRML MDRD: >90 ML/MIN/1.7M2
HCT VFR BLD AUTO: 38.4 % (ref 40–53)
HGB BLD-MCNC: 12.6 G/DL (ref 13.3–17.7)
MCH RBC QN AUTO: 28.9 PG (ref 26.5–33)
MCHC RBC AUTO-ENTMCNC: 32.8 G/DL (ref 31.5–36.5)
MCV RBC AUTO: 88 FL (ref 78–100)
PLATELET # BLD AUTO: 348 10E9/L (ref 150–450)
RBC # BLD AUTO: 4.36 10E12/L (ref 4.4–5.9)
VANCOMYCIN SERPL-MCNC: 9.7 MG/L
WBC # BLD AUTO: 9.3 10E9/L (ref 4–11)

## 2018-05-06 PROCEDURE — 87536 HIV-1 QUANT&REVRSE TRNSCRPJ: CPT | Performed by: INTERNAL MEDICINE

## 2018-05-06 PROCEDURE — 80202 ASSAY OF VANCOMYCIN: CPT | Performed by: INTERNAL MEDICINE

## 2018-05-06 PROCEDURE — 25000132 ZZH RX MED GY IP 250 OP 250 PS 637: Performed by: INTERNAL MEDICINE

## 2018-05-06 PROCEDURE — 36415 COLL VENOUS BLD VENIPUNCTURE: CPT | Performed by: INTERNAL MEDICINE

## 2018-05-06 PROCEDURE — 99232 SBSQ HOSP IP/OBS MODERATE 35: CPT | Performed by: INTERNAL MEDICINE

## 2018-05-06 PROCEDURE — 12000000 ZZH R&B MED SURG/OB

## 2018-05-06 PROCEDURE — 85027 COMPLETE CBC AUTOMATED: CPT | Performed by: INTERNAL MEDICINE

## 2018-05-06 PROCEDURE — 25000128 H RX IP 250 OP 636: Performed by: INTERNAL MEDICINE

## 2018-05-06 PROCEDURE — 82565 ASSAY OF CREATININE: CPT | Performed by: INTERNAL MEDICINE

## 2018-05-06 RX ORDER — POLYETHYLENE GLYCOL 3350 17 G/17G
17 POWDER, FOR SOLUTION ORAL DAILY
Status: DISCONTINUED | OUTPATIENT
Start: 2018-05-06 | End: 2018-05-14 | Stop reason: HOSPADM

## 2018-05-06 RX ORDER — CEFAZOLIN SODIUM 1 G/50ML
1250 SOLUTION INTRAVENOUS EVERY 12 HOURS
Status: DISCONTINUED | OUTPATIENT
Start: 2018-05-06 | End: 2018-05-08 | Stop reason: DRUGHIGH

## 2018-05-06 RX ADMIN — PREGABALIN 150 MG: 75 CAPSULE ORAL at 20:23

## 2018-05-06 RX ADMIN — BUPRENORPHINE HYDROCHLORIDE, NALOXONE HYDROCHLORIDE 1 FILM: 8; 2 FILM, SOLUBLE BUCCAL; SUBLINGUAL at 20:23

## 2018-05-06 RX ADMIN — ACETAMINOPHEN 650 MG: 325 TABLET ORAL at 20:22

## 2018-05-06 RX ADMIN — PROMETHAZINE HYDROCHLORIDE 25 MG: 25 TABLET ORAL at 21:45

## 2018-05-06 RX ADMIN — VANCOMYCIN HYDROCHLORIDE 1250 MG: 5 INJECTION, POWDER, LYOPHILIZED, FOR SOLUTION INTRAVENOUS at 06:56

## 2018-05-06 RX ADMIN — POLYETHYLENE GLYCOL 3350 17 G: 17 POWDER, FOR SOLUTION ORAL at 15:30

## 2018-05-06 RX ADMIN — CLONAZEPAM 1 MG: 1 TABLET ORAL at 08:25

## 2018-05-06 RX ADMIN — CEFTRIAXONE SODIUM 2 G: 2 INJECTION, POWDER, FOR SOLUTION INTRAMUSCULAR; INTRAVENOUS at 16:05

## 2018-05-06 RX ADMIN — BUPRENORPHINE HYDROCHLORIDE, NALOXONE HYDROCHLORIDE 1 FILM: 8; 2 FILM, SOLUBLE BUCCAL; SUBLINGUAL at 08:25

## 2018-05-06 RX ADMIN — PREGABALIN 150 MG: 75 CAPSULE ORAL at 08:25

## 2018-05-06 RX ADMIN — VANCOMYCIN HYDROCHLORIDE 1250 MG: 5 INJECTION, POWDER, LYOPHILIZED, FOR SOLUTION INTRAVENOUS at 17:46

## 2018-05-06 RX ADMIN — LEVOMILNACIPRAN HYDROCHLORIDE 40 MG: 40 CAPSULE, EXTENDED RELEASE ORAL at 08:25

## 2018-05-06 RX ADMIN — ABACAVIR SULFATE, DOLUTEGRAVIR SODIUM, LAMIVUDINE 1 TABLET: 600; 50; 300 TABLET, FILM COATED ORAL at 08:25

## 2018-05-06 RX ADMIN — MODAFINIL 100 MG: 100 TABLET ORAL at 08:25

## 2018-05-06 NOTE — PLAN OF CARE
Problem: Patient Care Overview  Goal: Plan of Care/Patient Progress Review  Outcome: Improving  A&Ox4.  Independent.  Tolerates regular diet.  VSS on RA.  Flying squad placed new PIV to right bicep.  Running IVF, received both IV abx.  LUE/elbow cellulitis warm to touch, 3+ edema, no ROM impairment associated.  Denies pain.  Refuses PCD's.  ID consulted.  Discharge pending.

## 2018-05-06 NOTE — PHARMACY-VANCOMYCIN DOSING SERVICE
Pharmacy Vancomycin Note  Date of Service May 6, 2018  Patient's  1980   37 year old, male    Indication: Skin and Soft Tissue Infection  Goal Trough Level: 10-15 mg/L  Day of Therapy: 3    Current Vancomycin regimen:  1000 mg IV q12h    Current estimated CrCl = Estimated Creatinine Clearance: 121 mL/min (based on Cr of 0.78).    Creatinine for last 3 days  2018:  4:55 PM Creatinine 0.71 mg/dL  2018:  9:40 AM Creatinine 0.67 mg/dL;  2:25 PM Creatinine 0.70 mg/dL  2018:  4:50 AM Creatinine 0.78 mg/dL    Recent Vancomycin Levels (past 3 days)  2018:  4:50 AM Vancomycin Level 9.7 mg/L    Vancomycin IV Administrations (past 72 hours)                   vancomycin (VANCOCIN) 1000 mg in dextrose 5% 200 mL PREMIX (mg) 1,000 mg New Bag 18 1914     1,000 mg New Bag  0617    vancomycin (VANCOCIN) 1,250 mg in sodium chloride 0.9 % 250 mL intermittent infusion (mg) 1,250 mg New Bag 18 1743                Nephrotoxins and other renal medications (Future)    Start     Dose/Rate Route Frequency Ordered Stop    18 0540  vancomycin (VANCOCIN) 1,250 mg in sodium chloride 0.9 % 250 mL intermittent infusion      1,250 mg  over 90 Minutes Intravenous EVERY 12 HOURS 18 0540               Contrast Orders - past 72 hours     None          Interpretation of levels and current regimen:  Trough level is  Subtherapeutic    Has serum creatinine changed > 50% in last 72 hours: No    Urine output:  unable to determine    Renal Function: Stable    Plan:  1.  Increase Dose to \1250mg q12h  2.  Pharmacy will check trough levels as appropriate in 1-3 Days.    3. Serum creatinine levels will be ordered daily for the first week of therapy and at least twice weekly for subsequent weeks.      Chris Hernandez        .

## 2018-05-06 NOTE — PROGRESS NOTES
Medications administered per writer with 2nd staff nurse present. Medications read out to pt and pt acknowledged and agreeable. Given to pt one at a time, and pt verbalized satisfaction with receiving all meds.

## 2018-05-06 NOTE — PROGRESS NOTES
"S:  No acute events overnight. Pain well controlled. Continues to have erythema about LUE. On IV abx    O:  /73 (BP Location: Left arm)  Pulse 97  Temp 97.7  F (36.5  C) (Oral)  Resp 16  Ht 1.778 m (5' 10\")  Wt 64.7 kg (142 lb 10.2 oz)  SpO2 99%  BMI 20.47 kg/m2      LUE:  Erythema about the olecranon and forearm  +olecranon bursitis, not ttp  +2+ pitting edema in forearm  ROM 5-120 painless  NVI distally    A/P:  37 year old male with LUE cellulitis, olecranon bursitis    - continue IV abx per ID  - will continue to follow for improvement of symptoms  - elevate LUE  - ice                  "

## 2018-05-06 NOTE — PROGRESS NOTES
[facility]  Infectious Disease Progress Note          Assessment and Plan:   IMPRESSION:   1.  A 37-year-old male with acute left arm cellulitis and olecranon bursa infection, almost certainly the primary focus here is left olecranon bursa which has significant fluid present, probably a Staph aureus infection, mild systemic symptoms, blood cultures pending.   2.  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   3.  IV drug user, currently active.   4.  Chronic HIV infection, previously noncompliant, but in recent months compliant on meds, undetectable virus, but not checked in 6 months.       RECOMMENDATIONS:   1.  Pending HIV numbers. Continue HAART meds  2.  Probably not resistant organism, but continue vancomycin and ceftriaxone for now.   3.  Follow the arm closely.  If not clinically improving,? further imaging, orthopedic following.  This will likely narrow down to being primarily an olecranon bursa infection; given he is an IV drug user, would think of soft tissue abscess or an injection site, but he  has not been injecting in this area, and the clinical appearance is compatible with a conventional olecranon bursa infection.         Interval History:   no new complaints and doing well; no cp, sob, n/v/d, or abd pain. Some pain BC neg T down              Medications:       abacavir-dolutegravir-LamiVUDine  1 tablet Oral Daily     buprenorphine HCl-naloxone HCl  1 Film Sublingual BID     cefTRIAXone  2 g Intravenous Q24H     clonazePAM (klonoPIN) tablet 1 mg  1 mg Oral Daily     levomilnacipran  40 mg Oral Daily     modafinil (PROVIGIL) tablet 100 mg  100 mg Oral Daily     pregabalin (LYRICA) capsule 150 mg  150 mg Oral BID     promethazine  25 mg Oral At Bedtime     sodium chloride (PF)  3 mL Intracatheter Q8H     vancomycin (VANCOCIN) IV   "1,250 mg Intravenous Q12H                  Physical Exam:   Blood pressure (!) 139/92, pulse 97, temperature 98.3  F (36.8  C), temperature source Axillary, resp. rate 16, height 1.778 m (5' 10\"), weight 64.7 kg (142 lb 10.2 oz), SpO2 100 %.  Wt Readings from Last 2 Encounters:   05/06/18 64.7 kg (142 lb 10.2 oz)   01/29/18 59.4 kg (131 lb)     Vital Signs with Ranges  Temp:  [98.2  F (36.8  C)-98.5  F (36.9  C)] 98.3  F (36.8  C)  Pulse:  [97] 97  Heart Rate:  [100-102] 102  Resp:  [16-20] 16  BP: (127-139)/(83-92) 139/92  SpO2:  [97 %-100 %] 100 %    Constitutional: Awake, alert, cooperative, no apparent distress   Lungs: Clear to auscultation bilaterally, no crackles or wheezing   Cardiovascular: Regular rate and rhythm, normal S1 and S2, and no murmur noted   Abdomen: Normal bowel sounds, soft, non-distended, non-tender   Skin: No rashes, no cyanosis, no edema   Other: Arm about same          Data:   All microbiology laboratory data reviewed.  Recent Labs   Lab Test  05/06/18   0450  05/04/18   1655 01/29/18   1938   WBC  9.3  13.6*  9.3   HGB  12.6*  13.4  15.6   HCT  38.4*  40.4  45.1   MCV  88  89  85   PLT  348  318  249     Recent Labs   Lab Test  05/06/18   0450  05/05/18   1425  05/05/18   0940   CR  0.78  0.70  0.67     Recent Labs   Lab Test  05/05/18   1425   SED  49*     Recent Labs   Lab Test  05/05/18   1425  05/04/18   1655  10/10/17   1901  10/10/17   1849  10/09/17   2035  10/09/17   2015  09/12/17   1701  09/12/17   1654  09/10/17   2242   CULT  No growth after 12 hours  No growth after 2 days  No growth  No growth  No growth  Cultured on the 1st day of incubation:  Bacillus species, not anthracis nor cereus group  *  Critical Value/Significant Value, preliminary result only, called to and read back by  Tonie Phan RN @0526 10/10/17..    (Note)  NEGATIVE for the following: Staphylococcus spp., Staph aureus, Staph  epidermidis, Staph lugdunensis, Streptococcus spp., Strep pneumoniae,  Strep " pyogenes, Strep agalactiae, Strep anginosus group, Enterococcus  faecalis, Enterococcus faecium, and Listeria spp. by [employer]  multiplex nucleic acid test. Final identification and antimicrobial  susceptibility testing will be verified by standard methods.    Critical Value/Significant Value called to and read back by Ambika Phan RN [id number] @ 1933 10/10/17 CS      No growth  No growth  Culture negative after 29 days  No anaerobes isolated  Moderate growth  Staphylococcus aureus  *

## 2018-05-06 NOTE — PLAN OF CARE
Problem: Patient Care Overview  Goal: Plan of Care/Patient Progress Review  Outcome: Improving  A&Ox4, VSS on RA. Independent, regular diet. L arm cellulitis hot, red taught, receding within marked borders. On IV vanco. Fluids 0.9 NS with 20 KCl running at 75. Will continue to monitor.

## 2018-05-06 NOTE — PROGRESS NOTES
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 He was admitted for further management.      Left olecranon bursitis/left elbow-forearm cellulitis:  Temp 98.2 and elevated WBC of 13.6 on admission. CRP 99. X-ray showed soft tissue swelling over the olecranon. Started on IV vancomycin and ceftriaxone upon admission. Patient was evaluated by ortho and no surgical intervention recommended for now.    Recent Labs  Lab 05/06/18  0450 05/04/18  1655   WBC 9.3 13.6*     -No significant clinical improvement.   -Cont IV vancomycin and ceftriaxone  -PRN acetaminophen available  -Follow on blood cultures (NGTD)  -Monitor fever curve, CBC, and area of erythema  -Monitor cr while on vancomycin  -ID and ortho are following and appreciate input  .   History of HIV:  Not been taking his HIV medication since 04/28 bacause of inability to get into his mailbox.    -Cont on PTA Triumeq  -Follow up on HIV monitoring labs     Hypokalemia.    K 3.2 on admission. Replaced per protocol.    Substance abuse.    The patient is followed in a contract with his psychiatrist.   -Cont PTA Suboxone     Anxiety/depression/PTSD.   The patient is followed closely by psychiatrist, Dr. Savita Hassan.   -Cont PTA clonazepam and Lyrica    # Pain Assessment:  Current Pain Score 5/6/2018   Patient currently in pain? yes   Pain score (0-10) 5   Pain location Arm   Pain descriptors -   - Bc is experiencing pain due to cellulitis. Pain management was discussed and the plan was created in a collaborative fashion.  Bc's response to the current recommendations: engaged  - Please see the plan  for pain management as documented above    Active Diet Order      Combination Diet Regular Diet Adult    DVT prophylaxis: SCDs, ambulation  CODE STATUS:  Full code.   Disposition: Pending ongoing management and hospital course. Anticipate 2-3 more days of inpatient management.    D/W RN.    Interval History   Patient continues to have swelling and redness of left elbow/forearm. He reports no fever, chills, cp, sob, n/v/diarrhea, or other complaints.    Data reviewed today: I reviewed all new labs and imaging over the last 24 hours.     Physical Exam   Temp: 98.3  F (36.8  C) Temp src: Axillary BP: (!) 139/92 Pulse: 97 Heart Rate: 102 Resp: 16 SpO2: 100 % O2 Device: None (Room air)    Vitals:    05/04/18 1613 05/05/18 0616 05/06/18 0519   Weight: 59 kg (130 lb) 63.8 kg (140 lb 10.5 oz) 64.7 kg (142 lb 10.2 oz)     Vital Signs with Ranges  Temp:  [98.2  F (36.8  C)-98.5  F (36.9  C)] 98.3  F (36.8  C)  Pulse:  [97] 97  Heart Rate:  [100-102] 102  Resp:  [16-20] 16  BP: (127-139)/(83-92) 139/92  SpO2:  [97 %-100 %] 100 %  I/O's Last 24 hours  I/O last 3 completed shifts:  In: 1465 [P.O.:120; I.V.:1345]  Out: -     Constitutional: Comfortable, no acute distress  HEENT: No conjunctival pallor.  Neurologic: Awake, alert, fully oriented  Neck: Supple, no elevated JVP  Respiratory: Clear to auscultation  Cardiovascular: Normal S1 and S2. Regular rhythm and rate  GI: Abdomen soft, non tender, non distended  Extremities: Left lower arm, elbow, and forearm swelling  Skin/integument: Left upper extremity erythema    Medications   All medications were reviewed.    0.9% sodium chloride + KCl 20 mEq/L 75 mL/hr at 05/05/18 1747       abacavir-dolutegravir-LamiVUDine  1 tablet Oral Daily     buprenorphine HCl-naloxone HCl  1 Film Sublingual BID     cefTRIAXone  2 g Intravenous Q24H     clonazePAM (klonoPIN) tablet 1 mg  1 mg Oral Daily     levomilnacipran  40 mg Oral Daily     modafinil (PROVIGIL) tablet 100 mg  100 mg Oral Daily      pregabalin (LYRICA) capsule 150 mg  150 mg Oral BID     promethazine  25 mg Oral At Bedtime     sodium chloride (PF)  3 mL Intracatheter Q8H     vancomycin (VANCOCIN) IV  1,250 mg Intravenous Q12H        Data     Recent Labs  Lab 05/06/18  0450 05/05/18  1425 05/05/18  0940 05/05/18  0200 05/04/18  1655   WBC 9.3  --   --   --  13.6*   HGB 12.6*  --   --   --  13.4   MCV 88  --   --   --  89     --   --   --  318   NA  --   --  142  --  138   POTASSIUM  --   --  3.9 3.9 3.2*   CHLORIDE  --   --  109  --  99   CO2  --   --  29  --  33*   BUN  --   --  9  --  7   CR 0.78 0.70 0.67  --  0.71   ANIONGAP  --   --  4  --  6   SANDRA  --   --  8.7  --  9.4   GLC  --   --  124*  --  78   ALBUMIN  --   --   --   --  3.3*   PROTTOTAL  --   --   --   --  8.6   BILITOTAL  --   --   --   --  0.4   ALKPHOS  --   --   --   --  103   ALT  --   --   --   --  142*   AST  --   --   --   --  74*   LIPASE  --   --   --   --  93       No results found for this or any previous visit (from the past 24 hour(s)).

## 2018-05-07 LAB
CD3 CELLS # BLD: 2158 CELLS/UL (ref 603–2990)
CD3 CELLS NFR BLD: 73 % (ref 49–84)
CD3+CD4+ CELLS # BLD: 1124 CELLS/UL (ref 441–2156)
CD3+CD4+ CELLS NFR BLD: 38 % (ref 28–63)
CD3+CD4+ CELLS/CD3+CD8+ CLL BLD: 1.15 % (ref 1.4–2.6)
CD3+CD8+ CELLS # BLD: 988 CELLS/UL (ref 125–1312)
CD3+CD8+ CELLS NFR BLD: 33 % (ref 10–40)
CREAT SERPL-MCNC: 0.77 MG/DL (ref 0.66–1.25)
GFR SERPL CREATININE-BSD FRML MDRD: >90 ML/MIN/1.7M2
IFC SPECIMEN: ABNORMAL

## 2018-05-07 PROCEDURE — 99232 SBSQ HOSP IP/OBS MODERATE 35: CPT | Performed by: INTERNAL MEDICINE

## 2018-05-07 PROCEDURE — 25000132 ZZH RX MED GY IP 250 OP 250 PS 637: Performed by: INTERNAL MEDICINE

## 2018-05-07 PROCEDURE — 12000000 ZZH R&B MED SURG/OB

## 2018-05-07 PROCEDURE — 86360 T CELL ABSOLUTE COUNT/RATIO: CPT | Performed by: INTERNAL MEDICINE

## 2018-05-07 PROCEDURE — 25000128 H RX IP 250 OP 636: Performed by: INTERNAL MEDICINE

## 2018-05-07 PROCEDURE — 86359 T CELLS TOTAL COUNT: CPT | Performed by: INTERNAL MEDICINE

## 2018-05-07 PROCEDURE — 82565 ASSAY OF CREATININE: CPT | Performed by: INTERNAL MEDICINE

## 2018-05-07 PROCEDURE — 36415 COLL VENOUS BLD VENIPUNCTURE: CPT | Performed by: INTERNAL MEDICINE

## 2018-05-07 RX ORDER — HYDROXYZINE HYDROCHLORIDE 25 MG/1
25 TABLET, FILM COATED ORAL EVERY 6 HOURS PRN
Status: DISCONTINUED | OUTPATIENT
Start: 2018-05-07 | End: 2018-05-14 | Stop reason: HOSPADM

## 2018-05-07 RX ORDER — IBUPROFEN 600 MG/1
600 TABLET, FILM COATED ORAL EVERY 6 HOURS PRN
Status: DISCONTINUED | OUTPATIENT
Start: 2018-05-07 | End: 2018-05-14 | Stop reason: HOSPADM

## 2018-05-07 RX ADMIN — ACETAMINOPHEN 650 MG: 325 TABLET ORAL at 15:19

## 2018-05-07 RX ADMIN — VANCOMYCIN HYDROCHLORIDE 1250 MG: 5 INJECTION, POWDER, LYOPHILIZED, FOR SOLUTION INTRAVENOUS at 06:06

## 2018-05-07 RX ADMIN — CEFTRIAXONE SODIUM 2 G: 2 INJECTION, POWDER, FOR SOLUTION INTRAMUSCULAR; INTRAVENOUS at 17:34

## 2018-05-07 RX ADMIN — VANCOMYCIN HYDROCHLORIDE 1250 MG: 5 INJECTION, POWDER, LYOPHILIZED, FOR SOLUTION INTRAVENOUS at 18:27

## 2018-05-07 RX ADMIN — BUPRENORPHINE HYDROCHLORIDE, NALOXONE HYDROCHLORIDE 1 FILM: 8; 2 FILM, SOLUBLE BUCCAL; SUBLINGUAL at 21:28

## 2018-05-07 RX ADMIN — MODAFINIL 100 MG: 100 TABLET ORAL at 08:57

## 2018-05-07 RX ADMIN — BUPRENORPHINE HYDROCHLORIDE, NALOXONE HYDROCHLORIDE 1 FILM: 8; 2 FILM, SOLUBLE BUCCAL; SUBLINGUAL at 08:58

## 2018-05-07 RX ADMIN — LEVOMILNACIPRAN HYDROCHLORIDE 40 MG: 40 CAPSULE, EXTENDED RELEASE ORAL at 08:57

## 2018-05-07 RX ADMIN — PREGABALIN 150 MG: 75 CAPSULE ORAL at 08:56

## 2018-05-07 RX ADMIN — POLYETHYLENE GLYCOL 3350 17 G: 17 POWDER, FOR SOLUTION ORAL at 08:55

## 2018-05-07 RX ADMIN — PROMETHAZINE HYDROCHLORIDE 25 MG: 25 TABLET ORAL at 21:28

## 2018-05-07 RX ADMIN — ABACAVIR SULFATE, DOLUTEGRAVIR SODIUM, LAMIVUDINE 1 TABLET: 600; 50; 300 TABLET, FILM COATED ORAL at 08:57

## 2018-05-07 RX ADMIN — CLONAZEPAM 1 MG: 1 TABLET ORAL at 08:57

## 2018-05-07 RX ADMIN — PREGABALIN 150 MG: 75 CAPSULE ORAL at 21:27

## 2018-05-07 RX ADMIN — HYDROXYZINE HYDROCHLORIDE 25 MG: 25 TABLET ORAL at 10:49

## 2018-05-07 RX ADMIN — IBUPROFEN 600 MG: 600 TABLET ORAL at 21:28

## 2018-05-07 NOTE — PLAN OF CARE
Problem: Patient Care Overview  Goal: Plan of Care/Patient Progress Review  Outcome: No Change  RN [id number]  Patient is alert and oriented.  Up independently in room.  Complains of left arm pain, prn Tylenol and scheduled Lyrica given with relief.  Left elbow red, warm and edematous.  Patient denies numbness and tingling.  IV antibiotics per orders.

## 2018-05-07 NOTE — PROGRESS NOTES
[facility]    Hospitalist Progress Note    Date of Service (when I saw the patient): 05/07/2018    Assessment & Plan   Bc German is a 37 year old male smoker with hx of HIV, active IVDU, opioid dependence on suboxone maintenance, hx of Staph aureus diskitis/epidural abscess requiring surgical drainage, and depression/anxiety who presented on 5/4/2018 with progressive left elbow pain, swelling, and redness, and was admitted for olecranon bursitis with associated cellulitis    Acute left olecranon bursitis with LUE cellulitis, Improved  Leukocytosis to 13.6 present on arrival. Left elbow XR on arrival showed soft tissue swelling over the olecranon. Low suspicion for septic arthritis per Ortho and ID who have been involved in his care. On admission, he was initiated on vancomycin and ceftriaxone.   - Continues on vancomycin and ceftriaxone  - RICE to left arm, Tylenol prn  - Ortho and ID following    HIV  Previously non-compliant with HAART, but reportedly compliant over the past few months.   - Continues on HAART  - CD4, viral load pending  - ID following    Polysubstance abuse with active IV drug use  Chronic pain syndrome with history of opioid dependence  - Continues on PTA suboxone and pregabalin    Major recurrent depressive with anxiety  History of PTSD  [PTA: clonazepam 1 mg daily, modafinil 100 mg daily] Follows closely with his psychiatrist, Savita Hassan  - Continues on PTA meds    # Pain Assessment:  Current Pain Score 5/7/2018   Patient currently in pain? yes   Pain score (0-10) -   Pain location Arm   Pain descriptors -   - Bc is experiencing pain due to left olecranon bursitis. Pain management was discussed and the plan was created in a collaborative fashion.  Bc's response to the current recommendations: compliant  - Pharmacologic adjuvants: Acetaminophen  - Non-pharmacologic adjuvants: Ice    FEN: Regular diet  DVT Prophylaxis: Pneumatic Compression Devices  Code Status:  Full Code    Disposition: Expected discharge once cleared by Ortho and ID, ?Thurs-Fri.    Xuan Devine    Interval History   No events overnight. Believes the redness and swelling over his left forearm and elbow is receding. Pain adequately controlled. Has some itching. ID/Ortho recs reviewed  - Continue IV antibiotics and RICE  - Hydroxyzine PRN for itching    -Data reviewed today: I reviewed all new labs and imaging results over the last 24 hours. I personally reviewed no images or EKG's today.    Physical Exam   Temp: 97.9  F (36.6  C) Temp src: Oral BP: (!) 126/92   Heart Rate: 107 Resp: 16 SpO2: 98 % O2 Device: None (Room air)    Vitals:    05/05/18 0616 05/06/18 0519 05/07/18 0434   Weight: 63.8 kg (140 lb 10.5 oz) 64.7 kg (142 lb 10.2 oz) 62.3 kg (137 lb 5.6 oz)     Vital Signs with Ranges  Temp:  [97.5  F (36.4  C)-99  F (37.2  C)] 97.9  F (36.6  C)  Heart Rate:  [] 107  Resp:  [16] 16  BP: (126-145)/(73-94) 126/92  SpO2:  [93 %-99 %] 98 %  I/O last 3 completed shifts:  In: 880 [P.O.:630; I.V.:250]  Out: -     Constitutional: Appears comfortable, NAD  Respiratory: Breathing non-labored. Lungs CTAB - no wheezes, crackles, or rhonchi  Cardiovascular: Heart RRR, no m/r/g. No pedal edema  GI: +BS, abd soft/NT  Skin/Integumen: +left forearm redness and swelling  Neuro: Alert and appropriate, WESLEY  Psych: Calm and cooperative    Medications       abacavir-dolutegravir-LamiVUDine  1 tablet Oral Daily     buprenorphine HCl-naloxone HCl  1 Film Sublingual BID     cefTRIAXone  2 g Intravenous Q24H     clonazePAM (klonoPIN) tablet 1 mg  1 mg Oral Daily     levomilnacipran  40 mg Oral Daily     modafinil (PROVIGIL) tablet 100 mg  100 mg Oral Daily     polyethylene glycol  17 g Oral Daily     pregabalin (LYRICA) capsule 150 mg  150 mg Oral BID     promethazine  25 mg Oral At Bedtime     sodium chloride (PF)  3 mL Intracatheter Q8H     vancomycin (VANCOCIN) IV  1,250 mg Intravenous Q12H     Data     Recent  Labs  Lab 05/07/18  0917 05/06/18  0450 05/05/18  1425 05/05/18  0940 05/05/18  0200 05/04/18  1655   WBC  --  9.3  --   --   --  13.6*   HGB  --  12.6*  --   --   --  13.4   MCV  --  88  --   --   --  89   PLT  --  348  --   --   --  318   NA  --   --   --  142  --  138   POTASSIUM  --   --   --  3.9 3.9 3.2*   CHLORIDE  --   --   --  109  --  99   CO2  --   --   --  29  --  33*   BUN  --   --   --  9  --  7   CR 0.77 0.78 0.70 0.67  --  0.71   ANIONGAP  --   --   --  4  --  6   SANDRA  --   --   --  8.7  --  9.4   GLC  --   --   --  124*  --  78   ALBUMIN  --   --   --   --   --  3.3*   PROTTOTAL  --   --   --   --   --  8.6   BILITOTAL  --   --   --   --   --  0.4   ALKPHOS  --   --   --   --   --  103   ALT  --   --   --   --   --  142*   AST  --   --   --   --   --  74*   LIPASE  --   --   --   --   --  93       No results found for this or any previous visit (from the past 24 hour(s)).

## 2018-05-07 NOTE — PROVIDER NOTIFICATION
MD Notification    Notified Person: MD    Notified Person Name: Dr. Devine    Notification Date/Time: 5/7/18 7904    Notification Interaction: Webpaged    Purpose of Notification: Patient reporting LUE burning pain proximal to and including site of cellulitis.  He would like PRN Ibuprofen ordered to alternate with PRN Tylenol if possible.    Orders Received:    Comments:

## 2018-05-07 NOTE — PROGRESS NOTES
Right bicep Vancomycin infiltration site from end of NOC/early AM does not show any redness, irritation, or necrosis apparent at this time.  Will continue to monitor.

## 2018-05-07 NOTE — PROGRESS NOTES
[facility]  Infectious Disease Progress Note          Assessment and Plan:   IMPRESSION:   1.  A 37-year-old male with acute left arm cellulitis and olecranon bursa infection, almost certainly the primary focus here is left olecranon bursa which has significant fluid present, probably a Staph aureus infection, mild systemic symptoms, blood cultures pending.   2.  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   3.  IV drug user, currently active.   4.  Chronic HIV infection, previously noncompliant, but in recent months compliant on meds, undetectable virus, but not checked in 6 months.       RECOMMENDATIONS:   1.  Pending HIV numbers. Continue HAART meds  2.  Probably not resistant organism, but continue vancomycin and ceftriaxone for now.   3.  Follow the arm closely.  If not clinically improving,? further imaging, orthopedic following.  This will likely narrow down to being primarily an olecranon bursa infection; given he is an IV drug user, would think of soft tissue abscess or an injection site, but he  has not been injecting in this area, and the clinical appearance is compatible with a conventional olecranon bursa infection.   4 Improved, but slowly, conventional approach to this would likely include at least some outpt Iv tx, not really viable here, continue IV inpt, ortho following        Interval History:   no new complaints and doing well; no cp, sob, n/v/d, or abd pain. Some pain BC neg T down              Medications:       abacavir-dolutegravir-LamiVUDine  1 tablet Oral Daily     buprenorphine HCl-naloxone HCl  1 Film Sublingual BID     cefTRIAXone  2 g Intravenous Q24H     clonazePAM (klonoPIN) tablet 1 mg  1 mg Oral Daily     levomilnacipran  40 mg Oral Daily     modafinil (PROVIGIL) tablet 100 mg  100 mg Oral Daily      "polyethylene glycol  17 g Oral Daily     pregabalin (LYRICA) capsule 150 mg  150 mg Oral BID     promethazine  25 mg Oral At Bedtime     sodium chloride (PF)  3 mL Intracatheter Q8H     vancomycin (VANCOCIN) IV  1,250 mg Intravenous Q12H                  Physical Exam:   Blood pressure 145/90, pulse 97, temperature 97.5  F (36.4  C), temperature source Oral, resp. rate 16, height 1.778 m (5' 10\"), weight 62.3 kg (137 lb 5.6 oz), SpO2 98 %.  Wt Readings from Last 2 Encounters:   05/07/18 62.3 kg (137 lb 5.6 oz)   01/29/18 59.4 kg (131 lb)     Vital Signs with Ranges  Temp:  [97.5  F (36.4  C)-99  F (37.2  C)] 97.5  F (36.4  C)  Heart Rate:  [85-98] 85  Resp:  [16] 16  BP: (126-145)/(73-94) 145/90  SpO2:  [93 %-99 %] 98 %    Constitutional: Awake, alert, cooperative, no apparent distress   Lungs: Clear to auscultation bilaterally, no crackles or wheezing   Cardiovascular: Regular rate and rhythm, normal S1 and S2, and no murmur noted   Abdomen: Normal bowel sounds, soft, non-distended, non-tender   Skin: No rashes, no cyanosis, no edema   Other: Arm sl better some bursa fluid          Data:   All microbiology laboratory data reviewed.  Recent Labs   Lab Test  05/06/18   0450  05/04/18   1655  01/29/18   1938   WBC  9.3  13.6*  9.3   HGB  12.6*  13.4  15.6   HCT  38.4*  40.4  45.1   MCV  88  89  85   PLT  348  318  249     Recent Labs   Lab Test  05/06/18   0450  05/05/18   1425  05/05/18   0940   CR  0.78  0.70  0.67     Recent Labs   Lab Test  05/05/18   1425   SED  49*     Recent Labs   Lab Test  05/05/18   1425  05/04/18   1655  10/10/17   1901  10/10/17   1849  10/09/17   2035  10/09/17   2015  09/12/17   1701  09/12/17   1654  09/10/17   2242   CULT  No growth after 2 days  No growth after 3 days  No growth  No growth  No growth  Cultured on the 1st day of incubation:  Bacillus species, not anthracis nor cereus group  *  Critical Value/Significant Value, preliminary result only, called to and read back by  Tonie " Oh, RN @1639 10/10/17.DH.    (Note)  NEGATIVE for the following: Staphylococcus spp., Staph aureus, Staph  epidermidis, Staph lugdunensis, Streptococcus spp., Strep pneumoniae,  Strep pyogenes, Strep agalactiae, Strep anginosus group, Enterococcus  faecalis, Enterococcus faecium, and Listeria spp. by Liztic  multiplex nucleic acid test. Final identification and antimicrobial  susceptibility testing will be verified by standard methods.    Critical Value/Significant Value called to and read back by Ambika Phan RN [id number] @ 1933 10/10/17 CS      No growth  No growth  Culture negative after 29 days  No anaerobes isolated  Moderate growth  Staphylococcus aureus  *

## 2018-05-07 NOTE — PLAN OF CARE
Problem: Patient Care Overview  Goal: Plan of Care/Patient Progress Review  Outcome: Improving  A&Ox4. VSS on RA. Pt verbalized frustration with not being able to smoke and feeling restless because of that. Talked to pt and is now cooperative with cares. Independent. Regular diet. L arm cellulitis receding within borders, 2-3+ edema, red and warm to touch, on IV vanco. Will continue to monitor.

## 2018-05-07 NOTE — PROGRESS NOTES
Orthopedic Surgery  Bchilario German  2018  Admit Date:  2018  Left UE cellulitis with olecranon bursitis    Patient resting comfortably in bed.    Pain controlled.  Tolerating oral intake.    Denies nausea or vomiting  Denies chest pain or shortness of breath  No events overnight.   States that the swelling in the arm is improved as well as the pain    Alert and orient to person, place, and time.  Vital Sign Ranges  Temperature Temp  Av  F (36.7  C)  Min: 97.5  F (36.4  C)  Max: 99  F (37.2  C)   Blood pressure Systolic (24hrs), Av , Min:126 , Max:145        Diastolic (24hrs), Av, Min:73, Max:94      Pulse No Data Recorded   Respirations Resp  Av  Min: 16  Max: 16   Pulse oximetry SpO2  Av %  Min: 93 %  Max: 99 %       Erythema about the olecranon and forearm  +olecranon bursitis, not ttp  +2+ pitting edema in forearm  ROM 5-120 painless  NVI distally    Labs:  Recent Labs   Lab Test  18   0940  18   0200  18   1655   POTASSIUM  3.9  3.9  3.2*     Recent Labs   Lab Test  18   0450  18   1655  18   1938   HGB  12.6*  13.4  15.6     Recent Labs   Lab Test  10/09/17   2015  03/12/15   1620  03/06/15   2343   INR  1.01  1.92*  1.69*     Recent Labs   Lab Test  18   0450  18   1655  18   1938   PLT  348  318  249       A/P  1. Plan   Continue IV antibiotics   RICE the left arm   Continue current pain regiment.   We will continue to follow    Tonie Berumen PA-C

## 2018-05-07 NOTE — PLAN OF CARE
Problem: Patient Care Overview  Goal: Plan of Care/Patient Progress Review  Outcome: Improving  Patient A&Ox4.  Calls appropriately, pleasant and calm throughout shift.  Up independently in room, ambulated in hallways.  Vitals stable.  C/o minimal pain left arm-red, warm to touch, swollen.  Improving per patient.  ID following-on IV Vancomycin and IV Rocephin.  New IV placed this shift.  C/o itching of left arm PRN Atarax given.  Continue to monitor

## 2018-05-08 LAB
ANION GAP SERPL CALCULATED.3IONS-SCNC: 5 MMOL/L (ref 3–14)
BUN SERPL-MCNC: 17 MG/DL (ref 7–30)
CALCIUM SERPL-MCNC: 9 MG/DL (ref 8.5–10.1)
CHLORIDE SERPL-SCNC: 104 MMOL/L (ref 94–109)
CO2 SERPL-SCNC: 30 MMOL/L (ref 20–32)
CREAT SERPL-MCNC: 0.73 MG/DL (ref 0.66–1.25)
ERYTHROCYTE [DISTWIDTH] IN BLOOD BY AUTOMATED COUNT: 13.1 % (ref 10–15)
GFR SERPL CREATININE-BSD FRML MDRD: >90 ML/MIN/1.7M2
GLUCOSE SERPL-MCNC: 106 MG/DL (ref 70–99)
HCT VFR BLD AUTO: 40 % (ref 40–53)
HGB BLD-MCNC: 13.1 G/DL (ref 13.3–17.7)
MCH RBC QN AUTO: 29.1 PG (ref 26.5–33)
MCHC RBC AUTO-ENTMCNC: 32.8 G/DL (ref 31.5–36.5)
MCV RBC AUTO: 89 FL (ref 78–100)
PLATELET # BLD AUTO: 390 10E9/L (ref 150–450)
POTASSIUM SERPL-SCNC: 4.1 MMOL/L (ref 3.4–5.3)
RBC # BLD AUTO: 4.5 10E12/L (ref 4.4–5.9)
SODIUM SERPL-SCNC: 139 MMOL/L (ref 133–144)
VANCOMYCIN SERPL-MCNC: 16.2 MG/L
WBC # BLD AUTO: 8.5 10E9/L (ref 4–11)

## 2018-05-08 PROCEDURE — 25000125 ZZHC RX 250: Performed by: INTERNAL MEDICINE

## 2018-05-08 PROCEDURE — 80048 BASIC METABOLIC PNL TOTAL CA: CPT | Performed by: INTERNAL MEDICINE

## 2018-05-08 PROCEDURE — 36415 COLL VENOUS BLD VENIPUNCTURE: CPT | Performed by: INTERNAL MEDICINE

## 2018-05-08 PROCEDURE — 12000000 ZZH R&B MED SURG/OB

## 2018-05-08 PROCEDURE — 25000132 ZZH RX MED GY IP 250 OP 250 PS 637: Performed by: INTERNAL MEDICINE

## 2018-05-08 PROCEDURE — 99232 SBSQ HOSP IP/OBS MODERATE 35: CPT | Performed by: INTERNAL MEDICINE

## 2018-05-08 PROCEDURE — 80202 ASSAY OF VANCOMYCIN: CPT | Performed by: INTERNAL MEDICINE

## 2018-05-08 PROCEDURE — 40000141 ZZH STATISTIC PERIPHERAL IV START W/O US GUIDANCE

## 2018-05-08 PROCEDURE — 25000128 H RX IP 250 OP 636: Performed by: INTERNAL MEDICINE

## 2018-05-08 PROCEDURE — 85027 COMPLETE CBC AUTOMATED: CPT | Performed by: INTERNAL MEDICINE

## 2018-05-08 RX ORDER — VANCOMYCIN HYDROCHLORIDE 1 G/200ML
1000 INJECTION, SOLUTION INTRAVENOUS EVERY 12 HOURS
Status: DISCONTINUED | OUTPATIENT
Start: 2018-05-08 | End: 2018-05-08

## 2018-05-08 RX ORDER — VANCOMYCIN HYDROCHLORIDE 1 G/200ML
1000 INJECTION, SOLUTION INTRAVENOUS EVERY 12 HOURS
Status: DISCONTINUED | OUTPATIENT
Start: 2018-05-08 | End: 2018-05-13

## 2018-05-08 RX ORDER — HEPARIN SODIUM,PORCINE 10 UNIT/ML
2-5 VIAL (ML) INTRAVENOUS
Status: DISCONTINUED | OUTPATIENT
Start: 2018-05-08 | End: 2018-05-14 | Stop reason: HOSPADM

## 2018-05-08 RX ADMIN — PROMETHAZINE HYDROCHLORIDE 25 MG: 25 TABLET ORAL at 22:18

## 2018-05-08 RX ADMIN — CLONAZEPAM 1 MG: 1 TABLET ORAL at 08:03

## 2018-05-08 RX ADMIN — Medication: at 08:10

## 2018-05-08 RX ADMIN — CEFTRIAXONE SODIUM 2 G: 2 INJECTION, POWDER, FOR SOLUTION INTRAMUSCULAR; INTRAVENOUS at 17:34

## 2018-05-08 RX ADMIN — PREGABALIN 150 MG: 75 CAPSULE ORAL at 22:18

## 2018-05-08 RX ADMIN — MODAFINIL 100 MG: 100 TABLET ORAL at 08:04

## 2018-05-08 RX ADMIN — IBUPROFEN 600 MG: 600 TABLET ORAL at 08:10

## 2018-05-08 RX ADMIN — POLYETHYLENE GLYCOL 3350 17 G: 17 POWDER, FOR SOLUTION ORAL at 08:03

## 2018-05-08 RX ADMIN — LEVOMILNACIPRAN HYDROCHLORIDE 40 MG: 40 CAPSULE, EXTENDED RELEASE ORAL at 08:03

## 2018-05-08 RX ADMIN — BUPRENORPHINE HYDROCHLORIDE, NALOXONE HYDROCHLORIDE 1 FILM: 8; 2 FILM, SOLUBLE BUCCAL; SUBLINGUAL at 08:03

## 2018-05-08 RX ADMIN — VANCOMYCIN HYDROCHLORIDE 1000 MG: 1 INJECTION, SOLUTION INTRAVENOUS at 16:03

## 2018-05-08 RX ADMIN — PREGABALIN 150 MG: 75 CAPSULE ORAL at 08:03

## 2018-05-08 RX ADMIN — BUPRENORPHINE HYDROCHLORIDE, NALOXONE HYDROCHLORIDE 1 FILM: 8; 2 FILM, SOLUBLE BUCCAL; SUBLINGUAL at 22:18

## 2018-05-08 RX ADMIN — ABACAVIR SULFATE, DOLUTEGRAVIR SODIUM, LAMIVUDINE 1 TABLET: 600; 50; 300 TABLET, FILM COATED ORAL at 08:27

## 2018-05-08 RX ADMIN — VANCOMYCIN HYDROCHLORIDE 1000 MG: 1 INJECTION, SOLUTION INTRAVENOUS at 08:27

## 2018-05-08 NOTE — PROGRESS NOTES
[facility]    Hospitalist Progress Note    Date of Service (when I saw the patient): 05/08/2018    Assessment & Plan   Bc German is a 37 year old male smoker with hx of HIV, active IVDU, opioid dependence on suboxone maintenance, hx of Staph aureus diskitis/epidural abscess requiring surgical drainage, and depression/anxiety who presented on 5/4/2018 with progressive left elbow pain, swelling, and redness, and was admitted for olecranon bursitis with associated cellulitis    Acute left olecranon bursitis with LUE cellulitis, Improved  Leukocytosis to 13.6 present on arrival. Left elbow XR on arrival showed soft tissue swelling over the olecranon. Low suspicion for septic arthritis per Ortho and ID who have been involved in his care. On admission, he was initiated on vancomycin and ceftriaxone.   - Continues on vancomycin and ceftriaxone, 5/4 to present  - RICE to left arm, Tylenol/ibuprofen prn  - Ortho and ID following    HIV  Previously non-compliant with HAART, but reportedly compliant over the past few months. CD4 count 1124  - Continues on HAART  - Viral load pending  - ID following    Polysubstance abuse with active IV drug use  Chronic pain syndrome with history of opioid dependence  - Continues on PTA suboxone and pregabalin    Major recurrent depressive with anxiety  History of PTSD  [PTA: clonazepam 1 mg daily, modafinil 100 mg daily] Follows closely with his psychiatrist, Savita Hassan  - Continues on PTA meds    # Pain Assessment:  Current Pain Score 5/8/2018   Patient currently in pain? -   Pain score (0-10) 2   Pain location -   Pain descriptors -   - Bc is experiencing pain due to left olecranon bursitis. Pain management was discussed and the plan was created in a collaborative fashion.  Bc's response to the current recommendations: compliant  - Pharmacologic adjuvants: Acetaminophen  - Non-pharmacologic adjuvants: Ice    FEN: Regular diet  DVT Prophylaxis: Pneumatic  Compression Devices  Code Status: Full Code    Disposition: Expected discharge once cleared by Ortho and ID. Patient has appointment with his suboxone provider on Thurs at 1:30p. If OK with ID and Ortho, we can probably discharge on Thurs to his appointment    Xuan Devine    Interval History   No events overnight. Believes the redness and swelling over his left forearm and elbow continues to improve, albeit slow. Pain adequately controlled. Hopes to discharge by mid-morning Thurs so that he can make it to his Suboxone appointment at 1:30  - No change in care plan by me    -Data reviewed today: I reviewed all new labs and imaging results over the last 24 hours. I personally reviewed no images or EKG's today.    Physical Exam   Temp: 97.4  F (36.3  C) Temp src: Oral BP: 106/66 Pulse: 86 Heart Rate: 85 Resp: 16 SpO2: 96 % O2 Device: None (Room air)    Vitals:    05/06/18 0519 05/07/18 0434 05/08/18 0700   Weight: 64.7 kg (142 lb 10.2 oz) 62.3 kg (137 lb 5.6 oz) 62.8 kg (138 lb 7.2 oz)     Vital Signs with Ranges  Temp:  [97.4  F (36.3  C)-98.3  F (36.8  C)] 97.4  F (36.3  C)  Pulse:  [] 86  Heart Rate:  [] 85  Resp:  [15-16] 16  BP: (106-135)/() 106/66  SpO2:  [96 %-100 %] 96 %  I/O last 3 completed shifts:  In: 1198 [P.O.:800; I.V.:398]  Out: -     Constitutional: Appears comfortable, NAD  Respiratory: Breathing non-labored. Lungs CTAB - no wheezes, crackles, or rhonchi  Cardiovascular: Heart RRR, no m/r/g. No pedal edema  GI: +BS, abd soft/NT  Skin/Integumen: +left forearm redness and swelling, somewhat improved, but slow  Neuro: Alert and appropriate, WESLEY  Psych: Calm and cooperative    Medications       abacavir-dolutegravir-LamiVUDine  1 tablet Oral Daily     buprenorphine HCl-naloxone HCl  1 Film Sublingual BID     cefTRIAXone  2 g Intravenous Q24H     clonazePAM (klonoPIN) tablet 1 mg  1 mg Oral Daily     levomilnacipran  40 mg Oral Daily     modafinil (PROVIGIL) tablet 100 mg  100 mg  Oral Daily     polyethylene glycol  17 g Oral Daily     pregabalin (LYRICA) capsule 150 mg  150 mg Oral BID     promethazine  25 mg Oral At Bedtime     sodium chloride (PF)  3 mL Intracatheter Q8H     vancomycin (VANCOCIN) IV  1,000 mg Intravenous Q12H     Data     Recent Labs  Lab 05/08/18  0455 05/07/18  0917 05/06/18  0450  05/05/18  0940 05/05/18  0200 05/04/18  1655   WBC 8.5  --  9.3  --   --   --  13.6*   HGB 13.1*  --  12.6*  --   --   --  13.4   MCV 89  --  88  --   --   --  89     --  348  --   --   --  318     --   --   --  142  --  138   POTASSIUM 4.1  --   --   --  3.9 3.9 3.2*   CHLORIDE 104  --   --   --  109  --  99   CO2 30  --   --   --  29  --  33*   BUN 17  --   --   --  9  --  7   CR 0.73 0.77 0.78  < > 0.67  --  0.71   ANIONGAP 5  --   --   --  4  --  6   SANDRA 9.0  --   --   --  8.7  --  9.4   *  --   --   --  124*  --  78   ALBUMIN  --   --   --   --   --   --  3.3*   PROTTOTAL  --   --   --   --   --   --  8.6   BILITOTAL  --   --   --   --   --   --  0.4   ALKPHOS  --   --   --   --   --   --  103   ALT  --   --   --   --   --   --  142*   AST  --   --   --   --   --   --  74*   LIPASE  --   --   --   --   --   --  93   < > = values in this interval not displayed.    No results found for this or any previous visit (from the past 24 hour(s)).

## 2018-05-08 NOTE — PROGRESS NOTES
Nursing note  Pt is a very difficult iv start. Three iv nurses tried with US this morning and CNRA also tried but all unsuccessful. There is an order for midline or PICC but unfortunately pt have no vein on the RUE, the writer can only find good vein for midline or PICC on the LUE. Due to the infection on the Left posterior elbow, it is not safe to place midline or PICC, since pt might be discharging in 2-3 days MD (Kathy) was notified about the situation and he said is ok to place a peripheral iv on the LUE. 22 gauge was then placed on the LUE.

## 2018-05-08 NOTE — PLAN OF CARE
"Pt was heard yelling in her room, stating to people who were not present in her room, \"Get the hell away from me! You're the one who is keeping me in this place!\" Writer approached pt, and pt stated, \"They're keeping me here! [employer] is keeping me here.\" Writer offered prn medications, and she declined. Writer asked pt if she feels her medications are working for her, and she stated \"no.\" However, pt took all HS medications without issue. Pt remained pleasant and calm for the remainder of the evening.    Offered to help trim pt fingernails, and she stated, \"No, not now. My toenails need to be done too.\"     No additional concerns at this time. Will continue to monitor and assess.  " Problem: Patient Care Overview  Goal: Plan of Care/Patient Progress Review  Outcome: No Change  [id number] Patient lethargic, oriented x4 flat affect. VSS on RA. Patient denies SOB, nausea, numbness/tingling. LUE red, +2 edema, boarders marked, no change on shift. Up independently. Per patient voiding adequately. IV abx given. Tolerating regular diet. Plan; possible d/c home when cleared by ortho.

## 2018-05-08 NOTE — PROGRESS NOTES
[facility]  Infectious Disease Progress Note          Assessment and Plan:   IMPRESSION:   1.  A 37-year-old male with acute left arm cellulitis and olecranon bursa infection, almost certainly the primary focus here is left olecranon bursa which has significant fluid present, probably a Staph aureus infection, mild systemic symptoms, blood cultures pending.   2.  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   3.  IV drug user, currently active.   4.  Chronic HIV infection, previously noncompliant, but in recent months compliant on meds, undetectable virus, but not checked in 6 months. Tcells are in nl range       RECOMMENDATIONS:   1.  Excellent HIV numbers. Continue HAART meds  2.  Probably not resistant organism, but continue vancomycin and ceftriaxone for now.   3.  Follow the arm closely. Very slow improving,? further imaging, orthopedic following.  This will likely narrow down to being primarily an olecranon bursa infection; given he is an IV drug user, would think of soft tissue abscess or an injection site, but he  has not been injecting in this area, and the clinical appearance is compatible with a conventional olecranon bursa infection.   4 Improved, but slowly, conventional approach to this would likely include at least some outpt Iv tx, not really viable here, continue IV inpt, ortho following, ? image        Interval History:   no new complaints and doing well; no cp, sob, n/v/d, or abd pain. Some pain BC neg T down              Medications:       abacavir-dolutegravir-LamiVUDine  1 tablet Oral Daily     buprenorphine HCl-naloxone HCl  1 Film Sublingual BID     cefTRIAXone  2 g Intravenous Q24H     clonazePAM (klonoPIN) tablet 1 mg  1 mg Oral Daily     levomilnacipran  40 mg Oral Daily     modafinil (PROVIGIL) tablet 100 mg  100 mg  "Oral Daily     polyethylene glycol  17 g Oral Daily     pregabalin (LYRICA) capsule 150 mg  150 mg Oral BID     promethazine  25 mg Oral At Bedtime     sodium chloride (PF)  3 mL Intracatheter Q8H     vancomycin (VANCOCIN) IV  1,000 mg Intravenous Q12H                  Physical Exam:   Blood pressure (!) 132/103, pulse 95, temperature 97.4  F (36.3  C), temperature source Oral, resp. rate 16, height 1.778 m (5' 10\"), weight 62.8 kg (138 lb 7.2 oz), SpO2 96 %.  Wt Readings from Last 2 Encounters:   05/08/18 62.8 kg (138 lb 7.2 oz)   01/29/18 59.4 kg (131 lb)     Vital Signs with Ranges  Temp:  [97.4  F (36.3  C)-98.3  F (36.8  C)] 97.4  F (36.3  C)  Pulse:  [] 95  Heart Rate:  [] 85  Resp:  [15-16] 16  BP: (111-135)/() 132/103  SpO2:  [96 %-100 %] 96 %    Constitutional: Awake, alert, cooperative, no apparent distress   Lungs: Clear to auscultation bilaterally, no crackles or wheezing   Cardiovascular: Regular rate and rhythm, normal S1 and S2, and no murmur noted   Abdomen: Normal bowel sounds, soft, non-distended, non-tender   Skin: No rashes, no cyanosis, no edema   Other: Arm sl better some bursa fluid          Data:   All microbiology laboratory data reviewed.  Recent Labs   Lab Test  05/08/18   0455  05/06/18   0450  05/04/18   1655   WBC  8.5  9.3  13.6*   HGB  13.1*  12.6*  13.4   HCT  40.0  38.4*  40.4   MCV  89  88  89   PLT  390  348  318     Recent Labs   Lab Test  05/08/18   0455  05/07/18   0917  05/06/18   0450   CR  0.73  0.77  0.78     Recent Labs   Lab Test  05/05/18   1425   SED  49*     Recent Labs   Lab Test  05/05/18   1425  05/04/18   1655  10/10/17   1901  10/10/17   1849  10/09/17   2035  10/09/17   2015  09/12/17   1701  09/12/17   1654  09/10/17   2242   CULT  No growth after 3 days  No growth after 4 days  No growth  No growth  No growth  Cultured on the 1st day of incubation:  Bacillus species, not anthracis nor cereus group  *  Critical Value/Significant Value, " preliminary result only, called to and read back by  Tonie Phan, RN @7591 10/10/17.DH.    (Note)  NEGATIVE for the following: Staphylococcus spp., Staph aureus, Staph  epidermidis, Staph lugdunensis, Streptococcus spp., Strep pneumoniae,  Strep pyogenes, Strep agalactiae, Strep anginosus group, Enterococcus  faecalis, Enterococcus faecium, and Listeria spp. by [employer]  multiplex nucleic acid test. Final identification and antimicrobial  susceptibility testing will be verified by standard methods.    Critical Value/Significant Value called to and read back by Ambika Phan RN UR10A @ 1933 10/10/17 CS      No growth  No growth  Culture negative after 29 days  No anaerobes isolated  Moderate growth  Staphylococcus aureus  *

## 2018-05-08 NOTE — PROVIDER NOTIFICATION
MD Notification    Notified Person: MD    Notified Person Name: Kathy    Notification Date/Time: 5/8/2018 1137      Notification Interaction: phone paged    Purpose of Notification: IV kept infiltrating, IV team unable to establish access. Wondering if midline or PICC will be a better choice.     Orders Received: pending.     Comments:

## 2018-05-08 NOTE — PLAN OF CARE
Problem: Patient Care Overview  Goal: Plan of Care/Patient Progress Review  Outcome: No Change  Care Plan Summary Note: vss, alert x4, moderate toothache, Ibuprofen and oral gel given with relief. IND in room. Good appetite, voiding well. Left arm still red, but not exceeding the boarder. PIV on right arm infiltrated, IV team unable to establish access. Pending midline or PICC per ID decision. Monitor.

## 2018-05-08 NOTE — PLAN OF CARE
Problem: Patient Care Overview  Goal: Plan of Care/Patient Progress Review  Outcome: Improving  A&Ox4.  Independent.  Walked halls x1.  Tolerates regular diet.  VSS on RA.  PRN Tylenol and Ibuprofen x1 for LUE pain, both effective.  LUE cellulitis appears improved.  Borders had faded, so were drawn again.  Site remains warm and swollen, but no reports of itching or extreme discomfort.  Received IV Rocephin and Vanco, no infiltration this shift.  IV to OTILIO BANERJEE'ulices.  Discharge pending.

## 2018-05-08 NOTE — PHARMACY-VANCOMYCIN DOSING SERVICE
Pharmacy Vancomycin Note  Date of Service May 8, 2018  Patient's  1980   37 year old, male    Indication: Skin and Soft Tissue Infection  Goal Trough Level: 10-15 mg/L  Day of Therapy: 5  Current Vancomycin regimen:  1250 mg IV q12h    Current estimated CrCl = Estimated Creatinine Clearance: 124.5 mL/min (based on Cr of 0.73).    Creatinine for last 3 days  2018:  9:40 AM Creatinine 0.67 mg/dL;  2:25 PM Creatinine 0.70 mg/dL  2018:  4:50 AM Creatinine 0.78 mg/dL  2018:  9:17 AM Creatinine 0.77 mg/dL  2018:  4:55 AM Creatinine 0.73 mg/dL    Recent Vancomycin Levels (past 3 days)  2018:  4:50 AM Vancomycin Level 9.7 mg/L  2018:  4:55 AM Vancomycin Level 16.2 mg/L    Vancomycin IV Administrations (past 72 hours)                   vancomycin (VANCOCIN) 1,250 mg in sodium chloride 0.9 % 250 mL intermittent infusion (mg) 1,250 mg New Bag 18 1827     1,250 mg New Bag  0606     1,250 mg New Bag 18 1746     1,250 mg New Bag  0656                Nephrotoxins and other renal medications (Future)    Start     Dose/Rate Route Frequency Ordered Stop    18 0900  vancomycin (VANCOCIN) 1000 mg in dextrose 5% 200 mL PREMIX      1,000 mg  200 mL/hr over 1 Hours Intravenous EVERY 12 HOURS 18 0602      18 1552  ibuprofen (ADVIL/MOTRIN) tablet 600 mg      600 mg Oral EVERY 6 HOURS PRN 18 1552               Contrast Orders - past 72 hours     None          Interpretation of levels and current regimen:  Trough level is  Supratherapeutic    Has serum creatinine changed > 50% in last 72 hours: No    Urine output:      Renal Function: Stable    Plan:  1.  Decrease Dose to 1000mg q12h  2.  Pharmacy will check trough levels as appropriate in 1-3 Days.    3. Serum creatinine levels will be ordered daily for the first week of therapy and at least twice weekly for subsequent weeks.      Chris Hernandez        .

## 2018-05-08 NOTE — PROGRESS NOTES
Orthopedic Surgery  Bc German  2018  Admit Date:  2018  Left UE cellulitis with olecranon bursitis     Patient resting comfortably in bed.    Pain controlled.  Tolerating oral intake.    Denies nausea or vomiting  Denies chest pain or shortness of breath  No events overnight.   States that the swelling in the arm is improved as well as the pain     Alert and orient to person, place, and time.  Vital Sign Ranges  Temperature Temp  Av.9  F (36.6  C)  Min: 97.4  F (36.3  C)  Max: 98.3  F (36.8  C)   Blood pressure Systolic (24hrs), Av , Min:111 , Max:135        Diastolic (24hrs), Av, Min:72, Max:103      Pulse Pulse  Av  Min: 95  Max: 113   Respirations Resp  Avg: 15.8  Min: 15  Max: 16   Pulse oximetry SpO2  Av.3 %  Min: 96 %  Max: 100 %       Erythema about the olecranon and forearm - rescinding from drawn boarders  +olecranon bursitis, not ttp  +2+ pitting edema in forearm  ROM 5-120 painless  NVI distally       Labs:  Recent Labs   Lab Test  18   0455  18   0940  18   0200   POTASSIUM  4.1  3.9  3.9     Recent Labs   Lab Test  18   0455  18   0450  18   1655   HGB  13.1*  12.6*  13.4     Recent Labs   Lab Test  10/09/17   2015  03/12/15   1620  03/06/15   2343   INR  1.01  1.92*  1.69*     Recent Labs   Lab Test  18   0455  18   0450  18   1655   PLT  390  348  318     A/P  1. Plan                        Continue IV antibiotics                        RICE the left arm                        Continue current pain regiment.                        We will continue to follow    Tonie Berumen PA-C

## 2018-05-09 LAB
CREAT SERPL-MCNC: 0.76 MG/DL (ref 0.66–1.25)
GFR SERPL CREATININE-BSD FRML MDRD: >90 ML/MIN/1.7M2

## 2018-05-09 PROCEDURE — 25000128 H RX IP 250 OP 636: Performed by: INTERNAL MEDICINE

## 2018-05-09 PROCEDURE — 94660 CPAP INITIATION&MGMT: CPT

## 2018-05-09 PROCEDURE — 25000132 ZZH RX MED GY IP 250 OP 250 PS 637: Performed by: INTERNAL MEDICINE

## 2018-05-09 PROCEDURE — 99232 SBSQ HOSP IP/OBS MODERATE 35: CPT | Performed by: INTERNAL MEDICINE

## 2018-05-09 PROCEDURE — 40000275 ZZH STATISTIC RCP TIME EA 10 MIN

## 2018-05-09 PROCEDURE — 82565 ASSAY OF CREATININE: CPT | Performed by: INTERNAL MEDICINE

## 2018-05-09 PROCEDURE — 12000000 ZZH R&B MED SURG/OB

## 2018-05-09 PROCEDURE — 36415 COLL VENOUS BLD VENIPUNCTURE: CPT | Performed by: INTERNAL MEDICINE

## 2018-05-09 RX ADMIN — PREGABALIN 150 MG: 75 CAPSULE ORAL at 21:28

## 2018-05-09 RX ADMIN — LEVOMILNACIPRAN HYDROCHLORIDE 40 MG: 40 CAPSULE, EXTENDED RELEASE ORAL at 08:31

## 2018-05-09 RX ADMIN — ABACAVIR SULFATE, DOLUTEGRAVIR SODIUM, LAMIVUDINE 1 TABLET: 600; 50; 300 TABLET, FILM COATED ORAL at 11:20

## 2018-05-09 RX ADMIN — Medication: at 02:44

## 2018-05-09 RX ADMIN — CLONAZEPAM 1 MG: 1 TABLET ORAL at 08:31

## 2018-05-09 RX ADMIN — BUPRENORPHINE HYDROCHLORIDE, NALOXONE HYDROCHLORIDE 1 FILM: 8; 2 FILM, SOLUBLE BUCCAL; SUBLINGUAL at 21:29

## 2018-05-09 RX ADMIN — POLYETHYLENE GLYCOL 3350 17 G: 17 POWDER, FOR SOLUTION ORAL at 08:32

## 2018-05-09 RX ADMIN — CEFTRIAXONE SODIUM 2 G: 2 INJECTION, POWDER, FOR SOLUTION INTRAMUSCULAR; INTRAVENOUS at 16:38

## 2018-05-09 RX ADMIN — BUPRENORPHINE HYDROCHLORIDE, NALOXONE HYDROCHLORIDE 1 FILM: 8; 2 FILM, SOLUBLE BUCCAL; SUBLINGUAL at 08:31

## 2018-05-09 RX ADMIN — PREGABALIN 150 MG: 75 CAPSULE ORAL at 08:31

## 2018-05-09 RX ADMIN — MODAFINIL 100 MG: 100 TABLET ORAL at 08:31

## 2018-05-09 RX ADMIN — VANCOMYCIN HYDROCHLORIDE 1000 MG: 1 INJECTION, SOLUTION INTRAVENOUS at 03:50

## 2018-05-09 RX ADMIN — PROMETHAZINE HYDROCHLORIDE 25 MG: 25 TABLET ORAL at 21:28

## 2018-05-09 RX ADMIN — ACETAMINOPHEN 650 MG: 325 TABLET ORAL at 02:44

## 2018-05-09 RX ADMIN — VANCOMYCIN HYDROCHLORIDE 1000 MG: 1 INJECTION, SOLUTION INTRAVENOUS at 17:44

## 2018-05-09 NOTE — PROGRESS NOTES
[facility]  Infectious Disease Progress Note          Assessment and Plan:   IMPRESSION:   1.  A 37-year-old male with acute left arm cellulitis and olecranon bursa infection, almost certainly the primary focus here is left olecranon bursa which has significant fluid present, probably a Staph aureus infection, mild systemic symptoms, blood cultures pending.   2.  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   3.  IV drug user, currently active.   4.  Chronic HIV infection, previously noncompliant, but in recent months compliant on meds, undetectable virus, but not checked in 6 months. Tcells are in nl range       RECOMMENDATIONS:   1.  Excellent HIV numbers. Continue HAART meds  2.  Probably not resistant organism, but continue vancomycin and ceftriaxone for now.   3.  Follow the arm closely. Very slow improving,? further imaging, orthopedic following.  This will likely narrow down to being primarily an olecranon bursa infection; given he is an IV drug user, would think of soft tissue abscess or an injection site, but he  has not been injecting in this area, and the clinical appearance is compatible with a conventional olecranon bursa infection.   4 Improved, but slowly, conventional approach to this would likely include at least some outpt Iv tx, not really viable here, continue IV inpt, ortho following, ? Image at some point        Interval History:   no new complaints and doing well; no cp, sob, n/v/d, or abd pain. Some pain BC neg T down              Medications:       abacavir-dolutegravir-LamiVUDine  1 tablet Oral Daily     buprenorphine HCl-naloxone HCl  1 Film Sublingual BID     cefTRIAXone  2 g Intravenous Q24H     clonazePAM (klonoPIN) tablet 1 mg  1 mg Oral Daily     levomilnacipran  40 mg Oral Daily     modafinil (PROVIGIL) tablet  "100 mg  100 mg Oral Daily     polyethylene glycol  17 g Oral Daily     pregabalin (LYRICA) capsule 150 mg  150 mg Oral BID     promethazine  25 mg Oral At Bedtime     sodium chloride (PF)  10 mL Intracatheter Q7 Days     sodium chloride (PF)  3 mL Intracatheter Q8H     vancomycin (VANCOCIN) IV  1,000 mg Intravenous Q12H                  Physical Exam:   Blood pressure 114/74, pulse 86, temperature 97.9  F (36.6  C), temperature source Oral, resp. rate 18, height 1.778 m (5' 10\"), weight 62.7 kg (138 lb 3.7 oz), SpO2 97 %.  Wt Readings from Last 2 Encounters:   05/09/18 62.7 kg (138 lb 3.7 oz)   01/29/18 59.4 kg (131 lb)     Vital Signs with Ranges  Temp:  [97.7  F (36.5  C)-98.5  F (36.9  C)] 97.9  F (36.6  C)  Heart Rate:  [] 81  Resp:  [16-18] 18  BP: (114-142)/(74-92) 114/74  FiO2 (%):  [30 %] 30 %  SpO2:  [93 %-100 %] 97 %    Constitutional: Awake, alert, cooperative, no apparent distress   Lungs: Clear to auscultation bilaterally, no crackles or wheezing   Cardiovascular: Regular rate and rhythm, normal S1 and S2, and no murmur noted   Abdomen: Normal bowel sounds, soft, non-distended, non-tender   Skin: No rashes, no cyanosis, no edema   Other: Arm sl better some bursa fluid          Data:   All microbiology laboratory data reviewed.  Recent Labs   Lab Test  05/08/18   0455  05/06/18   0450  05/04/18   1655   WBC  8.5  9.3  13.6*   HGB  13.1*  12.6*  13.4   HCT  40.0  38.4*  40.4   MCV  89  88  89   PLT  390  348  318     Recent Labs   Lab Test  05/09/18   0826  05/08/18   0455  05/07/18   0917   CR  0.76  0.73  0.77     Recent Labs   Lab Test  05/05/18   1425   SED  49*     Recent Labs   Lab Test  05/05/18   1425  05/04/18   1655  10/10/17   1901  10/10/17   1849  10/09/17   2035  10/09/17   2015  09/12/17   1701  09/12/17   1654  09/10/17   2242   CULT  No growth after 4 days  No growth after 5 days  No growth  No growth  No growth  Cultured on the 1st day of incubation:  Bacillus species, not " anthracis nor cereus group  *  Critical Value/Significant Value, preliminary result only, called to and read back by  Tonie Phan, RN @3369 10/10/17.DH.    (Note)  NEGATIVE for the following: Staphylococcus spp., Staph aureus, Staph  epidermidis, Staph lugdunensis, Streptococcus spp., Strep pneumoniae,  Strep pyogenes, Strep agalactiae, Strep anginosus group, Enterococcus  faecalis, Enterococcus faecium, and Listeria spp. by [employer]  multiplex nucleic acid test. Final identification and antimicrobial  susceptibility testing will be verified by standard methods.    Critical Value/Significant Value called to and read back by Ambika Phan RN UR10A @ 1933 10/10/17 CS      No growth  No growth  Culture negative after 29 days  No anaerobes isolated  Moderate growth  Staphylococcus aureus  *

## 2018-05-09 NOTE — PLAN OF CARE
Problem: Patient Care Overview  Goal: Plan of Care/Patient Progress Review  Outcome: No Change  Vss, AOx4. L elbow is red, marked. Pain at 2/10, declines pain med. Tolerating regular diet well, no nausea. Up independently. Denies any numbness or tingling.

## 2018-05-09 NOTE — PROGRESS NOTES
[facility]    Hospitalist Progress Note    Assessment & Plan   Bc German is a 37 year old male with PMHx of HIV, active IV drug use, opioid dependence on suboxone, hx of staph aureus discitis and epidural abscess requiring surgical drainage, tobacco use, depression and anxiety who was admitted on 5/4/2018 with left elbow pain, swelling and erythema dt olecranon bursitis with cellulitis.     Acute left olecranon bursitis with LUE cellulitis: Improved  WBC 13.6 on admission. Xray L elbow showed soft tissue swelling over the olecranon. Low suspicion for septic arthritis per Ortho and ID who have been involved in his care. On admission, he was initiated on vancomycin and ceftriaxone. Blood cultures from admission remain negative.   -- conts on Vancomycin and Rocephin  -- cont RICE to left arm, has prns available for pain including Tylenol and Ibuprofen  -- ortho and ID following     HIV  Previously non-compliant with HAART, but reportedly compliant over the past few months. CD4 count 1124.  -- conts on HAART  -- viral load pending  -- ID following     Polysubstance abuse with active IV drug use  Chronic pain syndrome with history of opioid dependence  -- conts on PTA suboxone and pregabalin     Major recurrent depressive with anxiety  History of PTSD  [PTA: clonazepam 1 mg daily, modafinil 100 mg daily] Follows closely with his psychiatrist, Savita Hassan  -- conts on PTA meds    Pain Assessment:  Current Pain Score 5/9/2018   Patient currently in pain? denies   Pain score (0-10) -   Pain location -   Pain descriptors -   - Bc is experiencing pain due to left olecranon bursitis. Pain management was discussed and the plan was created in a collaborative fashion.  Bc's response to the current recommendations: compliant  - Pharmacologic adjuvants: Acetaminophen    FEN: no IVFs, lytes stable, regular diet  DVT Prophylaxis: PCDs  Code Status: Full Code    Disposition: Discharge pending plans  for antibiotics (IV abx unlikely option in this setting with hx of IV drug use) -- await recs per ortho and ID. Patient has appointment with his suboxone provider on Thurs 5/10 at 1330. If okay with ID and Ortho, consider discharge on Thurs to allow him to get to this appointment.    Elizabeth Griffin    Interval History   Seen this morning. Feeling well. No specific complaints. Pain okay -- swelling much improved from admission. Eating/drinking okay. Denies cp/sob/cough, abd pain/n/v.     -Data reviewed today: I reviewed all new labs and imaging results over the last 24 hours. I personally reviewed no images or EKG's today.    Physical Exam   Temp: 97.9  F (36.6  C) Temp src: Oral BP: 114/74   Heart Rate: 81 Resp: 18 SpO2: 97 % O2 Device: None (Room air)    Vitals:    05/07/18 0434 05/08/18 0700 05/09/18 0500   Weight: 62.3 kg (137 lb 5.6 oz) 62.8 kg (138 lb 7.2 oz) 62.7 kg (138 lb 3.7 oz)     Vital Signs with Ranges  Temp:  [97.7  F (36.5  C)-98.5  F (36.9  C)] 97.9  F (36.6  C)  Heart Rate:  [] 81  Resp:  [16-18] 18  BP: (114-142)/(74-92) 114/74  FiO2 (%):  [30 %] 30 %  SpO2:  [93 %-100 %] 97 %  I/O last 3 completed shifts:  In: 394.2 [P.O.:315; I.V.:79.2]  Out: -     Constitutional: Resting comfortably, alert and conversing appropriately, NAD  Respiratory: CTAB, no wheeze/rales/rhonchi, no increased work of breathing  Cardiovascular: HRRR, no MGR, no LE edema  GI: S, NT, ND, +BS  Skin/Integumen: +warmth/erythema over L elbow extending distally down arm w/o expansion beyond border demarcated on admission, no fluctuance, no tenderness to palpation  Other:      Medications       abacavir-dolutegravir-LamiVUDine  1 tablet Oral Daily     buprenorphine HCl-naloxone HCl  1 Film Sublingual BID     cefTRIAXone  2 g Intravenous Q24H     clonazePAM (klonoPIN) tablet 1 mg  1 mg Oral Daily     levomilnacipran  40 mg Oral Daily     modafinil (PROVIGIL) tablet 100 mg  100 mg Oral Daily     polyethylene glycol  17 g  Oral Daily     pregabalin (LYRICA) capsule 150 mg  150 mg Oral BID     promethazine  25 mg Oral At Bedtime     sodium chloride (PF)  10 mL Intracatheter Q7 Days     sodium chloride (PF)  3 mL Intracatheter Q8H     vancomycin (VANCOCIN) IV  1,000 mg Intravenous Q12H       Data     Recent Labs  Lab 05/09/18  0826 05/08/18  0455 05/07/18  0917 05/06/18  0450  05/05/18  0940 05/05/18  0200 05/04/18  1655   WBC  --  8.5  --  9.3  --   --   --  13.6*   HGB  --  13.1*  --  12.6*  --   --   --  13.4   MCV  --  89  --  88  --   --   --  89   PLT  --  390  --  348  --   --   --  318   NA  --  139  --   --   --  142  --  138   POTASSIUM  --  4.1  --   --   --  3.9 3.9 3.2*   CHLORIDE  --  104  --   --   --  109  --  99   CO2  --  30  --   --   --  29  --  33*   BUN  --  17  --   --   --  9  --  7   CR 0.76 0.73 0.77 0.78  < > 0.67  --  0.71   ANIONGAP  --  5  --   --   --  4  --  6   SANDRA  --  9.0  --   --   --  8.7  --  9.4   GLC  --  106*  --   --   --  124*  --  78   ALBUMIN  --   --   --   --   --   --   --  3.3*   PROTTOTAL  --   --   --   --   --   --   --  8.6   BILITOTAL  --   --   --   --   --   --   --  0.4   ALKPHOS  --   --   --   --   --   --   --  103   ALT  --   --   --   --   --   --   --  142*   AST  --   --   --   --   --   --   --  74*   LIPASE  --   --   --   --   --   --   --  93   < > = values in this interval not displayed.    No results found for this or any previous visit (from the past 24 hour(s)).

## 2018-05-09 NOTE — PLAN OF CARE
Problem: Patient Care Overview  Goal: Plan of Care/Patient Progress Review  Outcome: Improving  A&O x 4, up independent in the room, vss,  on Ra,no c/o of pain,left elbow bursitis, red and swollen, redness with in the markings, improving per patient, ID following, continues on IV Vanco and Rocephin, good appetite, possible d/c tomorrow on abx.

## 2018-05-09 NOTE — PROGRESS NOTES
Orthopedic Surgery  Bc German  2018  Admit Date:  2018  Left UE cellulitis with olecranon bursitis      Patient walking comfortably in hallways  Pain controlled.  Tolerating oral intake.    Denies nausea or vomiting  Denies chest pain or shortness of breath  No events overnight.   Swelling in the arm continues to improve       Alert and orient to person, place, and time.  Vital Sign Ranges  Temperature Temp  Av  F (36.7  C)  Min: 97.7  F (36.5  C)  Max: 98.5  F (36.9  C)   Blood pressure Systolic (24hrs), Av , Min:106 , Max:142        Diastolic (24hrs), Av, Min:66, Max:92      Pulse Pulse  Av  Min: 86  Max: 86   Respirations Resp  Av  Min: 16  Max: 18   Pulse oximetry SpO2  Av.5 %  Min: 93 %  Max: 100 %       Erythema about the olecranon and forearm - stable within drawn boarders  +olecranon bursitis, not ttp  1+ edema in forearm  ROM 5-120 painless  NVI distally      Labs:  Recent Labs   Lab Test  18   0455  18   0940  18   0200   POTASSIUM  4.1  3.9  3.9     Recent Labs   Lab Test  18   0455  18   0450  18   1655   HGB  13.1*  12.6*  13.4     Recent Labs   Lab Test  10/09/17   2015  03/12/15   1620  03/06/15   2343   INR  1.01  1.92*  1.69*     Recent Labs   Lab Test  18   0455  18   0450  18   1655   PLT  390  348  318          A/P  1. Plan                        Continue IV antibiotics                        RICE the left arm                        Continue current pain regiment.                        We will continue to follow    Tonie Berumen PA-C

## 2018-05-09 NOTE — PLAN OF CARE
Problem: Patient Care Overview  Goal: Plan of Care/Patient Progress Review  Outcome: No Change  A&Ox4. VSS. Afebrile. Pt. c/o mouth/tooth pain, tylenol and benzocaine given for relief. Pt. denies nausea. Pt. continues on IV antibiotics. New PIV placed overnight. Left arm red and +2 edema. Borders marked and unchanged. Good appetite. Pt. ate a popsicle and peanut butter toast overnight. Voiding spontaneously. No BM. Up independently ambulating to the bathroom. No acute events. Pt. slept comfortably between cares. Will continue POC.

## 2018-05-10 LAB
BACTERIA SPEC CULT: NO GROWTH
CREAT SERPL-MCNC: 0.74 MG/DL (ref 0.66–1.25)
GFR SERPL CREATININE-BSD FRML MDRD: >90 ML/MIN/1.7M2
Lab: NORMAL
SPECIMEN SOURCE: NORMAL
VANCOMYCIN SERPL-MCNC: 10.9 MG/L

## 2018-05-10 PROCEDURE — 25000132 ZZH RX MED GY IP 250 OP 250 PS 637: Performed by: INTERNAL MEDICINE

## 2018-05-10 PROCEDURE — 25000128 H RX IP 250 OP 636: Performed by: INTERNAL MEDICINE

## 2018-05-10 PROCEDURE — 80202 ASSAY OF VANCOMYCIN: CPT | Performed by: INTERNAL MEDICINE

## 2018-05-10 PROCEDURE — 36415 COLL VENOUS BLD VENIPUNCTURE: CPT | Performed by: INTERNAL MEDICINE

## 2018-05-10 PROCEDURE — 82565 ASSAY OF CREATININE: CPT | Performed by: INTERNAL MEDICINE

## 2018-05-10 PROCEDURE — 12000000 ZZH R&B MED SURG/OB

## 2018-05-10 PROCEDURE — 99232 SBSQ HOSP IP/OBS MODERATE 35: CPT | Performed by: INTERNAL MEDICINE

## 2018-05-10 RX ADMIN — CEFTRIAXONE SODIUM 2 G: 2 INJECTION, POWDER, FOR SOLUTION INTRAMUSCULAR; INTRAVENOUS at 17:02

## 2018-05-10 RX ADMIN — ABACAVIR SULFATE, DOLUTEGRAVIR SODIUM, LAMIVUDINE 1 TABLET: 600; 50; 300 TABLET, FILM COATED ORAL at 08:30

## 2018-05-10 RX ADMIN — VANCOMYCIN HYDROCHLORIDE 1000 MG: 1 INJECTION, SOLUTION INTRAVENOUS at 06:02

## 2018-05-10 RX ADMIN — CLONAZEPAM 1 MG: 1 TABLET ORAL at 08:30

## 2018-05-10 RX ADMIN — MODAFINIL 100 MG: 100 TABLET ORAL at 08:27

## 2018-05-10 RX ADMIN — POLYETHYLENE GLYCOL 3350 17 G: 17 POWDER, FOR SOLUTION ORAL at 08:27

## 2018-05-10 RX ADMIN — PREGABALIN 150 MG: 75 CAPSULE ORAL at 22:04

## 2018-05-10 RX ADMIN — BUPRENORPHINE HYDROCHLORIDE, NALOXONE HYDROCHLORIDE 1 FILM: 8; 2 FILM, SOLUBLE BUCCAL; SUBLINGUAL at 22:05

## 2018-05-10 RX ADMIN — PREGABALIN 150 MG: 75 CAPSULE ORAL at 08:28

## 2018-05-10 RX ADMIN — PROMETHAZINE HYDROCHLORIDE 25 MG: 25 TABLET ORAL at 22:04

## 2018-05-10 RX ADMIN — VANCOMYCIN HYDROCHLORIDE 1000 MG: 1 INJECTION, SOLUTION INTRAVENOUS at 17:48

## 2018-05-10 RX ADMIN — BUPRENORPHINE HYDROCHLORIDE, NALOXONE HYDROCHLORIDE 1 FILM: 8; 2 FILM, SOLUBLE BUCCAL; SUBLINGUAL at 08:30

## 2018-05-10 RX ADMIN — LEVOMILNACIPRAN HYDROCHLORIDE 40 MG: 40 CAPSULE, EXTENDED RELEASE ORAL at 08:27

## 2018-05-10 NOTE — PROGRESS NOTES
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     Acute left olecranon bursitis with LUE cellulitis: Improved  WBC 13.6 on admission. Xray L elbow showed soft tissue swelling over the olecranon. Low suspicion for septic arthritis per Ortho and ID who have been involved in his care. On admission, he was initiated on vancomycin and ceftriaxone. Blood cultures from admission remain negative.   -- conts on Vancomycin and Rocephin -- per ID assessment today, recommend few additional days of IV antibiotics  -- conservative management recommended per ortho -- cont RICE, has prns available for pain including Tylenol and Ibuprofen     HIV  Previously non-compliant with HAART, but reportedly compliant over the past few months. CD4 count 1124.  -- conts on HAART, viral load pending     Polysubstance abuse with active IV drug use  Chronic pain syndrome with history of opioid dependence  -- conts on PTA suboxone and pregabalin  -- was due to follow up with suboxone provider (Dr. Barrientos) on 5/10 -- hospitalist spoke with Dr. Barrientos on 5/10, discussed that he will need to remain hospitalized for IV abx for a few more days. Dr. Barrientos will call in script for Suboxone to the discharge pharmacy here, will provide enough meds to get him through to his next appointment which is scheduled for 5/18     Major recurrent depressive with anxiety  History of PTSD  [PTA: clonazepam 1 mg daily, modafinil 100 mg daily] Follows closely with his psychiatrist, Savita Hassan  -- conts on PTA meds    Pain Assessment:  Current Pain Score 5/10/2018   Patient currently in pain? denies   Pain score  (0-10) -   Pain location -   Pain descriptors -   - Bc is experiencing pain due to left olecranon bursitis. Pain management was discussed and the plan was created in a collaborative fashion.  Bc's response to the current recommendations: compliant  - Pharmacologic adjuvants: Acetaminophen    FEN: no IVFs, lytes stable, regular diet  DVT Prophylaxis: PCDs  Code Status: Full Code    Disposition: ID recommending few additional days of IV antibiotic therapy. Discharge pending their recommendations on when to switch to po abx, suspect 2-3d at least. Note plans for follow up with Suboxone provider as above.      Elizabeth Griffin    Interval History   Seen this morning. Feeling well. No specific complaints. Pain controlled. Eating/drinking okay. Denies cp/sob/cough, abd pain/n/v.      -Data reviewed today: I reviewed all new labs and imaging results over the last 24 hours. I personally reviewed no images or EKG's today.    Physical Exam   Temp: 98.3  F (36.8  C) Temp src: Oral BP: 123/78   Heart Rate: 88 Resp: 18 SpO2: 99 % O2 Device: None (Room air)    Vitals:    05/08/18 0700 05/09/18 0500 05/10/18 0621   Weight: 62.8 kg (138 lb 7.2 oz) 62.7 kg (138 lb 3.7 oz) 62.4 kg (137 lb 9.1 oz)     Vital Signs with Ranges  Temp:  [98.3  F (36.8  C)-98.5  F (36.9  C)] 98.3  F (36.8  C)  Heart Rate:  [] 88  Resp:  [18] 18  BP: (123-136)/(78-80) 123/78  SpO2:  [97 %-99 %] 99 %       Constitutional: Resting comfortably, alert and conversing appropriately, NAD  Respiratory: CTAB, no wheeze/rales/rhonchi, no increased work of breathing  Cardiovascular: HRRR, no MGR, no LE edema  GI: S, NT, ND, +BS  Skin/Integumen: +warmth/erythema over L elbow extending distally down arm, +bursitis, no fluctuance, no tenderness to palpation  Other:      Medications       abacavir-dolutegravir-LamiVUDine  1 tablet Oral Daily     buprenorphine HCl-naloxone HCl  1 Film Sublingual BID     cefTRIAXone  2 g Intravenous Q24H      clonazePAM (klonoPIN) tablet 1 mg  1 mg Oral Daily     levomilnacipran  40 mg Oral Daily     modafinil (PROVIGIL) tablet 100 mg  100 mg Oral Daily     polyethylene glycol  17 g Oral Daily     pregabalin (LYRICA) capsule 150 mg  150 mg Oral BID     promethazine  25 mg Oral At Bedtime     sodium chloride (PF)  10 mL Intracatheter Q7 Days     sodium chloride (PF)  3 mL Intracatheter Q8H     vancomycin (VANCOCIN) IV  1,000 mg Intravenous Q12H       Data     Recent Labs  Lab 05/10/18  0804 05/09/18  0826 05/08/18  0455  05/06/18  0450  05/05/18  0940 05/05/18  0200 05/04/18  1655   WBC  --   --  8.5  --  9.3  --   --   --  13.6*   HGB  --   --  13.1*  --  12.6*  --   --   --  13.4   MCV  --   --  89  --  88  --   --   --  89   PLT  --   --  390  --  348  --   --   --  318   NA  --   --  139  --   --   --  142  --  138   POTASSIUM  --   --  4.1  --   --   --  3.9 3.9 3.2*   CHLORIDE  --   --  104  --   --   --  109  --  99   CO2  --   --  30  --   --   --  29  --  33*   BUN  --   --  17  --   --   --  9  --  7   CR 0.74 0.76 0.73  < > 0.78  < > 0.67  --  0.71   ANIONGAP  --   --  5  --   --   --  4  --  6   SANDRA  --   --  9.0  --   --   --  8.7  --  9.4   GLC  --   --  106*  --   --   --  124*  --  78   ALBUMIN  --   --   --   --   --   --   --   --  3.3*   PROTTOTAL  --   --   --   --   --   --   --   --  8.6   BILITOTAL  --   --   --   --   --   --   --   --  0.4   ALKPHOS  --   --   --   --   --   --   --   --  103   ALT  --   --   --   --   --   --   --   --  142*   AST  --   --   --   --   --   --   --   --  74*   LIPASE  --   --   --   --   --   --   --   --  93   < > = values in this interval not displayed.    No results found for this or any previous visit (from the past 24 hour(s)).

## 2018-05-10 NOTE — PHARMACY-VANCOMYCIN DOSING SERVICE
Pharmacy Vancomycin Note  Date of Service May 10, 2018  Patient's  1980   37 year old, male    Indication: Skin and Soft Tissue Infection  Goal Trough Level: 10-15 mg/L  Day of Therapy: 7  Current Vancomycin regimen:  1000 mg IV q12h    Current estimated CrCl = Estimated Creatinine Clearance: 123 mL/min (based on Cr of 0.74).    Creatinine for last 3 days  2018:  4:55 AM Creatinine 0.73 mg/dL  2018:  8:26 AM Creatinine 0.76 mg/dL  5/10/2018:  8:04 AM Creatinine 0.74 mg/dL    Recent Vancomycin Levels (past 3 days)  2018:  4:55 AM Vancomycin Level 16.2 mg/L  5/10/2018:  5:00 PM Vancomycin Level 10.9 mg/L    Vancomycin IV Administrations (past 72 hours)                   vancomycin (VANCOCIN) 1000 mg in dextrose 5% 200 mL PREMIX (mg) 1,000 mg New Bag 05/10/18 1748     1,000 mg New Bag  0602     1,000 mg New Bag 18 1744     1,000 mg New Bag  0350     1,000 mg New Bag 18 1603                Nephrotoxins and other renal medications (Future)    Start     Dose/Rate Route Frequency Ordered Stop    18 1600  vancomycin (VANCOCIN) 1000 mg in dextrose 5% 200 mL PREMIX      1,000 mg  200 mL/hr over 1 Hours Intravenous EVERY 12 HOURS 18 1443      18 1552  ibuprofen (ADVIL/MOTRIN) tablet 600 mg      600 mg Oral EVERY 6 HOURS PRN 18 1552               Contrast Orders - past 72 hours     None          Interpretation of levels and current regimen:  Trough level is  Therapeutic    Has serum creatinine changed > 50% in last 72 hours: No    Urine output:  unable to determine    Renal Function: Stable    Plan:  1.  Continue Current Dose  2.  Pharmacy will check trough levels as appropriate in 3-5 Days.    3. Serum creatinine levels will be ordered every other day for at least 10 days while on concomitant nephrotoxins.      Luis Sabillon        .

## 2018-05-10 NOTE — PROGRESS NOTES
[facility]  Infectious Disease Progress Note          Assessment and Plan:   IMPRESSION:   1.  A 37-year-old male with acute left arm cellulitis and olecranon bursa infection, almost certainly the primary focus here is left olecranon bursa which has significant fluid present, probably a Staph aureus infection, mild systemic symptoms, blood cultures pending.   2.  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   3.  IV drug user, currently active.   4.  Chronic HIV infection, previously noncompliant, but in recent months compliant on meds, undetectable virus, but not checked in 6 months. Tcells are in nl range       RECOMMENDATIONS:   1.  Excellent HIV numbers. Continue HAART meds  2.  Probably not resistant organism, but continue vancomycin and ceftriaxone for now.   3.  Follow the arm closely. Very slow improving,? further imaging, orthopedic following.  This will likely narrow down to being primarily an olecranon bursa infection; given he is an IV drug user, would think of soft tissue abscess or an injection site, but he  has not been injecting in this area, and the clinical appearance is compatible with a conventional olecranon bursa infection.   4 Improved, but slowly, conventional approach to this would likely include at least some outpt Iv tx, not really viable here, continue IV inpt, looks like he needs another few days of IV rx.        Interval History:   Afebrile.  Feels that he is making slow progress with the elbow.  Accompanying arm cellulitis looks to be resolving.              Medications:       abacavir-dolutegravir-LamiVUDine  1 tablet Oral Daily     buprenorphine HCl-naloxone HCl  1 Film Sublingual BID     cefTRIAXone  2 g Intravenous Q24H     clonazePAM (klonoPIN) tablet 1 mg  1 mg Oral Daily     levomilnacipran  40 mg Oral Daily  "    modafinil (PROVIGIL) tablet 100 mg  100 mg Oral Daily     polyethylene glycol  17 g Oral Daily     pregabalin (LYRICA) capsule 150 mg  150 mg Oral BID     promethazine  25 mg Oral At Bedtime     sodium chloride (PF)  10 mL Intracatheter Q7 Days     sodium chloride (PF)  3 mL Intracatheter Q8H     vancomycin (VANCOCIN) IV  1,000 mg Intravenous Q12H                  Physical Exam:   Blood pressure 123/78, pulse 86, temperature 98.3  F (36.8  C), temperature source Oral, resp. rate 18, height 1.778 m (5' 10\"), weight 62.4 kg (137 lb 9.1 oz), SpO2 99 %.  Wt Readings from Last 2 Encounters:   05/10/18 62.4 kg (137 lb 9.1 oz)   01/29/18 59.4 kg (131 lb)     Vital Signs with Ranges  Temp:  [98.3  F (36.8  C)-98.5  F (36.9  C)] 98.3  F (36.8  C)  Heart Rate:  [] 88  Resp:  [18] 18  BP: (123-136)/(78-80) 123/78  SpO2:  [97 %-99 %] 99 %    Constitutional: Awake, alert, cooperative, no apparent distress   Lungs: Clear to auscultation bilaterally, no crackles or wheezing   Cardiovascular: Regular rate and rhythm, normal S1 and S2, and no murmur noted   Abdomen: Normal bowel sounds, soft, non-distended, non-tender   Skin: No rashes, no cyanosis, no edema   Other: Arm sl better some bursa fluid.  Erythema concentrated over olecranon.          Data:   All microbiology laboratory data reviewed.  Recent Labs   Lab Test  05/08/18   0455  05/06/18   0450  05/04/18   1655   WBC  8.5  9.3  13.6*   HGB  13.1*  12.6*  13.4   HCT  40.0  38.4*  40.4   MCV  89  88  89   PLT  390  348  318     Recent Labs   Lab Test  05/10/18   0804  05/09/18   0826  05/08/18   0455   CR  0.74  0.76  0.73     Recent Labs   Lab Test  05/05/18   1425   SED  49*     Recent Labs   Lab Test  05/05/18   1425  05/04/18   1655  10/10/17   1901  10/10/17   1849  10/09/17   2035  10/09/17   2015  09/12/17   1701  09/12/17   1654  09/10/17   2242   CULT  No growth after 5 days  No growth  No growth  No growth  No growth  Cultured on the 1st day of " incubation:  Bacillus species, not anthracis nor cereus group  *  Critical Value/Significant Value, preliminary result only, called to and read back by  Tonie Phan, RN @[id number] 10/10/17..    (Note)  NEGATIVE for the following: Staphylococcus spp., Staph aureus, Staph  epidermidis, Staph lugdunensis, Streptococcus spp., Strep pneumoniae,  Strep pyogenes, Strep agalactiae, Strep anginosus group, Enterococcus  faecalis, Enterococcus faecium, and Listeria spp. by [employer]  multiplex nucleic acid test. Final identification and antimicrobial  susceptibility testing will be verified by standard methods.    Critical Value/Significant Value called to and read back by Ambika Phan RN [id number] @ 1933 10/10/17 CS      No growth  No growth  Culture negative after 29 days  No anaerobes isolated  Moderate growth  Staphylococcus aureus  *

## 2018-05-10 NOTE — PLAN OF CARE
Problem: Patient Care Overview  Goal: Plan of Care/Patient Progress Review  Outcome: No Change  A&O x 4, up independent in the room, vss,  on Ra,no c/o of pain,left elbow bursitis, red and swollen, redness with in the markings, improving per patient, ID following, continues on IV Vanco and Rocephin, good appetite, possible d/c tomorrow on abx.

## 2018-05-10 NOTE — PROGRESS NOTES
Orthopedic Surgery  Bc German  5/10/2018  Admit Date:  2018  Left UE cellulitis with olecranon bursitis      Patient resting in bed.  Pain controlled.  Tolerating oral intake.    Denies nausea or vomiting  Denies chest pain or shortness of breath  No events overnight.       Alert and orient to person, place, and time.  Vital Sign Ranges  Temperature Temp  Av.5  F (36.9  C)  Min: 98.3  F (36.8  C)  Max: 98.5  F (36.9  C)   Blood pressure Systolic (24hrs), Av , Min:123 , Max:136        Diastolic (24hrs), Av, Min:78, Max:80      Pulse No Data Recorded   Respirations Resp  Av  Min: 18  Max: 18   Pulse oximetry SpO2  Av.7 %  Min: 97 %  Max: 99 %       Erythema about the olecranon and forearm - stable within drawn boarders  +olecranon bursitis, not ttp  1+ edema in forearm, also stable  ROM 5-120 painless  NVI distally       Labs:  Recent Labs   Lab Test  18   0455  18   0940  18   0200   POTASSIUM  4.1  3.9  3.9     Recent Labs   Lab Test  18   0455  18   0450  18   1655   HGB  13.1*  12.6*  13.4     Recent Labs   Lab Test  10/09/17   2015  03/12/15   1620  03/06/15   2343   INR  1.01  1.92*  1.69*     Recent Labs   Lab Test  18   0455  18   0450  18   1655   PLT  390  348  318       A/P  1. Plan                        Continue IV antibiotics                        RICE the left arm                        Continue current pain regiment.                        OK for patient to be d/c'd for outpatient appointment today per ortho with plan of abx regimen     Tonie Berumen PA-C

## 2018-05-10 NOTE — PLAN OF CARE
Problem: Patient Care Overview  Goal: Plan of Care/Patient Progress Review  Outcome: Improving  A&O x 4, up independent in the room, vss, no c/o of pain, left arm redness and swelling improvingID and ortho following, continues on IV Vanco and Rocephin, possible d/c in 2-3 days.

## 2018-05-11 ENCOUNTER — ANESTHESIA EVENT (OUTPATIENT)
Dept: MEDSURG UNIT | Facility: CLINIC | Age: 38
DRG: 603 | End: 2018-05-11
Payer: COMMERCIAL

## 2018-05-11 ENCOUNTER — ANESTHESIA (OUTPATIENT)
Dept: MEDSURG UNIT | Facility: CLINIC | Age: 38
DRG: 603 | End: 2018-05-11
Payer: COMMERCIAL

## 2018-05-11 LAB
BACTERIA SPEC CULT: NO GROWTH
HIV1 RNA # PLAS NAA DL=20: 60 {COPIES}/ML
HIV1 RNA SERPL NAA+PROBE-LOG#: 1.8 {LOG_COPIES}/ML
Lab: NORMAL
SPECIMEN SOURCE: NORMAL

## 2018-05-11 PROCEDURE — 37000011 ZZH ANESTHESIA WARD SERVICE: Performed by: NURSE ANESTHETIST, CERTIFIED REGISTERED

## 2018-05-11 PROCEDURE — 12000000 ZZH R&B MED SURG/OB

## 2018-05-11 PROCEDURE — 3E03329 INTRODUCTION OF OTHER ANTI-INFECTIVE INTO PERIPHERAL VEIN, PERCUTANEOUS APPROACH: ICD-10-PCS | Performed by: INTERNAL MEDICINE

## 2018-05-11 PROCEDURE — 25000128 H RX IP 250 OP 636: Performed by: INTERNAL MEDICINE

## 2018-05-11 PROCEDURE — 40000671 ZZH STATISTIC ANESTHESIA CASE

## 2018-05-11 PROCEDURE — 25000132 ZZH RX MED GY IP 250 OP 250 PS 637: Performed by: INTERNAL MEDICINE

## 2018-05-11 PROCEDURE — 99231 SBSQ HOSP IP/OBS SF/LOW 25: CPT | Performed by: INTERNAL MEDICINE

## 2018-05-11 RX ADMIN — BUPRENORPHINE HYDROCHLORIDE, NALOXONE HYDROCHLORIDE 1 FILM: 8; 2 FILM, SOLUBLE BUCCAL; SUBLINGUAL at 21:28

## 2018-05-11 RX ADMIN — BUPRENORPHINE HYDROCHLORIDE, NALOXONE HYDROCHLORIDE 1 FILM: 8; 2 FILM, SOLUBLE BUCCAL; SUBLINGUAL at 09:11

## 2018-05-11 RX ADMIN — VANCOMYCIN HYDROCHLORIDE 1000 MG: 1 INJECTION, SOLUTION INTRAVENOUS at 22:24

## 2018-05-11 RX ADMIN — LEVOMILNACIPRAN HYDROCHLORIDE 40 MG: 40 CAPSULE, EXTENDED RELEASE ORAL at 09:11

## 2018-05-11 RX ADMIN — MODAFINIL 100 MG: 100 TABLET ORAL at 09:11

## 2018-05-11 RX ADMIN — PREGABALIN 150 MG: 75 CAPSULE ORAL at 21:28

## 2018-05-11 RX ADMIN — CEFTRIAXONE SODIUM 2 G: 2 INJECTION, POWDER, FOR SOLUTION INTRAMUSCULAR; INTRAVENOUS at 17:57

## 2018-05-11 RX ADMIN — VANCOMYCIN HYDROCHLORIDE 1000 MG: 1 INJECTION, SOLUTION INTRAVENOUS at 05:49

## 2018-05-11 RX ADMIN — CLONAZEPAM 1 MG: 1 TABLET ORAL at 09:11

## 2018-05-11 RX ADMIN — ABACAVIR SULFATE, DOLUTEGRAVIR SODIUM, LAMIVUDINE 1 TABLET: 600; 50; 300 TABLET, FILM COATED ORAL at 09:11

## 2018-05-11 RX ADMIN — PROMETHAZINE HYDROCHLORIDE 25 MG: 25 TABLET ORAL at 21:28

## 2018-05-11 RX ADMIN — PREGABALIN 150 MG: 75 CAPSULE ORAL at 09:11

## 2018-05-11 NOTE — PLAN OF CARE
Problem: Patient Care Overview  Goal: Plan of Care/Patient Progress Review  Outcome: No Change  Pt a/o x4. Independent to BR. VSS on RA. Denies pain. Tolerating regular diet, adequate PO intake. Redness to left arm improving, +2 mild edema observed. D/C pending 2-3 days, nursing will continue to monitor.

## 2018-05-11 NOTE — PROGRESS NOTES
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     Acute left olecranon bursitis with LUE cellulitis: Improved  WBC 13.6 on admission. Xray L elbow showed soft tissue swelling over the olecranon. Low suspicion for septic arthritis per Ortho and ID who have been involved in his care. On admission, he was initiated on vancomycin and ceftriaxone. Blood cultures from admission remain negative.   -- on Vancomycin and Rocephin   -- per ID will cont IV antibiotics for now, has had noted clinical improvement this stay, anticipate transition to po in next 1-2d  -- conservative management recommended per ortho -- cont RICE, has prns available for pain including Tylenol and Ibuprofen     HIV  Previously non-compliant with HAART, but reportedly compliant over the past few months. CD4 count 1124.  -- conts on HAART, viral load pending     Polysubstance abuse with active IV drug use  Chronic pain syndrome with history of opioid dependence  -- conts on PTA suboxone and pregabalin  -- was due to follow up with suboxone provider (Dr. Barrientos) on 5/10 -- hospitalist spoke with Dr. Barrientos on 5/10, discussed that he will need to remain hospitalized for IV abx for a few more days. Dr. Barrientos called in script for Suboxone to the discharge pharmacy here and has provided enough meds to get him through to his next appointment which is scheduled for 5/18     Major recurrent depressive with anxiety  History of PTSD  [PTA: clonazepam 1 mg daily, modafinil 100 mg daily] Follows closely with his psychiatrist, Savita Hassan  -- conts on PTA meds    Pain Assessment:  Current Pain Score 5/11/2018    Patient currently in pain? denies   Pain score (0-10) -   Pain location -   Pain descriptors -   - Bc is experiencing pain due to left olecranon bursitis. Pain management was discussed and the plan was created in a collaborative fashion.  Bc's response to the current recommendations: compliant  - Pharmacologic adjuvants: Acetaminophen    FEN: no IVFs, lytes stable, regular diet  DVT Prophylaxis: PCDs  Code Status: Full Code    Disposition: ID recommending few additional days of IV antibiotic therapy. Discharge pending their recommendations on when to switch to po abx, suspect 2d. Note plans for follow up with Suboxone provider on 5/18 as above.      Elizabeth Griffin    Interval History   Seen this morning. Feeling well. No specific complaints. Pain controlled. Eating/drinking okay. Denies cp/sob/cough, abd pain/n/v.  Noted improvement in bursitis and LUE swelling/erythema    -Data reviewed today: I reviewed all new labs and imaging results over the last 24 hours. I personally reviewed no images or EKG's today.    Physical Exam   Temp: 98.4  F (36.9  C) Temp src: Oral BP: 127/87 Pulse: 92 Heart Rate: 73 Resp: 18 SpO2: 99 % O2 Device: None (Room air)    Vitals:    05/09/18 0500 05/10/18 0621 05/11/18 0551   Weight: 62.7 kg (138 lb 3.7 oz) 62.4 kg (137 lb 9.1 oz) 65.1 kg (143 lb 8.3 oz)     Vital Signs with Ranges  Temp:  [97.5  F (36.4  C)-98.5  F (36.9  C)] 98.4  F (36.9  C)  Pulse:  [92-96] 92  Heart Rate:  [73-98] 73  Resp:  [18] 18  BP: (114-129)/(70-91) 127/87  SpO2:  [98 %-100 %] 99 %  I/O last 3 completed shifts:  In: 660 [P.O.:660]  Out: -     Constitutional: Resting comfortably, alert and conversing appropriately, NAD  Respiratory: CTAB, no wheeze/rales/rhonchi, no increased work of breathing  Cardiovascular: HRRR, no MGR, no LE edema  GI: S, NT, ND, +BS  Skin/Integumen: +warmth/erythema over L elbow extending distally down arm with noted improvement in recent days, +bursitis also  improved, no fluctuance, no tenderness to palpation  Other:      Medications       abacavir-dolutegravir-LamiVUDine  1 tablet Oral Daily     buprenorphine HCl-naloxone HCl  1 Film Sublingual BID     cefTRIAXone  2 g Intravenous Q24H     clonazePAM (klonoPIN) tablet 1 mg  1 mg Oral Daily     levomilnacipran  40 mg Oral Daily     modafinil (PROVIGIL) tablet 100 mg  100 mg Oral Daily     polyethylene glycol  17 g Oral Daily     pregabalin (LYRICA) capsule 150 mg  150 mg Oral BID     promethazine  25 mg Oral At Bedtime     sodium chloride (PF)  10 mL Intracatheter Q7 Days     sodium chloride (PF)  3 mL Intracatheter Q8H     vancomycin (VANCOCIN) IV  1,000 mg Intravenous Q12H       Data     Recent Labs  Lab 05/10/18  0804 05/09/18  0826 05/08/18  0455  05/06/18  0450  05/05/18  0940 05/05/18  0200 05/04/18  1655   WBC  --   --  8.5  --  9.3  --   --   --  13.6*   HGB  --   --  13.1*  --  12.6*  --   --   --  13.4   MCV  --   --  89  --  88  --   --   --  89   PLT  --   --  390  --  348  --   --   --  318   NA  --   --  139  --   --   --  142  --  138   POTASSIUM  --   --  4.1  --   --   --  3.9 3.9 3.2*   CHLORIDE  --   --  104  --   --   --  109  --  99   CO2  --   --  30  --   --   --  29  --  33*   BUN  --   --  17  --   --   --  9  --  7   CR 0.74 0.76 0.73  < > 0.78  < > 0.67  --  0.71   ANIONGAP  --   --  5  --   --   --  4  --  6   SANDRA  --   --  9.0  --   --   --  8.7  --  9.4   GLC  --   --  106*  --   --   --  124*  --  78   ALBUMIN  --   --   --   --   --   --   --   --  3.3*   PROTTOTAL  --   --   --   --   --   --   --   --  8.6   BILITOTAL  --   --   --   --   --   --   --   --  0.4   ALKPHOS  --   --   --   --   --   --   --   --  103   ALT  --   --   --   --   --   --   --   --  142*   AST  --   --   --   --   --   --   --   --  74*   LIPASE  --   --   --   --   --   --   --   --  93   < > = values in this interval not displayed.    No results found for this or any previous visit (from the past 24  hour(s)).

## 2018-05-11 NOTE — PLAN OF CARE
Problem: Patient Care Overview  Goal: Plan of Care/Patient Progress Review  Outcome: No Change  Pt A&Ox4.  VSS on RA.  Denies pain.  CMS intact.  Up independently.  IV SL.  Voiding adequately.  Father visited at bedside.  Left arm redness and swelling.  Possible discharge in 2-3 days.  Nursing to continue to monitor.

## 2018-05-11 NOTE — PROGRESS NOTES
SP[facility]UAL [facility] SERVICES Progress Note  [facility]    Brief intro visit with pt, who was lying in bed when I arrived.  He politely declined offer to visit, saying he was trying to sleep.  I invited him to contact us through his nurse if he feels like having a visit in the future.  SH team will remain available and gladly respond if needs for support arise.                                                                                                                                               Nilam Harvey M.A.  Staff   Pager [phone]  Phone [phone]

## 2018-05-11 NOTE — PROGRESS NOTES
Orthopedic Surgery  Bc German  2018  Admit Date:  2018  Left UE cellulitis with olecranon bursitis      Patient resting in bed.  Pain controlled.  Tolerating oral intake.    Denies nausea or vomiting  Denies chest pain or shortness of breath  No events overnight.       Alert and orient to person, place, and time.  Vital Sign Ranges  Temperature Temp  Av.1  F (36.7  C)  Min: 97.5  F (36.4  C)  Max: 98.5  F (36.9  C)   Blood pressure Systolic (24hrs), Av , Min:114 , Max:129        Diastolic (24hrs), Av, Min:70, Max:91      Pulse Pulse  Av  Min: 92  Max: 96   Respirations Resp  Av  Min: 18  Max: 18   Pulse oximetry SpO2  Av.3 %  Min: 98 %  Max: 100 %       Erythema about the olecranon and forearm - improving   +olecranon bursitis, not ttp  1+ edema in forearm, also stable  No fluctuance noted in olecranon bursa  ROM 5-120 painless  NVI distally     Labs:  Recent Labs   Lab Test  18   0455  18   0940  18   0200   POTASSIUM  4.1  3.9  3.9     Recent Labs   Lab Test  18   0455  18   0450  18   1655   HGB  13.1*  12.6*  13.4     Recent Labs   Lab Test  10/09/17   2015  03/12/15   1620  03/06/15   2343   INR  1.01  1.92*  1.69*     Recent Labs   Lab Test  18   0455  18   0450  18   1655   PLT  390  348  318          A/P  1. Plan                        Continue IV antibiotics                        RICE the left arm                        Continue current pain regiment.    Tonie Berumen PA-C

## 2018-05-11 NOTE — PLAN OF CARE
Problem: Patient Care Overview  Goal: Plan of Care/Patient Progress Review  Outcome: No Change  Care Plan Summary Note: vss, alert x4, denied pain. +BS, lungs clear, on RA. PIV patent and SL. Voiding and eating well. IND in room. Left arm redness improving, 1-2+edema, red blanchable. Continue with currently IV abx regiment, ID following. Pending d/c to home this weekend.

## 2018-05-11 NOTE — PROGRESS NOTES
[facility]  Infectious Disease Progress Note          Assessment and Plan:   IMPRESSION:   1.  A 37-year-old male with acute left arm cellulitis and olecranon bursa infection, almost certainly the primary focus here is left olecranon bursa which has significant fluid present, probably a Staph aureus infection, mild systemic symptoms, blood cultures pending.   2.  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   3.  IV drug user, currently active.   4.  Chronic HIV infection, previously noncompliant, but in recent months compliant on meds, undetectable virus, but not checked in 6 months. Tcells are in nl range       RECOMMENDATIONS:   1.  Excellent HIV numbers. Continue HAART meds  2.  Probably not resistant organism, but continue vancomycin and ceftriaxone for now.   3.  Follow the arm closely. Very slow improving,? further imaging, orthopedic following.  This will likely narrow down to being primarily an olecranon bursa infection; given he is an IV drug user, would think of soft tissue abscess or an injection site, but he  has not been injecting in this area, and the clinical appearance is compatible with a conventional olecranon bursa infection.   4 Improved, but slowly, conventional approach to this would likely include at least some outpt Iv tx, not really viable here, continue IV inpt, looks like he needs another 1-2 days of IV rx.        Interval History:   Afebrile.  Feels that he is making slow, steady progress with the elbow.  Accompanying arm cellulitis looks to be resolving.              Medications:       abacavir-dolutegravir-LamiVUDine  1 tablet Oral Daily     buprenorphine HCl-naloxone HCl  1 Film Sublingual BID     cefTRIAXone  2 g Intravenous Q24H     clonazePAM (klonoPIN) tablet 1 mg  1 mg Oral Daily     levomilnacipran  40 mg  "Oral Daily     modafinil (PROVIGIL) tablet 100 mg  100 mg Oral Daily     polyethylene glycol  17 g Oral Daily     pregabalin (LYRICA) capsule 150 mg  150 mg Oral BID     promethazine  25 mg Oral At Bedtime     sodium chloride (PF)  10 mL Intracatheter Q7 Days     sodium chloride (PF)  3 mL Intracatheter Q8H     vancomycin (VANCOCIN) IV  1,000 mg Intravenous Q12H                  Physical Exam:   Blood pressure (!) 129/91, pulse 96, temperature 98.4  F (36.9  C), temperature source Oral, resp. rate 18, height 1.778 m (5' 10\"), weight 65.1 kg (143 lb 8.3 oz), SpO2 99 %.  Wt Readings from Last 2 Encounters:   05/11/18 65.1 kg (143 lb 8.3 oz)   01/29/18 59.4 kg (131 lb)     Vital Signs with Ranges  Temp:  [97.5  F (36.4  C)-98.5  F (36.9  C)] 98.4  F (36.9  C)  Pulse:  [96] 96  Heart Rate:  [73-98] 73  Resp:  [18] 18  BP: (114-129)/(70-91) 129/91  SpO2:  [98 %-100 %] 99 %    Constitutional: Awake, alert, cooperative, no apparent distress   Lungs: Clear to auscultation bilaterally, no crackles or wheezing   Cardiovascular: Regular rate and rhythm, normal S1 and S2, and no murmur noted   Abdomen: Normal bowel sounds, soft, non-distended, non-tender   Skin: No rashes, no cyanosis, no edema   Other: Arm sl better some bursa fluid.  Erythema concentrated over olecranon.          Data:   All microbiology laboratory data reviewed.  Recent Labs   Lab Test  05/08/18   0455  05/06/18   0450  05/04/18   1655   WBC  8.5  9.3  13.6*   HGB  13.1*  12.6*  13.4   HCT  40.0  38.4*  40.4   MCV  89  88  89   PLT  390  348  318     Recent Labs   Lab Test  05/10/18   0804  05/09/18   0826  05/08/18   0455   CR  0.74  0.76  0.73     Recent Labs   Lab Test  05/05/18   1425   SED  49*     Recent Labs   Lab Test  05/05/18   1425  05/04/18   1655  10/10/17   1901  10/10/17   1849  10/09/17   2035  10/09/17   2015  09/12/17   1701  09/12/17   1654  09/10/17   2242   CULT  No growth  No growth  No growth  No growth  No growth  Cultured on the 1st " day of incubation:  Bacillus species, not anthracis nor cereus group  *  Critical Value/Significant Value, preliminary result only, called to and read back by  Tonie Phan, RN @[id number] 10/10/17..    (Note)  NEGATIVE for the following: Staphylococcus spp., Staph aureus, Staph  epidermidis, Staph lugdunensis, Streptococcus spp., Strep pneumoniae,  Strep pyogenes, Strep agalactiae, Strep anginosus group, Enterococcus  faecalis, Enterococcus faecium, and Listeria spp. by [employer]  multiplex nucleic acid test. Final identification and antimicrobial  susceptibility testing will be verified by standard methods.    Critical Value/Significant Value called to and read back by Ambika Phan RN [id number] @ 1933 10/10/17 CS      No growth  No growth  Culture negative after 29 days  No anaerobes isolated  Moderate growth  Staphylococcus aureus  *

## 2018-05-12 PROCEDURE — 12000000 ZZH R&B MED SURG/OB

## 2018-05-12 PROCEDURE — 99232 SBSQ HOSP IP/OBS MODERATE 35: CPT | Performed by: HOSPITALIST

## 2018-05-12 PROCEDURE — 25000128 H RX IP 250 OP 636: Performed by: INTERNAL MEDICINE

## 2018-05-12 PROCEDURE — 25000132 ZZH RX MED GY IP 250 OP 250 PS 637: Performed by: INTERNAL MEDICINE

## 2018-05-12 RX ADMIN — BUPRENORPHINE HYDROCHLORIDE, NALOXONE HYDROCHLORIDE 1 FILM: 8; 2 FILM, SOLUBLE BUCCAL; SUBLINGUAL at 08:37

## 2018-05-12 RX ADMIN — ABACAVIR SULFATE, DOLUTEGRAVIR SODIUM, LAMIVUDINE 1 TABLET: 600; 50; 300 TABLET, FILM COATED ORAL at 08:37

## 2018-05-12 RX ADMIN — VANCOMYCIN HYDROCHLORIDE 1000 MG: 1 INJECTION, SOLUTION INTRAVENOUS at 22:41

## 2018-05-12 RX ADMIN — MODAFINIL 100 MG: 100 TABLET ORAL at 08:37

## 2018-05-12 RX ADMIN — PROMETHAZINE HYDROCHLORIDE 25 MG: 25 TABLET ORAL at 22:41

## 2018-05-12 RX ADMIN — BUPRENORPHINE HYDROCHLORIDE, NALOXONE HYDROCHLORIDE 1 FILM: 8; 2 FILM, SOLUBLE BUCCAL; SUBLINGUAL at 22:41

## 2018-05-12 RX ADMIN — LEVOMILNACIPRAN HYDROCHLORIDE 40 MG: 40 CAPSULE, EXTENDED RELEASE ORAL at 08:37

## 2018-05-12 RX ADMIN — PREGABALIN 150 MG: 75 CAPSULE ORAL at 08:37

## 2018-05-12 RX ADMIN — CEFTRIAXONE SODIUM 2 G: 2 INJECTION, POWDER, FOR SOLUTION INTRAMUSCULAR; INTRAVENOUS at 17:10

## 2018-05-12 RX ADMIN — POLYETHYLENE GLYCOL 3350 17 G: 17 POWDER, FOR SOLUTION ORAL at 08:39

## 2018-05-12 RX ADMIN — VANCOMYCIN HYDROCHLORIDE 1000 MG: 1 INJECTION, SOLUTION INTRAVENOUS at 09:46

## 2018-05-12 RX ADMIN — CLONAZEPAM 1 MG: 1 TABLET ORAL at 08:37

## 2018-05-12 RX ADMIN — PREGABALIN 150 MG: 75 CAPSULE ORAL at 22:41

## 2018-05-12 NOTE — ANESTHESIA CARE TRANSFER NOTE
Patient: Bc German    * No procedures listed *    Diagnosis: * No pre-op diagnosis entered *  Diagnosis Additional Information: No value filed.    Anesthesia Type:   No value filed.     Note:  Airway :Room Air  Destination: pt not transferred.   Comments: Pt's RN present throughout IV placement. Report given.       Vitals: (Last set prior to Anesthesia Care Transfer)              Electronically Signed By: MEDHAT Rubio CRNA  May 11, 2018  9:45 PM

## 2018-05-12 NOTE — PLAN OF CARE
Problem: Patient Care Overview  Goal: Plan of Care/Patient Progress Review  Outcome: Improving  Care Plan Summary Note: vss, alert x4, denied pain. +BS, lungs clear, on RA. IND in room. PIV patent and SL. Tolerated vanco IV well. Left elbow red, blanchable, trace edema, but improving. Continue with IV abx for another day. Possibly d/c in am. Monitor.

## 2018-05-12 NOTE — PLAN OF CARE
Problem: Patient Care Overview  Goal: Plan of Care/Patient Progress Review  Outcome: Improving  Patient A/OX4. Up independently in room. Denies pain. VSS on room air. Regular diet tolerating with good appetite. L. Elbow redness present but improving +1 edema. Positive for HIV with a HX of IV drug abuse. Receiving Receiving antivirals and suboxone x2 daily. ID following. Plan to continue IV antibiotics and possible D/C home sometime this weekend.

## 2018-05-12 NOTE — PROGRESS NOTES
[facility]  Infectious Disease Progress Note          Assessment and Plan:   IMPRESSION:   1.  A 37-year-old male with acute left arm cellulitis and olecranon bursa infection, almost certainly the primary focus here is left olecranon bursa which has significant fluid present, probably a Staph aureus infection, mild systemic symptoms, blood cultures pending.   2.  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   3.  IV drug user, currently active.   4.  Chronic HIV infection, previously noncompliant, but in recent months compliant on meds, undetectable virus, but not checked in 6 months. Tcells are in nl range       RECOMMENDATIONS:   1.  Excellent HIV numbers. Continue HAART meds  2.  Probably not resistant organism, but continue vancomycin and ceftriaxone for now.   3.  Follow the arm closely. Very slow improving,? further imaging, orthopedic following.  This will likely narrow down to being primarily an olecranon bursa infection; given he is an IV drug user, would think of soft tissue abscess or an injection site, but he  has not been injecting in this area, and the clinical appearance is compatible with a conventional olecranon bursa infection.   4 Improved, but slowly, conventional approach to this would likely include at least some outpt Iv tx, not really viable here, continue IV inpt, looks like he needs another day of IV rx        Interval History:   Afebrile.  Feels that he is making slow, steady progress with the elbow.  Accompanying arm cellulitis looks to be resolving.              Medications:       abacavir-dolutegravir-LamiVUDine  1 tablet Oral Daily     buprenorphine HCl-naloxone HCl  1 Film Sublingual BID     cefTRIAXone  2 g Intravenous Q24H     clonazePAM (klonoPIN) tablet 1 mg  1 mg Oral Daily     levomilnacipran  40 mg Oral  "Daily     modafinil (PROVIGIL) tablet 100 mg  100 mg Oral Daily     polyethylene glycol  17 g Oral Daily     pregabalin (LYRICA) capsule 150 mg  150 mg Oral BID     promethazine  25 mg Oral At Bedtime     sodium chloride (PF)  10 mL Intracatheter Q7 Days     sodium chloride (PF)  3 mL Intracatheter Q8H     vancomycin (VANCOCIN) IV  1,000 mg Intravenous Q12H                  Physical Exam:   Blood pressure (!) 131/92, pulse 87, temperature 97.4  F (36.3  C), temperature source Axillary, resp. rate 16, height 1.778 m (5' 10\"), weight 64.8 kg (142 lb 13.7 oz), SpO2 100 %.  Wt Readings from Last 2 Encounters:   05/12/18 64.8 kg (142 lb 13.7 oz)   01/29/18 59.4 kg (131 lb)     Vital Signs with Ranges  Temp:  [97.4  F (36.3  C)-97.5  F (36.4  C)] 97.4  F (36.3  C)  Pulse:  [87] 87  Heart Rate:  [] 90  Resp:  [16] 16  BP: (122-131)/(82-92) 131/92  SpO2:  [100 %] 100 %    Constitutional: Awake, alert, cooperative, no apparent distress   Lungs: Clear to auscultation bilaterally, no crackles or wheezing   Cardiovascular: Regular rate and rhythm, normal S1 and S2, and no murmur noted   Abdomen: Normal bowel sounds, soft, non-distended, non-tender   Skin: No rashes, no cyanosis, no edema   Other: Arm sl better some bursa fluid.  Erythema concentrated over olecranon.          Data:   All microbiology laboratory data reviewed.  Recent Labs   Lab Test  05/08/18   0455  05/06/18   0450  05/04/18   1655   WBC  8.5  9.3  13.6*   HGB  13.1*  12.6*  13.4   HCT  40.0  38.4*  40.4   MCV  89  88  89   PLT  390  348  318     Recent Labs   Lab Test  05/10/18   0804  05/09/18   0826  05/08/18   0455   CR  0.74  0.76  0.73     Recent Labs   Lab Test  05/05/18   1425   SED  49*     Recent Labs   Lab Test  05/05/18   1425  05/04/18   1655  10/10/17   1901  10/10/17   1849  10/09/17   2035  10/09/17   2015  09/12/17   1701  09/12/17   1654  09/10/17   2242   CULT  No growth  No growth  No growth  No growth  No growth  Cultured on the 1st " day of incubation:  Bacillus species, not anthracis nor cereus group  *  Critical Value/Significant Value, preliminary result only, called to and read back by  Tonie Phan, RN @0678 10/10/17..    (Note)  NEGATIVE for the following: Staphylococcus spp., Staph aureus, Staph  epidermidis, Staph lugdunensis, Streptococcus spp., Strep pneumoniae,  Strep pyogenes, Strep agalactiae, Strep anginosus group, Enterococcus  faecalis, Enterococcus faecium, and Listeria spp. by [employer]  multiplex nucleic acid test. Final identification and antimicrobial  susceptibility testing will be verified by standard methods.    Critical Value/Significant Value called to and read back by Ambika Phan RN UR10A @ 1933 10/10/17 CS      No growth  No growth  Culture negative after 29 days  No anaerobes isolated  Moderate growth  Staphylococcus aureus  *

## 2018-05-12 NOTE — PLAN OF CARE
Problem: Patient Care Overview  Goal: Plan of Care/Patient Progress Review  Outcome: Improving  Pt is A&O x4. VSS on RA. Denies pain. LS dm/clear bilaterally. Up independently. Reg diet with good appetite. +2 edema and redness on Left arm. New PIV site on left upper arm. Rescheduled Vanco. ID following. Will continue to monitor.   Full code

## 2018-05-12 NOTE — PROGRESS NOTES
[facility]  Hospitalist Progress Note   05/12/2018          Assessment and Plan:       Bc German is a 37 year old male with medical his of HIV, active IV drug use, opioid dependence on suboxone, 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      Acute left olecranon bursitis with LUE cellulitis: Improved  WBC 13.6 on admission. Xray Left elbow showed soft tissue swelling over the olecranon. Low suspicion for septic arthritis per Ortho and ID who have been involved in his care. On admission, he was initiated on vancomycin and ceftriaxone. Blood cultures from admission remain negative.   on Vancomycin and Rocephin   per ID will continnue IV antibiotics for now, has had noted clinical improvement this stay, anticipate transition to po in next 1-2 days  monitor WBC count, CRP, renal function a.m.  conservative management recommended per ortho -- cont RICE, has prns available for pain including Tylenol and Ibuprofen      HIV  Previously non-compliant with HAART, but reportedly compliant over the past few months. CD4 count 1124.  Continue on HAART, viral load 60      Polysubstance abuse with active IV drug use  Chronic pain syndrome with history of opioid dependence  continue on PTA suboxone and pregabalin  was due to follow up with suboxone provider (Dr. Barrientos) on 5/10 -- hospitalist spoke with Dr. Barrientos on 5/10, discussed that he will need to remain hospitalized for IV abx for a few more days. Dr. Barrientos called in script for Suboxone to the discharge pharmacy here and has provided enough meds to get him through to his next appointment which is scheduled for 5/18      Major recurrent depressive with anxiety  History of PTSD  [PTA: clonazepam 1 mg daily, modafinil 100 mg daily] Follows closely with his psychiatrist, Savita  Mo  continue on PTA meds    # Pain Assessment:  Current Pain Score 5/12/2018   Patient currently in pain? denies   Pain score (0-10) -   Pain location -   Pain descriptors -   - Bc is experiencing pain due to bursitis. Pain management was discussed and the plan was created in a collaborative fashion.  Bc's response to the current recommendations: engaged  - Pharmacologic adjuvants: Acetaminophen    Active Diet Order      Combination Diet Regular Diet Adult      Diet    DVT Prophylaxis:  pneumatic compression device  Code Status: Full Code  Disposition: Expected discharge in 1 to 2 days pending clinical improvement    Patient, interdisciplinary team involved in care and agrees with plan.  Total time - Greater than 25 min. More than 50% of time spent in direct patient care, care coordination and formalizing plan of care.     Ale Bolanos MD        Interval History:      patient appears comfortable, sitting up in the bed.  Admits to pain over his left elbow area.  Tolerating oral diet, has been afebrile overnight.         Physical Exam:        Physical Exam   Temp:  [97.4  F (36.3  C)-97.5  F (36.4  C)] 97.4  F (36.3  C)  Pulse:  [87] 87  Heart Rate:  [] 90  Resp:  [16] 16  BP: (122-131)/(82-92) 131/92  SpO2:  [100 %] 100 %    Intake/Output Summary (Last 24 hours) at 05/12/18 1518  Last data filed at 05/12/18 0946   Gross per 24 hour   Intake             1470 ml   Output                0 ml   Net             1470 ml     Admission Weight: 59 kg (130 lb)  Current Weight: 64.8 kg (142 lb 13.7 oz)    PHYSICAL EXAM  GENERAL: Patient is in no distress. Alert and oriented.  HEART: Regular rate and rhythm. S1S2. No murmurs  LUNGS: Clear to auscultation bilaterally. No expiratory wheeze.  ABDOMEN: Soft, no abdominal tenderness, bowel sounds heard   EXTREMITIES: No pedal edema. 2+ peripheral pulses.  Left elbow area diffuse erythema, no open drainage. Tenderness and warmth present  SKIN: Warm, dry. No  rash or bruising.  PSYCHIATRY Cooperative       Medications:          abacavir-dolutegravir-LamiVUDine  1 tablet Oral Daily     buprenorphine HCl-naloxone HCl  1 Film Sublingual BID     cefTRIAXone  2 g Intravenous Q24H     clonazePAM (klonoPIN) tablet 1 mg  1 mg Oral Daily     levomilnacipran  40 mg Oral Daily     modafinil (PROVIGIL) tablet 100 mg  100 mg Oral Daily     polyethylene glycol  17 g Oral Daily     pregabalin (LYRICA) capsule 150 mg  150 mg Oral BID     promethazine  25 mg Oral At Bedtime     sodium chloride (PF)  10 mL Intracatheter Q7 Days     sodium chloride (PF)  3 mL Intracatheter Q8H     vancomycin (VANCOCIN) IV  1,000 mg Intravenous Q12H     acetaminophen, benzocaine, heparin lock flush, hydrOXYzine, ibuprofen, lidocaine (buffered or not buffered), melatonin, naloxone, potassium chloride, potassium chloride with lidocaine, potassium chloride, potassium chloride, potassium chloride, sodium chloride (PF), sodium chloride (PF), sodium chloride (PF)         Data:      All new lab and imaging data was reviewed.

## 2018-05-13 LAB
ALBUMIN SERPL-MCNC: 3.4 G/DL (ref 3.4–5)
ALP SERPL-CCNC: 114 U/L (ref 40–150)
ALT SERPL W P-5'-P-CCNC: 273 U/L (ref 0–70)
ANION GAP SERPL CALCULATED.3IONS-SCNC: 3 MMOL/L (ref 3–14)
AST SERPL W P-5'-P-CCNC: 168 U/L (ref 0–45)
BILIRUB SERPL-MCNC: 0.2 MG/DL (ref 0.2–1.3)
BUN SERPL-MCNC: 16 MG/DL (ref 7–30)
CALCIUM SERPL-MCNC: 9.3 MG/DL (ref 8.5–10.1)
CHLORIDE SERPL-SCNC: 101 MMOL/L (ref 94–109)
CK SERPL-CCNC: 129 U/L (ref 30–300)
CO2 SERPL-SCNC: 34 MMOL/L (ref 20–32)
CREAT SERPL-MCNC: 0.81 MG/DL (ref 0.66–1.25)
CRP SERPL-MCNC: 15.2 MG/L (ref 0–8)
ERYTHROCYTE [DISTWIDTH] IN BLOOD BY AUTOMATED COUNT: 13.3 % (ref 10–15)
GFR SERPL CREATININE-BSD FRML MDRD: >90 ML/MIN/1.7M2
GLUCOSE SERPL-MCNC: 109 MG/DL (ref 70–99)
HCT VFR BLD AUTO: 41.8 % (ref 40–53)
HGB BLD-MCNC: 13.6 G/DL (ref 13.3–17.7)
MCH RBC QN AUTO: 28.9 PG (ref 26.5–33)
MCHC RBC AUTO-ENTMCNC: 32.5 G/DL (ref 31.5–36.5)
MCV RBC AUTO: 89 FL (ref 78–100)
PLATELET # BLD AUTO: 388 10E9/L (ref 150–450)
POTASSIUM SERPL-SCNC: 4 MMOL/L (ref 3.4–5.3)
PROT SERPL-MCNC: 8.5 G/DL (ref 6.8–8.8)
RBC # BLD AUTO: 4.71 10E12/L (ref 4.4–5.9)
SODIUM SERPL-SCNC: 138 MMOL/L (ref 133–144)
WBC # BLD AUTO: 7.8 10E9/L (ref 4–11)

## 2018-05-13 PROCEDURE — 99232 SBSQ HOSP IP/OBS MODERATE 35: CPT | Performed by: HOSPITALIST

## 2018-05-13 PROCEDURE — 80053 COMPREHEN METABOLIC PANEL: CPT | Performed by: HOSPITALIST

## 2018-05-13 PROCEDURE — 82550 ASSAY OF CK (CPK): CPT | Performed by: HOSPITALIST

## 2018-05-13 PROCEDURE — 12000000 ZZH R&B MED SURG/OB

## 2018-05-13 PROCEDURE — 25000128 H RX IP 250 OP 636: Performed by: INTERNAL MEDICINE

## 2018-05-13 PROCEDURE — 25000132 ZZH RX MED GY IP 250 OP 250 PS 637: Performed by: INTERNAL MEDICINE

## 2018-05-13 PROCEDURE — 85027 COMPLETE CBC AUTOMATED: CPT | Performed by: HOSPITALIST

## 2018-05-13 PROCEDURE — 99207 ZZC CDG-MDM COMPONENT: MEETS LOW - DOWN CODED: CPT | Performed by: HOSPITALIST

## 2018-05-13 PROCEDURE — 86140 C-REACTIVE PROTEIN: CPT | Performed by: HOSPITALIST

## 2018-05-13 PROCEDURE — 36415 COLL VENOUS BLD VENIPUNCTURE: CPT | Performed by: HOSPITALIST

## 2018-05-13 RX ADMIN — CEPHALEXIN 250 MG: 250 CAPSULE ORAL at 21:00

## 2018-05-13 RX ADMIN — ABACAVIR SULFATE, DOLUTEGRAVIR SODIUM, LAMIVUDINE 1 TABLET: 600; 50; 300 TABLET, FILM COATED ORAL at 08:34

## 2018-05-13 RX ADMIN — PREGABALIN 150 MG: 75 CAPSULE ORAL at 20:59

## 2018-05-13 RX ADMIN — CEPHALEXIN 250 MG: 250 CAPSULE ORAL at 14:33

## 2018-05-13 RX ADMIN — LEVOMILNACIPRAN HYDROCHLORIDE 40 MG: 40 CAPSULE, EXTENDED RELEASE ORAL at 08:25

## 2018-05-13 RX ADMIN — MODAFINIL 100 MG: 100 TABLET ORAL at 08:25

## 2018-05-13 RX ADMIN — PREGABALIN 150 MG: 75 CAPSULE ORAL at 08:25

## 2018-05-13 RX ADMIN — VANCOMYCIN HYDROCHLORIDE 1000 MG: 1 INJECTION, SOLUTION INTRAVENOUS at 09:37

## 2018-05-13 RX ADMIN — CLONAZEPAM 1 MG: 1 TABLET ORAL at 08:25

## 2018-05-13 RX ADMIN — PROMETHAZINE HYDROCHLORIDE 25 MG: 25 TABLET ORAL at 21:00

## 2018-05-13 RX ADMIN — BUPRENORPHINE HYDROCHLORIDE, NALOXONE HYDROCHLORIDE 1 FILM: 8; 2 FILM, SOLUBLE BUCCAL; SUBLINGUAL at 20:59

## 2018-05-13 RX ADMIN — POLYETHYLENE GLYCOL 3350 17 G: 17 POWDER, FOR SOLUTION ORAL at 08:25

## 2018-05-13 RX ADMIN — BUPRENORPHINE HYDROCHLORIDE, NALOXONE HYDROCHLORIDE 1 FILM: 8; 2 FILM, SOLUBLE BUCCAL; SUBLINGUAL at 08:25

## 2018-05-13 NOTE — PHARMACY
Pharmacy was consulted to adjust HAART medications due to deranged liver function tests.    Elevated , .     Current HAART is the combination product Triumeq (Abacavir, Dolutegravir, Lamivudine). Abacavir dose adjustment for hepatic impairment is based on Gamble Score. In mild impairment (Gamble A), a dose decrease to Abacavir 200mg BID could be considered. However, transient elevations in transaminases are expected in chronic HBV. Patient did have similar elevation in LFTs last November which resolved. Currently < 5x ULN. Recommend rechecking LFTs for trend and more through hepatic evaluation if suspect irreversible liver impairment. No dose adjustment needed at this time.    Jessica Powers, PharmD

## 2018-05-13 NOTE — PLAN OF CARE
Problem: Patient Care Overview  Goal: Plan of Care/Patient Progress Review  Outcome: Improving  Patient A/OX4. Calm and cooperative. Slept good throughout the night. Up independently in room. VSS on room air. Denies any pain. Regular diet tolerating with good appetite. Left elbow improving, skin pink with mild swelling. No tenderness noted. Patient HIV positive, receiving antivirals daily. ID following. L.PIV patent and saline locked. On IV vanco and rocephin. Plan D/C home within the next day upon MD approval. Nursing continue to monitor.

## 2018-05-13 NOTE — PLAN OF CARE
Problem: Patient Care Overview  Goal: Plan of Care/Patient Progress Review  Outcome: No Change  Care Plan Summary Note: vss, alert x4, denied pain. +BS, lungs clear, on RA. IND in room. Good appetite, voiding well. PIV patent and SL. Left elbow pink, blanchable. Pending d/c to home in am pending liver funciton test. Cleared by ID, started on oral abx, first dose at 1440. Monitor.

## 2018-05-13 NOTE — PROGRESS NOTES
[facility]  Infectious Disease Progress Note          Assessment and Plan:   IMPRESSION:   1.  A 37-year-old male with acute left arm cellulitis and olecranon bursa infection, almost certainly the primary focus here is left olecranon bursa which has significant fluid present, probably a Staph aureus infection, mild systemic symptoms, blood cultures pending.   2.  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   3.  IV drug user, currently active.   4.  Chronic HIV infection, previously noncompliant, but in recent months compliant on meds, undetectable virus, but not checked in 6 months. Tcells are in nl range       RECOMMENDATIONS:   1.  Excellent HIV numbers. Continue HAART meds  2.  Probably not resistant organism, but continue vancomycin and ceftriaxone for now.   3.  Follow the arm closely. Very slow improving,? further imaging, orthopedic following.  This will likely narrow down to being primarily an olecranon bursa infection; given he is an IV drug user, would think of soft tissue abscess or an injection site, but he  has not been injecting in this area, and the clinical appearance is compatible with a conventional olecranon bursa infection.   4 Improved, but slowly, conventional approach to this would likely include at least some outpt Iv tx, not really viable here, switch to oral keflex for 10 days and ok to d/c        Interval History:   Afebrile.  Feels that he is making slow, steady progress with the elbow.  Accompanying arm cellulitis looks to be resolving.              Medications:       abacavir-dolutegravir-LamiVUDine  1 tablet Oral Daily     buprenorphine HCl-naloxone HCl  1 Film Sublingual BID     cefTRIAXone  2 g Intravenous Q24H     clonazePAM (klonoPIN) tablet 1 mg  1 mg Oral Daily     levomilnacipran  40 mg Oral Daily      "modafinil (PROVIGIL) tablet 100 mg  100 mg Oral Daily     polyethylene glycol  17 g Oral Daily     pregabalin (LYRICA) capsule 150 mg  150 mg Oral BID     promethazine  25 mg Oral At Bedtime     sodium chloride (PF)  10 mL Intracatheter Q7 Days     sodium chloride (PF)  3 mL Intracatheter Q8H     vancomycin (VANCOCIN) IV  1,000 mg Intravenous Q12H                  Physical Exam:   Blood pressure 120/69, pulse 77, temperature 97.9  F (36.6  C), temperature source Oral, resp. rate 16, height 1.778 m (5' 10\"), weight 64 kg (141 lb 1.5 oz), SpO2 99 %.  Wt Readings from Last 2 Encounters:   05/13/18 64 kg (141 lb 1.5 oz)   01/29/18 59.4 kg (131 lb)     Vital Signs with Ranges  Temp:  [97.2  F (36.2  C)-97.9  F (36.6  C)] 97.9  F (36.6  C)  Pulse:  [77] 77  Heart Rate:  [80-99] 80  Resp:  [16] 16  BP: (118-127)/(69-85) 120/69  SpO2:  [98 %-99 %] 99 %    Constitutional: Awake, alert, cooperative, no apparent distress   Lungs: Clear to auscultation bilaterally, no crackles or wheezing   Cardiovascular: Regular rate and rhythm, normal S1 and S2, and no murmur noted   Abdomen: Normal bowel sounds, soft, non-distended, non-tender   Skin: No rashes, no cyanosis, no edema   Other: Arm sl better some bursa fluid.  Erythema concentrated over olecranon.          Data:   All microbiology laboratory data reviewed.  Recent Labs   Lab Test  05/13/18   0955  05/08/18   0455  05/06/18   0450   WBC  7.8  8.5  9.3   HGB  13.6  13.1*  12.6*   HCT  41.8  40.0  38.4*   MCV  89  89  88   PLT  388  390  348     Recent Labs   Lab Test  05/10/18   0804  05/09/18   0826  05/08/18   0455   CR  0.74  0.76  0.73     Recent Labs   Lab Test  05/05/18   1425   SED  49*     Recent Labs   Lab Test  05/05/18   1425  05/04/18   1655  10/10/17   1901  10/10/17   1849  10/09/17   2035  10/09/17   2015  09/12/17   1701  09/12/17 1654  09/10/17   2242   CULT  No growth  No growth  No growth  No growth  No growth  Cultured on the 1st day of " incubation:  Bacillus species, not anthracis nor cereus group  *  Critical Value/Significant Value, preliminary result only, called to and read back by  Tonie Phan, RN @[id number] 10/10/17..    (Note)  NEGATIVE for the following: Staphylococcus spp., Staph aureus, Staph  epidermidis, Staph lugdunensis, Streptococcus spp., Strep pneumoniae,  Strep pyogenes, Strep agalactiae, Strep anginosus group, Enterococcus  faecalis, Enterococcus faecium, and Listeria spp. by [employer]  multiplex nucleic acid test. Final identification and antimicrobial  susceptibility testing will be verified by standard methods.    Critical Value/Significant Value called to and read back by Ambika Phan RN [id number] @ 1933 10/10/17 CS      No growth  No growth  Culture negative after 29 days  No anaerobes isolated  Moderate growth  Staphylococcus aureus  *

## 2018-05-13 NOTE — PROGRESS NOTES
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      Acute left olecranon bursitis with LUE cellulitis: Improving  WBC 13.6 on admission. Xray Left elbow showed soft tissue swelling over the olecranon. Low suspicion for septic arthritis per Ortho and ID who have been involved in his care.   On admission, he was initiated on vancomycin and ceftriaxone, which has been continued.   Blood cultures from admission remain negative. WBC 7.8, CRP 15.2, .  Per ID will continnue IV antibiotics for now, has had noted clinical improvement this stay, anticipate transition to po in next 1-2 days  conservative management recommended per ortho -- cont RICE, has prns available for pain including Tylenol and Ibuprofen    Deranged liver enzymes likely from infection/medications.  Patient denies any abdominal pain, tolerating oral diet, no nausea no vomiting.  patient has a ALT of 273, .  Discontinue Tylenol, pharmacy to adjust dose of medications for deranged liver function.  Monitor LFTs closely. Will check GGT.  Will consider ultrasound of abdomen if worsening liver enzymes.    HIV  Previously non-compliant with HAART, but reportedly compliant over the past few months.  CD4 count 1124. viral load 60  Continue on HAART, will hold of worsening liver function tests.  Requested pharmacy to adjust dose of medication for deranged liver function      Polysubstance abuse with active IV drug use  Chronic pain syndrome with history of opioid dependence  continue on PTA suboxone and pregabalin, monitor liver function test  was due to follow up  with suboxone provider (Dr. Barrientos) on 5/10 -- hospitalist spoke with Dr. Barrientos on 5/10, discussed that he will need to remain hospitalized for IV abx for a few more days. Dr. Barrientos called in script for Suboxone to the discharge pharmacy here and has provided enough meds to get him through to his next appointment which is scheduled for 5/18      Major recurrent depressive with anxiety  History of PTSD  [PTA: clonazepam 1 mg daily, modafinil 100 mg daily] Follows closely with his psychiatrist, Savita Hassan  continue on PTA meds, monitor liver function test    # Pain Assessment:  Current Pain Score 5/13/2018   Patient currently in pain? denies   Pain score (0-10) -   Pain location -   Pain descriptors -   - Bc is experiencing pain due to bursitis. Pain management was discussed and the plan was created in a collaborative fashion.  Bc's response to the current recommendations: engaged  - Pharmacologic adjuvants: tramadol PRN.    Active Diet Order      Combination Diet Regular Diet Adult      Diet    DVT Prophylaxis:  pneumatic compression device  Code Status: Full Code  Disposition: Expected discharge in 1 to 2 days pending clinical improvement    Patient, interdisciplinary team involved in care and agrees with plan.  Total time - Greater than 25 min. More than 50% of time spent in direct patient care, care coordination and formalizing plan of care.     Ale Bolanos MD        Interval History:      Patient appears comfortable, sitting up in the bed.  Admits to pain over his left elbow area.  Tolerating oral diet, has been afebrile overnight.         Physical Exam:        Physical Exam   Temp:  [97.2  F (36.2  C)-97.9  F (36.6  C)] 97.9  F (36.6  C)  Pulse:  [77] 77  Heart Rate:  [80-99] 80  Resp:  [16] 16  BP: (118-127)/(69-85) 120/69  SpO2:  [98 %-99 %] 99 %    Intake/Output Summary (Last 24 hours) at 05/12/18 1518  Last data filed at 05/12/18 0946   Gross per 24 hour   Intake             1470 ml   Output                 0 ml   Net             1470 ml     Admission Weight: 59 kg (130 lb)  Current Weight: 64.8 kg (142 lb 13.7 oz)    PHYSICAL EXAM  GENERAL: Patient is in no distress. Alert and oriented.  HEART: Regular rate and rhythm. S1S2. No murmurs  LUNGS: Clear to auscultation bilaterally. No expiratory wheeze.  ABDOMEN: Soft, no abdominal tenderness, bowel sounds heard   EXTREMITIES: No pedal edema. 2+ peripheral pulses.  Left elbow area diffuse erythema, no open drainage. Tenderness and warmth slightly improved compared to yesterday  SKIN: Warm, dry. No rash or bruising.  PSYCHIATRY Cooperative       Medications:          abacavir-dolutegravir-LamiVUDine  1 tablet Oral Daily     buprenorphine HCl-naloxone HCl  1 Film Sublingual BID     cefTRIAXone  2 g Intravenous Q24H     clonazePAM (klonoPIN) tablet 1 mg  1 mg Oral Daily     levomilnacipran  40 mg Oral Daily     modafinil (PROVIGIL) tablet 100 mg  100 mg Oral Daily     polyethylene glycol  17 g Oral Daily     pregabalin (LYRICA) capsule 150 mg  150 mg Oral BID     promethazine  25 mg Oral At Bedtime     sodium chloride (PF)  10 mL Intracatheter Q7 Days     sodium chloride (PF)  3 mL Intracatheter Q8H     vancomycin (VANCOCIN) IV  1,000 mg Intravenous Q12H     acetaminophen, benzocaine, heparin lock flush, hydrOXYzine, ibuprofen, lidocaine (buffered or not buffered), melatonin, naloxone, potassium chloride, potassium chloride with lidocaine, potassium chloride, potassium chloride, potassium chloride, sodium chloride (PF), sodium chloride (PF), sodium chloride (PF)         Data:      All new lab and imaging data was reviewed.

## 2018-05-13 NOTE — PLAN OF CARE
Problem: Patient Care Overview  Goal: Plan of Care/Patient Progress Review  Outcome: Improving  A&O x4. VSS on RA. Denies pain. LS clear bilaterally. Up independently in room and darling multiple times. Reg diet with good appetite. Affected elbow improving with less redness, discomfort, and edema +1.  L PIV intact and patent. On IV Vanco and Ceftriaxone. ID following. Took belongings from security and sent them home with mum. Expected d/c in 1-2 pending clinical improvement.   Full code

## 2018-05-14 VITALS
RESPIRATION RATE: 16 BRPM | BODY MASS INDEX: 20.2 KG/M2 | DIASTOLIC BLOOD PRESSURE: 88 MMHG | TEMPERATURE: 97.8 F | HEIGHT: 70 IN | OXYGEN SATURATION: 98 % | WEIGHT: 141.09 LBS | SYSTOLIC BLOOD PRESSURE: 129 MMHG | HEART RATE: 77 BPM

## 2018-05-14 LAB
ALBUMIN SERPL-MCNC: 3.2 G/DL (ref 3.4–5)
ALP SERPL-CCNC: 109 U/L (ref 40–150)
ALT SERPL W P-5'-P-CCNC: 288 U/L (ref 0–70)
AST SERPL W P-5'-P-CCNC: 173 U/L (ref 0–45)
BILIRUB DIRECT SERPL-MCNC: <0.1 MG/DL (ref 0–0.2)
BILIRUB SERPL-MCNC: 0.2 MG/DL (ref 0.2–1.3)
PROT SERPL-MCNC: 8.2 G/DL (ref 6.8–8.8)

## 2018-05-14 PROCEDURE — 25000132 ZZH RX MED GY IP 250 OP 250 PS 637: Performed by: INTERNAL MEDICINE

## 2018-05-14 PROCEDURE — 36415 COLL VENOUS BLD VENIPUNCTURE: CPT | Performed by: HOSPITALIST

## 2018-05-14 PROCEDURE — 80076 HEPATIC FUNCTION PANEL: CPT | Performed by: HOSPITALIST

## 2018-05-14 PROCEDURE — 99239 HOSP IP/OBS DSCHRG MGMT >30: CPT | Performed by: HOSPITALIST

## 2018-05-14 RX ORDER — PREGABALIN 150 MG/1
150 CAPSULE ORAL 2 TIMES DAILY
Qty: 10 CAPSULE | Refills: 0 | Status: SHIPPED | OUTPATIENT
Start: 2018-05-14 | End: 2021-11-15

## 2018-05-14 RX ORDER — MINOCYCLINE HYDROCHLORIDE 100 MG/1
100 CAPSULE ORAL EVERY 12 HOURS
Qty: 28 CAPSULE | Refills: 0 | Status: SHIPPED | OUTPATIENT
Start: 2018-05-14 | End: 2018-05-28

## 2018-05-14 RX ORDER — MODAFINIL 100 MG/1
50 TABLET ORAL DAILY
Qty: 3 TABLET | Refills: 0 | Status: SHIPPED | OUTPATIENT
Start: 2018-05-14 | End: 2018-05-19

## 2018-05-14 RX ORDER — CLONAZEPAM 1 MG/1
1 TABLET ORAL DAILY
Qty: 5 TABLET | Refills: 0 | Status: SHIPPED | OUTPATIENT
Start: 2018-05-14 | End: 2019-09-13

## 2018-05-14 RX ORDER — MINOCYCLINE HYDROCHLORIDE 100 MG/1
100 CAPSULE ORAL EVERY 12 HOURS SCHEDULED
Status: DISCONTINUED | OUTPATIENT
Start: 2018-05-14 | End: 2018-05-14

## 2018-05-14 RX ORDER — MINOCYCLINE HYDROCHLORIDE 100 MG/1
100 CAPSULE ORAL EVERY 12 HOURS SCHEDULED
Status: DISCONTINUED | OUTPATIENT
Start: 2018-05-14 | End: 2018-05-14 | Stop reason: HOSPADM

## 2018-05-14 RX ADMIN — ABACAVIR SULFATE, DOLUTEGRAVIR SODIUM, LAMIVUDINE 1 TABLET: 600; 50; 300 TABLET, FILM COATED ORAL at 09:53

## 2018-05-14 RX ADMIN — CLONAZEPAM 1 MG: 1 TABLET ORAL at 08:28

## 2018-05-14 RX ADMIN — PREGABALIN 150 MG: 75 CAPSULE ORAL at 08:28

## 2018-05-14 RX ADMIN — MINOCYCLINE HYDROCHLORIDE 100 MG: 100 CAPSULE ORAL at 09:52

## 2018-05-14 RX ADMIN — CEPHALEXIN 250 MG: 250 CAPSULE ORAL at 02:19

## 2018-05-14 RX ADMIN — LEVOMILNACIPRAN HYDROCHLORIDE 40 MG: 40 CAPSULE, EXTENDED RELEASE ORAL at 08:29

## 2018-05-14 RX ADMIN — CEPHALEXIN 250 MG: 250 CAPSULE ORAL at 08:28

## 2018-05-14 RX ADMIN — BUPRENORPHINE HYDROCHLORIDE, NALOXONE HYDROCHLORIDE 1 FILM: 8; 2 FILM, SOLUBLE BUCCAL; SUBLINGUAL at 08:29

## 2018-05-14 RX ADMIN — MODAFINIL 100 MG: 100 TABLET ORAL at 08:29

## 2018-05-14 NOTE — PLAN OF CARE
Problem: Patient Care Overview  Goal: Plan of Care/Patient Progress Review  Outcome: Improving   Pt A&O x4. VSS on RA. LS clear bilaterally. Reg diet with good appetite. Didn't order dinner. Have been snacking on jello and pudding. Up independently in room and darling multiple times. Left elbow improving with less swelling and redness. Dry skin peeling off from affected site. On oral Keflex. HIV education done and handout provided. Possible d/c tomorrow pending on liver function test results. Will continue to monitor.   Full code

## 2018-05-14 NOTE — PROGRESS NOTES
[facility]  Infectious Disease Progress Note          Assessment and Plan:   IMPRESSION:   1.  A 37-year-old male with acute left arm cellulitis and olecranon bursa infection, almost certainly the primary focus here is left olecranon bursa which has significant fluid present, probably a Staph aureus infection, mild systemic symptoms, blood cultures pending.   2.  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   3.  IV drug user, currently active.   4.  Chronic HIV infection, previously noncompliant, but in recent months compliant on meds, undetectable virus, but not checked in 6 months. Tcells are in nl range   5 LFTs up, likely part of known active hep C , doubt new acute issue      RECOMMENDATIONS:   1.  Excellent HIV numbers. Continue HAART meds  2.  LFTs noted doubt new acute issue   .    3 Improved, but slowly, conventional approach to this would likely include at least some outpt Iv tx, not really viable here, switch to oral, I would do both  keflex and minocin for 14 days and ok to d/c, he can call us if antibiotic issue, if arm worsens should return with MRI and surgery eval next steps        Interval History:   Afebrile.  Feels that he is making slow, steady progress with the elbow.  Accompanying arm cellulitis looks to be resolving.              Medications:       abacavir-dolutegravir-LamiVUDine  1 tablet Oral Daily     buprenorphine HCl-naloxone HCl  1 Film Sublingual BID     cephalexin  250 mg Oral Q6H TALHA     clonazePAM (klonoPIN) tablet 1 mg  1 mg Oral Daily     levomilnacipran  40 mg Oral Daily     modafinil (PROVIGIL) tablet 100 mg  100 mg Oral Daily     polyethylene glycol  17 g Oral Daily     pregabalin (LYRICA) capsule 150 mg  150 mg Oral BID     promethazine  25 mg Oral At Bedtime     sodium chloride (PF)  10 mL Intracatheter  "Q7 Days                  Physical Exam:   Blood pressure 125/88, pulse 77, temperature 97.7  F (36.5  C), temperature source Oral, resp. rate 18, height 1.778 m (5' 10\"), weight 64 kg (141 lb 1.5 oz), SpO2 97 %.  Wt Readings from Last 2 Encounters:   05/14/18 64 kg (141 lb 1.5 oz)   01/29/18 59.4 kg (131 lb)     Vital Signs with Ranges  Temp:  [97.7  F (36.5  C)-98.6  F (37  C)] 97.7  F (36.5  C)  Heart Rate:  [] 72  Resp:  [18] 18  BP: (124-125)/(73-88) 125/88  SpO2:  [97 %-98 %] 97 %    Constitutional: Awake, alert, cooperative, no apparent distress   Lungs: Clear to auscultation bilaterally, no crackles or wheezing   Cardiovascular: Regular rate and rhythm, normal S1 and S2, and no murmur noted   Abdomen: Normal bowel sounds, soft, non-distended, non-tender   Skin: No rashes, no cyanosis, no edema   Other: Arm sl better some bursa fluid.  Erythema concentrated over olecranon.          Data:   All microbiology laboratory data reviewed.  Recent Labs   Lab Test  05/13/18   0955  05/08/18   0455  05/06/18   0450   WBC  7.8  8.5  9.3   HGB  13.6  13.1*  12.6*   HCT  41.8  40.0  38.4*   MCV  89  89  88   PLT  388  390  348     Recent Labs   Lab Test  05/13/18   0955  05/10/18   0804  05/09/18   0826   CR  0.81  0.74  0.76     Recent Labs   Lab Test  05/05/18   1425   SED  49*     Recent Labs   Lab Test  05/05/18   1425  05/04/18   1655  10/10/17   1901  10/10/17   1849  10/09/17   2035  10/09/17   2015  09/12/17   1701  09/12/17   1654  09/10/17   2242   CULT  No growth  No growth  No growth  No growth  No growth  Cultured on the 1st day of incubation:  Bacillus species, not anthracis nor cereus group  *  Critical Value/Significant Value, preliminary result only, called to and read back by  Tonie Phan RN @5692 10/10/17..    (Note)  NEGATIVE for the following: Staphylococcus spp., Staph aureus, Staph  epidermidis, Staph lugdunensis, Streptococcus spp., Strep pneumoniae,  Strep pyogenes, Strep agalactiae, Strep " anginosus group, Enterococcus  faecalis, Enterococcus faecium, and Listeria spp. by [employer]  multiplex nucleic acid test. Final identification and antimicrobial  susceptibility testing will be verified by standard methods.    Critical Value/Significant Value called to and read back by Ambika Phan RN [id number] @ 1933 10/10/17 CS      No growth  No growth  Culture negative after 29 days  No anaerobes isolated  Moderate growth  Staphylococcus aureus  *

## 2018-05-14 NOTE — PLAN OF CARE
Problem: Patient Care Overview  Goal: Plan of Care/Patient Progress Review  Outcome: Improving  Patient A/OX4. Calm and cooperative throughout shift. Up independently in room. VSS on room air. Denies pain . Regular diet tolerating with good appetite.  Left elbow improving, skin pink with mild swelling. Full range of motion and no tenderness noted. On oral Keflex. ID has approved patient ready for D/C. D/C most likely today pending liver function tests. Nursing continue to monitor.

## 2018-05-14 NOTE — DISCHARGE SUMMARY
Discharge Summary  Hospitalist    Date of Admission:  5/4/2018  Date of Discharge:  5/14/2018  Discharging Provider: Ale Bolanos MD    Primary Care Physician   Khari Barrientos  Primary Care Provider Phone Number: [phone]  Primary Care Provider Fax Number: [phone]    PRINCIPAL DIAGNOSIS  Acute left olecranon bursitis with LUE cellulitis: Improving   Deranged liver enzymes likely from infection/medications.  Polysubstance abuse with active IV drug use  Chronic pain syndrome with history of opioid dependence  Major recurrent depressive with anxiety  History of PTSD    Past Medical History:   Diagnosis Date     Anxiety      Depressive disorder      Drug abuse, IV      Group A streptococcal infection 11/2014    Bacteremia/cellulitis     HCV antibody positive      HIV (human immunodeficiency virus infection) (H)      HIV (human immunodeficiency virus infection) (H)      HIV (human immunodeficiency virus infection) (H)      IVDU (intravenous drug user)      Osteomyelitis of vertebra of lumbosacral region (H) 3/2011    L3, left psoas abscess     Substance abuse        History of Present Illness   Bc German is an 37 year old male who presented with left elbow pain, swelling and erythema     Hospital Course   Bc German is a 37 year old male with medical his of HIV, active IV drug use, opioid dependence on suboxone, 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       Acute left olecranon bursitis with LUE cellulitis: Improving  WBC 13.6 on admission. Xray Left elbow showed soft tissue swelling over the olecranon.   Low suspicion for septic arthritis per Ortho and ID who have been involved in his care.   On admission, initiated on vancomycin and ceftriaxone [05/04] which has been continued till 05/13.  Blood cultures from admission  remain negative. WBC 7.8, CRP 15.2, .  Per ID - switch to oral Keflex/minocycline for another two weeks. Return to clinic back if worsening erythema or pain, then will need to consider MRI/surgical assessment.  Conservative management recommended per ortho -- cont RICE, pain control     Acute on chronic deranged liver enzymes likely from cellulitis/chronic hepatitis C/medications.  Patient denies any abdominal pain, tolerating oral diet, no nausea no vomiting.  ALT of 273>288, >173  Discontinued Tylenol, adjusted medications for hepatic function.  Will need to follow liver function tests closely in one week. Consider ultrasound of abdomen if worsening liver enzymes.  On reviewing patient's chart has had deranged liver function tests since 2010.    HIV  Previously non-compliant with HAART, but reportedly compliant over the past few months.  CD4 count 1124. viral load 60  Continue on HAART, monitor liver function tests  prescriptions refilled until PCP appointment.    Polysubstance abuse with active IV drug use  Chronic pain syndrome with history of opioid dependence  Continued on PTA suboxone and pregabalin, monitor liver function test  Was due to follow up with suboxone provider (Dr. Barrientos) on 5/10 -- hospitalist spoke with Dr. Barrientos on 5/10,  Dr. Barrientos has called in script for Suboxone to the discharge pharmacy here and has provided enough meds to get him through to his next appointment which is scheduled for 5/18  prescriptions for pregabalin/Klonopin/Modafinil until PCP appointment refilled.  Counseled patient to consider abstinence from drug use, social work assistance risk requested     Major recurrent depressive with anxiety  History of PTSD  PTA: clonazepam 1 mg daily, modafinil 100 mg daily   Follows closely with his psychiatrist, Savita Hassan  adjusted home medications for deranged liver function, monitor liver function tests on PCP visit within one week.    # Discharge Pain Plan:   - Patient  currently has NO PAIN and is not being prescribed pain medications on discharge.    Ale Bolanos MD    Pending Results   Unresulted Labs Ordered in the Past 30 Days of this Admission     No orders found from 3/5/2018 to 5/5/2018.             Physical Exam   Vitals:    05/12/18 0646 05/13/18 0646 05/14/18 0726   Weight: 64.8 kg (142 lb 13.7 oz) 64 kg (141 lb 1.5 oz) 64 kg (141 lb 1.5 oz)     Vital Signs with Ranges  Temp:  [97.7  F (36.5  C)-98.6  F (37  C)] 97.8  F (36.6  C)  Heart Rate:  [] 97  Resp:  [16-18] 16  BP: (124-129)/(73-88) 129/88  SpO2:  [97 %-98 %] 98 %  I/O last 3 completed shifts:  In: 320 [P.O.:120; I.V.:200]  Out: -   PHYSICAL EXAM  GENERAL: Patient is in no distress. Alert and oriented.  HEART: Regular rate and rhythm. S1S2. No murmurs  LUNGS: Clear to auscultation bilaterally. No expiratory wheeze.  ABDOMEN: Soft, no abdominal tenderness, bowel sounds heard   EXTREMITIES: No pedal edema. 2+ peripheral pulses.  Left elbow area diffuse erythema, no open drainage. Tenderness and warmth slightly improved compared to yesterday  SKIN: Warm, dry. No rash or bruising.  PSYCHIATRY Cooperative    )Consultations This Hospital Stay   PHARMACY TO DOSE VANCO  PHARMACY TO DOSE VANCO  INFECTIOUS DISEASES IP CONSULT  ORTHOPEDIC SURGERY IP CONSULT  VASCULAR ACCESS ADULT IP CONSULT  PHARMACY IP CONSULT    Time Spent on this Encounter   I, Ale Bolanos, personally saw the patient today and spent greater than 30 minutes discharging this patient.  More than 50% of time spent in direct patient care, care coordination, patient/caregiver counseling, and formalizing plan of care.    Discharge Orders     Follow-up and recommended labs and tests    Follow up with Dr Barrientos as scheduled on Friday 5/18/18 at 1:40pm for hospital follow up.     Reason for your hospital stay   Management of the infection in your left elbow and arm.     Activity   Your activity upon discharge: activity as tolerated     Follow-up and  recommended labs and tests    Follow up with your PCP as scheduled.  Follow ALT / AST levels in 5 - 7 days or earlier if symptomatic     When to contact your care team   Call your primary doctor if you have any of the following: increased swelling or increased pain or increasing redness.     Wound care and dressings   Instructions to care for your wound at home: ice to area for comfort.     Discharge Instructions   Strongly consider drug use cessation/ Reassess need for suboxone, emphasised compliance to medications and follow up.     Full Code     Diet   Follow this diet upon discharge: Regular         Discharge Medications   Current Discharge Medication List      START taking these medications    Details   cephalexin (KEFLEX) 250 MG capsule Take 1 capsule (250 mg) by mouth every 6 hours for 14 days  Qty: 56 capsule, Refills: 0    Associated Diagnoses: Cellulitis of left upper extremity; Olecranon bursitis of left elbow      minocycline (MINOCIN/DYNACIN) 100 MG capsule Take 1 capsule (100 mg) by mouth every 12 hours for 14 days  Qty: 28 capsule, Refills: 0    Associated Diagnoses: Cellulitis of left upper extremity; Olecranon bursitis of left elbow         CONTINUE these medications which have CHANGED    Details   abacavir-dolutegravir-LamiVUDine (TRIUMEQ) 600- MG per tablet Take 1 tablet by mouth daily  Qty: 5 tablet, Refills: 0    Associated Diagnoses: HIV disease (H)      clonazePAM (KLONOPIN) 1 MG tablet Take 1 tablet (1 mg) by mouth daily for 5 days  Qty: 5 tablet, Refills: 0    Associated Diagnoses: JASSI (generalized anxiety disorder)      levomilnacipran (FETZIMA) 40 MG 24 hr capsule Take 1 capsule (40 mg) by mouth daily for 5 days  Qty: 5 capsule, Refills: 0    Associated Diagnoses: HIV disease (H)      modafinil (PROVIGIL) 100 MG tablet Take 0.5 tablets (50 mg) by mouth daily for 5 days  Qty: 3 tablet, Refills: 0    Associated Diagnoses: JASSI (generalized anxiety disorder)      pregabalin (LYRICA) 150  MG capsule Take 1 capsule (150 mg) by mouth 2 times daily for 5 days  Qty: 10 capsule, Refills: 0    Associated Diagnoses: JASSI (generalized anxiety disorder)         CONTINUE these medications which have NOT CHANGED    Details   buprenorphine HCl-naloxone HCl (SUBOXONE) 8-2 MG per film Place 1 Film under the tongue 2 times daily      promethazine (PHENERGAN) 25 MG tablet Take 25 mg by mouth At Bedtime      testosterone cypionate (DEPOTESTOTERONE) 200 MG/ML injection Inject 0.0015 mg into the muscle once a week Inject 0.3 mL IM weekly           Allergies   No Known Allergies    Discharge Disposition   Discharged to home  Condition at discharge: Stable    DATA  Most Recent 3 CBC's:  Recent Labs   Lab Test  05/13/18   0955  05/08/18   0455  05/06/18   0450   WBC  7.8  8.5  9.3   HGB  13.6  13.1*  12.6*   MCV  89  89  88   PLT  388  390  348      Most Recent 3 BMP's:  Recent Labs   Lab Test  05/13/18   0955  05/10/18   0804  05/09/18   0826  05/08/18   0455   05/05/18   0940   NA  138   --    --   139   --   142   POTASSIUM  4.0   --    --   4.1   --   3.9   CHLORIDE  101   --    --   104   --   109   CO2  34*   --    --   30   --   29   BUN  16   --    --   17   --   9   CR  0.81  0.74  0.76  0.73   < >  0.67   ANIONGAP  3   --    --   5   --   4   SANDRA  9.3   --    --   9.0   --   8.7   GLC  109*   --    --   106*   --   124*    < > = values in this interval not displayed.     Most Recent 2 LFT's:  Recent Labs   Lab Test  05/14/18   0848  05/13/18   0955   AST  173*  168*   ALT  288*  273*   ALKPHOS  109  114   BILITOTAL  0.2  0.2     Most Recent INR's and Anticoagulation Dosing History:  Anticoagulation Dose History     Recent Dosing and Labs Latest Ref Rng & Units 11/3/2014 11/4/2014 11/5/2014 11/6/2014 3/6/2015 3/12/2015 10/9/2017    Warfarin 7.5 mg - - - 15 mg - - - -    Warfarin 10 mg - 10 mg 10 mg - - - - -    INR 0.86 - 1.14 1.35(H) 1.70(H) 1.82(H) 2.77(H) 1.69(H) 1.92(H) 1.01    INR Point of Care 0.86 - 1.14 -  - - - - - -        Most Recent 3 Troponin's:  Recent Labs   Lab Test  10/09/17   2015   TROPI  <0.015     Most Recent Cholesterol Panel:No lab results found.  Most Recent 6 Bacteria Isolates From Any Culture (See EPIC Reports for Culture Details):  Recent Labs   Lab Test  05/05/18   1425  05/04/18   1655  10/10/17   1901  10/10/17   1849  10/09/17   2035  10/09/17   2015   CULT  No growth  No growth  No growth  No growth  No growth  Cultured on the 1st day of incubation:  Bacillus species, not anthracis nor cereus group  *  Critical Value/Significant Value, preliminary result only, called to and read back by  Tonie Phan RN @5074 10/10/17.DH.    (Note)  NEGATIVE for the following: Staphylococcus spp., Staph aureus, Staph  epidermidis, Staph lugdunensis, Streptococcus spp., Strep pneumoniae,  Strep pyogenes, Strep agalactiae, Strep anginosus group, Enterococcus  faecalis, Enterococcus faecium, and Listeria spp. by [employer]  multiplex nucleic acid test. Final identification and antimicrobial  susceptibility testing will be verified by standard methods.    Critical Value/Significant Value called to and read back by Ambika Phan RN [id number] @ 1933 10/10/17 CS         Most Recent TSH, T4 and A1c Labs:  Recent Labs   Lab Test  10/09/17   2015   TSH  1.88     Results for orders placed or performed during the hospital encounter of 05/04/18   XR Elbow Left 2 Views    Narrative    ELBOW TWO VIEWS LEFT  5/4/2018 8:07 PM     HISTORY: Elbow pain;     COMPARISON: None.    FINDINGS:There is normal osseous alignment.  No fractures are  identified.  Soft tissue swelling is seen over the olecranon.      Impression    IMPRESSION:   1. Osseous structures appear intact.  2. Soft tissue swelling over the olecranon    INDIANA OSORIO MD

## 2018-05-14 NOTE — PROGRESS NOTES
Orthopedic Surgery  Bc German  2018  Admit Date:  2018  Left UE cellulitis with olecranon bursitis      Patient resting in bed.  Pain controlled.  Tolerating oral intake.    Denies nausea or vomiting  Denies chest pain or shortness of breath  No events overnight.       Alert and orient to person, place, and time.  Vital Sign Ranges  Temperature Temp  Av.2  F (36.8  C)  Min: 97.7  F (36.5  C)  Max: 98.6  F (37  C)   Blood pressure Systolic (24hrs), Av , Min:124 , Max:125        Diastolic (24hrs), Av, Min:73, Max:88      Pulse No Data Recorded   Respirations Resp  Av  Min: 18  Max: 18   Pulse oximetry SpO2  Av.5 %  Min: 97 %  Max: 98 %       Erythema about the olecranon and forearm - improving   No TTP  Edema in forearm significantly improved  No fluctuance noted in olecranon bursa  ROM 0-120 painless  NVI distally    Labs:  Recent Labs   Lab Test  18   0955  18   0455  18   0940   POTASSIUM  4.0  4.1  3.9     Recent Labs   Lab Test  18   0955  18   0455  18   0450   HGB  13.6  13.1*  12.6*     Recent Labs   Lab Test  10/09/17   2015  03/12/15   1620  03/06/15   2343   INR  1.01  1.92*  1.69*     Recent Labs   Lab Test  18   0955  18   0455  18   0450   PLT  388  390  348       A/P  1. Plan                        Continue antibiotics per ID                        RICE the left arm                        Continue current pain regiment.    Tonie Berumen PA-C

## 2018-05-14 NOTE — PLAN OF CARE
"Problem: Patient Care Overview  Goal: Plan of Care/Patient Progress Review  Outcome: Adequate for Discharge Date Met: 05/14/18  Discharge    Patient discharged to home via wheelchair with \"step-force\" volunteer.  A/Ox4, VSS, denies pain. Discharge orders received. Medications filled.  Patient has Triumec refill at home that came via mail.  Fetzima cannot be filled until 5/22/18, patient states that he has coverage until then and will be seeing MD on 5/18/18 for follow up.  AVS reviewed, follow up appointments reviewed, questions answered.  Patient verbalizes understanding, denies additional questions.    Listed belongings gathered and returned to patient. Yes  Care Plan and Patient education resolved: Yes  Prescriptions if needed, hard copies sent with patient  Yes  Home and hospital acquired medications returned to patient: NA  Medication Bin checked and emptied on discharge Yes  Follow up appointment made for patient: Yes      "

## 2018-05-15 ENCOUNTER — TELEPHONE (OUTPATIENT)
Dept: FAMILY MEDICINE | Facility: CLINIC | Age: 38
End: 2018-05-15

## 2018-05-15 NOTE — TELEPHONE ENCOUNTER
Patient's last visit at our clinic was 12/23/15 with retired provider Dr. Castañeda, appears he has new PCP listed on chart (Khari Barrientos?).    According to the 5/4/18 ED to hosp admit notes, Khari Barrientos manages patient's suboxone.       Patient reportedly has visit with Khari Barrientos on 5/18; see plan for PCP follow up:     When to contact your care team   Call your primary doctor if you have any of the following: increased swelling or increased pain or increasing redness.     Major recurrent depressive with anxiety  History of PTSD  PTA: clonazepam 1 mg daily, modafinil 100 mg daily   Follows closely with his psychiatrist, Savita Hassan  adjusted home medications for deranged liver function, monitor liver function tests on PCP visit within one week.      Plan to call patient later in the day when he is more likely awake.    Hope Dubose RN  [facility]

## 2018-05-15 NOTE — TELEPHONE ENCOUNTER
ED / Discharge Outreach Protocol    Patient Contact    Attempt # 1    Was call answered?  No.  Left message on voicemail with information to call me back at [facility] RN line, [phone].  Hope Dubose, DREA  [facility]

## 2018-05-15 NOTE — TELEPHONE ENCOUNTER
This patient was discharged from [facility] on 05/14/2018.    Discharge Diagnosis: Cisco (Generalized Anxiety Disorder), Cellulitis Of Left Upper Extremity    Follow-up instructions: Follow up with Dr Barrientos as scheduled on Friday 5/18/18 at 1:40pm for hospital follow up.    Follow up with your PCP as scheduled.  Follow ALT / AST levels in 5 - 7 days or earlier if symptomatic    A follow-up visit has not been scheduled.      Please follow-up with patient.

## 2018-05-17 NOTE — TELEPHONE ENCOUNTER
Called patient at [phone] (home) none (work)Left message to return phone call to triage line at [phone].  Camilla Augustin RN [facility] Triage.

## 2018-05-18 NOTE — TELEPHONE ENCOUNTER
Attempted to call patient at the home/mobile number listed in Epic, no answer, mailbox is full, unable to leave a message.    No other number listed.   Will need to try another time.    Hope Dubose RN  [facility]

## 2018-05-21 NOTE — TELEPHONE ENCOUNTER
Patient has not returned call to triage.  Routed to TC for letter.  Camilla Augustin, RN [facility] Triage.

## 2018-05-24 ENCOUNTER — TELEPHONE (OUTPATIENT)
Dept: PHARMACY | Facility: CLINIC | Age: 38
End: 2018-05-24

## 2018-09-10 DIAGNOSIS — B20 HIV DISEASE (H): ICD-10-CM

## 2018-09-12 RX ORDER — ABACAVIR SULFATE, DOLUTEGRAVIR SODIUM, LAMIVUDINE 600; 50; 300 MG/1; MG/1; MG/1
TABLET, FILM COATED ORAL
Qty: 30 TABLET | Refills: 0 | Status: SHIPPED | OUTPATIENT
Start: 2018-09-12 | End: 2018-12-09

## 2018-11-09 ENCOUNTER — HOSPITAL ENCOUNTER (EMERGENCY)
Facility: CLINIC | Age: 38
Discharge: HOME | End: 2018-11-09
Attending: EMERGENCY MEDICINE | Admitting: EMERGENCY MEDICINE
Payer: COMMERCIAL

## 2018-11-09 VITALS
HEIGHT: 71 IN | WEIGHT: 135 LBS | TEMPERATURE: 98.5 F | SYSTOLIC BLOOD PRESSURE: 111 MMHG | RESPIRATION RATE: 16 BRPM | OXYGEN SATURATION: 98 % | DIASTOLIC BLOOD PRESSURE: 70 MMHG | BODY MASS INDEX: 18.9 KG/M2

## 2018-11-09 DIAGNOSIS — T69.9XXA COLD EXPOSURE, INITIAL ENCOUNTER: ICD-10-CM

## 2018-11-09 PROCEDURE — 99285 EMERGENCY DEPT VISIT HI MDM: CPT

## 2018-11-09 ASSESSMENT — ENCOUNTER SYMPTOMS: FATIGUE: 1

## 2018-11-09 NOTE — ED PROVIDER NOTES
Patient now is awake and alert.  Able to ambulate on his own.  Not suicidal.  He is homeless so we got him a list of shelters for him to call to get a place to stay.  We also were able to find a coat in the Lost and found to give him because it is cold outside.  He was encouraged to follow-up with the clinic doctor.     Fern Brooks MD  11/09/18 0373

## 2018-11-09 NOTE — ED AVS SNAPSHOT
Emergency Department    [address] [place] [address]    Phone:  [phone]    Fax:  [phone]                                       Bc German   MRN: [id number]    Department:   Emergency Department   Date of Visit:  11/9/2018           After Visit Summary Signature Page     I have received my discharge instructions, and my questions have been answered. I have discussed any challenges I see with this plan with the nurse or doctor.    ..........................................................................................................................................  Patient/Patient Representative Signature      ..........................................................................................................................................  Patient Representative Print Name and Relationship to Patient    ..................................................               ................................................  Date                                   Time    ..........................................................................................................................................  Reviewed by Signature/Title    ...................................................              ..............................................  Date                                               Time          22EPIC Rev 08/18

## 2018-11-09 NOTE — ED AVS SNAPSHOT
Emergency Department    [address] [facility] [address]    Phone:  [phone]    Fax:  [phone]                                       Bc German   MRN: [id number]    Department:   Emergency Department   Date of Visit:  11/9/2018           Patient Information     Date Of Birth          1980        Your diagnoses for this visit were:     Cold exposure, initial encounter        You were seen by Otf Hoffman MD and Fern Brooks MD.      Follow-up Information     Follow up with  Emergency Department.    Specialty:  EMERGENCY MEDICINE    Why:  As needed    Contact information:    [address] [facility] [address] [phone]        Discharge Instructions       Please utilize the provided resources for shelters to help ensure that you have adequate protection from the weather.  Please return to the ER for any medical concerns.    24 Hour Appointment Hotline       To make an appointment at any [facility], call [phone] ([phone]). If you don't have a family doctor or clinic, we will help you find one. [facility] clinics are conveniently located to serve the needs of you and your family.             Review of your medicines      Our records show that you are taking the medicines listed below. If these are incorrect, please call your family doctor or clinic.        Dose / Directions Last dose taken    buprenorphine HCl-naloxone HCl 8-2 MG per film   Commonly known as:  SUBOXONE   Dose:  1 Film        Place 1 Film under the tongue 2 times daily   Refills:  0        clonazePAM 1 MG tablet   Commonly known as:  klonoPIN   Dose:  1 mg   Quantity:  5 tablet        Take 1 tablet (1 mg) by mouth daily for 5 days   Refills:  0        pregabalin 150 MG capsule   Commonly known as:  LYRICA   Dose:  150 mg   Quantity:  10 capsule        Take 1 capsule (150 mg) by mouth 2 times daily for 5 days   Refills:  0        promethazine 25 MG tablet    Commonly known as:  PHENERGAN   Dose:  25 mg        Take 25 mg by mouth At Bedtime   Refills:  0        testosterone cypionate 200 MG/ML injection   Commonly known as:  DEPOTESTOSTERONE   Dose:  0.0015 mg        Inject 0.0015 mg into the muscle once a week Inject 0.3 mL IM weekly   Refills:  0        TRIUMEQ 600- MG per tablet   Quantity:  30 tablet   Generic drug:  abacavir-dolutegravir-LamiVUDine        TAKE 1 TABLET BY MOUTH EVERY DAY   Refills:  0                Orders Needing Specimen Collection     None      Pending Results     No orders found from 11/7/2018 to 11/10/2018.            Pending Culture Results     No orders found from 11/7/2018 to 11/10/2018.            Pending Results Instructions     If you had any lab results that were not finalized at the time of your Discharge, you can call the ED Lab Result RN at [phone]. You will be contacted by this team for any positive Lab results or changes in treatment. The nurses are available 7 days a week from 10A to 6:30P.  You can leave a message 24 hours per day and they will return your call.        Test Results From Your Hospital Stay               Clinical Quality Measure: Blood Pressure Screening     Your blood pressure was checked while you were in the emergency department today. The last reading we obtained was  BP: 111/70 . Please read the guidelines below about what these numbers mean and what you should do about them.  If your systolic blood pressure (the top number) is less than 120 and your diastolic blood pressure (the bottom number) is less than 80, then your blood pressure is normal. There is nothing more that you need to do about it.  If your systolic blood pressure (the top number) is 120-139 or your diastolic blood pressure (the bottom number) is 80-89, your blood pressure may be higher than it should be. You should have your blood pressure rechecked within a year by a primary care provider.  If your systolic blood pressure (the top  "number) is 140 or greater or your diastolic blood pressure (the bottom number) is 90 or greater, you may have high blood pressure. High blood pressure is treatable, but if left untreated over time it can put you at risk for heart attack, stroke, or kidney failure. You should have your blood pressure rechecked by a primary care provider within the next 4 weeks.  If your provider in the emergency department today gave you specific instructions to follow-up with your doctor or provider even sooner than that, you should follow that instruction and not wait for up to 4 weeks for your follow-up visit.        Thank you for choosing [facility]       Thank you for choosing [facility] for your care. Our goal is always to provide you with excellent care. Hearing back from our patients is one way we can continue to improve our services. Please take a few minutes to complete the written survey that you may receive in the mail after you visit with us. Thank you!        [employer] Information     [employer] lets you send messages to your doctor, view your test results, renew your prescriptions, schedule appointments and more. To sign up, go to [email or web address] . Click on \"Log in\" on the left side of the screen, which will take you to the Welcome page. Then click on \"Sign up Now\" on the right side of the page.     You will be asked to enter the access code listed below, as well as some personal information. Please follow the directions to create your username and password.     Your access code is: [email or web address]  Expires: 2019  9:27 AM     Your access code will  in 90 days. If you need help or a new code, please call your [facility] clinic or [phone].        Care EveryWhere ID     This is your Care EveryWhere ID. This could be used by other organizations to access your [facility] medical records  [id number]        Equal Access to Services     ALMA MERCEDES AH: patti Sanchez, nelson spears " tawanda rodrigues ah. So [facility] [phone].    ATENCIÓN: Si habla español, tiene a chatterjee disposición servicios gratuitos de asistencia lingüística. Llame al [phone].    We comply with applicable federal civil rights laws and [place] laws. We do not discriminate on the basis of race, color, national origin, age, disability, sex, sexual orientation, or gender identity.            After Visit Summary       This is your record. Keep this with you and show to your community pharmacist(s) and doctor(s) at your next visit.

## 2018-11-09 NOTE — DISCHARGE INSTRUCTIONS
Please utilize the provided resources for shelters to help ensure that you have adequate protection from the weather.  Please return to the ER for any medical concerns.

## 2018-11-09 NOTE — ED NOTES
Bed: ED02  Expected date:   Expected time:   Means of arrival:   Comments:  U[address]- 38M- cold exposure

## 2018-11-09 NOTE — ED PROVIDER NOTES
"  History     Chief Complaint:    Cold Exposure       HPI   Bc German is a 38 year old male with a history of IV drug abuse, HCV antibody positive, and HIV who presents to the ED via EMS for evaluation of cold exposure. The patient states that he has been outside since 1400 yesterday and didn't have anywhere to go. He states he is tired and feels \"crappy\" from walking for the past two days as he hasn't really had a place to stay other than a \"trap house.\" He admits to using meth yesterday. He states he takes his prescribed medications. His blood sugar was 69 per EMS.     Allergies:  The patient has no known drug allergies.    Medications:    Suboxone  Klonopin  Lyrica  Phenergan  Depotestosterone  Triumeq     Past Medical History:    Anxiety  Depressive disorder  IV drug abuse  HCV antibody positive  HIV   Osteomyelitis of vertebra of lumbosacral region    Past Surgical History:    Pins to left eye socket fracture  Optical tracking system fusion cervical anterior one level  Arm surgery  PICC insertion  Transesophageal echocardiogram intraoperative    Family History:    No past pertinent family history.    Social History:  Current every day smoker.  Positive for alcohol use.   Admits to meth use.   Marital Status:  Single [1]     Review of Systems   Constitutional: Positive for fatigue.       Physical Exam   First Vitals:  BP: (!) 128/91  Heart Rate: 110  Temp: 98.5  F (36.9  C)  Resp: 20  Height: 180.3 cm (5' 11\")  Weight: 61.2 kg (135 lb)  SpO2: 95 %      Physical Exam  General: Appears well-developed and well-nourished. Appears fatigued  Head: No signs of trauma.   CV: Normal rate and regular rhythm.    Resp: Effort normal and breath sounds normal. No respiratory distress.   GI: Soft. There is no tenderness.  No rebound or guarding.  Normal bowel sounds.   MSK: Normal range of motion. no edema.   Neuro: The patient is alert and oriented to person, place, and time.  Strength in upper/lower extremities normal " and symmetrical.   Sensation normal. Speech normal.  GCS 15  Skin: Skin is warm and dry. No rash noted.   Psych: normal mood and affect. behavior is normal.         Emergency Department Course   Emergency Department Course:  Nursing notes and vitals reviewed. (1367) I performed an exam of the patient as documented above.     Patient given a box  lunch.     I rechecked the patient and discussed the results of his workup thus far.     Patient signed out to Dr. Brooks with discharge pending in the morning.     Impression & Plan    Medical Decision Making:  Bc German is a 38-year-old gentleman who was brought in by EMS for cold exposure.  He apparently been walking around outside including that was inadequate for the weather.  He seemed somewhat somnolent so EMS has been called and brought him to the emergency department.  Patient has a history of significant drug abuse and did admit to recent drug use.  He was alert although is somewhat somnolent.  He did have decision-making capacity.  He was very respectful and appropriate and was given food.  Patient was signed out to Dr. Brooks with the plan for discharge in the morning and information for resources for shelter.    Diagnosis:    ICD-10-CM    1. Cold exposure, initial encounter T69.9XXA        Disposition:  Signed out to Dr. Brooks for discharge in the morning.       Scribe Disclosure:  I,  Neno Michaud, am serving as a scribe on 11/9/2018 at 5:26 AM to personally document services performed by Otf Hoffman MD based on my observations and the provider's statements to me.          Neno Michaud  11/9/2018    EMERGENCY DEPARTMENT       Otf Hoffman MD  12/17/18 0631

## 2018-11-09 NOTE — ED NOTES
Pt fell asleep while eating sandwich. Food removed from patient's reach. Pt sleeping at this time.

## 2018-11-09 NOTE — ED NOTES
Tried to rouse patient up. Patient continues to immediately drift off to sleep. Sat patient on edge of bed and asked him to put his shoes on. Patient reached for sock and slowly drifted back to sleep while bending forward.

## 2018-11-09 NOTE — ED NOTES
Sandra QUISPE placed patient on a  hold prior to arrival to ED. Per MD, pt does not to be on a hold at this time as he is voluntary.

## 2018-12-09 DIAGNOSIS — B20 HIV DISEASE (H): ICD-10-CM

## 2019-09-13 ENCOUNTER — HOSPITAL ENCOUNTER (EMERGENCY)
Facility: CLINIC | Age: 39
Discharge: HOME | End: 2019-09-13
Attending: EMERGENCY MEDICINE | Admitting: EMERGENCY MEDICINE
Payer: COMMERCIAL

## 2019-09-13 VITALS
RESPIRATION RATE: 16 BRPM | WEIGHT: 127.3 LBS | DIASTOLIC BLOOD PRESSURE: 92 MMHG | TEMPERATURE: 97.3 F | OXYGEN SATURATION: 97 % | HEART RATE: 114 BPM | BODY MASS INDEX: 17.75 KG/M2 | SYSTOLIC BLOOD PRESSURE: 133 MMHG

## 2019-09-13 DIAGNOSIS — L03.90 CELLULITIS, UNSPECIFIED CELLULITIS SITE: ICD-10-CM

## 2019-09-13 DIAGNOSIS — R52 PAIN: ICD-10-CM

## 2019-09-13 PROCEDURE — 99283 EMERGENCY DEPT VISIT LOW MDM: CPT | Mod: Z6 | Performed by: EMERGENCY MEDICINE

## 2019-09-13 PROCEDURE — 25000132 ZZH RX MED GY IP 250 OP 250 PS 637: Performed by: EMERGENCY MEDICINE

## 2019-09-13 PROCEDURE — 99283 EMERGENCY DEPT VISIT LOW MDM: CPT | Performed by: EMERGENCY MEDICINE

## 2019-09-13 RX ORDER — SULFAMETHOXAZOLE/TRIMETHOPRIM 800-160 MG
1 TABLET ORAL 2 TIMES DAILY
Qty: 14 TABLET | Refills: 0 | Status: SHIPPED | OUTPATIENT
Start: 2019-09-13 | End: 2019-09-20

## 2019-09-13 RX ORDER — ACETAMINOPHEN 325 MG/1
650 TABLET ORAL EVERY 4 HOURS PRN
Status: DISCONTINUED | OUTPATIENT
Start: 2019-09-13 | End: 2019-09-13

## 2019-09-13 RX ORDER — LORAZEPAM 2 MG/1
2 TABLET ORAL 3 TIMES DAILY PRN
COMMUNITY
End: 2021-07-20

## 2019-09-13 RX ORDER — IBUPROFEN 600 MG/1
600 TABLET, FILM COATED ORAL ONCE
Status: COMPLETED | OUTPATIENT
Start: 2019-09-13 | End: 2019-09-13

## 2019-09-13 RX ADMIN — IBUPROFEN 600 MG: 600 TABLET, FILM COATED ORAL at 05:07

## 2019-09-13 ASSESSMENT — ENCOUNTER SYMPTOMS
WOUND: 1
ARTHRALGIAS: 1
MYALGIAS: 1
SORE THROAT: 0
NAUSEA: 0
CHILLS: 0
FEVER: 0
HEADACHES: 0
DIARRHEA: 0
VOMITING: 0
NECK PAIN: 1
SHORTNESS OF BREATH: 0

## 2019-09-13 NOTE — DISCHARGE INSTRUCTIONS
Please make an appointment to follow up with Your Primary Care Provider as soon as possible to discuss your pain control.    Take your antibiotics for your hand infection

## 2019-09-13 NOTE — ED AVS SNAPSHOT
[facility], [place], Emergency Department  [address]  [facility] [place] [address]  Phone:  [phone]  Fax:  [phone]                                    Bc German   MRN: [id number]    Department:  [facility], Emergency Department   Date of Visit:  9/13/2019           After Visit Summary Signature Page    I have received my discharge instructions, and my questions have been answered. I have discussed any challenges I see with this plan with the nurse or doctor.    ..........................................................................................................................................  Patient/Patient Representative Signature      ..........................................................................................................................................  Patient Representative Print Name and Relationship to Patient    ..................................................               ................................................  Date                                   Time    ..........................................................................................................................................  Reviewed by Signature/Title    ...................................................              ..............................................  Date                                               Time          22EPIC Rev 08/18

## 2019-09-13 NOTE — ED NOTES
"Pt states he has generalized chronic pain. States he usually uses marijuana and ibuprofen for pain. States he does not want pain meds. States he does not know why he stopped in. States he was \"walking by [facility], and decided to stop in.\"  "

## 2019-09-13 NOTE — ED PROVIDER NOTES
History     Chief Complaint   Patient presents with     Muscle Pain     Generalized pain.     HPI  Bc German is a 39 year old male who has a past medical history of anxiety, depression, HIV presenting with diffuse pain.  The patient has had pain especially in his neck for several years.  He takes ibuprofen for this.  He is here because he was walking by the hospital and decided to stop and to see if you get help with this pain.  He has been interested in medical marijuana in addition to methadone.  Denies suicidal or homicidal ideation.  Denies drug use today.  He occasionally injects meth.  He says he picks his hands, symptoms consider infection.  He would like some ibuprofen here in emergency department.  Denies fevers, chills, chest pain or shortness of breath.    I have reviewed the Medications, Allergies, Past Medical and Surgical History, and Social History in the Epic system.    Review of Systems   Constitutional: Negative for chills and fever.   HENT: Negative for sore throat.    Eyes: Negative for visual disturbance.   Respiratory: Negative for shortness of breath.    Cardiovascular: Negative for chest pain.   Gastrointestinal: Negative for diarrhea, nausea and vomiting.   Musculoskeletal: Positive for arthralgias, myalgias and neck pain (chronic).   Skin: Positive for rash and wound.   Neurological: Negative for headaches.   Psychiatric/Behavioral: Negative for self-injury and suicidal ideas.   All other systems reviewed and are negative.      Physical Exam   BP: (!) 140/104  Pulse: 106  Temp: 97.3  F (36.3  C)  Resp: 16  Weight: 57.7 kg (127 lb 4.8 oz)  SpO2: 97 %      Physical Exam  Physical Exam   Constitutional: oriented to person, place, and time. appears well-developed and well-nourished.   HENT:   Head: Normocephalic and atraumatic.   Neck: Normal range of motion.   Pulmonary/Chest: Effort normal. No respiratory distress.   Cardiac: No murmurs, rubs, gallops. RRR.  Abdominal: Abdomen soft,  nontender, nondistended. No rebound tenderness.  MSK: Long bones without deformity or evidence of trauma  Neurological: alert and oriented to person, place, and time.   Skin: Multiple superficial excoriations over his arms and legs.  On the dorsal surface of his left hand there is a circumferential area of erythema which blanches and is warm to the touch, no fluctuance.  No drainage.  Nontender to palpation..   Psychiatric:  normal mood and affect.  behavior is normal. Thought content normal.     ED Course        Procedures          Labs Ordered and Resulted from Time of ED Arrival Up to the Time of Departure from the ED - No data to display         Assessments & Plan (with Medical Decision Making)   MDM   Patient is presenting with request for pain control.  This pain has been chronic for him and has been going on for years.  Denies any recent trauma or falls.  He states that he was walking by the hospital and decided to stop in.  He is requesting medical marijuana and methadone, discussed that I do not provide this.  He does have a referral to pain clinic but he is hesitant to go.  Discussed that this is the next best step for him.  He appears quite well and nontoxic.  He does occasionally use meth and injects this, does appear that he may have a cellulitis on his left hand so we will treat with Bactrim  due to likelihood of staph and IV drug use.  No evidence of abscess. Patient declined tylenol/ibuprofen rx but would like ibuprofen while he is here. Patient will be discharged and recommended following up with his primary care provider.    I have reviewed the nursing notes.    I have reviewed the findings, diagnosis, plan and need for follow up with the patient.    New Prescriptions    SULFAMETHOXAZOLE-TRIMETHOPRIM (BACTRIM DS) 800-160 MG TABLET    Take 1 tablet by mouth 2 times daily for 7 days       Final diagnoses:   Cellulitis, unspecified cellulitis site   Pain       9/13/2019   [facility], [place], EMERGENCY  [facility]     Judah Quinones MD  09/13/19 3310

## 2019-09-27 NOTE — DISCHARGE INSTRUCTIONS
Patient:   TRAMAINE CABELLO            MRN: [id number]            FIN: [id number]              Age:   65 years     Sex:  FEMALE     :  [birth year]   Associated Diagnoses:   None   Author:   STANLEY WARD   (+) 500 cc   intubated  thycardic   dobutamine gtt lasix gtt milrinone gtt  octreotide gtt   PC 14/RR 18/ 50/ 5 ABg with alkalosis  Chets x ray stabel left pneumo small apical diffzue hazy opacities     Health Status   Allergies:    Allergies (1) Active Reaction  Bee Stings None Documented    Current medications: Medications (55) Active  Scheduled: (17)  AcetaZOLAMIDE 500 mg inj SDV  250 mg, IV Push, Daily  Albuterol 0.083% 2.5 mg/3 mL nebulizer soln  2.5 mg 3 mL, Nebulizer, Q6H  ALPRAZolam 0.25 mg tab  0.25 mg 1 tab, Oral, QAM & HS  Chlorhexidine gluconate 2% topical CLOTH 2s  6 pad, Topical, Daily  Docusate sodium 100 mg/10 mL oral liquid repack  100 mg 10 mL, Oral, Q Bedtime  ganciclovir  300 mg, IVPB, Q12H  HydrALAZINE 50 mg tab  50 mg 1 tab, Oral, Q8H  Insulin human lispro 10 unit/0.1 mL inj  2-12 units, Subcutaneous, Q6H  levETIRAcetam  500 mg 5 mL, IVPB, Q12H  Levothyroxine 100 mcg inj  30 mcg, Slow IV Push, Daily [before lunch]  meropenem  500 mg, IVPB, Q6H  Multivitamin with minerals chew tab  1 tab, Chewed, Daily  NitroGLYCERIN 2% ointment 1 gm UD  1 inch, Topical, Q8H  Pantoprazole 40 mg inj [RESTRICTED]  40 mg, Slow IV Push, BID  potassium CHLORIDE-sterile water  40 mEq 100 mL, IVPB, Once (scheduled)  Sodium chloride PF 0.9% flush inj 10 mL  10 mL, Flush, Q12H  Sucralfate 1,000 mg/10 mL oral susp repack  1,000 mg 10 mL, NG-tube, QID [before meals & HS]  Continuous: (15)  AMIODArone 450 mg [1 mg/min] + Dextrose 5% 250 mL  250 mL, IV, 33.33 mL/hr  dexMEDETomidine 400 mcg [0.4 mcg/kg/hr] + Sodium Chloride 0.9% 100 mL  100 mL, IV, 8.3 mL/hr  DOBUTamine 500 mg [2.5 mcg/kg/min] + premixed in Dextrose 5% 250 mL  250 mL, IV, 6.23 mL/hr  fat emulsion 20% 150 mL  150 mL, IVPB, 30  Please make an appointment to follow up with [facility] (phone: [phone]) and [facility] (phone: [phone]) as soon as possible in order to establish primary care, obtain refills for your medications, and for further evaluation.     mL/hr  furosemide 250 mg [40 mg/hr] + Sodium Chloride 0.9% 125 mL  125 mL, IV, 20 mL/hr  milrinone 20 mg [0.375 mcg/kg/min] + premixed in Dextrose 5% 100 mL  100 mL, IV, 9.34 mL/hr  niCARdipine 125 mg [5 mg/hr] + Sodium Chloride 0.9% 250 mL  250 mL, IV, 10 mL/hr  NORepinephrine 32 mg [3 mcg/min] + premixed in Sodium Chloride 0.9% 250 mL  250 mL, IV, 1.41 mL/hr  octreotide 1,000 mcg [50 mcg/hr] + Dextrose 5% 100 mL  100 mL, IV, 5 mL/hr  Sodium Chloride 0.9% 1,000 mL  1,000 mL, IV, 10 mL/hr  Sodium Chloride 0.9% 1,000 mL  1,000 mL, IV, 20 mL/hr  Sodium Chloride 0.9% 1,000 mL  1,000 mL, IV, 20 mL/hr  Sodium Chloride 0.9% 1,000 mL  1,000 mL, IV, 3 mL/hr  TPN adult custom CENTRAL LINE 2,000 mL + insulin regular 14 unit  2,000 mL, IV, 83.33 mL/hr  TPN adult custom CENTRAL LINE 2,000 mL + insulin regular 14 unit  2,000 mL, IV, 83.33 mL/hr  PRN: (23)  Acetaminophen 325 mg tab  650 mg 2 tab, Oral, Q6H  Acetaminophen 650 mg suppos  650 mg 1 suppository, Rectal, Q6H  albumin human 25%  25 gm 100 mL, IVPB, As Directed PRN  Albuterol 0.083% 2.5 mg/3 mL nebulizer soln  2.5 mg 3 mL, Nebulizer, Q6H  Bisacodyl 10 mg suppos  10 mg 1 suppository, Rectal, As Directed PRN  calcium gluconate 10%  1,000 mg 10 mL, IVPB, As Directed PRN  Dextrose (glucose) 40% 15 gm/37.5 gm oral gel UD  15 gm, Oral, As Directed PRN  Dextrose (glucose) 50% 25 gm/50 mL syringe  12.5 gm 25 mL, IV Push, As Directed PRN  FentaNYL 100 mcg/2 mL inj SDV  25 mcg 0.5 mL, IV Push, Q1H  Fleet adult 7-19 gm enema 133 mL  133 mL, Rectal, As Directed PRN  Glucagon 1 mg/1 mL emergency kit SDV  1 mg 1 mL, IM, As Directed PRN  HydrALAZINE 20 mg/1 mL inj SDV  10 mg 0.5 mL, Slow IV Push, Q6H  Hydrocodone-acetaminophen  mg tab  1 tab, Oral, Q4H  Hydrocodone-acetaminophen 5-325 mg tab  1 tab, Oral, Q4H  Lidocaine PF 2% 100 mg/5 mL inj  83 mg 4.15 mL, IV Push, Q5 Minutes  magnesium sulfate-sterile water  2 gm 50 mL, IVPB, As Directed PRN  Midazolam PF 2 mg/2 mL inj SDV  1  mg 1 mL, Slow IV Push, Q1H  Milk of magnesia 8% 30 mL oral susp UD  30 mL, Oral, QID  Ondansetron 4 mg/2 mL inj SDV  4 mg 2 mL, Slow IV Push, Q6H  Potassium CHLORIDE 20 mEq/15 mL oral liquid repack  40 mEq 30 mL, Oral, As Directed PRN  potassium CHLORIDE-sterile water  40 mEq 100 mL, IVPB, As Directed PRN  Sodium chloride PF 0.9% flush inj 10 mL  10 mL, Flush, As Directed PRN  Sodium chloride PF 0.9% flush inj 10 mL  20 mL, Flush, As Directed PRN       Objective   Intake and Output   I & O between:  21-AUG-2019 16:48 TO 22-AUG-2019 16:48  Med Dosing Weight:  83  kg   13-AUG-2019  24 Hour Intake:   6081.96  ( 73.28 mL/kg )  24 Hour Output:   5520.00           24 Hour Urine/Stool Output:   0.0  24 Hour Balance:   561.96           24 Hour Urine Output:   4140.00  ( 2.08 mL/kg/hr )     VS/Measurements     Vitals between:   21-AUG-2019 16:48:53   TO   22-AUG-2019 16:48:53                   LAST RESULT MINIMUM MAXIMUM  Heart Rate 122 111 155  Respiratory Rate 19 17 34  A Line Systolic 101 80 120  A Line Diastolic 84 59 86  A Line Mean 90 72 96  SpO2                    98 93 100  FiO2                    0.50 0.50 0.7    Lines and Tubes:    LINES  Arterial Line Right, Radial   Charted: 08/22/19 08:00  Inserted: 07/30/19   Days Since Insertion: 23 days  Central Catheter Nontunneled PICC, Nontunneled Triple Lumen Solo Power Right, Basilic   Charted: 08/22/19 08:00  Inserted: 08/20/19   Days Since Insertion: 2 days  Indication of Use: Fluid/Blood Products, Limited Peripheral Access, IV Drugs, Limited Peripheral Access  Peripheral Intravenous Hand Right   Gauge: 22   Charted: 08/22/19 08:00  Inserted: 08/18/19   Days Since Insertion: 4 days  Indication of Use: Drip  DRAINS AND TUBES  Chest Tubes Left Upper   Charted: 08/22/19 08:00  Inserted: 08/15/19   Days Since Insertion: 7 days  Gastric Tubes Nasogastric Right Nare   Tube Size:    Gastric Tube Level:    Charted: 08/22/19 08:00  Inserted: 08/05/19   Days Since Insertion: 17  days  Usage: Clamped  Gastric Tubes Nasogastric Idalmis Sump Right Nare   Tube Size:    Gastric Tube Level:    Charted: 08/22/19 08:00  Inserted: 08/13/19   Days Since Insertion: 9 days  Usage: Clamped  Urinary Catheter   Type:    Cath Size:    Charted: 08/22/19 08:00  Inserted: 08/19/19   Days Since Insertion: 3 days  Usage: Critically Ill Patient Requiring Accurate Output   .    General:  No acute distress, Not alert and oriented.    Eye:  Pupils are equal, round and reactive to light.    Neck:  No carotid bruit, No jugular venous distention.    Respiratory:  Lungs are clear to auscultation, Respirations are non-labored, Breath sounds are equal.   intubated    Cardiovascular:  Normal rate, Regular rhythm, Good pulses equal in all extremities.   Gastrointestinal:  Soft, Non-tender, Normal bowel sounds.    Integumentary:  Warm, Dry.      Results Review   Labs between:  21-AUG-2019 16:48 to 22-AUG-2019 16:48  CBC:                 WBC  HgB  Hct  Plt  MCV  RDW   22-AUG-2019   (L) 7.7  (L) 23.8         22-AUG-2019 (H) 14.7  (L) 8.8  (L) 26.0  (L) 117  88.7  (H) 16.0   21-AUG-2019   (L) 9.4  (L) 28.4         BMP:                 Na  Cl  BUN  Glu   22-AUG-2019                                     K  CO2  Cr  Ca                              4.0         BMP:                 Na  Cl  BUN  Glu   22-AUG-2019 137  98  (H) 49  (H) 175                              K  CO2  Cr  Ca                              3.9  32  0.59  (L) 8.1   BMP:                 Na  Cl  BUN  Glu   21-AUG-2019                                     K  CO2  Cr  Ca                              5.0         BMP:                 Na  Cl  BUN  Glu   21-AUG-2019                                     K  CO2  Cr  Ca                              3.6         CMP:                 AST  ALT  AlkPhos  Bili  Albumin   22-AUG-2019 36  34  (H) 269  (H) 2.8  (L) 2.1   Other Chem:             Mg  Phos  Triglycerides  GGTP  DirectBili                           1.9  2.8         POC GLU:                  Latest Result  Latest Date  Minimum  Min Date  Maximum  Max Date                             (H) 183  22-AUG-2019 (H) 183  22-AUG-2019 (H) 165  21-AUG-2019  COAG:                 INR  PT  PTT  Ddimer  Fibrinogen    22-AUG-2019 0.9  10.1  28       Blood Gas:            Ph  PCO2  PO2  BiCarb  BaseExcess   Arterial:  22-AUG-2019 (H) 7.49  40  (L) 62  (H) 31  (H) 7                              Ionized Ca  Na  K  HgB  Lactic Acid                              (L) 1.09  (L) 133  3.8  (L) 8.2  1.1   22-AUG-2019 (H) 7.52  38  (L) 63  (H) 31  (H) 8                              Ionized Ca  Na  K  HgB  Lactic Acid                              (L) 1.06  (L) 134  4.1  (L) 8.0  1.1   22-AUG-2019 (H) 7.53  38  (H) 132  (H) 32  (H) 8                              Ionized Ca  Na  K  HgB  Lactic Acid                              (L) 1.12  (L) 133  4.5  (L) 8.3  1.3   21-AUG-2019 (H) 7.50  44  (L) 51  (H) 34  (H) 10                              Ionized Ca  Na  K  HgB  Lactic Acid                              (L) 1.14  135  (H) 5.3  (L) 9.4  (H) 1.8                        Impression and Plan   Patient is a 65 y o lady with past medical history of  ICM/ CHF with HTn and HLD who was transferred from [facility] with NSTEMi ad cardiogenic shock as well as respiratoryf ailure due to PEA arrest on 7/30  #  Acute respiratory failure,7/30 -  extubated 8/5 -> reintubated for OR 8/13  # Abnormal chest imaging  # Hemoptysis -> DDx bland hemorrhage vs oral source, ANCA and anti-GBM negative -> improved/resolved  # Cardiogenic shock [facility], off oxygenator 7/31  # Acute on chronic systolic CHF  # Acute kidney failure s/p CRRT, off now since 7/22  # New CVA  8/21 CT head was done and shows L insular & L frontal and R frontal CVA that was not present on the last CT head    # right side flacid   # Recurrent/persistent GI bleed  # Ischemic colitis c/b perforated cecum now s/p ex lap w/ extended R hemicolectomy w/ end ileostomy and  cholecystectomy 8/13  # Gangrenous cholecystitis  # Anemia s/p acute blood loss  # Thrombocytopenia, ?consumptive  # Meningioma w/ vasogenic edema -> s/p dexamethasone taper per Neuro, off steroids  # Hx tobacco use  # Deconditioning  # extremeties ischemic - vascular to be consulted  - Continue vent support not a weaning candidate   - Bronchodilators and bronchial hygiene per protocol  - Cardiac and volume status optimization per CV surgery/heart failure  - Remains dependent on L CMag stabilization of her cardiac status  - Antibiotics per ID,   - Post-op care and ostomy management per trauma surgery  - Glycemic control  - Optimize nutrition, on TPN  - DVT and GI prophylaxis per CV surgery  - Pre-procedure pulmonary evaluation -> high risk for post-op respiratory failure at this time given critical illness w/ recent prolonged mechanical ventilation now back on vent  - Prognosis guarded  Palliative meeting 8/16 full code still   massive transfusion 8/17   CCT of 35 mins were spent reviewing the chart, the labs and the coordination of care. This time was excluding any teaching or procedures.  Continue ICU care   8/22/2019   Krystle Kelley M.D.  Pulmonary and Critical Care Attending

## 2021-04-29 NOTE — PLAN OF CARE
"Babita is a 38 year old who is being evaluated via a billable video visit.      How would you like to obtain your AVS? MyChart  If the video visit is dropped, the invitation should be resent by: Text to cell phone: [phone]  Will anyone else be joining your video visit? No    Video Start Time: 1:35 PM  Video-Visit Details    Type of service:  Video Visit    Video End Time:1:56 PM    Originating Location (pt. Location): Home    Distant Location (provider location):  [facility] [place]     Platform used for Video Visit: [facility]         Dear Dr. Pascual,    I had the pleasure of meeting with your patient Babita Rivas in our weight loss surgery office.  This patient is a 38 year old female who has been undergoing our thorough preoperative screening process in anticipation of potential bariatric surgery.    At initial evaluation we recorded Babita Rivas's Height: 157.5 cm (5' 2\"), Initial Weight (lbs): 290 lbs and Initial BMI: 49.78.  The patient has been unsuccessful with other methods of permanent weight loss and suffers from multiple weight related medical conditions.  Due to lack of success in achieving weight loss through other methods, she is interested in undergoing bariatric surgery.    PREVIOUS WEIGHT LOSS ATTEMPTS:     2/9/2021   I have tried the following methods to lose weight Watching portions or calories, Exercise, Slimfast, Prescription Medications       CO-MORBIDITIES OF OBESITY INCLUDE:     2/9/2021   I have the following health issues associated with obesity: Asthma     Has been interested in surgery for a long time.    Some difficulty working; problems with back pain.    Works as CNA and some troubles with lifting.    Has also had more problems with weight affecting ability to do every day activities at home and has affected family life in some ways.    VITALS:  Ht 1.575 m (5' 2\")   BMI 53.96 kg/m      PE:  GENERAL: Alert and oriented x3. " Problem: Goal Outcome Summary  Goal: Goal Outcome Summary  Pt is A&O X4. VS WNL.  s/p C6 and C7 corpectomy - C5-T1 anterior instrumented fusion  Neuro unchanged: numbness and tingling bilateral fingers and bilateral feet.  Anterior neck  Incision with durmabond intact LISSETH. Pt was complied with C-Collar until 2100, stated MD told him it s OK to remove the C-collor while lying in bed, charge nurse notified. On a regular diet, good appetite. Transfer SBA, ambulated hallway and sat in the chair. Pt would benefit from psychiatric follow up due to depression and anxiety.  Calm and a flat effect. Triple lumen IJ SL. Awaiting for TCU placement. Will continue to monitor and follow POC.       NAD  HEENT exam: Sclerae not icteric. Hearing good. Head normocephalic and atraumatic.   Rest of exam deferred due to virtual visit      In summary, she has undergone an appropriate medical evaluation, dietitian evaluation, as well as psychologic screening. The patient appears to be an appropriate candidate for bariatric surgery.    In the office today, I discussed the laparoscopic gastric sleeve surgery.  Risks, benefits and anticipated outcomes were outlined including the risk of death, staple line leak (1-2%), PE, DVT, ulcer, worsening GERD, N/V, stricture, hernia, wound infection, weight regain, and vitamin deficiencies. This patient has a good chance of sustaining permanent weight loss due to this procedure.  This should also allow improvement if not resolution of his/her weight related medical conditions.    At present we are going to present your patient's file for prior authorization to insurance.  Pending prior authorization, I anticipate a surgical date in the near future.  We will keep you updated on any progress.  If you have questions regarding care please feel free to contact me.  Total time spent in the clinic was 25 minutes with greater than 50% in face-to-face consultation.        Sincerely,    Ky Fitzpatrick MD    Please route or send letter to:  Primary Care Provider (PCP) and Referring Provider

## 2021-05-09 ENCOUNTER — APPOINTMENT (OUTPATIENT)
Dept: CT IMAGING | Facility: CLINIC | Age: 41
End: 2021-05-09
Attending: FAMILY MEDICINE
Payer: COMMERCIAL

## 2021-05-09 ENCOUNTER — HOSPITAL ENCOUNTER (EMERGENCY)
Facility: CLINIC | Age: 41
Discharge: HOME | End: 2021-05-09
Attending: FAMILY MEDICINE | Admitting: FAMILY MEDICINE
Payer: COMMERCIAL

## 2021-05-09 VITALS
TEMPERATURE: 96.5 F | BODY MASS INDEX: 17.9 KG/M2 | SYSTOLIC BLOOD PRESSURE: 114 MMHG | HEART RATE: 73 BPM | RESPIRATION RATE: 16 BRPM | DIASTOLIC BLOOD PRESSURE: 74 MMHG | OXYGEN SATURATION: 100 % | WEIGHT: 125 LBS | HEIGHT: 70 IN

## 2021-05-09 DIAGNOSIS — R53.83 OTHER FATIGUE: ICD-10-CM

## 2021-05-09 DIAGNOSIS — R82.5 POSITIVE URINE DRUG SCREEN: ICD-10-CM

## 2021-05-09 LAB
ALBUMIN SERPL-MCNC: 3.5 G/DL (ref 3.4–5)
ALBUMIN UR-MCNC: NEGATIVE MG/DL
ALCOHOL BREATH TEST: 0.01 (ref 0–0.01)
ALP SERPL-CCNC: 97 U/L (ref 40–150)
ALT SERPL W P-5'-P-CCNC: 114 U/L (ref 0–70)
AMMONIA PLAS-SCNC: 31 UMOL/L (ref 10–50)
AMPHETAMINES UR QL SCN: POSITIVE
ANION GAP SERPL CALCULATED.3IONS-SCNC: 2 MMOL/L (ref 3–14)
APAP SERPL-MCNC: <2 MG/L (ref 10–20)
APPEARANCE UR: CLEAR
APTT PPP: 33 SEC (ref 22–37)
AST SERPL W P-5'-P-CCNC: 81 U/L (ref 0–45)
BARBITURATES UR QL: NEGATIVE
BASOPHILS # BLD AUTO: 0 10E9/L (ref 0–0.2)
BASOPHILS NFR BLD AUTO: 0.7 %
BENZODIAZ UR QL: POSITIVE
BILIRUB SERPL-MCNC: 0.2 MG/DL (ref 0.2–1.3)
BILIRUB UR QL STRIP: NEGATIVE
BUN SERPL-MCNC: 12 MG/DL (ref 7–30)
CALCIUM SERPL-MCNC: 9.4 MG/DL (ref 8.5–10.1)
CANNABINOIDS UR QL SCN: POSITIVE
CHLORIDE SERPL-SCNC: 100 MMOL/L (ref 94–109)
CO2 SERPL-SCNC: 35 MMOL/L (ref 20–32)
COCAINE UR QL: NEGATIVE
COLOR UR AUTO: YELLOW
CREAT SERPL-MCNC: 0.67 MG/DL (ref 0.66–1.25)
DIFFERENTIAL METHOD BLD: NORMAL
EOSINOPHIL # BLD AUTO: 0.3 10E9/L (ref 0–0.7)
EOSINOPHIL NFR BLD AUTO: 5.5 %
ERYTHROCYTE [DISTWIDTH] IN BLOOD BY AUTOMATED COUNT: 13.2 % (ref 10–15)
ETHANOL SERPL-MCNC: <0.01 G/DL
ETHANOL UR QL SCN: NEGATIVE
GFR SERPL CREATININE-BSD FRML MDRD: >90 ML/MIN/{1.73_M2}
GLUCOSE SERPL-MCNC: 99 MG/DL (ref 70–99)
GLUCOSE UR STRIP-MCNC: NEGATIVE MG/DL
HCT VFR BLD AUTO: 42 % (ref 40–53)
HGB BLD-MCNC: 13.3 G/DL (ref 13.3–17.7)
HGB UR QL STRIP: NEGATIVE
IMM GRANULOCYTES # BLD: 0 10E9/L (ref 0–0.4)
IMM GRANULOCYTES NFR BLD: 0.7 %
INR PPP: 0.96 (ref 0.86–1.14)
INTERPRETATION ECG - MUSE: NORMAL
KETONES UR STRIP-MCNC: NEGATIVE MG/DL
LEUKOCYTE ESTERASE UR QL STRIP: NEGATIVE
LYMPHOCYTES # BLD AUTO: 1.8 10E9/L (ref 0.8–5.3)
LYMPHOCYTES NFR BLD AUTO: 33.5 %
MAGNESIUM SERPL-MCNC: 2 MG/DL (ref 1.6–2.3)
MCH RBC QN AUTO: 27.5 PG (ref 26.5–33)
MCHC RBC AUTO-ENTMCNC: 31.7 G/DL (ref 31.5–36.5)
MCV RBC AUTO: 87 FL (ref 78–100)
MONOCYTES # BLD AUTO: 0.5 10E9/L (ref 0–1.3)
MONOCYTES NFR BLD AUTO: 8.7 %
NEUTROPHILS # BLD AUTO: 2.8 10E9/L (ref 1.6–8.3)
NEUTROPHILS NFR BLD AUTO: 50.9 %
NITRATE UR QL: NEGATIVE
NRBC # BLD AUTO: 0 10*3/UL
NRBC BLD AUTO-RTO: 0 /100
OPIATES UR QL SCN: POSITIVE
PH UR STRIP: 6 PH (ref 5–7)
PLATELET # BLD AUTO: 354 10E9/L (ref 150–450)
POTASSIUM SERPL-SCNC: 3.6 MMOL/L (ref 3.4–5.3)
PROT SERPL-MCNC: 8.2 G/DL (ref 6.8–8.8)
RBC # BLD AUTO: 4.84 10E12/L (ref 4.4–5.9)
SALICYLATES SERPL-MCNC: <2 MG/DL
SODIUM SERPL-SCNC: 137 MMOL/L (ref 133–144)
SOURCE: NORMAL
SP GR UR STRIP: 1.02 (ref 1–1.03)
TROPONIN I SERPL-MCNC: <0.015 UG/L (ref 0–0.04)
TSH SERPL DL<=0.005 MIU/L-ACNC: 2.95 MU/L (ref 0.4–4)
UROBILINOGEN UR STRIP-MCNC: NORMAL MG/DL (ref 0–2)
WBC # BLD AUTO: 5.5 10E9/L (ref 4–11)

## 2021-05-09 PROCEDURE — 93005 ELECTROCARDIOGRAM TRACING: CPT | Performed by: FAMILY MEDICINE

## 2021-05-09 PROCEDURE — 82077 ASSAY SPEC XCP UR&BREATH IA: CPT | Performed by: FAMILY MEDICINE

## 2021-05-09 PROCEDURE — 82075 ASSAY OF BREATH ETHANOL: CPT | Performed by: FAMILY MEDICINE

## 2021-05-09 PROCEDURE — 70450 CT HEAD/BRAIN W/O DYE: CPT | Mod: 26 | Performed by: RADIOLOGY

## 2021-05-09 PROCEDURE — 99285 EMERGENCY DEPT VISIT HI MDM: CPT | Mod: 25 | Performed by: FAMILY MEDICINE

## 2021-05-09 PROCEDURE — 70450 CT HEAD/BRAIN W/O DYE: CPT

## 2021-05-09 PROCEDURE — 96360 HYDRATION IV INFUSION INIT: CPT | Performed by: FAMILY MEDICINE

## 2021-05-09 PROCEDURE — 85610 PROTHROMBIN TIME: CPT | Performed by: FAMILY MEDICINE

## 2021-05-09 PROCEDURE — 93010 ELECTROCARDIOGRAM REPORT: CPT | Performed by: FAMILY MEDICINE

## 2021-05-09 PROCEDURE — 84484 ASSAY OF TROPONIN QUANT: CPT | Performed by: FAMILY MEDICINE

## 2021-05-09 PROCEDURE — 85025 COMPLETE CBC W/AUTO DIFF WBC: CPT | Performed by: FAMILY MEDICINE

## 2021-05-09 PROCEDURE — 80053 COMPREHEN METABOLIC PANEL: CPT | Performed by: FAMILY MEDICINE

## 2021-05-09 PROCEDURE — 96361 HYDRATE IV INFUSION ADD-ON: CPT | Performed by: FAMILY MEDICINE

## 2021-05-09 PROCEDURE — 80307 DRUG TEST PRSMV CHEM ANLYZR: CPT | Performed by: FAMILY MEDICINE

## 2021-05-09 PROCEDURE — 80143 DRUG ASSAY ACETAMINOPHEN: CPT | Performed by: FAMILY MEDICINE

## 2021-05-09 PROCEDURE — 80320 DRUG SCREEN QUANTALCOHOLS: CPT | Performed by: FAMILY MEDICINE

## 2021-05-09 PROCEDURE — 81003 URINALYSIS AUTO W/O SCOPE: CPT | Performed by: FAMILY MEDICINE

## 2021-05-09 PROCEDURE — 85730 THROMBOPLASTIN TIME PARTIAL: CPT | Performed by: FAMILY MEDICINE

## 2021-05-09 PROCEDURE — 80179 DRUG ASSAY SALICYLATE: CPT | Performed by: FAMILY MEDICINE

## 2021-05-09 PROCEDURE — 84443 ASSAY THYROID STIM HORMONE: CPT | Performed by: FAMILY MEDICINE

## 2021-05-09 PROCEDURE — 258N000003 HC RX IP 258 OP 636: Performed by: FAMILY MEDICINE

## 2021-05-09 PROCEDURE — 83735 ASSAY OF MAGNESIUM: CPT | Performed by: FAMILY MEDICINE

## 2021-05-09 PROCEDURE — 82140 ASSAY OF AMMONIA: CPT | Performed by: FAMILY MEDICINE

## 2021-05-09 RX ORDER — SODIUM CHLORIDE 9 MG/ML
INJECTION, SOLUTION INTRAVENOUS CONTINUOUS
Status: DISCONTINUED | OUTPATIENT
Start: 2021-05-09 | End: 2021-05-09 | Stop reason: HOSPADM

## 2021-05-09 RX ORDER — LIDOCAINE 40 MG/G
CREAM TOPICAL
Status: DISCONTINUED | OUTPATIENT
Start: 2021-05-09 | End: 2021-05-09 | Stop reason: HOSPADM

## 2021-05-09 RX ADMIN — SODIUM CHLORIDE 1000 ML: 9 INJECTION, SOLUTION INTRAVENOUS at 14:58

## 2021-05-09 ASSESSMENT — ENCOUNTER SYMPTOMS
ACTIVITY CHANGE: 1
CONFUSION: 0
DECREASED CONCENTRATION: 1
ABDOMINAL PAIN: 0
WOUND: 0
NECK STIFFNESS: 0
VOICE CHANGE: 0
VOMITING: 0
NAUSEA: 0
BLOOD IN STOOL: 0
HEADACHES: 0
ARTHRALGIAS: 0
SHORTNESS OF BREATH: 0
COUGH: 0
COLOR CHANGE: 0
CHILLS: 0
HALLUCINATIONS: 0
FATIGUE: 1
TROUBLE SWALLOWING: 0
WEAKNESS: 1
DIFFICULTY URINATING: 0
BRUISES/BLEEDS EASILY: 0
SEIZURES: 0
FEVER: 0
EYE REDNESS: 0
DYSPHORIC MOOD: 1

## 2021-05-09 ASSESSMENT — MIFFLIN-ST. JEOR: SCORE: 1493.25

## 2021-05-09 NOTE — DISCHARGE INSTRUCTIONS
TODAY'S VISIT:  You were seen today for fatigue  -   - If you had any labs or imaging/radiology tests performed today, you should also discuss these tests with your usual provider.     FOLLOW-UP:  Please make an appointment to follow up with:  - Your Primary Care Provider. If you do not have a PCP, please call the Primary Care Center (phone: [phone] for an appointment    - Have your provider review the results from today's visit with you again to make sure no further follow-up or additional testing is needed based on those results.     RETURN TO THE EMERGENCY DEPARTMENT  Return to the Emergency Department at any time for any new or worsening symptoms or any concerns.

## 2021-05-09 NOTE — ED PROVIDER NOTES
"    [place] EMERGENCY DEPARTMENT ([facility])  5/09/21    History     Chief Complaint   Patient presents with     Fatigue     The history is provided by the patient and medical records.     Bc German is a 40 year old male with a history of HIV and IV drug abuse who presents to the Emergency Department via EMS with fatigue. Per EMS, patient was found \"passed out\" on a park bench. Patient reports he is feeling fatigued and tired. He reports drinking alcohol. He denies drug ingestion, SI, or HI. Patient wants to rest and eat. He is vague about his current living situation. Patient denies trauma, seizures, or headache. Patient may have seizure history but none currently. No hallucinations.  No reports of fever.  Denies trauma etc. patient denies any other IV or oral drug use.  Other history is limited because of fatigue.      Past Medical History  Past Medical History:   Diagnosis Date     Anxiety      Depressive disorder      Drug abuse, IV (H)      Group A streptococcal infection 11/2014    Bacteremia/cellulitis     HCV antibody positive      HIV (human immunodeficiency virus infection) (H)      HIV (human immunodeficiency virus infection) (H)      HIV (human immunodeficiency virus infection) (H)      IVDU (intravenous drug user)      Osteomyelitis of vertebra of lumbosacral region (H) 3/2011    L3, left psoas abscess     Substance abuse (H)      Past Surgical History:   Procedure Laterality Date     EYE SURGERY  1 year ago    pins to left eye after socket fracture     OPTICAL TRACKING SYSTEM FUSION CERVICAL ANTERIOR ONE LEVEL Right 9/10/2017    Procedure: OPTICAL TRACKING SYSTEM FUSION CERVICAL ANTERIOR ONE LEVEL;  Stealth C6-C7 Anterior Cervical Corpectomy and C5-T1 Fusion;  Surgeon: Marv Ambrose MD;  Location: [facility] OR     ORTHOPEDIC SURGERY      Arm Surgery     PICC INSERTION Left 09/21/2017    5fr DL BioFlo PICC, 42cm (1cm external) in the L basilic vein w/ tip in the low SVC.     " TRANSESOPHAGEAL ECHOCARDIOGRAM INTRAOPERATIVE N/A 3/17/2015    Procedure: TRANSESOPHAGEAL ECHOCARDIOGRAM INTRAOPERATIVE;  Surgeon: Generic Anesthesia Provider;  Location: [facility] OR     abacavir-dolutegravir-LamiVUDine (TRIUMEQ) 600- MG per tablet  buprenorphine HCl-naloxone HCl (SUBOXONE) 8-2 MG per film  LORazepam (ATIVAN) 2 MG tablet  pregabalin (LYRICA) 150 MG capsule  promethazine (PHENERGAN) 25 MG tablet  testosterone cypionate (DEPOTESTOTERONE) 200 MG/ML injection      No Known Allergies  Family History  History reviewed. No pertinent family history.  Social History   Social History     Tobacco Use     Smoking status: Current Every Day Smoker     Packs/day: 0.25     Types: Cigarettes     Smokeless tobacco: Never Used   Substance Use Topics     Alcohol use: Yes     Drug use: Yes     Types: Marijuana     Comment: Heroine--IV, benzo's      Past medical history, past surgical history, medications, allergies, family history, and social history were reviewed with the patient. No additional pertinent items.       Review of Systems   Constitutional: Positive for activity change and fatigue. Negative for chills and fever.   HENT: Negative for congestion, nosebleeds, trouble swallowing and voice change.    Eyes: Negative for redness and visual disturbance.   Respiratory: Negative for cough and shortness of breath.    Cardiovascular: Negative for chest pain and leg swelling.   Gastrointestinal: Negative for abdominal pain, blood in stool, nausea and vomiting.   Genitourinary: Negative for difficulty urinating.   Musculoskeletal: Negative for arthralgias and neck stiffness.   Skin: Negative for color change, rash and wound.   Allergic/Immunologic: Immunocompromised state: HIV hx.   Neurological: Positive for weakness (general). Negative for seizures and headaches.   Hematological: Does not bruise/bleed easily.   Psychiatric/Behavioral: Positive for decreased concentration and dysphoric mood. Negative for confusion,  "hallucinations and suicidal ideas.   All other systems reviewed and are negative.    A complete review of systems was performed with pertinent positives and negatives noted in the HPI, and all other systems negative.    Physical Exam   BP: (!) 117/94  Pulse: 69  Temp: 96.5  F (35.8  C)  Resp: 16  Height: 177.8 cm (5' 10\")  Weight: 56.7 kg (125 lb)  SpO2: 98 %  Physical Exam  Vitals signs and nursing note reviewed.   Constitutional:       General: He is in acute distress.      Appearance: He is well-developed. He is not toxic-appearing or diaphoretic.      Comments: Patient brought in by paramedics.  Patient is tired primarily does open his eyes nothing neurologically focal denying any complaints airway is intact.  Patient would just like to sleep and try something to eat currently.  No signs of any trauma.   HENT:      Head: Normocephalic and atraumatic.      Right Ear: External ear normal.      Left Ear: External ear normal.      Nose: No rhinorrhea.      Mouth/Throat:      Pharynx: Oropharynx is clear. No posterior oropharyngeal erythema.   Eyes:      General: No scleral icterus.     Extraocular Movements: Extraocular movements intact.      Conjunctiva/sclera: Conjunctivae normal.      Pupils: Pupils are equal, round, and reactive to light.      Comments: Pupils are both equal and reactive or not miotic.   Neck:      Musculoskeletal: Normal range of motion and neck supple.   Cardiovascular:      Rate and Rhythm: Normal rate.   Pulmonary:      Effort: No respiratory distress.      Breath sounds: No stridor. No wheezing.   Abdominal:      Palpations: There is no mass.      Tenderness: There is no abdominal tenderness. There is no guarding.   Musculoskeletal:         General: No swelling, tenderness or signs of injury.   Skin:     General: Skin is warm and dry.      Capillary Refill: Capillary refill takes less than 2 seconds.      Coloration: Skin is not jaundiced or pale.      Findings: No rash.   Neurological:     "  General: No focal deficit present.      Mental Status: He is alert.      Comments: Patient extremely tired but is oriented to person place time slow speaking but no dysarthria no focal findings seen on examination moving all extremities.  No facial dissymmetry.   Psychiatric:      Comments: Flat affect but no delusional thought pattern etc.         ED Course         Initially in the ER patient did not want further work-up he desponded to rest and eat.  Patient's vital signs are stable despite no neuro focal findings appear to be under the influence at this point.  Breathalyzer although week was 0.08.  Urinalysis ordered and pending at this point.  Patient observed and then rechecked still very sleepy but responsive to voice airway protected.  Patient did agree at this point we placed an IV ordered a liter of normal saline labs are drawn and reviewed.  EKG done revealing sinus rhythm nonspecific changes noted.  I did order head CT patient declined currently at this point.  No indications for chest x-ray at this point no sign of any respiratory distress chest pain etc.  Labs drawn and reviewed.  Patient CBC within normal limits.  Slight elevation of liver function test.  Other chemistries evaluate is notable also.  Patient's drug screen was positive for benzos along with THC and opiates and amphetamine  Urinalysis did not show any infection otherwise.    At this point patient signed out to evening physician to reassess once patient had all the labs back and reassess his alertness at this point more likely his symptoms not appear to be focal and are more likely reflective of polysubstance ingestion.    Procedures             EKG Interpretation:      Interpreted by Tyree Mojica MD  Time reviewed: [id number]  Symptoms at time of EKG: lethargy   Rhythm: normal sinus   Rate: normal  Axis: normal  Ectopy: none  Conduction: normal  ST Segments/ T Waves: Nonspecific ST-T wave changes  Q Waves: none  Comparison to prior: No  old EKG available    Clinical Impression: nsr with nonspec st changes             Results for orders placed or performed during the hospital encounter of 05/09/21   CT Head w/o Contrast     Status: None (Preliminary result)    Impression    RESIDENT PRELIMINARY INTERPRETATION  Impression: No acute intracranial pathology.   Drug abuse screen 6 urine (chem dep) ([facility])     Status: Abnormal   Result Value Ref Range    Amphetamine Qual Urine Positive (A) NEG^Negative    Barbiturates Qual Urine Negative NEG^Negative    Benzodiazepine Qual Urine Positive (A) NEG^Negative    Cannabinoids Qual Urine Positive (A) NEG^Negative    Cocaine Qual Urine Negative NEG^Negative    Ethanol Qual Urine Negative NEG^Negative    Opiates Qualitative Urine Positive (A) NEG^Negative   UA reflex to Microscopic and Culture     Status: None    Specimen: Urine Midstream; Catheterized Urine   Result Value Ref Range    Color Urine Yellow     Appearance Urine Clear     Glucose Urine Negative NEG^Negative mg/dL    Bilirubin Urine Negative NEG^Negative    Ketones Urine Negative NEG^Negative mg/dL    Specific Gravity Urine 1.018 1.003 - 1.035    Blood Urine Negative NEG^Negative    pH Urine 6.0 5.0 - 7.0 pH    Protein Albumin Urine Negative NEG^Negative mg/dL    Urobilinogen mg/dL Normal 0.0 - 2.0 mg/dL    Nitrite Urine Negative NEG^Negative    Leukocyte Esterase Urine Negative NEG^Negative    Source Catheterized Urine    CBC with platelets differential     Status: None   Result Value Ref Range    WBC 5.5 4.0 - 11.0 10e9/L    RBC Count 4.84 4.4 - 5.9 10e12/L    Hemoglobin 13.3 13.3 - 17.7 g/dL    Hematocrit 42.0 40.0 - 53.0 %    MCV 87 78 - 100 fl    MCH 27.5 26.5 - 33.0 pg    MCHC 31.7 31.5 - 36.5 g/dL    RDW 13.2 10.0 - 15.0 %    Platelet Count 354 150 - 450 10e9/L    Diff Method Automated Method     % Neutrophils 50.9 %    % Lymphocytes 33.5 %    % Monocytes 8.7 %    % Eosinophils 5.5 %    % Basophils 0.7 %    % Immature Granulocytes 0.7 %     Nucleated RBCs 0 0 /100    Absolute Neutrophil 2.8 1.6 - 8.3 10e9/L    Absolute Lymphocytes 1.8 0.8 - 5.3 10e9/L    Absolute Monocytes 0.5 0.0 - 1.3 10e9/L    Absolute Eosinophils 0.3 0.0 - 0.7 10e9/L    Absolute Basophils 0.0 0.0 - 0.2 10e9/L    Abs Immature Granulocytes 0.0 0 - 0.4 10e9/L    Absolute Nucleated RBC 0.0    INR     Status: None   Result Value Ref Range    INR 0.96 0.86 - 1.14   Partial thromboplastin time     Status: None   Result Value Ref Range    PTT 33 22 - 37 sec   Comprehensive metabolic panel     Status: Abnormal   Result Value Ref Range    Sodium 137 133 - 144 mmol/L    Potassium 3.6 3.4 - 5.3 mmol/L    Chloride 100 94 - 109 mmol/L    Carbon Dioxide 35 (H) 20 - 32 mmol/L    Anion Gap 2 (L) 3 - 14 mmol/L    Glucose 99 70 - 99 mg/dL    Urea Nitrogen 12 7 - 30 mg/dL    Creatinine 0.67 0.66 - 1.25 mg/dL    GFR Estimate >90 >60 mL/min/[1.73_m2]    GFR Estimate If Black >90 >60 mL/min/[1.73_m2]    Calcium 9.4 8.5 - 10.1 mg/dL    Bilirubin Total 0.2 0.2 - 1.3 mg/dL    Albumin 3.5 3.4 - 5.0 g/dL    Protein Total 8.2 6.8 - 8.8 g/dL    Alkaline Phosphatase 97 40 - 150 U/L     (H) 0 - 70 U/L    AST 81 (H) 0 - 45 U/L   Magnesium     Status: None   Result Value Ref Range    Magnesium 2.0 1.6 - 2.3 mg/dL   Troponin I     Status: None   Result Value Ref Range    Troponin I ES <0.015 0.000 - 0.045 ug/L   TSH     Status: None   Result Value Ref Range    TSH 2.95 0.40 - 4.00 mU/L   Alcohol ethyl     Status: None   Result Value Ref Range    Ethanol g/dL <0.01 <0.01 g/dL   Salicylate level     Status: None   Result Value Ref Range    Salicylate Level <2 mg/dL   Acetaminophen level     Status: None   Result Value Ref Range    Acetaminophen Level <2 mg/L   Ammonia (on ice)     Status: None   Result Value Ref Range    Ammonia 31 10 - 50 umol/L   EKG 12 lead     Status: None (Preliminary result)   Result Value Ref Range    Interpretation ECG Click View Image link to view waveform and result    Alcohol breath  test POCT     Status: Normal   Result Value Ref Range    Alcohol Breath Test 0.008 0.00 - 0.01     Medications   lidocaine 1 % 0.1-1 mL (has no administration in time range)   lidocaine (LMX4) cream (has no administration in time range)   sodium chloride (PF) 0.9% PF flush 3 mL (3 mLs Intracatheter Not Given 5/9/21 1705)   sodium chloride (PF) 0.9% PF flush 3 mL (has no administration in time range)   0.9% sodium chloride BOLUS (0 mLs Intravenous Stopped 5/9/21 1705)     Followed by   sodium chloride 0.9% infusion ( Intravenous Not Given 5/9/21 1706)        Assessments & Plan (with Medical Decision Making)  40-year-old male history of IV drug use HIV presented the ER after being found less responsive/sleeping on a park bench.  No obvious signs of trauma.  Patient brought in the ER for evaluation vitally was stable protecting airway but very tired and groggy without neuro focal findings.  No sign of infection etc.  No trauma is noted patient denied any complaints stated he might be drinking some alcohol initial breathalyzer is 0.08.  Drug screen ordered etc. patient blood sugar was within normal limits per paramedics.  Patient here in the ER initially was observed continue to be somewhat lethargic just globally then we ordered CBC chemistries EKG head CT etc.  Drug screen positive for benzodiazepine along with amphetamine opiates and THC.  Other labs have been stable without signs of any infection etc.  EKG nonspecific changes.  Here in the ER will continue to observe patient and presumption at this point that patient is lethargic somewhat altered mental status with slowly improving due to polysubstance ingestion.  Aspirin Tylenol alcohol levels were negative.  Signed out to evening physician.  Ammonia 31.  Troponin negative also.         I have reviewed the nursing notes. I have reviewed the findings, diagnosis, plan and need for follow up with the patient.    Discharge Medication List as of 5/9/2021  5:07 PM           Final diagnoses:   Positive urine drug screen   Other fatigue     I, Qing Madison, am serving as a trained medical scribe to document services personally performed by Tyree Mojica MD, based on the provider's statements to me.      ITyree MD, was physically present and have reviewed and verified the accuracy of this note documented by Qing Madison.     --  Tyree Mojica MD  [facility] EMERGENCY DEPARTMENT  5/9/2021    This note was created at least in part by the use of dragon voice dictation system. Inadvertent typographical errors may still exist.  Tyree Mojica MD.    Patient evaluated in the emergency department during the COVID-19 pandemic period. Careful attention to patients safety was addressed throughout the evaluation. Evaluation and treatment management was initiated with disposition made efficiently and appropriate as possible to minimize any risk of potential exposure to patient during this evaluation.       Tyree Mojica MD  05/09/21 4822

## 2021-05-09 NOTE — ED NOTES
"     Emergency Department Patient Sign-out       Brief HPI:  This is a 40 year old male signed out to me by Dr. Mojica .  See initial ED Provider note for details of the presentation.            Significant Events prior to my assuming care: The pt is a 41 yo male with a PMH of HIV and IV drug abuse who presents with fatigue and AMS.  Pt has been found passed out on a park bench. On arrival here, he stated that he wanted to rest and eat. He admitted to ETOH use, denied recent drug use. No hallucinations, SI or HI. No trauma reported. No fevers. Pt is awaiting labs, head CT, and to be given time to clear any ingested substances. If workup negative, pt to be discharged when clinically sober.       Exam:   Patient Vitals for the past 24 hrs:   BP Temp Temp src Pulse Resp SpO2 Height Weight   05/09/21 1400 98/71 -- -- 76 -- 96 % -- --   05/09/21 1300 -- -- -- 74 -- 96 % -- --   05/09/21 1242 -- -- -- 77 -- 99 % -- --   05/09/21 1241 91/66 -- -- 74 -- -- -- --   05/09/21 1042 -- -- -- -- -- 100 % -- --   05/09/21 1039 -- -- -- -- -- -- 1.778 m (5' 10\") 56.7 kg (125 lb)   05/09/21 1036 (!) 117/94 96.5  F (35.8  C) Oral 69 16 98 % -- --           ED RESULTS:   Results for orders placed or performed during the hospital encounter of 05/09/21 (from the past 24 hour(s))   Alcohol breath test POCT     Status: Normal    Collection Time: 05/09/21 11:16 AM   Result Value Ref Range    Alcohol Breath Test 0.008 0.00 - 0.01   Drug abuse screen 6 urine (chem dep) ([facility])     Status: Abnormal    Collection Time: 05/09/21  2:24 PM   Result Value Ref Range    Amphetamine Qual Urine Positive (A) NEG^Negative    Barbiturates Qual Urine Negative NEG^Negative    Benzodiazepine Qual Urine Positive (A) NEG^Negative    Cannabinoids Qual Urine Positive (A) NEG^Negative    Cocaine Qual Urine Negative NEG^Negative    Ethanol Qual Urine Negative NEG^Negative    Opiates Qualitative Urine Positive (A) NEG^Negative   UA reflex to Microscopic and Culture  "    Status: None    Collection Time: 05/09/21  2:24 PM    Specimen: Urine Midstream; Catheterized Urine   Result Value Ref Range    Color Urine Yellow     Appearance Urine Clear     Glucose Urine Negative NEG^Negative mg/dL    Bilirubin Urine Negative NEG^Negative    Ketones Urine Negative NEG^Negative mg/dL    Specific Gravity Urine 1.018 1.003 - 1.035    Blood Urine Negative NEG^Negative    pH Urine 6.0 5.0 - 7.0 pH    Protein Albumin Urine Negative NEG^Negative mg/dL    Urobilinogen mg/dL Normal 0.0 - 2.0 mg/dL    Nitrite Urine Negative NEG^Negative    Leukocyte Esterase Urine Negative NEG^Negative    Source Catheterized Urine    EKG 12 lead     Status: None (Preliminary result)    Collection Time: 05/09/21  2:27 PM   Result Value Ref Range    Interpretation ECG Click View Image link to view waveform and result    CBC with platelets differential     Status: None    Collection Time: 05/09/21  2:50 PM   Result Value Ref Range    WBC 5.5 4.0 - 11.0 10e9/L    RBC Count 4.84 4.4 - 5.9 10e12/L    Hemoglobin 13.3 13.3 - 17.7 g/dL    Hematocrit 42.0 40.0 - 53.0 %    MCV 87 78 - 100 fl    MCH 27.5 26.5 - 33.0 pg    MCHC 31.7 31.5 - 36.5 g/dL    RDW 13.2 10.0 - 15.0 %    Platelet Count 354 150 - 450 10e9/L    Diff Method Automated Method     % Neutrophils 50.9 %    % Lymphocytes 33.5 %    % Monocytes 8.7 %    % Eosinophils 5.5 %    % Basophils 0.7 %    % Immature Granulocytes 0.7 %    Nucleated RBCs 0 0 /100    Absolute Neutrophil 2.8 1.6 - 8.3 10e9/L    Absolute Lymphocytes 1.8 0.8 - 5.3 10e9/L    Absolute Monocytes 0.5 0.0 - 1.3 10e9/L    Absolute Eosinophils 0.3 0.0 - 0.7 10e9/L    Absolute Basophils 0.0 0.0 - 0.2 10e9/L    Abs Immature Granulocytes 0.0 0 - 0.4 10e9/L    Absolute Nucleated RBC 0.0    INR     Status: None    Collection Time: 05/09/21  2:50 PM   Result Value Ref Range    INR 0.96 0.86 - 1.14   Partial thromboplastin time     Status: None    Collection Time: 05/09/21  2:50 PM   Result Value Ref Range    PTT  33 22 - 37 sec   Comprehensive metabolic panel     Status: Abnormal    Collection Time: 05/09/21  2:50 PM   Result Value Ref Range    Sodium 137 133 - 144 mmol/L    Potassium 3.6 3.4 - 5.3 mmol/L    Chloride 100 94 - 109 mmol/L    Carbon Dioxide 35 (H) 20 - 32 mmol/L    Anion Gap 2 (L) 3 - 14 mmol/L    Glucose 99 70 - 99 mg/dL    Urea Nitrogen 12 7 - 30 mg/dL    Creatinine 0.67 0.66 - 1.25 mg/dL    GFR Estimate >90 >60 mL/min/[1.73_m2]    GFR Estimate If Black >90 >60 mL/min/[1.73_m2]    Calcium 9.4 8.5 - 10.1 mg/dL    Bilirubin Total 0.2 0.2 - 1.3 mg/dL    Albumin 3.5 3.4 - 5.0 g/dL    Protein Total 8.2 6.8 - 8.8 g/dL    Alkaline Phosphatase 97 40 - 150 U/L     (H) 0 - 70 U/L    AST 81 (H) 0 - 45 U/L   Magnesium     Status: None    Collection Time: 05/09/21  2:50 PM   Result Value Ref Range    Magnesium 2.0 1.6 - 2.3 mg/dL   Troponin I     Status: None    Collection Time: 05/09/21  2:50 PM   Result Value Ref Range    Troponin I ES <0.015 0.000 - 0.045 ug/L   TSH     Status: None    Collection Time: 05/09/21  2:50 PM   Result Value Ref Range    TSH 2.95 0.40 - 4.00 mU/L   Alcohol ethyl     Status: None    Collection Time: 05/09/21  2:50 PM   Result Value Ref Range    Ethanol g/dL <0.01 <0.01 g/dL   Salicylate level     Status: None    Collection Time: 05/09/21  2:50 PM   Result Value Ref Range    Salicylate Level <2 mg/dL   Acetaminophen level     Status: None    Collection Time: 05/09/21  2:50 PM   Result Value Ref Range    Acetaminophen Level <2 mg/L   Ammonia (on ice)     Status: None    Collection Time: 05/09/21  2:50 PM   Result Value Ref Range    Ammonia 31 10 - 50 umol/L   CT Head w/o Contrast     Status: None (Preliminary result)    Collection Time: 05/09/21  4:34 PM    Impression    RESIDENT PRELIMINARY INTERPRETATION  Impression: No acute intracranial pathology.       ED MEDICATIONS:   Medications   lidocaine 1 % 0.1-1 mL (has no administration in time range)   lidocaine (LMX4) cream (has no  administration in time range)   sodium chloride (PF) 0.9% PF flush 3 mL (3 mLs Intracatheter Not Given 5/9/21 1705)   sodium chloride (PF) 0.9% PF flush 3 mL (has no administration in time range)   0.9% sodium chloride BOLUS (0 mLs Intravenous Stopped 5/9/21 1705)     Followed by   sodium chloride 0.9% infusion ( Intravenous Not Given 5/9/21 1706)         Impression:    ICD-10-CM    1. Positive urine drug screen  R82.5 Acetaminophen level   2. Other fatigue  R53.83        Plan:    Pending studies include CT, labs.    UDS is positive for amphetamines, benzodiazepines, cannabinoids, opiates.  Remainder of laboratory work-up is unremarkable.    CT shows no acute disease process.    Patient has a sober ride home, his dad will come pick him up from the emergency department and stay with him until he returns to his baseline.  Patient to be discharged home. Advised to follow up with PCP within 1 week. To return to ER immediately with any new/worsening symptoms.     MD Vijay Canada Michelle C, MD  05/09/21 1406

## 2021-05-09 NOTE — ED TRIAGE NOTES
Per EMS: 'passed out' on park bench. Ambulatory into ED. Pt denies drug ingestion, reports ETOH today. Pt complains of past episodes of forgetfulness/disorientation. Oriented, answering questions appropriately. Pmh of meth/marijuana use.

## 2021-07-19 PROCEDURE — 96375 TX/PRO/DX INJ NEW DRUG ADDON: CPT

## 2021-07-19 PROCEDURE — 99285 EMERGENCY DEPT VISIT HI MDM: CPT | Mod: 25

## 2021-07-19 PROCEDURE — 96365 THER/PROPH/DIAG IV INF INIT: CPT

## 2021-07-20 ENCOUNTER — APPOINTMENT (OUTPATIENT)
Dept: ULTRASOUND IMAGING | Facility: CLINIC | Age: 41
End: 2021-07-20
Attending: EMERGENCY MEDICINE
Payer: COMMERCIAL

## 2021-07-20 ENCOUNTER — HOSPITAL ENCOUNTER (EMERGENCY)
Facility: CLINIC | Age: 41
Discharge: HOME | End: 2021-07-20
Attending: EMERGENCY MEDICINE | Admitting: EMERGENCY MEDICINE
Payer: COMMERCIAL

## 2021-07-20 VITALS
OXYGEN SATURATION: 98 % | TEMPERATURE: 98.3 F | RESPIRATION RATE: 9 BRPM | WEIGHT: 120 LBS | HEART RATE: 91 BPM | DIASTOLIC BLOOD PRESSURE: 86 MMHG | BODY MASS INDEX: 17.22 KG/M2 | SYSTOLIC BLOOD PRESSURE: 121 MMHG

## 2021-07-20 DIAGNOSIS — F41.9 ANXIETY: ICD-10-CM

## 2021-07-20 DIAGNOSIS — L03.113 CELLULITIS OF RIGHT UPPER EXTREMITY: ICD-10-CM

## 2021-07-20 LAB
ALBUMIN SERPL-MCNC: 3.7 G/DL (ref 3.4–5)
ALP SERPL-CCNC: 117 U/L (ref 40–150)
ALT SERPL W P-5'-P-CCNC: 151 U/L (ref 0–70)
ANION GAP SERPL CALCULATED.3IONS-SCNC: 10 MMOL/L (ref 3–14)
AST SERPL W P-5'-P-CCNC: 80 U/L (ref 0–45)
BASOPHILS # BLD AUTO: 0.1 10E3/UL (ref 0–0.2)
BASOPHILS NFR BLD AUTO: 1 %
BILIRUB SERPL-MCNC: 0.4 MG/DL (ref 0.2–1.3)
BUN SERPL-MCNC: 12 MG/DL (ref 7–30)
CALCIUM SERPL-MCNC: 10.1 MG/DL (ref 8.5–10.1)
CHLORIDE BLD-SCNC: 100 MMOL/L (ref 94–109)
CO2 SERPL-SCNC: 25 MMOL/L (ref 20–32)
CREAT SERPL-MCNC: 0.86 MG/DL (ref 0.66–1.25)
EOSINOPHIL # BLD AUTO: 0 10E3/UL (ref 0–0.7)
EOSINOPHIL NFR BLD AUTO: 0 %
ERYTHROCYTE [DISTWIDTH] IN BLOOD BY AUTOMATED COUNT: 13.9 % (ref 10–15)
GFR SERPL CREATININE-BSD FRML MDRD: >90 ML/MIN/1.73M2
GLUCOSE BLD-MCNC: 101 MG/DL (ref 70–99)
HCT VFR BLD AUTO: 45.9 % (ref 40–53)
HGB BLD-MCNC: 15 G/DL (ref 13.3–17.7)
HOLD SPECIMEN: NORMAL
HOLD SPECIMEN: NORMAL
IMM GRANULOCYTES # BLD: 0.2 10E3/UL
IMM GRANULOCYTES NFR BLD: 1 %
LACTATE SERPL-SCNC: 1 MMOL/L (ref 0.7–2)
LACTATE SERPL-SCNC: 2.1 MMOL/L (ref 0.7–2)
LYMPHOCYTES # BLD AUTO: 2 10E3/UL (ref 0.8–5.3)
LYMPHOCYTES NFR BLD AUTO: 15 %
MCH RBC QN AUTO: 27.5 PG (ref 26.5–33)
MCHC RBC AUTO-ENTMCNC: 32.7 G/DL (ref 31.5–36.5)
MCV RBC AUTO: 84 FL (ref 78–100)
MONOCYTES # BLD AUTO: 0.5 10E3/UL (ref 0–1.3)
MONOCYTES NFR BLD AUTO: 4 %
NEUTROPHILS # BLD AUTO: 10.6 10E3/UL (ref 1.6–8.3)
NEUTROPHILS NFR BLD AUTO: 79 %
NRBC # BLD AUTO: 0 10E3/UL
NRBC BLD AUTO-RTO: 0 /100
PLATELET # BLD AUTO: 491 10E3/UL (ref 150–450)
POTASSIUM BLD-SCNC: 4.2 MMOL/L (ref 3.4–5.3)
PROT SERPL-MCNC: 9.2 G/DL (ref 6.8–8.8)
RBC # BLD AUTO: 5.46 10E6/UL (ref 4.4–5.9)
SODIUM SERPL-SCNC: 135 MMOL/L (ref 133–144)
WBC # BLD AUTO: 13.3 10E3/UL (ref 4–11)

## 2021-07-20 PROCEDURE — 93971 EXTREMITY STUDY: CPT | Mod: RT

## 2021-07-20 PROCEDURE — 85025 COMPLETE CBC W/AUTO DIFF WBC: CPT | Performed by: EMERGENCY MEDICINE

## 2021-07-20 PROCEDURE — 36592 COLLECT BLOOD FROM PICC: CPT | Performed by: EMERGENCY MEDICINE

## 2021-07-20 PROCEDURE — 90471 IMMUNIZATION ADMIN: CPT | Performed by: EMERGENCY MEDICINE

## 2021-07-20 PROCEDURE — 258N000003 HC RX IP 258 OP 636: Performed by: EMERGENCY MEDICINE

## 2021-07-20 PROCEDURE — 87040 BLOOD CULTURE FOR BACTERIA: CPT | Performed by: EMERGENCY MEDICINE

## 2021-07-20 PROCEDURE — 36415 COLL VENOUS BLD VENIPUNCTURE: CPT | Performed by: EMERGENCY MEDICINE

## 2021-07-20 PROCEDURE — 80053 COMPREHEN METABOLIC PANEL: CPT | Performed by: EMERGENCY MEDICINE

## 2021-07-20 PROCEDURE — 83605 ASSAY OF LACTIC ACID: CPT | Performed by: EMERGENCY MEDICINE

## 2021-07-20 PROCEDURE — 82040 ASSAY OF SERUM ALBUMIN: CPT | Performed by: EMERGENCY MEDICINE

## 2021-07-20 PROCEDURE — 90715 TDAP VACCINE 7 YRS/> IM: CPT | Performed by: EMERGENCY MEDICINE

## 2021-07-20 PROCEDURE — 250N000011 HC RX IP 250 OP 636: Performed by: EMERGENCY MEDICINE

## 2021-07-20 RX ORDER — LORAZEPAM 2 MG/ML
0.5 INJECTION INTRAMUSCULAR ONCE
Status: COMPLETED | OUTPATIENT
Start: 2021-07-20 | End: 2021-07-20

## 2021-07-20 RX ORDER — LORAZEPAM 0.5 MG/1
1 TABLET ORAL EVERY 8 HOURS PRN
Qty: 12 TABLET | Refills: 0 | Status: ON HOLD | OUTPATIENT
Start: 2021-07-20 | End: 2021-12-30

## 2021-07-20 RX ORDER — CLINDAMYCIN HCL 150 MG
450 CAPSULE ORAL 3 TIMES DAILY
Qty: 63 CAPSULE | Refills: 0 | Status: SHIPPED | OUTPATIENT
Start: 2021-07-20 | End: 2021-07-27

## 2021-07-20 RX ORDER — CLINDAMYCIN PHOSPHATE 900 MG/50ML
900 INJECTION, SOLUTION INTRAVENOUS ONCE
Status: COMPLETED | OUTPATIENT
Start: 2021-07-20 | End: 2021-07-20

## 2021-07-20 RX ADMIN — SODIUM CHLORIDE 1000 ML: 9 INJECTION, SOLUTION INTRAVENOUS at 02:13

## 2021-07-20 RX ADMIN — LORAZEPAM 0.5 MG: 2 INJECTION INTRAMUSCULAR; INTRAVENOUS at 02:19

## 2021-07-20 RX ADMIN — CLOSTRIDIUM TETANI TOXOID ANTIGEN (FORMALDEHYDE INACTIVATED), CORYNEBACTERIUM DIPHTHERIAE TOXOID ANTIGEN (FORMALDEHYDE INACTIVATED), BORDETELLA PERTUSSIS TOXOID ANTIGEN (GLUTARALDEHYDE INACTIVATED), BORDETELLA PERTUSSIS FILAMENTOUS HEMAGGLUTININ ANTIGEN (FORMALDEHYDE INACTIVATED), BORDETELLA PERTUSSIS PERTACTIN ANTIGEN, AND BORDETELLA PERTUSSIS FIMBRIAE 2/3 ANTIGEN 0.5 ML: 5; 2; 2.5; 5; 3; 5 INJECTION, SUSPENSION INTRAMUSCULAR at 02:36

## 2021-07-20 RX ADMIN — CLINDAMYCIN PHOSPHATE 900 MG: 900 INJECTION, SOLUTION INTRAVENOUS at 02:36

## 2021-07-20 ASSESSMENT — ENCOUNTER SYMPTOMS
WOUND: 1
NERVOUS/ANXIOUS: 1

## 2021-07-20 NOTE — DISCHARGE INSTRUCTIONS
Next dose of antibiotic at 10am  Follow up with primary care provider for recheck of arm  I placed an order for mental health and primary care provider

## 2021-07-20 NOTE — ED TRIAGE NOTES
"Pt presents to ED via Triage for evaluation of wound check. Pt is an IV meth user. Large ulcer on right arm. Pt reports ulcer started a week ago. Pt reports chills at home. Pt reports today just before 8pm pt's jaw \"locked up\" and was unable to speak. Pt brought in by father.    "

## 2021-07-20 NOTE — ED PROVIDER NOTES
"  History   Chief Complaint:  Wound Check       The history is limited by the condition of the patient.      Bc German is a 41 year old male with history of intravenous drug abuse, anxiety who presents with a wound on his right arm. The patient's father brought him into the emergency department because the patient was spastic, anxious, and his jaw was \"locked up\" and is unable to speak. The patient reports that he injected Desomorphine 3-4 days ago. Has had no fever or chills. Did \"anette\" an abscess on own a few days ago on right arm.      Review of Systems   Skin: Positive for wound.   Psychiatric/Behavioral: The patient is nervous/anxious.    All other systems reviewed and are negative.      Allergies:  The patient has no known allergies.     Medications:  Triumeq  Suboxone  Ativan  Lyrica  Phenergan  Depotestosterone  Adderall      Past Medical History:    Anxiety  Drug abuse  Group A streptococcal infection  HCV antibody positive  HIV  IVDU  Osteomyelitis  Sepsis    Past Surgical History:    Eye surgery  Arm surgery  PICC insurtion  Transesophageal echocardiogram     Social History:  Patient presents with his father  IV drug abuser    Physical Exam     Patient Vitals for the past 24 hrs:   BP Temp Temp src Pulse Resp SpO2 Weight   07/20/21 0330 121/86 -- -- 91 9 98 % --   07/20/21 0230 127/83 -- -- 76 -- 94 % --   07/20/21 0216 (!) 125/99 -- -- 104 19 98 % --   07/19/21 2100 (!) 132/92 98.3  F (36.8  C) Temporal 114 20 97 % 54.4 kg (120 lb)       Physical Exam  General: Sitting up in bed  Eyes:  The pupils are equal and round    Conjunctivae and sclerae are normal  ENT:    Mouth closed at times but then mouth will open up and stay open  Neck:  Normal range of motion  CV:  Regular rate, regular rhythm     Skin warm and well perfused   Resp:  Non labored breathing on room air    No tachypnea    No cough heard  MS:  Mild swelling on right forearm. Full ROM of UE  Skin:  Mild erythema on proximal right forearm " "with scab. No drainage  Neuro:   Awake, alert.      Speech is normal and fluent.    Face is symmetric.     Moves all extremities equally  Psych:  Appears anxious    Emergency Department Course     Imaging:  US Upper Extremity Non Vascular Right  1.  No deep venous thrombosis in the right upper extremity.  As per radiology    Laboratory:  CBC: WBC 13.3 (H), HGB 15.0,  (H)  CMP: Glucose 101 (H), AST 80 (H),  (H), Protein total 9.2 (H) o/w WNL (Creatinine 0.86)     Lactic acid (result time 0140) 2.1 (H)     Blood culture peripheral blood pending      Emergency Department Course:    Reviewed:  I reviewed nursing notes, vitals and past medical history    Assessments:  0205 I obtained history and examined the patient as noted above.     Interventions:  0213 NS 1L IV  0219 Ativan 0.5 mg IV  0236 Cleocin 900 mg IV  0236 Adacel 0.5 ml Intramuscular    Disposition:  The patient was discharged to home.       Impression & Plan     Medical Decision Making:  Bc German is a 41 year old male who presented to the ED with wound check.  Has evidence of mild cellulitis in the right upper extremity.  There is some scarring and scab on area where he lanced what sounds like an abscess a few days ago.  He also reports that his jaw has \"locked up\" today.  He can close his mouth but then during my exam, he will open his mouth and will stay open.  Improved with Ativan here in the emergency department.  No DVT or thrombophlebitis on ultrasound.  There is no abscess.  Doubt tetanus infection.  Patient looks well has had no fever or chills.  The cellulitis is mild.  I do not think he requires hospitalization.  His lactate was minimally elevated and resolved with fluids.  Will do oral clindamycin for home given his IV drug use history.  Discussed risk of C. difficile with him.  He is also concerned about Ativan withdrawal as he was on Ativan but then recently ran out now does not have a provider to fill this as psychiatry " clinic closed.  Placed referral for mental health as well as primary care provider.  He says that he is also starting to get on Suboxone through a provider at a clinic.    Covid-19  Bc German was evaluated during a global COVID-19 pandemic, which necessitated consideration that the patient might be at risk for infection with the SARS-CoV-2 virus that causes COVID-19.   Applicable protocols for evaluation were followed during the patient's care.       Diagnosis:    ICD-10-CM    1. Cellulitis of right upper extremity  L03.113 Primary Care Referral   2. Anxiety  F41.9 MENTAL HEALTH REFERRAL  - Adult; Outpatient Treatment, Psychiatry; Individual/Couples/Family/Group Therapy/Health Psychology[place] [facility] [place] [facility] [phone]; Psychiatry and Psychotherapy (Individual/Couple/Family Therapy); [place]...       Discharge Medications:  Discharge Medication List as of 7/20/2021  4:00 AM      START taking these medications    Details   clindamycin (CLEOCIN) 150 MG capsule Take 3 capsules (450 mg) by mouth 3 times daily for 7 days, Disp-63 capsule, R-0, E-Prescribe             Scribe Disclosure:  SINDHU ECHOLS SOPHIA, am serving as a scribe at 2:06 AM on 7/20/2021 to document services personally performed by Camilla Gardiner MD based on my observations and the provider's statements to me.            Camilla Gardiner MD  07/20/21 0265

## 2021-07-25 LAB
BACTERIA BLD CULT: NO GROWTH
BACTERIA BLD CULT: NO GROWTH

## 2021-11-14 ENCOUNTER — HOSPITAL ENCOUNTER (EMERGENCY)
Facility: CLINIC | Age: 41
Discharge: HOME | End: 2021-11-14
Attending: EMERGENCY MEDICINE | Admitting: EMERGENCY MEDICINE
Payer: COMMERCIAL

## 2021-11-14 ENCOUNTER — APPOINTMENT (OUTPATIENT)
Dept: ULTRASOUND IMAGING | Facility: CLINIC | Age: 41
End: 2021-11-14
Attending: EMERGENCY MEDICINE
Payer: COMMERCIAL

## 2021-11-14 ENCOUNTER — APPOINTMENT (OUTPATIENT)
Dept: CT IMAGING | Facility: CLINIC | Age: 41
End: 2021-11-14
Attending: EMERGENCY MEDICINE
Payer: COMMERCIAL

## 2021-11-14 VITALS
OXYGEN SATURATION: 100 % | HEIGHT: 70 IN | WEIGHT: 130 LBS | SYSTOLIC BLOOD PRESSURE: 122 MMHG | DIASTOLIC BLOOD PRESSURE: 78 MMHG | BODY MASS INDEX: 18.61 KG/M2 | RESPIRATION RATE: 16 BRPM | HEART RATE: 140 BPM | TEMPERATURE: 98.5 F

## 2021-11-14 DIAGNOSIS — S31.31XA SCROTAL LACERATION, INITIAL ENCOUNTER: ICD-10-CM

## 2021-11-14 LAB
ANION GAP SERPL CALCULATED.3IONS-SCNC: <1 MMOL/L (ref 3–14)
BASOPHILS # BLD AUTO: 0 10E3/UL (ref 0–0.2)
BASOPHILS NFR BLD AUTO: 0 %
BUN SERPL-MCNC: 12 MG/DL (ref 7–30)
CALCIUM SERPL-MCNC: 9 MG/DL (ref 8.5–10.1)
CHLORIDE BLD-SCNC: 96 MMOL/L (ref 94–109)
CO2 SERPL-SCNC: 32 MMOL/L (ref 20–32)
CREAT SERPL-MCNC: 0.8 MG/DL (ref 0.66–1.25)
EOSINOPHIL # BLD AUTO: 0.1 10E3/UL (ref 0–0.7)
EOSINOPHIL NFR BLD AUTO: 1 %
ERYTHROCYTE [DISTWIDTH] IN BLOOD BY AUTOMATED COUNT: 12.9 % (ref 10–15)
GFR SERPL CREATININE-BSD FRML MDRD: >90 ML/MIN/1.73M2
GLUCOSE BLD-MCNC: 119 MG/DL (ref 70–99)
HCT VFR BLD AUTO: 37.1 % (ref 40–53)
HGB BLD-MCNC: 12.5 G/DL (ref 13.3–17.7)
HOLD SPECIMEN: NORMAL
IMM GRANULOCYTES # BLD: 0.1 10E3/UL
IMM GRANULOCYTES NFR BLD: 1 %
LACTATE SERPL-SCNC: 1.3 MMOL/L (ref 0.7–2)
LYMPHOCYTES # BLD AUTO: 1 10E3/UL (ref 0.8–5.3)
LYMPHOCYTES NFR BLD AUTO: 7 %
MCH RBC QN AUTO: 27.6 PG (ref 26.5–33)
MCHC RBC AUTO-ENTMCNC: 33.7 G/DL (ref 31.5–36.5)
MCV RBC AUTO: 82 FL (ref 78–100)
MONOCYTES # BLD AUTO: 0.8 10E3/UL (ref 0–1.3)
MONOCYTES NFR BLD AUTO: 6 %
NEUTROPHILS # BLD AUTO: 11.2 10E3/UL (ref 1.6–8.3)
NEUTROPHILS NFR BLD AUTO: 85 %
NRBC # BLD AUTO: 0 10E3/UL
NRBC BLD AUTO-RTO: 0 /100
PLATELET # BLD AUTO: 250 10E3/UL (ref 150–450)
POTASSIUM BLD-SCNC: 4.4 MMOL/L (ref 3.4–5.3)
RBC # BLD AUTO: 4.53 10E6/UL (ref 4.4–5.9)
SODIUM SERPL-SCNC: 128 MMOL/L (ref 133–144)
WBC # BLD AUTO: 13.1 10E3/UL (ref 4–11)

## 2021-11-14 PROCEDURE — 83605 ASSAY OF LACTIC ACID: CPT | Performed by: NURSE PRACTITIONER

## 2021-11-14 PROCEDURE — 250N000011 HC RX IP 250 OP 636: Performed by: EMERGENCY MEDICINE

## 2021-11-14 PROCEDURE — 80048 BASIC METABOLIC PNL TOTAL CA: CPT | Performed by: NURSE PRACTITIONER

## 2021-11-14 PROCEDURE — 85025 COMPLETE CBC W/AUTO DIFF WBC: CPT | Performed by: NURSE PRACTITIONER

## 2021-11-14 PROCEDURE — 250N000009 HC RX 250: Performed by: EMERGENCY MEDICINE

## 2021-11-14 PROCEDURE — 96365 THER/PROPH/DIAG IV INF INIT: CPT

## 2021-11-14 PROCEDURE — 96375 TX/PRO/DX INJ NEW DRUG ADDON: CPT

## 2021-11-14 PROCEDURE — 93005 ELECTROCARDIOGRAM TRACING: CPT

## 2021-11-14 PROCEDURE — 36415 COLL VENOUS BLD VENIPUNCTURE: CPT | Performed by: NURSE PRACTITIONER

## 2021-11-14 PROCEDURE — 258N000003 HC RX IP 258 OP 636: Performed by: NURSE PRACTITIONER

## 2021-11-14 PROCEDURE — 12002 RPR S/N/AX/GEN/TRNK2.6-7.5CM: CPT

## 2021-11-14 PROCEDURE — 250N000011 HC RX IP 250 OP 636: Performed by: NURSE PRACTITIONER

## 2021-11-14 PROCEDURE — 70450 CT HEAD/BRAIN W/O DYE: CPT

## 2021-11-14 PROCEDURE — 87186 SC STD MICRODIL/AGAR DIL: CPT | Performed by: NURSE PRACTITIONER

## 2021-11-14 PROCEDURE — 271N000002 HC RX 271: Performed by: EMERGENCY MEDICINE

## 2021-11-14 PROCEDURE — 250N000011 HC RX IP 250 OP 636

## 2021-11-14 PROCEDURE — 76870 US EXAM SCROTUM: CPT

## 2021-11-14 PROCEDURE — 99285 EMERGENCY DEPT VISIT HI MDM: CPT | Mod: 25

## 2021-11-14 PROCEDURE — 87149 DNA/RNA DIRECT PROBE: CPT | Performed by: NURSE PRACTITIONER

## 2021-11-14 RX ORDER — METHYLCELLULOSE 4000CPS 30 %
POWDER (GRAM) MISCELLANEOUS ONCE
Status: COMPLETED | OUTPATIENT
Start: 2021-11-14 | End: 2021-11-14

## 2021-11-14 RX ORDER — CEPHALEXIN 500 MG/1
500 CAPSULE ORAL 4 TIMES DAILY
Qty: 28 CAPSULE | Refills: 0 | Status: ON HOLD | OUTPATIENT
Start: 2021-11-14 | End: 2021-12-30

## 2021-11-14 RX ORDER — PIPERACILLIN SODIUM, TAZOBACTAM SODIUM 3; .375 G/15ML; G/15ML
3.38 INJECTION, POWDER, LYOPHILIZED, FOR SOLUTION INTRAVENOUS ONCE
Status: COMPLETED | OUTPATIENT
Start: 2021-11-14 | End: 2021-11-14

## 2021-11-14 RX ORDER — ACETAMINOPHEN 325 MG/1
650 TABLET ORAL ONCE
Status: DISCONTINUED | OUTPATIENT
Start: 2021-11-14 | End: 2021-11-14 | Stop reason: CLARIF

## 2021-11-14 RX ORDER — ONDANSETRON 2 MG/ML
4 INJECTION INTRAMUSCULAR; INTRAVENOUS ONCE
Status: COMPLETED | OUTPATIENT
Start: 2021-11-14 | End: 2021-11-14

## 2021-11-14 RX ORDER — ONDANSETRON 2 MG/ML
INJECTION INTRAMUSCULAR; INTRAVENOUS
Status: COMPLETED
Start: 2021-11-14 | End: 2021-11-14

## 2021-11-14 RX ORDER — LORAZEPAM 2 MG/ML
1 INJECTION INTRAMUSCULAR ONCE
Status: COMPLETED | OUTPATIENT
Start: 2021-11-14 | End: 2021-11-14

## 2021-11-14 RX ADMIN — PIPERACILLIN SODIUM AND TAZOBACTAM SODIUM 3.38 G: 3; .375 INJECTION, POWDER, LYOPHILIZED, FOR SOLUTION INTRAVENOUS at 17:43

## 2021-11-14 RX ADMIN — ONDANSETRON 4 MG: 2 INJECTION INTRAMUSCULAR; INTRAVENOUS at 20:31

## 2021-11-14 RX ADMIN — LORAZEPAM 1 MG: 2 INJECTION INTRAMUSCULAR; INTRAVENOUS at 19:20

## 2021-11-14 RX ADMIN — MIDAZOLAM HYDROCHLORIDE 2.5 MG: 1 INJECTION, SOLUTION INTRAMUSCULAR; INTRAVENOUS at 20:32

## 2021-11-14 RX ADMIN — SODIUM CHLORIDE 1000 ML: 9 INJECTION, SOLUTION INTRAVENOUS at 17:40

## 2021-11-14 RX ADMIN — EPINEPHRINE BITARTRATE 3 ML: 1 POWDER at 19:28

## 2021-11-14 RX ADMIN — Medication 150 MG: at 19:29

## 2021-11-14 ASSESSMENT — ENCOUNTER SYMPTOMS
HEADACHES: 1
BACK PAIN: 0
ABDOMINAL PAIN: 0
WOUND: 1

## 2021-11-14 ASSESSMENT — MIFFLIN-ST. JEOR: SCORE: 1510.93

## 2021-11-14 NOTE — ED PROVIDER NOTES
History   Chief Complaint:  Groin Pain       HPI   Bc German is a 41 year old male with history of HIV and IV drug use who presents with groin pain. The patient reports that he went to the bathroom last night at around 2330. He last remembers sitting on the toilet returning text messages before waking up to find himself lying in his bathtub, bleeding from his left groin area and scrotum. His father found him in the tub. Here in the ED, the patient says that he is currently experiencing a headache. However, he does not know whether he hit his head during last night's incident. The patient denies any other pain. He denies using drugs before the fall last night, but does admit to using meth two days ago.  No chest pain and no shortness of breath. No fevers.    Patient's last tetanus shot was administered 7/2021    Review of Systems   Constitutional: Negative for fever.   Respiratory: Negative for shortness of breath.    Cardiovascular: Negative for chest pain.   Gastrointestinal: Negative for abdominal pain.   Musculoskeletal: Negative for back pain.   Skin: Positive for wound.   Neurological: Positive for headaches.   All other systems reviewed and are negative.        Allergies:  No known drug allergies     Medications:  Triumeq  Suboxone  Ativan  Lyrica  Phenergan  Testosterone    Past Medical History:     Anxiety  Depressive disorder  IV drug abuse  Group A streptococcal infection  HCV anbtibody positive   HIV  Osteomyelitis of vertebra of lumbosacral region      Past Surgical History:    Eye surgery  Arm surgery  Transesophageal echocardiogram     Family History:    No known family history     Social History:  Arrives via triage  Unaccompanied in the ED     Physical Exam     Patient Vitals for the past 24 hrs:   BP Temp Pulse Resp SpO2 Height Weight   11/14/21 2133 -- -- -- -- 100 % -- --   11/14/21 2056 -- -- -- -- 100 % -- --   11/14/21 2035 122/78 -- (!) 140 -- 100 % -- --   11/14/21 2030 122/80 -- (!)  "133 -- 98 % -- --   11/14/21 1944 -- -- -- -- 100 % -- --   11/14/21 1854 136/82 -- (!) 132 -- -- -- --   11/14/21 1745 -- -- -- -- 100 % -- --   11/14/21 1615 131/78 98.5  F (36.9  C) (!) 141 16 -- 1.778 m (5' 10\") 59 kg (130 lb)       Physical Exam  Head:  No scalp contusions  Eyes:  The pupils are equal and round    Conjunctivae and sclerae are normal  ENT:    The nose is normal    Pinnae are normal    The oropharynx is normal  Neck:  Normal range of motion    There is no rigidity noted    No neck tenderness  CV:  Tachycardic and regular     No edema  Resp:  Lungs are clear    Non-labored    No rales    No wheezing   GI:  Abdomen is soft and non-tender, there is no rigidity    No distension    No rebound tenderness   MS:  Normal muscular tone    No asymmetric leg swelling  Skin:  Wounds throughout upper extremities noted  Neuro:   Awake, alert.      Speech is normal and fluent.    Face is symmetric.     Moves all extremities    Anxious appearing    Emergency Department Course     ECG:  Taken 1907, Read 1912  Sinus tachycardia  Otherwise normal ECG  Rate 133 bpm. ND interval 120. QRS duration 74. QT/QTc 286/425. P-R-T axes 47. 74 Interpreted at 61 by Jeremie Prabhakar MD.    Imaging:    US Testicular & Scrotum w Doppler Ltd  1.  Small right hydrocele otherwise normal. No evidence for testicular fracture.    CT Head w/o Contrast  1.  No CT findings of acute intracranial process.    Reads per radiology     Laboratory:    BMP: sodium 128 (L), anion gap <1 (L), glucose 119 (H) o/w WNL (Creatinine 0.80)     Lactic acid (result time 1751) 1.3    CBC: WBC 13.1 (H), HGB 12.5 (L),     Blood cultures pending x2      Laceration Repair        LACERATION:  A subcutaneous clean 4 cm laceration.      LOCATION:  Left side of scrotum      ANESTHESIA:  LET & 0.25% bupivacaine      PREPARATION:  Irrigation with Normal Saline      DEBRIDEMENT:  no debridement and wound explored, no foreign body found      CLOSURE:  Wound " was closed with One Layer.  Skin closed with 10 x 4.0 Ethylon using interrupted sutures.    Emergency Department Course:  Reviewed:  I reviewed nursing notes, vitals, past medical history and Care Everywhere    Assessments:  1718 I obtained history and examined the patient as noted above.   2038 I repaired the patient's laceration.  2242 I rechecked the patient.    Consults:  2241 I discussed the patient with Urology     Interventions:  1740 Normal Saline 1000 mL IV  1743 Zosyn 100 ml IV  1920 Ativan  2031 Zofran 4 mg IV  2032 Versed 2.5 mg IV     Disposition:  The patient was discharged to home.     Impression & Plan     Medical Decision Making:  Bc German is a 41 year old male with history of HIV who presents to the Emergency Department with a wound on his scrotum. He is not sure what happened. Last night he somehow injured his scrotum and has an open wound there. He has recently used methamphetamine and is quite anxious appearing here.  He is tachycardic and has HR of ~130. ECG shows sinus tachycardia without ischemic changes. No CP or shortness of breath.  On evaluation here there does not appear to be any violation of the subcutaneous layers of the scrotum. The wound was cleansed and anesthetized and repaired. He feels like he hit his head and he had a prolonged episode of loss of consciousness and notes that in the past he has had to catch himself when slipping getting into the tub and had to grab onto the shower to keep himself up. He thinks that this is what happened last night, so CT scan of his head and was negative. He had the wound repaired on his scrotum and he tolerated this fairly well, but did receive some Versed for sedation due to extreme anxiety. He is up to date on his tetanus. He was given antibiotics and discharged home. Plan for follow up with Urology in clinic for recheck.  Urology on call recommended supportive underwear in addition to antibiotics.  US of the scrotum was negative and did  not reveal testicular fracture.     Diagnosis:    ICD-10-CM    1. Scrotal laceration, initial encounter  S31.31XA        Discharge Medications:  New Prescriptions    CEPHALEXIN (KEFLEX) 500 MG CAPSULE    Take 1 capsule (500 mg) by mouth 4 times daily for 7 days     Scribe Disclosure:  I, Km Pacheco, am serving as a scribe at 5:21 PM on 11/14/2021 to document services personally performed by Jeremie Prabhakar MD based on my observations and the provider's statements to me.             Jeremie Prabhakar MD  11/15/21 0035

## 2021-11-14 NOTE — ED TRIAGE NOTES
Went to bathroom in the middle of night and remembered returning phone messages sitting on the toilet, next woke up inside bathtub bleeding from left groin area and scrotum. Admitted of using Meth the night before.

## 2021-11-15 ENCOUNTER — HOSPITAL ENCOUNTER (INPATIENT)
Facility: CLINIC | Age: 41
LOS: 44 days | Discharge: HOME | DRG: 871 | End: 2021-12-30
Attending: EMERGENCY MEDICINE | Admitting: INTERNAL MEDICINE
Payer: COMMERCIAL

## 2021-11-15 DIAGNOSIS — F19.10 DRUG ABUSE (H): ICD-10-CM

## 2021-11-15 DIAGNOSIS — R78.81 GRAM-POSITIVE BACTEREMIA: ICD-10-CM

## 2021-11-15 DIAGNOSIS — R21 GROIN RASH: ICD-10-CM

## 2021-11-15 DIAGNOSIS — F19.10 INTRAVENOUS DRUG ABUSE (H): ICD-10-CM

## 2021-11-15 DIAGNOSIS — F41.1 GAD (GENERALIZED ANXIETY DISORDER): ICD-10-CM

## 2021-11-15 DIAGNOSIS — I07.1 SEVERE TRICUSPID REGURGITATION: ICD-10-CM

## 2021-11-15 DIAGNOSIS — R33.8 ACUTE RETENTION OF URINE: ICD-10-CM

## 2021-11-15 DIAGNOSIS — I33.0 ACUTE BACTERIAL ENDOCARDITIS: Primary | ICD-10-CM

## 2021-11-15 DIAGNOSIS — B20 HUMAN IMMUNODEFICIENCY VIRUS (HIV) DISEASE (H): ICD-10-CM

## 2021-11-15 LAB
ATRIAL RATE - MUSE: 133 BPM
DIASTOLIC BLOOD PRESSURE - MUSE: NORMAL MMHG
ENTEROCOCCUS FAECALIS: NOT DETECTED
ENTEROCOCCUS FAECIUM: NOT DETECTED
INTERPRETATION ECG - MUSE: NORMAL
LISTERIA SPECIES (DETECTED/NOT DETECTED): NOT DETECTED
P AXIS - MUSE: 47 DEGREES
PR INTERVAL - MUSE: 120 MS
QRS DURATION - MUSE: 74 MS
QT - MUSE: 286 MS
QTC - MUSE: 425 MS
R AXIS - MUSE: 74 DEGREES
STAPHYLOCOCCUS AUREUS: DETECTED
STAPHYLOCOCCUS EPIDERMIDIS: NOT DETECTED
STAPHYLOCOCCUS LUGDUNENSIS: NOT DETECTED
STREPTOCOCCUS AGALACTIAE: NOT DETECTED
STREPTOCOCCUS ANGINOSUS GROUP: NOT DETECTED
STREPTOCOCCUS PNEUMONIAE: NOT DETECTED
STREPTOCOCCUS PYOGENES: NOT DETECTED
STREPTOCOCCUS SPECIES: NOT DETECTED
SYSTOLIC BLOOD PRESSURE - MUSE: NORMAL MMHG
T AXIS - MUSE: 61 DEGREES
VENTRICULAR RATE- MUSE: 133 BPM

## 2021-11-15 PROCEDURE — 99291 CRITICAL CARE FIRST HOUR: CPT | Mod: 25

## 2021-11-15 PROCEDURE — C9803 HOPD COVID-19 SPEC COLLECT: HCPCS

## 2021-11-15 PROCEDURE — 96365 THER/PROPH/DIAG IV INF INIT: CPT

## 2021-11-15 PROCEDURE — 96366 THER/PROPH/DIAG IV INF ADDON: CPT

## 2021-11-15 PROCEDURE — 96368 THER/DIAG CONCURRENT INF: CPT

## 2021-11-15 PROCEDURE — 96367 TX/PROPH/DG ADDL SEQ IV INF: CPT

## 2021-11-15 RX ORDER — CARISOPRODOL 350 MG/1
87.5 TABLET ORAL DAILY
Status: ON HOLD | COMMUNITY
End: 2021-12-30

## 2021-11-15 RX ORDER — PREGABALIN 150 MG/1
150 CAPSULE ORAL DAILY
COMMUNITY
End: 2024-02-29

## 2021-11-15 RX ORDER — PIPERACILLIN SODIUM, TAZOBACTAM SODIUM 4; .5 G/20ML; G/20ML
4.5 INJECTION, POWDER, LYOPHILIZED, FOR SOLUTION INTRAVENOUS ONCE
Status: COMPLETED | OUTPATIENT
Start: 2021-11-15 | End: 2021-11-16

## 2021-11-15 RX ORDER — IBUPROFEN 600 MG/1
600 TABLET, FILM COATED ORAL
Status: COMPLETED | OUTPATIENT
Start: 2021-11-15 | End: 2021-11-16

## 2021-11-15 RX ORDER — ASPIRIN 81 MG/1
81 TABLET ORAL DAILY
Status: ON HOLD | COMMUNITY
End: 2022-12-12

## 2021-11-15 RX ORDER — LEVOMILNACIPRAN HYDROCHLORIDE 120 MG/1
120 CAPSULE, EXTENDED RELEASE ORAL DAILY
COMMUNITY
Start: 2021-10-26 | End: 2022-02-06

## 2021-11-15 RX ORDER — ACETAMINOPHEN 325 MG/1
650 TABLET ORAL
Status: DISCONTINUED | OUTPATIENT
Start: 2021-11-15 | End: 2021-11-16

## 2021-11-15 ASSESSMENT — ENCOUNTER SYMPTOMS
FEVER: 1
FEVER: 0
COUGH: 1
CHILLS: 1
SHORTNESS OF BREATH: 0

## 2021-11-15 ASSESSMENT — MIFFLIN-ST. JEOR: SCORE: 1515.46

## 2021-11-15 NOTE — ED PROVIDER NOTES
Call from microbiology lab that the patient's second blood culture came back positive with similar results from the first blood culture.  I just spoke with the patient on the phone and he is coming into the ED for repeat evaluation.     Marco Velasquez, DO  11/15/21 1033

## 2021-11-15 NOTE — ED PROVIDER NOTES
Received notification from the lab that the patient's blood culture came back for gram-positive cocci in clusters.  In chart review, the patient has a history of HIV and IV drug use.  He admitted to using meth a few days ago.  He was found to be tachycardic and had a laceration to the scrotum.  Because of the tachycardia, labs and blood cultures were obtained with his history.  With this positive blood culture, I called the patient to review this with him.  He reports no fevers and feeling well.  I discussed this could be contaminant but also discussed that certainly could be positive for bacteremia and given his history and prior presentation, particularly with the tachycardia, I recommended he present immediately to the emergency department again for repeat evaluation.    Suggested labs to be repeated: blood cultures, CBC, BMP, lactate and others as appropriate.  The patient agreed to return to the emergency department.     Marco Velasquez,   11/15/21 1006

## 2021-11-15 NOTE — ED PROVIDER NOTES
Had not seen patient presents to the emergency department so called him again.  He reports that he is still feeling fine and at home.  I discussed with him that his second blood culture came back positive and emphasized the importance of coming into the emergency department immediately.  Discussed 2 positive blood cultures likely represents bacteremia and people can get sick, septic and die suddenly from this.  The patient expressed verbal understanding and agreement.  He agrees to present to the emergency department asap.     Marco Velasquez, DO  11/15/21 9314

## 2021-11-16 ENCOUNTER — APPOINTMENT (OUTPATIENT)
Dept: GENERAL RADIOLOGY | Facility: CLINIC | Age: 41
DRG: 871 | End: 2021-11-16
Attending: EMERGENCY MEDICINE
Payer: COMMERCIAL

## 2021-11-16 PROBLEM — R78.81 GRAM-POSITIVE BACTEREMIA: Status: ACTIVE | Noted: 2021-11-16

## 2021-11-16 PROBLEM — B20 HUMAN IMMUNODEFICIENCY VIRUS (HIV) DISEASE (H): Status: ACTIVE | Noted: 2021-11-16

## 2021-11-16 PROBLEM — F19.10 DRUG ABUSE (H): Status: ACTIVE | Noted: 2021-11-16

## 2021-11-16 LAB
ALBUMIN SERPL-MCNC: 2.7 G/DL (ref 3.4–5)
ALBUMIN UR-MCNC: NEGATIVE MG/DL
ALP SERPL-CCNC: 103 U/L (ref 40–150)
ALT SERPL W P-5'-P-CCNC: 61 U/L (ref 0–70)
ANION GAP SERPL CALCULATED.3IONS-SCNC: 4 MMOL/L (ref 3–14)
APPEARANCE UR: CLEAR
AST SERPL W P-5'-P-CCNC: 38 U/L (ref 0–45)
BASOPHILS # BLD AUTO: 0 10E3/UL (ref 0–0.2)
BASOPHILS NFR BLD AUTO: 0 %
BILIRUB SERPL-MCNC: 0.4 MG/DL (ref 0.2–1.3)
BILIRUB UR QL STRIP: NEGATIVE
BUN SERPL-MCNC: 11 MG/DL (ref 7–30)
CALCIUM SERPL-MCNC: 8.5 MG/DL (ref 8.5–10.1)
CD3 CELLS # BLD: 374 CELLS/UL (ref 603–2990)
CD3 CELLS NFR BLD: 67 % (ref 49–84)
CD3+CD4+ CELLS # BLD: 206 CELLS/UL (ref 441–2156)
CD3+CD4+ CELLS NFR BLD: 37 % (ref 28–63)
CD3+CD4+ CELLS/CD3+CD8+ CLL BLD: 1.37 % (ref 1.4–2.6)
CD3+CD8+ CELLS # BLD: 151 CELLS/UL (ref 125–1312)
CD3+CD8+ CELLS NFR BLD: 27 % (ref 10–40)
CHLORIDE BLD-SCNC: 97 MMOL/L (ref 94–109)
CO2 SERPL-SCNC: 29 MMOL/L (ref 20–32)
COLOR UR AUTO: ABNORMAL
CREAT SERPL-MCNC: 0.71 MG/DL (ref 0.66–1.25)
CRP SERPL-MCNC: 169 MG/L (ref 0–8)
EOSINOPHIL # BLD AUTO: 0.1 10E3/UL (ref 0–0.7)
EOSINOPHIL NFR BLD AUTO: 1 %
ERYTHROCYTE [DISTWIDTH] IN BLOOD BY AUTOMATED COUNT: 13.1 % (ref 10–15)
ERYTHROCYTE [SEDIMENTATION RATE] IN BLOOD BY WESTERGREN METHOD: 39 MM/HR (ref 0–15)
FLUAV RNA SPEC QL NAA+PROBE: NEGATIVE
FLUBV RNA RESP QL NAA+PROBE: NEGATIVE
GFR SERPL CREATININE-BSD FRML MDRD: >90 ML/MIN/1.73M2
GLUCOSE BLD-MCNC: 104 MG/DL (ref 70–99)
GLUCOSE BLDC GLUCOMTR-MCNC: 94 MG/DL (ref 70–99)
GLUCOSE UR STRIP-MCNC: NEGATIVE MG/DL
HCO3 BLDV-SCNC: 31 MMOL/L (ref 21–28)
HCT VFR BLD AUTO: 34.4 % (ref 40–53)
HGB BLD-MCNC: 11.4 G/DL (ref 13.3–17.7)
HGB UR QL STRIP: NEGATIVE
IMM GRANULOCYTES # BLD: 0.1 10E3/UL
IMM GRANULOCYTES NFR BLD: 1 %
KETONES UR STRIP-MCNC: NEGATIVE MG/DL
LACTATE BLD-SCNC: 1.8 MMOL/L
LEUKOCYTE ESTERASE UR QL STRIP: NEGATIVE
LYMPHOCYTES # BLD AUTO: 1 10E3/UL (ref 0.8–5.3)
LYMPHOCYTES NFR BLD AUTO: 7 %
MCH RBC QN AUTO: 27.7 PG (ref 26.5–33)
MCHC RBC AUTO-ENTMCNC: 33.1 G/DL (ref 31.5–36.5)
MCV RBC AUTO: 84 FL (ref 78–100)
MONOCYTES # BLD AUTO: 0.6 10E3/UL (ref 0–1.3)
MONOCYTES NFR BLD AUTO: 4 %
NEUTROPHILS # BLD AUTO: 12.9 10E3/UL (ref 1.6–8.3)
NEUTROPHILS NFR BLD AUTO: 87 %
NITRATE UR QL: NEGATIVE
NRBC # BLD AUTO: 0 10E3/UL
NRBC BLD AUTO-RTO: 0 /100
PCO2 BLDV: 50 MM HG (ref 40–50)
PH BLDV: 7.39 [PH] (ref 7.32–7.43)
PH UR STRIP: 7.5 [PH] (ref 5–7)
PLATELET # BLD AUTO: 187 10E3/UL (ref 150–450)
PO2 BLDV: 29 MM HG (ref 25–47)
POTASSIUM BLD-SCNC: 3.5 MMOL/L (ref 3.4–5.3)
PROCALCITONIN SERPL-MCNC: 1.03 NG/ML
PROT SERPL-MCNC: 7.3 G/DL (ref 6.8–8.8)
RBC # BLD AUTO: 4.11 10E6/UL (ref 4.4–5.9)
RBC URINE: <1 /HPF
SAO2 % BLDV: 54 % (ref 94–100)
SARS-COV-2 RNA RESP QL NAA+PROBE: NEGATIVE
SODIUM SERPL-SCNC: 130 MMOL/L (ref 133–144)
SP GR UR STRIP: 1.01 (ref 1–1.03)
T CELL COMMENT: ABNORMAL
UROBILINOGEN UR STRIP-MCNC: NORMAL MG/DL
WBC # BLD AUTO: 14.7 10E3/UL (ref 4–11)
WBC URINE: 1 /HPF

## 2021-11-16 PROCEDURE — 80053 COMPREHEN METABOLIC PANEL: CPT | Performed by: EMERGENCY MEDICINE

## 2021-11-16 PROCEDURE — 250N000011 HC RX IP 250 OP 636: Performed by: EMERGENCY MEDICINE

## 2021-11-16 PROCEDURE — 85652 RBC SED RATE AUTOMATED: CPT | Performed by: INTERNAL MEDICINE

## 2021-11-16 PROCEDURE — 83605 ASSAY OF LACTIC ACID: CPT

## 2021-11-16 PROCEDURE — 86140 C-REACTIVE PROTEIN: CPT | Performed by: INTERNAL MEDICINE

## 2021-11-16 PROCEDURE — 258N000003 HC RX IP 258 OP 636: Performed by: HOSPITALIST

## 2021-11-16 PROCEDURE — 82803 BLOOD GASES ANY COMBINATION: CPT

## 2021-11-16 PROCEDURE — 250N000011 HC RX IP 250 OP 636: Performed by: HOSPITALIST

## 2021-11-16 PROCEDURE — 85025 COMPLETE CBC W/AUTO DIFF WBC: CPT | Performed by: EMERGENCY MEDICINE

## 2021-11-16 PROCEDURE — 36415 COLL VENOUS BLD VENIPUNCTURE: CPT | Performed by: SPECIALIST

## 2021-11-16 PROCEDURE — 84145 PROCALCITONIN (PCT): CPT | Performed by: EMERGENCY MEDICINE

## 2021-11-16 PROCEDURE — 87636 SARSCOV2 & INF A&B AMP PRB: CPT | Performed by: EMERGENCY MEDICINE

## 2021-11-16 PROCEDURE — 36415 COLL VENOUS BLD VENIPUNCTURE: CPT | Performed by: EMERGENCY MEDICINE

## 2021-11-16 PROCEDURE — 87086 URINE CULTURE/COLONY COUNT: CPT | Performed by: EMERGENCY MEDICINE

## 2021-11-16 PROCEDURE — 250N000009 HC RX 250: Performed by: SPECIALIST

## 2021-11-16 PROCEDURE — 87077 CULTURE AEROBIC IDENTIFY: CPT | Performed by: EMERGENCY MEDICINE

## 2021-11-16 PROCEDURE — 71046 X-RAY EXAM CHEST 2 VIEWS: CPT

## 2021-11-16 PROCEDURE — 258N000003 HC RX IP 258 OP 636: Performed by: EMERGENCY MEDICINE

## 2021-11-16 PROCEDURE — 250N000011 HC RX IP 250 OP 636: Performed by: INTERNAL MEDICINE

## 2021-11-16 PROCEDURE — 258N000003 HC RX IP 258 OP 636: Performed by: STUDENT IN AN ORGANIZED HEALTH CARE EDUCATION/TRAINING PROGRAM

## 2021-11-16 PROCEDURE — 250N000013 HC RX MED GY IP 250 OP 250 PS 637: Performed by: HOSPITALIST

## 2021-11-16 PROCEDURE — 250N000013 HC RX MED GY IP 250 OP 250 PS 637: Performed by: INTERNAL MEDICINE

## 2021-11-16 PROCEDURE — 99223 1ST HOSP IP/OBS HIGH 75: CPT | Mod: AI | Performed by: INTERNAL MEDICINE

## 2021-11-16 PROCEDURE — 999N000128 HC STATISTIC PERIPHERAL IV START W/O US GUIDANCE

## 2021-11-16 PROCEDURE — 258N000003 HC RX IP 258 OP 636: Performed by: INTERNAL MEDICINE

## 2021-11-16 PROCEDURE — 86360 T CELL ABSOLUTE COUNT/RATIO: CPT | Performed by: SPECIALIST

## 2021-11-16 PROCEDURE — 250N000013 HC RX MED GY IP 250 OP 250 PS 637: Performed by: SPECIALIST

## 2021-11-16 PROCEDURE — 250N000013 HC RX MED GY IP 250 OP 250 PS 637: Performed by: EMERGENCY MEDICINE

## 2021-11-16 PROCEDURE — 120N000013 HC R&B IMCU

## 2021-11-16 PROCEDURE — 81001 URINALYSIS AUTO W/SCOPE: CPT | Performed by: EMERGENCY MEDICINE

## 2021-11-16 PROCEDURE — 250N000013 HC RX MED GY IP 250 OP 250 PS 637: Performed by: STUDENT IN AN ORGANIZED HEALTH CARE EDUCATION/TRAINING PROGRAM

## 2021-11-16 PROCEDURE — 87536 HIV-1 QUANT&REVRSE TRNSCRPJ: CPT | Performed by: SPECIALIST

## 2021-11-16 RX ORDER — ACETAMINOPHEN 325 MG/1
650 TABLET ORAL EVERY 6 HOURS PRN
Status: DISCONTINUED | OUTPATIENT
Start: 2021-11-16 | End: 2021-12-28

## 2021-11-16 RX ORDER — PIPERACILLIN SODIUM, TAZOBACTAM SODIUM 3; .375 G/15ML; G/15ML
3.38 INJECTION, POWDER, LYOPHILIZED, FOR SOLUTION INTRAVENOUS EVERY 6 HOURS
Status: DISCONTINUED | OUTPATIENT
Start: 2021-11-16 | End: 2021-11-16

## 2021-11-16 RX ORDER — DIAZEPAM 5 MG
5-10 TABLET ORAL EVERY 6 HOURS PRN
Status: DISCONTINUED | OUTPATIENT
Start: 2021-11-16 | End: 2021-11-20

## 2021-11-16 RX ORDER — ONDANSETRON 4 MG/1
4 TABLET, ORALLY DISINTEGRATING ORAL EVERY 6 HOURS PRN
Status: DISCONTINUED | OUTPATIENT
Start: 2021-11-16 | End: 2021-12-30 | Stop reason: HOSPADM

## 2021-11-16 RX ORDER — DIAZEPAM 5 MG
5-10 TABLET ORAL ONCE
Status: COMPLETED | OUTPATIENT
Start: 2021-11-16 | End: 2021-11-16

## 2021-11-16 RX ORDER — NAFCILLIN SODIUM 2 G/8ML
2 INJECTION, POWDER, FOR SOLUTION INTRAMUSCULAR; INTRAVENOUS EVERY 4 HOURS
Status: DISCONTINUED | OUTPATIENT
Start: 2021-11-16 | End: 2021-11-18

## 2021-11-16 RX ORDER — ACETAMINOPHEN 650 MG/1
650 SUPPOSITORY RECTAL EVERY 6 HOURS PRN
Status: DISCONTINUED | OUTPATIENT
Start: 2021-11-16 | End: 2021-12-28

## 2021-11-16 RX ORDER — PREGABALIN 150 MG/1
150 CAPSULE ORAL 2 TIMES DAILY
Status: DISCONTINUED | OUTPATIENT
Start: 2021-11-17 | End: 2021-11-25

## 2021-11-16 RX ORDER — LORAZEPAM 0.5 MG/1
1 TABLET ORAL EVERY 8 HOURS PRN
Status: DISCONTINUED | OUTPATIENT
Start: 2021-11-16 | End: 2021-11-20

## 2021-11-16 RX ORDER — PROCHLORPERAZINE 25 MG
25 SUPPOSITORY, RECTAL RECTAL EVERY 12 HOURS PRN
Status: DISCONTINUED | OUTPATIENT
Start: 2021-11-16 | End: 2021-12-20

## 2021-11-16 RX ORDER — BUPRENORPHINE AND NALOXONE 8; 2 MG/1; MG/1
1 FILM, SOLUBLE BUCCAL; SUBLINGUAL DAILY
Status: DISCONTINUED | OUTPATIENT
Start: 2021-11-17 | End: 2021-11-18

## 2021-11-16 RX ORDER — LIDOCAINE 40 MG/G
CREAM TOPICAL
Status: DISCONTINUED | OUTPATIENT
Start: 2021-11-16 | End: 2021-12-30 | Stop reason: HOSPADM

## 2021-11-16 RX ORDER — NALOXONE HYDROCHLORIDE 1 MG/ML
1 INJECTION INTRAMUSCULAR; INTRAVENOUS; SUBCUTANEOUS ONCE
Status: COMPLETED | OUTPATIENT
Start: 2021-11-16 | End: 2021-11-16

## 2021-11-16 RX ORDER — ASPIRIN 81 MG/1
81 TABLET ORAL DAILY
Status: DISCONTINUED | OUTPATIENT
Start: 2021-11-17 | End: 2021-12-30 | Stop reason: HOSPADM

## 2021-11-16 RX ORDER — OXYCODONE HYDROCHLORIDE 5 MG/1
5 TABLET ORAL EVERY 4 HOURS PRN
Status: DISCONTINUED | OUTPATIENT
Start: 2021-11-16 | End: 2021-11-22

## 2021-11-16 RX ORDER — ONDANSETRON 2 MG/ML
4 INJECTION INTRAMUSCULAR; INTRAVENOUS EVERY 6 HOURS PRN
Status: DISCONTINUED | OUTPATIENT
Start: 2021-11-16 | End: 2021-12-30 | Stop reason: HOSPADM

## 2021-11-16 RX ORDER — BISACODYL 10 MG
10 SUPPOSITORY, RECTAL RECTAL DAILY PRN
Status: DISCONTINUED | OUTPATIENT
Start: 2021-11-16 | End: 2021-12-30 | Stop reason: HOSPADM

## 2021-11-16 RX ORDER — PROCHLORPERAZINE MALEATE 5 MG
10 TABLET ORAL EVERY 6 HOURS PRN
Status: DISCONTINUED | OUTPATIENT
Start: 2021-11-16 | End: 2021-12-20

## 2021-11-16 RX ORDER — POLYETHYLENE GLYCOL 3350 17 G/17G
17 POWDER, FOR SOLUTION ORAL DAILY PRN
Status: DISCONTINUED | OUTPATIENT
Start: 2021-11-16 | End: 2021-11-29

## 2021-11-16 RX ORDER — SODIUM CHLORIDE AND POTASSIUM CHLORIDE 150; 900 MG/100ML; MG/100ML
INJECTION, SOLUTION INTRAVENOUS CONTINUOUS
Status: DISCONTINUED | OUTPATIENT
Start: 2021-11-16 | End: 2021-11-18

## 2021-11-16 RX ORDER — NITROGLYCERIN 0.4 MG/1
0.4 TABLET SUBLINGUAL EVERY 5 MIN PRN
Status: DISCONTINUED | OUTPATIENT
Start: 2021-11-16 | End: 2021-12-30 | Stop reason: HOSPADM

## 2021-11-16 RX ORDER — LIDOCAINE 40 MG/G
CREAM TOPICAL
Status: DISCONTINUED | OUTPATIENT
Start: 2021-11-16 | End: 2021-11-24

## 2021-11-16 RX ADMIN — SODIUM CHLORIDE 1000 ML: 9 INJECTION, SOLUTION INTRAVENOUS at 06:43

## 2021-11-16 RX ADMIN — SODIUM CHLORIDE, POTASSIUM CHLORIDE, SODIUM LACTATE AND CALCIUM CHLORIDE 1000 ML: 600; 310; 30; 20 INJECTION, SOLUTION INTRAVENOUS at 22:50

## 2021-11-16 RX ADMIN — NALOXONE HYDROCHLORIDE 1 MG: 1 INJECTION PARENTERAL at 10:25

## 2021-11-16 RX ADMIN — ACETAMINOPHEN 650 MG: 325 TABLET, FILM COATED ORAL at 00:36

## 2021-11-16 RX ADMIN — POTASSIUM CHLORIDE AND SODIUM CHLORIDE: 900; 150 INJECTION, SOLUTION INTRAVENOUS at 15:39

## 2021-11-16 RX ADMIN — OXYCODONE HYDROCHLORIDE 5 MG: 5 TABLET ORAL at 20:57

## 2021-11-16 RX ADMIN — PIPERACILLIN SODIUM AND TAZOBACTAM SODIUM 4.5 G: 4; .5 INJECTION, POWDER, LYOPHILIZED, FOR SOLUTION INTRAVENOUS at 00:16

## 2021-11-16 RX ADMIN — SODIUM CHLORIDE 500 ML: 9 INJECTION, SOLUTION INTRAVENOUS at 18:30

## 2021-11-16 RX ADMIN — POTASSIUM CHLORIDE AND SODIUM CHLORIDE: 900; 150 INJECTION, SOLUTION INTRAVENOUS at 19:17

## 2021-11-16 RX ADMIN — ABACAVIR SULFATE, DOLUTEGRAVIR SODIUM, LAMIVUDINE 1 TABLET: 600; 50; 300 TABLET, FILM COATED ORAL at 15:14

## 2021-11-16 RX ADMIN — VANCOMYCIN HYDROCHLORIDE 1500 MG: 5 INJECTION, POWDER, LYOPHILIZED, FOR SOLUTION INTRAVENOUS at 02:24

## 2021-11-16 RX ADMIN — LORAZEPAM 1 MG: 0.5 TABLET ORAL at 14:50

## 2021-11-16 RX ADMIN — POTASSIUM CHLORIDE AND SODIUM CHLORIDE: 900; 150 INJECTION, SOLUTION INTRAVENOUS at 06:43

## 2021-11-16 RX ADMIN — DIAZEPAM 10 MG: 5 TABLET ORAL at 03:00

## 2021-11-16 RX ADMIN — NAFCILLIN 2 G: 2 POWDER, FOR SOLUTION INTRAMUSCULAR; INTRAVENOUS at 23:49

## 2021-11-16 RX ADMIN — IBUPROFEN 600 MG: 600 TABLET ORAL at 00:36

## 2021-11-16 RX ADMIN — PIPERACILLIN SODIUM AND TAZOBACTAM SODIUM 3.38 G: 3; .375 INJECTION, POWDER, LYOPHILIZED, FOR SOLUTION INTRAVENOUS at 07:59

## 2021-11-16 RX ADMIN — NAFCILLIN 2 G: 2 POWDER, FOR SOLUTION INTRAMUSCULAR; INTRAVENOUS at 10:43

## 2021-11-16 RX ADMIN — ACETAMINOPHEN 650 MG: 325 TABLET, FILM COATED ORAL at 20:22

## 2021-11-16 RX ADMIN — SODIUM CHLORIDE, POTASSIUM CHLORIDE, SODIUM LACTATE AND CALCIUM CHLORIDE 1686 ML: 600; 310; 30; 20 INJECTION, SOLUTION INTRAVENOUS at 00:37

## 2021-11-16 RX ADMIN — NAFCILLIN 2 G: 2 POWDER, FOR SOLUTION INTRAMUSCULAR; INTRAVENOUS at 19:20

## 2021-11-16 ASSESSMENT — ACTIVITIES OF DAILY LIVING (ADL)
ADLS_ACUITY_SCORE: 12

## 2021-11-16 NOTE — PROVIDER NOTIFICATION
MD Notification    Notified Person: MD    Notified Person Name:   Apoorva MD    Notification Date/Time:  11/16/21 5192    Notification Interaction:  Lab called with critical- Positive blood cultures gram positive cocci and clusters, please advise.    Also, FYI-  Low BP 88/64, IV occluded- fluids pushed.      Thanks,  Patricia RN *[phone]  Purpose of Notification: critical lab    Orders Received:    Comments: Awaiting IV placement from IV team.  Once placed, verbal order for 500 ml 0.9% NS bolus over 1 hour.

## 2021-11-16 NOTE — H&P
[facility]  History and Physical   Hospitalist  Marisol Ernst MD       Bc German MRN# [id number]   YOB: 1980 Age: 41 year old      Date of Admission:  11/15/2021         Assessment and Plan:   Bc German is a 41 year old male with history of chronic opioid dependence, history of polysubstance IV drug abuse, history of endocarditis treated with IV antibiotics, history of lumbar spine L3 osteomyelitis and history of cervical osteomyelitis with cervical myelopathy needing surgery presented to the emergency room yesterday after an episode of syncope.  He is also having cough fevers chills, noted to be tachycardic in the emergency room had a left-sided scrotal laceration which needed to be sutured in the emergency room.  His blood cultures 2 out of 2 returned positive for gram-positive cocci in clusters in the setting of IV drug use.  His chest x-ray showed nodular and patchy opacity at the lateral right base may be explained by infectious/inflammatory pneumonitis      Sepsis secondary to staph bacteremia with possible pneumonia, endocarditis, recurrent osteomyelitis in the differential with his history of IV drug use;  Admit him to the hospital under inpatient status  Monitor him on telemetry  Continue IV Zosyn and vancomycin started in the emergency room  Follow-up on repeat blood cultures  N.p.o.  for possible procedures including EDUARDO  Formal ID consultation will be requested and appreciate assistance  Follow-up on CBC    History of opiate dependence  History of IV drug use;  Patient tells me he last used IV drugs 1 week ago  He also has multiple areas of skin excoriations from picking of skin as well as from IV drug use which could be the source of infection as well  Patient might benefit from chemical dependency consultation during this hospitalization after recovery from the sepsis  Continue on chronic Suboxone    History of HIV positive status and hepatitis C;  Continue  PTA meds once reviewed by pharmacy    Left scrotal laceration secondary to fall and possible syncope;  Status post suturing on 11/14/2021  Needs suture removal in 1 week  Wound is healing well currently    DVT prophylaxis; PCD's and ambulation as tolerated  IV fluids; normal saline with 20 of K at 100 mL/h  Diet; n.p.o. for possible procedures  Tirado catheter; not needed currently    Admit as inpatient    Marisol Ernst MD   Pager  [phone]                       Code Status:   Full Code         Primary Care Physician:   Khari Barrientos [phone]         Chief Complaint:   Fevers, chills, cough and gram-positive bacteremia in a patient with history of HIV and IV drug use    History is obtained from the patient, discussion with ED physician and review of medical records         History of Present Illness:   Bc German is a 41 year old male with history of chronic opioid dependence, history of polysubstance IV drug abuse, history of endocarditis treated with IV antibiotics, history of lumbar spine L3 osteomyelitis and history of cervical osteomyelitis with cervical myelopathy needing surgery presented to the emergency room yesterday after an episode of syncope.  Patient was sitting on the toilet and was looking at his phone and doing text messaging and the next thing he remembers his dad found him in the bathtub and he was bleeding from his scrotum.  His father noted him to be febrile with temperature of 100.2 he was brought into the emergency room at [facility].  He was noted to be tachycardic with heart rate into the 130s.  Patient had a significant laceration on his scrotum which was sutured.  And ultrasound of scrotum looked okay blood cultures x2 obtained and he was discharged home with oral Keflex.  Today his blood cultures from yesterday 2 out of 2 returned positive for gram-positive cocci in clusters so he was called to return to the emergency room.  Patient tells me that he has been coughing today.  Has  been having headaches and chills low-grade fever since yesterday.  Patient used IV drugs 1 week ago.  He smoked meth  this morning as per the patient.  In the emergency room he was evaluated by Dr. Dimas Martin.  On vital signs patient is febrile with temperature 101.9  F pulse rate is at 145 1 initially in the emergency room blood pressure 117/72 respirate of 20-30 he was satting 87% on room air.  Patient is diaphoretic sitting up in bed.  He regularly takes his medications but missed his HIV medication for the last couple of days.  His lab work CBC showed elevated WBC count at 14.7, hemoglobin mildly low at 11.4 platelet count at 187.  Glucose at 104 BMP is normal except a mildly low sodium at 130 all his LFTs are normal except a mildly low albumin at 2.7.  Lactate level is normal at 1.8.  Procalcitonin is at 1.03.  CRP returned elevated at 169.  Patient was given lactated Ringer's at 30 mL/kg today and a dose of vancomycin and Zosyn ordered and a request to hospitalization was made under hospitalist service.  Patient pulled out IV and currently RN is having hard time getting another peripheral IV and and they are currently working on it           Past Medical History:     Past Medical History:   Diagnosis Date     Anxiety      Depressive disorder      Drug abuse, IV (H)      Group A streptococcal infection 11/2014    Bacteremia/cellulitis     HCV antibody positive      HIV (human immunodeficiency virus infection) (H)      HIV (human immunodeficiency virus infection) (H)      HIV (human immunodeficiency virus infection) (H)      IVDU (intravenous drug user)      Osteomyelitis of vertebra of lumbosacral region (H) 3/2011    L3, left psoas abscess     Substance abuse (H)    Cervical osteomyelitis with epidural abscess with compressive phlegmon and cervical myelopathy s/p C6 and C7 corpectomy, C5-T1 anterior instrumentation fusion in 9/2017      H/o MSSA bacteremia  HIV  History of osteomyelitis, L3 and psoas  abscess in the past  Depression/anxiety  Tachycardia             Past Surgical History:     Past Surgical History:   Procedure Laterality Date     EYE SURGERY  1 year ago    pins to left eye after socket fracture     OPTICAL TRACKING SYSTEM FUSION CERVICAL ANTERIOR ONE LEVEL Right 9/10/2017    Procedure: OPTICAL TRACKING SYSTEM FUSION CERVICAL ANTERIOR ONE LEVEL;  Stealth C6-C7 Anterior Cervical Corpectomy and C5-T1 Fusion;  Surgeon: Marv Ambrose MD;  Location: [facility] OR     ORTHOPEDIC SURGERY      Arm Surgery     PICC INSERTION Left 09/21/2017    5fr DL BioFlo PICC, 42cm (1cm external) in the L basilic vein w/ tip in the low SVC.     TRANSESOPHAGEAL ECHOCARDIOGRAM INTRAOPERATIVE N/A 3/17/2015    Procedure: TRANSESOPHAGEAL ECHOCARDIOGRAM INTRAOPERATIVE;  Surgeon: Generic Anesthesia Provider;  Location: [facility] OR              Home Medications:     Prior to Admission medications    Medication Sig Last Dose Taking? Auth Provider   abacavir-dolutegravir-LamiVUDine (TRIUMEQ) 600- MG per tablet Take 1 tablet by mouth daily Please call [phone] & make appointment for further refills. Past Week at Unknown time Yes Damir Cisneros MD   aspirin 81 MG EC tablet Take 81 mg by mouth daily Past Week at Unknown time Yes Unknown, Entered By History   buprenorphine HCl-naloxone HCl (SUBOXONE) 8-2 MG per film Place 1 Film under the tongue daily as needed   at prn Yes Unknown, Entered By History   carisoprodol (SOMA) 350 MG tablet Take 87.5 mg by mouth daily Tapering off medication, current dose 1/4 tablet Past Week at Unknown time Yes Unknown, Entered By History   FETZIMA 120 MG 24 hr capsule Take 120 mg by mouth daily Past Week at Unknown time Yes Unknown, Entered By History   LORazepam (ATIVAN) 0.5 MG tablet Take 2 tablets (1 mg) by mouth every 8 hours as needed for anxiety  at prn Yes Camilla Gardiner MD   pregabalin (LYRICA) 150 MG capsule Take 150 mg by mouth 2 times daily Past Week at Unknown time Yes  Unknown, Entered By History   testosterone cypionate (DEPOTESTOTERONE) 200 MG/ML injection Inject 0.3 mLs into the muscle once a week Inject 0.3 mL IM every 77 days Past Month at Unknown time Yes Unknown, Entered By History   cephALEXin (KEFLEX) 500 MG capsule Take 1 capsule (500 mg) by mouth 4 times daily for 7 days  at not started yet  Jeremie Prabhakar MD            Allergies:   No Known Allergies         Social History:     Social History     Tobacco Use     Smoking status: Current Every Day Smoker     Packs/day: 0.25     Types: Cigarettes     Smokeless tobacco: Never Used   Substance Use Topics     Alcohol use: Yes     Patient currently lives with his dad  Used IV methamphetamines 1 week ago.  He smoked methamphetamines this morning  He also does  vaping of cigarettes several times a day.  He currently denies any alcohol use.  He denies any smoking of cigarettes currently.             Family History:   Father has type 2 diabetes mellitus and gout.  Mother has thyroid issues           Review of Systems:       The 10 point Review of Systems is negative other than noted in the HPI           Physical Exam:   Blood pressure 117/72, pulse (!) 145, temperature (!) 101.9  F (38.8  C), temperature source Oral, resp. rate 20, height 1.829 m (6'), weight 56.2 kg (124 lb), SpO2 97 %.  124 lbs 0 oz    Constitutional: Alert, awake not in any distress, patient appears ill and diaphoretic   Psych: Mood and affect are normal    Neuro: Cranial nerves 2-12 are intact, muscle power 5/5, no focal weakness   Eyes: No conjunctival injection, No scleral icterus, PERRLA   ENT: Ear, nose , throat are normal. Mucous membranes moist .  Poor oral dentition noted       Cardiovascular:  Sinus tachycardia, no murmurs rubs or gallops   Lungs:  Coarse breath sounds bilaterally no respiratory distress noted.  No accessory muscle use   Abdomen:  Soft, nontender, nondistended, no splenomegaly   Skin:  Multiple areas of's excoriations of the  skin noted possibly from injection drug use.  As well as picking of the skin from meth use.  Well-healing scrotal laceration with sutures in place   Musculoskeletal:  No clubbing cyanosis or edema   Other:           Data:   All new lab and imaging data was reviewed.   Recent Labs   Lab Test 11/16/21  0014 11/14/21  1734 07/20/21  0128 05/09/21  1450 10/09/17  2149 10/09/17  2015   WBC 14.7* 13.1*   < > 5.5   < > [facility] 2115 BY K   HGB 11.4* 12.5*   < > 13.3   < > [facility] 2115 BY K   MCV 84 82   < > 87   < > [facility] 2115 BY K    250   < > 354   < > [facility] 2115 BY K   INR  --   --   --  0.96  --  1.01    < > = values in this interval not displayed.      Results for orders placed or performed during the hospital encounter of 11/15/21   XR Chest 2 Views    Narrative    EXAM: XR CHEST 2 VW  LOCATION: [facility]  DATE/TIME: 11/16/2021 1:25 AM    INDICATION: cough  COMPARISON: 10/09/2017.      Impression    IMPRESSION: Small somewhat nodular and patchy opacity at the lateral right base may be explained by infectious/inflammatory pneumonitis. Given the nodular configuration, follow-up recommended to document clearing. Left lung clear. Incidental note of   accessory azygos fissure. Normal heart size and pulmonary vascularity. Postoperative changes of cervicothoracic fusion.       Recent Labs   Lab Test 11/16/21  0014 11/14/21  1734   * 128*   POTASSIUM 3.5 4.4   CHLORIDE 97 96   CO2 29 32   BUN 11 12   CR 0.71 0.80   ANIONGAP 4 <1*   SANDRA 8.5 9.0   * 119*     Recent Labs   Lab Test 05/09/21  1450 10/09/17  2015   TROPI <0.015 <0.015

## 2021-11-16 NOTE — PROGRESS NOTES
RECEIVING UNIT ED HANDOFF REVIEW    ED Nurse Handoff Report was reviewed by: Radha Bond RN on November 16, 2021 at 2:13 PM

## 2021-11-16 NOTE — PHARMACY-ADMISSION MEDICATION HISTORY
Pharmacy Medication History  Admission medication history interview status for the 11/15/2021  admission is complete. See EPIC admission navigator for prior to admission medications     Location of Interview: Patient room  Medication history sources: Patient, Surescripts    Significant changes made to the medication list:  Added: Soma, ASA, Fetzima, Lyrica  Stopped: Phenergan    In the past week, patient estimated taking medication this percent of the time: 50-90% due to illness    Additional medication history information:   Pt reports tapering Soma - now down to 1/4th tablet daily  Testosterone inj only paid by insurance every 77 days  Patient reports not starting cephalexin yet    Medication reconciliation completed by provider prior to medication history? No    Time spent in this activity: 15 minutes    Prior to Admission medications    Medication Sig Last Dose Taking? Auth Provider   abacavir-dolutegravir-LamiVUDine (TRIUMEQ) 600- MG per tablet Take 1 tablet by mouth daily Please call [phone] & make appointment for further refills. Past Week at Unknown time Yes Damir Cisneros MD   aspirin 81 MG EC tablet Take 81 mg by mouth daily Past Week at Unknown time Yes Unknown, Entered By History   buprenorphine HCl-naloxone HCl (SUBOXONE) 8-2 MG per film Place 1 Film under the tongue daily as needed   at prn Yes Unknown, Entered By History   carisoprodol (SOMA) 350 MG tablet Take 87.5 mg by mouth daily Tapering off medication, current dose 1/4 tablet Past Week at Unknown time Yes Unknown, Entered By History   FETZIMA 120 MG 24 hr capsule Take 120 mg by mouth daily Past Week at Unknown time Yes Unknown, Entered By History   LORazepam (ATIVAN) 0.5 MG tablet Take 2 tablets (1 mg) by mouth every 8 hours as needed for anxiety  at prn Yes Camilla Gardiner MD   pregabalin (LYRICA) 150 MG capsule Take 150 mg by mouth 2 times daily Past Week at Unknown time Yes Unknown, Entered By History   testosterone  cypionate (DEPOTESTOTERONE) 200 MG/ML injection Inject 0.3 mLs into the muscle once a week Inject 0.3 mL IM every 77 days Past Month at Unknown time Yes Unknown, Entered By History   cephALEXin (KEFLEX) 500 MG capsule Take 1 capsule (500 mg) by mouth 4 times daily for 7 days  at not started yet  Jeremie Prabhakar MD       The information provided in this note is only as accurate as the sources available at the time of update(s)     Harmony Harrell, IftikharD  Inpatient Clinical Pharmacist  [phone]

## 2021-11-16 NOTE — CONSULTS
[facility]    Infectious Disease Consultation     Date of Admission:  11/15/2021  Date of Consult : 11/16/21    Assessment:  1. MSSA sepsis with concern for endocarditis given substance abuse history. Likely skin source   2. Prior history of MSSA sepsis with pulmonary valve endocarditis in 2015 and spinal discitis/osteomyelitis with epidural abscess requiring surgical intervention in 2017.  3. Substance abuse with active use of methamphetamine / fentanyl currently  4. HIV/Hepatitis C co-infection. Last HIV VL detectable at 29,522 copies/ml and  CD4 625(28%) on  06/09/2021 , on Triumeq    Recommendations:  1. Follow repeat blood cxs  2. EDUARDO  3. Check HIV viral load / CD4 count  4. Discontinue Vancomycin / Zosyn, start Nafcillin, with plans to transition to Cefazolin once extent of infection has been determined.  5. May need additional imaging/diagnostics to evaluated for secondary sites of seeding based upon clinical course.  6. Continue ART with triumeq  7. Will need mental heal services /psych evaluation for ongoing substance abuse    Alana Cooper MD    Reason for Consult   I was asked to evaluate this patient for treatment recommendations for S.aureus bacteremia.    Primary Care Physician   Khari Barrientos    Chief Complaint   Fever and bacteremia    History is obtained from the patient and medical records    History of Present Illness   Bc German is a 41 year old male with HIV/Hepatitis C co-infection (HIV uncontrolled on Triumeq, last CD4 (625-28%) on  06/09/2021 and VL detectable at 29,522 copies/ml), hx of substance abuse, prior MSSA sepsis with spinal diskitis/osteomyelitis and epidural abscess requiring surgical drainage in 2017, and hx of MSSA sepsis associated with pulmonary valve endocarditis and septic pulmonary emboli in 2015 who is currently hospitalized for fever, cough and chills and positive blood cxs for MSSA following 11/14 ED evaluation for scrotal laceration and syncope.      Patient was evaluated in the Ed on 11/14 for syncopal episode while he was in the bathroom resulting in a fall and developed a left scrotal laceration which was sutured. He has used methamphetamines recently,  And has multiple sites of skin popping. His blood cxs from 11/14 turned positive for MSSA resulting in return to the ED. Testicular ultrasound is stable and CXR shows nodular patchy opacity in the right base. He is febrile with temp of 101.9, tachycardic and has leukocytosis of 14.7K meeting sepsis criteria. Patient reports having chills and feeling unwell since 11/12. He ran out of his HIV meds recently. Reports using methamphetamines and recently also used Fentanyl. As been using drugs since the age of 15. He has had about 5 syncopal episodes in the past month and reports his whole body is aching.    Patient has been initiated on Zosyn/Vancomycin, and ID has been asked to assist with antibiotic management.    Antimicrobial therapy  11/16 Zosyn, Vancomycin    Past Medical History   I have reviewed this patient's medical history and updated it with pertinent information if needed.   Past Medical History:   Diagnosis Date     Anxiety      Depressive disorder      Drug abuse, IV (H)      Group A streptococcal infection 11/2014    Bacteremia/cellulitis     HCV antibody positive      HIV (human immunodeficiency virus infection) (H)      HIV (human immunodeficiency virus infection) (H)      HIV (human immunodeficiency virus infection) (H)      IVDU (intravenous drug user)      Osteomyelitis of vertebra of lumbosacral region (H) 3/2011    L3, left psoas abscess     Substance abuse (H)      Past Surgical History   I have reviewed this patient's surgical history and updated it with pertinent information if needed.  Past Surgical History:   Procedure Laterality Date     EYE SURGERY  1 year ago    pins to left eye after socket fracture     OPTICAL TRACKING SYSTEM FUSION CERVICAL ANTERIOR ONE LEVEL Right 9/10/2017     Procedure: OPTICAL TRACKING SYSTEM FUSION CERVICAL ANTERIOR ONE LEVEL;  Stealth C6-C7 Anterior Cervical Corpectomy and C5-T1 Fusion;  Surgeon: Marv Ambrose MD;  Location: [facility] OR     ORTHOPEDIC SURGERY      Arm Surgery     PICC INSERTION Left 09/21/2017    5fr DL BioFlo PICC, 42cm (1cm external) in the L basilic vein w/ tip in the low SVC.     TRANSESOPHAGEAL ECHOCARDIOGRAM INTRAOPERATIVE N/A 3/17/2015    Procedure: TRANSESOPHAGEAL ECHOCARDIOGRAM INTRAOPERATIVE;  Surgeon: Generic Anesthesia Provider;  Location: [facility] OR     Prior to Admission Medications   Prior to Admission Medications   Prescriptions Last Dose Informant Patient Reported? Taking?   FETZIMA 120 MG 24 hr capsule Past Week at Unknown time  Yes Yes   Sig: Take 120 mg by mouth daily   LORazepam (ATIVAN) 0.5 MG tablet  at prn  No Yes   Sig: Take 2 tablets (1 mg) by mouth every 8 hours as needed for anxiety   abacavir-dolutegravir-LamiVUDine (TRIUMEQ) 600- MG per tablet Past Week at Unknown time  No Yes   Sig: Take 1 tablet by mouth daily Please call [phone] & make appointment for further refills.   aspirin 81 MG EC tablet Past Week at Unknown time  Yes Yes   Sig: Take 81 mg by mouth daily   buprenorphine HCl-naloxone HCl (SUBOXONE) 8-2 MG per film  at prn Pharmacy Yes Yes   Sig: Place 1 Film under the tongue daily as needed    carisoprodol (SOMA) 350 MG tablet Past Week at Unknown time  Yes Yes   Sig: Take 87.5 mg by mouth daily Tapering off medication, current dose 1/4 tablet   cephALEXin (KEFLEX) 500 MG capsule  at not started yet  No No   Sig: Take 1 capsule (500 mg) by mouth 4 times daily for 7 days   pregabalin (LYRICA) 150 MG capsule Past Week at Unknown time  Yes Yes   Sig: Take 150 mg by mouth 2 times daily   testosterone cypionate (DEPOTESTOTERONE) 200 MG/ML injection Past Month at Unknown time  Yes Yes   Sig: Inject 0.3 mLs into the muscle once a week Inject 0.3 mL IM every 77 days      Facility-Administered Medications: None      Allergies   No Known Allergies    Immunization History   Immunization History   Administered Date(s) Administered     COVID-19,PF,Pfizer (12+ Yrs) 05/25/2021     Influenza Vaccine IM > 6 months Valent IIV4 (Alfuria,Fluzone) 09/21/2017     Tdap (Adacel,Boostrix) 07/20/2021     Social History   I have reviewed this patient's social history and updated it with pertinent information if needed. Bc German  reports that he has been smoking cigarettes. He has been smoking about 0.25 packs per day. He has never used smokeless tobacco. He reports current alcohol use. He reports current drug use. Drug: Marijuana.    Family History   I have reviewed this patient's family history and updated it with pertinent information if needed.   No family history on file.    Review of Systems   The 10 point Review of Systems is negative other than noted in the HPI or here.     Physical Exam   Temp: 99.7  F (37.6  C) Temp src: Oral BP: (!) 80/57 Pulse: 93   Resp: 9 SpO2: 98 % O2 Device: None (Room air)    Vital Signs with Ranges  Temp:  [99.7  F (37.6  C)-101.9  F (38.8  C)] 99.7  F (37.6  C)  Pulse:  [] 93  Resp:  [9-30] 9  BP: ()/() 80/57  SpO2:  [95 %-100 %] 98 %  124 lbs 0 oz  Body mass index is 16.82 kg/m .    GENERAL APPEARANCE:  Awake, cachectic and ill appearing male  EYES: Eyes grossly normal to inspection  Mouth : Poor dentition  NECK: no adenopathy  RESP: lungs clear   CV: regular rates and rhythm  ABDOMEN: soft, nontender  MS: no edema  SKIN: multiple scars, IV racks, excoriations on hands,    : Left scrotal laceration site appears clean, no sign of infection.    Data   All laboratory data reviewed  Component      Latest Ref Rng & Units 11/16/2021   WBC      4.0 - 11.0 10e3/uL 14.7 (H)   RBC Count      4.40 - 5.90 10e6/uL 4.11 (L)   Hemoglobin      13.3 - 17.7 g/dL 11.4 (L)   Hematocrit      40.0 - 53.0 % 34.4 (L)   MCV      78 - 100 fL 84   MCH      26.5 - 33.0 pg 27.7   MCHC      31.5 - 36.5 g/dL  33.1   RDW      10.0 - 15.0 % 13.1   Platelet Count      150 - 450 10e3/uL 187     Component      Latest Ref Rng & Units 11/16/2021   CRP Inflammation      0.0 - 8.0 mg/L 169.0 (H)   Sed Rate      0 - 15 mm/hr 39 (H)     Component      Latest Ref Rng & Units 11/16/2021   CRP Inflammation      0.0 - 8.0 mg/L 169.0 (H)   Sed Rate      0 - 15 mm/hr 39 (H)     Microbiology  11/14 blood cxs bothsets 4/4 bottles MSSA by Golgi, 11/16 blood cxs pending    Culture Positive on the 1st day of incubation Abnormal        Gram positive cocci in clusters Panic     2 of 2 bottles        Imaging  11/16 CXr  EXAM: XR CHEST 2 VW  LOCATION: [facility]  DATE/TIME: 11/16/2021 1:25 AM     INDICATION: cough  COMPARISON: 10/09/2017.                                                                      IMPRESSION: Small somewhat nodular and patchy opacity at the lateral right base may be explained by infectious/inflammatory pneumonitis. Given the nodular configuration, follow-up recommended to document clearing. Left lung clear. Incidental note of   accessory azygos fissure. Normal heart size and pulmonary vascularity. Postoperative changes of cervicothoracic fusion    11/14 US testicles  EXAM: US TESTICULAR AND SCROTUM WITH DOPPLER LIMITED  LOCATION: [facility]  DATE/TIME: 11/14/2021 6:16 PM     INDICATION: Scrotal pain after trauma  COMPARISON: None.  TECHNIQUE: Ultrasound of scrotum with color flow and spectral Doppler with waveform analysis performed.     FINDINGS:     RIGHT: Right testicle measures 4.4 x 2.2 x 3.2 cm. Normal testicle with no masses. Normal arterial duplex and normal color flow. Normal epididymis. Small hydrocele. No varicocele.     LEFT: Left testicle measures 4.5 x 2.6 x 2.8 cm. Normal testicle with no masses. Normal arterial duplex and normal color flow. Normal epididymis. No hydrocele. No varicocele.                                                                       IMPRESSION:  1.  Small right hydrocele otherwise normal. No evidence for testicular fracture.

## 2021-11-16 NOTE — ED NOTES
"Patient pulled out his IV. Dr. Martin aware.  Patient inquired about getting a \"jugular access.\"   "

## 2021-11-16 NOTE — SIGNIFICANT EVENT
Writer arrived to the room as pt HR was alerting @ 120bpm. Pt top half was hanging off of the bed. Pt was not responsive, help called. Apneic periods noted per ER Dr. KASANDRA Godfrey ordered 1mg Narcan. Pt received and woke up. Stated did not take anything. Writer found pt holding a cap with a pinkish liquid left in pts hand. Pill bottle was open in pt belongings found to be ativan. Pt maintains did not take anything.

## 2021-11-16 NOTE — PROGRESS NOTES
Brief note    Patient admitted by my colleague Dr Ernst early this morning    Seen and examined patient  - reports feeling very thirsty  - T max 101.9, trending down; tachycardia improving but BP soft 79/50; was given a litre bolus of IVF for hypotension-- BP in 90s  - Na 128---130; normal lactic acid 1.8; pro nino 1.03; wbc 13---14,   - Blood culture 11/14 growing staph aureus; B and urine Cx from 11/16 pending  - after my examination, per nursing he was briefly nonresponsive and apneic, was evaluated by ED provider, narcan was given with improvement in mentation      Bc German is a 41 year old male with history of chronic opioid dependence, history of polysubstance IV drug abuse, history of endocarditis treated with IV antibiotics, history of lumbar spine L3 osteomyelitis and history of cervical osteomyelitis with cervical myelopathy needing surgery presented to the ED after an episode of syncope along with cough, fevers chills, noted to be tachycardic in the emergency room had a left-sided scrotal laceration which needed to be sutured in the emergency room.  His blood cultures 2 out of 2 returned positive for gram-positive cocci in clusters in the setting of IV drug use.     CXR on admission noted with small somewhat nodular and patchy opacity at the lateral right base may be explained by infectious/inflammatory pneumonitis      Sepsis secondary to staph bacteremia with possible pneumonia, endocarditis, recurrent osteomyelitis in the differential with his history of IV drug use;  - T max 101.9, trending down; tachycardia improving but BP soft 79/50; was given a litre bolus of IVF for hypotension-- BP in 90s; will increase maintenance IVF to 125 ml/hr  - Na 128---130; normal lactic acid 1.8; pro nino 1.03; wbc 13---14, ; CXR as above  - Blood culture 11/14 growing staph aureus; B and urine Cx from 11/16 pending  - will continue with empiric vancomycin and Zosyn  -infectious disease  consulted  -given prior history of infective endocarditis, will get repeat EDUARDO but will wait until a bit more stable     History of opiate dependence  History of IV drug use;  - per report last IVDU 1 week PTA  - on 11/16 in ED, per nursing he was briefly nonresponsive and apneic, was evaluated by ED provider, narcan was given with improvement in mentation  - has multiple areas of skin excoriations from picking of skin as well as from IV drug use which could be the source of infection as well  - will have CD evaluation when clinically more stable  - will continue on chronic Suboxone     History of HIV positive status and hepatitis C;  - phil resume PTA meds once reviewed by pharmacy     Left scrotal laceration secondary to fall and possible syncope;  - Status post suturing on 11/14/2021; needs suture removal in 1 week  - US scrotum noted with small right hydrocele otherwise normal. No evidence for testicular fracture.     DVT prophylaxis; PCD's and ambulation as tolerated    COVID status: asymptomatic screening negative on 11/16    Given his hypotension, sepsis will admit him to [facility] for close monitoring.

## 2021-11-16 NOTE — PROGRESS NOTES
11-15: Pt A/O x 4, intermittently lethargic. No complaints of pain. Hypotension improving. Scrotal incision LISSETH, CDI. Voiding in the urinal. EJ in place, wrapped loosely with coband to keep in place. ID consult completed. Report given to [facility] unit.

## 2021-11-16 NOTE — PROVIDER NOTIFICATION
Brief update:    Paged regarding new hypotension.    Patient here with staph bacteremia, IV drug use.    1 L bolus now, assess for new murmur.  At risk for valve failure from endocarditis, and if cardiac change, will need stat echocardiogram  Please repage following vital check with bolus to ensure improvement in hypotension.    Lee Salazar MD  6:46 AM

## 2021-11-16 NOTE — ED NOTES
Dr. Ernst states we should not do a central line r/t staff in his blood: contraindicated.  I asked the [facility] to call anesthesia for an IV access.

## 2021-11-16 NOTE — ED NOTES
Call placed at 0630 to hospitalist r/t hypotension.  Dr. Salazar instructed to listen for a heart murmer, and call him back if present.  I listened and did not detect a heart murmer.  Fluids ordered, and started.  Patient is very sleepy.

## 2021-11-16 NOTE — ED TRIAGE NOTES
Patient states he was called & told to come back due to positive blood cultures.  Complaints of fever, chills, headache, pain, diarrhea.  States pain is down to his bones.  States he is having constant diarrhea.

## 2021-11-16 NOTE — ED PROVIDER NOTES
"  History   Chief Complaint:  Blood Culture Positive (Cpm) and Fever       HPI   Bc German is a 41 year old male with history of HIV and IV drug abuse who presents with a positive blood culture. The patient reports back to the ED after blood tests from his visit yesterday returned positive for gram positive cocci. He first presented yesterday with a laceration to his scrotum, which was repaired prior to discharge. Throughout today, he has had a cough and been \"freezing\" cold. His temperature is currently 101.9 here in the ED. The patient reports that he last smoked meth this morning, but denies recent IV drug use. He has been generally consistent with his HIV drug regimen, but says that he missed his last two doses of Triumeq.     Blood culture from 11/14/2021 ED visit: Gram positive cocci in clusters (A!!) x2    Review of Systems   Constitutional: Positive for chills and fever.   Respiratory: Positive for cough.    All other systems reviewed and are negative.        Allergies:  No known drug allergies     Medications:  Triumeq  Suboxone  Ativan  Keflex  Lyrica  Phenergan  Depotestosterone    Past Medical History:     Anxiety  Depression  IV drug abuse  Group A streptococcal infection   HIV  Osteomyelitis of vertebra    Past Surgical History:    Eye surgery  Arm surgery     Family History:    No known family history     Social History:  Arrives via triage  Unaccompanied in the ED   Positive for methamphetamine abuse    Physical Exam     Patient Vitals for the past 24 hrs:   BP Temp Temp src Pulse Resp SpO2 Height Weight   11/15/21 2141 117/72 (!) 101.9  F (38.8  C) Oral (!) 145 20 97 % 1.829 m (6') 56.2 kg (124 lb)     Physical Exam    General: Alert, interactive in mild distress  Head:  Scalp is atraumatic  Eyes:  The pupils are equal, round, and reactive to light    EOM's intact    No scleral icterus  ENT:      Nose:  The external nose is normal  Ears:  External ears are normal  Mouth/Throat: The oropharynx " is normal    Mucus membranes are dry      Neck:  Normal range of motion.      There is no rigidity.    Trachea is in the midline         CV:  Tachycardia        Resp:  Breath sounds are coarse bilaterally    Non-labored, no retractions or accessory muscle use      GI:  Abdomen is soft, no distension, no tenderness.       MS:  Normal strength in all 4 extremities  Skin:  Multiple excoriations on the skin. Well healing scrotal laceration with sutures in place.  Neuro: Strength 5/5 x4.      GCS: 15  Psych:  Awake. Alert.  Anxious affect.      Appropriate interactions.  Lymph:  No anterior or posterior cervical lymphadenopathy noted.    Emergency Department Course     Imaging:  XR Chest 2 Views   Final Result   IMPRESSION: Small somewhat nodular and patchy opacity at the lateral right base may be explained by infectious/inflammatory pneumonitis. Given the nodular configuration, follow-up recommended to document clearing. Left lung clear. Incidental note of    accessory azygos fissure. Normal heart size and pulmonary vascularity. Postoperative changes of cervicothoracic fusion.        Report per radiology    Laboratory:  Labs Ordered and Resulted from Time of ED Arrival to Time of ED Departure   COMPREHENSIVE METABOLIC PANEL - Abnormal       Result Value    Sodium 130 (*)     Potassium 3.5      Chloride 97      Carbon Dioxide (CO2) 29      Anion Gap 4      Urea Nitrogen 11      Creatinine 0.71      Calcium 8.5      Glucose 104 (*)     Alkaline Phosphatase 103      AST 38      ALT 61      Protein Total 7.3      Albumin 2.7 (*)     Bilirubin Total 0.4      GFR Estimate >90     PROCALCITONIN - Abnormal    Procalcitonin 1.03 (*)    CBC WITH PLATELETS AND DIFFERENTIAL - Abnormal    WBC Count 14.7 (*)     RBC Count 4.11 (*)     Hemoglobin 11.4 (*)     Hematocrit 34.4 (*)     MCV 84      MCH 27.7      MCHC 33.1      RDW 13.1      Platelet Count 187      % Neutrophils 87      % Lymphocytes 7      % Monocytes 4      % Eosinophils  1      % Basophils 0      % Immature Granulocytes 1      NRBCs per 100 WBC 0      Absolute Neutrophils 12.9 (*)     Absolute Lymphocytes 1.0      Absolute Monocytes 0.6      Absolute Eosinophils 0.1      Absolute Basophils 0.0      Absolute Immature Granulocytes 0.1 (*)     Absolute NRBCs 0.0     ISTAT GASES LACTATE VENOUS POCT - Abnormal    Lactic Acid POCT 1.8      Bicarbonate Venous POCT 31 (*)     O2 Sat, Venous POCT 54 (*)     pCO2V Venous POCT 50      pH Venous POCT 7.39      pO2 Venous POCT 29     INFLUENZA A/B & SARS-COV2 PCR MULTIPLEX - Normal    Influenza A target Negative      Influenza B target Negative      SARS CoV2 PCR Negative     ROUTINE UA WITH MICROSCOPIC   URINE CULTURE   BLOOD CULTURE   BLOOD CULTURE          Emergency Department Course:  Reviewed:  I reviewed nursing notes, vitals and past medical history    Procedure:  IV access via the external jugular vein, after multiple attempts at IV access peripherally and utilizing the ultrasound nursing team was unfortunately unsuccessful therefore a right EJ line was placed without incident.  It flushed and aspirated well.    Assessments:  2200 I obtained history and examined the patient as noted above.   0100 I rechecked the patient and explained findings.     Interventions:  Medications   sodium chloride (PF) 0.9% PF flush 3 mL (has no administration in time range)   sodium chloride (PF) 0.9% PF flush 3 mL (has no administration in time range)   lactated ringers BOLUS 1,686 mL (1,686 mLs Intravenous New Bag 11/16/21 0037)   acetaminophen (TYLENOL) tablet 650 mg (650 mg Oral Given 11/16/21 0036)   vancomycin 1500 mg in 0.9% NaCl 250 ml intermittent infusion 1,500 mg (has no administration in time range)   piperacillin-tazobactam (ZOSYN) 4.5 g vial to attach to  mL bag (4.5 g Intravenous New Bag 11/16/21 0016)   ibuprofen (ADVIL/MOTRIN) tablet 600 mg (600 mg Oral Given 11/16/21 0036)       Disposition:  The patient was admitted to the hospital  under the care of Dr. Ernst.     Impression & Plan   Medical Decision Making:  Following presentation history and physical examination were performed, the above work-up was undertaken and blood cultures were repeated.  Patient had broad-spectrum antibiotic coverage initiated with vancomycin and Zosyn.  Likely source of his infection he is through the skin given the multiple excoriations and concern for possible IV drug abuse.  He does have a cough and chest radiograph demonstrates a possible pneumonitis, Covid and influenza testing are negative, urinalysis is pending.  Given the positive blood cultures and signs of bacteremia believe would benefit from hospitalization for IV antibiotics.  I discussed case with Dr. Ernst and he was admitted for further evaluation and treatment.  He remained tachycardic throughout his stay in the emergency department I think this is likely multifactorial from his methamphetamine use as well as his infection.  He was otherwise hemodynamically stable.    Diagnosis:    ICD-10-CM    1. Gram-positive bacteremia  R78.81    2. Human immunodeficiency virus (HIV) disease (H)  B20    3. Drug abuse (H)  F19.10          Scribe Disclosure:  ONESIMO, Km Pacheco, am serving as a scribe at 9:47 PM on 11/15/2021 to document services personally performed by Dimas Martin MD based on my observations and the provider's statements to me.              Dimas Martin MD  11/16/21 0202       Dimas Martin MD  11/16/21 0227

## 2021-11-17 LAB
BACTERIA BLD CULT: ABNORMAL
BACTERIA UR CULT: NO GROWTH

## 2021-11-17 PROCEDURE — 250N000011 HC RX IP 250 OP 636: Performed by: HOSPITALIST

## 2021-11-17 PROCEDURE — 250N000013 HC RX MED GY IP 250 OP 250 PS 637: Performed by: HOSPITALIST

## 2021-11-17 PROCEDURE — 250N000009 HC RX 250: Performed by: HOSPITALIST

## 2021-11-17 PROCEDURE — 120N000013 HC R&B IMCU

## 2021-11-17 PROCEDURE — 250N000009 HC RX 250: Performed by: SPECIALIST

## 2021-11-17 PROCEDURE — 250N000013 HC RX MED GY IP 250 OP 250 PS 637: Performed by: STUDENT IN AN ORGANIZED HEALTH CARE EDUCATION/TRAINING PROGRAM

## 2021-11-17 PROCEDURE — 99233 SBSQ HOSP IP/OBS HIGH 50: CPT | Performed by: HOSPITALIST

## 2021-11-17 PROCEDURE — 250N000013 HC RX MED GY IP 250 OP 250 PS 637: Performed by: INTERNAL MEDICINE

## 2021-11-17 RX ORDER — CARISOPRODOL 350 MG/1
87.5 TABLET ORAL ONCE
Status: COMPLETED | OUTPATIENT
Start: 2021-11-17 | End: 2021-11-17

## 2021-11-17 RX ORDER — NICOTINE 21 MG/24HR
1 PATCH, TRANSDERMAL 24 HOURS TRANSDERMAL EVERY EVENING
Status: DISPENSED | OUTPATIENT
Start: 2021-11-17 | End: 2021-12-29

## 2021-11-17 RX ORDER — CALCIUM CARBONATE 500 MG/1
500 TABLET, CHEWABLE ORAL 3 TIMES DAILY PRN
Status: DISCONTINUED | OUTPATIENT
Start: 2021-11-17 | End: 2021-12-30 | Stop reason: HOSPADM

## 2021-11-17 RX ADMIN — POTASSIUM CHLORIDE AND SODIUM CHLORIDE: 900; 150 INJECTION, SOLUTION INTRAVENOUS at 20:34

## 2021-11-17 RX ADMIN — ACETAMINOPHEN 650 MG: 325 TABLET, FILM COATED ORAL at 15:58

## 2021-11-17 RX ADMIN — ASPIRIN 81 MG: 81 TABLET, COATED ORAL at 08:30

## 2021-11-17 RX ADMIN — NAFCILLIN 2 G: 2 POWDER, FOR SOLUTION INTRAMUSCULAR; INTRAVENOUS at 06:52

## 2021-11-17 RX ADMIN — NAFCILLIN 2 G: 2 POWDER, FOR SOLUTION INTRAMUSCULAR; INTRAVENOUS at 20:21

## 2021-11-17 RX ADMIN — PREGABALIN 150 MG: 150 CAPSULE ORAL at 08:31

## 2021-11-17 RX ADMIN — ABACAVIR SULFATE, DOLUTEGRAVIR SODIUM, LAMIVUDINE 1 TABLET: 600; 50; 300 TABLET, FILM COATED ORAL at 08:30

## 2021-11-17 RX ADMIN — NAFCILLIN 2 G: 2 POWDER, FOR SOLUTION INTRAMUSCULAR; INTRAVENOUS at 15:46

## 2021-11-17 RX ADMIN — OXYCODONE HYDROCHLORIDE 5 MG: 5 TABLET ORAL at 11:27

## 2021-11-17 RX ADMIN — NAFCILLIN 2 G: 2 POWDER, FOR SOLUTION INTRAMUSCULAR; INTRAVENOUS at 11:24

## 2021-11-17 RX ADMIN — CARISOPRODOL 87.5 MG: 350 TABLET ORAL at 18:38

## 2021-11-17 RX ADMIN — NAFCILLIN 2 G: 2 POWDER, FOR SOLUTION INTRAMUSCULAR; INTRAVENOUS at 23:53

## 2021-11-17 RX ADMIN — PREGABALIN 150 MG: 150 CAPSULE ORAL at 20:21

## 2021-11-17 RX ADMIN — OXYCODONE HYDROCHLORIDE 5 MG: 5 TABLET ORAL at 15:58

## 2021-11-17 RX ADMIN — NICOTINE 1 PATCH: 14 PATCH, EXTENDED RELEASE TRANSDERMAL at 21:13

## 2021-11-17 RX ADMIN — NAFCILLIN 2 G: 2 POWDER, FOR SOLUTION INTRAMUSCULAR; INTRAVENOUS at 03:10

## 2021-11-17 RX ADMIN — LEVOMILNACIPRAN HYDROCHLORIDE 120 MG: 40 CAPSULE, EXTENDED RELEASE ORAL at 08:30

## 2021-11-17 RX ADMIN — OXYCODONE HYDROCHLORIDE 5 MG: 5 TABLET ORAL at 21:20

## 2021-11-17 RX ADMIN — LORAZEPAM 1 MG: 0.5 TABLET ORAL at 08:45

## 2021-11-17 RX ADMIN — POTASSIUM CHLORIDE AND SODIUM CHLORIDE: 900; 150 INJECTION, SOLUTION INTRAVENOUS at 06:01

## 2021-11-17 ASSESSMENT — ACTIVITIES OF DAILY LIVING (ADL)
ADLS_ACUITY_SCORE: 9
ADLS_ACUITY_SCORE: 7
ADLS_ACUITY_SCORE: 12
ADLS_ACUITY_SCORE: 12
ADLS_ACUITY_SCORE: 9
ADLS_ACUITY_SCORE: 9
ADLS_ACUITY_SCORE: 12
ADLS_ACUITY_SCORE: 9
ADLS_ACUITY_SCORE: 9
ADLS_ACUITY_SCORE: 12
ADLS_ACUITY_SCORE: 9
ADLS_ACUITY_SCORE: 12
ADLS_ACUITY_SCORE: 12
ADLS_ACUITY_SCORE: 9
ADLS_ACUITY_SCORE: 12
ADLS_ACUITY_SCORE: 9
ADLS_ACUITY_SCORE: 12
ADLS_ACUITY_SCORE: 5
ADLS_ACUITY_SCORE: 9

## 2021-11-17 NOTE — SIGNIFICANT EVENT
Significant Event Note    Time of event: 10:23 PM November 16, 2021    Description of event:    Hyptension    BP (!) 79/52   Pulse 117   Temp 98.2  F (36.8  C) (Oral)   Resp 15   Ht 1.829 m (6')   Wt 56.2 kg (124 lb)   SpO2 96%   BMI 16.82 kg/m      Per RN: relatively asymptomatic    Plan:    1L LR bolus over 2 hours  Already on IV Nafcillin    Discussed with: bedside nurse    Walt Odonnell MD

## 2021-11-17 NOTE — PLAN OF CARE
VSS except tachycardic. Alert and oriented, calm and cooperative. Up with SBA, denies lightheadedness. Tele ST with some sinus pauses, MD aware. Reg diet. Showered. Refused suboxone. PRN oxycodone and ativan given. COWA score 10. IVF and NADYA continued. LS diminished, c/o not being able to take a deep breath. EDUARDO tomorrow, NPO at midnight. CTM

## 2021-11-17 NOTE — PROGRESS NOTES
[facility]  Hospitalist Progress Note        Derick Guerin MD   11/17/2021        Interval History:        - fever trending down, afebrile last 24 hrs; Hypotension improved (required several fluid boluses), SBP in low 100s, still slightly tachy to low 100s  - evaluated by ID, B Cx growing MSSA, antibiotics switched to Nafcillin  - reports feeling very tired         Assessment and Plan:        Bc German is a 41 year old male with history of chronic opioid dependence, history of polysubstance IV drug abuse, history of MSSA sepsis with pulmonary valve endocarditis (2015), history of lumbar spine L3 osteomyelitis with epidural abscess (requiring surgical intervention 2017) presented to the ED after an episode of syncope along with cough, fevers chills, noted to be tachycardic in the emergency room had a left-sided scrotal laceration which needed to be sutured in the emergency room.  His blood cultures 2 out of 2 returned positive for gram-positive cocci in clusters in the setting of IV drug use.      CXR on admission noted with small somewhat nodular and patchy opacity at the lateral right base may be explained by infectious/inflammatory pneumonitis      MSSA Sepsis with possible pneumonia, endocarditis, recurrent osteomyelitis in the differential with his history of IV drug use;  - T max 101.9, trending down; afebrile last 24 hrs; Hypotension improved (required several fluid boluses), SBP in low 100s, still slightly tachy to low 100s  - evaluated by ID, B Cx growing MSSA, empiric vancomycin/zosyn switched to Nafcillin  - will continue maintenance  ml/hr  - Na 128---130; normal lactic acid 1.8; pro nino 1.03; wbc 13---14, ; CXR as above  - Blood culture 11/14 growing MSSA ; Blood culture from 11/16 positive as well; urine Cx from 11/16 with no growth; will repeat daily blood cultures until negative  - given prior history of infective endocarditis, will plan for EDUARDO 11/18, will  keep NPO after midnight     History of opiate dependence  History of active IV drug use  Depression  - per report last IVDU 1 week PTA  - on 11/16 in ED, per nursing he was briefly nonresponsive and apneic, was evaluated by ED provider, narcan was given with improvement in mentation  - has multiple areas of skin excoriations from picking of skin as well as from IV drug use which could be the source of infection as well  - will have CD evaluation when clinically more stable  - will continue on chronic Suboxone  - continue PTA Fetzima; prn ativan  - he is on a taper of Soma (currently 87.5 mg daily, 1/4th of 350 mg tablet); will resume (pharmacy to assist in resumption--unable to order 1/4 dose)     History of HIV positive status and hepatitis C;  - phil continue PTA Triumeq     Left scrotal laceration secondary to fall and possible syncope;  - Status post suturing on 11/14/2021; needs suture removal in 1 week  - US scrotum noted with small right hydrocele otherwise normal. No evidence for testicular fracture.     DVT prophylaxis; PCD's and ambulation as tolerated     COVID status: asymptomatic screening negative on 11/16    Clinically Significant Risk Factors Present on Admission                Care plan discussed with patient, infectious disease; >35 minutes spent today     Page Me (7 am to 6 pm)              Physical Exam:      Blood pressure 109/77, pulse 108, temperature 99.4  F (37.4  C), temperature source Oral, resp. rate 16, height 1.829 m (6'), weight 56.2 kg (124 lb), SpO2 98 %.  Vitals:    11/15/21 2141   Weight: 56.2 kg (124 lb)     Vital Signs with Ranges  Temp:  [98.1  F (36.7  C)-99.4  F (37.4  C)] 99.4  F (37.4  C)  Pulse:  [] 108  Resp:  [10-26] 16  BP: ()/(50-78) 109/77  SpO2:  [94 %-100 %] 98 %  I/O's Last 24 hours  I/O last 3 completed shifts:  In: -   Out: 2075 [Urine:2075]    Constitutional: awake and oriented but fatigued; resting comfortably in no apparent distress   HEENT: Pupils  equal and reactive to light and accomodation, neck supple    Oral cavity: Moist mucosa   Cardiovascular: Normal s1 s2, regular rate and rhythm, no murmur   Lungs: B/l clear to auscultation, no wheezes or crepitations   Abdomen: Soft, nt, nd, no guarding, rigidity or rebound; BS +   LE : No edema   Musculoskeletal: Power 5/5 in all extremities   Neuro: No focal neurological deficits noted   Psychiatry: normal mood and affect  Skin : noted multiple skin excoriations and injection marks; noted sutured scrotal laceration                 Medications:          abacavir-dolutegravir-LamiVUDine  1 tablet Oral Daily     aspirin  81 mg Oral Daily     buprenorphine HCl-naloxone HCl  1 Film Sublingual Daily     levomilnacipran  120 mg Oral Daily     nafcillin  2 g Intravenous Q4H     pregabalin  150 mg Oral BID     sodium chloride (PF)  3 mL Intracatheter Q8H     sodium chloride (PF)  3 mL Intracatheter Q8H     sodium chloride (PF)  3 mL Intracatheter Q8H     PRN Meds: acetaminophen **OR** acetaminophen, bisacodyl, calcium carbonate, diazepam, lidocaine 4%, lidocaine 4%, lidocaine (buffered or not buffered), lidocaine (buffered or not buffered), LORazepam, melatonin, nitroGLYcerin, ondansetron **OR** ondansetron, oxyCODONE, polyethylene glycol, prochlorperazine **OR** prochlorperazine **OR** prochlorperazine, sodium chloride (PF), sodium chloride (PF), sodium chloride (PF)         Data:      All new lab and imaging data was reviewed.   Recent Labs   Lab Test 11/16/21  0014 11/14/21  1734 07/20/21  0128 05/09/21  1450 10/09/17  2149 10/09/17  2015 03/13/15  0900 03/12/15  1620   WBC 14.7* 13.1* 13.3* 5.5   < > KUMAR UER 2115 BY [facility]   < > 11.8*   HGB 11.4* 12.5* 15.0 13.3   < > KUMAR UER 2115 BY [facility]   < > 10.5*   MCV 84 82 84 87   < > KUMAR UER 2115 BY [facility]   < > 81    250 491* 354   < > KUMAR UER 2115 BY [facility]   < > 515*   INR  --   --   --  0.96  --  1.01  --  1.92*    < > = values in this interval not displayed.       Recent Labs   Lab Test 11/16/21 2049 11/16/21  0014 11/14/21  1734 07/20/21  0128   NA  --  130* 128* 135   POTASSIUM  --  3.5 4.4 4.2   CHLORIDE  --  97 96 100   CO2  --  29 32 25   BUN  --  11 12 12   CR  --  0.71 0.80 0.86   ANIONGAP  --  4 <1* 10   SANDRA  --  8.5 9.0 10.1   GLC 94 104* 119* 101*     Recent Labs   Lab Test 05/09/21  1450 10/09/17  2015   TROPI <0.015 <0.015

## 2021-11-17 NOTE — PROVIDER NOTIFICATION
BRAULIO Guerin @ 17:31 on 11/17/21.--Berenice for Soma 87.5 mg po x 1 dose now.  MD address Soma dosing in [facility]

## 2021-11-17 NOTE — PROVIDER NOTIFICATION
MD Notification     Notified Person: MD     Notified Person Name: Walt Odonnell     Notification Date/Time: 11/16/21 22:15     Notification Interaction:  329 NB     Pt still having hypotension 79/52.  Slightly diaphoretic but denies lightheadedness, blurry vision.  Oral fluids encouraged.  Please advise.      Patricia RN  *[phone]  Purpose of Notification: hypotension     Orders Received: bolus ordered

## 2021-11-17 NOTE — PLAN OF CARE
L&O. VSS ex tachy, soft BP's. Pain managed with PRN oxy, tylenol. Responded to fluid resuscitation. UOP adequate. No s/sx of withdrawal. IV ABX. Discharge pending progress.

## 2021-11-17 NOTE — PROGRESS NOTES
Pt here with sepsis secondary to staph bacteremia, osteomyelitis.  Pt lethargic on shift.   A&O x4. VS tachycardic with hypotension, 500 ml bolus given in early evening.  Second bolus ordered, started at 22:30. Tele ST. regular diet, thin liquids. Takes pills whole with water.  Scrotal incision LISSETH and CDI. On IV abx for positive blood cultures.  0.9% NS with 20 meQ KCl at 125/hr.  Up with GBW. C/o bone pain (has osteomyelitis), tylenol x1, oxycodone x1 which he says was effective. Pt scoring green on the Aggression Stop Light Tool. Plan to complete abx treatments.  Discharge pending.    Home medications- lorazepam and narcan sent to pharmacy

## 2021-11-17 NOTE — PROVIDER NOTIFICATION
MD Notification    Notified Person: MD    Notified Person Name: Walt Odonnell    Notification Date/Time: 11/16/21 20:28    Notification Interaction:  329 NB    500 ml bolus was given at 1830 for low BP; Pt still having hypotension 85/50.  Says this is close to his baseline.  Please advise.    Patricia RN  *[phone]  Purpose of Notification: hypotension    Orders Received: Pt is asymptomatic, will CTM    Comments:

## 2021-11-17 NOTE — PROVIDER NOTIFICATION
MD Notification    Notified Person: MD    Notified Person Name: Walt Odonnell    Notification Date/Time: 11/16/21 18:23    Notification Interaction:    689-1 NB    Wondering if there's a reason pt is NPO? I don't see any procedures scheduled, has been hypotensive, Also, there is IMC in a hospitalist note, could you provide an order? Thanks.    DREA Gomez  *[phone]    Purpose of Notification:  Order clarification  Orders Received:    Comments:

## 2021-11-17 NOTE — PROGRESS NOTES
[facility]    Infectious Disease Progress Note    Date of Service: 11/17/2021     Assessment:  1. MSSA sepsis with concern for endocarditis given substance abuse history. Likely skin source   2. Prior history of MSSA sepsis with pulmonary valve endocarditis in 2015 and spinal discitis/osteomyelitis with epidural abscess requiring surgical intervention in 2017.  3. Substance abuse with active use of methamphetamine / fentanyl currently  4. HIV/Hepatitis C co-infection. Last HIV VL detectable at 29,522 copies/ml and  CD4 625(28%) on  06/09/2021 , on Triumeq     Recommendations  1. Repeat blood cxs X 2 sets  2. EDUARDO  3. Follow HIV RNA  4. Continue Triumeq  5. Maintain on Nafcillin    Alana Cooper MD    Interval History   Feels better today, afebrile, generalized myalgias a little improved. No new focal complaints.    Antimicrobial therapy  11/16 Nafcillin  prior  11/16 Zosyn, Vancomycin    Physical Exam   Temp: 99.4  F (37.4  C) Temp src: Oral BP: (!) 127/34 Pulse: (!) 123   Resp: 13 SpO2: 98 % O2 Device: None (Room air) Oxygen Delivery: 2 LPM  Vitals:    11/15/21 2141   Weight: 56.2 kg (124 lb)     Vital Signs with Ranges  Temp:  [98.1  F (36.7  C)-99.4  F (37.4  C)] 99.4  F (37.4  C)  Pulse:  [] 123  Resp:  [10-23] 13  BP: ()/(34-78) 127/34  SpO2:  [94 %-100 %] 98 %    GENERAL APPEARANCE:  Awake, cachectic  appearing male  EYES: Eyes grossly normal to inspection  Mouth : Poor dentition  NECK: no adenopathy  RESP: lungs clear   CV: regular rates and rhythm  ABDOMEN: soft, nontender  MS: no edema  SKIN: multiple scars, IV racks, excoriations on hands,    : Left scrotal laceration site appears clean, no sign of infection.      Other:    Medications     0.9% sodium chloride + KCl 20 mEq/L 125 mL/hr at 11/17/21 0601       abacavir-dolutegravir-LamiVUDine  1 tablet Oral Daily     aspirin  81 mg Oral Daily     buprenorphine HCl-naloxone HCl  1 Film Sublingual Daily     levomilnacipran   120 mg Oral Daily     nafcillin  2 g Intravenous Q4H     pregabalin  150 mg Oral BID     sodium chloride (PF)  3 mL Intracatheter Q8H     sodium chloride (PF)  3 mL Intracatheter Q8H     sodium chloride (PF)  3 mL Intracatheter Q8H       Data   All microbiology laboratory data reviewed.  Recent Labs   Lab Test 11/16/21  0014 11/14/21  1734 07/20/21  0128   WBC 14.7* 13.1* 13.3*   HGB 11.4* 12.5* 15.0   HCT 34.4* 37.1* 45.9   MCV 84 82 84    250 491*     Recent Labs   Lab Test 11/16/21  0014 11/14/21  1734 07/20/21  0128   CR 0.71 0.80 0.86     Recent Labs   Lab Test 11/16/21  0014   SED 39*     Microbiology  11/14 blood cxs bothsets 4/4 bottles MSSA by verigene, 11/16 blood cxs 4/4 bottles positive MSSA     Culture Positive on the 1st day of incubation Abnormal        Staphylococcus aureus Panic     2 of 2 bottles   Staphylococcus aureus Panic     2 of 2 bottles         Susceptibility     Staphylococcus aureus     PURNIMA     Clindamycin <=0.25 ug/mL Susceptible     Erythromycin <=0.25 ug/mL Susceptible     Gentamicin <=0.5 ug/mL Susceptible     Oxacillin <=0.25 ug/mL Susceptible     Tetracycline <=1.0 ug/mL Susceptible     Trimethoprim/Sulfamethoxazole <=0.5/9.5 u... Susceptible     Vancomycin <=0.5 ug/mL Susceptible        Imaging  11/16 CXr  EXAM: XR CHEST 2 VW  LOCATION: [facility]  DATE/TIME: 11/16/2021 1:25 AM     INDICATION: cough  COMPARISON: 10/09/2017.                                                                      IMPRESSION: Small somewhat nodular and patchy opacity at the lateral right base may be explained by infectious/inflammatory pneumonitis. Given the nodular configuration, follow-up recommended to document clearing. Left lung clear. Incidental note of   accessory azygos fissure. Normal heart size and pulmonary vascularity. Postoperative changes of cervicothoracic fusion     11/14 US testicles  EXAM: US TESTICULAR AND SCROTUM WITH DOPPLER LIMITED  LOCATION:   [facility]  DATE/TIME: 11/14/2021 6:16 PM     INDICATION: Scrotal pain after trauma  COMPARISON: None.  TECHNIQUE: Ultrasound of scrotum with color flow and spectral Doppler with waveform analysis performed.     FINDINGS:     RIGHT: Right testicle measures 4.4 x 2.2 x 3.2 cm. Normal testicle with no masses. Normal arterial duplex and normal color flow. Normal epididymis. Small hydrocele. No varicocele.     LEFT: Left testicle measures 4.5 x 2.6 x 2.8 cm. Normal testicle with no masses. Normal arterial duplex and normal color flow. Normal epididymis. No hydrocele. No varicocele.                                                                      IMPRESSION:  1.  Small right hydrocele otherwise normal. No evidence for testicular fracture.            Albendazole Counseling:  I discussed with the patient the risks of albendazole including but not limited to cytopenia, kidney damage, nausea/vomiting and severe allergy.  The patient understands that this medication is being used in an off-label manner.

## 2021-11-18 ENCOUNTER — APPOINTMENT (OUTPATIENT)
Dept: CARDIOLOGY | Facility: CLINIC | Age: 41
DRG: 871 | End: 2021-11-18
Attending: HOSPITALIST
Payer: COMMERCIAL

## 2021-11-18 ENCOUNTER — ANESTHESIA (OUTPATIENT)
Dept: SURGERY | Facility: CLINIC | Age: 41
DRG: 871 | End: 2021-11-18
Payer: COMMERCIAL

## 2021-11-18 ENCOUNTER — ANESTHESIA EVENT (OUTPATIENT)
Dept: SURGERY | Facility: CLINIC | Age: 41
DRG: 871 | End: 2021-11-18
Payer: COMMERCIAL

## 2021-11-18 LAB
ANION GAP SERPL CALCULATED.3IONS-SCNC: 3 MMOL/L (ref 3–14)
BASOPHILS # BLD AUTO: 0 10E3/UL (ref 0–0.2)
BASOPHILS NFR BLD AUTO: 0 %
BUN SERPL-MCNC: 5 MG/DL (ref 7–30)
CALCIUM SERPL-MCNC: 8.5 MG/DL (ref 8.5–10.1)
CHLORIDE BLD-SCNC: 108 MMOL/L (ref 94–109)
CO2 SERPL-SCNC: 29 MMOL/L (ref 20–32)
CREAT SERPL-MCNC: 0.81 MG/DL (ref 0.66–1.25)
EOSINOPHIL # BLD AUTO: 0.1 10E3/UL (ref 0–0.7)
EOSINOPHIL NFR BLD AUTO: 0 %
ERYTHROCYTE [DISTWIDTH] IN BLOOD BY AUTOMATED COUNT: 13.7 % (ref 10–15)
GFR SERPL CREATININE-BSD FRML MDRD: >90 ML/MIN/1.73M2
GLUCOSE BLD-MCNC: 106 MG/DL (ref 70–99)
HCT VFR BLD AUTO: 34 % (ref 40–53)
HGB BLD-MCNC: 11 G/DL (ref 13.3–17.7)
HIV1 RNA # PLAS NAA DL=20: 985 COPIES/ML
HIV1 RNA SERPL NAA+PROBE-LOG#: 3 {LOG_COPIES}/ML
IMM GRANULOCYTES # BLD: 0.1 10E3/UL
IMM GRANULOCYTES NFR BLD: 1 %
LVEF ECHO: NORMAL
LYMPHOCYTES # BLD AUTO: 1.5 10E3/UL (ref 0.8–5.3)
LYMPHOCYTES NFR BLD AUTO: 12 %
MCH RBC QN AUTO: 27.2 PG (ref 26.5–33)
MCHC RBC AUTO-ENTMCNC: 32.4 G/DL (ref 31.5–36.5)
MCV RBC AUTO: 84 FL (ref 78–100)
MONOCYTES # BLD AUTO: 0.9 10E3/UL (ref 0–1.3)
MONOCYTES NFR BLD AUTO: 7 %
NEUTROPHILS # BLD AUTO: 10.3 10E3/UL (ref 1.6–8.3)
NEUTROPHILS NFR BLD AUTO: 80 %
NRBC # BLD AUTO: 0 10E3/UL
NRBC BLD AUTO-RTO: 0 /100
PLATELET # BLD AUTO: 270 10E3/UL (ref 150–450)
POTASSIUM BLD-SCNC: 3.5 MMOL/L (ref 3.4–5.3)
RBC # BLD AUTO: 4.04 10E6/UL (ref 4.4–5.9)
SODIUM SERPL-SCNC: 140 MMOL/L (ref 133–144)
WBC # BLD AUTO: 12.9 10E3/UL (ref 4–11)

## 2021-11-18 PROCEDURE — 36415 COLL VENOUS BLD VENIPUNCTURE: CPT | Performed by: HOSPITALIST

## 2021-11-18 PROCEDURE — 370N000017 HC ANESTHESIA TECHNICAL FEE, PER MIN

## 2021-11-18 PROCEDURE — 76376 3D RENDER W/INTRP POSTPROCES: CPT | Mod: 26 | Performed by: INTERNAL MEDICINE

## 2021-11-18 PROCEDURE — 87040 BLOOD CULTURE FOR BACTERIA: CPT | Performed by: HOSPITALIST

## 2021-11-18 PROCEDURE — 93325 DOPPLER ECHO COLOR FLOW MAPG: CPT | Mod: 26 | Performed by: INTERNAL MEDICINE

## 2021-11-18 PROCEDURE — 250N000009 HC RX 250: Performed by: SPECIALIST

## 2021-11-18 PROCEDURE — 93312 ECHO TRANSESOPHAGEAL: CPT | Mod: 26 | Performed by: INTERNAL MEDICINE

## 2021-11-18 PROCEDURE — 80048 BASIC METABOLIC PNL TOTAL CA: CPT | Performed by: HOSPITALIST

## 2021-11-18 PROCEDURE — 76376 3D RENDER W/INTRP POSTPROCES: CPT

## 2021-11-18 PROCEDURE — 250N000011 HC RX IP 250 OP 636: Performed by: ANESTHESIOLOGY

## 2021-11-18 PROCEDURE — 99222 1ST HOSP IP/OBS MODERATE 55: CPT | Mod: 25 | Performed by: INTERNAL MEDICINE

## 2021-11-18 PROCEDURE — 36415 COLL VENOUS BLD VENIPUNCTURE: CPT | Performed by: SPECIALIST

## 2021-11-18 PROCEDURE — 258N000003 HC RX IP 258 OP 636: Performed by: HOSPITALIST

## 2021-11-18 PROCEDURE — 99233 SBSQ HOSP IP/OBS HIGH 50: CPT | Performed by: HOSPITALIST

## 2021-11-18 PROCEDURE — 250N000011 HC RX IP 250 OP 636: Performed by: HOSPITALIST

## 2021-11-18 PROCEDURE — 250N000009 HC RX 250: Performed by: HOSPITALIST

## 2021-11-18 PROCEDURE — 93320 DOPPLER ECHO COMPLETE: CPT | Mod: 26 | Performed by: INTERNAL MEDICINE

## 2021-11-18 PROCEDURE — 120N000013 HC R&B IMCU

## 2021-11-18 PROCEDURE — 250N000025 HC SEVOFLURANE, PER MIN

## 2021-11-18 PROCEDURE — 250N000013 HC RX MED GY IP 250 OP 250 PS 637: Performed by: STUDENT IN AN ORGANIZED HEALTH CARE EDUCATION/TRAINING PROGRAM

## 2021-11-18 PROCEDURE — 87040 BLOOD CULTURE FOR BACTERIA: CPT | Performed by: SPECIALIST

## 2021-11-18 PROCEDURE — 250N000013 HC RX MED GY IP 250 OP 250 PS 637: Performed by: HOSPITALIST

## 2021-11-18 PROCEDURE — 250N000009 HC RX 250: Performed by: ANESTHESIOLOGY

## 2021-11-18 PROCEDURE — 258N000003 HC RX IP 258 OP 636: Performed by: ANESTHESIOLOGY

## 2021-11-18 PROCEDURE — 999N000183 HC STATISTIC TEE INCLUDES SEDATION

## 2021-11-18 PROCEDURE — 85025 COMPLETE CBC W/AUTO DIFF WBC: CPT | Performed by: HOSPITALIST

## 2021-11-18 PROCEDURE — 999N000184 HC STATISTIC TELEMETRY

## 2021-11-18 PROCEDURE — 99222 1ST HOSP IP/OBS MODERATE 55: CPT | Mod: GC | Performed by: SURGERY

## 2021-11-18 RX ORDER — DEXAMETHASONE SODIUM PHOSPHATE 4 MG/ML
INJECTION, SOLUTION INTRA-ARTICULAR; INTRALESIONAL; INTRAMUSCULAR; INTRAVENOUS; SOFT TISSUE PRN
Status: DISCONTINUED | OUTPATIENT
Start: 2021-11-18 | End: 2021-11-18

## 2021-11-18 RX ORDER — VANCOMYCIN HYDROCHLORIDE 1 G/200ML
1000 INJECTION, SOLUTION INTRAVENOUS EVERY 12 HOURS
Status: DISCONTINUED | OUTPATIENT
Start: 2021-11-18 | End: 2021-11-22

## 2021-11-18 RX ORDER — CARISOPRODOL 350 MG/1
87.5 TABLET ORAL 2 TIMES DAILY
Status: DISCONTINUED | OUTPATIENT
Start: 2021-11-18 | End: 2021-12-08

## 2021-11-18 RX ORDER — SODIUM CHLORIDE AND POTASSIUM CHLORIDE 150; 900 MG/100ML; MG/100ML
INJECTION, SOLUTION INTRAVENOUS CONTINUOUS
Status: DISCONTINUED | OUTPATIENT
Start: 2021-11-18 | End: 2021-11-18

## 2021-11-18 RX ORDER — SODIUM CHLORIDE, SODIUM LACTATE, POTASSIUM CHLORIDE, CALCIUM CHLORIDE 600; 310; 30; 20 MG/100ML; MG/100ML; MG/100ML; MG/100ML
INJECTION, SOLUTION INTRAVENOUS CONTINUOUS PRN
Status: DISCONTINUED | OUTPATIENT
Start: 2021-11-18 | End: 2021-11-18

## 2021-11-18 RX ORDER — PROPOFOL 10 MG/ML
INJECTION, EMULSION INTRAVENOUS CONTINUOUS PRN
Status: DISCONTINUED | OUTPATIENT
Start: 2021-11-18 | End: 2021-11-18

## 2021-11-18 RX ORDER — MAGNESIUM HYDROXIDE/ALUMINUM HYDROXICE/SIMETHICONE 120; 1200; 1200 MG/30ML; MG/30ML; MG/30ML
30 SUSPENSION ORAL EVERY 4 HOURS PRN
Status: DISCONTINUED | OUTPATIENT
Start: 2021-11-18 | End: 2021-12-30 | Stop reason: HOSPADM

## 2021-11-18 RX ORDER — FENTANYL CITRATE 50 UG/ML
INJECTION, SOLUTION INTRAMUSCULAR; INTRAVENOUS PRN
Status: DISCONTINUED | OUTPATIENT
Start: 2021-11-18 | End: 2021-11-18

## 2021-11-18 RX ORDER — ONDANSETRON 2 MG/ML
INJECTION INTRAMUSCULAR; INTRAVENOUS PRN
Status: DISCONTINUED | OUTPATIENT
Start: 2021-11-18 | End: 2021-11-18

## 2021-11-18 RX ORDER — PROPOFOL 10 MG/ML
INJECTION, EMULSION INTRAVENOUS PRN
Status: DISCONTINUED | OUTPATIENT
Start: 2021-11-18 | End: 2021-11-18

## 2021-11-18 RX ADMIN — LORAZEPAM 1 MG: 0.5 TABLET ORAL at 07:37

## 2021-11-18 RX ADMIN — OXYCODONE HYDROCHLORIDE 5 MG: 5 TABLET ORAL at 18:45

## 2021-11-18 RX ADMIN — FENTANYL CITRATE 100 MCG: 50 INJECTION, SOLUTION INTRAMUSCULAR; INTRAVENOUS at 10:01

## 2021-11-18 RX ADMIN — ONDANSETRON 4 MG: 2 INJECTION INTRAMUSCULAR; INTRAVENOUS at 10:09

## 2021-11-18 RX ADMIN — ASPIRIN 81 MG: 81 TABLET, COATED ORAL at 11:41

## 2021-11-18 RX ADMIN — PREGABALIN 150 MG: 150 CAPSULE ORAL at 20:05

## 2021-11-18 RX ADMIN — ALUMINUM HYDROXIDE, MAGNESIUM HYDROXIDE, AND SIMETHICONE 30 ML: 200; 200; 20 SUSPENSION ORAL at 15:57

## 2021-11-18 RX ADMIN — DEXMEDETOMIDINE HYDROCHLORIDE 4 MCG: 100 INJECTION, SOLUTION INTRAVENOUS at 10:15

## 2021-11-18 RX ADMIN — LORAZEPAM 1 MG: 0.5 TABLET ORAL at 14:47

## 2021-11-18 RX ADMIN — NAFCILLIN 2 G: 2 POWDER, FOR SOLUTION INTRAMUSCULAR; INTRAVENOUS at 03:35

## 2021-11-18 RX ADMIN — PREGABALIN 150 MG: 150 CAPSULE ORAL at 11:41

## 2021-11-18 RX ADMIN — OXYCODONE HYDROCHLORIDE 5 MG: 5 TABLET ORAL at 14:47

## 2021-11-18 RX ADMIN — ABACAVIR SULFATE, DOLUTEGRAVIR SODIUM, LAMIVUDINE 1 TABLET: 600; 50; 300 TABLET, FILM COATED ORAL at 11:41

## 2021-11-18 RX ADMIN — MIDAZOLAM 2 MG: 1 INJECTION INTRAMUSCULAR; INTRAVENOUS at 10:01

## 2021-11-18 RX ADMIN — Medication 87.5 MG: at 20:35

## 2021-11-18 RX ADMIN — DEXMEDETOMIDINE HYDROCHLORIDE 12 MCG: 100 INJECTION, SOLUTION INTRAVENOUS at 10:01

## 2021-11-18 RX ADMIN — PROPOFOL 50 MG: 10 INJECTION, EMULSION INTRAVENOUS at 10:17

## 2021-11-18 RX ADMIN — VANCOMYCIN HYDROCHLORIDE 1000 MG: 1 INJECTION, SOLUTION INTRAVENOUS at 21:55

## 2021-11-18 RX ADMIN — SUCCINYLCHOLINE CHLORIDE 100 MG: 20 INJECTION, SOLUTION INTRAMUSCULAR; INTRAVENOUS; PARENTERAL at 10:03

## 2021-11-18 RX ADMIN — NICOTINE 1 PATCH: 14 PATCH, EXTENDED RELEASE TRANSDERMAL at 20:14

## 2021-11-18 RX ADMIN — NAFCILLIN 2 G: 2 POWDER, FOR SOLUTION INTRAMUSCULAR; INTRAVENOUS at 07:39

## 2021-11-18 RX ADMIN — PROPOFOL 200 MG: 10 INJECTION, EMULSION INTRAVENOUS at 10:01

## 2021-11-18 RX ADMIN — VANCOMYCIN HYDROCHLORIDE 1250 MG: 5 INJECTION, POWDER, LYOPHILIZED, FOR SOLUTION INTRAVENOUS at 11:31

## 2021-11-18 RX ADMIN — ALUMINUM HYDROXIDE, MAGNESIUM HYDROXIDE, AND SIMETHICONE 30 ML: 200; 200; 20 SUSPENSION ORAL at 20:45

## 2021-11-18 RX ADMIN — OXYCODONE HYDROCHLORIDE 5 MG: 5 TABLET ORAL at 03:35

## 2021-11-18 RX ADMIN — LEVOMILNACIPRAN HYDROCHLORIDE 120 MG: 40 CAPSULE, EXTENDED RELEASE ORAL at 11:41

## 2021-11-18 RX ADMIN — LORAZEPAM 1 MG: 0.5 TABLET ORAL at 21:54

## 2021-11-18 RX ADMIN — PROPOFOL 200 MCG/KG/MIN: 10 INJECTION, EMULSION INTRAVENOUS at 10:17

## 2021-11-18 RX ADMIN — SODIUM CHLORIDE, POTASSIUM CHLORIDE, SODIUM LACTATE AND CALCIUM CHLORIDE: 600; 310; 30; 20 INJECTION, SOLUTION INTRAVENOUS at 09:59

## 2021-11-18 RX ADMIN — DEXAMETHASONE SODIUM PHOSPHATE 4 MG: 4 INJECTION, SOLUTION INTRA-ARTICULAR; INTRALESIONAL; INTRAMUSCULAR; INTRAVENOUS; SOFT TISSUE at 10:09

## 2021-11-18 ASSESSMENT — ACTIVITIES OF DAILY LIVING (ADL)
ADLS_ACUITY_SCORE: 7
ADLS_ACUITY_SCORE: 5
ADLS_ACUITY_SCORE: 7
ADLS_ACUITY_SCORE: 5
ADLS_ACUITY_SCORE: 7

## 2021-11-18 ASSESSMENT — LIFESTYLE VARIABLES: TOBACCO_USE: 1

## 2021-11-18 NOTE — PROGRESS NOTES
Sedation Post Procedure Summary:    Immediately prior to starting the procedure a Time Out was conducted with procedural staff and re-confirmed the patient s name, procedure, and site/side. MD completed sedation assessment.     Consent obtained from patient after discussing the risks, benefits and alternatives.       Indication:  Sedation was required to allow for EDUARDO with general anesthesia    Sedatives: Propofol- see anesthesia records    Vital signs, airway, and pulse oximetry were monitored throughout the procedure and sedation was maintained until the procedure was complete.      Patient tolerance: Patient tolerated the procedure well with no immediate complications.    Post-procedure report given to: Svitlana SHEPARD and pt transferred to station 33 per cart with RN at 1120.    (See Doc Flow-sheets and MAR for additional information)    Lee Puentes RN

## 2021-11-18 NOTE — PROGRESS NOTES
Pt here with sepsis secondary to staph bacteremia, osteomyelitis. A&O x4. VS tachycardic at times.  BP improved today. Tele SR c BBB. regular diet, thin liquids. Takes pills whole with water.  Scrotal incision LISSETH and CDI. On IV abx for positive blood cultures.  0.9% NS with 20 meQ KCl at 125/hr.  Up with GBW. C/o bone pain (has osteomyelitis), tylenol x1, oxycodone x2.  Pt scoring green on the Aggression Stop Light Tool. Plan to complete abx treatments.  Discharge pending

## 2021-11-18 NOTE — PROGRESS NOTES
Pt arrived from station 33 to care suites for his EDUARDO with general anesthesia. All staff present and ready to proceed.

## 2021-11-18 NOTE — ANESTHESIA CARE TRANSFER NOTE
Patient: Bc German    Procedure: Procedure(s):  ECHOCARDIOGRAM, TRANSESOPHAGEAL, INTRAOPERATIVE       Diagnosis: Endocarditis [I38]  Diagnosis Additional Information: No value filed.    Anesthesia Type:   General     Note:    Oropharynx: oropharynx clear of all foreign objects and spontaneously breathing  Level of Consciousness: awake and drowsy  Oxygen Supplementation: room air    Independent Airway: airway patency satisfactory and stable  Dentition: dentition unchanged  Vital Signs Stable: post-procedure vital signs reviewed and stable  Report to RN Given: handoff report given  Patient transferred to: Medical/Surgical Unit (33)  Comments: yuan score: 10, Pt discharged from PACU per anesthesiologist. VSS and report called to RN on 33 by care suites nurse.   Handoff Report: Identifed the Patient, Identified the Reponsible Provider, Reviewed the pertinent medical history, Discussed the surgical course, Reviewed Intra-OP anesthesia mangement and issues during anesthesia, Set expectations for post-procedure period and Allowed opportunity for questions and acknowledgement of understanding      Vitals:  Vitals Value Taken Time   /93    Temp     Pulse 98    Resp 17    SpO2 100        Electronically Signed By: Christine Marie Volp Hodgkins, CRNA, APRN CRNA  November 18, 2021  10:53 AM

## 2021-11-18 NOTE — ANESTHESIA POSTPROCEDURE EVALUATION
Patient: Bc German    Procedure: Procedure(s):  ECHOCARDIOGRAM, TRANSESOPHAGEAL, INTRAOPERATIVE       Diagnosis:Endocarditis [I38]  Diagnosis Additional Information: No value filed.    Anesthesia Type:  General    Note:  Disposition: Outpatient   Postop Pain Control: Uneventful            Sign Out: Well controlled pain; Pain at Preoperative BASELINE   PONV: No   Neuro/Psych: Uneventful            Sign Out: Acceptable/Baseline neuro status   Airway/Respiratory: Uneventful            Sign Out: Acceptable/Baseline resp. status   CV/Hemodynamics: Uneventful            Sign Out: Acceptable CV status; No obvious hypovolemia; No obvious fluid overload   Other NRE: NONE   DID A NON-ROUTINE EVENT OCCUR? No           Last vitals:  Vitals Value Taken Time   BP     Temp     Pulse     Resp     SpO2         Electronically Signed By: Kwadwo Savage MD  November 18, 2021  1:48 PM

## 2021-11-18 NOTE — ANESTHESIA PREPROCEDURE EVALUATION
Prior to Admission medications    Medication Sig Start Date End Date Taking? Authorizing Provider   abacavir-dolutegravir-LamiVUDine (TRIUMEQ) 600- MG per tablet Take 1 tablet by mouth daily Please call [phone] & make appointment for further refills. 12/10/18  Yes Damir Cisneros MD   aspirin 81 MG EC tablet Take 81 mg by mouth daily   Yes Unknown, Entered By History   buprenorphine HCl-naloxone HCl (SUBOXONE) 8-2 MG per film Place 1 Film under the tongue daily as needed    Yes Unknown, Entered By History   carisoprodol (SOMA) 350 MG tablet Take 87.5 mg by mouth daily Tapering off medication, current dose 1/4 tablet    Yes Unknown, Entered By History   FETZIMA 120 MG 24 hr capsule Take 120 mg by mouth daily 10/26/21  Yes Unknown, Entered By History   LORazepam (ATIVAN) 0.5 MG tablet Take 2 tablets (1 mg) by mouth every 8 hours as needed for anxiety 7/20/21  Yes Camilla Gardiner MD   pregabalin (LYRICA) 150 MG capsule Take 150 mg by mouth 2 times daily   Yes Unknown, Entered By History   testosterone cypionate (DEPOTESTOTERONE) 200 MG/ML injection Inject 0.3 mLs into the muscle once a week Inject 0.3 mL IM every 77 days   Yes Unknown, Entered By History   cephALEXin (KEFLEX) 500 MG capsule Take 1 capsule (500 mg) by mouth 4 times daily for 7 days 11/14/21 11/21/21  Jeremie Prabhakar MD     Current Facility-Administered Medications Ordered in Epic   Medication Dose Route Frequency Last Rate Last Admin     0.9% sodium chloride + KCl 20 mEq/L infusion   Intravenous Continuous 125 mL/hr at 11/17/21 2034 New Bag at 11/17/21 2034     abacavir-dolutegravir-LamiVUDine (TRIUMEQ) 600- MG per tablet 1 tablet  1 tablet Oral Daily   1 tablet at 11/17/21 0830     acetaminophen (TYLENOL) tablet 650 mg  650 mg Oral Q6H PRN   650 mg at 11/17/21 1558    Or     acetaminophen (TYLENOL) Suppository 650 mg  650 mg Rectal Q6H PRN         aspirin EC tablet 81 mg  81 mg Oral Daily   81 mg at 11/17/21 0830      bisacodyl (DULCOLAX) Suppository 10 mg  10 mg Rectal Daily PRN         buprenorphine HCl-naloxone HCl (SUBOXONE) 8-2 MG per film 1 Film  1 Film Sublingual Daily         calcium carbonate (TUMS) chewable tablet 500 mg  500 mg Oral TID PRN         diazepam (VALIUM) tablet 5-10 mg  5-10 mg Oral Q6H PRN         Give   of usual dose of LONG ACTING insulin AM of procedure IF diabetic   Does not apply Continuous PRN         HOLD: Insulin - RAPID/SHORT acting AM of procedure IF diabetic   Does not apply HOLD         HOLD: Insulin - REGULAR AM of procedure IF diabetic   Does not apply HOLD         HOLD: Oral hypoglycemics AM of procedure IF diabetic   Does not apply HOLD         levomilnacipran (FETZIMA ER) 24 hr capsule 120 mg  120 mg Oral Daily   120 mg at 11/17/21 0830     lidocaine (LMX4) cream   Topical Q1H PRN         lidocaine (LMX4) cream   Topical Q1H PRN         lidocaine 1 % 0.1-1 mL  0.1-1 mL Other Q1H PRN         lidocaine 1 % 0.1-1 mL  0.1-1 mL Other Q1H PRN         LORazepam (ATIVAN) tablet 1 mg  1 mg Oral Q8H PRN   1 mg at 11/18/21 0737     May take oral meds with a sip of water, the morning of EDUARDO procedure.   Does not apply Continuous PRN         melatonin tablet 1 mg  1 mg Oral At Bedtime PRN         nafcillin IV 2 g vial to attach to  ml bag  2 g Intravenous Q4H 0 mL/hr at 11/16/21 1150 2 g at 11/18/21 0739     nicotine (NICODERM CQ) 14 MG/24HR 24 hr patch 1 patch  1 patch Transdermal QPM   1 patch at 11/17/21 2113     nicotine Patch in Place   Transdermal Q8H         nitroGLYcerin (NITROSTAT) sublingual tablet 0.4 mg  0.4 mg Sublingual Q5 Min PRN         ondansetron (ZOFRAN-ODT) ODT tab 4 mg  4 mg Oral Q6H PRN        Or     ondansetron (ZOFRAN) injection 4 mg  4 mg Intravenous Q6H PRN         oxyCODONE (ROXICODONE) tablet 5 mg  5 mg Oral Q4H PRN   5 mg at 11/18/21 0335     polyethylene glycol (MIRALAX) Packet 17 g  17 g Oral Daily PRN         pregabalin (LYRICA) capsule 150 mg  150 mg Oral BID    150 mg at 11/17/21 2021     prochlorperazine (COMPAZINE) injection 10 mg  10 mg Intravenous Q6H PRN        Or     prochlorperazine (COMPAZINE) tablet 10 mg  10 mg Oral Q6H PRN        Or     prochlorperazine (COMPAZINE) suppository 25 mg  25 mg Rectal Q12H PRN         sodium chloride (PF) 0.9% PF flush 3 mL  3 mL Intravenous Q1H PRN         sodium chloride (PF) 0.9% PF flush 3 mL  3 mL Intravenous Q8H         sodium chloride (PF) 0.9% PF flush 3 mL  3 mL Intracatheter Q8H         sodium chloride (PF) 0.9% PF flush 3 mL  3 mL Intracatheter q1 min prn         sodium chloride (PF) 0.9% PF flush 3 mL  3 mL Intracatheter Q8H   3 mL at 11/16/21 1915     sodium chloride (PF) 0.9% PF flush 3 mL  3 mL Intracatheter q1 min prn         sodium chloride (PF) 0.9% PF flush 3 mL  3 mL Intracatheter q1 min prn         sodium chloride (PF) 0.9% PF flush 3 mL  3 mL Intracatheter Q8H         vancomycin (VANCOCIN) 1000 mg in dextrose 5% 200 mL PREMIX  1,000 mg Intravenous Q12H         vancomycin 1250 mg in 0.9% NaCl 250 mL intermittent infusion 1,250 mg  1,250 mg Intravenous Once         No current [facility]-ordered outpatient medications on file.       0.9% sodium chloride + KCl 20 mEq/L 125 mL/hr at 11/17/21 2034     - MEDICATION INSTRUCTIONS -       - MEDICATION INSTRUCTIONS -       No results for input(s): ABO, RH in the last 44667 hours.  No results for input(s): HCG in the last 53557 hours.  Recent Results (from the past 744 hour(s))   CT Head w/o Contrast    Narrative    EXAM: CT HEAD W/O CONTRAST  LOCATION: [facility]  DATE/TIME: 11/14/2021 6:02 PM    INDICATION: Head trauma, mod-severe head trauma, abnormal mental status (Age 19-64y).  COMPARISON: 05/09/2021.  TECHNIQUE: Routine CT Head without IV contrast. Multiplanar reformats. Dose reduction techniques were used.    FINDINGS:  INTRACRANIAL CONTENTS: No intracranial hemorrhage, extraaxial collection, or mass effect.  No CT evidence of acute infarct.  Normal parenchymal attenuation. Normal ventricles and sulci.     VISUALIZED ORBITS/SINUSES/MASTOIDS: No acute intraorbital abnormality identified. Unchanged plate and screw fixation hardware along the left inferior orbital rim. Mild to moderate scattered paranasal sinus mucosal thickening, primarily in the ethmoid   sinuses. No middle ear or mastoid effusion.    BONES/SOFT TISSUES: No acute abnormality.      Impression    IMPRESSION:  1.  No CT findings of acute intracranial process.     US Testicular & Scrotum w Doppler Ltd    Narrative    EXAM: US TESTICULAR AND SCROTUM WITH DOPPLER LIMITED  LOCATION: [facility]  DATE/TIME: 11/14/2021 6:16 PM    INDICATION: Scrotal pain after trauma  COMPARISON: None.  TECHNIQUE: Ultrasound of scrotum with color flow and spectral Doppler with waveform analysis performed.    FINDINGS:    RIGHT: Right testicle measures 4.4 x 2.2 x 3.2 cm. Normal testicle with no masses. Normal arterial duplex and normal color flow. Normal epididymis. Small hydrocele. No varicocele.    LEFT: Left testicle measures 4.5 x 2.6 x 2.8 cm. Normal testicle with no masses. Normal arterial duplex and normal color flow. Normal epididymis. No hydrocele. No varicocele.      Impression    IMPRESSION:  1.  Small right hydrocele otherwise normal. No evidence for testicular fracture.   XR Chest 2 Views    Narrative    EXAM: XR CHEST 2 VW  LOCATION: [facility]  DATE/TIME: 11/16/2021 1:25 AM    INDICATION: cough  COMPARISON: 10/09/2017.      Impression    IMPRESSION: Small somewhat nodular and patchy opacity at the lateral right base may be explained by infectious/inflammatory pneumonitis. Given the nodular configuration, follow-up recommended to document clearing. Left lung clear. Incidental note of   accessory azygos fissure. Normal heart size and pulmonary vascularity. Postoperative changes of cervicothoracic fusion.   Anesthesia Pre-Procedure  Evaluation    Patient: Bc German   MRN: [id number] : 1980        Preoperative Diagnosis: Endocarditis [I38]    Procedure : Procedure(s):  ECHOCARDIOGRAM, TRANSESOPHAGEAL, INTRAOPERATIVE          Past Medical History:   Diagnosis Date     Anxiety      Depressive disorder      Drug abuse, IV (H)      Group A streptococcal infection 2014    Bacteremia/cellulitis     HCV antibody positive      HIV (human immunodeficiency virus infection) (H)      HIV (human immunodeficiency virus infection) (H)      HIV (human immunodeficiency virus infection) (H)      IVDU (intravenous drug user)      Osteomyelitis of vertebra of lumbosacral region (H) 3/2011    L3, left psoas abscess     Substance abuse (H)       Past Surgical History:   Procedure Laterality Date     EYE SURGERY  1 year ago    pins to left eye after socket fracture     OPTICAL TRACKING SYSTEM FUSION CERVICAL ANTERIOR ONE LEVEL Right 9/10/2017    Procedure: OPTICAL TRACKING SYSTEM FUSION CERVICAL ANTERIOR ONE LEVEL;  Stealth C6-C7 Anterior Cervical Corpectomy and C5-T1 Fusion;  Surgeon: Marv Ambrose MD;  Location: [facility] OR     ORTHOPEDIC SURGERY      Arm Surgery     PICC INSERTION Left 2017    5fr DL BioFlo PICC, 42cm (1cm external) in the L basilic vein w/ tip in the low SVC.     TRANSESOPHAGEAL ECHOCARDIOGRAM INTRAOPERATIVE N/A 3/17/2015    Procedure: TRANSESOPHAGEAL ECHOCARDIOGRAM INTRAOPERATIVE;  Surgeon: Generic Anesthesia Provider;  Location: [facility] OR      Allergies   Allergen Reactions     Bupropion      Other reaction(s): Seizures, Seizures  PN: seizure when took a double dose  seizure when took a double dose  PN: seizure when took a double dose  Other reaction(s): Seizures  PN: seizure when took a double dose       Acetaminophen Nausea and Vomiting     PN: : GI Upset  : GI Upset  PN: : GI Upset  Other reaction(s): Vomiting  PN: : GI Upset       Codeine Itching and Nausea and Vomiting     Other reaction(s): Abdominal Pain  PN:  weak;  fatigue; vomiting   weak; fatigue; vomiting  PN:  weak; fatigue; vomiting  PN:  weak; fatigue; vomiting       Sulfa Drugs Itching      Social History     Tobacco Use     Smoking status: Current Every Day Smoker     Packs/day: 0.25     Types: Cigarettes     Smokeless tobacco: Never Used   Substance Use Topics     Alcohol use: Yes      Wt Readings from Last 1 Encounters:   11/15/21 56.2 kg (124 lb)        Anesthesia Evaluation   Pt has had prior anesthetic.     No history of anesthetic complications       ROS/MED HX  ENT/Pulmonary:     (+) tobacco use, Current use,     Neurologic:       Cardiovascular:     (+) -----valvular problems/murmurs     METS/Exercise Tolerance:     Hematologic:       Musculoskeletal:       GI/Hepatic:       Renal/Genitourinary:       Endo:       Psychiatric/Substance Use: Comment: Iv drug abuse    (+) psychiatric history alcohol abuse H/O chronic opiod use . Recreational drug usage: Cocaine and Meth.    Infectious Disease: Comment: HIV      Malignancy:       Other:            Physical Exam    Airway        Mallampati: II    Neck ROM: full     Respiratory Devices and Support         Dental       (+) missing and loose      Cardiovascular   cardiovascular exam normal          Pulmonary   pulmonary exam normal                OUTSIDE LABS:  CBC:   Lab Results   Component Value Date    WBC 12.9 (H) 11/18/2021    WBC 14.7 (H) 11/16/2021    HGB 11.0 (L) 11/18/2021    HGB 11.4 (L) 11/16/2021    HCT 34.0 (L) 11/18/2021    HCT 34.4 (L) 11/16/2021     11/18/2021     11/16/2021     BMP:   Lab Results   Component Value Date     11/18/2021     (L) 11/16/2021    POTASSIUM 3.5 11/18/2021    POTASSIUM 3.5 11/16/2021    CHLORIDE 108 11/18/2021    CHLORIDE 97 11/16/2021    CO2 29 11/18/2021    CO2 29 11/16/2021    BUN 5 (L) 11/18/2021    BUN 11 11/16/2021    CR 0.81 11/18/2021    CR 0.71 11/16/2021     (H) 11/18/2021    GLC 94 11/16/2021     COAGS:   Lab Results   Component Value  Date    PTT 33 05/09/2021    INR 0.96 05/09/2021     POC:   Lab Results   Component Value Date     (H) 09/12/2017     HEPATIC:   Lab Results   Component Value Date    ALBUMIN 2.7 (L) 11/16/2021    PROTTOTAL 7.3 11/16/2021    ALT 61 11/16/2021    AST 38 11/16/2021    ALKPHOS 103 11/16/2021    BILITOTAL 0.4 11/16/2021    XIN 31 05/09/2021     OTHER:   Lab Results   Component Value Date    PH 7.39 11/16/2021    LACT 1.8 11/16/2021    SANDRA 8.5 11/18/2021    PHOS 3.5 09/09/2017    MAG 2.0 05/09/2021    LIPASE 93 05/04/2018    AMYLASE 74 02/24/2017    TSH 2.95 05/09/2021    T4 1.23 05/16/2006    .0 (H) 11/16/2021    SED 39 (H) 11/16/2021       Anesthesia Plan    ASA Status:  4   NPO Status:  NPO Appropriate    Anesthesia Type: General.     - Airway: ETT   Induction: Intravenous.   Maintenance: Balanced.   Techniques and Equipment:     - Airway: Video-Laryngoscope         Consents    Anesthesia Plan(s) and associated risks, benefits, and realistic alternatives discussed. Questions answered and patient/representative(s) expressed understanding.    - Discussed:     - Discussed with:  Patient         Postoperative Care    Pain management: IV analgesics.   PONV prophylaxis: Ondansetron (or other 5HT-3)     Comments:                Kwadwo Savage MD

## 2021-11-18 NOTE — PLAN OF CARE
Pt has a hx of drug abuse and has a staph infection in his blood.  Also on isolation.  Pt has Osteomyelitis that is generalized all over.  Pt is on IV antibiotics and also has a swollen scrotum. Which has a suture in place.   Pt is NPO foe a 12 noon a EDUARDO.  Pt is not happy for being NPO for the procedure.

## 2021-11-18 NOTE — PROGRESS NOTES
[facility]    Infectious Disease Progress Note    Date of Service : 11/18/2021     Assessment:  1. MSSA sepsis with concern for endocarditis given substance abuse history. Likely skin source   2. Prior history of MSSA sepsis with pulmonary valve endocarditis in 2015 and spinal discitis/osteomyelitis with epidural abscess requiring surgical intervention in 2017.  3. Substance abuse with active use of methamphetamine / fentanyl currently  4. HIV/Hepatitis C co-infection. Last HIV VL detectable at 29,522 copies/ml and  CD4 625(28%) on  06/09/2021 , on Triumeq     Recommendations  1. Repeat blood cxs X 2 sets again today - ordered  2. EDUARDO  3. Follow HIV RNA. CD4 > 200, does not require PJP prophylaxis.   4. Continue Triumeq  5. Maintain on Nafcillin    Alana Cooper MD    Interval History   Resting, afebrile now, continues to feel awful. Tolerating antibiotics without side effects.    Antimicrobial therapy  11/16 Nafcillin  prior  11/16 Zosyn, Vancomycin  Physical Exam   Temp: 98.2  F (36.8  C) Temp src: Oral BP: (!) 127/95 Pulse: 89   Resp: 22 SpO2: 99 % O2 Device: None (Room air)    Vitals:    11/15/21 2141   Weight: 56.2 kg (124 lb)     Vital Signs with Ranges  Temp:  [98.2  F (36.8  C)-98.8  F (37.1  C)] 98.2  F (36.8  C)  Pulse:  [] 89  Resp:  [13-27] 22  BP: (101-131)/(34-96) 127/95  SpO2:  [97 %-100 %] 99 %      GENERAL APPEARANCE:  Awake, cachectic  appearing male  EYES: Eyes grossly normal to inspection  Mouth : Poor dentition  NECK: no adenopathy  RESP: lungs clear   CV: regular rates and rhythm  ABDOMEN: soft, nontender  MS: no edema  SKIN: multiple scars, IV racks, excoriations on hands,    : Left scrotal laceration site appears clean, no sign of infection.    Other:    Medications     0.9% sodium chloride + KCl 20 mEq/L 125 mL/hr at 11/17/21 2034     - MEDICATION INSTRUCTIONS -       - MEDICATION INSTRUCTIONS -         abacavir-dolutegravir-LamiVUDine  1 tablet Oral Daily      aspirin  81 mg Oral Daily     buprenorphine HCl-naloxone HCl  1 Film Sublingual Daily     levomilnacipran  120 mg Oral Daily     nafcillin  2 g Intravenous Q4H     nicotine  1 patch Transdermal QPM     nicotine   Transdermal Q8H     pregabalin  150 mg Oral BID     sodium chloride (PF)  3 mL Intravenous Q8H     sodium chloride (PF)  3 mL Intracatheter Q8H     sodium chloride (PF)  3 mL Intracatheter Q8H     sodium chloride (PF)  3 mL Intracatheter Q8H     Data   All microbiology laboratory data reviewed.  Recent Labs   Lab Test 11/18/21  0535 11/16/21  0014 11/14/21  1734   WBC 12.9* 14.7* 13.1*   HGB 11.0* 11.4* 12.5*   HCT 34.0* 34.4* 37.1*   MCV 84 84 82    187 250     Recent Labs   Lab Test 11/18/21  0535 11/16/21  0014 11/14/21  1734   CR 0.81 0.71 0.80     Recent Labs   Lab Test 11/16/21  0014   SED 39*     Microbiology  11/14 blood cxs bothsets 4/4 bottles MSSA by verigene, 11/16 blood cxs 4/4 bottles positive MSSA     Culture Positive on the 1st day of incubation Abnormal         Staphylococcus aureus Panic     2 of 2 bottles   Staphylococcus aureus Panic     2 of 2 bottles          Susceptibility       Staphylococcus aureus       PURNIMA       Clindamycin <=0.25 ug/mL Susceptible       Erythromycin <=0.25 ug/mL Susceptible       Gentamicin <=0.5 ug/mL Susceptible       Oxacillin <=0.25 ug/mL Susceptible       Tetracycline <=1.0 ug/mL Susceptible       Trimethoprim/Sulfamethoxazole <=0.5/9.5 u... Susceptible       Vancomycin <=0.5 ug/mL Susceptible           Imaging  11/16 CXr  EXAM: XR CHEST 2 VW  LOCATION: [facility]  DATE/TIME: 11/16/2021 1:25 AM     INDICATION: cough  COMPARISON: 10/09/2017.                                                                      IMPRESSION: Small somewhat nodular and patchy opacity at the lateral right base may be explained by infectious/inflammatory pneumonitis. Given the nodular configuration, follow-up recommended to document clearing. Left  lung clear. Incidental note of   accessory azygos fissure. Normal heart size and pulmonary vascularity. Postoperative changes of cervicothoracic fusion     11/14 US testicles  EXAM: US TESTICULAR AND SCROTUM WITH DOPPLER LIMITED  LOCATION: [facility]  DATE/TIME: 11/14/2021 6:16 PM     INDICATION: Scrotal pain after trauma  COMPARISON: None.  TECHNIQUE: Ultrasound of scrotum with color flow and spectral Doppler with waveform analysis performed.     FINDINGS:     RIGHT: Right testicle measures 4.4 x 2.2 x 3.2 cm. Normal testicle with no masses. Normal arterial duplex and normal color flow. Normal epididymis. Small hydrocele. No varicocele.     LEFT: Left testicle measures 4.5 x 2.6 x 2.8 cm. Normal testicle with no masses. Normal arterial duplex and normal color flow. Normal epididymis. No hydrocele. No varicocele.                                                                      IMPRESSION:  1.  Small right hydrocele otherwise normal. No evidence for testicular fracture.

## 2021-11-18 NOTE — CONSULTS
Cardiothoracic Surgery Consult Note    Bc German MRN# [id number]   Age: 41 year old YOB: 1980     Date of Admission:  11/15/2021    Date of Consult:   11/15/21    Reason for consult: Tricuspid valve endocarditis             History of Present Illness:   41-year-old male admitted to medical floor for infective tricuspid valve endocarditis. He presented to ER on 11/14 after a fall/syncopal episode when his father found him in his bathtub with bleeding from left groin and scrotum. He was found to have a scrotal laceration which was repaired in the ER. Blood cultures were obtained which subsequently returned positive for MSSA and he was contacted to come back to the ED and was admitted for further evaluation. EDUARDO was done today, and CT surgery was consulted for concerning EDUARDO findings. He does not complain of chest pain, shortness of breath, headache, fever, GI or urinary complaints at this time.          Past Medical History:   MSSA sepsis with pulmonary valve endocarditis in 2015  lumbar spine L3 osteomyelitis with psoas abscess requiring drainage in 2017  HIV  Hep C  Left upper extremity DVT in past needing Coumadin (does not remember time & duration)    Past Medical History:   Diagnosis Date     Anxiety      Depressive disorder      Drug abuse, IV (H)      Group A streptococcal infection 11/2014    Bacteremia/cellulitis     HCV antibody positive      HIV (human immunodeficiency virus infection) (H)      HIV (human immunodeficiency virus infection) (H)      HIV (human immunodeficiency virus infection) (H)      IVDU (intravenous drug user)      Osteomyelitis of vertebra of lumbosacral region (H) 3/2011    L3, left psoas abscess     Substance abuse (H)           Past Surgical History:     Past Surgical History:   Procedure Laterality Date     EYE SURGERY  1 year ago    pins to left eye after socket fracture     OPTICAL TRACKING SYSTEM FUSION CERVICAL ANTERIOR ONE LEVEL Right 9/10/2017    Procedure:  OPTICAL TRACKING SYSTEM FUSION CERVICAL ANTERIOR ONE LEVEL;  Stealth C6-C7 Anterior Cervical Corpectomy and C5-T1 Fusion;  Surgeon: Marv Ambrose MD;  Location: [facility] OR     ORTHOPEDIC SURGERY      Arm Surgery     PICC INSERTION Left 09/21/2017    5fr DL BioFlo PICC, 42cm (1cm external) in the L basilic vein w/ tip in the low SVC.     TRANSESOPHAGEAL ECHOCARDIOGRAM INTRAOPERATIVE N/A 3/17/2015    Procedure: TRANSESOPHAGEAL ECHOCARDIOGRAM INTRAOPERATIVE;  Surgeon: Generic Anesthesia Provider;  Location: [facility] OR          Social History:   chronic opioid dependence  history of polysubstance IV drug abuse with active use of methamphetamine and fentanyl  Last Heroine use 5 days ago    Social History     Tobacco Use     Smoking status: Current Every Day Smoker     Packs/day: 0.25     Types: Cigarettes     Smokeless tobacco: Never Used   Substance Use Topics     Alcohol use: Yes          Family History:   History reviewed. No pertinent family history.          Allergies:     Allergies   Allergen Reactions     Bupropion      Other reaction(s): Seizures, Seizures  PN: seizure when took a double dose  seizure when took a double dose  PN: seizure when took a double dose  Other reaction(s): Seizures  PN: seizure when took a double dose       Acetaminophen Nausea and Vomiting     PN: : GI Upset  : GI Upset  PN: : GI Upset  Other reaction(s): Vomiting  PN: : GI Upset       Codeine Itching and Nausea and Vomiting     Other reaction(s): Abdominal Pain  PN:  weak; fatigue; vomiting   weak; fatigue; vomiting  PN:  weak; fatigue; vomiting  PN:  weak; fatigue; vomiting       Sulfa Drugs Itching          Medications:     Current Facility-Administered Medications   Medication     0.9% sodium chloride + KCl 20 mEq/L infusion     abacavir-dolutegravir-LamiVUDine (TRIUMEQ) 600- MG per tablet 1 tablet     acetaminophen (TYLENOL) tablet 650 mg    Or     acetaminophen (TYLENOL) Suppository 650 mg     aspirin EC tablet 81 mg      bisacodyl (DULCOLAX) Suppository 10 mg     calcium carbonate (TUMS) chewable tablet 500 mg     carisoprodol (SOMA) quarter-tab 87.5 mg     diazepam (VALIUM) tablet 5-10 mg     Give   of usual dose of LONG ACTING insulin AM of procedure IF diabetic     HOLD: Insulin - RAPID/SHORT acting AM of procedure IF diabetic     HOLD: Insulin - REGULAR AM of procedure IF diabetic     HOLD: Oral hypoglycemics AM of procedure IF diabetic     levomilnacipran (FETZIMA ER) 24 hr capsule 120 mg     lidocaine (LMX4) cream     lidocaine (LMX4) cream     lidocaine 1 % 0.1-1 mL     lidocaine 1 % 0.1-1 mL     LORazepam (ATIVAN) tablet 1 mg     May take oral meds with a sip of water, the morning of EDUARDO procedure.     melatonin tablet 1 mg     nicotine (NICODERM CQ) 14 MG/24HR 24 hr patch 1 patch     nicotine Patch in Place     nitroGLYcerin (NITROSTAT) sublingual tablet 0.4 mg     ondansetron (ZOFRAN-ODT) ODT tab 4 mg    Or     ondansetron (ZOFRAN) injection 4 mg     oxyCODONE (ROXICODONE) tablet 5 mg     polyethylene glycol (MIRALAX) Packet 17 g     pregabalin (LYRICA) capsule 150 mg     prochlorperazine (COMPAZINE) injection 10 mg    Or     prochlorperazine (COMPAZINE) tablet 10 mg    Or     prochlorperazine (COMPAZINE) suppository 25 mg     sodium chloride (PF) 0.9% PF flush 3 mL     sodium chloride (PF) 0.9% PF flush 3 mL     sodium chloride (PF) 0.9% PF flush 3 mL     sodium chloride (PF) 0.9% PF flush 3 mL     sodium chloride (PF) 0.9% PF flush 3 mL     vancomycin (VANCOCIN) 1000 mg in dextrose 5% 200 mL PREMIX          Review of Systems:    ROS: 10 point ROS neg other than the symptoms noted above in the HPI.           Physical Exam:   All vitals have been reviewed  Temp:  [98.2  F (36.8  C)-98.8  F (37.1  C)] 98.8  F (37.1  C)  Pulse:  [] 98  Resp:  [17-27] 20  BP: (102-137)/() 122/85  SpO2:  [98 %-100 %] 98 %    Physical Exam:  GENERAL: appears well, in no acute distress, unkempt  HEENT: within normal limits  NECK:   supple, no lymphadenopathy  CARDIOVASCULAR:  Regular rate and rhythm; normal S1, S2;  systolic murmur on left border of sternum  LUNGS: Clear bilaterally  ABDOMEN:  Soft, nontender, non-distended  EXTREMITIES:  Negative for edema, cyanosis or clubbing; multiple scars over both upper and lower extremities from needle sticks  NEUROLOGIC:  Alert and oriented x 3 with no focal deficits          Data:   All laboratory data reviewed    Results:  BMP  Recent Labs   Lab 11/18/21 0535 11/16/21 2049 11/16/21 0014 11/14/21  1734     --  130* 128*   POTASSIUM 3.5  --  3.5 4.4   CHLORIDE 108  --  97 96   CO2 29  --  29 32   BUN 5*  --  11 12   CR 0.81  --  0.71 0.80   * 94 104* 119*     CBC  Recent Labs   Lab 11/18/21 0535 11/16/21 0014 11/14/21  1734   WBC 12.9* 14.7* 13.1*   HGB 11.0* 11.4* 12.5*    187 250     LFT  Recent Labs   Lab 11/16/21  0014   AST 38   ALT 61   ALKPHOS 103   BILITOTAL 0.4   ALBUMIN 2.7*     Recent Labs   Lab 11/18/21  0535 11/16/21 2049 11/16/21  0014 11/14/21  1734   * 94 104* 119*     ECHO EDUARDO (11/18/2021):  Two, large, mobile masses noted, attached to the atrial side of base of posterior tricuspid leaflets are noted. (they measures 18 x 8mm and 13 x 5mm respectively).These are most consistent with large tricuspid valve vegetations.  Severe tricuspid regurgitation noted, directed towards the interatrial septum.  Cannot exclude perforation of posterior leaflet of tricuspid valve.  The right ventricle is mildly dilated.  The right ventricular systolic function is normal.  Left ventricular systolic function is normal.  The visual ejection fraction is 60-65%.    Blood culture (11/14/2021):  MRSA and MSSA         Assessment and Plan:   Assessment:   IV drug use  Infective endocarditis of Tricuspid valve, 18 & 13 mm vegetations (<20 mm), no heart failure, no septic PE  Prior h/o pulmonary valve endocarditis  Questionable compliance with abstinence form drug use      Plan:    No indication for acute surgical intervention at this time.  Please continue IV antibiotics for duration as per ID recommendations.  Consider sending to rehab for treatment of drug dependence.  Continue current care as per primary team.  Cardiac surgery will follow.    Discussed with staff, Dr. Hess.     NIMO WU MD  Fellow, Cardiothoracic Surgery

## 2021-11-18 NOTE — ANESTHESIA PROCEDURE NOTES
Airway         Procedure Start/Stop Times: 11/18/2021 10:07 AM  Staff -        Anesthesiologist:  Kwadwo Savage MD       CRNA: Justin Frias APRN CRNA       Performed By: CRNAIndications and Patient Condition       Indications for airway management: brittany-procedural       Induction type:intravenous       Mask difficulty assessment: 1 - vent by mask    Final Airway Details       Final airway type: endotracheal airway       Successful airway: ETT - single  Endotracheal Airway Details        ETT size (mm): 8.0       Successful intubation technique: video laryngoscopy       VL Blade Size: Dubon 4       Grade View of Cords: 1       Adjucts: stylet       Position: Right       Bite Block used: for sawyer.    Post intubation assessment        Placement verified by: capnometry, equal breath sounds and chest rise        Number of attempts at approach: 1       Secured with: pink tape       Ease of procedure: easy       Dentition: Intact and Unchanged

## 2021-11-18 NOTE — PROGRESS NOTES
[facility]  Hospitalist Progress Note        Derick Guerin MD   11/18/2021        Interval History:        - no acute issues overnight; got EDUARDO this morning with note of large tricuspid valve vegetation with severe regurgitation-- will consult cardiology and CT surgery  - Blood culture from 11/14 growing MRSA in addition to MSSA-- will have phar,acy dose vancomycin         Assessment and Plan:        Bc German is a 41 year old male with history of chronic opioid dependence, history of polysubstance IV drug abuse, history of MSSA sepsis with pulmonary valve endocarditis (2015), history of lumbar spine L3 osteomyelitis with epidural abscess (requiring surgical intervention 2017) presented to the ED after an episode of syncope along with cough, fevers chills, noted to be tachycardic in the emergency room had a left-sided scrotal laceration which needed to be sutured in the emergency room.  His blood cultures 2 out of 2 returned positive for gram-positive cocci in clusters in the setting of IV drug use.      CXR on admission noted with small somewhat nodular and patchy opacity at the lateral right base may be explained by infectious/inflammatory pneumonitis      MSSA and MRSA Sepsis with Tricuspid valve endocarditis  Severe TR secondary to above  H/o pulmonary valve endocarditis (2015), history of lumbar spine L3 osteomyelitis with epidural abscess (requiring surgical intervention 2017)  - T max 101.9 on 11/15, has been afebrile since; hypotension improved; will decrease NS to 75 ml/Hr X 24 hrs  - evaluated by ID, B Cx growing MSSA , empiric vancomycin/zosyn was switched to Nafcillin; now also MRSA (b Cx 11/14); will have pharmacy dose vancomycin, will discontinue nafcillin (11/18) ; B Cx from 11/17 with no growth so far; will repeat daily cultures; urine Cx from 11/16 with no growth  - completed EDUARDO 11/18 with note of large tricuspid valve vegetation with severe regurgitation-- will  consult cardiology and CT surgery  - Na 128---130---140; normal lactic acid 1.8; pro nino 1.03; wbc 13---14---12, ; CXR as above     History of opiate dependence  History of active IV drug use  Depression  Smoker  - per report last IVDU 1 week PTA; on 11/16 in ED, per nursing he was briefly nonresponsive and apneic, was evaluated by ED provider, narcan was given with improvement in mentation  - has multiple areas of skin excoriations from picking of skin as well as from IV drug use which could be the source of infection as well  - will have CD evaluation when clinically more stable  - takes Suboxone only prn and refusing here, will discontinue   - continue PTA Fetzima; prn ativan  - he is on a taper of Soma (listed at 87.5 mg daily, 1/4th of 350 mg tablet but reports was taking bid)-- resumed 11/17  - ordered nicotine patch, counselled on smoking and substance use cessation     History of HIV positive status and hepatitis C;  - phil continue PTA Triumeq  - CD4> 200, per ID no need for PJP prophylaxis     Left scrotal laceration secondary to fall and possible syncope;  - Status post suturing on 11/14/2021; needs suture removal in 1 week  - US scrotum noted with small right hydrocele otherwise normal. No evidence for testicular fracture.     DVT prophylaxis; PCD's and ambulation as tolerated     COVID status: asymptomatic screening negative on 11/16    Clinically Significant Risk Factors Present on Admission                Care plan discussed with patient, infectious disease and cardiology; >35 minutes spent today    Hemodynamically stable at this time; will discontinue IMC status     Page Me (7 am to 6 pm)              Physical Exam:      Blood pressure (!) 127/95, pulse 89, temperature 98.7  F (37.1  C), temperature source Oral, resp. rate 22, height 1.829 m (6'), weight 56.2 kg (124 lb), SpO2 99 %.  Vitals:    11/15/21 2141   Weight: 56.2 kg (124 lb)     Vital Signs with Ranges  Temp:  [98.2  F (36.8  C)-98.8   F (37.1  C)] 98.7  F (37.1  C)  Pulse:  [] 89  Resp:  [13-27] 22  BP: (101-131)/(34-96) 127/95  SpO2:  [97 %-100 %] 99 %  I/O's Last 24 hours  I/O last 3 completed shifts:  In: 3947.92 [P.O.:500; I.V.:3447.92]  Out: 3105 [Urine:3105]    Constitutional: awake and oriented , remorseful of drug use; resting comfortably in no apparent distress   HEENT: Pupils equal and reactive to light and accomodation, neck supple    Oral cavity: Moist mucosa   Cardiovascular: Normal s1 s2, regular rate and rhythm, no murmur   Lungs: B/l clear to auscultation, no wheezes or crepitations   Abdomen: Soft, nt, nd, no guarding, rigidity or rebound; BS +   LE : No edema   Musculoskeletal: Power 5/5 in all extremities   Neuro: No focal neurological deficits noted   Psychiatry: normal mood and affect  Skin : noted multiple skin excoriations and injection marks; sutured scrotal laceration                 Medications:          abacavir-dolutegravir-LamiVUDine  1 tablet Oral Daily     aspirin  81 mg Oral Daily     buprenorphine HCl-naloxone HCl  1 Film Sublingual Daily     levomilnacipran  120 mg Oral Daily     nafcillin  2 g Intravenous Q4H     nicotine  1 patch Transdermal QPM     nicotine   Transdermal Q8H     pregabalin  150 mg Oral BID     sodium chloride (PF)  3 mL Intravenous Q8H     sodium chloride (PF)  3 mL Intracatheter Q8H     sodium chloride (PF)  3 mL Intracatheter Q8H     sodium chloride (PF)  3 mL Intracatheter Q8H     PRN Meds: acetaminophen **OR** acetaminophen, bisacodyl, calcium carbonate, diazepam, - MEDICATION INSTRUCTIONS -, HOLD MEDICATION, HOLD MEDICATION, HOLD MEDICATION, lidocaine 4%, lidocaine 4%, lidocaine (buffered or not buffered), lidocaine (buffered or not buffered), LORazepam, - MEDICATION INSTRUCTIONS -, melatonin, nitroGLYcerin, ondansetron **OR** ondansetron, oxyCODONE, polyethylene glycol, prochlorperazine **OR** prochlorperazine **OR** prochlorperazine, sodium chloride (PF), sodium chloride (PF),  sodium chloride (PF), sodium chloride (PF)         Data:      All new lab and imaging data was reviewed.   Recent Labs   Lab Test 11/18/21  0535 11/16/21  0014 11/14/21 1734 07/20/21 0128 05/09/21  1450 10/09/17  2149 10/09/17  2015 03/13/15  0900 03/12/15  1620   WBC 12.9* 14.7* 13.1*   < > 5.5   < > [facility] 2115 BY [facility]   < > 11.8*   HGB 11.0* 11.4* 12.5*   < > 13.3   < > [facility] 2115 BY [facility]   < > 10.5*   MCV 84 84 82   < > 87   < > [facility] 2115 BY [facility]   < > 81    187 250   < > 354   < > [facility] 2115 BY [facility]   < > 515*   INR  --   --   --   --  0.96  --  1.01  --  1.92*    < > = values in this interval not displayed.      Recent Labs   Lab Test 11/18/21  0535 11/16/21 2049 11/16/21  0014 11/14/21  1734     --  130* 128*   POTASSIUM 3.5  --  3.5 4.4   CHLORIDE 108  --  97 96   CO2 29  --  29 32   BUN 5*  --  11 12   CR 0.81  --  0.71 0.80   ANIONGAP 3  --  4 <1*   SANDRA 8.5  --  8.5 9.0   * 94 104* 119*     Recent Labs   Lab Test 05/09/21  1450 10/09/17  2015   TROPI <0.015 <0.015

## 2021-11-18 NOTE — PHARMACY-VANCOMYCIN DOSING SERVICE
Pharmacy Vancomycin Initial Note  Date of Service 2021  Patient's  1980  41 year old, male    Indication: MRSA    Current estimated CrCl = Estimated Creatinine Clearance: 97.3 mL/min (based on SCr of 0.81 mg/dL).    Creatinine for last 3 days  2021: 12:14 AM Creatinine 0.71 mg/dL  2021:  5:35 AM Creatinine 0.81 mg/dL    Recent Vancomycin Level(s) for last 3 days  No results found for requested labs within last 72 hours.      Vancomycin IV Administrations (past 72 hours)                   vancomycin 1500 mg in 0.9% NaCl 250 ml intermittent infusion 1,500 mg (mg) 1,500 mg New Bag 21 0224                Nephrotoxins and other renal medications (From now, onward)    Start     Dose/Rate Route Frequency Ordered Stop    21 2200  vancomycin (VANCOCIN) 1000 mg in dextrose 5% 200 mL PREMIX         1,000 mg  200 mL/hr over 1 Hours Intravenous EVERY 12 HOURS 21 0932      21 1000  vancomycin 1250 mg in 0.9% NaCl 250 mL intermittent infusion 1,250 mg         1,250 mg  over 90 Minutes Intravenous ONCE 21 0932      21 1000  nafcillin IV 2 g vial to attach to  ml bag         2 g  over 1 Hours Intravenous EVERY 4 HOURS 21 0923            Contrast Orders - past 72 hours (72h ago, onward)            None          [employer] Prediction of Planned Initial Vancomycin Regimen  AUC24,ss521 mg/L.hr  Probability of AUC24 > 400 76 %  Ctrough,ss 15.2 mg/L  Probability of Ctrough,ss > 20 29 %  Probability of nephrotoxicity (Lodise WILLIMA ) 10 %        Plan:  1. Give vancomycin 1250 mg IV x1, then start vancomycin 1000 mg IV q12h.   2. Vancomycin monitoring method: AUC  3. Vancomycin therapeutic monitoring goal: 400-600 mg*h/L  4. Pharmacy will check vancomycin levels as appropriate in 1-3 Days.    5. Serum creatinine levels will be ordered every 48 hours.      Rola Hernandez [facility]

## 2021-11-18 NOTE — PROVIDER NOTIFICATION
MD Notification    Notified Person: MD    Notified Person Name: Walt Odonnell    Notification Date/Time: 11/17/21 20:35    Notification Interaction:329-1 NB     Pt would like nicotine patches.  Thanks.    DREA Gomez      Purpose of Notification: nicotine cravings    Orders Received: order provided for patch    Comments:

## 2021-11-18 NOTE — CONSULTS
[facility]    Cardiology Consultation    Date of Admission:  11/15/2021    Assessment & Plan   Bc German is a 41 year old male who was admitted on 11/15/2021. I was asked to see the patient for tricuspid valve endocarditis.    Summary:   1.  Tricuspid valve endocarditis with severe TR. EDUARDO with two large vegetations and severe TR. RV is mildly dilated with normal function.  He is hemodynamically stable and without evidence of decompensated heart failure presently.  2.  Bacteremia. Blood cultures from 11/14 with MSSA and MRSA. On nafcillin and MRSA.  3.  Hx MSSA sepsis with pulmonary valve endocarditis in 2015. Treated with IV antibiotics.   4.  Hx spinal discitis/osteomyelitis with epidural abscess requiring surgical intervention in 2017.   5.  HIV and Hep C co-infection.  6.  Ongoing polysubstance abuse.    Plan:  1.  Continue IV antibiotics.  2.  CV surgery consult.   3.  If surgical candidate, then will arrange for pre-operative coronary angiography.  4.  Agree with CD evaluation.    Thank you for the consult. Please call with questions.     Tameka Paredes PA-C    Reason for Consult   Reason for consult: I was asked by Dr. Guerin to evaluate this patient for TV endocarditis.    Primary Care Physician   Khari Barrientos    Chief Complaint   fever    History is obtained from the patient and medical record     History of Present Illness   Bc German is a 41 year old male with a history of chronic opioid dependence, history of polysubstance IV drug abuse with active use of methamphetamine and fentanyl, history of MSSA sepsis with pulmonary valve endocarditis in 2015, 2015, lumbar spine L3 osteomyelitis with epidural abscess requiring surgical intervention in 2017, HIV and Hep C co-infection who was admitted on 11/13 with bacteremia.    He presented to the ED on 11/14 after a syncopal episode and a fall. He reportedly went to the bathroom around 2330 and remembers sitting on the  "toilet. He then awoke and found himself lying in the bathtub bleeding from his left groin and scrotum. His father found him in the tub. He had a scrotal laceration which was sutured in the ED. Blood cultures were obtained which subsequently returned positive for MSSA and he was contacted to come back to the ED.     He has been admitted for further evaluation. He has been seen by ID and has been started on Nafcillin. A EDUARDO was performed today which showed two large mobile masses to the atrial side of base of  posterior tricuspid leaflets measuring 18 x 8 mm and 13 x 5 mm consistent with large vegetations. He has severe tricuspid regurgitation. EF is normal.     He has been mildly tachycardic but has responded to fluid boluses. He reports some shortness of breath. No chest pain. No abdominal distention or peripheral edema. When we discussed his EDUARDO findings and possible surgery he asked whether he had to have surgery \"as I've fought so hard.\"     Past Medical History    Past Medical History:   Diagnosis Date     Anxiety      Depressive disorder      Drug abuse, IV (H)      Group A streptococcal infection 11/2014    Bacteremia/cellulitis     HCV antibody positive      HIV (human immunodeficiency virus infection) (H)      HIV (human immunodeficiency virus infection) (H)      HIV (human immunodeficiency virus infection) (H)      IVDU (intravenous drug user)      Osteomyelitis of vertebra of lumbosacral region (H) 3/2011    L3, left psoas abscess     Substance abuse (H)        Past Surgical History   Past Surgical History:   Procedure Laterality Date     EYE SURGERY  1 year ago    pins to left eye after socket fracture     OPTICAL TRACKING SYSTEM FUSION CERVICAL ANTERIOR ONE LEVEL Right 9/10/2017    Procedure: OPTICAL TRACKING SYSTEM FUSION CERVICAL ANTERIOR ONE LEVEL;  Stealth C6-C7 Anterior Cervical Corpectomy and C5-T1 Fusion;  Surgeon: Marv Ambrose MD;  Location: [facility] OR     ORTHOPEDIC SURGERY      Arm " Surgery     PICC INSERTION Left 09/21/2017    5fr DL BioFlo PICC, 42cm (1cm external) in the L basilic vein w/ tip in the low SVC.     TRANSESOPHAGEAL ECHOCARDIOGRAM INTRAOPERATIVE N/A 3/17/2015    Procedure: TRANSESOPHAGEAL ECHOCARDIOGRAM INTRAOPERATIVE;  Surgeon: Generic Anesthesia Provider;  Location: [facility] OR       Prior to Admission Medications   Prior to Admission Medications   Prescriptions Last Dose Informant Patient Reported? Taking?   FETZIMA 120 MG 24 hr capsule Past Week at Unknown time  Yes Yes   Sig: Take 120 mg by mouth daily   LORazepam (ATIVAN) 0.5 MG tablet  at prn  No Yes   Sig: Take 2 tablets (1 mg) by mouth every 8 hours as needed for anxiety   abacavir-dolutegravir-LamiVUDine (TRIUMEQ) 600- MG per tablet Past Week at Unknown time  No Yes   Sig: Take 1 tablet by mouth daily Please call [phone] & make appointment for further refills.   aspirin 81 MG EC tablet Past Week at Unknown time  Yes Yes   Sig: Take 81 mg by mouth daily   buprenorphine HCl-naloxone HCl (SUBOXONE) 8-2 MG per film  at prn Pharmacy Yes Yes   Sig: Place 1 Film under the tongue daily as needed    carisoprodol (SOMA) 350 MG tablet Past Week at Unknown time  Yes Yes   Sig: Take 87.5 mg by mouth daily Tapering off medication, current dose 1/4 tablet    cephALEXin (KEFLEX) 500 MG capsule  at not started yet  No No   Sig: Take 1 capsule (500 mg) by mouth 4 times daily for 7 days   pregabalin (LYRICA) 150 MG capsule Past Week at Unknown time  Yes Yes   Sig: Take 150 mg by mouth 2 times daily   testosterone cypionate (DEPOTESTOTERONE) 200 MG/ML injection Past Month at Unknown time  Yes Yes   Sig: Inject 0.3 mLs into the muscle once a week Inject 0.3 mL IM every 77 days      Facility-Administered Medications: None     Allergies   Allergies   Allergen Reactions     Bupropion      Other reaction(s): Seizures, Seizures  PN: seizure when took a double dose  seizure when took a double dose  PN: seizure when took a double dose  Other  reaction(s): Seizures  PN: seizure when took a double dose       Acetaminophen Nausea and Vomiting     PN: : GI Upset  : GI Upset  PN: : GI Upset  Other reaction(s): Vomiting  PN: : GI Upset       Codeine Itching and Nausea and Vomiting     Other reaction(s): Abdominal Pain  PN:  weak; fatigue; vomiting   weak; fatigue; vomiting  PN:  weak; fatigue; vomiting  PN:  weak; fatigue; vomiting       Sulfa Drugs Itching     Social History   + IV drug use.     Family History   History reviewed. No pertinent family history.    Review of Systems   The 10 point Review of Systems is negative other than noted in the HPI or here.     Physical Exam   Temp: 98.8  F (37.1  C) Temp src: Oral BP: 122/85 Pulse: 98   Resp: 20 SpO2: 98 % O2 Device: None (Room air)    Vital Signs with Ranges  Temp:  [98.2  F (36.8  C)-98.8  F (37.1  C)] 98.8  F (37.1  C)  Pulse:  [] 98  Resp:  [17-27] 20  BP: (101-137)/() 122/85  SpO2:  [98 %-100 %] 98 %  124 lbs 0 oz    Constitutional: Alert, no acute distress.  Eyes: sclera anicteric  Respiratory: No respiratory distress. Anterior breath sounds are normal.   Cardiovascular: Regular rate and rhythm. Normal S1, S2. + systolic murmur at the LSB  GI: abdomen soft.  Skin: warm and dry.   Musculoskeletal: no LE edema bilaterally. Mark  Neurologic: alert and oriented x 3.  Psychiatric: affect appropriate.     Data   -Data reviewed today: All pertinent laboratory and imaging results from this encounter were reviewed. I personally reviewed the EKG tracing showing sinus tachycardia.  Recent Labs   Lab 11/18/21  0535 11/16/21  2049 11/16/21  0014 11/14/21  1734   WBC 12.9*  --  14.7* 13.1*   HGB 11.0*  --  11.4* 12.5*   MCV 84  --  84 82     --  187 250     --  130* 128*   POTASSIUM 3.5  --  3.5 4.4   CHLORIDE 108  --  97 96   CO2 29  --  29 32   BUN 5*  --  11 12   CR 0.81  --  0.71 0.80   ANIONGAP 3  --  4 <1*   SANDRA 8.5  --  8.5 9.0   * 94 104* 119*   ALBUMIN  --   --  2.7*  --     PROTTOTAL  --   --  7.3  --    BILITOTAL  --   --  0.4  --    ALKPHOS  --   --  103  --    ALT  --   --  61  --    AST  --   --  38  --        Imaging:  Recent Results (from the past 24 hour(s))   Transesophageal Echocardiogram   Result Value    LVEF  60-65%    [facility]    [id number]  [id number]  [id number]  [id number]^LINDA^MYRA^PAOLA     [facility]  Echocardiography Laboratory  [facility] [place] [address]     Name: AMBREEN PYLE  MRN: [id number]  : 1980  Study Date: 2021 10:01 AM  Age: 41 yrs  Gender: Male  Patient Location: [facility]  Reason For Study: 2nd degree AV Block  Ordering Physician: MYRA GUERIN  Referring Physician: MYRA GUERIN  Performed By: Crow Wilkins     HR: 101  ______________________________________________________________________________  Procedure  Complete EDUARDO Adult. 3D image acquisition, reconstruction, and real-time  interpretation was performed.  ______________________________________________________________________________  Interpretation Summary     Two, large, mobile masses noted, attached to the atrial side of base of  posterior tricuspid leaflets are noted. (they measures 18 x 8mm and 13 x 5mm  respectively).These are most consistent with large tricuspid valve  vegetations.  Severe tricuspid regurgitation noted, directed towards the interatrial septum.  Cannot exclude perforation of posterior leaflet of tricuspid valve.  The right ventricle is mildly dilated.  The right ventricular systolic function is normal.  Left ventricular systolic function is normal.  The visual ejection fraction is 60-65%.  Findings discussed with Dr Guerin.  These are new comnpared to prior echo from . The study was technically  adequate.  ______________________________________________________________________________  EDUARDO  Under general anesthesia. The transesophageal probe was passed without  difficulty. There were no complications associated  with this procedure.     Left Ventricle  The left ventricle is normal in size. Left ventricular systolic function is  normal. The visual ejection fraction is 60-65%. No regional wall motion  abnormalities noted.     Right Ventricle  The right ventricle is mildly dilated. The right ventricular systolic function  is normal.     Atria  Normal left atrial size. The right atrium is mild to moderately dilated. There  is no color Doppler evidence of an atrial shunt. No thrombus is detected in  the left atrial appendage.     Mitral Valve  The mitral valve leaflets are mildly thickened. There is trace mitral  regurgitation.     Tricuspid Valve  There is severe (4+) tricuspid regurgitation.     Aortic Valve  The aortic valve is trileaflet. No aortic regurgitation is present. No aortic  stenosis is present.     Pulmonic Valve  The pulmonic valve is not well seen, but is grossly normal.     Vessels  The aortic root is normal size.     Pericardial/Pleural  There is no pericardial effusion.     Rhythm  Sinus rhythm was noted.  ______________________________________________________________________________  Report approved by: Lili Antunez 11/18/2021 11:57 AM     ______________________________________________________________________________

## 2021-11-19 ENCOUNTER — HOME INFUSION (PRE-WILLOW HOME INFUSION) (OUTPATIENT)
Dept: PHARMACY | Facility: CLINIC | Age: 41
End: 2021-11-19
Payer: COMMERCIAL

## 2021-11-19 LAB
ANION GAP SERPL CALCULATED.3IONS-SCNC: 6 MMOL/L (ref 3–14)
BACTERIA BLD CULT: ABNORMAL
BASOPHILS # BLD AUTO: 0 10E3/UL (ref 0–0.2)
BASOPHILS NFR BLD AUTO: 0 %
BUN SERPL-MCNC: 7 MG/DL (ref 7–30)
CALCIUM SERPL-MCNC: 8.5 MG/DL (ref 8.5–10.1)
CHLORIDE BLD-SCNC: 110 MMOL/L (ref 94–109)
CO2 SERPL-SCNC: 27 MMOL/L (ref 20–32)
CREAT SERPL-MCNC: 0.76 MG/DL (ref 0.66–1.25)
EOSINOPHIL # BLD AUTO: 0.1 10E3/UL (ref 0–0.7)
EOSINOPHIL NFR BLD AUTO: 1 %
ERYTHROCYTE [DISTWIDTH] IN BLOOD BY AUTOMATED COUNT: 14 % (ref 10–15)
GFR SERPL CREATININE-BSD FRML MDRD: >90 ML/MIN/1.73M2
GLUCOSE BLD-MCNC: 110 MG/DL (ref 70–99)
HCT VFR BLD AUTO: 37.2 % (ref 40–53)
HGB BLD-MCNC: 12.2 G/DL (ref 13.3–17.7)
IMM GRANULOCYTES # BLD: 0.1 10E3/UL
IMM GRANULOCYTES NFR BLD: 1 %
LYMPHOCYTES # BLD AUTO: 2.1 10E3/UL (ref 0.8–5.3)
LYMPHOCYTES NFR BLD AUTO: 22 %
MCH RBC QN AUTO: 27.4 PG (ref 26.5–33)
MCHC RBC AUTO-ENTMCNC: 32.8 G/DL (ref 31.5–36.5)
MCV RBC AUTO: 83 FL (ref 78–100)
MONOCYTES # BLD AUTO: 0.8 10E3/UL (ref 0–1.3)
MONOCYTES NFR BLD AUTO: 8 %
NEUTROPHILS # BLD AUTO: 6.4 10E3/UL (ref 1.6–8.3)
NEUTROPHILS NFR BLD AUTO: 68 %
NRBC # BLD AUTO: 0 10E3/UL
NRBC BLD AUTO-RTO: 0 /100
PLATELET # BLD AUTO: 245 10E3/UL (ref 150–450)
POTASSIUM BLD-SCNC: 3.4 MMOL/L (ref 3.4–5.3)
RBC # BLD AUTO: 4.46 10E6/UL (ref 4.4–5.9)
SODIUM SERPL-SCNC: 143 MMOL/L (ref 133–144)
VANCOMYCIN SERPL-MCNC: 10.5 MG/L
WBC # BLD AUTO: 9.5 10E3/UL (ref 4–11)

## 2021-11-19 PROCEDURE — 250N000011 HC RX IP 250 OP 636: Performed by: HOSPITALIST

## 2021-11-19 PROCEDURE — 120N000013 HC R&B IMCU

## 2021-11-19 PROCEDURE — 36415 COLL VENOUS BLD VENIPUNCTURE: CPT | Performed by: INTERNAL MEDICINE

## 2021-11-19 PROCEDURE — 80202 ASSAY OF VANCOMYCIN: CPT | Performed by: INTERNAL MEDICINE

## 2021-11-19 PROCEDURE — 36415 COLL VENOUS BLD VENIPUNCTURE: CPT | Performed by: HOSPITALIST

## 2021-11-19 PROCEDURE — 85025 COMPLETE CBC W/AUTO DIFF WBC: CPT | Performed by: HOSPITALIST

## 2021-11-19 PROCEDURE — 82310 ASSAY OF CALCIUM: CPT | Performed by: HOSPITALIST

## 2021-11-19 PROCEDURE — 250N000013 HC RX MED GY IP 250 OP 250 PS 637: Performed by: STUDENT IN AN ORGANIZED HEALTH CARE EDUCATION/TRAINING PROGRAM

## 2021-11-19 PROCEDURE — 250N000013 HC RX MED GY IP 250 OP 250 PS 637: Performed by: HOSPITALIST

## 2021-11-19 PROCEDURE — 99233 SBSQ HOSP IP/OBS HIGH 50: CPT | Performed by: HOSPITALIST

## 2021-11-19 PROCEDURE — 250N000009 HC RX 250: Performed by: HOSPITALIST

## 2021-11-19 PROCEDURE — 99233 SBSQ HOSP IP/OBS HIGH 50: CPT | Mod: 25 | Performed by: INTERNAL MEDICINE

## 2021-11-19 PROCEDURE — 87040 BLOOD CULTURE FOR BACTERIA: CPT | Performed by: HOSPITALIST

## 2021-11-19 PROCEDURE — 999N000128 HC STATISTIC PERIPHERAL IV START W/O US GUIDANCE

## 2021-11-19 RX ORDER — BUPRENORPHINE AND NALOXONE 8; 2 MG/1; MG/1
1 FILM, SOLUBLE BUCCAL; SUBLINGUAL DAILY PRN
Status: DISCONTINUED | OUTPATIENT
Start: 2021-11-19 | End: 2021-11-22

## 2021-11-19 RX ORDER — NALOXONE HYDROCHLORIDE 0.4 MG/ML
0.2 INJECTION, SOLUTION INTRAMUSCULAR; INTRAVENOUS; SUBCUTANEOUS
Status: DISCONTINUED | OUTPATIENT
Start: 2021-11-19 | End: 2021-12-30 | Stop reason: HOSPADM

## 2021-11-19 RX ORDER — HYDROMORPHONE HCL IN WATER/PF 6 MG/30 ML
0.2 PATIENT CONTROLLED ANALGESIA SYRINGE INTRAVENOUS EVERY 6 HOURS PRN
Status: DISCONTINUED | OUTPATIENT
Start: 2021-11-19 | End: 2021-11-20

## 2021-11-19 RX ORDER — NALOXONE HYDROCHLORIDE 0.4 MG/ML
0.4 INJECTION, SOLUTION INTRAMUSCULAR; INTRAVENOUS; SUBCUTANEOUS
Status: DISCONTINUED | OUTPATIENT
Start: 2021-11-19 | End: 2021-12-30 | Stop reason: HOSPADM

## 2021-11-19 RX ADMIN — LEVOMILNACIPRAN HYDROCHLORIDE 120 MG: 40 CAPSULE, EXTENDED RELEASE ORAL at 08:37

## 2021-11-19 RX ADMIN — BUPRENORPHINE AND NALOXONE 1 FILM: 8; 2 FILM, SOLUBLE BUCCAL; SUBLINGUAL at 18:07

## 2021-11-19 RX ADMIN — ABACAVIR SULFATE, DOLUTEGRAVIR SODIUM, LAMIVUDINE 1 TABLET: 600; 50; 300 TABLET, FILM COATED ORAL at 08:37

## 2021-11-19 RX ADMIN — PREGABALIN 150 MG: 150 CAPSULE ORAL at 08:37

## 2021-11-19 RX ADMIN — ASPIRIN 81 MG: 81 TABLET, COATED ORAL at 08:37

## 2021-11-19 RX ADMIN — LORAZEPAM 1 MG: 0.5 TABLET ORAL at 06:37

## 2021-11-19 RX ADMIN — PREGABALIN 150 MG: 150 CAPSULE ORAL at 21:54

## 2021-11-19 RX ADMIN — OXYCODONE HYDROCHLORIDE 5 MG: 5 TABLET ORAL at 13:09

## 2021-11-19 RX ADMIN — Medication 87.5 MG: at 09:08

## 2021-11-19 RX ADMIN — LORAZEPAM 1 MG: 0.5 TABLET ORAL at 14:45

## 2021-11-19 RX ADMIN — VANCOMYCIN HYDROCHLORIDE 1000 MG: 1 INJECTION, SOLUTION INTRAVENOUS at 21:54

## 2021-11-19 RX ADMIN — OXYCODONE HYDROCHLORIDE 5 MG: 5 TABLET ORAL at 06:37

## 2021-11-19 RX ADMIN — Medication 87.5 MG: at 21:54

## 2021-11-19 RX ADMIN — OXYCODONE HYDROCHLORIDE 5 MG: 5 TABLET ORAL at 21:54

## 2021-11-19 RX ADMIN — VANCOMYCIN HYDROCHLORIDE 1000 MG: 1 INJECTION, SOLUTION INTRAVENOUS at 09:30

## 2021-11-19 RX ADMIN — NICOTINE 1 PATCH: 14 PATCH, EXTENDED RELEASE TRANSDERMAL at 22:06

## 2021-11-19 RX ADMIN — OXYCODONE HYDROCHLORIDE 5 MG: 5 TABLET ORAL at 17:18

## 2021-11-19 RX ADMIN — LORAZEPAM 1 MG: 0.5 TABLET ORAL at 23:07

## 2021-11-19 ASSESSMENT — ACTIVITIES OF DAILY LIVING (ADL)
ADLS_ACUITY_SCORE: 5

## 2021-11-19 NOTE — PROGRESS NOTES
Pt has coverage for iv abx through their [employer] plan. Pt has met their deductible and OOP so he should be covered at 100%. I will go ahead and cancel the referral at this time as pt is not appropriate for home infusion.        ([facility]) In reference to admission date 11/15/2021 to iv abx coverage.         Please contact Intake with any questions, [phone] or In Basket pool, [facility] ([id number]).

## 2021-11-19 NOTE — PLAN OF CARE
IMC discontinued. VSS. Alert and oriented, endorses anxiety at times. Up with SBA, denies lightheadedness.Tele Sinus arrhythmia.Refused suboxone. PRN ativan/oxy given. PRN maalox given for indigestion. COWA score 8. NADYA continued. LS diminished. EDUARDO completed under general anesthesia, cardiology and CV surgery consulted for endocarditis. Up independently. CTM

## 2021-11-19 NOTE — PHARMACY
I spoke with a nurse from [facility] clinic where the patient sees by Dr. Khari Barrientos who prescribes his Suboxone and pregabalin. The patient's prescription for carisoprodol was written by Dr. Pastor Blanco who has retired, so he does not have someone managing carisoprodol at this time. Per chart notes, the patient says he is tapering off of carisoprodol and is currently taking 87.5 mg (quarter tablets of 350 mg) by mouth two times daily. It is unclear at this time if there is a clear taper plan for carisoprodol. He last filled carisoprodol 350 mg tablets #90 for 30 days supply on 8/22/21.    Rola Hernandez, PharmD, BCPS

## 2021-11-19 NOTE — PROGRESS NOTES
CV Surgery    As per consult note, continue IV abx with echo at further date per cards to determine treatment response.     Will sign off, re-consult please if needed.    Debra Voss PA-C

## 2021-11-19 NOTE — PROGRESS NOTES
[facility]    Received referral for IV abx. Bc has coverage for IV abx through their [employer] plan. Pt has met their deductible and OOP so he should be covered at 100%.     [facility] multi-disciplinary team has reviewed this referral due to Bc' current IV drug use and they do not feel that we can provide a safe plan of care for Bc in the home setting. [facility] would consider providing infusion services if Bc was in a more controlled environment such as [facility], etc.     Thank you     Christina Denton RN  [facility] Liaison  [phone] (Mon thru Fri 8am - 5pm)  [phone] Office

## 2021-11-19 NOTE — PLAN OF CARE
VSS, c/o pain in the stomach, provider notified. Pain controled with PRN Oxycodone. up IND in room. IV abx continued, tolerating meals, COWS score 2. Patient calm & cooperative, Tele NSR.

## 2021-11-19 NOTE — PLAN OF CARE
Date/Time: November 18, 2021    Reason for Admission: sepsis     Cognitive/Mentation:AOx4  Neuros/CMS:intact   VS:VSS on RA; B/P: 125/90, T: 98.5, P: 74, R: 20     GI:denies n/v  :voiding adequately   Pain:denies pain, oxycodone available if needed    Skin:intact ex scabs and bruises scattered; scrotum repair intact  Activity:IND in room   Diet:regular     Discharge:pending     End of shift summary:no events during 4 hour coverage. Gave ativan x1.

## 2021-11-19 NOTE — PROGRESS NOTES
[facility]    Infectious Disease Progress Note    Date of Service : 11/19/2021     Assessment:  41 year old male with polysubstance use/IVDA, HIV-Hep C co-infection and prior hx of recurrent MSSA sepsis with pulmonic valve endocarditis, spinal discitis/osteomyelitis with epidural abscess requiring surgical debridement, who is now admitted with high grade MSSA/MRSA bacteremia with tricuspid valve endocarditis with two large [id number] x 8mm and 13 x 5mm. Repeat blood cxs are pending and there are no surgical plans at this time . He has had medication non compliance with ART with detectable VL, CD4 >200 at this time not requiring PCP prophylaxis. Treatment will be challenging given substance abuse history, and he will need additional psychiatry,mental carmine counseling and chemical dependency support.      1. MSSA/MRSA sepsis with tricuspid valve endocarditis   2. Scrotal laceration without signs of infection.  3. Prior history of MSSA sepsis with pulmonary valve endocarditis in 2015 and spinal discitis/osteomyelitis with epidural abscess requiring surgical intervention in 2017.  4. Substance abuse with active use of methamphetamine / fentanyl currently  5.  HIV/Hepatitis C co-infection. Last HIV VL detectable at 29,522 copies/ml and  CD4 625(28%) on  06/09/2021 , on Triumeq    Recommendations:  1. Follow blood cxs until persistently negative  2. Continue Vancomycin, pharmacy to dose based upon AUC  3. No clinical symptoms at this time to suggest additional seeding but may need additional imaging based upon clinical status  4. Will need [facility] echocardiogram in 2 weeks   5. Continue Triumeq    Alana Cooper MD    Interval History   Tearful and remorseful. Does not want to die. Achy all over thinks due toopiod withdrawal. No additional complaints.    Antimicrobial therapy  11/18 Vancomycin  prior  11/16-11/18 Nafcillin  11/16 Zosyn, Vancomycin    Physical Exam   Temp: 98.2  F (36.8  C) Temp src:  Oral BP: (!) 129/93 Pulse: 77   Resp: 15 SpO2: 98 % O2 Device: None (Room air)    Vitals:    11/15/21 2141   Weight: 56.2 kg (124 lb)     Vital Signs with Ranges  Temp:  [98.2  F (36.8  C)-98.8  F (37.1  C)] 98.2  F (36.8  C)  Pulse:  [74-98] 77  Resp:  [15-22] 15  BP: (112-137)/() 129/93  SpO2:  [98 %-100 %] 98 %    GENERAL APPEARANCE:  Awake, cachectic  appearing male  EYES: Eyes grossly normal to inspection  Mouth : Poor dentition  NECK: no adenopathy  RESP: lungs clear   CV: regular rates and rhythm  ABDOMEN: soft, nontender  MS: no edema  SKIN: multiple scars, IV racks, excoriations on hands,    : Left scrotal laceration site appears clean, no sign of infection.    Other:    Medications     - MEDICATION INSTRUCTIONS -       - MEDICATION INSTRUCTIONS -         abacavir-dolutegravir-LamiVUDine  1 tablet Oral Daily     aspirin  81 mg Oral Daily     carisoprodol  87.5 mg Oral BID     levomilnacipran  120 mg Oral Daily     nicotine  1 patch Transdermal QPM     nicotine   Transdermal Q8H     pregabalin  150 mg Oral BID     sodium chloride (PF)  3 mL Intravenous Q8H     vancomycin  1,000 mg Intravenous Q12H       Data   All microbiology laboratory data reviewed.  Recent Labs   Lab Test 11/19/21  0557 11/18/21  0535 11/16/21  0014   WBC 9.5 12.9* 14.7*   HGB 12.2* 11.0* 11.4*   HCT 37.2* 34.0* 34.4*   MCV 83 84 84    270 187     Recent Labs   Lab Test 11/19/21  0557 11/18/21  0535 11/16/21  0014   CR 0.76 0.81 0.71     Recent Labs   Lab Test 11/16/21  0014   SED 39*     Component      Latest Ref Rng & Units 11/16/2021   CD3 Mature T      49 - 84 % 67   Absolute CD3      603-2,990 cells/uL 374 (L)   CD4 [place] T      28 - 63 % 37   Absolute CD4      441-2,156 cells/uL 206 (L)   CD8 Suppressor T      10 - 40 % 27   Absolute CD8      125-1,312 cells/uL 151   CD4:CD8 Ratio      1.40 - 2.60 1.37 (L)   HIV-1 RNA Copies/mL, Instrument      <1 copies/mL 985 (H)   HIV RNA QT Log       3.0     Microbiology  11/14  & 4/16 blood cxs both sets MSSA & MRSA, blood cxs 11/18, 11/19 pending  Culture Positive on the 1st day of incubation Abnormal        Staphylococcus aureus Panic     2 of 2 bottles   Staphylococcus aureus MRSA Panic     2 of 2 bottles        Resulting Agency: [facility]       Susceptibility     Staphylococcus aureus Staphylococcus aureus MRSA     PURNIMA PURNIMA     Clindamycin <=0.25 ug/mL Susceptible <=0.25 ug/mL Susceptible 1     Erythromycin <=0.25 ug/mL Susceptible >=8.0 ug/mL Resistant     Gentamicin <=0.5 ug/mL Susceptible <=0.5 ug/mL Susceptible     Linezolid   2.0 ug/mL Susceptible     Oxacillin <=0.25 ug/mL Susceptible >=4.0 ug/mL Resistant     Tetracycline <=1.0 ug/mL Susceptible <=1.0 ug/mL Susceptible     Trimethoprim/Sulfamethoxazole <=0.5/9.5 u... Susceptible <=0.5/9.5 u... Susceptible     Vancomycin <=0.5 ug/mL Susceptible <=0.5 ug/mL Susceptible                 Imaging  11/18 EDUARDO  Interpretation Summary     Two, large, mobile masses noted, attached to the atrial side of base of  posterior tricuspid leaflets are noted. (they measures 18 x 8mm and 13 x 5mm  respectively).These are most consistent with large tricuspid valve  vegetations.  Severe tricuspid regurgitation noted, directed towards the interatrial septum.  Cannot exclude perforation of posterior leaflet of tricuspid valve.  The right ventricle is mildly dilated.  The right ventricular systolic function is normal.  Left ventricular systolic function is normal.  The visual ejection fraction is 60-65%.  Findings discussed with Dr Guerin.  These are new comnpared to prior echo from 2017. The study was technically  adequate.

## 2021-11-19 NOTE — PROGRESS NOTES
[facility]  Cardiology Progress Note    Date of Service (when I saw the patient): 11/19/2021  Summary: Bc German is a 41 year old male with history of polysubstance IV drug abuse with active use of methamphetamine and fentanyl, history of MSSA sepsis with pulmonary valve endocarditis in 2015, lumbar spine L3 osteomyelitis with epidural abscess requiring surgical intervention in 2017, HIV and Hep C co-infection  who was admitted on 11/15/2021 with staph aureus bacteremia.  Interval History   He endorses diffuse pain. No shortness of breath or swelling.   Assessment & Plan   1.  Tricuspid valve endocarditis with severe TR. EDUARDO with two large vegetations and severe TR. RV is mildly dilated with normal function.  He is hemodynamically stable and without evidence of decompensated heart failure presently.  -  Appreciate CV surgery consult. No plans for urgent surgical intervention at this time. Plan to continue antibiotics and follow clinically with surgery if worsens or fails to improve.  2.  Bacteremia. Blood cultures from 11/14 with MSSA and MRSA. On nafcillin and MRSA.  3.  Hx MSSA sepsis with pulmonary valve endocarditis in 2015. Treated with IV antibiotics.   4.  Hx spinal discitis/osteomyelitis with epidural abscess requiring surgical intervention in 2017.   5.  HIV and Hep C co-infection.  6.  Ongoing polysubstance abuse.    Plan:  1.  Continue antibiotics  2.  Needs complete abstinence from drugs.  3.  Assuming medical management is continued would recommend outpatient cardiology follow up in one month with a repeat echocardiogram. He is at risk for developing right heart failure in the future.    Please call with questions.     Tameka Paredes PA-C    Physical Exam   Temp: 98.2  F (36.8  C) Temp src: Oral BP: (!) 129/93 Pulse: 77   Resp: 15 SpO2: 98 % O2 Device: None (Room air)    Vitals:    11/15/21 2141   Weight: 56.2 kg (124 lb)     Vital Signs with Ranges  Temp:  [98.2  F  (36.8  C)-98.8  F (37.1  C)] 98.2  F (36.8  C)  Pulse:  [74-98] 77  Resp:  [15-22] 15  BP: (112-137)/() 129/93  SpO2:  [98 %-100 %] 98 %   0700 -  0659  In: 5822.92 [P.O.:1340; I.V.:4482.92]  Out: 8280 [Urine:8280]  Net: -8227.08  Constitutional: NAD.   Respiratory: CTAB.   Cardiovascular: RRR, s1s2, +  murmur  GI: soft, BS+  Skin: warm, no rashes  Musculoskeletal: Moving all extremities  Neurologic: Alert, oriented x 3  Neuropsychiatric: Normal affect   Data   Results for orders placed or performed during the hospital encounter of 11/15/21 (from the past 24 hour(s))   Transesophageal Echocardiogram   Result Value Ref Range    LVEF  60-65%     Narrative    [id number]  [id number]  [id number]^LINDA^MYRA^POALA     [facility]  Echocardiography Laboratory  [address] [place] [address]     Name: AMBREEN PYLE  MRN: [id number]  : 1980  Study Date: 2021 10:01 AM  Age: 41 yrs  Gender: Male  Patient Location: [facility]  Reason For Study: 2nd degree AV Block  Ordering Physician: MYRA MCKEON  Referring Physician: MYRA MCKEON  Performed By: Crow Wilkins     HR: 101  ______________________________________________________________________________  Procedure  Complete EDUARDO Adult. 3D image acquisition, reconstruction, and real-time  interpretation was performed.  ______________________________________________________________________________  Interpretation Summary     Two, large, mobile masses noted, attached to the atrial side of base of  posterior tricuspid leaflets are noted. (they measures 18 x 8mm and 13 x 5mm  respectively).These are most consistent with large tricuspid valve  vegetations.  Severe tricuspid regurgitation noted, directed towards the interatrial septum.  Cannot exclude perforation of posterior leaflet of tricuspid valve.  The right ventricle is mildly dilated.  The right ventricular systolic function is normal.  Left ventricular  systolic function is normal.  The visual ejection fraction is 60-65%.  Findings discussed with Dr Guerin.  These are new comnpared to prior echo from 2017. The study was technically  adequate.  ______________________________________________________________________________  EDUARDO  Under general anesthesia. The transesophageal probe was passed without  difficulty. There were no complications associated with this procedure.     Left Ventricle  The left ventricle is normal in size. Left ventricular systolic function is  normal. The visual ejection fraction is 60-65%. No regional wall motion  abnormalities noted.     Right Ventricle  The right ventricle is mildly dilated. The right ventricular systolic function  is normal.     Atria  Normal left atrial size. The right atrium is mild to moderately dilated. There  is no color Doppler evidence of an atrial shunt. No thrombus is detected in  the left atrial appendage.     Mitral Valve  The mitral valve leaflets are mildly thickened. There is trace mitral  regurgitation.     Tricuspid Valve  There is severe (4+) tricuspid regurgitation.     Aortic Valve  The aortic valve is trileaflet. No aortic regurgitation is present. No aortic  stenosis is present.     Pulmonic Valve  The pulmonic valve is not well seen, but is grossly normal.     Vessels  The aortic root is normal size.     Pericardial/Pleural  There is no pericardial effusion.     Rhythm  Sinus rhythm was noted.  ______________________________________________________________________________  Report approved by: Lili Antunez 11/18/2021 11:57 AM     ______________________________________________________________________________      Cardiology IP Consult: Patient to be seen: Routine - within 24 hours; Tricuspid valve endocarditis with severe TR; Consultant may enter orders: Yes; Requesting provider? Hospitalist (if different from attending physician)    Narrative    Tameka Paredes PA-C     11/18/2021  1:16  [facility]    Cardiology Consultation    Date of Admission:  11/15/2021    Assessment & Plan   Bc German is a 41 year old male who was admitted on   11/15/2021. I was asked to see the patient for tricuspid valve   endocarditis.    Summary:   1.  Tricuspid valve endocarditis with severe TR. EDUARDO with two   large vegetations and severe TR. RV is mildly dilated with normal   function.  He is hemodynamically stable and without evidence of   decompensated heart failure presently.  2.  Bacteremia. Blood cultures from 11/14 with MSSA and MRSA. On   nafcillin and MRSA.  3.  Hx MSSA sepsis with pulmonary valve endocarditis in 2015.   Treated with IV antibiotics.   4.  Hx spinal discitis/osteomyelitis with epidural abscess   requiring surgical intervention in 2017.   5.  HIV and Hep C co-infection.  6.  Ongoing polysubstance abuse.    Plan:  1.  Continue IV antibiotics.  2.  CV surgery consult.   3.  If surgical candidate, then will arrange for pre-operative   coronary angiography.  4.  Agree with CD evaluation.    Thank you for the consult. Please call with questions.     Tameka Paredes PA-C    Reason for Consult   Reason for consult: I was asked by Dr. Guerin to evaluate this   patient for TV endocarditis.    Primary Care Physician   Khari Barrientos    Chief Complaint   fever    History is obtained from the patient and medical record     History of Present Illness   Bc German is a 41 year old male with a history of chronic   opioid dependence, history of polysubstance IV drug abuse with   active use of methamphetamine and fentanyl, history of MSSA   sepsis with pulmonary valve endocarditis in 2015, 2015, lumbar   spine L3 osteomyelitis with epidural abscess requiring surgical   intervention in 2017, HIV and Hep C co-infection who was admitted   on 11/13 with bacteremia.    He presented to the ED on 11/14 after a syncopal episode and a   fall. He reportedly went to the bathroom around  "2330 and   remembers sitting on the toilet. He then awoke and found himself   lying in the bathtub bleeding from his left groin and scrotum.   His father found him in the tub. He had a scrotal laceration   which was sutured in the ED. Blood cultures were obtained which   subsequently returned positive for MSSA and he was contacted to   come back to the ED.     He has been admitted for further evaluation. He has been seen by   ID and has been started on Nafcillin. A EDUARDO was performed today   which showed two large mobile masses to the atrial side of base   of  posterior tricuspid leaflets measuring 18 x 8 mm and 13 x 5 mm   consistent with large vegetations. He has severe tricuspid   regurgitation. EF is normal.     He has been mildly tachycardic but has responded to fluid   boluses. He reports some shortness of breath. No chest pain. No   abdominal distention or peripheral edema. When we discussed his   EDUARDO findings and possible surgery he asked whether he had to have   surgery \"as I've fought so hard.\"     Past Medical History    Past Medical History:   Diagnosis Date     Anxiety      Depressive disorder      Drug abuse, IV (H)      Group A streptococcal infection 11/2014    Bacteremia/cellulitis     HCV antibody positive      HIV (human immunodeficiency virus infection) (H)      HIV (human immunodeficiency virus infection) (H)      HIV (human immunodeficiency virus infection) (H)      IVDU (intravenous drug user)      Osteomyelitis of vertebra of lumbosacral region (H) 3/2011    L3, left psoas abscess     Substance abuse (H)        Past Surgical History   Past Surgical History:   Procedure Laterality Date     EYE SURGERY  1 year ago    pins to left eye after socket fracture     OPTICAL TRACKING SYSTEM FUSION CERVICAL ANTERIOR ONE LEVEL   Right 9/10/2017    Procedure: OPTICAL TRACKING SYSTEM FUSION CERVICAL ANTERIOR ONE   LEVEL;  Stealth C6-C7 Anterior Cervical Corpectomy and C5-T1   Fusion;  Surgeon: Marv Ambrose " MD Nolan;  Location: [facility] OR     ORTHOPEDIC SURGERY      Arm Surgery     PICC INSERTION Left 09/21/2017    5fr DL BioFlo PICC, 42cm (1cm external) in the L basilic vein w/   tip in the low SVC.     TRANSESOPHAGEAL ECHOCARDIOGRAM INTRAOPERATIVE N/A 3/17/2015    Procedure: TRANSESOPHAGEAL ECHOCARDIOGRAM INTRAOPERATIVE;    Surgeon: Generic Anesthesia Provider;  Location: [facility] OR       Prior to Admission Medications   Prior to Admission Medications   Prescriptions Last Dose Informant Patient Reported? Taking?   FETZIMA 120 MG 24 hr capsule Past Week at Unknown time  Yes Yes   Sig: Take 120 mg by mouth daily   LORazepam (ATIVAN) 0.5 MG tablet  at prn  No Yes   Sig: Take 2 tablets (1 mg) by mouth every 8 hours as needed for   anxiety   abacavir-dolutegravir-LamiVUDine (TRIUMEQ) 600- MG per   tablet Past Week at Unknown time  No Yes   Sig: Take 1 tablet by mouth daily Please call [phone] & make   appointment for further refills.   aspirin 81 MG EC tablet Past Week at Unknown time  Yes Yes   Sig: Take 81 mg by mouth daily   buprenorphine HCl-naloxone HCl (SUBOXONE) 8-2 MG per film  at prn   Pharmacy Yes Yes   Sig: Place 1 Film under the tongue daily as needed    carisoprodol (SOMA) 350 MG tablet Past Week at Unknown time  Yes   Yes   Sig: Take 87.5 mg by mouth daily Tapering off medication, current   dose 1/4 tablet    cephALEXin (KEFLEX) 500 MG capsule  at not started yet  No No   Sig: Take 1 capsule (500 mg) by mouth 4 times daily for 7 days   pregabalin (LYRICA) 150 MG capsule Past Week at Unknown time  Yes   Yes   Sig: Take 150 mg by mouth 2 times daily   testosterone cypionate (DEPOTESTOTERONE) 200 MG/ML injection Past   Month at Unknown time  Yes Yes   Sig: Inject 0.3 mLs into the muscle once a week Inject 0.3 mL IM   every 77 days      Facility-Administered Medications: None     Allergies   Allergies   Allergen Reactions     Bupropion      Other reaction(s): Seizures, Seizures  PN: seizure when took a  double dose  seizure when took a double dose  PN: seizure when took a double dose  Other reaction(s): Seizures  PN: seizure when took a double dose       Acetaminophen Nausea and Vomiting     PN: : GI Upset  : GI Upset  PN: : GI Upset  Other reaction(s): Vomiting  PN: : GI Upset       Codeine Itching and Nausea and Vomiting     Other reaction(s): Abdominal Pain  PN:  weak; fatigue; vomiting   weak; fatigue; vomiting  PN:  weak; fatigue; vomiting  PN:  weak; fatigue; vomiting       Sulfa Drugs Itching     Social History   + IV drug use.     Family History   History reviewed. No pertinent family history.    Review of Systems   The 10 point Review of Systems is negative other than noted in   the HPI or here.     Physical Exam   Temp: 98.8  F (37.1  C) Temp src: Oral BP: 122/85 Pulse: 98     Resp: 20 SpO2: 98 % O2 Device: None (Room air)    Vital Signs with Ranges  Temp:  [98.2  F (36.8  C)-98.8  F (37.1  C)] 98.8  F (37.1  C)  Pulse:  [] 98  Resp:  [17-27] 20  BP: (101-137)/() 122/85  SpO2:  [98 %-100 %] 98 %  124 lbs 0 oz    Constitutional: Alert, no acute distress.  Eyes: sclera anicteric  Respiratory: No respiratory distress. Anterior breath sounds are   normal.   Cardiovascular: Regular rate and rhythm. Normal S1, S2. +   systolic murmur at the LSB  GI: abdomen soft.  Skin: warm and dry.   Musculoskeletal: no LE edema bilaterally. Mark  Neurologic: alert and oriented x 3.  Psychiatric: affect appropriate.     Data   -Data reviewed today: All pertinent laboratory and imaging   results from this encounter were reviewed. I personally reviewed   the EKG tracing showing sinus tachycardia.  Recent Labs   Lab 11/18/21  0535 11/16/21  2049 11/16/21  0014 11/14/21  1734   WBC 12.9*  --  14.7* 13.1*   HGB 11.0*  --  11.4* 12.5*   MCV 84  --  84 82     --  187 250     --  130* 128*   POTASSIUM 3.5  --  3.5 4.4   CHLORIDE 108  --  97 96   CO2 29  --  29 32   BUN 5*  --  11 12   CR 0.81  --  0.71 0.80    ANIONGAP 3  --  4 <1*   SANDRA 8.5  --  8.5 9.0   * 94 104* 119*   ALBUMIN  --   --  2.7*  --    PROTTOTAL  --   --  7.3  --    BILITOTAL  --   --  0.4  --    ALKPHOS  --   --  103  --    ALT  --   --  61  --    AST  --   --  38  --        Imaging:  Recent Results (from the past 24 hour(s))   Transesophageal Echocardiogram   Result Value    LVEF  60-65%    [facility]    [id number]  [id number]  [id number]^LINDA^MYRA^PAOLA     [facility]  Echocardiography Laboratory  [facility] [place] [address]     Name: AMBREEN PYLE  MRN: [id number]  : 1980  Study Date: 2021 10:01 AM  Age: 41 yrs  Gender: Male  Patient Location: [facility]  Reason For Study: 2nd degree AV Block  Ordering Physician: MYRA GUERIN  Referring Physician: MYRA GUERIN  Performed By: Crow Wilkins     HR: 101  __________________________________________________________________  ____________  Procedure  Complete EDUARDO Adult. 3D image acquisition, reconstruction, and   real-time  interpretation was performed.  __________________________________________________________________  ____________  Interpretation Summary     Two, large, mobile masses noted, attached to the atrial side of   base of  posterior tricuspid leaflets are noted. (they measures 18 x 8mm   and 13 x 5mm  respectively).These are most consistent with large tricuspid   valve  vegetations.  Severe tricuspid regurgitation noted, directed towards the   interatrial septum.  Cannot exclude perforation of posterior leaflet of tricuspid   valve.  The right ventricle is mildly dilated.  The right ventricular systolic function is normal.  Left ventricular systolic function is normal.  The visual ejection fraction is 60-65%.  Findings discussed with Dr Guerin.  These are new comnpared to prior echo from 2017. The study was    technically  adequate.  __________________________________________________________________  ____________  EDUARDO  Under general anesthesia. The transesophageal probe was passed   without  difficulty. There were no complications associated with this   procedure.     Left Ventricle  The left ventricle is normal in size. Left ventricular systolic   function is  normal. The visual ejection fraction is 60-65%. No regional wall   motion  abnormalities noted.     Right Ventricle  The right ventricle is mildly dilated. The right ventricular   systolic function  is normal.     Atria  Normal left atrial size. The right atrium is mild to moderately   dilated. There  is no color Doppler evidence of an atrial shunt. No thrombus is   detected in  the left atrial appendage.     Mitral Valve  The mitral valve leaflets are mildly thickened. There is trace   mitral  regurgitation.     Tricuspid Valve  There is severe (4+) tricuspid regurgitation.     Aortic Valve  The aortic valve is trileaflet. No aortic regurgitation is   present. No aortic  stenosis is present.     Pulmonic Valve  The pulmonic valve is not well seen, but is grossly normal.     Vessels  The aortic root is normal size.     Pericardial/Pleural  There is no pericardial effusion.     Rhythm  Sinus rhythm was noted.  __________________________________________________________________  ____________  Report approved by: Lili Antunez 11/18/2021 11:57 AM     __________________________________________________________________  ____________                 Cardiothoracic Surgery IP Consult: Patient to be seen: Routine - within 24 hours; Tricuspid valve endocarditis with severe TR; Consultant may enter orders: Yes; Requesting provider? Hospitalist (if different from attending physician)    Narrative    Jose Cisneros MD     11/18/2021  3:57 PM  Cardiothoracic Surgery Consult Note    Bc German MRN# [id number]   Age: 41 year old YOB: 1980     Date of  Admission:  11/15/2021    Date of Consult:   11/15/21    Reason for consult: Tricuspid valve endocarditis             History of Present Illness:   41-year-old male admitted to medical floor for infective   tricuspid valve endocarditis. He presented to ER on 11/14 after a   fall/syncopal episode when his father found him in his bathtub   with bleeding from left groin and scrotum. He was found to have a   scrotal laceration which was repaired in the ER. Blood cultures   were obtained which subsequently returned positive for MSSA and   he was contacted to come back to the ED and was admitted for   further evaluation. EDUARDO was done today, and CT surgery was   consulted for concerning EDUARDO findings. He does not complain of   chest pain, shortness of breath, headache, fever, GI or urinary   complaints at this time.          Past Medical History:   MSSA sepsis with pulmonary valve endocarditis in 2015  lumbar spine L3 osteomyelitis with psoas abscess requiring   drainage in 2017  HIV  Hep C  Left upper extremity DVT in past needing Coumadin (does not   remember time & duration)    Past Medical History:   Diagnosis Date     Anxiety      Depressive disorder      Drug abuse, IV (H)      Group A streptococcal infection 11/2014    Bacteremia/cellulitis     HCV antibody positive      HIV (human immunodeficiency virus infection) (H)      HIV (human immunodeficiency virus infection) (H)      HIV (human immunodeficiency virus infection) (H)      IVDU (intravenous drug user)      Osteomyelitis of vertebra of lumbosacral region (H) 3/2011    L3, left psoas abscess     Substance abuse (H)           Past Surgical History:     Past Surgical History:   Procedure Laterality Date     EYE SURGERY  1 year ago    pins to left eye after socket fracture     OPTICAL TRACKING SYSTEM FUSION CERVICAL ANTERIOR ONE LEVEL   Right 9/10/2017    Procedure: OPTICAL TRACKING SYSTEM FUSION CERVICAL ANTERIOR ONE   LEVEL;  Stealth C6-C7 Anterior Cervical  Corpectomy and C5-T1   Fusion;  Surgeon: Marv Ambrose MD;  Location: [facility] OR     ORTHOPEDIC SURGERY      Arm Surgery     PICC INSERTION Left 09/21/2017    5fr DL BioFlo PICC, 42cm (1cm external) in the L basilic vein w/   tip in the low SVC.     TRANSESOPHAGEAL ECHOCARDIOGRAM INTRAOPERATIVE N/A 3/17/2015    Procedure: TRANSESOPHAGEAL ECHOCARDIOGRAM INTRAOPERATIVE;    Surgeon: Generic Anesthesia Provider;  Location: [facility] OR          Social History:   chronic opioid dependence  history of polysubstance IV drug abuse with active use of   methamphetamine and fentanyl  Last Heroine use 5 days ago    Social History     Tobacco Use     Smoking status: Current Every Day Smoker     Packs/day: 0.25     Types: Cigarettes     Smokeless tobacco: Never Used   Substance Use Topics     Alcohol use: Yes          Family History:   History reviewed. No pertinent family history.          Allergies:     Allergies   Allergen Reactions     Bupropion      Other reaction(s): Seizures, Seizures  PN: seizure when took a double dose  seizure when took a double dose  PN: seizure when took a double dose  Other reaction(s): Seizures  PN: seizure when took a double dose       Acetaminophen Nausea and Vomiting     PN: : GI Upset  : GI Upset  PN: : GI Upset  Other reaction(s): Vomiting  PN: : GI Upset       Codeine Itching and Nausea and Vomiting     Other reaction(s): Abdominal Pain  PN:  weak; fatigue; vomiting   weak; fatigue; vomiting  PN:  weak; fatigue; vomiting  PN:  weak; fatigue; vomiting       Sulfa Drugs Itching          Medications:     Current Facility-Administered Medications   Medication     0.9% sodium chloride + KCl 20 mEq/L infusion     abacavir-dolutegravir-LamiVUDine (TRIUMEQ) 600- MG per   tablet 1 tablet     acetaminophen (TYLENOL) tablet 650 mg    Or     acetaminophen (TYLENOL) Suppository 650 mg     aspirin EC tablet 81 mg     bisacodyl (DULCOLAX) Suppository 10 mg     calcium carbonate (TUMS) chewable tablet 500  mg     carisoprodol (SOMA) quarter-tab 87.5 mg     diazepam (VALIUM) tablet 5-10 mg     Give   of usual dose of LONG ACTING insulin AM of procedure IF   diabetic     HOLD: Insulin - RAPID/SHORT acting AM of procedure IF diabetic     HOLD: Insulin - REGULAR AM of procedure IF diabetic     HOLD: Oral hypoglycemics AM of procedure IF diabetic     levomilnacipran (FETZIMA ER) 24 hr capsule 120 mg     lidocaine (LMX4) cream     lidocaine (LMX4) cream     lidocaine 1 % 0.1-1 mL     lidocaine 1 % 0.1-1 mL     LORazepam (ATIVAN) tablet 1 mg     May take oral meds with a sip of water, the morning of EDUARDO   procedure.     melatonin tablet 1 mg     nicotine (NICODERM CQ) 14 MG/24HR 24 hr patch 1 patch     nicotine Patch in Place     nitroGLYcerin (NITROSTAT) sublingual tablet 0.4 mg     ondansetron (ZOFRAN-ODT) ODT tab 4 mg    Or     ondansetron (ZOFRAN) injection 4 mg     oxyCODONE (ROXICODONE) tablet 5 mg     polyethylene glycol (MIRALAX) Packet 17 g     pregabalin (LYRICA) capsule 150 mg     prochlorperazine (COMPAZINE) injection 10 mg    Or     prochlorperazine (COMPAZINE) tablet 10 mg    Or     prochlorperazine (COMPAZINE) suppository 25 mg     sodium chloride (PF) 0.9% PF flush 3 mL     sodium chloride (PF) 0.9% PF flush 3 mL     sodium chloride (PF) 0.9% PF flush 3 mL     sodium chloride (PF) 0.9% PF flush 3 mL     sodium chloride (PF) 0.9% PF flush 3 mL     vancomycin (VANCOCIN) 1000 mg in dextrose 5% 200 mL PREMIX          Review of Systems:    ROS: 10 point ROS neg other than the symptoms noted above in the   HPI.           Physical Exam:   All vitals have been reviewed  Temp:  [98.2  F (36.8  C)-98.8  F (37.1  C)] 98.8  F (37.1  C)  Pulse:  [] 98  Resp:  [17-27] 20  BP: (102-137)/() 122/85  SpO2:  [98 %-100 %] 98 %    Physical Exam:  GENERAL: appears well, in no acute distress, unkempt  HEENT: within normal limits  NECK:  supple, no lymphadenopathy  CARDIOVASCULAR:  Regular rate and rhythm; normal S1, S2;     systolic murmur on left border of sternum  LUNGS: Clear bilaterally  ABDOMEN:  Soft, nontender, non-distended  EXTREMITIES:  Negative for edema, cyanosis or clubbing; multiple   scars over both upper and lower extremities from needle sticks  NEUROLOGIC:  Alert and oriented x 3 with no focal deficits          Data:   All laboratory data reviewed    Results:  BMP  Recent Labs   Lab 11/18/21 0535 11/16/21 2049 11/16/21  0014 11/14/21  1734     --  130* 128*   POTASSIUM 3.5  --  3.5 4.4   CHLORIDE 108  --  97 96   CO2 29  --  29 32   BUN 5*  --  11 12   CR 0.81  --  0.71 0.80   * 94 104* 119*     CBC  Recent Labs   Lab 11/18/21 0535 11/16/21  0014 11/14/21  1734   WBC 12.9* 14.7* 13.1*   HGB 11.0* 11.4* 12.5*    187 250     LFT  Recent Labs   Lab 11/16/21  0014   AST 38   ALT 61   ALKPHOS 103   BILITOTAL 0.4   ALBUMIN 2.7*     Recent Labs   Lab 11/18/21  0535 11/16/21 2049 11/16/21  0014 11/14/21  1734   * 94 104* 119*     ECHO EDUARDO (11/18/2021):  Two, large, mobile masses noted, attached to the atrial side of   base of posterior tricuspid leaflets are noted. (they measures 18   x 8mm and 13 x 5mm respectively).These are most consistent with   large tricuspid valve vegetations.  Severe tricuspid regurgitation noted, directed towards the   interatrial septum.  Cannot exclude perforation of posterior leaflet of tricuspid   valve.  The right ventricle is mildly dilated.  The right ventricular systolic function is normal.  Left ventricular systolic function is normal.  The visual ejection fraction is 60-65%.    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  Please  continue IV antibiotics for duration as per ID   recommendations.  Consider sending to rehab for treatment of drug dependence.  Continue current care as per primary team.  Cardiac surgery will follow.    Discussed with staff, Dr. Hess.     NIMO WU MD  Fellow, Cardiothoracic Surgery      Blood Culture Arm, Right    Specimen: Arm, Right; Blood   Result Value Ref Range    Culture No growth after 12 hours     Narrative    Only an Aerobic Blood Culture Bottle was collected, interpret results with caution.     CBC with Platelets & Differential    Narrative    The following orders were created for panel order CBC with Platelets & Differential.  Procedure                               Abnormality         Status                     ---------                               -----------         ------                     CBC with platelets and d...[[id number]]  Abnormal            Final result                 Please view results for these tests on the individual orders.   Basic metabolic panel   Result Value Ref Range    Sodium 143 133 - 144 mmol/L    Potassium 3.4 3.4 - 5.3 mmol/L    Chloride 110 (H) 94 - 109 mmol/L    Carbon Dioxide (CO2) 27 20 - 32 mmol/L    Anion Gap 6 3 - 14 mmol/L    Urea Nitrogen 7 7 - 30 mg/dL    Creatinine 0.76 0.66 - 1.25 mg/dL    Calcium 8.5 8.5 - 10.1 mg/dL    Glucose 110 (H) 70 - 99 mg/dL    GFR Estimate >90 >60 mL/min/1.73m2   CBC with platelets and differential   Result Value Ref Range    WBC Count 9.5 4.0 - 11.0 10e3/uL    RBC Count 4.46 4.40 - 5.90 10e6/uL    Hemoglobin 12.2 (L) 13.3 - 17.7 g/dL    Hematocrit 37.2 (L) 40.0 - 53.0 %    MCV 83 78 - 100 fL    MCH 27.4 26.5 - 33.0 pg    MCHC 32.8 31.5 - 36.5 g/dL    RDW 14.0 10.0 - 15.0 %    Platelet Count 245 150 - 450 10e3/uL    % Neutrophils 68 %    % Lymphocytes 22 %    % Monocytes 8 %    % Eosinophils 1 %    % Basophils 0 %    % Immature Granulocytes 1 %    NRBCs per 100 WBC 0 <1 /100    Absolute Neutrophils 6.4 1.6 - 8.3 10e3/uL     Absolute Lymphocytes 2.1 0.8 - 5.3 10e3/uL    Absolute Monocytes 0.8 0.0 - 1.3 10e3/uL    Absolute Eosinophils 0.1 0.0 - 0.7 10e3/uL    Absolute Basophils 0.0 0.0 - 0.2 10e3/uL    Absolute Immature Granulocytes 0.1 (H) <=0.0 10e3/uL    Absolute NRBCs 0.0 10e3/uL         Medications     - MEDICATION INSTRUCTIONS -       - MEDICATION INSTRUCTIONS -         abacavir-dolutegravir-LamiVUDine  1 tablet Oral Daily     aspirin  81 mg Oral Daily     carisoprodol  87.5 mg Oral BID     levomilnacipran  120 mg Oral Daily     nicotine  1 patch Transdermal QPM     nicotine   Transdermal Q8H     pregabalin  150 mg Oral BID     sodium chloride (PF)  3 mL Intravenous Q8H     vancomycin  1,000 mg Intravenous Q12H

## 2021-11-19 NOTE — PLAN OF CARE
Pt remained free from falls/trauma/injuries. No complaints of CP/SOB/discomfort. ICD extraction successful. Bilat groin sites CDI, no hematoma or pain. Left chest wall CDI w/ dressing. EP and ID following. On abx therapy, Vanc and Ceftriaxone. Will d/c home once medically stable. VSS. Fall bundle in place. POC explained, no questions at this time. Pt tolerating care.    Pt with infective tricuspid valve endocarditis, A/O x4, VSS on room air, Tele SR, gets PRN oxycodone for generalized bone pain. COWS score 6, Scrotal repair intact, independent, reg diet

## 2021-11-20 LAB
FLUAV RNA SPEC QL NAA+PROBE: NEGATIVE
FLUBV RNA RESP QL NAA+PROBE: NEGATIVE
SARS-COV-2 RNA RESP QL NAA+PROBE: NEGATIVE

## 2021-11-20 PROCEDURE — 250N000013 HC RX MED GY IP 250 OP 250 PS 637: Performed by: HOSPITALIST

## 2021-11-20 PROCEDURE — 99207 PR MOONLIGHTING INDICATOR: CPT | Performed by: INTERNAL MEDICINE

## 2021-11-20 PROCEDURE — 250N000011 HC RX IP 250 OP 636: Performed by: INTERNAL MEDICINE

## 2021-11-20 PROCEDURE — 250N000011 HC RX IP 250 OP 636: Performed by: HOSPITALIST

## 2021-11-20 PROCEDURE — 120N000001 HC R&B MED SURG/OB

## 2021-11-20 PROCEDURE — 99232 SBSQ HOSP IP/OBS MODERATE 35: CPT | Performed by: INTERNAL MEDICINE

## 2021-11-20 PROCEDURE — 87636 SARSCOV2 & INF A&B AMP PRB: CPT | Performed by: INTERNAL MEDICINE

## 2021-11-20 PROCEDURE — 250N000009 HC RX 250: Performed by: HOSPITALIST

## 2021-11-20 PROCEDURE — 250N000013 HC RX MED GY IP 250 OP 250 PS 637: Performed by: INTERNAL MEDICINE

## 2021-11-20 PROCEDURE — 250N000013 HC RX MED GY IP 250 OP 250 PS 637: Performed by: STUDENT IN AN ORGANIZED HEALTH CARE EDUCATION/TRAINING PROGRAM

## 2021-11-20 RX ADMIN — VANCOMYCIN HYDROCHLORIDE 1000 MG: 1 INJECTION, SOLUTION INTRAVENOUS at 21:09

## 2021-11-20 RX ADMIN — ONDANSETRON 4 MG: 2 INJECTION INTRAMUSCULAR; INTRAVENOUS at 10:42

## 2021-11-20 RX ADMIN — NICOTINE 1 PATCH: 14 PATCH, EXTENDED RELEASE TRANSDERMAL at 20:06

## 2021-11-20 RX ADMIN — PREGABALIN 150 MG: 150 CAPSULE ORAL at 20:05

## 2021-11-20 RX ADMIN — Medication 87.5 MG: at 10:45

## 2021-11-20 RX ADMIN — ABACAVIR SULFATE, DOLUTEGRAVIR SODIUM, LAMIVUDINE 1 TABLET: 600; 50; 300 TABLET, FILM COATED ORAL at 09:10

## 2021-11-20 RX ADMIN — OXYCODONE HYDROCHLORIDE 5 MG: 5 TABLET ORAL at 06:50

## 2021-11-20 RX ADMIN — OXYCODONE HYDROCHLORIDE 5 MG: 5 TABLET ORAL at 20:13

## 2021-11-20 RX ADMIN — Medication 87.5 MG: at 21:51

## 2021-11-20 RX ADMIN — LORAZEPAM 1.5 MG: 0.5 TABLET ORAL at 17:07

## 2021-11-20 RX ADMIN — BUPRENORPHINE AND NALOXONE 1 FILM: 8; 2 FILM, SOLUBLE BUCCAL; SUBLINGUAL at 21:56

## 2021-11-20 RX ADMIN — LEVOMILNACIPRAN HYDROCHLORIDE 120 MG: 40 CAPSULE, EXTENDED RELEASE ORAL at 09:09

## 2021-11-20 RX ADMIN — OXYCODONE HYDROCHLORIDE 5 MG: 5 TABLET ORAL at 12:21

## 2021-11-20 RX ADMIN — PREGABALIN 150 MG: 150 CAPSULE ORAL at 09:10

## 2021-11-20 RX ADMIN — ASPIRIN 81 MG: 81 TABLET, COATED ORAL at 09:10

## 2021-11-20 RX ADMIN — VANCOMYCIN HYDROCHLORIDE 1000 MG: 1 INJECTION, SOLUTION INTRAVENOUS at 09:17

## 2021-11-20 RX ADMIN — LORAZEPAM 1 MG: 0.5 TABLET ORAL at 06:50

## 2021-11-20 ASSESSMENT — ACTIVITIES OF DAILY LIVING (ADL)
ADLS_ACUITY_SCORE: 5

## 2021-11-20 NOTE — PROGRESS NOTES
Transfer in/out    Transfer to  from   Report given to Jade Vicente RN    Brief note about patient status upon transfer:  Patient admitted 11/15/21 for syncopal episode with all over body aches. EDUARDO completed- Tricuspid valve endocarditis. Patient A&Ox4, independent, continent. Lethargic this AM c/o generalized body pain. COW score 3. PRN Zofran IV given x1 for nausea. VSS on RA, Tele: NSR. Patient will be transferring to a medical floor.      Code status: Full  Skin: Scattered bruising and scabs. Sutured laceration to R scrotum.  Fall Risk: No  Isolation: Contact for MRSA  Patient belongings: Sent with patient  Medication drips upon transfer: N/A  Sign and held orders released? N/A

## 2021-11-20 NOTE — PLAN OF CARE
VSS, LS clear.  COWS=2-3, reports anxiety and diffuse body aches.  Oxy as available PRN, did use once daily Suboxone.  Expresses frustration that he is only receiving 5mg Oxy, states d/t his scrotal injury a higher dose would be more appropriate.  Also requests 2mg Ativan for anxiety vs. 1mg as that is his PTA dose.  Has been cooperative and accepting, however, of what is available.  IND in room, adequate void.  Visit from father last jose.  Rested between cares.  NSR.

## 2021-11-20 NOTE — PLAN OF CARE
DATE & TIME: 11/20/21 1500    Cognitive Concerns/ Orientation : A&O   BEHAVIOR & AGGRESSION TOOL COLOR: Green  CIWA SCORE: NA   ABNL VS/O2: VSS, on RA  MOBILITY: Ind  PAIN MANAGMENT: Oxycodone x2 today for overall body aches.  DIET: Reg, appetite good  BOWEL/BLADDER: Continent  ABNL LAB/BG: Covid neg today  DRAIN/DEVICES: 2 PIV sites  TELEMETRY RHYTHM: Sinus Tach  SKIN: Pt has scrotal laceration with stitches.  TESTS/PROCEDURES: NA  D/C DATE: Pending progress.   Discharge Barriers: Treatment plan with substance abuse. IV antibiotic therapy.  OTHER IMPORTANT INFO: Iso for MRSA bacteremia and tricuspid valve vegetation. Nicotine patch in place.  MD/RN ROUNDING SIGNED OFF D/E SHIFT: Seen on 33 prior to transfer.  COMMIT TO SIT DONE AND SIGNED OFF yes

## 2021-11-20 NOTE — PHARMACY-VANCOMYCIN DOSING SERVICE
Pharmacy Vancomycin Note  Date of Service 2021  Patient's  1980   41 year old, male    Indication: MRSA sepsis with tricuspid valve endocarditis   Day of Therapy: 4  Current vancomycin regimen:  1000 mg IV q12h  Current vancomycin monitoring method: AUC  Current vancomycin therapeutic monitoring goal: 400-600 mg*h/L    [employer] Prediction of Current Vancomycin Regimen  Loading dose: N/A  Regimen: 1000 mg IV every 12 hours.  Start time: 21:30 on 2021  Exposure target: AUC24 (range)400-600 mg/L.hr   AUC24,ss: 440 mg/L.hr  Probability of AUC24 > 400: 69 %  Ctrough,ss: 11.7 mg/L  Probability of Ctrough,ss > 20: 2 %  Probability of nephrotoxicity (Lodise WILLIAM ): 7 %      Current estimated CrCl = Estimated Creatinine Clearance: 103.7 mL/min (based on SCr of 0.76 mg/dL).    Creatinine for last 3 days  2021:  5:35 AM Creatinine 0.81 mg/dL  2021:  5:57 AM Creatinine 0.76 mg/dL    Recent Vancomycin Levels (past 3 days)  2021:  8:34 PM Vancomycin 10.5 mg/L    Vancomycin IV Administrations (past 72 hours)                   vancomycin (VANCOCIN) 1000 mg in dextrose 5% 200 mL PREMIX (mg) 1,000 mg New Bag 21 0930     1,000 mg New Bag 21 2155    vancomycin 1250 mg in 0.9% NaCl 250 mL intermittent infusion 1,250 mg (mg) 1,250 mg New Bag 21 1131                Nephrotoxins and other renal medications (From now, onward)    Start     Dose/Rate Route Frequency Ordered Stop    21 2200  vancomycin (VANCOCIN) 1000 mg in dextrose 5% 200 mL PREMIX         1,000 mg  200 mL/hr over 1 Hours Intravenous EVERY 12 HOURS 21 0932               Contrast Orders - past 72 hours (72h ago, onward)            None          Interpretation of levels and current regimen:  Vancomycin level is reflective of -600    Has serum creatinine changed greater than 50% in last 72 hours: No    Urine output:  good urine output    Renal Function: Stable    Insight[employer] Prediction of Planned  New Vancomycin Regimen  Regimen: 1000 mg IV every 12 hours.  Start time: 21:30 on 11/19/2021  Exposure target: AUC24 (range)400-600 mg/L.hr   AUC24,ss: 440 mg/L.hr  Probability of AUC24 > 400: 69 %  Ctrough,ss: 11.7 mg/L  Probability of Ctrough,ss > 20: 2 %  Probability of nephrotoxicity (Lodise WILLIAM 2009): 7 %      Plan:  1. Continue Current Dose  2. Vancomycin monitoring method: AUC  3. Vancomycin therapeutic monitoring goal: 400-600 mg*h/L  4. Pharmacy will check vancomycin levels as appropriate in 1-3 Days.  5. Serum creatinine levels will be ordered daily for the first week of therapy and at least twice weekly for subsequent weeks.    Mitzi Mujica [facility]

## 2021-11-20 NOTE — PROGRESS NOTES
[facility]  Hospitalist Progress Note for 11/20/2021       Assessment and Plan:   Bc German is a 41 year old male with history of chronic opioid dependence, history of polysubstance IV drug abuse, history of MSSA sepsis with pulmonary valve endocarditis (2015), history of lumbar spine L3 osteomyelitis with epidural abscess (requiring surgical intervention 2017) presented to the ED after an episode of syncope along with cough, fevers chills, noted to be tachycardic in the emergency room had a left-sided scrotal laceration which needed to be sutured in the emergency room.  His blood cultures 2 out of 2 returned positive for gram-positive cocci in clusters in the setting of IV drug use.      CXR on admission noted with small somewhat nodular and patchy opacity at the lateral right base may be explained by infectious/inflammatory pneumonitis      MSSA and MRSA Sepsis with Tricuspid valve endocarditis  Severe TR secondary to above  H/o pulmonary valve endocarditis (2015), history of lumbar spine L3 osteomyelitis with epidural abscess (requiring surgical intervention 2017)  - febrile to 101.9 on presentation, has been afebrile since; hypotension improved; IVF d/john 11/18  - evaluated and followed by ID, B Cx growing MSSA and MRSA; continue Vancomycin, pharmacy to dose; discontinued nafcillin (11/18);   - urine Cx from 11/16 with no growth  - blood cultures from 11/18 and 11/19 with no growth so far, follow   *  EDUARDO 11/18 with note of large tricuspid valve vegetation with severe regurgitation  - evaluated by cardiology and CT surgery: rec to continue medical management for now and follow clinical progress; plan to consider surgical options if clinically worsening  - ID following    Hyponatremia:resolved  - Na 128---130---140, improved; normal lactic acid 1.8; pro nino 1.03; wbc 13---14---12, ; CXR as above; IVF d/john     History of opiate dependence  History of active IV drug  use  Depression  Smoker  - per report last IVDU 1 week PTA; on 11/16 in ED, per nursing he was briefly nonresponsive and apneic, was evaluated by ED provider, narcan was given with improvement in mentation  - has multiple areas of skin excoriations from picking of skin as well as from IV drug use which could be the source of infection as well  - will have CD evaluation when clinically more stable and nearing discharge from the endocarditis perspective  - takes Suboxone only prn and refusing here, will have dilaudid prn for generalized bodyache   - continue PTA Fetzima; prn ativan  - he is on a taper of Soma (listed at 87.5 mg daily, 1/4th of 350 mg tablet but reports was taking bid)-- resumed 11/17; pharmacy assisting in figuring out the prescribing provider to figure out the taper plan  - continue nicotine patch, counselled on smoking and substance use cessation  - requested to be back on PTA Ativan, changed to 15 mg prn Q8hrs & will request palliative care consult on monday reg his multiple medications     History of HIV positive status and hepatitis C;  - phil continue PTA Triumeq  - CD4> 200, per ID no need for PJP prophylaxis     Left scrotal laceration secondary to fall and possible syncope;  - Status post suturing on 11/14/2021; needs suture removal in 1 week (around 11/21/21)  - US scrotum noted with small right hydrocele otherwise normal. No evidence for testicular fracture.     COVID status: asymptomatic screening negative on 11/16, 11/20    Diet: reg diet  DVT prophylaxis; PCD's and ambulation as tolerated     Disposition: TBD, plan per ID.      Britta Rossi MD.  Hospitalist [phone] (7am -6 pm)               Interval History:   Pt c/o -  New loss of appetite, cough, cold, coughing up discolored phlegm  Wants a higher dose of Ativan as PTA was taking 2 mg Q 8hrs prn -home medication listed as 0.5 prn Q8hrs + 1 mg prn Q8hrs)  continued generalized body aches  Pt being moved to reg floor-6th                 Medications:       abacavir-dolutegravir-LamiVUDine  1 tablet Oral Daily     aspirin  81 mg Oral Daily     carisoprodol  87.5 mg Oral BID     levomilnacipran  120 mg Oral Daily     nicotine  1 patch Transdermal QPM     nicotine   Transdermal Q8H     pregabalin  150 mg Oral BID     sodium chloride (PF)  3 mL Intravenous Q8H     vancomycin  1,000 mg Intravenous Q12H     acetaminophen **OR** acetaminophen, alum & mag hydroxide-simethicone, bisacodyl, buprenorphine HCl-naloxone HCl, calcium carbonate, lidocaine 4%, lidocaine 4%, lidocaine (buffered or not buffered), lidocaine (buffered or not buffered), LORazepam, melatonin, naloxone **OR** naloxone **OR** naloxone **OR** naloxone, nitroGLYcerin, ondansetron **OR** ondansetron, oxyCODONE, polyethylene glycol, prochlorperazine **OR** prochlorperazine **OR** prochlorperazine, sodium chloride (PF), sodium chloride (PF), sodium chloride (PF), sodium chloride (PF)               Physical Exam:   Blood pressure 114/82, pulse 98, temperature 98.6  F (37  C), temperature source Oral, resp. rate 16, height 1.829 m (6'), weight 56.2 kg (124 lb), SpO2 96 %.  Wt Readings from Last 4 Encounters:   11/15/21 56.2 kg (124 lb)   21 59 kg (130 lb)   21 54.4 kg (120 lb)   21 56.7 kg (125 lb)         Vital Sign Ranges  Temperature Temp  Av.4  F (36.9  C)  Min: 97.3  F (36.3  C)  Max: 99  F (37.2  C)   Blood pressure Systolic (24hrs), Av , Min:114 , Max:130        Diastolic (24hrs), Av, Min:82, Max:90      Pulse Pulse  Av  Min: 84  Max: 98   Respirations Resp  Av.8  Min: 16  Max: 20   Pulse oximetry SpO2  Av.8 %  Min: 96 %  Max: 100 %         Intake/Output Summary (Last 24 hours) at 2021 1154  Last data filed at 2021 0955  Gross per 24 hour   Intake 560 ml   Output 975 ml   Net -415 ml       Constitutional: Anxious, awake, alert, cooperative, no apparent distress   Lungs: Clear to auscultation bilaterally, no crackles or  wheezing   Cardiovascular: Regular rate and rhythm,S1 and S2   Abdomen: Normal bowel sounds, soft, non-distended, non-tender   Skin: No rashes, no cyanosis, no edema               Data:   All laboratory data reviewed

## 2021-11-21 LAB
CREAT SERPL-MCNC: 0.73 MG/DL (ref 0.66–1.25)
GFR SERPL CREATININE-BSD FRML MDRD: >90 ML/MIN/1.73M2

## 2021-11-21 PROCEDURE — 99207 PR MOONLIGHTING INDICATOR: CPT | Performed by: INTERNAL MEDICINE

## 2021-11-21 PROCEDURE — 36415 COLL VENOUS BLD VENIPUNCTURE: CPT | Performed by: INTERNAL MEDICINE

## 2021-11-21 PROCEDURE — 250N000013 HC RX MED GY IP 250 OP 250 PS 637: Performed by: INTERNAL MEDICINE

## 2021-11-21 PROCEDURE — 82565 ASSAY OF CREATININE: CPT | Performed by: INTERNAL MEDICINE

## 2021-11-21 PROCEDURE — 250N000009 HC RX 250: Performed by: HOSPITALIST

## 2021-11-21 PROCEDURE — 99232 SBSQ HOSP IP/OBS MODERATE 35: CPT | Performed by: INTERNAL MEDICINE

## 2021-11-21 PROCEDURE — 250N000013 HC RX MED GY IP 250 OP 250 PS 637: Performed by: STUDENT IN AN ORGANIZED HEALTH CARE EDUCATION/TRAINING PROGRAM

## 2021-11-21 PROCEDURE — 250N000011 HC RX IP 250 OP 636: Performed by: HOSPITALIST

## 2021-11-21 PROCEDURE — 250N000013 HC RX MED GY IP 250 OP 250 PS 637: Performed by: HOSPITALIST

## 2021-11-21 PROCEDURE — 120N000001 HC R&B MED SURG/OB

## 2021-11-21 RX ADMIN — OXYCODONE HYDROCHLORIDE 5 MG: 5 TABLET ORAL at 05:47

## 2021-11-21 RX ADMIN — PREGABALIN 150 MG: 150 CAPSULE ORAL at 20:13

## 2021-11-21 RX ADMIN — ABACAVIR SULFATE, DOLUTEGRAVIR SODIUM, LAMIVUDINE 1 TABLET: 600; 50; 300 TABLET, FILM COATED ORAL at 08:38

## 2021-11-21 RX ADMIN — LORAZEPAM 1.5 MG: 0.5 TABLET ORAL at 05:47

## 2021-11-21 RX ADMIN — NICOTINE 1 PATCH: 14 PATCH, EXTENDED RELEASE TRANSDERMAL at 20:12

## 2021-11-21 RX ADMIN — OXYCODONE HYDROCHLORIDE 5 MG: 5 TABLET ORAL at 12:12

## 2021-11-21 RX ADMIN — OXYCODONE HYDROCHLORIDE 5 MG: 5 TABLET ORAL at 16:49

## 2021-11-21 RX ADMIN — BUPRENORPHINE AND NALOXONE 1 FILM: 8; 2 FILM, SOLUBLE BUCCAL; SUBLINGUAL at 20:21

## 2021-11-21 RX ADMIN — OXYCODONE HYDROCHLORIDE 5 MG: 5 TABLET ORAL at 00:23

## 2021-11-21 RX ADMIN — VANCOMYCIN HYDROCHLORIDE 1000 MG: 1 INJECTION, SOLUTION INTRAVENOUS at 22:11

## 2021-11-21 RX ADMIN — OXYCODONE HYDROCHLORIDE 5 MG: 5 TABLET ORAL at 20:59

## 2021-11-21 RX ADMIN — PREGABALIN 150 MG: 150 CAPSULE ORAL at 08:38

## 2021-11-21 RX ADMIN — VANCOMYCIN HYDROCHLORIDE 1000 MG: 1 INJECTION, SOLUTION INTRAVENOUS at 08:36

## 2021-11-21 RX ADMIN — Medication 87.5 MG: at 20:59

## 2021-11-21 RX ADMIN — ASPIRIN 81 MG: 81 TABLET, COATED ORAL at 08:38

## 2021-11-21 RX ADMIN — LORAZEPAM 1.5 MG: 0.5 TABLET ORAL at 16:39

## 2021-11-21 RX ADMIN — LEVOMILNACIPRAN HYDROCHLORIDE 40 MG: 40 CAPSULE, EXTENDED RELEASE ORAL at 08:38

## 2021-11-21 RX ADMIN — Medication 87.5 MG: at 09:10

## 2021-11-21 ASSESSMENT — ACTIVITIES OF DAILY LIVING (ADL)
ADLS_ACUITY_SCORE: 5

## 2021-11-21 NOTE — PROGRESS NOTES
[facility]  Hospitalist Progress Note for 11/21/2021       Assessment and Plan:   Bc German is a 41 year old male with history of chronic opioid dependence, history of polysubstance IV drug abuse, history of MSSA sepsis with pulmonary valve endocarditis (2015), history of lumbar spine L3 osteomyelitis with epidural abscess (requiring surgical intervention 2017) presented to the ED after an episode of syncope along with cough, fevers chills, noted to be tachycardic in the emergency room had a left-sided scrotal laceration which needed to be sutured in the emergency room.  His blood cultures 2 out of 2 returned positive for gram-positive cocci in clusters in the setting of IV drug use.      CXR on admission noted with small somewhat nodular and patchy opacity at the lateral right base may be explained by infectious/inflammatory pneumonitis      MSSA and MRSA Sepsis with Tricuspid valve endocarditis  Severe TR secondary to above  H/o pulmonary valve endocarditis (2015), history of lumbar spine L3 osteomyelitis with epidural abscess (requiring surgical intervention 2017)  - febrile to 101.9 on presentation, has been afebrile since; hypotension improved; IVF d/john 11/18  - evaluated and followed by ID, B Cx growing MSSA and MRSA; continue Vancomycin, pharmacy to dose; discontinued nafcillin (11/18);   - urine Cx from 11/16 with no growth  - blood cultures from 11/18 and 11/19 with no growth so far, follow   *  EDUARDO 11/18 with note of large tricuspid valve vegetation with severe regurgitation  - evaluated by cardiology and CT surgery: rec to continue medical management for now and follow clinical progress; plan to consider surgical options if clinically worsening  - ID following     Hyponatremia:resolved  - Na 128---130---140, improved; normal lactic acid 1.8; pro nino 1.03; wbc 13---14---12, ; CXR as above; IVF d/john     History of opiate dependence  History of active IV drug  use  Depression  Smoker  - per report last IVDU 1 week PTA; on 11/16 in ED, per nursing he was briefly nonresponsive and apneic, was evaluated by ED provider, narcan was given with improvement in mentation  - has multiple areas of skin excoriations from picking of skin as well as from IV drug use which could be the source of infection as well  - will have CD evaluation when clinically more stable and nearing discharge from the endocarditis perspective  - takes Suboxone only prn and refusing here, will have dilaudid prn for generalized bodyache   - continue PTA Fetzima; prn ativan  - he is on a taper of Soma (listed at 87.5 mg daily, 1/4th of 350 mg tablet but reports was taking bid)-- resumed 11/17; pharmacy assisting in figuring out the prescribing provider to figure out the taper plan  - continue nicotine patch, counselled on smoking and substance use cessation  - requested to be back on PTA Ativan, changed to 15 mg prn Q8hrs & will request palliative care consult on monday reg his multiple medications & ongoing uncontrolled pain     History of HIV positive status and hepatitis C;  - phil continue PTA Triumeq  - CD4> 200, per ID no need for PJP prophylaxis     Left scrotal laceration secondary to fall and possible syncope;  - Status post suturing on 11/14/2021; needs suture removal in 1 week (around 11/21/21)  - US scrotum noted with small right hydrocele otherwise normal. No evidence for testicular fracture.     COVID status: asymptomatic screening negative on 11/16, 11/20     Diet: reg diet  DVT prophylaxis; PCD's and ambulation as tolerated     Disposition: TBD, plan per ID pending plan for outpt antibiotics. PT does not have a home but states he can go to his father's house if needed. He also needed to get established with a PCP at discharge.     Britta Rossi MD.  Hospitalist [phone] (7am -6 pm)               Interval History:   Cont to c/o pain - more so in scrotum today              Medications:        abacavir-dolutegravir-LamiVUDine  1 tablet Oral Daily     aspirin  81 mg Oral Daily     carisoprodol  87.5 mg Oral BID     levomilnacipran  120 mg Oral Daily     nicotine  1 patch Transdermal QPM     nicotine   Transdermal Q8H     pregabalin  150 mg Oral BID     sodium chloride (PF)  3 mL Intravenous Q8H     vancomycin  1,000 mg Intravenous Q12H     acetaminophen **OR** acetaminophen, alum & mag hydroxide-simethicone, bisacodyl, buprenorphine HCl-naloxone HCl, calcium carbonate, lidocaine 4%, lidocaine 4%, lidocaine (buffered or not buffered), lidocaine (buffered or not buffered), LORazepam, melatonin, naloxone **OR** naloxone **OR** naloxone **OR** naloxone, nitroGLYcerin, ondansetron **OR** ondansetron, oxyCODONE, polyethylene glycol, prochlorperazine **OR** prochlorperazine **OR** prochlorperazine, sodium chloride (PF), sodium chloride (PF), sodium chloride (PF), sodium chloride (PF)               Physical Exam:   Blood pressure 113/67, pulse 105, temperature 99.1  F (37.3  C), temperature source Oral, resp. rate 16, height 1.829 m (6'), weight 56.2 kg (124 lb), SpO2 99 %.  Wt Readings from Last 4 Encounters:   11/15/21 56.2 kg (124 lb)   21 59 kg (130 lb)   21 54.4 kg (120 lb)   21 56.7 kg (125 lb)         Vital Sign Ranges  Temperature Temp  Av.6  F (37  C)  Min: 98.3  F (36.8  C)  Max: 99.1  F (37.3  C)   Blood pressure Systolic (24hrs), Av , Min:109 , Max:117        Diastolic (24hrs), Av, Min:67, Max:82      Pulse Pulse  Av  Min: 105  Max: 108   Respirations Resp  Av  Min: 16  Max: 16   Pulse oximetry SpO2  Av %  Min: 98 %  Max: 100 %         Intake/Output Summary (Last 24 hours) at 2021 1117  Last data filed at 2021 0900  Gross per 24 hour   Intake 1050 ml   Output 400 ml   Net 650 ml       Constitutional: Sleeping soundly but wakes up when called,  cooperative, no apparent distress   Lungs: Clear to auscultation bilaterally, no crackles or  wheezing   Cardiovascular: Regular rate and rhythm, normal S1 and S2, and no murmur noted   Abdomen: Normal bowel sounds, soft, non-distended, non-tender   Skin: No rashes, no cyanosis, no edema          Data:   All laboratory data reviewed

## 2021-11-21 NOTE — PLAN OF CARE
Cognitive Concerns/ Orientation : A&O   BEHAVIOR & AGGRESSION TOOL COLOR: Green  CIWA SCORE: NA   ABNL VS/O2: VSS, on RA  MOBILITY: Ind  PAIN MANAGMENT: Oxycodone x3 for generalized body aches.  DIET: Reg, appetite good  BOWEL/BLADDER: Continent  ABNL LAB/BG:  DRAIN/DEVICES: PIV   TELEMETRY RHYTHM: Sinus Tachy  SKIN: Pt has scrotal laceration with sutures.  TESTS/PROCEDURES: NA  D/C DATE: Pending progress.   Discharge Barriers: Treatment plan with substance abuse. IV antibiotic therapy.  OTHER IMPORTANT INFO: Contact iso for MRSA maintained. PRN Ativan given for anxiety per pt's request.  MD/RN ROUNDING SIGNED OFF D/E SHIFT: NA

## 2021-11-21 NOTE — PLAN OF CARE
DATE & TIME: 11/21/21 1500 Cognitive Concerns/ Orientation : A&O   BEHAVIOR & AGGRESSION TOOL COLOR: Green  COWS SCORE: 3  ABNL VS/O2: VSS, on RA  MOBILITY: Ind  PAIN MANAGMENT: Oxycodone x1 for generalized body aches.  DIET: Reg, appetite good, despite pt says food doesn't always taste good.  BOWEL/BLADDER: Pt says he has noticed more difficulty getting stream started, unable to use urinal and has to go sit on the toilet to void. Used urinal yesterday and urine looked clear. No C/o of dysuria.  ABNL LAB/BG:None today  DRAIN/DEVICES: PIV   TELEMETRY RHYTHM: Sinus Tachy  SKIN: Pt has scrotal laceration with sutures. Appears to be healing without issue.  TESTS/PROCEDURES: NA  D/C DATE: Pending progress.   Discharge Barriers: Treatment plan with substance abuse. IV antibiotic therapy.  OTHER IMPORTANT INFO: Contact iso for MRSA maintained. Pt would only take 40mg of Fetzima ER this morning, he said the lower dose is more effective for him. Pt says he hopes to get into a long term treatment facility for his drug addiction. He also mentioned that methadone works much better for him than suboxone.  COMMIT TO SIT DONE AND SIGNED OFF yes

## 2021-11-21 NOTE — PROGRESS NOTES
[facility]    Infectious Disease Progress Note    Date of Service : 11/21/2021     Assessment:  41 year old male with polysubstance use/IVDA, HIV-Hep C co-infection and prior hx of recurrent MSSA sepsis with pulmonic valve endocarditis, spinal discitis/osteomyelitis with epidural abscess requiring surgical debridement, who is now admitted with high grade MSSA/MRSA bacteremia with tricuspid valve endocarditis with two large [id number] x 8mm and 13 x 5mm. Repeat blood cxs are pending and there are no surgical plans at this time . He has had medication non compliance with ART with detectable VL, CD4 >200 at this time not requiring PCP prophylaxis. Treatment will be challenging given substance abuse history, and he will need additional psychiatry,mental carmine counseling and chemical dependency support.      1. MSSA/MRSA sepsis with tricuspid valve endocarditis   2. Scrotal laceration without signs of infection.  3. Prior history of MSSA sepsis with pulmonary valve endocarditis in 2015 and spinal discitis/osteomyelitis with epidural abscess requiring surgical intervention in 2017.  4. Substance abuse with active use of methamphetamine / fentanyl currently  5.  HIV/Hepatitis C co-infection. Last HIV VL detectable at 29,522 copies/ml and  CD4 625(28%) on  06/09/2021 , on Triumeq    Recommendations:  1. Follow blood cxs until persistently negative, looks like has cleared, 11/18 on neg  2. Continue Vancomycin, pharmacy to dose based upon AUC  3. No clinical symptoms at this time to suggest additional seeding but may need additional imaging based upon clinical status  4. Will need FUP echocardiogram in 2 weeks   5. Continue Triumeq    Srinivas Chavez MD    Interval History   Sleepy or in bathroom . No additional complaints. Refusing some cares incl no recent T    Antimicrobial therapy  11/18 Vancomycin  prior  11/16-11/18 Nafcillin  11/16 Zosyn, Vancomycin    Physical Exam   Temp: 99.1  F (37.3  C)  Temp src: Oral BP: 113/67 Pulse: 105   Resp: 16 SpO2: 99 % O2 Device: None (Room air)    Vitals:    11/15/21 2141   Weight: 56.2 kg (124 lb)     Vital Signs with Ranges  Temp:  [98.3  F (36.8  C)-99.1  F (37.3  C)] 99.1  F (37.3  C)  Pulse:  [] 105  Resp:  [16] 16  BP: (109-117)/(67-82) 113/67  SpO2:  [96 %-100 %] 99 %    GENERAL APPEARANCE:  Awake, cachectic  appearing male  EYES: Eyes grossly normal to inspection  Mouth : Poor dentition  NECK: no adenopathy  RESP: lungs clear   CV: regular rates and rhythm  ABDOMEN: soft, nontender  MS: no edema  SKIN: multiple scars, IV racks, excoriations on hands,    : Left scrotal laceration site appears clean, no sign of infection.    Other:    Medications       abacavir-dolutegravir-LamiVUDine  1 tablet Oral Daily     aspirin  81 mg Oral Daily     carisoprodol  87.5 mg Oral BID     levomilnacipran  120 mg Oral Daily     nicotine  1 patch Transdermal QPM     nicotine   Transdermal Q8H     pregabalin  150 mg Oral BID     sodium chloride (PF)  3 mL Intravenous Q8H     vancomycin  1,000 mg Intravenous Q12H       Data   All microbiology laboratory data reviewed.  Recent Labs   Lab Test 11/19/21  0557 11/18/21  0535 11/16/21  0014   WBC 9.5 12.9* 14.7*   HGB 12.2* 11.0* 11.4*   HCT 37.2* 34.0* 34.4*   MCV 83 84 84    270 187     Recent Labs   Lab Test 11/21/21  0539 11/19/21  0557 11/18/21  0535   CR 0.73 0.76 0.81     Recent Labs   Lab Test 11/16/21  0014   SED 39*     Component      Latest Ref Rng & Units 11/16/2021   CD3 Mature T      49 - 84 % 67   Absolute CD3      603-2,990 cells/uL 374 (L)   CD4 Labadie T      28 - 63 % 37   Absolute CD4      441-2,156 cells/uL 206 (L)   CD8 Suppressor T      10 - 40 % 27   Absolute CD8      125-1,312 cells/uL 151   CD4:CD8 Ratio      1.40 - 2.60 1.37 (L)   HIV-1 RNA Copies/mL, Instrument      <1 copies/mL 985 (H)   HIV RNA QT Log       3.0     Microbiology  11/14 & 4/16 blood cxs both sets MSSA & MRSA, blood cxs 11/18, 11/19  pending  Culture Positive on the 1st day of incubation Abnormal        Staphylococcus aureus Panic     2 of 2 bottles   Staphylococcus aureus MRSA Panic     2 of 2 bottles        Resulting Agency: [facility]       Susceptibility     Staphylococcus aureus Staphylococcus aureus MRSA     PURNIMA PURNIMA     Clindamycin <=0.25 ug/mL Susceptible <=0.25 ug/mL Susceptible 1     Erythromycin <=0.25 ug/mL Susceptible >=8.0 ug/mL Resistant     Gentamicin <=0.5 ug/mL Susceptible <=0.5 ug/mL Susceptible     Linezolid   2.0 ug/mL Susceptible     Oxacillin <=0.25 ug/mL Susceptible >=4.0 ug/mL Resistant     Tetracycline <=1.0 ug/mL Susceptible <=1.0 ug/mL Susceptible     Trimethoprim/Sulfamethoxazole <=0.5/9.5 u... Susceptible <=0.5/9.5 u... Susceptible     Vancomycin <=0.5 ug/mL Susceptible <=0.5 ug/mL Susceptible                 Imaging  11/18 EDUARDO  Interpretation Summary     Two, large, mobile masses noted, attached to the atrial side of base of  posterior tricuspid leaflets are noted. (they measures 18 x 8mm and 13 x 5mm  respectively).These are most consistent with large tricuspid valve  vegetations.  Severe tricuspid regurgitation noted, directed towards the interatrial septum.  Cannot exclude perforation of posterior leaflet of tricuspid valve.  The right ventricle is mildly dilated.  The right ventricular systolic function is normal.  Left ventricular systolic function is normal.  The visual ejection fraction is 60-65%.  Findings discussed with Dr Guerin.  These are new comnpared to prior echo from 2017. The study was technically  adequate.

## 2021-11-22 LAB
CREAT SERPL-MCNC: 1.4 MG/DL (ref 0.66–1.25)
GFR SERPL CREATININE-BSD FRML MDRD: 63 ML/MIN/1.73M2
VANCOMYCIN SERPL-MCNC: 18.1 MG/L

## 2021-11-22 PROCEDURE — 250N000013 HC RX MED GY IP 250 OP 250 PS 637: Performed by: STUDENT IN AN ORGANIZED HEALTH CARE EDUCATION/TRAINING PROGRAM

## 2021-11-22 PROCEDURE — 250N000011 HC RX IP 250 OP 636: Performed by: INTERNAL MEDICINE

## 2021-11-22 PROCEDURE — 250N000013 HC RX MED GY IP 250 OP 250 PS 637: Performed by: HOSPITALIST

## 2021-11-22 PROCEDURE — 250N000009 HC RX 250: Performed by: HOSPITALIST

## 2021-11-22 PROCEDURE — 250N000013 HC RX MED GY IP 250 OP 250 PS 637: Performed by: INTERNAL MEDICINE

## 2021-11-22 PROCEDURE — 36415 COLL VENOUS BLD VENIPUNCTURE: CPT | Performed by: INTERNAL MEDICINE

## 2021-11-22 PROCEDURE — 99254 IP/OBS CNSLTJ NEW/EST MOD 60: CPT | Performed by: PSYCHIATRY & NEUROLOGY

## 2021-11-22 PROCEDURE — 250N000011 HC RX IP 250 OP 636: Performed by: HOSPITALIST

## 2021-11-22 PROCEDURE — 80202 ASSAY OF VANCOMYCIN: CPT | Performed by: INTERNAL MEDICINE

## 2021-11-22 PROCEDURE — 99233 SBSQ HOSP IP/OBS HIGH 50: CPT | Performed by: HOSPITALIST

## 2021-11-22 PROCEDURE — 250N000013 HC RX MED GY IP 250 OP 250 PS 637: Performed by: PSYCHIATRY & NEUROLOGY

## 2021-11-22 PROCEDURE — 120N000001 HC R&B MED SURG/OB

## 2021-11-22 PROCEDURE — 82565 ASSAY OF CREATININE: CPT | Performed by: INTERNAL MEDICINE

## 2021-11-22 RX ORDER — LORAZEPAM 0.5 MG/1
0.5 TABLET ORAL EVERY 6 HOURS PRN
Status: DISCONTINUED | OUTPATIENT
Start: 2021-11-22 | End: 2021-12-20

## 2021-11-22 RX ORDER — VANCOMYCIN HYDROCHLORIDE 1 G/200ML
1000 INJECTION, SOLUTION INTRAVENOUS EVERY 24 HOURS
Status: DISCONTINUED | OUTPATIENT
Start: 2021-11-23 | End: 2021-11-22 | Stop reason: DRUGHIGH

## 2021-11-22 RX ORDER — BUPRENORPHINE 8 MG/1
8 TABLET SUBLINGUAL 2 TIMES DAILY
Status: DISCONTINUED | OUTPATIENT
Start: 2021-11-22 | End: 2021-11-28

## 2021-11-22 RX ADMIN — LORAZEPAM 0.5 MG: 0.5 TABLET ORAL at 16:46

## 2021-11-22 RX ADMIN — Medication 87.5 MG: at 09:31

## 2021-11-22 RX ADMIN — OXYCODONE HYDROCHLORIDE 5 MG: 5 TABLET ORAL at 04:02

## 2021-11-22 RX ADMIN — ABACAVIR SULFATE, DOLUTEGRAVIR SODIUM, LAMIVUDINE 1 TABLET: 600; 50; 300 TABLET, FILM COATED ORAL at 09:30

## 2021-11-22 RX ADMIN — VANCOMYCIN HYDROCHLORIDE 1000 MG: 1 INJECTION, SOLUTION INTRAVENOUS at 10:39

## 2021-11-22 RX ADMIN — Medication 87.5 MG: at 23:05

## 2021-11-22 RX ADMIN — ACETAMINOPHEN 650 MG: 325 TABLET, FILM COATED ORAL at 12:32

## 2021-11-22 RX ADMIN — PREGABALIN 150 MG: 150 CAPSULE ORAL at 09:31

## 2021-11-22 RX ADMIN — ACETAMINOPHEN 650 MG: 325 TABLET, FILM COATED ORAL at 20:59

## 2021-11-22 RX ADMIN — ONDANSETRON 4 MG: 2 INJECTION INTRAMUSCULAR; INTRAVENOUS at 23:04

## 2021-11-22 RX ADMIN — LORAZEPAM 1.5 MG: 0.5 TABLET ORAL at 04:02

## 2021-11-22 RX ADMIN — NICOTINE 1 PATCH: 14 PATCH, EXTENDED RELEASE TRANSDERMAL at 20:52

## 2021-11-22 RX ADMIN — PREGABALIN 150 MG: 150 CAPSULE ORAL at 20:59

## 2021-11-22 RX ADMIN — LORAZEPAM 0.5 MG: 0.5 TABLET ORAL at 23:05

## 2021-11-22 RX ADMIN — BUPRENORPHINE HYDROCHLORIDE 8 MG: 8 TABLET SUBLINGUAL at 12:29

## 2021-11-22 RX ADMIN — ASPIRIN 81 MG: 81 TABLET, COATED ORAL at 09:30

## 2021-11-22 RX ADMIN — LEVOMILNACIPRAN HYDROCHLORIDE 40 MG: 40 CAPSULE, EXTENDED RELEASE ORAL at 09:31

## 2021-11-22 ASSESSMENT — ACTIVITIES OF DAILY LIVING (ADL)
ADLS_ACUITY_SCORE: 5
ADLS_ACUITY_SCORE: 3
ADLS_ACUITY_SCORE: 5
ADLS_ACUITY_SCORE: 3
ADLS_ACUITY_SCORE: 5
ADLS_ACUITY_SCORE: 5

## 2021-11-22 NOTE — PROGRESS NOTES
Pt reported that he has an occasional  productive cough with a small  greenish, blood streaked sputum(noted in a specimen cup at bedside). Temp 99.5. He is under the impression that a follow up chest X-ray was to be done. Nursing to follow up this morning.

## 2021-11-22 NOTE — CONSULTS
"Consult Date: 11/22/2021    PSYCHIATRY CONSULTATION    REQUESTING PHYSICIAN:  Dr. Medeiros    REASON FOR CONSULTATION:  Evaluate underlying psychiatric illness complicating medical compliance, history of IV drug use.    IDENTIFYING DATA:  The patient is a 41-year-old  male, HIV positive with a known history of polydrug dependence, here because of sepsis secondary to Staph bacteremia.  He has a very lengthy and storied history of chemical dependency concerns with opiate dependence.  He was using IV drugs prior to admission.  He came in with a fall, a scrotal laceration.  He has recurrent osteomyelitis, pneumonia and endocarditis and is currently on IV antibiotics.    CHIEF COMPLAINT:  \"I'm in a lot of pain.\"    HISTORY OF PRESENT ILLNESS:  Bc is a 41-year-old gentleman with a very complex chemical dependency history, multiple medical problems because of long-term IV drug use leading to recurrent infections.  He is convalescing on station 66.  He has been on Fetzima 120 mg daily long term, weaning off of Soma.  He is also on Lyrica.  He presents as dysphoric, reporting that he is constantly in pain.    Prior to admission, he was using some p.r.n. Ativan and using buprenorphine on a p.r.n. basis.  They have been giving that to him, and he typically takes it in the evening.  He has a history of responding poorly to Suboxone, indicating that he does not feel good when he takes it.  He has had some success in the past with methadone, but has had a hard time complying with it.  He expresses interest in going to treatment, but he has been to treatment 11 times, and he has a long-term course of antibiotics that need to be administered.  He denies current thoughts of self-harm.  We talked about initiating some Subutex rather than continuing to give him opiates and an ineffective dose of Suboxone.  He is agreeable to that plan and understands the potential benefits of being on a buprenorphine derivative in order to " prevent relapse.    On further questioning, he describes some chronic anxiety.  He has a very chaotic past history.  He had been living locally with family.  He has been seen by Psychiatry on a number of different occasions as a result of his multiple hospitalizations because of his IV drug use.  He says he wants to go to treatment directly if he can and hopes at some point to get on methadone.    PAST PSYCHIATRIC HISTORY:  Notable for depression, anxiety and a questionable diagnosis of PTSD.  He is currently on Fetzima and had been taking some p.r.n. Ativan, which has been continued here in the hospital.    PAST CHEMICAL DEPENDENCY HISTORY:  Notable for polydrug dependence, though methamphetamine and IV opiates appear to be his drugs of choice, with multiple medical problems as a result of this and many, many different treatment attempts.    PAST MEDICAL HISTORY:  Per chart review includes MSSA bacteremia, cervical osteomyelitis, HIV, endocarditis, scrotal laceration this admission.  HIV positive status.    PRIOR TO ADMISSION MEDICATIONS:  Triumeq, aspirin, buprenorphine 8/2 one daily as needed, Soma 87.5 mg daily, Fetzima 120 mg daily, Ativan 1 mg q. 8 hours p.r.n., Lyrica 150 mg b.i.d., testosterone, Keflex.    FAMILY HISTORY:  Unknown.    SOCIAL HISTORY:  The patient is unmarried, staying with family.  He is unemployed.  Says he has applied unsuccessfully for disability.  He has been plagued by chronic chemical dependency and mental health problems for many, many years.  He alludes to some trauma history and a very difficult upbringing.  Denies current legal problems.  Denies  history.    REVIEW OF SYSTEMS:  A 10-point review of systems is notable for generalized myalgias and body aches on constitutional exam.    MOST RECENT VITAL SIGNS:  Blood pressure 110/75, temperature 97.6, pulse 108, respiratory rate 18, oxygen saturation 98%.    MENTAL STATUS EXAMINATION:  Appearance:  The patient is a slender  gentleman.  He looks fatigued, physically uncomfortable.  He is lying in bed.  He is a fair historian.  Speech is dysphoric and tone rate and flow normal.  Use of language appropriate.  Motor exam is calm.  Coordination, station, gait not tested.  Muscle strength and tone diminished.  Affect is flat.  Mood dysphoric.  Thought process logical, coherent and goal-directed.  No loosening of associations, flight of ideas or formal thought disorder.  Thought content negative for hallucinations, delusions, paranoia, suicidal or homicidal ideation.  Insight and judgment chronically poor with respect to chemical use.  Cognitive:  The patient is fatigued, oriented x3.  Concentration intact.  Recent and remote memory intact.  General fund of knowledge average.    IMPRESSION:  The patient is a 41-year-old gentleman with severe polydrug dependence and multiple medical complications related to this.  Switching him to Subutex is a reasonable option to manage both his pain needs and his addiction.  We will discontinue all opiates.  We will continue his Fetzima and reduce Ativan slightly since using buprenorphine at a more ambitious dose can interact with benzodiazepines.  He should be offered a chemical dependency assessment closer to discharge.  He appears to have a very guarded prognosis, given multiple treatment failures and poor compliance.    DIAGNOSES:     1.  Opiate use disorder, severe.  2.  Amphetamine use disorder, severe.  3.  Major depressive disorder, recurrent, severe.  4.  Multiple medical comorbidities, including endocarditis and osteomyelitis secondary to IV drug use.    PLAN:     1.  Continue his Fetzima, lower the Ativan to 0.5 mg q. 6 hours p.r.n-this is appropriate given his CD history and potential interaction with subutex  2.  Start Subutex 8 mg b.i.d., discontinue all opiates.  3.  Pursue chemical dependency assessment closer to discharge to see if there is any viable programs that might be an option for  him.  4.  Reconsult Psychiatry as needed.    Luis Delgado MD        D: 2021   T: 2021   MT: DIANNE    Name:     AMBREEN PYLE  MRN:      [id number]        Account:      [id number]   :      1980           Consult Date: 2021     Document: [id number]

## 2021-11-22 NOTE — PLAN OF CARE
Cognitive Concerns/ Orientation : A&Ox4  BEHAVIOR & AGGRESSION TOOL COLOR: Green  COWS SCORE: 3  ABNL VS/O2: VSS, on RA  MOBILITY: Ind  PAIN MANAGMENT: Oxycodone x2 for generalized body aches.  DIET: Regular appetite good  BOWEL/BLADDER: Continent  ABNL LAB/BG: Refused vanco level draw. Pharmacy notified, to be drown this morning  DRAIN/DEVICES: PIV   TELEMETRY RHYTHM: Sinus Tachy  SKIN: Pt has scrotal laceration with sutures. Scattered scabs  TESTS/PROCEDURES: NA  D/C DATE: Pending progress.   Discharge Barriers: Treatment plan with substance abuse. IV antibiotic therapy.  OTHER IMPORTANT INFO: Contact iso for MRSA maintained. Pt says he hopes to get into a long term treatment facility for his drug addiction. PRN Suboxone given per pt's request

## 2021-11-22 NOTE — PROGRESS NOTES
Palliative consult received for pain management and medication management. After careful chart review and discussion with our team, pt is not an appropriate candidate for our team. Bedside RN notified. At this time, consult will be canceled. Consider Psych involvement for evaluation for CD treatment. Consider addiction medicine follow up post discharge. Thanks.    [facility]  Contact information available via [facility] Paging/Directory

## 2021-11-22 NOTE — PROGRESS NOTES
"CLINICAL NUTRITION SERVICES  -  ASSESSMENT NOTE    Recommendations Ordered by Registered Dietitian (RD):   Regular diet  Ensure Enlive TID w/ meals    Malnutrition: (11/22)   % Weight Loss:  None noted  % Intake:  <75% for > 7 days (moderate malnutrition)  Subcutaneous Fat Loss:  Baseline  Muscle Loss:  Baseline   Fluid Retention:  None noted    Malnutrition Diagnosis: Patient does not meet two of the above criteria necessary for diagnosing malnutrition - at risk       REASON FOR ASSESSMENT  Bc German is a 41 year old male seen by Registered Dietitian for LOS    NUTRITION HISTORY  - Information obtained from patient in room. Pt reports that his weight runs ~124#, and has been stable at this for some time.   - He was eating \"normally\" PTA, though this may only consist of one meal/day.   - NKFA    CURRENT NUTRITION ORDERS  Diet Order:     Regular     Current Intake/Tolerance:  Tolerating % of small, liquid trays. Pt reports poor appetite since admission. Today he is in pain. He is interested in supplements - Ensure.   Only one meal ordered 11/15-11/18.    NUTRITION FOCUSED PHYSICAL ASSESSMENT FOR DIAGNOSING MALNUTRITION)  Yes         Observed:    Low fat and muscle stores, baseline     Obtained from Chart/Interdisciplinary Team:   No recorded BM since admission?  Bari - Nutrition 3; Total 21    ANTHROPOMETRICS  Height: 6' 0\"  Weight: 124 lbs 0 oz (56.2 kg)   Body mass index is 16.82 kg/m .  Weight Status:  Underweight BMI <18.5  IBW: 80.9 kg   % IBW: 70%  Weight History: Pt reports stable wt around 124#  Wt Readings from Last 10 Encounters:   11/15/21 56.2 kg (124 lb)   11/14/21 59 kg (130 lb)   07/19/21 54.4 kg (120 lb)   05/09/21 56.7 kg (125 lb)   09/13/19 57.7 kg (127 lb 4.8 oz)   11/09/18 61.2 kg (135 lb)   05/14/18 64 kg (141 lb 1.5 oz)   01/29/18 59.4 kg (131 lb)   10/24/17 66.8 kg (147 lb 4.8 oz)   10/10/17 61.4 kg (135 lb 4.8 oz)     LABS  Labs reviewed    MEDICATIONS  Medications " reviewed    ASSESSED NUTRITION NEEDS PER APPROVED PRACTICE GUIDELINES:  Dosing Weight 56.2 kg   Estimated Energy Needs: 2150-7678 kcals (30-35 Kcal/Kg)  Justification: underweight   Estimated Protein Needs:  grams protein (1.2-1.8 g pro/Kg)  Justification: Repletion  Estimated Fluid Needs: 1 mL/kcal  Justification: maintenance    MALNUTRITION:  % Weight Loss:  None noted  % Intake:  <75% for > 7 days (moderate malnutrition)  Subcutaneous Fat Loss:  Baseline  Muscle Loss:  Baseline   Fluid Retention:  None noted    Malnutrition Diagnosis: Patient does not meet two of the above criteria necessary for diagnosing malnutrition - at risk    NUTRITION DIAGNOSIS:  Inadequate oral intake related to poor appetite w/ pain as evidenced by pt reports poor appetite, tolerance of % of smaller meals for the past 3 days, only one meal ordered from 11/15-11/18.       NUTRITION INTERVENTIONS  Recommendations / Nutrition Prescription  Regular diet  Ensure Enlive TID w/ meals       Implementation  Nutrition education: Provided education on supplements   Medical Food Supplement: ordered      Nutrition Goals  Intake of >50% meals and supplements.       MONITORING AND EVALUATION:  Progress towards goals will be monitored and evaluated per protocol and Practice Guidelines    Alice Mena RD, LD  [facility], Ortho, [facility]  Pager: [phone]  Weekend Pager: [phone]

## 2021-11-22 NOTE — PROGRESS NOTES
[facility]  Hospitalist Progress Note for 11/21/2021       Assessment and Plan:   This is a  41 year old male with history of chronic opioid dependence, history of polysubstance IV drug abuse, history of MSSA sepsis with pulmonary valve endocarditis (2015), history of lumbar spine L3 osteomyelitis with epidural abscess (requiring surgical intervention 2017) presented to the ED after an episode of syncope along with cough, fevers chills, noted to be tachycardic in the emergency room had a left-sided scrotal laceration which needed to be sutured in the emergency room.  His blood cultures 2 out of 2 returned positive for gram-positive cocci in clusters in the setting of IV drug use.      CXR on admission noted with small somewhat nodular and patchy opacity at the lateral right base may be explained by infectious/inflammatory pneumonitis      MSSA and MRSA Sepsis with Tricuspid valve endocarditis  Severe TR secondary to above  H/o pulmonary valve endocarditis (2015), history of lumbar spine L3 osteomyelitis with epidural abscess (requiring surgical intervention 2017)  - febrile to 101.9 on presentation, has been afebrile since; hypotension improved; IVF d/john 11/18  - evaluated and followed by ID, B Cx growing MSSA and MRSA; continue Vancomycin, pharmacy to dose; discontinued nafcillin (11/18);   - urine Cx from 11/16 with no growth  - blood cultures from 11/18 and 11/19 with no growth so far, follow   *  EDUARDO 11/18 with note of large tricuspid valve vegetation with severe regurgitation  - evaluated by cardiology and CT surgery: rec to continue medical management for now and follow clinical progress; plan to consider surgical options if clinically worsening  - ID following     Hyponatremia:resolved  - Na 128---130---140, improved; normal lactic acid 1.8; pro nino 1.03; wbc 13---14---12, ; CXR as above; IVF d/john     History of opiate dependence  History of active IV drug use  Depression  Smoker  -  per report last IVDU 1 week PTA; on 11/16 in ED, per nursing he was briefly nonresponsive and apneic, was evaluated by ED provider, narcan was given with improvement in mentation  - has multiple areas of skin excoriations from picking of skin as well as from IV drug use which could be the source of infection as well  - will have CD evaluation when clinically more stable and nearing discharge from the endocarditis perspective  - takes Suboxone only prn and refusing here, will have dilaudid prn for generalized bodyache   - continue PTA Fetzima; prn ativan  - he is on a taper of Soma (listed at 87.5 mg daily, 1/4th of 350 mg tablet but reports was taking bid)-- resumed 11/17; pharmacy assisting in figuring out the prescribing provider to figure out the taper plan  - continue nicotine patch, counselled on smoking and substance use cessation  - requested to be back on PTA Ativan, changed to 15 mg prn Q8hrs & will request palliative care consult on monday reg his multiple medications & ongoing uncontrolled pain     History of HIV positive status and hepatitis C;  - phil continue PTA Triumeq  - CD4> 200, per ID no need for PJP prophylaxis     Left scrotal laceration secondary to fall and possible syncope;  - Status post suturing on 11/14/2021; needs suture removal in 1 week (around 11/21/21)  - US scrotum noted with small right hydrocele otherwise normal. No evidence for testicular fracture.     COVID status: asymptomatic screening negative on 11/16, 11/20     Diet: reg diet  DVT prophylaxis; PCD's and ambulation as tolerated     Disposition: TBD, plan per ID pending plan for outpt antibiotics. PT does not have a home but states he can go to his father's house if needed. He also needed to get established with a PCP at discharge.     Rachid Medeiros MD    Hospitalist [phone] (7am -6 pm)               Interval History:   Cont to c/o pain - more so in scrotum today. scrotal sutures are clean dry and no drainage                Medications:       abacavir-dolutegravir-LamiVUDine  1 tablet Oral Daily     aspirin  81 mg Oral Daily     carisoprodol  87.5 mg Oral BID     levomilnacipran  120 mg Oral Daily     nicotine  1 patch Transdermal QPM     nicotine   Transdermal Q8H     pregabalin  150 mg Oral BID     sodium chloride (PF)  3 mL Intravenous Q8H     vancomycin  1,000 mg Intravenous Q12H     acetaminophen **OR** acetaminophen, alum & mag hydroxide-simethicone, bisacodyl, buprenorphine HCl-naloxone HCl, calcium carbonate, lidocaine 4%, lidocaine 4%, lidocaine (buffered or not buffered), lidocaine (buffered or not buffered), LORazepam, melatonin, naloxone **OR** naloxone **OR** naloxone **OR** naloxone, nitroGLYcerin, ondansetron **OR** ondansetron, oxyCODONE, polyethylene glycol, prochlorperazine **OR** prochlorperazine **OR** prochlorperazine, sodium chloride (PF), sodium chloride (PF), sodium chloride (PF), sodium chloride (PF)               Physical Exam:   Blood pressure 110/75, pulse 108, temperature 97.6  F (36.4  C), temperature source Oral, resp. rate 18, height 1.829 m (6'), weight 56.2 kg (124 lb), SpO2 98 %.  Wt Readings from Last 4 Encounters:   11/15/21 56.2 kg (124 lb)   11/14/21 59 kg (130 lb)   07/19/21 54.4 kg (120 lb)   05/09/21 56.7 kg (125 lb)          Intake/Output Summary (Last 24 hours) at 11/22/2021 0857  Last data filed at 11/21/2021 1904  Gross per 24 hour   Intake 890 ml   Output --   Net 890 ml         Constitutional: Sleeping soundly but wakes up when called,  cooperative, no apparent distress   Lungs: Clear to auscultation bilaterally, no crackles or wheezing   Cardiovascular: Regular rate and rhythm, normal S1 and S2, and no murmur noted   Abdomen: Normal bowel sounds, soft, non-distended, non-tender   Skin: No rashes, no cyanosis, no edema. Scrotal area suture line clean dry no drainsgs          Data:   All laboratory data reviewed

## 2021-11-22 NOTE — PROGRESS NOTES
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 Blood cxs are negative since 11/18 and no surgical plans at this time . He has had medication non compliance with ART with detectable VL, CD4 >200 at this time not requiring PCP prophylaxis. He will need additional psychiatry,mental carmine counseling and chemical dependency support.     1. MSSA/MRSA sepsis with tricuspid valve endocarditis - blood cxs cleared 11/18  2. JOSE RAMON -creatinine 1.4 today  3. Scrotal laceration without signs of infection.  4. Prior history of MSSA sepsis with pulmonary valve endocarditis in 2015 and spinal discitis/osteomyelitis with epidural abscess requiring surgical intervention in 2017.  5. Substance abuse with active use of methamphetamine / fentanyl currently  6.  HIV/Hepatitis C co-infection. Last HIV VL detectable at 29,522 copies/ml and  CD4 625(28%) on  06/09/2021 , on Triumeq     Recommendations:  1. Monitor renal functions - vancomycin level is therapeutic, but also at risk for infection related AGN.  2. Continue Vancomycin, pharmacy to dose based upon AUC  3. No clinical symptoms at this time to suggest additional seeding but may need additional imaging based upon clinical status  4. Will need FUP echocardiogram in 2 weeks   5. Continue Triumeq  6. Will repeat HIV VL in future    Aalna Cooper MD    Interval History   Resting, complains of difficulty urinating completely, no other complaints today, tolerating antibiotics without side effects    Antimicrobial therapy  11/18 Vancomycin  prior  11/16-11/18  Nafcillin  11/16 Zosyn, Vancomycin    Physical Exam   Temp: 98.8  F (37.1  C) Temp src: Oral BP: 104/61 Pulse: 112   Resp: 16 SpO2: 98 % O2 Device: None (Room air)    Vitals:    11/15/21 2141   Weight: 56.2 kg (124 lb)     Vital Signs with Ranges  Temp:  [97.6  F (36.4  C)-100.2  F (37.9  C)] 98.8  F (37.1  C)  Pulse:  [108-118] 112  Resp:  [16-18] 16  BP: (104-120)/(61-81) 104/61  SpO2:  [98 %-100 %] 98 %    GENERAL APPEARANCE:  Awake, cachectic  appearing male  EYES: Eyes grossly normal to inspection  Mouth : Poor dentition  NECK: no adenopathy  RESP: lungs clear   CV: regular rates and rhythm  ABDOMEN: soft, nontender  MS: no edema  SKIN: multiple scars, IV racks, excoriations on hands,    : Left scrotal laceration site appears clean, no sign of infection    Other:    Medications       abacavir-dolutegravir-LamiVUDine  1 tablet Oral Daily     aspirin  81 mg Oral Daily     buprenorphine  8 mg Sublingual BID     carisoprodol  87.5 mg Oral BID     levomilnacipran  120 mg Oral Daily     nicotine  1 patch Transdermal QPM     nicotine   Transdermal Q8H     pregabalin  150 mg Oral BID     sodium chloride (PF)  3 mL Intravenous Q8H     vancomycin  1,000 mg Intravenous Q12H       Data   All microbiology laboratory data reviewed.  Recent Labs   Lab Test 11/19/21  0557 11/18/21  0535 11/16/21  0014   WBC 9.5 12.9* 14.7*   HGB 12.2* 11.0* 11.4*   HCT 37.2* 34.0* 34.4*   MCV 83 84 84    270 187     Recent Labs   Lab Test 11/22/21  0719 11/21/21  0539 11/19/21  0557   CR 1.40* 0.73 0.76     Recent Labs   Lab Test 11/16/21  0014   SED 39*     Component      Latest Ref Rng & Units 11/16/2021   CD3 Mature T      49 - 84 % 67   Absolute CD3      603-2,990 cells/uL 374 (L)   CD4 [place] T      28 - 63 % 37   Absolute CD4      441-2,156 cells/uL 206 (L)   CD8 Suppressor T      10 - 40 % 27   Absolute CD8      125-1,312 cells/uL 151   CD4:CD8 Ratio      1.40 - 2.60 1.37 (L)   HIV-1 RNA Copies/mL, Instrument      <1 copies/mL  985 (H)   HIV RNA QT Log       3.0      Component      Latest Ref Rng & Units 11/22/2021   Vancomycin Level      mg/L 18.1     Microbiology  11/14 & 4/16 blood cxs both sets MSSA & MRSA, blood cxs 11/18, 11/19 blood cxs negative  Culture Positive on the 1st day of incubation Abnormal         Staphylococcus aureus Panic     2 of 2 bottles   Staphylococcus aureus MRSA Panic     2 of 2 bottles         Resulting Agency: [facility]         Susceptibility                Staphylococcus aureus Staphylococcus aureus MRSA       PURNIMA PURNIMA       Clindamycin <=0.25 ug/mL Susceptible <=0.25 ug/mL Susceptible 1       Erythromycin <=0.25 ug/mL Susceptible >=8.0 ug/mL Resistant       Gentamicin <=0.5 ug/mL Susceptible <=0.5 ug/mL Susceptible       Linezolid     2.0 ug/mL Susceptible       Oxacillin <=0.25 ug/mL Susceptible >=4.0 ug/mL Resistant       Tetracycline <=1.0 ug/mL Susceptible <=1.0 ug/mL Susceptible       Trimethoprim/Sulfamethoxazole <=0.5/9.5 u... Susceptible <=0.5/9.5 u... Susceptible       Vancomycin <=0.5 ug/mL Susceptible <=0.5 ug/mL Susceptible                    Imaging  11/18 EDUARDO  Interpretation Summary     Two, large, mobile masses noted, attached to the atrial side of base of  posterior tricuspid leaflets are noted. (they measures 18 x 8mm and 13 x 5mm  respectively).These are most consistent with large tricuspid valve  vegetations.  Severe tricuspid regurgitation noted, directed towards the interatrial septum.  Cannot exclude perforation of posterior leaflet of tricuspid valve.  The right ventricle is mildly dilated.  The right ventricular systolic function is normal.  Left ventricular systolic function is normal.  The visual ejection fraction is 60-65%.  Findings discussed with Dr Guerin.  These are new comnpared to prior echo from 2017. The study was technically  adequate.

## 2021-11-22 NOTE — PHARMACY-VANCOMYCIN DOSING SERVICE
Pharmacy Vancomycin Note  Date of Service 2021  Patient's  1980   41 year old, male    Indication: MRSA and Sepsis  Day of Therapy: 7  Current vancomycin regimen:  1000 mg IV q12h  Current vancomycin monitoring method: AUC  Current vancomycin therapeutic monitoring goal: 400-600 mg*h/L    Current estimated CrCl = Estimated Creatinine Clearance: 56.3 mL/min (A) (based on SCr of 1.4 mg/dL (H)).    Creatinine for last 3 days  2021:  5:39 AM Creatinine 0.73 mg/dL  2021:  7:19 AM Creatinine 1.40 mg/dL    Recent Vancomycin Levels (past 3 days)  2021:  8:34 PM Vancomycin 10.5 mg/L  2021:  7:19 AM Vancomycin 18.1 mg/L    Vancomycin IV Administrations (past 72 hours)                   vancomycin (VANCOCIN) 1000 mg in dextrose 5% 200 mL PREMIX (mg) 1,000 mg New Bag 21 1039     1,000 mg New Bag 21 2211     1,000 mg New Bag  0836     1,000 mg New Bag 21 2109     1,000 mg New Bag  0917     1,000 mg New Bag 21 2154                Nephrotoxins and other renal medications (From now, onward)    Start     Dose/Rate Route Frequency Ordered Stop    21 1030  Vancomycin       1,250 mg  200 mL/hr over 1.5 Hours Intravenous EVERY 24 HOURS 21                Contrast Orders - past 72 hours (72h ago, onward)            None          Interpretation of levels and current regimen:  Vancomycin level is reflective of AUC greater than 600    Has serum creatinine changed greater than 50% in last 72 hours: Yes    Urine output:  diminished urine output    Renal Function: Worsening     Insight[employer] Prediction of Current Vancomycin Regimen  Regimen: 1000 mg IV every 12 hours.  Exposure target: AUC24 (range)400-600 mg/L.hr   AUC24,ss: 776 mg/L.hr  Probability of AUC24 > 400: 100 %  Ctrough,ss: 25.9 mg/L  Probability of Ctrough,ss > 20: 96 %  Probability of nephrotoxicity (Lodise WILLIAM ): 30 %    Insight[employer] Prediction of Planned New Vancomycin Regimen  Regimen: 1250 mg IV every  24 hours.  Start time: 10:30 on 11/23/2021  Exposure target: AUC24 (range)400-600 mg/L.hr   AUC24,ss: 504 mg/L.hr  Probability of AUC24 > 400: 95 %  Ctrough,ss: 14.3 mg/L  Probability of Ctrough,ss > 20: 3 %  Probability of nephrotoxicity (Lodise WILLIAM 2009): 9 %      Plan:  1. Decrease Dose to 1250 mg every 24 hours  2. Vancomycin monitoring method: AUC  3. Vancomycin therapeutic monitoring goal: 400-600 mg*h/L  4. Pharmacy will check vancomycin levels as appropriate in 1-3 Days.  5. Serum creatinine levels will be ordered daily until stable. .    Leslye Ontiveros, IftikharD, BCPS

## 2021-11-22 NOTE — PLAN OF CARE
DATE & TIME: 11/22/2021 days     Cognitive Concerns/ Orientation : alert and oriented x 4    BEHAVIOR & AGGRESSION TOOL COLOR:  green is getting annoyed due to MD changing his meds   CIWA SCORE:  COWS 4   ABNL VS/O2: VSS RA   MOBILITY:  independent in the room   PAIN MANAGMENT:  medicated once for headache with some relief   DIET: Regular only ate breakfast so far today   BOWEL/BLADDER:  continent   ABNL LAB/BG:  Crea 1.4 Vanco level 18.1  DRAIN/DEVICES: SL   TELEMETRY RHYTHM:  ST   SKIN:  laceration to scrotum and is sutured and approximated   TESTS/PROCEDURES:  none   D/C DATE:  needs to finish Vanco and have treatment facility to go to   Discharge Barriers:  Completion of vanco and place to go for treatment   OTHER IMPORTANT INFO:  Pt very annoyed that the MD changed his ativan dose and frequency. New med was ordered and it is contraindicated to have high dose of Ativan. Spoke with Dr. Delgado and patient was given this information.

## 2021-11-23 LAB
ANION GAP SERPL CALCULATED.3IONS-SCNC: 9 MMOL/L (ref 3–14)
BACTERIA BLD CULT: NO GROWTH
BUN SERPL-MCNC: 25 MG/DL (ref 7–30)
CALCIUM SERPL-MCNC: 8.1 MG/DL (ref 8.5–10.1)
CHLORIDE BLD-SCNC: 104 MMOL/L (ref 94–109)
CO2 SERPL-SCNC: 22 MMOL/L (ref 20–32)
CREAT SERPL-MCNC: 1.78 MG/DL (ref 0.66–1.25)
CRP SERPL-MCNC: 144 MG/L (ref 0–8)
GFR SERPL CREATININE-BSD FRML MDRD: 47 ML/MIN/1.73M2
GLUCOSE BLD-MCNC: 93 MG/DL (ref 70–99)
GLUCOSE BLDC GLUCOMTR-MCNC: 85 MG/DL (ref 70–99)
POTASSIUM BLD-SCNC: 5.3 MMOL/L (ref 3.4–5.3)
SODIUM SERPL-SCNC: 135 MMOL/L (ref 133–144)
VANCOMYCIN SERPL-MCNC: 14.8 MG/L

## 2021-11-23 PROCEDURE — 250N000013 HC RX MED GY IP 250 OP 250 PS 637: Performed by: PSYCHIATRY & NEUROLOGY

## 2021-11-23 PROCEDURE — 120N000001 HC R&B MED SURG/OB

## 2021-11-23 PROCEDURE — 250N000013 HC RX MED GY IP 250 OP 250 PS 637: Performed by: HOSPITALIST

## 2021-11-23 PROCEDURE — 99232 SBSQ HOSP IP/OBS MODERATE 35: CPT | Performed by: HOSPITALIST

## 2021-11-23 PROCEDURE — 36415 COLL VENOUS BLD VENIPUNCTURE: CPT | Performed by: HOSPITALIST

## 2021-11-23 PROCEDURE — 82310 ASSAY OF CALCIUM: CPT | Performed by: HOSPITALIST

## 2021-11-23 PROCEDURE — 250N000013 HC RX MED GY IP 250 OP 250 PS 637: Performed by: INTERNAL MEDICINE

## 2021-11-23 PROCEDURE — 250N000009 HC RX 250: Performed by: HOSPITALIST

## 2021-11-23 PROCEDURE — 250N000013 HC RX MED GY IP 250 OP 250 PS 637: Performed by: STUDENT IN AN ORGANIZED HEALTH CARE EDUCATION/TRAINING PROGRAM

## 2021-11-23 PROCEDURE — 258N000003 HC RX IP 258 OP 636: Performed by: HOSPITALIST

## 2021-11-23 PROCEDURE — 250N000011 HC RX IP 250 OP 636

## 2021-11-23 PROCEDURE — 80202 ASSAY OF VANCOMYCIN: CPT

## 2021-11-23 PROCEDURE — 86140 C-REACTIVE PROTEIN: CPT | Performed by: SPECIALIST

## 2021-11-23 RX ORDER — IBUPROFEN 400 MG/1
400 TABLET, FILM COATED ORAL EVERY 8 HOURS PRN
Status: DISCONTINUED | OUTPATIENT
Start: 2021-11-23 | End: 2021-11-24

## 2021-11-23 RX ORDER — VANCOMYCIN HYDROCHLORIDE 1 G/200ML
1000 INJECTION, SOLUTION INTRAVENOUS EVERY 24 HOURS
Status: DISCONTINUED | OUTPATIENT
Start: 2021-11-23 | End: 2021-11-24

## 2021-11-23 RX ADMIN — PREGABALIN 150 MG: 150 CAPSULE ORAL at 08:45

## 2021-11-23 RX ADMIN — LEVOMILNACIPRAN HYDROCHLORIDE 120 MG: 40 CAPSULE, EXTENDED RELEASE ORAL at 08:45

## 2021-11-23 RX ADMIN — ABACAVIR SULFATE, DOLUTEGRAVIR SODIUM, LAMIVUDINE 1 TABLET: 600; 50; 300 TABLET, FILM COATED ORAL at 08:46

## 2021-11-23 RX ADMIN — Medication 87.5 MG: at 22:36

## 2021-11-23 RX ADMIN — ASPIRIN 81 MG: 81 TABLET, COATED ORAL at 08:46

## 2021-11-23 RX ADMIN — VANCOMYCIN HYDROCHLORIDE 1000 MG: 1 INJECTION, SOLUTION INTRAVENOUS at 10:20

## 2021-11-23 RX ADMIN — NICOTINE 1 PATCH: 14 PATCH, EXTENDED RELEASE TRANSDERMAL at 20:05

## 2021-11-23 RX ADMIN — Medication 87.5 MG: at 08:52

## 2021-11-23 RX ADMIN — LORAZEPAM 0.5 MG: 0.5 TABLET ORAL at 08:46

## 2021-11-23 RX ADMIN — SODIUM CHLORIDE 500 ML: 9 INJECTION, SOLUTION INTRAVENOUS at 08:47

## 2021-11-23 RX ADMIN — IBUPROFEN 400 MG: 400 TABLET ORAL at 13:25

## 2021-11-23 RX ADMIN — POLYETHYLENE GLYCOL 3350 17 G: 17 POWDER, FOR SOLUTION ORAL at 00:49

## 2021-11-23 RX ADMIN — BUPRENORPHINE HYDROCHLORIDE 8 MG: 8 TABLET SUBLINGUAL at 20:05

## 2021-11-23 RX ADMIN — PREGABALIN 150 MG: 150 CAPSULE ORAL at 20:05

## 2021-11-23 ASSESSMENT — ACTIVITIES OF DAILY LIVING (ADL)
ADLS_ACUITY_SCORE: 3
DEPENDENT_IADLS:: INDEPENDENT
ADLS_ACUITY_SCORE: 3

## 2021-11-23 NOTE — PHARMACY-VANCOMYCIN DOSING SERVICE
Pharmacy Vancomycin Note  Date of Service 2021  Patient's  1980   41 year old, male    Indication: MRSA and Sepsis  Day of Therapy: 8  Current vancomycin regimen: 1250 mg IV q24h (has not started yet), was on 1000 mg q12h then extended to q24h.  Current vancomycin monitoring method: AUC  Current vancomycin therapeutic monitoring goal: 400-600 mg*h/L    InsightRX Prediction of Current Vancomycin Regimen  If had started 1250 mg IV q24h this morning,   Predicted AUC24,ss: 611 mg/L.hr  Probability of AUC24 > 400: 100 %  Ctrough,ss: 18.5 mg/L  Probability of Ctrough,ss > 20: 27 %  Probability of nephrotoxicity (Lodise WILLIAM ): 15 %    Current estimated CrCl = Estimated Creatinine Clearance: 44.3 mL/min (A) (based on SCr of 1.78 mg/dL (H)).    Creatinine for last 3 days  2021:  5:39 AM Creatinine 0.73 mg/dL  2021:  7:19 AM Creatinine 1.40 mg/dL  2021:  9:12 AM Creatinine 1.78 mg/dL    Recent Vancomycin Levels (past 3 days)  2021:  7:19 AM Vancomycin 18.1 mg/L  2021:  9:12 AM Vancomycin 14.8 mg/L    Vancomycin IV Administrations (past 72 hours)                   vancomycin (VANCOCIN) 1000 mg in dextrose 5% 200 mL PREMIX (mg) 1,000 mg New Bag 21 1039     1,000 mg New Bag 21 2211     1,000 mg New Bag  0836     1,000 mg New Bag 21 2109                Nephrotoxins and other renal medications (From now, onward)    Start     Dose/Rate Route Frequency Ordered Stop    21 1030  vancomycin (VANCOCIN) 1000 mg in dextrose 5% 200 mL PREMIX         1,000 mg  200 mL/hr over 1 Hours Intravenous EVERY 24 HOURS 21 1001               Contrast Orders - past 72 hours (72h ago, onward)            None          Interpretation of levels and regimen:  Vancomycin level of 14.8 mg/L this AM. Creatinine 1.78.   Based on model, if starts Vanco 1250 mg q24h the predicted AUC would be greater than 600.    Has serum creatinine changed greater than 50% in last 72 hours:  Yes    Urine output:  diminished urine output    Renal Function: Worsening    InsightRX Prediction of Planned New Vancomycin Regimen  Regimen: 1000 mg IV every 24 hours.  Start time: 10:30 on 11/23/2021  Exposure target: AUC24 (range)400-600 mg/L.hr   AUC24,ss: 496 mg/L.hr  Probability of AUC24 > 400: 95 %  Ctrough,ss: 15.1 mg/L  Probability of Ctrough,ss > 20: 1 %  Probability of nephrotoxicity (Lodise WILLIAM 2009): 10 %    Plan:  1. Change Vancomycin regimen to 1000 mg every 24 hours.   2. Vancomycin monitoring method: AUC  3. Vancomycin therapeutic monitoring goal: 400-600 mg*h/L  4. Pharmacy will check vancomycin levels as appropriate in 1-3 Days.  5. Serum creatinine levels will be ordered daily for the first week of therapy and at least twice weekly for subsequent weeks.    Leslye Ontiveros, IftikharD, BCPS

## 2021-11-23 NOTE — PLAN OF CARE
"SUMMARY:Tricuspid Valve Endocarditis/ MSSA Bacteremia   DATE & TIME: 11/22/2021 3069-8756   Cognitive Concerns/ Orientation : A&Ox4, frustrated  BEHAVIOR & AGGRESSION TOOL COLOR: Green  COWS SCORE: 3  ABNL VS/O2: VSS on RA  MOBILITY: Independent  PAIN MANAGMENT: C/O scrotal pain/headache, tylenol given  DIET: Regular diet, ate fruit and 75% dinner tray  BOWEL/BLADDER: Continent  DRAIN/DEVICES: L PIV SL  TELEMETRY RHYTHM: SR/ST  SKIN: Pt has scrotal laceration with sutures. Scattered scabs/bruises. Cleansed by patient, he also gave himself bed bath. Scattered scabs and bruises on extremities.   TESTS/PROCEDURES: N/A  D/C DATE: Pending abx plan  Discharge Barriers: Treatment plan with substance abuse. IV antibiotic therapy.  OTHER IMPORTANT INFO: Contact [facility] for MRSA. Patient very frustrated with medication changes- empathetic listening provided. Wanted to pre-medicate w/ zofran for subutex administration as he reports it gives him \"migraines and intense nausea\"  "

## 2021-11-23 NOTE — CONSULTS
Care Management Initial Consult    General Information  Assessment completed with: Patient,    Type of CM/SW Visit: Initial Assessment    Primary Care Provider verified and updated as needed: Yes   Readmission within the last 30 days: no previous admission in last 30 days      Reason for Consult: discharge planning  Advance Care Planning: Advance Care Planning Reviewed: no concerns identified          Communication Assessment  Patient's communication style: spoken language (English or Bilingual)    Hearing Difficulty or Deaf: no   Wear Glasses or Blind: no    Cognitive  Cognitive/Neuro/Behavioral: WDL  Level of Consciousness: alert  Arousal Level: opens eyes spontaneously  Orientation: oriented x 4  Mood/Behavior: anxious,cooperative  Best Language: 0 - No aphasia  Speech: clear,spontaneous    Living Environment:   People in home: other (see comments) (homeless at times)     Current living Arrangements: homeless      Able to return to prior arrangements: no       Family/Social Support:  Care provided by: self  Provides care for: no one, unable/limited ability to care for self  Marital Status: Single  Parent(s)          Description of Support System: Involved         Current Resources:   Patient receiving home care services: No     Community Resources:    Equipment currently used at home: none  Supplies currently used at home:      Employment/Financial:  Employment Status: disabled        Financial Concerns: No concerns identified           Lifestyle & Psychosocial Needs:  Social Determinants of Health     Tobacco Use: High Risk     Smoking Tobacco Use: Current Every Day Smoker     Smokeless Tobacco Use: Never Used   Alcohol Use: Not on file   Financial Resource Strain: Not on file   Food Insecurity: Not on file   Transportation Needs: Not on file   Physical Activity: Not on file   Stress: Not on file   Social Connections: Not on file   Intimate Partner Violence: Not on file   Depression: Not on file   Housing  Stability: Not on file       Functional Status:  Prior to admission patient needed assistance:   Dependent ADLs:: Independent  Dependent IADLs:: Independent       Mental Health Status:  Mental Health Status: Past Concern       Chemical Dependency Status:  Chemical Dependency Status: Current Concern  Chemical Dependency Management: Previous treatment,Methadone          Values/Beliefs:  Spiritual, Cultural Beliefs, Zoroastrianism Practices, Values that affect care: no               Additional Information:  Long discussion with patient regarding plan of care. Patient stating he has a very chaotic past history.  He had been living locally with family and sometimes homeless when he is using drugs..  He has been seen by Psychiatry on a number of different occasions as a result of his multiple hospitalizations because of his IV drug use.  He says he wants to go to treatment directly if he can and hopes at some point to get on methadone.Patient stating since he needs to be on antibiotic for few weeks he is open to go to  [facility] as he has been there in 2017 with same issues. Patient stating he is done using drugs and would like to clean up his life and prefer going to a program that will last for about a year.   Writer will share this information with  who can work with patient.     Emmanuel Garcia RN  Inpatient Care Coordinator  [facility]  # [phone]

## 2021-11-23 NOTE — PLAN OF CARE
SUMMARY:Tricuspid Valve Endocarditis/ MSSA Bacteremia   DATE & TIME: 11/22/2021 4939-8037   Cognitive Concerns/ Orientation : A&Ox4, frustrated  BEHAVIOR & AGGRESSION TOOL COLOR: Green  COWS SCORE: 4  ABNL VS/O2: VSS on RA  MOBILITY: Independent  PAIN MANAGMENT: C/O scrotal pain/ all over pain- ice pack given  DIET: Regular diet, encourage to eat  BOWEL/BLADDER: Continent  ABNL LAB/BG:   DRAIN/DEVICES: L PIV SL  TELEMETRY RHYTHM: ST  SKIN: Pt has scrotal laceration with sutures. Scattered scabs/bruises  TESTS/PROCEDURES: N/A  D/C DATE: Pending abx plan  Discharge Barriers: Treatment plan with substance abuse. IV antibiotic therapy.  OTHER IMPORTANT INFO: Contact [facility] for MRSA. Patient very frustrated with medication changes- empathetic listening provided. Patient's dad visited this evening.

## 2021-11-23 NOTE — PROGRESS NOTES
[facility]  Hospitalist Progress Note for 11/21/2021       Assessment and Plan:   This is a  41 year old male with history of chronic opioid dependence, history of polysubstance IV drug abuse, history of MSSA sepsis with pulmonary valve endocarditis (2015), history of lumbar spine L3 osteomyelitis with epidural abscess (requiring surgical intervention 2017) presented to the ED after an episode of syncope along with cough, fevers chills, noted to be tachycardic in the emergency room had a left-sided scrotal laceration which needed to be sutured in the emergency room.  His blood cultures 2 out of 2 returned positive for gram-positive cocci in clusters in the setting of IV drug use.      CXR on admission noted with small somewhat nodular and patchy opacity at the lateral right base may be explained by infectious/inflammatory pneumonitis      MSSA and MRSA Sepsis with Tricuspid valve endocarditis  Severe TR secondary to above  H/o pulmonary valve endocarditis (2015), history of lumbar spine L3 osteomyelitis with epidural abscess (requiring surgical intervention 2017)  - febrile to 101.9 on presentation, has been afebrile since; hypotension improved; IVF d/john 11/18  - evaluated and followed by ID, B Cx growing MSSA and MRSA; continue Vancomycin, pharmacy to dose; discontinued nafcillin (11/18);   - urine Cx from 11/16 with no growth  - blood cultures from 11/18 and 11/19 with no growth so far, follow   *  EDUARDO 11/18 with note of large tricuspid valve vegetation with severe regurgitation  - evaluated by cardiology and CT surgery: rec to continue medical management for now and follow clinical progress; plan to consider surgical options if clinically worsening  - ID following  - Blood cultures cleared, and no clinac and laboratory findings suggestive of new-site seeding    Acute kidney injury;    Creatinine has increased from 1.4 to .1.7 since yesterday   likely multifactorial in the setting of active right  sided endocarditis, possible etiologies include  Pre-ranal Encarditis related JOSE RAMON ( immunocomplex mediated, embolic etc), drug  toxicity ? vanco  - will try with judicious IVF challenge  - UA with sediment  - Repat BMP in the morning.  - Encourage oral hydration    Hyponatremia:resolved     History of opiate dependence  History of active IV drug use  Depression  Smoker  - per report last IVDU 1 week PTA; on 11/16 in ED, per nursing he was briefly nonresponsive and apneic, was evaluated by ED provider, narcan was given with improvement in mentation  - has multiple areas of skin excoriations from picking of skin as well as from IV drug use which could be the source of infection as well  - will have CD evaluation when clinically more stable and nearing discharge from the endocarditis perspective  - takes Suboxone only prn and refusing here, will have dilaudid prn for generalized bodyache   - continue PTA Fetzima; prn ativan  - he is on a taper of Soma (listed at 87.5 mg daily, 1/4th of 350 mg tablet but reports was taking bid)-- resumed 11/17; pharmacy assisting in figuring out the prescribing provider to figure out the taper plan  - continue nicotine patch, counselled on smoking and substance use cessation  - requested to be back on PTA Ativan, changed to 15 mg prn Q8hrs & will request palliative care consult on monday reg his multiple medications & ongoing uncontrolled pain     History of HIV positive status and hepatitis C;  - phil continue PTA Triumeq  - CD4> 200, per ID no need for PJP prophylaxis     Left scrotal laceration secondary to fall and possible syncope;  - Status post suturing on 11/14/2021; needs suture removal in 1 week (around 11/21/21)  - US scrotum noted with small right hydrocele otherwise normal. No evidence for testicular fracture.     COVID status: asymptomatic screening negative on 11/16, 11/20     Diet: reg diet  DVT prophylaxis; PCD's and ambulation as tolerated     Disposition: TBD, plan per  ID pending plan for outpt antibiotics. PT does not have a home but states he can go to his father's house if needed. He also needed to get established with a PCP at discharge.     Rachid Medeiros MD    Hospitalist [phone] (7am -6 pm)               Interval History:   Cont to c/o pain - scrotal sutures are clean dry and no drainage . No issues reported form the night team              Medications:       abacavir-dolutegravir-LamiVUDine  1 tablet Oral Daily     aspirin  81 mg Oral Daily     buprenorphine  8 mg Sublingual BID     carisoprodol  87.5 mg Oral BID     levomilnacipran  120 mg Oral Daily     nicotine  1 patch Transdermal QPM     nicotine   Transdermal Q8H     pregabalin  150 mg Oral BID     sodium chloride (PF)  3 mL Intravenous Q8H     vancomycin  1,250 mg Intravenous Q24H     acetaminophen **OR** acetaminophen, alum & mag hydroxide-simethicone, bisacodyl, calcium carbonate, lidocaine 4%, lidocaine 4%, lidocaine (buffered or not buffered), lidocaine (buffered or not buffered), LORazepam, naloxone **OR** naloxone **OR** naloxone **OR** naloxone, nitroGLYcerin, ondansetron **OR** ondansetron, polyethylene glycol, prochlorperazine **OR** prochlorperazine **OR** prochlorperazine, sodium chloride (PF), sodium chloride (PF), sodium chloride (PF), sodium chloride (PF)               Physical Exam:   Blood pressure 95/70, pulse 100, temperature 99  F (37.2  C), temperature source Oral, resp. rate 18, height 1.829 m (6'), weight 56.2 kg (124 lb), SpO2 96 %.  Wt Readings from Last 4 Encounters:   11/15/21 56.2 kg (124 lb)   11/14/21 59 kg (130 lb)   07/19/21 54.4 kg (120 lb)   05/09/21 56.7 kg (125 lb)          Intake/Output Summary (Last 24 hours) at 11/22/2021 0866  Last data filed at 11/21/2021 1904  Gross per 24 hour   Intake 890 ml   Output --   Net 890 ml         Constitutional: Alert in NAD comfortably lying in bed   Lungs: CTA no wheezing   Cardiovascular: RRR, S1 S2 normal, No gallops   Abdomen: Soft  NT/ND + BS   Skin: No rashes, no cyanosis, no edema. Scrotal area suture line clean dry no drainsgs          Data:   All laboratory data reviewed

## 2021-11-23 NOTE — PLAN OF CARE
SUMMARY:Tricuspid Valve Endocarditis/ MSSA Bacteremia   DATE & TIME: 11/23/2021    Cognitive Concerns/ Orientation : Alert and Oriented x4, frustrated at times, but cooperative.    BEHAVIOR & AGGRESSION TOOL COLOR: Green  COWS SCORE: 4  ABNL VS/O2: VSS on RA, except for SBP of 95.    MOBILITY: Independent  PAIN MANAGMENT:  Denied pain   DIET: Regular diet, encourage to eat  BOWEL/BLADDER: Continent, Miralex given due to complaints of constipation.    ABNL LAB/BG: Creat 1.40  DRAIN/DEVICES: L PIV SL  TELEMETRY RHYTHM: NSR  SKIN: Pt has scrotal laceration with sutures. Scattered scabs/bruises  TESTS/PROCEDURES: N/A  D/C DATE: Pending abx plan, will need a couple weeks of IV abx.    Discharge Barriers: Treatment plan with substance abuse. IV antibiotic therapy.  OTHER IMPORTANT INFO: Contact [facility] for MRSA. Patient very frustrated with medication changes.

## 2021-11-23 NOTE — PROGRESS NOTES
[facility]    Infectious Disease Progress Note    Date of Service  11/23/2021     Assessment:  41 year old male with polysubstance use/IVDA, HIV-Hep C co-infection and prior hx of recurrent MSSA sepsis with pulmonic valve endocarditis, spinal discitis/osteomyelitis with epidural abscess requiring surgical debridement, who is now admitted with high grade MSSA/MRSA bacteremia with tricuspid valve endocarditis with two large [id number] x 8mm and 13 x 5mm. Blood cxs are negative since 11/18 and no surgical plans at this time . He has had medication non compliance with ART with detectable VL, CD4 >200 at this time not requiring PCP prophylaxis. He will need additional psychiatry,mental carmine counseling and chemical dependency support.     1. MSSA/MRSA sepsis with tricuspid valve endocarditis - blood cxs cleared 11/18  2. JOSE RAMON -creatinine 1.4 today  3. Scrotal laceration without signs of infection.  4. Prior history of MSSA sepsis with pulmonary valve endocarditis in 2015 and spinal discitis/osteomyelitis with epidural abscess requiring surgical intervention in 2017.  5. Substance abuse with active use of methamphetamine / fentanyl currently  6.  HIV/Hepatitis C co-infection. Last HIV VL detectable at 29,522 copies/ml and  CD4 625(28%) on  06/09/2021 , on Triumeq     Recommendations:  1. Monitor renal functions - vancomycin level is therapeutic, concerned about immune complex related AGN.   2. Continue Vancomycin, pharmacy to dose based upon AUC  3. No clinical symptoms at this time to suggest additional seeding but may need additional imaging based upon clinical status  4. Will need FUP echocardiogram in 2 weeks   5. Continue Triumeq  6. Will repeat HIV VL in future      Alana Cooper MD    Interval History   Ongoing complains of chronic pain , remained afebrile, tolerating antibiotics without side effects.    Physical Exam   Temp: 99.1  F (37.3  C) Temp src: Oral BP: 99/67 Pulse: 102   Resp: 18  SpO2: 98 % O2 Device: None (Room air)    Vitals:    11/15/21 2141   Weight: 56.2 kg (124 lb)     Vital Signs with Ranges  Temp:  [97.9  F (36.6  C)-99.1  F (37.3  C)] 99.1  F (37.3  C)  Pulse:  [] 102  Resp:  [16-18] 18  BP: ()/(67-77) 99/67  SpO2:  [96 %-98 %] 98 %       GENERAL APPEARANCE:  Awake, cachectic  appearing male  EYES: Eyes grossly normal to inspection  Mouth : Poor dentition  NECK: no adenopathy  RESP: lungs clear   CV: regular rates and rhythm  ABDOMEN: soft, nontender  MS: no edema  SKIN: multiple scars, IV racks, excoriations on hands,    : Left scrotal laceration site appears clean, no sign of infection    Other:    Medications       abacavir-dolutegravir-LamiVUDine  1 tablet Oral Daily     aspirin  81 mg Oral Daily     buprenorphine  8 mg Sublingual BID     carisoprodol  87.5 mg Oral BID     levomilnacipran  120 mg Oral Daily     nicotine  1 patch Transdermal QPM     nicotine   Transdermal Q8H     pregabalin  150 mg Oral BID     sodium chloride (PF)  3 mL Intravenous Q8H     vancomycin  1,000 mg Intravenous Q24H       Data   All microbiology laboratory data reviewed.  Recent Labs   Lab Test 11/19/21  0557 11/18/21  0535 11/16/21  0014   WBC 9.5 12.9* 14.7*   HGB 12.2* 11.0* 11.4*   HCT 37.2* 34.0* 34.4*   MCV 83 84 84    270 187     Recent Labs   Lab Test 11/23/21  0912 11/22/21  0719 11/21/21  0539   CR 1.78* 1.40* 0.73     Recent Labs   Lab Test 11/16/21  0014   SED 39*     Component      Latest Ref Rng & Units 11/16/2021   CD3 Mature T      49 - 84 % 67   Absolute CD3      603-2,990 cells/uL 374 (L)   CD4 [place] T      28 - 63 % 37   Absolute CD4      441-2,156 cells/uL 206 (L)   CD8 Suppressor T      10 - 40 % 27   Absolute CD8      125-1,312 cells/uL 151   CD4:CD8 Ratio      1.40 - 2.60 1.37 (L)   HIV-1 RNA Copies/mL, Instrument      <1 copies/mL 985 (H)   HIV RNA QT Log       3.0      Component      Latest Ref Rng & Units 11/22/2021   Vancomycin Level      mg/L 18.1       Microbiology  11/14 & 4/16 blood cxs both sets MSSA & MRSA, blood cxs 11/18, 11/19 blood cxs negative  Culture Positive on the 1st day of incubation Abnormal         Staphylococcus aureus Panic     2 of 2 bottles   Staphylococcus aureus MRSA Panic     2 of 2 bottles         Resulting Agency: [facility]         Susceptibility                       Staphylococcus aureus Staphylococcus aureus MRSA       PURNIMA PURNIMA       Clindamycin <=0.25 ug/mL Susceptible <=0.25 ug/mL Susceptible 1       Erythromycin <=0.25 ug/mL Susceptible >=8.0 ug/mL Resistant       Gentamicin <=0.5 ug/mL Susceptible <=0.5 ug/mL Susceptible       Linezolid     2.0 ug/mL Susceptible       Oxacillin <=0.25 ug/mL Susceptible >=4.0 ug/mL Resistant       Tetracycline <=1.0 ug/mL Susceptible <=1.0 ug/mL Susceptible       Trimethoprim/Sulfamethoxazole <=0.5/9.5 u... Susceptible <=0.5/9.5 u... Susceptible       Vancomycin <=0.5 ug/mL Susceptible <=0.5 ug/mL Susceptible                    Imaging  11/18 EDUARDO  Interpretation Summary     Two, large, mobile masses noted, attached to the atrial side of base of  posterior tricuspid leaflets are noted. (they measures 18 x 8mm and 13 x 5mm  respectively).These are most consistent with large tricuspid valve  vegetations.  Severe tricuspid regurgitation noted, directed towards the interatrial septum.  Cannot exclude perforation of posterior leaflet of tricuspid valve.  The right ventricle is mildly dilated.  The right ventricular systolic function is normal.  Left ventricular systolic function is normal.  The visual ejection fraction is 60-65%.  Findings discussed with Dr Guerin.  These are new comnpared to prior echo from 2017. The study was technically  adequate.

## 2021-11-24 ENCOUNTER — APPOINTMENT (OUTPATIENT)
Dept: ULTRASOUND IMAGING | Facility: CLINIC | Age: 41
DRG: 871 | End: 2021-11-24
Attending: SPECIALIST
Payer: COMMERCIAL

## 2021-11-24 LAB
ALBUMIN UR-MCNC: 20 MG/DL
ANION GAP SERPL CALCULATED.3IONS-SCNC: 2 MMOL/L (ref 3–14)
APPEARANCE UR: ABNORMAL
BACTERIA #/AREA URNS HPF: ABNORMAL /HPF
BACTERIA BLD CULT: NO GROWTH
BACTERIA BLD CULT: NO GROWTH
BILIRUB UR QL STRIP: NEGATIVE
BUN SERPL-MCNC: 33 MG/DL (ref 7–30)
CALCIUM SERPL-MCNC: 8.8 MG/DL (ref 8.5–10.1)
CHLORIDE BLD-SCNC: 102 MMOL/L (ref 94–109)
CO2 SERPL-SCNC: 28 MMOL/L (ref 20–32)
COLOR UR AUTO: ABNORMAL
CREAT SERPL-MCNC: 2.3 MG/DL (ref 0.66–1.25)
CREAT SERPL-MCNC: 2.7 MG/DL (ref 0.66–1.25)
GFR SERPL CREATININE-BSD FRML MDRD: 28 ML/MIN/1.73M2
GFR SERPL CREATININE-BSD FRML MDRD: 34 ML/MIN/1.73M2
GLUCOSE BLD-MCNC: 105 MG/DL (ref 70–99)
GLUCOSE UR STRIP-MCNC: NEGATIVE MG/DL
HGB UR QL STRIP: ABNORMAL
KETONES UR STRIP-MCNC: NEGATIVE MG/DL
LEUKOCYTE ESTERASE UR QL STRIP: NEGATIVE
MUCOUS THREADS #/AREA URNS LPF: PRESENT /LPF
NITRATE UR QL: NEGATIVE
PH UR STRIP: 5.5 [PH] (ref 5–7)
POTASSIUM BLD-SCNC: 5.3 MMOL/L (ref 3.4–5.3)
RBC URINE: 3 /HPF
RBC URINE: 6 /HPF
SODIUM SERPL-SCNC: 132 MMOL/L (ref 133–144)
SP GR UR STRIP: 1.01 (ref 1–1.03)
SPERM #/AREA URNS HPF: PRESENT /HPF
SPERM #/AREA URNS HPF: PRESENT /HPF
UROBILINOGEN UR STRIP-MCNC: NORMAL MG/DL
VANCOMYCIN SERPL-MCNC: 17.6 MG/L
WBC URINE: 11 /HPF
WBC URINE: 24 /HPF

## 2021-11-24 PROCEDURE — 250N000013 HC RX MED GY IP 250 OP 250 PS 637: Performed by: HOSPITALIST

## 2021-11-24 PROCEDURE — 120N000001 HC R&B MED SURG/OB

## 2021-11-24 PROCEDURE — 99232 SBSQ HOSP IP/OBS MODERATE 35: CPT | Performed by: INTERNAL MEDICINE

## 2021-11-24 PROCEDURE — 81015 MICROSCOPIC EXAM OF URINE: CPT | Performed by: INTERNAL MEDICINE

## 2021-11-24 PROCEDURE — 80048 BASIC METABOLIC PNL TOTAL CA: CPT | Performed by: HOSPITALIST

## 2021-11-24 PROCEDURE — 250N000013 HC RX MED GY IP 250 OP 250 PS 637: Performed by: PSYCHIATRY & NEUROLOGY

## 2021-11-24 PROCEDURE — 76770 US EXAM ABDO BACK WALL COMP: CPT

## 2021-11-24 PROCEDURE — 80202 ASSAY OF VANCOMYCIN: CPT | Performed by: INTERNAL MEDICINE

## 2021-11-24 PROCEDURE — 36415 COLL VENOUS BLD VENIPUNCTURE: CPT | Performed by: HOSPITALIST

## 2021-11-24 PROCEDURE — 81001 URINALYSIS AUTO W/SCOPE: CPT | Performed by: HOSPITALIST

## 2021-11-24 PROCEDURE — 36415 COLL VENOUS BLD VENIPUNCTURE: CPT | Performed by: INTERNAL MEDICINE

## 2021-11-24 PROCEDURE — 250N000013 HC RX MED GY IP 250 OP 250 PS 637: Performed by: STUDENT IN AN ORGANIZED HEALTH CARE EDUCATION/TRAINING PROGRAM

## 2021-11-24 PROCEDURE — 82565 ASSAY OF CREATININE: CPT | Performed by: INTERNAL MEDICINE

## 2021-11-24 PROCEDURE — 250N000013 HC RX MED GY IP 250 OP 250 PS 637: Performed by: INTERNAL MEDICINE

## 2021-11-24 PROCEDURE — 250N000013 HC RX MED GY IP 250 OP 250 PS 637

## 2021-11-24 PROCEDURE — 258N000003 HC RX IP 258 OP 636: Performed by: INTERNAL MEDICINE

## 2021-11-24 PROCEDURE — 250N000009 HC RX 250: Performed by: HOSPITALIST

## 2021-11-24 PROCEDURE — 87086 URINE CULTURE/COLONY COUNT: CPT | Performed by: INTERNAL MEDICINE

## 2021-11-24 RX ORDER — MAGNESIUM CARB/ALUMINUM HYDROX 105-160MG
296 TABLET,CHEWABLE ORAL ONCE
Status: COMPLETED | OUTPATIENT
Start: 2021-11-24 | End: 2021-11-24

## 2021-11-24 RX ORDER — SODIUM CHLORIDE 9 MG/ML
INJECTION, SOLUTION INTRAVENOUS CONTINUOUS
Status: DISCONTINUED | OUTPATIENT
Start: 2021-11-24 | End: 2021-11-30

## 2021-11-24 RX ADMIN — MAGNESIUM CITRATE 296 ML: 1.75 LIQUID ORAL at 23:55

## 2021-11-24 RX ADMIN — PREGABALIN 150 MG: 150 CAPSULE ORAL at 20:24

## 2021-11-24 RX ADMIN — NICOTINE 1 PATCH: 14 PATCH, EXTENDED RELEASE TRANSDERMAL at 20:27

## 2021-11-24 RX ADMIN — ASPIRIN 81 MG: 81 TABLET, COATED ORAL at 08:48

## 2021-11-24 RX ADMIN — ABACAVIR SULFATE, DOLUTEGRAVIR SODIUM, LAMIVUDINE 1 TABLET: 600; 50; 300 TABLET, FILM COATED ORAL at 08:48

## 2021-11-24 RX ADMIN — LEVOMILNACIPRAN HYDROCHLORIDE 40 MG: 40 CAPSULE, EXTENDED RELEASE ORAL at 08:47

## 2021-11-24 RX ADMIN — LORAZEPAM 0.5 MG: 0.5 TABLET ORAL at 10:16

## 2021-11-24 RX ADMIN — LORAZEPAM 0.5 MG: 0.5 TABLET ORAL at 03:54

## 2021-11-24 RX ADMIN — MAGNESIUM HYDROXIDE 30 ML: 400 SUSPENSION ORAL at 10:58

## 2021-11-24 RX ADMIN — Medication 87.5 MG: at 08:53

## 2021-11-24 RX ADMIN — IBUPROFEN 400 MG: 400 TABLET ORAL at 03:54

## 2021-11-24 RX ADMIN — POLYETHYLENE GLYCOL 3350 17 G: 17 POWDER, FOR SOLUTION ORAL at 02:22

## 2021-11-24 RX ADMIN — BUPRENORPHINE HYDROCHLORIDE 8 MG: 8 TABLET SUBLINGUAL at 20:29

## 2021-11-24 RX ADMIN — SODIUM CHLORIDE, PRESERVATIVE FREE: 5 INJECTION INTRAVENOUS at 14:51

## 2021-11-24 RX ADMIN — LORAZEPAM 0.5 MG: 0.5 TABLET ORAL at 20:52

## 2021-11-24 RX ADMIN — PREGABALIN 150 MG: 150 CAPSULE ORAL at 08:48

## 2021-11-24 RX ADMIN — Medication 87.5 MG: at 20:41

## 2021-11-24 ASSESSMENT — ACTIVITIES OF DAILY LIVING (ADL)
ADLS_ACUITY_SCORE: 3

## 2021-11-24 NOTE — PLAN OF CARE
SUMMARY:Tricuspid Valve Endocarditis/ MSSA Bacteremia   DATE & TIME: 11/23/2021 , 2798-1581   Cognitive Concerns/ Orientation : Alert and Oriented x4, frustrated and and anxious at times, but cooperative.  Ativan given x1.  BEHAVIOR & AGGRESSION TOOL COLOR: Green  COWS SCORE: 4  ABNL VS/O2: VSS , P 102, febrile 99.1 F   MOBILITY: Independent  PAIN MANAGMENT:  Scrotal pain, pt received ibuprofen x 1 effective    DIET: Regular diet, encouraged to eat  BOWEL/BLADDER: Continent, abdomen firm and non tender, pt complains of constipation (BM q 4-5 days is baseline)   ABNL LAB/BG: Creat 1.78, saline bolus 500 mL given and pt encouraged oral intake.  DRAIN/DEVICES: L PIV SL  TELEMETRY RHYTHM: NSR  SKIN: Pt has scrotal laceration with sutures. Scattered scabs/bruises  TESTS/PROCEDURES: N/A  D/C DATE: Pending abx plan, will need a couple weeks of IV abx.    Discharge Barriers: Treatment plan with substance abuse. IV antibiotic therapy.  OTHER IMPORTANT INFO: Contact [facility] for MRSA. Patient frustrated with medication changes. Nicotine patch R arm.

## 2021-11-24 NOTE — PHARMACY-VANCOMYCIN DOSING SERVICE
Pharmacy Vancomycin Note  Date of Service 2021  Patient's  1980   41 year old, male    Indication: MRSA and Sepsis  Day of Therapy: 9  Current vancomycin regimen:  intermittent  Current vancomycin monitoring method: Trough (NOT on renal replacement)   Current vancomycin therapeutic monitoring goal: 15-20 mg/L    Current estimated CrCl = Estimated Creatinine Clearance: 29.2 mL/min (A) (based on SCr of 2.7 mg/dL (H)).    Creatinine for last 3 days  2021:  7:19 AM Creatinine 1.40 mg/dL  2021:  9:12 AM Creatinine 1.78 mg/dL  2021: 12:10 AM Creatinine 2.30 mg/dL; 11:58 AM Creatinine 2.70 mg/dL    Recent Vancomycin Levels (past 3 days)  2021:  7:19 AM Vancomycin 18.1 mg/L  2021:  9:12 AM Vancomycin 14.8 mg/L  2021: 11:58 AM Vancomycin 17.6 mg/L    Vancomycin IV Administrations (past 72 hours)                   vancomycin (VANCOCIN) 1000 mg in dextrose 5% 200 mL PREMIX (mg) 1,000 mg New Bag 21 1020    vancomycin (VANCOCIN) 1000 mg in dextrose 5% 200 mL PREMIX (mg) 1,000 mg New Bag 21 1039     1,000 mg New Bag 21 2211                Nephrotoxins and other renal medications (From now, onward)    Start     Dose/Rate Route Frequency Ordered Stop    21 0344  vancomycin place aden - receiving intermittent dosing         1 each Does not apply SEE ADMIN INSTRUCTIONS 21 03421 1201  ibuprofen (ADVIL/MOTRIN) tablet 400 mg         400 mg Oral EVERY 8 HOURS PRN 21 1202               Contrast Orders - past 72 hours (72h ago, onward)            None          Interpretation of levels and current regimen:  Vancomycin level is reflective of therapeutic level  Has serum creatinine changed greater than 50% in last 72 hours: Yes  Urine output:  diminished urine output (per discussion with ID)   Renal Function: Worsening    Plan:  1. Future doses pending further levels given worsening renal function, q48h interval appropriate based on  current CrCl/patient weight  2. Vancomycin monitoring method: Trough (Method 1/2, based on intermittent levels)   3. Vancomycin therapeutic monitoring goal: 15-20 mg/L  4. Pharmacy will check vancomycin levels as appropriate in 1-3 Days.  5. Serum creatinine levels will be ordered daily for the first week of therapy and at least twice weekly for subsequent weeks.    Ceci Murray, [facility]

## 2021-11-24 NOTE — PROGRESS NOTES
[facility]    Infectious Disease Progress Note    Date of Service ): 11/24/2021     Assessment:  41 year old male with polysubstance use/IVDA, HIV-Hep C co-infection and prior hx of recurrent MSSA sepsis with pulmonic valve endocarditis, spinal discitis/osteomyelitis with epidural abscess requiring surgical debridement, who is now admitted with high grade MSSA/MRSA bacteremia with tricuspid valve endocarditis with two large [id number] x 8mm and 13 x 5mm. Blood cxs are negative since 11/18 and no surgical plans at this time . He has had medication non compliance with ART with detectable VL, CD4 >200 at this time not requiring PCP prophylaxis. He will need additional psychiatry,mental carmine counseling and chemical dependency support.     1. MSSA/MRSA sepsis with tricuspid valve endocarditis - blood cxs cleared 11/18  2. JOSE RAMON with worsening renal functions, creatinine up to 2.3 today  3. Scrotal laceration without signs of infection.  4. Prior history of MSSA sepsis with pulmonary valve endocarditis in 2015 and spinal discitis/osteomyelitis with epidural abscess requiring surgical intervention in 2017.  5. Substance abuse with active use of methamphetamine / fentanyl currently  6.  HIV/Hepatitis C co-infection. Last HIV VL detectable at 29,522 copies/ml and  CD4 625(28%) on  06/09/2021 , on Triumeq     Recommendations:  1. Consult Nephrology, check renal ultrasound  2. Discussed ART dosing with Pharm D in view of renal insufficiency. Ok to continue Triumeq at full dose for now  3. Monitor renal functions - vancomycin level is therapeutic, concerned about immune complex related AGN.   4. Will need FUP echocardiogram in 2 weeks   5. Will repeat HIV VL in future  6. Will add on Daptomycin sensitivities on MRSA isolate for possible transition from Vancomycin if needed    Alana Cooper MD    Interval History   Resting, complains of aching all over, no other complaints.    Antimicrobial  therapy  11/18 Vancomycin    Physical Exam   Temp: 97.8  F (36.6  C) Temp src: Oral BP: 103/75 Pulse: 85   Resp: 16 SpO2: 99 % O2 Device: None (Room air)    Vitals:    11/15/21 2141   Weight: 56.2 kg (124 lb)     Vital Signs with Ranges  Temp:  [97.6  F (36.4  C)-98.8  F (37.1  C)] 97.8  F (36.6  C)  Pulse:  [] 85  Resp:  [16-18] 16  BP: (101-108)/(67-75) 103/75  SpO2:  [97 %-99 %] 99 %    GENERAL APPEARANCE:  Awake, cachectic  appearing male  EYES: Eyes grossly normal to inspection  Mouth : Poor dentition  NECK: no adenopathy  RESP: lungs clear   CV: regular rates and rhythm  ABDOMEN: soft, nontender  MS: no edema  SKIN: multiple scars, IV racks, excoriations on hands,    : Left scrotal laceration site appears clean, no sign of infection    Other:    Medications       abacavir-dolutegravir-LamiVUDine  1 tablet Oral Daily     aspirin  81 mg Oral Daily     buprenorphine  8 mg Sublingual BID     carisoprodol  87.5 mg Oral BID     levomilnacipran  80 mg Oral Daily     nicotine  1 patch Transdermal QPM     nicotine   Transdermal Q8H     pregabalin  150 mg Oral BID     sodium chloride (PF)  3 mL Intravenous Q8H     vancomycin place aden - receiving intermittent dosing  1 each Does not apply See Admin Instructions       Data   All microbiology laboratory data reviewed.  Recent Labs   Lab Test 11/19/21  0557 11/18/21  0535 11/16/21  0014   WBC 9.5 12.9* 14.7*   HGB 12.2* 11.0* 11.4*   HCT 37.2* 34.0* 34.4*   MCV 83 84 84    270 187     Recent Labs   Lab Test 11/24/21  0010 11/23/21  0912 11/22/21  0719   CR 2.30* 1.78* 1.40*     Recent Labs   Lab Test 11/16/21  0014   SED 39*     Component      Latest Ref Rng & Units 11/16/2021   CD3 Mature T      49 - 84 % 67   Absolute CD3      603-2,990 cells/uL 374 (L)   CD4 [place] T      28 - 63 % 37   Absolute CD4      441-2,156 cells/uL 206 (L)   CD8 Suppressor T      10 - 40 % 27   Absolute CD8      125-1,312 cells/uL 151   CD4:CD8 Ratio      1.40 - 2.60 1.37 (L)    HIV-1 RNA Copies/mL, Instrument      <1 copies/mL 985 (H)   HIV RNA QT Log       3.0      Component      Latest Ref Rng & Units 11/22/2021   Vancomycin Level      mg/L 18.1      Microbiology  11/14 & 4/16 blood cxs both sets MSSA & MRSA, blood cxs 11/18, 11/19 blood cxs negative  Culture Positive on the 1st day of incubation Abnormal         Staphylococcus aureus Panic     2 of 2 bottles   Staphylococcus aureus MRSA Panic     2 of 2 bottles         Resulting Agency: [facility]         Susceptibility                       Staphylococcus aureus Staphylococcus aureus MRSA       PURNIMA PURNIMA       Clindamycin <=0.25 ug/mL Susceptible <=0.25 ug/mL Susceptible 1       Erythromycin <=0.25 ug/mL Susceptible >=8.0 ug/mL Resistant       Gentamicin <=0.5 ug/mL Susceptible <=0.5 ug/mL Susceptible       Linezolid     2.0 ug/mL Susceptible       Oxacillin <=0.25 ug/mL Susceptible >=4.0 ug/mL Resistant       Tetracycline <=1.0 ug/mL Susceptible <=1.0 ug/mL Susceptible       Trimethoprim/Sulfamethoxazole <=0.5/9.5 u... Susceptible <=0.5/9.5 u... Susceptible       Vancomycin <=0.5 ug/mL Susceptible <=0.5 ug/mL Susceptible                    Imaging  11/18 EDUARDO  Interpretation Summary     Two, large, mobile masses noted, attached to the atrial side of base of  posterior tricuspid leaflets are noted. (they measures 18 x 8mm and 13 x 5mm  respectively).These are most consistent with large tricuspid valve  vegetations.  Severe tricuspid regurgitation noted, directed towards the interatrial septum.  Cannot exclude perforation of posterior leaflet of tricuspid valve.  The right ventricle is mildly dilated.  The right ventricular systolic function is normal.  Left ventricular systolic function is normal.  The visual ejection fraction is 60-65%.  Findings discussed with Dr Guerin.  These are new comnpared to prior echo from 2017. The study was technically  adequate.

## 2021-11-24 NOTE — PROGRESS NOTES
[facility]    Hospitalist Progress Note    Assessment & Plan   Bc German is a 41 year old mal with PMHx of polysubstance abuse including IV drug use (including fentanyl and methamphetamine currently), chronic opioid dependence, HIV, hepatitis C, hx of MSSA sepsis with pulmonary valve endocarditis (2015), and 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 Was found to have recurrent MSSA/MRSA bacteremia and tricuspid valve endocarditis.     MSSA/MRSA bacteremia with tricuspid valve endocarditis  Severe tricuspid regurg dt endocarditis  Hx of pulmonary valve endocarditis (2015) dt MSSA   Hx of lumbar spine (L3) osteomyelitis with epidural abscess (requiring surgical intervention 2017)  * Presented to ED for evaluation of fevers/chills, cough and episode of syncope. Was febrile to 101.9 with associated tachycardia. WBC 13.1, lactate nl. CXR on admission showed nodular/patchy opacity at the R base.  * 2/2 blood cultures drawn on admission grew both MSSA and MRSA.   * ID following, abx consolidated to Vancomycin only on 11/18.  * Repeat blood cultures drawn 11/18 and 11/19 were negative  * EDUARDO done 11/18 notable for large tricuspid valve vegetation with severe regurgitation  * Seen by cardiology and CV surgery this stay -- recommended to continue medical management for now, will follow clinical progress, would consider surgical options if he clinically worsened  -- conts on IV Vancomycin per ID  -- no evidence of infection seeding elsewhere    HIV  Hepatitis C  * Chronic and stable on Triumeq.   * CDC >200 so no need for PJP ppx per ID    JOSE RAMON  * No hx of CKD.   * Cr initially stable this stay, began trending up as of 11/22: Cr 1.4 --> 1.78 --> 2.3 on 11/24  * Vanco levels have been okay  * Renal US obtained 11/24 and negative  "for obstruction  -- will start some gentle IVFs today with NS @75ml/h  -- full UA ordered  -- nephrology consulted    Intermittent Hyponatremia  Na 128 on admission, normalized as of 11/18.   -- Na now trending down, 132 as of 11/24   -- encourage po intake, monitor BMP    History of opiate dependence  History of active IV drug use  Tobacco Use  Depression  * On admission, had stated he last used IV drugs 1 wk prior to admission. While in the ED on 11/16, RN had noted patient was briefly nonresponsive and apneic. Was given dose of Narcan with noted improvement.   * Observed to have multiple areas of skin excoriations from skin picking and IV drug use.   * Prior to admission, had been started on taper off Soma -- was taking 87.5mg (1/4 of a 350mg tab) BID but later conversations with PCP's clinic revealed no provider there was managing taper  -- cont buprenorphine BID this stay  -- conts on levomilnacipran ER as per prior to admission  -- cont carisoprodol 87.5mg BID as per prior to admission, unclear what further plan was for taper  -- cont lorazepam 0.5mg q6h prn for anxiety   -- cont nicotine patch  -- consider CD consult when closer to discharge     Left scrotal laceration dt fall/possible syncope  * Laceration sutured in ED. Suture removed 11/21.   * US scrotum earlier this stay showed a small R hydrocele but was otherwise normal.     COVID status: asymptomatic screening negative on 11/16, 11/20     FEN: started NS @75ml/h on 11/24, lytes stable, regular diet  DVT Prophylaxis: PCDs  Code Status: Full Code    Disposition: Discharge date unclear, several days still pending plan for abx.    Elizabeth Griffin    Interval History   Seen this afternoon. Appears tired. Says he hurts \"all over\". No reported sob/cough. Taking some po but most of tray at bedside appears untouched.    -Data reviewed today: I reviewed all new labs and imaging results over the last 24 hours. I personally reviewed no images or EKG's " today.    Physical Exam   Temp: 97.8  F (36.6  C) Temp src: Oral BP: 103/75 Pulse: 85   Resp: 16 SpO2: 99 % O2 Device: None (Room air)    Vitals:    11/15/21 2141   Weight: 56.2 kg (124 lb)     Vital Signs with Ranges  Temp:  [97.6  F (36.4  C)-98.8  F (37.1  C)] 97.8  F (36.6  C)  Pulse:  [] 85  Resp:  [16-18] 16  BP: (101-108)/(67-75) 103/75  SpO2:  [97 %-99 %] 99 %  No intake/output data recorded.    Constitutional: Resting comfortably, alert and conversing appropriately, NAD  Respiratory: CTAB, no wheeze/rales/rhonchi, no increased wok of breathing  Cardiovascular: HRRR, +murmur towards apex, no LE edema  GI: S, NT, ND, +BS  Skin/Integumen: warm/dry, scattered excoriations  Other:      Medications       abacavir-dolutegravir-LamiVUDine  1 tablet Oral Daily     aspirin  81 mg Oral Daily     buprenorphine  8 mg Sublingual BID     carisoprodol  87.5 mg Oral BID     levomilnacipran  80 mg Oral Daily     nicotine  1 patch Transdermal QPM     nicotine   Transdermal Q8H     pregabalin  150 mg Oral BID     sodium chloride (PF)  3 mL Intravenous Q8H     vancomycin place aden - receiving intermittent dosing  1 each Does not apply See Admin Instructions       Data   Recent Labs   Lab 11/24/21  0010 11/23/21  0918 11/23/21  0912 11/22/21  0719 11/21/21  0539 11/19/21  0557 11/18/21  0535   WBC  --   --   --   --   --  9.5 12.9*   HGB  --   --   --   --   --  12.2* 11.0*   MCV  --   --   --   --   --  83 84   PLT  --   --   --   --   --  245 270   *  --  135  --   --  143 140   POTASSIUM 5.3  --  5.3  --   --  3.4 3.5   CHLORIDE 102  --  104  --   --  110* 108   CO2 28  --  22  --   --  27 29   BUN 33*  --  25  --   --  7 5*   CR 2.30*  --  1.78* 1.40*   < > 0.76 0.81   ANIONGAP 2*  --  9  --   --  6 3   SANDRA 8.8  --  8.1*  --   --  8.5 8.5   * 85 93  --   --  110* 106*    < > = values in this interval not displayed.       No results found for this or any previous visit (from the past 24 hour(s)).

## 2021-11-24 NOTE — PLAN OF CARE
SUMMARY:Tricuspid Valve Endocarditis/ MSSA Bacteremia   DATE & TIME: 11/23/2021-11/24/21 2736-8909   Cognitive Concerns/ Orientation : Alert and Oriented x4, frustrated and and anxious at times, but cooperative.  Ativan given x1.  BEHAVIOR & AGGRESSION TOOL COLOR: Green  COWS SCORE: 4  ABNL VS/O2: VSS , P 103  MOBILITY: Independent  PAIN MANAGMENT:  Scrotal pain, pt received ibuprofen x 1 effective    DIET: Regular diet  BOWEL/BLADDER: Continent, abdomen firm and tender, pt complains of constipation, miralax given (BM q 4-5 days is baseline)   ABNL LAB/BG: Creat 2.3 (pharm notified and vanco held, recheck at 0600)  DRAIN/DEVICES: L PIV SL  TELEMETRY RHYTHM: NSR  SKIN: Pt has scrotal laceration with sutures. Scattered scabs/bruises  TESTS/PROCEDURES: N/A  D/C DATE: Pending abx plan, will need a couple weeks of IV abx.    Discharge Barriers: Treatment plan with substance abuse. IV antibiotic therapy.  OTHER IMPORTANT INFO: Contact [facility] for MRSA. Patient very frustrated with medication changes.

## 2021-11-25 LAB
ANION GAP SERPL CALCULATED.3IONS-SCNC: 7 MMOL/L (ref 3–14)
BACTERIA UR CULT: NO GROWTH
BUN SERPL-MCNC: 41 MG/DL (ref 7–30)
CALCIUM SERPL-MCNC: 8.5 MG/DL (ref 8.5–10.1)
CHLORIDE BLD-SCNC: 105 MMOL/L (ref 94–109)
CO2 SERPL-SCNC: 26 MMOL/L (ref 20–32)
CREAT SERPL-MCNC: 2.8 MG/DL (ref 0.66–1.25)
CREAT UR-MCNC: 57 MG/DL
EOSINOPHIL SPEC QL WRIGHT STN: NORMAL
FRACT EXCRET NA UR+SERPL-RTO: 0.8 %
GFR SERPL CREATININE-BSD FRML MDRD: 27 ML/MIN/1.73M2
GLUCOSE BLD-MCNC: 85 MG/DL (ref 70–99)
MAGNESIUM SERPL-MCNC: 3.3 MG/DL (ref 1.6–2.3)
PHOSPHATE SERPL-MCNC: 4.7 MG/DL (ref 2.5–4.5)
POTASSIUM BLD-SCNC: 5.8 MMOL/L (ref 3.4–5.3)
SODIUM SERPL-SCNC: 138 MMOL/L (ref 133–144)
SODIUM UR-SCNC: 22 MMOL/L
VANCOMYCIN SERPL-MCNC: 12.7 MG/L

## 2021-11-25 PROCEDURE — 36415 COLL VENOUS BLD VENIPUNCTURE: CPT | Performed by: INTERNAL MEDICINE

## 2021-11-25 PROCEDURE — 120N000001 HC R&B MED SURG/OB

## 2021-11-25 PROCEDURE — 258N000003 HC RX IP 258 OP 636: Performed by: INTERNAL MEDICINE

## 2021-11-25 PROCEDURE — 86160 COMPLEMENT ANTIGEN: CPT | Performed by: INTERNAL MEDICINE

## 2021-11-25 PROCEDURE — 84100 ASSAY OF PHOSPHORUS: CPT | Performed by: INTERNAL MEDICINE

## 2021-11-25 PROCEDURE — 80048 BASIC METABOLIC PNL TOTAL CA: CPT | Performed by: INTERNAL MEDICINE

## 2021-11-25 PROCEDURE — 84300 ASSAY OF URINE SODIUM: CPT | Performed by: INTERNAL MEDICINE

## 2021-11-25 PROCEDURE — 250N000013 HC RX MED GY IP 250 OP 250 PS 637: Performed by: STUDENT IN AN ORGANIZED HEALTH CARE EDUCATION/TRAINING PROGRAM

## 2021-11-25 PROCEDURE — 89190 NASAL SMEAR FOR EOSINOPHILS: CPT | Performed by: INTERNAL MEDICINE

## 2021-11-25 PROCEDURE — 80202 ASSAY OF VANCOMYCIN: CPT | Performed by: INTERNAL MEDICINE

## 2021-11-25 PROCEDURE — 250N000013 HC RX MED GY IP 250 OP 250 PS 637: Performed by: PSYCHIATRY & NEUROLOGY

## 2021-11-25 PROCEDURE — 86038 ANTINUCLEAR ANTIBODIES: CPT | Performed by: INTERNAL MEDICINE

## 2021-11-25 PROCEDURE — 99233 SBSQ HOSP IP/OBS HIGH 50: CPT | Performed by: INTERNAL MEDICINE

## 2021-11-25 PROCEDURE — 250N000013 HC RX MED GY IP 250 OP 250 PS 637

## 2021-11-25 PROCEDURE — 250N000013 HC RX MED GY IP 250 OP 250 PS 637: Performed by: HOSPITALIST

## 2021-11-25 PROCEDURE — 250N000013 HC RX MED GY IP 250 OP 250 PS 637: Performed by: INTERNAL MEDICINE

## 2021-11-25 PROCEDURE — 83735 ASSAY OF MAGNESIUM: CPT | Performed by: INTERNAL MEDICINE

## 2021-11-25 PROCEDURE — 99223 1ST HOSP IP/OBS HIGH 75: CPT | Performed by: INTERNAL MEDICINE

## 2021-11-25 PROCEDURE — 250N000009 HC RX 250: Performed by: HOSPITALIST

## 2021-11-25 PROCEDURE — 250N000011 HC RX IP 250 OP 636: Performed by: INTERNAL MEDICINE

## 2021-11-25 RX ORDER — SODIUM POLYSTYRENE SULFONATE 15 G/60ML
15 SUSPENSION ORAL; RECTAL ONCE
Status: DISCONTINUED | OUTPATIENT
Start: 2021-11-25 | End: 2021-11-25

## 2021-11-25 RX ORDER — VANCOMYCIN HYDROCHLORIDE 1 G/200ML
1000 INJECTION, SOLUTION INTRAVENOUS ONCE
Status: COMPLETED | OUTPATIENT
Start: 2021-11-25 | End: 2021-11-25

## 2021-11-25 RX ORDER — SODIUM POLYSTYRENE SULFONATE 15 G/60ML
15 SUSPENSION ORAL; RECTAL ONCE
Status: COMPLETED | OUTPATIENT
Start: 2021-11-25 | End: 2021-11-25

## 2021-11-25 RX ORDER — HEPARIN SODIUM 5000 [USP'U]/.5ML
5000 INJECTION, SOLUTION INTRAVENOUS; SUBCUTANEOUS EVERY 8 HOURS
Status: DISCONTINUED | OUTPATIENT
Start: 2021-11-25 | End: 2021-11-28

## 2021-11-25 RX ADMIN — BUPRENORPHINE HYDROCHLORIDE 8 MG: 8 TABLET SUBLINGUAL at 22:36

## 2021-11-25 RX ADMIN — NICOTINE 1 PATCH: 14 PATCH, EXTENDED RELEASE TRANSDERMAL at 19:25

## 2021-11-25 RX ADMIN — BUPRENORPHINE HYDROCHLORIDE 8 MG: 8 TABLET SUBLINGUAL at 09:09

## 2021-11-25 RX ADMIN — SODIUM POLYSTYRENE SULFONATE 15 G: 15 SUSPENSION ORAL; RECTAL at 16:15

## 2021-11-25 RX ADMIN — SODIUM CHLORIDE 500 ML: 9 INJECTION, SOLUTION INTRAVENOUS at 09:59

## 2021-11-25 RX ADMIN — Medication 87.5 MG: at 09:21

## 2021-11-25 RX ADMIN — HEPARIN SODIUM 5000 UNITS: 5000 INJECTION, SOLUTION INTRAVENOUS; SUBCUTANEOUS at 19:29

## 2021-11-25 RX ADMIN — LORAZEPAM 0.5 MG: 0.5 TABLET ORAL at 16:35

## 2021-11-25 RX ADMIN — Medication 87.5 MG: at 23:19

## 2021-11-25 RX ADMIN — PREGABALIN 150 MG: 150 CAPSULE ORAL at 09:04

## 2021-11-25 RX ADMIN — LORAZEPAM 0.5 MG: 0.5 TABLET ORAL at 04:11

## 2021-11-25 RX ADMIN — LEVOMILNACIPRAN HYDROCHLORIDE 80 MG: 40 CAPSULE, EXTENDED RELEASE ORAL at 09:04

## 2021-11-25 RX ADMIN — VANCOMYCIN HYDROCHLORIDE 1000 MG: 1 INJECTION, SOLUTION INTRAVENOUS at 16:17

## 2021-11-25 RX ADMIN — SODIUM CHLORIDE, PRESERVATIVE FREE: 5 INJECTION INTRAVENOUS at 03:58

## 2021-11-25 RX ADMIN — SODIUM CHLORIDE, PRESERVATIVE FREE: 5 INJECTION INTRAVENOUS at 16:36

## 2021-11-25 RX ADMIN — ASPIRIN 81 MG: 81 TABLET, COATED ORAL at 09:04

## 2021-11-25 ASSESSMENT — ACTIVITIES OF DAILY LIVING (ADL)
ADLS_ACUITY_SCORE: 3

## 2021-11-25 NOTE — PLAN OF CARE
SUMMARY:Tricuspid Valve Endocarditis/ MSSA Bacteremia   DATE & TIME: 11/24/21, 2402-3958  Cognitive Concerns/ Orientation : Alert and Oriented x4, anxious but cooperative. Frustrated with med changes. Ativan given x1.  BEHAVIOR & AGGRESSION TOOL COLOR: Green  COWS SCORE: MD okayed to discontinue  ABNL VS/O2: VSS, room air.  MOBILITY: Independent  PAIN MANAGMENT:  Mild scrotal pain.  DIET: Regular diet  BOWEL/BLADDER: Continent, abdomen firm and tender. Last reported BM 6-7 days ago. PT denied suppository,  milk of mag given (BM q 4-5 days is baseline).  ABNL LAB/BG: Creat 2.7, sodium 132, UA positive WBC (11), RBC (3)  DRAIN/DEVICES: L PIV running 75  TELEMETRY RHYTHM: NSR  SKIN: Pt has scrotal laceration with sutures. Scattered scabs/bruises  TESTS/PROCEDURES: Renal ultrasound- no obstruction demonstrated  D/C DATE: Pending abx plan, will need a couple weeks of IV abx.    Discharge Barriers: Treatment plan with substance abuse. IV antibiotic therapy.  OTHER IMPORTANT INFO: Contact [facility] for MRSA.  Nephrology following.

## 2021-11-25 NOTE — PROGRESS NOTES
MD Notification    Notified Person: MD    Notified Person Name: Salazar    Notification Date/Time: 11/25/21 1218    Notification Interaction: Amcom    Purpose of Notification:  Pt c/o pain 9/10 at groin incision site and requesting something other than Tylenol, as pt states he is allergic and gets nauseous when taken. Pt refusing assessment due to pain.    Orders Received: Pending    Comments:

## 2021-11-25 NOTE — PLAN OF CARE
SUMMARY:Tricuspid Valve Endocarditis/ MSSA Bacteremia   DATE & TIME: 11/24/21-11/25/21 4236-2363  Cognitive Concerns/ Orientation : Alert and Oriented x4, anxious but cooperative. Frustrated with med changes. Ativan given x1.  BEHAVIOR & AGGRESSION TOOL COLOR: Green  COWS SCORE: NA  ABNL VS/O2: VSS, room air.  MOBILITY: Independent  PAIN MANAGMENT: Mild scrotal pain.  DIET: Regular diet  BOWEL/BLADDER: Continent, abdomen firm and tender. Last reported BM 6-7 days ago. Mag citrate given.  ABNL LAB/BG: Creat 2.7, sodium 132, UA positive WBC (11), RBC (3)  DRAIN/DEVICES: L PIV running 75  TELEMETRY RHYTHM: NSR  SKIN: Pt has scrotal laceration with sutures, with some redness no discharge. Scattered scabs/bruises  TESTS/PROCEDURES: Renal ultrasound- no obstruction demonstrated  D/C DATE: Pending abx plan, will need a couple weeks of IV abx.    Discharge Barriers: Treatment plan with substance abuse. IV antibiotic therapy.  OTHER IMPORTANT INFO: Contact [facility] for MRSA.  Nephrology following.

## 2021-11-25 NOTE — PLAN OF CARE
SUMMARY:Tricuspid Valve Endocarditis/ MSSA/MRSA Bacteremia, Scrotal laceration.   DATE & TIME: 11/25/21, 5600-5297  Cognitive Concerns/ Orientation : Alert and Oriented x4, anxious but cooperative. Frustrated with med changes.  BEHAVIOR & AGGRESSION TOOL COLOR: Green  COWS SCORE: MD okayed to discontinue on 11/24/21  ABNL VS/O2: VSS, room air.  MOBILITY: Independent  PAIN MANAGMENT:  Mild scrotal pain.   DIET: Renal diet (non-dialysis)  BOWEL/BLADDER: Continent, has received stool softener last two days, large BM x1 today, passing flatus, BS+.   ABNL LAB/BG: Todays labs still pending.   DRAIN/DEVICES: Lt PIV infusing NS 75 mL/hr.   TELEMETRY RHYTHM: NSR  SKIN: Pt has scrotal laceration, mild redness, order in place to remove sutures, pt was sleeping and requested to come back, will pass it on to the next RN. Scattered scabs/bruises.   TESTS/PROCEDURES: none today   D/C DATE: Pending abx plan, will need a couple weeks of IV abx, ID following.   Discharge Barriers: CD evaluation when clinically more stable and nearing discharge from the endocarditis perspective. IV antibiotic therapy plan.   OTHER IMPORTANT INFO: Contact [facility] for MRSA. Received one time dose of NS bolus 500 mL. ID and Nephrology following.

## 2021-11-25 NOTE — PHARMACY-VANCOMYCIN DOSING SERVICE
Pharmacy Vancomycin Note  Date of Service 2021  Patient's  1980   41 year old, male    Indication: MRSA and Sepsis  Day of Therapy: 10  Current vancomycin regimen:  Intermittent dosing  Current vancomycin monitoring method: Trough (Method 2 = manual dose calculation)  Current vancomycin therapeutic monitoring goal: 15-20 mg/L    Current estimated CrCl = Estimated Creatinine Clearance: 28.2 mL/min (A) (based on SCr of 2.8 mg/dL (H)).    Creatinine for last 3 days  2021:  9:12 AM Creatinine 1.78 mg/dL  2021: 12:10 AM Creatinine 2.30 mg/dL; 11:58 AM Creatinine 2.70 mg/dL  2021:  1:23 PM Creatinine 2.80 mg/dL    Recent Vancomycin Levels (past 3 days)  2021:  9:12 AM Vancomycin 14.8 mg/L  2021: 11:58 AM Vancomycin 17.6 mg/L  2021:  1:23 PM Vancomycin 12.7 mg/L    Vancomycin IV Administrations (past 72 hours)                   vancomycin (VANCOCIN) 1000 mg in dextrose 5% 200 mL PREMIX (mg) 1,000 mg New Bag 21 1020                Nephrotoxins and other renal medications (From now, onward)    Start     Dose/Rate Route Frequency Ordered Stop    21 1600  vancomycin (VANCOCIN) 1000 mg in dextrose 5% 200 mL PREMIX         1,000 mg  200 mL/hr over 1 Hours Intravenous ONCE 21 1553      21 0344  vancomycin place aden - receiving intermittent dosing         1 each Does not apply SEE ADMIN INSTRUCTIONS 21 0344               Contrast Orders - past 72 hours (72h ago, onward)            None          Interpretation of levels and current regimen:  Vancomycin level is reflective of subtherapeutic level    Has serum creatinine changed greater than 50% in last 72 hours: Yes    Urine output:  diminished urine output    Renal Function: Worsening      Plan:  1. Give 1000 mg (~18mg/kg) IV x1 dose  2. Vancomycin monitoring method: Intermittent dosing based on level  3. Vancomycin therapeutic monitoring goal: 15-20 mg/L  4. Pharmacy will check vancomycin  levels as appropriate in AM on 11/26.  5. Serum creatinine levels will be ordered daily for the first week of therapy and at least twice weekly for subsequent weeks.    Ira Haider, IftikharD

## 2021-11-25 NOTE — PROGRESS NOTES
[facility]    Hospitalist Progress Note    Assessment & Plan   Bc German is a 41 year old mal with PMHx of polysubstance abuse including IV drug use (including fentanyl and methamphetamine currently), chronic opioid dependence, HIV, hepatitis C, hx of MSSA sepsis with pulmonary valve endocarditis (2015), and 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 Was found to have recurrent MSSA/MRSA bacteremia and tricuspid valve endocarditis.     MSSA/MRSA bacteremia with tricuspid valve endocarditis  Severe tricuspid regurg dt endocarditis  Hx of pulmonary valve endocarditis (2015) dt MSSA   Hx of lumbar spine (L3) osteomyelitis with epidural abscess (requiring surgical intervention 2017)  * Presented to ED for evaluation of fevers/chills, cough and episode of syncope. Was febrile to 101.9 with associated tachycardia. WBC 13.1, lactate nl. CXR on admission showed nodular/patchy opacity at the R base.  * 2/2 blood cultures drawn on admission grew both MSSA and MRSA.   * ID following, abx consolidated to Vancomycin only on 11/18.  * Repeat blood cultures drawn 11/18 and 11/19 were negative  * EDUARDO done 11/18 notable for large tricuspid valve vegetation with severe regurgitation  * Seen by cardiology and CV surgery this stay -- recommended to continue medical management for now, will follow clinical progress, would consider surgical options if he clinically worsened  -- conts on IV Vancomycin per ID per enal dosing with JOSE RAMON   Holding some of his HIV meds due to JOSE RAMON per ID   -- no evidence of infection seeding elsewhere    HIV  Hepatitis C  * Chronic and stable on Triumeq on hold currently per ID .   * CDC >200 so no need for PJP ppx per ID    JOSE RAMON  * No hx of CKD.   * Cr initially stable this stay, began trending up as of 11/22: Cr  1.4 --> 1.78 --> 2.3  -2.7  on 11/25  * Vanco levels have been okay  * Renal US obtained 11/24 and negative for obstruction  -- will start some gentle IVFs today with NS @75ml/h  -- nephrology consulted and are following. See their note ATN, AIN  versus immunoglomerulonephritis per Nephrology   Work up pending     Intermittent Hyponatremia  Na 128 on admission, normalized as of 11/18.   -- Na now trending down, 132 as of 11/24   -- encourage po intake, monitor BMP    History of opiate dependence  History of active IV drug use  Tobacco Use  Depression  * On admission, had stated he last used IV drugs 1 wk prior to admission. While in the ED on 11/16, RN had noted patient was briefly nonresponsive and apneic. Was given dose of Narcan with noted improvement.   * Observed to have multiple areas of skin excoriations from skin picking and IV drug use.   * Prior to admission, had been started on taper off Soma -- was taking 87.5mg (1/4 of a 350mg tab) BID but later conversations with PCP's clinic revealed no provider there was managing taper  -- cont buprenorphine BID this stay  -- conts on levomilnacipran ER as per prior to admission  -- cont carisoprodol 87.5mg BID as per prior to admission, unclear what further plan was for taper  -- cont lorazepam 0.5mg q6h prn for anxiety   -- cont nicotine patch  -- consider CD consult when closer to discharge     Left scrotal laceration dt fall/possible syncope  * Laceration sutured in ED. Suture removed 11/21.   * US scrotum earlier this stay showed a small R hydrocele but was otherwise normal.     COVID status: asymptomatic screening negative on 11/16, 11/20     FEN: started NS @75ml/h on 11/24, lytes stable, regular diet  DVT Prophylaxis: PCDs  Code Status: Full Code    Disposition: Discharge date unclear, several days still pending plan for abx and improvement in JOSE RAMON .    Marisol Ernst MD   Pager [phone]    Interval History      Seen this afternoon. Appears tired. Says he  "hurts \"all over\". No reported sob/cough. Cr worse today at 2.7 seen by NEphrology and ID their notes reviewed    -Data reviewed today: I reviewed all new labs and imaging results over the last 24 hours. I personally reviewed no images or EKG's today.    Physical Exam   Temp: 98.5  F (36.9  C) Temp src: Oral BP: 119/84 Pulse: 92   Resp: 16 SpO2: 100 % O2 Device: None (Room air)    Vitals:    11/15/21 2141   Weight: 56.2 kg (124 lb)     Vital Signs with Ranges  Temp:  [97.7  F (36.5  C)-98.8  F (37.1  C)] 98.5  F (36.9  C)  Pulse:  [] 92  Resp:  [16] 16  BP: (108-123)/(77-88) 119/84  SpO2:  [98 %-100 %] 100 %  No intake/output data recorded.    Constitutional: Resting comfortably, alert and conversing appropriately, NAD  Respiratory: CTAB, no wheeze/rales/rhonchi, no increased wok of breathing  Cardiovascular: HRRR, +murmur towards apex, no LE edema  GI: S, NT, ND, +BS  Skin/Integumen: warm/dry, scattered excoriations  Other:      Medications     sodium chloride 75 mL/hr at 11/25/21 0358       aspirin  81 mg Oral Daily     buprenorphine  8 mg Sublingual BID     carisoprodol  87.5 mg Oral BID     levomilnacipran  80 mg Oral Daily     nicotine  1 patch Transdermal QPM     nicotine   Transdermal Q8H     sodium chloride (PF)  3 mL Intravenous Q8H     vancomycin place aden - receiving intermittent dosing  1 each Does not apply See Admin Instructions       Data   Recent Labs   Lab 11/24/21  1158 11/24/21  0010 11/23/21  0918 11/23/21  0912 11/21/21  0539 11/19/21  0557   WBC  --   --   --   --   --  9.5   HGB  --   --   --   --   --  12.2*   MCV  --   --   --   --   --  83   PLT  --   --   --   --   --  245   NA  --  132*  --  135  --  143   POTASSIUM  --  5.3  --  5.3  --  3.4   CHLORIDE  --  102  --  104  --  110*   CO2  --  28  --  22  --  27   BUN  --  33*  --  25  --  7   CR 2.70* 2.30*  --  1.78*   < > 0.76   ANIONGAP  --  2*  --  9  --  6   SANDRA  --  8.8  --  8.1*  --  8.5   GLC  --  105* 85 93  --  110*    < " > = values in this interval not displayed.       No results found for this or any previous visit (from the past 24 hour(s)).

## 2021-11-25 NOTE — PROVIDER NOTIFICATION
MD Notification    Notified Person: MD    Notified Person Name: Junior    Notification Date/Time: 11/24/2021 221    Notification Interaction: Med order    Purpose of Notification:601 NB    Patient still unable to have a bowel mvmt.  Miralax given twice with no effect. Refusing suppository.  No magnesium citrate in MAR.  Can it be ordered please.  Thanks Abiodun MULLINS RN *[phone]    Orders Received:    Comments:

## 2021-11-25 NOTE — CONSULTS
Consult Date: 11/25/2021    RENAL CONSULT    REQUESTING PHYSICIAN:  Marisol Ernst MD    CONSULTANT:  Fly Alfaro MD    REASON FOR CONSULTATION:  Acute kidney injury.    HISTORY OF PRESENT ILLNESS:  This is a 41-year-old male with problems with drug abuse, HIV and hepatitis C who is admitted with Staph bacteremia, both MSSA and MRSA, with SBE on the tricuspid valve.  He has normal baseline kidney function and now has acute kidney injury.    He tells me he has had a kidney stone in the past.  He notes in the hospital some decreased urine output, but he denies dysuria or gross hematuria.  He has no flank pain.  He has been taking a lot of ibuprofen, both as an outpatient and he has gotten it on an inpatient basis.  On 11/23/2021 and 11/24/2021, he got 400 mg of ibuprofen.  He has also been on vanco for most of this hospitalization.  When he was noted to have an acute kidney injury.  Yesterday, he was given 500 mL normal saline bolus and started on some normal saline maintenance IV fluid.    He has normal baseline kidney function.  On 11/19/2021, his creatinine was 0.76 and hemoglobin 12.2.  On 11/22/2021, his creatinine was 1.40.  On 11/23/2021, his creatinine was 1.78.  Yesterday, his creatinine was 2.3, rising to 2.7.  His electrolytes showed sodium 132, potassium 5.3, bicarbonate 28.  An ultrasound was done, which showed normal size kidneys and no hydronephrosis.  A urinalysis was done, which showed 20 of protein, trace blood, 11 white cells and 3 red cells.    PAST MEDICAL HISTORY:  Includes bacteremia with osteomyelitis and SBE.  He has a history of opiate dependence and IV drug use.  He is HIV positive and hepatitis C positive.    PAST SURGICAL HISTORY:  Includes eye surgery after a fracture, cervical fusion, arm surgery and PICC insertion in the past.    SOCIAL HISTORY:  He continues to smoke.  He continues to drink.  He is using IV drugs.    FAMILY HISTORY:  The father had diabetes.  Mother had thyroid  disease.    REVIEW OF SYSTEMS:  Currently, he feels comfortable.  He notes decreased urine output.  He has no chest pain or shortness of breath.    CURRENT MEDICATIONS:  Include vancomycin and normal saline IV.  His other medications are as listed.    PHYSICAL EXAMINATION:    GENERAL:  He is thin.  He has multiple tattoos.  He is not icteric.  LUNGS:  Blood pressure is 119/84, respiratory rate 16, pulse 92.  He is afebrile.  GENERAL:  He is alert and responsive, although his speech is somewhat slow.  Head shows no trauma.  The eyes show pupils, round and react to light.  The mouth shows poor dentition with missing teeth.  NECK:  Supple.  LUNGS:  Bibasilar rales and some scattered wheezing.  HEART:  Regular rhythm, normal S1, S2, 2/6 systolic murmur.  ABDOMEN:  Soft and nontender.  EXTREMITIES:  Normal nails, joints and pulses.  Lower extremities:  Muscle wasting and trace 1+ edema in the ankle.    IMPRESSION:  This is a patient with previously normal kidney function.  He has been getting vancomycin and he has gotten several doses of ibuprofen as well.  On admission, his creatinine was normal at 0.7-0.8 and now it is steadily rising; yesterday, it was 2.70.  We do not have intake and output data, we do not have weight data and we do not have laboratories yet from today.    He may have ATN due to nephrotoxic drugs including vancomycin and Advil.  I have asked that he not get any more nonsteroidal anti-inflammatory drugs.  I would also ask that the ID team consider switching him off of vancomycin to another appropriate medication.  The other possibility is glomerulonephritis of a postinfectious type.  We are going to check several laboratories in this regard, including complement C3 and C4.  We will get ANCA and ADAN.  We will get cryoglobulins, given that he is hepatitis C positive, and we will get urine for eosinophils to make sure this is not interstitial nephritis.  We will also check a urine sodium and fractional  excretion of sodium.  I think we can continue his IV fluids.  He does not have evidence right now of fluid overload.  Laboratories from today are pending and we will review these.    Fly Alfaro MD        D: 2021   T: 2021   MT: [id number]    Name:     AMBREEN PYLE  MRN:      [id number]        Account:      [id number]   :      1980           Consult Date: 2021     Document: [id number]

## 2021-11-25 NOTE — PROGRESS NOTES
[facility]    Infectious Disease Progress Note    Date of Service : 11/25/2021     Assessment:  41 year old male with polysubstance use/IVDA, HIV-Hep C co-infection and prior hx of recurrent MSSA sepsis with pulmonic valve endocarditis, spinal discitis/osteomyelitis with epidural abscess requiring surgical debridement, who is now admitted with high grade MSSA/MRSA bacteremia with tricuspid valve endocarditis with two large [id number] x 8mm and 13 x 5mm. Blood cxs are negative since 11/18 and no surgical plans at this time . He has had medication non compliance with ART with detectable VL, CD4 >200 at this time not requiring PCP prophylaxis. He will need additional psychiatry,mental carmine counseling and chemical dependency support.     1. MSSA/MRSA sepsis with tricuspid valve endocarditis - blood cxs cleared 11/18  2. JOSE RAMON with worsening renal functions, creatinine up to 2.7 today. Nephrology following and working up further .   3. Scrotal laceration without signs of infection.  4. Prior history of MSSA sepsis with pulmonary valve endocarditis in 2015 and spinal discitis/osteomyelitis with epidural abscess requiring surgical intervention in 2017.  5. Substance abuse with active use of methamphetamine / fentanyl currently  6.  HIV/Hepatitis C co-infection. Last HIV VL detectable at 29,522 copies/ml and  CD4 625(28%) on  06/09/2021 , on Triumeq     Recommendations  1. Discontinue Triumeq- will need to discuss renal dosing and review prior genotypes to recommend alternative regimen in view of renal failure  2. Continue Vancomycin for now, level therapeutic and Pharm-D dosing. Daptomycin sensitivities will be available by tomorrow if need to transition   3. Will need FUP echocardiogram by the end of next week   4. Follow clinical status and labs  5. Check HCV RNA      Alana Cooper MD    Interval History   Resting, continues to complain of difficulty urinating, US kidneys shows no obstruction. No  other complaints today    Antimicrobial therapy  11/18 Vancomycin    Physical Exam   Temp: 98.2  F (36.8  C) Temp src: Oral BP: 119/80 Pulse: 92   Resp: 16 SpO2: 99 % O2 Device: None (Room air)    Vitals:    11/15/21 2141   Weight: 56.2 kg (124 lb)     Vital Signs with Ranges  Temp:  [97.7  F (36.5  C)-98.8  F (37.1  C)] 98.2  F (36.8  C)  Pulse:  [] 92  Resp:  [16] 16  BP: (103-121)/(75-88) 119/80  SpO2:  [98 %-100 %] 99 %    GENERAL APPEARANCE:  Awake, cachectic  appearing male  EYES: Eyes grossly normal to inspection  Mouth : Poor dentition  NECK: no adenopathy  RESP: lungs clear   CV: regular rates and rhythm  ABDOMEN: soft, nontender  MS: no edema  SKIN: multiple scars, IV racks, excoriations on hands,    : Left scrotal laceration site appears clean, no sign of infection    Other:    Medications     sodium chloride 75 mL/hr at 11/25/21 0358       abacavir-dolutegravir-LamiVUDine  1 tablet Oral Daily     aspirin  81 mg Oral Daily     buprenorphine  8 mg Sublingual BID     carisoprodol  87.5 mg Oral BID     levomilnacipran  80 mg Oral Daily     nicotine  1 patch Transdermal QPM     nicotine   Transdermal Q8H     pregabalin  150 mg Oral BID     sodium chloride (PF)  3 mL Intravenous Q8H     vancomycin place aden - receiving intermittent dosing  1 each Does not apply See Admin Instructions       Data   All microbiology laboratory data reviewed.  Recent Labs   Lab Test 11/19/21  0557 11/18/21  0535 11/16/21  0014   WBC 9.5 12.9* 14.7*   HGB 12.2* 11.0* 11.4*   HCT 37.2* 34.0* 34.4*   MCV 83 84 84    270 187     Recent Labs   Lab Test 11/24/21  1158 11/24/21  0010 11/23/21  0912   CR 2.70* 2.30* 1.78*     Recent Labs   Lab Test 11/16/21  0014   SED 39*     Component      Latest Ref Rng & Units 11/16/2021   CD3 Mature T      49 - 84 % 67   Absolute CD3      603-2,990 cells/uL 374 (L)   CD4 [place] T      28 - 63 % 37   Absolute CD4      441-2,156 cells/uL 206 (L)   CD8 Suppressor T      10 - 40 % 27    Absolute CD8      125-1,312 cells/uL 151   CD4:CD8 Ratio      1.40 - 2.60 1.37 (L)   HIV-1 RNA Copies/mL, Instrument      <1 copies/mL 985 (H)   HIV RNA QT Log       3.0      Component      Latest Ref Rng & Units 11/22/2021   Vancomycin Level      mg/L 18.1      Microbiology  11/14 & 4/16 blood cxs both sets MSSA & MRSA, blood cxs 11/18, 11/19 blood cxs negative  Culture Positive on the 1st day of incubation Abnormal         Staphylococcus aureus Panic     2 of 2 bottles   Staphylococcus aureus MRSA Panic     2 of 2 bottles         Resulting Agency: [facility]         Susceptibility                       Staphylococcus aureus Staphylococcus aureus MRSA       PURNIMA PURNIMA       Clindamycin <=0.25 ug/mL Susceptible <=0.25 ug/mL Susceptible 1       Erythromycin <=0.25 ug/mL Susceptible >=8.0 ug/mL Resistant       Gentamicin <=0.5 ug/mL Susceptible <=0.5 ug/mL Susceptible       Linezolid     2.0 ug/mL Susceptible       Oxacillin <=0.25 ug/mL Susceptible >=4.0 ug/mL Resistant       Tetracycline <=1.0 ug/mL Susceptible <=1.0 ug/mL Susceptible       Trimethoprim/Sulfamethoxazole <=0.5/9.5 u... Susceptible <=0.5/9.5 u... Susceptible       Vancomycin <=0.5 ug/mL Susceptible <=0.5 ug/mL Susceptible                    Imaging  11/24 US kidneys  ULTRASOUND RENAL COMPLETE 11/24/2021 12:53 PM     CLINICAL HISTORY: MSSA sepsis, endocarditis, increasing creatinine,  evaluate for obstruction.     TECHNIQUE: Routine Bilateral Renal and Bladder Ultrasound.     COMPARISON: None.     FINDINGS:  RIGHT KIDNEY: 12.1 x 6.9 x 4.9 cm. Unremarkable echogenicity, no  hydronephrosis or masses. Simple cysts noted measuring up to 1.7 cm.  Mildly complex cyst in the upper right kidney measuring 1.8 cm.     LEFT KIDNEY: 1.2 x 6.0 x 5.8 cm. Unremarkable echogenicity, no  hydronephrosis or masses. Simple cysts noted.     BLADDER: Unremarkable given its level of distension.                                                                      IMPRESSION:  No  obstruction demonstrated    11/18 EDUARDO  Interpretation Summary     Two, large, mobile masses noted, attached to the atrial side of base of  posterior tricuspid leaflets are noted. (they measures 18 x 8mm and 13 x 5mm  respectively).These are most consistent with large tricuspid valve  vegetations.  Severe tricuspid regurgitation noted, directed towards the interatrial septum.  Cannot exclude perforation of posterior leaflet of tricuspid valve.  The right ventricle is mildly dilated.  The right ventricular systolic function is normal.  Left ventricular systolic function is normal.  The visual ejection fraction is 60-65%.  Findings discussed with Dr Guerin.  These are new comnpared to prior echo from 2017. The study was technically  adequate.

## 2021-11-25 NOTE — PLAN OF CARE
SUMMARY:Tricuspid Valve Endocarditis/ MSSA Bacteremia   DATE & TIME: 11/24/21, 4675-6056  Cognitive Concerns/ Orientation : Alert and Oriented x4, anxious but cooperative. Frustrated with med changes. Ativan given x1.  BEHAVIOR & AGGRESSION TOOL COLOR: Green  COWS SCORE: MD okayed to discontinue  ABNL VS/O2: VSS, room air.  MOBILITY: Independent  PAIN MANAGMENT:  Mild scrotal pain.  DIET: Regular diet  BOWEL/BLADDER: Continent, abdomen firm and tender, passing flatus. Last reported BM 6-7 days ago. PT declined suppository,  milk of mag given (BM q 4-5 days is baseline).  ABNL LAB/BG: Creat 2.7, sodium 132, UA positive WBC (24), RBC (6)  DRAIN/DEVICES: L PIV SL  TELEMETRY RHYTHM: NSR  SKIN: Pt has scrotal laceration with sutures. Scattered scabs/bruises  TESTS/PROCEDURES: Renal ultrasound- no obstruction demonstrated  D/C DATE: Pending abx plan, will need a couple weeks of IV abx.    Discharge Barriers: Treatment plan with substance abuse. IV antibiotic therapy.  OTHER IMPORTANT INFO: Contact [facility] for MRSA. NS started 75 ml/hr, nephrology following.

## 2021-11-26 LAB
ALBUMIN SERPL-MCNC: 1.7 G/DL (ref 3.4–5)
ALP SERPL-CCNC: 75 U/L (ref 40–150)
ALT SERPL W P-5'-P-CCNC: 24 U/L (ref 0–70)
ANA SER QL IF: NEGATIVE
ANION GAP SERPL CALCULATED.3IONS-SCNC: 7 MMOL/L (ref 3–14)
AST SERPL W P-5'-P-CCNC: 16 U/L (ref 0–45)
BACTERIA BLD CULT: ABNORMAL
BASOPHILS # BLD AUTO: 0.1 10E3/UL (ref 0–0.2)
BASOPHILS NFR BLD AUTO: 1 %
BILIRUB SERPL-MCNC: 0.2 MG/DL (ref 0.2–1.3)
BUN SERPL-MCNC: 40 MG/DL (ref 7–30)
C3 SERPL-MCNC: 18 MG/DL (ref 81–157)
C4 SERPL-MCNC: 18 MG/DL (ref 13–39)
CALCIUM SERPL-MCNC: 8.2 MG/DL (ref 8.5–10.1)
CHLORIDE BLD-SCNC: 104 MMOL/L (ref 94–109)
CK SERPL-CCNC: 16 U/L (ref 30–300)
CO2 SERPL-SCNC: 26 MMOL/L (ref 20–32)
CREAT SERPL-MCNC: 2.67 MG/DL (ref 0.66–1.25)
EOSINOPHIL # BLD AUTO: 0.2 10E3/UL (ref 0–0.7)
EOSINOPHIL NFR BLD AUTO: 2 %
ERYTHROCYTE [DISTWIDTH] IN BLOOD BY AUTOMATED COUNT: 14.6 % (ref 10–15)
GFR SERPL CREATININE-BSD FRML MDRD: 29 ML/MIN/1.73M2
GLUCOSE BLD-MCNC: 104 MG/DL (ref 70–99)
HCT VFR BLD AUTO: 28.5 % (ref 40–53)
HGB BLD-MCNC: 8.9 G/DL (ref 13.3–17.7)
IMM GRANULOCYTES # BLD: 0.2 10E3/UL
IMM GRANULOCYTES NFR BLD: 2 %
LYMPHOCYTES # BLD AUTO: 2.2 10E3/UL (ref 0.8–5.3)
LYMPHOCYTES NFR BLD AUTO: 20 %
MCH RBC QN AUTO: 27.4 PG (ref 26.5–33)
MCHC RBC AUTO-ENTMCNC: 31.2 G/DL (ref 31.5–36.5)
MCV RBC AUTO: 88 FL (ref 78–100)
MONOCYTES # BLD AUTO: 0.9 10E3/UL (ref 0–1.3)
MONOCYTES NFR BLD AUTO: 8 %
NEUTROPHILS # BLD AUTO: 7.4 10E3/UL (ref 1.6–8.3)
NEUTROPHILS NFR BLD AUTO: 67 %
NRBC # BLD AUTO: 0 10E3/UL
NRBC BLD AUTO-RTO: 0 /100
PLATELET # BLD AUTO: 479 10E3/UL (ref 150–450)
POTASSIUM BLD-SCNC: 5.1 MMOL/L (ref 3.4–5.3)
PROT SERPL-MCNC: 6.9 G/DL (ref 6.8–8.8)
RBC # BLD AUTO: 3.25 10E6/UL (ref 4.4–5.9)
SODIUM SERPL-SCNC: 137 MMOL/L (ref 133–144)
WBC # BLD AUTO: 10.9 10E3/UL (ref 4–11)

## 2021-11-26 PROCEDURE — 250N000013 HC RX MED GY IP 250 OP 250 PS 637: Performed by: PSYCHIATRY & NEUROLOGY

## 2021-11-26 PROCEDURE — 99233 SBSQ HOSP IP/OBS HIGH 50: CPT | Performed by: INTERNAL MEDICINE

## 2021-11-26 PROCEDURE — 250N000013 HC RX MED GY IP 250 OP 250 PS 637

## 2021-11-26 PROCEDURE — 36415 COLL VENOUS BLD VENIPUNCTURE: CPT | Performed by: INTERNAL MEDICINE

## 2021-11-26 PROCEDURE — 250N000013 HC RX MED GY IP 250 OP 250 PS 637: Performed by: SPECIALIST

## 2021-11-26 PROCEDURE — 85025 COMPLETE CBC W/AUTO DIFF WBC: CPT | Performed by: INTERNAL MEDICINE

## 2021-11-26 PROCEDURE — 82595 ASSAY OF CRYOGLOBULIN: CPT | Performed by: INTERNAL MEDICINE

## 2021-11-26 PROCEDURE — 80053 COMPREHEN METABOLIC PANEL: CPT | Performed by: INTERNAL MEDICINE

## 2021-11-26 PROCEDURE — 250N000013 HC RX MED GY IP 250 OP 250 PS 637: Performed by: HOSPITALIST

## 2021-11-26 PROCEDURE — 250N000011 HC RX IP 250 OP 636: Performed by: SPECIALIST

## 2021-11-26 PROCEDURE — 250N000011 HC RX IP 250 OP 636: Performed by: INTERNAL MEDICINE

## 2021-11-26 PROCEDURE — 82550 ASSAY OF CK (CPK): CPT | Performed by: SPECIALIST

## 2021-11-26 PROCEDURE — 120N000001 HC R&B MED SURG/OB

## 2021-11-26 PROCEDURE — 86255 FLUORESCENT ANTIBODY SCREEN: CPT | Performed by: INTERNAL MEDICINE

## 2021-11-26 PROCEDURE — G0463 HOSPITAL OUTPT CLINIC VISIT: HCPCS

## 2021-11-26 PROCEDURE — 258N000003 HC RX IP 258 OP 636: Performed by: SPECIALIST

## 2021-11-26 PROCEDURE — 87522 HEPATITIS C REVRS TRNSCRPJ: CPT | Performed by: SPECIALIST

## 2021-11-26 PROCEDURE — 250N000013 HC RX MED GY IP 250 OP 250 PS 637: Performed by: STUDENT IN AN ORGANIZED HEALTH CARE EDUCATION/TRAINING PROGRAM

## 2021-11-26 PROCEDURE — 86256 FLUORESCENT ANTIBODY TITER: CPT | Performed by: INTERNAL MEDICINE

## 2021-11-26 PROCEDURE — 258N000003 HC RX IP 258 OP 636: Performed by: INTERNAL MEDICINE

## 2021-11-26 RX ORDER — EMTRICITABINE 200 MG/1
200 CAPSULE ORAL
Status: DISCONTINUED | OUTPATIENT
Start: 2021-11-26 | End: 2021-11-29

## 2021-11-26 RX ADMIN — ASPIRIN 81 MG: 81 TABLET, COATED ORAL at 08:32

## 2021-11-26 RX ADMIN — SODIUM CHLORIDE, PRESERVATIVE FREE: 5 INJECTION INTRAVENOUS at 02:36

## 2021-11-26 RX ADMIN — HEPARIN SODIUM 5000 UNITS: 5000 INJECTION, SOLUTION INTRAVENOUS; SUBCUTANEOUS at 02:34

## 2021-11-26 RX ADMIN — Medication 87.5 MG: at 08:32

## 2021-11-26 RX ADMIN — DAPTOMYCIN 500 MG: 500 INJECTION, POWDER, LYOPHILIZED, FOR SOLUTION INTRAVENOUS at 12:50

## 2021-11-26 RX ADMIN — SODIUM CHLORIDE, PRESERVATIVE FREE: 5 INJECTION INTRAVENOUS at 22:35

## 2021-11-26 RX ADMIN — EMTRICITABINE 200 MG: 200 CAPSULE ORAL at 18:40

## 2021-11-26 RX ADMIN — HEPARIN SODIUM 5000 UNITS: 5000 INJECTION, SOLUTION INTRAVENOUS; SUBCUTANEOUS at 18:44

## 2021-11-26 RX ADMIN — BUPRENORPHINE HYDROCHLORIDE 8 MG: 8 TABLET SUBLINGUAL at 08:32

## 2021-11-26 RX ADMIN — Medication 87.5 MG: at 22:32

## 2021-11-26 RX ADMIN — LEVOMILNACIPRAN HYDROCHLORIDE 80 MG: 40 CAPSULE, EXTENDED RELEASE ORAL at 08:32

## 2021-11-26 RX ADMIN — LORAZEPAM 0.5 MG: 0.5 TABLET ORAL at 02:34

## 2021-11-26 RX ADMIN — BUPRENORPHINE HYDROCHLORIDE 8 MG: 8 TABLET SUBLINGUAL at 21:41

## 2021-11-26 RX ADMIN — HEPARIN SODIUM 5000 UNITS: 5000 INJECTION, SOLUTION INTRAVENOUS; SUBCUTANEOUS at 11:26

## 2021-11-26 RX ADMIN — LORAZEPAM 0.5 MG: 0.5 TABLET ORAL at 21:51

## 2021-11-26 RX ADMIN — TENOFOVIR ALAFENAMIDE 25 MG: 25 TABLET ORAL at 18:41

## 2021-11-26 RX ADMIN — RALTEGRAVIR 400 MG: 400 TABLET, FILM COATED ORAL at 21:41

## 2021-11-26 RX ADMIN — NICOTINE 1 PATCH: 14 PATCH, EXTENDED RELEASE TRANSDERMAL at 21:40

## 2021-11-26 ASSESSMENT — ACTIVITIES OF DAILY LIVING (ADL)
ADLS_ACUITY_SCORE: 3

## 2021-11-26 NOTE — CONSULTS
[facility] [place] Nurse Inpatient Wound Assessment   Reason for consultation: Evaluate and treat  Scrotum wound wounds    Assessment  Scrotum wound wounds due to Surgical Wound  Status: initial assessment  Wound appears shallow with pale wound base and draining slightly. Will treat with Triad paste.   Treatment Plan  Scrotum wound: BID and PRN  1.Cleanse area with microklenz and blot dry  2. Apply nickel thick layer of Triad paste (#[id number]) to wound bed and thin layer over reddened areas   3. Ok to leave open to air, unless patient would prefer to have area covered then would recommend covering with Optifoam gentle border (#[id number]) or  Mepilex Border Dressing (#[id number])    **No need to remove all paste after each incontinent episode, remove soiled paste and reapply as needed.  **If complete removal of paste is necessary use baby oil/mineral oil (Located in Pharmacy) and soft wash cloth.   Leave the brief off in bed to let the area dry as much as possible.   Use only one Covidien pad in between mattress and pt. No brief while in bed.        Orders Written  Recommended provider order: None, at this time  WO Nurse follow-up plan:weekly  Nursing to notify the Provider(s) and re-consult the WO Nurse if wound(s) deteriorates or new skin concern.    Patient History  According to provider note(s):  Bc German is a 41 year old mal with PMHx of polysubstance abuse including IV drug use (including fentanyl and methamphetamine currently), chronic opioid dependence, HIV, hepatitis C, hx of MSSA sepsis with pulmonary valve endocarditis (2015), and 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 Was found to have recurrent MSSA/MRSA bacteremia and tricuspid valve endocarditis.     Objective Data  Containment of urine/stool: Continent of bladder and Continent of bowel    Active  Diet Order  Orders Placed This Encounter      Renal Diet (non-dialysis)      Output:   I/O last 3 completed shifts:  In: 3035 [P.O.:760; I.V.:2275]  Out: 750 [Urine:750]    Risk Assessment:   Sensory Perception: 4-->no impairment  Moisture: 4-->rarely moist  Activity: 3-->walks occasionally  Mobility: 3-->slightly limited  Nutrition: 2-->probably inadequate  Friction and Shear: 3-->no apparent problem  Bari Score: 19                          Labs: Recent Labs   Lab 11/26/21  0005 11/26/21  0004 11/23/21  0912   ALBUMIN 1.7*  --   --    HGB  --  8.9*  --    WBC  --  10.9  --    CRP  --   --  144.0*       Physical Exam  Areas of skin assessed: focused scrotum    Wound Location:  Scrotum  11/26/21 (pt hand in photo)        Wound History: pt had syncope episode and when he woke up had large laceration to scrotum    Wound Base: 5% pink moist tissue with 90% pale tan tissue and 5% dark brownish black tissue-possible ecchymosis from trauma of falling     Palpation of the wound bed: normal      Drainage: scant     Description of drainage: serosanguinous     Measurements (length x width x depth, in cm) 5.5  x 2  x  0.2 cm      Tunneling N/A     Undermining N/A  Periwound skin: intact and erythema- blanchable      Color: red      Temperature: normal   Odor: none  Pain: mild, tender  Pain intervention prior to dressing change: NA    Interventions  Visual inspection and assessment completed   Wound Care Rationale Promote moist wound healing without tissue dehydration  and Provide protection   Wound Care: completed by RN  Supplies: ordered: Triad paste  Current off-loading measures: Pillows  Current support surface: Standard  Atmos Air mattress  Education provided to: plan of care, wound progress, Infection prevention  and Moisture management  Discussed plan of care with Patient and Nurse    Alexx Talbot RN CWOCN

## 2021-11-26 NOTE — PLAN OF CARE
SUMMARY:Tricuspid Valve Endocarditis/ MSSA/MRSA Bacteremia, Scrotal laceration.   DATE & TIME: 11/25/21-11/26/21 [id number]  Cognitive Concerns/ Orientation : Alert and Oriented x4, calm and cooperative. Frustrated with med changes.  BEHAVIOR & AGGRESSION TOOL COLOR: Green  COWS SCORE: MD okayed to discontinue on 11/24/21  ABNL VS/O2: VSS, room air.  MOBILITY: Independent  PAIN MANAGMENT:  Mild scrotal pain.   DIET: Renal diet (non-dialysis)  BOWEL/BLADDER: Continent, medium BM x1 today, passing flatus, BS+.   ABNL LAB/BG: Creat: 2.67, K+ 5.1, Alb: 1.7  DRAIN/DEVICES: Lt PIV infusing  mL/hr.   TELEMETRY RHYTHM: NSR  SKIN: Pt has scrotal laceration, mild redness, sutures removed yesterday. Reports burning and itching on scrotum, Vaseline applied per hospitalist. recommendation. Scattered scabs/bruises.   TESTS/PROCEDURES: none today   D/C DATE: Pending abx plan, will need a couple weeks of IV abx, ID following.   Discharge Barriers: CD evaluation when clinically more stable and nearing discharge from the endocarditis perspective. IV antibiotic therapy plan.   OTHER IMPORTANT INFO: Contact [facility] for MRSA. ID and Nephrology following.

## 2021-11-26 NOTE — PLAN OF CARE
Alert and oriented x 4. Calm but frustrated about receiving vancomycin. Reports burning and itching on scrotum, Vaseline applied per hospitalist recommendation. Up ind.  Kayexalate PO given. Waiting for K recheck. Very hard stick.  Tele SR

## 2021-11-26 NOTE — CONSULTS
Urology Consult    Name: Bc German    MRN: [id number]   YOB: 1980               Chief Complaint:   Scrotal laceration    History is obtained from the patient and chart review          History of Present Illness:       Bc German is a 41 year old mal with PMHx of polysubstance abuse including IV drug use, HIV, hepatitis C, prior bacteremia with recurrent abscesses (osteomyelitis, prior cardiac valves) now with MRSA bactermia and tricuspid endocarditis admitted 11/15/2021 for syncope, had a scrotal laceration noted at time of admission initially sutured in ER, sutures recently removed. He has been refusing to let nurses see but today complained of worsening appearance of the scrotal wound.    He denies increasing pain in the area. He is on daptomycin for his cardiac endocarditis/bacteremia.          Past Medical History:       Past Medical History:   Diagnosis Date     Anxiety      Depressive disorder      Drug abuse, IV (H)      Group A streptococcal infection 11/2014    Bacteremia/cellulitis     HCV antibody positive      HIV (human immunodeficiency virus infection) (H)      HIV (human immunodeficiency virus infection) (H)      HIV (human immunodeficiency virus infection) (H)      IVDU (intravenous drug user)      Osteomyelitis of vertebra of lumbosacral region (H) 3/2011    L3, left psoas abscess     Substance abuse (H)             Past Surgical History:       Past Surgical History:   Procedure Laterality Date     EYE SURGERY  1 year ago    pins to left eye after socket fracture     OPTICAL TRACKING SYSTEM FUSION CERVICAL ANTERIOR ONE LEVEL Right 9/10/2017    Procedure: OPTICAL TRACKING SYSTEM FUSION CERVICAL ANTERIOR ONE LEVEL;  Stealth C6-C7 Anterior Cervical Corpectomy and C5-T1 Fusion;  Surgeon: Marv Ambrose MD;  Location: [facility] OR     ORTHOPEDIC SURGERY      Arm Surgery     PICC INSERTION Left 09/21/2017    5fr DL BioFlo PICC, 42cm (1cm external) in the L basilic vein w/ tip  in the low SVC.     TRANSESOPHAGEAL ECHOCARDIOGRAM INTRAOPERATIVE N/A 3/17/2015    Procedure: TRANSESOPHAGEAL ECHOCARDIOGRAM INTRAOPERATIVE;  Surgeon: Generic Anesthesia Provider;  Location: [facility] OR     TRANSESOPHAGEAL ECHOCARDIOGRAM INTRAOPERATIVE N/A 11/18/2021    Procedure: ECHOCARDIOGRAM, TRANSESOPHAGEAL, INTRAOPERATIVE;  Surgeon: GENERIC ANESTHESIA PROVIDER;  Location:  OR            Social History:     Social History     Tobacco Use     Smoking status: Current Every Day Smoker     Packs/day: 0.25     Types: Cigarettes     Smokeless tobacco: Never Used   Substance Use Topics     Alcohol use: Yes            Family History:     History reviewed. No pertinent family history.         Allergies:     Allergies   Allergen Reactions     Bupropion      Other reaction(s): Seizures, Seizures  PN: seizure when took a double dose  seizure when took a double dose  PN: seizure when took a double dose  Other reaction(s): Seizures  PN: seizure when took a double dose       Acetaminophen Nausea and Vomiting     PN: : GI Upset  : GI Upset  PN: : GI Upset  Other reaction(s): Vomiting  PN: : GI Upset       Codeine Itching and Nausea and Vomiting     Other reaction(s): Abdominal Pain  PN:  weak; fatigue; vomiting   weak; fatigue; vomiting  PN:  weak; fatigue; vomiting  PN:  weak; fatigue; vomiting       Sulfa Drugs Itching            Medications:     Current Facility-Administered Medications   Medication     acetaminophen (TYLENOL) tablet 650 mg    Or     acetaminophen (TYLENOL) Suppository 650 mg     alum & mag hydroxide-simethicone (MAALOX) suspension 30 mL     aspirin EC tablet 81 mg     bisacodyl (DULCOLAX) Suppository 10 mg     buprenorphine (SUBUTEX) sublingual tablet 8 mg     calcium carbonate (TUMS) chewable tablet 500 mg     carisoprodol (SOMA) quarter-tab 87.5 mg     DAPTOmycin (CUBICIN) 500 mg in sodium chloride 0.9 % 100 mL intermittent infusion     emtricitabine (EMTRIVA) capsule 200 mg     heparin ANTICOAGULANT  injection 5,000 Units     levomilnacipran (FETZIMA ER) 24 hr capsule 80 mg     lidocaine (LMX4) cream     lidocaine 1 % 0.1-1 mL     LORazepam (ATIVAN) tablet 0.5 mg     naloxone (NARCAN) injection 0.2 mg    Or     naloxone (NARCAN) injection 0.4 mg    Or     naloxone (NARCAN) injection 0.2 mg    Or     naloxone (NARCAN) injection 0.4 mg     nicotine (NICODERM CQ) 14 MG/24HR 24 hr patch 1 patch     nicotine Patch in Place     nitroGLYcerin (NITROSTAT) sublingual tablet 0.4 mg     ondansetron (ZOFRAN-ODT) ODT tab 4 mg    Or     ondansetron (ZOFRAN) injection 4 mg     polyethylene glycol (MIRALAX) Packet 17 g     prochlorperazine (COMPAZINE) injection 10 mg    Or     prochlorperazine (COMPAZINE) tablet 10 mg    Or     prochlorperazine (COMPAZINE) suppository 25 mg     raltegravir (ISENTRESS) tablet 400 mg     sodium chloride (PF) 0.9% PF flush 3 mL     sodium chloride (PF) 0.9% PF flush 3 mL     sodium chloride (PF) 0.9% PF flush 3 mL     sodium chloride 0.9% infusion     tenofovir alafenamide fumarate (VEMLIDY) tablet 25 mg             Review of Systems:      ROS: 10 point ROS neg other than the symptoms noted above in the HPI           Physical Exam:     VS:  T: 98.8    HR: 92    BP: 122/90    RR: 16   GEN:  AOx3.  NAD.    LUNGS: Non-labored breathing.   BACK:  No midline or CVA tenderness.  ABD:  Soft.  NT.  ND.  No rebound or guarding.  No masses.  :  Circumcised.  Normal penile shaft.  Testicles descended bilaterally, no nodules or tenderness.  No inguinal hernias.  - Left sided scrotal laceration, wound edges have  with a fibrinous base. No surrounding erythema. No crepitus. No tenderness on exam. See WOC RN media pics from the same date. No indication on exam of damage to deep scrotal structures  KITTY: Deferred  EXT:  Warm, well perfused.  No edema.  SKIN:  Warm.  Dry.  No rashes.  NEURO:  A&O. CN grossly intact.            Data:   All laboratory data reviewed    Scrotal US at time of admission  reasurring    All pertinent imaging reviewed, noted for above.         Impression and Plan:     Impression:   Bc German is a 41 year old male with IVDU and associated bacteremia/endocarditis with scrotal laceration initially closed in ER, wound edges now  but no visual concern for infection, will need to heal now by secondary intention.      Plan:     - Agree with WOC consult. Would defer to WOC for wound cares.            - Urology will follow. Please contact resident/PA on call with any questions or concerns.         Discussed with Dr. Moon.    Yanira Lizarraga MD  Urology Resident

## 2021-11-26 NOTE — PROGRESS NOTES
[facility]    Infectious Disease Progress Note    Date of Service : 11/26/2021    Assessment:  41 year old male with polysubstance use/IVDA, HIV-Hep C co-infection and prior hx of recurrent MSSA sepsis with pulmonic valve endocarditis, spinal discitis/osteomyelitis with epidural abscess requiring surgical debridement, who is now admitted with high grade MSSA/MRSA bacteremia with tricuspid valve endocarditis with two large [id number] x 8mm and 13 x 5mm. Blood cxs are negative since 11/18 and no surgical plans at this time . He has had medication non compliance with ART with detectable VL, CD4 >200 at this time not requiring PCP prophylaxis. He will need additional psychiatry,mental carmine counseling and chemical dependency support.     1. MSSA/MRSA sepsis with tricuspid valve endocarditis - blood cxs cleared 11/18  2. JOSE RAMON with worsening renal functions, creatinine improving slowly. Nephrology following and furhter work up is pending  3. Scrotal laceration without signs of infection.  4. Prior history of MSSA sepsis with pulmonary valve endocarditis in 2015 and spinal discitis/osteomyelitis with epidural abscess requiring surgical intervention in 2017.  5. Substance abuse with active use of methamphetamine / fentanyl currently  6.  HIV/Hepatitis C co-infection. Last HIV VL detectable at 29,522 copies/ml and  CD4 625(28%) on  06/09/2021 , on Triumeq     Recommendations  1. Transition to Daptomycin 10mg/kg Q48H PURNIMA </= 0.25, follow renal functions, dose will need to be modified to daily as renal functions improve  2. Discontinue Vancomycin  3. Check baseline CK, and follow CK levels  4. Follow renal functions for improvement  5. Triumeq discontinued due to renal insufficiency. Renal functions borderline (Cr clearance 29 today, with improving renal functions expect clearance to improve in 1-2 days). Plan to re initiate ART with Raltegravir, TAF, Emtricitabine with renal adjustment.     Alana  MD Kenneth    Interval History   Resting, no new complaints.    Antimicrobial therapy  11/18 Vancomycin    Physical Exam   Temp: 98.8  F (37.1  C) Temp src: Oral BP: (!) 128/94 Pulse: 91   Resp: 16 SpO2: 100 % O2 Device: None (Room air)    Vitals:    11/15/21 2141   Weight: 56.2 kg (124 lb)     Vital Signs with Ranges  Temp:  [98.3  F (36.8  C)-98.8  F (37.1  C)] 98.8  F (37.1  C)  Pulse:  [87-99] 91  Resp:  [16-18] 16  BP: (110-128)/(75-94) 128/94  SpO2:  [98 %-100 %] 100 %    GENERAL APPEARANCE:  Awake, cachectic  appearing male  EYES: Eyes grossly normal to inspection  Mouth : Poor dentition  NECK: no adenopathy  RESP: lungs clear   CV: regular rates and rhythm  ABDOMEN: soft, nontender  MS: no edema  SKIN: multiple scars, IV racks, excoriations on hands,    : Left scrotal laceration site appears clean, no sign of infection    Other:    Medications     sodium chloride 100 mL/hr at 11/26/21 0236       aspirin  81 mg Oral Daily     buprenorphine  8 mg Sublingual BID     carisoprodol  87.5 mg Oral BID     heparin ANTICOAGULANT  5,000 Units Subcutaneous Q8H     levomilnacipran  80 mg Oral Daily     nicotine  1 patch Transdermal QPM     nicotine   Transdermal Q8H     sodium chloride (PF)  3 mL Intravenous Q8H     vancomycin place aden - receiving intermittent dosing  1 each Does not apply See Admin Instructions       Data   All microbiology laboratory data reviewed.  Recent Labs   Lab Test 11/26/21  0004 11/19/21  0557 11/18/21  0535   WBC 10.9 9.5 12.9*   HGB 8.9* 12.2* 11.0*   HCT 28.5* 37.2* 34.0*   MCV 88 83 84   * 245 270     Recent Labs   Lab Test 11/26/21  0005 11/25/21  1323 11/24/21  1158   CR 2.67* 2.80* 2.70*     Recent Labs   Lab Test 11/16/21  0014   SED 39*     Component      Latest Ref Rng & Units 11/16/2021   CD3 Mature T      49 - 84 % 67   Absolute CD3      603-2,990 cells/uL 374 (L)   CD4 [place] T      28 - 63 % 37   Absolute CD4      441-2,156 cells/uL 206 (L)   CD8 Suppressor T      10 -  40 % 27   Absolute CD8      125-1,312 cells/uL 151   CD4:CD8 Ratio      1.40 - 2.60 1.37 (L)   HIV-1 RNA Copies/mL, Instrument      <1 copies/mL 985 (H)   HIV RNA QT Log       3.0      Component      Latest Ref Rng & Units 11/22/2021   Vancomycin Level      mg/L 18.1      Microbiology  11/14 & 4/16 blood cxs both sets MSSA & MRSA, blood cxs 11/18, 11/19 blood cxs negative  Culture Positive on the 1st day of incubation Abnormal         Staphylococcus aureus Panic     2 of 2 bottles   Staphylococcus aureus MRSA Panic     2 of 2 bottles         Resulting Agency: [facility]         Susceptibility                       Staphylococcus aureus Staphylococcus aureus MRSA       PURNIMA PURNIMA       Clindamycin <=0.25 ug/mL Susceptible <=0.25 ug/mL Susceptible 1       Erythromycin <=0.25 ug/mL Susceptible >=8.0 ug/mL Resistant       Gentamicin <=0.5 ug/mL Susceptible <=0.5 ug/mL Susceptible       Linezolid     2.0 ug/mL Susceptible       Oxacillin <=0.25 ug/mL Susceptible >=4.0 ug/mL Resistant       Tetracycline <=1.0 ug/mL Susceptible <=1.0 ug/mL Susceptible       Trimethoprim/Sulfamethoxazole <=0.5/9.5 u... Susceptible <=0.5/9.5 u... Susceptible       Vancomycin <=0.5 ug/mL Susceptible <=0.5 ug/mL Susceptible                    Imaging  11/24 US kidneys  ULTRASOUND RENAL COMPLETE 11/24/2021 12:53 PM     CLINICAL HISTORY: MSSA sepsis, endocarditis, increasing creatinine,  evaluate for obstruction.     TECHNIQUE: Routine Bilateral Renal and Bladder Ultrasound.     COMPARISON: None.     FINDINGS:  RIGHT KIDNEY: 12.1 x 6.9 x 4.9 cm. Unremarkable echogenicity, no  hydronephrosis or masses. Simple cysts noted measuring up to 1.7 cm.  Mildly complex cyst in the upper right kidney measuring 1.8 cm.     LEFT KIDNEY: 1.2 x 6.0 x 5.8 cm. Unremarkable echogenicity, no  hydronephrosis or masses. Simple cysts noted.     BLADDER: Unremarkable given its level of distension.                                                                      IMPRESSION:   No obstruction demonstrated     11/18 EDUARDO  Interpretation Summary     Two, large, mobile masses noted, attached to the atrial side of base of  posterior tricuspid leaflets are noted. (they measures 18 x 8mm and 13 x 5mm  respectively).These are most consistent with large tricuspid valve  vegetations.  Severe tricuspid regurgitation noted, directed towards the interatrial septum.  Cannot exclude perforation of posterior leaflet of tricuspid valve.  The right ventricle is mildly dilated.  The right ventricular systolic function is normal.  Left ventricular systolic function is normal.  The visual ejection fraction is 60-65%.  Findings discussed with Dr Guerin.  These are new comnpared to prior echo from 2017. The study was technically  adequate.

## 2021-11-26 NOTE — PROGRESS NOTES
"1300: Pt refused to allow RN to assess scrotal lac this AM. Pt complained that MD had not been in to assess wound at noon. Writer again asked to view laceration, pt again refused stating \"you're not the doctor, you're the nurse. You can't do anything about it\". Writer relayed that MD was currently on unit, and would speak with her to come see pt when able. MD was at nurse's station after writer left pt's room and came to pt's room to assess wound with RN. Pt was upset when MD and RN were in room after MD stated she wanted further evaluation of wound per urology, threatening \"[facility] I [facility]. You can speak with my , you should have looked at this sooner\". Pt was reminded that he had refused assessment all morning, and RN could have paged MD this AM if he had allowed RN to see lac. Both WOC and urology consults ordered per MD.   "

## 2021-11-26 NOTE — PROGRESS NOTES
[facility]    Hospitalist Progress Note    Assessment & Plan   Bc German is a 41 year old mal with PMHx of polysubstance abuse including IV drug use (including fentanyl and methamphetamine currently), chronic opioid dependence, HIV, hepatitis C, hx of MSSA sepsis with pulmonary valve endocarditis (2015), and 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 Was found to have recurrent MSSA/MRSA bacteremia and tricuspid valve endocarditis.     MSSA/MRSA bacteremia with tricuspid valve endocarditis  Severe tricuspid regurg dt endocarditis  Hx of pulmonary valve endocarditis (2015) dt MSSA   Hx of lumbar spine (L3) osteomyelitis with epidural abscess (requiring surgical intervention 2017)  * Presented to ED for evaluation of fevers/chills, cough and episode of syncope. Was febrile to 101.9 with associated tachycardia. WBC 13.1, lactate nl. CXR on admission showed nodular/patchy opacity at the R base.  * 2/2 blood cultures drawn on admission grew both MSSA and MRSA.   * ID following, abx consolidated to Vancomycin only on 11/18.  * Repeat blood cultures drawn 11/18 and 11/19 were negative  * EDUARDO done 11/18 notable for large tricuspid valve vegetation with severe regurgitation  * Seen by cardiology and CV surgery this stay -- recommended to continue medical management for now, will follow clinical progress, would consider surgical options if he clinically worsened  ID discontinued vancomycin today due to acute kidney injury and started him on daptomycin  Follows weekly CPK while on daptomycin  Holding some of his HIV meds due to JOSE RAMON per ID anti-HIV regimen per  ID      HIV  Hepatitis C  * Chronic and stable on Triumeq on hold currently per ID .   * CDC >200 so no need for PJP ppx per ID    JOSE RAMON  * No hx of CKD.   * Cr initially  stable this stay, began trending up as of 11/22: Cr 1.4 --> 1.78 --> 2.3  -2.7-2.67  on 11/26  * Vanco levels have been okay  * Renal US obtained 11/24 and negative for obstruction  -- will start some gentle IVFs today with NS @ 100ml/h  -- nephrology consulted and are following. See their note ATN, AIN  versus immunoglomerulonephritis per Nephrology   Work up pending     Intermittent Hyponatremia  Mild hyperkalemia at 5.8 on 11/25/2021  Given 1 dose of Kayexalate 15 g on 11/25/2021 and with that the potassium improved to 5.1  Na 128 on admission, normalized as of 11/18.   -- Na normal now at 137  -- encourage po intake, monitor BMP    History of opiate dependence  History of active IV drug use  Tobacco Use  Depression  * On admission, had stated he last used IV drugs 1 wk prior to admission. While in the ED on 11/16, RN had noted patient was briefly nonresponsive and apneic. Was given dose of Narcan with noted improvement.   * Observed to have multiple areas of skin excoriations from skin picking and IV drug use.   * Prior to admission, had been started on taper off Soma -- was taking 87.5mg (1/4 of a 350mg tab) BID but later conversations with PCP's clinic revealed no provider there was managing taper  -- cont buprenorphine BID this stay  -- conts on levomilnacipran ER as per prior to admission  -- cont carisoprodol 87.5mg BID as per prior to admission, unclear what further plan was for taper  -- cont lorazepam 0.5mg q6h prn for anxiety   -- cont nicotine patch  -- consider CD consult when closer to discharge     Left scrotal laceration dt fall/possible syncope  Left scrotal wound infection  * Laceration sutured in ED. Suture removed 11/21.   * US scrotum earlier this stay showed a small R hydrocele but was otherwise normal.   Patient's scrotal laceration wound looks infected today.  Urology and wound care consultations requested patient is on IV daptomycin which should cover for the scrotal infection as  well      COVID status: asymptomatic screening negative on 11/16, 11/20     FEN: started NS @ 100ml/h on 11/24, lytes stable, regular diet  DVT Prophylaxis: PCDs  Code Status: Full Code    Disposition: Discharge date unclear, several days still pending plan for abx and improvement in JOSE RAMON .    Marisol Ernst MD   Pager [phone]    Interval History      Seen this afternoon.  Patient's left scrotal laceration wound looks infected with some pus in the area.  Patient is upset regarding this infection.  Threatens  to take legal action about this.  Creatinine is stable from 2.7 yesterday to 2.67 today.  I discussed with him that patient has been on IV antibiotics continuously since admission for his bacteremia.  Urology and  wound care consultations requested today.  Afebrile    -Data reviewed today: I reviewed all new labs and imaging results over the last 24 hours. I personally reviewed no images or EKG's today.    Physical Exam   Temp: 98.8  F (37.1  C) Temp src: Oral BP: (!) 122/90 Pulse: 92   Resp: 16 SpO2: 100 % O2 Device: None (Room air)    Vitals:    11/15/21 2141   Weight: 56.2 kg (124 lb)     Vital Signs with Ranges  Temp:  [98.3  F (36.8  C)-98.8  F (37.1  C)] 98.8  F (37.1  C)  Pulse:  [87-99] 92  Resp:  [16-18] 16  BP: (110-128)/(75-94) 122/90  SpO2:  [98 %-100 %] 100 %  I/O last 3 completed shifts:  In: 3035 [P.O.:760; I.V.:2275]  Out: 750 [Urine:750]    Constitutional: Resting comfortably, alert and conversing appropriately, NAD  Respiratory: CTAB, no wheeze/rales/rhonchi, no increased wok of breathing  Cardiovascular: HRRR, +murmur towards apex, no LE edema  GI: S, NT, ND, +BS  Skin/Integumen: warm/dry, scattered excoriations.  Left scrotal wound examined with the bedside RN present along with me and it showed small area of superficial pus surrounding incision with no deep abscess present  Other:      Medications     sodium chloride 100 mL/hr at 11/26/21 0835       aspirin  81 mg Oral Daily      buprenorphine  8 mg Sublingual BID     carisoprodol  87.5 mg Oral BID     DAPTOmycin (CUBICIN) intermittent infusion  9 mg/kg Intravenous Q48H     heparin ANTICOAGULANT  5,000 Units Subcutaneous Q8H     levomilnacipran  80 mg Oral Daily     nicotine  1 patch Transdermal QPM     nicotine   Transdermal Q8H     sodium chloride (PF)  3 mL Intravenous Q8H       Data   Recent Labs   Lab 11/26/21  0005 11/26/21  0004 11/25/21  1323 11/24/21  1158 11/24/21  0010   WBC  --  10.9  --   --   --    HGB  --  8.9*  --   --   --    MCV  --  88  --   --   --    PLT  --  479*  --   --   --      --  138  --  132*   POTASSIUM 5.1  --  5.8*  --  5.3   CHLORIDE 104  --  105  --  102   CO2 26  --  26  --  28   BUN 40*  --  41*  --  33*   CR 2.67*  --  2.80* 2.70* 2.30*   ANIONGAP 7  --  7  --  2*   SANDRA 8.2*  --  8.5  --  8.8   *  --  85  --  105*   ALBUMIN 1.7*  --   --   --   --    PROTTOTAL 6.9  --   --   --   --    BILITOTAL 0.2  --   --   --   --    ALKPHOS 75  --   --   --   --    ALT 24  --   --   --   --    AST 16  --   --   --   --        No results found for this or any previous visit (from the past 24 hour(s)).

## 2021-11-26 NOTE — PROVIDER NOTIFICATION
"Patient has a scrotal laceration. Sutures removed today.   Reports itching and burning sensation on the area.  Noted redness on area. He wants \"triple antibiotic\" ointment and petroleum gel.   Paged hospitalist  "

## 2021-11-27 LAB
ANION GAP SERPL CALCULATED.3IONS-SCNC: 6 MMOL/L (ref 3–14)
BUN SERPL-MCNC: 42 MG/DL (ref 7–30)
CALCIUM SERPL-MCNC: 8.8 MG/DL (ref 8.5–10.1)
CHLORIDE BLD-SCNC: 107 MMOL/L (ref 94–109)
CO2 SERPL-SCNC: 25 MMOL/L (ref 20–32)
CREAT SERPL-MCNC: 2.38 MG/DL (ref 0.66–1.25)
ERYTHROCYTE [DISTWIDTH] IN BLOOD BY AUTOMATED COUNT: 14.6 % (ref 10–15)
GFR SERPL CREATININE-BSD FRML MDRD: 33 ML/MIN/1.73M2
GLUCOSE BLD-MCNC: 89 MG/DL (ref 70–99)
GLUCOSE BLDC GLUCOMTR-MCNC: 141 MG/DL (ref 70–99)
GLUCOSE BLDC GLUCOMTR-MCNC: 91 MG/DL (ref 70–99)
GLUCOSE BLDC GLUCOMTR-MCNC: 93 MG/DL (ref 70–99)
HCT VFR BLD AUTO: 27.1 % (ref 40–53)
HGB BLD-MCNC: 8.2 G/DL (ref 13.3–17.7)
MCH RBC QN AUTO: 27.2 PG (ref 26.5–33)
MCHC RBC AUTO-ENTMCNC: 30.3 G/DL (ref 31.5–36.5)
MCV RBC AUTO: 90 FL (ref 78–100)
PLATELET # BLD AUTO: 385 10E3/UL (ref 150–450)
POTASSIUM BLD-SCNC: 5 MMOL/L (ref 3.4–5.3)
POTASSIUM BLD-SCNC: 6.1 MMOL/L (ref 3.4–5.3)
RBC # BLD AUTO: 3.01 10E6/UL (ref 4.4–5.9)
SODIUM SERPL-SCNC: 138 MMOL/L (ref 133–144)
WBC # BLD AUTO: 8.6 10E3/UL (ref 4–11)

## 2021-11-27 PROCEDURE — 84132 ASSAY OF SERUM POTASSIUM: CPT | Performed by: STUDENT IN AN ORGANIZED HEALTH CARE EDUCATION/TRAINING PROGRAM

## 2021-11-27 PROCEDURE — 250N000011 HC RX IP 250 OP 636: Performed by: STUDENT IN AN ORGANIZED HEALTH CARE EDUCATION/TRAINING PROGRAM

## 2021-11-27 PROCEDURE — 250N000013 HC RX MED GY IP 250 OP 250 PS 637: Performed by: PSYCHIATRY & NEUROLOGY

## 2021-11-27 PROCEDURE — 250N000013 HC RX MED GY IP 250 OP 250 PS 637: Performed by: STUDENT IN AN ORGANIZED HEALTH CARE EDUCATION/TRAINING PROGRAM

## 2021-11-27 PROCEDURE — 85027 COMPLETE CBC AUTOMATED: CPT | Performed by: INTERNAL MEDICINE

## 2021-11-27 PROCEDURE — 99233 SBSQ HOSP IP/OBS HIGH 50: CPT | Performed by: INTERNAL MEDICINE

## 2021-11-27 PROCEDURE — 80048 BASIC METABOLIC PNL TOTAL CA: CPT | Performed by: INTERNAL MEDICINE

## 2021-11-27 PROCEDURE — 250N000009 HC RX 250: Performed by: STUDENT IN AN ORGANIZED HEALTH CARE EDUCATION/TRAINING PROGRAM

## 2021-11-27 PROCEDURE — 250N000013 HC RX MED GY IP 250 OP 250 PS 637

## 2021-11-27 PROCEDURE — 250N000011 HC RX IP 250 OP 636: Performed by: INTERNAL MEDICINE

## 2021-11-27 PROCEDURE — 258N000001 HC RX 258: Performed by: STUDENT IN AN ORGANIZED HEALTH CARE EDUCATION/TRAINING PROGRAM

## 2021-11-27 PROCEDURE — 120N000001 HC R&B MED SURG/OB

## 2021-11-27 PROCEDURE — 258N000003 HC RX IP 258 OP 636: Performed by: INTERNAL MEDICINE

## 2021-11-27 PROCEDURE — 36415 COLL VENOUS BLD VENIPUNCTURE: CPT | Performed by: INTERNAL MEDICINE

## 2021-11-27 PROCEDURE — 250N000013 HC RX MED GY IP 250 OP 250 PS 637: Performed by: SPECIALIST

## 2021-11-27 PROCEDURE — 258N000003 HC RX IP 258 OP 636: Performed by: STUDENT IN AN ORGANIZED HEALTH CARE EDUCATION/TRAINING PROGRAM

## 2021-11-27 PROCEDURE — 250N000013 HC RX MED GY IP 250 OP 250 PS 637: Performed by: INTERNAL MEDICINE

## 2021-11-27 PROCEDURE — 250N000013 HC RX MED GY IP 250 OP 250 PS 637: Performed by: HOSPITALIST

## 2021-11-27 PROCEDURE — 36415 COLL VENOUS BLD VENIPUNCTURE: CPT | Performed by: STUDENT IN AN ORGANIZED HEALTH CARE EDUCATION/TRAINING PROGRAM

## 2021-11-27 PROCEDURE — 250N000012 HC RX MED GY IP 250 OP 636 PS 637: Performed by: STUDENT IN AN ORGANIZED HEALTH CARE EDUCATION/TRAINING PROGRAM

## 2021-11-27 RX ORDER — DEXTROSE MONOHYDRATE 100 MG/ML
INJECTION, SOLUTION INTRAVENOUS CONTINUOUS
Status: DISCONTINUED | OUTPATIENT
Start: 2021-11-27 | End: 2021-11-30

## 2021-11-27 RX ORDER — DEXTROSE MONOHYDRATE 25 G/50ML
25 INJECTION, SOLUTION INTRAVENOUS ONCE
Status: COMPLETED | OUTPATIENT
Start: 2021-11-27 | End: 2021-11-27

## 2021-11-27 RX ORDER — NICOTINE POLACRILEX 4 MG
15-30 LOZENGE BUCCAL
Status: DISCONTINUED | OUTPATIENT
Start: 2021-11-27 | End: 2021-12-30 | Stop reason: HOSPADM

## 2021-11-27 RX ORDER — ALBUTEROL SULFATE 5 MG/ML
10 SOLUTION RESPIRATORY (INHALATION) ONCE
Status: COMPLETED | OUTPATIENT
Start: 2021-11-27 | End: 2021-11-27

## 2021-11-27 RX ORDER — FUROSEMIDE 10 MG/ML
40 INJECTION INTRAMUSCULAR; INTRAVENOUS ONCE
Status: COMPLETED | OUTPATIENT
Start: 2021-11-27 | End: 2021-11-27

## 2021-11-27 RX ORDER — CALCIUM GLUCONATE 94 MG/ML
1 INJECTION, SOLUTION INTRAVENOUS ONCE
Status: COMPLETED | OUTPATIENT
Start: 2021-11-27 | End: 2021-11-27

## 2021-11-27 RX ORDER — SODIUM POLYSTYRENE SULFONATE 15 G/60ML
30 SUSPENSION ORAL; RECTAL ONCE
Status: COMPLETED | OUTPATIENT
Start: 2021-11-27 | End: 2021-11-27

## 2021-11-27 RX ORDER — TAMSULOSIN HYDROCHLORIDE 0.4 MG/1
0.4 CAPSULE ORAL DAILY
Status: DISCONTINUED | OUTPATIENT
Start: 2021-11-27 | End: 2021-12-30 | Stop reason: HOSPADM

## 2021-11-27 RX ORDER — DEXTROSE MONOHYDRATE 25 G/50ML
25-50 INJECTION, SOLUTION INTRAVENOUS
Status: DISCONTINUED | OUTPATIENT
Start: 2021-11-27 | End: 2021-12-30 | Stop reason: HOSPADM

## 2021-11-27 RX ADMIN — SODIUM CHLORIDE, PRESERVATIVE FREE: 5 INJECTION INTRAVENOUS at 18:43

## 2021-11-27 RX ADMIN — NICOTINE 1 PATCH: 14 PATCH, EXTENDED RELEASE TRANSDERMAL at 20:56

## 2021-11-27 RX ADMIN — ASPIRIN 81 MG: 81 TABLET, COATED ORAL at 09:40

## 2021-11-27 RX ADMIN — RALTEGRAVIR 400 MG: 400 TABLET, FILM COATED ORAL at 20:56

## 2021-11-27 RX ADMIN — HEPARIN SODIUM 5000 UNITS: 5000 INJECTION, SOLUTION INTRAVENOUS; SUBCUTANEOUS at 09:41

## 2021-11-27 RX ADMIN — HEPARIN SODIUM 5000 UNITS: 5000 INJECTION, SOLUTION INTRAVENOUS; SUBCUTANEOUS at 02:00

## 2021-11-27 RX ADMIN — FUROSEMIDE 40 MG: 10 INJECTION, SOLUTION INTRAVENOUS at 22:30

## 2021-11-27 RX ADMIN — ONDANSETRON 4 MG: 2 INJECTION INTRAMUSCULAR; INTRAVENOUS at 10:37

## 2021-11-27 RX ADMIN — TAMSULOSIN HYDROCHLORIDE 0.4 MG: 0.4 CAPSULE ORAL at 16:19

## 2021-11-27 RX ADMIN — SODIUM CHLORIDE 7 UNITS: 9 INJECTION, SOLUTION INTRAVENOUS at 22:08

## 2021-11-27 RX ADMIN — CALCIUM GLUCONATE 1 G: 98 INJECTION, SOLUTION INTRAVENOUS at 22:06

## 2021-11-27 RX ADMIN — RALTEGRAVIR 400 MG: 400 TABLET, FILM COATED ORAL at 09:40

## 2021-11-27 RX ADMIN — BUPRENORPHINE HYDROCHLORIDE 8 MG: 8 TABLET SUBLINGUAL at 09:40

## 2021-11-27 RX ADMIN — HEPARIN SODIUM 5000 UNITS: 5000 INJECTION, SOLUTION INTRAVENOUS; SUBCUTANEOUS at 18:34

## 2021-11-27 RX ADMIN — BUPRENORPHINE HYDROCHLORIDE 8 MG: 8 TABLET SUBLINGUAL at 20:56

## 2021-11-27 RX ADMIN — SODIUM POLYSTYRENE SULFONATE 30 G: 15 SUSPENSION ORAL; RECTAL at 21:57

## 2021-11-27 RX ADMIN — DEXTROSE MONOHYDRATE 25 G: 500 INJECTION PARENTERAL at 22:30

## 2021-11-27 RX ADMIN — SODIUM CHLORIDE, PRESERVATIVE FREE: 5 INJECTION INTRAVENOUS at 08:20

## 2021-11-27 RX ADMIN — LEVOMILNACIPRAN HYDROCHLORIDE 80 MG: 40 CAPSULE, EXTENDED RELEASE ORAL at 09:40

## 2021-11-27 RX ADMIN — TENOFOVIR ALAFENAMIDE 25 MG: 25 TABLET ORAL at 12:12

## 2021-11-27 RX ADMIN — DEXTROSE MONOHYDRATE: 100 INJECTION, SOLUTION INTRAVENOUS at 22:02

## 2021-11-27 RX ADMIN — ALBUTEROL SULFATE 10 MG: 2.5 SOLUTION RESPIRATORY (INHALATION) at 21:58

## 2021-11-27 RX ADMIN — LORAZEPAM 0.5 MG: 0.5 TABLET ORAL at 18:51

## 2021-11-27 RX ADMIN — Medication 87.5 MG: at 09:41

## 2021-11-27 RX ADMIN — Medication 87.5 MG: at 22:05

## 2021-11-27 ASSESSMENT — ACTIVITIES OF DAILY LIVING (ADL)
ADLS_ACUITY_SCORE: 7
ADLS_ACUITY_SCORE: 5
ADLS_ACUITY_SCORE: 7
ADLS_ACUITY_SCORE: 3
ADLS_ACUITY_SCORE: 7
ADLS_ACUITY_SCORE: 7
ADLS_ACUITY_SCORE: 3
ADLS_ACUITY_SCORE: 3
ADLS_ACUITY_SCORE: 7
ADLS_ACUITY_SCORE: 3
ADLS_ACUITY_SCORE: 3
ADLS_ACUITY_SCORE: 7
ADLS_ACUITY_SCORE: 7
ADLS_ACUITY_SCORE: 5
ADLS_ACUITY_SCORE: 3
ADLS_ACUITY_SCORE: 3
ADLS_ACUITY_SCORE: 5
ADLS_ACUITY_SCORE: 3
ADLS_ACUITY_SCORE: 5
ADLS_ACUITY_SCORE: 3
ADLS_ACUITY_SCORE: 5
ADLS_ACUITY_SCORE: 5

## 2021-11-27 ASSESSMENT — MIFFLIN-ST. JEOR: SCORE: 1647.91

## 2021-11-27 NOTE — PLAN OF CARE
SUMMARY:Tricuspid Valve Endocarditis/ MSSA/MRSA Bacteremia, Scrotal laceration.   DATE & TIME: 11/27/21, 7 a to 7 p  Cognitive Concerns/ Orientation : Alert and Oriented x4, calm and cooperative  BEHAVIOR & AGGRESSION TOOL COLOR: Green, can make his needs known  COWS SCORE: MD approved, discontinued on 11/24/21  ABNL VS/O2: VSS, room air.  MOBILITY: Independent  PAIN MANAGMENT:  Mild scrotal pain, R foot pain, cold pack applied  DIET: Renal diet (non-dialysis), poor appetite, no breakfast this shift  BOWEL/BLADDER: Continent,  passing flatus, BS+, Bladders scanned for 146  ABNL LAB/BG: Creat: 2.67, K+ 5.1, Alb: 1.7 as of 11/26/21  DRAIN/DEVICES: Lt PIV infusing  mL/hr.   TELEMETRY RHYTHM: NSR  SKIN: Pt has scrotal laceration, wound care done by LIZBETH,RN  TESTS/PROCEDURES:   D/C DATE: Pending abx plan, will need a couple weeks of IV abx, ID following.   Discharge Barriers: CD evaluation, completion of ABX  OTHER IMPORTANT INFO: Contact [facility] for MRSA. ID and Nephrology following.   Difficult Lab draw, 3 Techs tried  to draw with no success  zofran x 1 for nausea  Tamsulosin started by Nephro  CD consulted  Ativan x 1 for anxiety

## 2021-11-27 NOTE — PROGRESS NOTES
Brief Urology Note    ID: 41 year old with IVDU, associated bacteremia/endocarditis, also with scrotal laceration, wound has  since sutures removed    Interval:  No fevers, leukocytosis, remains on broad spectrum antibiotics that would cover any wound issues (dapto)    Plan:  - Defer to wound recommendations, I see wound order placed  - Appearance consistent with fibrinous wound base, not infection. Given healing by secondary intention would expect this take several weeks to heal.  - Urology will sign off. No urology follow up indicated.    Discussed with Dr. Moon

## 2021-11-27 NOTE — PROGRESS NOTES
[facility]    Hospitalist Progress Note    Assessment & Plan   Bc German is a 41 year old mal with PMHx of polysubstance abuse including IV drug use (including fentanyl and methamphetamine currently), chronic opioid dependence, HIV, hepatitis C, hx of MSSA sepsis with pulmonary valve endocarditis (2015), and 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 Was found to have recurrent MSSA/MRSA bacteremia and tricuspid valve endocarditis.     MSSA/MRSA bacteremia with tricuspid valve endocarditis  Severe tricuspid regurg dt endocarditis  Hx of pulmonary valve endocarditis (2015) dt MSSA   Hx of lumbar spine (L3) osteomyelitis with epidural abscess (requiring surgical intervention 2017)  * Presented to ED for evaluation of fevers/chills, cough and episode of syncope. Was febrile to 101.9 with associated tachycardia. WBC 13.1, lactate nl. CXR on admission showed nodular/patchy opacity at the R base.  * 2/2 blood cultures drawn on admission grew both MSSA and MRSA.   * ID following, abx consolidated to Vancomycin only on 11/18.  * Repeat blood cultures drawn 11/18 and 11/19 were negative  * EDUARDO done 11/18 notable for large tricuspid valve vegetation with severe regurgitation  * Seen by cardiology and CV surgery this stay -- recommended to continue medical management for now, will follow clinical progress, would consider surgical options if he clinically worsened  ID discontinued vancomycin today due to acute kidney injury and started him on daptomycin  Follow weekly CPK while on daptomycin  Holding some of his HIV meds due to JOSE RAMON per ID anti-HIV regimen per  ID      HIV  Hepatitis C  * Chronic and stable on Triumeq on hold currently per ID .   * CDC >200 so no need for PJP ppx per ID    JOSE RAMON  * No hx of CKD.   * Cr initially  stable this stay, began trending up as of 11/22: Cr 1.4 --> 1.78 --> 2.3  -2.7-2.67  on 11/26  ADAN returned negative  * Renal US obtained 11/24 and negative for obstruction  -- will start some gentle IVFs today with NS @ 100ml/h  -- nephrology consulted and are following. See their note ATN, AIN  versus immunoglomerulonephritis per Nephrology   Work up pending   BMP from today is still pending    Intermittent Hyponatremia  Mild hyperkalemia at 5.8 on 11/25/2021  Given 1 dose of Kayexalate 15 g on 11/25/2021 and with that the potassium improved to 5.1  Na 128 on admission, normalized as of 11/18.   -- Na normal now at 137  -- encourage po intake, monitor BMP    History of opiate dependence  History of active IV drug use  Tobacco Use  Depression  * On admission, had stated he last used IV drugs 1 wk prior to admission. While in the ED on 11/16, RN had noted patient was briefly nonresponsive and apneic. Was given dose of Narcan with noted improvement.   * Observed to have multiple areas of skin excoriations from skin picking and IV drug use.   * Prior to admission, had been started on taper off Soma -- was taking 87.5mg (1/4 of a 350mg tab) BID but later conversations with PCP's clinic revealed no provider there was managing taper  -- cont buprenorphine BID this stay  -- conts on levomilnacipran ER as per prior to admission  -- cont carisoprodol 87.5mg BID as per prior to admission, unclear what further plan was for taper  -- cont lorazepam 0.5mg q6h prn for anxiety   -- cont nicotine patch  CHEM Depp consultation requested     Left scrotal laceration dt fall/possible syncope  Left scrotal wound infection  * Laceration sutured in ED. Suture removed 11/21.   * US scrotum earlier this stay showed a small R hydrocele but was otherwise normal.   Patient's scrotal laceration wound looks infected today.  Urology and wound care consultations requested patient is on IV daptomycin which should cover for the scrotal infection as  well  Urology recommended wound care consultation      COVID status: asymptomatic screening negative on 11/16, 11/20     FEN: started NS @ 100ml/h on 11/24, lytes stable, regular diet  DVT Prophylaxis: PCDs  Code Status: Full Code    Disposition: Discharge date unclear, several days still pending plan for abx and improvement in JOSE RAMON .    Marisol Ernst MD   Pager [phone]    Interval History      Patient is somnolent but wakes up to verbal stimuli resting comfortably in bed.  No acute issues since yesterday.  CHEM Depp consultation requested today.  BMP still needs to be drawn today    -Data reviewed today: I reviewed all new labs and imaging results over the last 24 hours. I personally reviewed no images or EKG's today.    Physical Exam   Temp: 98.1  F (36.7  C) Temp src: Oral BP: (!) 131/101 Pulse: 82   Resp: 18 SpO2: 98 % O2 Device: None (Room air)    Vitals:    11/15/21 2141 11/27/21 0621   Weight: 56.2 kg (124 lb) 69.5 kg (153 lb 3.2 oz)     Vital Signs with Ranges  Temp:  [98  F (36.7  C)-98.1  F (36.7  C)] 98.1  F (36.7  C)  Pulse:  [82-94] 82  Resp:  [16-18] 18  BP: (130-135)/() 131/101  SpO2:  [98 %-100 %] 98 %  I/O last 3 completed shifts:  In: 2990 [P.O.:350; I.V.:2640]  Out: -     Constitutional: Resting comfortably, alert and conversing appropriately, NAD  Respiratory: CTAB, no wheeze/rales/rhonchi, no increased wok of breathing  Cardiovascular: HRRR, +murmur towards apex, no LE edema  GI: S, NT, ND, +BS  Skin/Integumen: warm/dry, scattered excoriations.  Left scrotal wound examined with the bedside RN present along with me and it showed small area of superficial pus surrounding incision with no deep abscess present  Other:      Medications     sodium chloride 100 mL/hr at 11/27/21 0820       aspirin  81 mg Oral Daily     buprenorphine  8 mg Sublingual BID     carisoprodol  87.5 mg Oral BID     DAPTOmycin (CUBICIN) intermittent infusion  9 mg/kg Intravenous Q48H     emtricitabine  200 mg Oral Q3  Days     heparin ANTICOAGULANT  5,000 Units Subcutaneous Q8H     levomilnacipran  80 mg Oral Daily     nicotine  1 patch Transdermal QPM     nicotine   Transdermal Q8H     raltegravir  400 mg Oral BID     sodium chloride (PF)  3 mL Intravenous Q8H     tenofovir alafenamide fumarate  25 mg Oral Daily       Data   Recent Labs   Lab 11/26/21  0005 11/26/21  0004 11/25/21  1323 11/24/21  1158 11/24/21  0010   WBC  --  10.9  --   --   --    HGB  --  8.9*  --   --   --    MCV  --  88  --   --   --    PLT  --  479*  --   --   --      --  138  --  132*   POTASSIUM 5.1  --  5.8*  --  5.3   CHLORIDE 104  --  105  --  102   CO2 26  --  26  --  28   BUN 40*  --  41*  --  33*   CR 2.67*  --  2.80* 2.70* 2.30*   ANIONGAP 7  --  7  --  2*   SANDRA 8.2*  --  8.5  --  8.8   *  --  85  --  105*   ALBUMIN 1.7*  --   --   --   --    PROTTOTAL 6.9  --   --   --   --    BILITOTAL 0.2  --   --   --   --    ALKPHOS 75  --   --   --   --    ALT 24  --   --   --   --    AST 16  --   --   --   --        No results found for this or any previous visit (from the past 24 hour(s)).

## 2021-11-27 NOTE — PLAN OF CARE
Cognitive Concerns/ Orientation : Alert and Oriented x4, anxious but cooperative.   BEHAVIOR & AGGRESSION TOOL COLOR: Green  COWS SCORE: MD okayed to discontinue on 11/24/21  ABNL VS/O2: VSS, room air.  MOBILITY: Independent  PAIN MANAGMENT:  Mild scrotal pain.   DIET: Renal diet (non-dialysis)  BOWEL/BLADDER: Continent,    ABNL LAB/BG: Todays labs still pending, very hard stick  DRAIN/DEVICES: Lt PIV infusing  mL/hr.   TELEMETRY RHYTHM: NSR  SKIN: Pt has scrotal laceration, mild redness, sutures removed. Scattered scabs/bruises.   TESTS/PROCEDURES: none today   D/C DATE:  will need a couple weeks of IV abx, ID, nephrology  following.   Discharge Barriers: CD evaluation when clinically more stable and nearing discharge from the endocarditis perspective. IV   antibiotic therapy plan.   OTHER IMPORTANT INFO: Contact ISO for MRSA. Pt refuse to take shower .Dressing changed.

## 2021-11-27 NOTE — PROGRESS NOTES
Assessment and Plan:   JOSE RAMON: Getting NS at 100 ml/h.   Cr: 2.70 > 2.80 > 2.67. K 5.1, HCO3 26. Alb 1.7, Ca 8.2. FENa 0.8, Charles 22.   ADAN neg, C3 18 low, C4 18 nl. CK 16. Urine eos neg.  ANCA and cryos pending.    Cont IVF, may simply have pre-renal azotemia. Low complement is worrisome for post-infectious GN associated with SBE. On antibiotics for SBE.   Monitor labs.   Add flomax for urinary sx. He may need Urology to see next week.             Interval History:   IV drug abuse  HIV  Hep C  SBE:  Off vanco, on Dapto.                 Review of Systems:   C/O urinary hesitancy and decreased stream.           Medications:       aspirin  81 mg Oral Daily     buprenorphine  8 mg Sublingual BID     carisoprodol  87.5 mg Oral BID     DAPTOmycin (CUBICIN) intermittent infusion  9 mg/kg Intravenous Q48H     emtricitabine  200 mg Oral Q3 Days     heparin ANTICOAGULANT  5,000 Units Subcutaneous Q8H     levomilnacipran  80 mg Oral Daily     nicotine  1 patch Transdermal QPM     nicotine   Transdermal Q8H     raltegravir  400 mg Oral BID     sodium chloride (PF)  3 mL Intravenous Q8H     tenofovir alafenamide fumarate  25 mg Oral Daily       sodium chloride 100 mL/hr at 21 0820     Current active medications and PTA medications reviewed, see medication list for details.            Physical Exam:   Vitals were reviewed  Patient Vitals for the past 24 hrs:   BP Temp Temp src Pulse Resp SpO2 Weight   21 0836 (!) 131/101 98.1  F (36.7  C) Oral 82 18 98 % --   21 0621 -- -- -- -- -- -- 69.5 kg (153 lb 3.2 oz)   21 0400 (!) 130/90 98  F (36.7  C) Oral 92 18 98 % --   21 2307 (!) 135/92 98  F (36.7  C) Oral 94 16 100 % --       Temp:  [98  F (36.7  C)-98.1  F (36.7  C)] 98.1  F (36.7  C)  Pulse:  [82-94] 82  Resp:  [16-18] 18  BP: (130-135)/() 131/101  SpO2:  [98 %-100 %] 98 %    Temperatures:  Current - Temp: 98.1  F (36.7  C); Max - Temp  Av  F (36.7  C)  Min: 98  F (36.7  C)  Max:  98.1  F (36.7  C)  Respiration range: Resp  Av.3  Min: 16  Max: 18  Pulse range: Pulse  Av.3  Min: 82  Max: 94  Blood pressure range: Systolic (24hrs), Av , Min:130 , Max:135   ; Diastolic (24hrs), Av, Min:90, Max:101    Pulse oximetry range: SpO2  Av.7 %  Min: 98 %  Max: 100 %    I/O last 3 completed shifts:  In: 2990 [P.O.:350; I.V.:2640]  Out: -       Intake/Output Summary (Last 24 hours) at 2021 1245  Last data filed at 2021 1200  Gross per 24 hour   Intake 2190 ml   Output --   Net 2190 ml       Alert and responsive  Lungs with clear BS  Cor RRR nl S1 S2 no M  LE no edema       Wt Readings from Last 4 Encounters:   21 69.5 kg (153 lb 3.2 oz)   21 59 kg (130 lb)   21 54.4 kg (120 lb)   21 56.7 kg (125 lb)          Data:          Lab Results   Component Value Date     2021     2021     2021     2021     2018     2018    Lab Results   Component Value Date    CHLORIDE 104 2021    CHLORIDE 105 2021    CHLORIDE 102 2021    CHLORIDE 100 2021    CHLORIDE 101 2018    CHLORIDE 104 2018    Lab Results   Component Value Date    BUN 40 2021    BUN 41 2021    BUN 33 2021    BUN 12 2021    BUN 16 2018    BUN 17 2018      Lab Results   Component Value Date    POTASSIUM 5.1 2021    POTASSIUM 5.8 2021    POTASSIUM 5.3 2021    POTASSIUM 3.6 2021    POTASSIUM 4.0 2018    POTASSIUM 4.1 2018    Lab Results   Component Value Date    CO2 26 2021    CO2 26 2021    CO2 28 2021    CO2 35 2021    CO2 34 2018    CO2 30 2018    Lab Results   Component Value Date    CR 2.67 2021    CR 2.80 2021    CR 2.70 2021    CR 0.67 2021    CR 0.81 2018    CR 0.74 05/10/2018        Recent Labs   Lab Test 21  0004 21  0557 21  0535    WBC 10.9 9.5 12.9*   HGB 8.9* 12.2* 11.0*   HCT 28.5* 37.2* 34.0*   MCV 88 83 84   * 245 270     Recent Labs   Lab Test 11/26/21  0005 11/16/21  0014 07/20/21  0128 05/09/21  1450 10/11/17  0745 10/09/17  2015 10/25/14  0955 10/24/14  1535   AST 16 38 80* 81*   < > 192*   < >  --    ALT 24 61 151* 114*   < > 147*   < >  --    ALKPHOS 75 103 117 97   < > 132   < >  --    BILITOTAL 0.2 0.4 0.4 0.2   < > 0.6   < >  --    XIN  --   --   --  31  --  29  --  15    < > = values in this interval not displayed.       Recent Labs   Lab Test 11/25/21  1323 05/09/21  1450 05/05/18  0940   MAG 3.3* 2.0 2.0     Recent Labs   Lab Test 11/25/21  1323 09/09/17  1859 04/20/17  1140   PHOS 4.7* 3.5 2.9     Recent Labs   Lab Test 11/26/21  0005 11/25/21  1323 11/24/21  0010   SANDRA 8.2* 8.5 8.8       Lab Results   Component Value Date    SANDRA 8.2 (L) 11/26/2021     Lab Results   Component Value Date    WBC 10.9 11/26/2021    HGB 8.9 (L) 11/26/2021    HCT 28.5 (L) 11/26/2021    MCV 88 11/26/2021     (H) 11/26/2021     Lab Results   Component Value Date     11/26/2021    POTASSIUM 5.1 11/26/2021    CHLORIDE 104 11/26/2021    CO2 26 11/26/2021     (H) 11/26/2021     Lab Results   Component Value Date    BUN 40 (H) 11/26/2021    CR 2.67 (H) 11/26/2021     Lab Results   Component Value Date    MAG 3.3 (H) 11/25/2021     Lab Results   Component Value Date    PHOS 4.7 (H) 11/25/2021       Creatinine   Date Value Ref Range Status   11/26/2021 2.67 (H) 0.66 - 1.25 mg/dL Final   11/25/2021 2.80 (H) 0.66 - 1.25 mg/dL Final   11/24/2021 2.70 (H) 0.66 - 1.25 mg/dL Final   11/24/2021 2.30 (H) 0.66 - 1.25 mg/dL Final   11/23/2021 1.78 (H) 0.66 - 1.25 mg/dL Final   11/22/2021 1.40 (H) 0.66 - 1.25 mg/dL Final   05/09/2021 0.67 0.66 - 1.25 mg/dL Final   05/13/2018 0.81 0.66 - 1.25 mg/dL Final   05/10/2018 0.74 0.66 - 1.25 mg/dL Final   05/09/2018 0.76 0.66 - 1.25 mg/dL Final   05/08/2018 0.73 0.66 - 1.25 mg/dL Final    05/07/2018 0.77 0.66 - 1.25 mg/dL Final       Attestation:  I have reviewed today's vital signs, notes, medications, labs and imaging.     Fly Alfaro MD

## 2021-11-27 NOTE — PLAN OF CARE
Alert and oriented X 4. Mood labile, pleasant and cooperative at times; intermittently uncooperative and angry. See previous note. Up Ind. ID following, IV Vanco changed to Daptomycin, antivirals changed to Emtriva, Vemlidy d/t renal function decline. Urology, WOC consulted for scrotal laceration, see notes. Tolerating diet, dad visited and brought pt food this afternoon. Tele SR-ST, VS otherwise stable on RA. Pt, father updated on POC. Continue to monitor.

## 2021-11-28 LAB
ANION GAP SERPL CALCULATED.3IONS-SCNC: 5 MMOL/L (ref 3–14)
BUN SERPL-MCNC: 44 MG/DL (ref 7–30)
CALCIUM SERPL-MCNC: 8.6 MG/DL (ref 8.5–10.1)
CHLORIDE BLD-SCNC: 107 MMOL/L (ref 94–109)
CO2 SERPL-SCNC: 25 MMOL/L (ref 20–32)
CREAT SERPL-MCNC: 2.36 MG/DL (ref 0.66–1.25)
GFR SERPL CREATININE-BSD FRML MDRD: 33 ML/MIN/1.73M2
GLUCOSE BLD-MCNC: 94 MG/DL (ref 70–99)
GLUCOSE BLDC GLUCOMTR-MCNC: 103 MG/DL (ref 70–99)
GLUCOSE BLDC GLUCOMTR-MCNC: 116 MG/DL (ref 70–99)
GLUCOSE BLDC GLUCOMTR-MCNC: 97 MG/DL (ref 70–99)
POTASSIUM BLD-SCNC: 5.2 MMOL/L (ref 3.4–5.3)
POTASSIUM BLD-SCNC: 5.9 MMOL/L (ref 3.4–5.3)
SODIUM SERPL-SCNC: 137 MMOL/L (ref 133–144)

## 2021-11-28 PROCEDURE — 80048 BASIC METABOLIC PNL TOTAL CA: CPT | Performed by: INTERNAL MEDICINE

## 2021-11-28 PROCEDURE — U0003 INFECTIOUS AGENT DETECTION BY NUCLEIC ACID (DNA OR RNA); SEVERE ACUTE RESPIRATORY SYNDROME CORONAVIRUS 2 (SARS-COV-2) (CORONAVIRUS DISEASE [COVID-19]), AMPLIFIED PROBE TECHNIQUE, MAKING USE OF HIGH THROUGHPUT TECHNOLOGIES AS DESCRIBED BY CMS-2020-01-R: HCPCS | Performed by: INTERNAL MEDICINE

## 2021-11-28 PROCEDURE — 250N000013 HC RX MED GY IP 250 OP 250 PS 637: Performed by: PSYCHIATRY & NEUROLOGY

## 2021-11-28 PROCEDURE — 36415 COLL VENOUS BLD VENIPUNCTURE: CPT | Performed by: INTERNAL MEDICINE

## 2021-11-28 PROCEDURE — 250N000013 HC RX MED GY IP 250 OP 250 PS 637: Performed by: HOSPITALIST

## 2021-11-28 PROCEDURE — 84132 ASSAY OF SERUM POTASSIUM: CPT | Performed by: INTERNAL MEDICINE

## 2021-11-28 PROCEDURE — 250N000013 HC RX MED GY IP 250 OP 250 PS 637: Performed by: STUDENT IN AN ORGANIZED HEALTH CARE EDUCATION/TRAINING PROGRAM

## 2021-11-28 PROCEDURE — 250N000013 HC RX MED GY IP 250 OP 250 PS 637: Performed by: INTERNAL MEDICINE

## 2021-11-28 PROCEDURE — 120N000001 HC R&B MED SURG/OB

## 2021-11-28 PROCEDURE — 250N000011 HC RX IP 250 OP 636: Performed by: INTERNAL MEDICINE

## 2021-11-28 PROCEDURE — 250N000013 HC RX MED GY IP 250 OP 250 PS 637

## 2021-11-28 PROCEDURE — 99232 SBSQ HOSP IP/OBS MODERATE 35: CPT | Performed by: INTERNAL MEDICINE

## 2021-11-28 PROCEDURE — 258N000003 HC RX IP 258 OP 636: Performed by: INTERNAL MEDICINE

## 2021-11-28 RX ORDER — BUPRENORPHINE 8 MG/1
8 TABLET SUBLINGUAL 3 TIMES DAILY
Status: DISCONTINUED | OUTPATIENT
Start: 2021-11-28 | End: 2021-11-30

## 2021-11-28 RX ORDER — DOCUSATE SODIUM 100 MG/1
100 CAPSULE, LIQUID FILLED ORAL 2 TIMES DAILY
Status: DISCONTINUED | OUTPATIENT
Start: 2021-11-28 | End: 2021-12-30 | Stop reason: HOSPADM

## 2021-11-28 RX ORDER — BISACODYL 10 MG
10 SUPPOSITORY, RECTAL RECTAL DAILY PRN
Status: DISCONTINUED | OUTPATIENT
Start: 2021-11-28 | End: 2021-11-28

## 2021-11-28 RX ORDER — SENNOSIDES 8.6 MG
2 TABLET ORAL 2 TIMES DAILY
Status: DISCONTINUED | OUTPATIENT
Start: 2021-11-28 | End: 2021-12-24

## 2021-11-28 RX ADMIN — LORAZEPAM 0.5 MG: 0.5 TABLET ORAL at 12:51

## 2021-11-28 RX ADMIN — ACETAMINOPHEN 650 MG: 325 TABLET, FILM COATED ORAL at 12:51

## 2021-11-28 RX ADMIN — Medication 87.5 MG: at 12:42

## 2021-11-28 RX ADMIN — BUPRENORPHINE HYDROCHLORIDE 8 MG: 8 TABLET SUBLINGUAL at 12:41

## 2021-11-28 RX ADMIN — Medication 87.5 MG: at 20:29

## 2021-11-28 RX ADMIN — LEVOMILNACIPRAN HYDROCHLORIDE 80 MG: 40 CAPSULE, EXTENDED RELEASE ORAL at 12:41

## 2021-11-28 RX ADMIN — NICOTINE 1 PATCH: 14 PATCH, EXTENDED RELEASE TRANSDERMAL at 22:49

## 2021-11-28 RX ADMIN — HEPARIN SODIUM 5000 UNITS: 5000 INJECTION, SOLUTION INTRAVENOUS; SUBCUTANEOUS at 01:40

## 2021-11-28 RX ADMIN — NICOTINE 1 PATCH: 14 PATCH, EXTENDED RELEASE TRANSDERMAL at 19:30

## 2021-11-28 RX ADMIN — SODIUM ZIRCONIUM CYCLOSILICATE 5 G: 5 POWDER, FOR SUSPENSION ORAL at 15:39

## 2021-11-28 RX ADMIN — ONDANSETRON 4 MG: 4 TABLET, ORALLY DISINTEGRATING ORAL at 20:35

## 2021-11-28 RX ADMIN — ASPIRIN 81 MG: 81 TABLET, COATED ORAL at 12:42

## 2021-11-28 RX ADMIN — LORAZEPAM 0.5 MG: 0.5 TABLET ORAL at 02:22

## 2021-11-28 RX ADMIN — SODIUM ZIRCONIUM CYCLOSILICATE 5 G: 5 POWDER, FOR SUSPENSION ORAL at 22:11

## 2021-11-28 RX ADMIN — HEPARIN SODIUM 5000 UNITS: 5000 INJECTION, SOLUTION INTRAVENOUS; SUBCUTANEOUS at 12:43

## 2021-11-28 RX ADMIN — SENNOSIDES 2 TABLET: 8.6 TABLET, FILM COATED ORAL at 19:29

## 2021-11-28 RX ADMIN — LORAZEPAM 0.5 MG: 0.5 TABLET ORAL at 19:33

## 2021-11-28 RX ADMIN — DOCUSATE SODIUM 100 MG: 100 CAPSULE, LIQUID FILLED ORAL at 19:28

## 2021-11-28 RX ADMIN — TAMSULOSIN HYDROCHLORIDE 0.4 MG: 0.4 CAPSULE ORAL at 12:41

## 2021-11-28 RX ADMIN — ONDANSETRON 4 MG: 2 INJECTION INTRAMUSCULAR; INTRAVENOUS at 04:56

## 2021-11-28 RX ADMIN — BUPRENORPHINE HYDROCHLORIDE 8 MG: 8 TABLET SUBLINGUAL at 22:11

## 2021-11-28 RX ADMIN — SODIUM CHLORIDE 500 ML: 9 INJECTION, SOLUTION INTRAVENOUS at 14:46

## 2021-11-28 RX ADMIN — ACETAMINOPHEN 650 MG: 325 TABLET, FILM COATED ORAL at 06:12

## 2021-11-28 ASSESSMENT — ACTIVITIES OF DAILY LIVING (ADL)
ADLS_ACUITY_SCORE: 7

## 2021-11-28 NOTE — PLAN OF CARE
SUMMARY:Tricuspid Valve Endocarditis/ MSSA/MRSA Bacteremia, Scrotal laceration.   DATE & TIME: 11/28/21, 5376-0928  Cognitive Concerns/ Orientation : Alert and Oriented x4, irritable this am refused VS, assessment and meds, pleasant this afternoon  BEHAVIOR & AGGRESSION TOOL COLOR: Green, can make his needs known  COWS SCORE: MD approved, discontinued on 11/24/21  ABNL VS/O2: VSS, room air.  MOBILITY: Independent  PAIN MANAGMENT: complained of headache, tylenol given  DIET: Renal diet (non-dialysis),  , no breakfast this shift, late lunch good appetite  BOWEL/BLADDER: Continent,  passing flatus, BS+,   ABNL LAB/BG: K+ 5.9 @1229   DRAIN/DEVICES: Lt PIV infusing  mL/hr.   TELEMETRY RHYTHM: NSR  SKIN: Pt has scrotal laceration, wound care refused   TESTS/PROCEDURES:   D/C DATE: Pending abx plan, will need a couple weeks of IV abx, ID following.   Discharge Barriers: CD evaluation, completion of ABX  OTHER IMPORTANT INFO: Contact ISO for MRSA. ID and Nephrology following.   Difficult Lab draw  CD consulted

## 2021-11-28 NOTE — PROGRESS NOTES
Paged about new hyperkalemia. Nephology following for JOSE RAMON and picture not entirely clear. Possible pre-renal vs post infectious GN. Cr is improved today however BUN elevated and K up to 6.1. No signs of acidosis.    Will initiate aggressive treatment via hyperkalemia protocol with insulin, dextrose, albuterol, kayexalate and 40 of lasix. Continue on IVF to avoid further aggravating possible pre-renal JOSE RAMON with dose of lasix. Repeat K in 4 hours. If remains elevated recommend nephrology to be called.    Rosaura Gibson DO

## 2021-11-28 NOTE — PROGRESS NOTES
[facility]    Hospitalist Progress Note    Assessment & Plan   Bc German is a 41 year old mal with PMHx of polysubstance abuse including IV drug use (including fentanyl and methamphetamine currently), chronic opioid dependence, HIV, hepatitis C, hx of MSSA sepsis with pulmonary valve endocarditis (2015), and 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 Was found to have recurrent MSSA/MRSA bacteremia and tricuspid valve endocarditis.     MSSA/MRSA bacteremia with tricuspid valve endocarditis  Severe tricuspid regurg dt endocarditis  Hx of pulmonary valve endocarditis (2015) dt MSSA   Hx of lumbar spine (L3) osteomyelitis with epidural abscess (requiring surgical intervention 2017)  * Presented to ED for evaluation of fevers/chills, cough and episode of syncope. Was febrile to 101.9 with associated tachycardia. WBC 13.1, lactate nl. CXR on admission showed nodular/patchy opacity at the R base.  * 2/2 blood cultures drawn on admission grew both MSSA and MRSA.   * ID following, abx consolidated to Vancomycin only on 11/18.  * Repeat blood cultures drawn 11/18 and 11/19 were negative  * EDUARDO done 11/18 notable for large tricuspid valve vegetation with severe regurgitation  * Seen by cardiology and CV surgery this stay -- recommended to continue medical management for now, will follow clinical progress, would consider surgical options if he clinically worsened  ID discontinued vancomycin  due to acute kidney injury and started him on daptomycin  Follow weekly CPK while on daptomycin  Holding some of his HIV meds due to JOSE RAMON per ID anti-HIV regimen per  ID      HIV  Hepatitis C  * Chronic and stable on Triumeq on hold currently per ID .   * CDC >200 so no need for PJP ppx per ID    JOSE RAMON on CKD   HYperkalemia   * No hx of CKD.    * Cr initially stable this stay, began trending up as of 11/22: Cr 1.4 --> 1.78 --> 2.3  -2.7-2.67 -2.38- 2.38 on 11/28  ADAN returned negative  * Renal US obtained 11/24 and negative for obstruction  -- will start some gentle IVFs today with NS @ 100ml/h  -- nephrology consulted and are following. See their note ATN  versus immunoglomerulonephritis per Nephrology   Potassium high at 6.1 treated with insulin and dextrose and lasix 40mg yesterday   POtassium at 5.9 today   Started on potassium binder LOKelma TID by Nephrology today        Intermittent Hyponatremia  Mild hyperkalemia at 5.8 on 11/25/2021  Given 1 dose of Kayexalate 15 g on 11/25/2021 and with that the potassium improved to 5.1  Na 128 on admission, normalized as of 11/18.   -- Na normal now at 137  -- encourage po intake, monitor BMP    History of opiate dependence  History of active IV drug use  Tobacco Use  Depression  * On admission, had stated he last used IV drugs 1 wk prior to admission. While in the ED on 11/16, RN had noted patient was briefly nonresponsive and apneic. Was given dose of Narcan with noted improvement.   * Observed to have multiple areas of skin excoriations from skin picking and IV drug use.   * Prior to admission, had been started on taper off Soma -- was taking 87.5mg (1/4 of a 350mg tab) BID but later conversations with PCP's clinic revealed no provider there was managing taper  -- cont buprenorphine BID this stay  -- conts on levomilnacipran ER as per prior to admission  -- cont carisoprodol 87.5mg BID as per prior to admission, unclear what further plan was for taper  -- cont lorazepam 0.5mg q6h prn for anxiety   -- cont nicotine patch  CHEM Depp consultation requested     Left scrotal laceration dt fall/possible syncope  Left scrotal wound infection  * Laceration sutured in ED. Suture removed 11/21.   * US scrotum earlier this stay showed a small R hydrocele but was otherwise normal.   Patient's scrotal laceration wound  looks infected today.  Urology and wound care consultations requested patient is on IV daptomycin which should cover for the scrotal infection as well  Urology recommended wound care consultation      COVID status: asymptomatic screening negative on 11/16, 11/20     FEN: started NS @ 100ml/h on 11/24, lytes stable, regular diet  DVT Prophylaxis: PCDs  Code Status: Full Code    Disposition: Discharge date unclear, several days still pending plan for abx and improvement in JOSE RAMON .    Marisol Ernst MD   Pager [phone]    Interval History      Patient's potassium is running high since yesterday. Started on potassium binder by Nephrology     -Data reviewed today: I reviewed all new labs and imaging results over the last 24 hours. I personally reviewed no images or EKG's today.    Physical Exam   Temp: 97.3  F (36.3  C) Temp src: Oral BP: (!) 151/95 Pulse: 76   Resp: 16 SpO2: 100 % O2 Device: None (Room air)    Vitals:    11/15/21 2141 11/27/21 0621   Weight: 56.2 kg (124 lb) 69.5 kg (153 lb 3.2 oz)     Vital Signs with Ranges  Temp:  [97  F (36.1  C)-98.1  F (36.7  C)] 97.3  F (36.3  C)  Pulse:  [76-93] 76  Resp:  [16] 16  BP: (129-154)/() 151/95  SpO2:  [96 %-100 %] 100 %  I/O last 3 completed shifts:  In: 2390 [P.O.:1190; I.V.:1200]  Out: 1950 [Urine:1950]    Constitutional: Resting comfortably, alert and conversing appropriately, NAD  Respiratory: CTAB, no wheeze/rales/rhonchi, no increased wok of breathing  Cardiovascular: HRRR, +murmur towards apex, no LE edema  GI: S, NT, ND, +BS  Skin/Integumen: warm/dry, scattered excoriations.  Left scrotal wound examined ordered wound care consult   Medications     dextrose 10% Stopped (11/28/21 0605)     sodium chloride 100 mL/hr at 11/27/21 1843       sodium chloride 0.9%  500 mL Intravenous Once     aspirin  81 mg Oral Daily     buprenorphine  8 mg Sublingual TID     carisoprodol  87.5 mg Oral BID     DAPTOmycin (CUBICIN) intermittent infusion  9 mg/kg Intravenous  Q48H     emtricitabine  200 mg Oral Q3 Days     levomilnacipran  80 mg Oral Daily     nicotine  1 patch Transdermal QPM     nicotine   Transdermal Q8H     raltegravir  400 mg Oral BID     sodium chloride (PF)  3 mL Intravenous Q8H     Sodium Zirconium Cyclosilicate  5 g Oral TID     tamsulosin  0.4 mg Oral Daily     tenofovir alafenamide fumarate  25 mg Oral Daily       Data   Recent Labs   Lab 11/28/21  1229 11/28/21  0215 11/28/21  0116 11/27/21  2343 11/27/21  2256 11/27/21  2138 11/27/21  1944 11/26/21  0005 11/26/21  0004   WBC  --   --   --   --   --   --  8.6  --  10.9   HGB  --   --   --   --   --   --  8.2*  --  8.9*   MCV  --   --   --   --   --   --  90  --  88   PLT  --   --   --   --   --   --  385  --  479*     --   --   --   --   --  138 137  --    POTASSIUM 5.9*  --   --   --  5.0  --  6.1* 5.1  --    CHLORIDE 107  --   --   --   --   --  107 104  --    CO2 25  --   --   --   --   --  25 26  --    BUN 44*  --   --   --   --   --  42* 40*  --    CR 2.36*  --   --   --   --   --  2.38* 2.67*  --    ANIONGAP 5  --   --   --   --   --  6 7  --    SANDRA 8.6  --   --   --   --   --  8.8 8.2*  --    GLC 94 97 116*   < >  --    < > 89 104*  --    ALBUMIN  --   --   --   --   --   --   --  1.7*  --    PROTTOTAL  --   --   --   --   --   --   --  6.9  --    BILITOTAL  --   --   --   --   --   --   --  0.2  --    ALKPHOS  --   --   --   --   --   --   --  75  --    ALT  --   --   --   --   --   --   --  24  --    AST  --   --   --   --   --   --   --  16  --     < > = values in this interval not displayed.       No results found for this or any previous visit (from the past 24 hour(s)).

## 2021-11-28 NOTE — PLAN OF CARE
MD Notification    Notified Person: MD    Notified Person Name: Dr. Gibson    Notification Date/Time:11/27/2021 @ 2039    Notification Interaction:paged    Purpose of Notification: K+ 6.1    Orders Received:no    Comments:

## 2021-11-28 NOTE — PLAN OF CARE
Neuro- A&O  Most Recent Vitals- Temp: 97  F (36.1  C) Temp src: Axillary BP: (!) 154/101 Pulse: 93   Resp: 16 SpO2: 96 % O2 Device: None (Room air)   Tele/Cardiac- NA  Resp- RA  Activity- SBA   Pain- resting most of the times denies pain   Drips- PIV  Drains/Tubes- NA  Skin- tattoos  GI/- WNL  Aggression Color- Green  COVID status- negative  Plan- TBD  Misc- PT A&O pain managed by PRN. Elevated K+.hyperkalemia protocol completed. Pt stated he wants to go out and smoke. He also requested his HIV meds to be revived since itt gives him GI discomfort. Pt poor oral intake. Iv hydration continue goo out out. Refused to change penial dressing plan to go home pending nursing will continue to monitor,    Jes Alfaro RN

## 2021-11-28 NOTE — PLAN OF CARE
MD Notification    Notified Person: MD    Notified Person Name: Salazar    Notification Date/Time:11/28/2021 @0511    Notification Interaction:paged    Purpose of Notification: Pt refused  lab/blood sugar checks    Orders Received: no    Comments: continue to monitor

## 2021-11-29 LAB
ANCA AB PATTERN SER IF-IMP: NORMAL
ANION GAP SERPL CALCULATED.3IONS-SCNC: 5 MMOL/L (ref 3–14)
BUN SERPL-MCNC: 46 MG/DL (ref 7–30)
C-ANCA TITR SER IF: NORMAL {TITER}
CALCIUM SERPL-MCNC: 8.4 MG/DL (ref 8.5–10.1)
CHLORIDE BLD-SCNC: 108 MMOL/L (ref 94–109)
CO2 SERPL-SCNC: 27 MMOL/L (ref 20–32)
CREAT SERPL-MCNC: 2.17 MG/DL (ref 0.66–1.25)
GFR SERPL CREATININE-BSD FRML MDRD: 37 ML/MIN/1.73M2
GLUCOSE BLD-MCNC: 103 MG/DL (ref 70–99)
HCV RNA SERPL NAA+PROBE-ACNC: ABNORMAL IU/ML
HCV RNA SERPL NAA+PROBE-LOG IU: 5.5 {LOG_IU}/ML
POTASSIUM BLD-SCNC: 5.3 MMOL/L (ref 3.4–5.3)
SARS-COV-2 RNA RESP QL NAA+PROBE: NEGATIVE
SODIUM SERPL-SCNC: 140 MMOL/L (ref 133–144)

## 2021-11-29 PROCEDURE — 250N000011 HC RX IP 250 OP 636: Performed by: SPECIALIST

## 2021-11-29 PROCEDURE — 99232 SBSQ HOSP IP/OBS MODERATE 35: CPT | Performed by: INTERNAL MEDICINE

## 2021-11-29 PROCEDURE — 250N000013 HC RX MED GY IP 250 OP 250 PS 637: Performed by: INTERNAL MEDICINE

## 2021-11-29 PROCEDURE — 250N000013 HC RX MED GY IP 250 OP 250 PS 637: Performed by: SPECIALIST

## 2021-11-29 PROCEDURE — H0001 ALCOHOL AND/OR DRUG ASSESS: HCPCS

## 2021-11-29 PROCEDURE — 250N000013 HC RX MED GY IP 250 OP 250 PS 637: Performed by: HOSPITALIST

## 2021-11-29 PROCEDURE — 250N000013 HC RX MED GY IP 250 OP 250 PS 637: Performed by: PSYCHIATRY & NEUROLOGY

## 2021-11-29 PROCEDURE — 250N000013 HC RX MED GY IP 250 OP 250 PS 637

## 2021-11-29 PROCEDURE — 80048 BASIC METABOLIC PNL TOTAL CA: CPT | Performed by: INTERNAL MEDICINE

## 2021-11-29 PROCEDURE — 250N000011 HC RX IP 250 OP 636: Performed by: INTERNAL MEDICINE

## 2021-11-29 PROCEDURE — 999N000040 HC STATISTIC CONSULT NO CHARGE VASC ACCESS

## 2021-11-29 PROCEDURE — 36415 COLL VENOUS BLD VENIPUNCTURE: CPT | Performed by: INTERNAL MEDICINE

## 2021-11-29 PROCEDURE — 250N000013 HC RX MED GY IP 250 OP 250 PS 637: Performed by: STUDENT IN AN ORGANIZED HEALTH CARE EDUCATION/TRAINING PROGRAM

## 2021-11-29 PROCEDURE — 36569 INSJ PICC 5 YR+ W/O IMAGING: CPT

## 2021-11-29 PROCEDURE — 99233 SBSQ HOSP IP/OBS HIGH 50: CPT | Performed by: INTERNAL MEDICINE

## 2021-11-29 PROCEDURE — 120N000001 HC R&B MED SURG/OB

## 2021-11-29 PROCEDURE — 258N000003 HC RX IP 258 OP 636: Performed by: SPECIALIST

## 2021-11-29 PROCEDURE — 250N000009 HC RX 250: Performed by: STUDENT IN AN ORGANIZED HEALTH CARE EDUCATION/TRAINING PROGRAM

## 2021-11-29 PROCEDURE — 272N000433 HC KIT CATH IV 18 OR 20G CM, POWERGLIDE W MAX BARRIER

## 2021-11-29 RX ORDER — POLYETHYLENE GLYCOL 3350 17 G/17G
17 POWDER, FOR SOLUTION ORAL 2 TIMES DAILY
Status: DISCONTINUED | OUTPATIENT
Start: 2021-11-29 | End: 2021-12-24

## 2021-11-29 RX ADMIN — SENNOSIDES 2 TABLET: 8.6 TABLET, FILM COATED ORAL at 08:23

## 2021-11-29 RX ADMIN — DAPTOMYCIN 650 MG: 500 INJECTION, POWDER, LYOPHILIZED, FOR SOLUTION INTRAVENOUS at 15:31

## 2021-11-29 RX ADMIN — MAGNESIUM HYDROXIDE 30 ML: 400 SUSPENSION ORAL at 15:31

## 2021-11-29 RX ADMIN — LIDOCAINE HYDROCHLORIDE 1 ML: 10 INJECTION, SOLUTION INFILTRATION; PERINEURAL at 12:24

## 2021-11-29 RX ADMIN — SENNOSIDES 2 TABLET: 8.6 TABLET, FILM COATED ORAL at 21:57

## 2021-11-29 RX ADMIN — TAMSULOSIN HYDROCHLORIDE 0.4 MG: 0.4 CAPSULE ORAL at 08:23

## 2021-11-29 RX ADMIN — NICOTINE 1 PATCH: 14 PATCH, EXTENDED RELEASE TRANSDERMAL at 20:24

## 2021-11-29 RX ADMIN — LEVOMILNACIPRAN HYDROCHLORIDE 40 MG: 40 CAPSULE, EXTENDED RELEASE ORAL at 08:23

## 2021-11-29 RX ADMIN — DOCUSATE SODIUM 100 MG: 100 CAPSULE, LIQUID FILLED ORAL at 08:23

## 2021-11-29 RX ADMIN — LORAZEPAM 0.5 MG: 0.5 TABLET ORAL at 23:31

## 2021-11-29 RX ADMIN — BUPRENORPHINE HYDROCHLORIDE 8 MG: 8 TABLET SUBLINGUAL at 08:22

## 2021-11-29 RX ADMIN — DOCUSATE SODIUM 100 MG: 100 CAPSULE, LIQUID FILLED ORAL at 21:57

## 2021-11-29 RX ADMIN — Medication 87.5 MG: at 21:57

## 2021-11-29 RX ADMIN — BUPRENORPHINE HYDROCHLORIDE 8 MG: 8 TABLET SUBLINGUAL at 15:31

## 2021-11-29 RX ADMIN — LORAZEPAM 0.5 MG: 0.5 TABLET ORAL at 08:23

## 2021-11-29 RX ADMIN — Medication 87.5 MG: at 08:38

## 2021-11-29 RX ADMIN — ASPIRIN 81 MG: 81 TABLET, COATED ORAL at 08:22

## 2021-11-29 RX ADMIN — ONDANSETRON 4 MG: 4 TABLET, ORALLY DISINTEGRATING ORAL at 08:23

## 2021-11-29 RX ADMIN — LORAZEPAM 0.5 MG: 0.5 TABLET ORAL at 16:48

## 2021-11-29 ASSESSMENT — MIFFLIN-ST. JEOR
SCORE: 1686.92
SCORE: 1653

## 2021-11-29 ASSESSMENT — ACTIVITIES OF DAILY LIVING (ADL)
ADLS_ACUITY_SCORE: 7
ADLS_ACUITY_SCORE: 5
ADLS_ACUITY_SCORE: 7
ADLS_ACUITY_SCORE: 7
ADLS_ACUITY_SCORE: 5
ADLS_ACUITY_SCORE: 7
ADLS_ACUITY_SCORE: 5
ADLS_ACUITY_SCORE: 7
ADLS_ACUITY_SCORE: 5
ADLS_ACUITY_SCORE: 7

## 2021-11-29 NOTE — PLAN OF CARE
SUMMARY:Tricuspid Valve Endocarditis/ MSSA/MRSA Bacteremia, Scrotal laceration.   DATE & TIME: 11/28/21, 6924-6967  Cognitive Concerns/ Orientation : Alert and Oriented x4,  VSS on RA  BEHAVIOR & AGGRESSION TOOL COLOR: Green  COWS SCORE:  MD approved, discontinued on 11/24/21  ABNL VS/O2: VSS, room air.  MOBILITY: Independent  PAIN MANAGMENT: denies pain  DIET: Renal diet (non-dialysis), good appetite  BOWEL/BLADDER: Continent, passing flatus, BS+,   ABNL LAB/BG: K+ 5.9 , HGB 8.2, Cr 2.3  DRAIN/DEVICES: Lt PIV infusing  mL/hr.   TELEMETRY RHYTHM: NSR  SKIN: Pt has scrotal laceration, ordered wound care performed  TESTS/PROCEDURES: K lab to be drawn @1930 unsuccessful - lab coming for second attempt at approx. 2230  D/C DATE: Pending abx plan, will need a couple weeks of IV abx, ID following.   Discharge Barriers: CD evaluation, completion of ABX  OTHER IMPORTANT INFO: Contact ISO for MRSA. ID and Nephrology following.   Difficult Lab draw  CD consulted. Pt refused HIV mx treatment.

## 2021-11-29 NOTE — CONSULTS
11/29/2021    Pt has been interviewed via polycom and CD Consult has been completed. Unit SW will follow up with TCU referrals. Pt can be referred to CD treatment once he is off IV antibiotics and medically stable.     Recommendations:      1. Abstain from all non-prescribed mood-altering substances  2. Take all medications as prescribed  3. Enter and complete a residential or inpatient treatment program  4. Follow all recommendations upon discharge from treatment. Recommendations may include, but are not limited to: extended treatment, outpatient treatment and/or sober housing.  5. Increase sober support-[facility]  6. Consider therapy on a regular basis.         7.   Follow all recommendations of your medical providers        Clinical Substantiation:       Pt meets criteria for Opiate use disorder and Stimulant related disorder. Pt reports he does not want to use substances anymore. Pt reports he would like a long term CD program. Pt has been using heroin and meth for most of his adult life. Per EHR review and pt report, pt will need IV antibiotics for at least a couple more weeks and will need a TCU. If pt needs to attend a TCU pt is recommended to attend a TCU with CD treatment. After his TCU discharge, pt is recommended to attend long term CD treatment and MAT.     Referrals/ Alternatives:        Barbra at [address] [address]  Phone:  [phone]  Fax:  [phone]     [facility] [address]           Phone: [phone]        [address] [address]  24-hour phone line: [phone]  Fax: [phone]     [facility]   Main: [phone]  Referral/Intake Line: [phone]  Fax: [phone]     Supportive Services   [facility]  [email or web address]        KENA consult completed by: KEVIN Chavarria  Phone Number: [phone]  E-mail  Address: [email or web address] Mental Health and Addiction Services Evaluation Department  [address] [place] [phone]

## 2021-11-29 NOTE — PROGRESS NOTES
[facility]    Hospitalist Progress Note    Assessment & Plan   Bc German is a 41 year old mal with PMHx of polysubstance abuse including IV drug use (including fentanyl and methamphetamine currently), chronic opioid dependence, HIV, hepatitis C, hx of MSSA sepsis with pulmonary valve endocarditis (2015), and 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 Was found to have recurrent MSSA/MRSA bacteremia and tricuspid valve endocarditis.     MSSA/MRSA bacteremia with tricuspid valve endocarditis  Severe tricuspid regurg dt endocarditis  Hx of pulmonary valve endocarditis (2015) dt MSSA   Hx of lumbar spine (L3) osteomyelitis with epidural abscess (requiring surgical intervention 2017)  * Presented to ED for evaluation of fevers/chills, cough and episode of syncope. Was febrile to 101.9 with associated tachycardia. WBC 13.1, lactate nl. CXR on admission showed nodular/patchy opacity at the R base.  * 2/2 blood cultures drawn on admission grew both MSSA and MRSA.   * ID following, abx consolidated to Vancomycin only on 11/18.  * Repeat blood cultures drawn 11/18 and 11/19 were negative  * EDUARDO done 11/18 notable for large tricuspid valve vegetation with severe regurgitation  * Seen by cardiology and CV surgery this stay -- recommended to continue medical management for now, will follow clinical progress, would consider surgical options if he clinically worsened  ID discontinued vancomycin  due to acute kidney injury and started him on daptomycin  Follow weekly CPK while on daptomycin  Holding some of his HIV meds due to JOSE RAMON per ID anti-HIV regimen per  ID  Pt to have repeat EDUARDO per ID on Thursday 12/2/21       HIV  Hepatitis C  * Chronic and stable on Triumeq on hold currently per ID .   * CDC >200 so no need for PJP ppx per  ID    JOSE RAMON on CKD   HYperkalemia   * No hx of CKD.   * Cr initially stable this stay, began trending up as of 11/22: Cr 1.4 --> 1.78 --> 2.3  -2.7-2.67 -2.38- 2.38 on 11/28  ADAN returned negative  * Renal US obtained 11/24 and negative for obstruction  -- will start some gentle IVFs today with NS @ 100ml/h  -- nephrology consulted and are following. See their note ATN  versus immunoglomerulonephritis per Nephrology   Potassium high at 6.1 treated with insulin and dextrose and lasix 40mg yesterday   POtassium at 5.9 today   Started on potassium binder LOKelma TID by Nephrology today    Today S BMP pending       Intermittent Hyponatremia  Mild hyperkalemia at 5.8 on 11/25/2021  Given 1 dose of Kayexalate 15 g on 11/25/2021 and with that the potassium improved to 5.1  Na 128 on admission, normalized as of 11/18.   -- Na normal now at 137  -- encourage po intake, monitor BMP    History of opiate dependence  History of active IV drug use  Tobacco Use  Depression  * On admission, had stated he last used IV drugs 1 wk prior to admission. While in the ED on 11/16, RN had noted patient was briefly nonresponsive and apneic. Was given dose of Narcan with noted improvement.   * Observed to have multiple areas of skin excoriations from skin picking and IV drug use.   * Prior to admission, had been started on taper off Soma -- was taking 87.5mg (1/4 of a 350mg tab) BID but later conversations with PCP's clinic revealed no provider there was managing taper  -- cont buprenorphine BID this stay  -- conts on levomilnacipran ER as per prior to admission  -- cont carisoprodol 87.5mg BID as per prior to admission, unclear what further plan was for taper  -- cont lorazepam 0.5mg q6h prn for anxiety   -- cont nicotine patch  CHEM Depp consultation requested and recommended inpatient treatment after completion of antibiotics      Left scrotal laceration dt fall/possible syncope  Left scrotal wound infection  * Laceration sutured in ED.  Suture removed 11/21.   * US scrotum earlier this stay showed a small R hydrocele but was otherwise normal.   Patient's scrotal laceration wound looks infected today.  Urology and wound care consultations requested patient is on IV daptomycin which should cover for the scrotal infection as well  Urology recommended wound care consultation      COVID status: asymptomatic screening negative on 11/16, 11/20     FEN: started NS @ 100ml/h on 11/24, lytes stable, regular diet  DVT Prophylaxis: PCDs  Code Status: Full Code    Disposition: Discharge date unclear, several days still pending plan for abx and improvement in JOSE RAMON .    Marisol Ernst MD   Pager [phone]    Interval History    C/o constipation started on miralax. Seen by chem dep. ID following. Needs repeat EDUARDO on Thursday      -Data reviewed today: I reviewed all new labs and imaging results over the last 24 hours. I personally reviewed no images or EKG's today.    Physical Exam   Temp: 97.1  F (36.2  C) Temp src: Oral BP: (!) 131/92 Pulse: 87   Resp: 16 SpO2: 97 % O2 Device: None (Room air)    Vitals:    11/15/21 2141 11/27/21 0621 11/29/21 0554   Weight: 56.2 kg (124 lb) 69.5 kg (153 lb 3.2 oz) 70 kg (154 lb 5.2 oz)     Vital Signs with Ranges  Temp:  [97.1  F (36.2  C)-98  F (36.7  C)] 97.1  F (36.2  C)  Pulse:  [79-97] 87  Resp:  [16] 16  BP: (120-143)/() 131/92  SpO2:  [97 %-99 %] 97 %  No intake/output data recorded.    Constitutional: Resting comfortably, alert and conversing appropriately, NAD  Respiratory: CTAB, no wheeze/rales/rhonchi, no increased wok of breathing  Cardiovascular: HRRR, +murmur towards apex, no LE edema  GI: S, NT, ND, +BS  Skin/Integumen: warm/dry, scattered excoriations.  Left scrotal wound examined ordered wound care consult   Medications     dextrose 10% Stopped (11/28/21 0605)     sodium chloride 100 mL/hr at 11/27/21 1843       aspirin  81 mg Oral Daily     buprenorphine  8 mg Sublingual TID     carisoprodol  87.5 mg  Oral BID     DAPTOmycin (CUBICIN) intermittent infusion  9 mg/kg Intravenous Q48H     DAPTOmycin (CUBICIN) intermittent infusion  9 mg/kg Intravenous Q24H     docusate sodium  100 mg Oral BID     levomilnacipran  80 mg Oral Daily     nicotine  1 patch Transdermal QPM     nicotine   Transdermal Q8H     polyethylene glycol  17 g Oral BID     sennosides  2 tablet Oral BID     sodium chloride (PF)  10 mL Intracatheter Q8H     sodium chloride (PF)  3 mL Intravenous Q8H     Sodium Zirconium Cyclosilicate  5 g Oral TID     tamsulosin  0.4 mg Oral Daily       Data   Recent Labs   Lab 11/29/21  1245 11/28/21  2150 11/28/21  1229 11/28/21  0215 11/27/21  2138 11/27/21  1944 11/26/21  0005 11/26/21  0004   WBC  --   --   --   --   --  8.6  --  10.9   HGB  --   --   --   --   --  8.2*  --  8.9*   MCV  --   --   --   --   --  90  --  88   PLT  --   --   --   --   --  385  --  479*     --  137  --   --  138 137  --    POTASSIUM 5.3 5.2 5.9*  --    < > 6.1* 5.1  --    CHLORIDE 108  --  107  --   --  107 104  --    CO2 27  --  25  --   --  25 26  --    BUN 46*  --  44*  --   --  42* 40*  --    CR 2.17*  --  2.36*  --   --  2.38* 2.67*  --    ANIONGAP 5  --  5  --   --  6 7  --    SANDRA 8.4*  --  8.6  --   --  8.8 8.2*  --    *  --  94 97   < > 89 104*  --    ALBUMIN  --   --   --   --   --   --  1.7*  --    PROTTOTAL  --   --   --   --   --   --  6.9  --    BILITOTAL  --   --   --   --   --   --  0.2  --    ALKPHOS  --   --   --   --   --   --  75  --    ALT  --   --   --   --   --   --  24  --    AST  --   --   --   --   --   --  16  --     < > = values in this interval not displayed.       No results found for this or any previous visit (from the past 24 hour(s)).

## 2021-11-29 NOTE — PROGRESS NOTES
Nephrology Progress Note  11/29/2021         Assessment & Recommendations:   41-year-old male with hx of  drug abuse, HIV and hepatitis C- admitted with Staph bacteremia, both MSSA and MRSA, with SBE on the tricuspid valve.  He has normal baseline kidney function and now has acute kidney injury    1 JOSE RAMON - normal baseline renal function.   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 UA is not too impressive with only trace blood and trace protein   ANCA neg  Possibly multifactorial ATN - sepsis, Abx ( Vanco)     Cr reached a plateau and now improving.     2. Hyperkalemia - with high K diet and JOSE RAMON. Advised on low K diet. Okay to hold Lokelma.   - on renal diet but getting fast food and high K drinks from outside     3 Infective endocarditis - on Dapto     4 ANemia - worsening anemia . No s/o bleeding. Consider hemolysis labs.     Recommendations were communicated to primary team    Padilla Martinez MD  [facility] - Nephrology   [phone]      Interval History :   Seen / examined.   No acute issues overnight.   Cr trending down . Good UOP       Review of Systems:   A 4 point review of systems was negative except as noted above.  Notably: fair appetite. no nausea or vomiting or diarrhea.  no confusion,  no fever or chills    Physical Exam:   No intake/output data recorded.    GENERAL APPEARANCE: alert and no distress  EYES:  no scleral icterus, pupils equal  PULM: lungs clear to auscultation, equal air movement, no cyanosis or clubbing  CV: regular rhythm, normal rate, no rub     -JVP -     -edema +   GI: soft, non tender,   NEURO: mentation intact and speech normal, no asterixis   Skin - no purpura / rash     Labs:   All labs reviewed by me  Electrolytes/Renal - Recent Labs   Lab Test 11/29/21  1245 11/28/21  2150 11/28/21  1229 11/28/21  0215 11/27/21  2138 11/27/21  1944  11/26/21  0005 11/25/21  1323 07/20/21  0128 05/09/21  1450 05/05/18  1425 05/05/18  0940 09/10/17  1946 09/09/17  1859 07/24/17  1154 04/20/17  1140     --  137  --   --  138   < > 138   < > 137   < > 142   < > 144   < > 137   POTASSIUM 5.3 5.2 5.9*  --    < > 6.1*   < > 5.8*   < > 3.6   < > 3.9   < > 4.0   < > 3.8   CHLORIDE 108  --  107  --   --  107   < > 105   < > 100   < > 109   < > 107   < > 101   CO2 27  --  25  --   --  25   < > 26   < > 35*   < > 29   < > 27   < > 30   BUN 46*  --  44*  --   --  42*   < > 41*   < > 12   < > 9   < > 18   < > 15   CR 2.17*  --  2.36*  --   --  2.38*   < > 2.80*   < > 0.67   < > 0.67   < > 0.93   < > 0.91   *  --  94 97   < > 89   < > 85   < > 99   < > 124*   < > 95   < > 98   SANDRA 8.4*  --  8.6  --   --  8.8   < > 8.5   < > 9.4   < > 8.7   < > 9.7   < > 9.7   MAG  --   --   --   --   --   --   --  3.3*  --  2.0  --  2.0   < > 2.2  --  2.2   PHOS  --   --   --   --   --   --   --  4.7*  --   --   --   --   --  3.5  --  2.9    < > = values in this interval not displayed.       CBC -   Recent Labs   Lab Test 11/27/21 1944 11/26/21  0004 11/19/21  0557   WBC 8.6 10.9 9.5   HGB 8.2* 8.9* 12.2*    479* 245       LFTs -   Recent Labs   Lab Test 11/26/21  0005 11/16/21  0014 07/20/21  0128   ALKPHOS 75 103 117   BILITOTAL 0.2 0.4 0.4   ALT 24 61 151*   AST 16 38 80*   PROTTOTAL 6.9 7.3 9.2*   ALBUMIN 1.7* 2.7* 3.7       Iron Panel -   Recent Labs   Lab Test 03/08/15  1235 10/24/14  1535   IRON 16* 56   IRONSAT 9* 31   MARILEE 295 250           Current Medications:    aspirin  81 mg Oral Daily     buprenorphine  8 mg Sublingual TID     carisoprodol  87.5 mg Oral BID     DAPTOmycin (CUBICIN) intermittent infusion  9 mg/kg Intravenous Q48H     DAPTOmycin (CUBICIN) intermittent infusion  9 mg/kg Intravenous Q24H     docusate sodium  100 mg Oral BID     levomilnacipran  80 mg Oral Daily     magnesium hydroxide  30 mL Oral Once     nicotine  1 patch Transdermal QPM      nicotine   Transdermal Q8H     polyethylene glycol  17 g Oral BID     sennosides  2 tablet Oral BID     sodium chloride (PF)  10 mL Intracatheter Q8H     sodium chloride (PF)  3 mL Intravenous Q8H     Sodium Zirconium Cyclosilicate  5 g Oral TID     tamsulosin  0.4 mg Oral Daily       dextrose 10% Stopped (11/28/21 0605)     sodium chloride 100 mL/hr at 11/27/21 1847     Padilla Martinez MD
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
## 2021-11-29 NOTE — PLAN OF CARE
SUMMARY:Tricuspid Valve Endocarditis/ MSSA/MRSA Bacteremia, Scrotal laceration.   DATE & TIME: 11/28/21, 4197-5732  Cognitive Concerns/ Orientation : Alert and Oriented x4,  VSS on RA  BEHAVIOR & AGGRESSION TOOL COLOR: Green  COWS SCORE:  MD approved, discontinued on 11/24/21  ABNL VS/O2:  VSS RA  MOBILITY: Independent  PAIN MANAGMENT: Denies pain  DIET: Renal diet (non-dialysis), good appetite  BOWEL/BLADDER: Continent, passing flatus, BS+,   ABNL LAB/BG: K+ 5.2 , HGB 8.2  DRAIN/DEVICES: Lt PIV was infusing  mL/hr, pt wants IVF  to be stopped for tonight until he is awake, unable to flash it this morning, he is mad and c/o has been poked many times and wants PICC to be placed instead of trying another PIV  TELEMETRY RHYTHM: NSR  SKIN: Pt has scrotal laceration, ordered wound care performed last shift  TESTS/PROCEDURES:   D/C DATE: Pending abx plan, will need a couple weeks of IV abx, ID following.   Discharge Barriers: CD evaluation, completion of ABX  OTHER IMPORTANT INFO: Contact [facility] for MRSA. ID and Nephrology following.   Difficult Lab draw  CD consulted

## 2021-11-29 NOTE — PROGRESS NOTES
CLINICAL NUTRITION SERVICES - REASSESSMENT NOTE      Malnutrition: (11/22)   % Weight Loss:  None noted  % Intake:  <75% for > 7 days (moderate malnutrition)  Subcutaneous Fat Loss:  Baseline  Muscle Loss:  Baseline   Fluid Retention:  None noted     Malnutrition Diagnosis: Patient does not meet two of the above criteria necessary for diagnosing malnutrition - at risk          EVALUATION OF PROGRESS TOWARD GOALS   Diet:  Renal (11/25)  Ensure TID with meals    Intake/Tolerance:    Patient unavailable to visit with this morning and no answer to bedside phone this afternoon  Chart reviewed, patient has been consuming % of 2 meals/day   Unable to determine intake of Ensure supplements    Labs reviewed: K 5.3 (NL), BUN 46 (H), Cr 2.17 (H)      NEW FINDINGS:   Chart reviewed   Repeat EDUARDO planned 12/2   Chem dep following for recommendations   Miralax started     Previous Goals:   Intake of >50% meals and supplements.   Evaluation: Likely Met    Previous Nutrition Diagnosis:   Inadequate oral intake related to poor appetite w/ pain as evidenced by pt reports poor appetite, tolerance of % of smaller meals for the past 3 days, only one meal ordered from 11/15-11/18.   Evaluation: Improving      CURRENT NUTRITION DIAGNOSIS  Predicted inadequate nutrient intake (protein/calorie) related to variable appetite and restricted diet order with intakes ranging from %     INTERVENTIONS  Recommendations / Nutrition Prescription  Continue diet and supplements as ordered     Implementation  None today     Goals  Patient will consume >50% meals and supplements      MONITORING AND EVALUATION:  Progress towards goals will be monitored and evaluated per protocol and Practice Guidelines      Adrienne Sanchez RD, LD  Clinical Dietitian   Unit pager [phone]

## 2021-11-29 NOTE — PROGRESS NOTES
"[facility]    Infectious Disease Progress Note    Date of Service : 11/29/2021     Assessment:  41 year old male with polysubstance use/IVDA, HIV-Hep C co-infection and prior hx of recurrent MSSA sepsis with pulmonic valve endocarditis, spinal discitis/osteomyelitis with epidural abscess requiring surgical debridement, who is now admitted with high grade MSSA/MRSA bacteremia with tricuspid valve endocarditis with two large [id number] x 8mm and 13 x 5mm. Blood cxs are negative since 11/18 and no surgical plans at this time . He has had medication non compliance with ART with detectable VL, CD4 >200 at this time not requiring PCP prophylaxis.      1. MSSA/MRSA sepsis with tricuspid valve endocarditis - blood cxs cleared 11/18  2. 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 Nephrology following   3. Scrotal laceration without signs of infection.  4. Prior history of MSSA sepsis with pulmonary valve endocarditis in 2015 and spinal discitis/osteomyelitis with epidural abscess requiring surgical intervention in 2017.  5. Substance abuse with active use of methamphetamine / fentanyl currently  6.  HIV/Hepatitis C co-infection. Last HIV VL detectable at 29,522 copies/ml and  CD4 625(28%) on  06/09/2021 , on Triumeq  7.  Hyperkalemia , ? Due to Toby  ? Daptomycin contributing?    Recommendations  1. ART changed to Raltegravir/Emtricitabine/Tenofovir which patient did not tolerate (nausea/vomiting) and wishes to discontinue  2. Resume Triumeq once renal functions improve  3. Continue Daptomycin  4. Repeat EDUARDO on Thursday  5. Midline catheter since he has lost his IV access  6. Follow clinical status and labs      Alana Cooper MD    Interval History   Resting, reports he was \"deathly sick\" with new Art regimen and refuses to continue. Otherwise feels ok    Antimicrobial therapy  11/26 Daptomycin  11/18-11/25 Vancomycin    Physical Exam   Temp: 97.7  F (36.5  C) Temp src: Oral " BP: (!) 143/103 Pulse: 79   Resp: 16 SpO2: 98 % O2 Device: None (Room air)    Vitals:    11/15/21 2141 11/27/21 0621 11/29/21 0554   Weight: 56.2 kg (124 lb) 69.5 kg (153 lb 3.2 oz) 70 kg (154 lb 5.2 oz)     Vital Signs with Ranges  Temp:  [97.3  F (36.3  C)-98  F (36.7  C)] 97.7  F (36.5  C)  Pulse:  [76-97] 79  Resp:  [16] 16  BP: (120-151)/() 143/103  SpO2:  [98 %-100 %] 98 %    Constitutional: Awake, alert, cooperative, no apparent distress  Lungs: Clear to auscultation bilaterally, no crackles or wheezing  Cardiovascular: Regular rate and rhythm, systolic murmur  Abdomen: Normal bowel sounds, soft, non-distended, non-tender  Skin: : multiple scars, IV racks, excoriations on hands,    : Left scrotal laceration site appears clean, no sign of infection    Other:    Medications     dextrose 10% Stopped (11/28/21 0605)     sodium chloride 100 mL/hr at 11/27/21 1843       aspirin  81 mg Oral Daily     buprenorphine  8 mg Sublingual TID     carisoprodol  87.5 mg Oral BID     DAPTOmycin (CUBICIN) intermittent infusion  9 mg/kg Intravenous Q48H     docusate sodium  100 mg Oral BID     emtricitabine  200 mg Oral Q3 Days     levomilnacipran  80 mg Oral Daily     nicotine  1 patch Transdermal QPM     nicotine   Transdermal Q8H     raltegravir  400 mg Oral BID     sennosides  2 tablet Oral BID     sodium chloride (PF)  3 mL Intravenous Q8H     Sodium Zirconium Cyclosilicate  5 g Oral TID     tamsulosin  0.4 mg Oral Daily     tenofovir alafenamide fumarate  25 mg Oral Daily       Data   All microbiology laboratory data reviewed.  Recent Labs   Lab Test 11/27/21 1944 11/26/21  0004 11/19/21  0557   WBC 8.6 10.9 9.5   HGB 8.2* 8.9* 12.2*   HCT 27.1* 28.5* 37.2*   MCV 90 88 83    479* 245     Recent Labs   Lab Test 11/28/21  1229 11/27/21 1944 11/26/21  0005   CR 2.36* 2.38* 2.67*     Recent Labs   Lab Test 11/16/21  0014   SED 39*     Component      Latest Ref Rng & Units 11/16/2021   CD3 Mature T      49 - 84  % 67   Absolute CD3      603-2,990 cells/uL 374 (L)   CD4 Metz T      28 - 63 % 37   Absolute CD4      441-2,156 cells/uL 206 (L)   CD8 Suppressor T      10 - 40 % 27   Absolute CD8      125-1,312 cells/uL 151   CD4:CD8 Ratio      1.40 - 2.60 1.37 (L)   HIV-1 RNA Copies/mL, Instrument      <1 copies/mL 985 (H)   HIV RNA QT Log       3.0     Microbiology  11/14 & 4/16 blood cxs both sets MSSA & MRSA, blood cxs 11/18, 11/19 blood cxs negative  Culture Positive on the 1st day of incubation Abnormal         Staphylococcus aureus Panic     2 of 2 bottles   Staphylococcus aureus MRSA Panic     2 of 2 bottles         Resulting Agency: [facility]         Susceptibility                       Staphylococcus aureus Staphylococcus aureus MRSA       PURNIMA PURNIMA       Clindamycin <=0.25 ug/mL Susceptible <=0.25 ug/mL Susceptible 1       Erythromycin <=0.25 ug/mL Susceptible >=8.0 ug/mL Resistant       Gentamicin <=0.5 ug/mL Susceptible <=0.5 ug/mL Susceptible       Linezolid     2.0 ug/mL Susceptible       Oxacillin <=0.25 ug/mL Susceptible >=4.0 ug/mL Resistant       Tetracycline <=1.0 ug/mL Susceptible <=1.0 ug/mL Susceptible       Trimethoprim/Sulfamethoxazole <=0.5/9.5 u... Susceptible <=0.5/9.5 u... Susceptible       Vancomycin <=0.5 ug/mL Susceptible <=0.5 ug/mL Susceptible                    Imaging  11/24 US kidneys  ULTRASOUND RENAL COMPLETE 11/24/2021 12:53 PM     CLINICAL HISTORY: MSSA sepsis, endocarditis, increasing creatinine,  evaluate for obstruction.     TECHNIQUE: Routine Bilateral Renal and Bladder Ultrasound.     COMPARISON: None.     FINDINGS:  RIGHT KIDNEY: 12.1 x 6.9 x 4.9 cm. Unremarkable echogenicity, no  hydronephrosis or masses. Simple cysts noted measuring up to 1.7 cm.  Mildly complex cyst in the upper right kidney measuring 1.8 cm.     LEFT KIDNEY: 1.2 x 6.0 x 5.8 cm. Unremarkable echogenicity, no  hydronephrosis or masses. Simple cysts noted.     BLADDER: Unremarkable given its level of  distension.                                                                      IMPRESSION:  No obstruction demonstrated     11/18 EDUARDO  Interpretation Summary     Two, large, mobile masses noted, attached to the atrial side of base of  posterior tricuspid leaflets are noted. (they measures 18 x 8mm and 13 x 5mm  respectively).These are most consistent with large tricuspid valve  vegetations.  Severe tricuspid regurgitation noted, directed towards the interatrial septum.  Cannot exclude perforation of posterior leaflet of tricuspid valve.  The right ventricle is mildly dilated.  The right ventricular systolic function is normal.  Left ventricular systolic function is normal.  The visual ejection fraction is 60-65%.  Findings discussed with Dr Guerin.  These are new comnpared to prior echo from 2017. The study was technically  adequate.

## 2021-11-29 NOTE — PLAN OF CARE
SUMMARY:Tricuspid Valve Endocarditis/ MSSA/MRSA Bacteremia, Scrotal laceration.   DATE & TIME: 11/29/21, 1700  Cognitive Concerns/ Orientation : Alert and Oriented x4,  VSS on RA  BEHAVIOR & AGGRESSION TOOL COLOR: Green  COWS SCORE:  MD approved, discontinued on 11/24/21  ABNL VS/O2:  VSS RA  MOBILITY: Independent  PAIN MANAGMENT: Denies pain. Medicated x1 this AM for nausea, no further episodes.  DIET: Renal diet (non-dialysis), good appetite  BOWEL/BLADDER: Continent, had 1 sm stool today.   ABNL LAB/BG: K+ 5.3 , HGB 8.2, Creat 2.17, BUN 46.  DRAIN/DEVICES: Midline placed today. Continues on Daptomycin IV q48 hrs.  TELEMETRY RHYTHM: NSR  SKIN: Pt has scrotal laceration, ordered wound care performed.  TESTS/PROCEDURES:   D/C DATE: Pending abx plan, will need a couple weeks of IV abx, ID following.   Discharge Barriers: CD evaluation, completion of ABX  OTHER IMPORTANT INFO: 2+ edema diogenes LE's. Contact ISO for MRSA. ID and Nephrology following.   Difficult Lab draw

## 2021-11-30 ENCOUNTER — APPOINTMENT (OUTPATIENT)
Dept: ULTRASOUND IMAGING | Facility: CLINIC | Age: 41
DRG: 871 | End: 2021-11-30
Attending: SPECIALIST
Payer: COMMERCIAL

## 2021-11-30 LAB
ANION GAP SERPL CALCULATED.3IONS-SCNC: 3 MMOL/L (ref 3–14)
BUN SERPL-MCNC: 42 MG/DL (ref 7–30)
CALCIUM SERPL-MCNC: 8.7 MG/DL (ref 8.5–10.1)
CHLORIDE BLD-SCNC: 108 MMOL/L (ref 94–109)
CO2 SERPL-SCNC: 28 MMOL/L (ref 20–32)
CREAT SERPL-MCNC: 2.04 MG/DL (ref 0.66–1.25)
GFR SERPL CREATININE-BSD FRML MDRD: 40 ML/MIN/1.73M2
GLUCOSE BLD-MCNC: 93 MG/DL (ref 70–99)
POTASSIUM BLD-SCNC: 5.3 MMOL/L (ref 3.4–5.3)
POTASSIUM BLD-SCNC: 5.6 MMOL/L (ref 3.4–5.3)
SODIUM SERPL-SCNC: 139 MMOL/L (ref 133–144)

## 2021-11-30 PROCEDURE — 250N000013 HC RX MED GY IP 250 OP 250 PS 637: Performed by: INTERNAL MEDICINE

## 2021-11-30 PROCEDURE — 250N000011 HC RX IP 250 OP 636: Performed by: SPECIALIST

## 2021-11-30 PROCEDURE — 999N000190 HC STATISTIC VAT ROUNDS

## 2021-11-30 PROCEDURE — 250N000013 HC RX MED GY IP 250 OP 250 PS 637: Performed by: HOSPITALIST

## 2021-11-30 PROCEDURE — 250N000013 HC RX MED GY IP 250 OP 250 PS 637

## 2021-11-30 PROCEDURE — 82550 ASSAY OF CK (CPK): CPT | Performed by: INTERNAL MEDICINE

## 2021-11-30 PROCEDURE — 250N000013 HC RX MED GY IP 250 OP 250 PS 637: Performed by: STUDENT IN AN ORGANIZED HEALTH CARE EDUCATION/TRAINING PROGRAM

## 2021-11-30 PROCEDURE — 99233 SBSQ HOSP IP/OBS HIGH 50: CPT | Performed by: INTERNAL MEDICINE

## 2021-11-30 PROCEDURE — 93971 EXTREMITY STUDY: CPT | Mod: RT

## 2021-11-30 PROCEDURE — 120N000001 HC R&B MED SURG/OB

## 2021-11-30 PROCEDURE — 36592 COLLECT BLOOD FROM PICC: CPT | Performed by: INTERNAL MEDICINE

## 2021-11-30 PROCEDURE — 99232 SBSQ HOSP IP/OBS MODERATE 35: CPT | Performed by: INTERNAL MEDICINE

## 2021-11-30 PROCEDURE — 258N000003 HC RX IP 258 OP 636: Performed by: SPECIALIST

## 2021-11-30 PROCEDURE — 250N000013 HC RX MED GY IP 250 OP 250 PS 637: Performed by: PSYCHIATRY & NEUROLOGY

## 2021-11-30 PROCEDURE — 250N000013 HC RX MED GY IP 250 OP 250 PS 637: Performed by: SPECIALIST

## 2021-11-30 PROCEDURE — 250N000011 HC RX IP 250 OP 636: Performed by: INTERNAL MEDICINE

## 2021-11-30 PROCEDURE — 80048 BASIC METABOLIC PNL TOTAL CA: CPT | Performed by: INTERNAL MEDICINE

## 2021-11-30 PROCEDURE — 84132 ASSAY OF SERUM POTASSIUM: CPT | Performed by: INTERNAL MEDICINE

## 2021-11-30 RX ORDER — BUPRENORPHINE 8 MG/1
8 TABLET SUBLINGUAL 2 TIMES DAILY
Status: DISCONTINUED | OUTPATIENT
Start: 2021-11-30 | End: 2021-12-08

## 2021-11-30 RX ADMIN — ABACAVIR SULFATE, DOLUTEGRAVIR SODIUM, LAMIVUDINE 1 TABLET: 600; 50; 300 TABLET, FILM COATED ORAL at 13:08

## 2021-11-30 RX ADMIN — Medication 87.5 MG: at 21:10

## 2021-11-30 RX ADMIN — SODIUM ZIRCONIUM CYCLOSILICATE 10 G: 5 POWDER, FOR SUSPENSION ORAL at 15:45

## 2021-11-30 RX ADMIN — ASPIRIN 81 MG: 81 TABLET, COATED ORAL at 09:47

## 2021-11-30 RX ADMIN — Medication 87.5 MG: at 09:47

## 2021-11-30 RX ADMIN — BUPRENORPHINE HYDROCHLORIDE 8 MG: 8 TABLET SUBLINGUAL at 21:13

## 2021-11-30 RX ADMIN — ACETAMINOPHEN 650 MG: 325 TABLET, FILM COATED ORAL at 21:13

## 2021-11-30 RX ADMIN — LORAZEPAM 0.5 MG: 0.5 TABLET ORAL at 10:00

## 2021-11-30 RX ADMIN — DOCUSATE SODIUM 100 MG: 100 CAPSULE, LIQUID FILLED ORAL at 09:47

## 2021-11-30 RX ADMIN — NICOTINE 1 PATCH: 14 PATCH, EXTENDED RELEASE TRANSDERMAL at 19:58

## 2021-11-30 RX ADMIN — TAMSULOSIN HYDROCHLORIDE 0.4 MG: 0.4 CAPSULE ORAL at 09:47

## 2021-11-30 RX ADMIN — ONDANSETRON 4 MG: 2 INJECTION INTRAMUSCULAR; INTRAVENOUS at 15:55

## 2021-11-30 RX ADMIN — DAPTOMYCIN 650 MG: 500 INJECTION, POWDER, LYOPHILIZED, FOR SOLUTION INTRAVENOUS at 13:09

## 2021-11-30 RX ADMIN — LEVOMILNACIPRAN HYDROCHLORIDE 40 MG: 40 CAPSULE, EXTENDED RELEASE ORAL at 09:48

## 2021-11-30 RX ADMIN — LORAZEPAM 0.5 MG: 0.5 TABLET ORAL at 21:10

## 2021-11-30 ASSESSMENT — ACTIVITIES OF DAILY LIVING (ADL)
ADLS_ACUITY_SCORE: 5

## 2021-11-30 NOTE — PROGRESS NOTES
Nephrology Progress Note  11/30/2021         Assessment & Recommendations:   41-year-old male with hx of  drug abuse, HIV and hepatitis C- admitted with Staph bacteremia, both MSSA and MRSA, with SBE on the tricuspid valve.  He has normal baseline kidney function and now has acute kidney injury    1 JOSE RAMON - normal baseline renal function.   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 UA is not too impressive with only trace blood and trace protein   ANCA neg  Possibly multifactorial ATN - sepsis, Abx ( Vanco)     Cr reached a plateau and now improving.     2. Hyperkalemia - with high K diet and JOSE RAMON. Advised on low K diet.   - on renal diet but getting fast food and high K drinks from outside   - Strict renal diet.   - Lokelama 10 g X 1 now    3 Infective endocarditis - on Dapto     4 ANemia - worsening anemia . No s/o bleeding. Consider hemolysis labs.   - Check CBC         Padilla Martinez MD  [facility] - Nephrology   [phone]      Interval History :   Seen / examined.   No acute issues overnight.   Cr trending down . Good UOP   On renal diet , but getting high K food from outside. K high at 5.6       Review of Systems:   A 4 point review of systems was negative except as noted above.  Notably: fair appetite. no nausea or vomiting or diarrhea.  no confusion,  no fever or chills    Physical Exam:   I/O last 3 completed shifts:  In: 600 [P.O.:600]  Out: 2700 [Urine:2700]    GENERAL APPEARANCE: alert and no distress  EYES:  no scleral icterus, pupils equal  PULM: lungs clear to auscultation, equal air movement, no cyanosis or clubbing  CV: regular rhythm, normal rate, no rub     -JVP -     -edema +   GI: soft, non tender,   NEURO: mentation intact and speech normal, no asterixis   Skin - no purpura / rash     Labs:   All labs reviewed by me  Electrolytes/Renal -   Recent Labs    Lab Test 11/30/21  0612 11/29/21  1245 11/28/21  2150 11/28/21  1229 11/26/21  0005 11/25/21  1323 07/20/21  0128 05/09/21  1450 05/05/18  1425 05/05/18  0940 09/10/17  1946 09/09/17  1859 07/24/17  1154 04/20/17  1140    140  --  137   < > 138   < > 137   < > 142   < > 144   < > 137   POTASSIUM 5.6* 5.3 5.2 5.9*   < > 5.8*   < > 3.6   < > 3.9   < > 4.0   < > 3.8   CHLORIDE 108 108  --  107   < > 105   < > 100   < > 109   < > 107   < > 101   CO2 28 27  --  25   < > 26   < > 35*   < > 29   < > 27   < > 30   BUN 42* 46*  --  44*   < > 41*   < > 12   < > 9   < > 18   < > 15   CR 2.04* 2.17*  --  2.36*   < > 2.80*   < > 0.67   < > 0.67   < > 0.93   < > 0.91   GLC 93 103*  --  94   < > 85   < > 99   < > 124*   < > 95   < > 98   SANDRA 8.7 8.4*  --  8.6   < > 8.5   < > 9.4   < > 8.7   < > 9.7   < > 9.7   MAG  --   --   --   --   --  3.3*  --  2.0  --  2.0   < > 2.2  --  2.2   PHOS  --   --   --   --   --  4.7*  --   --   --   --   --  3.5  --  2.9    < > = values in this interval not displayed.       CBC -   Recent Labs   Lab Test 11/27/21 1944 11/26/21  0004 11/19/21  0557   WBC 8.6 10.9 9.5   HGB 8.2* 8.9* 12.2*    479* 245       LFTs -   Recent Labs   Lab Test 11/26/21  0005 11/16/21  0014 07/20/21  0128   ALKPHOS 75 103 117   BILITOTAL 0.2 0.4 0.4   ALT 24 61 151*   AST 16 38 80*   PROTTOTAL 6.9 7.3 9.2*   ALBUMIN 1.7* 2.7* 3.7       Iron Panel -   Recent Labs   Lab Test 03/08/15  1235 10/24/14  1535   IRON 16* 56   IRONSAT 9* 31   MARILEE 295 250           Current Medications:    abacavir-dolutegravir-LamiVUDine  1 tablet Oral Daily     aspirin  81 mg Oral Daily     buprenorphine  8 mg Sublingual BID     carisoprodol  87.5 mg Oral BID     DAPTOmycin (CUBICIN) intermittent infusion  9 mg/kg Intravenous Q24H     docusate sodium  100 mg Oral BID     levomilnacipran  80 mg Oral Daily     nicotine  1 patch Transdermal QPM     nicotine   Transdermal Q8H     polyethylene glycol  17 g Oral BID     sennosides  2  tablet Oral BID     sodium chloride (PF)  10 mL Intracatheter Q8H     sodium chloride (PF)  3 mL Intravenous Q8H     tamsulosin  0.4 mg Oral Daily       dextrose 10% Stopped (11/28/21 0605)     sodium chloride 100 mL/hr at 11/27/21 1840     Padilla Martinez MD

## 2021-11-30 NOTE — PROGRESS NOTES
[facility]    Infectious Disease Progress Note    Date of Service : 11/30/2021        Assessment:  41 year old male with polysubstance use/IVDA, HIV-Hep C co-infection and prior hx of recurrent MSSA sepsis with pulmonic valve endocarditis, spinal discitis/osteomyelitis with epidural abscess requiring surgical debridement, who is now admitted with high grade MSSA/MRSA bacteremia with tricuspid valve endocarditis with two large [id number] x 8mm and 13 x 5mm. Blood cxs are negative since 11/18 and no surgical plans at this time . He has had medication non compliance with ART with detectable VL, CD4 >200 at this time not requiring PCP prophylaxis. Course further complicated by JOSE RAMON and hyperkalemia     1. MSSA/MRSA sepsis with tricuspid valve endocarditis - blood cxs cleared 11/18  2. 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 Nephrology following , renal functions improving  3. Scrotal laceration without signs of infection.  4. Prior history of MSSA sepsis with pulmonary valve endocarditis in 2015 and spinal discitis/osteomyelitis with epidural abscess requiring surgical intervention in 2017.  5. Substance abuse with active use of methamphetamine / fentanyl currently  6.  HIV/Hepatitis C co-infection. Last HIV VL detectable at 29,522 copies/ml and  CD4 625(28%) on  06/09/2021 , on Triumeq  7.  Hyperkalemia , ? Due to JOSE RAMON  ? Daptomycin contributing?  8. RUE pain and swelling    Recommendations  1. RUE doppler to evaluate for DVT  2. Continue Daptomycin, dose daily now- last CK was stable, repeat pending this morning  3. CARISA on Thursday  4. Resume Triumeq since renal functions have improved  5. Monitor potassium and renal functions  Further recommendations regarding duration of therapy pending Carisa results    Discussed with Dr Anai Ernst  Discussed with Nursing staff to remove all rings due to increasing swelling.  Discussed medication dosing with Pharm-D    Alana Cooper,  MD    Interval History   Had midline placed, complains of pain and swelling of RUE. Has pain all over , asks about Methadone clinic. Tolerating antibiotics without additional side effects    Antimicrobial therapy  11/26 Daptomycin  11/18-11/25 Vancomycin    Physical Exam   Temp: 97.8  F (36.6  C) Temp src: Oral BP: (!) 148/105 Pulse: 83   Resp: 16 SpO2: 99 % O2 Device: None (Room air)    Vitals:    11/27/21 0621 11/29/21 0554 11/29/21 1340   Weight: 69.5 kg (153 lb 3.2 oz) 70 kg (154 lb 5.2 oz) 73.4 kg (161 lb 12.8 oz)     Vital Signs with Ranges  Temp:  [97.1  F (36.2  C)-97.8  F (36.6  C)] 97.8  F (36.6  C)  Pulse:  [77-87] 83  Resp:  [16] 16  BP: (131-148)/() 148/105  SpO2:  [96 %-99 %] 99 %      Constitutional: Awake, alert, cooperative, no apparent distress  Lungs: Clear to auscultation bilaterally, no crackles or wheezing  Cardiovascular: Regular rate and rhythm, systolic murmur  Abdomen: Normal bowel sounds, soft, non-distended, non-tender  Skin: : multiple scars, IV racks, excoriations on hands,    : Left scrotal laceration site appears clean, no sign of infection  MS : RUE swelling    Other:    Medications     dextrose 10% Stopped (11/28/21 0605)     sodium chloride 100 mL/hr at 11/27/21 1843       aspirin  81 mg Oral Daily     buprenorphine  8 mg Sublingual TID     carisoprodol  87.5 mg Oral BID     DAPTOmycin (CUBICIN) intermittent infusion  9 mg/kg Intravenous Q24H     docusate sodium  100 mg Oral BID     levomilnacipran  80 mg Oral Daily     nicotine  1 patch Transdermal QPM     nicotine   Transdermal Q8H     polyethylene glycol  17 g Oral BID     sennosides  2 tablet Oral BID     sodium chloride (PF)  10 mL Intracatheter Q8H     sodium chloride (PF)  3 mL Intravenous Q8H     tamsulosin  0.4 mg Oral Daily       Data   All microbiology laboratory data reviewed.  Recent Labs   Lab Test 11/27/21  1944 11/26/21  0004 11/19/21  0557   WBC 8.6 10.9 9.5   HGB 8.2* 8.9* 12.2*   HCT 27.1* 28.5* 37.2*    MCV 90 88 83    479* 245     Recent Labs   Lab Test 11/30/21  0612 11/29/21  1245 11/28/21  1229   CR 2.04* 2.17* 2.36*     Recent Labs   Lab Test 11/16/21  0014   SED 39*     Component      Latest Ref Rng & Units 11/16/2021   CD3 Mature T      49 - 84 % 67   Absolute CD3      603-2,990 cells/uL 374 (L)   CD4 Bryant T      28 - 63 % 37   Absolute CD4      441-2,156 cells/uL 206 (L)   CD8 Suppressor T      10 - 40 % 27   Absolute CD8      125-1,312 cells/uL 151   CD4:CD8 Ratio      1.40 - 2.60 1.37 (L)   HIV-1 RNA Copies/mL, Instrument      <1 copies/mL 985 (H)   HIV RNA QT Log       3.0      Component      Latest Ref Rng & Units 11/26/2021   CK Total      30 - 300 U/L 16 (L)     Microbiology  11/14 & 4/16 blood cxs both sets MSSA & MRSA, blood cxs 11/18, 11/19 blood cxs negative  Culture Positive on the 1st day of incubation Abnormal         Staphylococcus aureus Panic     2 of 2 bottles   Staphylococcus aureus MRSA Panic     2 of 2 bottles         Resulting Agency: [facility]         Susceptibility                       Staphylococcus aureus Staphylococcus aureus MRSA       PURNIMA PURNIMA       Clindamycin <=0.25 ug/mL Susceptible <=0.25 ug/mL Susceptible 1       Erythromycin <=0.25 ug/mL Susceptible >=8.0 ug/mL Resistant       Gentamicin <=0.5 ug/mL Susceptible <=0.5 ug/mL Susceptible       Linezolid     2.0 ug/mL Susceptible       Oxacillin <=0.25 ug/mL Susceptible >=4.0 ug/mL Resistant       Tetracycline <=1.0 ug/mL Susceptible <=1.0 ug/mL Susceptible       Trimethoprim/Sulfamethoxazole <=0.5/9.5 u... Susceptible <=0.5/9.5 u... Susceptible       Vancomycin <=0.5 ug/mL Susceptible <=0.5 ug/mL Susceptible                    Imaging  11/24 US kidneys  ULTRASOUND RENAL COMPLETE 11/24/2021 12:53 PM     CLINICAL HISTORY: MSSA sepsis, endocarditis, increasing creatinine,  evaluate for obstruction.     TECHNIQUE: Routine Bilateral Renal and Bladder Ultrasound.     COMPARISON: None.     FINDINGS:  RIGHT KIDNEY: 12.1 x 6.9 x  4.9 cm. Unremarkable echogenicity, no  hydronephrosis or masses. Simple cysts noted measuring up to 1.7 cm.  Mildly complex cyst in the upper right kidney measuring 1.8 cm.     LEFT KIDNEY: 1.2 x 6.0 x 5.8 cm. Unremarkable echogenicity, no  hydronephrosis or masses. Simple cysts noted.     BLADDER: Unremarkable given its level of distension.                                                                      IMPRESSION:  No obstruction demonstrated     11/18 EDUARDO  Interpretation Summary     Two, large, mobile masses noted, attached to the atrial side of base of  posterior tricuspid leaflets are noted. (they measures 18 x 8mm and 13 x 5mm  respectively).These are most consistent with large tricuspid valve  vegetations.  Severe tricuspid regurgitation noted, directed towards the interatrial septum.  Cannot exclude perforation of posterior leaflet of tricuspid valve.  The right ventricle is mildly dilated.  The right ventricular systolic function is normal.  Left ventricular systolic function is normal.  The visual ejection fraction is 60-65%.  Findings discussed with Dr Guerin.  These are new comnpared to prior echo from 2017. The study was technically  adequate.

## 2021-11-30 NOTE — PROGRESS NOTES
[facility]    Hospitalist Progress Note    Assessment & Plan   Bc German is a 41 year old mal with PMHx of polysubstance abuse including IV drug use (including fentanyl and methamphetamine currently), chronic opioid dependence, HIV, hepatitis C, hx of MSSA sepsis with pulmonary valve endocarditis (2015), and 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 Was found to have recurrent MSSA/MRSA bacteremia and tricuspid valve endocarditis.     MSSA/MRSA bacteremia with tricuspid valve endocarditis  Severe tricuspid regurg dt endocarditis  Hx of pulmonary valve endocarditis (2015) dt MSSA   Hx of lumbar spine (L3) osteomyelitis with epidural abscess (requiring surgical intervention 2017)  * Presented to ED for evaluation of fevers/chills, cough and episode of syncope. Was febrile to 101.9 with associated tachycardia. WBC 13.1, lactate nl. CXR on admission showed nodular/patchy opacity at the R base.  * 2/2 blood cultures drawn on admission grew both MSSA and MRSA.   * ID following, abx consolidated to Vancomycin only on 11/18.  * Repeat blood cultures drawn 11/18 and 11/19 were negative  * EDUARDO done 11/18 notable for large tricuspid valve vegetation with severe regurgitation  * Seen by cardiology and CV surgery this stay -- recommended to continue medical management for now, will follow clinical progress, would consider surgical options if he clinically worsened  ID discontinued vancomycin  due to acute kidney injury and started him on daptomycin  Follow weekly CPK while on daptomycin  ART changed to Raltegravir/Emtricitabine/Tenofovir which patient did not tolerate (nausea/vomiting) and wishes to discontinued  Resumed Triumeq today   Midline placed for IV access on 11/29/21   Pt to have repeat EDUARDO per ID on Thursday 12/2/21        HIV  Hepatitis C  * Chronic and stable on Triumeq restarted today as cr improving   * CDC >200 so no need for PJP ppx per ID    JOSE RAMON on CKD   HYperkalemia   * No hx of CKD.   * Cr initially stable this stay, began trending up as of 11/22: Cr 1.4 --> 1.78 --> 2.3  -2.7-2.67 -2.38- 2.38 -2.17 - 2.04 today  ADAN returned negative  * Renal US obtained 11/24 and negative for obstruction  -- will start some gentle IVFs today with NS @ 100ml/h  -- nephrology consulted and are following. See their note ATN  versus immunoglomerulonephritis per Nephrology   Potassium high at 6.1 treated with insulin and dextrose and lasix 40mg yesterday   POtassium at 5.6 today   Started on potassium binder LOKelma TID by Nephrology  LOkelma 10gram one time dose ordered by NEphrology today         Intermittent Hyponatremia  Mild hyperkalemia at 5.8 on 11/25/2021  Given 1 dose of Kayexalate 15 g on 11/25/2021 and with that the potassium improved to 5.1  Na 128 on admission, normalized as of 11/18.   -- Na normal now at 139      History of opiate dependence  History of active IV drug use  Tobacco Use  Depression  * On admission, had stated he last used IV drugs 1 wk prior to admission. While in the ED on 11/16, RN had noted patient was briefly nonresponsive and apneic. Was given dose of Narcan with noted improvement.   * Observed to have multiple areas of skin excoriations from skin picking and IV drug use.   * Prior to admission, had been started on taper off Soma -- was taking 87.5mg (1/4 of a 350mg tab) BID but later conversations with PCP's clinic revealed no provider there was managing taper  -- cont buprenorphine BID this stay  -- conts on levomilnacipran ER as per prior to admission  -- cont carisoprodol 87.5mg BID as per prior to admission, unclear what further plan was for taper  -- cont lorazepam 0.5mg q6h prn for anxiety   -- cont nicotine patch  CHEM Depp consultation requested and recommended inpatient treatment after  completion of antibiotics directly will transfer from here to detox treatment center      Left scrotal laceration dt fall/possible syncope  Left scrotal wound infection  * Laceration sutured in ED. Suture removed 11/21.   * US scrotum earlier this stay showed a small R hydrocele but was otherwise normal.   Patient's scrotal laceration wound looks infected today.  Urology and wound care consultations requested patient is on IV daptomycin which should cover for the scrotal infection as well  Urology recommended wound care consultation  Getting wound cares       COVID status: asymptomatic screening negative on 11/16, 11/20, 11/29     FEN: low potassium diet   DVT Prophylaxis: PCDs  Code Status: Full Code    Disposition: Discharge date unclear, several days still pending plan for abx and improvement in JOSE RAMON .    Marisol Ernst MD   Pager [phone]    Interval History    C/o feeling edemetaous all over his body except right leg most likley from several days of IVF with JOSE RAMON.IVF held for now . No acute issues since yesterday . Cr continuous to improve        -Data reviewed today: I reviewed all new labs and imaging results over the last 24 hours. I personally reviewed no images or EKG's today.    Physical Exam   Temp: 98.3  F (36.8  C) Temp src: Oral BP: (!) 141/94 Pulse: 95   Resp: 16 SpO2: 96 % O2 Device: None (Room air)    Vitals:    11/27/21 0621 11/29/21 0554 11/29/21 1340   Weight: 69.5 kg (153 lb 3.2 oz) 70 kg (154 lb 5.2 oz) 73.4 kg (161 lb 12.8 oz)     Vital Signs with Ranges  Temp:  [97.7  F (36.5  C)-98.3  F (36.8  C)] 98.3  F (36.8  C)  Pulse:  [77-95] 95  Resp:  [16] 16  BP: (141-148)/() 141/94  SpO2:  [96 %-99 %] 96 %  I/O last 3 completed shifts:  In: 600 [P.O.:600]  Out: 2700 [Urine:2700]    Constitutional: Resting comfortably, alert and conversing appropriately, NAD  Respiratory: CTAB, no wheeze/rales/rhonchi, no increased wok of breathing  Cardiovascular: HRRR, +murmur towards apex, 1+  Edema in  b/l upper and LLE   GI: S, NT, ND, +BS  Skin/Integumen: warm/dry, scattered excoriations    Medications     sodium chloride 100 mL/hr at 11/27/21 1843       abacavir-dolutegravir-LamiVUDine  1 tablet Oral Daily     aspirin  81 mg Oral Daily     buprenorphine  8 mg Sublingual BID     carisoprodol  87.5 mg Oral BID     DAPTOmycin (CUBICIN) intermittent infusion  9 mg/kg Intravenous Q24H     docusate sodium  100 mg Oral BID     levomilnacipran  80 mg Oral Daily     nicotine  1 patch Transdermal QPM     nicotine   Transdermal Q8H     polyethylene glycol  17 g Oral BID     sennosides  2 tablet Oral BID     sodium chloride (PF)  10 mL Intracatheter Q8H     sodium chloride (PF)  3 mL Intravenous Q8H     Sodium Zirconium Cyclosilicate  10 g Oral Once     tamsulosin  0.4 mg Oral Daily       Data   Recent Labs   Lab 11/30/21  0612 11/29/21  1245 11/28/21  2150 11/28/21  1229 11/27/21  2138 11/27/21  1944 11/26/21  0005 11/26/21  0004   WBC  --   --   --   --   --  8.6  --  10.9   HGB  --   --   --   --   --  8.2*  --  8.9*   MCV  --   --   --   --   --  90  --  88   PLT  --   --   --   --   --  385  --  479*    140  --  137  --  138 137  --    POTASSIUM 5.6* 5.3 5.2 5.9*   < > 6.1* 5.1  --    CHLORIDE 108 108  --  107  --  107 104  --    CO2 28 27  --  25  --  25 26  --    BUN 42* 46*  --  44*  --  42* 40*  --    CR 2.04* 2.17*  --  2.36*  --  2.38* 2.67*  --    ANIONGAP 3 5  --  5  --  6 7  --    SANDRA 8.7 8.4*  --  8.6  --  8.8 8.2*  --    GLC 93 103*  --  94   < > 89 104*  --    ALBUMIN  --   --   --   --   --   --  1.7*  --    PROTTOTAL  --   --   --   --   --   --  6.9  --    BILITOTAL  --   --   --   --   --   --  0.2  --    ALKPHOS  --   --   --   --   --   --  75  --    ALT  --   --   --   --   --   --  24  --    AST  --   --   --   --   --   --  16  --     < > = values in this interval not displayed.       No results found for this or any previous visit (from the past 24 hour(s)).

## 2021-11-30 NOTE — PLAN OF CARE
"DATE & TIME: 11/30/21 1500  Cognitive Concerns/ Orientation : A&O x4, cooperative, anxious at times. PRN Ativan given x1. Pt states he is overwhelmed at times but ready to get help. Pt states one of his biggest barriers to sobriety is \"no treatment program focuses on what I want to focus on\"  BEHAVIOR & AGGRESSION TOOL COLOR: Green  ABNL VS/O2:  VSS on RA   MOBILITY: Independent  PAIN MANAGMENT: Complained of mid abd pain and LUQ pain, pt rates at 8, states it radiates through to his back and feels sharp at times.  DIET: 2 gram K+, Renal diet (non-dialysis), good appetite  BOWEL/BLADDER: Continent, up to BR   ABNL LAB/BG: BUN 46, Creat 2.04, K 5.6  DRAIN/DEVICES: R midline SL, refusing fluids.Pt reports discomfort since placement, right arm and hand swollen. Currently at US to evaluate midline site.  TELEMETRY RHYTHM: SB to NSR  SKIN: Scrotal laceration, BLE 2+ edema LLE, trace edema right LE. Scattered bruising and scabbing from picking  TESTS/PROCEDURES: EDUARDO on Thursday  D/C DATE: Pending  Discharge Barriers: Abx, hx IVDU  OTHER IMPORTANT INFO: Daptomycin q24h. ID and nephrology following. CD assessment completed. Contact precautions maintained for MRSA.      "

## 2021-11-30 NOTE — PROGRESS NOTES
Pt. Complained of pain at midline site after receiving a medication.  US shows vessel is patent.  Flushed x 2 with good blood return.

## 2021-11-30 NOTE — PLAN OF CARE
"DATE & TIME: 11/29/21 5029-3600  Cognitive Concerns/ Orientation : A&O x4, cooperative, anxious at times. PRN Ativan given x1. Pt states he is overwhelmed at times but ready to get help. Pt states one of his biggest barriers to sobriety is \"no treatment program focuses on what I want to focus on\"  BEHAVIOR & AGGRESSION TOOL COLOR: Green  ABNL VS/O2:  VSS on RA   MOBILITY: Independent  PAIN MANAGMENT: 7/10 generalized pain. Pt states this is due to Lyrica being held.  DIET: Renal diet (non-dialysis), good appetite  BOWEL/BLADDER: Continent, up to BR   ABNL LAB/BG: Creat 2.04, GFR 40, K 5.6  DRAIN/DEVICES: R midline SL, refusing fluids. Blood return noted, labs drawn in AM. Pt reports discomfort since placement, however site is WNL.  TELEMETRY RHYTHM: NSR  SKIN: Scrotal laceration, BLE 1-2+ edema, scattered bruising and scabbing from picking  TESTS/PROCEDURES: EDUARDO on Thursday  D/C DATE: Pending  Discharge Barriers: Abx, hx IVDU  OTHER IMPORTANT INFO: Daptomycin q24h. ID and nephrology following. CD assessment completed. Contact precautions maintained for MRSA.    Pt refused scheduled Subutex, stating \"you guys give me too much and it doesn't do anything to help me.\" Pt requesting being set up with a Methadone clinic in [place] (states he cannot do the [place] location due to distance). Had long conversation about different treatment. Pt is interested in treatment as long as it is more customized for his \"needs.\"  "

## 2021-12-01 LAB
ANION GAP SERPL CALCULATED.3IONS-SCNC: 7 MMOL/L (ref 3–14)
BUN SERPL-MCNC: 33 MG/DL (ref 7–30)
CALCIUM SERPL-MCNC: 8.5 MG/DL (ref 8.5–10.1)
CHLORIDE BLD-SCNC: 107 MMOL/L (ref 94–109)
CK SERPL-CCNC: 24 U/L (ref 30–300)
CO2 SERPL-SCNC: 26 MMOL/L (ref 20–32)
CREAT SERPL-MCNC: 2.08 MG/DL (ref 0.66–1.25)
ERYTHROCYTE [DISTWIDTH] IN BLOOD BY AUTOMATED COUNT: 14.6 % (ref 10–15)
GFR SERPL CREATININE-BSD FRML MDRD: 39 ML/MIN/1.73M2
GLUCOSE BLD-MCNC: 103 MG/DL (ref 70–99)
HCT VFR BLD AUTO: 24 % (ref 40–53)
HGB BLD-MCNC: 7.6 G/DL (ref 13.3–17.7)
MCH RBC QN AUTO: 27.5 PG (ref 26.5–33)
MCHC RBC AUTO-ENTMCNC: 31.7 G/DL (ref 31.5–36.5)
MCV RBC AUTO: 87 FL (ref 78–100)
PLATELET # BLD AUTO: 417 10E3/UL (ref 150–450)
POTASSIUM BLD-SCNC: 4.7 MMOL/L (ref 3.4–5.3)
RBC # BLD AUTO: 2.76 10E6/UL (ref 4.4–5.9)
SODIUM SERPL-SCNC: 140 MMOL/L (ref 133–144)
WBC # BLD AUTO: 6.6 10E3/UL (ref 4–11)

## 2021-12-01 PROCEDURE — 250N000013 HC RX MED GY IP 250 OP 250 PS 637: Performed by: SPECIALIST

## 2021-12-01 PROCEDURE — 250N000013 HC RX MED GY IP 250 OP 250 PS 637: Performed by: PSYCHIATRY & NEUROLOGY

## 2021-12-01 PROCEDURE — 250N000011 HC RX IP 250 OP 636: Performed by: INTERNAL MEDICINE

## 2021-12-01 PROCEDURE — 250N000013 HC RX MED GY IP 250 OP 250 PS 637: Performed by: INTERNAL MEDICINE

## 2021-12-01 PROCEDURE — 250N000013 HC RX MED GY IP 250 OP 250 PS 637: Performed by: HOSPITALIST

## 2021-12-01 PROCEDURE — 250N000013 HC RX MED GY IP 250 OP 250 PS 637

## 2021-12-01 PROCEDURE — 258N000003 HC RX IP 258 OP 636: Performed by: SPECIALIST

## 2021-12-01 PROCEDURE — 250N000011 HC RX IP 250 OP 636: Performed by: SPECIALIST

## 2021-12-01 PROCEDURE — 120N000001 HC R&B MED SURG/OB

## 2021-12-01 PROCEDURE — 80048 BASIC METABOLIC PNL TOTAL CA: CPT | Performed by: INTERNAL MEDICINE

## 2021-12-01 PROCEDURE — 999N000040 HC STATISTIC CONSULT NO CHARGE VASC ACCESS

## 2021-12-01 PROCEDURE — 85027 COMPLETE CBC AUTOMATED: CPT | Performed by: INTERNAL MEDICINE

## 2021-12-01 PROCEDURE — 250N000013 HC RX MED GY IP 250 OP 250 PS 637: Performed by: STUDENT IN AN ORGANIZED HEALTH CARE EDUCATION/TRAINING PROGRAM

## 2021-12-01 PROCEDURE — 99232 SBSQ HOSP IP/OBS MODERATE 35: CPT | Performed by: INTERNAL MEDICINE

## 2021-12-01 PROCEDURE — 999N000190 HC STATISTIC VAT ROUNDS

## 2021-12-01 RX ADMIN — TAMSULOSIN HYDROCHLORIDE 0.4 MG: 0.4 CAPSULE ORAL at 10:13

## 2021-12-01 RX ADMIN — Medication 3 DROP: at 11:49

## 2021-12-01 RX ADMIN — Medication 87.5 MG: at 10:29

## 2021-12-01 RX ADMIN — LEVOMILNACIPRAN HYDROCHLORIDE 80 MG: 40 CAPSULE, EXTENDED RELEASE ORAL at 10:13

## 2021-12-01 RX ADMIN — ONDANSETRON 4 MG: 4 TABLET, ORALLY DISINTEGRATING ORAL at 20:22

## 2021-12-01 RX ADMIN — POLYETHYLENE GLYCOL 3350 17 G: 17 POWDER, FOR SOLUTION ORAL at 23:53

## 2021-12-01 RX ADMIN — LORAZEPAM 0.5 MG: 0.5 TABLET ORAL at 10:18

## 2021-12-01 RX ADMIN — ASPIRIN 81 MG: 81 TABLET, COATED ORAL at 10:13

## 2021-12-01 RX ADMIN — Medication 3 DROP: at 20:54

## 2021-12-01 RX ADMIN — ABACAVIR SULFATE, DOLUTEGRAVIR SODIUM, LAMIVUDINE 1 TABLET: 600; 50; 300 TABLET, FILM COATED ORAL at 10:13

## 2021-12-01 RX ADMIN — DAPTOMYCIN 650 MG: 500 INJECTION, POWDER, LYOPHILIZED, FOR SOLUTION INTRAVENOUS at 13:42

## 2021-12-01 RX ADMIN — NICOTINE 1 PATCH: 14 PATCH, EXTENDED RELEASE TRANSDERMAL at 20:20

## 2021-12-01 RX ADMIN — BUPRENORPHINE HYDROCHLORIDE 8 MG: 8 TABLET SUBLINGUAL at 10:15

## 2021-12-01 RX ADMIN — Medication 87.5 MG: at 20:40

## 2021-12-01 ASSESSMENT — ACTIVITIES OF DAILY LIVING (ADL)
ADLS_ACUITY_SCORE: 5

## 2021-12-01 NOTE — PLAN OF CARE
"DATE & TIME: 11/30/21 1824-2880  Cognitive Concerns/ Orientation : A&O x4, cooperative, anxious at times. PRN Ativan given x1. Pt states he is overwhelmed at times but ready to get help. Pt states one of his biggest barriers to sobriety is \"no treatment program focuses on what I want to focus on\"  BEHAVIOR & AGGRESSION TOOL COLOR: Green  ABNL VS/O2: Refused. On RA  MOBILITY: Independent  PAIN MANAGMENT: Pt reports 9/10 pain in his L ear, describes it as sharp/throbbing \"like when you get off of a plane as a kid\" or \"as if there is metal in my ear.\" Previous nurse assessed ear with scope and did not see anything ex wax. PRN Tylenol and heat packs given. Initially refused Subutex but then changed mind, frustrated that he is not on Methadone.  DIET: 2 gram K+, NPO at midnight   BOWEL/BLADDER: Continent, up to BR. No BM, refusing constipation meds  ABNL LAB/BG: BUN 46, Creat 2.04, K 5.6 -> 5.3, CK 24  DRAIN/DEVICES: R midline SL (denies pain at site, US negative for DVT)  TELEMETRY RHYTHM: Now refusing  SKIN: Scrotal laceration, BLE edema, scattered bruising and scabbing from picking  TESTS/PROCEDURES: EDUARDO on Thursday  D/C DATE: Pending  Discharge Barriers: Abx, hx IVDU  OTHER IMPORTANT INFO: Daptomycin q24h. ID and nephrology following. CD assessment completed. Contact precautions maintained for MRSA  "

## 2021-12-01 NOTE — PROGRESS NOTES
[facility]  Hospitalist Progress Note    Assessment & Plan   Bc German is a 41 year old mal with PMHx of polysubstance abuse including IV drug use (including fentanyl and methamphetamine currently), chronic opioid dependence, HIV, hepatitis C, hx of MSSA sepsis with pulmonary valve endocarditis (2015), and 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 Was found to have recurrent MSSA/MRSA bacteremia and tricuspid valve endocarditis.     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 lactate nl. CXR on admission showed nodular/patchy opacity at the R base.  * 2/2 blood cultures drawn on admission grew both MSSA and MRSA.   * ID following, abx initially consolidated to Vancomycin only on 11/18.  * Repeat blood cultures drawn 11/18 and 11/19 were negative  * EDUARDO done 11/18 notable for large tricuspid valve vegetation with severe regurgitation  * Seen by cardiology and CV surgery this stay -- recommended to continue medical management for now, will follow clinical progress, would consider surgical options if he clinically worsened  ID discontinued vancomycin  due to acute kidney injury and started him on daptomycin  Follow weekly CPK while on daptomycin  ART changed to Raltegravir/Emtricitabine/Tenofovir which patient did not tolerate (nausea/vomiting) and wishes to discontinued  Resumed Triumeq today   Midline placed for IV access on 11/29/21   Pt to have repeat EDUARDO per ID on Thursday 12/2/21     HIV  Hepatitis  C  * Chronic and stable on Triumeq restarted today as cr improving   * CDC >200 so no need for PJP ppx per ID    JOSE RAMON on CKD   HYperkalemia   * No hx of CKD.   * Cr initially stable this stay, began trending up as of 11/22: Cr 1.4 --> 1.78 --> 2.3  -2.7-2.67 -2.38- 2.38 -2.17 - 2.04 today  ADAN returned negative  * Renal US obtained 11/24 and negative for obstruction  -- will start some gentle IVFs today with NS @ 100ml/h  -- nephrology consulted and are following. See their note ATN  versus immunoglomerulonephritis per Nephrology   Potassium high at 6.1 treated with insulin and dextrose and lasix 40mg yesterday   POtassium at 5.6 today   Started on potassium binder LOKelma TID by Nephrology  LOkelma 10gram one time dose ordered by NEphrology today     Intermittent Hyponatremia  Mild hyperkalemia at 5.8 on 11/25/2021  Given 1 dose of Kayexalate 15 g on 11/25/2021 and with that the potassium improved to 5.1  Na 128 on admission, normalized as of 11/18.   -- Na normal now at 139    History of opiate dependence  History of active IV drug use  Tobacco Use  Depression  * On admission, had stated he last used IV drugs 1 wk prior to admission. While in the ED on 11/16, RN had noted patient was briefly nonresponsive and apneic. Was given dose of Narcan with noted improvement.   * Observed to have multiple areas of skin excoriations from skin picking and IV drug use.   * Prior to admission, had been started on taper off Soma -- was taking 87.5mg (1/4 of a 350mg tab) BID but later conversations with PCP's clinic revealed no provider there was managing taper  -- cont buprenorphine BID this stay  -- conts on levomilnacipran ER as per prior to admission  -- cont carisoprodol 87.5mg BID as per prior to admission, unclear what further plan was for taper  -- cont lorazepam 0.5mg q6h prn for anxiety   -- cont nicotine patch  CHEM Depp consultation requested and recommended inpatient treatment after completion of antibiotics directly  will transfer from here to detox treatment center      Left scrotal laceration dt fall/possible syncope  Left scrotal wound infection  * Laceration sutured in ED. Suture removed 11/21.   * US scrotum earlier this stay showed a small R hydrocele but was otherwise normal.   Patient's scrotal laceration wound looks infected today.  Urology and wound care consultations requested patient is on IV daptomycin which should cover for the scrotal infection as well  Urology recommended wound care consultation  Getting wound cares       COVID status: asymptomatic screening negative on 11/16, 11/20, 11/29     FEN: low potassium diet   DVT Prophylaxis: PCDs  Code Status: Full Code    Disposition: Discharge date unclear, several days still pending plan for abx and improvement in JOSE RAMON .    Rachid Medeiros MD   Pager [phone]    Interval History    C/o feeling edemetaous all over his body except right leg most likley from several days of IVF with JOSE RAMON.IVF held for now . No acute issues since yesterday . Cr continuous to improve        -Data reviewed today: I reviewed all new labs and imaging results over the last 24 hours. I personally reviewed no images or EKG's today.    Physical Exam   Temp: 97.8  F (36.6  C) Temp src: Oral BP: (!) 151/102 Pulse: 90   Resp: 18 SpO2: 99 % O2 Device: None (Room air)    Vitals:    11/27/21 0621 11/29/21 0554 11/29/21 1340   Weight: 69.5 kg (153 lb 3.2 oz) 70 kg (154 lb 5.2 oz) 73.4 kg (161 lb 12.8 oz)     Vital Signs with Ranges  Temp:  [97.8  F (36.6  C)-98.3  F (36.8  C)] 97.8  F (36.6  C)  Pulse:  [83-95] 90  Resp:  [16-18] 18  BP: (140-151)/() 151/102  SpO2:  [96 %-100 %] 99 %  I/O last 3 completed shifts:  In: 1300 [P.O.:1300]  Out: 1875 [Urine:1875]    Constitutional: Resting comfortably, alert and conversing appropriately, NAD  Respiratory: CTAB, no wheeze/rales/rhonchi, no increased wok of breathing  Cardiovascular: HRRR, +murmur towards apex, 1+  Edema in b/l upper and LLE    GI: S, NT, ND, +BS  Skin/Integumen: warm/dry, scattered excoriations    Medications       abacavir-dolutegravir-LamiVUDine  1 tablet Oral Daily     aspirin  81 mg Oral Daily     buprenorphine  8 mg Sublingual BID     carisoprodol  87.5 mg Oral BID     DAPTOmycin (CUBICIN) intermittent infusion  9 mg/kg Intravenous Q24H     docusate sodium  100 mg Oral BID     levomilnacipran  80 mg Oral Daily     nicotine  1 patch Transdermal QPM     nicotine   Transdermal Q8H     polyethylene glycol  17 g Oral BID     sennosides  2 tablet Oral BID     sodium chloride (PF)  10 mL Intracatheter Q8H     sodium chloride (PF)  3 mL Intravenous Q8H     tamsulosin  0.4 mg Oral Daily       Data   Recent Labs   Lab 12/01/21 0621 11/30/21 1959 11/30/21 0612 11/29/21  1245 11/27/21 2138 11/27/21 1944 11/26/21  0005 11/26/21  0004   WBC 6.6  --   --   --   --  8.6  --  10.9   HGB 7.6*  --   --   --   --  8.2*  --  8.9*   MCV 87  --   --   --   --  90  --  88     --   --   --   --  385  --  479*     --  139 140   < > 138 137  --    POTASSIUM 4.7 5.3 5.6* 5.3   < > 6.1* 5.1  --    CHLORIDE 107  --  108 108   < > 107 104  --    CO2 26  --  28 27   < > 25 26  --    BUN 33*  --  42* 46*   < > 42* 40*  --    CR 2.08*  --  2.04* 2.17*   < > 2.38* 2.67*  --    ANIONGAP 7  --  3 5   < > 6 7  --    SANDRA 8.5  --  8.7 8.4*   < > 8.8 8.2*  --    *  --  93 103*   < > 89 104*  --    ALBUMIN  --   --   --   --   --   --  1.7*  --    PROTTOTAL  --   --   --   --   --   --  6.9  --    BILITOTAL  --   --   --   --   --   --  0.2  --    ALKPHOS  --   --   --   --   --   --  75  --    ALT  --   --   --   --   --   --  24  --    AST  --   --   --   --   --   --  16  --     < > = values in this interval not displayed.       Recent Results (from the past 24 hour(s))   US Upper Extremity Venous Duplex Right    Narrative    ULTRASOUND RIGHT UPPER EXTREMITY VENOUS DUPLEX  11/30/2021 3:28 PM    CLINICAL HISTORY/INDICATION:  New midline increased  pain and swelling,  evaluate for DVT.    COMPARISON:  7/20/2021    TECHNIQUE:  Grayscale, color-flow, and spectral waveform analysis were  performed of the deep veins of the right upper extremity    FINDINGS:  The right jugular vein demonstrates normal compressibility,  color-flow and spectral waveform.    The right subclavian vein, axillary vein, cephalic vein, brachial vein  and basilic vein demonstrate normal compressibility, spectral  waveform, color flow and augmentation.      Impression    IMPRESSION:  No evidence of deep venous thrombosis in the right upper  extremity. Evaluation is somewhat limited secondary to overlying  bandage.    PIA SOTO DO         SYSTEM ID:  [id number]

## 2021-12-01 NOTE — PROGRESS NOTES
[facility]    Infectious Disease Progress Note    Date of Service : 12/01/2021     Assessment:  41 year old male with polysubstance use/IVDA, HIV-Hep C co-infection and prior hx of recurrent MSSA sepsis with pulmonic valve endocarditis, spinal discitis/osteomyelitis with epidural abscess requiring surgical debridement, who is now admitted with high grade MSSA/MRSA bacteremia with tricuspid valve endocarditis with two large [id number] x 8mm and 13 x 5mm. Blood cxs are negative since 11/18 and no surgical plans at this time . He has had medication non compliance with ART with detectable VL, CD4 >200 at this time not requiring PCP prophylaxis. Course further complicated by JOSE RAMON and hyperkalemia     1. MSSA/MRSA sepsis with tricuspid valve endocarditis - blood cxs cleared 11/18  2. JSOE RAMON  improving  3. Scrotal laceration without signs of infection.  4. Prior history of MSSA sepsis with pulmonary valve endocarditis in 2015 and spinal discitis/osteomyelitis with epidural abscess requiring surgical intervention in 2017.  5. Substance abuse with active use of methamphetamine / fentanyl currently  6.  HIV/Hepatitis C co-infection. Last HIV VL detectable at 29,522 copies/ml and  CD4 625(28%) on  06/09/2021 , on Triumeq  7.  Hyperkalemia , ? Due to JOSE RAMON  ? Daptomycin contributing?  8. RUE pain and swelling without evidence of DVT on doppler     Recommendations  1. Continue Daptomycin,  CK/ K was stable  2. EDUARDO pending  3. Continue Triumeq since renal functions have improved  4. Monitor potassium and renal functions    Alana Cooper MD    Interval History   Resting, complains of left ear pain, no other complaints today     Antimicrobial therapy  11/26 Daptomycin  11/18-11/25 Vancomycin    Physical Exam   Temp: 97.8  F (36.6  C) Temp src: Oral BP: (!) 151/102 Pulse: 90   Resp: 18 SpO2: 99 % O2 Device: None (Room air)    Vitals:    11/27/21 0621 11/29/21 0554 11/29/21 1340   Weight: 69.5 kg (153 lb 3.2 oz)  70 kg (154 lb 5.2 oz) 73.4 kg (161 lb 12.8 oz)     Vital Signs with Ranges  Temp:  [97.8  F (36.6  C)-98.3  F (36.8  C)] 97.8  F (36.6  C)  Pulse:  [83-95] 90  Resp:  [16-18] 18  BP: (140-151)/() 151/102  SpO2:  [96 %-100 %] 99 %       Constitutional: Awake, alert, cooperative, no apparent distress  Lungs: Clear to auscultation bilaterally, no crackles or wheezing  Cardiovascular: Regular rate and rhythm, systolic murmur  Abdomen: Normal bowel sounds, soft, non-distended, non-tender  Skin: : multiple scars, IV racks, excoriations on hands,    : Left scrotal laceration site appears clean, no sign of infection  MS : RUE swelling    Other:    Medications       abacavir-dolutegravir-LamiVUDine  1 tablet Oral Daily     aspirin  81 mg Oral Daily     buprenorphine  8 mg Sublingual BID     carisoprodol  87.5 mg Oral BID     DAPTOmycin (CUBICIN) intermittent infusion  9 mg/kg Intravenous Q24H     docusate sodium  100 mg Oral BID     levomilnacipran  80 mg Oral Daily     nicotine  1 patch Transdermal QPM     nicotine   Transdermal Q8H     polyethylene glycol  17 g Oral BID     sennosides  2 tablet Oral BID     sodium chloride (PF)  10 mL Intracatheter Q8H     sodium chloride (PF)  3 mL Intravenous Q8H     tamsulosin  0.4 mg Oral Daily       Data   All microbiology laboratory data reviewed.  Recent Labs   Lab Test 12/01/21 0621 11/27/21  1944 11/26/21  0004   WBC 6.6 8.6 10.9   HGB 7.6* 8.2* 8.9*   HCT 24.0* 27.1* 28.5*   MCV 87 90 88    385 479*     Recent Labs   Lab Test 12/01/21  0621 11/30/21  0612 11/29/21  1245   CR 2.08* 2.04* 2.17*     Recent Labs   Lab Test 11/16/21  0014   SED 39*     Component      Latest Ref Rng & Units 11/16/2021   CD3 Mature T      49 - 84 % 67   Absolute CD3      603-2,990 cells/uL 374 (L)   CD4 [place] T      28 - 63 % 37   Absolute CD4      441-2,156 cells/uL 206 (L)   CD8 Suppressor T      10 - 40 % 27   Absolute CD8      125-1,312 cells/uL 151   CD4:CD8 Ratio      1.40 - 2.60  1.37 (L)   HIV-1 RNA Copies/mL, Instrument      <1 copies/mL 985 (H)   HIV RNA QT Log       3.0      Component      Latest Ref Rng & Units 11/26/2021   CK Total      30 - 300 U/L 16 (L)      Microbiology  11/14 & 4/16 blood cxs both sets MSSA & MRSA, blood cxs 11/18, 11/19 blood cxs negative  Culture Positive on the 1st day of incubation Abnormal         Staphylococcus aureus Panic     2 of 2 bottles   Staphylococcus aureus MRSA Panic     2 of 2 bottles         Resulting Agency: [facility]         Susceptibility                       Staphylococcus aureus Staphylococcus aureus MRSA       PURNIMA PURNIMA       Clindamycin <=0.25 ug/mL Susceptible <=0.25 ug/mL Susceptible 1       Erythromycin <=0.25 ug/mL Susceptible >=8.0 ug/mL Resistant       Gentamicin <=0.5 ug/mL Susceptible <=0.5 ug/mL Susceptible       Linezolid     2.0 ug/mL Susceptible       Oxacillin <=0.25 ug/mL Susceptible >=4.0 ug/mL Resistant       Tetracycline <=1.0 ug/mL Susceptible <=1.0 ug/mL Susceptible       Trimethoprim/Sulfamethoxazole <=0.5/9.5 u... Susceptible <=0.5/9.5 u... Susceptible       Vancomycin <=0.5 ug/mL Susceptible <=0.5 ug/mL Susceptible                    Imaging  11/24 US kidneys  ULTRASOUND RENAL COMPLETE 11/24/2021 12:53 PM     CLINICAL HISTORY: MSSA sepsis, endocarditis, increasing creatinine,  evaluate for obstruction.     TECHNIQUE: Routine Bilateral Renal and Bladder Ultrasound.     COMPARISON: None.     FINDINGS:  RIGHT KIDNEY: 12.1 x 6.9 x 4.9 cm. Unremarkable echogenicity, no  hydronephrosis or masses. Simple cysts noted measuring up to 1.7 cm.  Mildly complex cyst in the upper right kidney measuring 1.8 cm.     LEFT KIDNEY: 1.2 x 6.0 x 5.8 cm. Unremarkable echogenicity, no  hydronephrosis or masses. Simple cysts noted.     BLADDER: Unremarkable given its level of distension.                                                                      IMPRESSION:  No obstruction demonstrated     11/18 EDUARDO  Interpretation Summary     Two,  large, mobile masses noted, attached to the atrial side of base of  posterior tricuspid leaflets are noted. (they measures 18 x 8mm and 13 x 5mm  respectively).These are most consistent with large tricuspid valve  vegetations.  Severe tricuspid regurgitation noted, directed towards the interatrial septum.  Cannot exclude perforation of posterior leaflet of tricuspid valve.  The right ventricle is mildly dilated.  The right ventricular systolic function is normal.  Left ventricular systolic function is normal.  The visual ejection fraction is 60-65%.  Findings discussed with Dr Guerin.  These are new comnpared to prior echo from 2017. The study was technically  adequate.

## 2021-12-01 NOTE — PLAN OF CARE
DATE & TIME: 12/1/21 3928-0400   Cognitive Concerns/ Orientation : A&OX4   BEHAVIOR & AGGRESSION TOOL COLOR: Green  CIWA SCORE: NA  ABNL VS/O2: VSS  MOBILITY: Ind  PAIN MANAGMENT: C/O lt ear pain.  Drops ordered.  Taking subutex  DIET: 2gm K.  NPO at midnight for EDUARDO on 12/2  BOWEL/BLADDER: Continent.  C/O constipation however, refused all stool softeners/laxatives  ABNL LAB/BG: K down to 4.7.  Creat 2.08, HGB 7.6  DRAIN/DEVICES: Midline SL  TELEMETRY RHYTHM: Refused  SKIN: Scrotal laceration.  Refuses assessment.  BLE edema.  1+ edema rt hand.  Refuses head to toe skin assessment  TESTS/PROCEDURES: EDUARDO scheduled for 12/2  D/C DATE: Pending  Discharge Barriers: Abx.  Hx IVDU  OTHER IMPORTANT INFO: Daptomycin q24h, ID and Nephrology following.  CD assessment completed. Contact precautions maintained for MRSA.  EDUARDO scheduled for [id number] on 12/2.  NPO at midnight.  Pt aware

## 2021-12-01 NOTE — PROGRESS NOTES
Nephrology Progress Note  12/01/2021         Assessment & Recommendations:   41-year-old male with hx of  drug abuse, HIV and hepatitis C- admitted with Staph bacteremia, both MSSA and MRSA, with SBE on the tricuspid valve.  He has normal baseline kidney function and now has acute kidney injury    1 JOSE RAMON - normal baseline renal function.   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 UA is not too impressive with only trace blood and trace protein   ANCA neg  Possibly multifactorial ATN - sepsis, Abx ( Vanco)     Cr reached a plateau , non oliguric   On Triumeq. Monitor labs     2. Hyperkalemia - with high K diet and JOSE RAMON. Advised on low K diet.   - on renal diet but getting fast food and high K drinks from outside   - Strict renal diet.     - improved     3 Infective endocarditis - on Dapto     4 ANemia - worsening anemia . No s/o bleeding. Consider hemolysis labs.         Padilla Martinez MD  [facility] - Nephrology   [phone]      Interval History :   Seen / examined.   No acute issues overnight.   Cr stable  . Good UOP , 1.8 L   C/o plugged ear   K down to normal       Review of Systems:   A 4 point review of systems was negative except as noted above.  Notably: fair appetite. no nausea or vomiting or diarrhea.  no confusion,  no fever or chills    Physical Exam:   I/O last 3 completed shifts:  In: 1300 [P.O.:1300]  Out: 1875 [Urine:1875]    GENERAL APPEARANCE: alert and no distress  EYES:  no scleral icterus, pupils equal  PULM: lungs clear to auscultation, equal air movement, no cyanosis or clubbing  CV: regular rhythm, normal rate, no rub     -JVP -     -edema +   GI: soft, non tender,   NEURO: mentation intact and speech normal, no asterixis   Skin - no purpura / rash     Labs:   All labs reviewed by me  Electrolytes/Renal -   Recent Labs   Lab Test 12/01/21  1452  11/30/21 1959 11/30/21  0612 11/29/21  1245 11/26/21  0005 11/25/21  1323 07/20/21  0128 05/09/21  1450 05/05/18  1425 05/05/18  0940 09/10/17  1946 09/09/17  1859 07/24/17  1154 04/20/17  1140     --  139 140   < > 138   < > 137   < > 142   < > 144   < > 137   POTASSIUM 4.7 5.3 5.6* 5.3   < > 5.8*   < > 3.6   < > 3.9   < > 4.0   < > 3.8   CHLORIDE 107  --  108 108   < > 105   < > 100   < > 109   < > 107   < > 101   CO2 26  --  28 27   < > 26   < > 35*   < > 29   < > 27   < > 30   BUN 33*  --  42* 46*   < > 41*   < > 12   < > 9   < > 18   < > 15   CR 2.08*  --  2.04* 2.17*   < > 2.80*   < > 0.67   < > 0.67   < > 0.93   < > 0.91   *  --  93 103*   < > 85   < > 99   < > 124*   < > 95   < > 98   SANDRA 8.5  --  8.7 8.4*   < > 8.5   < > 9.4   < > 8.7   < > 9.7   < > 9.7   MAG  --   --   --   --   --  3.3*  --  2.0  --  2.0   < > 2.2  --  2.2   PHOS  --   --   --   --   --  4.7*  --   --   --   --   --  3.5  --  2.9    < > = values in this interval not displayed.       CBC -   Recent Labs   Lab Test 12/01/21 0621 11/27/21 1944 11/26/21  0004   WBC 6.6 8.6 10.9   HGB 7.6* 8.2* 8.9*    385 479*       LFTs -   Recent Labs   Lab Test 11/26/21  0005 11/16/21  0014 07/20/21  0128   ALKPHOS 75 103 117   BILITOTAL 0.2 0.4 0.4   ALT 24 61 151*   AST 16 38 80*   PROTTOTAL 6.9 7.3 9.2*   ALBUMIN 1.7* 2.7* 3.7       Iron Panel -   Recent Labs   Lab Test 03/08/15  1235 10/24/14  1535   IRON 16* 56   IRONSAT 9* 31   MARILEE 295 250           Current Medications:    abacavir-dolutegravir-LamiVUDine  1 tablet Oral Daily     aspirin  81 mg Oral Daily     buprenorphine  8 mg Sublingual BID     carbamide peroxide  3 drop Left Ear BID     carisoprodol  87.5 mg Oral BID     DAPTOmycin (CUBICIN) intermittent infusion  9 mg/kg Intravenous Q24H     docusate sodium  100 mg Oral BID     levomilnacipran  80 mg Oral Daily     nicotine  1 patch Transdermal QPM     nicotine   Transdermal Q8H     polyethylene glycol  17 g Oral BID      sennosides  2 tablet Oral BID     sodium chloride (PF)  10 mL Intracatheter Q8H     sodium chloride (PF)  3 mL Intravenous Q8H     tamsulosin  0.4 mg Oral Daily       Padilla Martinez MD

## 2021-12-02 ENCOUNTER — ANESTHESIA (OUTPATIENT)
Dept: SURGERY | Facility: CLINIC | Age: 41
DRG: 871 | End: 2021-12-02
Payer: COMMERCIAL

## 2021-12-02 ENCOUNTER — APPOINTMENT (OUTPATIENT)
Dept: CARDIOLOGY | Facility: CLINIC | Age: 41
DRG: 871 | End: 2021-12-02
Attending: SPECIALIST
Payer: COMMERCIAL

## 2021-12-02 ENCOUNTER — ANESTHESIA EVENT (OUTPATIENT)
Dept: SURGERY | Facility: CLINIC | Age: 41
DRG: 871 | End: 2021-12-02
Payer: COMMERCIAL

## 2021-12-02 LAB
ANION GAP SERPL CALCULATED.3IONS-SCNC: 5 MMOL/L (ref 3–14)
BUN SERPL-MCNC: 28 MG/DL (ref 7–30)
CALCIUM SERPL-MCNC: 8.5 MG/DL (ref 8.5–10.1)
CHLORIDE BLD-SCNC: 108 MMOL/L (ref 94–109)
CO2 SERPL-SCNC: 26 MMOL/L (ref 20–32)
CREAT SERPL-MCNC: 1.88 MG/DL (ref 0.66–1.25)
GFR SERPL CREATININE-BSD FRML MDRD: 44 ML/MIN/1.73M2
GLUCOSE BLD-MCNC: 106 MG/DL (ref 70–99)
LVEF ECHO: NORMAL
POTASSIUM BLD-SCNC: 4.7 MMOL/L (ref 3.4–5.3)
SODIUM SERPL-SCNC: 139 MMOL/L (ref 133–144)

## 2021-12-02 PROCEDURE — 93312 ECHO TRANSESOPHAGEAL: CPT | Mod: 26 | Performed by: INTERNAL MEDICINE

## 2021-12-02 PROCEDURE — 120N000001 HC R&B MED SURG/OB

## 2021-12-02 PROCEDURE — 36592 COLLECT BLOOD FROM PICC: CPT | Performed by: HOSPITALIST

## 2021-12-02 PROCEDURE — 250N000013 HC RX MED GY IP 250 OP 250 PS 637

## 2021-12-02 PROCEDURE — 250N000009 HC RX 250: Performed by: SPECIALIST

## 2021-12-02 PROCEDURE — 250N000011 HC RX IP 250 OP 636: Performed by: INTERNAL MEDICINE

## 2021-12-02 PROCEDURE — 370N000017 HC ANESTHESIA TECHNICAL FEE, PER MIN

## 2021-12-02 PROCEDURE — 250N000013 HC RX MED GY IP 250 OP 250 PS 637: Performed by: STUDENT IN AN ORGANIZED HEALTH CARE EDUCATION/TRAINING PROGRAM

## 2021-12-02 PROCEDURE — 250N000013 HC RX MED GY IP 250 OP 250 PS 637: Performed by: INTERNAL MEDICINE

## 2021-12-02 PROCEDURE — 258N000003 HC RX IP 258 OP 636: Performed by: SPECIALIST

## 2021-12-02 PROCEDURE — 250N000013 HC RX MED GY IP 250 OP 250 PS 637: Performed by: SPECIALIST

## 2021-12-02 PROCEDURE — 250N000011 HC RX IP 250 OP 636: Performed by: SPECIALIST

## 2021-12-02 PROCEDURE — 93320 DOPPLER ECHO COMPLETE: CPT | Mod: 26 | Performed by: INTERNAL MEDICINE

## 2021-12-02 PROCEDURE — 93325 DOPPLER ECHO COLOR FLOW MAPG: CPT

## 2021-12-02 PROCEDURE — 93325 DOPPLER ECHO COLOR FLOW MAPG: CPT | Mod: 26 | Performed by: INTERNAL MEDICINE

## 2021-12-02 PROCEDURE — 250N000013 HC RX MED GY IP 250 OP 250 PS 637: Performed by: HOSPITALIST

## 2021-12-02 PROCEDURE — 999N000197 HC STATISTIC WOC PT EDUCATION, 0-15 MIN

## 2021-12-02 PROCEDURE — 99152 MOD SED SAME PHYS/QHP 5/>YRS: CPT | Performed by: INTERNAL MEDICINE

## 2021-12-02 PROCEDURE — 36569 INSJ PICC 5 YR+ W/O IMAGING: CPT

## 2021-12-02 PROCEDURE — 272N000433 HC KIT CATH IV 18 OR 20G CM, POWERGLIDE W MAX BARRIER

## 2021-12-02 PROCEDURE — 999N000183 HC STATISTIC TEE INCLUDES SEDATION

## 2021-12-02 PROCEDURE — 258N000003 HC RX IP 258 OP 636: Performed by: NURSE ANESTHETIST, CERTIFIED REGISTERED

## 2021-12-02 PROCEDURE — 99233 SBSQ HOSP IP/OBS HIGH 50: CPT | Performed by: HOSPITALIST

## 2021-12-02 PROCEDURE — 250N000025 HC SEVOFLURANE, PER MIN

## 2021-12-02 PROCEDURE — 80048 BASIC METABOLIC PNL TOTAL CA: CPT | Performed by: INTERNAL MEDICINE

## 2021-12-02 PROCEDURE — 86140 C-REACTIVE PROTEIN: CPT | Performed by: SPECIALIST

## 2021-12-02 PROCEDURE — 710N000009 HC RECOVERY PHASE 1, LEVEL 1, PER MIN

## 2021-12-02 PROCEDURE — 99232 SBSQ HOSP IP/OBS MODERATE 35: CPT | Performed by: INTERNAL MEDICINE

## 2021-12-02 PROCEDURE — 250N000009 HC RX 250: Performed by: NURSE ANESTHETIST, CERTIFIED REGISTERED

## 2021-12-02 PROCEDURE — 250N000011 HC RX IP 250 OP 636: Performed by: NURSE ANESTHETIST, CERTIFIED REGISTERED

## 2021-12-02 PROCEDURE — 250N000013 HC RX MED GY IP 250 OP 250 PS 637: Performed by: PSYCHIATRY & NEUROLOGY

## 2021-12-02 PROCEDURE — 80051 ELECTROLYTE PANEL: CPT | Performed by: HOSPITALIST

## 2021-12-02 RX ORDER — FENTANYL CITRATE 50 UG/ML
INJECTION, SOLUTION INTRAMUSCULAR; INTRAVENOUS PRN
Status: DISCONTINUED | OUTPATIENT
Start: 2021-12-02 | End: 2021-12-02

## 2021-12-02 RX ORDER — HYDRALAZINE HYDROCHLORIDE 20 MG/ML
10 INJECTION INTRAMUSCULAR; INTRAVENOUS EVERY 4 HOURS PRN
Status: ACTIVE | OUTPATIENT
Start: 2021-12-02 | End: 2021-12-03

## 2021-12-02 RX ORDER — SODIUM CHLORIDE, SODIUM LACTATE, POTASSIUM CHLORIDE, CALCIUM CHLORIDE 600; 310; 30; 20 MG/100ML; MG/100ML; MG/100ML; MG/100ML
INJECTION, SOLUTION INTRAVENOUS CONTINUOUS PRN
Status: DISCONTINUED | OUTPATIENT
Start: 2021-12-02 | End: 2021-12-02

## 2021-12-02 RX ORDER — LIDOCAINE HYDROCHLORIDE 20 MG/ML
INJECTION, SOLUTION INFILTRATION; PERINEURAL PRN
Status: DISCONTINUED | OUTPATIENT
Start: 2021-12-02 | End: 2021-12-02

## 2021-12-02 RX ORDER — ONDANSETRON 2 MG/ML
INJECTION INTRAMUSCULAR; INTRAVENOUS PRN
Status: DISCONTINUED | OUTPATIENT
Start: 2021-12-02 | End: 2021-12-02

## 2021-12-02 RX ORDER — MAGNESIUM CARB/ALUMINUM HYDROX 105-160MG
296 TABLET,CHEWABLE ORAL
Status: COMPLETED | OUTPATIENT
Start: 2021-12-02 | End: 2021-12-02

## 2021-12-02 RX ORDER — DEXAMETHASONE SODIUM PHOSPHATE 4 MG/ML
INJECTION, SOLUTION INTRA-ARTICULAR; INTRALESIONAL; INTRAMUSCULAR; INTRAVENOUS; SOFT TISSUE PRN
Status: DISCONTINUED | OUTPATIENT
Start: 2021-12-02 | End: 2021-12-02

## 2021-12-02 RX ADMIN — DEXAMETHASONE SODIUM PHOSPHATE 4 MG: 4 INJECTION, SOLUTION INTRA-ARTICULAR; INTRALESIONAL; INTRAMUSCULAR; INTRAVENOUS; SOFT TISSUE at 12:52

## 2021-12-02 RX ADMIN — ONDANSETRON 4 MG: 2 INJECTION INTRAMUSCULAR; INTRAVENOUS at 12:52

## 2021-12-02 RX ADMIN — LORAZEPAM 0.5 MG: 0.5 TABLET ORAL at 07:21

## 2021-12-02 RX ADMIN — SENNOSIDES 2 TABLET: 8.6 TABLET, FILM COATED ORAL at 10:24

## 2021-12-02 RX ADMIN — ASPIRIN 81 MG: 81 TABLET, COATED ORAL at 10:25

## 2021-12-02 RX ADMIN — BUPRENORPHINE HYDROCHLORIDE 8 MG: 8 TABLET SUBLINGUAL at 21:55

## 2021-12-02 RX ADMIN — MIDAZOLAM 2 MG: 1 INJECTION INTRAMUSCULAR; INTRAVENOUS at 12:44

## 2021-12-02 RX ADMIN — ROCURONIUM BROMIDE 5 MG: 50 INJECTION, SOLUTION INTRAVENOUS at 12:44

## 2021-12-02 RX ADMIN — PHENYLEPHRINE HYDROCHLORIDE 100 MCG: 10 INJECTION INTRAVENOUS at 12:44

## 2021-12-02 RX ADMIN — LIDOCAINE HYDROCHLORIDE 100 MG: 20 INJECTION, SOLUTION INFILTRATION; PERINEURAL at 12:44

## 2021-12-02 RX ADMIN — MAGNESIUM CITRATE 296 ML: 1.75 LIQUID ORAL at 23:48

## 2021-12-02 RX ADMIN — SODIUM CHLORIDE, POTASSIUM CHLORIDE, SODIUM LACTATE AND CALCIUM CHLORIDE: 600; 310; 30; 20 INJECTION, SOLUTION INTRAVENOUS at 12:41

## 2021-12-02 RX ADMIN — LORAZEPAM 0.5 MG: 0.5 TABLET ORAL at 23:48

## 2021-12-02 RX ADMIN — LORAZEPAM 0.5 MG: 0.5 TABLET ORAL at 00:07

## 2021-12-02 RX ADMIN — TAMSULOSIN HYDROCHLORIDE 0.4 MG: 0.4 CAPSULE ORAL at 10:29

## 2021-12-02 RX ADMIN — ONDANSETRON 4 MG: 4 TABLET, ORALLY DISINTEGRATING ORAL at 07:21

## 2021-12-02 RX ADMIN — NICOTINE 1 PATCH: 14 PATCH, EXTENDED RELEASE TRANSDERMAL at 21:51

## 2021-12-02 RX ADMIN — Medication 3 DROP: at 08:51

## 2021-12-02 RX ADMIN — DAPTOMYCIN 650 MG: 500 INJECTION, POWDER, LYOPHILIZED, FOR SOLUTION INTRAVENOUS at 13:59

## 2021-12-02 RX ADMIN — ALUMINUM HYDROXIDE, MAGNESIUM HYDROXIDE, AND SIMETHICONE 30 ML: 200; 200; 20 SUSPENSION ORAL at 00:25

## 2021-12-02 RX ADMIN — FENTANYL CITRATE 50 MCG: 50 INJECTION, SOLUTION INTRAMUSCULAR; INTRAVENOUS at 12:44

## 2021-12-02 RX ADMIN — LEVOMILNACIPRAN HYDROCHLORIDE 80 MG: 40 CAPSULE, EXTENDED RELEASE ORAL at 10:24

## 2021-12-02 RX ADMIN — ABACAVIR SULFATE, DOLUTEGRAVIR SODIUM, LAMIVUDINE 1 TABLET: 600; 50; 300 TABLET, FILM COATED ORAL at 10:24

## 2021-12-02 RX ADMIN — LIDOCAINE HYDROCHLORIDE ANHYDROUS 1 ML: 10 INJECTION, SOLUTION INFILTRATION at 08:17

## 2021-12-02 RX ADMIN — LORAZEPAM 0.5 MG: 0.5 TABLET ORAL at 17:42

## 2021-12-02 RX ADMIN — Medication 87.5 MG: at 10:25

## 2021-12-02 RX ADMIN — Medication 87.5 MG: at 23:48

## 2021-12-02 RX ADMIN — ACETAMINOPHEN 650 MG: 325 TABLET, FILM COATED ORAL at 00:07

## 2021-12-02 RX ADMIN — DOCUSATE SODIUM 100 MG: 100 CAPSULE, LIQUID FILLED ORAL at 10:24

## 2021-12-02 RX ADMIN — SUCCINYLCHOLINE CHLORIDE 100 MG: 20 INJECTION, SOLUTION INTRAMUSCULAR; INTRAVENOUS; PARENTERAL at 12:44

## 2021-12-02 ASSESSMENT — ACTIVITIES OF DAILY LIVING (ADL)
ADLS_ACUITY_SCORE: 5

## 2021-12-02 ASSESSMENT — LIFESTYLE VARIABLES: TOBACCO_USE: 1

## 2021-12-02 NOTE — PROGRESS NOTES
Nephrology Progress Note  12/02/2021         Assessment & Recommendations:   41-year-old male with hx of  drug abuse, HIV and hepatitis C- admitted with Staph bacteremia, both MSSA and MRSA, with SBE on the tricuspid valve.  He has normal baseline kidney function and now has acute kidney injury    1 JOSE RAMON - normal baseline renal function.   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 UA is not too impressive with only trace blood and trace protein   ANCA neg  Possibly multifactorial ATN - sepsis, Abx ( Vanco)     Cr reached a plateau , non oliguric  - awaiting labs today   On Triumeq. Monitor labs     2. Hyperkalemia - with high K diet and JOSE RAMON. Advised on low K diet.   - on renal diet but getting fast food and high K drinks from outside   - Strict renal diet.       3 Infective endocarditis - on Dapto     4 ANemia - worsening anemia . No s/o bleeding. Consider hemolysis labs.       F/u labs today .       Padilla Martinez MD  [facility] - Nephrology   [phone]      Interval History :   Seen / examined.   No acute issues overnight. Feels tired.   Plan for EDUARDO today   Labs pending. Good urine output        Review of Systems:   A 4 point review of systems was negative except as noted above.  Notably: fair appetite. no nausea or vomiting or diarrhea.  no confusion,  no fever or chills    Physical Exam:   I/O last 3 completed shifts:  In: 450 [P.O.:450]  Out: 900 [Urine:900]    GENERAL APPEARANCE: alert and no distress  EYES:  no scleral icterus, pupils equal  PULM: lungs clear to auscultation, equal air movement, no cyanosis or clubbing  CV: regular rhythm, normal rate, no rub     -JVP -     -edema +   GI: soft, non tender,   NEURO: mentation intact and speech normal, no asterixis   Skin - no purpura / rash     Labs:   All labs reviewed by me  Electrolytes/Renal -   Recent Labs    Lab Test 12/01/21 0621 11/30/21 1959 11/30/21 0612 11/29/21  1245 11/26/21  0005 11/25/21  1323 07/20/21  0128 05/09/21  1450 05/05/18  1425 05/05/18  0940 09/10/17  1946 09/09/17  1859 07/24/17  1154 04/20/17  1140     --  139 140   < > 138   < > 137   < > 142   < > 144   < > 137   POTASSIUM 4.7 5.3 5.6* 5.3   < > 5.8*   < > 3.6   < > 3.9   < > 4.0   < > 3.8   CHLORIDE 107  --  108 108   < > 105   < > 100   < > 109   < > 107   < > 101   CO2 26  --  28 27   < > 26   < > 35*   < > 29   < > 27   < > 30   BUN 33*  --  42* 46*   < > 41*   < > 12   < > 9   < > 18   < > 15   CR 2.08*  --  2.04* 2.17*   < > 2.80*   < > 0.67   < > 0.67   < > 0.93   < > 0.91   *  --  93 103*   < > 85   < > 99   < > 124*   < > 95   < > 98   SANDRA 8.5  --  8.7 8.4*   < > 8.5   < > 9.4   < > 8.7   < > 9.7   < > 9.7   MAG  --   --   --   --   --  3.3*  --  2.0  --  2.0   < > 2.2  --  2.2   PHOS  --   --   --   --   --  4.7*  --   --   --   --   --  3.5  --  2.9    < > = values in this interval not displayed.       CBC -   Recent Labs   Lab Test 12/01/21 0621 11/27/21 1944 11/26/21  0004   WBC 6.6 8.6 10.9   HGB 7.6* 8.2* 8.9*    385 479*       LFTs -   Recent Labs   Lab Test 11/26/21  0005 11/16/21  0014 07/20/21  0128   ALKPHOS 75 103 117   BILITOTAL 0.2 0.4 0.4   ALT 24 61 151*   AST 16 38 80*   PROTTOTAL 6.9 7.3 9.2*   ALBUMIN 1.7* 2.7* 3.7       Iron Panel -   Recent Labs   Lab Test 03/08/15  1235 10/24/14  1535   IRON 16* 56   IRONSAT 9* 31   MARILEE 295 250           Current Medications:    abacavir-dolutegravir-LamiVUDine  1 tablet Oral Daily     aspirin  81 mg Oral Daily     buprenorphine  8 mg Sublingual BID     carbamide peroxide  3 drop Left Ear BID     carisoprodol  87.5 mg Oral BID     DAPTOmycin (CUBICIN) intermittent infusion  9 mg/kg Intravenous Q24H     docusate sodium  100 mg Oral BID     levomilnacipran  80 mg Oral Daily     nicotine  1 patch Transdermal QPM     nicotine   Transdermal Q8H     polyethylene  glycol  17 g Oral BID     sennosides  2 tablet Oral BID     sodium chloride (PF)  10 mL Intracatheter Q8H     sodium chloride (PF)  3 mL Intravenous Q8H     tamsulosin  0.4 mg Oral Daily       Padilla Martinez MD

## 2021-12-02 NOTE — ANESTHESIA CARE TRANSFER NOTE
Patient: Bc German    Procedure: Procedure(s):  ECHOCARDIOGRAM, TRANSESOPHAGEAL, INTRAOPERATIVE       Diagnosis: Endocarditis [I38]  Diagnosis Additional Information: No value filed.    Anesthesia Type:   General     Note:    Oropharynx: oropharynx clear of all foreign objects and spontaneously breathing  Level of Consciousness: awake  Oxygen Supplementation: face mask  Level of Supplemental Oxygen (L/min / FiO2): 6  Independent Airway: airway patency satisfactory and stable  Dentition: dentition unchanged  Vital Signs Stable: post-procedure vital signs reviewed and stable  Report to RN Given: handoff report given  Patient transferred to: PACU  Comments: Neuromuscular blockade not used after succinylcholine for intubation, spontaneous return of TOF 4/4 with sustained tetany, spontaneous respirations, adequate tidal volumes, followed commands to voice, oropharynx suctioned with soft flexible catheter, extubated atraumatically, extubated with suction, airway patent after extubation.  Oxygen via facemask at 6 liters per minute to PACU. Oxygen tubing connected to wall O2 in PACU, SpO2, NiBP, and EKG monitors and alarms on and functioning, report on patient's clinical status given to PACU RN, RN questions answered.     Handoff Report: Identifed the Patient, Identified the Reponsible Provider, Reviewed the pertinent medical history, Discussed the surgical course, Reviewed Intra-OP anesthesia mangement and issues during anesthesia, Set expectations for post-procedure period and Allowed opportunity for questions and acknowledgement of understanding      Vitals:  Vitals Value Taken Time   /114 12/02/21 1317   Temp     Pulse 81 12/02/21 1320   Resp 11 12/02/21 1320   SpO2 97 % 12/02/21 1320   Vitals shown include unvalidated device data.    Electronically Signed By: MEDHAT Adams CRNA  December 2, 2021  1:22 PM

## 2021-12-02 NOTE — ANESTHESIA PREPROCEDURE EVALUATION
Anesthesia Pre-Procedure Evaluation    Patient: Bc German   MRN: [id number] : 1980        Preoperative Diagnosis: Endocarditis [I38]    Procedure : Procedure(s):  ECHOCARDIOGRAM, TRANSESOPHAGEAL, INTRAOPERATIVE          Past Medical History:   Diagnosis Date     Anxiety      Depressive disorder      Drug abuse, IV (H)      Group A streptococcal infection 2014    Bacteremia/cellulitis     HCV antibody positive      HIV (human immunodeficiency virus infection) (H)      HIV (human immunodeficiency virus infection) (H)      HIV (human immunodeficiency virus infection) (H)      IVDU (intravenous drug user)      Osteomyelitis of vertebra of lumbosacral region (H) 3/2011    L3, left psoas abscess     Substance abuse (H)       Past Surgical History:   Procedure Laterality Date     EYE SURGERY  1 year ago    pins to left eye after socket fracture     OPTICAL TRACKING SYSTEM FUSION CERVICAL ANTERIOR ONE LEVEL Right 9/10/2017    Procedure: OPTICAL TRACKING SYSTEM FUSION CERVICAL ANTERIOR ONE LEVEL;  Stealth C6-C7 Anterior Cervical Corpectomy and C5-T1 Fusion;  Surgeon: Marv Ambrose MD;  Location: [facility] OR     ORTHOPEDIC SURGERY      Arm Surgery     PICC INSERTION Left 2017    5fr DL BioFlo PICC, 42cm (1cm external) in the L basilic vein w/ tip in the low SVC.     TRANSESOPHAGEAL ECHOCARDIOGRAM INTRAOPERATIVE N/A 3/17/2015    Procedure: TRANSESOPHAGEAL ECHOCARDIOGRAM INTRAOPERATIVE;  Surgeon: Generic Anesthesia Provider;  Location: [facility] OR     TRANSESOPHAGEAL ECHOCARDIOGRAM INTRAOPERATIVE N/A 2021    Procedure: ECHOCARDIOGRAM, TRANSESOPHAGEAL, INTRAOPERATIVE;  Surgeon: GENERIC ANESTHESIA PROVIDER;  Location: [facility] OR      Allergies   Allergen Reactions     Bupropion      Other reaction(s): Seizures, Seizures  PN: seizure when took a double dose  seizure when took a double dose  PN: seizure when took a double dose  Other reaction(s): Seizures  PN: seizure when took a double dose        Acetaminophen Nausea and Vomiting     PN: : GI Upset  : GI Upset  PN: : GI Upset  Other reaction(s): Vomiting  PN: : GI Upset       Codeine Itching and Nausea and Vomiting     Other reaction(s): Abdominal Pain  PN:  weak; fatigue; vomiting   weak; fatigue; vomiting  PN:  weak; fatigue; vomiting  PN:  weak; fatigue; vomiting       Sulfa Drugs Itching      Social History     Tobacco Use     Smoking status: Current Every Day Smoker     Packs/day: 0.25     Types: Cigarettes     Smokeless tobacco: Never Used   Substance Use Topics     Alcohol use: Yes      Wt Readings from Last 1 Encounters:   21 73.4 kg (161 lb 12.8 oz)        Anesthesia Evaluation   Pt has had prior anesthetic.     No history of anesthetic complications       ROS/MED HX  ENT/Pulmonary:     (+) tobacco use, Current use,     Neurologic:       Cardiovascular:     (+) -----valvular problems/murmurs     METS/Exercise Tolerance:     Hematologic:       Musculoskeletal:       GI/Hepatic:     (+) hepatitis type C,     Renal/Genitourinary:       Endo:       Psychiatric/Substance Use: Comment: Iv drug abuse    (+) psychiatric history alcohol abuse H/O chronic opiod use . Recreational drug usage: Cocaine and Meth.    Infectious Disease: Comment: HIV      Malignancy:       Other:            Physical Exam    Airway  airway exam normal      Mallampati: II   TM distance: > 3 FB   Neck ROM: full   Mouth opening: > 3 cm    Respiratory Devices and Support         Dental     Comment: Poor dentition    (+) missing and chipped      Cardiovascular   cardiovascular exam normal          Pulmonary   pulmonary exam normal                   Cedrick Yang MD  [phone] 2021      Narrative & Impression  [id number]  [id number]  [id number]^LINDA^MYRA^PAOLA     [facility]  Echocardiography Laboratory  [address] [place] [address]     Name: AMBREEN PYLE  MRN: [id number]  : 1980  Study Date: 2021 10:01 AM  Age:  41 yrs  Gender: Male  Patient Location: [facility]  Reason For Study: 2nd degree AV Block  Ordering Physician: MYRA GUERIN  Referring Physician: MYRA GUERIN  Performed By: Crow Wilkins     HR: 101  ______________________________________________________________________________  Procedure  Complete EDUARDO Adult. 3D image acquisition, reconstruction, and real-time  interpretation was performed.  ______________________________________________________________________________  Interpretation Summary     Two, large, mobile masses noted, attached to the atrial side of base of  posterior tricuspid leaflets are noted. (they measures 18 x 8mm and 13 x 5mm  respectively).These are most consistent with large tricuspid valve  vegetations.  Severe tricuspid regurgitation noted, directed towards the interatrial septum.  Cannot exclude perforation of posterior leaflet of tricuspid valve.  The right ventricle is mildly dilated.  The right ventricular systolic function is normal.  Left ventricular systolic function is normal.  The visual ejection fraction is 60-65%.  Findings discussed with Dr Guerin.  These are new comnpared to prior echo from 2017. The study was technically  adequate.  ______________________________________________________________________________  EDUARDO  Under general anesthesia. The transesophageal probe was passed without  difficulty. There were no complications associated with this procedure.     Left Ventricle  The left ventricle is normal in size. Left ventricular systolic function is  normal. The visual ejection fraction is 60-65%. No regional wall motion  abnormalities noted.     Right Ventricle  The right ventricle is mildly dilated. The right ventricular systolic function  is normal.     Atria  Normal left atrial size. The right atrium is mild to moderately dilated. There  is no color Doppler evidence of an atrial shunt. No thrombus is detected in  the left atrial appendage.     Mitral  Valve  The mitral valve leaflets are mildly thickened. There is trace mitral  regurgitation.     Tricuspid Valve  There is severe (4+) tricuspid regurgitation.     Aortic Valve  The aortic valve is trileaflet. No aortic regurgitation is present. No aortic  stenosis is present.     Pulmonic Valve  The pulmonic valve is not well seen, but is grossly normal.     Vessels  The aortic root is normal size.     Pericardial/Pleural  There is no pericardial effusion.     Rhythm  Sinus rhythm was noted.  ______________________________________________________________________________  Report approved by: Lili Antunez 11/18/2021 11:57 AM        OUTSIDE LABS:  CBC:   Lab Results   Component Value Date    WBC 6.6 12/01/2021    WBC 8.6 11/27/2021    HGB 7.6 (L) 12/01/2021    HGB 8.2 (L) 11/27/2021    HCT 24.0 (L) 12/01/2021    HCT 27.1 (L) 11/27/2021     12/01/2021     11/27/2021     BMP:   Lab Results   Component Value Date     12/01/2021     11/30/2021    POTASSIUM 4.7 12/01/2021    POTASSIUM 5.3 11/30/2021    CHLORIDE 107 12/01/2021    CHLORIDE 108 11/30/2021    CO2 26 12/01/2021    CO2 28 11/30/2021    BUN 33 (H) 12/01/2021    BUN 42 (H) 11/30/2021    CR 2.08 (H) 12/01/2021    CR 2.04 (H) 11/30/2021     (H) 12/01/2021    GLC 93 11/30/2021     COAGS:   Lab Results   Component Value Date    PTT 33 05/09/2021    INR 0.96 05/09/2021     POC:   Lab Results   Component Value Date     (H) 09/12/2017     HEPATIC:   Lab Results   Component Value Date    ALBUMIN 1.7 (L) 11/26/2021    PROTTOTAL 6.9 11/26/2021    ALT 24 11/26/2021    AST 16 11/26/2021    ALKPHOS 75 11/26/2021    BILITOTAL 0.2 11/26/2021    XIN 31 05/09/2021     OTHER:   Lab Results   Component Value Date    PH 7.39 11/16/2021    LACT 1.8 11/16/2021    SANDRA 8.5 12/01/2021    PHOS 4.7 (H) 11/25/2021    MAG 3.3 (H) 11/25/2021    LIPASE 93 05/04/2018    AMYLASE 74 02/24/2017    TSH 2.95 05/09/2021    T4 1.23 05/16/2006    .0  (H) 11/23/2021    SED 39 (H) 11/16/2021       Anesthesia Plan    ASA Status:  4   NPO Status:  NPO Appropriate    Anesthesia Type: General.     - Airway: ETT   Induction: Intravenous.   Maintenance: Balanced.   Techniques and Equipment:     - Airway: Video-Laryngoscope         Consents    Anesthesia Plan(s) and associated risks, benefits, and realistic alternatives discussed. Questions answered and patient/representative(s) expressed understanding.    - Discussed:     - Discussed with:  Patient         Postoperative Care    Pain management: IV analgesics.   PONV prophylaxis: Ondansetron (or other 5HT-3)     Comments:                David Mixon MD

## 2021-12-02 NOTE — PROGRESS NOTES
"Called to assess occluded midline HORTENCIA.  Serosanguinous drainage under dressing.  Dressing removed and a leak between catheter and hub observed when flushed (stream shooting in air.).  Midline removed.  Discussed need to find IV access.  Pt. Replied \"not today\".  Discussed w pt.'s nurse--next antibiotic due tomorrow afternoon.  "

## 2021-12-02 NOTE — PLAN OF CARE
DATE & TIME: 12/2/21 9321-0359  Cognitive Concerns/ Orientation : A&O x4, frustrated at times.   BEHAVIOR & AGGRESSION TOOL COLOR: Green  ABNL VS/O2: BP elevated. On RA  MOBILITY: Independent  PAIN MANAGMENT: C/o generalized pain rated 8/10,  Refused Subutex. C/O ear pain.  Drops applied.  Pt states pain is getting better  DIET: Regular  BOWEL/BLADDER: Continent, up to BR. Using urinal at bedside at times. No bm. BM meds given  ABNL LAB/BG: Pt refused lab draw this AM  DRAIN/DEVICES: R midline removed after  not able to flushing it, IV nurse came and removed line last evening.  L Mid line placed this AM  TELEMETRY RHYTHM: NA  SKIN: Scrotal laceration; patient refused wound care by nurse this AM.  WOC following.  +1 edema to bilateral feet/ankle, scattered bruising and scabbing.  TESTS/PROCEDURES: EDUARDO today. NPO from midnight except meds.  D/C DATE: Pending  Discharge Barriers: plan for antibiotic and improvement in JOSE RAMON  OTHER IMPORTANT INFO: Daptomycin q24h. ID and nephrology following. Contact precautions maintained for MRSA.

## 2021-12-02 NOTE — PROGRESS NOTES
[facility]  Hospitalist Progress Note    Assessment & Plan   Bc German is a 41 year old mal with PMHx of polysubstance abuse including IV drug use (including fentanyl and methamphetamine currently), chronic opioid dependence, HIV, hepatitis C, hx of MSSA sepsis with pulmonary valve endocarditis (2015), and 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 Was found to have recurrent MSSA/MRSA bacteremia and tricuspid valve endocarditis.     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 lactate nl. CXR on admission showed nodular/patchy opacity at the R base.  * 2/2 blood cultures drawn on admission grew both MSSA and MRSA >> blood cx cleared  on 11/18 >> vancomycin  changed      to daptomycin due to JOSE RAMON   EDUARDO done 11/18 notable for large tricuspid valve vegetation with severe regurgitation  * per CV surgery continue medical management for now, consider surgical options if he clinically worsened    Follow weekly CPK while on daptomycin. Currently  wnl  For ART he is on Triumeq   New Midline placed for IV access on 12/2  Pt to have repeat EDUARDO today Thursday 12/2/21     HIV  Hepatitis C  * Chronic and stable on Triumeq restarted today as cr improving   * CDC >200 so no need for PJP ppx per ID    JOSE RAMON on CKD   Hyperkalemia (resolved)   * No hx of CKD and with a normal baseline enrique function  * Cr initially stable this stay, began trending up as of 11/22: Cr 1.4   11/24/2021 11/25/2021 11/26/2021 11/27/2021   11/28/2021 11/29/2021 11/30/2021 12/1/2021    Creatinine 2.70 (H) 2.80 (H) 2.67 (H) 2.38 (H) 2.36 (H) 2.17 (H) 2.04 (H) 2.08 (H)     ADAN returned negative, Renal US obtained 11/24 and negative for obstruction  -- nephrology consulted and are following; possibly multifactorial     Hyponatremia: resolved  -- Na normal now at 140    History of opiate dependence  History of active IV drug use  Tobacco Use  Depression  * On admission, had stated he last used IV drugs 1 wk prior to admission. While in the ED on 11/16, RN had noted patient was briefly nonresponsive and apneic. Was given dose of Narcan with noted improvement.   * Prior to admission, had been started on taper off Soma -- was taking 87.5mg (1/4 of a 350mg tab) BID but later conversations with PCP's clinic revealed no provider there was managing taper  -- cont buprenorphine BID this stay  -- conts on levomilnacipran ER as per prior to admission  -- cont carisoprodol 87.5mg BID as per prior to admission, unclear what further plan was for taper  -- cont lorazepam 0.5mg q6h prn for anxiety   -- cont nicotine patch  CHEM Depp consultation requested and recommended inpatient treatment after completion of antibiotics directly will transfer from here to detox treatment center      Left scrotal laceration dt fall/possible syncope  Left scrotal wound infection  * Laceration sutured in ED. Suture removed 11/21.   * US scrotum earlier this stay showed a small R hydrocele but was otherwise normal.   Patient's scrotal laceration wound looks clean and dry.    Urology and wound care consultations requested patient is on IV daptomycin which should cover for the scrotal infection as well   Left area pain; improving    No evidence of otitis media or externa. Likely cerumen impaction,  _ continue to  apply carbamide peroxide  Ear drops to left ears,     COVID status: asymptomatic screening negative on 11/16, 11/20, 11/29     FEN: low potassium diet   DVT Prophylaxis:  PCDs  Code Status: Full Code    Disposition: Discharge date unclear, several days still pending plan for abx and improvement in JOSE RAMON .    Rachid Medeiros MD     Interval History         feels same, left pain has improved. Slept well. No new complaints, awaiting for TTE  scheduled for later today    -Data reviewed today: I reviewed all new labs and imaging results over the last 24 hours. I personally reviewed no images or EKG's today.    Physical Exam   Temp: 97.7  F (36.5  C) Temp src: Oral BP: (!) 143/94 Pulse: 98   Resp: 18 SpO2: 99 % O2 Device: None (Room air)    Vitals:    11/27/21 0621 11/29/21 0554 11/29/21 1340   Weight: 69.5 kg (153 lb 3.2 oz) 70 kg (154 lb 5.2 oz) 73.4 kg (161 lb 12.8 oz)     Vital Signs with Ranges  Temp:  [97.7  F (36.5  C)-97.8  F (36.6  C)] 97.7  F (36.5  C)  Pulse:  [85-98] 98  Resp:  [18] 18  BP: (143)/(94-95) 143/94  SpO2:  [99 %] 99 %  I/O last 3 completed shifts:  In: 450 [P.O.:450]  Out: 900 [Urine:900]    Constitutional: alert oriented in NAD  Respiratory: CTAB, no wheeze/rales/rhonchi, no increased wok of breathing  Cardiovascular: HRRR, soft systolic murmur noted   GI: soft NT/ND + BS    Medications       abacavir-dolutegravir-LamiVUDine  1 tablet Oral Daily     aspirin  81 mg Oral Daily     buprenorphine  8 mg Sublingual BID     carbamide peroxide  3 drop Left Ear BID     carisoprodol  87.5 mg Oral BID     DAPTOmycin (CUBICIN) intermittent infusion  9 mg/kg Intravenous Q24H     docusate sodium  100 mg Oral BID     levomilnacipran  80 mg Oral Daily     nicotine  1 patch Transdermal QPM     nicotine   Transdermal Q8H     polyethylene glycol  17 g Oral BID     sennosides  2 tablet Oral BID     sodium chloride (PF)  10 mL Intracatheter Q8H     sodium chloride (PF)  3 mL Intravenous Q8H     tamsulosin  0.4 mg Oral Daily       Data   Recent Labs   Lab 12/01/21  0621 11/30/21 1959 11/30/21  0612 11/29/21  1245 11/27/21  2138 11/27/21 1944 11/26/21  0005 11/26/21  0004    WBC 6.6  --   --   --   --  8.6  --  10.9   HGB 7.6*  --   --   --   --  8.2*  --  8.9*   MCV 87  --   --   --   --  90  --  88     --   --   --   --  385  --  479*     --  139 140   < > 138 137  --    POTASSIUM 4.7 5.3 5.6* 5.3   < > 6.1* 5.1  --    CHLORIDE 107  --  108 108   < > 107 104  --    CO2 26  --  28 27   < > 25 26  --    BUN 33*  --  42* 46*   < > 42* 40*  --    CR 2.08*  --  2.04* 2.17*   < > 2.38* 2.67*  --    ANIONGAP 7  --  3 5   < > 6 7  --    SANDRA 8.5  --  8.7 8.4*   < > 8.8 8.2*  --    *  --  93 103*   < > 89 104*  --    ALBUMIN  --   --   --   --   --   --  1.7*  --    PROTTOTAL  --   --   --   --   --   --  6.9  --    BILITOTAL  --   --   --   --   --   --  0.2  --    ALKPHOS  --   --   --   --   --   --  75  --    ALT  --   --   --   --   --   --  24  --    AST  --   --   --   --   --   --  16  --     < > = values in this interval not displayed.       No results found for this or any previous visit (from the past 24 hour(s)).

## 2021-12-02 NOTE — ANESTHESIA PROCEDURE NOTES
Airway       Patient location: [facility] - Operating Room or Procedural Area.       Procedure Start/Stop Times: 12/2/2021 12:47 PM  Staff -        Anesthesiologist:  David Mixon MD       CRNA: Marv Bates APRN CRNA       Performed By: CRNA  Consent for Airway        Urgency: elective  Indications and Patient Condition       Indications for airway management: brittany-procedural and airway protection       Induction type:RSI       Mask difficulty assessment: 0 - not attempted    Final Airway Details       Final airway type: endotracheal airway       Successful airway: ETT - single  Endotracheal Airway Details        ETT size (mm): 7.0       Cuffed: yes       Cuff volume (mL): 10       Successful intubation technique: video laryngoscopy       VL Blade Size: Dubon 4       Grade View of Cords: 1       Adjucts: stylet       Position: Right       Measured from: lips       Secured at (cm): 21       Bite block used: None    Post intubation assessment        Placement verified by: capnometry, equal breath sounds and chest rise        Number of attempts at approach: 1       Number of other approaches attempted: 0       Secured with: pink tape       Ease of procedure: easy       Dentition: Intact and Unchanged

## 2021-12-02 NOTE — PLAN OF CARE
DATE & TIME: 12/1/21 2414-8629  Cognitive Concerns/ Orientation : A&O x4, frustrated at times.   BEHAVIOR & AGGRESSION TOOL COLOR: Green  ABNL VS/O2: VSS. On RA  MOBILITY: Independent  PAIN MANAGMENT: C/o generalized pain rated 8/10, PRN tylenol given x1 and ativan given x1 for anxiety. Patient refused his schedule subutex. Pt c/o abd bloating Miralax and Maalox given x1  DIET: NPO  BOWEL/BLADDER: Continent, up to BR. Using urinal at bedside at times. No bm. Patient refused all his schedule bm meds earlier; patient said it doesn't work for him and magnesium citrate is what works for him, later he requested miralax.  ABNL LAB/BG: Hgb 7.6, Creat 2.08, K 5.6 -> 5.3, Urea nitrogen 3.3  DRAIN/DEVICES: R midline removed after  not able to flushing it, IV nurse came and removed line.  Unable to put a new one, pt stated '' not today, Iv nurse to come back today to place new midline.  TELEMETRY RHYTHM: NA  SKIN: Scrotal laceration; patient refused wound care last shift, +1 edema to bilateral feet/ankle, scattered bruising and scabbing.  TESTS/PROCEDURES: EDUARDO today. NPO from midnight except meds.  D/C DATE: Pending  Discharge Barriers: plan for antibiotic and improvement in JOSE RAMON  OTHER IMPORTANT INFO: Daptomycin q24h. ID and nephrology following. Contact precautions maintained for MRSA. Nicotine patch on right arm.

## 2021-12-02 NOTE — ANESTHESIA POSTPROCEDURE EVALUATION
Patient: Bc German    Procedure: Procedure(s):  ECHOCARDIOGRAM, TRANSESOPHAGEAL, INTRAOPERATIVE       Diagnosis:Endocarditis [I38]  Diagnosis Additional Information: No value filed.    Anesthesia Type:  General    Note:  Disposition: Inpatient   Postop Pain Control: Uneventful            Sign Out: Well controlled pain   PONV: No   Neuro/Psych: Uneventful            Sign Out: Acceptable/Baseline neuro status   Airway/Respiratory: Uneventful            Sign Out: Acceptable/Baseline resp. status   CV/Hemodynamics: Uneventful            Sign Out: Acceptable CV status   Other NRE: NONE   DID A NON-ROUTINE EVENT OCCUR? No           Last vitals:  Vitals Value Taken Time   /113 12/02/21 1340   Temp 36.6  C (97.8  F) 12/02/21 1317   Pulse 86 12/02/21 1340   Resp 12 12/02/21 1340   SpO2 96 % 12/02/21 1340   Vitals shown include unvalidated device data.    Electronically Signed By: David Mixon MD  December 2, 2021  2:37 PM

## 2021-12-02 NOTE — PROVIDER NOTIFICATION
MD Notification    Notified Person: MD    Notified Person Name:  Mala    Notification Date/Time:  12/2 0745    Notification Interaction:  Web page    Purpose of Notification:  Pt lost midline last evening.  Needs a order to place a new one    Orders Received: Order for midline received    Comments:

## 2021-12-02 NOTE — PRE-PROCEDURE
GENERAL PRE-PROCEDURE:   Procedure:  EDUARDO  Date/Time:  12/2/2021 12:29 PM    Written consent obtained?: Yes    Risks and benefits: Risks, benefits and alternatives were discussed    Consent given by:  Patient  Patient states understanding of procedure being performed: Yes    Patient's understanding of procedure matches consent: Yes    Procedure consent matches procedure scheduled: Yes    Expected level of sedation:  Moderate  Appropriately NPO:  Yes  Mallampati  :  Grade 2- soft palate, base of uvula, tonsillar pillars, and portion of posterior pharyngeal wall visible  Lungs:  Lungs clear with good breath sounds bilaterally  Heart:  Normal heart sounds and rate and systolic murmur  History & Physical reviewed:  History and physical reviewed and no updates needed  Statement of review:  I have reviewed the lab findings, diagnostic data, medications, and the plan for sedation    The risks and benefits of EDUARDO have been discussed with the patient in detail including the risks of serious complications including rare risk of death, esophageal tear or trauma, emergent surgery, pharyngeal hematoma/ trauma, methemoglobinemia, gastrointestinal bleeding etc. Patient denies any active upper GI issues or bleeding or difficult swallowing. Denies prior sedation related problems. The patient understands the above mentioned risks and is willing to proceed with the EDUARDO. Discussed with ordering hospitalist MD. EDUARDO (rather than TTE) requested by ID team. We will proceed per request.

## 2021-12-02 NOTE — PROGRESS NOTES
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 Blood cxs are negative since 11/18 and no surgical plans at this time . He has had medication non compliance with ART with detectable VL, CD4 >200 at this time not requiring PCP prophylaxis. Course further complicated by JOSE RAMON and hyperkalemia     1. MSSA/MRSA sepsis with tricuspid valve endocarditis - blood cxs cleared 11/18  2. JOSE RAMON  improving  3. Scrotal laceration without signs of infection.  4. Prior history of MSSA sepsis with pulmonary valve endocarditis in 2015 and spinal discitis/osteomyelitis with epidural abscess requiring surgical intervention in 2017.  5. Substance abuse with active use of methamphetamine / fentanyl currently  6.  HIV/Hepatitis C co-infection. Last HIV VL detectable at 29,522 copies/ml and  CD4 625(28%) on  06/09/2021 , on Triumeq  7.  Hyperkalemia , ? Due to JOSE RAMON  ? Daptomycin contributing? Improved now  8. RUE pain and swelling without evidence of DVT on doppler     Recommendations  1. Continue Daptomycin  2. Await EDUARDO   3. Continue Triumeq since renal functions have improved  4. Monitor potassium and renal functions    Alana Cooper MD    Interval History   Lost IV, getting EDUARDO today, remained afebrile    Antimicrobial therapy  11/26 Daptomycin  11/18-11/25 Vancomycin    Physical Exam   Temp: 97.7  F (36.5  C) Temp src: Oral BP: (!) 146/103 Pulse: 92   Resp: 18 SpO2: 98 % O2 Device: None (Room air)    Vitals:    11/27/21 0621 11/29/21 0554 11/29/21 1340   Weight: 69.5 kg (153 lb 3.2 oz) 70 kg (154 lb 5.2 oz) 73.4  kg (161 lb 12.8 oz)     Vital Signs with Ranges  Temp:  [97.7  F (36.5  C)-97.8  F (36.6  C)] 97.7  F (36.5  C)  Pulse:  [75-98] 92  Resp:  [18] 18  BP: (143-163)/() 146/103  SpO2:  [98 %-100 %] 98 %    Constitutional: Awake, alert, cooperative, no apparent distress  Lungs: Clear to auscultation bilaterally, no crackles or wheezing  Cardiovascular: Regular rate and rhythm, systolic murmur  Abdomen: Normal bowel sounds, soft, non-distended, non-tender  Skin: : multiple scars, IV racks, excoriations on hands,    : Left scrotal laceration site appears clean, no sign of infection  MS : RUE swelling    Other:    Medications       abacavir-dolutegravir-LamiVUDine  1 tablet Oral Daily     aspirin  81 mg Oral Daily     buprenorphine  8 mg Sublingual BID     carbamide peroxide  3 drop Left Ear BID     carisoprodol  87.5 mg Oral BID     DAPTOmycin (CUBICIN) intermittent infusion  9 mg/kg Intravenous Q24H     docusate sodium  100 mg Oral BID     levomilnacipran  80 mg Oral Daily     nicotine  1 patch Transdermal QPM     nicotine   Transdermal Q8H     polyethylene glycol  17 g Oral BID     sennosides  2 tablet Oral BID     sodium chloride (PF)  10 mL Intracatheter Q8H     sodium chloride (PF)  3 mL Intravenous Q8H     tamsulosin  0.4 mg Oral Daily       Data   All microbiology laboratory data reviewed.  Recent Labs   Lab Test 12/01/21 0621 11/27/21  1944 11/26/21  0004   WBC 6.6 8.6 10.9   HGB 7.6* 8.2* 8.9*   HCT 24.0* 27.1* 28.5*   MCV 87 90 88    385 479*     Recent Labs   Lab Test 12/01/21 0621 11/30/21  0612 11/29/21  1245   CR 2.08* 2.04* 2.17*     Recent Labs   Lab Test 11/16/21  0014   SED 39*     Component      Latest Ref Rng & Units 11/16/2021   CD3 [place] T      49 - 84 % 67   Absolute CD3      603-2,990 cells/uL 374 (L)   CD4 [place] T      28 - 63 % 37   Absolute CD4      441-2,156 cells/uL 206 (L)   CD8 Suppressor T      10 - 40 % 27   Absolute CD8      125-1,312 cells/uL 151   CD4:CD8  Ratio      1.40 - 2.60 1.37 (L)   HIV-1 RNA Copies/mL, Instrument      <1 copies/mL 985 (H)   HIV RNA QT Log       3.0      Component      Latest Ref Rng & Units 11/26/2021   CK Total      30 - 300 U/L 16 (L)      Microbiology  11/14 & 4/16 blood cxs both sets MSSA & MRSA, blood cxs 11/18, 11/19 blood cxs negative  Culture Positive on the 1st day of incubation Abnormal         Staphylococcus aureus Panic     2 of 2 bottles   Staphylococcus aureus MRSA Panic     2 of 2 bottles         Resulting Agency: [facility]         Susceptibility                       Staphylococcus aureus Staphylococcus aureus MRSA       PURNIMA PURNIMA       Clindamycin <=0.25 ug/mL Susceptible <=0.25 ug/mL Susceptible 1       Erythromycin <=0.25 ug/mL Susceptible >=8.0 ug/mL Resistant       Gentamicin <=0.5 ug/mL Susceptible <=0.5 ug/mL Susceptible       Linezolid     2.0 ug/mL Susceptible       Oxacillin <=0.25 ug/mL Susceptible >=4.0 ug/mL Resistant       Tetracycline <=1.0 ug/mL Susceptible <=1.0 ug/mL Susceptible       Trimethoprim/Sulfamethoxazole <=0.5/9.5 u... Susceptible <=0.5/9.5 u... Susceptible       Vancomycin <=0.5 ug/mL Susceptible <=0.5 ug/mL Susceptible                    Imaging  11/24 US kidneys  ULTRASOUND RENAL COMPLETE 11/24/2021 12:53 PM     CLINICAL HISTORY: MSSA sepsis, endocarditis, increasing creatinine,  evaluate for obstruction.     TECHNIQUE: Routine Bilateral Renal and Bladder Ultrasound.     COMPARISON: None.     FINDINGS:  RIGHT KIDNEY: 12.1 x 6.9 x 4.9 cm. Unremarkable echogenicity, no  hydronephrosis or masses. Simple cysts noted measuring up to 1.7 cm.  Mildly complex cyst in the upper right kidney measuring 1.8 cm.     LEFT KIDNEY: 1.2 x 6.0 x 5.8 cm. Unremarkable echogenicity, no  hydronephrosis or masses. Simple cysts noted.     BLADDER: Unremarkable given its level of distension.                                                                      IMPRESSION:  No obstruction demonstrated     11/18 EDUARDO  Interpretation  Summary     Two, large, mobile masses noted, attached to the atrial side of base of  posterior tricuspid leaflets are noted. (they measures 18 x 8mm and 13 x 5mm  respectively).These are most consistent with large tricuspid valve  vegetations.  Severe tricuspid regurgitation noted, directed towards the interatrial septum.  Cannot exclude perforation of posterior leaflet of tricuspid valve.  The right ventricle is mildly dilated.  The right ventricular systolic function is normal.  Left ventricular systolic function is normal.  The visual ejection fraction is 60-65%.  Findings discussed with Dr Guerin.  These are new comnpared to prior echo from 2017. The study was technically  adequate.

## 2021-12-02 NOTE — PROVIDER NOTIFICATION
MD Notification    Notified Person: MD    Notified Person Name: Mala    Notification Date/Time: 12/2 1455    Notification Interaction:  Web page    Purpose of Notification:  High BP    Orders Received:    Comments:

## 2021-12-02 NOTE — PROCEDURES
[facility]    Single Lumen Midline Placement    Date/Time: 12/2/2021 9:41 AM  Performed by: Olga Bettencourt RN  Authorized by: Alana Cooper MD   Indications: vascular access      UNIVERSAL PROTOCOL   Site Marked: Yes  Prior Images Obtained and Reviewed:  Yes  Required items: Required blood products, implants, devices and special equipment available    Patient identity confirmed:  Verbally with patient, arm band, provided demographic data and hospital-assigned identification number  NA - No sedation, light sedation, or local anesthesia  Confirmation Checklist:  Patient's identity using two indicators, relevant allergies, procedure was appropriate and matched the consent or emergent situation and correct equipment/implants were available  Time out: Immediately prior to the procedure a time out was called    Universal Protocol: the Joint Commission Universal Protocol was followed    Preparation: Patient was prepped and draped in usual sterile fashion    ESBL (mL):  2     ANESTHESIA    Local Anesthetic: Lidocaine 1% without epinephrine  Anesthetic Total (mL):  1      SEDATION    Patient Sedated: No        Preparation: skin prepped with ChloraPrep  Skin prep agent: skin prep agent completely dried prior to procedure  Sterile barriers: maximum sterile barriers were used: cap, mask, sterile gown, sterile gloves, and large sterile sheet  Hand hygiene: hand hygiene performed prior to central venous catheter insertion  Type of line used: Midline  Catheter type: single lumen  Lumen type: non-valved  Catheter size: 4 Fr  Brand: Bard  Placement method: ultrasound  Number of attempts: 1  Successful placement: yes  Orientation: left  Location: basilic vein  Arm circumference: adults 10 cm  Extremity circumference: 28  Visible catheter length: 0  Internal length: 8 cm  Total catheter length: 8  Dressing and securement: alcohol impregnated caps, chlorhexidine disc applied, dressing applied and  securement device  Post procedure assessment: blood return through all ports  PROCEDURE   Patient Tolerance:  Patient tolerated the procedure well with no immediate complications   Midline placed without difficulty/midline ok to use

## 2021-12-02 NOTE — PROGRESS NOTES
Care Management Follow Up    Length of Stay (days): 15    Expected Discharge Date: 12/07/2021     Concerns to be Addressed: discharge planning     Patient plan of care discussed at interdisciplinary rounds: Yes    Anticipated Discharge Disposition: Inpatient Chemical Dependency     Anticipated Discharge Services:    Anticipated Discharge DME:      Patient/family educated on Medicare website which has current facility and service quality ratings:    Education Provided on the Discharge Plan:    Patient/Family in Agreement with the Plan: yes    Referrals Placed by CM/SW:    Private pay costs discussed:     Additional Information:  Met with patient today.  He is in agreement with entering a residential chemical dependency program.  He would like to be in a program outside of the [facility] area.  Writer sent Leyda Frances, the CD , an email asking her to discuss options outside of the [facility] area. Leyda will speak with patient and suggest [facility] which has several programs outside of the [facility] area.   Patient would like to be able to go home and pack clothing and then go on to the program rather than discharging directly to the program. We discussed this could be done the same day of discharge.  His belongings are at his father's.  Patient's mother was present and said she or his father can assist.    Patient did bring up that he has been on Ativan since 2012 for extreme anxiety.  He had tried many medications previously and finds this most effective and the medication is prescribed by his psychiatrist.   When not on Ativan he reports he has difficulty eating and sleeping.   Writer discussed that she is unaware of a CD program that will allow him to be on Ativan.  Patient aware of this a a potential barrier to residential treatment.  Recommend he discuss with Leyda Frances as she may know of some options.  We discussed referrals will be made once we known how long he will be in the hospital on IV therapy.  He  expressed frustration in that he is getting different opinions from the different MD's and doesn't know the length of his therapy. Writer will ask CT RN to speak with physicians and update patient.    Patient thought his name was blocked in the hospital census.  Writer determined it was not and he requested it be blocked. He explains there are people in the community whom he doesn't want  to know where he is.     Plan:  Either Leyda or JAREN will make referrals to CD programs and follow up for admission once patient's discharge date is anticipated.  Because waiting lists can be one to two weeks we can begin sending referrals pending  discharge date.             Alyce Oreilly, EULASW

## 2021-12-02 NOTE — PLAN OF CARE
DATE & TIME: 12/1/21 3-11pm  Cognitive Concerns/ Orientation : A&O x4, frustrated at times.   BEHAVIOR & AGGRESSION TOOL COLOR: Green  ABNL VS/O2: VSS. On RA  MOBILITY: Independent  PAIN MANAGMENT: C/o generalized pain rated 4/10, declined pain med offered. Patient refused his schedule subutex.   DIET: 2 gram potassium diet  BOWEL/BLADDER: Continent, up to BR. Using urinal at bedside at times. No bm. Patient refused all his schedule bm meds; patient said it doesn't work for him and magnesium citrate is what works for him.  ABNL LAB/BG: Hgb 7.6, Creat 2.08, K 5.6 -> 5.3, Urea nitrogen 3.3  DRAIN/DEVICES: R midline not flushing, IV nurse came and removed line. Iv nurse to come back tomorrow to place new midline.  TELEMETRY RHYTHM: NA  SKIN: Scrotal laceration; patient refused wound care this shift, +1 edema to bilateral feet/ankle, scattered bruising and scabbing.  TESTS/PROCEDURES: EDUARDO tomorrow. NPO from midnight except meds.  D/C DATE: Pending  Discharge Barriers: plan for antibiotic and improvement in JOSE RAMON  OTHER IMPORTANT INFO: Daptomycin q24h. ID and nephrology following. Contact precautions maintained for MRSA. Nicotine patch on right arm.

## 2021-12-02 NOTE — PLAN OF CARE
DATE & TIME: 12/2/21   Cognitive Concerns/ Orientation : A&O x4, frustrated/irritable/refusing cares at times, requesting to be left alone to sleep.   BEHAVIOR & AGGRESSION TOOL COLOR: Green  ABNL VS/O2: Refused Vitals, on RA  MOBILITY: Ind  PAIN MANAGMENT: Denies  DIET: Regular, stalin meal after EDUARDO  BOWEL/BLADDER: Continent, up to BR. Using urinal at bedside at times. Small BM x1, pt c/o constipation and requesting mag citrate, will ask the [facility] when convenient, pt has sched miralax and senna at [facility] (refused AM miralax).  ABNL LAB/BG: Creat 1.88, trending down  DRAIN/DEVICES: L midline SL  TELEMETRY RHYTHM: NA  SKIN: Scrotal laceration; patient refused wound care, +1 edema to bilateral feet/ankle, scattered bruising and scabbing.  TESTS/PROCEDURES: EDUARDO performed.  D/C DATE: Pend progress  Discharge Barriers: Pend progress  OTHER IMPORTANT INFO: Daptomycin q24h. ID and nephrology following. Contact precautions maintained for MRSA. Nicotine patch on. PRN ativan given as requested, apologetic for his irritable behavior earlier in the shift.

## 2021-12-02 NOTE — PROGRESS NOTES
[facility] Nurse Inpatient Wound Assessment   Reason for consultation: Re-Evaluate and treat  Scrotum wound wounds     Assessment  Patient refused assessment, will re-attempt consult/reassessment at a later time/date.       Continue current plan of care:   Scrotum wound: BID and PRN  1.Cleanse area with microklenz and blot dry  2. Apply nickel thick layer of Triad paste (#[id number]) to wound bed and thin layer over reddened areas   3. Ok to leave open to air, unless patient would prefer to have area covered then would recommend covering with Optifoam gentle border (#058451) or  Mepilex Border Dressing (#875106)     **No need to remove all paste after each incontinent episode, remove soiled paste and reapply as needed.  **If complete removal of paste is necessary use baby oil/mineral oil (Located in Pharmacy) and soft wash cloth.   Leave the brief off in bed to let the area dry as much as possible.   Use only one Covidien pad in between mattress and pt. No brief while in bed.      Orders: reviewed      Erlinda KINGSLEY

## 2021-12-03 ENCOUNTER — APPOINTMENT (OUTPATIENT)
Dept: ULTRASOUND IMAGING | Facility: CLINIC | Age: 41
DRG: 871 | End: 2021-12-03
Attending: SPECIALIST
Payer: COMMERCIAL

## 2021-12-03 LAB
ANION GAP SERPL CALCULATED.3IONS-SCNC: 1 MMOL/L (ref 3–14)
BUN SERPL-MCNC: 31 MG/DL (ref 7–30)
CALCIUM SERPL-MCNC: 8.7 MG/DL (ref 8.5–10.1)
CHLORIDE BLD-SCNC: 105 MMOL/L (ref 94–109)
CO2 SERPL-SCNC: 30 MMOL/L (ref 20–32)
CREAT SERPL-MCNC: 1.92 MG/DL (ref 0.66–1.25)
CRP SERPL-MCNC: 74.5 MG/L (ref 0–8)
GFR SERPL CREATININE-BSD FRML MDRD: 43 ML/MIN/1.73M2
GLUCOSE BLD-MCNC: 131 MG/DL (ref 70–99)
POTASSIUM BLD-SCNC: 5.2 MMOL/L (ref 3.4–5.3)
SODIUM SERPL-SCNC: 136 MMOL/L (ref 133–144)

## 2021-12-03 PROCEDURE — 99233 SBSQ HOSP IP/OBS HIGH 50: CPT | Performed by: HOSPITALIST

## 2021-12-03 PROCEDURE — G0463 HOSPITAL OUTPT CLINIC VISIT: HCPCS

## 2021-12-03 PROCEDURE — 250N000011 HC RX IP 250 OP 636: Performed by: SPECIALIST

## 2021-12-03 PROCEDURE — 258N000003 HC RX IP 258 OP 636: Performed by: SPECIALIST

## 2021-12-03 PROCEDURE — 93971 EXTREMITY STUDY: CPT | Mod: LT

## 2021-12-03 PROCEDURE — 250N000013 HC RX MED GY IP 250 OP 250 PS 637: Performed by: SPECIALIST

## 2021-12-03 PROCEDURE — 250N000013 HC RX MED GY IP 250 OP 250 PS 637

## 2021-12-03 PROCEDURE — 250N000011 HC RX IP 250 OP 636: Performed by: HOSPITALIST

## 2021-12-03 PROCEDURE — 250N000013 HC RX MED GY IP 250 OP 250 PS 637: Performed by: HOSPITALIST

## 2021-12-03 PROCEDURE — 999N000190 HC STATISTIC VAT ROUNDS

## 2021-12-03 PROCEDURE — 250N000013 HC RX MED GY IP 250 OP 250 PS 637: Performed by: STUDENT IN AN ORGANIZED HEALTH CARE EDUCATION/TRAINING PROGRAM

## 2021-12-03 PROCEDURE — 250N000013 HC RX MED GY IP 250 OP 250 PS 637: Performed by: PSYCHIATRY & NEUROLOGY

## 2021-12-03 PROCEDURE — 120N000001 HC R&B MED SURG/OB

## 2021-12-03 PROCEDURE — 99232 SBSQ HOSP IP/OBS MODERATE 35: CPT | Performed by: INTERNAL MEDICINE

## 2021-12-03 PROCEDURE — 250N000013 HC RX MED GY IP 250 OP 250 PS 637: Performed by: INTERNAL MEDICINE

## 2021-12-03 RX ORDER — AMLODIPINE BESYLATE 5 MG/1
5 TABLET ORAL DAILY
Status: DISCONTINUED | OUTPATIENT
Start: 2021-12-03 | End: 2021-12-20

## 2021-12-03 RX ORDER — FUROSEMIDE 10 MG/ML
40 INJECTION INTRAMUSCULAR; INTRAVENOUS ONCE
Status: COMPLETED | OUTPATIENT
Start: 2021-12-03 | End: 2021-12-03

## 2021-12-03 RX ORDER — METOPROLOL TARTRATE 25 MG/1
25 TABLET, FILM COATED ORAL 2 TIMES DAILY
Status: DISCONTINUED | OUTPATIENT
Start: 2021-12-03 | End: 2021-12-19

## 2021-12-03 RX ADMIN — BUPRENORPHINE HYDROCHLORIDE 8 MG: 8 TABLET SUBLINGUAL at 20:51

## 2021-12-03 RX ADMIN — BUPRENORPHINE HYDROCHLORIDE 8 MG: 8 TABLET SUBLINGUAL at 08:20

## 2021-12-03 RX ADMIN — DOCUSATE SODIUM 100 MG: 100 CAPSULE, LIQUID FILLED ORAL at 20:51

## 2021-12-03 RX ADMIN — LEVOMILNACIPRAN HYDROCHLORIDE 80 MG: 40 CAPSULE, EXTENDED RELEASE ORAL at 08:21

## 2021-12-03 RX ADMIN — DAPTOMYCIN 650 MG: 500 INJECTION, POWDER, LYOPHILIZED, FOR SOLUTION INTRAVENOUS at 12:30

## 2021-12-03 RX ADMIN — ASPIRIN 81 MG: 81 TABLET, COATED ORAL at 08:21

## 2021-12-03 RX ADMIN — Medication 3 DROP: at 08:24

## 2021-12-03 RX ADMIN — LORAZEPAM 0.5 MG: 0.5 TABLET ORAL at 14:23

## 2021-12-03 RX ADMIN — AMLODIPINE BESYLATE 5 MG: 5 TABLET ORAL at 10:21

## 2021-12-03 RX ADMIN — METOPROLOL TARTRATE 25 MG: 25 TABLET, FILM COATED ORAL at 20:51

## 2021-12-03 RX ADMIN — Medication 87.5 MG: at 20:51

## 2021-12-03 RX ADMIN — TAMSULOSIN HYDROCHLORIDE 0.4 MG: 0.4 CAPSULE ORAL at 08:21

## 2021-12-03 RX ADMIN — NICOTINE 1 PATCH: 14 PATCH, EXTENDED RELEASE TRANSDERMAL at 20:53

## 2021-12-03 RX ADMIN — ABACAVIR SULFATE, DOLUTEGRAVIR SODIUM, LAMIVUDINE 1 TABLET: 600; 50; 300 TABLET, FILM COATED ORAL at 08:21

## 2021-12-03 RX ADMIN — SENNOSIDES 2 TABLET: 8.6 TABLET, FILM COATED ORAL at 20:51

## 2021-12-03 RX ADMIN — Medication 3 DROP: at 20:57

## 2021-12-03 RX ADMIN — Medication 87.5 MG: at 08:47

## 2021-12-03 RX ADMIN — DOCUSATE SODIUM 100 MG: 100 CAPSULE, LIQUID FILLED ORAL at 08:21

## 2021-12-03 RX ADMIN — LORAZEPAM 0.5 MG: 0.5 TABLET ORAL at 22:39

## 2021-12-03 RX ADMIN — FUROSEMIDE 40 MG: 10 INJECTION, SOLUTION INTRAVENOUS at 15:00

## 2021-12-03 RX ADMIN — LORAZEPAM 0.5 MG: 0.5 TABLET ORAL at 08:20

## 2021-12-03 ASSESSMENT — ACTIVITIES OF DAILY LIVING (ADL)
ADLS_ACUITY_SCORE: 5

## 2021-12-03 ASSESSMENT — MIFFLIN-ST. JEOR: SCORE: 1637

## 2021-12-03 NOTE — PLAN OF CARE
DATE & TIME: 12/3/21 [id number]   Cognitive Concerns/ Orientation : A/Ox4  BEHAVIOR & AGGRESSION TOOL COLOR: Green   ABNL VS/O2: VSS now; 90's RA (BP has been elevated - lasix and norvasc given)  MOBILITY: Independent  PAIN MANAGMENT: Patient c/o generalized pain but declined pain meds   DIET: Regular  BOWEL/BLADDER: Continent; received mag citrate last night and colace in am - last BM today 12/3  ABNL LAB/BG: Cr 1.92; K+ 5.2  DRAIN/DEVICES: Midline SL'd  SKIN: Scrotal laceration LISSETH; Scattered bruising and scabs.  D/C DATE: Discharge pending progress  OTHER IMPORTANT INFO: Nicotine patch to left arm. Daptomycin q24 hrs. ID following. Nephrology signed off. Cardiothoracic surgery consulted. Contact precautions maintained.

## 2021-12-03 NOTE — PROVIDER NOTIFICATION
MD Notification    Notified Person: MD    Notified Person Name:  Apoorva    Notification Date/Time:  12/3 0940    Notification Interaction:  Vocera Page    Purpose of Notification:  Elevated BP    Orders Received:  Norvasc ordered    Comments:

## 2021-12-03 NOTE — PROGRESS NOTES
[facility]  Hospitalist Progress Note        Derick Guerin MD   12/03/2021        Interval History:        - ID following, to continue Daptomycin, with plans to switch to Vancomycin when renal function better; will need six weeks of antibiotic until 12/29  - remains afebrile ; EDUARDO repeat 12/02 noted with smaller vegetations than prior EDUARDO but did note that tricuspid valve leaflet is likely perforated and prolapsing leading to severe degenerative TR, EF 65-70 %  - given EDUARDO findings, will have thoracic surgery re-evaluation  - noted worsening bilateral LE swelling, likely due to TR, US 12/3 noted with no DVT  - nephrology following for JOSE RAMON, Cr peak 2.8 (11/25)---> trending down-->1.92; nephro plans to follow up outpatient; signed off 12/3  - BP elevated in 160s-- will start amlodipine 5 mg po daily; hydralazine IV prn for SBP>180         Assessment and Plan:        Bc German is a 41 year old male with PMHx of polysubstance abuse including IV drug use (including fentanyl and methamphetamine currently), chronic opioid dependence, HIV, hepatitis C, hx of MSSA sepsis with pulmonary valve endocarditis (2015), and 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 Was found to have recurrent MSSA/MRSA bacteremia and tricuspid valve endocarditis.    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 lactate nl. CXR on admission showed nodular/patchy opacity at the R base.  * 2/2 blood cultures drawn on admission grew both MSSA and  MRSA >> blood cx cleared  on 11/18 >> vancomycin  changed to daptomycin due to JOSE RAMON   EDUARDO done 11/18 notable for large tricuspid valve vegetation with severe regurgitation  * per CV surgery continue medical management for now, consider surgical options if he clinically worsened  - ID following, to continue Daptomycin, with plans to switch to Vancomycin when renal function better; will need 6 weeks of antibiotic until 12/29; midline placed 12/2  - remains afebrile ; EDUARDO repeat 12/02 noted with smaller vegetations than prior EDUARDO but did note that tricuspid valve leaflet is likely perforated and prolapsing leading to severe degenerative TR, EF 65-70 %  - given EDUARDO findings, will have thoracic surgery re-evaluation  - noted worsening bilateral LE swelling, likely due to TR, US 12/3 noted with no DVT; will order 40 mg IV Lasix 12/3 and monitor volume status closely    HTN  - BP elevated in 160s-- will start amlodipine 5 mg po daily; hydralazine IV prn for SBP>180; will also start Metoprolol 25 mg po bid with hold parameters; plan for intermittent diuresis as above     HIV  Hepatitis C  * Chronic and stable on Triumeq restarted 11/30 as cr improving   * CDC >200 so no need for PJP ppx per ID     JOSE RAMON on CKD   Hyperkalemia (resolved)  Hyponatremia: resolved   * No hx of CKD and with a normal baseline enrique function  * Cr initially stable this stay, began trending up after vancomycin  - nephrology evaluated for JOSE RAMON, Cr peak 2.8 (11/25)---> trending down-->1.92; nephro plans to follow up outpatient; signed off 12/3  - monitor BMP  - ADAN returned negative, Renal US obtained 11/24 and negative for obstruction     History of opiate dependence  History of active IV drug use  Tobacco Use  Depression  * On admission, had stated he last used IV drugs 1 wk prior to admission. While in the ED on 11/16, RN had noted patient was briefly nonresponsive and apneic. Was given dose of Narcan with noted improvement.   * Prior to admission, had  been started on taper off Soma -- was taking 87.5mg (1/4 of a 350mg tab) BID but later conversations with PCP's clinic revealed no provider there was managing taper  -- cont buprenorphine BID this stay  -- conts on levomilnacipran ER as per prior to admission  -- cont carisoprodol 87.5mg BID as per prior to admission, unclear what further plan was for taper  -- cont lorazepam 0.5mg q6h prn for anxiety   -- cont nicotine patch  CHEM Depp consultation requested and recommended inpatient treatment after completion of antibiotics directly will transfer from here to detox treatment center      Left scrotal laceration dt fall/possible syncope  Left scrotal wound infection  * Laceration sutured in ED. Suture removed 11/21.   * US scrotum earlier this stay showed a small R hydrocele but was otherwise normal.   Patient's scrotal laceration wound looks clean and dry.    Urology and wound care consultations requested patient is on IV daptomycin which should cover for the scrotal infection as well     Left ear pain; improving    No evidence of otitis media or externa. Likely cerumen impaction,  _ continue to  apply carbamide peroxide  Ear drops to left ears,      COVID status: asymptomatic screening negative on 11/16, 11/20, 11/29     FEN: low potassium diet   DVT Prophylaxis: PCDs  Code Status: Full Code     Disposition: Discharge date unclear, will need to complete IV antibiotics, likely here (until 12/29) and then will need to go to inpatient Chem dep treatment pending ID clearance      Clinically Significant Risk Factors Present on Admission                 Care plan discussed with patient, nursing and also Dr. Cooper from infectious disease        Page Me (7 am to 6 pm)              Physical Exam:      Blood pressure (!) 163/114, pulse 60, temperature 97.6  F (36.4  C), temperature source Oral, resp. rate 18, height 1.829 m (6'), weight 68.4 kg (150 lb 12.7 oz), SpO2 99 %.  Vitals:    11/29/21 0554 11/29/21 1340 12/03/21 0652    Weight: 70 kg (154 lb 5.2 oz) 73.4 kg (161 lb 12.8 oz) 68.4 kg (150 lb 12.7 oz)     Vital Signs with Ranges  Temp:  [97.6  F (36.4  C)-98.2  F (36.8  C)] 97.6  F (36.4  C)  Pulse:  [60-92] 60  Resp:  [11-18] 18  BP: (136-164)/() 163/114  SpO2:  [95 %-99 %] 99 %  I/O's Last 24 hours  I/O last 3 completed shifts:  In: 1140 [P.O.:640; I.V.:500]  Out: 1400 [Urine:1400]    Constitutional: awake and oriented X 3; resting comfortably in no apparent distress   HEENT: Pupils equal and reactive to light and accomodation, neck supple    Oral cavity: Moist mucosa   Cardiovascular: Normal s1 s2, regular rate and rhythm, systolic murmur +   Lungs: B/l clear to auscultation, no wheezes or crepitations   Abdomen: Soft, nt, nd, no guarding, rigidity or rebound; BS +   LE : B/l edema ++   Musculoskeletal: Power 5/5 in all extremities   Neuro: No focal neurological deficits noted   Psychiatry: normal mood and affect  Skin : noted multiple skin excoriations and injection marks; scrotal laceration suture removed                   Medications:          abacavir-dolutegravir-LamiVUDine  1 tablet Oral Daily     aspirin  81 mg Oral Daily     buprenorphine  8 mg Sublingual BID     carbamide peroxide  3 drop Left Ear BID     carisoprodol  87.5 mg Oral BID     DAPTOmycin (CUBICIN) intermittent infusion  9 mg/kg Intravenous Q24H     docusate sodium  100 mg Oral BID     levomilnacipran  80 mg Oral Daily     nicotine  1 patch Transdermal QPM     nicotine   Transdermal Q8H     polyethylene glycol  17 g Oral BID     sennosides  2 tablet Oral BID     sodium chloride (PF)  10 mL Intracatheter Q8H     tamsulosin  0.4 mg Oral Daily     PRN Meds: acetaminophen **OR** acetaminophen, alum & mag hydroxide-simethicone, bisacodyl, calcium carbonate, glucose **OR** dextrose **OR** glucagon, hydrALAZINE, lidocaine 4%, lidocaine (buffered or not buffered), lidocaine (buffered or not buffered), LORazepam, naloxone **OR** naloxone **OR** naloxone **OR**  naloxone, nitroGLYcerin, ondansetron **OR** ondansetron, prochlorperazine **OR** prochlorperazine **OR** prochlorperazine, sodium chloride (PF), sodium chloride (PF), sodium chloride (PF)         Data:      All new lab and imaging data was reviewed.   Recent Labs   Lab Test 12/01/21  0621 11/27/21  1944 11/26/21  0004 07/20/21  0128 05/09/21  1450 10/09/17  2149 10/09/17  2015 03/13/15  0900 03/12/15  1620   WBC 6.6 8.6 10.9   < > 5.5   < > [facility] [address] [facility]   < > 11.8*   HGB 7.6* 8.2* 8.9*   < > 13.3   < > [facility] [address] [facility]   < > 10.5*   MCV 87 90 88   < > 87   < > [facility] 2[facility] BY [facility]   < > 81    385 479*   < > 354   < > [facility]   < > 515*   INR  --   --   --   --  0.96  --  1.01  --  1.92*    < > = values in this interval not displayed.      Recent Labs   Lab Test 12/02/21  2354 12/02/21  1439 12/01/21  0621    139 140   POTASSIUM 5.2 4.7 4.7   CHLORIDE 105 108 107   CO2 30 26 26   BUN 31* 28 33*   CR 1.92* 1.88* 2.08*   ANIONGAP 1* 5 7   SANDRA 8.7 8.5 8.5   * 106* 103*     Recent Labs   Lab Test 05/09/21  1450 10/09/17  2015   TROPI <0.015 <0.015

## 2021-12-03 NOTE — PROGRESS NOTES
Care Management Follow Up    Length of Stay (days): 17    Expected Discharge Date: 01/14/2022     Concerns to be Addressed: discharge planning     Patient plan of care discussed at interdisciplinary rounds: Yes    Anticipated Discharge Disposition: Inpatient Chemical Dependency     Anticipated Discharge Services:    Anticipated Discharge DME:      Patient/family educated on Medicare website which has current facility and service quality ratings:    Education Provided on the Discharge Plan:    Patient/Family in Agreement with the Plan: yes    Referrals Placed by CM/SW:    Private pay costs discussed: Not applicable    Additional Information:  Met with pt. Provided him a list of CD treatment facilities provide by KEVIN Chavarria. He appreciated this list and will look in to them. He would prefer a facility that is out of the [facility] and can accomodate him on ativan. Also, he would like to be set up with a methadone clinic in the [facility]. He feels methadone works better for him then suboxone. Updated Leyda on discharge plan. Pt will need to complete IV antibiotics prior to discharge to a CD program. If more then 30 days before pt attends program, Leyda will need to re-do/update her assessment.       DONELL Wilkinson, LGSW  [phone]  [facility]

## 2021-12-03 NOTE — PROGRESS NOTES
[facility]    Infectious Disease Progress Note    Date of Service : 12/03/2021     Assessment:  41 year old male with polysubstance use/IVDA, HIV-Hep C co-infection and prior hx of recurrent MSSA sepsis with pulmonic valve endocarditis, spinal discitis/osteomyelitis with epidural abscess requiring surgical debridement, who is now admitted with high grade MSSA/MRSA bacteremia with tricuspid valve endocarditis with two large [id number] x 8mm and 13 x 5mm, and repeat EDUARDO now shows valve perforation with severe tricuspid regurgitation. Blood cxs are negative since 11/18  . He has had medication non compliance with ART with detectable VL, CD4 >200 at this time not requiring PCP prophylaxis. Course further complicated by JOSE RAMON and hyperkalemia     1. MSSA/MRSA sepsis with tricuspid valve endocarditis - blood cxs cleared 11/18, Repeat EDUARDO 12/2 now shows valve perforation with severe regurgitation.   2. JOSE RAMON  improving  3. New LE edema L>R.  4. Prior history of MSSA sepsis with pulmonary valve endocarditis in 2015 and spinal discitis/osteomyelitis with epidural abscess requiring surgical intervention in 2017.  5. Substance abuse with active use of methamphetamine / fentanyl currently  6.  HIV/Hepatitis C co-infection. Last HIV VL detectable at 29,522 copies/ml and  CD4 625(28%) on  06/09/2021 , on Triumeq  7.  Hyperkalemia , ? Due to JOSE RAMON  ? Daptomycin contributing? Improved now  8. Scrotal laceration      Recommendations  1. Will need thoracic surgical evaluation / follow up given findings of tricuspid valve perforation with severe regurgitation on EDUARDO. Unlikely surgical candidate given active drug use unless acutely symptomatic.   2. Doppler LLE to assess for DVT  3. Will need to complete treatment for full 6 week duration until 12/29.  4. Plan to transition back to vancomycin once renal functions are better    Alana Cooper MD    Interval History   Resting, complains of increasing LE swelling L>R.  No calf pain    Antimicrobial therapy  11/26 Daptomycin  11/18-11/25 Vancomycin    Physical Exam   Temp: 97.6  F (36.4  C) Temp src: Oral BP: (!) 163/114 Pulse: 60   Resp: 18 SpO2: 99 % O2 Device: None (Room air) Oxygen Delivery: 6 LPM  Vitals:    11/29/21 0554 11/29/21 1340 12/03/21 0652   Weight: 70 kg (154 lb 5.2 oz) 73.4 kg (161 lb 12.8 oz) 68.4 kg (150 lb 12.7 oz)     Vital Signs with Ranges  Temp:  [97.6  F (36.4  C)-98.2  F (36.8  C)] 97.6  F (36.4  C)  Pulse:  [60-92] 60  Resp:  [11-18] 18  BP: (136-164)/() 163/114  SpO2:  [95 %-100 %] 99 %    Constitutional: Awake, alert, cooperative, no apparent distress  Lungs: Clear to auscultation bilaterally, no crackles or wheezing  Cardiovascular: Regular rate and rhythm, systolic murmur  Abdomen: Normal bowel sounds, soft, non-distended, non-tender  Skin: : multiple scars, IV racks, excoriations on hands,    : Left scrotal laceration site appears clean, no sign of infection  MS : bilateral LE edema L>R    Other:    Medications       abacavir-dolutegravir-LamiVUDine  1 tablet Oral Daily     aspirin  81 mg Oral Daily     buprenorphine  8 mg Sublingual BID     carbamide peroxide  3 drop Left Ear BID     carisoprodol  87.5 mg Oral BID     DAPTOmycin (CUBICIN) intermittent infusion  9 mg/kg Intravenous Q24H     docusate sodium  100 mg Oral BID     levomilnacipran  80 mg Oral Daily     nicotine  1 patch Transdermal QPM     nicotine   Transdermal Q8H     polyethylene glycol  17 g Oral BID     sennosides  2 tablet Oral BID     sodium chloride (PF)  10 mL Intracatheter Q8H     tamsulosin  0.4 mg Oral Daily       Data   All microbiology laboratory data reviewed.  Recent Labs   Lab Test 12/01/21  0621 11/27/21 1944 11/26/21  0004   WBC 6.6 8.6 10.9   HGB 7.6* 8.2* 8.9*   HCT 24.0* 27.1* 28.5*   MCV 87 90 88    385 479*     Recent Labs   Lab Test 12/02/21  2354 12/02/21  1439 12/01/21  0621   CR 1.92* 1.88* 2.08*     Recent Labs   Lab Test 11/16/21  0014   SED  39*   Microbiology  11/14 & 4/16 blood cxs both sets MSSA & MRSA, blood cxs 11/18, 11/19 blood cxs negative  Culture Positive on the 1st day of incubation Abnormal         Staphylococcus aureus Panic     2 of 2 bottles   Staphylococcus aureus MRSA Panic     2 of 2 bottles         Resulting Agency: [facility]         Susceptibility                       Staphylococcus aureus Staphylococcus aureus MRSA       PURNIMA PURNIMA       Clindamycin <=0.25 ug/mL Susceptible <=0.25 ug/mL Susceptible 1       Erythromycin <=0.25 ug/mL Susceptible >=8.0 ug/mL Resistant       Gentamicin <=0.5 ug/mL Susceptible <=0.5 ug/mL Susceptible       Linezolid     2.0 ug/mL Susceptible       Oxacillin <=0.25 ug/mL Susceptible >=4.0 ug/mL Resistant       Tetracycline <=1.0 ug/mL Susceptible <=1.0 ug/mL Susceptible       Trimethoprim/Sulfamethoxazole <=0.5/9.5 u... Susceptible <=0.5/9.5 u... Susceptible       Vancomycin <=0.5 ug/mL Susceptible <=0.5 ug/mL Susceptible                    Imaging  11/2 EDUARDO  Interpretation Summary     There is a small filamentous echodensity noted on the posterior mitral valve  leaflet (image 28) which in the clinical context, could be a vegetation.  The aortic valve is normal in structure and function.  There is a 0.9 cm vegetation noted on the posterior leaflet of the tricuspid  valve. The leaflet is likely perforated and prolapsing leading to severe  degenerative TR. Vegetations much smaller than prior EDUARDO. See images 100-127  towards the end of the study.  The visual ejection fraction is 65-70%.  The right ventricle is normal in size and function    11/24 US kidneys  ULTRASOUND RENAL COMPLETE 11/24/2021 12:53 PM     CLINICAL HISTORY: MSSA sepsis, endocarditis, increasing creatinine,  evaluate for obstruction.     TECHNIQUE: Routine Bilateral Renal and Bladder Ultrasound.     COMPARISON: None.     FINDINGS:  RIGHT KIDNEY: 12.1 x 6.9 x 4.9 cm. Unremarkable echogenicity, no  hydronephrosis or masses. Simple cysts noted  measuring up to 1.7 cm.  Mildly complex cyst in the upper right kidney measuring 1.8 cm.     LEFT KIDNEY: 1.2 x 6.0 x 5.8 cm. Unremarkable echogenicity, no  hydronephrosis or masses. Simple cysts noted.     BLADDER: Unremarkable given its level of distension.                                                                      IMPRESSION:  No obstruction demonstrated     11/18 EDUARDO  Interpretation Summary     Two, large, mobile masses noted, attached to the atrial side of base of  posterior tricuspid leaflets are noted. (they measures 18 x 8mm and 13 x 5mm  respectively).These are most consistent with large tricuspid valve  vegetations.  Severe tricuspid regurgitation noted, directed towards the interatrial septum.  Cannot exclude perforation of posterior leaflet of tricuspid valve.  The right ventricle is mildly dilated.  The right ventricular systolic function is normal.  Left ventricular systolic function is normal.  The visual ejection fraction is 60-65%.  Findings discussed with Dr Guerin.  These are new comnpared to prior echo from 2017. The study was technically  adequate.

## 2021-12-03 NOTE — PLAN OF CARE
DATE & TIME: 12/3/21 7588-6299    Cognitive Concerns/ Orientation : A&OX4   BEHAVIOR & AGGRESSION TOOL COLOR: Green  CIWA SCORE: NA   ABNL VS/O2: Hypertension.  BP med ordered  MOBILITY: Ind  PAIN MANAGMENT: Denies  DIET: Regular  BOWEL/BLADDER: C/O constipation.  Mag citrate given over NOC.  Colace given this AM.  Refused miralax and senna  ABNL LAB/BG: Creat 1.92.  CRP 74.5  DRAIN/DEVICES: Lt Midline SL  TELEMETRY RHYTHM: NA  SKIN: Scattered bruises, scabs, abrasions.  Scrotal wound.  1+ edema to legs, feet ankles and hands  TESTS/PROCEDURES: LE US done today to assess for DVT  D/C DATE: Pending medical improvement  Discharge Barriers: Medical improvement.  Antibiotic course  OTHER IMPORTANT INFO: Daptomycin q24 hrs.  ID following.   Nephrology signed off. Cardiothoracic surgery consulted.  Contact precautions maintained.  PRN ativan given this AM.  BPs 160s/100s.  MD updated.  Norvasc ordered.  IV lasix also ordered.

## 2021-12-03 NOTE — CONSULTS
Patient has already been seen by Cardiothoracic Surgery on 11/18/2021. Please refer to Consult note for detailed history and exam.    42 yo male with h/o IV drug abuse and pulmonary endocarditis few years ago managed with abx, now admitted with tricuspid valve endocarditis.  Patient has been treated with IV Abx.  Last few blood cultures have been negative.    /83 (BP Location: Right arm)   Pulse 89   Temp 97.3  F (36.3  C) (Oral)   Resp 18   Ht 1.829 m (6')   Wt 68.4 kg (150 lb 12.7 oz)   SpO2 98%   BMI 20.45 kg/m      ECHO (12/02/2021):  There is a small filamentous echodensity noted on the posterior mitral valve leaflet (image 28) which in the clinical context, could be a vegetation.  The aortic valve is normal in structure and function.  There is a 0.9 cm vegetation noted on the posterior leaflet of the tricuspid valve. The leaflet is likely perforated and prolapsing leading to severe degenerative TR. Vegetations much smaller than prior EDUARDO.  The visual ejection fraction is 65-70%.  The right ventricle is normal in size and function.    A/P:  Tricuspid valve endocarditis, responding to IV abx.  Severe TR with perforation of leaflet.    - Patient will benefit from surgical replacement of Tricuspid valve.  - Please have Addiction Specialist evaluate the patient before he is scheduled for surgery, to ensure compliance after surgery to prevent recurrent prosthetic valve endocarditis.  - Please continue care as per primary team.  - Cardiac surgery will follow.  - Plan d/w staff Dr. Hess.

## 2021-12-03 NOTE — PROGRESS NOTES
Nephrology Progress Note  12/03/2021         Assessment & Recommendations:   41-year-old male with hx of  drug abuse, HIV and hepatitis C- admitted with Staph bacteremia, both MSSA and MRSA, with SBE on the tricuspid valve.  He has normal baseline kidney function and now has acute kidney injury    1 JOSE RAMON - normal baseline renal function.   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 UA is not too impressive with only trace blood and trace protein .  In any case, he is already on right treatment with antibiotics.  ANCA neg  Possibly multifactorial ATN - sepsis, Abx ( Vanco)     Cr reached a plateau , non oliguric   On Triumeq. Monitor labs     2. Hyperkalemia - with high K diet and JOSE RAMON. Advised on low K diet.   - on renal diet but getting fast food and high K drinks from outside   - Strict renal diet.       3 Infective endocarditis - on Dapto     4 ANemia - worsening anemia . No s/o bleeding. Consider hemolysis labs.     5 hypertension-agree with starting amlodipine 5 mg      Creatinine overall stable/better over last several days.  Electrolytes are okay.  We will have to wait and see if his creatinine improves further to a new baseline.    Plan to set up for outpatient nephrology follow-up on discharge if kidney function not back to baseline.  Expect prolonged hospital stay per .    We will sign off.  Please call back with questions.       Padilla Martinez MD  [facility] - Nephrology   [phone]      Interval History :   Seen / examined.   No acute issues overnight.   Complains of constipation.  EDUARDO done yesterday.  Noted findings including possible tricuspid valve perforation with severe regurgitation.  Labs stable.  Creatinine 1.9.  Good urine output.      Review of Systems:   A 4 point review of systems was negative except as noted above.  Notably: fair  appetite. no nausea or vomiting or diarrhea.  no confusion,  no fever or chills    Physical Exam:   I/O last 3 completed shifts:  In: 1140 [P.O.:640; I.V.:500]  Out: 1400 [Urine:1400]    GENERAL APPEARANCE: alert and no distress  EYES:  no scleral icterus, pupils equal  PULM: lungs clear to auscultation, equal air movement, no cyanosis or clubbing  CV: regular rhythm, normal rate, no rub     -JVP -     -edema +   GI: soft, non tender,   NEURO: mentation intact and speech normal, no asterixis   Skin - no purpura / rash     Labs:   All labs reviewed by me  Electrolytes/Renal -   Recent Labs   Lab Test 12/02/21  2354 12/02/21  1439 12/01/21  0621 11/26/21  0005 11/25/21  1323 07/20/21  0128 05/09/21  1450 05/05/18  1425 05/05/18  0940 09/10/17  1946 09/09/17  1859 07/24/17  1154 04/20/17  1140    139 140   < > 138   < > 137   < > 142   < > 144   < > 137   POTASSIUM 5.2 4.7 4.7   < > 5.8*   < > 3.6   < > 3.9   < > 4.0   < > 3.8   CHLORIDE 105 108 107   < > 105   < > 100   < > 109   < > 107   < > 101   CO2 30 26 26   < > 26   < > 35*   < > 29   < > 27   < > 30   BUN 31* 28 33*   < > 41*   < > 12   < > 9   < > 18   < > 15   CR 1.92* 1.88* 2.08*   < > 2.80*   < > 0.67   < > 0.67   < > 0.93   < > 0.91   * 106* 103*   < > 85   < > 99   < > 124*   < > 95   < > 98   SANDRA 8.7 8.5 8.5   < > 8.5   < > 9.4   < > 8.7   < > 9.7   < > 9.7   MAG  --   --   --   --  3.3*  --  2.0  --  2.0   < > 2.2  --  2.2   PHOS  --   --   --   --  4.7*  --   --   --   --   --  3.5  --  2.9    < > = values in this interval not displayed.       CBC -   Recent Labs   Lab Test 12/01/21  0621 11/27/21  1944 11/26/21  0004   WBC 6.6 8.6 10.9   HGB 7.6* 8.2* 8.9*    385 479*       LFTs -   Recent Labs   Lab Test 11/26/21  0005 11/16/21  0014 07/20/21  0128   ALKPHOS 75 103 117   BILITOTAL 0.2 0.4 0.4   ALT 24 61 151*   AST 16 38 80*   PROTTOTAL 6.9 7.3 9.2*   ALBUMIN 1.7* 2.7* 3.7       Iron Panel -   Recent Labs   Lab Test  03/08/15  1235 10/24/14  1535   IRON 16* 56   IRONSAT 9* 31   MARILEE 295 250           Current Medications:    abacavir-dolutegravir-LamiVUDine  1 tablet Oral Daily     amLODIPine  5 mg Oral Daily     aspirin  81 mg Oral Daily     buprenorphine  8 mg Sublingual BID     carbamide peroxide  3 drop Left Ear BID     carisoprodol  87.5 mg Oral BID     DAPTOmycin (CUBICIN) intermittent infusion  9 mg/kg Intravenous Q24H     docusate sodium  100 mg Oral BID     levomilnacipran  80 mg Oral Daily     nicotine  1 patch Transdermal QPM     nicotine   Transdermal Q8H     polyethylene glycol  17 g Oral BID     sennosides  2 tablet Oral BID     sodium chloride (PF)  10 mL Intracatheter Q8H     tamsulosin  0.4 mg Oral Daily       Padilla Martinez MD

## 2021-12-04 LAB
ANION GAP SERPL CALCULATED.3IONS-SCNC: 4 MMOL/L (ref 3–14)
BASOPHILS # BLD AUTO: 0 10E3/UL (ref 0–0.2)
BASOPHILS NFR BLD AUTO: 0 %
BUN SERPL-MCNC: 34 MG/DL (ref 7–30)
CALCIUM SERPL-MCNC: 9 MG/DL (ref 8.5–10.1)
CHLORIDE BLD-SCNC: 106 MMOL/L (ref 94–109)
CK SERPL-CCNC: 19 U/L (ref 30–300)
CO2 SERPL-SCNC: 29 MMOL/L (ref 20–32)
CREAT SERPL-MCNC: 1.78 MG/DL (ref 0.66–1.25)
EOSINOPHIL # BLD AUTO: 0.2 10E3/UL (ref 0–0.7)
EOSINOPHIL NFR BLD AUTO: 3 %
ERYTHROCYTE [DISTWIDTH] IN BLOOD BY AUTOMATED COUNT: 14.7 % (ref 10–15)
FERRITIN SERPL-MCNC: 214 NG/ML (ref 26–388)
FOLATE SERPL-MCNC: 10.8 NG/ML
GFR SERPL CREATININE-BSD FRML MDRD: 47 ML/MIN/1.73M2
GLUCOSE BLD-MCNC: 90 MG/DL (ref 70–99)
HCT VFR BLD AUTO: 22.6 % (ref 40–53)
HGB BLD-MCNC: 7.1 G/DL (ref 13.3–17.7)
IMM GRANULOCYTES # BLD: 0.1 10E3/UL
IMM GRANULOCYTES NFR BLD: 1 %
IRON SATN MFR SERPL: 14 % (ref 15–46)
IRON SERPL-MCNC: 30 UG/DL (ref 35–180)
LYMPHOCYTES # BLD AUTO: 2.4 10E3/UL (ref 0.8–5.3)
LYMPHOCYTES NFR BLD AUTO: 32 %
MCH RBC QN AUTO: 27.3 PG (ref 26.5–33)
MCHC RBC AUTO-ENTMCNC: 31.4 G/DL (ref 31.5–36.5)
MCV RBC AUTO: 87 FL (ref 78–100)
MONOCYTES # BLD AUTO: 0.5 10E3/UL (ref 0–1.3)
MONOCYTES NFR BLD AUTO: 6 %
NEUTROPHILS # BLD AUTO: 4.4 10E3/UL (ref 1.6–8.3)
NEUTROPHILS NFR BLD AUTO: 58 %
NRBC # BLD AUTO: 0 10E3/UL
NRBC BLD AUTO-RTO: 0 /100
PLATELET # BLD AUTO: 385 10E3/UL (ref 150–450)
POTASSIUM BLD-SCNC: 4.1 MMOL/L (ref 3.4–5.3)
RBC # BLD AUTO: 2.6 10E6/UL (ref 4.4–5.9)
RETICS # AUTO: 0.08 10E6/UL (ref 0.03–0.1)
RETICS/RBC NFR AUTO: 3 % (ref 0.5–2)
SODIUM SERPL-SCNC: 139 MMOL/L (ref 133–144)
T4 FREE SERPL-MCNC: 0.98 NG/DL (ref 0.76–1.46)
TIBC SERPL-MCNC: 216 UG/DL (ref 240–430)
TSH SERPL DL<=0.005 MIU/L-ACNC: 4.21 MU/L (ref 0.4–4)
VIT B12 SERPL-MCNC: 552 PG/ML (ref 193–986)
WBC # BLD AUTO: 7.6 10E3/UL (ref 4–11)

## 2021-12-04 PROCEDURE — 250N000013 HC RX MED GY IP 250 OP 250 PS 637: Performed by: INTERNAL MEDICINE

## 2021-12-04 PROCEDURE — 250N000011 HC RX IP 250 OP 636: Performed by: SPECIALIST

## 2021-12-04 PROCEDURE — 250N000013 HC RX MED GY IP 250 OP 250 PS 637: Performed by: PSYCHIATRY & NEUROLOGY

## 2021-12-04 PROCEDURE — 36592 COLLECT BLOOD FROM PICC: CPT | Performed by: HOSPITALIST

## 2021-12-04 PROCEDURE — 85045 AUTOMATED RETICULOCYTE COUNT: CPT | Performed by: HOSPITALIST

## 2021-12-04 PROCEDURE — 258N000003 HC RX IP 258 OP 636: Performed by: SPECIALIST

## 2021-12-04 PROCEDURE — 84439 ASSAY OF FREE THYROXINE: CPT | Performed by: HOSPITALIST

## 2021-12-04 PROCEDURE — 120N000001 HC R&B MED SURG/OB

## 2021-12-04 PROCEDURE — 83550 IRON BINDING TEST: CPT | Performed by: HOSPITALIST

## 2021-12-04 PROCEDURE — 250N000013 HC RX MED GY IP 250 OP 250 PS 637: Performed by: SPECIALIST

## 2021-12-04 PROCEDURE — 80048 BASIC METABOLIC PNL TOTAL CA: CPT | Performed by: HOSPITALIST

## 2021-12-04 PROCEDURE — 82746 ASSAY OF FOLIC ACID SERUM: CPT | Performed by: HOSPITALIST

## 2021-12-04 PROCEDURE — 99233 SBSQ HOSP IP/OBS HIGH 50: CPT | Performed by: HOSPITALIST

## 2021-12-04 PROCEDURE — 84443 ASSAY THYROID STIM HORMONE: CPT | Performed by: HOSPITALIST

## 2021-12-04 PROCEDURE — 250N000011 HC RX IP 250 OP 636: Performed by: INTERNAL MEDICINE

## 2021-12-04 PROCEDURE — 250N000013 HC RX MED GY IP 250 OP 250 PS 637: Performed by: HOSPITALIST

## 2021-12-04 PROCEDURE — 82728 ASSAY OF FERRITIN: CPT | Performed by: HOSPITALIST

## 2021-12-04 PROCEDURE — 82607 VITAMIN B-12: CPT | Performed by: HOSPITALIST

## 2021-12-04 PROCEDURE — 250N000013 HC RX MED GY IP 250 OP 250 PS 637: Performed by: STUDENT IN AN ORGANIZED HEALTH CARE EDUCATION/TRAINING PROGRAM

## 2021-12-04 PROCEDURE — 85025 COMPLETE CBC W/AUTO DIFF WBC: CPT | Performed by: HOSPITALIST

## 2021-12-04 PROCEDURE — 82550 ASSAY OF CK (CPK): CPT | Performed by: INTERNAL MEDICINE

## 2021-12-04 PROCEDURE — 999N000190 HC STATISTIC VAT ROUNDS

## 2021-12-04 PROCEDURE — 250N000013 HC RX MED GY IP 250 OP 250 PS 637

## 2021-12-04 RX ADMIN — Medication 3 DROP: at 21:17

## 2021-12-04 RX ADMIN — BUPRENORPHINE HYDROCHLORIDE 8 MG: 8 TABLET SUBLINGUAL at 21:17

## 2021-12-04 RX ADMIN — DOCUSATE SODIUM 100 MG: 100 CAPSULE, LIQUID FILLED ORAL at 21:17

## 2021-12-04 RX ADMIN — Medication 87.5 MG: at 09:33

## 2021-12-04 RX ADMIN — TAMSULOSIN HYDROCHLORIDE 0.4 MG: 0.4 CAPSULE ORAL at 08:49

## 2021-12-04 RX ADMIN — LORAZEPAM 0.5 MG: 0.5 TABLET ORAL at 14:36

## 2021-12-04 RX ADMIN — METOPROLOL TARTRATE 25 MG: 25 TABLET, FILM COATED ORAL at 21:16

## 2021-12-04 RX ADMIN — DAPTOMYCIN 650 MG: 500 INJECTION, POWDER, LYOPHILIZED, FOR SOLUTION INTRAVENOUS at 14:37

## 2021-12-04 RX ADMIN — ONDANSETRON 4 MG: 2 INJECTION INTRAMUSCULAR; INTRAVENOUS at 08:47

## 2021-12-04 RX ADMIN — DOCUSATE SODIUM 100 MG: 100 CAPSULE, LIQUID FILLED ORAL at 08:48

## 2021-12-04 RX ADMIN — LEVOMILNACIPRAN HYDROCHLORIDE 80 MG: 40 CAPSULE, EXTENDED RELEASE ORAL at 08:48

## 2021-12-04 RX ADMIN — ONDANSETRON 4 MG: 2 INJECTION INTRAMUSCULAR; INTRAVENOUS at 14:37

## 2021-12-04 RX ADMIN — NICOTINE 1 PATCH: 14 PATCH, EXTENDED RELEASE TRANSDERMAL at 21:15

## 2021-12-04 RX ADMIN — LORAZEPAM 0.5 MG: 0.5 TABLET ORAL at 21:22

## 2021-12-04 RX ADMIN — ASPIRIN 81 MG: 81 TABLET, COATED ORAL at 08:48

## 2021-12-04 RX ADMIN — METOPROLOL TARTRATE 25 MG: 25 TABLET, FILM COATED ORAL at 08:48

## 2021-12-04 RX ADMIN — BUPRENORPHINE HYDROCHLORIDE 8 MG: 8 TABLET SUBLINGUAL at 08:49

## 2021-12-04 RX ADMIN — Medication 87.5 MG: at 21:17

## 2021-12-04 RX ADMIN — AMLODIPINE BESYLATE 5 MG: 5 TABLET ORAL at 08:49

## 2021-12-04 RX ADMIN — ABACAVIR SULFATE, DOLUTEGRAVIR SODIUM, LAMIVUDINE 1 TABLET: 600; 50; 300 TABLET, FILM COATED ORAL at 08:48

## 2021-12-04 RX ADMIN — LORAZEPAM 0.5 MG: 0.5 TABLET ORAL at 08:47

## 2021-12-04 ASSESSMENT — ACTIVITIES OF DAILY LIVING (ADL)
ADLS_ACUITY_SCORE: 5

## 2021-12-04 NOTE — PROGRESS NOTES
"WO Nurse Inpatient Wound Assessment   Reason for consultation: Evaluate and treat  Scrotum wound wounds    Assessment  Scrotum wound wounds due to Surgical Wound  Status: healing  Wound much  and showing signs of healing.  Will continue with Triad paste.     Pt very emotional during visit today and expressed how upset he is at himself for allowing himself to do drugs and hurt his body as he has. Pt states \"I am done with all the drugs and I need to get my life back and get myself good again\". Pt reflecting on his past and wanting to improve his life and health for himself and family.  Treatment Plan  Scrotum wound: BID and PRN  1.Cleanse area with microklenz and blot dry  2. Apply nickel thick layer of Triad paste (#731989) to wound bed and thin layer over reddened areas   3. Ok to leave open to air, unless patient would prefer to have area covered then would recommend covering with Optifoam gentle border (#343203) or  Mepilex Border Dressing (#546256)    **No need to remove all paste after each incontinent episode, remove soiled paste and reapply as needed.  **If complete removal of paste is necessary use baby oil/mineral oil (Located in Pharmacy) and soft wash cloth.   Leave the brief off in bed to let the area dry as much as possible.   Use only one Covidien pad in between mattress and pt. No brief while in bed.        Orders Written  Recommended provider order: None, at this time  WO Nurse follow-up plan:weekly  Nursing to notify the Provider(s) and re-consult the WO Nurse if wound(s) deteriorates or new skin concern.    Patient History  According to provider note(s):  Bc German is a 41 year old mal with PMHx of polysubstance abuse including IV drug use (including fentanyl and methamphetamine currently), chronic opioid dependence, HIV, hepatitis C, hx of MSSA sepsis with pulmonary valve endocarditis (2015), and hx of lumbar spine L3 osteomyelitis with epidural abscess (requiring surgical " intervention 2017) who was admitted on 11/15/2021 with constellation of symptoms including fever/chills, cough and episode of syncope. Was found to have recurrent MSSA/MRSA bacteremia and tricuspid valve endocarditis.     Objective Data  Containment of urine/stool: Continent of bladder and Continent of bowel    Active Diet Order  Orders Placed This Encounter      Regular Diet Adult      Output:   I/O last 3 completed shifts:  In: -   Out: 950 [Urine:950]    Risk Assessment:   Sensory Perception: 4-->no impairment  Moisture: 4-->rarely moist  Activity: 3-->walks occasionally  Mobility: 4-->no limitation  Nutrition: 3-->adequate  Friction and Shear: 3-->no apparent problem  Bari Score: 21                          Labs:   Recent Labs   Lab 12/04/21  0619 12/02/21  2354   HGB 7.1*  --    WBC 7.6  --    CRP  --  74.5*       Physical Exam  Areas of skin assessed: focused scrotum    Wound Location:  Scrotum  11/26/21 (pt hand in photo)        Wound History: pt had syncope episode and when he woke up had large laceration to scrotum    Wound Base: 100 pale pink moist tissue     Palpation of the wound bed: normal      Drainage: scant     Description of drainage: serosanguinous     Measurements (length x width x depth, in cm) 4.6  x 2  x  0.2 cm      Tunneling N/A     Undermining N/A  Periwound skin: intact and erythema- blanchable      Color: red      Temperature: normal   Odor: none  Pain: mild, tender  Pain intervention prior to dressing change: NA    Interventions  Visual inspection and assessment completed   Wound Care Rationale Promote moist wound healing without tissue dehydration  and Provide protection   Wound Care: done per plan of care  Supplies: at bedside  Current off-loading measures: Pillows  Current support surface: Standard  Atmos Air mattress  Education provided to: plan of care, wound progress, Infection prevention  and Moisture management  Discussed plan of care with Patient and Nurse    Alexx Talbot RN  CWOCN

## 2021-12-04 NOTE — PLAN OF CARE
Cognitive Concerns/ Orientation : Pt A/Ox4, calm/cooperative  BEHAVIOR & AGGRESSION TOOL COLOR: Green   ABNL VS/O2: VSS on RA  MOBILITY: Independent  PAIN MANAGMENT: Denies  DIET: Regular, good po  BOWEL/BLADDER: Continent of both, BMx1  ABNL LAB/BG: Cr 1.92  DRAIN/DEVICES: Midline SL  SKIN: Scrotal laceration, scattered bruising/scars and scabs.  D/C DATE: Discharge pending progress/placement  OTHER IMPORTANT INFO: Pt cooperative this shift. Nicotine patch to LFA,  PRN Ativan given at bedtime, continues on Daptomycin.

## 2021-12-04 NOTE — PROGRESS NOTES
"[facility]  Hospitalist Progress Note        Derick Guerin MD   12/04/2021        Interval History:        - lower extremity edema much better after a dose of IV Lasix on 12/3  - re-evaluated by thoracic surgery and suggest that patient will benefit from surgical replacement of Tricuspid valve; Thoracic surgery suggest evaluation by \"Addiction Specialist\" prior to surgery to ensure compliance after surgery to prevent recurrent prosthetic valve endocarditis-- patient has already been seen by Chem dep and is planned to go for inpatient treatment upon discharge   - BP better with amlodipine  - patient denies any other active complaints at this time  - noted gradual drop in hemoglobin--->7.1, hemodynamically stable and no suggestion of active bleed         Assessment and Plan:        Bc German is a 41 year old male with PMHx of polysubstance abuse including IV drug use (including fentanyl and methamphetamine currently), chronic opioid dependence, HIV, hepatitis C, hx of MSSA sepsis with pulmonary valve endocarditis (2015), and 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 Was found to have recurrent MSSA/MRSA bacteremia and tricuspid valve endocarditis.    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 lactate nl. CXR on admission showed nodular/patchy opacity at the R base.  * 2/2 blood cultures drawn on admission grew both MSSA and MRSA >> blood cx cleared  on 11/18 >> vancomycin  changed to daptomycin due to JOSE RAMON   " "EDUARDO done 11/18 notable for large tricuspid valve vegetation with severe regurgitation  * per CV surgery continue medical management for now, consider surgical options if he clinically worsened  - ID following, to continue Daptomycin, with plans to switch to Vancomycin when renal function better; will need 6 weeks of antibiotic until 12/29; midline placed 12/2  - remains afebrile ; EDUARDO repeat 12/02 noted with smaller vegetations than prior EDUARDO but did note that tricuspid valve leaflet is likely perforated and prolapsing leading to severe degenerative TR, EF 65-70 %  - noted worsening bilateral LE swelling, likely due to TR, US 12/3 noted with no DVT; edema improved with 40 mg IV Lasix given on 12/3 ; will monitor volume status closely and consider diuretics prn    - given EDUARDO findings, re-evaluated by thoracic surgery and suggest that patient will benefit from surgical replacement of Tricuspid valve; Thoracic surgery suggest evaluation by \"Addiction Specialist\" prior to surgery to ensure compliance after surgery to prevent recurrent prosthetic valve endocarditis-- patient has already been seen by Chem dep and is planned to go for inpatient treatment upon discharge     Chronic anemia  -likely anemia of chronic disease in the setting of infective endocarditis  - hemoglobin on admission however was 12.5 and has gradually trended down--->7.1  - hemodynamically stable and no suggestion of active bleed  - will check iron studies, TSH, b12 and folate  - will monitor hemoglobin and transfuse PRN for Hb <7    HTN  - BP better with addition of amlodipine 5 mg po daily and Metoprolol 25 mg po bid with hold parameters  (12/3); hydralazine IV prn for SBP>180; plan for intermittent diuresis as above     HIV  Hepatitis C  * Chronic and stable on Triumeq restarted 11/30 as cr improving   * CDC >200 so no need for PJP ppx per ID     JOSE RAMON on CKD   Hyperkalemia (resolved)  Hyponatremia: resolved   * No hx of CKD and with a normal " baseline enrique function  * Cr initially stable this stay, began trending up after vancomycin  - nephrology evaluated for JOSE RAMON, Cr peak 2.8 (11/25)---> trending down-->1.92---1.78; nephro plans to follow up outpatient; signed off 12/3  - monitor BMP  - ADAN returned negative, Renal US obtained 11/24 and negative for obstruction     History of opiate dependence  History of active IV drug use  Tobacco Use  Depression  * On admission, had stated he last used IV drugs 1 wk prior to admission. While in the ED on 11/16, RN had noted patient was briefly nonresponsive and apneic. Was given dose of Narcan with noted improvement.   * Prior to admission, had been started on taper off Soma -- was taking 87.5mg (1/4 of a 350mg tab) BID but later conversations with PCP's clinic revealed no provider there was managing taper  -- cont buprenorphine BID this stay  -- conts on levomilnacipran ER as per prior to admission  -- cont carisoprodol 87.5mg BID as per prior to admission, unclear what further plan was for taper  -- cont lorazepam 0.5mg q6h prn for anxiety   -- cont nicotine patch  CHEM Depp consultation requested and recommended inpatient treatment after completion of antibiotics directly will transfer from here to detox treatment center   - will consider psychiatry evaluation if needed from surgery perspective prior to valve surgery     Left scrotal laceration dt fall/possible syncope  Left scrotal wound infection  * Laceration sutured in ED. Suture removed 11/21.   * US scrotum earlier this stay showed a small R hydrocele but was otherwise normal.   Patient's scrotal laceration wound looks clean and dry.    Urology and wound care consultations requested patient is on IV daptomycin which should cover for the scrotal infection as well     Left ear pain; improving    No evidence of otitis media or externa. Likely cerumen impaction,  _ continue to  apply carbamide peroxide  Ear drops to left ears,      COVID status: asymptomatic  screening negative on 11/16, 11/20, 11/29     FEN: low potassium diet   DVT Prophylaxis: PCDs  Code Status: Full Code     Disposition: Discharge date unclear, will need to complete IV antibiotics, likely here (until 12/29) and then will need to go to inpatient Chem dep treatment pending ID clearance; also being considered for valve replacement      Clinically Significant Risk Factors Present on Admission                 Care plan discussed with patient and nursing      Page Me (7 am to 6 pm)              Physical Exam:      Blood pressure 128/82, pulse 86, temperature 97.3  F (36.3  C), temperature source Oral, resp. rate 18, height 1.829 m (6'), weight 68.4 kg (150 lb 12.7 oz), SpO2 97 %.  Vitals:    11/29/21 0554 11/29/21 1340 12/03/21 0652   Weight: 70 kg (154 lb 5.2 oz) 73.4 kg (161 lb 12.8 oz) 68.4 kg (150 lb 12.7 oz)     Vital Signs with Ranges  Temp:  [97.3  F (36.3  C)-97.6  F (36.4  C)] 97.3  F (36.3  C)  Pulse:  [60-89] 86  Resp:  [18] 18  BP: (126-173)/() 128/82  SpO2:  [97 %-99 %] 97 %  I/O's Last 24 hours  I/O last 3 completed shifts:  In: -   Out: 950 [Urine:950]    Constitutional: awake and oriented X 3; resting comfortably in no apparent distress   HEENT: Pupils equal and reactive to light and accomodation, neck supple    Oral cavity: Moist mucosa   Cardiovascular: Normal s1 s2, regular rate and rhythm, systolic murmur +   Lungs: B/l clear to auscultation, no wheezes or crepitations   Abdomen: Soft, nt, nd, no guarding, rigidity or rebound; BS +   LE : B/l edema much improved   Musculoskeletal: Power 5/5 in all extremities   Neuro: No focal neurological deficits noted   Psychiatry: normal mood and affect  Skin : noted multiple skin excoriations and injection marks; scrotal laceration suture removed                   Medications:          abacavir-dolutegravir-LamiVUDine  1 tablet Oral Daily     amLODIPine  5 mg Oral Daily     aspirin  81 mg Oral Daily     buprenorphine  8 mg Sublingual BID      carbamide peroxide  3 drop Left Ear BID     carisoprodol  87.5 mg Oral BID     DAPTOmycin (CUBICIN) intermittent infusion  9 mg/kg Intravenous Q24H     docusate sodium  100 mg Oral BID     levomilnacipran  80 mg Oral Daily     metoprolol tartrate  25 mg Oral BID     nicotine  1 patch Transdermal QPM     nicotine   Transdermal Q8H     polyethylene glycol  17 g Oral BID     sennosides  2 tablet Oral BID     sodium chloride (PF)  10 mL Intracatheter Q8H     tamsulosin  0.4 mg Oral Daily     PRN Meds: acetaminophen **OR** acetaminophen, alum & mag hydroxide-simethicone, bisacodyl, calcium carbonate, glucose **OR** dextrose **OR** glucagon, lidocaine 4%, lidocaine (buffered or not buffered), lidocaine (buffered or not buffered), LORazepam, naloxone **OR** naloxone **OR** naloxone **OR** naloxone, nitroGLYcerin, ondansetron **OR** ondansetron, prochlorperazine **OR** prochlorperazine **OR** prochlorperazine, sodium chloride (PF), sodium chloride (PF), sodium chloride (PF)         Data:      All new lab and imaging data was reviewed.   Recent Labs   Lab Test 12/04/21  0619 12/01/21  0621 11/27/21  1944 07/20/21  0128 05/09/21  1450 10/09/17  2149 10/09/17  2015 03/13/15  0900 03/12/15  1620   WBC 7.6 6.6 8.6   < > 5.5   < > KUM[facility]ER 2115 BY [facility]   < > 11.8*   HGB 7.1* 7.6* 8.2*   < > 13.3   < > [facility]ER 2115 BY [facility]   < > 10.5*   MCV 87 87 90   < > 87   < > KUM[facility] UER 2115 BY [facility]   < > 81    417 385   < > 354   < > KUM[facility]ER 2115 BY [facility]   < > 515*   INR  --   --   --   --  0.96  --  1.01  --  1.92*    < > = values in this interval not displayed.      Recent Labs   Lab Test 12/04/21  0619 12/02/21  2354 12/02/21  1439 12/01/21  0621    136 139 140   POTASSIUM 4.1 5.2 4.7 4.7   CHLORIDE 106 105 108 107   CO2  --  30 26 26   BUN  --  31* 28 33*   CR  --  1.92* 1.88* 2.08*   ANIONGAP  --  1* 5 7   SANDRA  --  8.7 8.5 8.5   GLC  --  131* 106* 103*     Recent Labs   Lab Test 05/09/21  1450 10/09/17  2015   TROPI  <0.015 <0.015

## 2021-12-05 LAB
ANION GAP SERPL CALCULATED.3IONS-SCNC: 4 MMOL/L (ref 3–14)
BASOPHILS # BLD AUTO: 0 10E3/UL (ref 0–0.2)
BASOPHILS NFR BLD AUTO: 0 %
BUN SERPL-MCNC: 37 MG/DL (ref 7–30)
CALCIUM SERPL-MCNC: 9 MG/DL (ref 8.5–10.1)
CHLORIDE BLD-SCNC: 107 MMOL/L (ref 94–109)
CO2 SERPL-SCNC: 28 MMOL/L (ref 20–32)
CREAT SERPL-MCNC: 1.61 MG/DL (ref 0.66–1.25)
EOSINOPHIL # BLD AUTO: 0.3 10E3/UL (ref 0–0.7)
EOSINOPHIL NFR BLD AUTO: 3 %
ERYTHROCYTE [DISTWIDTH] IN BLOOD BY AUTOMATED COUNT: 14.5 % (ref 10–15)
GFR SERPL CREATININE-BSD FRML MDRD: 53 ML/MIN/1.73M2
GLUCOSE BLD-MCNC: 91 MG/DL (ref 70–99)
HCT VFR BLD AUTO: 24.6 % (ref 40–53)
HGB BLD-MCNC: 7.7 G/DL (ref 13.3–17.7)
IMM GRANULOCYTES # BLD: 0.1 10E3/UL
IMM GRANULOCYTES NFR BLD: 1 %
LYMPHOCYTES # BLD AUTO: 2.4 10E3/UL (ref 0.8–5.3)
LYMPHOCYTES NFR BLD AUTO: 26 %
MCH RBC QN AUTO: 27.1 PG (ref 26.5–33)
MCHC RBC AUTO-ENTMCNC: 31.3 G/DL (ref 31.5–36.5)
MCV RBC AUTO: 87 FL (ref 78–100)
MONOCYTES # BLD AUTO: 0.6 10E3/UL (ref 0–1.3)
MONOCYTES NFR BLD AUTO: 6 %
NEUTROPHILS # BLD AUTO: 5.8 10E3/UL (ref 1.6–8.3)
NEUTROPHILS NFR BLD AUTO: 64 %
NRBC # BLD AUTO: 0 10E3/UL
NRBC BLD AUTO-RTO: 0 /100
PLATELET # BLD AUTO: 387 10E3/UL (ref 150–450)
POTASSIUM BLD-SCNC: 4.3 MMOL/L (ref 3.4–5.3)
RBC # BLD AUTO: 2.84 10E6/UL (ref 4.4–5.9)
RETICS # AUTO: 0.08 10E6/UL (ref 0.03–0.1)
RETICS/RBC NFR AUTO: 2.7 % (ref 0.5–2)
SODIUM SERPL-SCNC: 139 MMOL/L (ref 133–144)
WBC # BLD AUTO: 9.1 10E3/UL (ref 4–11)

## 2021-12-05 PROCEDURE — 85045 AUTOMATED RETICULOCYTE COUNT: CPT | Performed by: HOSPITALIST

## 2021-12-05 PROCEDURE — 250N000013 HC RX MED GY IP 250 OP 250 PS 637: Performed by: PSYCHIATRY & NEUROLOGY

## 2021-12-05 PROCEDURE — 250N000013 HC RX MED GY IP 250 OP 250 PS 637

## 2021-12-05 PROCEDURE — 258N000003 HC RX IP 258 OP 636: Performed by: SPECIALIST

## 2021-12-05 PROCEDURE — 250N000011 HC RX IP 250 OP 636: Performed by: INTERNAL MEDICINE

## 2021-12-05 PROCEDURE — 999N000190 HC STATISTIC VAT ROUNDS

## 2021-12-05 PROCEDURE — 250N000011 HC RX IP 250 OP 636: Performed by: SPECIALIST

## 2021-12-05 PROCEDURE — 85025 COMPLETE CBC W/AUTO DIFF WBC: CPT | Performed by: HOSPITALIST

## 2021-12-05 PROCEDURE — 82310 ASSAY OF CALCIUM: CPT | Performed by: HOSPITALIST

## 2021-12-05 PROCEDURE — 250N000013 HC RX MED GY IP 250 OP 250 PS 637: Performed by: STUDENT IN AN ORGANIZED HEALTH CARE EDUCATION/TRAINING PROGRAM

## 2021-12-05 PROCEDURE — 120N000001 HC R&B MED SURG/OB

## 2021-12-05 PROCEDURE — 250N000013 HC RX MED GY IP 250 OP 250 PS 637: Performed by: INTERNAL MEDICINE

## 2021-12-05 PROCEDURE — 250N000013 HC RX MED GY IP 250 OP 250 PS 637: Performed by: HOSPITALIST

## 2021-12-05 PROCEDURE — 99232 SBSQ HOSP IP/OBS MODERATE 35: CPT | Performed by: HOSPITALIST

## 2021-12-05 PROCEDURE — 250N000013 HC RX MED GY IP 250 OP 250 PS 637: Performed by: SPECIALIST

## 2021-12-05 RX ADMIN — Medication 3 DROP: at 22:19

## 2021-12-05 RX ADMIN — TAMSULOSIN HYDROCHLORIDE 0.4 MG: 0.4 CAPSULE ORAL at 13:51

## 2021-12-05 RX ADMIN — ONDANSETRON 4 MG: 4 TABLET, ORALLY DISINTEGRATING ORAL at 22:10

## 2021-12-05 RX ADMIN — ASPIRIN 81 MG: 81 TABLET, COATED ORAL at 13:51

## 2021-12-05 RX ADMIN — Medication 87.5 MG: at 13:51

## 2021-12-05 RX ADMIN — DOCUSATE SODIUM 100 MG: 100 CAPSULE, LIQUID FILLED ORAL at 22:18

## 2021-12-05 RX ADMIN — ABACAVIR SULFATE, DOLUTEGRAVIR SODIUM, LAMIVUDINE 1 TABLET: 600; 50; 300 TABLET, FILM COATED ORAL at 13:50

## 2021-12-05 RX ADMIN — Medication 87.5 MG: at 22:18

## 2021-12-05 RX ADMIN — METOPROLOL TARTRATE 25 MG: 25 TABLET, FILM COATED ORAL at 13:50

## 2021-12-05 RX ADMIN — ONDANSETRON 4 MG: 2 INJECTION INTRAMUSCULAR; INTRAVENOUS at 13:51

## 2021-12-05 RX ADMIN — LEVOMILNACIPRAN HYDROCHLORIDE 80 MG: 40 CAPSULE, EXTENDED RELEASE ORAL at 13:49

## 2021-12-05 RX ADMIN — AMLODIPINE BESYLATE 5 MG: 5 TABLET ORAL at 13:50

## 2021-12-05 RX ADMIN — NICOTINE 1 PATCH: 14 PATCH, EXTENDED RELEASE TRANSDERMAL at 22:18

## 2021-12-05 RX ADMIN — BUPRENORPHINE HYDROCHLORIDE 8 MG: 8 TABLET SUBLINGUAL at 13:50

## 2021-12-05 RX ADMIN — LORAZEPAM 0.5 MG: 0.5 TABLET ORAL at 05:37

## 2021-12-05 RX ADMIN — METOPROLOL TARTRATE 25 MG: 25 TABLET, FILM COATED ORAL at 22:18

## 2021-12-05 RX ADMIN — LORAZEPAM 0.5 MG: 0.5 TABLET ORAL at 13:51

## 2021-12-05 RX ADMIN — BUPRENORPHINE HYDROCHLORIDE 8 MG: 8 TABLET SUBLINGUAL at 22:18

## 2021-12-05 RX ADMIN — DOCUSATE SODIUM 100 MG: 100 CAPSULE, LIQUID FILLED ORAL at 13:50

## 2021-12-05 RX ADMIN — DAPTOMYCIN 650 MG: 500 INJECTION, POWDER, LYOPHILIZED, FOR SOLUTION INTRAVENOUS at 15:30

## 2021-12-05 RX ADMIN — LORAZEPAM 0.5 MG: 0.5 TABLET ORAL at 22:27

## 2021-12-05 RX ADMIN — ONDANSETRON 4 MG: 4 TABLET, ORALLY DISINTEGRATING ORAL at 05:37

## 2021-12-05 ASSESSMENT — ACTIVITIES OF DAILY LIVING (ADL)
ADLS_ACUITY_SCORE: 5

## 2021-12-05 NOTE — PROGRESS NOTES
[facility]  Hospitalist Progress Note        Derick Guerin MD   12/05/2021        Interval History:        - Hb stable; no s/o active bleed; no acute issues  - thoracic surgery plans for surgical replacement with bioprosthetic valve early next week         Assessment and Plan:        Bc German is a 41 year old male with PMHx of polysubstance abuse including IV drug use (including fentanyl and methamphetamine currently), chronic opioid dependence, HIV, hepatitis C, hx of MSSA sepsis with pulmonary valve endocarditis (2015), and 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 Was found to have recurrent MSSA/MRSA bacteremia and tricuspid valve endocarditis.    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 lactate nl. CXR on admission showed nodular/patchy opacity at the R base.  * 2/2 blood cultures drawn on admission grew both MSSA and MRSA >> blood cx cleared  on 11/18 >> vancomycin  changed to daptomycin due to JOSE RAMON   EDUARDO done 11/18 notable for large tricuspid valve vegetation with severe regurgitation  - ID following, to continue Daptomycin, with plans to switch to Vancomycin when renal function better; will need 6 weeks of antibiotic until 12/29; midline placed 12/2  - remains afebrile ; EDUARDO repeat 12/02 noted with smaller vegetations than prior EDUARDO but did note that tricuspid valve leaflet is likely perforated and prolapsing leading to severe degenerative TR, EF 65-70 %  - bilateral LE swelling, likely due to  TR, US 12/3 noted with no DVT; edema improved with 40 mg IV Lasix given on 12/3 ; will monitor volume status closely and consider diuretics prn  - given EDUARDO findings, re-evaluated by thoracic surgery (initially rec medical management) and plans for surgical replacement with bioprosthetic valve early next week    Chronic anemia, likely anemia of chronic disease  - likely anemia of chronic disease in the setting of infective endocarditis  - hemoglobin on admission however was 12.5 and has gradually trended down--->7.1, stable--7.7  - hemodynamically stable and no suggestion of active bleed  - iron studies consistent with likely ACD with normal ferritin and low TIBC, TSH slightly elevated but free T4 normal; b12 and folate normal  - will monitor hemoglobin and transfuse PRN for Hb <7; might need blood transfusion preoperatively to keep Hb around 8    HTN  - BP better with addition of amlodipine 5 mg po daily and Metoprolol 25 mg po bid with hold parameters  (12/3); continue; hydralazine IV prn for SBP>180; plan for intermittent diuresis as above     HIV  Hepatitis C  * Chronic and stable on Triumeq restarted 11/30 as cr improving   * CDC >200 so no need for PJP ppx per ID     JOSE RAMON on CKD   Hyperkalemia (resolved)  Hyponatremia: resolved   * No hx of CKD and with a normal baseline enrique function  * Cr initially stable this stay, began trending up after vancomycin  - nephrology evaluated for JOSE RAMON, Cr peak 2.8 (11/25)---> trending down --> 1.92---1.78---1.61; nephro plans to follow up outpatient; signed off 12/3  - monitor BMP  - ADAN returned negative, Renal US obtained 11/24 and negative for obstruction     History of opiate dependence  History of active IV drug use  Tobacco Use  Depression  * On admission, had stated he last used IV drugs 1 wk prior to admission. While in the ED on 11/16, RN had noted patient was briefly nonresponsive and apneic. Was given dose of Narcan with noted improvement.   * Prior to admission, had  been started on taper off Soma -- was taking 87.5mg (1/4 of a 350mg tab) BID but later conversations with PCP's clinic revealed no provider there was managing taper  -- cont buprenorphine BID this stay  -- conts on levomilnacipran ER as per prior to admission  -- cont carisoprodol 87.5mg BID as per prior to admission, unclear what further plan was for taper  -- cont lorazepam 0.5mg q6h prn for anxiety   -- cont nicotine patch  CHEM Depp consultation requested and recommended inpatient treatment after completion of antibiotics directly will transfer from here to detox treatment center   - will consider psychiatry evaluation if needed from surgery perspective prior to valve surgery     Left scrotal laceration dt fall/possible syncope  Left scrotal wound infection  * Laceration sutured in ED. Suture removed 11/21.   * US scrotum earlier this stay showed a small R hydrocele but was otherwise normal.   Patient's scrotal laceration wound looks clean and dry.    Urology and wound care consultations requested patient is on IV daptomycin which should cover for the scrotal infection as well     Left ear pain; improved    No evidence of otitis media or externa. Likely cerumen impaction,  _ continue to  apply carbamide peroxide  Ear drops to left ears,      COVID status: asymptomatic screening negative on 11/16, 11/20, 11/29     FEN: low potassium diet   DVT Prophylaxis: PCDs  Code Status: Full Code     Disposition: Discharge date unclear, will need to complete IV antibiotics, likely here (until 12/29) and then will need to go to inpatient Chem dep treatment pending ID clearance; also being considered for valve replacement      Clinically Significant Risk Factors Present on Admission                 Care plan discussed with patient and nursing      Page Me (7 am to 6 pm)              Physical Exam:      Blood pressure 128/76, pulse 79, temperature 96.9  F (36.1  C), temperature source Oral, resp. rate 18, height 1.829 m (6'),  weight 68.4 kg (150 lb 12.7 oz), SpO2 99 %.  Vitals:    11/29/21 0554 11/29/21 1340 12/03/21 0652   Weight: 70 kg (154 lb 5.2 oz) 73.4 kg (161 lb 12.8 oz) 68.4 kg (150 lb 12.7 oz)     Vital Signs with Ranges  Temp:  [96.9  F (36.1  C)-97.4  F (36.3  C)] 96.9  F (36.1  C)  Pulse:  [79-86] 79  Resp:  [18] 18  BP: (122-133)/(76-92) 128/76  SpO2:  [98 %-99 %] 99 %  I/O's Last 24 hours  No intake/output data recorded.    Constitutional: awake and oriented X 3; resting comfortably in no apparent distress   HEENT: Pupils equal and reactive to light and accomodation, neck supple    Oral cavity: Moist mucosa   Cardiovascular: Normal s1 s2, regular rate and rhythm, systolic murmur +   Lungs: B/l clear to auscultation, no wheezes or crepitations   Abdomen: Soft, nt, nd, no guarding, rigidity or rebound; BS +   LE : B/l edema improved   Musculoskeletal: Power 5/5 in all extremities   Neuro: No focal neurological deficits noted   Psychiatry: normal mood and affect  Skin : noted multiple skin excoriations and injection marks                   Medications:          abacavir-dolutegravir-LamiVUDine  1 tablet Oral Daily     amLODIPine  5 mg Oral Daily     aspirin  81 mg Oral Daily     buprenorphine  8 mg Sublingual BID     carbamide peroxide  3 drop Left Ear BID     carisoprodol  87.5 mg Oral BID     DAPTOmycin (CUBICIN) intermittent infusion  9 mg/kg Intravenous Q24H     docusate sodium  100 mg Oral BID     levomilnacipran  80 mg Oral Daily     metoprolol tartrate  25 mg Oral BID     nicotine  1 patch Transdermal QPM     nicotine   Transdermal Q8H     polyethylene glycol  17 g Oral BID     sennosides  2 tablet Oral BID     sodium chloride (PF)  10 mL Intracatheter Q8H     tamsulosin  0.4 mg Oral Daily     PRN Meds: acetaminophen **OR** acetaminophen, alum & mag hydroxide-simethicone, bisacodyl, calcium carbonate, glucose **OR** dextrose **OR** glucagon, lidocaine 4%, lidocaine (buffered or not buffered), lidocaine (buffered or not  buffered), LORazepam, naloxone **OR** naloxone **OR** naloxone **OR** naloxone, nitroGLYcerin, ondansetron **OR** ondansetron, prochlorperazine **OR** prochlorperazine **OR** prochlorperazine, sodium chloride (PF), sodium chloride (PF), sodium chloride (PF)         Data:      All new lab and imaging data was reviewed.   Recent Labs   Lab Test 12/05/21  0555 12/04/21  0619 12/01/21  0621 07/20/21  0128 05/09/21  1450 10/09/17  2149 10/09/17  2015 03/13/15  0900 03/12/15  1620   WBC 9.1 7.6 6.6   < > 5.5   < > [facility] [address] [facility]   < > 11.8*   HGB 7.7* 7.1* 7.6*   < > 13.3   < > [facility] [address] [facility]   < > 10.5*   MCV 87 87 87   < > 87   < > [facility] [address] [facility]   < > 81    385 417   < > 354   < > [facility]   < > 515*   INR  --   --   --   --  0.96  --  1.01  --  1.92*    < > = values in this interval not displayed.      Recent Labs   Lab Test 12/05/21  0556 12/04/21  0619 12/02/21  2354    139 136   POTASSIUM 4.3 4.1 5.2   CHLORIDE 107 106 105   CO2 28 29 30   BUN 37* 34* 31*   CR 1.61* 1.78* 1.92*   ANIONGAP 4 4 1*   SANDRA 9.0 9.0 8.7   GLC 91 90 131*     Recent Labs   Lab Test 05/09/21  1450 10/09/17  2015   TROPI <0.015 <0.015

## 2021-12-05 NOTE — PLAN OF CARE
"DATE & TIME: 12/4/2021 [id number]  Cognitive Concerns/ Orientation: A/Ox4, calm/cooperative  BEHAVIOR & AGGRESSION TOOL COLOR: Green   ABNL VS/O2: VSS on RA  MOBILITY: Independent  PAIN MANAGMENT: Denies  DIET: Regular, good po  BOWEL/BLADDER: Continent   ABNL LAB/BG: Cr 1.78; Hgb 7.1  DRAIN/DEVICES: Midline SL'd  SKIN: Scrotal laceration - LISSETH; scattered bruising/scars and scabs.  D/C DATE: Discharge date unclear, will need to complete IV antibiotics, likely here (until 12/29) and then will need to go to inpatient Chem dep treatment pending ID clearance; also being considered for valve replacement  OTHER IMPORTANT INFO: Nicotine patch to LFA. Patient c/o anxiety and nausea - \"I just don't feel good today.\" PRN Ativan and Zofran given x2 with relief; continues on Daptomycin.      "

## 2021-12-05 NOTE — PLAN OF CARE
Cognitive Concerns/ Orientation : Pt A/Ox4, calm/cooperative  BEHAVIOR & AGGRESSION TOOL COLOR: Green   ABNL VS/O2: VSS on RA  MOBILITY: Independent  PAIN MANAGMENT: Denies  DIET: Regular, good po  BOWEL/BLADDER: Continent   ABNL LAB/BG: Cr 1.78; Hgb 7.1  DRAIN/DEVICES: Midline SL  SKIN: Scrotal laceration - LISSETH; scattered bruising/scars and scabs.  D/C DATE: Discharge date unclear, will need to complete IV antibiotics, likely here (until 12/29) and then will need to go to inpatient Chem dep treatment pending ID clearance; also being considered for valve replacement  OTHER IMPORTANT INFO: Pt anxious/cooperative this shift. Nicotine patch to LFA. Patient c/o anxiety and nausea today- PRN Ativan x2 Zofran x1 given, continues on Daptomycin.

## 2021-12-06 LAB
CRYOGLOB SER QL: ABNORMAL
HGB BLD-MCNC: 7.3 G/DL (ref 13.3–17.7)
SARS-COV-2 RNA RESP QL NAA+PROBE: NEGATIVE

## 2021-12-06 PROCEDURE — 250N000013 HC RX MED GY IP 250 OP 250 PS 637: Performed by: PSYCHIATRY & NEUROLOGY

## 2021-12-06 PROCEDURE — 99232 SBSQ HOSP IP/OBS MODERATE 35: CPT | Performed by: HOSPITALIST

## 2021-12-06 PROCEDURE — 250N000013 HC RX MED GY IP 250 OP 250 PS 637: Performed by: SPECIALIST

## 2021-12-06 PROCEDURE — U0005 INFEC AGEN DETEC AMPLI PROBE: HCPCS | Performed by: HOSPITALIST

## 2021-12-06 PROCEDURE — 250N000011 HC RX IP 250 OP 636: Performed by: INTERNAL MEDICINE

## 2021-12-06 PROCEDURE — 85018 HEMOGLOBIN: CPT | Performed by: HOSPITALIST

## 2021-12-06 PROCEDURE — 999N000190 HC STATISTIC VAT ROUNDS

## 2021-12-06 PROCEDURE — 250N000013 HC RX MED GY IP 250 OP 250 PS 637: Performed by: HOSPITALIST

## 2021-12-06 PROCEDURE — 258N000003 HC RX IP 258 OP 636: Performed by: SPECIALIST

## 2021-12-06 PROCEDURE — 250N000013 HC RX MED GY IP 250 OP 250 PS 637

## 2021-12-06 PROCEDURE — 250N000013 HC RX MED GY IP 250 OP 250 PS 637: Performed by: INTERNAL MEDICINE

## 2021-12-06 PROCEDURE — 120N000001 HC R&B MED SURG/OB

## 2021-12-06 PROCEDURE — 250N000011 HC RX IP 250 OP 636: Performed by: SPECIALIST

## 2021-12-06 RX ADMIN — ONDANSETRON 4 MG: 2 INJECTION INTRAMUSCULAR; INTRAVENOUS at 06:09

## 2021-12-06 RX ADMIN — AMLODIPINE BESYLATE 5 MG: 5 TABLET ORAL at 08:32

## 2021-12-06 RX ADMIN — ABACAVIR SULFATE, DOLUTEGRAVIR SODIUM, LAMIVUDINE 1 TABLET: 600; 50; 300 TABLET, FILM COATED ORAL at 08:32

## 2021-12-06 RX ADMIN — TAMSULOSIN HYDROCHLORIDE 0.4 MG: 0.4 CAPSULE ORAL at 08:32

## 2021-12-06 RX ADMIN — DOCUSATE SODIUM 100 MG: 100 CAPSULE, LIQUID FILLED ORAL at 08:32

## 2021-12-06 RX ADMIN — ASPIRIN 81 MG: 81 TABLET, COATED ORAL at 08:32

## 2021-12-06 RX ADMIN — METOPROLOL TARTRATE 25 MG: 25 TABLET, FILM COATED ORAL at 08:32

## 2021-12-06 RX ADMIN — METOPROLOL TARTRATE 25 MG: 25 TABLET, FILM COATED ORAL at 21:56

## 2021-12-06 RX ADMIN — Medication 87.5 MG: at 21:57

## 2021-12-06 RX ADMIN — LORAZEPAM 0.5 MG: 0.5 TABLET ORAL at 19:49

## 2021-12-06 RX ADMIN — DOCUSATE SODIUM 100 MG: 100 CAPSULE, LIQUID FILLED ORAL at 21:56

## 2021-12-06 RX ADMIN — LORAZEPAM 0.5 MG: 0.5 TABLET ORAL at 08:36

## 2021-12-06 RX ADMIN — DAPTOMYCIN 650 MG: 500 INJECTION, POWDER, LYOPHILIZED, FOR SOLUTION INTRAVENOUS at 13:14

## 2021-12-06 RX ADMIN — LEVOMILNACIPRAN HYDROCHLORIDE 80 MG: 40 CAPSULE, EXTENDED RELEASE ORAL at 08:32

## 2021-12-06 RX ADMIN — BUPRENORPHINE HYDROCHLORIDE 8 MG: 8 TABLET SUBLINGUAL at 08:32

## 2021-12-06 RX ADMIN — ONDANSETRON 4 MG: 2 INJECTION INTRAMUSCULAR; INTRAVENOUS at 19:49

## 2021-12-06 RX ADMIN — Medication 87.5 MG: at 08:31

## 2021-12-06 ASSESSMENT — ACTIVITIES OF DAILY LIVING (ADL)
ADLS_ACUITY_SCORE: 5

## 2021-12-06 NOTE — PROGRESS NOTES
[facility]    Infectious Disease Progress Note    Date of Service : 12/06/2021     Assessment:  41 year old male with polysubstance use/IVDA, HIV-Hep C co-infection and prior hx of recurrent MSSA sepsis with pulmonic valve endocarditis, spinal discitis/osteomyelitis with epidural abscess requiring surgical debridement, who is now admitted with high grade MSSA/MRSA bacteremia with tricuspid valve endocarditis with two large [id number] x 8mm and 13 x 5mm, and repeat EDUARDO now shows valve perforation with severe tricuspid regurgitation. Blood cxs are negative since 11/18  . He has had medication non compliance with ART with detectable VL, CD4 >200 at this time not requiring PCP prophylaxis. Course further complicated by JOSE RAMON and hyperkalemia which have improved as well as tricuspid valve perforation which needs surgical management.     1. MSSA/MRSA sepsis with tricuspid valve endocarditis - blood cxs cleared 11/18, Repeat EDUARDO 12/2 now shows valve perforation with severe regurgitation, awaiting surgery.   2. JOSE RAMON  improving  3. LE edema , doppler negative for DVT  4. Prior history of MSSA sepsis with pulmonary valve endocarditis in 2015 and spinal discitis/osteomyelitis with epidural abscess requiring surgical intervention in 2017.  5. Substance abuse with active use of methamphetamine / fentanyl currently  6.  HIV/Hepatitis C co-infection. Last HIV VL detectable at 29,522 copies/ml and  CD4 625(28%) on  06/09/2021 , on Triumeq  7.  Hyperkalemia , ? Due to JOSE RAMON  ? Daptomycin contributing? Improved now  8. Scrotal laceration      Recommendations  1. Continue IV Daptomycin, plan IV antibiotics until 12/29 to complete 6 weeks of treatment.  2. Valve replacement planned at some point    Alana Cooper MD    Interval History    Resting, no new complaints today  Tolerating antibiotics without side effects    Physical Exam   Temp: 98  F (36.7  C) Temp src: Oral BP: 124/88 Pulse: 84   Resp: 18 SpO2: 98 % O2  Device: None (Room air)    Vitals:    11/29/21 0554 11/29/21 1340 12/03/21 0652   Weight: 70 kg (154 lb 5.2 oz) 73.4 kg (161 lb 12.8 oz) 68.4 kg (150 lb 12.7 oz)     Vital Signs with Ranges  Temp:  [98  F (36.7  C)-98.4  F (36.9  C)] 98  F (36.7  C)  Pulse:  [81-84] 84  Resp:  [18] 18  BP: (124-138)/(88-94) 124/88  SpO2:  [98 %-100 %] 98 %    Constitutional: Awake, alert, cooperative, no apparent distress  Lungs: Clear to auscultation bilaterally, no crackles or wheezing  Cardiovascular: Regular rate and rhythm, systolic murmur  Abdomen: Normal bowel sounds, soft, non-distended, non-tender  Skin: : multiple scars, IV racks, excoriations on hands,    : Left scrotal laceration site appears clean, no sign of infection  MS : bilateral LE edema L>R    Other:    Medications       abacavir-dolutegravir-LamiVUDine  1 tablet Oral Daily     amLODIPine  5 mg Oral Daily     aspirin  81 mg Oral Daily     buprenorphine  8 mg Sublingual BID     carbamide peroxide  3 drop Left Ear BID     carisoprodol  87.5 mg Oral BID     DAPTOmycin (CUBICIN) intermittent infusion  9 mg/kg Intravenous Q24H     docusate sodium  100 mg Oral BID     levomilnacipran  80 mg Oral Daily     metoprolol tartrate  25 mg Oral BID     nicotine  1 patch Transdermal QPM     nicotine   Transdermal Q8H     polyethylene glycol  17 g Oral BID     sennosides  2 tablet Oral BID     sodium chloride (PF)  10 mL Intracatheter Q8H     tamsulosin  0.4 mg Oral Daily       Data   All microbiology laboratory data reviewed.  Recent Labs   Lab Test 12/06/21  0624 12/05/21  0555 12/04/21  0619 12/01/21  0621   WBC  --  9.1 7.6 6.6   HGB 7.3* 7.7* 7.1* 7.6*   HCT  --  24.6* 22.6* 24.0*   MCV  --  87 87 87   PLT  --  387 385 417     Recent Labs   Lab Test 12/05/21  0556 12/04/21  0619 12/02/21  2354   CR 1.61* 1.78* 1.92*     Recent Labs   Lab Test 11/16/21  0014   SED 39*     Microbiology  11/14 & 4/16 blood cxs both sets MSSA & MRSA, blood cxs 11/18, 11/19 blood cxs  negative  Culture Positive on the 1st day of incubation Abnormal         Staphylococcus aureus Panic     2 of 2 bottles   Staphylococcus aureus MRSA Panic     2 of 2 bottles         Resulting Agency: [facility]         Susceptibility                       Staphylococcus aureus Staphylococcus aureus MRSA       PURNIMA PURNIMA       Clindamycin <=0.25 ug/mL Susceptible <=0.25 ug/mL Susceptible 1       Erythromycin <=0.25 ug/mL Susceptible >=8.0 ug/mL Resistant       Gentamicin <=0.5 ug/mL Susceptible <=0.5 ug/mL Susceptible       Linezolid     2.0 ug/mL Susceptible       Oxacillin <=0.25 ug/mL Susceptible >=4.0 ug/mL Resistant       Tetracycline <=1.0 ug/mL Susceptible <=1.0 ug/mL Susceptible       Trimethoprim/Sulfamethoxazole <=0.5/9.5 u... Susceptible <=0.5/9.5 u... Susceptible       Vancomycin <=0.5 ug/mL Susceptible <=0.5 ug/mL Susceptible                    Imaging  11/2 EDUARDO  Interpretation Summary     There is a small filamentous echodensity noted on the posterior mitral valve  leaflet (image 28) which in the clinical context, could be a vegetation.  The aortic valve is normal in structure and function.  There is a 0.9 cm vegetation noted on the posterior leaflet of the tricuspid  valve. The leaflet is likely perforated and prolapsing leading to severe  degenerative TR. Vegetations much smaller than prior EDUARDO. See images 100-127  towards the end of the study.  The visual ejection fraction is 65-70%.  The right ventricle is normal in size and function     11/24 US kidneys  ULTRASOUND RENAL COMPLETE 11/24/2021 12:53 PM     CLINICAL HISTORY: MSSA sepsis, endocarditis, increasing creatinine,  evaluate for obstruction.     TECHNIQUE: Routine Bilateral Renal and Bladder Ultrasound.     COMPARISON: None.     FINDINGS:  RIGHT KIDNEY: 12.1 x 6.9 x 4.9 cm. Unremarkable echogenicity, no  hydronephrosis or masses. Simple cysts noted measuring up to 1.7 cm.  Mildly complex cyst in the upper right kidney measuring 1.8 cm.     LEFT KIDNEY:  1.2 x 6.0 x 5.8 cm. Unremarkable echogenicity, no  hydronephrosis or masses. Simple cysts noted.     BLADDER: Unremarkable given its level of distension.                                                                      IMPRESSION:  No obstruction demonstrated     11/18 EDUARDO  Interpretation Summary     Two, large, mobile masses noted, attached to the atrial side of base of  posterior tricuspid leaflets are noted. (they measures 18 x 8mm and 13 x 5mm  respectively).These are most consistent with large tricuspid valve  vegetations.  Severe tricuspid regurgitation noted, directed towards the interatrial septum.  Cannot exclude perforation of posterior leaflet of tricuspid valve.  The right ventricle is mildly dilated.  The right ventricular systolic function is normal.  Left ventricular systolic function is normal.  The visual ejection fraction is 60-65%.  Findings discussed with Dr Guerin.  These are new comnpared to prior echo from 2017. The study was technically  adequate.

## 2021-12-06 NOTE — PROGRESS NOTES
CLINICAL NUTRITION SERVICES - REASSESSMENT NOTE    Recommendations Ordered by Registered Dietitian (RD):   - Educated pt on increased protein needs with upcoming surgery.   - Continue supplements (Ensure Enlive TID w/ meals)   Malnutrition: (11/22)   % Weight Loss:  None noted  % Intake:  <75% for > 7 days (moderate malnutrition)  Subcutaneous Fat Loss:  Baseline  Muscle Loss:  Baseline   Fluid Retention:  None noted     Malnutrition Diagnosis: Patient does not meet two of the above criteria necessary for diagnosing malnutrition - at risk     EVALUATION OF PROGRESS TOWARD GOALS   Diet: Regular + Ensure Enlive w/ meals   Intake/Tolerance:   - eating % of 1-2 meals/day.   - receives Ensure TID.   - Good appetite recorded in flowsheet.   - Pt reports eating OK. He drinks the Ensure regularly, not always 3/day. Ordered Croatian toast and an omelet this morning for breakfast.   - Just learned that he'll need heart surgery- feeling a little overwhelmed at time of visit.     - Last weight recorded 12/3 (68.4 kg)   - Last BM 12/3    NEW FINDINGS:   - Planned valve replacement late this week vs early next week.     ASSESSED NUTRITION NEEDS PER APPROVED PRACTICE GUIDELINES:  Dosing Weight 56.2 kg   Estimated Energy Needs: 7283-7123 kcals (30-35 Kcal/Kg)  Justification: underweight   Estimated Protein Needs:  grams protein (1.2-1.8 g pro/Kg)  Justification: Repletion    Previous Goals:   Patient will consume >50% meals and supplements  Evaluation: Met    Previous Nutrition Diagnosis:   Predicted inadequate nutrient intake (protein/calorie) related to variable appetite and restricted diet order with intakes ranging from %   Evaluation: Continues, updated below     CURRENT NUTRITION DIAGNOSIS  Increased nutrient needs (protein) related to prolonged hospitalization and upcoming surgery as evidenced by day 23 inpatient + plan for valve replacement in the next week.     INTERVENTIONS  Recommendations / Nutrition  Prescription  Regular diet  Ensure Enlive TID w/ meals     Implementation  Nutrition Education: Educated pt on increased protein needs with upcoming surgery.     Goals  Intake of at least 75% meals BID - TID + 2 supplements daily.     MONITORING AND EVALUATION:  Progress towards goals will be monitored and evaluated per protocol and Practice Guidelines    Alice Mena RD, LD  [facility], [facility], [facility]  Pager: [phone]  Weekend Pager: [phone]

## 2021-12-06 NOTE — PLAN OF CARE
DATE & TIME: 12/5/21 0700-1930  Cognitive Concerns/ Orientation : Pt A/Ox4, calm/cooperative, withdrawn  BEHAVIOR & AGGRESSION TOOL COLOR: Green   ABNL VS/O2: VSS on RA  MOBILITY: Independent  PAIN MANAGMENT: Denies  DIET: Regular, good po  BOWEL/BLADDER: Continent   ABNL LAB/BG: Cr 1.61; Hgb 7.7  DRAIN/DEVICES: Midline SL  SKIN: Scrotal laceration - refused wound care - LISSETH; scattered bruising/scars and scabs.  TESTS/PROCEDURES: Anticipating valve replacement early next week secondary to tricuspid valve endocarditis  D/C DATE: Discharge date unclear, will need to complete IV antibiotics, likely here (until 12/29) and then will need to go to inpatient Chem dep treatment pending ID clearance; also being considered for valve replacement   OTHER IMPORTANT INFO: Patient has been quiet and withdrawn; continues to c/o nausea and anxiety - PRN Ativan and Zofran x1 given; continues on Daptomycin.

## 2021-12-06 NOTE — PROGRESS NOTES
CV Surgery    Reached out to Dr. PRANEETH Barrientos at the [facility] (addiction medicine physician) for further ongoing recommendations. He will contact patient to discuss plans moving forward. For now will continue medical management as pt extremely high risk of polysubstance relapse per addiction medicine. Will defer surgery at this time to allow for patient to receive help/therapy that he needs and re-evaluate patient as able. Will need to carefully monitor for any signs of heart failure/fluid overload moving forward.       **Addendum: surgeon discussed with Dr. Barrientos and in agreement with above plan    Debra Voss PA-C

## 2021-12-06 NOTE — PROGRESS NOTES
[facility], [place]  Hospitalist Progress Note        Derick Guerin MD   12/06/2021        Interval History:        - no acute issues overnight; patient anxious about need for valve replacement surgery         Assessment and Plan:        Bc German is a 41 year old male with PMHx of polysubstance abuse including IV drug use (including fentanyl and methamphetamine currently), chronic opioid dependence, HIV, hepatitis C, hx of MSSA sepsis with pulmonary valve endocarditis (2015), and 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 Was found to have recurrent MSSA/MRSA bacteremia and tricuspid valve endocarditis.    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 lactate nl. CXR on admission showed nodular/patchy opacity at the R base.  * 2/2 blood cultures drawn on admission grew both MSSA and MRSA >> blood cx cleared  on 11/18 >> vancomycin  changed to daptomycin due to JOSE RAMON   EDUARDO done 11/18 notable for large tricuspid valve vegetation with severe regurgitation  - ID following, to continue Daptomycin, with plans to switch to Vancomycin when renal function better; will need 6 weeks of antibiotic until 12/29; midline placed 12/2  - remains afebrile ; EDUARDO repeat 12/02 noted with smaller vegetations than prior EDUARDO but did note that tricuspid valve leaflet is likely perforated and prolapsing leading to severe degenerative TR, EF 65-70 %  - bilateral LE swelling, likely due to TR, US 12/3 noted with no DVT; edema improved with 40 mg  IV Lasix given on 12/3 ; will monitor volume status closely and consider diuretics prn  - given EDUARDO findings, re-evaluated by thoracic surgery (initially rec medical management) and plans for surgical replacement with bioprosthetic valve at some point; surgery concerned about his IV drug use and consulting with Dr SELINA Barrientos (addiction medicine physician) at [facility] prior to considering surgery     Chronic anemia, likely anemia of chronic disease  - likely anemia of chronic disease in the setting of infective endocarditis  - hemoglobin on admission however was 12.5 and has gradually trended down--->7.1, stable--7.7---7.3  - hemodynamically stable and no suggestion of active bleed  - iron studies consistent with likely ACD with normal ferritin and low TIBC, TSH slightly elevated but free T4 normal; b12 and folate normal  - will monitor hemoglobin and transfuse PRN for Hb <7; might need blood transfusion preoperatively to keep Hb around 8    HTN  - BP reasonably controlled with addition of amlodipine 5 mg po daily and Metoprolol 25 mg po bid with hold parameters  (12/3); continue; hydralazine IV prn for SBP>180; plan for intermittent diuresis as above     HIV  Hepatitis C  * Chronic and stable on Triumeq restarted 11/30 as cr improving   * CDC >200 so no need for PJP ppx per ID     JOSE RAMON on CKD   Hyperkalemia (resolved)  Hyponatremia: resolved   * No hx of CKD and with a normal baseline enrique function  * Cr initially stable this stay, began trending up after vancomycin  - nephrology evaluated for JOSE RAMON, Cr peak 2.8 (11/25)---> trending down --> 1.92---1.78---1.61; nephro plans to follow up outpatient; signed off 12/3  - monitor BMP  - ADAN returned negative, Renal US obtained 11/24 and negative for obstruction     History of opiate dependence  History of active IV drug use  Tobacco Use  Depression  * On admission, had stated he last used IV drugs 1 wk prior to admission. While in the ED on 11/16, RN had noted patient was briefly  nonresponsive and apneic. Was given dose of Narcan with noted improvement.   * Prior to admission, had been started on taper off Soma -- was taking 87.5mg (1/4 of a 350mg tab) BID but later conversations with PCP's clinic revealed no provider there was managing taper  -- cont buprenorphine BID this stay  -- conts on levomilnacipran ER as per prior to admission  -- cont carisoprodol 87.5mg BID as per prior to admission, unclear what further plan was for taper  -- cont lorazepam 0.5mg q6h prn for anxiety   -- cont nicotine patch  CHEM Depp consultation requested and recommended inpatient treatment after completion of antibiotics directly will transfer from here to detox treatment center   - will consider psychiatry evaluation if needed from surgery perspective prior to valve surgery;    - surgery concerned about his IV drug use and consulting with Dr SELINA Barrientos (addiction medicine physician) at [facility] prior to considering surgery      Left scrotal laceration dt fall/possible syncope  Left scrotal wound infection  * Laceration sutured in ED. Suture removed 11/21.   * US scrotum earlier this stay showed a small R hydrocele but was otherwise normal.   Patient's scrotal laceration wound looks clean and dry.    Urology and wound care consultations requested patient is on IV daptomycin which should cover for the scrotal infection as well     Left ear pain; improved    No evidence of otitis media or externa. Likely cerumen impaction,  _ continue to  apply carbamide peroxide  Ear drops to left ears,      COVID status: asymptomatic screening negative on 11/16, 11/20, 11/29    Orders Placed This Encounter      Regular Diet Adult    DVT Prophylaxis: PCDs  Code Status: Full Code     Disposition: Discharge date unclear, will need to complete IV antibiotics, likely here (until 12/29) and then will need to go to inpatient Chem dep treatment pending ID clearance; also being considered for valve replacement      Clinically Significant Risk  Factors Present on Admission                 Care plan discussed with patient and nursing      Page Me (7 am to 6 pm)              Physical Exam:      Blood pressure (!) 138/94, pulse 81, temperature 98.4  F (36.9  C), temperature source Oral, resp. rate 18, height 1.829 m (6'), weight 68.4 kg (150 lb 12.7 oz), SpO2 100 %.  Vitals:    11/29/21 0554 11/29/21 1340 12/03/21 0652   Weight: 70 kg (154 lb 5.2 oz) 73.4 kg (161 lb 12.8 oz) 68.4 kg (150 lb 12.7 oz)     Vital Signs with Ranges  Temp:  [98.4  F (36.9  C)] 98.4  F (36.9  C)  Pulse:  [81] 81  Resp:  [18] 18  BP: (138)/(94) 138/94  SpO2:  [100 %] 100 %  I/O's Last 24 hours  No intake/output data recorded.    Constitutional: awake and oriented X 3; resting comfortably in no apparent distress   HEENT: Pupils equal and reactive to light and accomodation, neck supple    Oral cavity: Moist mucosa   Cardiovascular: Normal s1 s2, regular rate and rhythm, systolic murmur +   Lungs: B/l clear to auscultation, no wheezes or crepitations   Abdomen: Soft, nt, nd, no guarding, rigidity or rebound; BS +   LE : B/l edema improved   Musculoskeletal: Power 5/5 in all extremities   Neuro: No focal neurological deficits noted   Psychiatry: normal mood and affect, slightly anxious  Skin : noted multiple skin excoriations and injection marks                   Medications:          abacavir-dolutegravir-LamiVUDine  1 tablet Oral Daily     amLODIPine  5 mg Oral Daily     aspirin  81 mg Oral Daily     buprenorphine  8 mg Sublingual BID     carbamide peroxide  3 drop Left Ear BID     carisoprodol  87.5 mg Oral BID     DAPTOmycin (CUBICIN) intermittent infusion  9 mg/kg Intravenous Q24H     docusate sodium  100 mg Oral BID     levomilnacipran  80 mg Oral Daily     metoprolol tartrate  25 mg Oral BID     nicotine  1 patch Transdermal QPM     nicotine   Transdermal Q8H     polyethylene glycol  17 g Oral BID     sennosides  2 tablet Oral BID     sodium chloride (PF)  10 mL Intracatheter Q8H      tamsulosin  0.4 mg Oral Daily     PRN Meds: acetaminophen **OR** acetaminophen, alum & mag hydroxide-simethicone, bisacodyl, calcium carbonate, glucose **OR** dextrose **OR** glucagon, lidocaine 4%, lidocaine (buffered or not buffered), lidocaine (buffered or not buffered), LORazepam, naloxone **OR** naloxone **OR** naloxone **OR** naloxone, nitroGLYcerin, ondansetron **OR** ondansetron, prochlorperazine **OR** prochlorperazine **OR** prochlorperazine, sodium chloride (PF), sodium chloride (PF), sodium chloride (PF)         Data:      All new lab and imaging data was reviewed.   Recent Labs   Lab Test 12/06/21  0624 12/05/21  0555 12/04/21  0619 12/01/21  0621 07/20/21  0128 05/09/21  1450 10/09/17  2149 10/09/17  2015 03/13/15  0900 03/12/15  1620   WBC  --  9.1 7.6 6.6   < > 5.5   < > [facility] 2[facility] BY [facility]   < > 11.8*   HGB 7.3* 7.7* 7.1* 7.6*   < > 13.3   < > [facility] 2[facility]   < > 10.5*   MCV  --  87 87 87   < > 87   < > [facility] 2115 BY [facility]   < > 81   PLT  --  387 385 417   < > 354   < > [facility] 2115 BY [facility]   < > 515*   INR  --   --   --   --   --  0.96  --  1.01  --  1.92*    < > = values in this interval not displayed.      Recent Labs   Lab Test 12/05/21  0556 12/04/21  0619 12/02/21  2354    139 136   POTASSIUM 4.3 4.1 5.2   CHLORIDE 107 106 105   CO2 28 29 30   BUN 37* 34* 31*   CR 1.61* 1.78* 1.92*   ANIONGAP 4 4 1*   SANDRA 9.0 9.0 8.7   GLC 91 90 131*     Recent Labs   Lab Test 05/09/21  1450 10/09/17  2015   TROPI <0.015 <0.015

## 2021-12-06 NOTE — PLAN OF CARE
Cognitive Concerns/ Orientation : Pt A/Ox4, calm/cooperative, withdrawn  BEHAVIOR & AGGRESSION TOOL COLOR: Green   ABNL VS/O2: VSS on RA  MOBILITY: Independent  PAIN MANAGMENT: Denies  DIET: Regular, good po  BOWEL/BLADDER: Continent   ABNL LAB/BG: Cr 1.61; Hgb 7.7  DRAIN/DEVICES: Midline SL  SKIN: Scrotal laceration - refused wound care - LISSETH; scattered bruising/scars and scabs.  TESTS/PROCEDURES: Anticipating valve replacement early next week secondary to tricuspid valve endocarditis  D/C DATE: Discharge date pending POC  OTHER IMPORTANT INFO: Patient frustrated and withdrawn; continues to c/o nausea and anxiety - PRN Ativan and Zofran x1 given; continues on Daptomycin.

## 2021-12-07 LAB
CK SERPL-CCNC: 17 U/L (ref 30–300)
PATH REPORT.COMMENTS IMP SPEC: NORMAL
PATH REPORT.FINAL DX SPEC: NORMAL
PATH REPORT.MICROSCOPIC SPEC OTHER STN: NORMAL
PATH REPORT.MICROSCOPIC SPEC OTHER STN: NORMAL
PATH REPORT.RELEVANT HX SPEC: NORMAL

## 2021-12-07 PROCEDURE — 250N000013 HC RX MED GY IP 250 OP 250 PS 637: Performed by: PSYCHIATRY & NEUROLOGY

## 2021-12-07 PROCEDURE — 250N000013 HC RX MED GY IP 250 OP 250 PS 637: Performed by: INTERNAL MEDICINE

## 2021-12-07 PROCEDURE — 250N000013 HC RX MED GY IP 250 OP 250 PS 637: Performed by: HOSPITALIST

## 2021-12-07 PROCEDURE — 250N000013 HC RX MED GY IP 250 OP 250 PS 637: Performed by: STUDENT IN AN ORGANIZED HEALTH CARE EDUCATION/TRAINING PROGRAM

## 2021-12-07 PROCEDURE — 250N000013 HC RX MED GY IP 250 OP 250 PS 637: Performed by: SPECIALIST

## 2021-12-07 PROCEDURE — 82550 ASSAY OF CK (CPK): CPT | Performed by: INTERNAL MEDICINE

## 2021-12-07 PROCEDURE — 99233 SBSQ HOSP IP/OBS HIGH 50: CPT | Performed by: HOSPITALIST

## 2021-12-07 PROCEDURE — 120N000001 HC R&B MED SURG/OB

## 2021-12-07 PROCEDURE — 250N000011 HC RX IP 250 OP 636: Performed by: INTERNAL MEDICINE

## 2021-12-07 PROCEDURE — 85060 BLOOD SMEAR INTERPRETATION: CPT | Performed by: PATHOLOGY

## 2021-12-07 PROCEDURE — 250N000011 HC RX IP 250 OP 636: Performed by: SPECIALIST

## 2021-12-07 PROCEDURE — 250N000013 HC RX MED GY IP 250 OP 250 PS 637

## 2021-12-07 PROCEDURE — 258N000003 HC RX IP 258 OP 636: Performed by: SPECIALIST

## 2021-12-07 RX ORDER — MAGNESIUM CARB/ALUMINUM HYDROX 105-160MG
296 TABLET,CHEWABLE ORAL ONCE
Status: COMPLETED | OUTPATIENT
Start: 2021-12-07 | End: 2021-12-07

## 2021-12-07 RX ADMIN — MAGNESIUM CITRATE 296 ML: 1.75 LIQUID ORAL at 12:44

## 2021-12-07 RX ADMIN — METOPROLOL TARTRATE 25 MG: 25 TABLET, FILM COATED ORAL at 09:57

## 2021-12-07 RX ADMIN — BUPRENORPHINE HYDROCHLORIDE 8 MG: 8 TABLET SUBLINGUAL at 09:58

## 2021-12-07 RX ADMIN — METOPROLOL TARTRATE 25 MG: 25 TABLET, FILM COATED ORAL at 22:42

## 2021-12-07 RX ADMIN — TAMSULOSIN HYDROCHLORIDE 0.4 MG: 0.4 CAPSULE ORAL at 09:58

## 2021-12-07 RX ADMIN — LORAZEPAM 0.5 MG: 0.5 TABLET ORAL at 16:58

## 2021-12-07 RX ADMIN — DOCUSATE SODIUM 100 MG: 100 CAPSULE, LIQUID FILLED ORAL at 09:58

## 2021-12-07 RX ADMIN — Medication 87.5 MG: at 09:57

## 2021-12-07 RX ADMIN — ABACAVIR SULFATE, DOLUTEGRAVIR SODIUM, LAMIVUDINE 1 TABLET: 600; 50; 300 TABLET, FILM COATED ORAL at 09:57

## 2021-12-07 RX ADMIN — AMLODIPINE BESYLATE 5 MG: 5 TABLET ORAL at 09:58

## 2021-12-07 RX ADMIN — DOCUSATE SODIUM 100 MG: 100 CAPSULE, LIQUID FILLED ORAL at 22:42

## 2021-12-07 RX ADMIN — Medication 87.5 MG: at 22:42

## 2021-12-07 RX ADMIN — ONDANSETRON 4 MG: 2 INJECTION INTRAMUSCULAR; INTRAVENOUS at 06:41

## 2021-12-07 RX ADMIN — Medication 3 DROP: at 09:59

## 2021-12-07 RX ADMIN — ONDANSETRON 4 MG: 4 TABLET, ORALLY DISINTEGRATING ORAL at 16:58

## 2021-12-07 RX ADMIN — NICOTINE 1 PATCH: 14 PATCH, EXTENDED RELEASE TRANSDERMAL at 10:11

## 2021-12-07 RX ADMIN — LORAZEPAM 0.5 MG: 0.5 TABLET ORAL at 23:20

## 2021-12-07 RX ADMIN — LORAZEPAM 0.5 MG: 0.5 TABLET ORAL at 06:41

## 2021-12-07 RX ADMIN — LEVOMILNACIPRAN HYDROCHLORIDE 80 MG: 40 CAPSULE, EXTENDED RELEASE ORAL at 09:58

## 2021-12-07 RX ADMIN — DAPTOMYCIN 650 MG: 500 INJECTION, POWDER, LYOPHILIZED, FOR SOLUTION INTRAVENOUS at 13:56

## 2021-12-07 RX ADMIN — ONDANSETRON 4 MG: 2 INJECTION INTRAMUSCULAR; INTRAVENOUS at 23:20

## 2021-12-07 RX ADMIN — ASPIRIN 81 MG: 81 TABLET, COATED ORAL at 09:58

## 2021-12-07 ASSESSMENT — ACTIVITIES OF DAILY LIVING (ADL)
ADLS_ACUITY_SCORE: 5

## 2021-12-07 NOTE — PLAN OF CARE
"Cognitive Concerns/ Orientation: A/Ox4. Low motivation.  BEHAVIOR & AGGRESSION TOOL COLOR: Green   ABNL VS/O2: VSS on RA  MOBILITY: Independent  PAIN MANAGMENT: Denies  DIET: Regular-adequate  BOWEL/BLADDER: Continent   ABNL LAB/BG:   DRAIN/DEVICES: Midline SL  SKIN: Scrotal laceration -wound care completed; scattered bruising/scars and scabs.  TESTS/PROCEDURES: Anticipating valve replacement early next week secondary to tricuspid valve endocarditis, frequently comments \"they better give me some good pain meds, I don't want to suffer\"  D/C DATE: Discharge date pending POC  OTHER IMPORTANT INFO: Ativan given for anxiety per pt request.     "

## 2021-12-07 NOTE — PLAN OF CARE
"Cognitive Concerns/ Orientation: Alert and oriented x 4   BEHAVIOR & AGGRESSION TOOL COLOR: Green   ABNL VS/O2: VSS on RA  MOBILITY: Independent  PAIN MANAGMENT: Denies  DIET: Regular-adequate  BOWEL/BLADDER: Continent   ABNL LAB/BG: Hemoglobin 7.3, Creat 1.61  DRAIN/DEVICES: Midline SL left arm   SKIN: Scrotal laceration -wound care completed; scattered bruising/scars and scabs.  TESTS/PROCEDURES: Anticipating valve replacement early next week secondary to tricuspid valve endocarditis, frequently comments \"they better give me some good pain meds, I don't want to suffer\"  D/C DATE: Discharge date pending POC  OTHER IMPORTANT INFO: Ativan given for anxiety per pt request. Intermittent nausea, Zofran x 1.  Nicotine patch on right arm, patient refused new patch and wanted to keep old patch on.  Contact isolation precautions in place.         "

## 2021-12-07 NOTE — PROGRESS NOTES
[facility]  Hospitalist Progress Note        Derick Guerin MD   12/07/2021        Interval History:        - reports being constipated for 3 days; states only magnesium citrate works for him; declines other bowel regimen         Assessment and Plan:        Bc German is a 41 year old male with PMHx of polysubstance abuse including IV drug use (including fentanyl and methamphetamine currently), chronic opioid dependence, HIV, hepatitis C, hx of MSSA sepsis with pulmonary valve endocarditis (2015), and 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 Was found to have recurrent MSSA/MRSA bacteremia and tricuspid valve endocarditis.    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 lactate nl. CXR on admission showed nodular/patchy opacity at the R base.  * 2/2 blood cultures drawn on admission grew both MSSA and MRSA >> blood cx cleared  on 11/18 >> vancomycin  changed to daptomycin due to JOSE RAMON   EDUARDO done 11/18 notable for large tricuspid valve vegetation with severe regurgitation  - ID following, to continue Daptomycin, with plans to switch to Vancomycin when renal function better; will need 6 weeks of antibiotic until 12/29; midline was placed 12/2  - remains afebrile ; EDUARDO repeat 12/02 noted with smaller vegetations than prior EDUARDO but did note that tricuspid valve leaflet is likely perforated and prolapsing leading to severe degenerative TR, EF 65-70 %  - bilateral LE swelling, likely due to TR, US 12/3 noted with no  DVT; edema improved with 40 mg IV Lasix given on 12/3 ; will monitor volume status closely and consider diuretics prn  - given EDUARDO findings, re-evaluated by thoracic surgery (initially rec medical management) and plans for surgical replacement with bioprosthetic valve at some point; surgery concerned about his IV drug use and consulted with Dr SELINA Barrientos (addiction medicine physician) at [facility] prior to considering surgery     - discussed with DR Barrientos - suggested that he is at high risk for relapse and recommended to hold off on surgery for at least 6 months if possible to see if he would remain sober     Chronic anemia, likely anemia of chronic disease  - likely anemia of chronic disease in the setting of infective endocarditis  - hemoglobin on admission however was 12.5 and has gradually trended down--->7.1, stable--7.7---7.3  - hemodynamically stable and no suggestion of active bleed  - iron studies consistent with likely ACD with normal ferritin and low TIBC, TSH slightly elevated but free T4 normal; b12 and folate normal  - will monitor hemoglobin and transfuse PRN for Hb <7; might need blood transfusion preoperatively to keep Hb around 8    HTN  - BP reasonably controlled with addition of amlodipine 5 mg po daily and Metoprolol 25 mg po bid with hold parameters  (12/3); continue; hydralazine IV prn for SBP>180; plan for intermittent diuresis as above     HIV  Hepatitis C  * Chronic and stable on Triumeq restarted 11/30 as cr improving   * CDC >200 so no need for PJP ppx per ID     JOSE RAMON on CKD   Hyperkalemia (resolved)  Hyponatremia: resolved   * No hx of CKD and with a normal baseline enrique function  * Cr initially stable this stay, began trending up after vancomycin  - nephrology evaluated for JOSE RAMON, Cr peak 2.8 (11/25)---> trending down --> 1.92---1.78---1.61; nephro plans to follow up outpatient; signed off 12/3  - monitor BMP  - ADAN returned negative, Renal US obtained 11/24 and negative for  obstruction     History of opiate dependence  History of active IV drug use  Tobacco Use  Depression  * On admission, had stated he last used IV drugs 1 wk prior to admission. While in the ED on 11/16, RN had noted patient was briefly nonresponsive and apneic. Was given dose of Narcan with noted improvement.   * Prior to admission, had been started on taper off Soma -- was taking 87.5mg (1/4 of a 350mg tab) BID but later conversations with PCP's clinic revealed no provider there was managing taper  -- cont buprenorphine BID this stay  -- conts on levomilnacipran ER as per prior to admission  -- cont carisoprodol 87.5mg BID as per prior to admission, unclear what further plan was for taper  -- cont lorazepam 0.5mg q6h prn for anxiety   -- cont nicotine patch  CHEM Depp evaluated and recommended inpatient treatment after completion of antibiotics directly will transfer from here to detox treatment center   - will consider psychiatry evaluation if needed from surgery perspective prior to valve surgery;    - surgery concerned about his IV drug use and consulting with Dr SELINA Barrientos (addiction medicine physician) at [facility] prior to considering surgery      Left scrotal laceration dt fall/possible syncope  Left scrotal wound infection  * Laceration sutured in ED. Suture removed 11/21.   * US scrotum earlier this stay showed a small R hydrocele but was otherwise normal.   Patient's scrotal laceration wound looks clean and dry.    Urology and wound care consultations requested patient is on IV daptomycin which should cover for the scrotal infection as well     Left ear pain; improved    No evidence of otitis media or externa. Likely cerumen impaction,  _ continue to  apply carbamide peroxide  Ear drops to left ears prn    Constipation: bowel regimen in place but states only magnesium citrate works for him; declines other bowel regimen; Mag citrate ordered 12/7; also got on 12/2     COVID status: asymptomatic screening negative on  11/16, 11/20, 11/29    Orders Placed This Encounter      Regular Diet Adult    DVT Prophylaxis: PCDs  Code Status: Full Code     Disposition: Discharge date unclear, will need to complete IV antibiotics, likely here (until 12/29) and then will need to go to inpatient Chem dep treatment pending ID clearance; also being considered for valve replacement      Clinically Significant Risk Factors Present on Admission                 Care plan discussed with patient , nursing and CT surgery PA and also with DR Barrientos (Addiction Specialist at [facility]); >35 minutes spent today       Page Me (7 am to 6 pm)              Physical Exam:      Blood pressure 122/84, pulse 75, temperature 98  F (36.7  C), temperature source Oral, resp. rate 18, height 1.829 m (6'), weight 68.4 kg (150 lb 12.7 oz), SpO2 100 %.  Vitals:    11/29/21 0554 11/29/21 1340 12/03/21 0652   Weight: 70 kg (154 lb 5.2 oz) 73.4 kg (161 lb 12.8 oz) 68.4 kg (150 lb 12.7 oz)     Vital Signs with Ranges  Temp:  [98  F (36.7  C)] 98  F (36.7  C)  Pulse:  [75-84] 75  Resp:  [18] 18  BP: (118-124)/(81-88) 122/84  SpO2:  [98 %-100 %] 100 %  I/O's Last 24 hours  I/O last 3 completed shifts:  In: 200 [P.O.:200]  Out: 1450 [Urine:1450]    Constitutional: awake and oriented X 3; resting comfortably in no apparent distress   HEENT: Pupils equal and reactive to light and accomodation, neck supple    Oral cavity: Moist mucosa   Cardiovascular: Normal s1 s2, regular rate and rhythm, systolic murmur +   Lungs: B/l clear to auscultation, no wheezes or crepitations   Abdomen: Soft, nt, nd, no guarding, rigidity or rebound; BS +   LE : B/l edema improved   Musculoskeletal: Power 5/5 in all extremities   Neuro: No focal neurological deficits noted   Psychiatry: normal mood and affect, slightly anxious  Skin : noted multiple skin excoriations and injection marks                   Medications:          abacavir-dolutegravir-LamiVUDine  1 tablet Oral Daily     amLODIPine  5 mg Oral  Daily     aspirin  81 mg Oral Daily     buprenorphine  8 mg Sublingual BID     carbamide peroxide  3 drop Left Ear BID     carisoprodol  87.5 mg Oral BID     DAPTOmycin (CUBICIN) intermittent infusion  9 mg/kg Intravenous Q24H     docusate sodium  100 mg Oral BID     levomilnacipran  80 mg Oral Daily     metoprolol tartrate  25 mg Oral BID     nicotine  1 patch Transdermal QPM     nicotine   Transdermal Q8H     polyethylene glycol  17 g Oral BID     sennosides  2 tablet Oral BID     sodium chloride (PF)  10 mL Intracatheter Q8H     tamsulosin  0.4 mg Oral Daily     PRN Meds: acetaminophen **OR** acetaminophen, alum & mag hydroxide-simethicone, bisacodyl, calcium carbonate, glucose **OR** dextrose **OR** glucagon, lidocaine 4%, lidocaine (buffered or not buffered), LORazepam, naloxone **OR** naloxone **OR** naloxone **OR** naloxone, nitroGLYcerin, ondansetron **OR** ondansetron, prochlorperazine **OR** prochlorperazine **OR** prochlorperazine, sodium chloride (PF), sodium chloride (PF), sodium chloride (PF)         Data:      All new lab and imaging data was reviewed.   Recent Labs   Lab Test 12/06/21  0624 12/05/21  0555 12/04/21  0619 12/01/21  0621 07/20/21  0128 05/09/21  1450 10/09/17  2149 10/09/17  2015 03/13/15  0900 03/12/15  1620   WBC  --  9.1 7.6 6.6   < > 5.5   < > [facility] 2115 BY [facility]   < > 11.8*   HGB 7.3* 7.7* 7.1* 7.6*   < > 13.3   < > [facility] 2115 [facility]   < > 10.5*   MCV  --  87 87 87   < > 87   < > [facility] 2115 [facility]   < > 81   PLT  --  387 385 417   < > 354   < > [facility]ER 2115 BY [facility]   < > 515*   INR  --   --   --   --   --  0.96  --  1.01  --  1.92*    < > = values in this interval not displayed.      Recent Labs   Lab Test 12/05/21  0556 12/04/21  0619 12/02/21  2354    139 136   POTASSIUM 4.3 4.1 5.2   CHLORIDE 107 106 105   CO2 28 29 30   BUN 37* 34* 31*   CR 1.61* 1.78* 1.92*   ANIONGAP 4 4 1*   SANDRA 9.0 9.0 8.7   GLC 91 90 131*     Recent Labs   Lab Test 05/09/21  1450  10/09/17  2015   TROPI <0.015 <0.015

## 2021-12-07 NOTE — PROGRESS NOTES
[facility]     Addiction Progress Note - Addiction Service   I was connected to Mr. Quan by the CT surgery team due to his current situation of injecting drug related endocarditis.  Mr. German is well known to me from prior injection drug related osteomyelitis, he has since been following with me at [facility] for several years, for severe OUD.     #Injection drug related endocarditis  He has struggled with avoidance of illicit drugs for many years.  His Chemical dep goals have been difficult to identify over time, however currently his goal is sobriety from illicit drugs.  Due to the social and mental health challenges Walt faces, and challenges meeting this goal in the past, he is at high risk of relapse; and hence, would be at high risk for develping prosthetic valve endocarditis.  I discussed that with him by phone today, and also discussed the risk of prosthetic valve endocarditis in the future.    Although his goal is to avoid injection drugs, ideally he would have some time to meet that goal as an outpatient, prior to having surgery.  He is in agreement with this thought as well.  I discussed briefly with the primary team and with CT surgery yesterday, and if the risks of prosthetic valve endocarditis outweigh the risk of postponing it surgery, he may be in Mr. German's best interest to avoid surgery at this time.  This would allow him to focus on chemical dependency and mental health, and once stable, follow up as an outpatient and have surgical plan reassessed.    Recommendations:  1. please discuss the above thought with process with cardiology (to make sure there are minimal concerns for long term structural damage to develop by postponing surgery), and with infectious disease (do we have source control to allow for post-poning of valve repair, AND would he need suppressive antibiotics).  - finally, I would consider having Infectious disease weigh in on whether his  dental caries should be evaluated as a potential infectious source while admitted.  2. Regarding Soma:   - his retired psychiatrist had been prescribing this, 350 mg TID.  I have not been a part of this treatment plan, and do not know if his new outpatient psychiatrist (per [facility], prescribing the lorazepam) is planning on prescribing this.  I would recommend this either be tapered during hospital stay, or discussed with his outpatient psychiatrist  3. He currently is receiving subutex, which is a formulation of buprenorphine that does not have naloxone.  Please consider switching this to suboxone while in the hospital to demonstrate that he can tolerate suboxone (his insurance won't cover subutex).  4. To link to outpatient care, please have Walt follow up with me, see below.  If he goes directly to chem dep treatment, please provide the following for long term follow up:  Patient to follow up with Dr. Khari Barrientos at:  [facility] ([facility])  [address] [address]  Main number: [phone]  Suboxone contact: [facility] chemical health care coordinator, Reyna Antoni: [phone]      Khari Barrientos MD  Internal Medicine/Pediatrics Hospitalist & Staff Physician  Addiction Medicine   of Internal Medicine and Pediatrics  [facility]  Pager: [phone]

## 2021-12-08 LAB
ANION GAP SERPL CALCULATED.3IONS-SCNC: 1 MMOL/L (ref 3–14)
BUN SERPL-MCNC: 31 MG/DL (ref 7–30)
CALCIUM SERPL-MCNC: 8.9 MG/DL (ref 8.5–10.1)
CHLORIDE BLD-SCNC: 106 MMOL/L (ref 94–109)
CO2 SERPL-SCNC: 29 MMOL/L (ref 20–32)
CREAT SERPL-MCNC: 1.42 MG/DL (ref 0.66–1.25)
GFR SERPL CREATININE-BSD FRML MDRD: 62 ML/MIN/1.73M2
GLUCOSE BLD-MCNC: 112 MG/DL (ref 70–99)
HGB BLD-MCNC: 7.6 G/DL (ref 13.3–17.7)
POTASSIUM BLD-SCNC: 5.1 MMOL/L (ref 3.4–5.3)
SODIUM SERPL-SCNC: 136 MMOL/L (ref 133–144)

## 2021-12-08 PROCEDURE — 120N000001 HC R&B MED SURG/OB

## 2021-12-08 PROCEDURE — 250N000013 HC RX MED GY IP 250 OP 250 PS 637: Performed by: PSYCHIATRY & NEUROLOGY

## 2021-12-08 PROCEDURE — 85018 HEMOGLOBIN: CPT | Performed by: HOSPITALIST

## 2021-12-08 PROCEDURE — 99233 SBSQ HOSP IP/OBS HIGH 50: CPT | Performed by: HOSPITALIST

## 2021-12-08 PROCEDURE — 250N000011 HC RX IP 250 OP 636: Performed by: SPECIALIST

## 2021-12-08 PROCEDURE — 999N000190 HC STATISTIC VAT ROUNDS

## 2021-12-08 PROCEDURE — 250N000013 HC RX MED GY IP 250 OP 250 PS 637: Performed by: SPECIALIST

## 2021-12-08 PROCEDURE — 250N000013 HC RX MED GY IP 250 OP 250 PS 637: Performed by: INTERNAL MEDICINE

## 2021-12-08 PROCEDURE — 250N000013 HC RX MED GY IP 250 OP 250 PS 637: Performed by: HOSPITALIST

## 2021-12-08 PROCEDURE — 80048 BASIC METABOLIC PNL TOTAL CA: CPT | Performed by: HOSPITALIST

## 2021-12-08 PROCEDURE — 258N000003 HC RX IP 258 OP 636: Performed by: SPECIALIST

## 2021-12-08 PROCEDURE — 250N000011 HC RX IP 250 OP 636: Performed by: INTERNAL MEDICINE

## 2021-12-08 PROCEDURE — 250N000013 HC RX MED GY IP 250 OP 250 PS 637: Performed by: STUDENT IN AN ORGANIZED HEALTH CARE EDUCATION/TRAINING PROGRAM

## 2021-12-08 RX ORDER — CARISOPRODOL 350 MG/1
87.5 TABLET ORAL
Status: COMPLETED | OUTPATIENT
Start: 2021-12-16 | End: 2021-12-22

## 2021-12-08 RX ORDER — CARISOPRODOL 350 MG/1
87.5 TABLET ORAL DAILY
Status: DISCONTINUED | OUTPATIENT
Start: 2021-12-09 | End: 2021-12-08

## 2021-12-08 RX ORDER — CARISOPRODOL 350 MG/1
87.5 TABLET ORAL DAILY
Status: COMPLETED | OUTPATIENT
Start: 2021-12-09 | End: 2021-12-15

## 2021-12-08 RX ORDER — BUPRENORPHINE AND NALOXONE 8; 2 MG/1; MG/1
1 FILM, SOLUBLE BUCCAL; SUBLINGUAL 2 TIMES DAILY
Status: DISCONTINUED | OUTPATIENT
Start: 2021-12-08 | End: 2021-12-30 | Stop reason: HOSPADM

## 2021-12-08 RX ADMIN — AMLODIPINE BESYLATE 5 MG: 5 TABLET ORAL at 10:51

## 2021-12-08 RX ADMIN — METOPROLOL TARTRATE 25 MG: 25 TABLET, FILM COATED ORAL at 10:51

## 2021-12-08 RX ADMIN — NICOTINE 1 PATCH: 14 PATCH, EXTENDED RELEASE TRANSDERMAL at 09:46

## 2021-12-08 RX ADMIN — DOCUSATE SODIUM 100 MG: 100 CAPSULE, LIQUID FILLED ORAL at 21:29

## 2021-12-08 RX ADMIN — DOCUSATE SODIUM 100 MG: 100 CAPSULE, LIQUID FILLED ORAL at 10:50

## 2021-12-08 RX ADMIN — Medication 87.5 MG: at 10:52

## 2021-12-08 RX ADMIN — TAMSULOSIN HYDROCHLORIDE 0.4 MG: 0.4 CAPSULE ORAL at 10:51

## 2021-12-08 RX ADMIN — METOPROLOL TARTRATE 25 MG: 25 TABLET, FILM COATED ORAL at 21:29

## 2021-12-08 RX ADMIN — ONDANSETRON 4 MG: 2 INJECTION INTRAMUSCULAR; INTRAVENOUS at 18:47

## 2021-12-08 RX ADMIN — LORAZEPAM 0.5 MG: 0.5 TABLET ORAL at 18:48

## 2021-12-08 RX ADMIN — LORAZEPAM 0.5 MG: 0.5 TABLET ORAL at 06:51

## 2021-12-08 RX ADMIN — ABACAVIR SULFATE, DOLUTEGRAVIR SODIUM, LAMIVUDINE 1 TABLET: 600; 50; 300 TABLET, FILM COATED ORAL at 10:51

## 2021-12-08 RX ADMIN — ASPIRIN 81 MG: 81 TABLET, COATED ORAL at 10:50

## 2021-12-08 RX ADMIN — DAPTOMYCIN 650 MG: 500 INJECTION, POWDER, LYOPHILIZED, FOR SOLUTION INTRAVENOUS at 14:19

## 2021-12-08 RX ADMIN — LEVOMILNACIPRAN HYDROCHLORIDE 120 MG: 40 CAPSULE, EXTENDED RELEASE ORAL at 10:51

## 2021-12-08 RX ADMIN — BUPRENORPHINE HYDROCHLORIDE 8 MG: 8 TABLET SUBLINGUAL at 10:50

## 2021-12-08 RX ADMIN — ONDANSETRON 4 MG: 2 INJECTION INTRAMUSCULAR; INTRAVENOUS at 06:51

## 2021-12-08 RX ADMIN — Medication 3 DROP: at 09:44

## 2021-12-08 ASSESSMENT — ACTIVITIES OF DAILY LIVING (ADL)
ADLS_ACUITY_SCORE: 5

## 2021-12-08 ASSESSMENT — MIFFLIN-ST. JEOR: SCORE: 1596

## 2021-12-08 NOTE — PROGRESS NOTES
[facility]  Hospitalist Progress Note        Derick Guerin MD   12/08/2021        Interval History:        - was evaluated by Dr Barrientos (addiction specialist at [place]); concern that he is at high risk for relapse and thus at high risk for prosthetic valve endocarditis if he undergoes valve replacement; rec postponing surgery at this time to focus on chemical dependency  - no acute issues overnight; still constipated, getting mag citrate, declines other bowel regimen         Assessment and Plan:        Bc German is a 41 year old male with PMHx of polysubstance abuse including IV drug use (including fentanyl and methamphetamine currently), chronic opioid dependence, HIV, hepatitis C, hx of MSSA sepsis with pulmonary valve endocarditis (2015), and 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 Was found to have recurrent MSSA/MRSA bacteremia and tricuspid valve endocarditis.    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 lactate nl. CXR on admission showed nodular/patchy opacity at the R base.  * 2/2 blood cultures drawn on admission grew both MSSA and MRSA >> blood cx cleared  on 11/18 >> vancomycin  changed to daptomycin due to JOSE RAMON   EDUARDO done 11/18 notable for large tricuspid valve vegetation with severe regurgitation  - ID following, to continue Daptomycin, with plans to switch to Vancomycin when renal function better; will need 6 weeks of antibiotic until 12/29; midline was placed 12/2  - remains afebrile ;  EDUARDO repeat 12/02 noted with smaller vegetations than prior EDUARDO but did note that tricuspid valve leaflet is likely perforated and prolapsing leading to severe degenerative TR, EF 65-70 %  - bilateral LE swelling, likely due to TR, US 12/3 noted with no DVT; edema improved with 40 mg IV Lasix given on 12/3 ; will monitor volume status closely and consider diuretics prn  - given EDUARDO findings, re-evaluated by thoracic surgery (initially rec medical management) and plans for surgical replacement with bioprosthetic valve at some point; surgery concerned about his IV drug use and consulted with Dr SELINA Barrientos (addiction medicine physician) at [facility] prior to considering surgery     - was evaluated by Dr Barrientos (addiction specialist at [place]); concern that he is at high risk for relapse and thus at high risk for prosthetic valve endocarditis if he undergoes valve replacement; rec postponing surgery at this time to focus on chemical dependency    Chronic anemia, likely anemia of chronic disease  - likely anemia of chronic disease in the setting of infective endocarditis  - hemoglobin on admission however was 12.5 and has gradually trended down--->7.1, stable--7.7---7.3---7.6  - hemodynamically stable and no suggestion of active bleed  - iron studies consistent with likely ACD with normal ferritin and low TIBC, TSH slightly elevated but free T4 normal; b12 and folate normal  - will monitor hemoglobin and transfuse PRN for Hb <7; might need blood transfusion preoperatively to keep Hb around 8    HTN  - BP reasonably controlled with addition of amlodipine 5 mg po daily and Metoprolol 25 mg po bid with hold parameters  (12/3); continue; hydralazine IV prn for SBP>180; plan for intermittent diuresis as above     HIV  Hepatitis C  * Chronic and stable on Triumeq restarted 11/30 as cr improving   * CDC >200 so no need for PJP ppx per ID     JOSE RAMON on CKD, likely due to Vancomycin   Hyperkalemia (resolved)  Hyponatremia: resolved   * No  hx of CKD and with a normal baseline enrique function  * Cr initially stable this stay, began trending up after vancomycin  - nephrology evaluated for JOSE RAMON, Cr peak 2.8 (11/25)---> trending down --> 1.92---1.78---1.61---1.42; nephro plans to follow up outpatient; signed off 12/3  - monitor BMP periodically  - ADAN returned negative, Renal US obtained 11/24 and negative for obstruction     History of opiate dependence  History of active IV drug use  Tobacco Use  Depression  * On admission, had stated he last used IV drugs 1 wk prior to admission. While in the ED on 11/16, RN had noted patient was briefly nonresponsive and apneic. Was given dose of Narcan with noted improvement.   * Prior to admission, had been started on taper off Soma -- was taking 87.5mg (1/4 of a 350mg tab) BID but later conversations with PCP's clinic revealed no provider there was managing taper  -- continued on PTA Subutex BID this stay-- after discussion with Dr Snyder, switched to Suboxone bid (12/8)  -- conts on levomilnacipran ER as per prior to admission  -- cont carisoprodol, after discussion with DR snyder and Pharmacy, plan to taper--- switched to 87.5mg daily for 7 days, then plan to do q 48 hrs for 7 days  -- cont lorazepam 0.5mg q6h prn for anxiety   -- cont nicotine patch  CHEM Depp evaluated and recommended inpatient treatment after completion of antibiotics directly will transfer from here to detox treatment center      Left scrotal laceration dt fall/possible syncope  Left scrotal wound infection  * Laceration sutured in ED. Suture removed 11/21.   * US scrotum earlier this stay showed a small R hydrocele but was otherwise normal.   Patient's scrotal laceration wound looks clean and dry.    Urology and wound care consultations requested patient is on IV daptomycin which should cover for the scrotal infection as well     Left ear pain; improved    No evidence of otitis media or externa. Likely cerumen impaction,  _ continue to  apply  carbamide peroxide  Ear drops to left ears prn    Constipation: bowel regimen in place but states only magnesium citrate works for him; declines other bowel regimen; Mag citrate ordered 12/7; also got on 12/2     COVID status: asymptomatic screening negative on 11/16, 11/20, 11/29    Orders Placed This Encounter      Regular Diet Adult    DVT Prophylaxis: PCDs  Code Status: Full Code     Disposition: Discharge date unclear, will need to complete IV antibiotics, likely here (until 12/29) and then will need to go to inpatient Chem dep treatment pending ID clearance; also being considered for valve replacement      Clinically Significant Risk Factors Present on Admission                 Care plan discussed with patient , DR Barrientos (Addiction Specialist at [place]) and also with pharmacy to adjust suboxone and Soma; >35 minutes spent today       Page Me (7 am to 6 pm)              Physical Exam:      Blood pressure (!) 134/95, pulse 82, temperature 97.9  F (36.6  C), temperature source Oral, resp. rate 18, height 1.829 m (6'), weight 64.3 kg (141 lb 12.1 oz), SpO2 100 %.  Vitals:    11/29/21 1340 12/03/21 0652 12/08/21 0300   Weight: 73.4 kg (161 lb 12.8 oz) 68.4 kg (150 lb 12.7 oz) 64.3 kg (141 lb 12.1 oz)     Vital Signs with Ranges  Temp:  [97.8  F (36.6  C)-97.9  F (36.6  C)] 97.9  F (36.6  C)  Pulse:  [75-82] 82  Resp:  [18] 18  BP: (128-134)/() 134/95  SpO2:  [100 %] 100 %  I/O's Last 24 hours  I/O last 3 completed shifts:  In: 300 [P.O.:300]  Out: 700 [Urine:700]    Constitutional: awake and oriented X 3; resting comfortably in no apparent distress   HEENT: Pupils equal and reactive to light and accomodation, neck supple    Oral cavity: Moist mucosa   Cardiovascular: Normal s1 s2, regular rate and rhythm, systolic murmur +   Lungs: B/l clear to auscultation, no wheezes or crepitations   Abdomen: Soft, nt, nd, no guarding, rigidity or rebound; BS +   LE : B/l edema improved   Musculoskeletal: Power 5/5 in  all extremities   Neuro: No focal neurological deficits noted   Psychiatry: normal mood and affect, slightly anxious  Skin : noted multiple skin excoriations and injection marks                   Medications:          abacavir-dolutegravir-LamiVUDine  1 tablet Oral Daily     amLODIPine  5 mg Oral Daily     aspirin  81 mg Oral Daily     buprenorphine  8 mg Sublingual BID     carbamide peroxide  3 drop Left Ear BID     carisoprodol  87.5 mg Oral BID     DAPTOmycin (CUBICIN) intermittent infusion  9 mg/kg Intravenous Q24H     docusate sodium  100 mg Oral BID     levomilnacipran  80 mg Oral Daily     metoprolol tartrate  25 mg Oral BID     nicotine  1 patch Transdermal QPM     nicotine   Transdermal Q8H     polyethylene glycol  17 g Oral BID     sennosides  2 tablet Oral BID     sodium chloride (PF)  10 mL Intracatheter Q8H     tamsulosin  0.4 mg Oral Daily     PRN Meds: acetaminophen **OR** acetaminophen, alum & mag hydroxide-simethicone, bisacodyl, calcium carbonate, glucose **OR** dextrose **OR** glucagon, lidocaine 4%, lidocaine (buffered or not buffered), LORazepam, naloxone **OR** naloxone **OR** naloxone **OR** naloxone, nitroGLYcerin, ondansetron **OR** ondansetron, prochlorperazine **OR** prochlorperazine **OR** prochlorperazine, sodium chloride (PF), sodium chloride (PF), sodium chloride (PF)         Data:      All new lab and imaging data was reviewed.   Recent Labs   Lab Test 12/08/21  0701 12/06/21  0624 12/05/21  0555 12/04/21  0619 12/01/21  0621 07/20/21  0128 05/09/21  1450 10/09/17  2149 10/09/17  2015 03/13/15  0900 03/12/15  1620   WBC  --   --  9.1 7.6 6.6   < > 5.5   < > KUMAR UER 2115 BY K   < > 11.8*   HGB 7.6* 7.3* 7.7* 7.1* 7.6*   < > 13.3   < > KUMAR UER 2115 BY K   < > 10.5*   MCV  --   --  87 87 87   < > 87   < > KUMAR UER 2115 BY K   < > 81   PLT  --   --  387 385 417   < > 354   < > KUMAR BEAVER 2115 BY MJK   < > 515*   INR  --   --   --   --   --   --  0.96  --  1.01  --  1.92*     < > = values in this interval not displayed.      Recent Labs   Lab Test 12/08/21  0701 12/05/21  0556 12/04/21  0619    139 139   POTASSIUM 5.1 4.3 4.1   CHLORIDE 106 107 106   CO2 29 28 29   BUN 31* 37* 34*   CR 1.42* 1.61* 1.78*   ANIONGAP 1* 4 4   SANDRA 8.9 9.0 9.0   * 91 90     Recent Labs   Lab Test 05/09/21  1450 10/09/17  2015   TROPI <0.015 <0.015

## 2021-12-08 NOTE — PLAN OF CARE
DATE & TIME: 12/8/2021 6533-8641  Cognitive Concerns/ Orientation: Alert and oriented x 4   BEHAVIOR & AGGRESSION TOOL COLOR: Green   ABNL VS/O2: VSS on RA  MOBILITY: Independent  PAIN MANAGMENT: Denies  DIET: Regular-adequate  BOWEL/BLADDER: Continent, no BM for 3 days, reported feeling constipated. Took scheduled Docusate but declined Miralax and senna. MD ordered one time dose of Magnesium citrate per pt request, pt drank 1/2 bottle so far and reported he'll finish it slowly, no BM yet.   ABNL LAB/BG: CK total 17, no other labs drawn today   DRAIN/DEVICES: Midline SL left arm   SKIN: Scrotal laceration -wound care completed; scattered bruising/scars and scabs.  TESTS/PROCEDURES: given recent EDUARDO findings, pt needs surgical replacement with bioprosthetic valve at some point. However, Dr SELINA Barrientos (addiction medicine physician) suggested that he is at high risk for relapse and recommended to hold off on surgery for now.   D/C DATE: TBD, last day of IV Abx is 12/29, recommendation is to transfer to inpatient CD treatment after completion of antibiotic. ID following.   OTHER IMPORTANT INFO: Contact isolation precautions maintained.   New nicotine patch on Lt arm. On IV Daptomycin q24h. Received prn Ativan x1 for anxiety and prn Zofran x1 for c/o nausea.

## 2021-12-08 NOTE — PLAN OF CARE
DATE & TIME: 12/8/2021 1900-0730  Cognitive Concerns/ Orientation: Alert and oriented x 4   BEHAVIOR & AGGRESSION TOOL COLOR: Green   ABNL VS/O2: VSS on RA  MOBILITY: Independent  PAIN MANAGMENT: Denies  DIET: Regular-adequate  BOWEL/BLADDER: Continent, no BM for 3 days, reported feeling constipated. Took scheduled Docusate, declined Miralax and senna. Drank 1/2 one time dose of mag citrate.  ABNL LAB/BG: CK total 17  DRAIN/DEVICES: Midline SL left arm   SKIN: Scrotal laceration -wound care completed per patient; scattered bruising/scars and scabs.  TESTS/PROCEDURES: given recent EDUARDO findings, pt needs surgical replacement with bioprosthetic valve at some point. Dr SELINA Barrientos (addiction medicine physician) states he is at high risk for relapse and recommended to hold off on surgery for now.   D/C DATE: TBD, last day of IV Abx is 12/29, recommendation is to transfer to inpatient CD treatment after completion of antibiotic. ID following.   OTHER IMPORTANT INFO: Contact isolation precautions maintained.   Nicotine patch on Lt arm. On IV Daptomycin q24h. Received prn Ativan x2 for anxiety and prn Zofran x2 for c/o nausea.

## 2021-12-09 LAB
ANION GAP SERPL CALCULATED.3IONS-SCNC: 3 MMOL/L (ref 3–14)
BUN SERPL-MCNC: 40 MG/DL (ref 7–30)
CALCIUM SERPL-MCNC: 9.5 MG/DL (ref 8.5–10.1)
CHLORIDE BLD-SCNC: 108 MMOL/L (ref 94–109)
CO2 SERPL-SCNC: 28 MMOL/L (ref 20–32)
CREAT SERPL-MCNC: 1.4 MG/DL (ref 0.66–1.25)
GFR SERPL CREATININE-BSD FRML MDRD: 63 ML/MIN/1.73M2
GLUCOSE BLD-MCNC: 93 MG/DL (ref 70–99)
POTASSIUM BLD-SCNC: 5.1 MMOL/L (ref 3.4–5.3)
SODIUM SERPL-SCNC: 139 MMOL/L (ref 133–144)

## 2021-12-09 PROCEDURE — 250N000011 HC RX IP 250 OP 636: Performed by: INTERNAL MEDICINE

## 2021-12-09 PROCEDURE — 120N000001 HC R&B MED SURG/OB

## 2021-12-09 PROCEDURE — 250N000013 HC RX MED GY IP 250 OP 250 PS 637: Performed by: INTERNAL MEDICINE

## 2021-12-09 PROCEDURE — 250N000013 HC RX MED GY IP 250 OP 250 PS 637: Performed by: HOSPITALIST

## 2021-12-09 PROCEDURE — 99232 SBSQ HOSP IP/OBS MODERATE 35: CPT | Performed by: HOSPITALIST

## 2021-12-09 PROCEDURE — 250N000013 HC RX MED GY IP 250 OP 250 PS 637: Performed by: STUDENT IN AN ORGANIZED HEALTH CARE EDUCATION/TRAINING PROGRAM

## 2021-12-09 PROCEDURE — 80048 BASIC METABOLIC PNL TOTAL CA: CPT | Performed by: INTERNAL MEDICINE

## 2021-12-09 PROCEDURE — 250N000013 HC RX MED GY IP 250 OP 250 PS 637: Performed by: SPECIALIST

## 2021-12-09 PROCEDURE — 258N000003 HC RX IP 258 OP 636: Performed by: INTERNAL MEDICINE

## 2021-12-09 PROCEDURE — 250N000013 HC RX MED GY IP 250 OP 250 PS 637: Performed by: PSYCHIATRY & NEUROLOGY

## 2021-12-09 PROCEDURE — G0463 HOSPITAL OUTPT CLINIC VISIT: HCPCS

## 2021-12-09 RX ADMIN — DOCUSATE SODIUM 100 MG: 100 CAPSULE, LIQUID FILLED ORAL at 09:42

## 2021-12-09 RX ADMIN — DOCUSATE SODIUM 100 MG: 100 CAPSULE, LIQUID FILLED ORAL at 23:53

## 2021-12-09 RX ADMIN — TAMSULOSIN HYDROCHLORIDE 0.4 MG: 0.4 CAPSULE ORAL at 09:43

## 2021-12-09 RX ADMIN — LORAZEPAM 0.5 MG: 0.5 TABLET ORAL at 01:54

## 2021-12-09 RX ADMIN — Medication 3 DROP: at 09:43

## 2021-12-09 RX ADMIN — LEVOMILNACIPRAN HYDROCHLORIDE 120 MG: 40 CAPSULE, EXTENDED RELEASE ORAL at 09:42

## 2021-12-09 RX ADMIN — METOPROLOL TARTRATE 25 MG: 25 TABLET, FILM COATED ORAL at 09:42

## 2021-12-09 RX ADMIN — LORAZEPAM 0.5 MG: 0.5 TABLET ORAL at 14:45

## 2021-12-09 RX ADMIN — DAPTOMYCIN 600 MG: 500 INJECTION, POWDER, LYOPHILIZED, FOR SOLUTION INTRAVENOUS at 14:04

## 2021-12-09 RX ADMIN — ONDANSETRON 4 MG: 2 INJECTION INTRAMUSCULAR; INTRAVENOUS at 23:50

## 2021-12-09 RX ADMIN — METOPROLOL TARTRATE 25 MG: 25 TABLET, FILM COATED ORAL at 23:53

## 2021-12-09 RX ADMIN — ONDANSETRON 4 MG: 2 INJECTION INTRAMUSCULAR; INTRAVENOUS at 01:54

## 2021-12-09 RX ADMIN — ABACAVIR SULFATE, DOLUTEGRAVIR SODIUM, LAMIVUDINE 1 TABLET: 600; 50; 300 TABLET, FILM COATED ORAL at 09:43

## 2021-12-09 RX ADMIN — NICOTINE 1 PATCH: 14 PATCH, EXTENDED RELEASE TRANSDERMAL at 09:45

## 2021-12-09 RX ADMIN — ASPIRIN 81 MG: 81 TABLET, COATED ORAL at 09:41

## 2021-12-09 RX ADMIN — ONDANSETRON 4 MG: 2 INJECTION INTRAMUSCULAR; INTRAVENOUS at 14:45

## 2021-12-09 RX ADMIN — CARISOPRODOL 87.5 MG: 350 TABLET ORAL at 09:43

## 2021-12-09 RX ADMIN — LORAZEPAM 0.5 MG: 0.5 TABLET ORAL at 23:53

## 2021-12-09 RX ADMIN — BUPRENORPHINE AND NALOXONE 1 FILM: 8; 2 FILM, SOLUBLE BUCCAL; SUBLINGUAL at 09:45

## 2021-12-09 RX ADMIN — AMLODIPINE BESYLATE 5 MG: 5 TABLET ORAL at 09:43

## 2021-12-09 ASSESSMENT — ACTIVITIES OF DAILY LIVING (ADL)
ADLS_ACUITY_SCORE: 5

## 2021-12-09 ASSESSMENT — MIFFLIN-ST. JEOR: SCORE: 1584

## 2021-12-09 NOTE — PLAN OF CARE
DATE & TIME: 12/8/2021 [id number]  Cognitive Concerns/ Orientation: Alert and oriented x 4   BEHAVIOR & AGGRESSION TOOL COLOR: Green   ABNL VS/O2: VSS on RA  MOBILITY: Independent  PAIN MANAGMENT: Denies  DIET: Regular-adequate  BOWEL/BLADDER: Continent, no BM for 4 days, reported feeling constipated. Took scheduled Docusate, declined Miralax and senna. Drank 1/2 one time dose of mag citrate yesterday, no BM yet, passing flatus   ABNL LAB/BG: Cr 1.42, Hgb 7.6, CK total 17  DRAIN/DEVICES: Midline SL left arm   SKIN: Scrotal laceration, refusing wounds cares, preferred to do it himself, supplies and instructions given.   TESTS/PROCEDURES: given recent EDUARDO findings, pt needs surgical replacement with bioprosthetic valve at some point. Dr SELINA Barrientos (addiction medicine physician) states he is at high risk for relapse and recommended to hold off on surgery for now.   D/C DATE: TBD, last day of IV Abx is 12/29, recommendation is to transfer to inpatient CD treatment after completion of antibiotic. ID following.   OTHER IMPORTANT INFO: Contact isolation precautions maintained.   Nicotine patch on Lt arm. On IV Daptomycin q24h. Received prn Zofran x1 and Ativan x1.

## 2021-12-09 NOTE — PROGRESS NOTES
CV Surgery    Pt will wait for surgery to receive treatment and avoid injecting drugs and with need to meet this goal in order to be a surgical candidate. Really appreciate Dr. Khari Barrientos with addiction medicine for his expertise and guidance on this young patient.     Will follow peripherally    Debra Voss PA-C

## 2021-12-09 NOTE — PROGRESS NOTES
[facility] Nurse Inpatient Wound Assessment   Reason for consultation: Evaluate and treat  Scrotum wound wounds    Assessment  Scrotum wound wounds due to Surgical Wound  Status: healing  Wound much  and showing signs of healing.  Will continue with Triad paste.     Pt sitting with his father today and reports pt and nurse just did dressing change and he prefers to wait until early next week. Nursing reports wound continues to progress with more pink tissue. Pt complains of itching around scrotum, however nursing reports he was using personal care spray and once he stopped using this he no longer complains of itching. Will continue to monitor itching and add antifungal powder if necessary.   Treatment Plan  Scrotum wound: BID and PRN  1.Cleanse area with microklenz and blot dry  2. Apply nickel thick layer of Triad paste (#966546) to wound bed and thin layer over reddened areas   3. Ok to leave open to air, unless patient would prefer to have area covered then would recommend covering with Optifoam gentle border (#157902) or  Mepilex Border Dressing (#[id number])    **No need to remove all paste after each incontinent episode, remove soiled paste and reapply as needed.  **If complete removal of paste is necessary use baby oil/mineral oil (Located in Pharmacy) and soft wash cloth.   Leave the brief off in bed to let the area dry as much as possible.   Use only one Covidien pad in between mattress and pt. No brief while in bed.        Orders Written  Recommended provider order: None, at this time  WO Nurse follow-up plan:weekly  Nursing to notify the Provider(s) and re-consult the [facility] Nurse if wound(s) deteriorates or new skin concern.    Patient History  According to provider note(s):  Bc German is a 41 year old mal with PMHx of polysubstance abuse including IV drug use (including fentanyl and methamphetamine currently), chronic opioid dependence, HIV, hepatitis C, hx of MSSA sepsis with pulmonary valve  endocarditis (2015), and 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 Was found to have recurrent MSSA/MRSA bacteremia and tricuspid valve endocarditis.     Objective Data  Containment of urine/stool: Continent of bladder and Continent of bowel    Active Diet Order  Orders Placed This Encounter      Regular Diet Adult      Output:   I/O last 3 completed shifts:  In: 400 [P.O.:400]  Out: 1000 [Urine:1000]    Risk Assessment:   Sensory Perception: 4-->no impairment  Moisture: 4-->rarely moist  Activity: 3-->walks occasionally  Mobility: 4-->no limitation  Nutrition: 3-->adequate  Friction and Shear: 3-->no apparent problem  Bari Score: 21                          Labs:   Recent Labs   Lab 12/08/21  0701 12/06/21  0624 12/05/21  0555 12/04/21  0619 12/02/21  2354   HGB 7.6*   < > 7.7*   < >  --    WBC  --   --  9.1   < >  --    CRP  --   --   --   --  74.5*    < > = values in this interval not displayed.       Physical Exam  Areas of skin assessed: focused scrotum    Wound Location:  Scrotum  11/26/21 (pt hand in photo)        Wound History: pt had syncope episode and when he woke up had large laceration to scrotum    Wound Base: 100%  pink moist tissue     Palpation of the wound bed: normal      Drainage: scant     Description of drainage: serosanguinous     Measurements (length x width x depth, in cm) 4.6  x 2  x  0.2 cm      Tunneling N/A     Undermining N/A  Periwound skin: intact and erythema- blanchable      Color: red      Temperature: normal   Odor: none  Pain: mild, tender  Pain intervention prior to dressing change: NA    Interventions  Visual inspection and assessment completed   Wound Care Rationale Promote moist wound healing without tissue dehydration  and Provide protection   Wound Care: done per plan of care  Supplies: at  bedside  Current off-loading measures: Pillows  Current support surface: Standard  Atmos Air mattress  Education provided to: plan of care, wound progress, Infection prevention  and Moisture management  Discussed plan of care with Patient and Nurse    Alexx Talbot RN CWOCN

## 2021-12-09 NOTE — PROGRESS NOTES
[facility]  Hospitalist Progress Note        Derick Guerin MD   12/09/2021        Interval History:        - no acute issues overnight; Subutex was switched to Suboxone per Dr Barrientos's recommendation; patient hesitant to take suboxone stating he doesn't like it but willing to try  - renal function continues to improve         Assessment and Plan:        Bc German is a 41 year old male with PMHx of polysubstance abuse including IV drug use (including fentanyl and methamphetamine currently), chronic opioid dependence, HIV, hepatitis C, hx of MSSA sepsis with pulmonary valve endocarditis (2015), and 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 Was found to have recurrent MSSA/MRSA bacteremia and tricuspid valve endocarditis.    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 lactate nl. CXR on admission showed nodular/patchy opacity at the R base.  * 2/2 blood cultures drawn on admission grew both MSSA and MRSA >> blood cx cleared  on 11/18 >> vancomycin  changed to daptomycin due to JOSE RAMON   EDUARDO done 11/18 notable for large tricuspid valve vegetation with severe regurgitation  - ID following, to continue Daptomycin, with plans to switch to Vancomycin when renal function better; will need 6 weeks of antibiotic in hospital, until 12/29; midline was placed 12/2  - remains afebrile ; EDUARDO repeat 12/02 noted with smaller vegetations than prior EDUARDO but did note that tricuspid valve leaflet is likely perforated and prolapsing  leading to severe degenerative TR, EF 65-70 %  - bilateral LE swelling, likely due to TR, US 12/3 noted with no DVT; edema improved with 40 mg IV Lasix given on 12/3 ; will monitor volume status closely and consider diuretics prn  - given EDUARDO findings, re-evaluated by thoracic surgery (initially rec medical management) and plans for surgical replacement with bioprosthetic valve at some point; surgery concerned about his IV drug use and consulted with Dr SELINA Barrientos (addiction medicine physician) at [facility] prior to considering surgery     - was evaluated by Dr Barrientos (addiction specialist at [place]); concern that he is at high risk for relapse and thus at high risk for prosthetic valve endocarditis if he undergoes valve replacement; rec postponing surgery at this time to focus on chemical dependency    Chronic anemia, likely anemia of chronic disease  - likely anemia of chronic disease in the setting of infective endocarditis  - hemoglobin on admission however was 12.5 and has gradually trended down--->7.1, stable--7.7---7.3---7.6  - hemodynamically stable and no suggestion of active bleed  - iron studies consistent with likely ACD with normal ferritin and low TIBC, TSH slightly elevated but free T4 normal; b12 and folate normal  - will monitor hemoglobin periodically and transfuse PRN for Hb <7    HTN  - BP reasonably controlled with addition of amlodipine 5 mg po daily and Metoprolol 25 mg po bid with hold parameters  (12/3); continue; hydralazine IV prn for SBP>180; plan for intermittent diuresis as above     HIV  Hepatitis C  * Chronic and stable on Triumeq restarted 11/30 as cr improving   * CDC >200 so no need for PJP ppx per ID     JOSE RAMON on CKD, likely due to Vancomycin   Hyperkalemia (resolved)  Hyponatremia: resolved   * No hx of CKD and with a normal baseline enrique function  * Cr initially stable this stay, began trending up after vancomycin  - nephrology evaluated for JOSE RAMON, Cr peak 2.8 (11/25)---> trending down  --> 1.92---1.78---1.61---1.4; nephro plans to follow up outpatient; signed off 12/3  - monitor BMP periodically  - ADAN returned negative, Renal US obtained 11/24 and negative for obstruction     History of opiate dependence  History of active IV drug use  Tobacco Use  Depression  * On admission, had stated he last used IV drugs 1 wk prior to admission. While in the ED on 11/16, RN had noted patient was briefly nonresponsive and apneic. Was given dose of Narcan with noted improvement.   * Prior to admission, had been started on taper off Soma -- was taking 87.5mg (1/4 of a 350mg tab) BID but later conversations with PCP's clinic revealed no provider there was managing taper  -- was continued on PTA Subutex BID this stay-- after discussion with Dr Snyder, switched to Suboxone bid (12/8)  -- conts on levomilnacipran ER as per prior to admission  -- cont carisoprodol, after discussion with DR snyder and Pharmacy, plan to taper--- switched to 87.5mg daily for 7 days (taper started 12/8), then plan to do q 48 hrs for 7 days  -- cont lorazepam 0.5mg q6h prn for anxiety   -- cont nicotine patch  CHEM Depp evaluated and recommended inpatient treatment after completion of antibiotics directly will transfer from here to detox treatment center      Left scrotal laceration dt fall/possible syncope  Left scrotal wound infection  * Laceration sutured in ED. Suture removed 11/21.   * US scrotum earlier this stay showed a small R hydrocele but was otherwise normal.   Patient's scrotal laceration wound looks clean and dry.    Urology and wound care consultations requested patient is on IV daptomycin which should cover for the scrotal infection as well     Left ear pain; improved    No evidence of otitis media or externa. Likely cerumen impaction,  _ continue to  apply carbamide peroxide  Ear drops to left ears prn    Constipation: bowel regimen in place but states only magnesium citrate works for him; declines other bowel regimen; Mag  citrate ordered 12/7; also got on 12/2     COVID status: asymptomatic screening negative on 11/16, 11/20, 11/29    Orders Placed This Encounter      Regular Diet Adult    DVT Prophylaxis: PCDs  Code Status: Full Code     Disposition: Discharge date unclear, will need to complete IV antibiotics, likely here (until 12/29) and then will need to go to inpatient Chem dep treatment pending ID clearance; also being considered for valve replacement      Clinically Significant Risk Factors Present on Admission                 Care plan discussed with patient      Page Me (7 am to 6 pm)              Physical Exam:      Blood pressure (!) 137/105, pulse 81, temperature 98.2  F (36.8  C), temperature source Oral, resp. rate 18, height 1.829 m (6'), weight 63.1 kg (139 lb 1.8 oz), SpO2 97 %.  Vitals:    12/03/21 0652 12/08/21 0300 12/09/21 0300   Weight: 68.4 kg (150 lb 12.7 oz) 64.3 kg (141 lb 12.1 oz) 63.1 kg (139 lb 1.8 oz)     Vital Signs with Ranges  Temp:  [97.4  F (36.3  C)-98.2  F (36.8  C)] 98.2  F (36.8  C)  Pulse:  [75-81] 81  Resp:  [18] 18  BP: (120-137)/() 137/105  SpO2:  [97 %-100 %] 97 %  I/O's Last 24 hours  I/O last 3 completed shifts:  In: 400 [P.O.:400]  Out: 1000 [Urine:1000]    Constitutional: awake and oriented X 3; resting comfortably in no apparent distress   HEENT: Pupils equal and reactive to light and accomodation, neck supple    Oral cavity: Moist mucosa   Cardiovascular: Normal s1 s2, regular rate and rhythm, systolic murmur +   Lungs: B/l clear to auscultation, no wheezes or crepitations   Abdomen: Soft, nt, nd, no guarding, rigidity or rebound; BS +   LE : B/l edema improved   Musculoskeletal: Power 5/5 in all extremities   Neuro: No focal neurological deficits noted   Psychiatry: normal mood and affect  Skin : noted multiple skin excoriations and injection marks                   Medications:          abacavir-dolutegravir-LamiVUDine  1 tablet Oral Daily     amLODIPine  5 mg Oral Daily      aspirin  81 mg Oral Daily     buprenorphine HCl-naloxone HCl  1 Film Sublingual BID     carbamide peroxide  3 drop Left Ear BID     [START ON 12/16/2021] carisoprodol  87.5 mg Oral Q48H     carisoprodol  87.5 mg Oral Daily     DAPTOmycin (CUBICIN) intermittent infusion  9 mg/kg Intravenous Q24H     docusate sodium  100 mg Oral BID     levomilnacipran  120 mg Oral Daily     metoprolol tartrate  25 mg Oral BID     nicotine  1 patch Transdermal QPM     nicotine   Transdermal Q8H     polyethylene glycol  17 g Oral BID     sennosides  2 tablet Oral BID     sodium chloride (PF)  10 mL Intracatheter Q8H     tamsulosin  0.4 mg Oral Daily     PRN Meds: acetaminophen **OR** acetaminophen, alum & mag hydroxide-simethicone, bisacodyl, calcium carbonate, glucose **OR** dextrose **OR** glucagon, lidocaine 4%, lidocaine (buffered or not buffered), LORazepam, naloxone **OR** naloxone **OR** naloxone **OR** naloxone, nitroGLYcerin, ondansetron **OR** ondansetron, prochlorperazine **OR** prochlorperazine **OR** prochlorperazine, sodium chloride (PF), sodium chloride (PF), sodium chloride (PF)         Data:      All new lab and imaging data was reviewed.   Recent Labs   Lab Test 12/08/21  0701 12/06/21  0624 12/05/21  0555 12/04/21  0619 12/01/21  0621 07/20/21  0128 05/09/21  1450 10/09/17  2149 10/09/17  2015 03/13/15  0900 03/12/15  1620   WBC  --   --  9.1 7.6 6.6   < > 5.5   < > [facility] [address] BY [facility]   < > 11.8*   HGB 7.6* 7.3* 7.7* 7.1* 7.6*   < > 13.3   < > [facility] [address] BY [facility]   < > 10.5*   MCV  --   --  87 87 87   < > 87   < > [facility] [address] BY [facility]   < > 81   PLT  --   --  387 385 417   < > 354   < > [facility] [address] BY ILANA   < > 515*   INR  --   --   --   --   --   --  0.96  --  1.01  --  1.92*    < > = values in this interval not displayed.      Recent Labs   Lab Test 12/09/21  0702 12/08/21  0701 12/05/21  0556    136 139   POTASSIUM 5.1 5.1 4.3   CHLORIDE 108 106 107   CO2 28 29 28   BUN 40* 31* 37*   CR  1.40* 1.42* 1.61*   ANIONGAP 3 1* 4   SANDRA 9.5 8.9 9.0   GLC 93 112* 91     Recent Labs   Lab Test 05/09/21  1450 10/09/17  2015   TROPI <0.015 <0.015

## 2021-12-10 LAB
ANION GAP SERPL CALCULATED.3IONS-SCNC: 2 MMOL/L (ref 3–14)
BUN SERPL-MCNC: 40 MG/DL (ref 7–30)
CALCIUM SERPL-MCNC: 9.3 MG/DL (ref 8.5–10.1)
CHLORIDE BLD-SCNC: 109 MMOL/L (ref 94–109)
CO2 SERPL-SCNC: 28 MMOL/L (ref 20–32)
CREAT SERPL-MCNC: 1.29 MG/DL (ref 0.66–1.25)
GFR SERPL CREATININE-BSD FRML MDRD: 69 ML/MIN/1.73M2
GLUCOSE BLD-MCNC: 92 MG/DL (ref 70–99)
HGB BLD-MCNC: 7.4 G/DL (ref 13.3–17.7)
POTASSIUM BLD-SCNC: 5 MMOL/L (ref 3.4–5.3)
SODIUM SERPL-SCNC: 139 MMOL/L (ref 133–144)

## 2021-12-10 PROCEDURE — 250N000013 HC RX MED GY IP 250 OP 250 PS 637: Performed by: PSYCHIATRY & NEUROLOGY

## 2021-12-10 PROCEDURE — 250N000013 HC RX MED GY IP 250 OP 250 PS 637: Performed by: SPECIALIST

## 2021-12-10 PROCEDURE — 250N000013 HC RX MED GY IP 250 OP 250 PS 637: Performed by: STUDENT IN AN ORGANIZED HEALTH CARE EDUCATION/TRAINING PROGRAM

## 2021-12-10 PROCEDURE — 99232 SBSQ HOSP IP/OBS MODERATE 35: CPT | Performed by: HOSPITALIST

## 2021-12-10 PROCEDURE — 250N000013 HC RX MED GY IP 250 OP 250 PS 637: Performed by: HOSPITALIST

## 2021-12-10 PROCEDURE — 250N000011 HC RX IP 250 OP 636: Performed by: INTERNAL MEDICINE

## 2021-12-10 PROCEDURE — 85018 HEMOGLOBIN: CPT | Performed by: HOSPITALIST

## 2021-12-10 PROCEDURE — 250N000013 HC RX MED GY IP 250 OP 250 PS 637: Performed by: INTERNAL MEDICINE

## 2021-12-10 PROCEDURE — 999N000190 HC STATISTIC VAT ROUNDS

## 2021-12-10 PROCEDURE — 80048 BASIC METABOLIC PNL TOTAL CA: CPT | Performed by: HOSPITALIST

## 2021-12-10 PROCEDURE — 999N000040 HC STATISTIC CONSULT NO CHARGE VASC ACCESS

## 2021-12-10 PROCEDURE — 120N000001 HC R&B MED SURG/OB

## 2021-12-10 PROCEDURE — 258N000003 HC RX IP 258 OP 636: Performed by: INTERNAL MEDICINE

## 2021-12-10 RX ADMIN — NICOTINE 1 PATCH: 14 PATCH, EXTENDED RELEASE TRANSDERMAL at 09:57

## 2021-12-10 RX ADMIN — AMLODIPINE BESYLATE 5 MG: 5 TABLET ORAL at 08:41

## 2021-12-10 RX ADMIN — DAPTOMYCIN 600 MG: 500 INJECTION, POWDER, LYOPHILIZED, FOR SOLUTION INTRAVENOUS at 13:29

## 2021-12-10 RX ADMIN — ASPIRIN 81 MG: 81 TABLET, COATED ORAL at 08:41

## 2021-12-10 RX ADMIN — CARISOPRODOL 87.5 MG: 350 TABLET ORAL at 10:02

## 2021-12-10 RX ADMIN — LEVOMILNACIPRAN HYDROCHLORIDE 120 MG: 40 CAPSULE, EXTENDED RELEASE ORAL at 08:40

## 2021-12-10 RX ADMIN — BUPRENORPHINE AND NALOXONE 1 FILM: 8; 2 FILM, SOLUBLE BUCCAL; SUBLINGUAL at 08:40

## 2021-12-10 RX ADMIN — ONDANSETRON 4 MG: 2 INJECTION INTRAMUSCULAR; INTRAVENOUS at 23:57

## 2021-12-10 RX ADMIN — DOCUSATE SODIUM 100 MG: 100 CAPSULE, LIQUID FILLED ORAL at 08:41

## 2021-12-10 RX ADMIN — LORAZEPAM 0.5 MG: 0.5 TABLET ORAL at 09:58

## 2021-12-10 RX ADMIN — ONDANSETRON 4 MG: 2 INJECTION INTRAMUSCULAR; INTRAVENOUS at 14:26

## 2021-12-10 RX ADMIN — METOPROLOL TARTRATE 25 MG: 25 TABLET, FILM COATED ORAL at 08:41

## 2021-12-10 RX ADMIN — LORAZEPAM 0.5 MG: 0.5 TABLET ORAL at 16:06

## 2021-12-10 RX ADMIN — TAMSULOSIN HYDROCHLORIDE 0.4 MG: 0.4 CAPSULE ORAL at 08:41

## 2021-12-10 RX ADMIN — ABACAVIR SULFATE, DOLUTEGRAVIR SODIUM, LAMIVUDINE 1 TABLET: 600; 50; 300 TABLET, FILM COATED ORAL at 08:41

## 2021-12-10 ASSESSMENT — ACTIVITIES OF DAILY LIVING (ADL)
ADLS_ACUITY_SCORE: 5

## 2021-12-10 NOTE — PROGRESS NOTES
[facility]    Medicine Progress Note - Hospitalist Service       Date of Admission:  11/15/2021    Assessment & Plan         Bc German is a 41 year old male with PMHx of polysubstance abuse including IV drug use (including fentanyl and methamphetamine currently), chronic opioid dependence, HIV, hepatitis C, hx of MSSA sepsis with pulmonary valve endocarditis (2015), and 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 Was found to have recurrent MSSA/MRSA bacteremia and tricuspid valve endocarditis.     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 lactate nl. CXR on admission showed nodular/patchy opacity at the R base.  * 2/2 blood cultures drawn on admission grew both MSSA and MRSA >> blood cx cleared  on 11/18 >> vancomycin  changed to daptomycin due to JOSE RAMON   EDUARDO done 11/18 notable for large tricuspid valve vegetation with severe regurgitation  - ID following, to continue Daptomycin, with plans to switch to Vancomycin when renal function better; will need 6 weeks of antibiotic in hospital, until 12/29; midline was placed 12/2  - remains afebrile ; EDUARDO repeat 12/02 noted with smaller vegetations than prior EDUARDO but did note that tricuspid valve leaflet is likely perforated and prolapsing leading to severe degenerative TR, EF 65-70 %  - bilateral LE swelling, likely due to TR, US 12/3 noted with no DVT; edema improved with 40 mg IV Lasix given on 12/3 ; will monitor volume status closely and consider diuretics  prn  - given EDUARDO findings, re-evaluated by thoracic surgery (initially rec medical management) and plans for surgical replacement with bioprosthetic valve at some point; surgery concerned about his IV drug use and consulted with Dr SELINA Barrientos (addiction medicine physician) at [facility] prior to considering surgery      - was evaluated by Dr Barrientos (addiction specialist at [place]); concern that he is at high risk for relapse and thus at high risk for prosthetic valve endocarditis if he undergoes valve replacement; rec postponing surgery at this time to focus on chemical dependency     Chronic anemia, likely anemia of chronic disease  Hemoglobin   Date Value Ref Range Status   12/10/2021 7.4 (L) 13.3 - 17.7 g/dL Final   12/08/2021 7.6 (L) 13.3 - 17.7 g/dL Final   05/09/2021 13.3 13.3 - 17.7 g/dL Final   05/13/2018 13.6 13.3 - 17.7 g/dL Final     - likely anemia of chronic disease in the setting of infective endocarditis  - hemoglobin on admission however was 12.5 and has gradually trended down--->7.1, stable--7.7---7.3---7.6  - hemodynamically stable and no suggestion of active bleed  - iron studies consistent with likely ACD with normal ferritin and low TIBC, TSH slightly elevated but free T4 normal; b12 and folate normal  - will monitor hemoglobin periodically and transfuse PRN for Hb <7     HTN  - BP reasonably controlled with addition of amlodipine 5 mg po daily and Metoprolol 25 mg po bid with hold parameters  (12/3); continue; hydralazine IV prn for SBP>180; plan for intermittent diuresis as above     HIV  Hepatitis C  * Chronic and stable on Triumeq restarted 11/30 as cr improving   * CDC >200 so no need for PJP ppx per ID     JOSE RAMON on CKD, likely due to Vancomycin   Hyperkalemia (resolved)  Hyponatremia: resolved   * No hx of CKD and with a normal baseline enrique function  * Cr initially stable this stay, began trending up after vancomycin  - nephrology evaluated for JOSE RAMON, Cr peak 2.8 (11/25)---> trending down -->  1.92---1.78---1.61---1.4; nephro plans to follow up outpatient; signed off 12/3  - monitor BMP periodically  - ADAN returned negative, Renal US obtained 11/24 and negative for obstruction     History of opiate dependence  History of active IV drug use  Tobacco Use  Depression  * On admission, had stated he last used IV drugs 1 wk prior to admission. While in the ED on 11/16, RN had noted patient was briefly nonresponsive and apneic. Was given dose of Narcan with noted improvement.   * Prior to admission, had been started on taper off Soma -- was taking 87.5mg (1/4 of a 350mg tab) BID but later conversations with PCP's clinic revealed no provider there was managing taper  -- was continued on PTA Subutex BID this stay-- after discussion with Dr Snyder, switched to Suboxone bid (12/8)  -- conts on levomilnacipran ER as per prior to admission  -- cont carisoprodol, after discussion with DR snyder and Pharmacy, plan to taper--- switched to 87.5mg daily for 7 days (taper started 12/8), then plan to do q 48 hrs for 7 days  -- cont lorazepam 0.5mg q6h prn for anxiety   -- cont nicotine patch  CHEM Depp evaluated and recommended inpatient treatment after completion of antibiotics directly will transfer from here to detox treatment center      Left scrotal laceration dt fall/possible syncope  Left scrotal wound infection  * Laceration sutured in ED. Suture removed 11/21.   * US scrotum earlier this stay showed a small R hydrocele but was otherwise normal.   Patient's scrotal laceration wound looks clean and dry.    -Follow WOC recommendations       Left ear pain; improved    No evidence of otitis media or externa. Likely cerumen impaction,  -Continue to  apply carbamide peroxide  Ear drops to left ears prn     Constipation  bowel regimen in place but states only magnesium citrate works for him; declines other bowel regimen; Mag citrate ordered 12/7; also got on 12/2     12/10- stable, continue antibiotics through 12/29.        Diet: Snacks/Supplements Adult: Ensure Enlive; With Meals  Regular Diet Adult    DVT Prophylaxis: Pneumatic Compression Devices  Tirado Catheter: Not present  Central Lines: None  Code Status: Full Code      Disposition Plan   Expected Discharge: 12/29/2021     Anticipated discharge location:  Awaiting care coordination huddle  Delays:     Other (Add Comment)            The patient's care was discussed with the Patient.    Jonathan Ojeda MD  Hospitalist Service  [facility]  Securely message with the Vocera Web Console (learn more here)  Text page via Flavourly Paging/Directory        Clinically Significant Risk Factors Present on Admission                ______________________________________________________________________    Interval History   Stable overnight.  No pain or complaints.    Data reviewed today: I reviewed all medications, new labs and imaging results over the last 24 hours. I personally reviewed no images or EKG's today.    Physical Exam   Vital Signs: Temp: 97.3  F (36.3  C) Temp src: Axillary BP: (!) 128/100 Pulse: 83   Resp: 20 SpO2: 100 % O2 Device: None (Room air)    Weight: 139 lbs 1.76 oz  Constitutional: awake, alert, cooperative, no apparent distress  Neuropsychiatric: General: normal, calm and normal eye contact    Data   Recent Labs   Lab 12/10/21  0647 12/09/21  0702 12/08/21  0701 12/06/21  0624 12/05/21  0556 12/05/21  0555 12/04/21  0619   WBC  --   --   --   --   --  9.1 7.6   HGB 7.4*  --  7.6* 7.3*  --  7.7* 7.1*   MCV  --   --   --   --   --  87 87   PLT  --   --   --   --   --  387 385    139 136  --    < >  --  139   POTASSIUM 5.0 5.1 5.1  --    < >  --  4.1   CHLORIDE 109 108 106  --    < >  --  106   CO2 28 28 29  --    < >  --  29   BUN 40* 40* 31*  --    < >  --  34*   CR 1.29* 1.40* 1.42*  --    < >  --  1.78*   ANIONGAP 2* 3 1*  --    < >  --  4   SANDRA 9.3 9.5 8.9  --    < >  --  9.0   GLC 92 93 112*  --    < >  --  90    < > = values in  this interval not displayed.     No results found for this or any previous visit (from the past 24 hour(s)).  Medications       abacavir-dolutegravir-LamiVUDine  1 tablet Oral Daily     amLODIPine  5 mg Oral Daily     aspirin  81 mg Oral Daily     buprenorphine HCl-naloxone HCl  1 Film Sublingual BID     carbamide peroxide  3 drop Left Ear BID     [START ON 12/16/2021] carisoprodol  87.5 mg Oral Q48H     carisoprodol  87.5 mg Oral Daily     DAPTOmycin (CUBICIN) intermittent infusion  9 mg/kg Intravenous Q24H     docusate sodium  100 mg Oral BID     levomilnacipran  120 mg Oral Daily     metoprolol tartrate  25 mg Oral BID     nicotine  1 patch Transdermal QPM     nicotine   Transdermal Q8H     polyethylene glycol  17 g Oral BID     sennosides  2 tablet Oral BID     sodium chloride (PF)  10 mL Intracatheter Q8H     tamsulosin  0.4 mg Oral Daily

## 2021-12-10 NOTE — PLAN OF CARE
DATE & TIME: 12/9/21 [id number]    Cognitive Concerns/ Orientation : Pt A/Ox4   BEHAVIOR & AGGRESSION TOOL COLOR: Green   ABNL VS/O2: VSS on RA  MOBILITY: Independent  PAIN MANAGMENT: Denies  DIET: Regular  BOWEL/BLADDER: Continent, no BM for last few days. Refused Miralax and Senna.  ABNL LAB/BG: Cr 1.40  DRAIN/DEVICES: Midline SL left arm  SKIN: Scrotal laceration, per patient will do his own wound care. Refused full skin assessment.  D/C DATE: Discharge pending, last day of IV antibiotics 12/29, recommendation transfer to inpatient CD treatment after antibiotics  OTHER IMPORTANT INFO: Contact isolation. Continues on IV Daptomycin. Ativan and Zofran given x1.

## 2021-12-10 NOTE — PLAN OF CARE
DATE & TIME: 12/10/21    Cognitive Concerns/ Orientation : Pt A/Ox4, irritable at times, occ refuses cares/assessments.  Resting or watching tv between cares.  BEHAVIOR & AGGRESSION TOOL COLOR: Green   ABNL VS/O2: VSS on RA  MOBILITY: Ind, occ amb in room, using urinal at bedside, repos ind in bed.  PAIN MANAGMENT: Denies  DIET: Regular, ate breakfast, mom brought in snacks/pastries for lunch, currently eating dinner.   BOWEL/BLADDER: Continent, using urinal.  No BM, last reported BM on 12/6. Refused Miralax and Senna.  ABNL LAB/BG: Cr 1.29 trend down, Hgb 7.4  DRAIN/DEVICES: Midline SL left arm  SKIN: Scrotal laceration, per patient will do his own wound care. Refused full skin assessment x3, reports no changes.  D/C DATE: Discharge pending, last day of IV antibiotics 12/29, recommendation transfer to inpatient CD treatment after antibiotics.  OTHER IMPORTANT INFO: Contact isolation. Continues on daily IV Daptomycin. Ativan and Zofran PRN given as requested.

## 2021-12-10 NOTE — PLAN OF CARE
DATE & TIME: 12/9/2021, [id number]  Cognitive Concerns/ Orientation: Alert and oriented x 4   BEHAVIOR & AGGRESSION TOOL COLOR: Green   ABNL VS/O2: VSS on RA  MOBILITY: Independent  PAIN MANAGMENT: Denies  DIET: Regular-adequate  BOWEL/BLADDER: Continent, no BM for 4 days, reported feeling constipated. Declined Miralax and senna. Passing flatus.  ABNL LAB/BG: Cr 1.40  DRAIN/DEVICES: Midline SL left arm   SKIN: Scrotal laceration, prefers to do wounds cares himself, supplies and instructions given.  TESTS/PROCEDURES: Pt needs surgical replacement with bioprosthetic valve at some point. Dr SELINA Barrientos (addiction medicine physician) states he is at high risk for relapse and recommended to hold off on surgery for now.   D/C DATE: TBD, last day of IV Abx is 12/29, recommendation is to transfer to inpatient CD treatment after completion of antibiotic. ID following.   OTHER IMPORTANT INFO: Contact isolation precautions maintained.   Nicotine patch on Lt arm. On IV Daptomycin q24h. Ativan and zofran given x1.

## 2021-12-11 PROCEDURE — 250N000013 HC RX MED GY IP 250 OP 250 PS 637: Performed by: HOSPITALIST

## 2021-12-11 PROCEDURE — 258N000003 HC RX IP 258 OP 636: Performed by: INTERNAL MEDICINE

## 2021-12-11 PROCEDURE — 99207 PR MOONLIGHTING INDICATOR: CPT | Performed by: INTERNAL MEDICINE

## 2021-12-11 PROCEDURE — 120N000001 HC R&B MED SURG/OB

## 2021-12-11 PROCEDURE — 250N000011 HC RX IP 250 OP 636: Performed by: INTERNAL MEDICINE

## 2021-12-11 PROCEDURE — 99231 SBSQ HOSP IP/OBS SF/LOW 25: CPT | Performed by: INTERNAL MEDICINE

## 2021-12-11 PROCEDURE — 250N000013 HC RX MED GY IP 250 OP 250 PS 637: Performed by: SPECIALIST

## 2021-12-11 PROCEDURE — 250N000013 HC RX MED GY IP 250 OP 250 PS 637: Performed by: STUDENT IN AN ORGANIZED HEALTH CARE EDUCATION/TRAINING PROGRAM

## 2021-12-11 PROCEDURE — 250N000013 HC RX MED GY IP 250 OP 250 PS 637: Performed by: INTERNAL MEDICINE

## 2021-12-11 PROCEDURE — 250N000013 HC RX MED GY IP 250 OP 250 PS 637: Performed by: PSYCHIATRY & NEUROLOGY

## 2021-12-11 RX ADMIN — METOPROLOL TARTRATE 25 MG: 25 TABLET, FILM COATED ORAL at 09:53

## 2021-12-11 RX ADMIN — BUPRENORPHINE AND NALOXONE 1 FILM: 8; 2 FILM, SOLUBLE BUCCAL; SUBLINGUAL at 09:54

## 2021-12-11 RX ADMIN — LORAZEPAM 0.5 MG: 0.5 TABLET ORAL at 18:59

## 2021-12-11 RX ADMIN — METOPROLOL TARTRATE 25 MG: 25 TABLET, FILM COATED ORAL at 23:23

## 2021-12-11 RX ADMIN — DOCUSATE SODIUM 100 MG: 100 CAPSULE, LIQUID FILLED ORAL at 09:54

## 2021-12-11 RX ADMIN — ONDANSETRON 4 MG: 4 TABLET, ORALLY DISINTEGRATING ORAL at 18:59

## 2021-12-11 RX ADMIN — ASPIRIN 81 MG: 81 TABLET, COATED ORAL at 09:54

## 2021-12-11 RX ADMIN — DOCUSATE SODIUM 100 MG: 100 CAPSULE, LIQUID FILLED ORAL at 23:23

## 2021-12-11 RX ADMIN — DAPTOMYCIN 600 MG: 500 INJECTION, POWDER, LYOPHILIZED, FOR SOLUTION INTRAVENOUS at 13:11

## 2021-12-11 RX ADMIN — LORAZEPAM 0.5 MG: 0.5 TABLET ORAL at 09:53

## 2021-12-11 RX ADMIN — METOPROLOL TARTRATE 25 MG: 25 TABLET, FILM COATED ORAL at 00:00

## 2021-12-11 RX ADMIN — CARISOPRODOL 87.5 MG: 350 TABLET ORAL at 07:56

## 2021-12-11 RX ADMIN — NICOTINE 1 PATCH: 14 PATCH, EXTENDED RELEASE TRANSDERMAL at 09:53

## 2021-12-11 RX ADMIN — DOCUSATE SODIUM 100 MG: 100 CAPSULE, LIQUID FILLED ORAL at 00:00

## 2021-12-11 RX ADMIN — LEVOMILNACIPRAN HYDROCHLORIDE 120 MG: 40 CAPSULE, EXTENDED RELEASE ORAL at 09:53

## 2021-12-11 RX ADMIN — TAMSULOSIN HYDROCHLORIDE 0.4 MG: 0.4 CAPSULE ORAL at 09:54

## 2021-12-11 RX ADMIN — AMLODIPINE BESYLATE 5 MG: 5 TABLET ORAL at 09:54

## 2021-12-11 RX ADMIN — ABACAVIR SULFATE, DOLUTEGRAVIR SODIUM, LAMIVUDINE 1 TABLET: 600; 50; 300 TABLET, FILM COATED ORAL at 09:54

## 2021-12-11 RX ADMIN — BUPRENORPHINE AND NALOXONE 1 FILM: 8; 2 FILM, SOLUBLE BUCCAL; SUBLINGUAL at 00:00

## 2021-12-11 RX ADMIN — LORAZEPAM 0.5 MG: 0.5 TABLET ORAL at 00:00

## 2021-12-11 ASSESSMENT — ACTIVITIES OF DAILY LIVING (ADL)
ADLS_ACUITY_SCORE: 5

## 2021-12-11 ASSESSMENT — MIFFLIN-ST. JEOR: SCORE: 1580.78

## 2021-12-11 NOTE — PROGRESS NOTES
[facility]  Hospitalist Progress Note for 12/11/2021  Date of Admission:  11/15/2021       Assessment and Plan:   Bc German is a 41 year old male with PMHx of polysubstance abuse including IV drug use (including fentanyl and methamphetamine currently), chronic opioid dependence, HIV, hepatitis C, hx of MSSA sepsis with pulmonary valve endocarditis (2015), and 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 Was found to have recurrent MSSA/MRSA bacteremia and tricuspid valve endocarditis.     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 lactate nl. CXR on admission showed nodular/patchy opacity at the R base.  * 2/2 blood cultures drawn on admission grew both MSSA and MRSA >> blood cx cleared  on 11/18 >> vancomycin  changed to daptomycin due to JOSE RAMON   EDUARDO done 11/18 notable for large tricuspid valve vegetation with severe regurgitation  - ID following, to continue Daptomycin, with plans to switch to Vancomycin when renal function better; will need 6 weeks of antibiotic in hospital, until 12/29; midline was placed 12/2  - remains afebrile ; EDUARDO repeat 12/02 noted with smaller vegetations than prior EDUARDO but did note that tricuspid valve leaflet is likely perforated and prolapsing leading to severe degenerative TR, EF 65-70 %  - bilateral LE swelling, likely due to TR, US 12/3 noted with no DVT; edema improved with 40 mg IV Lasix given on 12/3 ; will monitor volume status closely and consider diuretics prn  - given EDUARDO  findings, re-evaluated by thoracic surgery (initially rec medical management) and plans for surgical replacement with bioprosthetic valve at some point; surgery concerned about his IV drug use and consulted with Dr SELINA Barrientos (addiction medicine physician) at [facility] prior to considering surgery      - was evaluated by Dr Barrientos (addiction specialist at [place]); concern that he is at high risk for relapse and thus at high risk for prosthetic valve endocarditis if he undergoes valve replacement; rec postponing surgery at this time to focus on chemical dependency     Chronic anemia, likely anemia of chronic disease  - likely anemia of chronic disease in the setting of infective endocarditis  - hemoglobin on admission however was 12.5 and has gradually trended down--->7.1, stable--7.7---7.3---7.6--> 7.4 12/10.   - hemodynamically stable and no suggestion of active bleed  - iron studies consistent with likely ACD with normal ferritin and low TIBC, TSH slightly elevated but free T4 normal; b12 and folate normal  - will monitor hemoglobin periodically,repeat hgb in AM and transfuse PRN for Hb <7     HTN  - BP reasonably controlled with addition of amlodipine 5 mg po daily and Metoprolol 25 mg po bid with hold parameters  (12/3); continue; hydralazine IV prn for SBP>180; plan for intermittent diuresis as above     HIV  Hepatitis C  * Chronic and stable on Triumeq restarted 11/30 as cr improving   * CDC >200 so no need for PJP ppx per ID     JOSE RAMON on CKD, likely due to Vancomycin   Hyperkalemia (resolved)  Hyponatremia: resolved   * No hx of CKD and with a normal baseline enrique function  * Cr initially stable this stay, began trending up after vancomycin  - nephrology evaluated for JOSE RAMON, Cr peak 2.8 (11/25)---> trending down --> 1.92---1.78---1.61---1.4; nephro plans to follow up outpatient; signed off 12/3  - monitor BMP periodically  - ADAN returned negative, Renal US obtained 11/24 and negative for obstruction     History of  opiate dependence  History of active IV drug use  Tobacco Use  Depression  * On admission, had stated he last used IV drugs 1 wk prior to admission. While in the ED on 11/16, RN had noted patient was briefly nonresponsive and apneic. Was given dose of Narcan with noted improvement.   * Prior to admission, had been started on taper off Soma -- was taking 87.5mg (1/4 of a 350mg tab) BID but later conversations with PCP's clinic revealed no provider there was managing taper  -- was continued on PTA Subutex BID this stay-- after discussion with Dr Snyder, switched to Suboxone bid (12/8)  -- conts on levomilnacipran ER as per prior to admission  -- cont carisoprodol, after discussion with DR snyder and Pharmacy, plan to taper--- switched to 87.5mg daily for 7 days (taper started 12/8), then plan to do q 48 hrs for 7 days  -- cont lorazepam 0.5mg q6h prn for anxiety (consider psych / Addiction medicine consult/ recommendations reg lorazepam at discharge)  -- cont nicotine patch  CHEM Depp evaluated and recommended inpatient treatment after completion of antibiotics directly will transfer from here to detox treatment center      Left scrotal laceration dt fall/possible syncope  Left scrotal wound infection  * Laceration sutured in ED. Suture removed 11/21.   * US scrotum earlier this stay showed a small R hydrocele but was otherwise normal.   Patient's scrotal laceration wound looks clean and dry.    -Follow WOC recommendations       Left ear pain; improved    No evidence of otitis media or externa. Likely cerumen impaction,  -Continue to  apply carbamide peroxide  Ear drops to left ears prn     Constipation  bowel regimen in place but states only magnesium citrate works for him; declines other bowel regimen; Mag citrate ordered 12/7; also got on 12/2     12/10- stable, continue antibiotics through 12/29.     Diet: Snacks/Supplements Adult: Ensure Enlive; With Meals  Regular Diet Adult    DVT Prophylaxis: Pneumatic  Compression Devices  Tirado Catheter: Not present  Central Lines: None  Code Status: Full Code       Disposition Plan   Expected Discharge: 12/29/2021     Anticipated discharge location: CHE[facility] Depp evaluated and recommended inpatient treatment after completion of antibiotics directly will transfer from here to detox treatment center  if pt agrees. SW following.    Britta Rossi MD.  Hospitalist [phone] (7am -6 pm)                 Interval History:   no new complaints              Medications:       abacavir-dolutegravir-LamiVUDine  1 tablet Oral Daily     amLODIPine  5 mg Oral Daily     aspirin  81 mg Oral Daily     buprenorphine HCl-naloxone HCl  1 Film Sublingual BID     carbamide peroxide  3 drop Left Ear BID     [START ON 12/16/2021] carisoprodol  87.5 mg Oral Q48H     carisoprodol  87.5 mg Oral Daily     DAPTOmycin (CUBICIN) intermittent infusion  9 mg/kg Intravenous Q24H     docusate sodium  100 mg Oral BID     levomilnacipran  120 mg Oral Daily     metoprolol tartrate  25 mg Oral BID     nicotine  1 patch Transdermal QPM     nicotine   Transdermal Q8H     polyethylene glycol  17 g Oral BID     sennosides  2 tablet Oral BID     sodium chloride (PF)  10 mL Intracatheter Q8H     tamsulosin  0.4 mg Oral Daily     acetaminophen **OR** acetaminophen, alum & mag hydroxide-simethicone, bisacodyl, calcium carbonate, glucose **OR** dextrose **OR** glucagon, lidocaine 4%, lidocaine (buffered or not buffered), LORazepam, naloxone **OR** naloxone **OR** naloxone **OR** naloxone, nitroGLYcerin, ondansetron **OR** ondansetron, prochlorperazine **OR** prochlorperazine **OR** prochlorperazine, sodium chloride (PF), sodium chloride (PF), sodium chloride (PF)               Physical Exam:   Blood pressure 106/70, pulse 65, temperature 97.3  F (36.3  C), temperature source Axillary, resp. rate 18, height 1.829 m (6'), weight 62.8 kg (138 lb 6.4 oz), SpO2 100 %.  Wt Readings from Last 4 Encounters:   12/11/21 62.8 kg (138 lb  6.4 oz)   21 59 kg (130 lb)   21 54.4 kg (120 lb)   21 56.7 kg (125 lb)         Vital Sign Ranges  Temperature Temp  Av.6  F (36.4  C)  Min: 97.3  F (36.3  C)  Max: 97.8  F (36.6  C)   Blood pressure Systolic (24hrs), Av , Min:106 , Max:126        Diastolic (24hrs), Av, Min:69, Max:96      Pulse Pulse  Av.3  Min: 65  Max: 80   Respirations Resp  Av.3  Min: 18  Max: 20   Pulse oximetry SpO2  Av %  Min: 100 %  Max: 100 %         Intake/Output Summary (Last 24 hours) at 2021 1012  Last data filed at 12/10/2021 1430  Gross per 24 hour   Intake 500 ml   Output --   Net 500 ml       Constitutional: Awake, calm,alert, cooperative, no apparent distress   Lungs: Clear to auscultation bilaterally, no crackles or wheezing               Data:   All laboratory data reviewed

## 2021-12-11 NOTE — PLAN OF CARE
DATE & TIME: 12/10/21 3899-2702    Cognitive Concerns/ Orientation : Pt A/Ox4   BEHAVIOR & AGGRESSION TOOL COLOR: Green   ABNL VS/O2: VSS on RA  MOBILITY: Independent  PAIN MANAGMENT: Denies  DIET: Regular  BOWEL/BLADDER: Continent  ABNL LAB/BG: Hgb 7.4  DRAIN/DEVICES: Midline SL  SKIN: Scrotal laceration, per patient he is doing his own wound care. Refused full skin assessment.  D/C DATE: Discharge pending progress  OTHER IMPORTANT INFO: IV Daptomycin daily. PRN Ativan and Zofran PRN given x1.

## 2021-12-11 NOTE — PLAN OF CARE
DATE & TIME: 12/11/21    Cognitive Concerns/ Orientation : A/Ox4, states feeling depressed and he would like to sleep, offered distraction, declines.   BEHAVIOR & AGGRESSION TOOL COLOR: Green   ABNL VS/O2: VSS on RA  MOBILITY: Ind, up ad gene occ to BR, prefers to use urinal, declines sitting up in chair.   PAIN MANAGMENT: Denies  DIET: Regular, good appetite  BOWEL/BLADDER: Continent  ABNL LAB/BG: Hgb 7.4 on 12/10, recheck 12/12.  DRAIN/DEVICES: L Midline SL, patent, blood returned.  SKIN: Scrotal laceration, per patient he is doing his own wound care. Refused full skin assessment. Scattered bruises, scabs.   D/C DATE: Discharge pend progress  OTHER IMPORTANT INFO:Contact iso. IV Daptomycin daily. PRN Ativan and zofran given when requested. Declines showering when offered x2.

## 2021-12-12 LAB — HGB BLD-MCNC: 7.7 G/DL (ref 13.3–17.7)

## 2021-12-12 PROCEDURE — 120N000001 HC R&B MED SURG/OB

## 2021-12-12 PROCEDURE — 250N000013 HC RX MED GY IP 250 OP 250 PS 637: Performed by: STUDENT IN AN ORGANIZED HEALTH CARE EDUCATION/TRAINING PROGRAM

## 2021-12-12 PROCEDURE — 250N000011 HC RX IP 250 OP 636: Performed by: INTERNAL MEDICINE

## 2021-12-12 PROCEDURE — 99231 SBSQ HOSP IP/OBS SF/LOW 25: CPT | Performed by: INTERNAL MEDICINE

## 2021-12-12 PROCEDURE — 99207 PR CDG-CUT & PASTE-POTENTIAL IMPACT ON LEVEL: CPT | Performed by: INTERNAL MEDICINE

## 2021-12-12 PROCEDURE — 99207 PR MOONLIGHTING INDICATOR: CPT | Performed by: INTERNAL MEDICINE

## 2021-12-12 PROCEDURE — 250N000013 HC RX MED GY IP 250 OP 250 PS 637: Performed by: INTERNAL MEDICINE

## 2021-12-12 PROCEDURE — 250N000013 HC RX MED GY IP 250 OP 250 PS 637: Performed by: PSYCHIATRY & NEUROLOGY

## 2021-12-12 PROCEDURE — 250N000013 HC RX MED GY IP 250 OP 250 PS 637: Performed by: HOSPITALIST

## 2021-12-12 PROCEDURE — 999N000190 HC STATISTIC VAT ROUNDS

## 2021-12-12 PROCEDURE — 250N000013 HC RX MED GY IP 250 OP 250 PS 637: Performed by: SPECIALIST

## 2021-12-12 PROCEDURE — 85018 HEMOGLOBIN: CPT | Performed by: INTERNAL MEDICINE

## 2021-12-12 PROCEDURE — 258N000003 HC RX IP 258 OP 636: Performed by: INTERNAL MEDICINE

## 2021-12-12 RX ADMIN — ONDANSETRON 4 MG: 4 TABLET, ORALLY DISINTEGRATING ORAL at 08:50

## 2021-12-12 RX ADMIN — TAMSULOSIN HYDROCHLORIDE 0.4 MG: 0.4 CAPSULE ORAL at 08:41

## 2021-12-12 RX ADMIN — METOPROLOL TARTRATE 25 MG: 25 TABLET, FILM COATED ORAL at 08:41

## 2021-12-12 RX ADMIN — BUPRENORPHINE AND NALOXONE 1 FILM: 8; 2 FILM, SOLUBLE BUCCAL; SUBLINGUAL at 08:41

## 2021-12-12 RX ADMIN — CARISOPRODOL 87.5 MG: 350 TABLET ORAL at 08:42

## 2021-12-12 RX ADMIN — ASPIRIN 81 MG: 81 TABLET, COATED ORAL at 08:41

## 2021-12-12 RX ADMIN — LEVOMILNACIPRAN HYDROCHLORIDE 120 MG: 40 CAPSULE, EXTENDED RELEASE ORAL at 08:41

## 2021-12-12 RX ADMIN — ONDANSETRON 4 MG: 4 TABLET, ORALLY DISINTEGRATING ORAL at 21:31

## 2021-12-12 RX ADMIN — ABACAVIR SULFATE, DOLUTEGRAVIR SODIUM, LAMIVUDINE 1 TABLET: 600; 50; 300 TABLET, FILM COATED ORAL at 08:41

## 2021-12-12 RX ADMIN — DOCUSATE SODIUM 100 MG: 100 CAPSULE, LIQUID FILLED ORAL at 21:32

## 2021-12-12 RX ADMIN — DAPTOMYCIN 600 MG: 500 INJECTION, POWDER, LYOPHILIZED, FOR SOLUTION INTRAVENOUS at 13:55

## 2021-12-12 RX ADMIN — DOCUSATE SODIUM 100 MG: 100 CAPSULE, LIQUID FILLED ORAL at 08:41

## 2021-12-12 RX ADMIN — LORAZEPAM 0.5 MG: 0.5 TABLET ORAL at 08:50

## 2021-12-12 RX ADMIN — AMLODIPINE BESYLATE 5 MG: 5 TABLET ORAL at 08:41

## 2021-12-12 RX ADMIN — LORAZEPAM 0.5 MG: 0.5 TABLET ORAL at 21:32

## 2021-12-12 RX ADMIN — METOPROLOL TARTRATE 25 MG: 25 TABLET, FILM COATED ORAL at 21:32

## 2021-12-12 RX ADMIN — NICOTINE 1 PATCH: 14 PATCH, EXTENDED RELEASE TRANSDERMAL at 09:57

## 2021-12-12 ASSESSMENT — ACTIVITIES OF DAILY LIVING (ADL)
ADLS_ACUITY_SCORE: 5

## 2021-12-12 ASSESSMENT — MIFFLIN-ST. JEOR: SCORE: 1569.89

## 2021-12-12 NOTE — PLAN OF CARE
DATE & TIME: 12/12/21    Cognitive Concerns/ Orientation : Alert and Oriented x4  BEHAVIOR & AGGRESSION TOOL COLOR: Green   ABNL VS/O2: VSS on RA  MOBILITY: Ind, up ad gene.  PAIN MANAGMENT: Denies  DIET: Regular, good appetite  BOWEL/BLADDER: Continent  ABNL LAB/BG: Hgb 7.4 on 12/10, recheck 12/12.  DRAIN/DEVICES: L Midline SL, patent, blood returned.  SKIN: Scrotal laceration, per patient he is doing his own wound care. Refused full skin assessment. Scattered bruises, scabs.   D/C DATE: Discharge pend progress  OTHER IMPORTANT INFO:Contact iso. IV Daptomycin daily.  Declined most medications and refused everything until 11pm.  Refused suboxone due to poor taste.

## 2021-12-12 NOTE — PROGRESS NOTES
[facility]  Hospitalist Progress Note for 12/12/2021  Date of Admission:  11/15/2021       Assessment and Plan:   Bc German is a 41 year old male with PMHx of polysubstance abuse including IV drug use (including fentanyl and methamphetamine currently), chronic opioid dependence, HIV, hepatitis C, hx of MSSA sepsis with pulmonary valve endocarditis (2015), and 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 Was found to have recurrent MSSA/MRSA bacteremia and tricuspid valve endocarditis.     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 lactate nl. CXR on admission showed nodular/patchy opacity at the R base.  * 2/2 blood cultures drawn on admission grew both MSSA and MRSA >> blood cx cleared  on 11/18 >> vancomycin  changed to daptomycin due to JOSE RAMON   EDUARDO done 11/18 notable for large tricuspid valve vegetation with severe regurgitation  - ID following, to continue Daptomycin, with plans to switch to Vancomycin when renal function better; will need 6 weeks of antibiotic in hospital, until 12/29; midline was placed 12/2  - remains afebrile ; EDUARDO repeat 12/02 noted with smaller vegetations than prior EDUARDO but did note that tricuspid valve leaflet is likely perforated and prolapsing leading to severe degenerative TR, EF 65-70 %  - bilateral LE swelling, likely due to TR, US 12/3 noted with no DVT; edema improved with 40 mg IV Lasix given on 12/3 ; will monitor volume status closely and consider diuretics prn  - given EDUARDO  findings, re-evaluated by thoracic surgery (initially rec medical management) and plans for surgical replacement with bioprosthetic valve at some point; surgery concerned about his IV drug use and consulted with Dr SELINA Barrientos (addiction medicine physician) at [facility] prior to considering surgery      - was evaluated by Dr Barrientos (addiction specialist at [place]); concern that he is at high risk for relapse and thus at high risk for prosthetic valve endocarditis if he undergoes valve replacement; rec postponing surgery at this time to focus on chemical dependency     Chronic anemia, likely anemia of chronic disease  - likely anemia of chronic disease in the setting of infective endocarditis  - hemoglobin on admission however was 12.5 and has gradually trended down--->7.1, stable--7.7---7.3---7.6--> 7.4 12/10.   - hemodynamically stable and no suggestion of active bleed  - iron studies consistent with likely ACD with normal ferritin and low TIBC, TSH slightly elevated but free T4 normal; b12 and folate normal  - will monitor hemoglobin periodically,repeat hgb in AM and transfuse PRN for Hb <7     HTN  - BP reasonably controlled with addition of amlodipine 5 mg po daily and Metoprolol 25 mg po bid with hold parameters  (12/3); continue; hydralazine IV prn for SBP>180; plan for intermittent diuresis as above     HIV  Hepatitis C  * Chronic and stable on Triumeq restarted 11/30 as cr improving   * CDC >200 so no need for PJP ppx per ID     JOSE RAMON on CKD, likely due to Vancomycin   Hyperkalemia (resolved)  Hyponatremia: resolved   * No hx of CKD and with a normal baseline enrique function  * Cr initially stable this stay, began trending up after vancomycin  - nephrology evaluated for JOSE RAMON, Cr peak 2.8 (11/25)---> trending down --> 1.92---1.78---1.61---1.4; nephro plans to follow up outpatient; signed off 12/3  - monitor BMP periodically  - ADAN returned negative, Renal US obtained 11/24 and negative for obstruction     History of  opiate dependence  History of active IV drug use  Tobacco Use  Depression  * On admission, had stated he last used IV drugs 1 wk prior to admission. While in the ED on 11/16, RN had noted patient was briefly nonresponsive and apneic. Was given dose of Narcan with noted improvement.   * Prior to admission, had been started on taper off Soma -- was taking 87.5mg (1/4 of a 350mg tab) BID but later conversations with PCP's clinic revealed no provider there was managing taper  -- was continued on PTA Subutex BID this stay-- after discussion with Dr Snyder, switched to Suboxone bid (12/8)  -- conts on levomilnacipran ER as per prior to admission  -- cont carisoprodol, after discussion with DR snyder and Pharmacy, plan to taper--- switched to 87.5mg daily for 7 days (taper started 12/8), then plan to do q 48 hrs for 7 days  -- cont lorazepam 0.5mg q6h prn for anxiety (consider psych / Addiction medicine consult/ recommendations reg lorazepam at discharge)  -- cont nicotine patch  CHEM Depp evaluated and recommended inpatient treatment after completion of antibiotics directly will transfer from here to detox treatment center      Left scrotal laceration dt fall/possible syncope  Left scrotal wound infection  * Laceration sutured in ED. Suture removed 11/21.   * US scrotum earlier this stay showed a small R hydrocele but was otherwise normal.   Patient's scrotal laceration wound looks clean and dry.    -Follow WOC recommendations       Left ear pain; improved    No evidence of otitis media or externa. Likely cerumen impaction,  -Continue to  apply carbamide peroxide  Ear drops to left ears prn     Constipation  bowel regimen in place but states only magnesium citrate works for him; declines other bowel regimen; Mag citrate ordered 12/7; also got on 12/2     12/10- stable, continue antibiotics through 12/29.     Diet: Snacks/Supplements Adult: Ensure Enlive; With Meals  Regular Diet Adult    DVT Prophylaxis: Pneumatic  Compression Devices  Tirado Catheter: Not present  Central Lines: None  Code Status: Full Code       Disposition Plan   Expected Discharge: 12/29/2021     Anticipated discharge location: [facility] Depp evaluated and recommended inpatient treatment after completion of antibiotics directly will transfer from here to detox treatment center  if pt agrees. SW following.    Britta Rossi MD.  Hospitalist [phone] (7am -6 pm)                 Interval History:   no new complaints              Medications:       abacavir-dolutegravir-LamiVUDine  1 tablet Oral Daily     amLODIPine  5 mg Oral Daily     aspirin  81 mg Oral Daily     buprenorphine HCl-naloxone HCl  1 Film Sublingual BID     carbamide peroxide  3 drop Left Ear BID     [START ON 12/16/2021] carisoprodol  87.5 mg Oral Q48H     carisoprodol  87.5 mg Oral Daily     DAPTOmycin (CUBICIN) intermittent infusion  9 mg/kg Intravenous Q24H     docusate sodium  100 mg Oral BID     levomilnacipran  120 mg Oral Daily     metoprolol tartrate  25 mg Oral BID     nicotine  1 patch Transdermal QPM     nicotine   Transdermal Q8H     polyethylene glycol  17 g Oral BID     sennosides  2 tablet Oral BID     sodium chloride (PF)  10 mL Intracatheter Q8H     tamsulosin  0.4 mg Oral Daily     acetaminophen **OR** acetaminophen, alum & mag hydroxide-simethicone, bisacodyl, calcium carbonate, glucose **OR** dextrose **OR** glucagon, lidocaine 4%, lidocaine (buffered or not buffered), LORazepam, naloxone **OR** naloxone **OR** naloxone **OR** naloxone, nitroGLYcerin, ondansetron **OR** ondansetron, prochlorperazine **OR** prochlorperazine **OR** prochlorperazine, sodium chloride (PF), sodium chloride (PF), sodium chloride (PF)               Physical Exam:   Blood pressure (!) 158/40, pulse 67, temperature 97.8  F (36.6  C), temperature source Oral, resp. rate 18, height 1.829 m (6'), weight 61.7 kg (136 lb), SpO2 99 %.  Wt Readings from Last 4 Encounters:   12/12/21 61.7 kg (136 lb)    21 59 kg (130 lb)   21 54.4 kg (120 lb)   21 56.7 kg (125 lb)         Vital Sign Ranges  Temperature Temp  Av.6  F (36.4  C)  Min: 97.3  F (36.3  C)  Max: 97.8  F (36.6  C)   Blood pressure Systolic (24hrs), Av , Min:106 , Max:126        Diastolic (24hrs), Av, Min:69, Max:96      Pulse Pulse  Av.3  Min: 65  Max: 80   Respirations Resp  Av.3  Min: 18  Max: 20   Pulse oximetry SpO2  Av %  Min: 100 %  Max: 100 %         Intake/Output Summary (Last 24 hours) at 2021 1012  Last data filed at 12/10/2021 1430  Gross per 24 hour   Intake 500 ml   Output --   Net 500 ml       Constitutional: Awake, calm,alert, cooperative, no apparent distress   Lungs: Clear to auscultation bilaterally, no crackles or wheezing               Data:   All laboratory data reviewed

## 2021-12-12 NOTE — PLAN OF CARE
DATE & TIME: 12/12/21    Cognitive Concerns/ Orientation : A&Ox4, pleasant and coop.   BEHAVIOR & AGGRESSION TOOL COLOR: Green   ABNL VS/O2: VSS on RA.   MOBILITY: Ind, up ad gene.   PAIN MANAGMENT: Denies  DIET: Regular, good appetite  BOWEL/BLADDER: Continent. Enc more amb to BR instead of urinal use in bed.   ABNL LAB/BG: Hgb 7.7  DRAIN/DEVICES: L Midline SL, patent, blood returned.  SKIN: Scrotal laceration, per patient he is doing his own wound care. Refused full skin assessment. Scattered bruises, scabs.   D/C Date: Unknown, plan for CD treatment following abx course.   OTHER IMPORTANT INFO: Pt agreeable to change own bed linen and perform hygiene cares Ind. Contact iso. IV Daptomycin daily thru 12/29. Resting or watching between cares. Ativan and Zofran ODT given when requested.

## 2021-12-13 LAB
ANION GAP SERPL CALCULATED.3IONS-SCNC: 3 MMOL/L (ref 3–14)
BUN SERPL-MCNC: 34 MG/DL (ref 7–30)
CALCIUM SERPL-MCNC: 9 MG/DL (ref 8.5–10.1)
CHLORIDE BLD-SCNC: 106 MMOL/L (ref 94–109)
CO2 SERPL-SCNC: 28 MMOL/L (ref 20–32)
CREAT SERPL-MCNC: 1.37 MG/DL (ref 0.66–1.25)
GFR SERPL CREATININE-BSD FRML MDRD: 65 ML/MIN/1.73M2
GLUCOSE BLD-MCNC: 94 MG/DL (ref 70–99)
HGB BLD-MCNC: 8.2 G/DL (ref 13.3–17.7)
POTASSIUM BLD-SCNC: 4.8 MMOL/L (ref 3.4–5.3)
SODIUM SERPL-SCNC: 137 MMOL/L (ref 133–144)

## 2021-12-13 PROCEDURE — 85018 HEMOGLOBIN: CPT | Performed by: INTERNAL MEDICINE

## 2021-12-13 PROCEDURE — 250N000013 HC RX MED GY IP 250 OP 250 PS 637: Performed by: PSYCHIATRY & NEUROLOGY

## 2021-12-13 PROCEDURE — 120N000001 HC R&B MED SURG/OB

## 2021-12-13 PROCEDURE — 250N000011 HC RX IP 250 OP 636: Performed by: INTERNAL MEDICINE

## 2021-12-13 PROCEDURE — 999N000190 HC STATISTIC VAT ROUNDS

## 2021-12-13 PROCEDURE — 80048 BASIC METABOLIC PNL TOTAL CA: CPT | Performed by: INTERNAL MEDICINE

## 2021-12-13 PROCEDURE — 258N000003 HC RX IP 258 OP 636: Performed by: INTERNAL MEDICINE

## 2021-12-13 PROCEDURE — 250N000013 HC RX MED GY IP 250 OP 250 PS 637: Performed by: STUDENT IN AN ORGANIZED HEALTH CARE EDUCATION/TRAINING PROGRAM

## 2021-12-13 PROCEDURE — 250N000013 HC RX MED GY IP 250 OP 250 PS 637: Performed by: INTERNAL MEDICINE

## 2021-12-13 PROCEDURE — 99232 SBSQ HOSP IP/OBS MODERATE 35: CPT | Performed by: INTERNAL MEDICINE

## 2021-12-13 PROCEDURE — 250N000013 HC RX MED GY IP 250 OP 250 PS 637: Performed by: HOSPITALIST

## 2021-12-13 PROCEDURE — 250N000013 HC RX MED GY IP 250 OP 250 PS 637: Performed by: SPECIALIST

## 2021-12-13 RX ADMIN — METOPROLOL TARTRATE 25 MG: 25 TABLET, FILM COATED ORAL at 22:04

## 2021-12-13 RX ADMIN — LEVOMILNACIPRAN HYDROCHLORIDE 120 MG: 40 CAPSULE, EXTENDED RELEASE ORAL at 09:30

## 2021-12-13 RX ADMIN — DAPTOMYCIN 600 MG: 500 INJECTION, POWDER, LYOPHILIZED, FOR SOLUTION INTRAVENOUS at 13:28

## 2021-12-13 RX ADMIN — TAMSULOSIN HYDROCHLORIDE 0.4 MG: 0.4 CAPSULE ORAL at 09:29

## 2021-12-13 RX ADMIN — BUPRENORPHINE AND NALOXONE 1 FILM: 8; 2 FILM, SOLUBLE BUCCAL; SUBLINGUAL at 09:34

## 2021-12-13 RX ADMIN — CARISOPRODOL 87.5 MG: 350 TABLET ORAL at 09:31

## 2021-12-13 RX ADMIN — NICOTINE 1 PATCH: 14 PATCH, EXTENDED RELEASE TRANSDERMAL at 09:33

## 2021-12-13 RX ADMIN — LORAZEPAM 0.5 MG: 0.5 TABLET ORAL at 22:05

## 2021-12-13 RX ADMIN — AMLODIPINE BESYLATE 5 MG: 5 TABLET ORAL at 09:29

## 2021-12-13 RX ADMIN — ASPIRIN 81 MG: 81 TABLET, COATED ORAL at 09:29

## 2021-12-13 RX ADMIN — METOPROLOL TARTRATE 25 MG: 25 TABLET, FILM COATED ORAL at 09:30

## 2021-12-13 RX ADMIN — ABACAVIR SULFATE, DOLUTEGRAVIR SODIUM, LAMIVUDINE 1 TABLET: 600; 50; 300 TABLET, FILM COATED ORAL at 09:30

## 2021-12-13 RX ADMIN — LORAZEPAM 0.5 MG: 0.5 TABLET ORAL at 09:48

## 2021-12-13 RX ADMIN — BUPRENORPHINE AND NALOXONE 1 FILM: 8; 2 FILM, SOLUBLE BUCCAL; SUBLINGUAL at 22:04

## 2021-12-13 ASSESSMENT — ACTIVITIES OF DAILY LIVING (ADL)
ADLS_ACUITY_SCORE: 5

## 2021-12-13 NOTE — PROGRESS NOTES
[facility]  Hospitalist Progress Note for 12/12/2021  Date of Admission:  11/15/2021       Assessment and Plan:   Bc German is a 41 year old male with PMHx of polysubstance abuse including IV drug use (including fentanyl and methamphetamine currently), chronic opioid dependence, HIV, hepatitis C, hx of MSSA sepsis with pulmonary valve endocarditis (2015), and 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 Was found to have recurrent MSSA/MRSA bacteremia and tricuspid valve endocarditis.     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 lactate nl. CXR on admission showed nodular/patchy opacity at the R base.  * 2/2 blood cultures drawn on admission grew both MSSA and MRSA >> blood cx cleared  on 11/18 >> vancomycin  changed to daptomycin due to JOSE RAMON   EDUARDO done 11/18 notable for large tricuspid valve vegetation with severe regurgitation  - ID following, to continue Daptomycin, with plans to switch to Vancomycin when renal function better; will need 6 weeks of antibiotic in hospital, until 12/29; midline was placed 12/2  - discussed with ID- cr today 1.37, would continue with Daptomycin at this time.   - remains afebrile ; EDUARDO repeat 12/02 noted with smaller vegetations than prior EDUARDO but did note that tricuspid valve leaflet is likely perforated and prolapsing leading to severe degenerative TR, EF 65-70 %  - bilateral LE swelling, likely due to TR, US 12/3 noted with no DVT; edema improved with 40 mg IV Lasix given on 12/3  ; will monitor volume status closely and consider diuretics prn  - given EDUARDO findings, re-evaluated by thoracic surgery (initially rec medical management) and plans for surgical replacement with bioprosthetic valve at some point; surgery concerned about his IV drug use and consulted with Dr SELINA Barrientos (addiction medicine physician) at [facility] prior to considering surgery      - was evaluated by Dr Barrientos (addiction specialist at [facility]); concern that he is at high risk for relapse and thus at high risk for prosthetic valve endocarditis if he undergoes valve replacement; rec postponing surgery at this time to focus on chemical dependency     Chronic anemia, likely anemia of chronic disease  - likely anemia of chronic disease in the setting of infective endocarditis  - hemoglobin on admission however was 12.5 and has gradually trended down--->7.1, stable--7.7---7.3---7.6--> 7.4 12/10.   - hemodynamically stable and no suggestion of active bleed  - iron studies consistent with likely ACD with normal ferritin and low TIBC, TSH slightly elevated but free T4 normal; b12 and folate normal  - will monitor hemoglobin periodically,repeat hgb in AM and transfuse PRN for Hb <7  - Hb today 8.2     HTN  - BP reasonably controlled with addition of amlodipine 5 mg po daily and Metoprolol 25 mg po bid with hold parameters  (12/3); continue; hydralazine IV prn for SBP>180; plan for intermittent diuresis as above     HIV  Hepatitis C  * Chronic and stable on Triumeq restarted 11/30 as cr improving   * CDC >200 so no need for PJP ppx per ID     JOSE RAMON on CKD, likely due to Vancomycin, improved  Hyperkalemia (resolved)  Hyponatremia: resolved   * No hx of CKD and with a normal baseline enrique function  * Cr initially stable this stay, began trending up after vancomycin  - nephrology evaluated for JOSE RAMON, Cr peak 2.8 (11/25)---> trending down --> 1.92---1.78---1.61---1.4--1.37 nephro plans to follow up outpatient; signed off 12/3  - monitor BMP  "periodically  - ADAN returned negative, Renal US obtained 11/24 and negative for obstruction     History of opiate dependence  History of active IV drug use  Tobacco Use  Depression  * On admission, had stated he last used IV drugs 1 wk prior to admission. While in the ED on 11/16, RN had noted patient was briefly nonresponsive and apneic. Was given dose of Narcan with noted improvement.   * Prior to admission, had been started on taper off Soma -- was taking 87.5mg (1/4 of a 350mg tab) BID but later conversations with PCP's clinic revealed no provider there was managing taper  -- was continued on PTA Subutex BID this stay-- after discussion with Dr Snyder, switched to Suboxone bid (12/8)  -- conts on levomilnacipran ER as per prior to admission  -- cont carisoprodol, after discussion with DR snyder and Pharmacy, plan to taper--- switched to 87.5mg daily for 7 days (taper started 12/8), then plan to do q 48 hrs for 7 days  -- cont lorazepam 0.5mg q6h prn for anxiety (consider psych / Addiction medicine consult/ recommendations reg lorazepam at discharge)  -- cont nicotine patch  -- PTA had been on Lyrica 150 mg po BID which was discontinued on 11/24 because of worsening renal function?  -- 12/13- he is asking if Lyrica can be resumed \"even half of dose\" because his anxiety is increasing and Lyrica would help    CHEM Depp evaluated and recommended inpatient treatment after completion of antibiotics directly will transfer from here to detox treatment center      Left scrotal laceration dt fall/possible syncope  Left scrotal wound infection  * Laceration sutured in ED. Suture removed 11/21.   * US scrotum earlier this stay showed a small R hydrocele but was otherwise normal.   Patient's scrotal laceration wound looks clean and dry.    -Follow WOC recommendations       Left ear pain; improved    No evidence of otitis media or externa. Likely cerumen impaction,  -Continue to  apply carbamide peroxide  Ear drops to left ears " "prn     Constipation  bowel regimen in place but states only magnesium citrate works for him; declines other bowel regimen; Mag citrate ordered 12/7; also got on 12/2          Diet: Snacks/Supplements Adult: Ensure Enlive; With Meals  Regular Diet Adult    DVT Prophylaxis: Pneumatic Compression Devices  Tirado Catheter: Not present  Central Lines: None  Code Status: Full Code       Disposition Plan   Expected Discharge: 12/29/2021     Anticipated discharge location: CHE[facility] Depp evaluated and recommended inpatient treatment after completion of antibiotics directly will transfer from here to detox treatment center  if pt agrees. SW following.    Sudha Miller MD                 Interval History:   Reports feeling tired; wants to know his Hb level- 8.2 today (up from 7.7 yesterday)  - wants to check his TSH and vit B12 levels- I told him that those blood tests were checked on 12/04 and the levels were normal  - wants to resume Lyrica, even at a lower dose; states that his anxiety level is increasing and what he receives here is not helping him with anxiety; he says that \"if he would be outside the hospital, he would buy a bag of dope and use it in the morning before Suboxone would start working\"  - reports some posterior chest wall pain that is getting worse when he takes deep breathing \"because of EDUARDO\"  - appetite varies, sometimes does not have appetite when he feels anxious.               Medications:       abacavir-dolutegravir-LamiVUDine  1 tablet Oral Daily     amLODIPine  5 mg Oral Daily     aspirin  81 mg Oral Daily     buprenorphine HCl-naloxone HCl  1 Film Sublingual BID     carbamide peroxide  3 drop Left Ear BID     [START ON 12/16/2021] carisoprodol  87.5 mg Oral Q48H     carisoprodol  87.5 mg Oral Daily     DAPTOmycin (CUBICIN) intermittent infusion  9 mg/kg Intravenous Q24H     docusate sodium  100 mg Oral BID     levomilnacipran  120 mg Oral Daily     metoprolol tartrate  25 mg Oral BID     nicotine "  1 patch Transdermal QPM     nicotine   Transdermal Q8H     polyethylene glycol  17 g Oral BID     sennosides  2 tablet Oral BID     sodium chloride (PF)  10 mL Intracatheter Q8H     tamsulosin  0.4 mg Oral Daily     acetaminophen **OR** acetaminophen, alum & mag hydroxide-simethicone, bisacodyl, calcium carbonate, glucose **OR** dextrose **OR** glucagon, lidocaine 4%, lidocaine (buffered or not buffered), LORazepam, naloxone **OR** naloxone **OR** naloxone **OR** naloxone, nitroGLYcerin, ondansetron **OR** ondansetron, prochlorperazine **OR** prochlorperazine **OR** prochlorperazine, sodium chloride (PF), sodium chloride (PF), sodium chloride (PF)               Physical Exam:   Blood pressure 109/68, pulse 76, temperature 97.8  F (36.6  C), temperature source Oral, resp. rate 18, height 1.829 m (6'), weight 61.7 kg (136 lb), SpO2 100 %.  Wt Readings from Last 4 Encounters:   21 61.7 kg (136 lb)   21 59 kg (130 lb)   21 54.4 kg (120 lb)   21 56.7 kg (125 lb)         Vital Sign Ranges  Temperature Temp  Av.6  F (36.4  C)  Min: 97.3  F (36.3  C)  Max: 97.8  F (36.6  C)   Blood pressure Systolic (24hrs), Av , Min:106 , Max:126        Diastolic (24hrs), Av, Min:69, Max:96      Pulse Pulse  Av.3  Min: 65  Max: 80   Respirations Resp  Av.3  Min: 18  Max: 20   Pulse oximetry SpO2  Av %  Min: 100 %  Max: 100 %         Intake/Output Summary (Last 24 hours) at 2021 1012  Last data filed at 12/10/2021 1430  Gross per 24 hour   Intake 500 ml   Output --   Net 500 ml       Constitutional: Awake, calm,alert, cooperative, no apparent distress   Lungs: Clear to auscultation bilaterally, no crackles or wheezing               Data:   All laboratory data reviewed

## 2021-12-13 NOTE — PLAN OF CARE
DATE & TIME: 12/12/21 2297-7864    Cognitive Concerns/ Orientation : Pt A/Ox4   BEHAVIOR & AGGRESSION TOOL COLOR: Green   ABNL VS/O2: VSS on RA  MOBILITY: Independent  PAIN MANAGMENT: Denies  DIET: Regular  BOWEL/BLADDER: Continent  ABNL LAB/BG: Hgb 7.7  DRAIN/DEVICES: Midline IV SL, blood return noted.  SKIN: Scrotal laceration, per patient looks good he is doing his own wound care. Refused full skin assessment. Scattered bruising and scabs.  D/C DATE: Discharge pending plan for CD treatment following antibiotic course  OTHER IMPORTANT INFO: Pt ambulating in hallway with staff to go get coffee. Contact isolation. IV Daptomycin daily. Ativan and PO Zofran given x1.

## 2021-12-13 NOTE — PROGRESS NOTES
CLINICAL NUTRITION SERVICES - REASSESSMENT NOTE    Malnutrition: (11/22)   % Weight Loss:  None noted  % Intake:  <75% for > 7 days (moderate malnutrition)  Subcutaneous Fat Loss:  Baseline  Muscle Loss:  Baseline   Fluid Retention:  None noted     Malnutrition Diagnosis: Patient does not meet two of the above criteria necessary for diagnosing malnutrition - at risk     EVALUATION OF PROGRESS TOWARD GOALS   Diet: Regular diet + Ensure Enlive   Intake/Tolerance:   - eating % of most meals, ordered 1-2x daily.  - Meals tend to be adequate in size/balanced.   - He also receives 3 ensure enlive daily for a total 80 g protein, 1050 kcal.     - Weight recorded 12/12: 61.7 kg (136 lb)  - Last BM recorded 12/3 - suspect not updated/not being tracked closely out of privacy for this patient who is 41 years old with a prolonged admission.     ASSESSED NUTRITION NEEDS:  Dosing Weight 56.2 kg   Estimated Energy Needs: 9219-1681 kcals (30-35 Kcal/Kg)  Justification: underweight   Estimated Protein Needs:  grams protein (1.2-1.8 g pro/Kg)  Justification: Repletion    NEW FINDINGS:   - WOCN providing recs for scrotal wound. Pt doing his own cares.   - CV Surgery - hold off for now at the recommendation of Dr Barrientos from [facility]    Previous Goals:   Intake of at least 75% meals BID - TID + 2 supplements daily.  Evaluation: Met for meals BID on average    Previous Nutrition Diagnosis:   Increased nutrient needs (protein) related to prolonged hospitalization and upcoming surgery as evidenced by day 23 inpatient + plan for valve replacement in the next week.  Evaluation: No change    CURRENT NUTRITION DIAGNOSIS  Increased nutrient needs (protein) related to prolonged hospitalization and upcoming surgery as evidenced by day 23 inpatient + plan for valve replacement in the next week.    INTERVENTIONS  Recommendations / Nutrition Prescription  Regular diet  Ensure TID    Implementation  None new    Goals  Intake of at least  75% meals BID + 2 supplements daily.       MONITORING AND EVALUATION:  Progress towards goals will be monitored and evaluated per protocol and Practice Guidelines    Alice Mena RD, LD  [facility] [place], [facility], [facility], [facility]  Pager: [phone]  Weekend Pager: [phone]

## 2021-12-13 NOTE — PROGRESS NOTES
[facility]    Infectious Disease Progress Note    Date of Service : 12/13/2021     Assessment:  41 year old male with polysubstance use/IVDA, HIV-Hep C co-infection and prior hx of recurrent MSSA sepsis with pulmonic valve endocarditis, spinal discitis/osteomyelitis with epidural abscess requiring surgical debridement, who is now admitted with high grade MSSA/MRSA bacteremia with tricuspid valve endocarditis with two large [id number] x 8mm and 13 x 5mm, and repeat EDUARDO now shows valve perforation with severe tricuspid regurgitation. Blood cxs are negative since 11/18  . He has had medication non compliance with ART with detectable VL, CD4 >200 at this time not requiring PCP prophylaxis. Course further complicated by JOSE RAMON and hyperkalemia which have improved as well as tricuspid valve perforation which needs surgical management.     1. MSSA/MRSA sepsis with tricuspid valve endocarditis - blood cxs cleared 11/18, Repeat EDUARDO 12/2 now shows valve perforation with severe regurgitation, awaiting surgery.   2. JOSE RAMON  improving  3. LE edema , doppler negative for DVT  4. Prior history of MSSA sepsis with pulmonary valve endocarditis in 2015 and spinal discitis/osteomyelitis with epidural abscess requiring surgical intervention in 2017.  5. Substance abuse with active use of methamphetamine / fentanyl currently  6.  HIV/Hepatitis C co-infection. Last HIV VL detectable at 29,522 copies/ml and  CD4 625(28%) on  06/09/2021 , on Triumeq        Recommendations  1. Continue IV Daptomycin, plan IV antibiotics until 12/29 to complete 6 weeks of treatment.  2. Valve replacement planned at some point  3. Cultures have been clear since 11/ 18   4. Continue on PTA triumeq    Bora Juarez MD    Interval History    Resting, no new complaints today  Tolerating antibiotics without side effects    Physical Exam   Temp: 97.8  F (36.6  C) Temp src: Oral BP: 109/68 Pulse: 76   Resp: 18 SpO2: 100 % O2 Device: None (Room  air)    Vitals:    12/09/21 0300 12/11/21 0447 12/12/21 0337   Weight: 63.1 kg (139 lb 1.8 oz) 62.8 kg (138 lb 6.4 oz) 61.7 kg (136 lb)     Vital Signs with Ranges  Temp:  [97.8  F (36.6  C)-97.9  F (36.6  C)] 97.8  F (36.6  C)  Pulse:  [67-76] 76  Resp:  [18] 18  BP: (109-158)/(40-86) 109/68  SpO2:  [99 %-100 %] 100 %    Constitutional: Awake, alert, cooperative, no apparent distress  Lungs: Clear to auscultation bilaterally, no crackles or wheezing  Cardiovascular: Regular rate and rhythm, systolic murmur  Abdomen: Normal bowel sounds, soft, non-distended, non-tender  Skin: : multiple scars, IV racks, excoriations on hands,    : Left scrotal laceration site appears clean, no sign of infection  MS : bilateral LE edema L>R    Other:    Medications       abacavir-dolutegravir-LamiVUDine  1 tablet Oral Daily     amLODIPine  5 mg Oral Daily     aspirin  81 mg Oral Daily     buprenorphine HCl-naloxone HCl  1 Film Sublingual BID     carbamide peroxide  3 drop Left Ear BID     [START ON 12/16/2021] carisoprodol  87.5 mg Oral Q48H     carisoprodol  87.5 mg Oral Daily     DAPTOmycin (CUBICIN) intermittent infusion  9 mg/kg Intravenous Q24H     docusate sodium  100 mg Oral BID     levomilnacipran  120 mg Oral Daily     metoprolol tartrate  25 mg Oral BID     nicotine  1 patch Transdermal QPM     nicotine   Transdermal Q8H     polyethylene glycol  17 g Oral BID     sennosides  2 tablet Oral BID     sodium chloride (PF)  10 mL Intracatheter Q8H     tamsulosin  0.4 mg Oral Daily       Data   All microbiology laboratory data reviewed.  Recent Labs   Lab Test 12/13/21  0949 12/12/21  0617 12/10/21  0647 12/06/21  0624 12/05/21  0555 12/04/21  0619 12/01/21  0621   WBC  --   --   --   --  9.1 7.6 6.6   HGB 8.2* 7.7* 7.4*   < > 7.7* 7.1* 7.6*   HCT  --   --   --   --  24.6* 22.6* 24.0*   MCV  --   --   --   --  87 87 87   PLT  --   --   --   --  856 486 417    < > = values in this interval not displayed.     Recent Labs   Lab  Test 12/13/21  0949 12/10/21  0647 12/09/21  0702   CR 1.37* 1.29* 1.40*     Recent Labs   Lab Test 11/16/21  0014   SED 39*     Microbiology  11/14 & 4/16 blood cxs both sets MSSA & MRSA, blood cxs 11/18, 11/19 blood cxs negative  Culture Positive on the 1st day of incubation Abnormal         Staphylococcus aureus Panic     2 of 2 bottles   Staphylococcus aureus MRSA Panic     2 of 2 bottles         Resulting Agency: [facility]         Susceptibility                       Staphylococcus aureus Staphylococcus aureus MRSA       PURNIMA PURNIMA       Clindamycin <=0.25 ug/mL Susceptible <=0.25 ug/mL Susceptible 1       Erythromycin <=0.25 ug/mL Susceptible >=8.0 ug/mL Resistant       Gentamicin <=0.5 ug/mL Susceptible <=0.5 ug/mL Susceptible       Linezolid     2.0 ug/mL Susceptible       Oxacillin <=0.25 ug/mL Susceptible >=4.0 ug/mL Resistant       Tetracycline <=1.0 ug/mL Susceptible <=1.0 ug/mL Susceptible       Trimethoprim/Sulfamethoxazole <=0.5/9.5 u... Susceptible <=0.5/9.5 u... Susceptible       Vancomycin <=0.5 ug/mL Susceptible <=0.5 ug/mL Susceptible                    Imaging  11/2 EDUARDO  Interpretation Summary     There is a small filamentous echodensity noted on the posterior mitral valve  leaflet (image 28) which in the clinical context, could be a vegetation.  The aortic valve is normal in structure and function.  There is a 0.9 cm vegetation noted on the posterior leaflet of the tricuspid  valve. The leaflet is likely perforated and prolapsing leading to severe  degenerative TR. Vegetations much smaller than prior EDUARDO. See images 100-127  towards the end of the study.  The visual ejection fraction is 65-70%.  The right ventricle is normal in size and function     11/24 US kidneys  ULTRASOUND RENAL COMPLETE 11/24/2021 12:53 PM     CLINICAL HISTORY: MSSA sepsis, endocarditis, increasing creatinine,  evaluate for obstruction.     TECHNIQUE: Routine Bilateral Renal and Bladder Ultrasound.     COMPARISON:  None.     FINDINGS:  RIGHT KIDNEY: 12.1 x 6.9 x 4.9 cm. Unremarkable echogenicity, no  hydronephrosis or masses. Simple cysts noted measuring up to 1.7 cm.  Mildly complex cyst in the upper right kidney measuring 1.8 cm.     LEFT KIDNEY: 1.2 x 6.0 x 5.8 cm. Unremarkable echogenicity, no  hydronephrosis or masses. Simple cysts noted.     BLADDER: Unremarkable given its level of distension.                                                                      IMPRESSION:  No obstruction demonstrated     11/18 EDUARDO  Interpretation Summary     Two, large, mobile masses noted, attached to the atrial side of base of  posterior tricuspid leaflets are noted. (they measures 18 x 8mm and 13 x 5mm  respectively).These are most consistent with large tricuspid valve  vegetations.  Severe tricuspid regurgitation noted, directed towards the interatrial septum.  Cannot exclude perforation of posterior leaflet of tricuspid valve.  The right ventricle is mildly dilated.  The right ventricular systolic function is normal.  Left ventricular systolic function is normal.  The visual ejection fraction is 60-65%.  Findings discussed with Dr Guerin.  These are new comnpared to prior echo from 2017. The study was technically  adequate.

## 2021-12-14 LAB — CK SERPL-CCNC: 25 U/L (ref 30–300)

## 2021-12-14 PROCEDURE — 250N000013 HC RX MED GY IP 250 OP 250 PS 637: Performed by: SPECIALIST

## 2021-12-14 PROCEDURE — 250N000013 HC RX MED GY IP 250 OP 250 PS 637: Performed by: INTERNAL MEDICINE

## 2021-12-14 PROCEDURE — 99207 PR CDG-MDM COMPONENT: MEETS MODERATE - UP CODED: CPT | Performed by: INTERNAL MEDICINE

## 2021-12-14 PROCEDURE — 250N000011 HC RX IP 250 OP 636: Performed by: INTERNAL MEDICINE

## 2021-12-14 PROCEDURE — 99232 SBSQ HOSP IP/OBS MODERATE 35: CPT | Performed by: INTERNAL MEDICINE

## 2021-12-14 PROCEDURE — 999N000190 HC STATISTIC VAT ROUNDS

## 2021-12-14 PROCEDURE — 82550 ASSAY OF CK (CPK): CPT | Performed by: INTERNAL MEDICINE

## 2021-12-14 PROCEDURE — 120N000001 HC R&B MED SURG/OB

## 2021-12-14 PROCEDURE — 258N000003 HC RX IP 258 OP 636: Performed by: INTERNAL MEDICINE

## 2021-12-14 PROCEDURE — 250N000013 HC RX MED GY IP 250 OP 250 PS 637: Performed by: PSYCHIATRY & NEUROLOGY

## 2021-12-14 PROCEDURE — 250N000013 HC RX MED GY IP 250 OP 250 PS 637: Performed by: STUDENT IN AN ORGANIZED HEALTH CARE EDUCATION/TRAINING PROGRAM

## 2021-12-14 PROCEDURE — 250N000013 HC RX MED GY IP 250 OP 250 PS 637: Performed by: HOSPITALIST

## 2021-12-14 RX ADMIN — BUPRENORPHINE AND NALOXONE 1 FILM: 8; 2 FILM, SOLUBLE BUCCAL; SUBLINGUAL at 08:43

## 2021-12-14 RX ADMIN — DAPTOMYCIN 600 MG: 500 INJECTION, POWDER, LYOPHILIZED, FOR SOLUTION INTRAVENOUS at 13:58

## 2021-12-14 RX ADMIN — CARISOPRODOL 87.5 MG: 350 TABLET ORAL at 10:40

## 2021-12-14 RX ADMIN — ASPIRIN 81 MG: 81 TABLET, COATED ORAL at 08:42

## 2021-12-14 RX ADMIN — METOPROLOL TARTRATE 25 MG: 25 TABLET, FILM COATED ORAL at 08:42

## 2021-12-14 RX ADMIN — ONDANSETRON 4 MG: 2 INJECTION INTRAMUSCULAR; INTRAVENOUS at 18:46

## 2021-12-14 RX ADMIN — NICOTINE 1 PATCH: 14 PATCH, EXTENDED RELEASE TRANSDERMAL at 08:45

## 2021-12-14 RX ADMIN — TAMSULOSIN HYDROCHLORIDE 0.4 MG: 0.4 CAPSULE ORAL at 08:42

## 2021-12-14 RX ADMIN — ONDANSETRON 4 MG: 4 TABLET, ORALLY DISINTEGRATING ORAL at 06:29

## 2021-12-14 RX ADMIN — ABACAVIR SULFATE, DOLUTEGRAVIR SODIUM, LAMIVUDINE 1 TABLET: 600; 50; 300 TABLET, FILM COATED ORAL at 08:41

## 2021-12-14 RX ADMIN — AMLODIPINE BESYLATE 5 MG: 5 TABLET ORAL at 08:41

## 2021-12-14 RX ADMIN — LEVOMILNACIPRAN HYDROCHLORIDE 120 MG: 40 CAPSULE, EXTENDED RELEASE ORAL at 08:42

## 2021-12-14 RX ADMIN — LORAZEPAM 0.5 MG: 0.5 TABLET ORAL at 06:24

## 2021-12-14 ASSESSMENT — ACTIVITIES OF DAILY LIVING (ADL)
ADLS_ACUITY_SCORE: 5

## 2021-12-14 ASSESSMENT — MIFFLIN-ST. JEOR: SCORE: 1545

## 2021-12-14 NOTE — PLAN OF CARE
DATE & TIME: 12/13/2021 3-11    Cognitive Concerns/ Orientation : Pt A/Ox4   BEHAVIOR & AGGRESSION TOOL COLOR: Green   ABNL VS/O2: VSS on RA  MOBILITY: Independent  PAIN MANAGMENT: Denies  DIET: Regular  BOWEL/BLADDER: Continent  ABNL LAB/BG: Creatine 1.37  DRAIN/DEVICES: Midline IV SL, blood return noted.  SKIN: Scrotal laceration, per patient looks good he is doing his own wound care. Refused full skin assessment.   D/C DATE: Discharge pending plan for CD treatment following antibiotic course  OTHER IMPORTANT INFO: Pt ambulating in room  Contact isolation. No PRN's utilized this shift. LS clear bilaterally. Pt slept most of shift. PRN ativan given.

## 2021-12-14 NOTE — PLAN OF CARE
DATE & TIME: 12/14/2021 days     Cognitive Concerns/ Orientation :  alert and oriented x 4    BEHAVIOR & AGGRESSION TOOL COLOR:  green   CIWA SCORE:  na    ABNL VS/O2:  VSS RA   MOBILITY:  up independently   PAIN MANAGMENT:  denies had Zofran on nights no further complaints   DIET:  Regular and ate well for breakfast and fair for lunch   BOWEL/BLADDER:  continent   ABNL LAB/BG:  CK 25  DRAIN/DEVICES: midline in left arm   TELEMETRY RHYTHM: na   SKIN:  bruised   TESTS/PROCEDURES:  none   D/C DATE:  needs to complete IV antibiotics and then needs to have a inpt placement for CD   Discharge Barriers:  needs to complete IV antibiotics   OTHER IMPORTANT INFO:  Pt had a fair morning complaints of feeling very tired, and sleeping on and off. Up to the bathroom.

## 2021-12-14 NOTE — PLAN OF CARE
DATE & TIME: 12/13/21, 3166 - 5104    Cognitive Concerns/ Orientation : A&O x 4   BEHAVIOR & AGGRESSION TOOL COLOR: Green   ABNL VS/O2: VSS on rom air  MOBILITY: Independent  PAIN MANAGMENT: Denied  DIET: Regular  BOWEL/BLADDER: Continent  ABNL LAB/BG: AM labs pending  DRAIN/DEVICES: Left Midline SL  TELEMETRY RHYTHM: NA  SKIN: Scattered bruies. Unable to assess scrotum. Patient refused full skinassessment  TESTS/PROCEDURES: NA  D/C DATE: Pending CD treatment following Antibiotic course. Prn Ativen given for anxiety along with Zofran po  OTHER IMPORTANT INFO: Nicotine patch in place to left shoulder. Contact precautions maintained

## 2021-12-14 NOTE — PROGRESS NOTES
[facility]  Hospitalist Progress Note for 12/12/2021  Date of Admission:  11/15/2021       Assessment and Plan:   Bc German is a 41 year old male with PMHx of polysubstance abuse including IV drug use (including fentanyl and methamphetamine currently), chronic opioid dependence, HIV, hepatitis C, hx of MSSA sepsis with pulmonary valve endocarditis (2015), and 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 Was found to have recurrent MSSA/MRSA bacteremia and tricuspid valve endocarditis.     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 lactate nl. CXR on admission showed nodular/patchy opacity at the R base.  * 2/2 blood cultures drawn on admission grew both MSSA and MRSA >> blood cx cleared  on 11/18 >> vancomycin  changed to daptomycin due to JOSE RAMON   EDUARDO done 11/18 notable for large tricuspid valve vegetation with severe regurgitation  - ID following, to continue Daptomycin, with plans to switch to Vancomycin when renal function better; will need 6 weeks of antibiotic in hospital, until 12/29; midline was placed 12/2  - discussed with ID- cr today 1.37, would continue with Daptomycin at this time.   - remains afebrile ; EDUARDO repeat 12/02 noted with smaller vegetations than prior EDUARDO but did note that tricuspid valve leaflet is likely perforated and prolapsing leading to severe degenerative TR, EF 65-70 %  - was having bilateral LE swelling, likely due to TR, US 12/3 noted with no DVT; edema improved with 40 mg IV Lasix  given on 12/3 ; will monitor volume status closely and consider diuretics prn  - given EDUARDO findings, re-evaluated by thoracic surgery (initially rec medical management) and plans for surgical replacement with bioprosthetic valve at some point; surgery concerned about his IV drug use and consulted with Dr SELINA Barrientos (addiction medicine physician) at [facility] prior to considering surgery      - was evaluated by Dr Barrientos (addiction specialist at [place]); concern that he is at high risk for relapse and thus at high risk for prosthetic valve endocarditis if he undergoes valve replacement; rec postponing surgery at this time to focus on chemical dependency     Chronic anemia, likely anemia of chronic disease  - likely anemia of chronic disease in the setting of infective endocarditis  - hemoglobin on admission however was 12.5 and has gradually trended down--->7.1, stable--7.7---7.3---7.6--> 7.4 12/10.   - hemodynamically stable and no suggestion of active bleed  - iron studies consistent with likely ACD with normal ferritin and low TIBC, TSH slightly elevated but free T4 normal; b12 and folate normal  - will monitor hemoglobin periodically,repeat hgb in AM and transfuse PRN for Hb <7  - Hb 8.2 on 12/13     HTN  - BP controlled with addition of amlodipine 5 mg po daily and Metoprolol 25 mg po bid with hold parameters  (12/3); continue;   - hydralazine IV prn for SBP>180; plan for intermittent diuresis if needed     HIV  Hepatitis C  * Chronic and stable on Triumeq restarted 11/30 as cr improving   * CDC >200 so no need for PJP ppx per ID     JOSE RAMON on CKD, likely due to Vancomycin, improved  Hyperkalemia (resolved)  Hyponatremia: resolved   * No hx of CKD and with a normal baseline enrique function  * Cr initially stable this stay, began trending up after vancomycin  - nephrology evaluated for JOSE RAMON, Cr peak 2.8 (11/25)---> trending down --> 1.92---1.78---1.61---1.4--1.37 nephro plans to follow up outpatient; signed off 12/3  -  "monitor BMP periodically  - ADAN returned negative, Renal US obtained 11/24 and negative for obstruction     History of opiate dependence  History of active IV drug use  Tobacco Use  Depression  * On admission, had stated he last used IV drugs 1 wk prior to admission. While in the ED on 11/16, RN had noted patient was briefly nonresponsive and apneic. Was given dose of Narcan with noted improvement.   * Prior to admission, had been started on taper off Soma -- was taking 87.5mg (1/4 of a 350mg tab) BID but later conversations with PCP's clinic revealed no provider there was managing taper  -- was continued on PTA Subutex BID this stay-- after discussion with Dr Snyder, switched to Suboxone bid (12/8)  -- conts on levomilnacipran ER as per prior to admission  -- cont carisoprodol, after discussion with DR snyder and Pharmacy, plan to taper--- switched to 87.5mg daily for 7 days (taper started 12/8), then plan to do q 48 hrs for 7 days  -- cont lorazepam 0.5mg q6h prn for anxiety (consider psych / Addiction medicine consult/ recommendations reg lorazepam at discharge)  -- cont nicotine patch  -- PTA had been on Lyrica 150 mg po BID which was discontinued on 11/24 because of worsening renal function?  -- 12/13- he is asking if Lyrica can be resumed \"even half of dose\" because his anxiety is increasing and Lyrica would help    CHEM Depp evaluated and recommended inpatient treatment after completion of antibiotics directly will transfer from here to detox treatment center      Left scrotal laceration dt fall/possible syncope  Left scrotal wound infection  * Laceration sutured in ED. Suture removed 11/21.   * US scrotum earlier this stay showed a small R hydrocele but was otherwise normal.   Patient's scrotal laceration wound looks clean and dry.    -Follow WOC recommendations       Left ear pain; improved    No evidence of otitis media or externa. Likely cerumen impaction,  -Continue to  apply carbamide peroxide  Ear drops " to left ears prn     Constipation  bowel regimen in place but states only magnesium citrate works for him; declines other bowel regimen; Mag citrate ordered 12/7; also got on 12/2          Diet: Snacks/Supplements Adult: Ensure Enlive; With Meals  Regular Diet Adult    DVT Prophylaxis: Pneumatic Compression Devices  Tirado Catheter: Not present  Central Lines: None  Code Status: Full Code       Disposition Plan   Expected Discharge: 12/29/2021     Anticipated discharge location: [facility] Depp evaluated and recommended inpatient treatment after completion of antibiotics directly will transfer from here to detox treatment center  if pt agrees. SW following.    Sudha Miller MD                 Interval History:   Doing fine, resting after he ate lunch  - no chest pain, no SOB  - no N/V, no abd pain  - discussed with RN              Medications:       abacavir-dolutegravir-LamiVUDine  1 tablet Oral Daily     amLODIPine  5 mg Oral Daily     aspirin  81 mg Oral Daily     buprenorphine HCl-naloxone HCl  1 Film Sublingual BID     carbamide peroxide  3 drop Left Ear BID     [START ON 12/16/2021] carisoprodol  87.5 mg Oral Q48H     carisoprodol  87.5 mg Oral Daily     DAPTOmycin (CUBICIN) intermittent infusion  9 mg/kg Intravenous Q24H     docusate sodium  100 mg Oral BID     levomilnacipran  120 mg Oral Daily     metoprolol tartrate  25 mg Oral BID     nicotine  1 patch Transdermal QPM     nicotine   Transdermal Q8H     polyethylene glycol  17 g Oral BID     sennosides  2 tablet Oral BID     sodium chloride (PF)  10 mL Intracatheter Q8H     tamsulosin  0.4 mg Oral Daily     acetaminophen **OR** acetaminophen, alum & mag hydroxide-simethicone, bisacodyl, calcium carbonate, glucose **OR** dextrose **OR** glucagon, lidocaine 4%, lidocaine (buffered or not buffered), LORazepam, naloxone **OR** naloxone **OR** naloxone **OR** naloxone, nitroGLYcerin, ondansetron **OR** ondansetron, prochlorperazine **OR** prochlorperazine  **OR** prochlorperazine, sodium chloride (PF), sodium chloride (PF), sodium chloride (PF)               Physical Exam:   Blood pressure 101/64, pulse 72, temperature 97.5  F (36.4  C), temperature source Oral, resp. rate 16, height 1.829 m (6'), weight 59.2 kg (130 lb 8.2 oz), SpO2 98 %.  Wt Readings from Last 4 Encounters:   21 59.2 kg (130 lb 8.2 oz)   21 59 kg (130 lb)   21 54.4 kg (120 lb)   21 56.7 kg (125 lb)         Vital Sign Ranges  Temperature Temp  Av.6  F (36.4  C)  Min: 97.3  F (36.3  C)  Max: 97.8  F (36.6  C)   Blood pressure Systolic (24hrs), Av , Min:106 , Max:126        Diastolic (24hrs), Av, Min:69, Max:96      Pulse Pulse  Av.3  Min: 65  Max: 80   Respirations Resp  Av.3  Min: 18  Max: 20   Pulse oximetry SpO2  Av %  Min: 100 %  Max: 100 %         Intake/Output Summary (Last 24 hours) at 2021 1012  Last data filed at 12/10/2021 1430  Gross per 24 hour   Intake 500 ml   Output --   Net 500 ml       Constitutional: Awake, calm,alert, cooperative, no apparent distress   Lungs: Clear to auscultation bilaterally, no crackles or wheezing   CV: S1S2, RRR, systolic murmur 3/6 precordial area, no edema   GI: abd- soft, nonT, nonD, BS present   Neuro: Awake, alert, orientedX3, no FNDs               Data:   All laboratory data reviewed

## 2021-12-15 LAB
ANION GAP SERPL CALCULATED.3IONS-SCNC: 3 MMOL/L (ref 3–14)
BUN SERPL-MCNC: 39 MG/DL (ref 7–30)
CALCIUM SERPL-MCNC: 9.4 MG/DL (ref 8.5–10.1)
CHLORIDE BLD-SCNC: 107 MMOL/L (ref 94–109)
CO2 SERPL-SCNC: 29 MMOL/L (ref 20–32)
CREAT SERPL-MCNC: 1.31 MG/DL (ref 0.66–1.25)
GFR SERPL CREATININE-BSD FRML MDRD: 68 ML/MIN/1.73M2
GLUCOSE BLD-MCNC: 92 MG/DL (ref 70–99)
POTASSIUM BLD-SCNC: 4.8 MMOL/L (ref 3.4–5.3)
SARS-COV-2 RNA RESP QL NAA+PROBE: NEGATIVE
SODIUM SERPL-SCNC: 139 MMOL/L (ref 133–144)

## 2021-12-15 PROCEDURE — 258N000003 HC RX IP 258 OP 636: Performed by: INTERNAL MEDICINE

## 2021-12-15 PROCEDURE — 250N000011 HC RX IP 250 OP 636: Performed by: INTERNAL MEDICINE

## 2021-12-15 PROCEDURE — 250N000013 HC RX MED GY IP 250 OP 250 PS 637: Performed by: STUDENT IN AN ORGANIZED HEALTH CARE EDUCATION/TRAINING PROGRAM

## 2021-12-15 PROCEDURE — 120N000001 HC R&B MED SURG/OB

## 2021-12-15 PROCEDURE — 87635 SARS-COV-2 COVID-19 AMP PRB: CPT | Performed by: INTERNAL MEDICINE

## 2021-12-15 PROCEDURE — 80048 BASIC METABOLIC PNL TOTAL CA: CPT | Performed by: INTERNAL MEDICINE

## 2021-12-15 PROCEDURE — 99233 SBSQ HOSP IP/OBS HIGH 50: CPT | Performed by: INTERNAL MEDICINE

## 2021-12-15 PROCEDURE — 250N000013 HC RX MED GY IP 250 OP 250 PS 637: Performed by: PSYCHIATRY & NEUROLOGY

## 2021-12-15 PROCEDURE — 250N000013 HC RX MED GY IP 250 OP 250 PS 637: Performed by: HOSPITALIST

## 2021-12-15 PROCEDURE — 250N000013 HC RX MED GY IP 250 OP 250 PS 637: Performed by: INTERNAL MEDICINE

## 2021-12-15 PROCEDURE — 250N000013 HC RX MED GY IP 250 OP 250 PS 637: Performed by: SPECIALIST

## 2021-12-15 RX ADMIN — CARISOPRODOL 87.5 MG: 350 TABLET ORAL at 09:15

## 2021-12-15 RX ADMIN — BUPRENORPHINE AND NALOXONE 1 FILM: 8; 2 FILM, SOLUBLE BUCCAL; SUBLINGUAL at 08:17

## 2021-12-15 RX ADMIN — LORAZEPAM 0.5 MG: 0.5 TABLET ORAL at 07:00

## 2021-12-15 RX ADMIN — BUPRENORPHINE AND NALOXONE 1 FILM: 8; 2 FILM, SOLUBLE BUCCAL; SUBLINGUAL at 00:07

## 2021-12-15 RX ADMIN — LEVOMILNACIPRAN HYDROCHLORIDE 120 MG: 40 CAPSULE, EXTENDED RELEASE ORAL at 08:17

## 2021-12-15 RX ADMIN — ABACAVIR SULFATE, DOLUTEGRAVIR SODIUM, LAMIVUDINE 1 TABLET: 600; 50; 300 TABLET, FILM COATED ORAL at 08:17

## 2021-12-15 RX ADMIN — DAPTOMYCIN 600 MG: 500 INJECTION, POWDER, LYOPHILIZED, FOR SOLUTION INTRAVENOUS at 14:54

## 2021-12-15 RX ADMIN — NICOTINE 1 PATCH: 14 PATCH, EXTENDED RELEASE TRANSDERMAL at 15:42

## 2021-12-15 RX ADMIN — TAMSULOSIN HYDROCHLORIDE 0.4 MG: 0.4 CAPSULE ORAL at 08:17

## 2021-12-15 RX ADMIN — LORAZEPAM 0.5 MG: 0.5 TABLET ORAL at 00:07

## 2021-12-15 RX ADMIN — METOPROLOL TARTRATE 25 MG: 25 TABLET, FILM COATED ORAL at 08:17

## 2021-12-15 RX ADMIN — ASPIRIN 81 MG: 81 TABLET, COATED ORAL at 08:17

## 2021-12-15 RX ADMIN — AMLODIPINE BESYLATE 5 MG: 5 TABLET ORAL at 08:17

## 2021-12-15 RX ADMIN — ONDANSETRON 4 MG: 4 TABLET, ORALLY DISINTEGRATING ORAL at 07:00

## 2021-12-15 RX ADMIN — METOPROLOL TARTRATE 25 MG: 25 TABLET, FILM COATED ORAL at 00:06

## 2021-12-15 ASSESSMENT — ACTIVITIES OF DAILY LIVING (ADL)
ADLS_ACUITY_SCORE: 5

## 2021-12-15 ASSESSMENT — MIFFLIN-ST. JEOR: SCORE: 1556.74

## 2021-12-15 NOTE — PROGRESS NOTES
VSS. A&Ox4. Pt. Refused assessment this afternoon.  very tired, and sleeping on and off. C/o nausea. Zofran given.

## 2021-12-15 NOTE — PROGRESS NOTES
[facility]  Hospitalist Progress Note for 12/12/2021  Date of Admission:  11/15/2021       Assessment and Plan:   Bc German is a 41 year old male with PMHx of polysubstance abuse including IV drug use (including fentanyl and methamphetamine currently), chronic opioid dependence, HIV, hepatitis C, hx of MSSA sepsis with pulmonary valve endocarditis (2015), and 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 Was found to have recurrent MSSA/MRSA bacteremia and tricuspid valve endocarditis.     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 lactate nl. CXR on admission showed nodular/patchy opacity at the R base.  * 2/2 blood cultures drawn on admission grew both MSSA and MRSA >> blood cx cleared  on 11/18 >> vancomycin  changed to daptomycin due to JOSE RAMON   EDUARDO done 11/18 notable for large tricuspid valve vegetation with severe regurgitation  - ID following, to continue Daptomycin, with plans to switch to Vancomycin when renal function better; will need 6 weeks of antibiotic in hospital, until 12/29; midline was placed 12/2  - discussed with ID- cr today 1.37, would continue with Daptomycin at this time.   - remains afebrile ; EDUARDO repeat 12/02 noted with smaller vegetations than prior EDUARDO but did note that tricuspid valve leaflet is likely perforated and prolapsing leading to severe degenerative TR, EF 65-70 %  - was having bilateral LE swelling, likely due to TR, US 12/3 noted with no DVT; edema improved with 40 mg IV Lasix  given on 12/3 ; will monitor volume status closely and consider diuretics prn  - given EDUARDO findings, re-evaluated by thoracic surgery (initially rec medical management) and plans for surgical replacement with bioprosthetic valve at some point; surgery concerned about his IV drug use and consulted with Dr SELINA Barrientos (addiction medicine physician) at [facility] prior to considering surgery      - was evaluated by Dr Barrientos (addiction specialist at [place]); concern that he is at high risk for relapse and thus at high risk for prosthetic valve endocarditis if he undergoes valve replacement; rec postponing surgery at this time to focus on chemical dependency     Chronic anemia, likely anemia of chronic disease  - likely anemia of chronic disease in the setting of infective endocarditis  - hemoglobin on admission however was 12.5 and has gradually trended down--->7.1, stable--7.7---7.3---7.6--> 7.4 12/10.   - hemodynamically stable and no suggestion of active bleed  - iron studies consistent with likely ACD with normal ferritin and low TIBC, TSH slightly elevated but free T4 normal; b12 and folate normal  - will monitor hemoglobin periodically,repeat hgb in AM and transfuse PRN for Hb <7  - Hb 8.2 on 12/13     HTN  - BP controlled with addition of amlodipine 5 mg po daily and Metoprolol 25 mg po bid with hold parameters  (12/3); continue;   - hydralazine IV prn for SBP>180; plan for intermittent diuresis if needed     HIV  Hepatitis C  * Chronic and stable on Triumeq restarted 11/30 as cr improving   * CDC >200 so no need for PJP ppx per ID     JOSE RAMON on CKD, likely due to Vancomycin, improved  Hyperkalemia (resolved)  Hyponatremia: resolved   * No hx of CKD and with a normal baseline enrique function  * Cr initially stable this stay, began trending up after vancomycin  - nephrology evaluated for JOSE RAMON, Cr peak 2.8 (11/25)---> trending down --> 1.92---1.78---1.61---1.4--1.37 nephro plans to follow up outpatient; signed off 12/3  -  "monitor BMP periodically  - ADAN returned negative, Renal US obtained 11/24 and negative for obstruction     History of opiate dependence  History of active IV drug use  Tobacco Use  Depression  Anxiety  * On admission, had stated he last used IV drugs 1 wk prior to admission. While in the ED on 11/16, RN had noted patient was briefly nonresponsive and apneic. Was given dose of Narcan with noted improvement.   * Prior to admission, had been started on taper off Soma -- was taking 87.5mg (1/4 of a 350mg tab) BID but later conversations with PCP's clinic revealed no provider there was managing taper  -- was continued on PTA Subutex BID this stay-- after discussion with Dr Snyder, switched to Suboxone bid (12/8)  -- conts on levomilnacipran ER as per prior to admission  -- cont carisoprodol, after discussion with DR snyder and Pharmacy, plan to taper--- switched to 87.5mg daily for 7 days (taper started 12/8), then plan to do q 48 hrs for 7 days  -- cont lorazepam 0.5mg q6h prn for anxiety (consider psych / Addiction medicine consult/ recommendations reg lorazepam at discharge)  -- cont nicotine patch  -- PTA had been on Lyrica 150 mg po BID which was discontinued on 11/24 because of worsening renal function?  -- 12/13- he is asking if Lyrica can be resumed \"even half of dose\" because his anxiety is increasing and Lyrica would help    12/15- today he is frustrated about his management; states he has withdrawal symptoms, cravings and increased anxiety; stated that because of increased anxiety- he cannot eat and he cannot sleep; said he is worried bout 30lbs weight loss because he does not have appetite; said that he does not want to be on Suboxone because \"he does not want to get addicted to it\" he said that PTA he was taking Suboxone only as needed. He said that only Ativan is helping with anxiety and the ordered dose of Ativan 0.5 mg po q6h prn is too small.  - wants to restart Lyrica or Gabapentin  - Psych consult to " "re-assess.    CHEM Depp evaluated and recommended inpatient treatment after completion of antibiotics directly will transfer from here to detox treatment center      Left scrotal laceration dt fall/possible syncope  Left scrotal wound infection  * Laceration sutured in ED. Suture removed 11/21.   * US scrotum earlier this stay showed a small R hydrocele but was otherwise normal.   Patient's scrotal laceration wound looks clean and dry.    -Follow WOC recommendations       Left ear pain; improved    No evidence of otitis media or externa. Likely cerumen impaction,  -Continue to  apply carbamide peroxide  Ear drops to left ears prn     Constipation  bowel regimen in place but states only magnesium citrate works for him; declines other bowel regimen; Mag citrate ordered 12/7; also got on 12/2        Diet: Snacks/Supplements Adult: Ensure Enlive; With Meals  Regular Diet Adult    DVT Prophylaxis: Pneumatic Compression Devices  Tirado Catheter: Not present  Central Lines: None  Code Status: Full Code       Disposition Plan   Expected Discharge: 12/29/2021     Anticipated discharge location: [facility] Depp evaluated and recommended inpatient treatment after completion of antibiotics directly will transfer from here to detox treatment center  if pt agrees. SW following.    Time Spent on this Encounter   I spent 35 minutes on the unit/floor managing the care of Bc German. Over 50% of my time was spent on the following:   - Counseling the patient and/or family regarding: prognosis, risks and benefits of treatment options and recommended follow-up; had a long discussion with the patient about his current management of opioid use disorder, his cravings, his request to not be on Suboxone and to increase Ativan.   - Coordination of care with the: nurse      Sudha Miller MD                   Interval History:   Frustrated, reports \"withdrawing symptoms and cravings\";   - states \"he does not want to be addicted to " "Suboxone\" and   - reports increased anxiety and asking to increase Lorazepam dose  - asking to restart Lyrica or Gabapentin  - reported that he vomited last night, no vomiting today  - states that he cannot eat or sleep because of increased anxiety  - no chest pain, no SOB  - discussed with RN              Medications:       abacavir-dolutegravir-LamiVUDine  1 tablet Oral Daily     amLODIPine  5 mg Oral Daily     aspirin  81 mg Oral Daily     buprenorphine HCl-naloxone HCl  1 Film Sublingual BID     carbamide peroxide  3 drop Left Ear BID     [START ON 2021] carisoprodol  87.5 mg Oral Q48H     DAPTOmycin (CUBICIN) intermittent infusion  9 mg/kg Intravenous Q24H     docusate sodium  100 mg Oral BID     levomilnacipran  120 mg Oral Daily     metoprolol tartrate  25 mg Oral BID     nicotine  1 patch Transdermal QPM     nicotine   Transdermal Q8H     polyethylene glycol  17 g Oral BID     sennosides  2 tablet Oral BID     sodium chloride (PF)  10 mL Intracatheter Q8H     tamsulosin  0.4 mg Oral Daily     acetaminophen **OR** acetaminophen, alum & mag hydroxide-simethicone, bisacodyl, calcium carbonate, glucose **OR** dextrose **OR** glucagon, lidocaine 4%, lidocaine (buffered or not buffered), LORazepam, naloxone **OR** naloxone **OR** naloxone **OR** naloxone, nitroGLYcerin, ondansetron **OR** ondansetron, prochlorperazine **OR** prochlorperazine **OR** prochlorperazine, sodium chloride (PF), sodium chloride (PF), sodium chloride (PF)               Physical Exam:   Blood pressure 118/89, pulse 69, temperature 97.4  F (36.3  C), temperature source Oral, resp. rate 18, height 1.829 m (6'), weight 60.4 kg (133 lb 1.6 oz), SpO2 98 %.  Wt Readings from Last 4 Encounters:   12/15/21 60.4 kg (133 lb 1.6 oz)   21 59 kg (130 lb)   21 54.4 kg (120 lb)   21 56.7 kg (125 lb)         Vital Sign Ranges  Temperature Temp  Av.6  F (36.4  C)  Min: 97.3  F (36.3  C)  Max: 97.8  F (36.6  C)   Blood pressure " Systolic (24hrs), Av , Min:106 , Max:126        Diastolic (24hrs), Av, Min:69, Max:96      Pulse Pulse  Av.3  Min: 65  Max: 80   Respirations Resp  Av.3  Min: 18  Max: 20   Pulse oximetry SpO2  Av %  Min: 100 %  Max: 100 %         Intake/Output Summary (Last 24 hours) at 2021 1012  Last data filed at 12/10/2021 1430  Gross per 24 hour   Intake 500 ml   Output --   Net 500 ml       Constitutional: Awake, calm,alert, cooperative, no apparent distress   Lungs: Clear to auscultation bilaterally, no crackles or wheezing   CV: S1S2, RRR, systolic murmur 3/6 precordial area, no edema   GI: abd- soft, nonT, nonD, BS present   Neuro: Awake, alert, orientedX3, no FNDs               Data:   All laboratory data reviewed

## 2021-12-15 NOTE — PROGRESS NOTES
[facility], [place]    Infectious Disease Progress Note    Date of Service : 12/15/2021     Assessment:  41 year old male with polysubstance use/IVDA, HIV-Hep C co-infection and prior hx of recurrent MSSA sepsis with pulmonic valve endocarditis, spinal discitis/osteomyelitis with epidural abscess requiring surgical debridement, who is now admitted with high grade MSSA/MRSA bacteremia with tricuspid valve endocarditis with two large [id number] x 8mm and 13 x 5mm, and repeat EDUARDO now shows valve perforation with severe tricuspid regurgitation. Blood cxs are negative since 11/18  . He has had medication non compliance with ART with detectable VL, CD4 >200 at this time not requiring PCP prophylaxis. Course further complicated by JOSE RAMON and hyperkalemia which have improved as well as tricuspid valve perforation which needs surgical management.     1. MSSA/MRSA sepsis with tricuspid valve endocarditis - blood cxs cleared 11/18, Repeat EDUARDO 12/2 now shows valve perforation with severe regurgitation, awaiting surgery.   2. JOSE RAMON  improving  3. LE edema , doppler negative for DVT  4. Prior history of MSSA sepsis with pulmonary valve endocarditis in 2015 and spinal discitis/osteomyelitis with epidural abscess requiring surgical intervention in 2017.  5. Substance abuse with active use of methamphetamine / fentanyl currently  6.  HIV/Hepatitis C co-infection. Last HIV VL detectable at 29,522 copies/ml and  CD4 625(28%) on  06/09/2021 , on Triumeq        Recommendations  1. Continue IV Daptomycin, plan IV antibiotics until 12/29 to complete 6 weeks of treatment.  2. Valve replacement planned at some point in future, no sxs of valve failure currently  3. Cultures have been clear since 11/ 18   4. Continue on PTA triumeq  5 Hep C is still active ie viremic and should be treated at some point    Srinivas Chavez MD    Interval History    Resting, no new complaints today  Tolerating antibiotics without side  effects    Physical Exam   Temp: 97.4  F (36.3  C) Temp src: Oral BP: 118/89 Pulse: 69   Resp: 18 SpO2: 98 % O2 Device: None (Room air)    Vitals:    12/12/21 0337 12/14/21 0700 12/15/21 0625   Weight: 61.7 kg (136 lb) 59.2 kg (130 lb 8.2 oz) 60.4 kg (133 lb 1.6 oz)     Vital Signs with Ranges  Temp:  [97.4  F (36.3  C)-97.5  F (36.4  C)] 97.4  F (36.3  C)  Pulse:  [69-72] 69  Resp:  [16-18] 18  BP: (101-118)/(64-89) 118/89  SpO2:  [98 %] 98 %    Constitutional: Awake, alert, cooperative, no apparent distress  Lungs: Clear to auscultation bilaterally, no crackles or wheezing  Cardiovascular: Regular rate and rhythm, systolic murmur  Abdomen: Normal bowel sounds, soft, non-distended, non-tender  Skin: : multiple scars, IV racks, excoriations on hands,    : Left scrotal laceration site appears clean, no sign of infection  MS : bilateral LE edema L>R    Other:    Medications       abacavir-dolutegravir-LamiVUDine  1 tablet Oral Daily     amLODIPine  5 mg Oral Daily     aspirin  81 mg Oral Daily     buprenorphine HCl-naloxone HCl  1 Film Sublingual BID     carbamide peroxide  3 drop Left Ear BID     [START ON 12/16/2021] carisoprodol  87.5 mg Oral Q48H     DAPTOmycin (CUBICIN) intermittent infusion  9 mg/kg Intravenous Q24H     docusate sodium  100 mg Oral BID     levomilnacipran  120 mg Oral Daily     metoprolol tartrate  25 mg Oral BID     nicotine  1 patch Transdermal QPM     nicotine   Transdermal Q8H     polyethylene glycol  17 g Oral BID     sennosides  2 tablet Oral BID     sodium chloride (PF)  10 mL Intracatheter Q8H     tamsulosin  0.4 mg Oral Daily       Data   All microbiology laboratory data reviewed.  Recent Labs   Lab Test 12/13/21  0949 12/12/21  0617 12/10/21  0647 12/06/21  0624 12/05/21  0555 12/04/21  0619 12/01/21  0621   WBC  --   --   --   --  9.1 7.6 6.6   HGB 8.2* 7.7* 7.4*   < > 7.7* 7.1* 7.6*   HCT  --   --   --   --  24.6* 22.6* 24.0*   MCV  --   --   --   --  94 45 87   PLT  --   --   --    --  387 385 417    < > = values in this interval not displayed.     Recent Labs   Lab Test 12/15/21  0646 12/13/21  0949 12/10/21  0647   CR 1.31* 1.37* 1.29*     Recent Labs   Lab Test 11/16/21  0014   SED 39*     Microbiology  11/14 & 4/16 blood cxs both sets MSSA & MRSA, blood cxs 11/18, 11/19 blood cxs negative  Culture Positive on the 1st day of incubation Abnormal         Staphylococcus aureus Panic     2 of 2 bottles   Staphylococcus aureus MRSA Panic     2 of 2 bottles         Resulting Agency: [facility]         Susceptibility                       Staphylococcus aureus Staphylococcus aureus MRSA       PURNIMA PURNIMA       Clindamycin <=0.25 ug/mL Susceptible <=0.25 ug/mL Susceptible 1       Erythromycin <=0.25 ug/mL Susceptible >=8.0 ug/mL Resistant       Gentamicin <=0.5 ug/mL Susceptible <=0.5 ug/mL Susceptible       Linezolid     2.0 ug/mL Susceptible       Oxacillin <=0.25 ug/mL Susceptible >=4.0 ug/mL Resistant       Tetracycline <=1.0 ug/mL Susceptible <=1.0 ug/mL Susceptible       Trimethoprim/Sulfamethoxazole <=0.5/9.5 u... Susceptible <=0.5/9.5 u... Susceptible       Vancomycin <=0.5 ug/mL Susceptible <=0.5 ug/mL Susceptible                    Imaging  11/2 EDUARDO  Interpretation Summary     There is a small filamentous echodensity noted on the posterior mitral valve  leaflet (image 28) which in the clinical context, could be a vegetation.  The aortic valve is normal in structure and function.  There is a 0.9 cm vegetation noted on the posterior leaflet of the tricuspid  valve. The leaflet is likely perforated and prolapsing leading to severe  degenerative TR. Vegetations much smaller than prior EDUARDO. See images 100-127  towards the end of the study.  The visual ejection fraction is 65-70%.  The right ventricle is normal in size and function     11/24 US kidneys  ULTRASOUND RENAL COMPLETE 11/24/2021 12:53 PM     CLINICAL HISTORY: MSSA sepsis, endocarditis, increasing creatinine,  evaluate for  obstruction.     TECHNIQUE: Routine Bilateral Renal and Bladder Ultrasound.     COMPARISON: None.     FINDINGS:  RIGHT KIDNEY: 12.1 x 6.9 x 4.9 cm. Unremarkable echogenicity, no  hydronephrosis or masses. Simple cysts noted measuring up to 1.7 cm.  Mildly complex cyst in the upper right kidney measuring 1.8 cm.     LEFT KIDNEY: 1.2 x 6.0 x 5.8 cm. Unremarkable echogenicity, no  hydronephrosis or masses. Simple cysts noted.     BLADDER: Unremarkable given its level of distension.                                                                      IMPRESSION:  No obstruction demonstrated     11/18 EDUARDO  Interpretation Summary     Two, large, mobile masses noted, attached to the atrial side of base of  posterior tricuspid leaflets are noted. (they measures 18 x 8mm and 13 x 5mm  respectively).These are most consistent with large tricuspid valve  vegetations.  Severe tricuspid regurgitation noted, directed towards the interatrial septum.  Cannot exclude perforation of posterior leaflet of tricuspid valve.  The right ventricle is mildly dilated.  The right ventricular systolic function is normal.  Left ventricular systolic function is normal.  The visual ejection fraction is 60-65%.  Findings discussed with Dr Guerin.  These are new comnpared to prior echo from 2017. The study was technically  adequate.

## 2021-12-15 NOTE — PLAN OF CARE
DATE & TIME: 12/14/21 8949-7033    Cognitive Concerns/ Orientation : Pt A/Ox4   BEHAVIOR & AGGRESSION TOOL COLOR: Green   ABNL VS/O2: VSS on RA  MOBILITY: Independent  PAIN MANAGMENT: Denies  DIET: Regular  BOWEL/BLADDER: Continent  ABNL LAB/BG: Cr 1.37/Hgb 8.2  DRAIN/DEVICES: Midline SL  SKIN: Scattered bruising. Pt declined full skin assessment, STEW scrotal laceration.  D/C DATE: Discharge pending progress  OTHER IMPORTANT INFO: Nicotine patch to right arm. Per patient he had episode of emesis on evenings. Pt showered overnight tonight. Pt needs covid swab done.

## 2021-12-16 LAB
BASOPHILS # BLD AUTO: 0 10E3/UL (ref 0–0.2)
BASOPHILS NFR BLD AUTO: 1 %
EOSINOPHIL # BLD AUTO: 0.2 10E3/UL (ref 0–0.7)
EOSINOPHIL NFR BLD AUTO: 3 %
ERYTHROCYTE [DISTWIDTH] IN BLOOD BY AUTOMATED COUNT: 17.2 % (ref 10–15)
HCT VFR BLD AUTO: 28.2 % (ref 40–53)
HGB BLD-MCNC: 8.8 G/DL (ref 13.3–17.7)
IMM GRANULOCYTES # BLD: 0.1 10E3/UL
IMM GRANULOCYTES NFR BLD: 1 %
LYMPHOCYTES # BLD AUTO: 1.7 10E3/UL (ref 0.8–5.3)
LYMPHOCYTES NFR BLD AUTO: 29 %
MCH RBC QN AUTO: 27.8 PG (ref 26.5–33)
MCHC RBC AUTO-ENTMCNC: 31.2 G/DL (ref 31.5–36.5)
MCV RBC AUTO: 89 FL (ref 78–100)
MONOCYTES # BLD AUTO: 0.4 10E3/UL (ref 0–1.3)
MONOCYTES NFR BLD AUTO: 8 %
NEUTROPHILS # BLD AUTO: 3.4 10E3/UL (ref 1.6–8.3)
NEUTROPHILS NFR BLD AUTO: 58 %
NRBC # BLD AUTO: 0 10E3/UL
NRBC BLD AUTO-RTO: 0 /100
PLATELET # BLD AUTO: 313 10E3/UL (ref 150–450)
RBC # BLD AUTO: 3.17 10E6/UL (ref 4.4–5.9)
WBC # BLD AUTO: 5.8 10E3/UL (ref 4–11)

## 2021-12-16 PROCEDURE — 250N000011 HC RX IP 250 OP 636: Performed by: INTERNAL MEDICINE

## 2021-12-16 PROCEDURE — 250N000013 HC RX MED GY IP 250 OP 250 PS 637: Performed by: INTERNAL MEDICINE

## 2021-12-16 PROCEDURE — 250N000013 HC RX MED GY IP 250 OP 250 PS 637: Performed by: NURSE PRACTITIONER

## 2021-12-16 PROCEDURE — 250N000013 HC RX MED GY IP 250 OP 250 PS 637: Performed by: SPECIALIST

## 2021-12-16 PROCEDURE — 250N000013 HC RX MED GY IP 250 OP 250 PS 637: Performed by: HOSPITALIST

## 2021-12-16 PROCEDURE — 250N000013 HC RX MED GY IP 250 OP 250 PS 637: Performed by: PSYCHIATRY & NEUROLOGY

## 2021-12-16 PROCEDURE — 99232 SBSQ HOSP IP/OBS MODERATE 35: CPT | Performed by: INTERNAL MEDICINE

## 2021-12-16 PROCEDURE — G0463 HOSPITAL OUTPT CLINIC VISIT: HCPCS

## 2021-12-16 PROCEDURE — 85025 COMPLETE CBC W/AUTO DIFF WBC: CPT | Performed by: INTERNAL MEDICINE

## 2021-12-16 PROCEDURE — 258N000003 HC RX IP 258 OP 636: Performed by: INTERNAL MEDICINE

## 2021-12-16 PROCEDURE — 99233 SBSQ HOSP IP/OBS HIGH 50: CPT | Performed by: NURSE PRACTITIONER

## 2021-12-16 PROCEDURE — 250N000013 HC RX MED GY IP 250 OP 250 PS 637: Performed by: STUDENT IN AN ORGANIZED HEALTH CARE EDUCATION/TRAINING PROGRAM

## 2021-12-16 PROCEDURE — 120N000001 HC R&B MED SURG/OB

## 2021-12-16 RX ORDER — PREGABALIN 75 MG/1
75 CAPSULE ORAL 2 TIMES DAILY
Status: DISCONTINUED | OUTPATIENT
Start: 2021-12-16 | End: 2021-12-30 | Stop reason: HOSPADM

## 2021-12-16 RX ADMIN — LORAZEPAM 0.5 MG: 0.5 TABLET ORAL at 08:13

## 2021-12-16 RX ADMIN — DOCUSATE SODIUM 100 MG: 100 CAPSULE, LIQUID FILLED ORAL at 10:13

## 2021-12-16 RX ADMIN — BUPRENORPHINE AND NALOXONE 1 FILM: 8; 2 FILM, SOLUBLE BUCCAL; SUBLINGUAL at 10:14

## 2021-12-16 RX ADMIN — CARISOPRODOL 87.5 MG: 350 TABLET ORAL at 10:25

## 2021-12-16 RX ADMIN — PREGABALIN 75 MG: 75 CAPSULE ORAL at 13:45

## 2021-12-16 RX ADMIN — NICOTINE 1 PATCH: 14 PATCH, EXTENDED RELEASE TRANSDERMAL at 10:15

## 2021-12-16 RX ADMIN — ABACAVIR SULFATE, DOLUTEGRAVIR SODIUM, LAMIVUDINE 1 TABLET: 600; 50; 300 TABLET, FILM COATED ORAL at 08:13

## 2021-12-16 RX ADMIN — LEVOMILNACIPRAN HYDROCHLORIDE 120 MG: 40 CAPSULE, EXTENDED RELEASE ORAL at 08:14

## 2021-12-16 RX ADMIN — DAPTOMYCIN 600 MG: 500 INJECTION, POWDER, LYOPHILIZED, FOR SOLUTION INTRAVENOUS at 13:46

## 2021-12-16 RX ADMIN — ASPIRIN 81 MG: 81 TABLET, COATED ORAL at 08:14

## 2021-12-16 RX ADMIN — TAMSULOSIN HYDROCHLORIDE 0.4 MG: 0.4 CAPSULE ORAL at 08:14

## 2021-12-16 ASSESSMENT — ACTIVITIES OF DAILY LIVING (ADL)
ADLS_ACUITY_SCORE: 5

## 2021-12-16 NOTE — PLAN OF CARE
DATE & TIME: 12/16/2021 days     Cognitive Concerns/ Orientation : alert and oriented x4    BEHAVIOR & AGGRESSION TOOL COLOR: green  CIWA SCORE: na   ABNL VS/O2: VSS RA  MOBILITY: up independently   PAIN MANAGMENT:  denies   DIET:  regular ate well   BOWEL/BLADDER: continent   ABNL LAB/BG: hgb 8.8  DRAIN/DEVICES: Midline in left arm  TELEMETRY RHYTHM: na   SKIN:  bruised   TESTS/PROCEDURES:  psych saw today and added Lyrica   D/C DATE:  once IV antibiotics are done and needs to have a chemical dependency program   Discharge Barriers:  IV antibiotics completion   OTHER IMPORTANT INFO:  up in the room independently, and ate well this am. Ambulating in the halls. VSS now ordered daily.

## 2021-12-16 NOTE — PROGRESS NOTES
[facility]    Infectious Disease Progress Note    Date of Service : 12/16/2021     Assessment:  41 year old male with polysubstance use/IVDA, HIV-Hep C co-infection and prior hx of recurrent MSSA sepsis with pulmonic valve endocarditis, spinal discitis/osteomyelitis with epidural abscess requiring surgical debridement, who is now admitted with high grade MSSA/MRSA bacteremia with tricuspid valve endocarditis with two large [id number] x 8mm and 13 x 5mm, and repeat EDUARDO now shows valve perforation with severe tricuspid regurgitation. Blood cxs are negative since 11/18  . He has had medication non compliance with ART with detectable VL, CD4 >200 at this time not requiring PCP prophylaxis. Course further complicated by JOSE RAMON and hyperkalemia which have improved as well as tricuspid valve perforation which needs surgical management.     1. MSSA/MRSA sepsis with tricuspid valve endocarditis - blood cxs cleared 11/18, Repeat EDUARDO 12/2 now shows valve perforation with severe regurgitation, awaiting surgery.   2. JOSE RAMON  improving  3. LE edema , doppler negative for DVT  4. Prior history of MSSA sepsis with pulmonary valve endocarditis in 2015 and spinal discitis/osteomyelitis with epidural abscess requiring surgical intervention in 2017.  5. Substance abuse with active use of methamphetamine / fentanyl currently  6.  HIV/Hepatitis C co-infection. Last HIV VL detectable at 29,522 copies/ml and  CD4 625(28%) on  06/09/2021 , on Triumeq        Recommendations  1. Continue IV Daptomycin, plan IV antibiotics until 12/29 to complete 6 weeks of treatment.  2. Valve replacement planned at some point in future, no sxs of valve failure currently  3. Cultures have been clear since 11/ 18   4. Continue on PTA triumeq  5 Hep C is still active, ie viremic and should be treated at some point    Srinivas Chavez MD    Interval History    Resting, no new complaints today  Tolerating antibiotics without side  effects    Physical Exam   Temp: 97.4  F (36.3  C) Temp src: Oral BP: 111/71 Pulse: 92   Resp: 16 SpO2: 96 % O2 Device: None (Room air)    Vitals:    12/12/21 0337 12/14/21 0700 12/15/21 0625   Weight: 61.7 kg (136 lb) 59.2 kg (130 lb 8.2 oz) 60.4 kg (133 lb 1.6 oz)     Vital Signs with Ranges  Temp:  [97.4  F (36.3  C)] 97.4  F (36.3  C)  Pulse:  [87-92] 92  Resp:  [16] 16  BP: (111-115)/(71-76) 111/71  SpO2:  [96 %] 96 %    Constitutional: Awake, alert, cooperative, no apparent distress  Lungs: Clear to auscultation bilaterally, no crackles or wheezing  Cardiovascular: Regular rate and rhythm, systolic murmur  Abdomen: Normal bowel sounds, soft, non-distended, non-tender  Skin: : multiple scars, IV racks, excoriations on hands,    : Left scrotal laceration site appears clean, no sign of infection  MS : bilateral LE edema L>R    Other:    Medications       abacavir-dolutegravir-LamiVUDine  1 tablet Oral Daily     amLODIPine  5 mg Oral Daily     aspirin  81 mg Oral Daily     buprenorphine HCl-naloxone HCl  1 Film Sublingual BID     carbamide peroxide  3 drop Left Ear BID     carisoprodol  87.5 mg Oral Q48H     DAPTOmycin (CUBICIN) intermittent infusion  9 mg/kg Intravenous Q24H     docusate sodium  100 mg Oral BID     levomilnacipran  120 mg Oral Daily     metoprolol tartrate  25 mg Oral BID     nicotine  1 patch Transdermal QPM     nicotine   Transdermal Q8H     polyethylene glycol  17 g Oral BID     sennosides  2 tablet Oral BID     sodium chloride (PF)  10 mL Intracatheter Q8H     tamsulosin  0.4 mg Oral Daily       Data   All microbiology laboratory data reviewed.  Recent Labs   Lab Test 12/16/21  0635 12/13/21  0949 12/12/21  0617 12/06/21  0624 12/05/21  0555 12/04/21  0619   WBC 5.8  --   --   --  9.1 7.6   HGB 8.8* 8.2* 7.7*   < > 7.7* 7.1*   HCT 28.2*  --   --   --  24.6* 22.6*   MCV 89  --   --   --  87 87     --   --   --  387 385    < > = values in this interval not displayed.     Recent Labs    Lab Test 12/15/21  0646 12/13/21  0949 12/10/21  0647   CR 1.31* 1.37* 1.29*     Recent Labs   Lab Test 11/16/21  0014   SED 39*     Microbiology  11/14 & 4/16 blood cxs both sets MSSA & MRSA, blood cxs 11/18, 11/19 blood cxs negative  Culture Positive on the 1st day of incubation Abnormal         Staphylococcus aureus Panic     2 of 2 bottles   Staphylococcus aureus MRSA Panic     2 of 2 bottles         Resulting Agency: [facility]         Susceptibility                       Staphylococcus aureus Staphylococcus aureus MRSA       PURNIMA PURNIMA       Clindamycin <=0.25 ug/mL Susceptible <=0.25 ug/mL Susceptible 1       Erythromycin <=0.25 ug/mL Susceptible >=8.0 ug/mL Resistant       Gentamicin <=0.5 ug/mL Susceptible <=0.5 ug/mL Susceptible       Linezolid     2.0 ug/mL Susceptible       Oxacillin <=0.25 ug/mL Susceptible >=4.0 ug/mL Resistant       Tetracycline <=1.0 ug/mL Susceptible <=1.0 ug/mL Susceptible       Trimethoprim/Sulfamethoxazole <=0.5/9.5 u... Susceptible <=0.5/9.5 u... Susceptible       Vancomycin <=0.5 ug/mL Susceptible <=0.5 ug/mL Susceptible                    Imaging  11/2 EDUARDO  Interpretation Summary     There is a small filamentous echodensity noted on the posterior mitral valve  leaflet (image 28) which in the clinical context, could be a vegetation.  The aortic valve is normal in structure and function.  There is a 0.9 cm vegetation noted on the posterior leaflet of the tricuspid  valve. The leaflet is likely perforated and prolapsing leading to severe  degenerative TR. Vegetations much smaller than prior EDUARDO. See images 100-127  towards the end of the study.  The visual ejection fraction is 65-70%.  The right ventricle is normal in size and function     11/24 US kidneys  ULTRASOUND RENAL COMPLETE 11/24/2021 12:53 PM     CLINICAL HISTORY: MSSA sepsis, endocarditis, increasing creatinine,  evaluate for obstruction.     TECHNIQUE: Routine Bilateral Renal and Bladder Ultrasound.     COMPARISON:  None.     FINDINGS:  RIGHT KIDNEY: 12.1 x 6.9 x 4.9 cm. Unremarkable echogenicity, no  hydronephrosis or masses. Simple cysts noted measuring up to 1.7 cm.  Mildly complex cyst in the upper right kidney measuring 1.8 cm.     LEFT KIDNEY: 1.2 x 6.0 x 5.8 cm. Unremarkable echogenicity, no  hydronephrosis or masses. Simple cysts noted.     BLADDER: Unremarkable given its level of distension.                                                                      IMPRESSION:  No obstruction demonstrated     11/18 EDUARDO  Interpretation Summary     Two, large, mobile masses noted, attached to the atrial side of base of  posterior tricuspid leaflets are noted. (they measures 18 x 8mm and 13 x 5mm  respectively).These are most consistent with large tricuspid valve  vegetations.  Severe tricuspid regurgitation noted, directed towards the interatrial septum.  Cannot exclude perforation of posterior leaflet of tricuspid valve.  The right ventricle is mildly dilated.  The right ventricular systolic function is normal.  Left ventricular systolic function is normal.  The visual ejection fraction is 60-65%.  Findings discussed with Dr Guerin.  These are new comnpared to prior echo from 2017. The study was technically  adequate.

## 2021-12-16 NOTE — PROGRESS NOTES
[facility] Nurse Inpatient Wound Assessment   Reason for consultation: Evaluate and treat  Scrotum wound wounds    Assessment  Scrotum wound wounds due to Surgical Wound  Status: healing  Wound clean with mild hypergranulation, discussed with pt to ensure he is applying cream 2x daily. If hypergranulation continues would recommend switch to Aquacel AG and covering with dressing, however slightly hesitant as we would need Aquacel to stay in place over wound.      Treatment Plan  Scrotum wound: BID and PRN  1.Cleanse area with microklenz and blot dry  2. Apply nickel thick layer of Triad paste (#[id number]) to wound bed and thin layer over reddened areas   3. Ok to leave open to air, unless patient would prefer to have area covered then would recommend covering with Polymem or Optifoam gentle border (#[id number])     **No need to remove all paste after each incontinent episode, remove soiled paste and reapply as needed.  **If complete removal of paste is necessary use baby oil/mineral oil (Located in Pharmacy) and soft wash cloth.   Use only one Covidien pad in between mattress and pt. No brief while in bed.        Orders Written  Recommended provider order: None, at this time  WO Nurse follow-up plan:weekly  Nursing to notify the Provider(s) and re-consult the WO Nurse if wound(s) deteriorates or new skin concern.    Patient History  According to provider note(s):  Bc German is a 41 year old mal with PMHx of polysubstance abuse including IV drug use (including fentanyl and methamphetamine currently), chronic opioid dependence, HIV, hepatitis C, hx of MSSA sepsis with pulmonary valve endocarditis (2015), and 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 Was found to have recurrent MSSA/MRSA bacteremia and tricuspid valve endocarditis.     Objective  Data  Containment of urine/stool: Continent of bladder and Continent of bowel    Active Diet Order  Orders Placed This Encounter      Regular Diet Adult      Output:   I/O last 3 completed shifts:  In: 480 [P.O.:480]  Out: -     Risk Assessment:   Sensory Perception: 4-->no impairment  Moisture: 4-->rarely moist  Activity: 4-->walks frequently  Mobility: 4-->no limitation  Nutrition: 3-->adequate  Friction and Shear: 3-->no apparent problem  Bari Score: 22                          Labs:   Recent Labs   Lab 12/16/21  0635   HGB 8.8*   WBC 5.8       Physical Exam  Areas of skin assessed: focused scrotum    Wound Location:  Scrotum  11/26/21 (pt hand in photo)      12/16/21            Wound History: pt had syncope episode and when he woke up had large laceration to scrotum. Pt does report itching to area, discussed how wound healing can cause itching and also discussed not using any bathwipes or green label hairwash spray    Wound Base: 90 red moist granulation tissue with mild hypergranulation at 12 O'clock     Palpation of the wound bed: normal      Drainage: scant     Description of drainage: serous and tan     Measurements (length x width x depth, in cm) 2.8  x 1  x  0.1 cm      Tunneling N/A     Undermining N/A  Periwound skin: intact      Color: red      Temperature: normal   Odor: none  Pain: denies , none  Pain intervention prior to dressing change: NA    Interventions  Visual inspection and assessment completed   Wound Care Rationale Promote moist wound healing without tissue dehydration  and Provide protection   Wound Care: done per plan of care  Supplies: at bedside  Current off-loading measures: Pillows  Current support surface: Standard  Atmos Air mattress  Education provided to: plan of care, wound progress, Infection prevention  and Moisture management  Discussed plan of care with Patient and Nurse    Alexx Talbot RN CWOCN

## 2021-12-16 NOTE — PLAN OF CARE
Cognitive Concerns/ Orientation: A&Ox4   BEHAVIOR & AGGRESSION TOOL COLOR: Green   ABNL VS/O2: VSS on RA  MOBILITY: Independent in the room  PAIN MANAGMENT: headache-declined interventions  DIET: Regular-fair appetite  BOWEL/BLADDER: Continent  ABNL LAB/BG: Cr 1.31 Hgb 8.2  DRAIN/DEVICES: Midline SL  SKIN: Scattered bruising. Declined scrotal assessment  D/C DATE: Discharge pending progress  OTHER IMPORTANT INFO: nicotine patch on left arm. Resting between cares.

## 2021-12-16 NOTE — PLAN OF CARE
Cognitive Concerns/ Orientation: A&Ox4   BEHAVIOR & AGGRESSION TOOL COLOR: Green   ABNL VS/O2: Daily VS, stable on RA  MOBILITY: Independent  PAIN MANAGMENT: denies pain   DIET: Regular  BOWEL/BLADDER: Continent  ABNL LAB/BG: Cr 1.31, Hgb 8.2  DRAIN/DEVICES: Midline, saline locked, blood return noted.     SKIN: Scattered bruising. Pt declined full skin assessment, STEW scrotal laceration.  D/C DATE: Pending, requires IV Abx until 12/29; then to in pt CD treatment, if pt agreeable, per MD note.   OTHER IMPORTANT INFO: nicotine patch on left arm. Pt refused HS meds and full assessment.

## 2021-12-16 NOTE — CONSULTS
"    Psychiatry Consultation; Follow up          Reason for Consult, requesting source:    Opioid dependence, on suboxone, states has increased cravings, anxiety, wants to be off Suboxone, restart Lyrica, increase Ativan.   Requesting source: Marisol Ernst            Interim history:    Bc German is a 41 year old male who was admitted to [facility] on 11/15/21 due to sepsis secondary to MSSA bacteremia, after presenting to the ED for evaluation of syncope and scrotal laceration. Long history of polysubstance use, with recent use of IV opioids and methamphetamine prior to admission. Pt is followed by Dr. Barrientos on an outpatient basis for management of Suboxone. He also sees an outpatient psychiatrist who manages his lorazepam and Fetzima. Apparently, prior to admission, he had a prescription from his outpatient psychiatrist for lorazepam 2 mg TID. This has since been reduced to 0.5 mg q6h prn. Pt is also on a Soma taper as well. Was last seen by Dr. Delgado on 11/22, and Dr. Barrientos spoke to patient on 12/7 via telephone.     On interview today, patient recounts his history for writer. Discusses his history of taking Suboxone, as well as his history on lorazepam. Reports that prior to hospitalization, he would only relapse once or twice per month. Later, he did tell me that he was relapsing closer to once per week for a couple of months. Reports that he would take Suboxone as needed and was not dependent upon it. Says he does not want to be \"addicted\" to Suboxone, and that it does not help with his opioid cravings. He continually states that lorazepam is the medication that helps the most with his cravings and anxiety. Reports his stress and anxiety is what leads him to seek out \"dope.\" He reports that he may also be experiencing \"withdrawals\" from Lyrica and that Lyrica helped manage his anxiety. Reports his anxiety has been \"through the roof.\" Reports that he has difficulty eating without " copious amounts of lorazepam. States he has lost 30 pounds in the last couple of weeks, but then acknowledges that he was edematous and a lot of this was water weight. Patient acknowledges being homeless in the past, which has made it difficult for him to engage in psychotherapy. Encouraged him to find a therapist and attempted to discuss that lorazepam is not the answer, that it is more of a band-aid for the triggers of his anxiety.          Medications:     Current Facility-Administered Medications   Medication     abacavir-dolutegravir-LamiVUDine (TRIUMEQ) 600- MG per tablet 1 tablet     acetaminophen (TYLENOL) tablet 650 mg    Or     acetaminophen (TYLENOL) Suppository 650 mg     alum & mag hydroxide-simethicone (MAALOX) suspension 30 mL     amLODIPine (NORVASC) tablet 5 mg     aspirin EC tablet 81 mg     bisacodyl (DULCOLAX) Suppository 10 mg     buprenorphine HCl-naloxone HCl (SUBOXONE) 8-2 MG per film 1 Film     calcium carbonate (TUMS) chewable tablet 500 mg     carbamide peroxide (DEBROX) 6.5 % otic solution 3 drop     carisoprodol (SOMA) quarter-tab 87.5 mg     DAPTOmycin (CUBICIN) 600 mg in sodium chloride 0.9 % 100 mL intermittent infusion     glucose gel 15-30 g    Or     dextrose 50 % injection 25-50 mL    Or     glucagon injection 1 mg     docusate sodium (COLACE) capsule 100 mg     levomilnacipran (FETZIMA ER) 24 hr capsule 120 mg     lidocaine (LMX4) cream     lidocaine 1 % 0.1-1 mL     LORazepam (ATIVAN) tablet 0.5 mg     metoprolol tartrate (LOPRESSOR) tablet 25 mg     naloxone (NARCAN) injection 0.2 mg    Or     naloxone (NARCAN) injection 0.4 mg    Or     naloxone (NARCAN) injection 0.2 mg    Or     naloxone (NARCAN) injection 0.4 mg     nicotine (NICODERM CQ) 14 MG/24HR 24 hr patch 1 patch     nicotine Patch in Place     nitroGLYcerin (NITROSTAT) sublingual tablet 0.4 mg     ondansetron (ZOFRAN-ODT) ODT tab 4 mg    Or     ondansetron (ZOFRAN) injection 4 mg     polyethylene glycol  "(MIRALAX) Packet 17 g     pregabalin (LYRICA) capsule 75 mg     prochlorperazine (COMPAZINE) injection 10 mg    Or     prochlorperazine (COMPAZINE) tablet 10 mg    Or     prochlorperazine (COMPAZINE) suppository 25 mg     sennosides (SENOKOT) tablet 2 tablet     sodium chloride (PF) 0.9% PF flush 10 mL     sodium chloride (PF) 0.9% PF flush 10-20 mL     sodium chloride (PF) 0.9% PF flush 3 mL     sodium chloride (PF) 0.9% PF flush 3 mL     tamsulosin (FLOMAX) capsule 0.4 mg              MSE:     Appearance: age-appearing, dressed in hospital gown, adequate hygiene and grooming, tattooes on arms, in no acute distress  Behavior: cooperative and calm  Eye contact: appropriate  Orientation: alert and oriented to time, place and person  Movements: did not observe any tics, tremors, or dystonia  Mood: \"anxious\"  Affect: calm, stable, mildly restricted range  Speech: Normal rate, rhythm, tone  Language: fluent in English, with appropriately placed inflections, good articulation  Memory: no impairment in immediate, recent, or remote memory  Intellectual capacity: Average for development based on word choice  Concentration: attentive  Thought process and content: tangential, thoughts focused on medications, goal-oriented; did not elicit any delusions or paranoia. No auditory or visual hallucinations.   Associations: no loosened associations  Insight: fair to moderately poor  Judgement: fair  Safety: denies suicidal or homicidal ideation    Vital signs:  Temp: 97.4  F (36.3  C) Temp src: Oral BP: 111/71 Pulse: 92   Resp: 16 SpO2: 96 % O2 Device: None (Room air) Oxygen Delivery: 6 LPM Height: 182.9 cm (6') Weight: 60.4 kg (133 lb 1.6 oz)  Estimated body mass index is 18.05 kg/m  as calculated from the following:    Height as of this encounter: 1.829 m (6').    Weight as of this encounter: 60.4 kg (133 lb 1.6 oz).              DSM-5 Diagnosis:   #1 Opioid use disorder, severe  #2 Amphetamine use disorder, severe  #3 Major " "Depressive Disorder, recurrent, moderate  #4 Unspecified anxiety disorder  #5 Multiple medical comorbidities, including endocarditis and osteomyelitis secondary to IV drug use.          Assessment:   Mr. German was seen for follow-up today to address his medication concerns. Appears to put a lot of emphasis on the benefits of lorazepam in treating his anxiety, curving his drug cravings, and increasing his appetite. I took some time to emphasize that this drug is not the solution to his anxiety. That he needs a good period of sobriety, as well as a thorough trial of psychotherapy to address some of the causes of his long-standing anxiety. He states that when he leaves the hospital, he plans to increase his lorazepam to 1 mg per dose. He also plans to taper and discontinue his Suboxone because he does not think that it helps prevent cravings. He acknowledges that it \"almost helps.\" I encouraged him to speak with Dr. Barrientos prior to stopping his Suboxone and discussed the importance of staying away from injection drug use. Ultimately, settled on restarting his Lyrica at 75 mg BID. His kidney function has improved. Creatinine clearance is 64.7 ml/min. I am not going to increase his lorazepam and do not recommend that he go back to using higher doses. I will continue his Suboxone at 8 mg BID and do not recommend that he stop Suboxone. He will need to work with SW on finding chemical dependency treatment.           Summary of Recommendations:   1) Will restart Lyrica at 75 mg BID for anxiety/pain  2) Continue Suboxone 8 mg BID. I do not recommend that patient discontinue this medication. He was encouraged to follow-up with Dr. Barrientos before he were to stop taking it.   3) Can continue lorazepam 0.5 mg q6h prn for anxiety - do not recommend increasing  4) Continue Fetzima 120 mg daily  5) Consider removing nicotine patch prior to bedtime if experiencing anxiety or sweating at night  6) Will need to work with SW to find " chemical dependency treatment. Patient wanting to go somewhere away from [place] for 9-12 months. Recommend he go straight to CD treatment upon discharge if not needing a TCU.   7) Recommend he engage in individual psychotherapy for his depression and anxiety  8) Re-consult psychiatry as needed, thank you.     Km Gold, PMNEHALP-BC, APRN, CNP  Consult/Liaison Psychiatry  [facility]  Provider can be paged via [facility] Paging/Directory  If I am unavailable, please contact [facility] at [phone] to reach the on-call provider.

## 2021-12-16 NOTE — PLAN OF CARE
Pt refused assessment from 7p-11p pt refused medications until after 11pm. Pt sleeping during shift.

## 2021-12-16 NOTE — PROGRESS NOTES
[facility]  Hospitalist Progress Note for 12/12/2021  Date of Admission:  11/15/2021       Assessment and Plan:   Bc German is a 41 year old male with PMHx of polysubstance abuse including IV drug use (including fentanyl and methamphetamine currently), chronic opioid dependence, HIV, hepatitis C, hx of MSSA sepsis with pulmonary valve endocarditis (2015), and 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 Was found to have recurrent MSSA/MRSA bacteremia and tricuspid valve endocarditis.     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 lactate nl. CXR on admission showed nodular/patchy opacity at the R base.  * 2/2 blood cultures drawn on admission grew both MSSA and MRSA >> blood cx cleared  on 11/18 >> vancomycin  changed to daptomycin due to JOSE RAMON   EDUARDO done 11/18 notable for large tricuspid valve vegetation with severe regurgitation  - ID following, to continue Daptomycin, with plans to switch to Vancomycin when renal function better; will need 6 weeks of antibiotic in hospital, until 12/29; midline was placed 12/2  - discussed with ID- cr today 1.37, would continue with Daptomycin at this time.   - remains afebrile ; EDUARDO repeat 12/02 noted with smaller vegetations than prior EDUARDO but did note that tricuspid valve leaflet is likely perforated and prolapsing leading to severe degenerative TR, EF 65-70 %  - was having bilateral LE swelling, likely due to TR, US 12/3 noted with no DVT; edema improved with 40 mg IV Lasix  given on 12/3 ; will monitor volume status closely and consider diuretics prn  - given EDUARDO findings, re-evaluated by thoracic surgery (initially rec medical management) and plans for surgical replacement with bioprosthetic valve at some point; surgery concerned about his IV drug use and consulted with Dr SELINA Barrientos (addiction medicine physician) at [facility] prior to considering surgery      - was evaluated by Dr Barrientos (addiction specialist at [facility]); concern that he is at high risk for relapse and thus at high risk for prosthetic valve endocarditis if he undergoes valve replacement; rec postponing surgery at this time to focus on chemical dependency     Chronic anemia, likely anemia of chronic disease  - likely anemia of chronic disease in the setting of infective endocarditis  - hemoglobin on admission however was 12.5 and has gradually trended down--->7.1, stable--7.7---7.3---7.6--> 7.4 12/10.   - hemodynamically stable and no suggestion of active bleed  - iron studies consistent with likely ACD with normal ferritin and low TIBC, TSH slightly elevated but free T4 normal; b12 and folate normal  - will monitor hemoglobin periodically,repeat hgb in AM and transfuse PRN for Hb <7  - Hb 8.2--8.8 on 12/16     HTN  - BP controlled with addition of amlodipine 5 mg po daily and Metoprolol 25 mg po bid with hold parameters  (12/3); continue;   - hydralazine IV prn for SBP>180; plan for intermittent diuresis if needed     HIV  Hepatitis C  * Chronic and stable on Triumeq restarted 11/30 as cr improving   * CDC >200 so no need for PJP ppx per ID     JOSE RAMON on CKD, likely due to Vancomycin, improved  Hyperkalemia (resolved)  Hyponatremia: resolved   * No hx of CKD and with a normal baseline enrique function  * Cr initially stable this stay, began trending up after vancomycin  - nephrology evaluated for JOSE RAMON, Cr peak 2.8 (11/25)---> trending down --> 1.92---1.78---1.61---1.4--1.37 nephro plans to follow up outpatient; signed off 12/3  -  "monitor BMP periodically  - ADAN returned negative, Renal US obtained 11/24 and negative for obstruction     History of opiate dependence  History of active IV drug use  Tobacco Use  Depression  Anxiety  * On admission, had stated he last used IV drugs 1 wk prior to admission. While in the ED on 11/16, RN had noted patient was briefly nonresponsive and apneic. Was given dose of Narcan with noted improvement.   * Prior to admission, had been started on taper off Soma -- was taking 87.5mg (1/4 of a 350mg tab) BID but later conversations with PCP's clinic revealed no provider there was managing taper  -- was continued on PTA Subutex BID this stay-- after discussion with Dr Snyder, switched to Suboxone bid (12/8)  -- conts on levomilnacipran ER as per prior to admission  -- cont carisoprodol, after discussion with DR snyder and Pharmacy, plan to taper--- switched to 87.5mg daily for 7 days (taper started 12/8), then plan to do q 48 hrs for 7 days  -- cont lorazepam 0.5mg q6h prn for anxiety (consider psych / Addiction medicine consult/ recommendations reg lorazepam at discharge)  -- cont nicotine patch  -- PTA had been on Lyrica 150 mg po BID which was discontinued on 11/24 because of worsening renal function?  -- 12/13- he is asking if Lyrica can be resumed \"even half of dose\" because his anxiety is increasing and Lyrica would help    12/15- today he is frustrated about his management; states he has withdrawal symptoms, cravings and increased anxiety; stated that because of increased anxiety- he cannot eat and he cannot sleep; said he is worried bout 30lbs weight loss because he does not have appetite; said that he does not want to be on Suboxone because \"he does not want to get addicted to it\" he said that PTA he was taking Suboxone only as needed. He said that only Ativan is helping with anxiety and the ordered dose of Ativan 0.5 mg po q6h prn is too small.  - wants to restart Lyrica or Gabapentin  - Psych consult to " re-assess.  - started on Lyrica 75 mg po BID as per Psych; Psych did not recommend to stop Suboxone or to increase the dose of Ativan at this time.    [facility] evaluated and recommended inpatient treatment after completion of antibiotics directly will transfer from here to detox treatment center      Left scrotal laceration dt fall/possible syncope  Left scrotal wound infection  * Laceration sutured in ED. Suture removed 11/21.   * US scrotum earlier this stay showed a small R hydrocele but was otherwise normal.   Patient's scrotal laceration wound looks clean and dry.    -Follow WOC recommendations       Left ear pain; improved    No evidence of otitis media or externa. Likely cerumen impaction,  -Continue to  apply carbamide peroxide  Ear drops to left ears prn     Constipation  bowel regimen in place but states only magnesium citrate works for him; declines other bowel regimen; Mag citrate ordered 12/7; also got on 12/2        Diet: Snacks/Supplements Adult: Ensure Enlive; With Meals  Regular Diet Adult    DVT Prophylaxis: Pneumatic Compression Devices  Tirado Catheter: Not present  Central Lines: None  Code Status: Full Code       Disposition Plan   Expected Discharge: 12/29/2021     Anticipated discharge location: [facility] evaluated and recommended inpatient treatment after completion of antibiotics directly will transfer from here to detox treatment center  if pt agrees. SW following.    Sudha Miller MD                   Interval History:   Seen by Psych NP earlier today, started on Lyrica 75 mg po BID but no changes in his Suboxone regimen; also no increase of Lorazepam doses/frequency  - seems more calm today, said that he slept 12 hours last night  - ate pretty good for lunch, encouraged him to eat healthy, high protein diet  - denies chest pain, no SOB  - has some nausea but unchanged, no vomiting  - no diarrhea  - said that he walked 3 times today  - discussed with RN              Medications:        abacavir-dolutegravir-LamiVUDine  1 tablet Oral Daily     amLODIPine  5 mg Oral Daily     aspirin  81 mg Oral Daily     buprenorphine HCl-naloxone HCl  1 Film Sublingual BID     carbamide peroxide  3 drop Left Ear BID     carisoprodol  87.5 mg Oral Q48H     DAPTOmycin (CUBICIN) intermittent infusion  9 mg/kg Intravenous Q24H     docusate sodium  100 mg Oral BID     levomilnacipran  120 mg Oral Daily     metoprolol tartrate  25 mg Oral BID     nicotine  1 patch Transdermal QPM     nicotine   Transdermal Q8H     polyethylene glycol  17 g Oral BID     pregabalin  75 mg Oral BID     sennosides  2 tablet Oral BID     sodium chloride (PF)  10 mL Intracatheter Q8H     tamsulosin  0.4 mg Oral Daily     acetaminophen **OR** acetaminophen, alum & mag hydroxide-simethicone, bisacodyl, calcium carbonate, glucose **OR** dextrose **OR** glucagon, lidocaine 4%, lidocaine (buffered or not buffered), LORazepam, naloxone **OR** naloxone **OR** naloxone **OR** naloxone, nitroGLYcerin, ondansetron **OR** ondansetron, prochlorperazine **OR** prochlorperazine **OR** prochlorperazine, sodium chloride (PF), sodium chloride (PF), sodium chloride (PF)               Physical Exam:   Blood pressure 111/71, pulse 92, temperature 97.4  F (36.3  C), temperature source Oral, resp. rate 16, height 1.829 m (6'), weight 60.4 kg (133 lb 1.6 oz), SpO2 96 %.  Wt Readings from Last 4 Encounters:   12/15/21 60.4 kg (133 lb 1.6 oz)   21 59 kg (130 lb)   21 54.4 kg (120 lb)   21 56.7 kg (125 lb)         Vital Sign Ranges  Temperature Temp  Av.6  F (36.4  C)  Min: 97.3  F (36.3  C)  Max: 97.8  F (36.6  C)   Blood pressure Systolic (24hrs), Av , Min:106 , Max:126        Diastolic (24hrs), Av, Min:69, Max:96      Pulse Pulse  Av.3  Min: 65  Max: 80   Respirations Resp  Av.3  Min: 18  Max: 20   Pulse oximetry SpO2  Av %  Min: 100 %  Max: 100 %         Intake/Output Summary (Last 24 hours) at 2021  1012  Last data filed at 12/10/2021 1430  Gross per 24 hour   Intake 500 ml   Output --   Net 500 ml       Constitutional: Awake, calm,alert, cooperative, no apparent distress   Lungs: Clear to auscultation bilaterally, no crackles or wheezing   CV: S1S2, RRR, systolic murmur 3/6 precordial area, no edema   GI: abd- soft, nonT, nonD, BS present   Neuro: Awake, alert, orientedX3, no FNDs               Data:   All laboratory data reviewed

## 2021-12-17 LAB
ANION GAP SERPL CALCULATED.3IONS-SCNC: 3 MMOL/L (ref 3–14)
BUN SERPL-MCNC: 31 MG/DL (ref 7–30)
CALCIUM SERPL-MCNC: 9.9 MG/DL (ref 8.5–10.1)
CHLORIDE BLD-SCNC: 104 MMOL/L (ref 94–109)
CO2 SERPL-SCNC: 29 MMOL/L (ref 20–32)
CREAT SERPL-MCNC: 1.25 MG/DL (ref 0.66–1.25)
CRP SERPL-MCNC: 5.4 MG/L (ref 0–8)
ERYTHROCYTE [SEDIMENTATION RATE] IN BLOOD BY WESTERGREN METHOD: 74 MM/HR (ref 0–15)
GFR SERPL CREATININE-BSD FRML MDRD: 72 ML/MIN/1.73M2
GLUCOSE BLD-MCNC: 113 MG/DL (ref 70–99)
POTASSIUM BLD-SCNC: 5.1 MMOL/L (ref 3.4–5.3)
SODIUM SERPL-SCNC: 136 MMOL/L (ref 133–144)

## 2021-12-17 PROCEDURE — 250N000013 HC RX MED GY IP 250 OP 250 PS 637: Performed by: PSYCHIATRY & NEUROLOGY

## 2021-12-17 PROCEDURE — 250N000011 HC RX IP 250 OP 636: Performed by: INTERNAL MEDICINE

## 2021-12-17 PROCEDURE — 999N000190 HC STATISTIC VAT ROUNDS

## 2021-12-17 PROCEDURE — 250N000013 HC RX MED GY IP 250 OP 250 PS 637: Performed by: HOSPITALIST

## 2021-12-17 PROCEDURE — 86140 C-REACTIVE PROTEIN: CPT | Performed by: INTERNAL MEDICINE

## 2021-12-17 PROCEDURE — 120N000001 HC R&B MED SURG/OB

## 2021-12-17 PROCEDURE — 999N000040 HC STATISTIC CONSULT NO CHARGE VASC ACCESS

## 2021-12-17 PROCEDURE — 80048 BASIC METABOLIC PNL TOTAL CA: CPT | Performed by: INTERNAL MEDICINE

## 2021-12-17 PROCEDURE — 250N000013 HC RX MED GY IP 250 OP 250 PS 637: Performed by: SPECIALIST

## 2021-12-17 PROCEDURE — 85652 RBC SED RATE AUTOMATED: CPT | Performed by: INTERNAL MEDICINE

## 2021-12-17 PROCEDURE — 99232 SBSQ HOSP IP/OBS MODERATE 35: CPT | Performed by: INTERNAL MEDICINE

## 2021-12-17 PROCEDURE — 250N000013 HC RX MED GY IP 250 OP 250 PS 637: Performed by: INTERNAL MEDICINE

## 2021-12-17 PROCEDURE — 250N000013 HC RX MED GY IP 250 OP 250 PS 637: Performed by: NURSE PRACTITIONER

## 2021-12-17 PROCEDURE — 258N000003 HC RX IP 258 OP 636: Performed by: INTERNAL MEDICINE

## 2021-12-17 RX ADMIN — LORAZEPAM 0.5 MG: 0.5 TABLET ORAL at 17:07

## 2021-12-17 RX ADMIN — LORAZEPAM 0.5 MG: 0.5 TABLET ORAL at 01:29

## 2021-12-17 RX ADMIN — ASPIRIN 81 MG: 81 TABLET, COATED ORAL at 08:58

## 2021-12-17 RX ADMIN — ONDANSETRON 4 MG: 4 TABLET, ORALLY DISINTEGRATING ORAL at 01:29

## 2021-12-17 RX ADMIN — PREGABALIN 75 MG: 75 CAPSULE ORAL at 01:29

## 2021-12-17 RX ADMIN — PREGABALIN 75 MG: 75 CAPSULE ORAL at 08:57

## 2021-12-17 RX ADMIN — DOCUSATE SODIUM 100 MG: 100 CAPSULE, LIQUID FILLED ORAL at 08:57

## 2021-12-17 RX ADMIN — LEVOMILNACIPRAN HYDROCHLORIDE 120 MG: 40 CAPSULE, EXTENDED RELEASE ORAL at 08:58

## 2021-12-17 RX ADMIN — ABACAVIR SULFATE, DOLUTEGRAVIR SODIUM, LAMIVUDINE 1 TABLET: 600; 50; 300 TABLET, FILM COATED ORAL at 08:56

## 2021-12-17 RX ADMIN — TAMSULOSIN HYDROCHLORIDE 0.4 MG: 0.4 CAPSULE ORAL at 08:57

## 2021-12-17 RX ADMIN — DAPTOMYCIN 600 MG: 500 INJECTION, POWDER, LYOPHILIZED, FOR SOLUTION INTRAVENOUS at 13:27

## 2021-12-17 RX ADMIN — DOCUSATE SODIUM 100 MG: 100 CAPSULE, LIQUID FILLED ORAL at 01:29

## 2021-12-17 ASSESSMENT — ACTIVITIES OF DAILY LIVING (ADL)
ADLS_ACUITY_SCORE: 5

## 2021-12-17 NOTE — PROGRESS NOTES
[facility]    Infectious Disease Progress Note    Date of Service : 12/17/2021     Assessment:  41 year old male with polysubstance use/IVDA, HIV-Hep C co-infection and prior hx of recurrent MSSA sepsis with pulmonic valve endocarditis, spinal discitis/osteomyelitis with epidural abscess requiring surgical debridement, who is now admitted with high grade MSSA/MRSA bacteremia with tricuspid valve endocarditis with two large [id number] x 8mm and 13 x 5mm, and repeat EDUARDO now shows valve perforation with severe tricuspid regurgitation. Blood cxs are negative since 11/18  . He has had medication non compliance with ART with detectable VL, CD4 >200 at this time not requiring PCP prophylaxis. Course further complicated by JOSE RAMON and hyperkalemia which have improved as well as tricuspid valve perforation which needs surgical management.     1. MSSA/MRSA sepsis with tricuspid valve endocarditis - blood cxs cleared 11/18, Repeat EDUARDO 12/2 now shows valve perforation with severe regurgitation, awaiting surgery.   2. JOSE RAMON  improving  3. LE edema , doppler negative for DVT  4. Prior history of MSSA sepsis with pulmonary valve endocarditis in 2015 and spinal discitis/osteomyelitis with epidural abscess requiring surgical intervention in 2017.  5. Substance abuse with active use of methamphetamine / fentanyl currently  6.  HIV/Hepatitis C co-infection. Last HIV VL detectable at 29,522 copies/ml and  CD4 625(28%) on  06/09/2021 , on Triumeq        Recommendations  1. Continue IV Daptomycin, plan IV antibiotics until 12/29 to complete 6 weeks of treatment. Here with no other viable disposition   2. Valve replacement planned at some point in future, no sxs of valve failure currently  3. Cultures have been clear since 11/ 18   4. Continue on PTA triumeq  5 Hep C is still active, ie viremic and should be treated at some point    Srinivas Chavez MD    Interval History    Resting, no new complaints  today  Tolerating antibiotics without side effects    Physical Exam   Temp: 97.1  F (36.2  C) Temp src: Oral BP: 106/68 Pulse: 87   Resp: 18 SpO2: 99 % O2 Device: None (Room air)    Vitals:    12/12/21 0337 12/14/21 0700 12/15/21 0625   Weight: 61.7 kg (136 lb) 59.2 kg (130 lb 8.2 oz) 60.4 kg (133 lb 1.6 oz)     Vital Signs with Ranges  Temp:  [97.1  F (36.2  C)] 97.1  F (36.2  C)  Pulse:  [87] 87  Resp:  [18] 18  BP: (106)/(68) 106/68  SpO2:  [99 %] 99 %    Constitutional: Awake, alert, cooperative, no apparent distress  Lungs: Clear to auscultation bilaterally, no crackles or wheezing  Cardiovascular: Regular rate and rhythm, systolic murmur  Abdomen: Normal bowel sounds, soft, non-distended, non-tender  Skin: : multiple scars, IV racks, excoriations on hands,    : Left scrotal laceration site appears clean, no sign of infection  MS : bilateral LE edema L>R    Other:    Medications       abacavir-dolutegravir-LamiVUDine  1 tablet Oral Daily     amLODIPine  5 mg Oral Daily     aspirin  81 mg Oral Daily     buprenorphine HCl-naloxone HCl  1 Film Sublingual BID     carbamide peroxide  3 drop Left Ear BID     carisoprodol  87.5 mg Oral Q48H     DAPTOmycin (CUBICIN) intermittent infusion  9 mg/kg Intravenous Q24H     docusate sodium  100 mg Oral BID     levomilnacipran  120 mg Oral Daily     metoprolol tartrate  25 mg Oral BID     nicotine  1 patch Transdermal QPM     nicotine   Transdermal Q8H     polyethylene glycol  17 g Oral BID     pregabalin  75 mg Oral BID     sennosides  2 tablet Oral BID     sodium chloride (PF)  10 mL Intracatheter Q8H     tamsulosin  0.4 mg Oral Daily       Data   All microbiology laboratory data reviewed.  Recent Labs   Lab Test 12/16/21  0635 12/13/21  0949 12/12/21  0617 12/06/21  0624 12/05/21  0555 12/04/21  0619   WBC 5.8  --   --   --  9.1 7.6   HGB 8.8* 8.2* 7.7*   < > 7.7* 7.1*   HCT 28.2*  --   --   --  24.6* 22.6*   MCV 89  --   --   --  87 87     --   --   --  729 552     < > = values in this interval not displayed.     Recent Labs   Lab Test 12/17/21  0550 12/15/21  0646 12/13/21  0949   CR 1.25 1.31* 1.37*     Recent Labs   Lab Test 12/17/21  0550   SED 74*     Microbiology  11/14 & 4/16 blood cxs both sets MSSA & MRSA, blood cxs 11/18, 11/19 blood cxs negative  Culture Positive on the 1st day of incubation Abnormal         Staphylococcus aureus Panic     2 of 2 bottles   Staphylococcus aureus MRSA Panic     2 of 2 bottles         Resulting Agency: [facility]         Susceptibility                       Staphylococcus aureus Staphylococcus aureus MRSA       PURNIMA PURNIMA       Clindamycin <=0.25 ug/mL Susceptible <=0.25 ug/mL Susceptible 1       Erythromycin <=0.25 ug/mL Susceptible >=8.0 ug/mL Resistant       Gentamicin <=0.5 ug/mL Susceptible <=0.5 ug/mL Susceptible       Linezolid     2.0 ug/mL Susceptible       Oxacillin <=0.25 ug/mL Susceptible >=4.0 ug/mL Resistant       Tetracycline <=1.0 ug/mL Susceptible <=1.0 ug/mL Susceptible       Trimethoprim/Sulfamethoxazole <=0.5/9.5 u... Susceptible <=0.5/9.5 u... Susceptible       Vancomycin <=0.5 ug/mL Susceptible <=0.5 ug/mL Susceptible                    Imaging  11/2 EDUARDO  Interpretation Summary     There is a small filamentous echodensity noted on the posterior mitral valve  leaflet (image 28) which in the clinical context, could be a vegetation.  The aortic valve is normal in structure and function.  There is a 0.9 cm vegetation noted on the posterior leaflet of the tricuspid  valve. The leaflet is likely perforated and prolapsing leading to severe  degenerative TR. Vegetations much smaller than prior EDUARDO. See images 100-127  towards the end of the study.  The visual ejection fraction is 65-70%.  The right ventricle is normal in size and function     11/24 US kidneys  ULTRASOUND RENAL COMPLETE 11/24/2021 12:53 PM     CLINICAL HISTORY: MSSA sepsis, endocarditis, increasing creatinine,  evaluate for obstruction.     TECHNIQUE: Routine  Bilateral Renal and Bladder Ultrasound.     COMPARISON: None.     FINDINGS:  RIGHT KIDNEY: 12.1 x 6.9 x 4.9 cm. Unremarkable echogenicity, no  hydronephrosis or masses. Simple cysts noted measuring up to 1.7 cm.  Mildly complex cyst in the upper right kidney measuring 1.8 cm.     LEFT KIDNEY: 1.2 x 6.0 x 5.8 cm. Unremarkable echogenicity, no  hydronephrosis or masses. Simple cysts noted.     BLADDER: Unremarkable given its level of distension.                                                                      IMPRESSION:  No obstruction demonstrated     11/18 EDUARDO  Interpretation Summary     Two, large, mobile masses noted, attached to the atrial side of base of  posterior tricuspid leaflets are noted. (they measures 18 x 8mm and 13 x 5mm  respectively).These are most consistent with large tricuspid valve  vegetations.  Severe tricuspid regurgitation noted, directed towards the interatrial septum.  Cannot exclude perforation of posterior leaflet of tricuspid valve.  The right ventricle is mildly dilated.  The right ventricular systolic function is normal.  Left ventricular systolic function is normal.  The visual ejection fraction is 60-65%.  Findings discussed with Dr Guerin.  These are new comnpared to prior echo from 2017. The study was technically  adequate.

## 2021-12-17 NOTE — PLAN OF CARE
Cognitive Concerns/ Orientation: A&Ox4. Quiet   BEHAVIOR & AGGRESSION TOOL COLOR: Green   ABNL VS/O2: Daily VS, vss, ON ROOM AIR  MOBILITY: Independent IN THE ROOM  PAIN MANAGMENT: denies pain   DIET: Regular-GOOD APPETITE  BOWEL/BLADDER: Continent  ABNL LAB/BG: Cr 1.31, Hgb 8.8  DRAIN/DEVICES: Midline, saline locked.  SKIN: Scattered bruising. Pt declined assessment of scrotal laceratiopn.  D/C DATE: Pending, requires IV Abx until 12/29; then to in pt CD treatment, if pt agreeable, per MD note.   OTHER IMPORTANT INFO: nicotine patch on right arm.

## 2021-12-17 NOTE — PROGRESS NOTES
[facility]  Hospitalist Progress Note for 12/12/2021  Date of Admission:  11/15/2021       Assessment and Plan:   Bc German is a 41 year old male with PMHx of polysubstance abuse including IV drug use (including fentanyl and methamphetamine currently), chronic opioid dependence, HIV, hepatitis C, hx of MSSA sepsis with pulmonary valve endocarditis (2015), and 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 Was found to have recurrent MSSA/MRSA bacteremia and tricuspid valve endocarditis.     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 lactate nl. CXR on admission showed nodular/patchy opacity at the R base.  * 2/2 blood cultures drawn on admission grew both MSSA and MRSA >> blood cx cleared  on 11/18 >> vancomycin  changed to daptomycin due to JOSE RAMON   EDUARDO done 11/18 notable for large tricuspid valve vegetation with severe regurgitation  - ID following, to continue Daptomycin, with plans to switch to Vancomycin when renal function better; will need 6 weeks of antibiotic in hospital, until 12/29; midline was placed 12/2  - discussed with ID- cr 1.37--1.31--1.25, would continue with Daptomycin at this time.   - remains afebrile ; EDUARDO repeat 12/02 noted with smaller vegetations than prior EDUARDO but did note that tricuspid valve leaflet is likely perforated and prolapsing leading to severe degenerative TR, EF 65-70 %  - was having bilateral LE swelling, likely due to TR, US 12/3 noted with no DVT; edema improved with 40 mg IV  Lasix given on 12/3 ; will monitor volume status closely and consider diuretics prn  - given EDUARDO findings, re-evaluated by thoracic surgery (initially rec medical management) and plans for surgical replacement with bioprosthetic valve at some point; surgery concerned about his IV drug use and consulted with Dr SELINA Barrientos (addiction medicine physician) at [facility] prior to considering surgery      - was evaluated by Dr Barrientos (addiction specialist at [place]); concern that he is at high risk for relapse and thus at high risk for prosthetic valve endocarditis if he undergoes valve replacement; rec postponing surgery at this time to focus on chemical dependency     Chronic anemia, likely anemia of chronic disease  - likely anemia of chronic disease in the setting of infective endocarditis  - hemoglobin on admission however was 12.5 and has gradually trended down--->7.1, stable--7.7---7.3---7.6--> 7.4 12/10.   - hemodynamically stable and no suggestion of active bleed  - iron studies consistent with likely ACD with normal ferritin and low TIBC, TSH slightly elevated but free T4 normal; b12 and folate normal  - will monitor hemoglobin periodically,repeat hgb in AM and transfuse PRN for Hb <7  - Hb 8.2--8.8 on 12/16     HTN  - BP controlled with addition of amlodipine 5 mg po daily and Metoprolol 25 mg po bid with hold parameters  (12/3); continue;   - hydralazine IV prn for SBP>180; plan for intermittent diuresis if needed     HIV  Hepatitis C  * Chronic and stable on Triumeq restarted 11/30 as cr improving   * CDC >200 so no need for PJP ppx per ID     JOSE RAMON on CKD, likely due to Vancomycin, improved  Hyperkalemia (resolved)  Hyponatremia: resolved   * No hx of CKD and with a normal baseline enrique function  * Cr initially stable this stay, began trending up after vancomycin  - nephrology evaluated for JOSE RAMON, Cr peak 2.8 (11/25)---> trending down --> 1.92---1.78---1.61---1.4--1.37 nephro plans to follow up outpatient; signed off  "12/3  - monitor BMP periodically  - ADAN returned negative, Renal US obtained 11/24 and negative for obstruction     History of opiate dependence  History of active IV drug use  Tobacco Use  Depression  Anxiety  * On admission, had stated he last used IV drugs 1 wk prior to admission. While in the ED on 11/16, RN had noted patient was briefly nonresponsive and apneic. Was given dose of Narcan with noted improvement.   * Prior to admission, had been started on taper off Soma -- was taking 87.5mg (1/4 of a 350mg tab) BID but later conversations with PCP's clinic revealed no provider there was managing taper  -- was continued on PTA Subutex BID this stay-- after discussion with Dr Snyder, switched to Suboxone bid (12/8)  -- conts on levomilnacipran ER as per prior to admission  -- cont carisoprodol, after discussion with DR snyder and Pharmacy, plan to taper--- switched to 87.5mg daily for 7 days (taper started 12/8), then plan to do q 48 hrs for 7 days  -- cont lorazepam 0.5mg q6h prn for anxiety (consider psych / Addiction medicine consult/ recommendations reg lorazepam at discharge)  -- cont nicotine patch  -- PTA had been on Lyrica 150 mg po BID which was discontinued on 11/24 because of worsening renal function?  -- 12/13- he is asking if Lyrica can be resumed \"even half of dose\" because his anxiety is increasing and Lyrica would help    12/15- today he is frustrated about his management; states he has withdrawal symptoms, cravings and increased anxiety; stated that because of increased anxiety- he cannot eat and he cannot sleep; said he is worried bout 30lbs weight loss because he does not have appetite; said that he does not want to be on Suboxone because \"he does not want to get addicted to it\" he said that PTA he was taking Suboxone only as needed. He said that only Ativan is helping with anxiety and the ordered dose of Ativan 0.5 mg po q6h prn is too small.  - wants to restart Lyrica or Gabapentin    12/16- " "Psych consult to re-assess.  - started on Lyrica 75 mg po BID as per Psych; Psych did not recommend to stop Suboxone or to increase the dose of Ativan at this time.    12/17- today- he asks to change his Ativan from 0.5 mg q6h prn to 1 mg BID prn (\"because it would be the same daily dose\"); overall, he seems to keep asking for higher doses of Lorazepam; I told him that I will not change the dose/frequency at this time as Psych just saw him yesterday and did not recommend any changes.    [facility] De[facility] evaluated and recommended inpatient treatment after completion of antibiotics directly will transfer from here to detox treatment center      Left scrotal laceration dt fall/possible syncope  Left scrotal wound infection  * Laceration sutured in ED. Suture removed 11/21.   * US scrotum earlier this stay showed a small R hydrocele but was otherwise normal.   Patient's scrotal laceration wound looks clean and dry.    -Follow WOC recommendations       Left ear pain; improved    No evidence of otitis media or externa. Likely cerumen impaction,  -Continue to  apply carbamide peroxide  Ear drops to left ears prn     Constipation  bowel regimen in place but states only magnesium citrate works for him; declines other bowel regimen; Mag citrate ordered 12/7; also got on 12/2        Diet: Snacks/Supplements Adult: Ensure Enlive; With Meals  Regular Diet Adult    DVT Prophylaxis: Pneumatic Compression Devices  Tirado Catheter: Not present  Central Lines: None  Code Status: Full Code       Disposition Plan   Expected Discharge: 12/29/2021     Anticipated discharge location: [facility] evaluated and recommended inpatient treatment after completion of antibiotics directly will transfer from here to detox treatment center  if pt agrees. SW following.    Sudha Miller MD                   Interval History:   Was sleeping comfortable when I entered the room; he woke up, states he is feeling tired; asked to change his Ativan dose from " "0.5 mg po q6h prn to 1 mg po BID prn because :it would be the same daily dose\"  - I told him that I will not change the Ativan dose/frequency as per Psych recommendations; he said that \"you are worthless\" and asked me to leave                 Medications:       abacavir-dolutegravir-LamiVUDine  1 tablet Oral Daily     amLODIPine  5 mg Oral Daily     aspirin  81 mg Oral Daily     buprenorphine HCl-naloxone HCl  1 Film Sublingual BID     carbamide peroxide  3 drop Left Ear BID     carisoprodol  87.5 mg Oral Q48H     DAPTOmycin (CUBICIN) intermittent infusion  9 mg/kg Intravenous Q24H     docusate sodium  100 mg Oral BID     levomilnacipran  120 mg Oral Daily     metoprolol tartrate  25 mg Oral BID     nicotine  1 patch Transdermal QPM     nicotine   Transdermal Q8H     polyethylene glycol  17 g Oral BID     pregabalin  75 mg Oral BID     sennosides  2 tablet Oral BID     sodium chloride (PF)  10 mL Intracatheter Q8H     tamsulosin  0.4 mg Oral Daily     acetaminophen **OR** acetaminophen, alum & mag hydroxide-simethicone, bisacodyl, calcium carbonate, glucose **OR** dextrose **OR** glucagon, lidocaine 4%, lidocaine (buffered or not buffered), LORazepam, naloxone **OR** naloxone **OR** naloxone **OR** naloxone, nitroGLYcerin, ondansetron **OR** ondansetron, prochlorperazine **OR** prochlorperazine **OR** prochlorperazine, sodium chloride (PF), sodium chloride (PF), sodium chloride (PF)               Physical Exam:   Blood pressure 106/68, pulse 87, temperature 97.1  F (36.2  C), temperature source Oral, resp. rate 18, height 1.829 m (6'), weight 60.4 kg (133 lb 1.6 oz), SpO2 99 %.  Wt Readings from Last 4 Encounters:   12/15/21 60.4 kg (133 lb 1.6 oz)   21 59 kg (130 lb)   21 54.4 kg (120 lb)   21 56.7 kg (125 lb)         Vital Sign Ranges  Temperature Temp  Av.6  F (36.4  C)  Min: 97.3  F (36.3  C)  Max: 97.8  F (36.6  C)   Blood pressure Systolic (24hrs), Av , Min:106 , Max:126        " Diastolic (24hrs), Av, Min:69, Max:96      Pulse Pulse  Av.3  Min: 65  Max: 80   Respirations Resp  Av.3  Min: 18  Max: 20   Pulse oximetry SpO2  Av %  Min: 100 %  Max: 100 %         Intake/Output Summary (Last 24 hours) at 2021 1012  Last data filed at 12/10/2021 1430  Gross per 24 hour   Intake 500 ml   Output --   Net 500 ml       Constitutional: Awake, calm, alert, no apparent distress   Lungs: Clear to auscultation bilaterally, no crackles or wheezing   CV: S1S2, RRR, systolic murmur 3/6 precordial area, no edema   GI: abd- soft, nonT, nonD, BS present   Neuro: Awake, alert, orientedX3, no FNDs               Data:   All laboratory data reviewed

## 2021-12-17 NOTE — PLAN OF CARE
Cognitive Concerns/ Orientation : A&Ox4  BEHAVIOR & AGGRESSION TOOL COLOR: Green   ABNL VS/O2: Daily vitals  MOBILITY: Independent  PAIN MANAGMENT: Denies pain  DIET: Regular  BOWEL/BLADDER: Continent  ABNL LAB/BG: Hgb 8.8  DRAIN/DEVICES: Midline, saline locked   SKIN: Scattered bruising, scrotal wound, pt refused full skin assessment  D/C DATE: Discharge pending, requires IV antibiotics until 12/29  OTHER IMPORTANT INFO: Seen by psych yesterday, resumed Lyrica. ID and WOC following. Refused HS metoprolol and Suboxone. PRN Ativan and Zofran given.

## 2021-12-17 NOTE — PLAN OF CARE
DATE & TIME: 12/17/2021 days     Cognitive Concerns/ Orientation :  alert and oriented x 4    BEHAVIOR & AGGRESSION TOOL COLOR:  green   CIWA SCORE:  na    ABNL VS/O2:  VSS done once per day   MOBILITY:  independent   PAIN MANAGMENT:  denies   DIET:  regular ate well for breakfast   BOWEL/BLADDER:  continent   ABNL LAB/BG:  Sed rate 74 CRP 5.4 creat 1.25  DRAIN/DEVICES: Midline   TELEMETRY RHYTHM:  na   SKIN:  bruised scrotal lac he takes care of per self   TESTS/PROCEDURES:  none   D/C DATE:  after done with IV antibiotics   Discharge Barriers:  once done with IV antibiotics to CD treatment   OTHER IMPORTANT INFO:  pt up in the room independently denies pain, ate well for breakfast. Walked in the halls and tolerated well

## 2021-12-17 NOTE — PLAN OF CARE
DATE & TIME: 12/16/21 1900-2300    Cognitive Concerns/ Orientation : Pt A/Ox4   BEHAVIOR & AGGRESSION TOOL COLOR: Green   ABNL VS/O2: Daily vitals  MOBILITY: Independent  PAIN MANAGMENT: Denies  DIET: Regular  BOWEL/BLADDER: Continent  ABNL LAB/BG: Hgb 8.8  DRAIN/DEVICES: Midline SL  SKIN: Scattered bruising, pt refused full skin assessment  D/C DATE: Discharge pending, requires IV antibiotics until 12/29  OTHER IMPORTANT INFO: Pt refused full assessment this shift. Pt would like all bedtime medications after 11pm he stated he was tired and wanted to sleep until then.

## 2021-12-18 PROCEDURE — 250N000013 HC RX MED GY IP 250 OP 250 PS 637: Performed by: STUDENT IN AN ORGANIZED HEALTH CARE EDUCATION/TRAINING PROGRAM

## 2021-12-18 PROCEDURE — 258N000003 HC RX IP 258 OP 636: Performed by: INTERNAL MEDICINE

## 2021-12-18 PROCEDURE — 250N000013 HC RX MED GY IP 250 OP 250 PS 637: Performed by: SPECIALIST

## 2021-12-18 PROCEDURE — 99232 SBSQ HOSP IP/OBS MODERATE 35: CPT | Performed by: INTERNAL MEDICINE

## 2021-12-18 PROCEDURE — 250N000013 HC RX MED GY IP 250 OP 250 PS 637: Performed by: HOSPITALIST

## 2021-12-18 PROCEDURE — 250N000013 HC RX MED GY IP 250 OP 250 PS 637: Performed by: NURSE PRACTITIONER

## 2021-12-18 PROCEDURE — 120N000001 HC R&B MED SURG/OB

## 2021-12-18 PROCEDURE — 250N000011 HC RX IP 250 OP 636: Performed by: INTERNAL MEDICINE

## 2021-12-18 PROCEDURE — 250N000013 HC RX MED GY IP 250 OP 250 PS 637: Performed by: PSYCHIATRY & NEUROLOGY

## 2021-12-18 PROCEDURE — 250N000013 HC RX MED GY IP 250 OP 250 PS 637: Performed by: INTERNAL MEDICINE

## 2021-12-18 RX ADMIN — PREGABALIN 75 MG: 75 CAPSULE ORAL at 10:00

## 2021-12-18 RX ADMIN — PROCHLORPERAZINE MALEATE 10 MG: 5 TABLET ORAL at 07:49

## 2021-12-18 RX ADMIN — LORAZEPAM 0.5 MG: 0.5 TABLET ORAL at 07:49

## 2021-12-18 RX ADMIN — NICOTINE 1 PATCH: 14 PATCH, EXTENDED RELEASE TRANSDERMAL at 12:10

## 2021-12-18 RX ADMIN — PREGABALIN 75 MG: 75 CAPSULE ORAL at 23:03

## 2021-12-18 RX ADMIN — DAPTOMYCIN 600 MG: 500 INJECTION, POWDER, LYOPHILIZED, FOR SOLUTION INTRAVENOUS at 13:57

## 2021-12-18 RX ADMIN — ABACAVIR SULFATE, DOLUTEGRAVIR SODIUM, LAMIVUDINE 1 TABLET: 600; 50; 300 TABLET, FILM COATED ORAL at 10:12

## 2021-12-18 RX ADMIN — AMLODIPINE BESYLATE 5 MG: 5 TABLET ORAL at 10:09

## 2021-12-18 RX ADMIN — LORAZEPAM 0.5 MG: 0.5 TABLET ORAL at 17:29

## 2021-12-18 RX ADMIN — LEVOMILNACIPRAN HYDROCHLORIDE 120 MG: 40 CAPSULE, EXTENDED RELEASE ORAL at 10:13

## 2021-12-18 RX ADMIN — TAMSULOSIN HYDROCHLORIDE 0.4 MG: 0.4 CAPSULE ORAL at 10:08

## 2021-12-18 RX ADMIN — DOCUSATE SODIUM 100 MG: 100 CAPSULE, LIQUID FILLED ORAL at 23:04

## 2021-12-18 RX ADMIN — LORAZEPAM 0.5 MG: 0.5 TABLET ORAL at 23:59

## 2021-12-18 RX ADMIN — DOCUSATE SODIUM 100 MG: 100 CAPSULE, LIQUID FILLED ORAL at 10:12

## 2021-12-18 RX ADMIN — CARISOPRODOL 87.5 MG: 350 TABLET ORAL at 10:20

## 2021-12-18 RX ADMIN — ASPIRIN 81 MG: 81 TABLET, COATED ORAL at 10:08

## 2021-12-18 RX ADMIN — BUPRENORPHINE AND NALOXONE 1 FILM: 8; 2 FILM, SOLUBLE BUCCAL; SUBLINGUAL at 10:11

## 2021-12-18 ASSESSMENT — ACTIVITIES OF DAILY LIVING (ADL)
ADLS_ACUITY_SCORE: 5

## 2021-12-18 NOTE — PLAN OF CARE
Cognitive Concerns/ Orientation : A&Ox4  BEHAVIOR & AGGRESSION TOOL COLOR: Green   ABNL VS/O2: Daily vitals  MOBILITY: Independent  PAIN MANAGMENT: Denies pain  DIET: Regular  BOWEL/BLADDER: Continent  ABNL LAB/BG: Hgb 8.8 CRP 5.4 Creat 1.25  DRAIN/DEVICES: Midline, saline locked   SKIN: Scattered bruising, scrotal wound, pt refused full skin assessment  D/C DATE: Discharge pending, requires IV antibiotics until 12/29  OTHER IMPORTANT INFO: Seen by psych yesterday, resumed Lyrica. ID and WOC following. Pt refuse to take  all the medications now .Patient want to sleep and take later  .

## 2021-12-18 NOTE — PROGRESS NOTES
[facility]    Infectious Disease Progress Note    Date of Service : 12/18/2021     Assessment:  41 year old male with polysubstance use/IVDA, HIV-Hep C co-infection and prior hx of recurrent MSSA sepsis with pulmonic valve endocarditis, spinal discitis/osteomyelitis with epidural abscess requiring surgical debridement, who is now admitted with high grade MSSA/MRSA bacteremia with tricuspid valve endocarditis with two large [id number] x 8mm and 13 x 5mm, and repeat EDUARDO now shows valve perforation with severe tricuspid regurgitation. Blood cxs are negative since 11/18  . He has had medication non compliance with ART with detectable VL, CD4 >200 at this time not requiring PCP prophylaxis. Course further complicated by JOSE RAMON and hyperkalemia which have improved as well as tricuspid valve perforation which needs surgical management.     1. MSSA/MRSA sepsis with tricuspid valve endocarditis - blood cxs cleared 11/18, Repeat EDUARDO 12/2 now shows valve perforation with severe regurgitation, awaiting surgery.   2. JOSE RAMON  improving  3. LE edema , doppler negative for DVT  4. Prior history of MSSA sepsis with pulmonary valve endocarditis in 2015 and spinal discitis/osteomyelitis with epidural abscess requiring surgical intervention in 2017.  5. Substance abuse with active use of methamphetamine / fentanyl currently  6.  HIV/Hepatitis C co-infection. Last HIV VL detectable at 29,522 copies/ml and  CD4 625(28%) on  06/09/2021 , on Triumeq        Recommendations  1. Continue IV Daptomycin, plan IV antibiotics until 12/29 to complete 6 weeks of treatment. Here with no other viable disposition   2. Valve replacement planned at some point in future, no sxs of valve failure currently  3. Cultures have been clear since 11/ 18   4. Continue on PTA triumeq,, VL recently low +. Pt wants to see if undet  Will recheck  5 Hep C is still active, ie viremic and should be treated at some point    Srinivas Chavez  MD    Interval History    Resting, no new complaints today  Tolerating antibiotics without side effects    Physical Exam                      Vitals:    12/12/21 0337 12/14/21 0700 12/15/21 0625   Weight: 61.7 kg (136 lb) 59.2 kg (130 lb 8.2 oz) 60.4 kg (133 lb 1.6 oz)     Vital Signs with Ranges       Constitutional: Awake, alert, cooperative, no apparent distress  Lungs: Clear to auscultation bilaterally, no crackles or wheezing  Cardiovascular: Regular rate and rhythm, systolic murmur  Abdomen: Normal bowel sounds, soft, non-distended, non-tender  Skin: : multiple scars, IV racks, excoriations on hands,    : Left scrotal laceration site appears clean, no sign of infection  MS : bilateral LE edema L>R    Other:    Medications       abacavir-dolutegravir-LamiVUDine  1 tablet Oral Daily     amLODIPine  5 mg Oral Daily     aspirin  81 mg Oral Daily     buprenorphine HCl-naloxone HCl  1 Film Sublingual BID     carbamide peroxide  3 drop Left Ear BID     carisoprodol  87.5 mg Oral Q48H     DAPTOmycin (CUBICIN) intermittent infusion  9 mg/kg Intravenous Q24H     docusate sodium  100 mg Oral BID     levomilnacipran  120 mg Oral Daily     metoprolol tartrate  25 mg Oral BID     nicotine  1 patch Transdermal QPM     nicotine   Transdermal Q8H     polyethylene glycol  17 g Oral BID     pregabalin  75 mg Oral BID     sennosides  2 tablet Oral BID     sodium chloride (PF)  10 mL Intracatheter Q8H     tamsulosin  0.4 mg Oral Daily       Data   All microbiology laboratory data reviewed.  Recent Labs   Lab Test 12/16/21  0635 12/13/21  0949 12/12/21  0617 12/06/21  0624 12/05/21  0555 12/04/21  0619   WBC 5.8  --   --   --  9.1 7.6   HGB 8.8* 8.2* 7.7*   < > 7.7* 7.1*   HCT 28.2*  --   --   --  24.6* 22.6*   MCV 89  --   --   --  87 87     --   --   --  387 385    < > = values in this interval not displayed.     Recent Labs   Lab Test 12/17/21  0550 12/15/21  0646 12/13/21  0949   CR 1.25 1.31* 1.37*     Recent Labs    Lab Test 12/17/21  0550   SED 74*     Microbiology  11/14 & 4/16 blood cxs both sets MSSA & MRSA, blood cxs 11/18, 11/19 blood cxs negative  Culture Positive on the 1st day of incubation Abnormal         Staphylococcus aureus Panic     2 of 2 bottles   Staphylococcus aureus MRSA Panic     2 of 2 bottles         Resulting Agency: [facility]         Susceptibility                       Staphylococcus aureus Staphylococcus aureus MRSA       PURNIMA PURNIMA       Clindamycin <=0.25 ug/mL Susceptible <=0.25 ug/mL Susceptible 1       Erythromycin <=0.25 ug/mL Susceptible >=8.0 ug/mL Resistant       Gentamicin <=0.5 ug/mL Susceptible <=0.5 ug/mL Susceptible       Linezolid     2.0 ug/mL Susceptible       Oxacillin <=0.25 ug/mL Susceptible >=4.0 ug/mL Resistant       Tetracycline <=1.0 ug/mL Susceptible <=1.0 ug/mL Susceptible       Trimethoprim/Sulfamethoxazole <=0.5/9.5 u... Susceptible <=0.5/9.5 u... Susceptible       Vancomycin <=0.5 ug/mL Susceptible <=0.5 ug/mL Susceptible                    Imaging  11/2 EDUARDO  Interpretation Summary     There is a small filamentous echodensity noted on the posterior mitral valve  leaflet (image 28) which in the clinical context, could be a vegetation.  The aortic valve is normal in structure and function.  There is a 0.9 cm vegetation noted on the posterior leaflet of the tricuspid  valve. The leaflet is likely perforated and prolapsing leading to severe  degenerative TR. Vegetations much smaller than prior EDUARDO. See images 100-127  towards the end of the study.  The visual ejection fraction is 65-70%.  The right ventricle is normal in size and function     11/24 US kidneys  ULTRASOUND RENAL COMPLETE 11/24/2021 12:53 PM     CLINICAL HISTORY: MSSA sepsis, endocarditis, increasing creatinine,  evaluate for obstruction.     TECHNIQUE: Routine Bilateral Renal and Bladder Ultrasound.     COMPARISON: None.     FINDINGS:  RIGHT KIDNEY: 12.1 x 6.9 x 4.9 cm. Unremarkable echogenicity, no  hydronephrosis or  masses. Simple cysts noted measuring up to 1.7 cm.  Mildly complex cyst in the upper right kidney measuring 1.8 cm.     LEFT KIDNEY: 1.2 x 6.0 x 5.8 cm. Unremarkable echogenicity, no  hydronephrosis or masses. Simple cysts noted.     BLADDER: Unremarkable given its level of distension.                                                                      IMPRESSION:  No obstruction demonstrated     11/18 EDUARDO  Interpretation Summary     Two, large, mobile masses noted, attached to the atrial side of base of  posterior tricuspid leaflets are noted. (they measures 18 x 8mm and 13 x 5mm  respectively).These are most consistent with large tricuspid valve  vegetations.  Severe tricuspid regurgitation noted, directed towards the interatrial septum.  Cannot exclude perforation of posterior leaflet of tricuspid valve.  The right ventricle is mildly dilated.  The right ventricular systolic function is normal.  Left ventricular systolic function is normal.  The visual ejection fraction is 60-65%.  Findings discussed with Dr Guerin.  These are new comnpared to prior echo from 2017. The study was technically  adequate.

## 2021-12-18 NOTE — PROGRESS NOTES
[facility]    Hospitalist Progress Note    Assessment & Plan   Bc German is a 41 year old male with PMHx of polysubstance abuse including IV drug use (including fentanyl and methamphetamine currently), chronic opioid dependence, HIV, hepatitis C, hx of MSSA sepsis with pulmonary valve endocarditis (2015), and 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 Was found to have recurrent MSSA/MRSA bacteremia and tricuspid valve endocarditis.     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 lactate nl. CXR on admission showed nodular/patchy opacity at the R base.  * 2/2 blood cultures drawn on admission grew both MSSA and MRSA >> blood cx cleared  on 11/18 >> vancomycin  changed to daptomycin due to JOSE RAMON   EDUARDO done 11/18 notable for large tricuspid valve vegetation with severe regurgitation  - ID following, to continue Daptomycin, with plans to switch to Vancomycin when renal function better; will need 6 weeks of antibiotic in hospital, until 12/29; midline was placed 12/2  - discussed with ID- cr 1.37--1.31--1.25, would continue with Daptomycin at this time.   - remains afebrile ; EDUARDO repeat 12/02 noted with smaller vegetations than prior EDUARDO but did note that tricuspid valve leaflet is likely perforated and prolapsing leading to severe degenerative TR, EF 65-70 %  - was having bilateral LE swelling, likely due to TR, US 12/3 noted with no DVT; edema improved with 40 mg IV Lasix given on 12/3 ; will monitor volume  status closely and consider diuretics prn  - given EDUARDO findings, re-evaluated by thoracic surgery (initially rec medical management) and plans for surgical replacement with bioprosthetic valve at some point; surgery concerned about his IV drug use and consulted with Dr SELINA Barrientos (addiction medicine physician) at [facility] prior to considering surgery    -Cultures have been clear since 11/ 18   -wt: 56kg admission---> peak 73kg---> stable 59-61kg since. Follow volume status given severe TR.   - was evaluated by Dr Barrientos (addiction specialist at [facility]); concern that he is at high risk for relapse and thus at high risk for prosthetic valve endocarditis if he undergoes valve replacement; rec postponing surgery at this time to focus on chemical dependency  -outpatient follow up with CV surgery, touch base with discharge plan with them at time of discharge.  -no signs of RHF at this time.     -Continue IV Daptomycin, plan IV antibiotics until 12/29 to complete 6 weeks of treatment. Here with no other viable disposition      Chronic anemia, likely anemia of chronic disease  - likely anemia of chronic disease in the setting of infective endocarditis  - hemoglobin on admission however was 12.5 and has gradually trended down--->7.1, stable--7.7---7.3---7.6--> 7.4 12/10.   - hemodynamically stable and no suggestion of active bleed  - iron studies consistent with likely ACD with normal ferritin and low TIBC, TSH slightly elevated but free T4 normal; b12 and folate normal  - will monitor hemoglobin periodically,repeat hgb in AM and transfuse PRN for Hb <7  - Hb 8.2--8.8 on 12/16     HTN  - BP controlled with addition of amlodipine 5 mg po daily and Metoprolol 25 mg po bid with hold parameters  (12/3); continue;   - hydralazine IV prn for SBP>180; plan for intermittent diuresis if needed     HIV  Hepatitis C  * Chronic and stable on Triumeq restarted 11/30 as cr improving   * CDC >200 so no need for PJP ppx per ID  -follows with   "[facility]/ID     JOSE RAMON on CKD, likely due to Vancomycin, improved  Hyperkalemia (resolved)  Hyponatremia: resolved   * No hx of CKD and with a normal baseline enrique function  * Cr initially stable this stay, began trending up after vancomycin  - nephrology evaluated for JOSE RAMON, Cr peak 2.8 (11/25)---> trending down --> 1.92---1.78---1.61---1.4--1.37 nephro plans to follow up outpatient; signed off 12/3  - monitor BMP periodically  - ADAN returned negative, Renal US obtained 11/24 and negative for obstruction     History of opiate dependence  History of active IV drug use  Tobacco Use  Depression  Anxiety  * On admission, had stated he last used IV drugs 1 wk prior to admission. While in the ED on 11/16, RN had noted patient was briefly nonresponsive and apneic. Was given dose of Narcan with noted improvement.   * Prior to admission, had been started on taper off Soma -- was taking 87.5mg (1/4 of a 350mg tab) BID but later conversations with PCP's clinic revealed no provider there was managing taper  -- was continued on PTA Subutex BID this stay-- after discussion with Dr Snyder, switched to Suboxone bid (12/8)  -- conts on levomilnacipran ER as per prior to admission  -- cont carisoprodol, after discussion with DR snyder and Pharmacy, plan to taper--- switched to 87.5mg daily for 7 days (taper started 12/8), then plan to do q 48 hrs for 7 days  -- cont lorazepam 0.5mg q6h prn for anxiety (consider psych / Addiction medicine consult/ recommendations reg lorazepam at discharge)  -- cont nicotine patch  -- PTA had been on Lyrica 150 mg po BID which was discontinued on 11/24 because of worsening renal function?  -- 12/13- he is asking if Lyrica can be resumed \"even half of dose\" because his anxiety is increasing and Lyrica would help       12/15- today he is frustrated about his management; states he has withdrawal symptoms, cravings and increased anxiety; stated that because of increased anxiety- he cannot " "eat and he cannot sleep; said he is worried bout 30lbs weight loss because he does not have appetite; said that he does not want to be on Suboxone because \"he does not want to get addicted to it\" he said that PTA he was taking Suboxone only as needed. He said that only Ativan is helping with anxiety and the ordered dose of Ativan 0.5 mg po q6h prn is too small.  - wants to restart Lyrica or Gabapentin     12/16- Psych consult to re-assess.  - started on Lyrica 75 mg po BID as per Psych; Psych did not recommend to stop Suboxone or to increase the dose of Ativan at this time.     12/17- today- he asks to change his Ativan from 0.5 mg q6h prn to 1 mg BID prn (\"because it would be the same daily dose\"); overall, he seems to keep asking for higher doses of Lorazepam; I told him that I will not change the dose/frequency at this time as Psych just saw him yesterday and did not recommend any changes.     CHE[facility] Depp evaluated and recommended inpatient treatment after completion of antibiotics directly will transfer from here to detox treatment center     -reviewed last psych note. Pt still feels he needs higher dose of ativan. He finds that that the lyrica that was restarted has been helpful. Emphasized adherence to current regimen and importance of chem dep and outpatient psychology evaluation and management     Left scrotal laceration dt fall/possible syncope  Left scrotal wound infection  * Laceration sutured in ED. Suture removed 11/21.   * US scrotum earlier this stay showed a small R hydrocele but was otherwise normal.   Patient's scrotal laceration wound looks clean and dry.    -Follow WOC recommendations       Left ear pain; improved    No evidence of otitis media or externa. Likely cerumen impaction,  -Continue to  apply carbamide peroxide  Ear drops to left ears prn     Constipation  bowel regimen in place but states only magnesium citrate works for him; declines other bowel regimen; Mag citrate ordered 12/7; also " got on 12/2        Diet: Snacks/Supplements Adult: Ensure Enlive; With Meals  Regular Diet Adult    DVT Prophylaxis: Pneumatic Compression Devices  Tirado Catheter: Not present  Central Lines: None  Code Status: Full Code       Disposition Plan   Expected Discharge: 12/29/2021     Anticipated discharge location: [facility] evaluated and recommended inpatient treatment after completion of antibiotics directly will transfer from here to detox treatment center  if pt agrees. SW following.       Jeremiah Quan MD  Text Page  (7am to 6pm)  Interval History   Seen by ID  No cp or sob  Lyrical has been helpful with anxiety  Taking po    -Data reviewed today: I reviewed all new labs and imaging results over the last 24 hours. I personally reviewed labs last 48 hours.     Physical Exam   Temp: 97.2  F (36.2  C) Temp src: Oral BP: 110/65 Pulse: 78   Resp: 18 SpO2: 99 % O2 Device: None (Room air)    Vitals:    12/12/21 0337 12/14/21 0700 12/15/21 0625   Weight: 61.7 kg (136 lb) 59.2 kg (130 lb 8.2 oz) 60.4 kg (133 lb 1.6 oz)     Vital Signs with Ranges  Temp:  [97.2  F (36.2  C)] 97.2  F (36.2  C)  Pulse:  [78] 78  Resp:  [18] 18  BP: (110)/(65) 110/65  SpO2:  [99 %] 99 %  I/O last 3 completed shifts:  In: 650 [P.O.:650]  Out: -     Constitutional: Nad, up in bed  Respiratory: CTAB, breathing easilyi   Cardiovascular: RRR no r/g. 2/6 murmur RLSB  GI: soft, nt, nd  Skin/Integumen: no rash or edema  Neuro: nl speech and mentation. No tremor  Psych: slightly anxious,        Medications       abacavir-dolutegravir-LamiVUDine  1 tablet Oral Daily     amLODIPine  5 mg Oral Daily     aspirin  81 mg Oral Daily     buprenorphine HCl-naloxone HCl  1 Film Sublingual BID     carbamide peroxide  3 drop Left Ear BID     carisoprodol  87.5 mg Oral Q48H     DAPTOmycin (CUBICIN) intermittent infusion  9 mg/kg Intravenous Q24H     docusate sodium  100 mg Oral BID     levomilnacipran  120 mg Oral Daily     metoprolol tartrate  25 mg Oral BID      nicotine  1 patch Transdermal QPM     nicotine   Transdermal Q8H     polyethylene glycol  17 g Oral BID     pregabalin  75 mg Oral BID     sennosides  2 tablet Oral BID     sodium chloride (PF)  10 mL Intracatheter Q8H     tamsulosin  0.4 mg Oral Daily       Data   Recent Labs   Lab 12/17/21  0550 12/16/21  0635 12/15/21  0646 12/13/21  0949 12/12/21  0617   WBC  --  5.8  --   --   --    HGB  --  8.8*  --  8.2* 7.7*   MCV  --  89  --   --   --    PLT  --  313  --   --   --      --  139 137  --    POTASSIUM 5.1  --  4.8 4.8  --    CHLORIDE 104  --  107 106  --    CO2 29  --  29 28  --    BUN 31*  --  39* 34*  --    CR 1.25  --  1.31* 1.37*  --    ANIONGAP 3  --  3 3  --    SANDRA 9.9  --  9.4 9.0  --    *  --  92 94  --        Imaging:   No results found for this or any previous visit (from the past 24 hour(s)).

## 2021-12-18 NOTE — PLAN OF CARE
[id number]: Pt independent in room/darling. VSS on RA. C/o intermittent nausea- valium given per pt request. LUE midline SL. Denies any pain. Continue to monitor.

## 2021-12-18 NOTE — PLAN OF CARE
DATE & TIME: 12/17/21 [id number]   Cognitive Concerns/ Orientation : A&Ox4  BEHAVIOR & AGGRESSION TOOL COLOR: Green   ABNL VS/O2: Daily vitals  MOBILITY: Independent  PAIN MANAGMENT: Denies pain  DIET: Regular  BOWEL/BLADDER: Continent  ABNL LAB/BG: Hgb 8.8 CRP 5.4 Creat 1.25  DRAIN/DEVICES: Midline, saline locked   SKIN: Scattered bruising, scrotal wound, pt refused full skin assessment  D/C DATE: Discharge pending, requires IV antibiotics until 12/29  OTHER IMPORTANT INFO: Seen by psych , chaded Lyrica. ID and WOC following. Pt refuse to take  all the medications now .Patient want to sleep and take later

## 2021-12-19 PROCEDURE — 250N000011 HC RX IP 250 OP 636: Performed by: INTERNAL MEDICINE

## 2021-12-19 PROCEDURE — 250N000013 HC RX MED GY IP 250 OP 250 PS 637: Performed by: STUDENT IN AN ORGANIZED HEALTH CARE EDUCATION/TRAINING PROGRAM

## 2021-12-19 PROCEDURE — 250N000013 HC RX MED GY IP 250 OP 250 PS 637: Performed by: SPECIALIST

## 2021-12-19 PROCEDURE — 250N000013 HC RX MED GY IP 250 OP 250 PS 637: Performed by: NURSE PRACTITIONER

## 2021-12-19 PROCEDURE — 120N000001 HC R&B MED SURG/OB

## 2021-12-19 PROCEDURE — 250N000013 HC RX MED GY IP 250 OP 250 PS 637: Performed by: INTERNAL MEDICINE

## 2021-12-19 PROCEDURE — 999N000190 HC STATISTIC VAT ROUNDS

## 2021-12-19 PROCEDURE — 250N000013 HC RX MED GY IP 250 OP 250 PS 637: Performed by: HOSPITALIST

## 2021-12-19 PROCEDURE — 87536 HIV-1 QUANT&REVRSE TRNSCRPJ: CPT | Performed by: INTERNAL MEDICINE

## 2021-12-19 PROCEDURE — 99232 SBSQ HOSP IP/OBS MODERATE 35: CPT | Performed by: INTERNAL MEDICINE

## 2021-12-19 PROCEDURE — 258N000003 HC RX IP 258 OP 636: Performed by: INTERNAL MEDICINE

## 2021-12-19 PROCEDURE — 250N000013 HC RX MED GY IP 250 OP 250 PS 637: Performed by: PSYCHIATRY & NEUROLOGY

## 2021-12-19 RX ADMIN — PREGABALIN 75 MG: 75 CAPSULE ORAL at 10:45

## 2021-12-19 RX ADMIN — LORAZEPAM 0.5 MG: 0.5 TABLET ORAL at 10:46

## 2021-12-19 RX ADMIN — DAPTOMYCIN 600 MG: 500 INJECTION, POWDER, LYOPHILIZED, FOR SOLUTION INTRAVENOUS at 13:35

## 2021-12-19 RX ADMIN — DOCUSATE SODIUM 100 MG: 100 CAPSULE, LIQUID FILLED ORAL at 10:46

## 2021-12-19 RX ADMIN — ABACAVIR SULFATE, DOLUTEGRAVIR SODIUM, LAMIVUDINE 1 TABLET: 600; 50; 300 TABLET, FILM COATED ORAL at 10:46

## 2021-12-19 RX ADMIN — LEVOMILNACIPRAN HYDROCHLORIDE 120 MG: 40 CAPSULE, EXTENDED RELEASE ORAL at 10:43

## 2021-12-19 RX ADMIN — TAMSULOSIN HYDROCHLORIDE 0.4 MG: 0.4 CAPSULE ORAL at 10:45

## 2021-12-19 RX ADMIN — ASPIRIN 81 MG: 81 TABLET, COATED ORAL at 10:46

## 2021-12-19 RX ADMIN — NICOTINE 1 PATCH: 14 PATCH, EXTENDED RELEASE TRANSDERMAL at 10:42

## 2021-12-19 ASSESSMENT — ACTIVITIES OF DAILY LIVING (ADL)
ADLS_ACUITY_SCORE: 5

## 2021-12-19 NOTE — PROGRESS NOTES
[facility]    Hospitalist Progress Note    Assessment & Plan   Bc German is a 41 year old male with PMHx of polysubstance abuse including IV drug use (including fentanyl and methamphetamine currently), chronic opioid dependence, HIV, hepatitis C, hx of MSSA sepsis with pulmonary valve endocarditis (2015), and 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 Was found to have recurrent MSSA/MRSA bacteremia and tricuspid valve endocarditis.     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 lactate nl. CXR on admission showed nodular/patchy opacity at the R base.  * 2/2 blood cultures drawn on admission grew both MSSA and MRSA >> blood cx cleared  on 11/18 >> vancomycin  changed to daptomycin due to JOSE RAMON   EDUARDO done 11/18 notable for large tricuspid valve vegetation with severe regurgitation  - ID following, to continue Daptomycin, with plans to switch to Vancomycin when renal function better; will need 6 weeks of antibiotic in hospital, until 12/29; midline was placed 12/2  - discussed with ID- cr 1.37--1.31--1.25, would continue with Daptomycin at this time.   - remains afebrile ; EDUARDO repeat 12/02 noted with smaller vegetations than prior EDUARDO but did note that tricuspid valve leaflet is likely perforated and prolapsing leading to severe degenerative TR, EF 65-70 %  - was having bilateral LE swelling, likely due to TR, US 12/3 noted with no DVT; edema improved with 40 mg IV Lasix given on 12/3 ; will monitor volume  status closely and consider diuretics prn  - given EDUARDO findings, re-evaluated by thoracic surgery (initially rec medical management) and plans for surgical replacement with bioprosthetic valve at some point; surgery concerned about his IV drug use and consulted with Dr SELINA Barrientos (addiction medicine physician) at [facility] prior to considering surgery    -Cultures have been clear since 11/ 18   -wt: 56kg admission---> peak 73kg---> stable 59-61kg since. Follow volume status given severe TR.   - was evaluated by Dr Barrientos (addiction specialist at [facility]); concern that he is at high risk for relapse and thus at high risk for prosthetic valve endocarditis if he undergoes valve replacement; rec postponing surgery at this time to focus on chemical dependency  -outpatient follow up with CV surgery, touch base with discharge plan with them at time of discharge.  -no signs of RHF at this time.     -Continue IV Daptomycin, plan IV antibiotics until 12/29 to complete 6 weeks of treatment. Here with no other viable disposition      Chronic anemia, likely anemia of chronic disease  - likely anemia of chronic disease in the setting of infective endocarditis  - hemoglobin on admission however was 12.5 and has gradually trended down--->7.1, stable--7.7---7.3---7.6--> 7.4 12/10.   - hemodynamically stable and no suggestion of active bleed  - iron studies consistent with likely ACD with normal ferritin and low TIBC, TSH slightly elevated but free T4 normal; b12 and folate normal  - will monitor hemoglobin periodically,repeat hgb in AM and transfuse PRN for Hb <7  - Hb 8.2--8.8 on 12/16  - am Hb     HTN  - BP controlled with addition of amlodipine 5 mg po daily and Metoprolol 25 mg po bid with hold parameters  (12/3); continue;   - hydralazine IV prn for SBP>180; plan for intermittent diuresis if needed  -pt not taking metoprolol last few days. Will stop and follow. sbp has been wnl/at goal off of this. May be able to stop norvasc as  "well.      HIV  Hepatitis C  * Chronic and stable on Triumeq restarted 11/30 as cr improving   * CDC >200 so no need for PJP ppx per ID  -follows with Dr Chavez of [facility]     JOSE RAMON on CKD, likely due to Vancomycin, improved  Hyperkalemia (resolved)  Hyponatremia: resolved   * No hx of CKD and with a normal baseline enrique function  * Cr initially stable this stay, began trending up after vancomycin  - nephrology evaluated for JOSE RAMON, Cr peak 2.8 (11/25)---> trending down --> 1.92---1.78---1.61---1.4--1.37 nephro plans to follow up outpatient; signed off 12/3  - monitor BMP periodically  - ADAN returned negative, Renal US obtained 11/24 and negative for obstruction  - am bmp     History of opiate dependence  History of active IV drug use  Tobacco Use  Depression  Anxiety  * On admission, had stated he last used IV drugs 1 wk prior to admission. While in the ED on 11/16, RN had noted patient was briefly nonresponsive and apneic. Was given dose of Narcan with noted improvement.   * Prior to admission, had been started on taper off Soma -- was taking 87.5mg (1/4 of a 350mg tab) BID but later conversations with PCP's clinic revealed no provider there was managing taper  -- was continued on PTA Subutex BID this stay-- after discussion with Dr Snyder, switched to Suboxone bid (12/8)  -- conts on levomilnacipran ER as per prior to admission  -- cont carisoprodol, after discussion with DR snyder and Pharmacy, plan to taper--- switched to 87.5mg daily for 7 days (taper started 12/8), then plan to do q 48 hrs for 7 days  -- cont lorazepam 0.5mg q6h prn for anxiety (consider psych / Addiction medicine consult/ recommendations reg lorazepam at discharge)  -- cont nicotine patch  -- PTA had been on Lyrica 150 mg po BID which was discontinued on 11/24 because of worsening renal function?  -- 12/13- he is asking if Lyrica can be resumed \"even half of dose\" because his anxiety is increasing and Lyrica would " "help         12/15- today he is frustrated about his management; states he has withdrawal symptoms, cravings and increased anxiety; stated that because of increased anxiety- he cannot eat and he cannot sleep; said he is worried bout 30lbs weight loss because he does not have appetite; said that he does not want to be on Suboxone because \"he does not want to get addicted to it\" he said that PTA he was taking Suboxone only as needed. He said that only Ativan is helping with anxiety and the ordered dose of Ativan 0.5 mg po q6h prn is too small.  - wants to restart Lyrica or Gabapentin     12/16- Psych consult to re-assess.  - started on Lyrica 75 mg po BID as per Psych; Psych did not recommend to stop Suboxone or to increase the dose of Ativan at this time.     12/17- today- he asks to change his Ativan from 0.5 mg q6h prn to 1 mg BID prn (\"because it would be the same daily dose\"); overall, he seems to keep asking for higher doses of Lorazepam; I told him that I will not change the dose/frequency at this time as Psych just saw him yesterday and did not recommend any changes.     CHEM Depp evaluated and recommended inpatient treatment after completion of antibiotics directly will transfer from here to detox treatment center     -reviewed last psych note. Pt still feels he needs higher dose of ativan. He finds that that the lyrica that was restarted has been helpful. Emphasized adherence to current regimen and importance of chem dep and outpatient psychology evaluation and management    - 12/19,   -  Today; per SW note, pt feeling exhausted and feels he needs TCU before treatment. SW following. Will order PT, OT  Pt also attempting to self wean off suboxone. Will reconsult psychiatry regarding suboxone Rx.        Left scrotal laceration dt fall/possible syncope  Left scrotal wound infection  * Laceration sutured in ED. Suture removed 11/21.   * US scrotum earlier this stay showed a small R hydrocele but was otherwise " normal.   Patient's scrotal laceration wound looks clean and dry.  -evaluated on 12/19. No new issues. Healing slowly, no signs infection    -Follow WOC recommendations       Left ear pain; improved    No evidence of otitis media or externa. Likely cerumen impaction,  -Continue to  apply carbamide peroxide  Ear drops to left ears prn     Constipation  bowel regimen in place but states only magnesium citrate works for him; declines other bowel regimen; Mag citrate ordered 12/7; also got on 12/2        Diet: Snacks/Supplements Adult: Ensure Enlive; With Meals  Regular Diet Adult    DVT Prophylaxis: Pneumatic Compression Devices  Tirado Catheter: Not present  Central Lines: None  Code Status: Full Code       Disposition Plan   Expected Discharge: 12/29/2021     Anticipated discharge location: CHEM Depp evaluated and recommended inpatient treatment after completion of antibiotics directly will transfer from here to detox treatment center  if pt agrees. SW following.       Jeremiah Quan MD  Text Page  (7am to 6pm)  Interval History   Has been not taking metoprolol because sbp has been wnl  Attempting to wean off suboxone  Feels he has nausea, crawling sensations at times   Thinking if he stops taking suboxone these sensations will resolve    Told SW he feels exhausted and would prefer TCU before going to in patient chem dep      -Data reviewed today: I reviewed all new labs and imaging results over the last 24 hours. I personally reviewed labs last 48 hours.     Physical Exam   Temp: 98.1  F (36.7  C) Temp src: Oral BP: 108/73 Pulse: 98   Resp: 18 SpO2: 99 % O2 Device: None (Room air)    Vitals:    12/12/21 0337 12/14/21 0700 12/15/21 0625   Weight: 61.7 kg (136 lb) 59.2 kg (130 lb 8.2 oz) 60.4 kg (133 lb 1.6 oz)     Vital Signs with Ranges  Temp:  [98.1  F (36.7  C)] 98.1  F (36.7  C)  Pulse:  [98] 98  Resp:  [18] 18  BP: (108)/(73) 108/73  SpO2:  [99 %] 99 %  No intake/output data recorded.    Constitutional: Nad,  up in bed  Respiratory: CTAB, breathing easilyi   Cardiovascular: RRR no r/g. 2/6 murmur RLSB  GI: soft, nt, nd  ; scrotal lac without signs erythema, L scrotum. No redness  Skin/Integumen: no rash or edema  Neuro: nl speech and mentation. No tremor  Psych: slightly anxious,        Medications       abacavir-dolutegravir-LamiVUDine  1 tablet Oral Daily     amLODIPine  5 mg Oral Daily     aspirin  81 mg Oral Daily     buprenorphine HCl-naloxone HCl  1 Film Sublingual BID     carbamide peroxide  3 drop Left Ear BID     carisoprodol  87.5 mg Oral Q48H     DAPTOmycin (CUBICIN) intermittent infusion  9 mg/kg Intravenous Q24H     docusate sodium  100 mg Oral BID     levomilnacipran  120 mg Oral Daily     metoprolol tartrate  25 mg Oral BID     nicotine  1 patch Transdermal QPM     nicotine   Transdermal Q8H     polyethylene glycol  17 g Oral BID     pregabalin  75 mg Oral BID     sennosides  2 tablet Oral BID     sodium chloride (PF)  10 mL Intracatheter Q8H     tamsulosin  0.4 mg Oral Daily       Data   Recent Labs   Lab 12/17/21  0550 12/16/21  0635 12/15/21  0646 12/13/21  0949   WBC  --  5.8  --   --    HGB  --  8.8*  --  8.2*   MCV  --  89  --   --    PLT  --  313  --   --      --  139 137   POTASSIUM 5.1  --  4.8 4.8   CHLORIDE 104  --  107 106   CO2 29  --  29 28   BUN 31*  --  39* 34*   CR 1.25  --  1.31* 1.37*   ANIONGAP 3  --  3 3   SANDRA 9.9  --  9.4 9.0   *  --  92 94       Imaging:   No results found for this or any previous visit (from the past 24 hour(s)).

## 2021-12-19 NOTE — PLAN OF CARE
DATE & TIME: 12/18/21 4487-3904  Cognitive Concerns/ Orientation : A&Ox4  BEHAVIOR & AGGRESSION TOOL COLOR: Green   ABNL VS/O2: Daily vitals stable  MOBILITY: Independent  PAIN MANAGMENT: Denies pain  DIET: Regular, tolerating  BOWEL/BLADDER: Continent  ABNL LAB/BG:   DRAIN/DEVICES: Midline, saline locked   SKIN: Scattered bruising, scrotal wound, pt refused full skin assessment  D/C DATE: Discharge pending, requires IV antibiotics until 12/29  OTHER IMPORTANT INFO: ID and WOC following. PRN ativan for anxiety

## 2021-12-19 NOTE — PROGRESS NOTES
Care Management Follow Up    Length of Stay (days): 33    Expected Discharge Date: 12/29/2021     Concerns to be Addressed: discharge planning.  Patient will have completed his IV antibiotic therapy on 12/29.  The plan has been once he has completed the IV antibiotic therapy he will be stable for discharge and transfer to a residential treatment program.  Patient received a Substance Use Assesment on 11/29 which is good to use for insurance authorization 45 days from 11/29.  During earlier conversation, patient requested a out[place] program rather than a metro location.  Reviewed the [facility] system has multiple outstate programs.    Earlier this week patient expressed to bedside RN that he wants to enter a TCU before admitting to Substance Use Treatment program.    Writer met with patient today.  He explains he is physically exhausted and doesn't have the endurance to participate in structure of all day therapy groups in the residential programs.  He reports he is sleeping for 12 hours at night and gives an example after walking around the station several times he went back to his room and slept from 4pm thru the night.   Writer expressed concern he may not qualify for insurance coverage as he is physically independent however we will ask PT and OT to assess if TCU level of care for therapy is indicated.    In relation to Substance Use Treatment he has made calls to [facility] and a [facility] program in [place]. He is looking for a dual program which will allow him to continue use of Ativan.  He reports the two programs above will allow him to continue Ativan   Patient talked about how he is not pleased with the dosing and schedule of his Ativan here and had requested to Dr Miller a change. Patient also expressed concern that he was becoming addicted to Suboxone here and how found out he can take the Suboxone PRN which he has begun to do.  He states hs history is that  Methadone is more effective in curbing his  cravings and is asking writer if there is methadone clinic in [place].   He reports he plans to discuss his medication schedule with his PCP, Khari Barrientos post discharge.  Patient expresses his needs and shares he will continue to advocate for himself.     Patient plan of care discussed at  interdisciplinary rounds: Yes    Anticipated Discharge Disposition: Inpatient Chemical Dependency vs TCU.     Anticipated Discharge Services:    Anticipated Discharge DME:      Patient/family educated on Medicare website which has current facility and service quality ratings:    Education Provided on the Discharge Plan:    Patient/Family in Agreement with the Plan: yes    Referrals Placed by CM/SW:    Private pay costs discussed: Not applicable    Additional Information:  Will ask MD for rehab evaluation from OT and PT.      DARIAN Hurtado

## 2021-12-19 NOTE — PLAN OF CARE
Cognitive Concerns/ Orientation : A&Ox4  BEHAVIOR & AGGRESSION TOOL COLOR: Green   ABNL VS/O2: Daily vitals stable  MOBILITY: Independent  PAIN MANAGMENT: Denies pain  DIET: Regular, tolerating  BOWEL/BLADDER: Continent  ABNL LAB/BG:   DRAIN/DEVICES: Midline, saline locked   SKIN: Scattered bruising, scrotal wound, pt refused full skin assessment  D/C DATE: Discharge pending, requires IV antibiotics until 12/29  OTHER IMPORTANT INFO: ID and WOC following. Refuse full skin assessment.

## 2021-12-20 ENCOUNTER — APPOINTMENT (OUTPATIENT)
Dept: OCCUPATIONAL THERAPY | Facility: CLINIC | Age: 41
DRG: 871 | End: 2021-12-20
Attending: INTERNAL MEDICINE
Payer: COMMERCIAL

## 2021-12-20 LAB
ANION GAP SERPL CALCULATED.3IONS-SCNC: 2 MMOL/L (ref 3–14)
BUN SERPL-MCNC: 32 MG/DL (ref 7–30)
CALCIUM SERPL-MCNC: 9.5 MG/DL (ref 8.5–10.1)
CHLORIDE BLD-SCNC: 106 MMOL/L (ref 94–109)
CO2 SERPL-SCNC: 28 MMOL/L (ref 20–32)
CREAT SERPL-MCNC: 1 MG/DL (ref 0.66–1.25)
ERYTHROCYTE [DISTWIDTH] IN BLOOD BY AUTOMATED COUNT: 18.2 % (ref 10–15)
GFR SERPL CREATININE-BSD FRML MDRD: >90 ML/MIN/1.73M2
GLUCOSE BLD-MCNC: 99 MG/DL (ref 70–99)
HCT VFR BLD AUTO: 30.4 % (ref 40–53)
HGB BLD-MCNC: 9.1 G/DL (ref 13.3–17.7)
MCH RBC QN AUTO: 27.7 PG (ref 26.5–33)
MCHC RBC AUTO-ENTMCNC: 29.9 G/DL (ref 31.5–36.5)
MCV RBC AUTO: 92 FL (ref 78–100)
PLATELET # BLD AUTO: 296 10E3/UL (ref 150–450)
POTASSIUM BLD-SCNC: 4.9 MMOL/L (ref 3.4–5.3)
RBC # BLD AUTO: 3.29 10E6/UL (ref 4.4–5.9)
SODIUM SERPL-SCNC: 136 MMOL/L (ref 133–144)
WBC # BLD AUTO: 5.9 10E3/UL (ref 4–11)

## 2021-12-20 PROCEDURE — 85027 COMPLETE CBC AUTOMATED: CPT | Performed by: INTERNAL MEDICINE

## 2021-12-20 PROCEDURE — 99233 SBSQ HOSP IP/OBS HIGH 50: CPT | Performed by: PSYCHIATRY & NEUROLOGY

## 2021-12-20 PROCEDURE — 250N000013 HC RX MED GY IP 250 OP 250 PS 637: Performed by: NURSE PRACTITIONER

## 2021-12-20 PROCEDURE — 99232 SBSQ HOSP IP/OBS MODERATE 35: CPT | Performed by: INTERNAL MEDICINE

## 2021-12-20 PROCEDURE — 999N000190 HC STATISTIC VAT ROUNDS

## 2021-12-20 PROCEDURE — 250N000011 HC RX IP 250 OP 636: Performed by: INTERNAL MEDICINE

## 2021-12-20 PROCEDURE — 97530 THERAPEUTIC ACTIVITIES: CPT | Mod: GO | Performed by: OCCUPATIONAL THERAPIST

## 2021-12-20 PROCEDURE — 258N000003 HC RX IP 258 OP 636: Performed by: INTERNAL MEDICINE

## 2021-12-20 PROCEDURE — 97165 OT EVAL LOW COMPLEX 30 MIN: CPT | Mod: GO | Performed by: OCCUPATIONAL THERAPIST

## 2021-12-20 PROCEDURE — 250N000013 HC RX MED GY IP 250 OP 250 PS 637: Performed by: HOSPITALIST

## 2021-12-20 PROCEDURE — 250N000013 HC RX MED GY IP 250 OP 250 PS 637: Performed by: INTERNAL MEDICINE

## 2021-12-20 PROCEDURE — 84132 ASSAY OF SERUM POTASSIUM: CPT | Performed by: INTERNAL MEDICINE

## 2021-12-20 PROCEDURE — 250N000013 HC RX MED GY IP 250 OP 250 PS 637: Performed by: PSYCHIATRY & NEUROLOGY

## 2021-12-20 PROCEDURE — 250N000013 HC RX MED GY IP 250 OP 250 PS 637: Performed by: SPECIALIST

## 2021-12-20 PROCEDURE — 120N000001 HC R&B MED SURG/OB

## 2021-12-20 RX ORDER — LORAZEPAM 0.5 MG/1
0.5 TABLET ORAL 3 TIMES DAILY
Status: DISCONTINUED | OUTPATIENT
Start: 2021-12-20 | End: 2021-12-30 | Stop reason: HOSPADM

## 2021-12-20 RX ADMIN — PREGABALIN 75 MG: 75 CAPSULE ORAL at 21:16

## 2021-12-20 RX ADMIN — DOCUSATE SODIUM 100 MG: 100 CAPSULE, LIQUID FILLED ORAL at 08:59

## 2021-12-20 RX ADMIN — ONDANSETRON 4 MG: 4 TABLET, ORALLY DISINTEGRATING ORAL at 05:35

## 2021-12-20 RX ADMIN — TAMSULOSIN HYDROCHLORIDE 0.4 MG: 0.4 CAPSULE ORAL at 08:59

## 2021-12-20 RX ADMIN — LEVOMILNACIPRAN HYDROCHLORIDE 120 MG: 40 CAPSULE, EXTENDED RELEASE ORAL at 08:59

## 2021-12-20 RX ADMIN — CARISOPRODOL 87.5 MG: 350 TABLET ORAL at 08:59

## 2021-12-20 RX ADMIN — ABACAVIR SULFATE, DOLUTEGRAVIR SODIUM, LAMIVUDINE 1 TABLET: 600; 50; 300 TABLET, FILM COATED ORAL at 08:59

## 2021-12-20 RX ADMIN — DOCUSATE SODIUM 100 MG: 100 CAPSULE, LIQUID FILLED ORAL at 21:17

## 2021-12-20 RX ADMIN — LORAZEPAM 0.5 MG: 0.5 TABLET ORAL at 05:35

## 2021-12-20 RX ADMIN — PREGABALIN 75 MG: 75 CAPSULE ORAL at 08:59

## 2021-12-20 RX ADMIN — LORAZEPAM 0.5 MG: 0.5 TABLET ORAL at 21:16

## 2021-12-20 RX ADMIN — DAPTOMYCIN 600 MG: 500 INJECTION, POWDER, LYOPHILIZED, FOR SOLUTION INTRAVENOUS at 13:31

## 2021-12-20 RX ADMIN — AMLODIPINE BESYLATE 5 MG: 5 TABLET ORAL at 08:59

## 2021-12-20 RX ADMIN — LORAZEPAM 0.5 MG: 0.5 TABLET ORAL at 13:31

## 2021-12-20 RX ADMIN — BUPRENORPHINE AND NALOXONE 1 FILM: 8; 2 FILM, SOLUBLE BUCCAL; SUBLINGUAL at 08:59

## 2021-12-20 RX ADMIN — ASPIRIN 81 MG: 81 TABLET, COATED ORAL at 08:59

## 2021-12-20 ASSESSMENT — ACTIVITIES OF DAILY LIVING (ADL)
ADLS_ACUITY_SCORE: 5
PREVIOUS_RESPONSIBILITIES: MEAL PREP;HOUSEKEEPING;MEDICATION MANAGEMENT
ADLS_ACUITY_SCORE: 5

## 2021-12-20 ASSESSMENT — MIFFLIN-ST. JEOR: SCORE: 1554.02

## 2021-12-20 NOTE — PLAN OF CARE
PT: Orders received, chart reviewed, discussed with OT who evaluated pt. Pt is ambulating independently, walking halls multiple times a day and moving safely. No concerns from nursing. Pt has no skilled PT needs at this time. PT to complete orders.

## 2021-12-20 NOTE — PLAN OF CARE
DATE & TIME: 12/20/21 5576-9011  Cognitive Concerns/ Orientation : A&Ox4  BEHAVIOR & AGGRESSION TOOL COLOR: Green   ABNL VS/O2: Daily vitals stable  MOBILITY: Independent  PAIN MANAGMENT: Denies pain  DIET: Regular, tolerating  BOWEL/BLADDER: Continent  ABNL LAB/BG:   DRAIN/DEVICES: Midline, saline locked   SKIN: Scattered bruising, scrotal wound(performs own wound care)  D/C DATE: Discharge pending, requires IV antibiotics until 12/29  OTHER IMPORTANT INFO: ID and WOC following. Refuse full skin assessment and refuse to take some medications.

## 2021-12-20 NOTE — CONSULTS
Psychiatry Consultation; Follow up          Reason for Consult, requesting source:    Opioid dependence: follow-up on suboxone  Requesting source: Dr Quan            Interim history:    I met with the patient on station 66.  I reviewed the chart.  We had a long discussion this morning about his current situation.  He continues to bargain for taking a larger dose of Ativan.  He has some opposition to staying on Suboxone because he is convinced that it does not help him.  He talks about possibly wanting to be on methadone but acknowledges that he had trouble complying with that in the past and it is not something that we can start here.  Dr. Barrientos his outpatient provider recommended that he be converted from Subutex to Suboxone-I do not disagree with that assessment given the overall relapse risk, the fact that Suboxone has less abuse potential.  He is currently taking Suboxone 8-2 twice daily and does not want to increase the dose.  He talks about tapering off of it but I made it clear to him that we do not support this, would not write for a taper, the risks are much too high for him given concern for relapse and his recent infection.  I explained to him long-term that staying on Ativan is not a sustainable treatment plan given his extensive chemical dependency history.  He rejects this assessment, talks about wanting to go to treatment.  Despite much discussion, he was not willing to consider alternatives for anxiety such as hydroxyzine, Seroquel, mirtazapine despite complaints that he cannot sleep and does not have an appetite.  He also rejects the idea of increasing Suboxone to cut his craving, claiming that it will make him more anxious and worsen his depression.  At this juncture, I do not see the patient as amenable to appropriate clinical suggestions, I would not make any changes in the current treatment plan other than to schedule 0.5 mg Ativan 3 times daily, to  avoid as needed dosing to reduce his  medication seeking behaviors.  If he decides to refuse the Suboxone, that is his choice, but it is not clinically appropriate at this point to write for a taper under the current clinical circumstances.    !0 point Review of Systems: completed by Luis Delgado MD, negative other than that noted in the Interim history         Medications:     Current Facility-Administered Medications   Medication     abacavir-dolutegravir-LamiVUDine (TRIUMEQ) 600- MG per tablet 1 tablet     acetaminophen (TYLENOL) tablet 650 mg    Or     acetaminophen (TYLENOL) Suppository 650 mg     alum & mag hydroxide-simethicone (MAALOX) suspension 30 mL     amLODIPine (NORVASC) tablet 5 mg     aspirin EC tablet 81 mg     bisacodyl (DULCOLAX) Suppository 10 mg     buprenorphine HCl-naloxone HCl (SUBOXONE) 8-2 MG per film 1 Film     calcium carbonate (TUMS) chewable tablet 500 mg     carbamide peroxide (DEBROX) 6.5 % otic solution 3 drop     carisoprodol (SOMA) quarter-tab 87.5 mg     DAPTOmycin (CUBICIN) 600 mg in sodium chloride 0.9 % 100 mL intermittent infusion     glucose gel 15-30 g    Or     dextrose 50 % injection 25-50 mL    Or     glucagon injection 1 mg     docusate sodium (COLACE) capsule 100 mg     levomilnacipran (FETZIMA ER) 24 hr capsule 120 mg     lidocaine (LMX4) cream     lidocaine 1 % 0.1-1 mL     LORazepam (ATIVAN) tablet 0.5 mg     naloxone (NARCAN) injection 0.2 mg    Or     naloxone (NARCAN) injection 0.4 mg    Or     naloxone (NARCAN) injection 0.2 mg    Or     naloxone (NARCAN) injection 0.4 mg     nicotine (NICODERM CQ) 14 MG/24HR 24 hr patch 1 patch     nicotine Patch in Place     nitroGLYcerin (NITROSTAT) sublingual tablet 0.4 mg     ondansetron (ZOFRAN-ODT) ODT tab 4 mg    Or     ondansetron (ZOFRAN) injection 4 mg     polyethylene glycol (MIRALAX) Packet 17 g     pregabalin (LYRICA) capsule 75 mg     prochlorperazine (COMPAZINE) injection 10 mg    Or     prochlorperazine (COMPAZINE) tablet 10 mg     "Or     prochlorperazine (COMPAZINE) suppository 25 mg     sennosides (SENOKOT) tablet 2 tablet     sodium chloride (PF) 0.9% PF flush 10 mL     sodium chloride (PF) 0.9% PF flush 10-20 mL     sodium chloride (PF) 0.9% PF flush 3 mL     sodium chloride (PF) 0.9% PF flush 3 mL     tamsulosin (FLOMAX) capsule 0.4 mg              MSE:     Appearance: age-appearing, dressed in hospital gown, adequate hygiene and grooming, tattooes on arms, in no acute distress  Behavior: calm and interruptive, argumentative regarding medications  Eye contact: Diminished  Orientation: alert and oriented to time, place and person  Movements: did not observe any tics, tremors, or dystonia  Mood: \"anxious\"  Affect: calm, stable, mildly restricted range.  Slightly irritable  Speech: Monotone  Language: fluent in English, with appropriately placed inflections, good articulation  Memory: no impairment in immediate, recent, or remote memory  Intellectual capacity: Average for development based on word choice  Concentration: attentive  Thought process and content: tangential, thoughts focused on medications, goal-oriented; did not elicit any delusions or paranoia. No auditory or visual hallucinations.   Associations: no loosened associations  Insight: fair to moderately poor  Judgement: fair  Safety: denies suicidal or homicidal ideation    Vital signs:  Temp: 98.1  F (36.7  C) Temp src: Oral BP: 108/73 Pulse: 98   Resp: 18 SpO2: 99 % O2 Device: None (Room air) Oxygen Delivery: 6 LPM Height: 182.9 cm (6') Weight: 60.1 kg (132 lb 8 oz)  Estimated body mass index is 17.97 kg/m  as calculated from the following:    Height as of this encounter: 1.829 m (6').    Weight as of this encounter: 60.1 kg (132 lb 8 oz).              DSM-5 Diagnosis:   #1 Opioid use disorder, severe  #2 Amphetamine use disorder, severe  #3 Major Depressive Disorder, recurrent, moderate  #4 Unspecified anxiety disorder  #5 Multiple medical comorbidities, including endocarditis " and osteomyelitis secondary to IV drug use.          Assessment:   Mr. German was seen for follow-up today to address his medication concerns.  He continues to have a very oppositional stance regarding his medication assisted therapy.  He should be continued on Suboxone due to relapse risk, we can schedule the Ativan 0.5 mg 3 times daily which represents a reduction in his initial stated dose that he was getting on an outpatient basis.  Long-term keeping him on a benzodiazepine is not going to be a sustainable plan  1) continue Lyrica at 75 mg BID for anxiety/pain  2) Continue Suboxone 8 mg BID. I do not recommend that patient discontinue this medication.  It would be his choice if he decides to stop taking it but this it would put him at a high risk of relapse which we discussed at length   3) Can continue lorazepam 0.5 mg 3 times daily, avoid as needed dosing because of his tendency to seek pills.    4) Continue Fetzima 120 mg daily  5) Consider removing nicotine patch prior to bedtime if experiencing anxiety or sweating at night  6) Will need to work with  to find chemical dependency treatment. Patient wanting to go somewhere away from [place] for 9-12 months. Recommend he go straight to CD treatment upon discharge if not needing a TCU.   7) Recommend he engage in individual psychotherapy for his depression and anxiety  8) Assuming he is still taking the Suboxone dose at discharge then a bridging dose would need to be ordered at the time of discharge   9.)  At this point he is rejecting any and all suggestions for possible management of his anxiety and sleep complaints so I have nothing else to offer at this point.    Luis Delgado MD

## 2021-12-20 NOTE — PROGRESS NOTES
"CLINICAL NUTRITION SERVICES - REASSESSMENT NOTE      Recommendations Ordered by Registered Dietitian (RD):   Ensure Enlive 5x/day (will add at 10 am and 2 pm snack)   Future/Additional Recommendations:   Monitor weight trends    Malnutrition:   % Weight Loss:  > 5% in 1 month (severe malnutrition)  % Intake:  <75% for > 7 days (moderate malnutrition)  Subcutaneous Fat Loss:  Very little fat stores at baseline  Muscle Loss:  Very little muscle tone at baseline   Fluid Retention:  None noted    Malnutrition Diagnosis: Severe malnutrition  In Context of:  Acute illness or injury with underlying chronic illness or disease        EVALUATION OF PROGRESS TOWARD GOALS   Diet:  Regular    Supplements: Ensure Enlive TID with meals     Intake/Tolerance:  Pt has been ordering 1-3 meals/day with % intakes (though most meals documented at 100%). Spoke with pt at bedside this morning who states that his appetite has been fluctuating with medication changes. Pt discussed at length his frustration over many medication changes and that what he was requesting was not being considered. Re-directed pt back to focus on nutrition to which he stated he would like to receive more Ensure than just 3x/day. He \"loves\" this supplement and is drinking 3 bottles/day (1050 kcal, 80 g protein).     Pt also notes he has struggled with anxiety since he was a kid and stated that he finds it hard to eat in front of other people due to his level of anxiety. He questions whether he has an \"eating disorder\".       ASSESSED NUTRITION NEEDS:  Dosing Weight 56.2 kg   Estimated Energy Needs: 7110-4374 kcals (30-35 Kcal/Kg)  Justification: underweight   Estimated Protein Needs:  grams protein (1.2-1.8 g pro/Kg)  Justification: Repletion      NEW FINDINGS:   - Labs: Reviewed.   - Meds: colace BID, miralax, senokot   - GI: GI WDL, last BM on 12/19, previously noted to be constipated   - Skin: Per WOCN on 12/16, scrotum wounds are healing   - Weight: " Wt is overall down ~10# in the past 2 weeks (7% wt loss) and down 4# in the past week (3% wt loss). Pt states his UBW is ~140-141#.    Noted valve replacement has been postponed in order for primary focus to be on chemical dependency treatment.    Previous Goals:   Intake of at least 75% meals BID + 2 supplements daily.   Evaluation: Met    Previous Nutrition Diagnosis:   Increased nutrient needs (protein) related to prolonged hospitalization and upcoming surgery as evidenced by day 23 inpatient + plan for valve replacement in the next week.  Evaluation: Completed - surgery postponed       CURRENT NUTRITION DIAGNOSIS  Unintended weight loss related to unclear etiology, potential appetite and intake fluctuations as evidenced by 7% wt loss in the past 2 weeks and 3% wt loss over the past week     INTERVENTIONS  Recommendations / Nutrition Prescription  Ensure Enlive 5x/day (will add at 10 am and 2 pm snack)    Implementation  Medical Food Supplement    Goals  Patient will consume >/= 75% of Ensure supplements at least 3x/day and meals at least 2x/day   No further wt loss <60.1 kg       MONITORING AND EVALUATION:  Progress towards goals will be monitored and evaluated per protocol and Practice Guidelines      Danielle Lucas RD, LD

## 2021-12-20 NOTE — PLAN OF CARE
DATE & TIME: 12/19/21 6643-3972  Cognitive Concerns/ Orientation : A&Ox4  BEHAVIOR & AGGRESSION TOOL COLOR: Green   ABNL VS/O2: Daily vitals stable  MOBILITY: Independent  PAIN MANAGMENT: Denies pain  DIET: Regular, tolerating  BOWEL/BLADDER: Continent  ABNL LAB/BG:   DRAIN/DEVICES: Midline, saline locked   SKIN: Scattered bruising, scrotal wound(performs own wound care)  D/C DATE: Discharge pending, requires IV antibiotics until 12/29  OTHER IMPORTANT INFO: ID and WOC following.

## 2021-12-20 NOTE — PLAN OF CARE
Cognitive Concerns/ Orientation : A&Ox4  BEHAVIOR & AGGRESSION TOOL COLOR: Green   ABNL VS/O2: Daily vitals stable  MOBILITY: Independent  PAIN MANAGMENT: Denies pain  DIET: Regular, tolerating  BOWEL/BLADDER: Continent  ABNL LAB/BG:   DRAIN/DEVICES: Midline, saline locked   SKIN: Scattered bruising, scrotal wound(performs own wound care)  D/C DATE: Discharge pending, requires IV antibiotics until 12/29  OTHER IMPORTANT INFO: ID and WOC following. Refuse full skin assessment and refuse to take some medications..

## 2021-12-20 NOTE — PROGRESS NOTES
[facility]    Infectious Disease Progress Note    Date of Service : 12/20/2021     Assessment:  41 year old male with polysubstance use/IVDA, HIV-Hep C co-infection and prior hx of recurrent MSSA sepsis with pulmonic valve endocarditis, spinal discitis/osteomyelitis with epidural abscess requiring surgical debridement, who is now admitted with high grade MSSA/MRSA bacteremia with tricuspid valve endocarditis with two large [id number] x 8mm and 13 x 5mm, and repeat EDUARDO now shows valve perforation with severe tricuspid regurgitation. Blood cxs are negative since 11/18  . He has had medication non compliance with ART with detectable VL, CD4 >200 at this time not requiring PCP prophylaxis. Course further complicated by JOSE RAMON and hyperkalemia which have improved as well as tricuspid valve perforation which needs surgical management.     1. MSSA/MRSA sepsis with tricuspid valve endocarditis - blood cxs cleared 11/18, Repeat EDUARDO 12/2 now shows valve perforation with severe regurgitation, awaiting surgery.   2. JOSE RAMON  improving  3. LE edema , doppler negative for DVT  4. Prior history of MSSA sepsis with pulmonary valve endocarditis in 2015 and spinal discitis/osteomyelitis with epidural abscess requiring surgical intervention in 2017.  5. Substance abuse with active use of methamphetamine / fentanyl currently  6.  HIV/Hepatitis C co-infection. Last HIV VL detectable at 29,522 copies/ml and  CD4 625(28%) on  06/09/2021 , on Triumeq        Recommendations  1. Continue IV Daptomycin, plan IV antibiotics until 12/29 to complete 6 weeks of treatment. Here with no other viable disposition   2. Valve replacement planned at some point in future, no sxs of valve failure currently  3. Cultures have been clear since 11/ 18   4. Continue on PTA triumeq,, VL recently low +. Pt wants to see if undet  Will recheck(pending)  5 Hep C is still active, ie viremic and should be treated at some point    Srinivas Chavez  MD    Interval History    Resting, no new complaints today inflamm markers elevated still  Tolerating antibiotics without side effects    Physical Exam   Temp: 97.6  F (36.4  C) Temp src: Oral BP: 115/89 Pulse: 89   Resp: 18 SpO2: 100 % O2 Device: None (Room air)    Vitals:    12/14/21 0700 12/15/21 0625 12/20/21 0625   Weight: 59.2 kg (130 lb 8.2 oz) 60.4 kg (133 lb 1.6 oz) 60.1 kg (132 lb 8 oz)     Vital Signs with Ranges  Temp:  [97.6  F (36.4  C)] 97.6  F (36.4  C)  Pulse:  [89] 89  Resp:  [18] 18  BP: (115)/(89) 115/89  SpO2:  [100 %] 100 %    Constitutional: Awake, alert, cooperative, no apparent distress  Lungs: Clear to auscultation bilaterally, no crackles or wheezing  Cardiovascular: Regular rate and rhythm, systolic murmur  Abdomen: Normal bowel sounds, soft, non-distended, non-tender  Skin: : multiple scars, IV racks, excoriations on hands,    : Left scrotal laceration site appears clean, no sign of infection  MS : bilateral LE edema L>R    Other:    Medications       abacavir-dolutegravir-LamiVUDine  1 tablet Oral Daily     amLODIPine  5 mg Oral Daily     aspirin  81 mg Oral Daily     buprenorphine HCl-naloxone HCl  1 Film Sublingual BID     carbamide peroxide  3 drop Left Ear BID     carisoprodol  87.5 mg Oral Q48H     DAPTOmycin (CUBICIN) intermittent infusion  9 mg/kg Intravenous Q24H     docusate sodium  100 mg Oral BID     levomilnacipran  120 mg Oral Daily     LORazepam  0.5 mg Oral TID     nicotine  1 patch Transdermal QPM     nicotine   Transdermal Q8H     polyethylene glycol  17 g Oral BID     pregabalin  75 mg Oral BID     sennosides  2 tablet Oral BID     sodium chloride (PF)  10 mL Intracatheter Q8H     tamsulosin  0.4 mg Oral Daily       Data   All microbiology laboratory data reviewed.  Recent Labs   Lab Test 12/20/21  0639 12/16/21  0635 12/13/21  0949 12/06/21  0624 12/05/21  0555   WBC 5.9 5.8  --   --  9.1   HGB 9.1* 8.8* 8.2*   < > 7.7*   HCT 30.4* 28.2*  --   --  24.6*   MCV 92 89   --   --  87    313  --   --  387    < > = values in this interval not displayed.     Recent Labs   Lab Test 12/20/21  0639 12/17/21  0550 12/15/21  0646   CR 1.00 1.25 1.31*     Recent Labs   Lab Test 12/17/21  0550   SED 74*     Microbiology  11/14 & 4/16 blood cxs both sets MSSA & MRSA, blood cxs 11/18, 11/19 blood cxs negative  Culture Positive on the 1st day of incubation Abnormal         Staphylococcus aureus Panic     2 of 2 bottles   Staphylococcus aureus MRSA Panic     2 of 2 bottles         Resulting Agency: [facility]         Susceptibility                       Staphylococcus aureus Staphylococcus aureus MRSA       PURNIMA PURNIMA       Clindamycin <=0.25 ug/mL Susceptible <=0.25 ug/mL Susceptible 1       Erythromycin <=0.25 ug/mL Susceptible >=8.0 ug/mL Resistant       Gentamicin <=0.5 ug/mL Susceptible <=0.5 ug/mL Susceptible       Linezolid     2.0 ug/mL Susceptible       Oxacillin <=0.25 ug/mL Susceptible >=4.0 ug/mL Resistant       Tetracycline <=1.0 ug/mL Susceptible <=1.0 ug/mL Susceptible       Trimethoprim/Sulfamethoxazole <=0.5/9.5 u... Susceptible <=0.5/9.5 u... Susceptible       Vancomycin <=0.5 ug/mL Susceptible <=0.5 ug/mL Susceptible                    Imaging  11/2 EDUARDO  Interpretation Summary     There is a small filamentous echodensity noted on the posterior mitral valve  leaflet (image 28) which in the clinical context, could be a vegetation.  The aortic valve is normal in structure and function.  There is a 0.9 cm vegetation noted on the posterior leaflet of the tricuspid  valve. The leaflet is likely perforated and prolapsing leading to severe  degenerative TR. Vegetations much smaller than prior EDUARDO. See images 100-127  towards the end of the study.  The visual ejection fraction is 65-70%.  The right ventricle is normal in size and function     11/24 US kidneys  ULTRASOUND RENAL COMPLETE 11/24/2021 12:53 PM     CLINICAL HISTORY: MSSA sepsis, endocarditis, increasing creatinine,  evaluate for  obstruction.     TECHNIQUE: Routine Bilateral Renal and Bladder Ultrasound.     COMPARISON: None.     FINDINGS:  RIGHT KIDNEY: 12.1 x 6.9 x 4.9 cm. Unremarkable echogenicity, no  hydronephrosis or masses. Simple cysts noted measuring up to 1.7 cm.  Mildly complex cyst in the upper right kidney measuring 1.8 cm.     LEFT KIDNEY: 1.2 x 6.0 x 5.8 cm. Unremarkable echogenicity, no  hydronephrosis or masses. Simple cysts noted.     BLADDER: Unremarkable given its level of distension.                                                                      IMPRESSION:  No obstruction demonstrated     11/18 EDUARDO  Interpretation Summary     Two, large, mobile masses noted, attached to the atrial side of base of  posterior tricuspid leaflets are noted. (they measures 18 x 8mm and 13 x 5mm  respectively).These are most consistent with large tricuspid valve  vegetations.  Severe tricuspid regurgitation noted, directed towards the interatrial septum.  Cannot exclude perforation of posterior leaflet of tricuspid valve.  The right ventricle is mildly dilated.  The right ventricular systolic function is normal.  Left ventricular systolic function is normal.  The visual ejection fraction is 60-65%.  Findings discussed with Dr Guerin.  These are new comnpared to prior echo from 2017. The study was technically  adequate.

## 2021-12-20 NOTE — PROGRESS NOTES
[facility]    Hospitalist Progress Note    Assessment & Plan   Bc German is a 41 year old male with PMHx of polysubstance abuse including IV drug use (including fentanyl and methamphetamine currently), chronic opioid dependence, HIV, hepatitis C, hx of MSSA sepsis with pulmonary valve endocarditis (2015), and 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 Was found to have recurrent MSSA/MRSA bacteremia and tricuspid valve endocarditis.     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 lactate nl. CXR on admission showed nodular/patchy opacity at the R base.  * 2/2 blood cultures drawn on admission grew both MSSA and MRSA >> blood cx cleared  on 11/18 >> vancomycin  changed to daptomycin due to JOSE RAMON   EDUARDO done 11/18 notable for large tricuspid valve vegetation with severe regurgitation  - ID following, to continue Daptomycin, with plans to switch to Vancomycin when renal function better; will need 6 weeks of antibiotic in hospital, until 12/29; midline was placed 12/2  - discussed with ID- cr 1.37--1.31--1.25, would continue with Daptomycin at this time.   - remains afebrile ; EDUARDO repeat 12/02 noted with smaller vegetations than prior EDUARDO but did note that tricuspid valve leaflet is likely perforated and prolapsing leading to severe degenerative TR, EF 65-70 %  - was having bilateral LE swelling, likely due to TR, US 12/3 noted with no DVT; edema improved with 40 mg IV Lasix given on 12/3 ; will monitor volume  status closely and consider diuretics prn  - given EDUARDO findings, re-evaluated by thoracic surgery (initially rec medical management) and plans for surgical replacement with bioprosthetic valve at some point; surgery concerned about his IV drug use and consulted with Dr SELINA Barrientos (addiction medicine physician) at [facility] prior to considering surgery    -Cultures have been clear since 11/ 18   -wt: 56kg admission---> peak 73kg---> stable 59-61kg since. Follow volume status given severe TR.   - was evaluated by Dr Barrientos (addiction specialist at [place]); concern that he is at high risk for relapse and thus at high risk for prosthetic valve endocarditis if he undergoes valve replacement; rec postponing surgery at this time to focus on chemical dependency  -outpatient follow up with CV surgery, touch base with discharge plan with them at time of discharge.  -no signs of RHF at this time.     -Continue IV Daptomycin, plan IV antibiotics until 12/29 to complete 6 weeks of treatment. Here with no other viable disposition      Chronic anemia, likely anemia of chronic disease  - likely anemia of chronic disease in the setting of infective endocarditis  - hemoglobin on admission however was 12.5 and has gradually trended down--->7.1, stable--7.7---7.3---7.6--> 7.4 12/10.   - hemodynamically stable and no suggestion of active bleed  - iron studies consistent with likely ACD with normal ferritin and low TIBC, TSH slightly elevated but free T4 normal; b12 and folate normal  - will monitor hemoglobin periodically,repeat hgb in AM and transfuse PRN for Hb <7  - Hb stable 8-9 range  - follow cbc with platelet count intermittently.      HTN  - BP controlled with addition of amlodipine 5 mg po daily and Metoprolol 25 mg po bid with hold parameters  (12/3); continue;   - hydralazine IV prn for SBP>180; plan for intermittent diuresis if needed  -pt not taking metoprolol last few days. Stopped, pt normotensive.   - 12/20will stop  amlodipine and follow sbp. Nl currently on low dose. reeval over next few days if needs this restarted.          HIV  Hepatitis C  * Chronic and stable on Triumeq restarted 11/30 as cr improving   * CDC >200 so no need for PJP ppx per ID  -follows with Dr Chavez of [facility]/ID  -Hep C still active/viremic and will need to be on treatment at some point     JOSE RAMON on CKD, likely due to Vancomycin: resolved.   Hyperkalemia (resolved)  Hyponatremia: resolved   * No hx of CKD and with a normal baseline enrique function  * Cr initially stable this stay, began trending up after vancomycin  - nephrology evaluated for JOSE RAMON, Cr peak 2.8 (11/25)---> trending down --> 1.92---1.78---1.61---1.4--1.37 nephro plans to follow up outpatient; signed off 12/3  - monitor BMP periodically  - ADAN returned negative, Renal US obtained 11/24 and negative for obstruction  - am bmp     History of opiate dependence  History of active IV drug use  Tobacco Use  Depression  Anxiety  * On admission, had stated he last used IV drugs 1 wk prior to admission. While in the ED on 11/16, RN had noted patient was briefly nonresponsive and apneic. Was given dose of Narcan with noted improvement.   * Prior to admission, had been started on taper off Soma -- was taking 87.5mg (1/4 of a 350mg tab) BID but later conversations with PCP's clinic revealed no provider there was managing taper  -- was continued on PTA Subutex BID this stay-- after discussion with Dr Snyder, switched to Suboxone bid (12/8)  -- conts on levomilnacipran ER as per prior to admission  -- cont carisoprodol, after discussion with DR snyder and Pharmacy, plan to taper--- switched to 87.5mg daily for 7 days (taper started 12/8), then plan to do q 48 hrs for 7 days  -- was on lorazepam 0.5mg q6h prn for anxiety (consider psych / Addiction medicine consult/ recommendations reg lorazepam at discharge)  --  nicotine patch  -- PTA had been on Lyrica 150 mg po BID which was discontinued on  "11/24 because of worsening renal function?   -- 12/13- he is asking if Lyrica can be resumed \"even half of dose\" because his anxiety is increasing and Lyrica would help.    12/15- today he is frustrated about his management; states he has withdrawal symptoms, cravings and increased anxiety; stated that because of increased anxiety- he cannot eat and he cannot sleep; said he is worried bout 30lbs weight loss because he does not have appetite; said that he does not want to be on Suboxone because \"he does not want to get addicted to it\" he said that PTA he was taking Suboxone only as needed. He said that only Ativan is helping with anxiety and the ordered dose of Ativan 0.5 mg po q6h prn is too small.  - wants to restart Lyrica or Gabapentin     12/16- Psych consult to re-assess.  - started on Lyrica 75 mg po BID as per Psych; Psych did not recommend to stop Suboxone or to increase the dose of Ativan at this time.     12/17- today- he asks to change his Ativan from 0.5 mg q6h prn to 1 mg BID prn (\"because it would be the same daily dose\"); overall, he seems to keep asking for higher doses of Lorazepam; I told him that I will not change the dose/frequency at this time as Psych just saw him yesterday and did not recommend any changes.     CHEM Depp evaluated and recommended inpatient treatment after completion of antibiotics directly will transfer from here to detox treatment center     -reviewed last psych note. Pt still feels he needs higher dose of ativan. He finds that that the lyrica that was restarted has been helpful. Emphasized adherence to current regimen and importance of chem dep and outpatient psychology evaluation and management    - 12/19,   per SW note, pt feeling exhausted and feels he needs TCU before treatment. SW following. Will order PT, OT  Pt also attempting to self wean off suboxone. Will reconsult psychiatry regarding suboxone Rx.   12/20. Seen by psychiatry again regarding pt's suboxone and wish " to taper off. Pt had been taking every other day. Also with ongoing anxiety. Seen psychiatry consult note. Psychiatry had detailed discussion with patient and does not support tapering off suboxone given high risk for narcotic dependence relapse. Pt changed to scheduled ativan and prn ativan stopped.   Pt did not agree with psychiatry assessment.          Left scrotal laceration dt fall/possible syncope  Left scrotal wound infection  * Laceration sutured in ED. Suture removed 11/21.   * US scrotum earlier this stay showed a small R hydrocele but was otherwise normal.   Patient's scrotal laceration wound looks clean and dry.  -evaluated on 12/19. No new issues. Healing slowly, no signs infection    -Follow WOC recommendations       Left ear pain; improved    No evidence of otitis media or externa. Likely cerumen impaction,  -Continue to  apply carbamide peroxide  Ear drops to left ears prn     Constipation  bowel regimen in place but states only magnesium citrate works for him; declines other bowel regimen; Mag citrate ordered 12/7; also got on 12/2        Diet: Snacks/Supplements Adult: Ensure Enlive; With Meals  Regular Diet Adult    DVT Prophylaxis: Pneumatic Compression Devices  Tirado Catheter: Not present  Central Lines: None  Code Status: Full Code       Disposition Plan   Expected Discharge: 12/29/2021     Anticipated discharge location: [facility] Depp evaluated and recommended inpatient treatment after completion of antibiotics directly will transfer from here to detox treatment center  if pt agrees. SW following.       Jeremiah Quan MD  Text Page  (7am to 6pm)  Interval History   Seen by ID, seen by psychiatry.   Pt feeling ok  No new complaints.         -Data reviewed today: I reviewed all new labs and imaging results over the last 24 hours. I personally reviewed labs last 48 hours.     Physical Exam   Temp: 97.6  F (36.4  C) Temp src: Oral BP: 115/89 Pulse: 89   Resp: 18 SpO2: 100 % O2 Device: None (Room  air)    Vitals:    12/14/21 0700 12/15/21 0625 12/20/21 0625   Weight: 59.2 kg (130 lb 8.2 oz) 60.4 kg (133 lb 1.6 oz) 60.1 kg (132 lb 8 oz)     Vital Signs with Ranges  Temp:  [97.6  F (36.4  C)] 97.6  F (36.4  C)  Pulse:  [89] 89  Resp:  [18] 18  BP: (115)/(89) 115/89  SpO2:  [100 %] 100 %  I/O last 3 completed shifts:  In: 240 [P.O.:240]  Out: -     Constitutional: Nad, in bed  Respiratory: CTAB, breathing easilyi   Cardiovascular: RRR no r/g. 2/6 murmur RLSB  GI: soft, nt, nd  ; scrotal lac without signs erythema, L scrotum. No redness  Skin/Integumen: no rash or edema  Neuro: nl speech and mentation. No tremor  Psych: slightly anxious,        Medications       abacavir-dolutegravir-LamiVUDine  1 tablet Oral Daily     aspirin  81 mg Oral Daily     buprenorphine HCl-naloxone HCl  1 Film Sublingual BID     carbamide peroxide  3 drop Left Ear BID     carisoprodol  87.5 mg Oral Q48H     DAPTOmycin (CUBICIN) intermittent infusion  9 mg/kg Intravenous Q24H     docusate sodium  100 mg Oral BID     levomilnacipran  120 mg Oral Daily     LORazepam  0.5 mg Oral TID     nicotine  1 patch Transdermal QPM     nicotine   Transdermal Q8H     polyethylene glycol  17 g Oral BID     pregabalin  75 mg Oral BID     sennosides  2 tablet Oral BID     sodium chloride (PF)  10 mL Intracatheter Q8H     tamsulosin  0.4 mg Oral Daily       Data   Recent Labs   Lab 12/20/21  0639 12/17/21  0550 12/16/21  0635 12/15/21  0646   WBC 5.9  --  5.8  --    HGB 9.1*  --  8.8*  --    MCV 92  --  89  --      --  313  --     136  --  139   POTASSIUM 4.9 5.1  --  4.8   CHLORIDE 106 104  --  107   CO2 28 29  --  29   BUN 32* 31*  --  39*   CR 1.00 1.25  --  1.31*   ANIONGAP 2* 3  --  3   SANDRA 9.5 9.9  --  9.4   GLC 99 113*  --  92       Imaging:   No results found for this or any previous visit (from the past 24 hour(s)).

## 2021-12-21 LAB
CK SERPL-CCNC: 34 U/L (ref 30–300)
HIV1 RNA # PLAS NAA DL=20: NOT DETECTED COPIES/ML

## 2021-12-21 PROCEDURE — 120N000001 HC R&B MED SURG/OB

## 2021-12-21 PROCEDURE — 99232 SBSQ HOSP IP/OBS MODERATE 35: CPT | Performed by: INTERNAL MEDICINE

## 2021-12-21 PROCEDURE — 258N000003 HC RX IP 258 OP 636: Performed by: INTERNAL MEDICINE

## 2021-12-21 PROCEDURE — 82550 ASSAY OF CK (CPK): CPT | Performed by: INTERNAL MEDICINE

## 2021-12-21 PROCEDURE — 250N000013 HC RX MED GY IP 250 OP 250 PS 637: Performed by: PSYCHIATRY & NEUROLOGY

## 2021-12-21 PROCEDURE — 250N000011 HC RX IP 250 OP 636: Performed by: INTERNAL MEDICINE

## 2021-12-21 PROCEDURE — 250N000013 HC RX MED GY IP 250 OP 250 PS 637: Performed by: STUDENT IN AN ORGANIZED HEALTH CARE EDUCATION/TRAINING PROGRAM

## 2021-12-21 PROCEDURE — 250N000013 HC RX MED GY IP 250 OP 250 PS 637: Performed by: INTERNAL MEDICINE

## 2021-12-21 PROCEDURE — 999N000190 HC STATISTIC VAT ROUNDS

## 2021-12-21 PROCEDURE — 250N000013 HC RX MED GY IP 250 OP 250 PS 637: Performed by: NURSE PRACTITIONER

## 2021-12-21 PROCEDURE — 250N000013 HC RX MED GY IP 250 OP 250 PS 637: Performed by: SPECIALIST

## 2021-12-21 PROCEDURE — 250N000013 HC RX MED GY IP 250 OP 250 PS 637: Performed by: HOSPITALIST

## 2021-12-21 RX ADMIN — PREGABALIN 75 MG: 75 CAPSULE ORAL at 10:01

## 2021-12-21 RX ADMIN — DOCUSATE SODIUM 100 MG: 100 CAPSULE, LIQUID FILLED ORAL at 10:01

## 2021-12-21 RX ADMIN — TAMSULOSIN HYDROCHLORIDE 0.4 MG: 0.4 CAPSULE ORAL at 10:01

## 2021-12-21 RX ADMIN — BUPRENORPHINE AND NALOXONE 1 FILM: 8; 2 FILM, SOLUBLE BUCCAL; SUBLINGUAL at 10:01

## 2021-12-21 RX ADMIN — ABACAVIR SULFATE, DOLUTEGRAVIR SODIUM, LAMIVUDINE 1 TABLET: 600; 50; 300 TABLET, FILM COATED ORAL at 10:01

## 2021-12-21 RX ADMIN — DAPTOMYCIN 600 MG: 500 INJECTION, POWDER, LYOPHILIZED, FOR SOLUTION INTRAVENOUS at 15:08

## 2021-12-21 RX ADMIN — LORAZEPAM 0.5 MG: 0.5 TABLET ORAL at 06:44

## 2021-12-21 RX ADMIN — Medication 3 DROP: at 22:08

## 2021-12-21 RX ADMIN — LORAZEPAM 0.5 MG: 0.5 TABLET ORAL at 15:16

## 2021-12-21 RX ADMIN — LORAZEPAM 0.5 MG: 0.5 TABLET ORAL at 22:06

## 2021-12-21 RX ADMIN — NICOTINE 1 PATCH: 14 PATCH, EXTENDED RELEASE TRANSDERMAL at 10:07

## 2021-12-21 RX ADMIN — DOCUSATE SODIUM 100 MG: 100 CAPSULE, LIQUID FILLED ORAL at 22:05

## 2021-12-21 RX ADMIN — ASPIRIN 81 MG: 81 TABLET, COATED ORAL at 10:01

## 2021-12-21 RX ADMIN — PREGABALIN 75 MG: 75 CAPSULE ORAL at 22:06

## 2021-12-21 RX ADMIN — LEVOMILNACIPRAN HYDROCHLORIDE 120 MG: 40 CAPSULE, EXTENDED RELEASE ORAL at 10:01

## 2021-12-21 ASSESSMENT — ACTIVITIES OF DAILY LIVING (ADL)
ADLS_ACUITY_SCORE: 5

## 2021-12-21 NOTE — PROGRESS NOTES
SPIRITUAL HEALTH SERVICES  SPIRITUAL ASSESSMENT Progress Note  [facility] UN[facility]    REFERRAL SOURCE: -initiated visit to assess emotional and spiritual needs in light of length of hospital stay.      Attempted x2 today; Bc was interested in visit, requested follow up tomorrow.    PLAN: I will follow up tomorrow. Spiritual Health Services remains available for patient, family, and staff support.     Please page the on call  by placing a STAT or ASAP Epic referral for Spiritual Health (which will roll to the on call pager).        RHODA Ochoa.   Associate   Pager [phone]

## 2021-12-21 NOTE — PROGRESS NOTES
[facility]    Infectious Disease Progress Note    Date of Service : 12/21/2021     Assessment:  41 year old male with polysubstance use/IVDA, HIV-Hep C co-infection and prior hx of recurrent MSSA sepsis with pulmonic valve endocarditis, spinal discitis/osteomyelitis with epidural abscess requiring surgical debridement, who is now admitted with high grade MSSA/MRSA bacteremia with tricuspid valve endocarditis with two large [id number] x 8mm and 13 x 5mm, and repeat EDUARDO now shows valve perforation with severe tricuspid regurgitation. Blood cxs are negative since 11/18  . He has had medication non compliance with ART with detectable VL, CD4 >200 at this time not requiring PCP prophylaxis. Course further complicated by JOSE RAMON and hyperkalemia which have improved as well as tricuspid valve perforation which needs surgical management.     1. MSSA/MRSA sepsis with tricuspid valve endocarditis - blood cxs cleared 11/18, Repeat EDUARDO 12/2 now shows valve perforation with severe regurgitation, awaiting surgery.   2. JOSE RAMON  improving  3. LE edema , doppler negative for DVT  4. Prior history of MSSA sepsis with pulmonary valve endocarditis in 2015 and spinal discitis/osteomyelitis with epidural abscess requiring surgical intervention in 2017.  5. Substance abuse with active use of methamphetamine / fentanyl currently  6.  HIV/Hepatitis C co-infection. Last HIV VL detectable at 29,522 copies/ml and  CD4 625(28%) on  06/09/2021 , on Triumeq        Recommendations  1. Continue IV Daptomycin, plan IV antibiotics until 12/29 to complete 6 weeks of treatment. Here with no other viable disposition   2. Valve replacement planned at some point in future, no sxs of valve failure currently  3. Cultures have been clear since 11/ 18   4. Continue on PTA triumeq,, VL recently low +. Pt wants to see if undet  recheck(pending)  5 Hep C is still active, ie viremic and should be treated at some point    Srinivas Chavez  MD    Interval History    Resting, no new complaints today inflamm markers elevated still  Tolerating antibiotics without side effects    Physical Exam       BP: 117/76 Pulse: 89   Resp: 16   O2 Device: None (Room air)    Vitals:    12/14/21 0700 12/15/21 0625 12/20/21 0625   Weight: 59.2 kg (130 lb 8.2 oz) 60.4 kg (133 lb 1.6 oz) 60.1 kg (132 lb 8 oz)     Vital Signs with Ranges  Pulse:  [89] 89  Resp:  [16] 16  BP: (117)/(76) 117/76    Constitutional: Awake, alert, cooperative, no apparent distress  Lungs: Clear to auscultation bilaterally, no crackles or wheezing  Cardiovascular: Regular rate and rhythm, systolic murmur  Abdomen: Normal bowel sounds, soft, non-distended, non-tender  Skin: : multiple scars, IV racks, excoriations on hands,    : Left scrotal laceration site appears clean, no sign of infection  MS : bilateral LE edema L>R    Other:    Medications       abacavir-dolutegravir-LamiVUDine  1 tablet Oral Daily     aspirin  81 mg Oral Daily     buprenorphine HCl-naloxone HCl  1 Film Sublingual BID     carbamide peroxide  3 drop Left Ear BID     carisoprodol  87.5 mg Oral Q48H     DAPTOmycin (CUBICIN) intermittent infusion  9 mg/kg Intravenous Q24H     docusate sodium  100 mg Oral BID     levomilnacipran  120 mg Oral Daily     LORazepam  0.5 mg Oral TID     nicotine  1 patch Transdermal QPM     nicotine   Transdermal Q8H     polyethylene glycol  17 g Oral BID     pregabalin  75 mg Oral BID     sennosides  2 tablet Oral BID     sodium chloride (PF)  10 mL Intracatheter Q8H     tamsulosin  0.4 mg Oral Daily       Data   All microbiology laboratory data reviewed.  Recent Labs   Lab Test 12/20/21  0639 12/16/21  0635 12/13/21  0949 12/06/21  0624 12/05/21  0555   WBC 5.9 5.8  --   --  9.1   HGB 9.1* 8.8* 8.2*   < > 7.7*   HCT 30.4* 28.2*  --   --  24.6*   MCV 92 89  --   --  87    313  --   --  387    < > = values in this interval not displayed.     Recent Labs   Lab Test 12/20/21  0639 12/17/21  0550  12/15/21  0646   CR 1.00 1.25 1.31*     Recent Labs   Lab Test 12/17/21  0550   SED 74*     Microbiology  11/14 & 4/16 blood cxs both sets MSSA & MRSA, blood cxs 11/18, 11/19 blood cxs negative  Culture Positive on the 1st day of incubation Abnormal         Staphylococcus aureus Panic     2 of 2 bottles   Staphylococcus aureus MRSA Panic     2 of 2 bottles         Resulting Agency: [facility]         Susceptibility                       Staphylococcus aureus Staphylococcus aureus MRSA       PURNIMA PURNIMA       Clindamycin <=0.25 ug/mL Susceptible <=0.25 ug/mL Susceptible 1       Erythromycin <=0.25 ug/mL Susceptible >=8.0 ug/mL Resistant       Gentamicin <=0.5 ug/mL Susceptible <=0.5 ug/mL Susceptible       Linezolid     2.0 ug/mL Susceptible       Oxacillin <=0.25 ug/mL Susceptible >=4.0 ug/mL Resistant       Tetracycline <=1.0 ug/mL Susceptible <=1.0 ug/mL Susceptible       Trimethoprim/Sulfamethoxazole <=0.5/9.5 u... Susceptible <=0.5/9.5 u... Susceptible       Vancomycin <=0.5 ug/mL Susceptible <=0.5 ug/mL Susceptible                    Imaging  11/2 EDUARDO  Interpretation Summary     There is a small filamentous echodensity noted on the posterior mitral valve  leaflet (image 28) which in the clinical context, could be a vegetation.  The aortic valve is normal in structure and function.  There is a 0.9 cm vegetation noted on the posterior leaflet of the tricuspid  valve. The leaflet is likely perforated and prolapsing leading to severe  degenerative TR. Vegetations much smaller than prior EDUARDO. See images 100-127  towards the end of the study.  The visual ejection fraction is 65-70%.  The right ventricle is normal in size and function     11/24 US kidneys  ULTRASOUND RENAL COMPLETE 11/24/2021 12:53 PM     CLINICAL HISTORY: MSSA sepsis, endocarditis, increasing creatinine,  evaluate for obstruction.     TECHNIQUE: Routine Bilateral Renal and Bladder Ultrasound.     COMPARISON: None.     FINDINGS:  RIGHT KIDNEY: 12.1 x 6.9 x 4.9  cm. Unremarkable echogenicity, no  hydronephrosis or masses. Simple cysts noted measuring up to 1.7 cm.  Mildly complex cyst in the upper right kidney measuring 1.8 cm.     LEFT KIDNEY: 1.2 x 6.0 x 5.8 cm. Unremarkable echogenicity, no  hydronephrosis or masses. Simple cysts noted.     BLADDER: Unremarkable given its level of distension.                                                                      IMPRESSION:  No obstruction demonstrated     11/18 EDUARDO  Interpretation Summary     Two, large, mobile masses noted, attached to the atrial side of base of  posterior tricuspid leaflets are noted. (they measures 18 x 8mm and 13 x 5mm  respectively).These are most consistent with large tricuspid valve  vegetations.  Severe tricuspid regurgitation noted, directed towards the interatrial septum.  Cannot exclude perforation of posterior leaflet of tricuspid valve.  The right ventricle is mildly dilated.  The right ventricular systolic function is normal.  Left ventricular systolic function is normal.  The visual ejection fraction is 60-65%.  Findings discussed with Dr Guerin.  These are new comnpared to prior echo from 2017. The study was technically  adequate.

## 2021-12-21 NOTE — PLAN OF CARE
DATE & TIME: 12/20/21 3 p to 7 p  Cognitive Concerns/ Orientation : A&Ox4  BEHAVIOR & AGGRESSION TOOL COLOR: Green, calm and pleasant   ABNL VS/O2: Daily vitals only  MOBILITY: Independent  PAIN MANAGMENT: Denies pain  DIET: Regular, tolerating  BOWEL/BLADDER: Continent  ABNL LAB/BG:   DRAIN/DEVICES: LUE Midline, saline locked   SKIN: Scattered bruising, scrotal wound(performs own wound care)  D/C DATE: Discharge pending, requires IV antibiotics until 12/29  OTHER IMPORTANT INFO: ID and WOC following. Refuse full skin assessment  declined stool softener from AM shift, family bringing food from outside

## 2021-12-21 NOTE — PLAN OF CARE
DATE & TIME: 12/21/2021 [id number]  Cognitive Concerns/ Orientation : A&Ox4  BEHAVIOR & AGGRESSION TOOL COLOR: Green   ABNL VS/O2: Daily vitals only  MOBILITY: Independent in room  PAIN MANAGMENT: Denies pain  DIET: Regular, tolerating  BOWEL/BLADDER: Continent of B&B  ABNL LAB/BG: none  DRAIN/DEVICES: LUE Midline, saline locked,  SKIN: Scattered bruising, scrotal wound(performs own wound care). Refused skin assessments.  D/C DATE: Discharge pending, requires IV antibiotics until 12/29.  OTHER IMPORTANT INFO: ID and WOC following. Pt is able to make needs known.

## 2021-12-21 NOTE — PLAN OF CARE
DATE & TIME: 12/21/2021 [id number]  Cognitive Concerns/ Orientation : A&Ox4  BEHAVIOR & AGGRESSION TOOL COLOR: Green   ABNL VS/O2: Daily vitals only  MOBILITY: Independent in room  PAIN MANAGMENT: Denies pain  DIET: Regular, tolerating  BOWEL/BLADDER: Continent of B&B  ABNL LAB/BG: Hgb 9.1  DRAIN/DEVICES: LUE Midline, saline locked,  SKIN: Scattered bruising, scrotal wound(performs own wound care). Refused skin assessments.  D/C DATE: Discharge pending, requires IV antibiotics until 12/29.  OTHER IMPORTANT INFO: ID and WOC following. Pt is able to make needs known. Refused assessment expressed would like to sleep. Slept majority of the night, no acute changes during shift.

## 2021-12-21 NOTE — PROGRESS NOTES
[facility]    Hospitalist Progress Note    Assessment & Plan   Bc German is a 41 year old male with PMHx of polysubstance abuse including IV drug use (including fentanyl and methamphetamine currently), chronic opioid dependence, HIV, hepatitis C, hx of MSSA sepsis with pulmonary valve endocarditis (2015), and 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 Was found to have recurrent MSSA/MRSA bacteremia and tricuspid valve endocarditis.     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 lactate nl. CXR on admission showed nodular/patchy opacity at the R base.  * 2/2 blood cultures drawn on admission grew both MSSA and MRSA >> blood cx cleared  on 11/18 >> vancomycin  changed to daptomycin due to JOSE RAMON   EDUARDO done 11/18 notable for large tricuspid valve vegetation with severe regurgitation  - ID following, to continue Daptomycin, with plans to switch to Vancomycin when renal function better; will need 6 weeks of antibiotic in hospital, until 12/29; midline was placed 12/2  - discussed with ID- cr 1.37--1.31--1.25, would continue with Daptomycin at this time.   - remains afebrile ; EDUARDO repeat 12/02 noted with smaller vegetations than prior EDUARDO but did note that tricuspid valve leaflet is likely perforated and prolapsing leading to severe degenerative TR, EF 65-70 %  - was having bilateral LE swelling, likely due to TR, US 12/3 noted with no DVT; edema improved with 40 mg IV Lasix given on 12/3 ; will monitor volume  status closely and consider diuretics prn  - given EDUARDO findings, re-evaluated by thoracic surgery (initially rec medical management) and plans for surgical replacement with bioprosthetic valve at some point; surgery concerned about his IV drug use and consulted with Dr SELINA Barrientos (addiction medicine physician) at [facility] prior to considering surgery    -Cultures have been clear since 11/ 18   -wt: 56kg admission---> peak 73kg---> stable 59-61kg since. Follow volume status given severe TR.   - was evaluated by Dr Barrientos (addiction specialist at [place]); concern that he is at high risk for relapse and thus at high risk for prosthetic valve endocarditis if he undergoes valve replacement; rec postponing surgery at this time to focus on chemical dependency  -outpatient follow up with CV surgery, touch base with discharge plan with them at time of discharge.  -no signs of RHF at this time.   -murmur LLSB  -no signs right HF.    -Continue IV Daptomycin, plan IV antibiotics until 12/29 to complete 6 weeks of treatment. Here with no other viable disposition      Chronic anemia, likely anemia of chronic disease  - likely anemia of chronic disease in the setting of infective endocarditis  - hemoglobin on admission however was 12.5 and has gradually trended down--->7.1, stable--7.7---7.3---7.6--> 7.4 12/10.   - hemodynamically stable and no suggestion of active bleed  - iron studies consistent with likely ACD with normal ferritin and low TIBC, TSH slightly elevated but free T4 normal; b12 and folate normal  - will monitor hemoglobin periodically,repeat hgb in AM and transfuse PRN for Hb <7  - Hb stable 8-9 range  - follow cbc with platelet count intermittently.      HTN  - BP controlled with addition of amlodipine 5 mg po daily and Metoprolol 25 mg po bid with hold parameters  (12/3); continue;   - hydralazine IV prn for SBP>180; plan for intermittent diuresis if needed  -pt not taking metoprolol last few days. Stopped, pt  normotensive.   - 12/20will stop amlodipine and follow sbp. Nl currently on low dose. reeval over next few days if needs this restarted.   -sbp <120/- off meds         HIV  Hepatitis C  * Chronic and stable on Triumeq restarted 11/30 as cr improving   * CDC >200 so no need for PJP ppx per ID  -follows with Dr Chavez of [facility]/ID  -Hep C still active/viremic and will need to be on treatment at some point     JOSE RAMON on CKD, likely due to Vancomycin: resolved.   Hyperkalemia (resolved)  Hyponatremia: resolved   * No hx of CKD and with a normal baseline enrique function  * Cr initially stable this stay, began trending up after vancomycin  - nephrology evaluated for JOSE RAMON, Cr peak 2.8 (11/25)---> trending down --> 1.92---1.78---1.61---1.4--1.37 nephro plans to follow up outpatient; signed off 12/3  - monitor BMP periodically  - ADAN returned negative, Renal US obtained 11/24 and negative for obstruction  - bmp in several days. 12/20 nl     History of opiate dependence  History of active IV drug use  Tobacco Use  Depression  Anxiety  * On admission, had stated he last used IV drugs 1 wk prior to admission. While in the ED on 11/16, RN had noted patient was briefly nonresponsive and apneic. Was given dose of Narcan with noted improvement.   * Prior to admission, had been started on taper off Soma -- was taking 87.5mg (1/4 of a 350mg tab) BID but later conversations with PCP's clinic revealed no provider there was managing taper  -- was continued on PTA Subutex BID this stay-- after discussion with Dr Snyder, switched to Suboxone bid (12/8)  -- conts on levomilnacipran ER as per prior to admission  -- cont carisoprodol, after discussion with DR snyder and Pharmacy, plan to taper--- switched to 87.5mg daily for 7 days (taper started 12/8), then plan to do q 48 hrs for 7 days  -- was on lorazepam 0.5mg q6h prn for anxiety (consider psych / Addiction medicine consult/ recommendations reg lorazepam at discharge)  --   "nicotine patch  -- PTA had been on Lyrica 150 mg po BID which was discontinued on 11/24 because of worsening renal function?   -- 12/13- he is asking if Lyrica can be resumed \"even half of dose\" because his anxiety is increasing and Lyrica would help.    12/15- today he is frustrated about his management; states he has withdrawal symptoms, cravings and increased anxiety; stated that because of increased anxiety- he cannot eat and he cannot sleep; said he is worried bout 30lbs weight loss because he does not have appetite; said that he does not want to be on Suboxone because \"he does not want to get addicted to it\" he said that PTA he was taking Suboxone only as needed. He said that only Ativan is helping with anxiety and the ordered dose of Ativan 0.5 mg po q6h prn is too small.  - wants to restart Lyrica or Gabapentin     12/16- Psych consult to re-assess.  - started on Lyrica 75 mg po BID as per Psych; Psych did not recommend to stop Suboxone or to increase the dose of Ativan at this time.     12/17- today- he asks to change his Ativan from 0.5 mg q6h prn to 1 mg BID prn (\"because it would be the same daily dose\"); overall, he seems to keep asking for higher doses of Lorazepam; I told him that I will not change the dose/frequency at this time as Psych just saw him yesterday and did not recommend any changes.     [facility] Depp evaluated and recommended inpatient treatment after completion of antibiotics directly will transfer from here to detox treatment center     -reviewed last psych note. Pt still feels he needs higher dose of ativan. He finds that that the lyrica that was restarted has been helpful. Emphasized adherence to current regimen and importance of chem dep and outpatient psychology evaluation and management    - 12/19,   per SW note, pt feeling exhausted and feels he needs TCU before treatment. SW following. Will order PT, OT  Pt also attempting to self wean off suboxone. Will reconsult psychiatry " "regarding suboxone Rx.   12/20. Seen by psychiatry again regarding pt's suboxone and wish to taper off. Pt had been taking every other day. Also with ongoing anxiety. Seen psychiatry consult note. Psychiatry had detailed discussion with patient and does not support tapering off suboxone given high risk for narcotic dependence relapse. Pt changed to scheduled ativan and prn ativan stopped.   Pt did not agree with psychiatry assessment.   12/21. Pt now taking suboxone daily stating \" I want to do the right thing\".   Reinforced that taking suboxone with prevent narcotic w/d symptoms which may have been driving some of his anxiety, treats his addiction          Left scrotal laceration dt fall/possible syncope  Left scrotal wound infection  * Laceration sutured in ED. Suture removed 11/21.   * US scrotum earlier this stay showed a small R hydrocele but was otherwise normal.   Patient's scrotal laceration wound looks clean and dry.  -evaluated on 12/19. No new issues. Healing well, no signs infection    -Follow WOC recommendations       Left ear pain; improved    No evidence of otitis media or externa. Likely cerumen impaction,  -Continue to  apply carbamide peroxide  Ear drops to left ears prn     Constipation  bowel regimen in place but states only magnesium citrate works for him; declines other bowel regimen; Mag citrate ordered 12/7; also got on 12/2        Diet: Snacks/Supplements Adult: Ensure Enlive; With Meals  Regular Diet Adult    DVT Prophylaxis: Pneumatic Compression Devices  Tirado Catheter: Not present  Central Lines: None  Code Status: Full Code       Disposition Plan   Expected Discharge: 12/29/2021     Anticipated discharge location: [facility] De[facility] evaluated and recommended inpatient treatment after completion of antibiotics directly will transfer from here to detox treatment center  if pt agrees. SW following.       Jeremiah Quan MD  Text Page  (7am to 6pm)  Interval History     Visiting with his " mom  Taking suboxone daily now  No new issues.   No n/v/d/abd pain    -Data reviewed today: I reviewed all new labs and imaging results over the last 24 hours. I personally reviewed labs last 48 hours.     Physical Exam   Temp: 97.3  F (36.3  C) Temp src: Oral BP: 110/72 Pulse: 77   Resp: 16 SpO2: 99 % O2 Device: None (Room air)    Vitals:    12/14/21 0700 12/15/21 0625 12/20/21 0625   Weight: 59.2 kg (130 lb 8.2 oz) 60.4 kg (133 lb 1.6 oz) 60.1 kg (132 lb 8 oz)     Vital Signs with Ranges  Temp:  [97.3  F (36.3  C)] 97.3  F (36.3  C)  Pulse:  [77-89] 77  Resp:  [16] 16  BP: (110-117)/(72-76) 110/72  SpO2:  [99 %] 99 %  I/O last 3 completed shifts:  In: -   Out: 300 [Urine:300]    Constitutional: Nad, in bed  Respiratory: CTAB, breathing easilyi   Cardiovascular: RRR no r/g. 2/6 murmur RLSB  GI: soft, nt, nd  ; scrotal lac without signs erythema, L scrotum. No redness, healing, looking better.   Skin/Integumen: no rash or edema  Neuro: nl speech and mentation. No tremor  Psych: pleasant, calmer today.         Medications       abacavir-dolutegravir-LamiVUDine  1 tablet Oral Daily     aspirin  81 mg Oral Daily     buprenorphine HCl-naloxone HCl  1 Film Sublingual BID     carbamide peroxide  3 drop Left Ear BID     carisoprodol  87.5 mg Oral Q48H     DAPTOmycin (CUBICIN) intermittent infusion  9 mg/kg Intravenous Q24H     docusate sodium  100 mg Oral BID     levomilnacipran  120 mg Oral Daily     LORazepam  0.5 mg Oral TID     nicotine  1 patch Transdermal QPM     nicotine   Transdermal Q8H     polyethylene glycol  17 g Oral BID     pregabalin  75 mg Oral BID     sennosides  2 tablet Oral BID     sodium chloride (PF)  10 mL Intracatheter Q8H     tamsulosin  0.4 mg Oral Daily       Data   Recent Labs   Lab 12/20/21  0639 12/17/21  0550 12/16/21  0635 12/15/21  0646   WBC 5.9  --  5.8  --    HGB 9.1*  --  8.8*  --    MCV 92  --  89  --      --  313  --     136  --  139   POTASSIUM 4.9 5.1  --  4.8    CHLORIDE 106 104  --  107   CO2 28 29  --  29   BUN 32* 31*  --  39*   CR 1.00 1.25  --  1.31*   ANIONGAP 2* 3  --  3   SANDRA 9.5 9.9  --  9.4   GLC 99 113*  --  92       Imaging:   No results found for this or any previous visit (from the past 24 hour(s)).

## 2021-12-21 NOTE — PLAN OF CARE
A/O x4 VSSon RA, denies pain. Independent, continent, midline to LUE, declined skin assessment, on IV antibiotics till 12/29

## 2021-12-22 LAB — SARS-COV-2 RNA RESP QL NAA+PROBE: NEGATIVE

## 2021-12-22 PROCEDURE — 99232 SBSQ HOSP IP/OBS MODERATE 35: CPT | Performed by: INTERNAL MEDICINE

## 2021-12-22 PROCEDURE — 250N000013 HC RX MED GY IP 250 OP 250 PS 637: Performed by: NURSE PRACTITIONER

## 2021-12-22 PROCEDURE — 250N000013 HC RX MED GY IP 250 OP 250 PS 637: Performed by: HOSPITALIST

## 2021-12-22 PROCEDURE — 87536 HIV-1 QUANT&REVRSE TRNSCRPJ: CPT | Performed by: INTERNAL MEDICINE

## 2021-12-22 PROCEDURE — 250N000013 HC RX MED GY IP 250 OP 250 PS 637: Performed by: PSYCHIATRY & NEUROLOGY

## 2021-12-22 PROCEDURE — 250N000013 HC RX MED GY IP 250 OP 250 PS 637: Performed by: STUDENT IN AN ORGANIZED HEALTH CARE EDUCATION/TRAINING PROGRAM

## 2021-12-22 PROCEDURE — 250N000011 HC RX IP 250 OP 636: Performed by: INTERNAL MEDICINE

## 2021-12-22 PROCEDURE — 250N000013 HC RX MED GY IP 250 OP 250 PS 637: Performed by: INTERNAL MEDICINE

## 2021-12-22 PROCEDURE — 258N000003 HC RX IP 258 OP 636: Performed by: INTERNAL MEDICINE

## 2021-12-22 PROCEDURE — 87635 SARS-COV-2 COVID-19 AMP PRB: CPT | Performed by: INTERNAL MEDICINE

## 2021-12-22 PROCEDURE — 250N000013 HC RX MED GY IP 250 OP 250 PS 637: Performed by: SPECIALIST

## 2021-12-22 PROCEDURE — 999N000190 HC STATISTIC VAT ROUNDS

## 2021-12-22 PROCEDURE — 120N000001 HC R&B MED SURG/OB

## 2021-12-22 PROCEDURE — 999N000197 HC STATISTIC WOC PT EDUCATION, 0-15 MIN

## 2021-12-22 RX ADMIN — LORAZEPAM 0.5 MG: 0.5 TABLET ORAL at 13:34

## 2021-12-22 RX ADMIN — MICONAZOLE NITRATE: 2 POWDER TOPICAL at 17:10

## 2021-12-22 RX ADMIN — ASPIRIN 81 MG: 81 TABLET, COATED ORAL at 10:29

## 2021-12-22 RX ADMIN — LEVOMILNACIPRAN HYDROCHLORIDE 120 MG: 40 CAPSULE, EXTENDED RELEASE ORAL at 10:28

## 2021-12-22 RX ADMIN — LORAZEPAM 0.5 MG: 0.5 TABLET ORAL at 06:55

## 2021-12-22 RX ADMIN — NICOTINE 1 PATCH: 14 PATCH, EXTENDED RELEASE TRANSDERMAL at 10:31

## 2021-12-22 RX ADMIN — ABACAVIR SULFATE, DOLUTEGRAVIR SODIUM, LAMIVUDINE 1 TABLET: 600; 50; 300 TABLET, FILM COATED ORAL at 10:28

## 2021-12-22 RX ADMIN — SENNOSIDES 2 TABLET: 8.6 TABLET, FILM COATED ORAL at 10:29

## 2021-12-22 RX ADMIN — PREGABALIN 75 MG: 75 CAPSULE ORAL at 10:29

## 2021-12-22 RX ADMIN — CARISOPRODOL 87.5 MG: 350 TABLET ORAL at 10:30

## 2021-12-22 RX ADMIN — BUPRENORPHINE AND NALOXONE 1 FILM: 8; 2 FILM, SOLUBLE BUCCAL; SUBLINGUAL at 10:30

## 2021-12-22 RX ADMIN — DOCUSATE SODIUM 100 MG: 100 CAPSULE, LIQUID FILLED ORAL at 10:28

## 2021-12-22 RX ADMIN — TAMSULOSIN HYDROCHLORIDE 0.4 MG: 0.4 CAPSULE ORAL at 10:29

## 2021-12-22 RX ADMIN — DAPTOMYCIN 600 MG: 500 INJECTION, POWDER, LYOPHILIZED, FOR SOLUTION INTRAVENOUS at 13:24

## 2021-12-22 ASSESSMENT — ACTIVITIES OF DAILY LIVING (ADL)
ADLS_ACUITY_SCORE: 5

## 2021-12-22 NOTE — PROGRESS NOTES
[facility]    Hospitalist Progress Note    Assessment & Plan   Bc German is a 41 year old male with PMHx of polysubstance abuse including IV drug use (including fentanyl and methamphetamine currently), chronic opioid dependence, HIV, hepatitis C, hx of MSSA sepsis with pulmonary valve endocarditis (2015), and 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 Was found to have recurrent MSSA/MRSA bacteremia and tricuspid valve endocarditis.     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 lactate nl. CXR on admission showed nodular/patchy opacity at the R base.  * 2/2 blood cultures drawn on admission grew both MSSA and MRSA >> blood cx cleared  on 11/18 >> vancomycin  changed to daptomycin due to JOSE RAMON   EDUARDO done 11/18 notable for large tricuspid valve vegetation with severe regurgitation  - ID following, to continue Daptomycin, with plans to switch to Vancomycin when renal function better; will need 6 weeks of antibiotic in hospital, until 12/29; midline was placed 12/2  - discussed with ID- cr 1.37--1.31--1.25, would continue with Daptomycin at this time.   - remains afebrile ; EDUARDO repeat 12/02 noted with smaller vegetations than prior EDUARDO but did note that tricuspid valve leaflet is likely perforated and prolapsing leading to severe degenerative TR, EF 65-70 %  - was having bilateral LE swelling, likely due to TR, US 12/3 noted with no DVT; edema improved with 40 mg IV Lasix given on 12/3 ; will monitor volume  status closely and consider diuretics prn  - given EDUARDO findings, re-evaluated by thoracic surgery (initially rec medical management) and plans for surgical replacement with bioprosthetic valve at some point; surgery concerned about his IV drug use and consulted with Dr SELINA Barrientos (addiction medicine physician) at [facility] prior to considering surgery    -Cultures have been clear since 11/ 18   -wt: 56kg admission---> peak 73kg---> stable 59-61kg since. Follow volume status given severe TR.   - was evaluated by Dr Barrientos (addiction specialist at [place]); concern that he is at high risk for relapse and thus at high risk for prosthetic valve endocarditis if he undergoes valve replacement; rec postponing surgery at this time to focus on chemical dependency  -outpatient follow up with CV surgery, touch base with discharge plan with them at time of discharge.  -no signs of RHF at this time.   -murmur LLSB  -no signs right HF.    -Continue IV Daptomycin, plan IV antibiotics until 12/29 to complete 6 weeks of treatment. Here with no other viable disposition      Chronic anemia, likely anemia of chronic disease  - likely anemia of chronic disease in the setting of infective endocarditis  - hemoglobin on admission however was 12.5 and has gradually trended down--->7.1, stable--7.7---7.3---7.6--> 7.4 12/10.   - hemodynamically stable and no suggestion of active bleed  - iron studies consistent with likely ACD with normal ferritin and low TIBC, TSH slightly elevated but free T4 normal; b12 and folate normal  - will monitor hemoglobin periodically,repeat hgb in AM and transfuse PRN for Hb <7  - Hb stable 8-9 range  - follow cbc with platelet count intermittently.  Am cbc     HTN  - BP controlled with addition of amlodipine 5 mg po daily and Metoprolol 25 mg po bid with hold parameters  (12/3); continue;   - hydralazine IV prn for SBP>180; plan for intermittent diuresis if needed  -pt not taking metoprolol last few days. Stopped, pt  normotensive.   - 12/20will stop amlodipine and follow sbp. Nl currently on low dose. reeval over next few days if needs this restarted.   -sbp <120/- off meds         HIV  Hepatitis C  * Chronic and stable on Triumeq restarted 11/30 as cr improving   * CDC >200 so no need for PJP ppx per ID  -follows with Dr Chavez of [facility]/ID  -Hep C still active/viremic and will need to be on treatment at some point     JOSE RAMON on CKD, likely due to Vancomycin: resolved.   Hyperkalemia (resolved)  Hyponatremia: resolved   * No hx of CKD and with a normal baseline enrique function  * Cr initially stable this stay, began trending up after vancomycin  - nephrology evaluated for JOSE RAMON, Cr peak 2.8 (11/25)---> trending down --> 1.92---1.78---1.61---1.4--1.37 nephro plans to follow up outpatient; signed off 12/3  - monitor BMP periodically  - ADAN returned negative, Renal US obtained 11/24 and negative for obstruction  - bmp in several days. 12/20 nl     History of opiate dependence  History of active IV drug use  Tobacco Use  Depression  Anxiety  * On admission, had stated he last used IV drugs 1 wk prior to admission. While in the ED on 11/16, RN had noted patient was briefly nonresponsive and apneic. Was given dose of Narcan with noted improvement.   * Prior to admission, had been started on taper off Soma -- was taking 87.5mg (1/4 of a 350mg tab) BID but later conversations with PCP's clinic revealed no provider there was managing taper  -- was continued on PTA Subutex BID this stay-- after discussion with Dr Snyder, switched to Suboxone bid (12/8)  -- conts on levomilnacipran ER as per prior to admission  -- cont carisoprodol, after discussion with DR snyder and Pharmacy, plan to taper--- switched to 87.5mg daily for 7 days (taper started 12/8), then plan to do q 48 hrs for 7 days  -- was on lorazepam 0.5mg q6h prn for anxiety (consider psych / Addiction medicine consult/ recommendations reg lorazepam at discharge)  --   "nicotine patch  -- PTA had been on Lyrica 150 mg po BID which was discontinued on 11/24 because of worsening renal function?   -- 12/13- he is asking if Lyrica can be resumed \"even half of dose\" because his anxiety is increasing and Lyrica would help.    12/15- today he is frustrated about his management; states he has withdrawal symptoms, cravings and increased anxiety; stated that because of increased anxiety- he cannot eat and he cannot sleep; said he is worried bout 30lbs weight loss because he does not have appetite; said that he does not want to be on Suboxone because \"he does not want to get addicted to it\" he said that PTA he was taking Suboxone only as needed. He said that only Ativan is helping with anxiety and the ordered dose of Ativan 0.5 mg po q6h prn is too small.  - wants to restart Lyrica or Gabapentin     12/16- Psych consult to re-assess.  - started on Lyrica 75 mg po BID as per Psych; Psych did not recommend to stop Suboxone or to increase the dose of Ativan at this time.     12/17- today- he asks to change his Ativan from 0.5 mg q6h prn to 1 mg BID prn (\"because it would be the same daily dose\"); overall, he seems to keep asking for higher doses of Lorazepam; I told him that I will not change the dose/frequency at this time as Psych just saw him yesterday and did not recommend any changes.     CHEM Depp evaluated and recommended inpatient treatment after completion of antibiotics directly will transfer from here to detox treatment center     -reviewed last psych note. Pt still feels he needs higher dose of ativan. He finds that that the lyrica that was restarted has been helpful. Emphasized adherence to current regimen and importance of chem dep and outpatient psychology evaluation and management    - 12/19,   per SW note, pt feeling exhausted and feels he needs TCU before treatment. SW following. Will order PT, OT  Pt also attempting to self wean off suboxone. Will reconsult psychiatry " "regarding suboxone Rx.   12/20. Seen by psychiatry again regarding pt's suboxone and wish to taper off. Pt had been taking every other day. Also with ongoing anxiety. Seen psychiatry consult note. Psychiatry had detailed discussion with patient and does not support tapering off suboxone given high risk for narcotic dependence relapse. Pt changed to scheduled ativan and prn ativan stopped.   Pt did not agree with psychiatry assessment.   12/21. Pt now taking suboxone daily stating \" I want to do the right thing\".   Reinforced that taking suboxone with prevent narcotic w/d symptoms which may have been driving some of his anxiety, treats his addiction   Taking one dose daily (not taking both doses)         Left scrotal laceration dt fall/possible syncope  Left scrotal wound infection  * Laceration sutured in ED. Suture removed 11/21.   * US scrotum earlier this stay showed a small R hydrocele but was otherwise normal.   Patient's scrotal laceration wound looks clean and dry.  -evaluated on 12/19. No new issues. Healing well, no signs infection    -Follow WOC recommendations       Left ear pain; improved    No evidence of otitis media or externa. Likely cerumen impaction,  -Continue to  apply carbamide peroxide  Ear drops to left ears prn     Constipation  bowel regimen in place but states only magnesium citrate works for him; declines other bowel regimen; Mag citrate ordered 12/7; also got on 12/2     Groin fungal rash  Miconazole powder bid    Diet: Snacks/Supplements Adult: Ensure Enlive; With Meals  Regular Diet Adult    DVT Prophylaxis: Pneumatic Compression Devices  Tirado Catheter: Not present  Central Lines: None  Code Status: Full Code       Disposition Plan   Expected Discharge: 12/29/2021     Anticipated discharge location: CHEM Depp evaluated and recommended inpatient treatment after completion of antibiotics directly will transfer from here to detox treatment center  if pt agrees. SW " following.         Discussed care plan with psych, pt, JAREN Quan MD  Text Page  (7am to 6pm)  Interval History     Seen by ID  Feels fatigued during day. Rash in groin  Taking po.     -Data reviewed today: I reviewed all new labs and imaging results over the last 24 hours. I personally reviewed labs last 48 hours.     Physical Exam   Temp: 97.3  F (36.3  C) Temp src: Oral BP: 116/83 Pulse: 66   Resp: 16 SpO2: 97 % O2 Device: None (Room air)    Vitals:    12/14/21 0700 12/15/21 0625 12/20/21 0625   Weight: 59.2 kg (130 lb 8.2 oz) 60.4 kg (133 lb 1.6 oz) 60.1 kg (132 lb 8 oz)     Vital Signs with Ranges  Temp:  [97.3  F (36.3  C)-98.3  F (36.8  C)] 97.3  F (36.3  C)  Pulse:  [] 66  Resp:  [16] 16  BP: (116-124)/(83-89) 116/83  SpO2:  [97 %-99 %] 97 %  No intake/output data recorded.    Constitutional: Nad, in bed  Respiratory: CTAB, breathing easilyi   Cardiovascular: RRR no r/g. 2/6 murmur RLSB  GI: soft, nt, nd  ; scrotal lac without signs erythema, L scrotum. No redness, healing, looking better.   Skin/Integumen: no edema, mild redish rash bilateral groin  Neuro: nl speech and mentation. No tremor  Psych: pleasant, calm, conversant, perseverative at times       Medications       abacavir-dolutegravir-LamiVUDine  1 tablet Oral Daily     aspirin  81 mg Oral Daily     buprenorphine HCl-naloxone HCl  1 Film Sublingual BID     carbamide peroxide  3 drop Left Ear BID     DAPTOmycin (CUBICIN) intermittent infusion  9 mg/kg Intravenous Q24H     docusate sodium  100 mg Oral BID     levomilnacipran  120 mg Oral Daily     LORazepam  0.5 mg Oral TID     miconazole   Topical BID     nicotine  1 patch Transdermal QPM     nicotine   Transdermal Q8H     polyethylene glycol  17 g Oral BID     pregabalin  75 mg Oral BID     sennosides  2 tablet Oral BID     sodium chloride (PF)  10 mL Intracatheter Q8H     tamsulosin  0.4 mg Oral Daily       Data   Recent Labs   Lab 12/20/21  0639 12/17/21  0550 12/16/21  0635    WBC 5.9  --  5.8   HGB 9.1*  --  8.8*   MCV 92  --  89     --  313    136  --    POTASSIUM 4.9 5.1  --    CHLORIDE 106 104  --    CO2 28 29  --    BUN 32* 31*  --    CR 1.00 1.25  --    ANIONGAP 2* 3  --    SANDRA 9.5 9.9  --    GLC 99 113*  --        Imaging:   No results found for this or any previous visit (from the past 24 hour(s)).

## 2021-12-22 NOTE — PLAN OF CARE
DATE & TIME: 12/21/2021 [id number]   Cognitive Concerns/ Orientation : A&Ox4  BEHAVIOR & AGGRESSION TOOL COLOR: Green   ABNL VS/O2: Daily vitals only  MOBILITY: Independent in room  PAIN MANAGMENT: Denies pain  DIET: Regular, tolerating  BOWEL/BLADDER: Continent of B&B  ABNL LAB/BG: none  DRAIN/DEVICES: LUE Midline, saline locked.   SKIN: Scattered bruising, scrotal wound(performs own wound care). Refused skin assessments.  D/C DATE: Discharge pending, requires IV antibiotics until 12/29.  OTHER IMPORTANT INFO: ID and WOC following. Pt is able to make needs known.

## 2021-12-22 NOTE — PLAN OF CARE
DATE & TIME: 12/21/2021 1900-2330  Cognitive Concerns/ Orientation : A&O x4, calm & cooperative. Very talkative and pleasant. Reports being very hopeful for sobriety and listed things he is most excited for. TID scheduled Ativan.  BEHAVIOR & AGGRESSION TOOL COLOR: Green   ABNL VS/O2: Daily vitals only  MOBILITY: Independent in room  PAIN MANAGMENT: Denies  DIET: Regular, tolerating  BOWEL/BLADDER: Continent of B&B  ABNL LAB/BG: None  DRAIN/DEVICES: LUE Midline SL  SKIN: Scattered bruising, scrotal wound (performs own wound care). Refused skin assessments.  D/C DATE: Discharge pending, requires IV antibiotics until 12/29. Plan to potentially go to treatment.   OTHER IMPORTANT INFO: Refused Suboxone. Contact precautions for MRSA

## 2021-12-22 NOTE — PLAN OF CARE
DATE & TIME: 12/22/21, 0456 -6979   Cognitive Concerns/ Orientation : A&O x 4   BEHAVIOR & AGGRESSION TOOL COLOR: Green  ABNL VS/O2: Daily vital signs  MOBILITY: Independent  PAIN MANAGMENT: Denied  DIET: Regular  BOWEL/BLADDER: Continent  ABNL LAB/BG: NA  DRAIN/DEVICES: LUE Midline SL  SKIN: Refused full skin assessment. Scattered bruises. Performs own care to scrotal wound  TESTS/PROCEDURES: None scheduled  D/C DATE: Discharge pending completion of IV antibiotic on 12/29/21  OTHER IMPORTANT INFO: ID and WOC following. Contact precautions maintained

## 2021-12-22 NOTE — PLAN OF CARE
DATE & TIME: 12/22/21 0700-1930     Cognitive Concerns/ Orientation : A&O x 4   BEHAVIOR & AGGRESSION TOOL COLOR: Green  ABNL VS/O2: Daily vital signs  MOBILITY: Independent  PAIN MANAGMENT: Denies  DIET: Regular  BOWEL/BLADDER: Continent  ABNL LAB/BG: BUN 32, Hgb 9.1  DRAIN/DEVICES: LUE Midline SL  SKIN: Scrotal wound red with scant serosanguinous drainage. Pt completed his own wound care. Has a new mild, red rash on groin, miconazole ordered.   TESTS/PROCEDURES: None scheduled  D/C DATE: Discharge pending completion of IV antibiotic on 12/29/21. SW following for Chem Dep placement.   OTHER IMPORTANT INFO: ID and WOC following. Contact precautions maintained. Father at bedside this shift.

## 2021-12-22 NOTE — PROGRESS NOTES
[facility]    Infectious Disease Progress Note    Date of Service : 12/22/2021     Assessment:  41 year old male with polysubstance use/IVDA, HIV-Hep C co-infection and prior hx of recurrent MSSA sepsis with pulmonic valve endocarditis, spinal discitis/osteomyelitis with epidural abscess requiring surgical debridement, who is now admitted with high grade MSSA/MRSA bacteremia with tricuspid valve endocarditis with two large [id number] x 8mm and 13 x 5mm, and repeat EDUARDO now shows valve perforation with severe tricuspid regurgitation. Blood cxs are negative since 11/18  . He has had medication non compliance with ART with detectable VL, CD4 >200 at this time not requiring PCP prophylaxis. Course further complicated by JOSE RAMON and hyperkalemia which have improved as well as tricuspid valve perforation which needs surgical management.     1. MSSA/MRSA sepsis with tricuspid valve endocarditis - blood cxs cleared 11/18, Repeat EDUARDO 12/2 now shows valve perforation with severe regurgitation, awaiting surgery.   2. JOSE RAMON  improving  3. LE edema , doppler negative for DVT  4. Prior history of MSSA sepsis with pulmonary valve endocarditis in 2015 and spinal discitis/osteomyelitis with epidural abscess requiring surgical intervention in 2017.  5. Substance abuse with active use of methamphetamine / fentanyl currently  6.  HIV/Hepatitis C co-infection. Last HIV VL detectable at 29,522 copies/ml and  CD4 625(28%) on  06/09/2021 , on Triumeq        Recommendations  1. Continue IV Daptomycin, plan IV antibiotics until 12/29 to complete 6 weeks of treatment. Here with no other viable disposition   2. Valve replacement planned at some point in future, no sxs of valve failure currently, fatigue biggest co  3. Cultures have been clear since 11/ 18   4. Continue on PTA triumeq,, VL recently low +. Pt wants to see if undet  recheck(pending)  5 Hep C is still active, ie viremic and should be treated at some  point    Srinivas Chavez MD    Interval History    Resting, no new complaints today inflamm markers elevated still  Tolerating antibiotics without side effects    Physical Exam   Temp: 98.3  F (36.8  C) Temp src: Oral BP: 124/89 Pulse: 107   Resp: 16 SpO2: 99 % O2 Device: None (Room air)    Vitals:    12/14/21 0700 12/15/21 0625 12/20/21 0625   Weight: 59.2 kg (130 lb 8.2 oz) 60.4 kg (133 lb 1.6 oz) 60.1 kg (132 lb 8 oz)     Vital Signs with Ranges  Temp:  [97.3  F (36.3  C)-98.3  F (36.8  C)] 98.3  F (36.8  C)  Pulse:  [] 107  Resp:  [16] 16  BP: (110-124)/(72-89) 124/89  SpO2:  [99 %] 99 %    Constitutional: Awake, alert, cooperative, no apparent distress  Lungs: Clear to auscultation bilaterally, no crackles or wheezing  Cardiovascular: Regular rate and rhythm, systolic murmur  Abdomen: Normal bowel sounds, soft, non-distended, non-tender  Skin: : multiple scars, IV racks, excoriations on hands,    : Left scrotal laceration site appears clean, no sign of infection  MS : bilateral LE edema L>R    Other:    Medications       abacavir-dolutegravir-LamiVUDine  1 tablet Oral Daily     aspirin  81 mg Oral Daily     buprenorphine HCl-naloxone HCl  1 Film Sublingual BID     carbamide peroxide  3 drop Left Ear BID     carisoprodol  87.5 mg Oral Q48H     DAPTOmycin (CUBICIN) intermittent infusion  9 mg/kg Intravenous Q24H     docusate sodium  100 mg Oral BID     levomilnacipran  120 mg Oral Daily     LORazepam  0.5 mg Oral TID     nicotine  1 patch Transdermal QPM     nicotine   Transdermal Q8H     polyethylene glycol  17 g Oral BID     pregabalin  75 mg Oral BID     sennosides  2 tablet Oral BID     sodium chloride (PF)  10 mL Intracatheter Q8H     tamsulosin  0.4 mg Oral Daily       Data   All microbiology laboratory data reviewed.  Recent Labs   Lab Test 12/20/21  0639 12/16/21  0635 12/13/21  0949 12/06/21  0624 12/05/21  0555   WBC 5.9 5.8  --   --  9.1   HGB 9.1* 8.8* 8.2*   < > 7.7*   HCT 30.4* 28.2*  --   --   24.6*   MCV 92 89  --   --  87    313  --   --  387    < > = values in this interval not displayed.     Recent Labs   Lab Test 12/20/21  0639 12/17/21  0550 12/15/21  0646   CR 1.00 1.25 1.31*     Recent Labs   Lab Test 12/17/21  0550   SED 74*     Microbiology  11/14 & 4/16 blood cxs both sets MSSA & MRSA, blood cxs 11/18, 11/19 blood cxs negative  Culture Positive on the 1st day of incubation Abnormal         Staphylococcus aureus Panic     2 of 2 bottles   Staphylococcus aureus MRSA Panic     2 of 2 bottles         Resulting Agency: [facility]         Susceptibility                       Staphylococcus aureus Staphylococcus aureus MRSA       PURNIMA PURNIMA       Clindamycin <=0.25 ug/mL Susceptible <=0.25 ug/mL Susceptible 1       Erythromycin <=0.25 ug/mL Susceptible >=8.0 ug/mL Resistant       Gentamicin <=0.5 ug/mL Susceptible <=0.5 ug/mL Susceptible       Linezolid     2.0 ug/mL Susceptible       Oxacillin <=0.25 ug/mL Susceptible >=4.0 ug/mL Resistant       Tetracycline <=1.0 ug/mL Susceptible <=1.0 ug/mL Susceptible       Trimethoprim/Sulfamethoxazole <=0.5/9.5 u... Susceptible <=0.5/9.5 u... Susceptible       Vancomycin <=0.5 ug/mL Susceptible <=0.5 ug/mL Susceptible                    Imaging  11/2 EDUARDO  Interpretation Summary     There is a small filamentous echodensity noted on the posterior mitral valve  leaflet (image 28) which in the clinical context, could be a vegetation.  The aortic valve is normal in structure and function.  There is a 0.9 cm vegetation noted on the posterior leaflet of the tricuspid  valve. The leaflet is likely perforated and prolapsing leading to severe  degenerative TR. Vegetations much smaller than prior EDUARDO. See images 100-127  towards the end of the study.  The visual ejection fraction is 65-70%.  The right ventricle is normal in size and function     11/24 US kidneys  ULTRASOUND RENAL COMPLETE 11/24/2021 12:53 PM     CLINICAL HISTORY: MSSA sepsis, endocarditis, increasing  creatinine,  evaluate for obstruction.     TECHNIQUE: Routine Bilateral Renal and Bladder Ultrasound.     COMPARISON: None.     FINDINGS:  RIGHT KIDNEY: 12.1 x 6.9 x 4.9 cm. Unremarkable echogenicity, no  hydronephrosis or masses. Simple cysts noted measuring up to 1.7 cm.  Mildly complex cyst in the upper right kidney measuring 1.8 cm.     LEFT KIDNEY: 1.2 x 6.0 x 5.8 cm. Unremarkable echogenicity, no  hydronephrosis or masses. Simple cysts noted.     BLADDER: Unremarkable given its level of distension.                                                                      IMPRESSION:  No obstruction demonstrated     11/18 EDUARDO  Interpretation Summary     Two, large, mobile masses noted, attached to the atrial side of base of  posterior tricuspid leaflets are noted. (they measures 18 x 8mm and 13 x 5mm  respectively).These are most consistent with large tricuspid valve  vegetations.  Severe tricuspid regurgitation noted, directed towards the interatrial septum.  Cannot exclude perforation of posterior leaflet of tricuspid valve.  The right ventricle is mildly dilated.  The right ventricular systolic function is normal.  Left ventricular systolic function is normal.  The visual ejection fraction is 60-65%.  Findings discussed with Dr Guerin.  These are new comnpared to prior echo from 2017. The study was technically  adequate.

## 2021-12-22 NOTE — PROGRESS NOTES
"SPIRITUAL HEALTH SERVICES  SPIRITUAL ASSESSMENT Progress Note  [facility] UNIT [facility]    REFERRAL SOURCE: -initiated visit to assess emotional and spiritual needs in light of length of hospital stay.      Follow up visit at Walt's request. Walt processed his lengthy hospitalization and shared some of his addiction history with me, as well as a long history struggling with anxiety. Named that he has been sober over a month; he attributed this to hitting his \"rock bottom\" when he learned that he would need \"open heart surgery.\" I congratulated him on his sobriety, which he appreciated.    At the close of our visit, Walt said \"thanks for listening\" and is open to follow up.    I provided empathetic listening and emotional support    PLAN: I will continue to follow. Spirit[facility] Services remains available for patient, family, and staff support.     Please page the on call  by placing a STAT or ASAP Epic referral for Spiritual Health (which will roll to the on call pager).        RHODA Ochoa.   Associate   Pager [phone]      "

## 2021-12-22 NOTE — PROGRESS NOTES
"[facility] Nurse Inpatient Wound Assessment   Reason for consultation: Evaluate and treat  Scrotum wound wounds      Entered patient room, introduced self and purpose of reassessment, patient reports wound has shrunk in size and improved. Reports he participated in wound care with his primary nurse in the morning. Declined / refused reassessment today stating \"I know it only takes a second to whip it out but I just dont feel like it.\"     Wound team will reattempt consult at a later time/date. Continue current plan of care.       Erlinda KINGSLEY   "

## 2021-12-22 NOTE — PROGRESS NOTES
Care Management Follow Up    Length of Stay (days): 36    Expected Discharge Date: 12/29/2021     Concerns to be Addressed: discharge planning     Patient plan of care discussed at interdisciplinary rounds: Yes    Anticipated Discharge Disposition: Inpatient Chemical Dependency     Anticipated Discharge Services:    Anticipated Discharge DME:      Patient/family educated on Medicare website which has current facility and service quality ratings:    Education Provided on the Discharge Plan:    Patient/Family in Agreement with the Plan: yes    Referrals Placed by CM/SW:    Private pay costs discussed:     Additional Information:  Met with patient and father.  Patient continues to feel low endurance and concern he cannot keep up with all day therapy groups.  He is basing this on his experience at [facility].   He is thinking an Intensive Outpatient with Lodging may be more realistic at this time.  [facility] are assessing patient along with [facility] in [place].  Patient has also spoken with Jeremías Richmond and would like a referral to this program.  Patient is calling facilities himself asking about their policies related to use of Ativan Suboxone and Methadone and whether they accept his insurance.  Father explains he is paying for patient's health insurance.  Patient is unemployed and concerned that his insurance will likely only authorize 28 days of treatment. Writer shared patient may be eligible for state funding under Consulidated Funds for treatment beyond 28 days. For this reason we will want to focus on programs which accept state funding.  Dr Quan did enter the room during our discussion and did address patient's ongoing concern with his endurance. Patient has been assessed by therapy and does not require skilled therapy.  We will continue to pursue an appropriate CD treatment program.    EULA HurtadoSW

## 2021-12-23 LAB
ANION GAP SERPL CALCULATED.3IONS-SCNC: 1 MMOL/L (ref 3–14)
BUN SERPL-MCNC: 31 MG/DL (ref 7–30)
CALCIUM SERPL-MCNC: 9.3 MG/DL (ref 8.5–10.1)
CHLORIDE BLD-SCNC: 109 MMOL/L (ref 94–109)
CO2 SERPL-SCNC: 29 MMOL/L (ref 20–32)
CREAT SERPL-MCNC: 0.92 MG/DL (ref 0.66–1.25)
ERYTHROCYTE [DISTWIDTH] IN BLOOD BY AUTOMATED COUNT: 18.6 % (ref 10–15)
GFR SERPL CREATININE-BSD FRML MDRD: >90 ML/MIN/1.73M2
GLUCOSE BLD-MCNC: 96 MG/DL (ref 70–99)
HCT VFR BLD AUTO: 31.1 % (ref 40–53)
HGB BLD-MCNC: 9.7 G/DL (ref 13.3–17.7)
HIV1 RNA # PLAS NAA DL=20: NOT DETECTED COPIES/ML
MCH RBC QN AUTO: 28.3 PG (ref 26.5–33)
MCHC RBC AUTO-ENTMCNC: 31.2 G/DL (ref 31.5–36.5)
MCV RBC AUTO: 91 FL (ref 78–100)
PLATELET # BLD AUTO: 249 10E3/UL (ref 150–450)
POTASSIUM BLD-SCNC: 4.4 MMOL/L (ref 3.4–5.3)
RBC # BLD AUTO: 3.43 10E6/UL (ref 4.4–5.9)
SODIUM SERPL-SCNC: 139 MMOL/L (ref 133–144)
WBC # BLD AUTO: 5.3 10E3/UL (ref 4–11)

## 2021-12-23 PROCEDURE — 250N000013 HC RX MED GY IP 250 OP 250 PS 637: Performed by: INTERNAL MEDICINE

## 2021-12-23 PROCEDURE — 250N000011 HC RX IP 250 OP 636: Performed by: INTERNAL MEDICINE

## 2021-12-23 PROCEDURE — 999N000190 HC STATISTIC VAT ROUNDS

## 2021-12-23 PROCEDURE — 99232 SBSQ HOSP IP/OBS MODERATE 35: CPT | Performed by: INTERNAL MEDICINE

## 2021-12-23 PROCEDURE — 258N000003 HC RX IP 258 OP 636: Performed by: INTERNAL MEDICINE

## 2021-12-23 PROCEDURE — 250N000013 HC RX MED GY IP 250 OP 250 PS 637: Performed by: NURSE PRACTITIONER

## 2021-12-23 PROCEDURE — G0463 HOSPITAL OUTPT CLINIC VISIT: HCPCS

## 2021-12-23 PROCEDURE — 85027 COMPLETE CBC AUTOMATED: CPT | Performed by: INTERNAL MEDICINE

## 2021-12-23 PROCEDURE — 250N000013 HC RX MED GY IP 250 OP 250 PS 637: Performed by: PSYCHIATRY & NEUROLOGY

## 2021-12-23 PROCEDURE — 82310 ASSAY OF CALCIUM: CPT | Performed by: INTERNAL MEDICINE

## 2021-12-23 PROCEDURE — 120N000001 HC R&B MED SURG/OB

## 2021-12-23 PROCEDURE — 250N000013 HC RX MED GY IP 250 OP 250 PS 637: Performed by: HOSPITALIST

## 2021-12-23 PROCEDURE — 250N000013 HC RX MED GY IP 250 OP 250 PS 637: Performed by: SPECIALIST

## 2021-12-23 RX ADMIN — PREGABALIN 75 MG: 75 CAPSULE ORAL at 21:18

## 2021-12-23 RX ADMIN — MICONAZOLE NITRATE: 2 POWDER TOPICAL at 21:22

## 2021-12-23 RX ADMIN — BUPRENORPHINE AND NALOXONE 1 FILM: 8; 2 FILM, SOLUBLE BUCCAL; SUBLINGUAL at 09:06

## 2021-12-23 RX ADMIN — DOCUSATE SODIUM 100 MG: 100 CAPSULE, LIQUID FILLED ORAL at 21:19

## 2021-12-23 RX ADMIN — DOCUSATE SODIUM 100 MG: 100 CAPSULE, LIQUID FILLED ORAL at 14:54

## 2021-12-23 RX ADMIN — LORAZEPAM 0.5 MG: 0.5 TABLET ORAL at 00:29

## 2021-12-23 RX ADMIN — MICONAZOLE NITRATE: 2 POWDER TOPICAL at 09:10

## 2021-12-23 RX ADMIN — DAPTOMYCIN 600 MG: 500 INJECTION, POWDER, LYOPHILIZED, FOR SOLUTION INTRAVENOUS at 14:55

## 2021-12-23 RX ADMIN — PREGABALIN 75 MG: 75 CAPSULE ORAL at 09:05

## 2021-12-23 RX ADMIN — ASPIRIN 81 MG: 81 TABLET, COATED ORAL at 09:05

## 2021-12-23 RX ADMIN — LORAZEPAM 0.5 MG: 0.5 TABLET ORAL at 14:54

## 2021-12-23 RX ADMIN — LEVOMILNACIPRAN HYDROCHLORIDE 120 MG: 40 CAPSULE, EXTENDED RELEASE ORAL at 09:05

## 2021-12-23 RX ADMIN — PREGABALIN 75 MG: 75 CAPSULE ORAL at 00:30

## 2021-12-23 RX ADMIN — LORAZEPAM 0.5 MG: 0.5 TABLET ORAL at 21:19

## 2021-12-23 RX ADMIN — ABACAVIR SULFATE, DOLUTEGRAVIR SODIUM, LAMIVUDINE 1 TABLET: 600; 50; 300 TABLET, FILM COATED ORAL at 09:05

## 2021-12-23 RX ADMIN — LORAZEPAM 0.5 MG: 0.5 TABLET ORAL at 09:05

## 2021-12-23 RX ADMIN — TAMSULOSIN HYDROCHLORIDE 0.4 MG: 0.4 CAPSULE ORAL at 09:05

## 2021-12-23 RX ADMIN — BUPRENORPHINE AND NALOXONE 1 FILM: 8; 2 FILM, SOLUBLE BUCCAL; SUBLINGUAL at 00:28

## 2021-12-23 ASSESSMENT — ACTIVITIES OF DAILY LIVING (ADL)
ADLS_ACUITY_SCORE: 5

## 2021-12-23 NOTE — PROGRESS NOTES
[facility]    Infectious Disease Progress Note    Date of Service : 12/23/2021     Assessment:  41 year old male with polysubstance use/IVDA, HIV-Hep C co-infection and prior hx of recurrent MSSA sepsis with pulmonic valve endocarditis, spinal discitis/osteomyelitis with epidural abscess requiring surgical debridement, who is now admitted with high grade MSSA/MRSA bacteremia with tricuspid valve endocarditis with two large [id number] x 8mm and 13 x 5mm, and repeat EDUARDO now shows valve perforation with severe tricuspid regurgitation. Blood cxs are negative since 11/18  . He has had medication non compliance with ART with detectable VL, CD4 >200 at this time not requiring PCP prophylaxis. Course further complicated by JOSE RAMON and hyperkalemia which have improved as well as tricuspid valve perforation which needs surgical management.     1. MSSA/MRSA sepsis with tricuspid valve endocarditis - blood cxs cleared 11/18, Repeat EDUARDO 12/2 now shows valve perforation with severe regurgitation, awaiting surgery.   2. JOSE RAMON  improving  3. LE edema , doppler negative for DVT  4. Prior history of MSSA sepsis with pulmonary valve endocarditis in 2015 and spinal discitis/osteomyelitis with epidural abscess requiring surgical intervention in 2017.  5. Substance abuse with active use of methamphetamine / fentanyl currently  6.  HIV/Hepatitis C co-infection. Last HIV VL detectable at 29,522 copies/ml and  CD4 625(28%) on  06/09/2021 , on Triumeq        Recommendations  1. Continue IV Daptomycin, plan IV antibiotics until 12/29 to complete 6 weeks of treatment. Here with no other viable disposition   2. Valve replacement planned at some point in future, no sxs of valve failure currently, fatigue biggest co  3. Cultures have been clear since 11/ 18   4. Continue on PTA triumeq,, VL recently low +. Pt wants to see if undet  Recheck( is undet on triumeq  5 Hep C is still active, ie viremic and should be treated at  some point    Srinivas Chavez MD    Interval History    Resting, no new complaints today inflamm markers elevated still  Tolerating antibiotics without side effects    Physical Exam   Temp: 98  F (36.7  C) Temp src: Oral BP: 118/71 Pulse: 89   Resp: 18 SpO2: 100 % O2 Device: None (Room air)    Vitals:    12/14/21 0700 12/15/21 0625 12/20/21 0625   Weight: 59.2 kg (130 lb 8.2 oz) 60.4 kg (133 lb 1.6 oz) 60.1 kg (132 lb 8 oz)     Vital Signs with Ranges  Temp:  [97.3  F (36.3  C)-98  F (36.7  C)] 98  F (36.7  C)  Pulse:  [66-89] 89  Resp:  [16-18] 18  BP: (116-118)/(71-83) 118/71  SpO2:  [97 %-100 %] 100 %    Constitutional: Awake, alert, cooperative, no apparent distress  Lungs: Clear to auscultation bilaterally, no crackles or wheezing  Cardiovascular: Regular rate and rhythm, systolic murmur  Abdomen: Normal bowel sounds, soft, non-distended, non-tender  Skin: : multiple scars, IV racks, excoriations on hands,    : Left scrotal laceration site appears clean, no sign of infection  MS : bilateral LE edema L>R    Other:    Medications       abacavir-dolutegravir-LamiVUDine  1 tablet Oral Daily     aspirin  81 mg Oral Daily     buprenorphine HCl-naloxone HCl  1 Film Sublingual BID     carbamide peroxide  3 drop Left Ear BID     DAPTOmycin (CUBICIN) intermittent infusion  9 mg/kg Intravenous Q24H     docusate sodium  100 mg Oral BID     levomilnacipran  120 mg Oral Daily     LORazepam  0.5 mg Oral TID     miconazole   Topical BID     nicotine  1 patch Transdermal QPM     nicotine   Transdermal Q8H     polyethylene glycol  17 g Oral BID     pregabalin  75 mg Oral BID     sennosides  2 tablet Oral BID     sodium chloride (PF)  10 mL Intracatheter Q8H     tamsulosin  0.4 mg Oral Daily       Data   All microbiology laboratory data reviewed.  Recent Labs   Lab Test 12/23/21  0705 12/20/21  0639 12/16/21  0635   WBC 5.3 5.9 5.8   HGB 9.7* 9.1* 8.8*   HCT 31.1* 30.4* 28.2*   MCV 91 92 89    296 313     Recent Labs    Lab Test 12/23/21  0705 12/20/21  0639 12/17/21  0550   CR 0.92 1.00 1.25     Recent Labs   Lab Test 12/17/21  0550   SED 74*     Microbiology  11/14 & 4/16 blood cxs both sets MSSA & MRSA, blood cxs 11/18, 11/19 blood cxs negative  Culture Positive on the 1st day of incubation Abnormal         Staphylococcus aureus Panic     2 of 2 bottles   Staphylococcus aureus MRSA Panic     2 of 2 bottles         Resulting Agency: [facility]         Susceptibility                       Staphylococcus aureus Staphylococcus aureus MRSA       PURNIMA PURNIMA       Clindamycin <=0.25 ug/mL Susceptible <=0.25 ug/mL Susceptible 1       Erythromycin <=0.25 ug/mL Susceptible >=8.0 ug/mL Resistant       Gentamicin <=0.5 ug/mL Susceptible <=0.5 ug/mL Susceptible       Linezolid     2.0 ug/mL Susceptible       Oxacillin <=0.25 ug/mL Susceptible >=4.0 ug/mL Resistant       Tetracycline <=1.0 ug/mL Susceptible <=1.0 ug/mL Susceptible       Trimethoprim/Sulfamethoxazole <=0.5/9.5 u... Susceptible <=0.5/9.5 u... Susceptible       Vancomycin <=0.5 ug/mL Susceptible <=0.5 ug/mL Susceptible                    Imaging  11/2 EDUARDO  Interpretation Summary     There is a small filamentous echodensity noted on the posterior mitral valve  leaflet (image 28) which in the clinical context, could be a vegetation.  The aortic valve is normal in structure and function.  There is a 0.9 cm vegetation noted on the posterior leaflet of the tricuspid  valve. The leaflet is likely perforated and prolapsing leading to severe  degenerative TR. Vegetations much smaller than prior EDUARDO. See images 100-127  towards the end of the study.  The visual ejection fraction is 65-70%.  The right ventricle is normal in size and function     11/24 US kidneys  ULTRASOUND RENAL COMPLETE 11/24/2021 12:53 PM     CLINICAL HISTORY: MSSA sepsis, endocarditis, increasing creatinine,  evaluate for obstruction.     TECHNIQUE: Routine Bilateral Renal and Bladder Ultrasound.     COMPARISON:  None.     FINDINGS:  RIGHT KIDNEY: 12.1 x 6.9 x 4.9 cm. Unremarkable echogenicity, no  hydronephrosis or masses. Simple cysts noted measuring up to 1.7 cm.  Mildly complex cyst in the upper right kidney measuring 1.8 cm.     LEFT KIDNEY: 1.2 x 6.0 x 5.8 cm. Unremarkable echogenicity, no  hydronephrosis or masses. Simple cysts noted.     BLADDER: Unremarkable given its level of distension.                                                                      IMPRESSION:  No obstruction demonstrated     11/18 EDUARDO  Interpretation Summary     Two, large, mobile masses noted, attached to the atrial side of base of  posterior tricuspid leaflets are noted. (they measures 18 x 8mm and 13 x 5mm  respectively).These are most consistent with large tricuspid valve  vegetations.  Severe tricuspid regurgitation noted, directed towards the interatrial septum.  Cannot exclude perforation of posterior leaflet of tricuspid valve.  The right ventricle is mildly dilated.  The right ventricular systolic function is normal.  Left ventricular systolic function is normal.  The visual ejection fraction is 60-65%.  Findings discussed with Dr Guerin.  These are new comnpared to prior echo from 2017. The study was technically  adequate.

## 2021-12-23 NOTE — PLAN OF CARE
Cognitive Concerns/ Orientation : A&O x 4   BEHAVIOR & AGGRESSION TOOL COLOR: Green, calm/cooperative  ABNL VS/O2: Daily vital signs  MOBILITY: Independent  PAIN MANAGMENT: Denies  DIET: Regular, fair po  BOWEL/BLADDER: Continent  ABNL LAB/BG: Covid swab negative  DRAIN/DEVICES: LUE Midline SL  SKIN: Scrotal wound red with scant serosanguinous drainage. Pt completed his own wound care. Has a new mild, red rash on groin, miconazole ordered.   TESTS/PROCEDURES: None scheduled  D/C DATE: Discharge pending completion of IV antibiotic on 12/29/21. SW following for Chem Dep placement.   OTHER IMPORTANT INFO: Pt slept well, no c/o pain, continues on Daptomycin, ID and WOC following. Contact precautions maintained.

## 2021-12-23 NOTE — PROGRESS NOTES
[facility]    Hospitalist Progress Note    Assessment & Plan   Bc German is a 41 year old male with PMHx of polysubstance abuse including IV drug use (including fentanyl and methamphetamine currently), chronic opioid dependence, HIV, hepatitis C, hx of MSSA sepsis with pulmonary valve endocarditis (2015), and 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 Was found to have recurrent MSSA/MRSA bacteremia and tricuspid valve endocarditis.     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 lactate nl. CXR on admission showed nodular/patchy opacity at the R base.  * 2/2 blood cultures drawn on admission grew both MSSA and MRSA >> blood cx cleared  on 11/18 >> vancomycin  changed to daptomycin due to JOSE RAMON   EDUARDO done 11/18 notable for large tricuspid valve vegetation with severe regurgitation  - ID following, to continue Daptomycin, with plans to switch to Vancomycin when renal function better; will need 6 weeks of antibiotic in hospital, until 12/29; midline was placed 12/2  - discussed with ID- cr 1.37--1.31--1.25, would continue with Daptomycin at this time.   - remains afebrile ; EDUARDO repeat 12/02 noted with smaller vegetations than prior EDUARDO but did note that tricuspid valve leaflet is likely perforated and prolapsing leading to severe degenerative TR, EF 65-70 %  - was having bilateral LE swelling, likely due to TR, US 12/3 noted with no DVT; edema improved with 40 mg IV Lasix given on 12/3 ; will monitor volume  status closely and consider diuretics prn  - given EDUARDO findings, re-evaluated by thoracic surgery (initially rec medical management) and plans for surgical replacement with bioprosthetic valve at some point; surgery concerned about his IV drug use and consulted with Dr SELINA Barrientos (addiction medicine physician) at [facility] prior to considering surgery    -Cultures have been clear since 11/ 18   -wt: 56kg admission---> peak 73kg---> stable 59-61kg and stable    Follow volume status given severe TR.   - was evaluated by Dr Barrientos (addiction specialist at [place]); concern that he is at high risk for relapse and thus at high risk for prosthetic valve endocarditis if he undergoes valve replacement; rec postponing surgery at this time to focus on chemical dependency  -outpatient follow up with CV surgery, touch base with discharge plan with them at time of discharge.  -cV surgery to consider valve repair after pt completes treatment and remains sober  -no signs of RHF at this time.   -murmur LLSB  -no signs right HF.    -Continue IV Daptomycin, plan IV antibiotics until 12/29 to complete 6 weeks of treatment. Here with no other viable disposition, outpatient CV surgery follow up, chem Dep program at discharge/after completes IV abx in patient     Chronic anemia, likely anemia of chronic disease  - likely anemia of chronic disease in the setting of infective endocarditis  - hemoglobin on admission however was 12.5 and has gradually trended down--->7.1, stable--7.7---7.3---7.6--> 7.4 12/10.   - hemodynamically stable and no suggestion of active bleed  - iron studies consistent with likely ACD with normal ferritin and low TIBC, TSH slightly elevated but free T4 normal; b12 and folate normal  - will monitor hemoglobin periodically,repeat hgb in AM and transfuse PRN for Hb <7  - Hb stable 8-9 range  - follow cbc with platelet count intermittently.        HTN  - BP controlled with addition of amlodipine 5 mg po daily and Metoprolol  25 mg po bid with hold parameters  (12/3); continue;   - hydralazine IV prn for SBP>180; plan for intermittent diuresis if needed  -pt not taking metoprolol last few days. Stopped, pt normotensive.   - 12/20will stop amlodipine and follow sbp. Nl currently on low dose. reeval over next few days if needs this restarted.   -sbp <120/- off meds         HIV  Hepatitis C  * Chronic and stable on Triumeq restarted 11/30 as cr improving   * CDC >200 so no need for PJP ppx per ID  -follows with Dr Chavez of [facility]/ID  -Hep C still active/viremic and will need to be on treatment at some point     JOSE RAMON on CKD, likely due to Vancomycin: resolved.   Hyperkalemia (resolved)  Hyponatremia: resolved   * No hx of CKD and with a normal baseline enrique function  * Cr initially stable this stay, began trending up after vancomycin  - nephrology evaluated for JOSE RAMON, Cr peak 2.8 (11/25)---> trending down --> 1.92---1.78---1.61---1.4--1.37 nephro plans to follow up outpatient; signed off 12/3  - monitor BMP periodically  - ADAN returned negative, Renal US obtained 11/24 and negative for obstruction  - bmp in several days. 12/20 nl and 12/23.      History of opiate dependence  History of active IV drug use  Tobacco Use  Depression  Anxiety  * On admission, had stated he last used IV drugs 1 wk prior to admission. While in the ED on 11/16, RN had noted patient was briefly nonresponsive and apneic. Was given dose of Narcan with noted improvement.   * Prior to admission, had been started on taper off Soma -- was taking 87.5mg (1/4 of a 350mg tab) BID but later conversations with PCP's clinic revealed no provider there was managing taper  -- was continued on PTA Subutex BID this stay-- after discussion with Dr Snyder, switched to Suboxone bid (12/8)  -- conts on levomilnacipran ER as per prior to admission  -- cont carisoprodol, after discussion with DR snyder and Pharmacy, plan to taper--- switched to 87.5mg daily for 7 days (taper  "started 12/8), then plan to do q 48 hrs for 7 days  -- was on lorazepam 0.5mg q6h prn for anxiety (consider psych / Addiction medicine consult/ recommendations reg lorazepam at discharge)  --  nicotine patch  -- PTA had been on Lyrica 150 mg po BID which was discontinued on 11/24 because of worsening renal function?   -- 12/13- he is asking if Lyrica can be resumed \"even half of dose\" because his anxiety is increasing and Lyrica would help.    12/15- today he is frustrated about his management; states he has withdrawal symptoms, cravings and increased anxiety; stated that because of increased anxiety- he cannot eat and he cannot sleep; said he is worried bout 30lbs weight loss because he does not have appetite; said that he does not want to be on Suboxone because \"he does not want to get addicted to it\" he said that PTA he was taking Suboxone only as needed. He said that only Ativan is helping with anxiety and the ordered dose of Ativan 0.5 mg po q6h prn is too small.  - wants to restart Lyrica or Gabapentin     12/16- Psych consult to re-assess.  - started on Lyrica 75 mg po BID as per Psych; Psych did not recommend to stop Suboxone or to increase the dose of Ativan at this time.     12/17- today- he asks to change his Ativan from 0.5 mg q6h prn to 1 mg BID prn (\"because it would be the same daily dose\"); overall, he seems to keep asking for higher doses of Lorazepam; I told him that I will not change the dose/frequency at this time as Psych just saw him yesterday and did not recommend any changes.     [facility] Depp evaluated and recommended inpatient treatment after completion of antibiotics directly will transfer from here to detox treatment center     -reviewed last psych note. Pt still feels he needs higher dose of ativan. He finds that that the lyrica that was restarted has been helpful. Emphasized adherence to current regimen and importance of chem dep and outpatient psychology evaluation and management    - " "12/19,   per SW note, pt feeling exhausted and feels he needs TCU before treatment. SW following. Will order PT, OT  Pt also attempting to self wean off suboxone. Will reconsult psychiatry regarding suboxone Rx.   12/20. Seen by psychiatry again regarding pt's suboxone and wish to taper off. Pt had been taking every other day. Also with ongoing anxiety. Seen psychiatry consult note. Psychiatry had detailed discussion with patient and does not support tapering off suboxone given high risk for narcotic dependence relapse. Pt changed to scheduled ativan and prn ativan stopped.   Pt did not agree with psychiatry assessment.   12/21. Pt now taking suboxone daily stating \" I want to do the right thing\".   Reinforced that taking suboxone with prevent narcotic w/d symptoms which may have been driving some of his anxiety, treats his addiction   Taking one dose daily (not taking both doses)    -recommend reconsulting psychiatry on Monday to follow up change of ativan to TID. Discussed with psychiatry         Left scrotal laceration dt fall/possible syncope  Left scrotal wound infection  * Laceration sutured in ED. Suture removed 11/21.   * US scrotum earlier this stay showed a small R hydrocele but was otherwise normal.   Patient's scrotal laceration wound looks clean and dry.  -evaluated on 12/19. No new issues. Healing well, no signs infection    -Follow WOC recommendations   -continued improvement      Left ear pain; improved    No evidence of otitis media or externa. Likely cerumen impaction,  -Continue to  apply carbamide peroxide  Ear drops to left ears prn  Resolved.      Constipation  bowel regimen in place but states only magnesium citrate works for him; declines other bowel regimen; Mag citrate ordered 12/7; also got on 12/2  No new issues     Groin fungal rash  Miconazole powder bid  follow    Diet: Snacks/Supplements Adult: Ensure Enlive; With Meals  Regular Diet Adult    DVT Prophylaxis: Pneumatic Compression " Devices  Tirado Catheter: Not present  Central Lines: None  Code Status: Full Code       Disposition Plan   Expected Discharge: 12/29/2021     Anticipated discharge location: [facility] evaluated and recommended inpatient treatment after completion of antibiotics directly will transfer from here to detox treatment center  if pt agrees. SW following.         Discussed care plan with pt    Jeremiah Quan MD  Text Page  (7am to 6pm)  Interval History   No new issues. Feels fine  Taking po.     -Data reviewed today: I reviewed all new labs and imaging results over the last 24 hours. I personally reviewed labs last 48 hours.     Physical Exam   Temp: 98  F (36.7  C) Temp src: Oral BP: 118/71 Pulse: 89   Resp: 18 SpO2: 100 % O2 Device: None (Room air)    Vitals:    12/14/21 0700 12/15/21 0625 12/20/21 0625   Weight: 59.2 kg (130 lb 8.2 oz) 60.4 kg (133 lb 1.6 oz) 60.1 kg (132 lb 8 oz)     Vital Signs with Ranges  Temp:  [97.3  F (36.3  C)-98  F (36.7  C)] 98  F (36.7  C)  Pulse:  [66-89] 89  Resp:  [16-18] 18  BP: (116-118)/(71-83) 118/71  SpO2:  [97 %-100 %] 100 %  I/O last 3 completed shifts:  In: 120 [P.O.:120]  Out: -     Constitutional: Nad, in bed  Respiratory: CTAB, breathing easilyi   Cardiovascular: RRR no r/g. 2/6 murmur RLSB  GI: soft, nt, nd  ; scrotal lac without signs erythema, L scrotum. No redness, healing, looking better again today  Skin/Integumen: no edema, mild redish rash bilateral groin   Neuro: nl speech and mentation. No tremor  Psych: pleasant, calm, conversant, perseverative at times       Medications       abacavir-dolutegravir-LamiVUDine  1 tablet Oral Daily     aspirin  81 mg Oral Daily     buprenorphine HCl-naloxone HCl  1 Film Sublingual BID     carbamide peroxide  3 drop Left Ear BID     DAPTOmycin (CUBICIN) intermittent infusion  9 mg/kg Intravenous Q24H     docusate sodium  100 mg Oral BID     levomilnacipran  120 mg Oral Daily     LORazepam  0.5 mg Oral TID     miconazole   Topical  BID     nicotine  1 patch Transdermal QPM     nicotine   Transdermal Q8H     polyethylene glycol  17 g Oral BID     pregabalin  75 mg Oral BID     sennosides  2 tablet Oral BID     sodium chloride (PF)  10 mL Intracatheter Q8H     tamsulosin  0.4 mg Oral Daily       Data   Recent Labs   Lab 12/23/21  0705 12/20/21  0639 12/17/21  0550   WBC 5.3 5.9  --    HGB 9.7* 9.1*  --    MCV 91 92  --     296  --     136 136   POTASSIUM 4.4 4.9 5.1   CHLORIDE 109 106 104   CO2 29 28 29   BUN 31* 32* 31*   CR 0.92 1.00 1.25   ANIONGAP 1* 2* 3   SANDRA 9.3 9.5 9.9   GLC 96 99 113*       Imaging:   No results found for this or any previous visit (from the past 24 hour(s)).

## 2021-12-23 NOTE — PLAN OF CARE
DATE & TIME: 12/23/21 5713-0362  Cognitive Concerns/ Orientation : A&O x 4   BEHAVIOR & AGGRESSION TOOL COLOR: Green, calm/cooperative  ABNL VS/O2: Daily vital signs  MOBILITY: Independent  PAIN MANAGMENT: Denies  DIET: Regular  BOWEL/BLADDER: Continent  ABNL LAB/BG: Covid swab negative  DRAIN/DEVICES: LUE Midline SL  SKIN: Scrotal wound red with scant serosanguinous drainage. Pt completed his own wound care. Has a new mild, red rash on groin, miconazole ordered.   TESTS/PROCEDURES: None scheduled  D/C DATE: Discharge pending completion of IV antibiotic on 12/29/21. SW following for Chem Dep placement.   OTHER IMPORTANT INFO: continues on Daptomycin, ID and WOC following. Contact precautions maintained.

## 2021-12-23 NOTE — PROGRESS NOTES
"[facility] Nurse Inpatient Wound Assessment   Reason for consultation: Re-evaluate and treat  Scrotum wound wounds    Assessment  Scrotum wound wounds due to Surgical Wound  Status: healed        Treatment Plan    Wound care  2 TIMES DAILY        Comments: Scrotum: BID   Apply antifungal powder to scrotum (ok to apply to wound area) and to bilateral medial thighs BID, rub into skin and wound and dust off excess         Orders Reviewed and Updated  Recommended provider order: None, at this time  WO Nurse follow-up plan:signing off  Nursing to notify the Provider(s) and re-consult the [facility] Nurse if wound(s) deteriorates or new skin concern.    Patient History  According to provider note(s): \"41 year old mal with PMHx of polysubstance abuse including IV drug use (including fentanyl and methamphetamine currently), chronic opioid dependence, HIV, hepatitis C, hx of MSSA sepsis with pulmonary valve endocarditis (2015), and 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 Was found to have recurrent MSSA/MRSA bacteremia and tricuspid valve endocarditis.\"     Objective Data  Containment of urine/stool: Continent of bladder and Continent of bowel    Active Diet Order  Orders Placed This Encounter      Regular Diet Adult      Output:   No intake/output data recorded.    Risk Assessment:   Sensory Perception: 4-->no impairment  Moisture: 4-->rarely moist  Activity: 4-->walks frequently  Mobility: 4-->no limitation  Nutrition: 4-->excellent  Friction and Shear: 3-->no apparent problem  Bari Score: 23                          Labs:   Recent Labs   Lab 12/23/21  0705 12/20/21  0639 12/17/21  0550   HGB 9.7*   < >  --    WBC 5.3   < >  --    CRP  --   --  5.4    < > = values in this interval not displayed.       Physical Exam  Areas of skin assessed: focused scrotum    Wound " Location:  Scrotum  Refused photo 12-23    Wound History: pt had syncope episode and when he woke up had large laceration to scrotum. Pt does report itching to area, discussed how wound healing can cause itching and also discussed not using any bathwipes or green label hairwash spray    Wound Base:      Palpation of the wound bed: normal      Drainage: scant     Description of drainage: serosanguinous     Measurements (length x width x depth, in cm) less than 0.8cm x less than 0.3cm x less than 0.1cm       Tunneling N/A     Undermining N/A  Periwound skin: intact      Color: red      Temperature: normal   Odor: none  Pain: denies , none  Pain intervention prior to dressing change: NA    Interventions  Visual inspection and assessment completed   Wound Care Rationale treat rash, encourge epithilization to complete   Wound Care: done per plan of care  Supplies: at bedside  Current off-loading measures: Pillows  Current support surface: Standard  Atmos Air mattress  Education provided to: plan of care, wound progress, Infection prevention  and Moisture management  Discussed plan of care with Patient and Nurse    Erlinda KINGSLEY

## 2021-12-24 PROCEDURE — 99233 SBSQ HOSP IP/OBS HIGH 50: CPT | Performed by: HOSPITALIST

## 2021-12-24 PROCEDURE — 999N000190 HC STATISTIC VAT ROUNDS

## 2021-12-24 PROCEDURE — 120N000001 HC R&B MED SURG/OB

## 2021-12-24 PROCEDURE — 250N000013 HC RX MED GY IP 250 OP 250 PS 637: Performed by: STUDENT IN AN ORGANIZED HEALTH CARE EDUCATION/TRAINING PROGRAM

## 2021-12-24 PROCEDURE — 250N000011 HC RX IP 250 OP 636: Performed by: INTERNAL MEDICINE

## 2021-12-24 PROCEDURE — 250N000013 HC RX MED GY IP 250 OP 250 PS 637: Performed by: HOSPITALIST

## 2021-12-24 PROCEDURE — 250N000013 HC RX MED GY IP 250 OP 250 PS 637: Performed by: PSYCHIATRY & NEUROLOGY

## 2021-12-24 PROCEDURE — 250N000013 HC RX MED GY IP 250 OP 250 PS 637: Performed by: SPECIALIST

## 2021-12-24 PROCEDURE — 99207 PR CDG-MDM COMPONENT: MEETS HIGH - UP CODED: CPT | Performed by: HOSPITALIST

## 2021-12-24 PROCEDURE — 258N000003 HC RX IP 258 OP 636: Performed by: INTERNAL MEDICINE

## 2021-12-24 PROCEDURE — 250N000013 HC RX MED GY IP 250 OP 250 PS 637: Performed by: INTERNAL MEDICINE

## 2021-12-24 PROCEDURE — 250N000013 HC RX MED GY IP 250 OP 250 PS 637: Performed by: NURSE PRACTITIONER

## 2021-12-24 PROCEDURE — 999N000040 HC STATISTIC CONSULT NO CHARGE VASC ACCESS

## 2021-12-24 RX ORDER — POLYETHYLENE GLYCOL 3350 17 G/17G
17 POWDER, FOR SOLUTION ORAL 2 TIMES DAILY PRN
Status: DISCONTINUED | OUTPATIENT
Start: 2021-12-24 | End: 2021-12-30 | Stop reason: HOSPADM

## 2021-12-24 RX ADMIN — PREGABALIN 75 MG: 75 CAPSULE ORAL at 09:27

## 2021-12-24 RX ADMIN — ABACAVIR SULFATE, DOLUTEGRAVIR SODIUM, LAMIVUDINE 1 TABLET: 600; 50; 300 TABLET, FILM COATED ORAL at 09:27

## 2021-12-24 RX ADMIN — TAMSULOSIN HYDROCHLORIDE 0.4 MG: 0.4 CAPSULE ORAL at 09:27

## 2021-12-24 RX ADMIN — BUPRENORPHINE AND NALOXONE 1 FILM: 8; 2 FILM, SOLUBLE BUCCAL; SUBLINGUAL at 09:27

## 2021-12-24 RX ADMIN — DOCUSATE SODIUM 100 MG: 100 CAPSULE, LIQUID FILLED ORAL at 20:55

## 2021-12-24 RX ADMIN — ASPIRIN 81 MG: 81 TABLET, COATED ORAL at 09:27

## 2021-12-24 RX ADMIN — NICOTINE 1 PATCH: 14 PATCH, EXTENDED RELEASE TRANSDERMAL at 09:26

## 2021-12-24 RX ADMIN — DOCUSATE SODIUM 100 MG: 100 CAPSULE, LIQUID FILLED ORAL at 09:27

## 2021-12-24 RX ADMIN — MICONAZOLE NITRATE: 2 POWDER TOPICAL at 09:32

## 2021-12-24 RX ADMIN — DAPTOMYCIN 600 MG: 500 INJECTION, POWDER, LYOPHILIZED, FOR SOLUTION INTRAVENOUS at 14:20

## 2021-12-24 RX ADMIN — PREGABALIN 75 MG: 75 CAPSULE ORAL at 20:55

## 2021-12-24 RX ADMIN — LORAZEPAM 0.5 MG: 0.5 TABLET ORAL at 09:27

## 2021-12-24 RX ADMIN — LORAZEPAM 0.5 MG: 0.5 TABLET ORAL at 15:16

## 2021-12-24 RX ADMIN — LORAZEPAM 0.5 MG: 0.5 TABLET ORAL at 21:10

## 2021-12-24 RX ADMIN — MICONAZOLE NITRATE: 2 POWDER TOPICAL at 20:57

## 2021-12-24 RX ADMIN — LEVOMILNACIPRAN HYDROCHLORIDE 120 MG: 40 CAPSULE, EXTENDED RELEASE ORAL at 09:26

## 2021-12-24 ASSESSMENT — ACTIVITIES OF DAILY LIVING (ADL)
ADLS_ACUITY_SCORE: 5

## 2021-12-24 ASSESSMENT — MIFFLIN-ST. JEOR: SCORE: 1552.2

## 2021-12-24 NOTE — PLAN OF CARE
"Summary: Recurrent MSSA/MRSA bacteremia with tricuspid valve endocarditis  DATE & TIME: 12/23/21, 8335-2352  Cognitive Concerns/ Orientation : A/Ox 4, flat and withdrawn    BEHAVIOR & AGGRESSION TOOL COLOR: Green, calm/cooperative, anxious and frustrated at times   ABNL VS/O2: Daily vital signs  MOBILITY: Independent, mild PASTRANA per pt. report  PAIN MANAGMENT: Denies, mild headache at times-refused interventions  DIET: Regular, tolerating   BOWEL/BLADDER: refused assessment-Continent  ABNL LAB/BG: hgb= 9.7 (trending up)   DRAIN/DEVICES: LUE Midline/SL  SKIN: Scrotal wound-Pt completed his own wound care, refused RN assessment. Has a new mild, red rash on groin per day shift report- miconazole available. Wound cares updated this evening per pt. Demonstrated understand of new orders and frequency of dressing changes-see orders in chart   TESTS/PROCEDURES: None scheduled  D/C DATE: Discharge pending completion of IV antibiotic on 12/29/21. SW following for Chem Dep placement. Pt. Very anxious about discharge plans   OTHER IMPORTANT INFO: refused full assessment this evening. Continues on Daptomycin, ID and WOC following. Contact precautions maintained. Refused suboxone, education provided. Pt states that it gives him \"bird heart\" and intensifies his cravings for drugs. Stated ativan would be sufficient despite him taking triple the dose at home.      "

## 2021-12-24 NOTE — PLAN OF CARE
Summary: Recurrent MSSA/MRSA bacteremia with tricuspid valve endocarditis  DATE & TIME: 12/24/21, 9913-9833  Cognitive Concerns/ Orientation : A/Ox 4, flat   BEHAVIOR & AGGRESSION TOOL COLOR: Green, calm/cooperative, anxious and frustrated at times   ABNL VS/O2: Daily vital signs  MOBILITY: Independent, mild PASTRANA per pt. report  PAIN MANAGMENT: Denies  DIET: Regular, tolerating    BOWEL/BLADDER: refused assessment-Continent.  Constipated, MD updated on pt request for Mag citrate  ABNL LAB/BG: hgb= 9.7 (trending up)   DRAIN/DEVICES: LUE Midline/SL q24h antibiotics.  SKIN: Scrotal wound-Pt completed his own wound care, refused RN assessment. Has a red rash on groin and has miconazole available. Demonstrated understand of new orders and frequency of dressing changes-see orders in chart   TESTS/PROCEDURES: None scheduled  D/C DATE: Discharge pending completion of IV antibiotic on 12/29/21. SW following for Chem Dep placement. Pt. Very anxious about discharge plans   OTHER IMPORTANT INFO: refused full assessment. Continues on Daptomycin, ID and WOC following. Contact precautions maintained. Takes Suboxone in the morning but has been refusing at night.

## 2021-12-24 NOTE — PLAN OF CARE
Summary: Recurrent MSSA/MRSA bacteremia with tricuspid valve endocarditis  DATE & TIME: 12/23/21, 0736-0109   Cognitive Concerns/ Orientation : A&O x 4, flat and withdrawn, anxious at times  BEHAVIOR & AGGRESSION TOOL COLOR: Green  ABNL VS/O2: Daily vital signs  MOBILITY: Independent, mild PASTRANA per pt. Report  PAIN MANAGMENT: Denies, mild headache at times-refused interventions  DIET: Regular, tolerating   BOWEL/BLADDER: refused assessment-Continent  ABNL LAB/BG: hgb= 9.7 (trending up)   DRAIN/DEVICES: LUE Midline/SL  SKIN: Scrotal wound-Pt completed own wound care, refused RN assessment. Has a new mild, red rash on groin per day shift report- miconazole available. Wound cares updated this evening, education provided to patient-pt. Demonstrated understand of new orders and frequency of dressing changes-see orders in chart   TESTS/PROCEDURES: None scheduled  D/C DATE: Discharge pending completion of IV antibiotic on 12/29/21. SW following for Chem Dep placement. Pt. Very anxious about discharge plans   OTHER IMPORTANT INFO: refused full assessment this evening. Continues on Daptomycin, ID and WOC following. Contact precautions maintained.

## 2021-12-24 NOTE — PROGRESS NOTES
[facility]    Medicine Progress Note - Hospitalist Service       Date of Admission:  11/15/2021    Assessment & Plan         Bc German is a 41 year old male with PMHx of polysubstance abuse including IV drug use (including fentanyl and methamphetamine currently), chronic opioid dependence, HIV, hepatitis C, hx of MSSA sepsis with pulmonary valve endocarditis (2015), and 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 Was found to have recurrent MSSA/MRSA bacteremia and tricuspid valve endocarditis.     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 Lactate nl. CXR on admission showed nodular/patchy opacity at the R base.  * 2/2 blood cultures drawn on admission grew both MSSA and MRSA >> blood cx cleared on 11/18 >> vancomycin changed to daptomycin due to JOSE RAMON.  * EDUARDO done 11/18 notable for large tricuspid valve vegetation with severe regurgitation.    Admitted to inpatient.    Blood cultures have been negative since 11/18/21.    ID consulted, plan to continue antibiotics through at least 12/29/21 (six week of treatment). Currently on daptomycin per ID.    Remains afebrile; EDUARDO repeat 12/02 noted with smaller vegetations than prior EDUARDO but did note that tricuspid valve leaflet is likely perforated and prolapsing leading to severe degenerative TR, EF 65-70 %.    Was having bilateral LE swelling, likely due to TR, US 12/3 noted with no DVT; edema improved with 40 mg IV Lasix given on 12/3 ; will monitor volume status  closely and consider diuretics prn.    Weight was 56kg admission---> peak 73kg---> stable 59-61kg and stable.  Follow volume status given severe TR.    Given repeat EDUARDO findings, re-evaluated by thoracic surgery (initially rec medical management) and plans for surgical replacement with bioprosthetic valve at some point; surgery concerned about his IV drug use and consulted with Dr SELINA Barrientos (addiction medicine physician) at [facility] prior to considering surgery.    Was evaluated by Dr Barrientos (addiction specialist at [place]); concern that he is at high risk for relapse and thus at high risk for prosthetic valve endocarditis if he undergoes valve replacement; rec postponing surgery at this time to focus on chemical dependency.    Outpatient follow up with CV surgery, touch base with discharge plan with them at time of discharge.    CV surgery to consider valve repair after pt completes treatment and remains sober.    No signs of RHF at this time.     Chronic anemia, likely anemia of chronic disease  Likely anemia of chronic disease in the setting of infective endocarditis.  Hemoglobin on admission however was 12.5 and has gradually trended down--->7.1, stable--7.7---7.3---7.6--> 7.4 12/10.     Hemodynamically stable and no suggestion of active bleed.    Iron studies consistent with likely ACD with normal ferritin and low TIBC, TSH slightly elevated but free T4 normal; b12 and folate normal.    Will monitor hemoglobin periodically,repeat hgb in AM and transfuse PRN for Hb <7.    Hemoglobin stable 8-10 range.      Hypertension    BP controlled with addition of amlodipine 5 mg po daily and Metoprolol 25 mg po bid with hold parameters (12/3). Amlodipine and metoprolol subsequently discontinued and blood pressure remains within normal limits.    Hydralazine IV prn for SBP>180; plan for intermittent diuresis if needed.       HIV  Hepatitis C  * Chronic and stable on Triumeq.   * CDC >200 so no need for PJP ppx per  ID.    Follows with Dr Chavez of [facility]/ID.    HIV-1 RNA undetectable on 12/19/21 and 12/22/21. Continue PTA Triumeq.    Hep C still active/viremic and will need to be on treatment at some point.     JOSE RAMON on CKD, likely due to Vancomycin, resolved  Hyperkalemia, resolved  Hyponatremia, resolved   * No hx of CKD and has a normal baseline renal function.  * Creatinine initially stable this stay, began trending up after vancomycin.    Nephrology evaluated for JOSE RAMON, signed off 12/3/21.    ADAN returned negative, Renal US obtained 11/24 and negative for obstruction.    Creatinine peaked at 2.8, then trended down.    Monitor labs intermittently.     History of opiate dependence  History of active IV drug use  Tobacco Use  Depression  Anxiety  * On admission, had stated he last used IV drugs 1 wk prior to admission. While in the ED on 11/16, RN had noted patient was briefly nonresponsive and apneic. Was given dose of Narcan with noted improvement.   * Prior to admission, had been started on taper off Soma -- was taking 87.5mg (1/4 of a 350mg tab) BID but later conversations with PCP's clinic revealed no provider there was managing taper    Continued on PTA Subutex BID this stay-- after discussion with Dr Barrientos, switched to Suboxone bid (12/8).    Continue PTA levomilnacipran ER.    Tapered off carisoprodol during this admission per pharmacy/Dr. Barrientos recommendations.    Using lorazepam TID for anxiety, this was changed on 12/20/21.    Continue Lyrica 75 mg PO BID. PTA dose was higher, stopped due to worsening renal function on 11/24, restarted on 12/16 at a lower dose per psychiatry.    Chemical dependency evaluated and recommended inpatient treatment after completion of antibiotics directly will transfer from here to detox treatment center.    12/19 per SW note, pt feeling exhausted and feels he needs TCU before treatment. SW following. Ordered PT and OT.    Plan to re-consult psychiatry on Monday to follow up  change of ativan to TID and discuss discharge plans.    Social work following. Helping with search for treatment program/facility, sounds like we won't hear much new until Monday at the earliest due to the holiday weekend.     Left scrotal laceration dt fall/possible syncope  Left scrotal wound infection  * Laceration sutured in ED. Suture removed 11/21.   * US scrotum earlier this stay showed a small R hydrocele but was otherwise normal.     Patient's scrotal laceration appears to be healing well.    Continue cares per WOC nurse.     Left ear pain; improved   No evidence of otitis media or externa. Likely cerumen impaction.    Resolved.    PRN debrox drops available.     Constipation  Bowel regimen in place but states only magnesium citrate works for him.    Continue docusate twice daily.    Added PRN milk of magnesia.     Groin fungal rash    Miconazole powder bid.    COVID-19 PCR negative    COVID-19 PCR testing has been negative on multiple occasions during this hospitalization.       Diet: Regular Diet Adult  Snacks/Supplements Adult: Ensure Enlive; With Meals    DVT Prophylaxis: Pneumatic Compression Devices  Tirado Catheter: Not present  Central Lines: None  Code Status: Full Code      Disposition Plan   Expected Discharge: 12/29/2021     Anticipated discharge location: other (see comments)    Delays:     Administering IV medications            The patient's care was discussed with the Bedside Nurse, Care Coordinator/, Patient and Patient's Family.    Fly Velez MD  Hospitalist Service  [facility]  Securely message with the Vocera Web Console (learn more here)  Text page via [employer] Paging/Directory        Clinically Significant Risk Factors Present on Admission                    ______________________________________________________________________    Interval History   Bc German was seen today. Feels constipated, discussed bowel regimen. No nausea. Has not  tried relistor or something similar yet. Denies fevers, chest pain, shortness of breath. Tolerating oral intake. Feels very fatigued. Discussed possible discharge plans.    Data reviewed today: I reviewed all medications, new labs and imaging results over the last 24 hours. I personally reviewed no images or EKG's today.    Physical Exam   Vital Signs: Temp: 97.5  F (36.4  C) Temp src: Oral BP: 106/77 Pulse: 83   Resp: 18 SpO2: 98 %      Weight: 132 lbs 1.6 oz  Constitutional: awake, alert, cooperative, no apparent distress  Respiratory: clear to auscultation bilaterally, no crackles or wheezing  Cardiovascular: regular rate and rhythm, normal S1 and S2, systolic murmur noted, midline in place in left basilic vein  GI: normal bowel sounds, soft, non-distended, non-tender  Skin: warm, dry  Musculoskeletal: no lower extremity pitting edema present  Neurologic: awake, alert, oriented to name, place and time    Data   Recent Labs   Lab 12/23/21  0705 12/20/21  0639   WBC 5.3 5.9   HGB 9.7* 9.1*   MCV 91 92    296    136   POTASSIUM 4.4 4.9   CHLORIDE 109 106   CO2 29 28   BUN 31* 32*   CR 0.92 1.00   ANIONGAP 1* 2*   SANDRA 9.3 9.5   GLC 96 99     Medications       abacavir-dolutegravir-LamiVUDine  1 tablet Oral Daily     aspirin  81 mg Oral Daily     buprenorphine HCl-naloxone HCl  1 Film Sublingual BID     carbamide peroxide  3 drop Left Ear BID     DAPTOmycin (CUBICIN) intermittent infusion  9 mg/kg Intravenous Q24H     docusate sodium  100 mg Oral BID     levomilnacipran  120 mg Oral Daily     LORazepam  0.5 mg Oral TID     miconazole   Topical BID     nicotine  1 patch Transdermal QPM     nicotine   Transdermal Q8H     pregabalin  75 mg Oral BID     sodium chloride (PF)  10 mL Intracatheter Q8H     tamsulosin  0.4 mg Oral Daily

## 2021-12-24 NOTE — PLAN OF CARE
Summary: Recurrent MSSA/MRSA bacteremia with tricuspid valve endocarditis  DATE & TIME: 12/24/21,8602-3946  Cognitive Concerns/ Orientation : A/Ox 4, flat   BEHAVIOR & AGGRESSION TOOL COLOR: Green, calm/cooperative, anxious and frustrated at times   ABNL VS/O2: Daily vital signs  MOBILITY: Independent, mild PASTRANA per pt. report  PAIN MANAGMENT: Denies  DIET: Regular, tolerating    BOWEL/BLADDER: refused assessment-Continent.  Constipated, MD updated on pt request for Mag citrate  ABNL LAB/BG: hgb= 9.7 (trending up)   DRAIN/DEVICES: LUE Midline/SL q24h antibiotics.  SKIN: Scrotal wound-Pt completed his own wound care, refused RN assessment. Has a red rash on groin and has miconazole available. Demonstrated understand of new orders and frequency of dressing changes-see orders in chart   TESTS/PROCEDURES: None scheduled  D/C DATE: Discharge pending completion of IV antibiotic on 12/29/21. SW following for Chem Dep placement. Pt. Very anxious about discharge plans   OTHER IMPORTANT INFO: refused full assessment. Continues on Daptomycin, ID and WOC following. Contact precautions maintained. Takes Suboxone in the morning but has been refusing at night.

## 2021-12-25 PROCEDURE — 250N000011 HC RX IP 250 OP 636: Performed by: INTERNAL MEDICINE

## 2021-12-25 PROCEDURE — 99232 SBSQ HOSP IP/OBS MODERATE 35: CPT | Performed by: HOSPITALIST

## 2021-12-25 PROCEDURE — 250N000013 HC RX MED GY IP 250 OP 250 PS 637: Performed by: NURSE PRACTITIONER

## 2021-12-25 PROCEDURE — 999N000190 HC STATISTIC VAT ROUNDS

## 2021-12-25 PROCEDURE — 250N000013 HC RX MED GY IP 250 OP 250 PS 637: Performed by: SPECIALIST

## 2021-12-25 PROCEDURE — 250N000013 HC RX MED GY IP 250 OP 250 PS 637: Performed by: INTERNAL MEDICINE

## 2021-12-25 PROCEDURE — 250N000013 HC RX MED GY IP 250 OP 250 PS 637: Performed by: HOSPITALIST

## 2021-12-25 PROCEDURE — 258N000003 HC RX IP 258 OP 636: Performed by: INTERNAL MEDICINE

## 2021-12-25 PROCEDURE — 250N000013 HC RX MED GY IP 250 OP 250 PS 637: Performed by: PSYCHIATRY & NEUROLOGY

## 2021-12-25 PROCEDURE — 250N000013 HC RX MED GY IP 250 OP 250 PS 637: Performed by: STUDENT IN AN ORGANIZED HEALTH CARE EDUCATION/TRAINING PROGRAM

## 2021-12-25 PROCEDURE — 120N000001 HC R&B MED SURG/OB

## 2021-12-25 RX ORDER — MAGNESIUM CARB/ALUMINUM HYDROX 105-160MG
296 TABLET,CHEWABLE ORAL ONCE
Status: COMPLETED | OUTPATIENT
Start: 2021-12-25 | End: 2021-12-25

## 2021-12-25 RX ADMIN — ABACAVIR SULFATE, DOLUTEGRAVIR SODIUM, LAMIVUDINE 1 TABLET: 600; 50; 300 TABLET, FILM COATED ORAL at 09:09

## 2021-12-25 RX ADMIN — MAGNESIUM CITRATE 296 ML: 1.75 LIQUID ORAL at 12:20

## 2021-12-25 RX ADMIN — LORAZEPAM 0.5 MG: 0.5 TABLET ORAL at 06:36

## 2021-12-25 RX ADMIN — DOCUSATE SODIUM 100 MG: 100 CAPSULE, LIQUID FILLED ORAL at 21:21

## 2021-12-25 RX ADMIN — BUPRENORPHINE AND NALOXONE 1 FILM: 8; 2 FILM, SOLUBLE BUCCAL; SUBLINGUAL at 09:09

## 2021-12-25 RX ADMIN — PREGABALIN 75 MG: 75 CAPSULE ORAL at 09:09

## 2021-12-25 RX ADMIN — PREGABALIN 75 MG: 75 CAPSULE ORAL at 21:21

## 2021-12-25 RX ADMIN — LORAZEPAM 0.5 MG: 0.5 TABLET ORAL at 14:06

## 2021-12-25 RX ADMIN — LEVOMILNACIPRAN HYDROCHLORIDE 120 MG: 40 CAPSULE, EXTENDED RELEASE ORAL at 09:09

## 2021-12-25 RX ADMIN — DAPTOMYCIN 600 MG: 500 INJECTION, POWDER, LYOPHILIZED, FOR SOLUTION INTRAVENOUS at 14:06

## 2021-12-25 RX ADMIN — TAMSULOSIN HYDROCHLORIDE 0.4 MG: 0.4 CAPSULE ORAL at 09:09

## 2021-12-25 RX ADMIN — ASPIRIN 81 MG: 81 TABLET, COATED ORAL at 09:09

## 2021-12-25 RX ADMIN — LORAZEPAM 0.5 MG: 0.5 TABLET ORAL at 21:21

## 2021-12-25 RX ADMIN — NICOTINE 1 PATCH: 14 PATCH, EXTENDED RELEASE TRANSDERMAL at 21:39

## 2021-12-25 RX ADMIN — DOCUSATE SODIUM 100 MG: 100 CAPSULE, LIQUID FILLED ORAL at 09:09

## 2021-12-25 ASSESSMENT — ACTIVITIES OF DAILY LIVING (ADL)
ADLS_ACUITY_SCORE: 5

## 2021-12-25 NOTE — PLAN OF CARE
Summary: Recurrent MSSA/MRSA bacteremia with tricuspid valve endocarditis  DATE & TIME: 12/25/21 4780-3292  Cognitive Concerns/ Orientation : A/Ox 4, flat   BEHAVIOR & AGGRESSION TOOL COLOR: Green, calm/cooperative, anxious and frustrated at times   ABNL VS/O2: Daily vital signs  MOBILITY: Independent, mild PASTRANA per pt. report  PAIN MANAGMENT: Denies  DIET: Regular, tolerating    BOWEL/BLADDER: refused assessment-Continent.  Constipated 1 time mag citrate given  ABNL LAB/BG: hgb= 9.7 (trending up)   DRAIN/DEVICES: LUE Midline/SL q24h antibiotics.  SKIN: Scrotal wound-Pt completed his own wound care, refused RN assessment but states mostly healed. Has a red rash on groin and has miconazole available. Demonstrated understand of new orders and frequency of dressing changes-see orders in chart   TESTS/PROCEDURES: None scheduled  D/C DATE: Discharge pending completion of IV antibiotic on 12/29/21. SW following for Chem Dep placement. Pt. Very anxious about discharge plans   OTHER IMPORTANT INFO:  Continues on Daptomycin, ID and WOC following. Contact precautions maintained. Takes Suboxone in the morning but has been refusing at night.

## 2021-12-25 NOTE — PROGRESS NOTES
[facility]    Medicine Progress Note - Hospitalist Service       Date of Admission:  11/15/2021    Assessment & Plan         Bc German is a 41 year old male with PMHx of polysubstance abuse including IV drug use (including fentanyl and methamphetamine currently), chronic opioid dependence, HIV, hepatitis C, hx of MSSA sepsis with pulmonary valve endocarditis (2015), and 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 Was found to have recurrent MSSA/MRSA bacteremia and tricuspid valve endocarditis.     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 Lactate nl. CXR on admission showed nodular/patchy opacity at the R base.  * 2/2 blood cultures drawn on admission grew both MSSA and MRSA >> blood cx cleared on 11/18 >> vancomycin changed to daptomycin due to JOSE RAMON.  * EDUARDO done 11/18 notable for large tricuspid valve vegetation with severe regurgitation.    Admitted to inpatient.    Blood cultures have been negative since 11/18/21.    ID consulted, plan to continue antibiotics through at least 12/29/21 (six week of treatment). Currently on daptomycin per ID.    Remains afebrile; EDUARDO repeat 12/02 noted with smaller vegetations than prior EDUARDO but did note that tricuspid valve leaflet is likely perforated and prolapsing leading to severe degenerative TR, EF 65-70 %.    Was having bilateral LE swelling, likely due to TR, US 12/3 noted with no DVT; edema improved with 40 mg IV Lasix given on 12/3 ; will monitor volume status  closely and consider diuretics prn.    Weight was 56kg admission---> peak 73kg---> stable 59-61kg and stable.  Follow volume status given severe TR.    Given repeat EDUARDO findings, re-evaluated by thoracic surgery (initially rec medical management) and plans for surgical replacement with bioprosthetic valve at some point; surgery concerned about his IV drug use and consulted with Dr SELINA Barrientos (addiction medicine physician) at [facility] prior to considering surgery.    Was evaluated by Dr Barrientos (addiction specialist at [facility]); concern that he is at high risk for relapse and thus at high risk for prosthetic valve endocarditis if he undergoes valve replacement; rec postponing surgery at this time to focus on chemical dependency.    Outpatient follow up with CV surgery, touch base with discharge plan with them at time of discharge.    CV surgery to consider valve repair after patient completes chemical dependency treatment and remains sober.    No signs of RHF at this time.     Chronic anemia, likely anemia of chronic disease  Likely anemia of chronic disease in the setting of infective endocarditis.  Hemoglobin on admission however was 12.5 and has gradually trended down--->7.1, stable--7.7---7.3---7.6--> 7.4 12/10.     Hemodynamically stable and no suggestion of active bleed.    Iron studies consistent with likely ACD with normal ferritin and low TIBC, TSH slightly elevated but free T4 normal; b12 and folate normal.    Will monitor hemoglobin periodically,repeat hgb in AM and transfuse PRN for Hb <7.    Hemoglobin stable 8-10 range.      Hypertension    BP controlled with addition of amlodipine 5 mg po daily and Metoprolol 25 mg po bid with hold parameters (12/3). Amlodipine and metoprolol subsequently discontinued and blood pressure remains within normal limits.    Hydralazine IV prn for SBP>180; plan for intermittent diuresis if needed.       HIV  Hepatitis C  * Chronic and stable on Triumeq.   * CDC >200 so no need for  PJP ppx per ID.    Follows with Dr Chavez of [facility]/ID.    HIV-1 RNA undetectable on 12/19/21 and 12/22/21. Continue PTA Triumeq.    Hep C still active/viremic and will need to be on treatment at some point.     JOSE RAMON on CKD, likely due to Vancomycin, resolved  Hyperkalemia, resolved  Hyponatremia, resolved   * No hx of CKD and has a normal baseline renal function.  * Creatinine initially stable this stay, began trending up after vancomycin.    Nephrology evaluated for JOSE RAMON, signed off 12/3/21.    ADAN returned negative, Renal US obtained 11/24 and negative for obstruction.    Creatinine peaked at 2.8, then trended down.    Monitor labs intermittently.     History of opiate dependence  History of active IV drug use  Tobacco Use  Depression  Anxiety  * On admission, had stated he last used IV drugs 1 wk prior to admission. While in the ED on 11/16, RN had noted patient was briefly nonresponsive and apneic. Was given dose of Narcan with noted improvement.   * Prior to admission, had been started on taper off Soma -- was taking 87.5mg (1/4 of a 350mg tab) BID but later conversations with PCP's clinic revealed no provider there was managing taper    Continued on PTA Subutex BID this stay - after discussion with Dr Barrientos, switched to Suboxone bid (12/8).    Continue PTA levomilnacipran ER.    Tapered off carisoprodol during this admission per pharmacy/Dr. Barrientos recommendations.    Using lorazepam TID for anxiety, this was changed on 12/20/21.    Continue Lyrica 75 mg PO BID. PTA dose was higher, stopped due to worsening renal function on 11/24, restarted on 12/16 at a lower dose per psychiatry.    Chemical dependency evaluated and recommended inpatient treatment after completion of antibiotics directly will transfer from here to detox treatment center.    12/19 per SW note, pt feeling exhausted and feels he needs TCU before treatment. SW following. Ordered PT and OT.    Plan to re-consult psychiatry on Monday  to follow up change of ativan to TID and discuss discharge plans.    Social work following. Helping with search for treatment program/facility, will follow-up on Monday.     Left scrotal laceration dt fall/possible syncope  Left scrotal wound infection  * Laceration sutured in ED. Suture removed 11/21.   * US scrotum earlier this stay showed a small R hydrocele but was otherwise normal.     Patient's scrotal laceration appears to be healing well.    Continue cares per WOC nurse.     Left ear pain; improved   No evidence of otitis media or externa. Likely cerumen impaction.    Resolved.    PRN debrox drops available.     Constipation  Bowel regimen in place but states only magnesium citrate works for him.    Continue docusate twice daily.    Ordered a one time dose of magnesium citrate today.     Groin fungal rash    Miconazole powder bid.    COVID-19 PCR negative    COVID-19 PCR testing has been negative on multiple occasions during this hospitalization.       Diet: Regular Diet Adult  Snacks/Supplements Adult: Ensure Enlive; With Meals    DVT Prophylaxis: Pneumatic Compression Devices  Tirado Catheter: Not present  Central Lines: None  Code Status: Full Code      Disposition Plan   Expected Discharge: 12/29/2021     Anticipated discharge location: other (see comments)    Delays:     Administering IV medications            The patient's care was discussed with the Bedside Nurse and Patient.    Fly Velez MD  Hospitalist Service  [facility]  Securely message with the Vocera Web Console (learn more here)  Text page via [employer] Paging/Directory        Clinically Significant Risk Factors Present on Admission                    ______________________________________________________________________    Interval History   Bc German was seen this morning. Feels about the same. Still constipated, would like to try magnesium citrate as he states this is the only thing that works for him.  Had multiple questions about his medications and discharge plan which were discussed, some questions deferred to psychiatry.    Data reviewed today: I reviewed all medications, new labs and imaging results over the last 24 hours. I personally reviewed no images or EKG's today.    Physical Exam   Vital Signs: Temp: 97.3  F (36.3  C) Temp src: Oral BP: 107/70 Pulse: 80   Resp: 18 SpO2: 98 % O2 Device: None (Room air)    Weight: 132 lbs 1.6 oz  Constitutional: awake, alert, cooperative, no apparent distress, sitting up in a chair  Respiratory: clear to auscultation bilaterally, no crackles or wheezing  Cardiovascular: Normal apical impulse, regular rate and rhythm, normal S1 and S2, systolic murmur noted, midline in place in left basilic vein  GI: normal bowel sounds, soft, non-distended, non-tender  Skin: warm, dry  Musculoskeletal: no lower extremity pitting edema present  Neurologic: awake, alert, oriented to name, place and time, motor is 5 out of 5 bilaterally, sensory is intact    Data   Recent Labs   Lab 12/23/21  0705 12/20/21  0639   WBC 5.3 5.9   HGB 9.7* 9.1*   MCV 91 92    296    136   POTASSIUM 4.4 4.9   CHLORIDE 109 106   CO2 29 28   BUN 31* 32*   CR 0.92 1.00   ANIONGAP 1* 2*   SANDRA 9.3 9.5   GLC 96 99     Medications       abacavir-dolutegravir-LamiVUDine  1 tablet Oral Daily     aspirin  81 mg Oral Daily     buprenorphine HCl-naloxone HCl  1 Film Sublingual BID     carbamide peroxide  3 drop Left Ear BID     DAPTOmycin (CUBICIN) intermittent infusion  9 mg/kg Intravenous Q24H     docusate sodium  100 mg Oral BID     levomilnacipran  120 mg Oral Daily     LORazepam  0.5 mg Oral TID     magnesium citrate  296 mL Oral Once     miconazole   Topical BID     nicotine  1 patch Transdermal QPM     nicotine   Transdermal Q8H     pregabalin  75 mg Oral BID     sodium chloride (PF)  10 mL Intracatheter Q8H     tamsulosin  0.4 mg Oral Daily

## 2021-12-25 NOTE — PLAN OF CARE
Summary: Recurrent MSSA/MRSA bacteremia with tricuspid valve endocarditis  DATE & TIME: 12/25/21,4900-9418  Cognitive Concerns/ Orientation : A/Ox 4, flat   BEHAVIOR & AGGRESSION TOOL COLOR: Green, calm/cooperative, anxious at times   ABNL VS/O2: Daily vital signs stable  MOBILITY: Independent  PAIN MANAGMENT: Denies  DIET: Regular, tolerating    BOWEL/BLADDER: refused assessment-Continent.  Constipated, mag citrate given on days, +flatus  ABNL LAB/BG: NA  DRAIN/DEVICES: LUE Midline/SL q24h antibiotics.  SKIN: Scrotal wound-Pt completed his own wound care, refused RN assessment. Has a red rash on groin and has miconazole available. Demonstrated understand of new orders and frequency of dressing changes-see orders in chart   TESTS/PROCEDURES: None scheduled  D/C DATE: Discharge pending completion of IV antibiotic on 12/29/21. SW following for Chem Dep placement. Pt. Very anxious about discharge plans   OTHER IMPORTANT INFO:Continues on Daptomycin, ID and WOC following. Contact precautions maintained. Takes Suboxone in the morning but has been refusing at night.

## 2021-12-25 NOTE — PLAN OF CARE
Summary: Recurrent MSSA/MRSA bacteremia with tricuspid valve endocarditis  DATE & TIME: 12/24/21-12/25/2021, [id number]  Cognitive Concerns/ Orientation : A/Ox 4, flat   BEHAVIOR & AGGRESSION TOOL COLOR: Green, calm/cooperative, anxious and frustrated at times   ABNL VS/O2: Daily vital signs  MOBILITY: Independent, mild PASTRANA per pt. report  PAIN MANAGMENT: Denies  DIET: Regular, tolerating    BOWEL/BLADDER: refused assessment-Continent.  Constipated, MD updated on pt request for Mag citrate  ABNL LAB/BG: hgb= 9.7 (trending up)   DRAIN/DEVICES: LUE Midline/SL q24h antibiotics.  SKIN: Scrotal wound-Pt completed his own wound care, refused RN assessment. Has a red rash on groin and has miconazole available. Demonstrated understand of new orders and frequency of dressing changes-see orders in chart   TESTS/PROCEDURES: None scheduled  D/C DATE: Discharge pending completion of IV antibiotic on 12/29/21. SW following for Chem Dep placement. Pt. Very anxious about discharge plans   OTHER IMPORTANT INFO: refused vitals and parts of assessment. Continues on Daptomycin, ID and WOC following. Contact precautions maintained. Takes Suboxone in the morning but has been refusing at night.

## 2021-12-26 PROCEDURE — 120N000001 HC R&B MED SURG/OB

## 2021-12-26 PROCEDURE — 250N000011 HC RX IP 250 OP 636: Performed by: INTERNAL MEDICINE

## 2021-12-26 PROCEDURE — 250N000013 HC RX MED GY IP 250 OP 250 PS 637: Performed by: INTERNAL MEDICINE

## 2021-12-26 PROCEDURE — 250N000013 HC RX MED GY IP 250 OP 250 PS 637: Performed by: HOSPITALIST

## 2021-12-26 PROCEDURE — 250N000013 HC RX MED GY IP 250 OP 250 PS 637: Performed by: SPECIALIST

## 2021-12-26 PROCEDURE — 250N000013 HC RX MED GY IP 250 OP 250 PS 637: Performed by: NURSE PRACTITIONER

## 2021-12-26 PROCEDURE — 99232 SBSQ HOSP IP/OBS MODERATE 35: CPT | Performed by: HOSPITALIST

## 2021-12-26 PROCEDURE — 250N000013 HC RX MED GY IP 250 OP 250 PS 637: Performed by: PSYCHIATRY & NEUROLOGY

## 2021-12-26 PROCEDURE — 999N000190 HC STATISTIC VAT ROUNDS

## 2021-12-26 PROCEDURE — 258N000003 HC RX IP 258 OP 636: Performed by: INTERNAL MEDICINE

## 2021-12-26 RX ADMIN — ABACAVIR SULFATE, DOLUTEGRAVIR SODIUM, LAMIVUDINE 1 TABLET: 600; 50; 300 TABLET, FILM COATED ORAL at 12:23

## 2021-12-26 RX ADMIN — LORAZEPAM 0.5 MG: 0.5 TABLET ORAL at 21:52

## 2021-12-26 RX ADMIN — LEVOMILNACIPRAN HYDROCHLORIDE 120 MG: 40 CAPSULE, EXTENDED RELEASE ORAL at 12:23

## 2021-12-26 RX ADMIN — LORAZEPAM 0.5 MG: 0.5 TABLET ORAL at 14:50

## 2021-12-26 RX ADMIN — LORAZEPAM 0.5 MG: 0.5 TABLET ORAL at 06:25

## 2021-12-26 RX ADMIN — PREGABALIN 75 MG: 75 CAPSULE ORAL at 21:52

## 2021-12-26 RX ADMIN — DOCUSATE SODIUM 100 MG: 100 CAPSULE, LIQUID FILLED ORAL at 21:52

## 2021-12-26 RX ADMIN — BUPRENORPHINE AND NALOXONE 1 FILM: 8; 2 FILM, SOLUBLE BUCCAL; SUBLINGUAL at 12:24

## 2021-12-26 RX ADMIN — TAMSULOSIN HYDROCHLORIDE 0.4 MG: 0.4 CAPSULE ORAL at 12:24

## 2021-12-26 RX ADMIN — DOCUSATE SODIUM 100 MG: 100 CAPSULE, LIQUID FILLED ORAL at 12:23

## 2021-12-26 RX ADMIN — ASPIRIN 81 MG: 81 TABLET, COATED ORAL at 12:23

## 2021-12-26 RX ADMIN — ONDANSETRON 4 MG: 2 INJECTION INTRAMUSCULAR; INTRAVENOUS at 12:37

## 2021-12-26 RX ADMIN — PREGABALIN 75 MG: 75 CAPSULE ORAL at 12:24

## 2021-12-26 RX ADMIN — DAPTOMYCIN 600 MG: 500 INJECTION, POWDER, LYOPHILIZED, FOR SOLUTION INTRAVENOUS at 14:50

## 2021-12-26 ASSESSMENT — ACTIVITIES OF DAILY LIVING (ADL)
ADLS_ACUITY_SCORE: 5

## 2021-12-26 NOTE — PLAN OF CARE
Summary: Recurrent MSSA/MRSA bacteremia with tricuspid valve endocarditis  DATE & TIME: 12/25/21-12/26/21 [id number]  Cognitive Concerns/ Orientation : A/Ox 4, flat   BEHAVIOR & AGGRESSION TOOL COLOR: Green, calm/cooperative, anxious and frustrated at times   ABNL VS/O2: Daily vital signs  MOBILITY: Independent, mild PASTRANA per pt. report  PAIN MANAGMENT: Denies  DIET: Regular, tolerating    BOWEL/BLADDER: refused assessment-Continent.  Constipated 1 time mag citrate given  ABNL LAB/BG: hgb= 9.7 (trending up)   DRAIN/DEVICES: LUE Midline/SL q24h antibiotics.  SKIN: Scrotal wound-Pt completes his own wound care, refused RN assessment but states mostly healed. Has a red rash on groin and has miconazole available. Demonstrated understand of new orders and frequency of dressing changes-see orders in chart   TESTS/PROCEDURES: None scheduled  D/C DATE: Discharge pending completion of IV antibiotic on 12/29/21. SW following for Chem Dep placement. Pt. Very anxious about discharge plans   OTHER IMPORTANT INFO:  Continues on Daptomycin, ID and WOC following. Contact precautions maintained. Takes Suboxone in the morning but has been refusing at night.

## 2021-12-26 NOTE — PROGRESS NOTES
[facility]    Infectious Disease Progress Note    Date of Service : 12/26/2021      Assessment:  41 year old male with polysubstance use/IVDA, HIV-Hep C co-infection and prior hx of recurrent MSSA sepsis with pulmonic valve endocarditis, spinal discitis/osteomyelitis with epidural abscess requiring surgical debridement, who is now admitted with high grade MSSA/MRSA bacteremia with tricuspid valve endocarditis with two large [id number] x 8mm and 13 x 5mm, and repeat EDUARDO now shows valve perforation with severe tricuspid regurgitation. Blood cxs are negative since 11/18  . He has had medication non compliance with ART with detectable VL, CD4 >200 at this time not requiring PCP prophylaxis. Course further complicated by JOSE RAMON and hyperkalemia which have improved as well as tricuspid valve perforation which needs surgical management.     1. MSSA/MRSA sepsis with tricuspid valve endocarditis - blood cxs cleared 11/18, Repeat EDUARDO 12/2 now shows valve perforation with severe regurgitation, awaiting surgery.   2. JOSE RAMON  improving  3. LE edema , doppler negative for DVT  4. Prior history of MSSA sepsis with pulmonary valve endocarditis in 2015 and spinal discitis/osteomyelitis with epidural abscess requiring surgical intervention in 2017.  5. Substance abuse with active use of methamphetamine / fentanyl currently  6.  HIV/Hepatitis C co-infection. Last HIV VL detectable at 29,522 copies/ml and  CD4 625(28%) on  06/09/2021 , on Triumeq  7.  Hyperkalemia , ? Due to JOSE RAMON  ? Daptomycin contributing? Improved now  8. Scrotal laceration      Recommendations  1. Continue IV Daptomycin,  until 12/29 to complete 6 weeks of treatment. Here with no other viable disposition   2. Valve replacement planned at some point in future, no sxs of valve failure currently, fatigue biggest complaint  3. Cultures have been clear since 11/ 18   4. Continue triumeq,, VL now suppressed  5 Hep C is still active, and should be  treated at some point        Alana Cooper MD    Interval History   Resting, continues to complain of fatigue. Tolerating antibiotics without side effects, no new complaints today    Physical Exam   Temp: 97.7  F (36.5  C) Temp src: Oral BP: 111/78 Pulse: 98   Resp: 18 SpO2: 100 % O2 Device: None (Room air)    Vitals:    12/15/21 0625 12/20/21 0625 12/24/21 0641   Weight: 60.4 kg (133 lb 1.6 oz) 60.1 kg (132 lb 8 oz) 59.9 kg (132 lb 1.6 oz)     Vital Signs with Ranges  Temp:  [97.3  F (36.3  C)-97.7  F (36.5  C)] 97.7  F (36.5  C)  Pulse:  [80-98] 98  Resp:  [18] 18  BP: (107-111)/(70-78) 111/78  SpO2:  [98 %-100 %] 100 %    Constitutional: Awake, alert, cooperative, no apparent distress  Lungs: Clear to auscultation bilaterally, no crackles or wheezing  Cardiovascular: l S1 and S2,  murmur +  Abdomen: Normal bowel sounds, soft, non-distended, non-tender  Skin: No rashes, no cyanosis, no edema  Other:    Medications       abacavir-dolutegravir-LamiVUDine  1 tablet Oral Daily     aspirin  81 mg Oral Daily     buprenorphine HCl-naloxone HCl  1 Film Sublingual BID     carbamide peroxide  3 drop Left Ear BID     DAPTOmycin (CUBICIN) intermittent infusion  9 mg/kg Intravenous Q24H     docusate sodium  100 mg Oral BID     levomilnacipran  120 mg Oral Daily     LORazepam  0.5 mg Oral TID     miconazole   Topical BID     nicotine  1 patch Transdermal QPM     nicotine   Transdermal Q8H     pregabalin  75 mg Oral BID     sodium chloride (PF)  10 mL Intracatheter Q8H     tamsulosin  0.4 mg Oral Daily       Data   All microbiology laboratory data reviewed.  Recent Labs   Lab Test 12/23/21  0705 12/20/21  0639 12/16/21  0635   WBC 5.3 5.9 5.8   HGB 9.7* 9.1* 8.8*   HCT 31.1* 30.4* 28.2*   MCV 91 92 89    296 313     Recent Labs   Lab Test 12/23/21  0705 12/20/21  0639 12/17/21  0550   CR 0.92 1.00 1.25     Recent Labs   Lab Test 12/17/21  0550   SED 74*

## 2021-12-26 NOTE — PROGRESS NOTES
[facility]    Medicine Progress Note - Hospitalist Service       Date of Admission:  11/15/2021    Assessment & Plan            Bc German is a 41 year old male with PMHx of polysubstance abuse including IV drug use (including fentanyl and methamphetamine currently), chronic opioid dependence, HIV, hepatitis C, hx of MSSA sepsis with pulmonary valve endocarditis (2015), and 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 Was found to have recurrent MSSA/MRSA bacteremia and tricuspid valve endocarditis.     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 Lactate nl. CXR on admission showed nodular/patchy opacity at the R base.  * 2/2 blood cultures drawn on admission grew both MSSA and MRSA >> blood cx cleared on 11/18 >> vancomycin changed to daptomycin due to JOSE RAMON.  * EDUARDO done 11/18 notable for large tricuspid valve vegetation with severe regurgitation.    Admitted to inpatient.    Blood cultures have been negative since 11/18/21.    ID consulted, plan to continue antibiotics through at least 12/29/21 (six week of treatment). Currently on daptomycin per ID.    Remains afebrile; EDUARDO repeat 12/02 noted with smaller vegetations than prior EDUARDO but did note that tricuspid valve leaflet is likely perforated and prolapsing leading to severe degenerative TR, EF 65-70 %.    Was having bilateral LE swelling, likely due to TR, US 12/3 noted with no DVT; edema improved with 40 mg IV Lasix given on 12/3 ; will monitor volume  status closely and consider diuretics prn.    Weight was 56kg admission---> peak 73kg---> stable 59-61kg and stable.  Follow volume status given severe TR.    Given repeat EDUARDO findings, re-evaluated by thoracic surgery (initially rec medical management) and plans for surgical replacement with bioprosthetic valve at some point; surgery concerned about his IV drug use and consulted with Dr SELINA Barrientos (addiction medicine physician) at [facility] prior to considering surgery.    Was evaluated by Dr Barrientos (addiction specialist at [place]); concern that he is at high risk for relapse and thus at high risk for prosthetic valve endocarditis if he undergoes valve replacement; rec postponing surgery at this time to focus on chemical dependency.    Outpatient follow up with CV surgery, touch base with discharge plan with them at time of discharge.    CV surgery to consider valve repair after patient completes chemical dependency treatment and remains sober.    No signs of RHF at this time.     Chronic anemia, likely anemia of chronic disease  Likely anemia of chronic disease in the setting of infective endocarditis.  Hemoglobin on admission however was 12.5 and has gradually trended down--->7.1, stable--7.7---7.3---7.6--> 7.4 12/10.     Hemodynamically stable and no suggestion of active bleed.    Iron studies consistent with likely ACD with normal ferritin and low TIBC, TSH slightly elevated but free T4 normal; b12 and folate normal.    Will monitor hemoglobin periodically,repeat hgb in AM and transfuse PRN for Hb <7.    Hemoglobin stable 8-10 range.      Hypertension    BP controlled with addition of amlodipine 5 mg po daily and Metoprolol 25 mg po bid with hold parameters (12/3). Amlodipine and metoprolol subsequently discontinued and blood pressure remains within normal limits.    Hydralazine IV prn for SBP>180; plan for intermittent diuresis if needed.       HIV  Hepatitis C  * Chronic and stable on Triumeq.   * CDC >200 so no  need for PJP ppx per ID.    Follows with Dr Chavez of [facility].    HIV-1 RNA undetectable on 12/19/21 and 12/22/21. Continue PTA Triumeq.    Hep C still active/viremic and will need to be on treatment at some point.     JOSE RAMON on CKD, likely due to Vancomycin, resolved  Hyperkalemia, resolved  Hyponatremia, resolved   * No hx of CKD and has a normal baseline renal function.  * Creatinine initially stable this stay, began trending up after vancomycin.    Nephrology evaluated for JOSE RAMON, signed off 12/3/21.    ADAN returned negative, Renal US obtained 11/24 and negative for obstruction.    Creatinine peaked at 2.8, then trended down.    Monitor labs intermittently.     History of opiate dependence  History of active IV drug use  Tobacco Use  Depression  Anxiety  * On admission, had stated he last used IV drugs 1 wk prior to admission. While in the ED on 11/16, RN had noted patient was briefly nonresponsive and apneic. Was given dose of Narcan with noted improvement.   * Prior to admission, had been started on taper off Soma -- was taking 87.5mg (1/4 of a 350mg tab) BID but later conversations with PCP's clinic revealed no provider there was managing taper    Continued on PTA Subutex BID this stay - after discussion with Dr Barrientos, switched to Suboxone bid (12/8).    Continue PTA levomilnacipran ER.    Tapered off carisoprodol during this admission per pharmacy/Dr. Barrientos recommendations.    Using lorazepam TID for anxiety, this was changed on 12/20/21.    Continue Lyrica 75 mg PO BID. PTA dose was higher, stopped due to worsening renal function on 11/24, restarted on 12/16 at a lower dose per psychiatry.    Chemical dependency evaluated and recommended inpatient treatment after completion of antibiotics directly will transfer from here to detox treatment center.    12/19 per SW note, pt feeling exhausted and feels he needs TCU before treatment. SW following. Ordered PT and OT.    Plan to re-consult psychiatry  tomorrow to follow up change of ativan to TID and discuss discharge plans.    Social work following. Helping with search for treatment program/facility, will follow-up on Monday.     Left scrotal laceration dt fall/possible syncope  Left scrotal wound infection  * Laceration sutured in ED. Suture removed 11/21.   * US scrotum earlier this stay showed a small R hydrocele but was otherwise normal.     Patient's scrotal laceration appears to be healing well.    Continue cares per WOC nurse.     Left ear pain; improved   No evidence of otitis media or externa. Likely cerumen impaction.    Resolved.    PRN debrox drops available.     Constipation  Bowel regimen in place but states only magnesium citrate works for him.    Continue docusate twice daily.    Magnesium citrate ordered.     Groin fungal rash    Miconazole powder bid.    COVID-19 PCR negative    COVID-19 PCR testing has been negative on multiple occasions during this hospitalization.       Diet: Regular Diet Adult  Snacks/Supplements Adult: Ensure Enlive; With Meals    DVT Prophylaxis: Pneumatic Compression Devices  Tirado Catheter: Not present  Central Lines: None  Code Status: Full Code      Disposition Plan   Expected Discharge: 12/29/2021     Anticipated discharge location: other (see comments)    Delays:     Administering IV medications            The patient's care was discussed with the Bedside Nurse and Patient.    Fly Velez MD  Hospitalist Service  [facility]  Securely message with the Voc[employer] Web Console (learn more here)  Text page via [employer] Paging/Directory        Clinically Significant Risk Factors Present on Admission                    ______________________________________________________________________    Interval History   Bc German feels OK today. Used most of the magnesium citrate, has passed gas but no BM yet. No other questions/concerns for me at the moment. Denies fevers, chest pain, shortness of  breath, nausea, abdominal.    Data reviewed today: I reviewed all medications, new labs and imaging results over the last 24 hours. I personally reviewed no images or EKG's today.    Physical Exam   Vital Signs: Temp: 97.7  F (36.5  C) Temp src: Oral BP: 111/78 Pulse: 98   Resp: 18 SpO2: 100 %      Weight: 132 lbs 1.6 oz  Constitutional: awake, alert, cooperative, no apparent distress, laying in the hospital bed  Respiratory: clear to auscultation bilaterally, no crackles or wheezing  Cardiovascular: regular rate and rhythm, normal S1 and S2, murmur noted  GI: normal bowel sounds, soft, non-distended, non-tender  Skin: warm, dry  Musculoskeletal: no lower extremity pitting edema present  Neurologic: awake, alert, oriented to name, place and time, motor is 5 out of 5 bilaterally, sensory is intact    Data   Recent Labs   Lab 12/23/21  0705 12/20/21  0639   WBC 5.3 5.9   HGB 9.7* 9.1*   MCV 91 92    296    136   POTASSIUM 4.4 4.9   CHLORIDE 109 106   CO2 29 28   BUN 31* 32*   CR 0.92 1.00   ANIONGAP 1* 2*   SANDRA 9.3 9.5   GLC 96 99     Medications       abacavir-dolutegravir-LamiVUDine  1 tablet Oral Daily     aspirin  81 mg Oral Daily     buprenorphine HCl-naloxone HCl  1 Film Sublingual BID     carbamide peroxide  3 drop Left Ear BID     DAPTOmycin (CUBICIN) intermittent infusion  9 mg/kg Intravenous Q24H     docusate sodium  100 mg Oral BID     levomilnacipran  120 mg Oral Daily     LORazepam  0.5 mg Oral TID     miconazole   Topical BID     nicotine  1 patch Transdermal QPM     nicotine   Transdermal Q8H     pregabalin  75 mg Oral BID     sodium chloride (PF)  10 mL Intracatheter Q8H     tamsulosin  0.4 mg Oral Daily

## 2021-12-27 ENCOUNTER — APPOINTMENT (OUTPATIENT)
Dept: CARDIOLOGY | Facility: CLINIC | Age: 41
DRG: 871 | End: 2021-12-27
Attending: SPECIALIST
Payer: COMMERCIAL

## 2021-12-27 LAB
ALBUMIN SERPL-MCNC: 2.7 G/DL (ref 3.4–5)
ALP SERPL-CCNC: 154 U/L (ref 40–150)
ALT SERPL W P-5'-P-CCNC: 1146 U/L (ref 0–70)
ANION GAP SERPL CALCULATED.3IONS-SCNC: 3 MMOL/L (ref 3–14)
AST SERPL W P-5'-P-CCNC: 728 U/L (ref 0–45)
BILIRUB SERPL-MCNC: 0.3 MG/DL (ref 0.2–1.3)
BUN SERPL-MCNC: 30 MG/DL (ref 7–30)
CALCIUM SERPL-MCNC: 9.4 MG/DL (ref 8.5–10.1)
CHLORIDE BLD-SCNC: 105 MMOL/L (ref 94–109)
CK SERPL-CCNC: 30 U/L (ref 30–300)
CO2 SERPL-SCNC: 30 MMOL/L (ref 20–32)
CREAT SERPL-MCNC: 0.92 MG/DL (ref 0.66–1.25)
ERYTHROCYTE [DISTWIDTH] IN BLOOD BY AUTOMATED COUNT: 18.6 % (ref 10–15)
GFR SERPL CREATININE-BSD FRML MDRD: >90 ML/MIN/1.73M2
GLUCOSE BLD-MCNC: 95 MG/DL (ref 70–99)
HCT VFR BLD AUTO: 32.9 % (ref 40–53)
HGB BLD-MCNC: 10.2 G/DL (ref 13.3–17.7)
LVEF ECHO: NORMAL
MCH RBC QN AUTO: 29 PG (ref 26.5–33)
MCHC RBC AUTO-ENTMCNC: 31 G/DL (ref 31.5–36.5)
MCV RBC AUTO: 94 FL (ref 78–100)
PLATELET # BLD AUTO: 234 10E3/UL (ref 150–450)
POTASSIUM BLD-SCNC: 4.5 MMOL/L (ref 3.4–5.3)
PROT SERPL-MCNC: 7.3 G/DL (ref 6.8–8.8)
RBC # BLD AUTO: 3.52 10E6/UL (ref 4.4–5.9)
SODIUM SERPL-SCNC: 138 MMOL/L (ref 133–144)
WBC # BLD AUTO: 5.1 10E3/UL (ref 4–11)

## 2021-12-27 PROCEDURE — 999N000190 HC STATISTIC VAT ROUNDS

## 2021-12-27 PROCEDURE — 250N000013 HC RX MED GY IP 250 OP 250 PS 637: Performed by: INTERNAL MEDICINE

## 2021-12-27 PROCEDURE — 93306 TTE W/DOPPLER COMPLETE: CPT | Mod: 26 | Performed by: INTERNAL MEDICINE

## 2021-12-27 PROCEDURE — 250N000013 HC RX MED GY IP 250 OP 250 PS 637: Performed by: NURSE PRACTITIONER

## 2021-12-27 PROCEDURE — 250N000013 HC RX MED GY IP 250 OP 250 PS 637: Performed by: STUDENT IN AN ORGANIZED HEALTH CARE EDUCATION/TRAINING PROGRAM

## 2021-12-27 PROCEDURE — 80053 COMPREHEN METABOLIC PANEL: CPT | Performed by: HOSPITALIST

## 2021-12-27 PROCEDURE — 99207 PR CDG-MDM COMPONENT: MEETS LOW - DOWN CODED: CPT | Performed by: HOSPITALIST

## 2021-12-27 PROCEDURE — 93306 TTE W/DOPPLER COMPLETE: CPT

## 2021-12-27 PROCEDURE — 82550 ASSAY OF CK (CPK): CPT | Performed by: SPECIALIST

## 2021-12-27 PROCEDURE — 99231 SBSQ HOSP IP/OBS SF/LOW 25: CPT | Performed by: HOSPITALIST

## 2021-12-27 PROCEDURE — 120N000001 HC R&B MED SURG/OB

## 2021-12-27 PROCEDURE — 87522 HEPATITIS C REVRS TRNSCRPJ: CPT | Performed by: SPECIALIST

## 2021-12-27 PROCEDURE — 250N000013 HC RX MED GY IP 250 OP 250 PS 637: Performed by: PSYCHIATRY & NEUROLOGY

## 2021-12-27 PROCEDURE — 36592 COLLECT BLOOD FROM PICC: CPT | Performed by: HOSPITALIST

## 2021-12-27 PROCEDURE — 250N000013 HC RX MED GY IP 250 OP 250 PS 637: Performed by: HOSPITALIST

## 2021-12-27 PROCEDURE — 85027 COMPLETE CBC AUTOMATED: CPT | Performed by: HOSPITALIST

## 2021-12-27 RX ADMIN — LORAZEPAM 0.5 MG: 0.5 TABLET ORAL at 21:52

## 2021-12-27 RX ADMIN — NICOTINE 1 PATCH: 14 PATCH, EXTENDED RELEASE TRANSDERMAL at 09:29

## 2021-12-27 RX ADMIN — TAMSULOSIN HYDROCHLORIDE 0.4 MG: 0.4 CAPSULE ORAL at 09:29

## 2021-12-27 RX ADMIN — LORAZEPAM 0.5 MG: 0.5 TABLET ORAL at 14:10

## 2021-12-27 RX ADMIN — PREGABALIN 75 MG: 75 CAPSULE ORAL at 21:52

## 2021-12-27 RX ADMIN — DOCUSATE SODIUM 100 MG: 100 CAPSULE, LIQUID FILLED ORAL at 09:28

## 2021-12-27 RX ADMIN — BUPRENORPHINE AND NALOXONE 1 FILM: 8; 2 FILM, SOLUBLE BUCCAL; SUBLINGUAL at 09:29

## 2021-12-27 RX ADMIN — LEVOMILNACIPRAN HYDROCHLORIDE 120 MG: 40 CAPSULE, EXTENDED RELEASE ORAL at 09:33

## 2021-12-27 RX ADMIN — LORAZEPAM 0.5 MG: 0.5 TABLET ORAL at 06:16

## 2021-12-27 RX ADMIN — ASPIRIN 81 MG: 81 TABLET, COATED ORAL at 09:29

## 2021-12-27 RX ADMIN — DOCUSATE SODIUM 100 MG: 100 CAPSULE, LIQUID FILLED ORAL at 21:52

## 2021-12-27 RX ADMIN — PREGABALIN 75 MG: 75 CAPSULE ORAL at 09:28

## 2021-12-27 ASSESSMENT — ACTIVITIES OF DAILY LIVING (ADL)
ADLS_ACUITY_SCORE: 5

## 2021-12-27 NOTE — PLAN OF CARE
Summary: Recurrent MSSA/MRSA bacteremia with tricuspid valve endocarditis  DATE & TIME: 12/27/21 7658-6242  Cognitive Concerns/ Orientation : A/Ox 4, flat   BEHAVIOR & AGGRESSION TOOL COLOR: Green, calm/cooperative, anxious and frustrated at times   ABNL VS/O2: Daily vital signs  MOBILITY: Independent  PAIN MANAGMENT: Denies  DIET: Regular, tolerating    BOWEL/BLADDER: refused assessment-Continent.    ABNL LAB/BG: ALT 1146,    DRAIN/DEVICES: LUE Midline/SL q24h antibiotics.  SKIN: Scrotal wound-Pt completes his own wound care, refused RN assessment but states mostly healed. TESTS/PROCEDURES: ECHO scheduled for today  D/C DATE: Discharge pending completion of IV antibiotic on 12/29/21. SW following for Chem Dep placement. Pt. Very anxious about discharge plans   OTHER IMPORTANT INFO:  Daptomycin and Triumeq help at this time due to increase ALT/AST.  ID and WOC following. Contact precautions maintained. Takes Suboxone in the morning but has been refusing at night.

## 2021-12-27 NOTE — PLAN OF CARE
Summary: Recurrent MSSA/MRSA bacteremia with tricuspid valve endocarditis  DATE & TIME: 12/26/21-12/27/21 2813-7554  Cognitive Concerns/ Orientation : A/Ox 4, flat   BEHAVIOR & AGGRESSION TOOL COLOR: Green, calm/cooperative, anxious and frustrated at times   ABNL VS/O2: Daily vital signs  MOBILITY: Independent, mild PASTRANA per pt. report  PAIN MANAGMENT: Denies  DIET: Regular, tolerating    BOWEL/BLADDER: refused assessment-Continent.  Constipated but passing gas  ABNL LAB/BG: hgb= 9.7 (trending up)   DRAIN/DEVICES: LUE Midline/SL q24h antibiotics.  SKIN: Scrotal wound-Pt completes his own wound care, refused RN assessment but states mostly healed. TESTS/PROCEDURES: None scheduled  D/C DATE: Discharge pending completion of IV antibiotic on 12/29/21. SW following for Chem Dep placement. Pt. Very anxious about discharge plans   OTHER IMPORTANT INFO:  Continues on Daptomycin, ID and WOC following. Contact precautions maintained. Takes Suboxone in the morning but has been refusing at night.

## 2021-12-27 NOTE — PROGRESS NOTES
[facility]    Medicine Progress Note - Hospitalist Service       Date of Admission:  11/15/2021    Assessment & Plan         Bc German is a 41 year old male with PMHx of polysubstance abuse including IV drug use (including fentanyl and methamphetamine currently), chronic opioid dependence, HIV, hepatitis C, hx of MSSA sepsis with pulmonary valve endocarditis (2015), and 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 Was found to have recurrent MSSA/MRSA bacteremia and tricuspid valve endocarditis.     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 Lactate nl. CXR on admission showed nodular/patchy opacity at the R base.  * 2/2 blood cultures drawn on admission grew both MSSA and MRSA >> blood cx cleared on 11/18 >> vancomycin changed to daptomycin due to JOSE RAMON.  * EDUARDO done 11/18 notable for large tricuspid valve vegetation with severe regurgitation.    Admitted to inpatient.    Blood cultures have been negative since 11/18/21.    ID consulted, plan to continue antibiotics through at least 12/29/21 (six week of treatment). Currently on daptomycin per ID.    Remains afebrile; EDUARDO repeat 12/02 noted with smaller vegetations than prior EDUARDO but did note that tricuspid valve leaflet is likely perforated and prolapsing leading to severe degenerative TR, EF 65-70 %.    Was having bilateral LE swelling, likely due to TR, US 12/3 noted with no DVT; edema improved with 40 mg IV Lasix given on 12/3 ; will monitor volume status  closely and consider diuretics prn.    Weight was 56kg admission---> peak 73kg---> stable 59-61kg and stable.  Follow volume status given severe TR.    Given repeat EDUARDO findings, re-evaluated by thoracic surgery (initially rec medical management) and plans for surgical replacement with bioprosthetic valve at some point; surgery concerned about his IV drug use and consulted with Dr SELINA Barrientos (addiction medicine physician) at [facility] prior to considering surgery.    Was evaluated by Dr Barrientos (addiction specialist at [place]); concern that he is at high risk for relapse and thus at high risk for prosthetic valve endocarditis if he undergoes valve replacement; rec postponing surgery at this time to focus on chemical dependency.    Outpatient follow up with CV surgery, touch base with discharge plan with them at time of discharge.    CV surgery to consider valve repair after patient completes chemical dependency treatment and remains sober.    No signs of RHF at this time.     Elevated LFTs  , ALT 1146, alk phos 154, and bilirubin 0.3 on 12/27/21. Patient seems to be asymptomatic from this. Abdominal exam benign.    Discussed with ID.    Plan for repeat TTE today to evaluate for worsening valve disease/CHF, although clinically does not appear to be in acute CHF.    Known hepatitis C, consider checking viral load.    Will hold medications this morning pending further evaluation.    Consider RUQ ultrasound pending further evaluation.    Chronic anemia, likely anemia of chronic disease  Likely anemia of chronic disease in the setting of infective endocarditis.  Hemoglobin on admission however was 12.5 and has gradually trended down--->7.1, stable--7.7---7.3---7.6--> 7.4 12/10.     Hemodynamically stable and no suggestion of active bleed.    Iron studies consistent with likely ACD with normal ferritin and low TIBC, TSH slightly elevated but free T4 normal; b12 and folate normal.    Will monitor  hemoglobin periodically.    Transfuse PRN for Hb <7.    Hemoglobin stable 8-10 range.      Hypertension    BP controlled with addition of amlodipine 5 mg po daily and Metoprolol 25 mg po bid with hold parameters (12/3). Amlodipine and metoprolol subsequently discontinued and blood pressure remains within normal limits.    Hydralazine IV prn for SBP>180; plan for intermittent diuresis if needed.       HIV  Hepatitis C  * Chronic and stable on Triumeq.   * CDC >200 so no need for PJP ppx per ID.    Follows with Dr Chavez of [facility].    HIV-1 RNA undetectable on 12/19/21 and 12/22/21. Continue PTA Triumeq.    Hep C still active/viremic and will need to be on treatment at some point.     JOSE RAMON on CKD, likely due to Vancomycin, resolved  Hyperkalemia, resolved  Hyponatremia, resolved   * No hx of CKD and has a normal baseline renal function.  * Creatinine initially stable this stay, began trending up after vancomycin.    Nephrology evaluated for JOSE RAMON, signed off 12/3/21.    ADAN returned negative, Renal US obtained 11/24 and negative for obstruction.    Creatinine peaked at 2.8, then trended down.    Monitor labs intermittently.     History of opiate dependence  History of active IV drug use  Tobacco Use  Depression  Anxiety  * On admission, had stated he last used IV drugs 1 wk prior to admission. While in the ED on 11/16, RN had noted patient was briefly nonresponsive and apneic. Was given dose of Narcan with noted improvement.   * Prior to admission, had been started on taper off Soma -- was taking 87.5mg (1/4 of a 350mg tab) BID but later conversations with PCP's clinic revealed no provider there was managing taper    Continued on PTA Subutex BID this stay - after discussion with Dr Barrientos, switched to Suboxone bid (12/8).    Continue PTA levomilnacipran ER.    Tapered off carisoprodol during this admission per pharmacy/Dr. Barrientos recommendations.    Using lorazepam TID for anxiety, this was changed on  12/20/21.    Continue Lyrica 75 mg PO BID. PTA dose was higher, stopped due to worsening renal function on 11/24, restarted on 12/16 at a lower dose per psychiatry.    Chemical dependency evaluated and recommended inpatient treatment after completion of antibiotics directly will transfer from here to detox treatment center.    12/19 per SW note, pt feeling exhausted and feels he needs TCU before treatment. SW following. Ordered PT and OT.    Re-consult psychiatry today to follow up change of ativan to TID and discuss discharge plans/anxiety management.    Social work following. Helping with search for treatment program/facility, will follow-up with him today.     Left scrotal laceration dt fall/possible syncope  Left scrotal wound infection  * Laceration sutured in ED. Suture removed 11/21.   * US scrotum earlier this stay showed a small R hydrocele but was otherwise normal.     Patient's scrotal laceration appears to be healing well.    Continue cares per WOC nurse.     Left ear pain; improved   No evidence of otitis media or externa. Likely cerumen impaction.    Resolved.    PRN debrox drops available.     Constipation  Bowel regimen in place but states only magnesium citrate works for him.    Continue docusate twice daily.    Received magnesium citrate on 12/25.     Groin fungal rash    Miconazole powder bid.    COVID-19 PCR negative    COVID-19 PCR testing has been negative on multiple occasions during this hospitalization.       Diet: Regular Diet Adult  Snacks/Supplements Adult: Ensure Enlive; With Meals    DVT Prophylaxis: Pneumatic Compression Devices  Tirado Catheter: Not present  Central Lines: None  Code Status: Full Code      Disposition Plan   Expected Discharge: 12/29/2021     Anticipated discharge location: other (see comments)    Delays:     Administering IV medications            The patient's care was discussed with the Bedside Nurse, Care Coordinator/, Patient and ID  Consultant.    Fly Velez MD  Hospitalist Service  [facility]  Securely message with the [employer] Web Console (learn more here)  Text page via [email or web address] Paging/Directory        Clinically Significant Risk Factors Present on Admission                    ______________________________________________________________________    Interval History   Bc German was seen this morning. No new complaints/concerns. Denies fevers, chest pain, shortness of breath, nausea, abdominal pain, diarrhea.    Data reviewed today: I reviewed all medications, new labs and imaging results over the last 24 hours. I personally reviewed no images or EKG's today.    Physical Exam   Vital Signs: Temp: 97.7  F (36.5  C) Temp src: Oral BP: 123/79 Pulse: 63   Resp: 18 SpO2: 98 % O2 Device: None (Room air)    Weight: 132 lbs 1.6 oz  Constitutional: awake, alert, cooperative, no apparent distress, laying in the hospital bed  Respiratory: clear to auscultation bilaterally, no crackles or wheezing  Cardiovascular: regular rate and rhythm, normal S1 and S2, no murmur noted  GI: normal bowel sounds, soft, non-distended, non-tender  Skin: warm, dry  Musculoskeletal: no lower extremity pitting edema present  Neurologic: awake, alert, oriented to name, place and time, motor is 5 out of 5 bilaterally, sensory is intact    Data   Recent Labs   Lab 12/27/21  0617 12/23/21  0705   WBC 5.1 5.3   HGB 10.2* 9.7*   MCV 94 91    249    139   POTASSIUM 4.5 4.4   CHLORIDE 105 109   CO2 30 29   BUN 30 31*   CR 0.92 0.92   ANIONGAP 3 1*   SANDRA 9.4 9.3   GLC 95 96   ALBUMIN 2.7*  --    PROTTOTAL 7.3  --    BILITOTAL 0.3  --    ALKPHOS 154*  --    ALT 1,146*  --    *  --      Medications       aspirin  81 mg Oral Daily     buprenorphine HCl-naloxone HCl  1 Film Sublingual BID     carbamide peroxide  3 drop Left Ear BID     docusate sodium  100 mg Oral BID     levomilnacipran  120 mg Oral Daily     LORazepam  0.5 mg Oral  TID     miconazole   Topical BID     nicotine  1 patch Transdermal QPM     nicotine   Transdermal Q8H     pregabalin  75 mg Oral BID     sodium chloride (PF)  10 mL Intracatheter Q8H     tamsulosin  0.4 mg Oral Daily

## 2021-12-27 NOTE — PROGRESS NOTES
[facility]    Infectious Disease Progress Note    Date of Service : 12/27/2021        Assessment:  41 year old male with polysubstance use/IVDA, HIV-Hep C co-infection and prior hx of recurrent MSSA sepsis with pulmonic valve endocarditis, spinal discitis/osteomyelitis with epidural abscess requiring surgical debridement, who is now admitted with high grade MSSA/MRSA bacteremia with tricuspid valve endocarditis with two large [id number] x 8mm and 13 x 5mm, and repeat EDUARDO showing valve perforation with severe tricuspid regurgitation. Blood cxs are negative since 11/18  . He has had medication non compliance with ART with detectable VL, CD4 >200 , viral load is now suppressed during hospital stay. Course was further complicated by JOSE RAMON and hyperkalemia which have improved , regimen was changed from Vancomycin to Daptomycin which is remains on now. He has developed tricuspid valve perforation which needs surgical management in future.     1. MSSA/MRSA sepsis with tricuspid valve endocarditis - blood cxs cleared 11/18, Repeat EDUARDO 12/2 shows valve perforation with severe regurgitation, which will need surgical management in future.   2. Severe transaminitis ? CHF related vs medication related  3. Prior history of MSSA sepsis with pulmonary valve endocarditis in 2015 and spinal discitis/osteomyelitis with epidural abscess requiring surgical intervention in 2017.  4. Substance abuse with active use of methamphetamine / fentanyl currently  5.  HIV/Hepatitis C co-infection. HIV VL detectable at 29,522 copies/ml and  CD4 625(28%) on  06/09/2021 , on Triumeq now with suppressed viral load     Recommendations  1. Hold Daptomycin (end date was 12/29, will discontinue antibiotics now) and Triumeq in view of new LFT abnormality  2. Check echocardiogram, CK and HCV RNA   3. Valve replacement planned at some point in future, no sxs of valve failure currently, fatigue biggest complaint  4. Will need  rehabilitation for substance abuse / mental health  5. Hep C is untreated and will need treatment at some point    Discussed with Dr. David Cooper MD    Interval History   Continues to complain of fatigue , no cough , shortness of breath or lower extremity edema. LFTs markedly elevated today    Physical Exam   Temp: 97.7  F (36.5  C) Temp src: Oral BP: 123/79 Pulse: 63   Resp: 18 SpO2: 98 % O2 Device: None (Room air)    Vitals:    12/15/21 0625 12/20/21 0625 12/24/21 0641   Weight: 60.4 kg (133 lb 1.6 oz) 60.1 kg (132 lb 8 oz) 59.9 kg (132 lb 1.6 oz)     Vital Signs with Ranges  Temp:  [97.7  F (36.5  C)] 97.7  F (36.5  C)  Pulse:  [63] 63  Resp:  [18] 18  BP: (123)/(79) 123/79  SpO2:  [98 %] 98 %    Constitutional: Awake, alert, cooperative, no apparent distress  Lungs: Clear to auscultation bilaterally, no crackles or wheezing  Cardiovascular: l S1 and S2,  murmur +  Abdomen: Normal bowel sounds, soft, non-distended, non-tender  Skin: No rashes, no cyanosis, no edema    Other:    Medications       abacavir-dolutegravir-LamiVUDine  1 tablet Oral Daily     aspirin  81 mg Oral Daily     buprenorphine HCl-naloxone HCl  1 Film Sublingual BID     carbamide peroxide  3 drop Left Ear BID     DAPTOmycin (CUBICIN) intermittent infusion  9 mg/kg Intravenous Q24H     docusate sodium  100 mg Oral BID     levomilnacipran  120 mg Oral Daily     LORazepam  0.5 mg Oral TID     miconazole   Topical BID     nicotine  1 patch Transdermal QPM     nicotine   Transdermal Q8H     pregabalin  75 mg Oral BID     sodium chloride (PF)  10 mL Intracatheter Q8H     tamsulosin  0.4 mg Oral Daily       Data   All microbiology laboratory data reviewed.  Recent Labs   Lab Test 12/27/21  0617 12/23/21  0705 12/20/21  0639   WBC 5.1 5.3 5.9   HGB 10.2* 9.7* 9.1*   HCT 32.9* 31.1* 30.4*   MCV 94 91 92    249 296     Recent Labs   Lab Test 12/27/21  0617 12/23/21  0705 12/20/21  0639   CR 0.92 0.92 1.00     Recent Labs   Lab Test  12/17/21  0550   SED 74*     Component      Latest Ref Rng & Units 12/27/2021   Alkaline Phosphatase      40 - 150 U/L 154 (H)   AST      0 - 45 U/L 728 (HH)   ALT      0 - 70 U/L 1,146 (HH)   Protein Total      6.8 - 8.8 g/dL 7.3   Albumin      3.4 - 5.0 g/dL 2.7 (L)   Bilirubin Total      0.2 - 1.3 mg/dL 0.3   GFR Estimate      >60 mL/min/1.73m2 >90   CK Total      30 - 300 U/L 30

## 2021-12-27 NOTE — PLAN OF CARE
Summary: Recurrent MSSA/MRSA bacteremia with tricuspid valve endocarditis  DATE & TIME: 12/26/21 2359-1066  Cognitive Concerns/ Orientation : A/Ox 4, flat   BEHAVIOR & AGGRESSION TOOL COLOR: Green, calm/cooperative, anxious and frustrated at times   ABNL VS/O2: Daily vital signs  MOBILITY: Independent, mild PASTRANA per pt. report  PAIN MANAGMENT: Denies  DIET: Regular, tolerating    BOWEL/BLADDER: refused assessment-Continent.  Constipated but passing gas  ABNL LAB/BG: hgb= 9.7 (trending up)   DRAIN/DEVICES: LUE Midline/SL q24h antibiotics.  SKIN: Scrotal wound-Pt completes his own wound care, refused RN assessment but states mostly healed. TESTS/PROCEDURES: None scheduled  D/C DATE: Discharge pending completion of IV antibiotic on 12/29/21. SW following for Chem Dep placement. Pt. Very anxious about discharge plans   OTHER IMPORTANT INFO:  Continues on Daptomycin, ID and WOC following. Contact precautions maintained. Takes Suboxone in the morning but has been refusing at night.

## 2021-12-28 LAB
ALBUMIN SERPL-MCNC: 2.8 G/DL (ref 3.4–5)
ALP SERPL-CCNC: 151 U/L (ref 40–150)
ALT SERPL W P-5'-P-CCNC: 1002 U/L (ref 0–70)
ANION GAP SERPL CALCULATED.3IONS-SCNC: <1 MMOL/L (ref 3–14)
AST SERPL W P-5'-P-CCNC: 538 U/L (ref 0–45)
BILIRUB SERPL-MCNC: 0.4 MG/DL (ref 0.2–1.3)
BUN SERPL-MCNC: 31 MG/DL (ref 7–30)
CALCIUM SERPL-MCNC: 9.8 MG/DL (ref 8.5–10.1)
CHLORIDE BLD-SCNC: 104 MMOL/L (ref 94–109)
CK SERPL-CCNC: 32 U/L (ref 30–300)
CO2 SERPL-SCNC: 34 MMOL/L (ref 20–32)
CREAT SERPL-MCNC: 0.88 MG/DL (ref 0.66–1.25)
GFR SERPL CREATININE-BSD FRML MDRD: >90 ML/MIN/1.73M2
GLUCOSE BLD-MCNC: 96 MG/DL (ref 70–99)
POTASSIUM BLD-SCNC: 4.6 MMOL/L (ref 3.4–5.3)
PROT SERPL-MCNC: 7.3 G/DL (ref 6.8–8.8)
SODIUM SERPL-SCNC: 138 MMOL/L (ref 133–144)

## 2021-12-28 PROCEDURE — 99207 PR CDG-MDM COMPONENT: MEETS MODERATE - DOWN CODED: CPT | Performed by: HOSPITALIST

## 2021-12-28 PROCEDURE — 250N000013 HC RX MED GY IP 250 OP 250 PS 637: Performed by: NURSE PRACTITIONER

## 2021-12-28 PROCEDURE — 250N000013 HC RX MED GY IP 250 OP 250 PS 637: Performed by: INTERNAL MEDICINE

## 2021-12-28 PROCEDURE — 250N000013 HC RX MED GY IP 250 OP 250 PS 637: Performed by: PSYCHIATRY & NEUROLOGY

## 2021-12-28 PROCEDURE — 250N000013 HC RX MED GY IP 250 OP 250 PS 637: Performed by: STUDENT IN AN ORGANIZED HEALTH CARE EDUCATION/TRAINING PROGRAM

## 2021-12-28 PROCEDURE — 250N000013 HC RX MED GY IP 250 OP 250 PS 637: Performed by: HOSPITALIST

## 2021-12-28 PROCEDURE — 99232 SBSQ HOSP IP/OBS MODERATE 35: CPT | Performed by: HOSPITALIST

## 2021-12-28 PROCEDURE — 120N000001 HC R&B MED SURG/OB

## 2021-12-28 PROCEDURE — 82550 ASSAY OF CK (CPK): CPT | Performed by: INTERNAL MEDICINE

## 2021-12-28 PROCEDURE — 80053 COMPREHEN METABOLIC PANEL: CPT | Performed by: HOSPITALIST

## 2021-12-28 RX ORDER — MAGNESIUM CARB/ALUMINUM HYDROX 105-160MG
296 TABLET,CHEWABLE ORAL ONCE
Status: COMPLETED | OUTPATIENT
Start: 2021-12-29 | End: 2021-12-29

## 2021-12-28 RX ADMIN — DOCUSATE SODIUM 100 MG: 100 CAPSULE, LIQUID FILLED ORAL at 21:08

## 2021-12-28 RX ADMIN — PREGABALIN 75 MG: 75 CAPSULE ORAL at 21:08

## 2021-12-28 RX ADMIN — LORAZEPAM 0.5 MG: 0.5 TABLET ORAL at 13:46

## 2021-12-28 RX ADMIN — LEVOMILNACIPRAN HYDROCHLORIDE 120 MG: 40 CAPSULE, EXTENDED RELEASE ORAL at 09:34

## 2021-12-28 RX ADMIN — PREGABALIN 75 MG: 75 CAPSULE ORAL at 09:34

## 2021-12-28 RX ADMIN — BUPRENORPHINE AND NALOXONE 1 FILM: 8; 2 FILM, SOLUBLE BUCCAL; SUBLINGUAL at 21:08

## 2021-12-28 RX ADMIN — BUPRENORPHINE AND NALOXONE 1 FILM: 8; 2 FILM, SOLUBLE BUCCAL; SUBLINGUAL at 09:34

## 2021-12-28 RX ADMIN — LORAZEPAM 0.5 MG: 0.5 TABLET ORAL at 06:28

## 2021-12-28 RX ADMIN — MICONAZOLE NITRATE: 2 POWDER TOPICAL at 21:11

## 2021-12-28 RX ADMIN — ASPIRIN 81 MG: 81 TABLET, COATED ORAL at 09:34

## 2021-12-28 RX ADMIN — DOCUSATE SODIUM 100 MG: 100 CAPSULE, LIQUID FILLED ORAL at 09:34

## 2021-12-28 RX ADMIN — TAMSULOSIN HYDROCHLORIDE 0.4 MG: 0.4 CAPSULE ORAL at 09:34

## 2021-12-28 RX ADMIN — NICOTINE 1 PATCH: 14 PATCH, EXTENDED RELEASE TRANSDERMAL at 09:36

## 2021-12-28 RX ADMIN — LORAZEPAM 0.5 MG: 0.5 TABLET ORAL at 21:08

## 2021-12-28 ASSESSMENT — ACTIVITIES OF DAILY LIVING (ADL)
ADLS_ACUITY_SCORE: 5

## 2021-12-28 ASSESSMENT — MIFFLIN-ST. JEOR: SCORE: 1572.61

## 2021-12-28 NOTE — PLAN OF CARE
DATE & TIME: 12/27/21-12/28/21 5092-9163  Cognitive Concerns/ Orientation : A/Ox 4   BEHAVIOR & AGGRESSION TOOL COLOR: Green, calm/cooperative  ABNL VS/O2: Daily VS, not collected this shift.   MOBILITY: Independent  PAIN MANAGMENT: Denies  DIET: Regular  BOWEL/BLADDER: Continent up to BR or using urinal at bedside.   ABNL LAB/BG: N/A  DRAIN/DEVICES: LUE Midline/ SL- blood return noted  SKIN: Scrotal wound- Pt completes his own wound care, refused RN assessment this shift  TESTS/PROCEDURES: N/A  D/C DATE: Pending   OTHER IMPORTANT INFO:  IV abx and Triumeq discontinued 12/27/21 due to elevated LFTs. ID and WOC following. Contact precautions maintained.

## 2021-12-28 NOTE — PROGRESS NOTES
[facility]    Infectious Disease Progress Note    Date of Service : 12/28/2021     Assessment:  41 year old male with polysubstance use/IVDA, HIV-Hep C co-infection and prior hx of recurrent MSSA sepsis with pulmonic valve endocarditis, spinal discitis/osteomyelitis with epidural abscess requiring surgical debridement, who is now admitted with high grade MSSA/MRSA bacteremia with tricuspid valve endocarditis with two large [id number] x 8mm and 13 x 5mm, and repeat EDUARDO showing valve perforation with severe tricuspid regurgitation. Blood cxs are negative since 11/18  . He has had medication non compliance with ART with detectable VL, CD4 >200 , viral load is now suppressed during hospital stay. Course was further complicated by JOSE RAMON and hyperkalemia which have improved , regimen was changed from Vancomycin to Daptomycin which is remains on now. He has developed tricuspid valve perforation which needs surgical management in future.     1. MSSA/MRSA sepsis with tricuspid valve endocarditis - blood cxs cleared 11/18, Repeat EDUARDO 12/2 shows valve perforation with severe regurgitation, which will need surgical management in future.   2. Severe transaminitis ? CHF related vs medication related  3. Prior history of MSSA sepsis with pulmonary valve endocarditis in 2015 and spinal discitis/osteomyelitis with epidural abscess requiring surgical intervention in 2017.  4. Substance abuse with active use of methamphetamine / fentanyl currently  5.  HIV/Hepatitis C co-infection. HIV VL detectable at 29,522 copies/ml and  CD4 625(28%) on  06/09/2021 , on Triumeq now with suppressed viral load     Recommendations  1. Plan to restart Triumeq by tomorrow with close monitoring of LFTs  2. S/p prolonged treatment with Daptomycin for endocarditis , echocardiogram appears stable. Still has tricuspid valve regurgitation/perforation and will need surgical follow up for future surgery  3. rehabilitation for  substance abuse / mental health  4. Hep C is untreated and will need treatment at some point      Alana Cooper MD    Interval History   No new complaints, remains stable, remained afebrile    Physical Exam   Temp: 97.6  F (36.4  C) Temp src: Oral BP: 114/83 Pulse: 79   Resp: 16 SpO2: 99 % O2 Device: None (Room air)    Vitals:    12/20/21 0625 12/24/21 0641 12/28/21 0611   Weight: 60.1 kg (132 lb 8 oz) 59.9 kg (132 lb 1.6 oz) 62 kg (136 lb 9.6 oz)     Vital Signs with Ranges  Temp:  [97.6  F (36.4  C)-97.9  F (36.6  C)] 97.6  F (36.4  C)  Pulse:  [79-82] 79  Resp:  [16-18] 16  BP: (108-114)/(76-83) 114/83  SpO2:  [98 %-99 %] 99 %      Constitutional: Awake, alert, cooperative, no apparent distress  Lungs: Clear to auscultation bilaterally, no crackles or wheezing  Cardiovascular: l S1 and S2,  murmur +  Abdomen: Normal bowel sounds, soft, non-distended, non-tender  Skin: No rashes, no cyanosis, no edema    Other:    Medications       aspirin  81 mg Oral Daily     buprenorphine HCl-naloxone HCl  1 Film Sublingual BID     docusate sodium  100 mg Oral BID     levomilnacipran  120 mg Oral Daily     LORazepam  0.5 mg Oral TID     miconazole   Topical BID     nicotine  1 patch Transdermal QPM     nicotine   Transdermal Q8H     pregabalin  75 mg Oral BID     sodium chloride (PF)  10 mL Intracatheter Q8H     tamsulosin  0.4 mg Oral Daily       Data   All microbiology laboratory data reviewed.  Recent Labs   Lab Test 12/27/21  0617 12/23/21  0705 12/20/21  0639   WBC 5.1 5.3 5.9   HGB 10.2* 9.7* 9.1*   HCT 32.9* 31.1* 30.4*   MCV 94 91 92    249 296     Recent Labs   Lab Test 12/28/21  0629 12/27/21  0617 12/23/21  0705   CR 0.88 0.92 0.92     Recent Labs   Lab Test 12/17/21  0550   SED 74*     Component      Latest Ref Rng & Units 12/28/2021   AST      0 - 45 U/L 538 (HH)   ALT      0 - 70 U/L 1,002 ()   Protein Total      6.8 - 8.8 g/dL 7.3   Albumin      3.4 - 5.0 g/dL 2.8 (L)   Bilirubin Total      0.2 - 1.3 mg/dL  0.4     Imaging  12/27 TTE  Interpretation Summary     The left ventricle is normal in size.  Left ventricular systolic function is normal.  The visual ejection fraction is 60-65%.  Normal left ventricular wall motion  Thickened mitral valve anterior leaflet  There is trace mitral regurgitation.  Tricuspid leaflets are thickened.  There is moderate (2+) tricuspid regurgitation. The jet is eccentric, directed  toward the atrial septum, possibly originating from a perforation in the  posterior leaflet.  No valvular vegetations identified.     Compared to the previous study, the TR has decreased significantly.  Vegetations are no longer seen.

## 2021-12-28 NOTE — PLAN OF CARE
DATE & TIME: 12/27/21 7613-4985  Cognitive Concerns/ Orientation : A/Ox 4   BEHAVIOR & AGGRESSION TOOL COLOR: Green, calm/cooperative, anxious and frustrated at times   ABNL VS/O2: Daily vital signs done this shift  MOBILITY: Independent  PAIN MANAGMENT: Denies  DIET: Regular- good appetite  BOWEL/BLADDER: Continent- using urinal at bedside.  ABNL LAB/BG: ALT 1146,    DRAIN/DEVICES: LUE Midline/ SL- blood return noted  SKIN: Scrotal wound-Pt completes his own wound care, refused RN assessment   TESTS/PROCEDURES: Nothing scheduled  D/C DATE: Pending   OTHER IMPORTANT INFO:  IV abx discontinued today due to elevated LFTs. ID and WOC following. Contact precautions maintained. Refuses suboxone at night. Nicotine patch on L shoulder

## 2021-12-28 NOTE — PLAN OF CARE
DATE & TIME: 12/28/21 7479-2615  Cognitive Concerns/ Orientation : A/Ox 4   BEHAVIOR & AGGRESSION TOOL COLOR: Green, calm/cooperative  ABNL VS/O2: VSS  MOBILITY: Independent  PAIN MANAGMENT: Denies  DIET: Regular  BOWEL/BLADDER: Continent up to BR or using urinal at bedside.   ABNL LAB/BG: ALT 1002 .  Tredning down  DRAIN/DEVICES: LUE Midline/ SL- blood return noted  SKIN: Scrotal wound- Pt completes his own wound care, refused RN assessment this shift  TESTS/PROCEDURES: N/A  D/C DATE: Pending   OTHER IMPORTANT INFO:  IV abx and Triumeq discontinued 12/27/21 due to elevated LFTs. Triumeq to be restarted on 12/29.  ID and WOC following. Contact precautions maintained.

## 2021-12-28 NOTE — PROGRESS NOTES
Care Management Follow Up    Length of Stay (days): 42    Expected Discharge Date: 12/29/2021     Concerns to be Addressed: discharge planning     Patient plan of care discussed at interdisciplinary rounds: Yes    Anticipated Discharge Disposition: Inpatient Chemical Dependency     Anticipated Discharge Services:    Anticipated Discharge DME:      Patient/family educated on Medicare website which has current facility and service quality ratings:    Education Provided on the Discharge Plan:    Patient/Family in Agreement with the Plan: yes    Referrals Placed by CM/SW:    Private pay costs discussed:     Additional Information:  Met with patient today.  He signed OMID's to send referrals to to [facility] however when writer spoke with Christina in their Intake she said they have a two week wait list.   Sent clinical information to [place] which has immediate openings at [place], [facility], [facility] and [facility].  In addition to the clinical information being faxed, patient is aware he needs to speak with [facility], intake thru [facility] at [phone].    Sent clinical information to [place].  In the event the community residential programs decline due to patient's medical history, writer  discussed [facility] as another option.  Bc has been to [facility] and did sign an OMID for this program also.   Patient also spoke to a [facility] program in [place] which is am 8 month program.  Their intake staff discouraged consideration due to the heart surgery he needs however they said he can call them when he is out of the hospital.  Patient plan is to discharge home with his parents for a few days and then admit into a program.  Writer encouraged patient to have program that he is accepted at before he discharges.   Patient talked a lot of being in a different frame of mind then he has been before.  He is determined to succeed this time and did reference his conversation he with [facility]ales,  [facility] as being helpful.  Plan:  Will meet with patient tomorrow to determine if he has spoken with [facility] intake staff.         EULA HurtadoSW
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
## 2021-12-28 NOTE — PROGRESS NOTES
4093-5913: Contact precautions for MRSA. Pt AOx4, IND in room. Vitals refused this shift- completed on AM shift. Denied any pain. Refused full assessment- acknowledged/answered simple questions. Skin looks intact- tattoos throughout. LUE midline SL. Plan to restart Triumeq tomorrow and monitor LFTs. Discharge pending acceptance into treatment.

## 2021-12-28 NOTE — PROGRESS NOTES
[facility]    Medicine Progress Note - Hospitalist Service       Date of Admission:  11/15/2021    Assessment & Plan         Bc German is a 41 year old male with PMHx of polysubstance abuse including IV drug use (including fentanyl and methamphetamine currently), chronic opioid dependence, HIV, hepatitis C, hx of MSSA sepsis with pulmonary valve endocarditis (2015), and 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 Was found to have recurrent MSSA/MRSA bacteremia and tricuspid valve endocarditis.     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 Lactate nl. CXR on admission showed nodular/patchy opacity at the R base.  * 2/2 blood cultures drawn on admission grew both MSSA and MRSA >> blood cx cleared on 11/18 >> vancomycin changed to daptomycin due to JOSE RAMON.  * EDUARDO done 11/18 notable for large tricuspid valve vegetation with severe regurgitation.    Admitted to inpatient.    Blood cultures have been negative since 11/18/21.    ID consulted, planned to continue antibiotics through at least 12/29/21 (six week of treatment), however stopped on 12/27/21 due to increased LFTS.    Remains afebrile; EDUARDO repeat 12/02 noted with smaller vegetations than prior EDUARDO but did note that tricuspid valve leaflet is likely perforated and prolapsing leading to severe degenerative TR, EF 65-70 %.    Was having bilateral LE swelling, likely due to TR, US 12/3 noted with no DVT; edema improved with 40 mg IV Lasix given on 12/3 ; will  monitor volume status closely and consider diuretics prn.    Weight was 56kg admission---> peak 73kg---> stable 59-61kg and stable.  Follow volume status given severe TR.    Given repeat EDUARDO findings, re-evaluated by thoracic surgery (initially rec medical management) and plans for surgical replacement with bioprosthetic valve at some point; surgery concerned about his IV drug use and consulted with Dr SELINA Barrientos (addiction medicine physician) at [facility] prior to considering surgery.    Was evaluated by Dr Barrientos (addiction specialist at [place]); concern that he is at high risk for relapse and thus at high risk for prosthetic valve endocarditis if he undergoes valve replacement; rec postponing surgery at this time to focus on chemical dependency.    Outpatient follow up with CV surgery, touch base with discharge plan with them at time of discharge.    CV surgery to consider valve repair after patient completes chemical dependency treatment and remains sober.    Repeat TTE on 12/27/21 showed resolution of vegetations and improvement in TR.    No signs of RHF at this time.     Elevated LFTs  , ALT 1146, alk phos 154, and bilirubin 0.3 on 12/27/21. Patient seems to be asymptomatic from this. Abdominal exam benign.    Discussed with ID.    Repeat TTE showed resolution of vegetations and improvement in TR on 12/27/21. today to evaluate for worsening valve disease/CHF, although clinically does not appear to be in acute CHF.    Known hepatitis C, viral load pending.    Daptomycin discontinued, no further antibiotics planned at this time.    Triumeq on hold, ID considering restarting tomorrow.    Recheck labs in AM.    Chronic anemia, likely anemia of chronic disease  Likely anemia of chronic disease in the setting of infective endocarditis.  Hemoglobin on admission however was 12.5 and has gradually trended down--->7.1, stable--7.7---7.3---7.6--> 7.4 12/10.     Hemodynamically stable and no suggestion of active  bleed.    Iron studies consistent with likely ACD with normal ferritin and low TIBC, TSH slightly elevated but free T4 normal; b12 and folate normal.    Will monitor hemoglobin periodically.    Transfuse PRN for Hb <7.    Hemoglobin stable 8-10 range.      Hypertension    BP controlled with addition of amlodipine 5 mg po daily and Metoprolol 25 mg po bid with hold parameters (12/3). Amlodipine and metoprolol subsequently discontinued and blood pressure remains within normal limits.    Hydralazine IV prn for SBP>180; plan for intermittent diuresis if needed.       HIV  Hepatitis C  * Chronic and stable on Triumeq.   * CDC >200 so no need for PJP ppx per ID.    Follows with Dr Chavez of [facility]/ID.    HIV-1 RNA undetectable on 12/19/21 and 12/22/21. Triumeq placed on hold on 12/27/21 due to elevated LFTs, ID planning to restart tomorrow if LFTs improved.    Hep C still active/viremic and will need to be on treatment at some point.     JOSE RAMON on CKD, likely due to Vancomycin, resolved  Hyperkalemia, resolved  Hyponatremia, resolved   * No hx of CKD and has a normal baseline renal function.  * Creatinine initially stable this stay, began trending up after vancomycin.    Nephrology evaluated for JOSE RAMON, signed off 12/3/21.    ADAN returned negative, Renal US obtained 11/24 and negative for obstruction.    Creatinine peaked at 2.8, then trended down.    Monitor labs intermittently.     History of opiate dependence  History of active IV drug use  Tobacco Use  Depression  Anxiety  * On admission, had stated he last used IV drugs 1 wk prior to admission. While in the ED on 11/16, RN had noted patient was briefly nonresponsive and apneic. Was given dose of Narcan with noted improvement.   * Prior to admission, had been started on taper off Soma -- was taking 87.5mg (1/4 of a 350mg tab) BID but later conversations with PCP's clinic revealed no provider there was managing taper    Continued on PTA Subutex BID this stay -  after discussion with Dr Barrientos, switched to Suboxone bid (12/8).    Continue PTA levomilnacipran ER.    Tapered off carisoprodol during this admission per pharmacy/Dr. Barrientos recommendations.    Continue nicotine patch, will decrease from 14 mg patch to 7 mg patch on 12/29/21 as he will have completed 6 weeks of the 14 mg patch.    Briefly counseled on tobacco cessation.    Using lorazepam TID for anxiety, this was changed on 12/20/21.    Continue Lyrica 75 mg PO BID. PTA dose was higher, stopped due to worsening renal function on 11/24, restarted on 12/16 at a lower dose per psychiatry.    Chemical dependency evaluated and recommended inpatient treatment after completion of antibiotics directly will transfer from here to detox treatment center.    12/19 per SW note, pt feeling exhausted and feels he needs TCU before treatment. SW following. Ordered PT and OT.    Re-consulted psychiatry 12/27/21 to follow up change of ativan to TID and discuss discharge plans/anxiety management. Can re-consult as needed.    Social work following, helping with search for treatment program/facility, see note from 12/28/21.     Left scrotal laceration dt fall/possible syncope  Left scrotal wound infection  * Laceration sutured in ED. Suture removed 11/21.   * US scrotum earlier this stay showed a small R hydrocele but was otherwise normal.     Patient's scrotal laceration appears to be healing well.    Continue cares per [facility] nurse.     Left ear pain; improved   No evidence of otitis media or externa. Likely cerumen impaction.    Resolved.    PRN debrox drops discontinued as he has not been using them.     Constipation  Bowel regimen in place but states only magnesium citrate works for him.    Continue docusate twice daily.    Received magnesium citrate on 12/25, will order another dose for tomorrow as he continues to feel constipated.     Groin fungal rash    Miconazole powder bid.    COVID-19 PCR negative    COVID-19 PCR testing has  been negative on multiple occasions during this hospitalization.       Diet: Regular Diet Adult  Snacks/Supplements Adult: Ensure Enlive; With Meals    DVT Prophylaxis: Pneumatic Compression Devices  Tirado Catheter: Not present  Central Lines: None  Code Status: Full Code      Disposition Plan   Expected Discharge: ID might restart Triumeq on 12/29/21 if LFTs continue to improve, will watch LFTs at least one more day after restarting Triumeq to ensure continued improvement. Consider discharge as early as 12/30/21 pending progress with finding a CD treatment program.     Anticipated discharge location: other (see comments)    Delays:     Administering IV medications            The patient's care was discussed with the Bedside Nurse, Care Coordinator/, Patient and ID Consultant.    Fly Velez MD  Hospitalist Service  [facility]  Securely message with the Vocera Web Console (learn more here)  Text page via [employer] Paging/Directory        Clinically Significant Risk Factors Present on Admission                    ______________________________________________________________________    Interval History   Bc German was seen today. No new complaints/concerns. Denies fevers, chest pain, shortness of breath, nausea, abdominal pain.    Data reviewed today: I reviewed all medications, new labs and imaging results over the last 24 hours. I personally reviewed no images or EKG's today.    Physical Exam   Vital Signs: Temp: 97.6  F (36.4  C) Temp src: Oral BP: 114/83 Pulse: 79   Resp: 16 SpO2: 99 % O2 Device: None (Room air)    Weight: 136 lbs 9.6 oz  Constitutional: awake, alert, cooperative, no apparent distress, laying in the hospital bed  Respiratory: clear to auscultation bilaterally, no crackles or wheezing  Cardiovascular: regular rate and rhythm, normal S1 and S2, murmur noted  GI: normal bowel sounds, soft, non-distended, non-tender  Skin: warm, dry  Musculoskeletal: no  lower extremity pitting edema present  Neurologic: awake, alert, oriented to name, place and time, motor is 5 out of 5 bilaterally, sensory is intact    Data   Recent Labs   Lab 12/28/21  0629 12/27/21  0617 12/23/21  0705   WBC  --  5.1 5.3   HGB  --  10.2* 9.7*   MCV  --  94 91   PLT  --  234 249    138 139   POTASSIUM 4.6 4.5 4.4   CHLORIDE 104 105 109   CO2 34* 30 29   BUN 31* 30 31*   CR 0.88 0.92 0.92   ANIONGAP <1* 3 1*   SANDRA 9.8 9.4 9.3   GLC 96 95 96   ALBUMIN 2.8* 2.7*  --    PROTTOTAL 7.3 7.3  --    BILITOTAL 0.4 0.3  --    ALKPHOS 151* 154*  --    ALT 1,002* 1,146*  --    * 728*  --      Medications       aspirin  81 mg Oral Daily     buprenorphine HCl-naloxone HCl  1 Film Sublingual BID     docusate sodium  100 mg Oral BID     levomilnacipran  120 mg Oral Daily     LORazepam  0.5 mg Oral TID     miconazole   Topical BID     nicotine  1 patch Transdermal QPM     nicotine   Transdermal Q8H     pregabalin  75 mg Oral BID     sodium chloride (PF)  10 mL Intracatheter Q8H     tamsulosin  0.4 mg Oral Daily

## 2021-12-29 VITALS
TEMPERATURE: 97.8 F | DIASTOLIC BLOOD PRESSURE: 69 MMHG | RESPIRATION RATE: 18 BRPM | BODY MASS INDEX: 18.5 KG/M2 | HEIGHT: 72 IN | WEIGHT: 136.6 LBS | HEART RATE: 108 BPM | OXYGEN SATURATION: 97 % | SYSTOLIC BLOOD PRESSURE: 105 MMHG

## 2021-12-29 LAB
ALBUMIN SERPL-MCNC: 3 G/DL (ref 3.4–5)
ALP SERPL-CCNC: 161 U/L (ref 40–150)
ALT SERPL W P-5'-P-CCNC: 880 U/L (ref 0–70)
ANION GAP SERPL CALCULATED.3IONS-SCNC: 2 MMOL/L (ref 3–14)
AST SERPL W P-5'-P-CCNC: 416 U/L (ref 0–45)
BILIRUB SERPL-MCNC: 0.4 MG/DL (ref 0.2–1.3)
BUN SERPL-MCNC: 28 MG/DL (ref 7–30)
CALCIUM SERPL-MCNC: 9.7 MG/DL (ref 8.5–10.1)
CHLORIDE BLD-SCNC: 104 MMOL/L (ref 94–109)
CO2 SERPL-SCNC: 31 MMOL/L (ref 20–32)
CREAT SERPL-MCNC: 0.84 MG/DL (ref 0.66–1.25)
GFR SERPL CREATININE-BSD FRML MDRD: >90 ML/MIN/1.73M2
GLUCOSE BLD-MCNC: 99 MG/DL (ref 70–99)
HCV RNA SERPL NAA+PROBE-ACNC: ABNORMAL IU/ML
HCV RNA SERPL NAA+PROBE-LOG IU: 5.9 {LOG_IU}/ML
POTASSIUM BLD-SCNC: 4.5 MMOL/L (ref 3.4–5.3)
PROT SERPL-MCNC: 7.8 G/DL (ref 6.8–8.8)
SODIUM SERPL-SCNC: 137 MMOL/L (ref 133–144)

## 2021-12-29 PROCEDURE — 250N000013 HC RX MED GY IP 250 OP 250 PS 637: Performed by: NURSE PRACTITIONER

## 2021-12-29 PROCEDURE — 250N000013 HC RX MED GY IP 250 OP 250 PS 637: Performed by: HOSPITALIST

## 2021-12-29 PROCEDURE — 250N000013 HC RX MED GY IP 250 OP 250 PS 637: Performed by: PSYCHIATRY & NEUROLOGY

## 2021-12-29 PROCEDURE — 80053 COMPREHEN METABOLIC PANEL: CPT | Performed by: HOSPITALIST

## 2021-12-29 PROCEDURE — 250N000013 HC RX MED GY IP 250 OP 250 PS 637: Performed by: INTERNAL MEDICINE

## 2021-12-29 PROCEDURE — 99207 PR CDG-MDM COMPONENT: MEETS LOW - DOWN CODED: CPT | Performed by: INTERNAL MEDICINE

## 2021-12-29 PROCEDURE — 250N000013 HC RX MED GY IP 250 OP 250 PS 637: Performed by: SPECIALIST

## 2021-12-29 PROCEDURE — 999N000190 HC STATISTIC VAT ROUNDS

## 2021-12-29 PROCEDURE — 99231 SBSQ HOSP IP/OBS SF/LOW 25: CPT | Performed by: INTERNAL MEDICINE

## 2021-12-29 PROCEDURE — 120N000001 HC R&B MED SURG/OB

## 2021-12-29 RX ADMIN — LORAZEPAM 0.5 MG: 0.5 TABLET ORAL at 14:17

## 2021-12-29 RX ADMIN — NICOTINE 1 PATCH: 7 PATCH, EXTENDED RELEASE TRANSDERMAL at 09:34

## 2021-12-29 RX ADMIN — PREGABALIN 75 MG: 75 CAPSULE ORAL at 20:51

## 2021-12-29 RX ADMIN — TAMSULOSIN HYDROCHLORIDE 0.4 MG: 0.4 CAPSULE ORAL at 09:32

## 2021-12-29 RX ADMIN — ASPIRIN 81 MG: 81 TABLET, COATED ORAL at 09:32

## 2021-12-29 RX ADMIN — LEVOMILNACIPRAN HYDROCHLORIDE 120 MG: 40 CAPSULE, EXTENDED RELEASE ORAL at 09:32

## 2021-12-29 RX ADMIN — PREGABALIN 75 MG: 75 CAPSULE ORAL at 09:43

## 2021-12-29 RX ADMIN — LORAZEPAM 0.5 MG: 0.5 TABLET ORAL at 06:04

## 2021-12-29 RX ADMIN — MAGNESIUM CITRATE 296 ML: 1.75 LIQUID ORAL at 10:26

## 2021-12-29 RX ADMIN — MICONAZOLE NITRATE: 2 POWDER TOPICAL at 20:53

## 2021-12-29 RX ADMIN — DOCUSATE SODIUM 100 MG: 100 CAPSULE, LIQUID FILLED ORAL at 20:51

## 2021-12-29 RX ADMIN — LORAZEPAM 0.5 MG: 0.5 TABLET ORAL at 20:51

## 2021-12-29 RX ADMIN — DOCUSATE SODIUM 100 MG: 100 CAPSULE, LIQUID FILLED ORAL at 09:32

## 2021-12-29 RX ADMIN — ABACAVIR SULFATE, DOLUTEGRAVIR SODIUM, LAMIVUDINE 1 TABLET: 600; 50; 300 TABLET, FILM COATED ORAL at 10:20

## 2021-12-29 RX ADMIN — MICONAZOLE NITRATE: 2 POWDER TOPICAL at 09:44

## 2021-12-29 ASSESSMENT — ACTIVITIES OF DAILY LIVING (ADL)
ADLS_ACUITY_SCORE: 5

## 2021-12-29 NOTE — PROGRESS NOTES
[facility]    Infectious Disease Progress Note    Date of Service : 12/29/2021     Assessment:  41 year old male with polysubstance use/IVDA, HIV-Hep C co-infection and prior hx of recurrent MSSA sepsis with pulmonic valve endocarditis, spinal discitis/osteomyelitis with epidural abscess requiring surgical debridement, who is now admitted with high grade MSSA/MRSA bacteremia with tricuspid valve endocarditis with two large [id number] x 8mm and 13 x 5mm, and repeat EDUARDO showing valve perforation with severe tricuspid regurgitation. Blood cxs are negative since 11/18  . He has had medication non compliance with ART with detectable VL, CD4 >200 , viral load is now suppressed during hospital stay. Course was further complicated by JOSE RAMON and hyperkalemia which have improved , regimen was changed from Vancomycin to Daptomycin which is remains on now. He has developed tricuspid valve perforation which needs surgical management in future.     1. MSSA/MRSA sepsis with tricuspid valve endocarditis - blood cxs cleared 11/18, Repeat EDUARDO 12/2 shows valve perforation with severe regurgitation, which will need surgical management in future.   2. Severe transaminitis ? CHF related vs medication related. Off Daptomycin now, LFTs imprving  3. Prior history of MSSA sepsis with pulmonary valve endocarditis in 2015 and spinal discitis/osteomyelitis with epidural abscess requiring surgical intervention in 2017.  4. Substance abuse with active use of methamphetamine / fentanyl currently  5.  HIV/Hepatitis C co-infection. HIV VL detectable at 29,522 copies/ml and  CD4 625(28%) on  06/09/2021 , on Triumeq now with suppressed viral load     Recommendations  1. Resume Triumeq and repeat LFTs in am  2. Plan discharge on Triumeq if LFTs remain stable with close FUP and repeat LFT panel on Monday 01/02  3. S/p prolonged IV antibiotic treatment with Vancomycin then Daptomycin for endocarditis ( 11/18- 12/26)  ,  echocardiogram appears stable. Still has tricuspid valve regurgitation/perforation and will need surgical follow up for future surgery  4. rehabilitation for substance abuse / mental health  5. Hep C is untreated and will need treatment at some point        Alana Cooper MD    Interval History   No new complaints today, remains stable. Motivated to quit drugs    Physical Exam             Resp: 16        Vitals:    12/20/21 0625 12/24/21 0641 12/28/21 0611   Weight: 60.1 kg (132 lb 8 oz) 59.9 kg (132 lb 1.6 oz) 62 kg (136 lb 9.6 oz)     Vital Signs with Ranges  Resp:  [16] 16    Constitutional: Awake, alert, cooperative, no apparent distress  Lungs: Clear to auscultation bilaterally, no crackles or wheezing  Cardiovascular: Regular rate and rhythm, S1 and S2,  murmur +  Abdomen: Normal bowel sounds, soft, non-distended, non-tender  Skin: No rashes, no cyanosis, no edema  MS : No edema    Other:    Medications       abacavir-dolutegravir-LamiVUDine  1 tablet Oral Daily     aspirin  81 mg Oral Daily     buprenorphine HCl-naloxone HCl  1 Film Sublingual BID     docusate sodium  100 mg Oral BID     levomilnacipran  120 mg Oral Daily     LORazepam  0.5 mg Oral TID     magnesium citrate  296 mL Oral Once     miconazole   Topical BID     nicotine  1 patch Transdermal QPM     nicotine  1 patch Transdermal Daily     nicotine   Transdermal Q8H     pregabalin  75 mg Oral BID     sodium chloride (PF)  10 mL Intracatheter Q8H     tamsulosin  0.4 mg Oral Daily       Data   All microbiology laboratory data reviewed.  Recent Labs   Lab Test 12/27/21  0617 12/23/21  0705 12/20/21  0639   WBC 5.1 5.3 5.9   HGB 10.2* 9.7* 9.1*   HCT 32.9* 31.1* 30.4*   MCV 94 91 92    249 296     Recent Labs   Lab Test 12/29/21  0611 12/28/21  0629 12/27/21  0617   CR 0.84 0.88 0.92     Recent Labs   Lab Test 12/17/21  0550   SED 74*     Component      Latest Ref Rng & Units 12/29/2021   Glucose      70 - 99 mg/dL 99   Alkaline Phosphatase      40  - 150 U/L 161 (H)   AST      0 - 45 U/L 416 (H)   ALT      0 - 70 U/L 880 (HH)   Protein Total      6.8 - 8.8 g/dL 7.8   Albumin      3.4 - 5.0 g/dL 3.0 (L)   Bilirubin Total      0.2 - 1.3 mg/dL 0.4     Imaging  12/27 TTE  Interpretation Summary     The left ventricle is normal in size.  Left ventricular systolic function is normal.  The visual ejection fraction is 60-65%.  Normal left ventricular wall motion  Thickened mitral valve anterior leaflet  There is trace mitral regurgitation.  Tricuspid leaflets are thickened.  There is moderate (2+) tricuspid regurgitation. The jet is eccentric, directed  toward the atrial septum, possibly originating from a perforation in the  posterior leaflet.  No valvular vegetations identified.     Compared to the previous study, the TR has decreased significantly.  Vegetations are no longer seen.

## 2021-12-29 NOTE — PROGRESS NOTES
CLINICAL NUTRITION SERVICES - REASSESSMENT NOTE    RECOMMENDATIONS FOR MD/PROVIDER TO ORDER:   - He had questions about nitric oxide and creatine - not sure how/if these would affect cardiac function in the setting of endocarditis?    Malnutrition: (12/20)   % Weight Loss:  > 5% in 1 month (severe malnutrition)  % Intake:  <75% for > 7 days (moderate malnutrition)  Subcutaneous Fat Loss:  Very little fat stores at baseline  Muscle Loss:  Very little muscle tone at baseline   Fluid Retention:  None noted     Malnutrition Diagnosis: Severe malnutrition  In Context of:  Acute illness or injury with underlying chronic illness or disease      EVALUATION OF PROGRESS TOWARD GOALS   Diet: Regular + Ensure Enlive w/ meals and BID between meals (5x daily)   Intake/Tolerance:   - Ordering 1-2 meals most days. Receives 5 ensure enlive supplements (each with 350 kcal 20 g pro).  - 62 kg yesterday.     - Pt seen in room. He tells me he was drinking all of his supplements until about 3 days ago when it seemed like he was only receiving vanilla flavored ensure. He had several bottles in his room unopened.   - Eating OK, received a meal during visit (grilled cheese) and did start eating while writer was in the room.   - He had questions about nitric oxide and creatine - not sure how/if these would affect cardiac function in the setting of endocarditis?       ASSESSED NUTRITION NEEDS:  Dosing Weight 56.2 kg   Estimated Energy Needs: 2743-1075 kcals (30-35 Kcal/Kg)  Justification: underweight   Estimated Protein Needs:  grams protein (1.2-1.8 g pro/Kg)  Justification: Repletion    NEW FINDINGS:   - Patient may discharge as soon as tomorrow.     Previous Goals:   Patient will consume >/= 75% of Ensure supplements at least 3x/day and meals at least 2x/day   Evaluation: Met  No further wt loss <60.1 kg   Evaluation: Met    Previous Nutrition Diagnosis:   Unintended weight loss related to unclear etiology, potential appetite and  intake fluctuations as evidenced by 7% wt loss in the past 2 weeks and 3% wt loss over the past week   Evaluation: No change    CURRENT NUTRITION DIAGNOSIS  Predicted inadequate nutrient intake related to prolonged admission, fluctuating weights indicating up to 7% loss in the past month or less.     INTERVENTIONS  Recommendations / Nutrition Prescription  Regular diet  Ensure enlive w/ meals and snacks     Implementation  Medical Food Supplement: changed supplement flavor    Goals  Intake of >75% meals and at least 3 Supplements daily.       MONITORING AND EVALUATION:  Progress towards goals will be monitored and evaluated per protocol and Practice Guidelines    Alice Mena RD, LD  [facility], [facility], [facility]  Pager: [phone]  Weekend Pager: [phone]

## 2021-12-29 NOTE — PLAN OF CARE
DATE & TIME: 12/29/21 4334-1084  Cognitive Concerns/ Orientation : A&Ox 4   BEHAVIOR & AGGRESSION TOOL COLOR: Green, calm/cooperative  ABNL VS/O2: VS refused, on RA  MOBILITY: Independent  PAIN MANAGMENT: Denies  DIET: Regular  BOWEL/BLADDER: Continent up to BR or using urinal at bedside.   ABNL LAB/BG: , , CMP ordered for today.   DRAIN/DEVICES: LUE Midline, WDL, saline locked, blood return noted  SKIN: Scrotal wound, refused assessment; pt completes his own wound care.  TESTS/PROCEDURES: N/A  D/C DATE: Possibly 12/30, restarted Triumeq and monitoring LFTs afterwards; also needs CD treatment arranged.   OTHER IMPORTANT INFO: Refused full assessment, but denies SOB, nausea. ID and WOC following.

## 2021-12-29 NOTE — PROGRESS NOTES
[facility]    Medicine Progress Note - Hospitalist Service        Date of Admission:  11/15/2021  9:42 PM    Assessment & Plan:   Bc German is a 41 year old male with PMHx of polysubstance abuse including IV drug use (including fentanyl and methamphetamine currently), chronic opioid dependence, HIV, hepatitis C, hx of MSSA sepsis with pulmonary valve endocarditis (2015), and 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 Was found to have recurrent MSSA/MRSA bacteremia and tricuspid valve endocarditis.     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 Lactate nl. CXR on admission showed nodular/patchy opacity at the R base.  * 2/2 blood cultures drawn on admission grew both MSSA and MRSA >> blood cx cleared on 11/18 >> vancomycin changed to daptomycin due to JOSE RAMON.  * EDUARDO done 11/18 notable for large tricuspid valve vegetation with severe regurgitation.    Blood cultures have been negative since 11/18/21.    ID consulted, planned to continue antibiotics through at least 12/29/21 (six week of treatment), however stopped on 12/27/21 due to increased LFTS.    Remains afebrile; EDUARDO repeat 12/02 noted with smaller vegetations than prior EDUARDO but did note that tricuspid valve leaflet is likely perforated and prolapsing leading to severe degenerative TR, EF 65-70 %.  ? Was having bilateral LE swelling, likely due to TR, US 12/3 noted with no DVT; edema improved with 40 mg IV Lasix given on 12/3 ; will monitor volume status closely  and consider diuretics prn.  ? Weight was 56kg admission---> peak 73kg---> stable 59-61kg and stable.  Follow volume status given severe TR.    Given repeat EDUARDO findings, re-evaluated by thoracic surgery (initially rec medical management) and plans for surgical replacement with bioprosthetic valve at some point; surgery concerned about his IV drug use and consulted with Dr SELINA Barrientos (addiction medicine physician) at [facility] prior to considering surgery.    Was evaluated by Dr Barrientos (addiction specialist at [facility]); concern that he is at high risk for relapse and thus at high risk for prosthetic valve endocarditis if he undergoes valve replacement; rec postponing surgery at this time to focus on chemical dependency.  ? Outpatient follow up with CV surgery  ? CV surgery to consider valve repair after patient completes chemical dependency treatment and remains sober.    Repeat TTE on 12/27/21 showed resolution of vegetations and improvement in TR.    No signs of RHF at this time.      Elevated LFTs  , ALT 1146, alk phos 154, and bilirubin 0.3 on 12/27/21. Patient seems to be asymptomatic from this. Abdominal exam benign.    Repeat TTE showed resolution of vegetations and improvement in TR on 12/27/21. today to evaluate for worsening valve disease/CHF, although clinically does not appear to be in acute CHF.    Known hepatitis C    Daptomycin discontinued, no further antibiotics planned at this time.    Triumeq, LFTs trending down, recheck LFTs tomorrow morning.     Chronic anemia, likely anemia of chronic disease  Likely anemia of chronic disease in the setting of infective endocarditis.  Hemoglobin on admission however was 12.5 and has gradually trended down--->7.1, stable--7.7---7.3---7.6--> 7.4 12/10.     Hemodynamically stable and no suggestion of active bleed.    Iron studies consistent with likely ACD with normal ferritin and low TIBC, TSH slightly elevated but free T4 normal; b12 and folate normal.    Will  monitor hemoglobin periodically.    Transfuse PRN for Hb <7.    Hemoglobin stable 8-10 range.      Hypertension    Blood pressure currently controlled without any antihypertensives.     HIV  Hepatitis C  * Chronic and stable on Triumeq.   * CDC >200 so no need for PJP ppx per ID.    Follows with Dr Chavez of [facility]/ID.    HIV-1 RNA undetectable on 12/19/21 and 12/22/21. Triumeq placed on hold on 12/27/21 due to elevated LFTs, ID planning to restart tomorrow if LFTs improved.    Hep C still active/viremic and will need to be on treatment at some point.     JOSE RAMON on CKD, likely due to Vancomycin, resolved  Hyperkalemia, resolved  Hyponatremia, resolved   * No hx of CKD and has a normal baseline renal function.  * Creatinine initially stable this stay, began trending up after vancomycin.    Nephrology evaluated for JOSE RAMON, signed off 12/3/21.    ADAN returned negative, Renal US obtained 11/24 and negative for obstruction.    Creatinine peaked at 2.8, then trended down.    Monitor labs intermittently.     History of opiate dependence  History of active IV drug use  Tobacco Use  Depression  Anxiety  * On admission, had stated he last used IV drugs 1 wk prior to admission. While in the ED on 11/16, RN had noted patient was briefly nonresponsive and apneic. Was given dose of Narcan with noted improvement.   * Prior to admission, had been started on taper off Soma -- was taking 87.5mg (1/4 of a 350mg tab) BID but later conversations with PCP's clinic revealed no provider there was managing taper    Continued on PTA Subutex BID this stay - after discussion with Dr Barrientos, switched to Suboxone bid (12/8).    Continue PTA levomilnacipran ER.    Tapered off carisoprodol during this admission per pharmacy/Dr. Barrientos recommendations.    Continue nicotine patch, will decrease from 14 mg patch to 7 mg patch on 12/29/21 as he will have completed 6 weeks of the 14 mg patch.    Briefly counseled on tobacco cessation.    Using  lorazepam TID for anxiety, this was changed on 12/20/21.    Continue Lyrica 75 mg PO BID. PTA dose was higher, stopped due to worsening renal function on 11/24, restarted on 12/16 at a lower dose per psychiatry.    Chemical dependency evaluated and recommended inpatient treatment after completion of antibiotics.  Patient still undecided if he wants chemical dependency treatment locally in the area or in [place].  He is exploring both options.    Psychiatry following intermittently, continue lorazepam 0.5 mg 3 times daily, avoid as needed dosing.  Per psychiatry they do not recommend tapering off his Ativan at discharge.  The risk are too much  for him given concerns for relapse and recent infection.    Social work following, helping with search for treatment program/facility, see note from 12/28/21.     Left scrotal laceration dt fall/possible syncope  Left scrotal wound infection  * Laceration sutured in ED. Suture removed 11/21.   * US scrotum earlier this stay showed a small R hydrocele but was otherwise normal.     Patient's scrotal laceration appears to be healing well.    Continue cares per WOC nurse.     Left ear pain; improved   No evidence of otitis media or externa. Likely cerumen impaction.    Resolved.    PRN debrox drops discontinued as he has not been using them.     Constipation  Bowel regimen in place but states only magnesium citrate works for him.    Continue docusate twice daily.    Received magnesium citrate on 12/25, will order another dose for tomorrow as he continues to feel constipated.     Groin fungal rash    Miconazole powder bid.      Diet: Regular Diet Adult  Snacks/Supplements Adult: Ensure Enlive; With Meals     DVT Prophylaxis: Pneumatic Compression Devices   Tirado Catheter: Not present  Code Status: Full Code     Disposition Plan    Expected Discharge: 12/30/2021     Anticipated discharge location: other (see comments)    Delays:     Administering IV medications          Entered:  Arthur Daniels MD 12/29/2021, 1:41 PM        Clinically Significant Risk Factors Present on Admission                      The patient's care was discussed with the Bedside Nurse and Patient.    Arthur Daniels MD  Hospitalist Service  [facility]  Text Page 7AM-6PM  Securely message with the Vocera Web Console (learn more here)  Text page via [employer] Paging/Directory    ______________________________________________________________________    Interval History   Afebrile.  LFTs trending down.  Denies nausea or vomiting.  Patient is looking at options both locally and in [place] for his chemical dependency rehab    Data reviewed today: I reviewed all medications, new labs and imaging results over the last 24 hours. I personally reviewed no images or EKG's today.    Physical Exam   Vital signs:                  Oxygen Delivery: 6 LPM Height: 182.9 cm (6') Weight: 62 kg (136 lb 9.6 oz)  Estimated body mass index is 18.53 kg/m  as calculated from the following:    Height as of this encounter: 1.829 m (6').    Weight as of this encounter: 62 kg (136 lb 9.6 oz).      Wt Readings from Last 2 Encounters:   12/28/21 62 kg (136 lb 9.6 oz)   11/14/21 59 kg (130 lb)       Gen: AAOX3, NAD  Resp: CTA B/L, normal WOB  CVS: RRR, no murmur  Abd/GI: Soft, non-tender. BS- normoactive.    Skin: Warm, dry no rashes  MSK:  no pedal edema  Neuro- CN- intact. No focal deficits.        Data   Recent Labs   Lab 12/29/21  0611 12/28/21  0629 12/27/21  0617 12/23/21  0705 12/23/21  0705   WBC  --   --  5.1  --  5.3   HGB  --   --  10.2*  --  9.7*   MCV  --   --  94  --  91   PLT  --   --  234  --  249    138 138  --  139   POTASSIUM 4.5 4.6 4.5  --  4.4   CHLORIDE 104 104 105  --  109   CO2 31 34* 30  --  29   BUN 28 31* 30  --  31*   CR 0.84 0.88 0.92  --  0.92   ANIONGAP 2* <1* 3  --  1*   SANDRA 9.7 9.8 9.4  --  9.3   GLC 99 96 95  --  96   ALBUMIN 3.0* 2.8* 2.7*   < >  --    PROTTOTAL 7.8 7.3 7.3   < >  --     BILITOTAL 0.4 0.4 0.3   < >  --    ALKPHOS 161* 151* 154*   < >  --    * 1,002* 1,146*   < >  --    * 538* 728*   < >  --     < > = values in this interval not displayed.       No results found for this or any previous visit (from the past 24 hour(s)).  Medications       abacavir-dolutegravir-LamiVUDine  1 tablet Oral Daily     aspirin  81 mg Oral Daily     buprenorphine HCl-naloxone HCl  1 Film Sublingual BID     docusate sodium  100 mg Oral BID     levomilnacipran  120 mg Oral Daily     LORazepam  0.5 mg Oral TID     miconazole   Topical BID     nicotine  1 patch Transdermal Daily     nicotine   Transdermal Q8H     pregabalin  75 mg Oral BID     sodium chloride (PF)  10 mL Intracatheter Q8H     tamsulosin  0.4 mg Oral Daily

## 2021-12-29 NOTE — PLAN OF CARE
"A&Ox4, pleasant. IND in room. No Vitals done this shift- completed on AM shift per order. Denied any pain. Contact precautions for MRSA. Refused whole skin assessment, caring for his scrotal wound, \"healing .\" Skin looks intact- tattoos throughout. LUE midline SL. Plan to restart Triumeq tomorrow and monitor LFTs. Discharge pending acceptance into treatment.   "

## 2021-12-29 NOTE — PROGRESS NOTES
Care Management Discharge Note    Discharge Date: 12/30/2021       Discharge Disposition: Inpatient Chemical Dependency    Discharge Services:      Discharge DME:      Discharge Transportation: family or friend will provide    Private pay costs discussed: Not applicable    PAS Confirmation Code:  N/A  Patient/family educated on Medicare website which has current facility and service quality ratings:      Education Provided on the Discharge Plan:    Persons Notified of Discharge Plans:  Patient/Family in Agreement with the Plan: yes    Handoff Referral Completed: Yes    Additional Information:  Spoke with patient over the phone today to discuss his progress regarding a Substance Use Program.  He reports he does have multiple matters to attend to  before he enters treatment.  His plan, when d/c'd from the hospital, is to go to his father's house in [place] and stay there with his farther until he can resolve his matters and then admit into [facility] or one of the [facility] programs which allow him to stay on Ativan. During previous visits, both patient's father and mother have confirmed patient can stay short term with patient's father.  Patient has the names and contact numbers for the programs and writer has faxed the Substance Use Assessment and recent medical progress note to these facilities.   Patient asked for  writer's name and phone number  So he can keep in touch if he has questions or to inform writer of program he admits into.        EULA HurtadoSW

## 2021-12-29 NOTE — PLAN OF CARE
DATE & TIME: 12/29/21 0985-0577  Cognitive Concerns/ Orientation : A&Ox 4   BEHAVIOR & AGGRESSION TOOL COLOR: Green, calm/cooperative  ABNL VS/O2: VSS on RA  MOBILITY: Independent  PAIN MANAGMENT: Denies  DIET: Regular  BOWEL/BLADDER: Continent up to BR or using urinal at bedside.   ABNL LAB/BG: , -improving   DRAIN/DEVICES: LUE Midline SL  SKIN: Scrotal wound, refused assessment; pt completes his own wound care.  TESTS/PROCEDURES: N/A  D/C DATE: Possibly 12/30, restarted Triumeq and monitoring LFTs tomorrow. Has CD materials and will go to inpatient treatment after taking care of things at home first  OTHER IMPORTANT INFO: ID and WOC following. Off antibiotics.

## 2021-12-29 NOTE — PLAN OF CARE
DATE & TIME: 12/29/21 [id number]  Cognitive Concerns/ Orientation : A&Ox 4   BEHAVIOR & AGGRESSION TOOL COLOR: Green, calm/cooperative  ABNL VS/O2: Daily VS, not collected this shift.   MOBILITY: Independent  PAIN MANAGMENT: Denies  DIET: Regular  BOWEL/BLADDER: Continent up to BR or using urinal at bedside.   ABNL LAB/BG: ALT 1002,  yesterday, CMP ordered for today.   DRAIN/DEVICES: LUE Midline, WDL, saline locked, blood return noted  SKIN: Scrotal wound, refused assessment; pt completes his own wound care.  TESTS/PROCEDURES: N/A  D/C DATE: Possibly 12/30, pending restarting Triumeq and monitoring LFTs afterwards; also needs CD treatment arranged.   OTHER IMPORTANT INFO: Refused full assessment, but denies SOB, nausea. IV abx and Triumeq discontinued 12/27/21 due to elevated LFTs; possibly restarting Triumeq today. ID and WOC following.

## 2021-12-30 LAB
ALBUMIN SERPL-MCNC: 2.9 G/DL (ref 3.4–5)
ALP SERPL-CCNC: 158 U/L (ref 40–150)
ALT SERPL W P-5'-P-CCNC: 696 U/L (ref 0–70)
AST SERPL W P-5'-P-CCNC: 314 U/L (ref 0–45)
BILIRUB DIRECT SERPL-MCNC: <0.1 MG/DL (ref 0–0.2)
BILIRUB SERPL-MCNC: 0.3 MG/DL (ref 0.2–1.3)
PROT SERPL-MCNC: 7.5 G/DL (ref 6.8–8.8)

## 2021-12-30 PROCEDURE — 99232 SBSQ HOSP IP/OBS MODERATE 35: CPT | Performed by: NURSE PRACTITIONER

## 2021-12-30 PROCEDURE — 250N000013 HC RX MED GY IP 250 OP 250 PS 637: Performed by: HOSPITALIST

## 2021-12-30 PROCEDURE — 250N000013 HC RX MED GY IP 250 OP 250 PS 637: Performed by: SPECIALIST

## 2021-12-30 PROCEDURE — 250N000013 HC RX MED GY IP 250 OP 250 PS 637: Performed by: INTERNAL MEDICINE

## 2021-12-30 PROCEDURE — 80076 HEPATIC FUNCTION PANEL: CPT | Performed by: INTERNAL MEDICINE

## 2021-12-30 PROCEDURE — 250N000013 HC RX MED GY IP 250 OP 250 PS 637: Performed by: PSYCHIATRY & NEUROLOGY

## 2021-12-30 PROCEDURE — 99239 HOSP IP/OBS DSCHRG MGMT >30: CPT | Performed by: INTERNAL MEDICINE

## 2021-12-30 PROCEDURE — 250N000013 HC RX MED GY IP 250 OP 250 PS 637: Performed by: NURSE PRACTITIONER

## 2021-12-30 RX ORDER — BUPRENORPHINE AND NALOXONE 8; 2 MG/1; MG/1
1 FILM, SOLUBLE BUCCAL; SUBLINGUAL 2 TIMES DAILY
Start: 2021-12-30 | End: 2024-02-29

## 2021-12-30 RX ORDER — LORAZEPAM 0.5 MG/1
0.5 TABLET ORAL EVERY 8 HOURS PRN
Qty: 12 TABLET | Refills: 0
Start: 2021-12-30 | End: 2022-02-06

## 2021-12-30 RX ORDER — TAMSULOSIN HYDROCHLORIDE 0.4 MG/1
0.4 CAPSULE ORAL DAILY
Qty: 30 CAPSULE | Refills: 0 | Status: SHIPPED | OUTPATIENT
Start: 2021-12-31 | End: 2024-02-29

## 2021-12-30 RX ADMIN — BUPRENORPHINE AND NALOXONE 1 FILM: 8; 2 FILM, SOLUBLE BUCCAL; SUBLINGUAL at 10:15

## 2021-12-30 RX ADMIN — NICOTINE 1 PATCH: 7 PATCH, EXTENDED RELEASE TRANSDERMAL at 10:15

## 2021-12-30 RX ADMIN — ASPIRIN 81 MG: 81 TABLET, COATED ORAL at 10:15

## 2021-12-30 RX ADMIN — TAMSULOSIN HYDROCHLORIDE 0.4 MG: 0.4 CAPSULE ORAL at 10:15

## 2021-12-30 RX ADMIN — DOCUSATE SODIUM 100 MG: 100 CAPSULE, LIQUID FILLED ORAL at 10:15

## 2021-12-30 RX ADMIN — LORAZEPAM 0.5 MG: 0.5 TABLET ORAL at 06:00

## 2021-12-30 RX ADMIN — MICONAZOLE NITRATE: 2 POWDER TOPICAL at 10:16

## 2021-12-30 RX ADMIN — LEVOMILNACIPRAN HYDROCHLORIDE 120 MG: 40 CAPSULE, EXTENDED RELEASE ORAL at 10:15

## 2021-12-30 RX ADMIN — ABACAVIR SULFATE, DOLUTEGRAVIR SODIUM, LAMIVUDINE 1 TABLET: 600; 50; 300 TABLET, FILM COATED ORAL at 10:15

## 2021-12-30 RX ADMIN — PREGABALIN 75 MG: 75 CAPSULE ORAL at 10:15

## 2021-12-30 ASSESSMENT — ACTIVITIES OF DAILY LIVING (ADL)
ADLS_ACUITY_SCORE: 5

## 2021-12-30 NOTE — DISCHARGE SUMMARY
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 (requiring surgical intervention 2017)  Elevated LFTs-improving  Anemia of chronic disease  Hypertension  HIV  Hepatitis C  JOSE RAMON on CKD, likely due to Vancomycin, resolved  Hyperkalemia, resolved  Hyponatremia, resolved   History of opiate dependence  History of IV drug use  Tobacco Use  Depression  Anxiety  Left scrotal laceration dt fall/possible syncope  Left scrotal wound infection  Constipation  Groin fungal rash    Hospital Course:    Bc German is a 41 year old male with PMHx of polysubstance abuse including IV drug use (including fentanyl and methamphetamine currently), chronic opioid dependence, HIV, hepatitis C, hx of MSSA sepsis with pulmonary valve endocarditis (2015), and 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 Was found to have recurrent MSSA/MRSA bacteremia and tricuspid valve endocarditis.     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 Lactate nl. CXR on admission showed nodular/patchy opacity at the R base.  * 2/2 blood cultures drawn on admission grew both MSSA and MRSA >> blood cx cleared on 11/18  >> vancomycin changed to daptomycin due to JOSE RAMON.  * EDUARDO done 11/18 notable for large tricuspid valve vegetation with severe regurgitation.    Blood cultures have been negative since 11/18/21.    ID followed, completed 6 weeks of prolonged IV antibiotic treatment in the hospital from 11/18-12/26.    Remains afebrile; EDUARDO repeat 12/02 noted with smaller vegetations than prior EDUARDO but did note that tricuspid valve leaflet is likely perforated and prolapsing leading to severe degenerative TR, EF 65-70 %.    Was having bilateral LE swelling, likely due to TR, US 12/3 noted with no DVT; edema improved with 40 mg IV Lasix given on 12/3    Given repeat EDUARDO findings, re-evaluated by thoracic surgery (initially rec medical management) and plans for surgical replacement with bioprosthetic valve at some point; surgery concerned about his IV drug use and consulted with Dr SELINA Barrientos (addiction medicine physician) at [facility] prior to considering surgery.    Was evaluated by Dr Barrientos (addiction specialist at [place]); concern that he is at high risk for relapse and thus at high risk for prosthetic valve endocarditis if he undergoes valve replacement; rec postponing surgery at this time to focus on chemical dependency.  ? Outpatient follow up with CV surgery  ? CV surgery to consider valve repair after patient completes chemical dependency treatment and remains sober.    Repeat TTE on 12/27/21 showed resolution of vegetations and improvement in TR.    No signs of RHF at this time.      Elevated LFTs  , ALT 1146, alk phos 154, and bilirubin 0.3 on 12/27/21. Patient seems to be asymptomatic from this. Abdominal exam benign.    Repeat TTE showed resolution of vegetations and improvement in TR on 12/27/21. today to evaluate for worsening valve disease/CHF, although clinically does not appear to be in acute CHF.    Known hepatitis C    Daptomycin discontinued, no further antibiotics planned at this time.    Triumeq resumed on 12/29,  LFTs trending down, recheck LFTs again as outpatient on 1/3/2022     Chronic anemia, likely anemia of chronic disease  Likely anemia of chronic disease in the setting of infective endocarditis.  Hemoglobin on admission however was 12.5 and has gradually trended down--->7.1, but now stable between 9-10        Hypertension    Blood pressure currently controlled without any antihypertensives.     HIV  Hepatitis C  * Chronic and stable on Triumeq.   * CDC >200 so no need for PJP ppx per ID.    Follows with Dr Chavez of [facility]/ID.    HIV-1 RNA undetectable on 12/19/21 and 12/22/21. Triumeq placed on hold on 12/27/21 due to elevated LFTs, resumed as mentioned above.  Hep C still active/viremic and will need to be on treatment at some point.     JOSE RAMON on CKD, likely due to Vancomycin, resolved  Hyperkalemia, resolved  Hyponatremia, resolved   * No hx of CKD and has a normal baseline renal function.  * Creatinine initially stable this stay, began trending up after vancomycin.    Nephrology evaluated for JOSE RAMON, signed off 12/3/21.    ADAN returned negative, Renal US obtained 11/24 and negative for obstruction.    Creatinine peaked at 2.8, then trended down and normalized.    Monitor labs intermittently.     History of opiate dependence  History of active IV drug use  Tobacco Use  Depression  Anxiety  * On admission, had stated he last used IV drugs 1 wk prior to admission. While in the ED on 11/16, RN had noted patient was briefly nonresponsive and apneic. Was given dose of Narcan with noted improvement.   * Prior to admission, had been started on taper off Soma -- was taking 87.5mg (1/4 of a 350mg tab) BID but later conversations with PCP's clinic revealed no provider there was managing taper    Continued on PTA Subutex BID this stay - after discussion with Dr Barrientos, switched to Suboxone bid (12/8).     Continue PTA levomilnacipran ER.    Tapered off carisoprodol during this admission per pharmacy/Dr. Barrientos  recommendations.    Continue Lyrica 75 mg PO BID. PTA dose was higher, stopped due to worsening renal function on 11/24, restarted on 12/16 at a lower dose per psychiatry.    Chemical dependency evaluated and recommended inpatient treatment after completion of antibiotics.  Patient was back-and-forth about getting chemical dependency treatment in [place] versus locally in the [place] area.  Moreover he apparently has social issues to take care of at home and therefore his plan is to discharge home, take care of those issues and then seek chemical dependency treatment.  I had a long discussion with his dad Barrington on the day of discharge, patient will be discharging to his parents house for the moment.  Psychiatry did not feel he was holdable.    Psychiatry following intermittently, continue lorazepam 0.5 mg 3 times daily, avoid as needed dosing.  Per psychiatry they do not recommend tapering off his Ativan at discharge.  The risk are too much  for him given concerns for relapse and recent infection.  Patient mentions that he already has a refill from his outpatient practitioner for Ativan, at this point I would defer it to them as far as further refills are concerned.  However psychiatry here felt that long-term Ativan would not be a good option for him.     Left scrotal laceration dt fall/possible syncope  Left scrotal wound infection  * Laceration sutured in ED. Suture removed 11/21.   * US scrotum earlier this stay showed a small R hydrocele but was otherwise normal.     Patient's scrotal laceration appears to be healing well.    Continue cares per [facility] nurse.     Left ear pain; improved   No evidence of otitis media or externa. Likely cerumen impaction.    Resolved.     Constipation  Bowel regimen in place but states only magnesium citrate works for him.     Groin fungal rash    Miconazole powder bid.       Arthur Daniels MD    Significant Results and Procedures   See below    Pending Results      Unresulted Labs Ordered in the Past 30 Days of this Admission     No orders found from 10/16/2021 to 11/16/2021.          Code Status   Full Code       Primary Care Physician   Khari Barrientos    Physical Exam   Temp: 97.8  F (36.6  C) Temp src: Oral BP: 105/69 Pulse: 108   Resp: 18 SpO2: 97 % O2 Device: None (Room air)      Constitutional: AAOX3, NAD  Respiratory: CTA B/L, Normal WOB  Cardiovascular: RRR, No murmur  GI: Soft, Non- tender, BS- normoactive  Neuro: CN- grossly intact, Motor strength 5/5 on all 4 extremities.     Discharge Disposition   Discharged to home  Condition at discharge: Stable    Consultations This Hospital Stay   PHARMACY TO DOSE VANCO  PHARMACY TO DOSE VANCO  INFECTIOUS DISEASES IP CONSULT  CARDIOLOGY IP CONSULT  PHARMACY TO DOSE VANCO  CARE MANAGEMENT / SOCIAL WORK IP CONSULT  CARDIOLOGY IP CONSULT  CARDIOTHORACIC SURGERY IP CONSULT  PALLIATIVE CARE ADULT IP CONSULT  PSYCHIATRY IP CONSULT  NEPHROLOGY IP CONSULT  UROLOGY IP CONSULT  WOUND OSTOMY CONTINENCE NURSE  IP CONSULT  CHEMICAL DEPENDENCY IP CONSULT  VASCULAR ACCESS ADULT IP CONSULT  VASCULAR ACCESS ADULT IP CONSULT  CARDIOTHORACIC SURGERY IP CONSULT  PSYCHIATRY IP CONSULT  PHYSICAL THERAPY ADULT IP CONSULT  OCCUPATIONAL THERAPY ADULT IP CONSULT  PSYCHIATRY IP CONSULT  PSYCHIATRY IP CONSULT  PSYCHIATRY IP CONSULT    Time Spent on this Encounter   I, Arthur Daniels MD, personally saw the patient today and spent greater than 30 minutes discharging this patient.    Discharge Orders      Follow-Up with Cardiologist      Discharge Order: F/U with Cardiac  MARCIA      Reason for your hospital stay    MSSA/MRSA bacteremia with tricuspid valve endocarditis     Activity    Your activity upon discharge: activity as tolerated     Follow-up and recommended labs and tests    Follow up with primary care provider, Khari Barrientos, within 7 days for hospital follow- up.    Hepatic panel on 1/3/2022  F/U CV surgery in 3-4 weeks  F/U Luis's place for   treatment ASAP     Echocardiogram Limited    Administration of IV contrast will be tailored to this examination per the appropriate written protocol listed in the Echocardiography department Protocol Book, or by the supervising Cardiologist. This may result in an order change.    Use of contrast is at the discretion of the supervising Cardiologist.     Diet    Follow this diet upon discharge: Orders Placed This Encounter      Snacks/Supplements Adult: Ensure Enlive; With Meals      Regular Diet Adult     Discharge Medications   Current Discharge Medication List      START taking these medications    Details   miconazole (MICATIN) 2 % external powder Apply topically 2 times daily  Qty: 43 g, Refills: 0    Associated Diagnoses: Groin rash      tamsulosin (FLOMAX) 0.4 MG capsule Take 1 capsule (0.4 mg) by mouth daily  Qty: 30 capsule, Refills: 0    Comments: Future refills by PCP Dr. Khari Barrientos with phone number [phone].  Associated Diagnoses: Acute retention of urine         CONTINUE these medications which have CHANGED    Details   buprenorphine HCl-naloxone HCl (SUBOXONE) 8-2 MG per film Place 1 Film under the tongue 2 times daily    Associated Diagnoses: Intravenous drug abuse (H)      LORazepam (ATIVAN) 0.5 MG tablet Take 1 tablet (0.5 mg) by mouth every 8 hours as needed for anxiety  Qty: 12 tablet, Refills: 0    Associated Diagnoses: JASSI (generalized anxiety disorder)         CONTINUE these medications which have NOT CHANGED    Details   abacavir-dolutegravir-LamiVUDine (TRIUMEQ) 600- MG per tablet Take 1 tablet by mouth daily Please call [phone] & make appointment for further refills.  Qty: 30 tablet, Refills: 0    Associated Diagnoses: HIV disease (H)      aspirin 81 MG EC tablet Take 81 mg by mouth daily      FETZIMA 120 MG 24 hr capsule Take 120 mg by mouth daily      pregabalin (LYRICA) 150 MG capsule Take 150 mg by mouth 2 times daily      testosterone cypionate (DEPOTESTOTERONE) 200 MG/ML  injection Inject 0.3 mLs into the muscle once a week Inject 0.3 mL IM every 77 days         STOP taking these medications       carisoprodol (SOMA) 350 MG tablet Comments:   Reason for Stopping:         cephALEXin (KEFLEX) 500 MG capsule Comments:   Reason for Stopping:             Allergies   Allergies   Allergen Reactions     Bupropion      Other reaction(s): Seizures, Seizures  PN: seizure when took a double dose  seizure when took a double dose  PN: seizure when took a double dose  Other reaction(s): Seizures  PN: seizure when took a double dose       Acetaminophen Nausea and Vomiting     PN: : GI Upset  : GI Upset  PN: : GI Upset  Other reaction(s): Vomiting  PN: : GI Upset       Codeine Itching and Nausea and Vomiting     Other reaction(s): Abdominal Pain  PN:  weak; fatigue; vomiting   weak; fatigue; vomiting  PN:  weak; fatigue; vomiting  PN:  weak; fatigue; vomiting       Sulfa Drugs Itching     Data   Most Recent 3 CBC's:  Recent Labs   Lab Test 12/27/21  0617 12/23/21  0705 12/20/21  0639   WBC 5.1 5.3 5.9   HGB 10.2* 9.7* 9.1*   MCV 94 91 92    249 296      Most Recent 3 BMP's:  Recent Labs   Lab Test 12/29/21  0611 12/28/21  0629 12/27/21  0617    138 138   POTASSIUM 4.5 4.6 4.5   CHLORIDE 104 104 105   CO2 31 34* 30   BUN 28 31* 30   CR 0.84 0.88 0.92   ANIONGAP 2* <1* 3   SANDRA 9.7 9.8 9.4   GLC 99 96 95     Most Recent 2 LFT's:  Recent Labs   Lab Test 12/30/21  0601 12/29/21  0611   * 416*   * 880*   ALKPHOS 158* 161*   BILITOTAL 0.3 0.4     Most Recent INR's and Anticoagulation Dosing History:  Anticoagulation Dose History     Recent Dosing and Labs Latest Ref Rng & Units 11/4/2014 11/5/2014 11/6/2014 3/6/2015 3/12/2015 10/9/2017 5/9/2021    Warfarin 7.5 mg - - 15 mg - - - - -    Warfarin 10 mg - 10 mg - - - - - -    INR 0.86 - 1.14 1.70(H) 1.82(H) 2.77(H) 1.69(H) 1.92(H) 1.01 0.96        Most Recent 3 Troponin's:  Recent Labs   Lab Test 05/09/21  1450 10/09/17  2015   TROPI  <0.015 <0.015     Most Recent Cholesterol Panel:No lab results found.  Most Recent 6 Bacteria Isolates From Any Culture (See EPIC Reports for Culture Details):  Recent Labs   Lab Test 05/05/18  1425 05/04/18  1655 10/10/17  1901 10/10/17  1849 10/09/17  2035 10/09/17  2015   CULT No growth No growth No growth No growth No growth Cultured on the 1st day of incubation:  Bacillus species, not anthracis nor cereus group  *  Critical Value/Significant Value, preliminary result only, called to and read back by  Tonie Phan, RN @1631 10/10/17.DH.    (Note)  NEGATIVE for the following: Staphylococcus spp., Staph aureus, Staph  epidermidis, Staph lugdunensis, Streptococcus spp., Strep pneumoniae,  Strep pyogenes, Strep agalactiae, Strep anginosus group, Enterococcus  faecalis, Enterococcus faecium, and Listeria spp. by [employer]  multiplex nucleic acid test. Final identification and antimicrobial  susceptibility testing will be verified by standard methods.    Critical Value/Significant Value called to and read back by Ambika Pahn RN [id number] @ 1933 10/10/17 CS         Most Recent TSH, T4 and A1c Labs:  Recent Labs   Lab Test 12/04/21  0619   TSH 4.21*   T4 0.98       Results for orders placed or performed during the hospital encounter of 11/15/21   XR Chest 2 Views    Narrative    EXAM: XR CHEST 2 VW  LOCATION: [facility]  DATE/TIME: 11/16/2021 1:25 AM    INDICATION: cough  COMPARISON: 10/09/2017.      Impression    IMPRESSION: Small somewhat nodular and patchy opacity at the lateral right base may be explained by infectious/inflammatory pneumonitis. Given the nodular configuration, follow-up recommended to document clearing. Left lung clear. Incidental note of   accessory azygos fissure. Normal heart size and pulmonary vascularity. Postoperative changes of cervicothoracic fusion.   US Renal Complete    Narrative    ULTRASOUND RENAL COMPLETE 11/24/2021 12:53 PM    CLINICAL HISTORY: MSSA sepsis,  endocarditis, increasing creatinine,  evaluate for obstruction.    TECHNIQUE: Routine Bilateral Renal and Bladder Ultrasound.    COMPARISON: None.    FINDINGS:  RIGHT KIDNEY: 12.1 x 6.9 x 4.9 cm. Unremarkable echogenicity, no  hydronephrosis or masses. Simple cysts noted measuring up to 1.7 cm.  Mildly complex cyst in the upper right kidney measuring 1.8 cm.    LEFT KIDNEY: 1.2 x 6.0 x 5.8 cm. Unremarkable echogenicity, no  hydronephrosis or masses. Simple cysts noted.    BLADDER: Unremarkable given its level of distension.      Impression    IMPRESSION:  No obstruction demonstrated.    DIANA KIM MD         SYSTEM ID:  [id number]   US Upper Extremity Venous Duplex Right    Narrative    ULTRASOUND RIGHT UPPER EXTREMITY VENOUS DUPLEX  11/30/2021 3:28 PM    CLINICAL HISTORY/INDICATION:  New midline increased pain and swelling,  evaluate for DVT.    COMPARISON:  7/20/2021    TECHNIQUE:  Grayscale, color-flow, and spectral waveform analysis were  performed of the deep veins of the right upper extremity    FINDINGS:  The right jugular vein demonstrates normal compressibility,  color-flow and spectral waveform.    The right subclavian vein, axillary vein, cephalic vein, brachial vein  and basilic vein demonstrate normal compressibility, spectral  waveform, color flow and augmentation.      Impression    IMPRESSION:  No evidence of deep venous thrombosis in the right upper  extremity. Evaluation is somewhat limited secondary to overlying  bandage.    PIA SOTO DO         SYSTEM ID:  [id number]   US Lower Extremity Venous Duplex Left    Narrative    VENOUS ULTRASOUND LEFT LOWER EXTREMITY  12/3/2021 11:01 AM     HISTORY: Asymmetric swelling, evaluate for DVT.    COMPARISON: None.    TECHNIQUE:  Color Doppler and spectral waveform analysis performed  throughout the deep veins of the left lower extremity.    FINDINGS: The left common femoral, proximal greater saphenous,  femoral, and popliteal veins demonstrate normal  blood flow,  compression, and augmentation. Posterior tibial and peroneal veins are  compressible. Contralateral right common femoral vein is patent.      Impression    IMPRESSION: Negative for deep venous thrombosis throughout the left  lower extremity.     LAMAR MULLER MD         SYSTEM ID:  [id number]   Transesophageal Echocardiogram     Value    LVEF  60-65%    [facility]    [id number]  [facility]  [id number]  [id number]^LINDA^MYRA^PAOLA     [facility]  Echocardiography Laboratory  [address] [place] [address]     Name: AMBREEN PYLE  MRN: [id number]  : 1980  Study Date: 2021 10:01 AM  Age: 41 yrs  Gender: Male  Patient Location: [facility]  Reason For Study: 2nd degree AV Block  Ordering Physician: MYRA GUERIN  Referring Physician: MYRA GUERIN  Performed By: Crow Wilkins     HR: 101  ______________________________________________________________________________  Procedure  Complete EDUARDO Adult. 3D image acquisition, reconstruction, and real-time  interpretation was performed.  ______________________________________________________________________________  Interpretation Summary     Two, large, mobile masses noted, attached to the atrial side of base of  posterior tricuspid leaflets are noted. (they measures 18 x 8mm and 13 x 5mm  respectively).These are most consistent with large tricuspid valve  vegetations.  Severe tricuspid regurgitation noted, directed towards the interatrial septum.  Cannot exclude perforation of posterior leaflet of tricuspid valve.  The right ventricle is mildly dilated.  The right ventricular systolic function is normal.  Left ventricular systolic function is normal.  The visual ejection fraction is 60-65%.  Findings discussed with Dr Guerin.  These are new comnpared to prior echo from 2017. The study was technically  adequate.  ______________________________________________________________________________  EDUARDO  Under general  anesthesia. The transesophageal probe was passed without  difficulty. There were no complications associated with this procedure.     Left Ventricle  The left ventricle is normal in size. Left ventricular systolic function is  normal. The visual ejection fraction is 60-65%. No regional wall motion  abnormalities noted.     Right Ventricle  The right ventricle is mildly dilated. The right ventricular systolic function  is normal.     Atria  Normal left atrial size. The right atrium is mild to moderately dilated. There  is no color Doppler evidence of an atrial shunt. No thrombus is detected in  the left atrial appendage.     Mitral Valve  The mitral valve leaflets are mildly thickened. There is trace mitral  regurgitation.     Tricuspid Valve  There is severe (4+) tricuspid regurgitation.     Aortic Valve  The aortic valve is trileaflet. No aortic regurgitation is present. No aortic  stenosis is present.     Pulmonic Valve  The pulmonic valve is not well seen, but is grossly normal.     Vessels  The aortic root is normal size.     Pericardial/Pleural  There is no pericardial effusion.     Rhythm  Sinus rhythm was noted.  ______________________________________________________________________________  Report approved by: Lili Antunez 2021 11:57 AM     ______________________________________________________________________________      Transesophageal Echocardiogram     Value    LVEF  65-70%    [facility]    [id number]  [facility]  [id number]  [id number]^ZHANG^PAIGE     [facility]  Echocardiography Laboratory  [address] [place] [address]     Name: AMBREEN PYLE  MRN: [id number]  : 1980  Study Date: 2021 12:31 PM  Age: 41 yrs  Gender: Male  Patient Location: [facility]  Reason For Study: Endocarditis  Ordering Physician: PAIGE HOLCOMB  Performed By: Beni Ellis     BSA: 1.9 m2  Height: 72 in  Weight: 160 lb  HR: 90  BP: 136/72  mmHg  ______________________________________________________________________________  Procedure  Complete EDUARDO Adult. EDUARDO Probe #Loaner was also used during the procedure.  ______________________________________________________________________________  Interpretation Summary     There is a small filamentous echodensity noted on the posterior mitral valve  leaflet (image 28) which in the clinical context, could be a vegetation.  The aortic valve is normal in structure and function.  There is a 0.9 cm vegetation noted on the posterior leaflet of the tricuspid  valve. The leaflet is likely perforated and prolapsing leading to severe  degenerative TR. Vegetations much smaller than prior EDUARDO. See images 100-127  towards the end of the study.  The visual ejection fraction is 65-70%.  The right ventricle is normal in size and function.  ______________________________________________________________________________  EDUARDO  Consent to the procedure was obtained prior to sedation. Prior to the exam,  the oral cavity was checked and no overcrowding was noted. The transesophageal  probe was passed without difficulty. There were no complications associated  with this procedure. EDUARDO was done under general anesthesia given history.  Probe was inserted by anesthesia team.     Left Ventricle  The left ventricle is normal in size. There is normal left ventricular wall  thickness. Left ventricular systolic function is normal. The visual ejection  fraction is 65-70%. No regional wall motion abnormalities noted.     Right Ventricle  The right ventricle is normal in size and function.     Atria  Normal left atrial size. The right atrium is mildly dilated. There is no color  Doppler evidence of an atrial shunt. No thrombus is detected in the left  atrial appendage.     Mitral Valve  There is a small filamentous echodensity noted on the posterior mitral valve  leaflet (image 28) which in the clinical context, could be a vegetation. There  is  mild (1+) mitral regurgitation. There is no mitral valve stenosis.     Tricuspid Valve  There is a 0.9 cm vegetation noted on the posterior leaflet of the tricuspid  valve. The leaflet is likely perforated and prolapsing leading to severe  degenerative TR. Vegetations much smaller than prior EDUARDO. Echocardiogram  suggestive of endocarditis.     Aortic Valve  The aortic valve is normal in structure and function. There is physiologic  aortic regurgitation. No aortic stenosis is present.     Pulmonic Valve  The pulmonic valve is not well seen, but is grossly normal. There is trace  pulmonic valvular regurgitation.     Vessels  The aortic root is normal size. Normal size ascending aorta.     Pericardial/Pleural  There is no pericardial effusion.  ______________________________________________________________________________  Doppler Measurements & Calculations  TR max moises: 276.4 cm/sec  TR max P.6 mmHg     ______________________________________________________________________________  Report approved by: Lili Javier 2021 03:10 PM         Echocardiogram Complete     Value    LVEF  60-65%    Narrative    [id number]  [id number]  [id number]  [id number]     [facility]  Echocardiography Laboratory  [address] [place] [address]     Name: AMBREEN PYLE  MRN: [id number]  : 1980  Study Date: 2021 03:00 PM  Age: 41 yrs  Gender: Male  Patient Location: [facility]  Reason For Study: Endocarditis  Ordering Physician: PAIGE HOLCOMB  Referring Physician: Khari Barrientos  Performed By: Fredy Musa RDCS     BSA: 1.8 m2  Height: 72 in  Weight: 132 lb  HR: 98  BP: 123/78 mmHg  ______________________________________________________________________________  Procedure  Complete Portable Echo Adult.  ______________________________________________________________________________  Interpretation Summary     The left ventricle is normal in size.  Left ventricular systolic function is  normal.  The visual ejection fraction is 60-65%.  Normal left ventricular wall motion  Thickened mitral valve anterior leaflet  There is trace mitral regurgitation.  Tricuspid leaflets are thickened.  There is moderate (2+) tricuspid regurgitation. The jet is eccentric, directed  toward the atrial septum, possibly originating from a perforation in the  posterior leaflet.  No valvular vegetations identified.     Compared to the previous study, the TR has decreased significantly.  Vegetations are no longer seen.  ______________________________________________________________________________  Left Ventricle  The left ventricle is normal in size. There is normal left ventricular wall  thickness. Left ventricular systolic function is normal. The visual ejection  fraction is 60-65%. Diastolic Doppler findings (E/E' ratio and/or other  parameters) suggest left ventricular filling pressures are normal. Normal left  ventricular wall motion.     Right Ventricle  The right ventricle is normal in structure, function and size.     Atria  Normal left atrial size. Right atrial size is normal. There is no atrial shunt  seen.     Mitral Valve  Thickened mitral valve anterior leaflet. There is trace mitral regurgitation.     Tricuspid Valve  Tricuspid leaflets are thickened. Right ventricle systolic pressure estimate  normal. There is moderate (2+) tricuspid regurgitation. The right ventricular  systolic pressure is approximated at 25.6 mmHg plus the right atrial pressure.     Aortic Valve  The aortic valve is normal in structure and function. No aortic regurgitation  is present. No aortic stenosis is present.     Pulmonic Valve  The pulmonic valve is not well seen, but is grossly normal. There is trace  pulmonic valvular regurgitation.     Vessels  Normal size aorta.     Pericardium  There is no pericardial effusion.     ______________________________________________________________________________  MMode/2D Measurements &  Calculations  IVSd: 1.00 cm  LVIDd: 4.4 cm  LVIDs: 2.6 cm  LVPWd: 0.95 cm  FS: 39.9 %  LV mass(C)d: 143.1 grams  LV mass(C)dI: 80.1 grams/m2     Ao root diam: 3.0 cm  LA dimension: 3.7 cm  asc Aorta Diam: 2.7 cm  LA/Ao: 1.2  LA Volume (BP): 45.5 ml  LA Volume Index (BP): 25.4 ml/m2  RWT: 0.43     Doppler Measurements & Calculations  MV E max moises: 55.0 cm/sec  MV A max moises: 53.6 cm/sec  MV E/A: 1.0  MV dec slope: 260.2 cm/sec2     PA acc time: 0.06 sec  TR max moises: 252.8 cm/sec  TR max P.6 mmHg  E/E' av.0  Lateral E/e': 3.9  Medial E/e': 6.2     ______________________________________________________________________________  Report approved by: Lili Evangelista 2021 04:17 PM

## 2021-12-30 NOTE — PLAN OF CARE
DATE & TIME: 12/29/21 5595-6003  Cognitive Concerns/ Orientation : A&Ox4   BEHAVIOR & AGGRESSION TOOL COLOR: Green, calm/cooperative  ABNL VS/O2: VSS on RA  MOBILITY: Independent  PAIN MANAGMENT: Denies  DIET: Regular  BOWEL/BLADDER: Continent up to BR or using urinal at bedside.   ABNL LAB/BG: , -improving   DRAIN/DEVICES: LUE Midline SL  SKIN: Scrotal wound, refused full skin assessment; pt completes his own wound care, miconazole powder.  TESTS/PROCEDURES: N/A  D/C DATE: Possibly 12/30, restarted Triumeq and monitoring LFTs tomorrow. Has CD materials and will go to inpatient treatment after taking care of things at home first  OTHER IMPORTANT INFO: ID and WOC following. Off antibiotics.

## 2021-12-30 NOTE — PROGRESS NOTES
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 Blood cxs are negative since 11/18  . He has had medication non compliance with ART with detectable VL, CD4 >200 , viral load is now suppressed during hospital stay. Course was further complicated by JOSE RAMON and hyperkalemia which have improved , regimen was changed from Vancomycin to Daptomycin which is remains on now. He has developed tricuspid valve perforation which needs surgical management in future.     1. MSSA/MRSA sepsis with tricuspid valve endocarditis - blood cxs cleared 11/18, Repeat EDUARDO 12/2 shows valve perforation with severe regurgitation, which will need surgical management in future.   2. Severe transaminitis ? CHF related vs medication related. Off Daptomycin now, LFTs improving  3. Prior history of MSSA sepsis with pulmonary valve endocarditis in 2015 and spinal discitis/osteomyelitis with epidural abscess requiring surgical intervention in 2017.  4. Substance abuse with active use of methamphetamine / fentanyl currently  5.  HIV/Hepatitis C co-infection. HIV VL detectable at 29,522 copies/ml and  CD4 625(28%) on  06/09/2021 , on Triumeq now with suppressed viral load     Recommendations  1. LFTs stable, ok to discharge from the ID stand point , repeat LFT panel next week  2. Continue Triumeq   3. S/p prolonged IV antibiotic treatment with Vancomycin then Daptomycin for endocarditis  (11/18- 12/26)  , repeat echocardiogram appears stable. Still  has tricuspid valve regurgitation/perforation and will need surgical follow up for future surgery  4. rehabilitation for substance abuse / mental health  5. Hep C is untreated and will need treatment at some point  FUP with Dr. Chavez for HIV management    Discharge planned today, ID will sign off  Alana Cooper MD    Interval History   LFTs improving, planned for discharge today, no new complaints.    Physical Exam   Temp: 97.8  F (36.6  C) Temp src: Oral BP: 105/69 Pulse: 108   Resp: 18 SpO2: 97 % O2 Device: None (Room air)    Vitals:    12/20/21 0625 12/24/21 0641 12/28/21 0611   Weight: 60.1 kg (132 lb 8 oz) 59.9 kg (132 lb 1.6 oz) 62 kg (136 lb 9.6 oz)     Vital Signs with Ranges  Temp:  [97.8  F (36.6  C)] 97.8  F (36.6  C)  Pulse:  [108] 108  Resp:  [18] 18  BP: (105)/(69) 105/69  SpO2:  [97 %] 97 %      Constitutional: Awake, alert, cooperative, no apparent distress  Lungs: Clear to auscultation bilaterally, no crackles or wheezing  Cardiovascular: Regular rate and rhythm, S1 and S2,  murmur +  Abdomen: Normal bowel sounds, soft, non-distended, non-tender  Skin: No rashes, no cyanosis, no edema  MS : No edema    Other:    Medications       abacavir-dolutegravir-LamiVUDine  1 tablet Oral Daily     aspirin  81 mg Oral Daily     buprenorphine HCl-naloxone HCl  1 Film Sublingual BID     docusate sodium  100 mg Oral BID     levomilnacipran  120 mg Oral Daily     LORazepam  0.5 mg Oral TID     miconazole   Topical BID     nicotine  1 patch Transdermal Daily     nicotine   Transdermal Q8H     pregabalin  75 mg Oral BID     sodium chloride (PF)  10 mL Intracatheter Q8H     tamsulosin  0.4 mg Oral Daily       Data   All microbiology laboratory data reviewed.  Recent Labs   Lab Test 12/27/21  0617 12/23/21  0705 12/20/21  0639   WBC 5.1 5.3 5.9   HGB 10.2* 9.7* 9.1*   HCT 32.9* 31.1* 30.4*   MCV 94 91 92    249 296     Recent Labs   Lab Test 12/29/21  0611 12/28/21  0629 12/27/21  0617   CR 0.84 0.88 0.92      Recent Labs   Lab Test 12/17/21  0550   SED 74*     Component      Latest Ref Rng & Units 12/30/2021   Bilirubin Total      0.2 - 1.3 mg/dL 0.3   Bilirubin Direct      0.0 - 0.2 mg/dL <0.1   Protein Total      6.8 - 8.8 g/dL 7.5   Albumin      3.4 - 5.0 g/dL 2.9 (L)   Alkaline Phosphatase      40 - 150 U/L 158 (H)   AST      0 - 45 U/L 314 (H)   ALT      0 - 70 U/L 696 (HH)     Imaging  12/27 TTE  Interpretation Summary     The left ventricle is normal in size.  Left ventricular systolic function is normal.  The visual ejection fraction is 60-65%.  Normal left ventricular wall motion  Thickened mitral valve anterior leaflet  There is trace mitral regurgitation.  Tricuspid leaflets are thickened.  There is moderate (2+) tricuspid regurgitation. The jet is eccentric, directed  toward the atrial septum, possibly originating from a perforation in the  posterior leaflet.  No valvular vegetations identified.     Compared to the previous study, the TR has decreased significantly.  Vegetations are no longer seen.

## 2021-12-30 NOTE — PROGRESS NOTES
Discharge    Patient discharged to home via car with father  Care plan note - complete    Listed belongings gathered and given to patient (including from security/pharmacy). Yes  Care Plan and Patient education resolved: Yes  Prescriptions if needed, hard copies sent with patient  Yes  Medication Bin checked and emptied on discharge Yes  SW/care coordinator/charge RN aware of discharge: Yes

## 2021-12-30 NOTE — CONSULTS
Psychiatry Consultation; Follow up          Reason for Consult, requesting source:    Dosing of Suboxone, pt discharging  Requesting source: Marisol Sujata            Interim history:    Bc German is a 41 year old male who was admitted to [facility] on 11/15/21 due to sepsis secondary to MSSA bacteremia, after presenting to the ED for evaluation of syncope and scrotal laceration. Long history of polysubstance use, with recent use of IV opioids and methamphetamine prior to admission. Pt is followed by Dr. Barrientos on an outpatient basis for management of Suboxone. He also sees an outpatient psychiatrist who manages his lorazepam and Fetzima.     Mr. German is seen for follow-up today. He is seen lying in his hospital bed this morning, appearing a bit somnolent. He reports feeling tired today, states he did not sleep well overnight due to some noise in the hallway. He reports he has been calling around, trying to find a treatment facility that will allow him to stay on lorazepam. His preference would be [facility] in [place]. He reviews all of the things he needs to do when he gets home to his father's house. Pt was confronted about declining his Suboxone dosing yesterday. He stated that he did take it in the morning, but this was not accurate. He says the bedtime dose effects his sleep so he only wants to take it in the morning. Writer spoke to patient's RN, who noted that patient told the RN that he did not want to take the morning dose and that he would take it in the evening. Patient appears a bit gamey and inconsistent. He states he already has a buprenorphine refill at [facility]. He was encouraged to follow-up with Dr. Barrientos this afternoon as he reports he is able to do a walk-in appointment.          Medications:     Current Facility-Administered Medications   Medication     abacavir-dolutegravir-LamiVUDine (TRIUMEQ) 600- MG per tablet 1 tablet     alum & mag  "hydroxide-simethicone (MAALOX) suspension 30 mL     aspirin EC tablet 81 mg     bisacodyl (DULCOLAX) Suppository 10 mg     buprenorphine HCl-naloxone HCl (SUBOXONE) 8-2 MG per film 1 Film     calcium carbonate (TUMS) chewable tablet 500 mg     glucose gel 15-30 g    Or     dextrose 50 % injection 25-50 mL    Or     glucagon injection 1 mg     docusate sodium (COLACE) capsule 100 mg     levomilnacipran (FETZIMA ER) 24 hr capsule 120 mg     lidocaine (LMX4) cream     lidocaine 1 % 0.1-1 mL     LORazepam (ATIVAN) tablet 0.5 mg     magnesium hydroxide (MILK OF MAGNESIA) suspension 15-30 mL     miconazole (MICATIN) 2 % powder     naloxone (NARCAN) injection 0.2 mg    Or     naloxone (NARCAN) injection 0.4 mg    Or     naloxone (NARCAN) injection 0.2 mg    Or     naloxone (NARCAN) injection 0.4 mg     nicotine (NICODERM CQ) 7 MG/24HR 24 hr patch 1 patch     nicotine Patch in Place     nitroGLYcerin (NITROSTAT) sublingual tablet 0.4 mg     ondansetron (ZOFRAN-ODT) ODT tab 4 mg    Or     ondansetron (ZOFRAN) injection 4 mg     polyethylene glycol (MIRALAX) Packet 17 g     pregabalin (LYRICA) capsule 75 mg     sodium chloride (PF) 0.9% PF flush 10 mL     sodium chloride (PF) 0.9% PF flush 10-20 mL     sodium chloride (PF) 0.9% PF flush 3 mL     sodium chloride (PF) 0.9% PF flush 3 mL     tamsulosin (FLOMAX) capsule 0.4 mg              MSE:     Appearance: age-appearing, casually dressed, fair hygiene, in no acute distress  Behavior: calm, somewhat gamey  Eye contact: fair  Orientation: alert and oriented to time, place and person  Movements: did not observe any tics, tremors, dystonia, or tardive dyskinesia  Mood: \"tired\"  Affect: mood-congruent, blunted, stable  Speech: Normal rate, rhythm, tone  Language: fluent, with appropriately placed inflections, good articulation  Memory: no impairment in immediate, recent, or remote memory  Intellectual capacity: Average for development based on word choice  Concentration: " attentive  Thought process and content: tangential, coherent. No evidence of anomalous perceptions. No delusions or paranoia endorsed or elicited.   Associations: tight  Insight: fair  Judgement: limited due to recent refusal of Suboxone  Safety: denies suicidal or homicidal ideation    Vital signs:  Temp: 97.8  F (36.6  C) Temp src: Oral BP: 105/69 Pulse: 108   Resp: 18 SpO2: 97 % O2 Device: None (Room air) Oxygen Delivery: 6 LPM Height: 182.9 cm (6') Weight: 62 kg (136 lb 9.6 oz)  Estimated body mass index is 18.53 kg/m  as calculated from the following:    Height as of this encounter: 1.829 m (6').    Weight as of this encounter: 62 kg (136 lb 9.6 oz).              DSM-5 Diagnosis:   #1 Opioid use disorder, severe  #2 Amphetamine use disorder, severe  #3 Major Depressive Disorder, recurrent, moderate  #4 Unspecified anxiety disorder          Assessment:   Mr. German is seen for psychiatric follow-up today. He has not yet secured a bed at [facility] treatment unfortunately. He has been attempting to locate a facility which will allow him to remain on his lorazepam. He believes [facility] would be an option and reports he will be following up with them to determine when he could get in. Unfortunately, he has begun to decline his Suboxone, which has been ill-advised. He remains at high risk for relapse upon discharge from the hospital. He reports that he has a refill on his Suboxone at [facility] and declines a new prescription. He plans to continue his lorazepam 0.5 mg TID and does not need a prescription for this (last filled 12/3 for 120 tablets). He reports his mother has his lorazepam prescription. From a psychiatric standpoint he is cleared for discharge. He was highly encouraged to follow-up for [facility] treatment and continue to work with Dr. Barrientos on management of his Suboxone.           Summary of Recommendations:   1) Patient declines a Suboxone refill, noting that he has a refill available at [facility].  He was encouraged to follow-up with Dr. Barrientos as soon as he leaves the hospital.     2) He does not need a prescription for lorazepam - 120 tablets of 0.5 mg filled on 12/3, he states his mother has this.    3) He has not yet secured a bed at  treatment. Was encouraged to follow-up with [facility] as soon as possible.    4) Patient remains at high risk for relapse if not following recommendations. At this time, he is not holdable and is cleared for discharge from a psychiatric perspective.     5) Page me with additional questions or concerns, thank you.       Km Gold, PMNEHALP-BC, APRN, CNP  Consult/Liaison Psychiatry  [facility]  Provider can be paged via [facility] Paging/Directory  If I am unavailable, please contact [facility] at [phone] to reach the on-call provider.

## 2021-12-31 ENCOUNTER — TELEPHONE (OUTPATIENT)
Dept: CARDIOLOGY | Facility: CLINIC | Age: 41
End: 2021-12-31
Payer: COMMERCIAL

## 2021-12-31 DIAGNOSIS — I07.1 SEVERE TRICUSPID REGURGITATION: ICD-10-CM

## 2021-12-31 DIAGNOSIS — I07.9 ENDOCARDITIS OF TRICUSPID VALVE: Primary | ICD-10-CM

## 2021-12-31 NOTE — TELEPHONE ENCOUNTER
----- Message from Tameka Paredes PA-C sent at 12/31/2021 11:46 AM CST -----  Regarding: RE: follow up  He should have follow up with us. I don't think surgery was planning to intervene for now. I think a follow up in a few weeks would be fine if he is staying in the area. I know he was planning to go to treatment... He just had an echo so no need for that. Thanks. Nabila    ===================    Call to patient to set up post discharge return - left message. Order placed.  Taime Osei RN 12/31/21 12:39 PM

## 2022-01-05 ENCOUNTER — LAB REQUISITION (OUTPATIENT)
Dept: LAB | Facility: CLINIC | Age: 42
End: 2022-01-05
Payer: COMMERCIAL

## 2022-01-05 DIAGNOSIS — B20 HUMAN IMMUNODEFICIENCY VIRUS (HIV) DISEASE (H): ICD-10-CM

## 2022-01-05 DIAGNOSIS — F11.20 OPIOID DEPENDENCE, UNCOMPLICATED (H): ICD-10-CM

## 2022-01-05 LAB
ALBUMIN SERPL-MCNC: 3.5 G/DL (ref 3.4–5)
ALP SERPL-CCNC: 140 U/L (ref 40–150)
ALT SERPL W P-5'-P-CCNC: 437 U/L (ref 0–70)
AST SERPL W P-5'-P-CCNC: 224 U/L (ref 0–45)
BASOPHILS # BLD AUTO: 0.1 10E3/UL (ref 0–0.2)
BASOPHILS NFR BLD AUTO: 1 %
BILIRUB DIRECT SERPL-MCNC: 0.1 MG/DL (ref 0–0.2)
BILIRUB SERPL-MCNC: 0.3 MG/DL (ref 0.2–1.3)
EOSINOPHIL # BLD AUTO: 0.2 10E3/UL (ref 0–0.7)
EOSINOPHIL NFR BLD AUTO: 2 %
ERYTHROCYTE [DISTWIDTH] IN BLOOD BY AUTOMATED COUNT: 16.9 % (ref 10–15)
HCT VFR BLD AUTO: 38.8 % (ref 40–53)
HGB BLD-MCNC: 12.8 G/DL (ref 13.3–17.7)
IMM GRANULOCYTES # BLD: 0.2 10E3/UL
IMM GRANULOCYTES NFR BLD: 2 %
LYMPHOCYTES # BLD AUTO: 2.6 10E3/UL (ref 0.8–5.3)
LYMPHOCYTES NFR BLD AUTO: 29 %
MCH RBC QN AUTO: 29.8 PG (ref 26.5–33)
MCHC RBC AUTO-ENTMCNC: 33 G/DL (ref 31.5–36.5)
MCV RBC AUTO: 90 FL (ref 78–100)
MONOCYTES # BLD AUTO: 0.8 10E3/UL (ref 0–1.3)
MONOCYTES NFR BLD AUTO: 9 %
NEUTROPHILS # BLD AUTO: 5.3 10E3/UL (ref 1.6–8.3)
NEUTROPHILS NFR BLD AUTO: 57 %
NRBC # BLD AUTO: 0 10E3/UL
NRBC BLD AUTO-RTO: 0 /100
PLATELET # BLD AUTO: 338 10E3/UL (ref 150–450)
PROT SERPL-MCNC: 8.6 G/DL (ref 6.8–8.8)
RBC # BLD AUTO: 4.3 10E6/UL (ref 4.4–5.9)
WBC # BLD AUTO: 9.1 10E3/UL (ref 4–11)

## 2022-01-05 PROCEDURE — 80076 HEPATIC FUNCTION PANEL: CPT | Mod: ORL | Performed by: INTERNAL MEDICINE

## 2022-01-05 PROCEDURE — 85025 COMPLETE CBC W/AUTO DIFF WBC: CPT | Mod: ORL | Performed by: INTERNAL MEDICINE

## 2022-01-18 ENCOUNTER — LAB REQUISITION (OUTPATIENT)
Dept: LAB | Facility: CLINIC | Age: 42
End: 2022-01-18
Payer: COMMERCIAL

## 2022-01-18 ENCOUNTER — OFFICE VISIT (OUTPATIENT)
Dept: CARDIOLOGY | Facility: CLINIC | Age: 42
End: 2022-01-18
Payer: COMMERCIAL

## 2022-01-18 VITALS
HEIGHT: 70 IN | WEIGHT: 149 LBS | SYSTOLIC BLOOD PRESSURE: 132 MMHG | HEART RATE: 101 BPM | OXYGEN SATURATION: 99 % | DIASTOLIC BLOOD PRESSURE: 84 MMHG | BODY MASS INDEX: 21.33 KG/M2

## 2022-01-18 DIAGNOSIS — I07.9 ENDOCARDITIS OF TRICUSPID VALVE: ICD-10-CM

## 2022-01-18 DIAGNOSIS — I33.0 ACUTE BACTERIAL ENDOCARDITIS: ICD-10-CM

## 2022-01-18 DIAGNOSIS — Z86.79 PERSONAL HISTORY OF OTHER DISEASES OF THE CIRCULATORY SYSTEM: ICD-10-CM

## 2022-01-18 DIAGNOSIS — I07.1 SEVERE TRICUSPID REGURGITATION: ICD-10-CM

## 2022-01-18 DIAGNOSIS — G89.29 OTHER CHRONIC PAIN: ICD-10-CM

## 2022-01-18 LAB
ALBUMIN SERPL-MCNC: 3 G/DL (ref 3.4–5)
ALP SERPL-CCNC: 113 U/L (ref 40–150)
ALT SERPL W P-5'-P-CCNC: 369 U/L (ref 0–70)
ANION GAP SERPL CALCULATED.3IONS-SCNC: 7 MMOL/L (ref 3–14)
AST SERPL W P-5'-P-CCNC: 150 U/L (ref 0–45)
BASOPHILS # BLD AUTO: 0 10E3/UL (ref 0–0.2)
BASOPHILS NFR BLD AUTO: 1 %
BILIRUB SERPL-MCNC: 0.2 MG/DL (ref 0.2–1.3)
BUN SERPL-MCNC: 15 MG/DL (ref 7–30)
CALCIUM SERPL-MCNC: 8.8 MG/DL (ref 8.5–10.1)
CHLORIDE BLD-SCNC: 106 MMOL/L (ref 94–109)
CO2 SERPL-SCNC: 24 MMOL/L (ref 20–32)
CREAT SERPL-MCNC: 0.86 MG/DL (ref 0.66–1.25)
EOSINOPHIL # BLD AUTO: 0.2 10E3/UL (ref 0–0.7)
EOSINOPHIL NFR BLD AUTO: 3 %
ERYTHROCYTE [DISTWIDTH] IN BLOOD BY AUTOMATED COUNT: 14.9 % (ref 10–15)
GFR SERPL CREATININE-BSD FRML MDRD: >90 ML/MIN/1.73M2
GLUCOSE BLD-MCNC: 90 MG/DL (ref 70–99)
HCT VFR BLD AUTO: 35.8 % (ref 40–53)
HGB BLD-MCNC: 11.7 G/DL (ref 13.3–17.7)
IMM GRANULOCYTES # BLD: 0.1 10E3/UL
IMM GRANULOCYTES NFR BLD: 1 %
LYMPHOCYTES # BLD AUTO: 2.9 10E3/UL (ref 0.8–5.3)
LYMPHOCYTES NFR BLD AUTO: 41 %
MCH RBC QN AUTO: 29.4 PG (ref 26.5–33)
MCHC RBC AUTO-ENTMCNC: 32.7 G/DL (ref 31.5–36.5)
MCV RBC AUTO: 90 FL (ref 78–100)
MONOCYTES # BLD AUTO: 0.7 10E3/UL (ref 0–1.3)
MONOCYTES NFR BLD AUTO: 10 %
NEUTROPHILS # BLD AUTO: 3.2 10E3/UL (ref 1.6–8.3)
NEUTROPHILS NFR BLD AUTO: 44 %
NRBC # BLD AUTO: 0 10E3/UL
NRBC BLD AUTO-RTO: 0 /100
PLATELET # BLD AUTO: 225 10E3/UL (ref 150–450)
POTASSIUM BLD-SCNC: 4.2 MMOL/L (ref 3.4–5.3)
PROT SERPL-MCNC: 7 G/DL (ref 6.8–8.8)
RBC # BLD AUTO: 3.98 10E6/UL (ref 4.4–5.9)
SODIUM SERPL-SCNC: 137 MMOL/L (ref 133–144)
WBC # BLD AUTO: 7.1 10E3/UL (ref 4–11)

## 2022-01-18 PROCEDURE — 99214 OFFICE O/P EST MOD 30 MIN: CPT | Performed by: NURSE PRACTITIONER

## 2022-01-18 PROCEDURE — 85025 COMPLETE CBC W/AUTO DIFF WBC: CPT | Mod: ORL | Performed by: PEDIATRICS

## 2022-01-18 PROCEDURE — 80053 COMPREHEN METABOLIC PANEL: CPT | Mod: ORL | Performed by: PEDIATRICS

## 2022-01-18 ASSESSMENT — MIFFLIN-ST. JEOR: SCORE: 1597.11

## 2022-01-18 NOTE — PATIENT INSTRUCTIONS
Today's Recommendations    1. Please call with symptoms of weight gain, leg swelling, belly swelling, shortness of breath  2. Please follow up with Dr. Aguilar in echo in 3 months.    Please send a [employer] message or call [phone] with questions or concerns.     Scheduling number [phone].

## 2022-01-18 NOTE — PROGRESS NOTES
Cardiology Clinic Progress Note  Bc German MRN# [id number]   YOB: 1980 Age: 41 year old     Primary cardiologist: Dr. Aguilar    Reason for visit: Discharge follow-up    History of presenting illness:    Bc German, a pleasant 41 year old patient who has a past medical history significant for polysubstance abuse including IV drug use (fentanyl and methamphetamine), chronic opioid dependence, HIV, hep C, CKD, MRSA sepsis with pulmonary valve endocarditis in 2015, history of lumbar spine L3 osteomyelitis with epidural abscess requiring surgical intervention.    He was admitted to [facility] on 11/15/2021 with fevers, chills, cough, cough, tachycardia and recurrent syncope.  He had syncopal episode while getting off the toilet and fell into the shower obtaining a scrotal laceration.  He was found to have recurrent MSSA/MRSA bacteremia with tricuspid valve endocarditis.  Fortunately, his blood cultures cleared on 11/18/2021 and he completed a 6-week course of IV antibiotics while inpatient.      A repeat EDUARDO on 12/2 showed smaller vegetations with the tricuspid valve leaflet perforated and prolapsing leading to degenerative TR with LVEF was 65 to 70%. After the EDUARDO he was reevaluated by CV surgery with plans for surgical replacement with bioprosthetic valves at some point but were concerned about his IV drug use.  Dr. Khari Barrientos from the addiction medicine at the [place] was consulted and was ultimately decided to postpone surgery at this time to focus on his chemical dependency.  His echocardiogram on 12/27/2021 showed resolution of the vegetations and improvement of TR. He was given IV Lasix on 12/3 due to bilateral lower extremity edema and diuresed well.  Due to vancomycin he had an JOSE RAMON on CKD that resolved.    Today returns for a discharge follow-up Walt he reports that he has been 62 days sober from alcohol and illicit drugs.  He is working with Dr. Barrientos  and addiction medicine with goal to remain sober.  Currently he remains without any symptoms of right heart failure with stable weights, no lower extremity edema, abdominal distention or shortness of breath on exertion.  Thankfully, he remains symptom-free from a cardiac standpoint.  The goal would be now to to focus on his recovery and ongoing sobriety and will reevaluate need for tricuspid valve replacement as he progresses in his treatment.         Assessment and Plan:     ASSESSMENT:    1. Tricuspid valve endocarditis    Initial EDUARDO from 11/15/2021 showed 2 large mobile masses attached to the atrial side of base of posterior tricuspid leaflets with severe tricuspid regurgitation and perforation could not be excluded    EDUARDO 12/2 showed smaller vegetations with the tricuspid valve leaflet perforated and prolapsing leading to degenerative TR with preserved EF    Echocardiogram from 12/27/2021 showed resolution of vegetations and improvement of TR    2. Bacteremia with MSSA/MRSA    Cultures cleared as of 11/18/2021    ID was following during his hospitalization and received 6 weeks of IV antibiotics during his stay    Plan is to follow-up with Dr. Chavez with infectious disease recent bacteremia as well as HIV and hepatitis C.    3. Polysubstance abuse     IV drug use with fentanyl and methamphetamine, chronic opioid use, and alcohol use    62 days sober and will be entering a treatment facility in early February.  Currently living with his parents.    Followed by [facility] clinic and known to Dr. Barrientos with addiction medicine    High risk for relapse per addiction medicine due to social and mental health challenges and history of relapses.    The plan is for him to be admitted to [facility] in [place] in the first week of February for inpatient treatment.    PLAN:     1. Patient instructed to call the clinic with any signs of heart failure  2. Return to clinic in approximately 3 to 4 months with limited  "echocardiogram at follow-up with Dr. Aguilar     Orders this Visit:  Orders Placed This Encounter   Procedures     Follow-Up with Cardiologist     Echocardiogram Limited     No orders of the defined types were placed in this encounter.    There are no discontinued medications.    Today's clinic visit entailed:  Review of the result(s) of each unique test - Transesophageal echo x2, transthoracic echo x1  65 minutes spent on the date of the encounter doing chart review, history and exam, documentation and further activities per the note  Provider  Link to MDM Help Grid     The level of medical decision making during this visit was of moderate complexity.           Review of Systems:     Review of Systems:  Skin:  not assessed     Eyes:  not assessed    ENT:  not assessed    Respiratory:  Positive for shortness of breath  Cardiovascular:    Positive for;palpitations;lightheadedness  Gastroenterology:      Genitourinary:  not assessed    Musculoskeletal:  not assessed    Neurologic:  Positive for    Psychiatric:  Positive for anxiety  Heme/Lymph/Imm:  not assessed    Endocrine:  not assessed              Physical Exam:   Vitals: /84   Pulse 101   Ht 1.778 m (5' 10\")   Wt 67.6 kg (149 lb)   SpO2 99%   BMI 21.38 kg/m    Constitutional:  cooperative thin      Skin:  warm and dry to the touch        Head:  normocephalic        Eyes:  pupils equal and round        ENT:  no pallor or cyanosis        Neck:  not assessed this visit        Chest:  clear to auscultation        Cardiac: regular rhythm                  Abdomen:  abdomen soft        Vascular: not assessed this visit                                      Extremities and Back:  no edema        Neurological:  affect appropriate             Medications:     Current Outpatient Medications   Medication Sig Dispense Refill     abacavir-dolutegravir-LamiVUDine (TRIUMEQ) 600- MG per tablet Take 1 tablet by mouth daily Please call [phone] & make " appointment for further refills. 30 tablet 0     buprenorphine HCl-naloxone HCl (SUBOXONE) 8-2 MG per film Place 1 Film under the tongue 2 times daily       FETZIMA 120 MG 24 hr capsule Take 120 mg by mouth daily       LORazepam (ATIVAN) 0.5 MG tablet Take 1 tablet (0.5 mg) by mouth every 8 hours as needed for anxiety 12 tablet 0     miconazole (MICATIN) 2 % external powder Apply topically 2 times daily 43 g 0     pregabalin (LYRICA) 150 MG capsule Take 150 mg by mouth 2 times daily       tamsulosin (FLOMAX) 0.4 MG capsule Take 1 capsule (0.4 mg) by mouth daily 30 capsule 0     testosterone cypionate (DEPOTESTOTERONE) 200 MG/ML injection Inject 0.3 mLs into the muscle once a week Inject 0.3 mL IM every 77 days       aspirin 81 MG EC tablet Take 81 mg by mouth daily (Patient not taking: Reported on 1/18/2022)         No family history on file.    Social History     Socioeconomic History     Marital status: Single     Spouse name: Not on file     Number of children: Not on file     Years of education: Not on file     Highest education level: Not on file   Occupational History     Not on file   Tobacco Use     Smoking status: Current Every Day Smoker     Packs/day: 0.25     Types: Cigarettes     Smokeless tobacco: Never Used     Tobacco comment: mostly vapes   Substance and Sexual Activity     Alcohol use: Yes     Drug use: Yes     Types: Marijuana     Comment: Heroine--IV, benzo's     Sexual activity: Yes     Partners: Female, Male   Other Topics Concern     Parent/sibling w/ CABG, MI or angioplasty before 65F 55M? Not Asked   Social History Narrative    ** Merged History Encounter **         ** Merged History Encounter **          Social Determinants of Health     Financial Resource Strain: Not on file   Food Insecurity: Not on file   Transportation Needs: Not on file   Physical Activity: Not on file   Stress: Not on file   Social Connections: Not on file   Intimate Partner Violence: Not on file   Housing Stability:  Not on file            Past Medical History:     Past Medical History:   Diagnosis Date     Anxiety      Depressive disorder      Drug abuse, IV (H)      Group A streptococcal infection 11/2014    Bacteremia/cellulitis     HCV antibody positive      HIV (human immunodeficiency virus infection) (H)      HIV (human immunodeficiency virus infection) (H)      HIV (human immunodeficiency virus infection) (H)      IVDU (intravenous drug user)      Osteomyelitis of vertebra of lumbosacral region (H) 3/2011    L3, left psoas abscess     Substance abuse (H)               Past Surgical History:     Past Surgical History:   Procedure Laterality Date     EYE SURGERY  1 year ago    pins to left eye after socket fracture     OPTICAL TRACKING SYSTEM FUSION CERVICAL ANTERIOR ONE LEVEL Right 9/10/2017    Procedure: OPTICAL TRACKING SYSTEM FUSION CERVICAL ANTERIOR ONE LEVEL;  Stealth C6-C7 Anterior Cervical Corpectomy and C5-T1 Fusion;  Surgeon: Marv Ambrose MD;  Location: [facility] OR     ORTHOPEDIC SURGERY      Arm Surgery     PICC INSERTION Left 09/21/2017    5fr DL BioFlo PICC, 42cm (1cm external) in the L basilic vein w/ tip in the low SVC.     TRANSESOPHAGEAL ECHOCARDIOGRAM INTRAOPERATIVE N/A 3/17/2015    Procedure: TRANSESOPHAGEAL ECHOCARDIOGRAM INTRAOPERATIVE;  Surgeon: Generic Anesthesia Provider;  Location: [facility] OR     TRANSESOPHAGEAL ECHOCARDIOGRAM INTRAOPERATIVE N/A 11/18/2021    Procedure: ECHOCARDIOGRAM, TRANSESOPHAGEAL, INTRAOPERATIVE;  Surgeon: GENERIC ANESTHESIA PROVIDER;  Location:  OR     TRANSESOPHAGEAL ECHOCARDIOGRAM INTRAOPERATIVE N/A 12/2/2021    Procedure: ECHOCARDIOGRAM, TRANSESOPHAGEAL, INTRAOPERATIVE;  Surgeon: GENERIC ANESTHESIA PROVIDER;  Location:  OR              Allergies:   Bupropion, Acetaminophen, Codeine, and Sulfa drugs       Data:   All laboratory data reviewed:    Recent Labs   Lab Test 12/04/21  0619 05/09/21  1450 10/09/17  2015   TSH 4.21* 2.95 1.88   IRON 30*  --   --    *  --    --    IRONSAT 14*  --   --    MARILEE 214  --   --        Lab Results   Component Value Date    WBC 9.1 01/05/2022    WBC 5.5 05/09/2021    RBC 4.30 (L) 01/05/2022    RBC 4.84 05/09/2021    HGB 12.8 (L) 01/05/2022    HGB 13.3 05/09/2021    HCT 38.8 (L) 01/05/2022    HCT 42.0 05/09/2021    MCV 90 01/05/2022    MCV 87 05/09/2021    MCH 29.8 01/05/2022    MCH 27.5 05/09/2021    MCHC 33.0 01/05/2022    MCHC 31.7 05/09/2021    RDW 16.9 (H) 01/05/2022    RDW 13.2 05/09/2021     01/05/2022     05/09/2021       Lab Results   Component Value Date     12/29/2021     05/09/2021    POTASSIUM 4.5 12/29/2021    POTASSIUM 3.6 05/09/2021    CHLORIDE 104 12/29/2021    CHLORIDE 100 05/09/2021    CO2 31 12/29/2021    CO2 35 (H) 05/09/2021    ANIONGAP 2 (L) 12/29/2021    ANIONGAP 2 (L) 05/09/2021    GLC 99 12/29/2021    GLC 99 05/09/2021    BUN 28 12/29/2021    BUN 12 05/09/2021    CR 0.84 12/29/2021    CR 0.67 05/09/2021    GFRESTIMATED >90 12/29/2021    GFRESTIMATED >90 05/09/2021    GFRESTBLACK >90 05/09/2021    SANDRA 9.7 12/29/2021    SANDRA 9.4 05/09/2021      Lab Results   Component Value Date     (H) 01/05/2022    AST 81 (H) 05/09/2021     (H) 01/05/2022     (H) 05/09/2021       No results found for: A1C    Lab Results   Component Value Date    INR 0.96 05/09/2021    INR 1.01 10/09/2017         MEDHAT LANZA [facility]  Pager: [phone]  RN phone: [phone]

## 2022-01-18 NOTE — LETTER
1/18/2022    Khari Barrientos MD  [facility] [address] [address]    RE: Bc German       Dear Colleague,     I had the pleasure of seeing Bc German in the [facility].    Cardiology Clinic Progress Note  Bc German MRN# [id number]   YOB: 1980 Age: 41 year old     Primary cardiologist: Dr. Aguilar    Reason for visit: Discharge follow-up    History of presenting illness:    Bc German, a pleasant 41 year old patient who has a past medical history significant for polysubstance abuse including IV drug use (fentanyl and methamphetamine), chronic opioid dependence, HIV, hep C, CKD, MRSA sepsis with pulmonary valve endocarditis in 2015, history of lumbar spine L3 osteomyelitis with epidural abscess requiring surgical intervention.    He was admitted to [facility] on 11/15/2021 with fevers, chills, cough, cough, tachycardia and recurrent syncope.  He had syncopal episode while getting off the toilet and fell into the shower obtaining a scrotal laceration.  He was found to have recurrent MSSA/MRSA bacteremia with tricuspid valve endocarditis.  Fortunately, his blood cultures cleared on 11/18/2021 and he completed a 6-week course of IV antibiotics while inpatient.      A repeat EDUARDO on 12/2 showed smaller vegetations with the tricuspid valve leaflet perforated and prolapsing leading to degenerative TR with LVEF was 65 to 70%. After the EDUARDO he was reevaluated by CV surgery with plans for surgical replacement with bioprosthetic valves at some point but were concerned about his IV drug use.  Dr. Khari Barrientos from the addiction medicine at the [place] was consulted and was ultimately decided to postpone surgery at this time to focus on his chemical dependency.  His echocardiogram on 12/27/2021 showed resolution of the vegetations and improvement of TR. He was given IV Lasix on 12/3 due to bilateral lower extremity edema and  diuresed well.  Due to vancomycin he had an JOSE RAMON on CKD that resolved.    Today returns for a discharge follow-up Walt he reports that he has been 62 days sober from alcohol and illicit drugs.  He is working with Dr. Barrientos and addiction medicine with goal to remain sober.  Currently he remains without any symptoms of right heart failure with stable weights, no lower extremity edema, abdominal distention or shortness of breath on exertion.  Thankfully, he remains symptom-free from a cardiac standpoint.  The goal would be now to to focus on his recovery and ongoing sobriety and will reevaluate need for tricuspid valve replacement as he progresses in his treatment.         Assessment and Plan:     ASSESSMENT:    1. Tricuspid valve endocarditis    Initial EDUARDO from 11/15/2021 showed 2 large mobile masses attached to the atrial side of base of posterior tricuspid leaflets with severe tricuspid regurgitation and perforation could not be excluded    EDUARDO 12/2 showed smaller vegetations with the tricuspid valve leaflet perforated and prolapsing leading to degenerative TR with preserved EF    Echocardiogram from 12/27/2021 showed resolution of vegetations and improvement of TR    2. Bacteremia with MSSA/MRSA    Cultures cleared as of 11/18/2021    ID was following during his hospitalization and received 6 weeks of IV antibiotics during his stay    Plan is to follow-up with Dr. Chavez with infectious disease recent bacteremia as well as HIV and hepatitis C.    3. Polysubstance abuse     IV drug use with fentanyl and methamphetamine, chronic opioid use, and alcohol use    62 days sober and will be entering a treatment facility in early February.  Currently living with his parents.    Followed by [facility] clinic and known to Dr. Barrientos with addiction medicine    High risk for relapse per addiction medicine due to social and mental health challenges and history of relapses.    The plan is for him to be admitted to [facility] in  [place] in the first week of February for inpatient treatment.    PLAN:     1. Patient instructed to call the clinic with any signs of heart failure  2. Return to clinic in approximately 3 to 4 months with limited echocardiogram at follow-up with Dr. Jeff Farmer this Visit:  Orders Placed This Encounter   Procedures     Follow-Up with Cardiologist     Echocardiogram Limited     No orders of the defined types were placed in this encounter.    There are no discontinued medications.    Today's clinic visit entailed:  Review of the result(s) of each unique test - Transesophageal echo x2, transthoracic echo x1  65 minutes spent on the date of the encounter doing chart review, history and exam, documentation and further activities per the note  Provider  Link to [employer] Help Grid     The level of medical decision making during this visit was of moderate complexity.           Review of Systems:     Review of Systems:  Skin:  not assessed     Eyes:  not assessed    ENT:  not assessed    Respiratory:  Positive for shortness of breath  Cardiovascular:    Positive for;palpitations;lightheadedness  Gastroenterology:      Genitourinary:  not assessed    Musculoskeletal:  not assessed    Neurologic:  Positive for    Psychiatric:  Positive for anxiety  Heme/Lymph/Imm:  not assessed    Endocrine:  not assessed              Physical Exam:   Vitals: /84   Pulse 101   Ht 1.778 m (5' 10\")   Wt 67.6 kg (149 lb)   SpO2 99%   BMI 21.38 kg/m    Constitutional:  cooperative thin      Skin:  warm and dry to the touch        Head:  normocephalic        Eyes:  pupils equal and round        ENT:  no pallor or cyanosis        Neck:  not assessed this visit        Chest:  clear to auscultation        Cardiac: regular rhythm                  Abdomen:  abdomen soft        Vascular: not assessed this visit                                      Extremities and Back:  no edema        Neurological:  affect appropriate             " Medications:     Current Outpatient Medications   Medication Sig Dispense Refill     abacavir-dolutegravir-LamiVUDine (TRIUMEQ) 600- MG per tablet Take 1 tablet by mouth daily Please call [phone] & make appointment for further refills. 30 tablet 0     buprenorphine HCl-naloxone HCl (SUBOXONE) 8-2 MG per film Place 1 Film under the tongue 2 times daily       FETZIMA 120 MG 24 hr capsule Take 120 mg by mouth daily       LORazepam (ATIVAN) 0.5 MG tablet Take 1 tablet (0.5 mg) by mouth every 8 hours as needed for anxiety 12 tablet 0     miconazole (MICATIN) 2 % external powder Apply topically 2 times daily 43 g 0     pregabalin (LYRICA) 150 MG capsule Take 150 mg by mouth 2 times daily       tamsulosin (FLOMAX) 0.4 MG capsule Take 1 capsule (0.4 mg) by mouth daily 30 capsule 0     testosterone cypionate (DEPOTESTOTERONE) 200 MG/ML injection Inject 0.3 mLs into the muscle once a week Inject 0.3 mL IM every 77 days       aspirin 81 MG EC tablet Take 81 mg by mouth daily (Patient not taking: Reported on 1/18/2022)         No family history on file.    Social History     Socioeconomic History     Marital status: Single     Spouse name: Not on file     Number of children: Not on file     Years of education: Not on file     Highest education level: Not on file   Occupational History     Not on file   Tobacco Use     Smoking status: Current Every Day Smoker     Packs/day: 0.25     Types: Cigarettes     Smokeless tobacco: Never Used     Tobacco comment: mostly vapes   Substance and Sexual Activity     Alcohol use: Yes     Drug use: Yes     Types: Marijuana     Comment: Heroine--IV, benzo's     Sexual activity: Yes     Partners: Female, Male   Other Topics Concern     Parent/sibling w/ CABG, MI or angioplasty before 65F 55M? Not Asked   Social History Narrative    ** Merged History Encounter **         ** Merged History Encounter **          Social Determinants of Health     Financial Resource Strain: Not on file    Food Insecurity: Not on file   Transportation Needs: Not on file   Physical Activity: Not on file   Stress: Not on file   Social Connections: Not on file   Intimate Partner Violence: Not on file   Housing Stability: Not on file            Past Medical History:     Past Medical History:   Diagnosis Date     Anxiety      Depressive disorder      Drug abuse, IV (H)      Group A streptococcal infection 11/2014    Bacteremia/cellulitis     HCV antibody positive      HIV (human immunodeficiency virus infection) (H)      HIV (human immunodeficiency virus infection) (H)      HIV (human immunodeficiency virus infection) (H)      IVDU (intravenous drug user)      Osteomyelitis of vertebra of lumbosacral region (H) 3/2011    L3, left psoas abscess     Substance abuse (H)               Past Surgical History:     Past Surgical History:   Procedure Laterality Date     EYE SURGERY  1 year ago    pins to left eye after socket fracture     OPTICAL TRACKING SYSTEM FUSION CERVICAL ANTERIOR ONE LEVEL Right 9/10/2017    Procedure: OPTICAL TRACKING SYSTEM FUSION CERVICAL ANTERIOR ONE LEVEL;  Stealth C6-C7 Anterior Cervical Corpectomy and C5-T1 Fusion;  Surgeon: Marv Ambrose MD;  Location: [facility] OR     ORTHOPEDIC SURGERY      Arm Surgery     PICC INSERTION Left 09/21/2017    5fr DL BioFlo PICC, 42cm (1cm external) in the L basilic vein w/ tip in the low SVC.     TRANSESOPHAGEAL ECHOCARDIOGRAM INTRAOPERATIVE N/A 3/17/2015    Procedure: TRANSESOPHAGEAL ECHOCARDIOGRAM INTRAOPERATIVE;  Surgeon: Generic Anesthesia Provider;  Location:  OR     TRANSESOPHAGEAL ECHOCARDIOGRAM INTRAOPERATIVE N/A 11/18/2021    Procedure: ECHOCARDIOGRAM, TRANSESOPHAGEAL, INTRAOPERATIVE;  Surgeon: GENERIC ANESTHESIA PROVIDER;  Location:  OR     TRANSESOPHAGEAL ECHOCARDIOGRAM INTRAOPERATIVE N/A 12/2/2021    Procedure: ECHOCARDIOGRAM, TRANSESOPHAGEAL, INTRAOPERATIVE;  Surgeon: GENERIC ANESTHESIA PROVIDER;  Location:  OR              Allergies:    Bupropion, Acetaminophen, Codeine, and Sulfa drugs       Data:   All laboratory data reviewed:    Recent Labs   Lab Test 12/04/21  0619 05/09/21  1450 10/09/17  2015   TSH 4.21* 2.95 1.88   IRON 30*  --   --    *  --   --    IRONSAT 14*  --   --    MARILEE 214  --   --        Lab Results   Component Value Date    WBC 9.1 01/05/2022    WBC 5.5 05/09/2021    RBC 4.30 (L) 01/05/2022    RBC 4.84 05/09/2021    HGB 12.8 (L) 01/05/2022    HGB 13.3 05/09/2021    HCT 38.8 (L) 01/05/2022    HCT 42.0 05/09/2021    MCV 90 01/05/2022    MCV 87 05/09/2021    MCH 29.8 01/05/2022    MCH 27.5 05/09/2021    MCHC 33.0 01/05/2022    MCHC 31.7 05/09/2021    RDW 16.9 (H) 01/05/2022    RDW 13.2 05/09/2021     01/05/2022     05/09/2021       Lab Results   Component Value Date     12/29/2021     05/09/2021    POTASSIUM 4.5 12/29/2021    POTASSIUM 3.6 05/09/2021    CHLORIDE 104 12/29/2021    CHLORIDE 100 05/09/2021    CO2 31 12/29/2021    CO2 35 (H) 05/09/2021    ANIONGAP 2 (L) 12/29/2021    ANIONGAP 2 (L) 05/09/2021    GLC 99 12/29/2021    GLC 99 05/09/2021    BUN 28 12/29/2021    BUN 12 05/09/2021    CR 0.84 12/29/2021    CR 0.67 05/09/2021    GFRESTIMATED >90 12/29/2021    GFRESTIMATED >90 05/09/2021    GFRESTBLACK >90 05/09/2021    SANDRA 9.7 12/29/2021    SANDRA 9.4 05/09/2021      Lab Results   Component Value Date     (H) 01/05/2022    AST 81 (H) 05/09/2021     (H) 01/05/2022     (H) 05/09/2021       No results found for: A1C    Lab Results   Component Value Date    INR 0.96 05/09/2021    INR 1.01 10/09/2017         MEDHAT LANZA [facility]  Pager: [phone]  RN phone: [phone]      cc:   Tameka Paredes PA-C  [address]  [facility] [address]  [place] [id number]

## 2022-02-06 ENCOUNTER — APPOINTMENT (OUTPATIENT)
Dept: ULTRASOUND IMAGING | Facility: CLINIC | Age: 42
DRG: 854 | End: 2022-02-06
Attending: EMERGENCY MEDICINE
Payer: COMMERCIAL

## 2022-02-06 ENCOUNTER — HOSPITAL ENCOUNTER (INPATIENT)
Facility: CLINIC | Age: 42
LOS: 4 days | Discharge: HOME | DRG: 854 | End: 2022-02-10
Attending: EMERGENCY MEDICINE | Admitting: INTERNAL MEDICINE
Payer: COMMERCIAL

## 2022-02-06 ENCOUNTER — APPOINTMENT (OUTPATIENT)
Dept: CT IMAGING | Facility: CLINIC | Age: 42
DRG: 854 | End: 2022-02-06
Attending: EMERGENCY MEDICINE
Payer: COMMERCIAL

## 2022-02-06 DIAGNOSIS — N30.01 ACUTE CYSTITIS WITH HEMATURIA: ICD-10-CM

## 2022-02-06 DIAGNOSIS — R10.9 RIGHT SIDED ABDOMINAL PAIN: ICD-10-CM

## 2022-02-06 DIAGNOSIS — K81.0 ACUTE CHOLECYSTITIS: Primary | ICD-10-CM

## 2022-02-06 DIAGNOSIS — G89.18 POSTOPERATIVE PAIN: ICD-10-CM

## 2022-02-06 PROBLEM — Z21 ASYMPTOMATIC HUMAN IMMUNODEFICIENCY VIRUS (HIV) INFECTION STATUS (H): Status: ACTIVE | Noted: 2017-02-28

## 2022-02-06 PROBLEM — R19.7 DIARRHEA OF PRESUMED INFECTIOUS ORIGIN: Status: ACTIVE | Noted: 2022-02-06

## 2022-02-06 PROBLEM — Z87.898 HISTORY OF INTRAVENOUS DRUG ABUSE: Status: ACTIVE | Noted: 2022-02-06

## 2022-02-06 LAB
ALBUMIN SERPL-MCNC: 2.8 G/DL (ref 3.4–5)
ALBUMIN UR-MCNC: >600 MG/DL
ALP SERPL-CCNC: 91 U/L (ref 40–150)
ALT SERPL W P-5'-P-CCNC: 45 U/L (ref 0–70)
ANION GAP SERPL CALCULATED.3IONS-SCNC: 5 MMOL/L (ref 3–14)
APPEARANCE UR: ABNORMAL
AST SERPL W P-5'-P-CCNC: 27 U/L (ref 0–45)
BASOPHILS # BLD AUTO: 0.1 10E3/UL (ref 0–0.2)
BASOPHILS NFR BLD AUTO: 0 %
BILIRUB SERPL-MCNC: 0.4 MG/DL (ref 0.2–1.3)
BILIRUB UR QL STRIP: NEGATIVE
BUN SERPL-MCNC: 19 MG/DL (ref 7–30)
C DIFF TOX B STL QL: NEGATIVE
CALCIUM SERPL-MCNC: 9.9 MG/DL (ref 8.5–10.1)
CHLORIDE BLD-SCNC: 107 MMOL/L (ref 94–109)
CO2 SERPL-SCNC: 25 MMOL/L (ref 20–32)
COLOR UR AUTO: ABNORMAL
CREAT SERPL-MCNC: 1.04 MG/DL (ref 0.66–1.25)
CRP SERPL-MCNC: 130 MG/L (ref 0–8)
EOSINOPHIL # BLD AUTO: 0.2 10E3/UL (ref 0–0.7)
EOSINOPHIL NFR BLD AUTO: 1 %
ERYTHROCYTE [DISTWIDTH] IN BLOOD BY AUTOMATED COUNT: 13.1 % (ref 10–15)
GFR SERPL CREATININE-BSD FRML MDRD: >90 ML/MIN/1.73M2
GLUCOSE BLD-MCNC: 109 MG/DL (ref 70–99)
GLUCOSE UR STRIP-MCNC: NEGATIVE MG/DL
HCT VFR BLD AUTO: 42.8 % (ref 40–53)
HGB BLD-MCNC: 13.3 G/DL (ref 13.3–17.7)
HGB UR QL STRIP: ABNORMAL
IMM GRANULOCYTES # BLD: 0.3 10E3/UL
IMM GRANULOCYTES NFR BLD: 1 %
KETONES UR STRIP-MCNC: NEGATIVE MG/DL
LACTATE SERPL-SCNC: 2 MMOL/L (ref 0.7–2)
LEUKOCYTE ESTERASE UR QL STRIP: ABNORMAL
LIPASE SERPL-CCNC: 157 U/L (ref 73–393)
LYMPHOCYTES # BLD AUTO: 2.1 10E3/UL (ref 0.8–5.3)
LYMPHOCYTES NFR BLD AUTO: 9 %
MCH RBC QN AUTO: 28.4 PG (ref 26.5–33)
MCHC RBC AUTO-ENTMCNC: 31.1 G/DL (ref 31.5–36.5)
MCV RBC AUTO: 91 FL (ref 78–100)
MONOCYTES # BLD AUTO: 1.8 10E3/UL (ref 0–1.3)
MONOCYTES NFR BLD AUTO: 8 %
MUCOUS THREADS #/AREA URNS LPF: PRESENT /LPF
NEUTROPHILS # BLD AUTO: 19.1 10E3/UL (ref 1.6–8.3)
NEUTROPHILS NFR BLD AUTO: 81 %
NITRATE UR QL: NEGATIVE
NRBC # BLD AUTO: 0 10E3/UL
NRBC BLD AUTO-RTO: 0 /100
PH UR STRIP: 6.5 [PH] (ref 5–7)
PHOSPHATE SERPL-MCNC: 4.7 MG/DL (ref 2.5–4.5)
PLATELET # BLD AUTO: 639 10E3/UL (ref 150–450)
POTASSIUM BLD-SCNC: 3.5 MMOL/L (ref 3.4–5.3)
PROCALCITONIN SERPL-MCNC: 0.27 NG/ML
PROT SERPL-MCNC: 8.5 G/DL (ref 6.8–8.8)
RBC # BLD AUTO: 4.69 10E6/UL (ref 4.4–5.9)
RBC URINE: >182 /HPF
SARS-COV-2 RNA RESP QL NAA+PROBE: NEGATIVE
SODIUM SERPL-SCNC: 137 MMOL/L (ref 133–144)
SP GR UR STRIP: 1.03 (ref 1–1.03)
UROBILINOGEN UR STRIP-MCNC: NORMAL MG/DL
WBC # BLD AUTO: 23.5 10E3/UL (ref 4–11)
WBC URINE: 71 /HPF
YEAST #/AREA URNS HPF: ABNORMAL /HPF

## 2022-02-06 PROCEDURE — 87086 URINE CULTURE/COLONY COUNT: CPT | Performed by: EMERGENCY MEDICINE

## 2022-02-06 PROCEDURE — 84145 PROCALCITONIN (PCT): CPT | Performed by: INTERNAL MEDICINE

## 2022-02-06 PROCEDURE — 81001 URINALYSIS AUTO W/SCOPE: CPT | Performed by: EMERGENCY MEDICINE

## 2022-02-06 PROCEDURE — 96361 HYDRATE IV INFUSION ADD-ON: CPT

## 2022-02-06 PROCEDURE — 84100 ASSAY OF PHOSPHORUS: CPT | Performed by: INTERNAL MEDICINE

## 2022-02-06 PROCEDURE — 47562 LAPAROSCOPIC CHOLECYSTECTOMY: CPT | Mod: AS | Performed by: PHYSICIAN ASSISTANT

## 2022-02-06 PROCEDURE — 36415 COLL VENOUS BLD VENIPUNCTURE: CPT | Performed by: EMERGENCY MEDICINE

## 2022-02-06 PROCEDURE — 99203 OFFICE O/P NEW LOW 30 MIN: CPT | Mod: 57 | Performed by: SURGERY

## 2022-02-06 PROCEDURE — 96365 THER/PROPH/DIAG IV INF INIT: CPT

## 2022-02-06 PROCEDURE — 250N000011 HC RX IP 250 OP 636: Performed by: EMERGENCY MEDICINE

## 2022-02-06 PROCEDURE — 74177 CT ABD & PELVIS W/CONTRAST: CPT

## 2022-02-06 PROCEDURE — 96375 TX/PRO/DX INJ NEW DRUG ADDON: CPT

## 2022-02-06 PROCEDURE — 120N000001 HC R&B MED SURG/OB

## 2022-02-06 PROCEDURE — 96376 TX/PRO/DX INJ SAME DRUG ADON: CPT

## 2022-02-06 PROCEDURE — 80053 COMPREHEN METABOLIC PANEL: CPT | Performed by: EMERGENCY MEDICINE

## 2022-02-06 PROCEDURE — 87635 SARS-COV-2 COVID-19 AMP PRB: CPT | Performed by: EMERGENCY MEDICINE

## 2022-02-06 PROCEDURE — 76705 ECHO EXAM OF ABDOMEN: CPT

## 2022-02-06 PROCEDURE — 250N000009 HC RX 250: Performed by: EMERGENCY MEDICINE

## 2022-02-06 PROCEDURE — 87040 BLOOD CULTURE FOR BACTERIA: CPT | Performed by: EMERGENCY MEDICINE

## 2022-02-06 PROCEDURE — 258N000003 HC RX IP 258 OP 636: Performed by: EMERGENCY MEDICINE

## 2022-02-06 PROCEDURE — 87493 C DIFF AMPLIFIED PROBE: CPT | Performed by: INTERNAL MEDICINE

## 2022-02-06 PROCEDURE — 250N000013 HC RX MED GY IP 250 OP 250 PS 637: Performed by: INTERNAL MEDICINE

## 2022-02-06 PROCEDURE — 250N000011 HC RX IP 250 OP 636: Performed by: INTERNAL MEDICINE

## 2022-02-06 PROCEDURE — 85025 COMPLETE CBC W/AUTO DIFF WBC: CPT | Performed by: EMERGENCY MEDICINE

## 2022-02-06 PROCEDURE — 86140 C-REACTIVE PROTEIN: CPT | Performed by: INTERNAL MEDICINE

## 2022-02-06 PROCEDURE — 99285 EMERGENCY DEPT VISIT HI MDM: CPT | Mod: 25

## 2022-02-06 PROCEDURE — 99223 1ST HOSP IP/OBS HIGH 75: CPT | Mod: AI | Performed by: INTERNAL MEDICINE

## 2022-02-06 PROCEDURE — 83690 ASSAY OF LIPASE: CPT | Performed by: EMERGENCY MEDICINE

## 2022-02-06 PROCEDURE — 83605 ASSAY OF LACTIC ACID: CPT | Performed by: EMERGENCY MEDICINE

## 2022-02-06 PROCEDURE — 258N000003 HC RX IP 258 OP 636: Performed by: INTERNAL MEDICINE

## 2022-02-06 RX ORDER — IOPAMIDOL 755 MG/ML
70 INJECTION, SOLUTION INTRAVASCULAR ONCE
Status: COMPLETED | OUTPATIENT
Start: 2022-02-06 | End: 2022-02-06

## 2022-02-06 RX ORDER — PROCHLORPERAZINE MALEATE 10 MG
10 TABLET ORAL EVERY 6 HOURS PRN
Status: DISCONTINUED | OUTPATIENT
Start: 2022-02-06 | End: 2022-02-10 | Stop reason: HOSPADM

## 2022-02-06 RX ORDER — ACETAMINOPHEN 325 MG/1
650 TABLET ORAL EVERY 6 HOURS PRN
Status: DISCONTINUED | OUTPATIENT
Start: 2022-02-06 | End: 2022-02-10 | Stop reason: HOSPADM

## 2022-02-06 RX ORDER — ONDANSETRON 2 MG/ML
4 INJECTION INTRAMUSCULAR; INTRAVENOUS ONCE
Status: COMPLETED | OUTPATIENT
Start: 2022-02-06 | End: 2022-02-06

## 2022-02-06 RX ORDER — PREGABALIN 50 MG/1
150 CAPSULE ORAL DAILY
Status: DISCONTINUED | OUTPATIENT
Start: 2022-02-06 | End: 2022-02-10 | Stop reason: HOSPADM

## 2022-02-06 RX ORDER — HYDROMORPHONE HYDROCHLORIDE 2 MG/1
4 TABLET ORAL EVERY 4 HOURS PRN
Status: DISCONTINUED | OUTPATIENT
Start: 2022-02-06 | End: 2022-02-08

## 2022-02-06 RX ORDER — ONDANSETRON 2 MG/ML
4 INJECTION INTRAMUSCULAR; INTRAVENOUS EVERY 6 HOURS PRN
Status: DISCONTINUED | OUTPATIENT
Start: 2022-02-06 | End: 2022-02-10 | Stop reason: HOSPADM

## 2022-02-06 RX ORDER — KETOROLAC TROMETHAMINE 15 MG/ML
15 INJECTION, SOLUTION INTRAMUSCULAR; INTRAVENOUS ONCE
Status: COMPLETED | OUTPATIENT
Start: 2022-02-06 | End: 2022-02-06

## 2022-02-06 RX ORDER — FAMOTIDINE 20 MG/1
20 TABLET, FILM COATED ORAL DAILY
Status: DISCONTINUED | OUTPATIENT
Start: 2022-02-06 | End: 2022-02-10 | Stop reason: HOSPADM

## 2022-02-06 RX ORDER — ONDANSETRON 4 MG/1
4 TABLET, ORALLY DISINTEGRATING ORAL EVERY 6 HOURS PRN
Status: DISCONTINUED | OUTPATIENT
Start: 2022-02-06 | End: 2022-02-10 | Stop reason: HOSPADM

## 2022-02-06 RX ORDER — LIDOCAINE 40 MG/G
CREAM TOPICAL
Status: DISCONTINUED | OUTPATIENT
Start: 2022-02-06 | End: 2022-02-06

## 2022-02-06 RX ORDER — LIDOCAINE 40 MG/G
CREAM TOPICAL
Status: DISCONTINUED | OUTPATIENT
Start: 2022-02-06 | End: 2022-02-10 | Stop reason: HOSPADM

## 2022-02-06 RX ORDER — PROCHLORPERAZINE 25 MG
25 SUPPOSITORY, RECTAL RECTAL EVERY 12 HOURS PRN
Status: DISCONTINUED | OUTPATIENT
Start: 2022-02-06 | End: 2022-02-10 | Stop reason: HOSPADM

## 2022-02-06 RX ORDER — FAMOTIDINE 20 MG/1
20 TABLET, FILM COATED ORAL DAILY
COMMUNITY
End: 2024-02-29

## 2022-02-06 RX ORDER — HYDROMORPHONE HYDROCHLORIDE 1 MG/ML
0.5 INJECTION, SOLUTION INTRAMUSCULAR; INTRAVENOUS; SUBCUTANEOUS
Status: COMPLETED | OUTPATIENT
Start: 2022-02-06 | End: 2022-02-06

## 2022-02-06 RX ORDER — NAPHAZOLINE HCL/GLYCERIN 0.03%-0.5%
1 DROPS OPHTHALMIC (EYE) DAILY PRN
COMMUNITY
End: 2024-02-29

## 2022-02-06 RX ORDER — TAMSULOSIN HYDROCHLORIDE 0.4 MG/1
0.4 CAPSULE ORAL DAILY
Status: DISCONTINUED | OUTPATIENT
Start: 2022-02-06 | End: 2022-02-10 | Stop reason: HOSPADM

## 2022-02-06 RX ORDER — ACETAMINOPHEN 650 MG/1
650 SUPPOSITORY RECTAL EVERY 6 HOURS PRN
Status: DISCONTINUED | OUTPATIENT
Start: 2022-02-06 | End: 2022-02-10 | Stop reason: HOSPADM

## 2022-02-06 RX ORDER — HYDROMORPHONE HCL IN WATER/PF 6 MG/30 ML
0.5 PATIENT CONTROLLED ANALGESIA SYRINGE INTRAVENOUS
Status: DISCONTINUED | OUTPATIENT
Start: 2022-02-06 | End: 2022-02-08

## 2022-02-06 RX ORDER — LORAZEPAM 1 MG/1
1 TABLET ORAL 2 TIMES DAILY
COMMUNITY
End: 2024-02-29

## 2022-02-06 RX ORDER — PIPERACILLIN SODIUM, TAZOBACTAM SODIUM 4; .5 G/20ML; G/20ML
4.5 INJECTION, POWDER, LYOPHILIZED, FOR SOLUTION INTRAVENOUS EVERY 6 HOURS
Status: DISCONTINUED | OUTPATIENT
Start: 2022-02-06 | End: 2022-02-10 | Stop reason: HOSPADM

## 2022-02-06 RX ORDER — CALCIUM CARBONATE 500 MG/1
1000 TABLET, CHEWABLE ORAL 4 TIMES DAILY PRN
Status: DISCONTINUED | OUTPATIENT
Start: 2022-02-06 | End: 2022-02-10 | Stop reason: HOSPADM

## 2022-02-06 RX ORDER — PIPERACILLIN SODIUM, TAZOBACTAM SODIUM 4; .5 G/20ML; G/20ML
4.5 INJECTION, POWDER, LYOPHILIZED, FOR SOLUTION INTRAVENOUS ONCE
Status: COMPLETED | OUTPATIENT
Start: 2022-02-06 | End: 2022-02-06

## 2022-02-06 RX ORDER — ASPIRIN 81 MG/1
81 TABLET ORAL DAILY
Status: DISCONTINUED | OUTPATIENT
Start: 2022-02-06 | End: 2022-02-07

## 2022-02-06 RX ORDER — LORAZEPAM 1 MG/1
1 TABLET ORAL 2 TIMES DAILY
Status: DISCONTINUED | OUTPATIENT
Start: 2022-02-06 | End: 2022-02-10 | Stop reason: HOSPADM

## 2022-02-06 RX ORDER — SODIUM CHLORIDE 9 MG/ML
INJECTION, SOLUTION INTRAVENOUS CONTINUOUS
Status: DISCONTINUED | OUTPATIENT
Start: 2022-02-06 | End: 2022-02-08

## 2022-02-06 RX ADMIN — FAMOTIDINE 20 MG: 20 TABLET ORAL at 18:55

## 2022-02-06 RX ADMIN — HYDROMORPHONE HYDROCHLORIDE 0.5 MG: 1 INJECTION, SOLUTION INTRAMUSCULAR; INTRAVENOUS; SUBCUTANEOUS at 14:42

## 2022-02-06 RX ADMIN — IOPAMIDOL 70 ML: 755 INJECTION, SOLUTION INTRAVENOUS at 14:22

## 2022-02-06 RX ADMIN — TAMSULOSIN HYDROCHLORIDE 0.4 MG: 0.4 CAPSULE ORAL at 18:55

## 2022-02-06 RX ADMIN — LEVOMILNACIPRAN HYDROCHLORIDE 60 MG: 40 CAPSULE, EXTENDED RELEASE ORAL at 20:50

## 2022-02-06 RX ADMIN — SODIUM CHLORIDE: 900 INJECTION INTRAVENOUS at 18:03

## 2022-02-06 RX ADMIN — ONDANSETRON 4 MG: 2 INJECTION INTRAMUSCULAR; INTRAVENOUS at 13:22

## 2022-02-06 RX ADMIN — ASPIRIN 81 MG: 81 TABLET, COATED ORAL at 18:55

## 2022-02-06 RX ADMIN — ABACAVIR SULFATE, DOLUTEGRAVIR SODIUM, LAMIVUDINE 1 TABLET: 600; 50; 300 TABLET, FILM COATED ORAL at 20:51

## 2022-02-06 RX ADMIN — HYDROMORPHONE HYDROCHLORIDE 0.5 MG: 1 INJECTION, SOLUTION INTRAMUSCULAR; INTRAVENOUS; SUBCUTANEOUS at 16:55

## 2022-02-06 RX ADMIN — HYDROMORPHONE HYDROCHLORIDE 4 MG: 2 TABLET ORAL at 20:51

## 2022-02-06 RX ADMIN — ONDANSETRON 4 MG: 2 INJECTION INTRAMUSCULAR; INTRAVENOUS at 16:55

## 2022-02-06 RX ADMIN — SODIUM CHLORIDE 1000 ML: 9 INJECTION, SOLUTION INTRAVENOUS at 13:22

## 2022-02-06 RX ADMIN — PIPERACILLIN SODIUM AND TAZOBACTAM SODIUM 4.5 G: 4; .5 INJECTION, POWDER, LYOPHILIZED, FOR SOLUTION INTRAVENOUS at 16:47

## 2022-02-06 RX ADMIN — HYDROMORPHONE HYDROCHLORIDE 0.5 MG: 1 INJECTION, SOLUTION INTRAMUSCULAR; INTRAVENOUS; SUBCUTANEOUS at 13:25

## 2022-02-06 RX ADMIN — PIPERACILLIN AND TAZOBACTAM 4.5 G: 4; .5 INJECTION, POWDER, FOR SOLUTION INTRAVENOUS at 22:58

## 2022-02-06 RX ADMIN — SODIUM CHLORIDE 60 ML: 900 INJECTION INTRAVENOUS at 14:28

## 2022-02-06 RX ADMIN — PREGABALIN 150 MG: 50 CAPSULE ORAL at 18:55

## 2022-02-06 RX ADMIN — KETOROLAC TROMETHAMINE 15 MG: 15 INJECTION, SOLUTION INTRAMUSCULAR; INTRAVENOUS at 13:24

## 2022-02-06 ASSESSMENT — ACTIVITIES OF DAILY LIVING (ADL)
ADLS_ACUITY_SCORE: 12
ADLS_ACUITY_SCORE: 15
ADLS_ACUITY_SCORE: 12
ADLS_ACUITY_SCORE: 15

## 2022-02-06 ASSESSMENT — ENCOUNTER SYMPTOMS
BACK PAIN: 1
BLOOD IN STOOL: 0
SHORTNESS OF BREATH: 0
VOMITING: 1
NAUSEA: 1
FLANK PAIN: 1
DYSURIA: 0
ABDOMINAL PAIN: 1
DIARRHEA: 0
FEVER: 0

## 2022-02-06 ASSESSMENT — MIFFLIN-ST. JEOR: SCORE: 1556.29

## 2022-02-06 NOTE — PHARMACY-ADMISSION MEDICATION HISTORY
"Pharmacy Medication History  Admission medication history interview status for the 2/6/2022  admission is complete. See EPIC admission navigator for prior to admission medications     Location of Interview: Patient room  Medication history sources: Patient and Surescripts    Significant changes made to the medication list:  1) Patient states that he doesn't like to take his Suboxone as it worsens his anxiety so is not taking as prescribed.  2) Adjusted Fetzmia dose to 60mg daily  3) Lorazepam strength updated to 1mg BID  4) Now takes Lyrica 150mg daily.     In the past week, patient estimated taking medication this percent of the time: 50-90% due to illness      Medication reconciliation completed by provider prior to medication history? No    Time spent in this activity: 20 minutes    Prior to Admission medications    Medication Sig Last Dose Taking? Auth Provider   abacavir-dolutegravir-LamiVUDine (TRIUMEQ) 600- MG per tablet Take 1 tablet by mouth daily Please call [phone] & make appointment for further refills. Past Week at Unknown time Yes Damir Cisneros MD   buprenorphine HCl-naloxone HCl (SUBOXONE) 8-2 MG per film Place 1 Film under the tongue 2 times daily  Patient taking differently: Place 0.5 Film under the tongue daily as needed (Patient states Suboxone has been \"worsening his anxiety\" so he has not been taking often. He states he only takes 1/3-1/2 film once in a while) 2/2/2022 Yes Arthur Daniels MD   levomilnacipran (FETZIMA ER) 20 MG 24 hr capsule Take 60 mg by mouth daily  Past Week at Unknown time Yes Unknown, Entered By History   LORazepam (ATIVAN) 1 MG tablet Take 1 mg by mouth 2 times daily Past Week at Unknown time Yes Unknown, Entered By History   Multiple Vitamins-Minerals (MULTIVITAMIN GUMMIES ADULT PO) Take 1 chew tab by mouth daily Past Week at Unknown time Yes Unknown, Entered By History   Naphazoline-Glycerin (CLEAR EYES MAX REDNESS RELIEF) 0.03-0.5 % SOLN Place 1 " drop into both eyes daily as needed prn med Yes Unknown, Entered By History   pregabalin (LYRICA) 150 MG capsule Take 150 mg by mouth daily  Past Week at Unknown time Yes Unknown, Entered By History   tamsulosin (FLOMAX) 0.4 MG capsule Take 1 capsule (0.4 mg) by mouth daily Past Week at Unknown time Yes Arthur Daniels MD   testosterone cypionate (DEPOTESTOTERONE) 200 MG/ML injection Inject 0.3 mLs into the muscle once a week Inject 0.3 mL IM every 77 days 1/29/2022 Yes Unknown, Entered By History   aspirin 81 MG EC tablet Take 81 mg by mouth daily  Patient not taking: Reported on 1/18/2022 12/30/2021  Unknown, Entered By History   famotidine (PEPCID) 20 MG tablet Take 20 mg by mouth daily has not started yet  Unknown, Entered By History       The information provided in this note is only as accurate as the sources available at the time of update(s)

## 2022-02-06 NOTE — ED NOTES
[facility]  ED Nurse Handoff Report    ED Chief complaint: Abdominal Pain      ED Diagnosis:   Final diagnoses:   Acute cholecystitis   Acute cystitis with hematuria   Right sided abdominal pain       Code Status: Full Code    Allergies:   Allergies   Allergen Reactions     Bupropion      Other reaction(s): Seizures, Seizures  PN: seizure when took a double dose  seizure when took a double dose  PN: seizure when took a double dose  Other reaction(s): Seizures  PN: seizure when took a double dose       Acetaminophen Nausea and Vomiting     PN: : GI Upset  : GI Upset  PN: : GI Upset  Other reaction(s): Vomiting  PN: : GI Upset       Codeine Itching and Nausea and Vomiting     Other reaction(s): Abdominal Pain  PN:  weak; fatigue; vomiting   weak; fatigue; vomiting  PN:  weak; fatigue; vomiting  PN:  weak; fatigue; vomiting       Sulfa Drugs Itching       Patient Story:   41 year old male who presents with right-sided abdominal pain for the last 1 week.  He notes 7 days ago he started to have vomiting and right-sided abdominal pain.  He does have a history of kidney stones.  Patient notes that he had a history of substance abuse but recently got clean.      Focused Assessment:    Alert and oriented times 3  VSS  abd soft bowel sounds present tender to touch RLQ  Lung sounds clear       Treatments and/or interventions provided:   Jose PLUMMER    Patient's response to treatments and/or interventions:   Pain improved from 10/10 to 7/10    To be done/followed up on inpatient unit:        Does this patient have any cognitive concerns?: no    Activity level - Baseline/Home:  Independent  Activity Level - Current:   Independent    Patient's Preferred language: English   Needed?: No    Isolation: None and Contact   Infection: Not Applicable  MRSA  Patient tested for COVID 19 prior to admission: YES  Bariatric?: No    Vital Signs:   Vitals:    02/06/22 1345 02/06/22 1400 02/06/22 1415  02/06/22 1445   BP: (!) 136/96 (!) 135/93 (!) 132/95 (!) 134/96   Pulse: 96 96 99 100   Resp: 16 16 12 14   Temp:       TempSrc:       SpO2: 100% 100% 100% 100%   Weight:       Height:           Cardiac Rhythm:     Was the PSS-3 completed:   Yes  What interventions are required if any?  no             Family Comments:     OBS brochure/video discussed/provided to patient/family: No              Name of person given brochure if not patient:                 Relationship to patient:       For the majority of the shift this patient's behavior was Green.   Behavioral interventions performed were none.    ED NURSE PHONE NUMBER:   [phone]

## 2022-02-06 NOTE — PROGRESS NOTES
RECEIVING UNIT ED HANDOFF REVIEW    ED Nurse Handoff Report was reviewed by: Riri Mendez RN on February 6, 2022 at 4:49 PM

## 2022-02-06 NOTE — ED PROVIDER NOTES
History     Chief Complaint:  Abdominal Pain     HPI   Bc German is a 41 year old male with a significant past medical history pertinent for recent endocarditis and bacteremia in the setting of history of IV drug use who presents with right-sided abdominal pain for the last 1 week.  He notes 7 days ago he started to have vomiting and right-sided abdominal pain.  He does have a history of kidney stones requiring lithotripsy historically but no other prior abdominal surgeries.  Patient notes that he had a history of substance abuse but recently got clean.  Patient notes he still feels nauseous and having significant right flank and right lower quadrant abdominal pain.  He initially presented to urgent care and they referred him to the emergency department for further evaluation.  He denies diarrhea.  He denies fever and constitutional symptoms.    ROS:  Review of Systems   Constitutional: Negative for fever.   Respiratory: Negative for shortness of breath.    Cardiovascular: Negative for chest pain.   Gastrointestinal: Positive for abdominal pain, nausea and vomiting. Negative for blood in stool and diarrhea.   Genitourinary: Positive for flank pain. Negative for dysuria.   Musculoskeletal: Positive for back pain.   All other systems reviewed and are negative.    Allergies:  Bupropion  Acetaminophen  Codeine  Sulfa Drugs     Medications:    abacavir-dolutegravir-LamiVUDine (TRIUMEQ) 600- MG per tablet  buprenorphine HCl-naloxone HCl (SUBOXONE) 8-2 MG per film  levomilnacipran (FETZIMA ER) 20 MG 24 hr capsule  LORazepam (ATIVAN) 1 MG tablet  Multiple Vitamins-Minerals (MULTIVITAMIN GUMMIES ADULT PO)  Naphazoline-Glycerin (CLEAR EYES MAX REDNESS RELIEF) 0.03-0.5 % SOLN  pregabalin (LYRICA) 150 MG capsule  tamsulosin (FLOMAX) 0.4 MG capsule  testosterone cypionate (DEPOTESTOTERONE) 200 MG/ML injection  aspirin 81 MG EC tablet  famotidine (PEPCID) 20 MG tablet        Past Medical History:    Past Medical  History:   Diagnosis Date     Anxiety      Depressive disorder      Drug abuse, IV (H)      Group A streptococcal infection 11/2014     HCV antibody positive      HIV (human immunodeficiency virus infection) (H)      HIV (human immunodeficiency virus infection) (H)      HIV (human immunodeficiency virus infection) (H)      IVDU (intravenous drug user)      Osteomyelitis of vertebra of lumbosacral region (H) 3/2011     Substance abuse (H)      Patient Active Problem List   Diagnosis     Cellulitis     Sepsis (H)     JASSI (generalized anxiety disorder)     Non-compliant patient     Intravenous drug abuse (H)     Medication side effect     Asymptomatic human immunodeficiency virus (HIV) infection status (H)     Balance problem     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     Osteomyelitis (H)     Cellulitis of left arm     Drug abuse (H)     Gram-positive bacteremia     Human immunodeficiency virus (HIV) disease (H)     Acute cholecystitis     Right sided abdominal pain     Acute cystitis with hematuria     History of intravenous drug abuse (H)     Diarrhea of presumed infectious origin        Past Surgical History:    Past Surgical History:   Procedure Laterality Date     EYE SURGERY  1 year ago    pins to left eye after socket fracture     OPTICAL TRACKING SYSTEM FUSION CERVICAL ANTERIOR ONE LEVEL Right 9/10/2017    Procedure: OPTICAL TRACKING SYSTEM FUSION CERVICAL ANTERIOR ONE LEVEL;  Stealth C6-C7 Anterior Cervical Corpectomy and C5-T1 Fusion;  Surgeon: Marv Ambrose MD;  Location: [facility] OR     ORTHOPEDIC SURGERY      Arm Surgery     PICC INSERTION Left 09/21/2017    5fr DL BioFlo PICC, 42cm (1cm external) in the L basilic vein w/ tip in the low SVC.     TRANSESOPHAGEAL ECHOCARDIOGRAM INTRAOPERATIVE N/A 3/17/2015    Procedure: TRANSESOPHAGEAL ECHOCARDIOGRAM INTRAOPERATIVE;  Surgeon: Generic Anesthesia Provider;  Location: [facility] OR     TRANSESOPHAGEAL ECHOCARDIOGRAM INTRAOPERATIVE N/A 11/18/2021    Procedure:  "ECHOCARDIOGRAM, TRANSESOPHAGEAL, INTRAOPERATIVE;  Surgeon: GENERIC ANESTHESIA PROVIDER;  Location:  OR     TRANSESOPHAGEAL ECHOCARDIOGRAM INTRAOPERATIVE N/A 12/2/2021    Procedure: ECHOCARDIOGRAM, TRANSESOPHAGEAL, INTRAOPERATIVE;  Surgeon: GENERIC ANESTHESIA PROVIDER;  Location:  OR        Family History:    family history is not on file.    Social History:   reports that he has been smoking cigarettes. He has been smoking about 0.25 packs per day. He has never used smokeless tobacco. He reports current alcohol use. He reports current drug use. Drug: Marijuana.  PCP: Khari Barrientos     Physical Exam     Patient Vitals for the past 24 hrs:   BP Temp Temp src Pulse Resp SpO2 Height Weight   02/06/22 1530 (!) 137/97 -- -- 101 -- 100 % -- --   02/06/22 1515 (!) 139/99 -- -- 93 -- 100 % -- --   02/06/22 1500 (!) 136/92 -- -- 101 -- 100 % -- --   02/06/22 1445 (!) 134/96 -- -- 100 14 100 % -- --   02/06/22 1415 (!) 132/95 -- -- 99 12 100 % -- --   02/06/22 1400 (!) 135/93 -- -- 96 16 100 % -- --   02/06/22 1345 (!) 136/96 -- -- 96 16 100 % -- --   02/06/22 1330 (!) 132/92 -- -- 98 16 100 % -- --   02/06/22 1315 (!) 134/97 -- -- 111 -- -- -- --   02/06/22 1250 (!) 134/91 97.9  F (36.6  C) Temporal 97 14 100 % 1.778 m (5' 10\") 63.5 kg (140 lb)      Physical Exam  General: Alert, appears well-developed and well-nourished. Cooperative.     In moderate distress  HEENT:  Head:  Atraumatic  Ears:  External ears are normal  Mouth/Throat:  Oropharynx is without erythema or exudate and mucous membranes are dry.   Eyes:   Conjunctivae normal and EOM are normal. No scleral icterus.  CV:  Normal rate, regular rhythm, normal heart sounds and radial pulses are 2+ and symmetric.  No murmur.  Resp:  Breath sounds are clear bilaterally    Non-labored, no retractions or accessory muscle use  GI:  Abdomen is soft, no distension, RUQ & RLQ abd tenderness. No rebound or guarding.  Right CVA tenderness  MS:  Normal range of motion. No " edema.    Normal strength in all 4 extremities.     Back atraumatic.    No midline cervical, thoracic, or lumbar tenderness  Skin:  Warm and dry.  No rash or lesions noted.  Neuro: Alert. Normal strength.  GCS: 15  Psych:  Normal mood and affect.    Emergency Department Course   Imaging:  CT Abdomen Pelvis w Contrast   Final Result   IMPRESSION:    1.  Distended gallbladder with wall thickening and edgar hepatis fluid and stranding. This is consistent with acute cholecystitis.   2.  Wall thickening of the urinary bladder may be from underdistention. Cannot exclude cystitis on this study.   3.  Normal appendix.   4.  Multiple punctate calcifications in the kidneys. These likely represent nonobstructing renal calculi.       Abdomen US, limited (RUQ only)    (Results Pending)     Report per radiology    Laboratory:  Labs Ordered and Resulted from Time of ED Arrival to Time of ED Departure   ROUTINE UA WITH MICROSCOPIC REFLEX TO CULTURE - Abnormal       Result Value    Color Urine Orange (*)     Appearance Urine Slightly Cloudy (*)     Glucose Urine Negative      Bilirubin Urine Negative      Ketones Urine Negative      Specific Gravity Urine 1.026      Blood Urine Large (*)     pH Urine 6.5      Protein Albumin Urine >600 (*)     Urobilinogen Urine Normal      Nitrite Urine Negative      Leukocyte Esterase Urine Small (*)     Budding Yeast Urine Few (*)     Mucus Urine Present (*)     RBC Urine >182 (*)     WBC Urine 71 (*)    COMPREHENSIVE METABOLIC PANEL - Abnormal    Sodium 137      Potassium 3.5      Chloride 107      Carbon Dioxide (CO2) 25      Anion Gap 5      Urea Nitrogen 19      Creatinine 1.04      Calcium 9.9      Glucose 109 (*)     Alkaline Phosphatase 91      AST 27      ALT 45      Protein Total 8.5      Albumin 2.8 (*)     Bilirubin Total 0.4      GFR Estimate >90     CBC WITH PLATELETS AND DIFFERENTIAL - Abnormal    WBC Count 23.5 (*)     RBC Count 4.69      Hemoglobin 13.3      Hematocrit 42.8       MCV 91      MCH 28.4      MCHC 31.1 (*)     RDW 13.1      Platelet Count 639 (*)     % Neutrophils 81      % Lymphocytes 9      % Monocytes 8      % Eosinophils 1      % Basophils 0      % Immature Granulocytes 1      NRBCs per 100 WBC 0      Absolute Neutrophils 19.1 (*)     Absolute Lymphocytes 2.1      Absolute Monocytes 1.8 (*)     Absolute Eosinophils 0.2      Absolute Basophils 0.1      Absolute Immature Granulocytes 0.3      Absolute NRBCs 0.0     LIPASE - Normal    Lipase 157     LACTIC ACID WHOLE BLOOD - Normal    Lactic Acid 2.0     COVID-19 VIRUS (CORONAVIRUS) BY PCR   PROCALCITONIN   CRP INFLAMMATION   URINE CULTURE   BLOOD CULTURE   BLOOD CULTURE   CLOSTRIDIUM DIFFICILE TOXIN B        Procedures     Emergency Department Course:     Reviewed:  I reviewed nursing notes, vitals and past medical history    Assessments:  1305 I obtained history and examined the patient as noted above.   1459 I rechecked the patient and explained findings.     Consults:   3887 I spoke with Dr. Parkinson of General Surgery    Interventions:  Medications   HYDROmorphone (PF) (DILAUDID) injection 0.5 mg (0.5 mg Intravenous Given 2/6/22 1442)   piperacillin-tazobactam (ZOSYN) 4.5 g vial to attach to  mL bag (has no administration in time range)   0.9% sodium chloride BOLUS (0 mLs Intravenous Stopped 2/6/22 1419)   ketorolac (TORADOL) injection 15 mg (15 mg Intravenous Given 2/6/22 1324)   ondansetron (ZOFRAN) injection 4 mg (4 mg Intravenous Given 2/6/22 1322)   Saline flush (60 mLs Intravenous Given 2/6/22 1428)   iopamidol (ISOVUE-370) solution 70 mL (70 mLs Intravenous Given 2/6/22 1422)        Disposition:  The patient was admitted to the hospital under the care of Dr. Del Rosario.     Impression & Plan    CMS Diagnoses: None    Covid-19  Bc German was evaluated during a global COVID-19 pandemic, which necessitated consideration that the patient might be at risk for infection with the SARS-CoV-2 virus that causes COVID-19.    Applicable protocols for evaluation were followed during the patient's care.   COVID-19 was considered as part of the patient's evaluation. The plan for testing is:  a test was obtained during this visit.    Medical Decision Making:  Bc German is a 41 year old male who presents with abdominal pain.  The workup in the Emergency Room is concerning for acute cholecystitis.  Antibiotics have been started and the patient will be admitted to the medicine service for further evaluation and treatment with a likely surgical consultation.  Patient also appears to have ongoing urinary tract infection based on CT imaging and urinalysis results.  Urine culture in process.  Due to dual infections I was concerned for potential bacteremia particular in the patient's history of IV drug use and recent endocarditis/bacteremia admission.  Blood cultures in process at time of admission.  Initial lactate was 2.0 no signs of severe sepsis at this time.  Patient remains hemodynamically stable.    There is no evidence at this point of serious complications of cholecystitis such as gangrenous cholecystitis, septic shock, etc.  No signs of other complications such as CBD stone, ascending cholangitis, gallstone pancreatitis.  Given constellation of symptoms, lab data and ultrasound I doubt GERD, gastritis, PUD, perforated ulcer, diverticulitis, colitis.      Diagnosis:    ICD-10-CM    1. Acute cholecystitis  K81.0    2. Acute cystitis with hematuria  N30.01    3. Right sided abdominal pain  R10.9       Discharge Medications:  New Prescriptions    No medications on file      2/6/2022   Fredy Griffin MD White, Scott, MD  02/06/22 1556

## 2022-02-07 ENCOUNTER — ANESTHESIA EVENT (OUTPATIENT)
Dept: SURGERY | Facility: CLINIC | Age: 42
DRG: 854 | End: 2022-02-07
Payer: COMMERCIAL

## 2022-02-07 ENCOUNTER — ANESTHESIA (OUTPATIENT)
Dept: SURGERY | Facility: CLINIC | Age: 42
DRG: 854 | End: 2022-02-07
Payer: COMMERCIAL

## 2022-02-07 LAB
ALBUMIN SERPL-MCNC: 1.9 G/DL (ref 3.4–5)
ALP SERPL-CCNC: 68 U/L (ref 40–150)
ALT SERPL W P-5'-P-CCNC: 34 U/L (ref 0–70)
ANION GAP SERPL CALCULATED.3IONS-SCNC: 7 MMOL/L (ref 3–14)
AST SERPL W P-5'-P-CCNC: 29 U/L (ref 0–45)
BACTERIA UR CULT: NO GROWTH
BASOPHILS # BLD AUTO: 0.1 10E3/UL (ref 0–0.2)
BASOPHILS NFR BLD AUTO: 1 %
BILIRUB SERPL-MCNC: 0.4 MG/DL (ref 0.2–1.3)
BUN SERPL-MCNC: 15 MG/DL (ref 7–30)
CALCIUM SERPL-MCNC: 7.8 MG/DL (ref 8.5–10.1)
CHLORIDE BLD-SCNC: 112 MMOL/L (ref 94–109)
CO2 SERPL-SCNC: 19 MMOL/L (ref 20–32)
CREAT SERPL-MCNC: 0.95 MG/DL (ref 0.66–1.25)
EOSINOPHIL # BLD AUTO: 0.7 10E3/UL (ref 0–0.7)
EOSINOPHIL NFR BLD AUTO: 4 %
ERYTHROCYTE [DISTWIDTH] IN BLOOD BY AUTOMATED COUNT: 13.2 % (ref 10–15)
GFR SERPL CREATININE-BSD FRML MDRD: >90 ML/MIN/1.73M2
GLUCOSE BLD-MCNC: 97 MG/DL (ref 70–99)
HCT VFR BLD AUTO: 34.8 % (ref 40–53)
HGB BLD-MCNC: 10.8 G/DL (ref 13.3–17.7)
IMM GRANULOCYTES # BLD: 0.1 10E3/UL
IMM GRANULOCYTES NFR BLD: 1 %
LACTATE SERPL-SCNC: 0.7 MMOL/L (ref 0.7–2)
LACTATE SERPL-SCNC: 0.7 MMOL/L (ref 0.7–2)
LYMPHOCYTES # BLD AUTO: 1.9 10E3/UL (ref 0.8–5.3)
LYMPHOCYTES NFR BLD AUTO: 12 %
MAGNESIUM SERPL-MCNC: 1.5 MG/DL (ref 1.8–2.6)
MAGNESIUM SERPL-MCNC: 1.6 MG/DL (ref 1.8–2.6)
MCH RBC QN AUTO: 28.7 PG (ref 26.5–33)
MCHC RBC AUTO-ENTMCNC: 31 G/DL (ref 31.5–36.5)
MCV RBC AUTO: 93 FL (ref 78–100)
MONOCYTES # BLD AUTO: 1.1 10E3/UL (ref 0–1.3)
MONOCYTES NFR BLD AUTO: 7 %
NEUTROPHILS # BLD AUTO: 11.2 10E3/UL (ref 1.6–8.3)
NEUTROPHILS NFR BLD AUTO: 75 %
NRBC # BLD AUTO: 0 10E3/UL
NRBC BLD AUTO-RTO: 0 /100
PHOSPHATE SERPL-MCNC: 2.7 MG/DL (ref 2.5–4.5)
PLATELET # BLD AUTO: 366 10E3/UL (ref 150–450)
POTASSIUM BLD-SCNC: 4.3 MMOL/L (ref 3.4–5.3)
PROT SERPL-MCNC: 6.1 G/DL (ref 6.8–8.8)
RBC # BLD AUTO: 3.76 10E6/UL (ref 4.4–5.9)
SODIUM SERPL-SCNC: 138 MMOL/L (ref 133–144)
WBC # BLD AUTO: 15 10E3/UL (ref 4–11)

## 2022-02-07 PROCEDURE — 80053 COMPREHEN METABOLIC PANEL: CPT | Performed by: INTERNAL MEDICINE

## 2022-02-07 PROCEDURE — 250N000025 HC SEVOFLURANE, PER MIN: Performed by: SURGERY

## 2022-02-07 PROCEDURE — 84100 ASSAY OF PHOSPHORUS: CPT | Performed by: INTERNAL MEDICINE

## 2022-02-07 PROCEDURE — 83605 ASSAY OF LACTIC ACID: CPT | Performed by: INTERNAL MEDICINE

## 2022-02-07 PROCEDURE — 250N000009 HC RX 250: Performed by: NURSE ANESTHETIST, CERTIFIED REGISTERED

## 2022-02-07 PROCEDURE — 250N000013 HC RX MED GY IP 250 OP 250 PS 637: Performed by: HOSPITALIST

## 2022-02-07 PROCEDURE — 250N000011 HC RX IP 250 OP 636: Performed by: NURSE ANESTHETIST, CERTIFIED REGISTERED

## 2022-02-07 PROCEDURE — 88304 TISSUE EXAM BY PATHOLOGIST: CPT | Mod: TC | Performed by: SURGERY

## 2022-02-07 PROCEDURE — 999N000141 HC STATISTIC PRE-PROCEDURE NURSING ASSESSMENT: Performed by: SURGERY

## 2022-02-07 PROCEDURE — 85025 COMPLETE CBC W/AUTO DIFF WBC: CPT | Performed by: INTERNAL MEDICINE

## 2022-02-07 PROCEDURE — 272N000001 HC OR GENERAL SUPPLY STERILE: Performed by: SURGERY

## 2022-02-07 PROCEDURE — 99232 SBSQ HOSP IP/OBS MODERATE 35: CPT | Performed by: HOSPITALIST

## 2022-02-07 PROCEDURE — 370N000017 HC ANESTHESIA TECHNICAL FEE, PER MIN: Performed by: SURGERY

## 2022-02-07 PROCEDURE — 258N000003 HC RX IP 258 OP 636: Performed by: INTERNAL MEDICINE

## 2022-02-07 PROCEDURE — 250N000011 HC RX IP 250 OP 636: Performed by: HOSPITALIST

## 2022-02-07 PROCEDURE — 360N000076 HC SURGERY LEVEL 3, PER MIN: Performed by: SURGERY

## 2022-02-07 PROCEDURE — 250N000011 HC RX IP 250 OP 636: Performed by: INTERNAL MEDICINE

## 2022-02-07 PROCEDURE — 250N000013 HC RX MED GY IP 250 OP 250 PS 637: Performed by: INTERNAL MEDICINE

## 2022-02-07 PROCEDURE — 710N000010 HC RECOVERY PHASE 1, LEVEL 2, PER MIN: Performed by: SURGERY

## 2022-02-07 PROCEDURE — 83735 ASSAY OF MAGNESIUM: CPT | Performed by: INTERNAL MEDICINE

## 2022-02-07 PROCEDURE — 250N000013 HC RX MED GY IP 250 OP 250 PS 637: Performed by: PHYSICIAN ASSISTANT

## 2022-02-07 PROCEDURE — 250N000009 HC RX 250: Performed by: SURGERY

## 2022-02-07 PROCEDURE — 258N000003 HC RX IP 258 OP 636: Performed by: NURSE ANESTHETIST, CERTIFIED REGISTERED

## 2022-02-07 PROCEDURE — 47562 LAPAROSCOPIC CHOLECYSTECTOMY: CPT | Performed by: SURGERY

## 2022-02-07 PROCEDURE — 120N000001 HC R&B MED SURG/OB

## 2022-02-07 PROCEDURE — 36415 COLL VENOUS BLD VENIPUNCTURE: CPT | Performed by: INTERNAL MEDICINE

## 2022-02-07 PROCEDURE — 250N000011 HC RX IP 250 OP 636: Performed by: PHYSICIAN ASSISTANT

## 2022-02-07 PROCEDURE — 258N000003 HC RX IP 258 OP 636: Performed by: PHYSICIAN ASSISTANT

## 2022-02-07 PROCEDURE — 0FT44ZZ RESECTION OF GALLBLADDER, PERCUTANEOUS ENDOSCOPIC APPROACH: ICD-10-PCS | Performed by: SURGERY

## 2022-02-07 RX ORDER — FENTANYL CITRATE 50 UG/ML
25 INJECTION, SOLUTION INTRAMUSCULAR; INTRAVENOUS EVERY 5 MIN PRN
Status: DISCONTINUED | OUTPATIENT
Start: 2022-02-07 | End: 2022-02-07 | Stop reason: HOSPADM

## 2022-02-07 RX ORDER — NEOSTIGMINE METHYLSULFATE 1 MG/ML
VIAL (ML) INJECTION PRN
Status: DISCONTINUED | OUTPATIENT
Start: 2022-02-07 | End: 2022-02-07

## 2022-02-07 RX ORDER — LABETALOL HYDROCHLORIDE 5 MG/ML
10 INJECTION, SOLUTION INTRAVENOUS
Status: DISCONTINUED | OUTPATIENT
Start: 2022-02-07 | End: 2022-02-07 | Stop reason: HOSPADM

## 2022-02-07 RX ORDER — SODIUM CHLORIDE, SODIUM LACTATE, POTASSIUM CHLORIDE, CALCIUM CHLORIDE 600; 310; 30; 20 MG/100ML; MG/100ML; MG/100ML; MG/100ML
INJECTION, SOLUTION INTRAVENOUS CONTINUOUS
Status: DISCONTINUED | OUTPATIENT
Start: 2022-02-07 | End: 2022-02-07 | Stop reason: HOSPADM

## 2022-02-07 RX ORDER — GLYCOPYRROLATE 0.2 MG/ML
INJECTION, SOLUTION INTRAMUSCULAR; INTRAVENOUS PRN
Status: DISCONTINUED | OUTPATIENT
Start: 2022-02-07 | End: 2022-02-07

## 2022-02-07 RX ORDER — FENTANYL CITRATE 50 UG/ML
INJECTION, SOLUTION INTRAMUSCULAR; INTRAVENOUS PRN
Status: DISCONTINUED | OUTPATIENT
Start: 2022-02-07 | End: 2022-02-07

## 2022-02-07 RX ORDER — SIMETHICONE 80 MG
80 TABLET,CHEWABLE ORAL 4 TIMES DAILY
Status: DISCONTINUED | OUTPATIENT
Start: 2022-02-07 | End: 2022-02-10 | Stop reason: HOSPADM

## 2022-02-07 RX ORDER — LIDOCAINE HYDROCHLORIDE 20 MG/ML
INJECTION, SOLUTION INFILTRATION; PERINEURAL PRN
Status: DISCONTINUED | OUTPATIENT
Start: 2022-02-07 | End: 2022-02-07

## 2022-02-07 RX ORDER — ASPIRIN 81 MG/1
81 TABLET ORAL DAILY
Status: DISCONTINUED | OUTPATIENT
Start: 2022-02-08 | End: 2022-02-10 | Stop reason: HOSPADM

## 2022-02-07 RX ORDER — AMOXICILLIN 250 MG
1 CAPSULE ORAL 2 TIMES DAILY
Status: DISCONTINUED | OUTPATIENT
Start: 2022-02-07 | End: 2022-02-10 | Stop reason: HOSPADM

## 2022-02-07 RX ORDER — SODIUM CHLORIDE, SODIUM LACTATE, POTASSIUM CHLORIDE, CALCIUM CHLORIDE 600; 310; 30; 20 MG/100ML; MG/100ML; MG/100ML; MG/100ML
INJECTION, SOLUTION INTRAVENOUS CONTINUOUS PRN
Status: DISCONTINUED | OUTPATIENT
Start: 2022-02-07 | End: 2022-02-07

## 2022-02-07 RX ORDER — MAGNESIUM HYDROXIDE 1200 MG/15ML
LIQUID ORAL PRN
Status: DISCONTINUED | OUTPATIENT
Start: 2022-02-07 | End: 2022-02-07 | Stop reason: HOSPADM

## 2022-02-07 RX ORDER — PROPOFOL 10 MG/ML
INJECTION, EMULSION INTRAVENOUS PRN
Status: DISCONTINUED | OUTPATIENT
Start: 2022-02-07 | End: 2022-02-07

## 2022-02-07 RX ORDER — HYDROMORPHONE HCL IN WATER/PF 6 MG/30 ML
0.2 PATIENT CONTROLLED ANALGESIA SYRINGE INTRAVENOUS EVERY 5 MIN PRN
Status: DISCONTINUED | OUTPATIENT
Start: 2022-02-07 | End: 2022-02-07 | Stop reason: HOSPADM

## 2022-02-07 RX ORDER — ONDANSETRON 2 MG/ML
4 INJECTION INTRAMUSCULAR; INTRAVENOUS EVERY 30 MIN PRN
Status: DISCONTINUED | OUTPATIENT
Start: 2022-02-07 | End: 2022-02-07 | Stop reason: HOSPADM

## 2022-02-07 RX ORDER — MAGNESIUM SULFATE HEPTAHYDRATE 40 MG/ML
2 INJECTION, SOLUTION INTRAVENOUS ONCE
Status: COMPLETED | OUTPATIENT
Start: 2022-02-07 | End: 2022-02-07

## 2022-02-07 RX ORDER — ONDANSETRON 2 MG/ML
INJECTION INTRAMUSCULAR; INTRAVENOUS PRN
Status: DISCONTINUED | OUTPATIENT
Start: 2022-02-07 | End: 2022-02-07

## 2022-02-07 RX ORDER — BUPIVACAINE HYDROCHLORIDE AND EPINEPHRINE 2.5; 5 MG/ML; UG/ML
INJECTION, SOLUTION INFILTRATION; PERINEURAL PRN
Status: DISCONTINUED | OUTPATIENT
Start: 2022-02-07 | End: 2022-02-07 | Stop reason: HOSPADM

## 2022-02-07 RX ORDER — ONDANSETRON 4 MG/1
4 TABLET, ORALLY DISINTEGRATING ORAL EVERY 30 MIN PRN
Status: DISCONTINUED | OUTPATIENT
Start: 2022-02-07 | End: 2022-02-07 | Stop reason: HOSPADM

## 2022-02-07 RX ORDER — DEXAMETHASONE SODIUM PHOSPHATE 4 MG/ML
INJECTION, SOLUTION INTRA-ARTICULAR; INTRALESIONAL; INTRAMUSCULAR; INTRAVENOUS; SOFT TISSUE PRN
Status: DISCONTINUED | OUTPATIENT
Start: 2022-02-07 | End: 2022-02-07

## 2022-02-07 RX ORDER — QUETIAPINE FUMARATE 25 MG/1
25 TABLET, FILM COATED ORAL AT BEDTIME
Status: DISCONTINUED | OUTPATIENT
Start: 2022-02-07 | End: 2022-02-08

## 2022-02-07 RX ADMIN — DEXMEDETOMIDINE HYDROCHLORIDE 12 MCG: 100 INJECTION, SOLUTION INTRAVENOUS at 16:53

## 2022-02-07 RX ADMIN — ABACAVIR SULFATE, DOLUTEGRAVIR SODIUM, LAMIVUDINE 1 TABLET: 600; 50; 300 TABLET, FILM COATED ORAL at 08:18

## 2022-02-07 RX ADMIN — TAMSULOSIN HYDROCHLORIDE 0.4 MG: 0.4 CAPSULE ORAL at 08:18

## 2022-02-07 RX ADMIN — QUETIAPINE FUMARATE 25 MG: 25 TABLET ORAL at 21:39

## 2022-02-07 RX ADMIN — HYDROMORPHONE HYDROCHLORIDE 4 MG: 2 TABLET ORAL at 21:39

## 2022-02-07 RX ADMIN — FENTANYL CITRATE 50 MCG: 50 INJECTION, SOLUTION INTRAMUSCULAR; INTRAVENOUS at 15:36

## 2022-02-07 RX ADMIN — LIDOCAINE HYDROCHLORIDE 100 MG: 20 INJECTION, SOLUTION INFILTRATION; PERINEURAL at 15:12

## 2022-02-07 RX ADMIN — ASPIRIN 81 MG: 81 TABLET, COATED ORAL at 08:17

## 2022-02-07 RX ADMIN — PROPOFOL 160 MG: 10 INJECTION, EMULSION INTRAVENOUS at 15:12

## 2022-02-07 RX ADMIN — LEVOMILNACIPRAN HYDROCHLORIDE 60 MG: 40 CAPSULE, EXTENDED RELEASE ORAL at 08:16

## 2022-02-07 RX ADMIN — PIPERACILLIN AND TAZOBACTAM 4.5 G: 4; .5 INJECTION, POWDER, FOR SOLUTION INTRAVENOUS at 12:23

## 2022-02-07 RX ADMIN — LORAZEPAM 1 MG: 1 TABLET ORAL at 21:39

## 2022-02-07 RX ADMIN — FAMOTIDINE 20 MG: 20 TABLET ORAL at 08:17

## 2022-02-07 RX ADMIN — HYDROMORPHONE HYDROCHLORIDE 4 MG: 2 TABLET ORAL at 08:18

## 2022-02-07 RX ADMIN — LORAZEPAM 1 MG: 1 TABLET ORAL at 08:17

## 2022-02-07 RX ADMIN — SODIUM CHLORIDE: 900 INJECTION INTRAVENOUS at 19:00

## 2022-02-07 RX ADMIN — DEXMEDETOMIDINE HYDROCHLORIDE 12 MCG: 100 INJECTION, SOLUTION INTRAVENOUS at 15:24

## 2022-02-07 RX ADMIN — MAGNESIUM SULFATE HEPTAHYDRATE 2 G: 40 INJECTION, SOLUTION INTRAVENOUS at 20:01

## 2022-02-07 RX ADMIN — PIPERACILLIN AND TAZOBACTAM 4.5 G: 4; .5 INJECTION, POWDER, FOR SOLUTION INTRAVENOUS at 15:04

## 2022-02-07 RX ADMIN — PHENYLEPHRINE HYDROCHLORIDE 100 MCG: 10 INJECTION INTRAVENOUS at 15:25

## 2022-02-07 RX ADMIN — MIDAZOLAM 2 MG: 1 INJECTION INTRAMUSCULAR; INTRAVENOUS at 15:04

## 2022-02-07 RX ADMIN — PIPERACILLIN AND TAZOBACTAM 4.5 G: 4; .5 INJECTION, POWDER, FOR SOLUTION INTRAVENOUS at 04:33

## 2022-02-07 RX ADMIN — PREGABALIN 150 MG: 50 CAPSULE ORAL at 08:17

## 2022-02-07 RX ADMIN — ONDANSETRON 4 MG: 2 INJECTION INTRAMUSCULAR; INTRAVENOUS at 16:12

## 2022-02-07 RX ADMIN — FENTANYL CITRATE 100 MCG: 50 INJECTION, SOLUTION INTRAMUSCULAR; INTRAVENOUS at 15:12

## 2022-02-07 RX ADMIN — DEXMEDETOMIDINE HYDROCHLORIDE 8 MCG: 100 INJECTION, SOLUTION INTRAVENOUS at 16:57

## 2022-02-07 RX ADMIN — ACETAMINOPHEN 650 MG: 325 TABLET, FILM COATED ORAL at 00:27

## 2022-02-07 RX ADMIN — HYDROMORPHONE HYDROCHLORIDE 4 MG: 2 TABLET ORAL at 12:08

## 2022-02-07 RX ADMIN — PHENYLEPHRINE HYDROCHLORIDE 100 MCG: 10 INJECTION INTRAVENOUS at 15:33

## 2022-02-07 RX ADMIN — HYDROMORPHONE HYDROCHLORIDE 0.5 MG: 0.2 INJECTION, SOLUTION INTRAMUSCULAR; INTRAVENOUS; SUBCUTANEOUS at 18:50

## 2022-02-07 RX ADMIN — SIMETHICONE 80 MG: 80 TABLET, CHEWABLE ORAL at 18:51

## 2022-02-07 RX ADMIN — SODIUM CHLORIDE: 900 INJECTION INTRAVENOUS at 04:33

## 2022-02-07 RX ADMIN — SODIUM CHLORIDE: 900 INJECTION INTRAVENOUS at 12:22

## 2022-02-07 RX ADMIN — DEXMEDETOMIDINE HYDROCHLORIDE 8 MCG: 100 INJECTION, SOLUTION INTRAVENOUS at 15:43

## 2022-02-07 RX ADMIN — GLYCOPYRROLATE 0.4 MG: 0.2 INJECTION, SOLUTION INTRAMUSCULAR; INTRAVENOUS at 16:52

## 2022-02-07 RX ADMIN — SODIUM CHLORIDE, POTASSIUM CHLORIDE, SODIUM LACTATE AND CALCIUM CHLORIDE: 600; 310; 30; 20 INJECTION, SOLUTION INTRAVENOUS at 15:04

## 2022-02-07 RX ADMIN — FENTANYL CITRATE 50 MCG: 50 INJECTION, SOLUTION INTRAMUSCULAR; INTRAVENOUS at 15:48

## 2022-02-07 RX ADMIN — NEOSTIGMINE METHYLSULFATE 3 MG: 1 INJECTION, SOLUTION INTRAVENOUS at 16:52

## 2022-02-07 RX ADMIN — SIMETHICONE 80 MG: 80 TABLET, CHEWABLE ORAL at 21:39

## 2022-02-07 RX ADMIN — ROCURONIUM BROMIDE 30 MG: 50 INJECTION, SOLUTION INTRAVENOUS at 15:12

## 2022-02-07 RX ADMIN — SODIUM CHLORIDE, POTASSIUM CHLORIDE, SODIUM LACTATE AND CALCIUM CHLORIDE: 600; 310; 30; 20 INJECTION, SOLUTION INTRAVENOUS at 16:21

## 2022-02-07 RX ADMIN — ROCURONIUM BROMIDE 10 MG: 50 INJECTION, SOLUTION INTRAVENOUS at 15:32

## 2022-02-07 RX ADMIN — DEXAMETHASONE SODIUM PHOSPHATE 4 MG: 4 INJECTION, SOLUTION INTRA-ARTICULAR; INTRALESIONAL; INTRAMUSCULAR; INTRAVENOUS; SOFT TISSUE at 15:21

## 2022-02-07 ASSESSMENT — ACTIVITIES OF DAILY LIVING (ADL)
ADLS_ACUITY_SCORE: 14
ADLS_ACUITY_SCORE: 15
ADLS_ACUITY_SCORE: 14
ADLS_ACUITY_SCORE: 15
ADLS_ACUITY_SCORE: 14

## 2022-02-07 ASSESSMENT — LIFESTYLE VARIABLES: TOBACCO_USE: 1

## 2022-02-07 ASSESSMENT — MIFFLIN-ST. JEOR: SCORE: 1525.89

## 2022-02-07 NOTE — PLAN OF CARE
Pt A&Ox4. VSS on RA except tachy. Tmax 100.8, Tylenol given x1, down to 98.6. Tele: Sinus Tach. Abd pain managed w/ PO dilaudid. PIV infusing NS at 100 ml/hr. Intermittent Zosyn. NPO since midnight. Voiding adequately, dark/tea colored urine. No BM overnight. 0000 Lactic 0.7, next check at 0600. Up SBA. General surgery and SW consulted. Possible surgery today. Discharge pending.

## 2022-02-07 NOTE — BRIEF OP NOTE
[facility]  General Surgery Brief Operative Note    Pre-operative diagnosis: Acute cholecystitis [K81.0]   Post-operative diagnosis Same   Procedure: Procedure(s):  CHOLECYSTECTOMY, LAPAROSCOPIC    Surgeon(s), Assistant(s): Surgeon(s) and Role:     * Lico Rodriguez MD - Primary     * Dana Bhat PA-C - Assisting   Estimated blood loss: 50mL   Drains: None   Specimens: ID Type Source Tests Collected by Time Destination   1 : gallbladder and contents Tissue Gallbladder SURGICAL PATHOLOGY EXAM Lico Rodriguez MD 2/7/2022  3:38 PM       Findings: Hydrops, thickened gallbladder wall   Complications:  Condition: None  Stable   Comments:      Dana Bhat PA-C  [facility]         See dictated operative report for full details

## 2022-02-07 NOTE — CONSULTS
General Surgery Consultation      Bc German MRN# [id number]   YOB: 1980 Age: 41 year old   Date of Admission: 2/6/2022     Reason for consult: I was asked by Dr. Del Rosario to evaluate this patient for acute cholecystitis.           Assessment and Plan:     Acute cholecystitis    History of intravenous drug abuse (H)    Diarrhea of presumed infectious origin    Sepsis (H)    JASSI (generalized anxiety disorder)    Asymptomatic human immunodeficiency virus (HIV) infection status (H)    Right sided abdominal pain    Acute cystitis with hematuria    After a week of ongoing biliary colic/cholecystitis symptoms he comes in with severe radiographic evidence of cholecystitis.  I discussed the rationale, risk, benefits, hopeful outcome, and possible complication of cholecystectomy.  He understands and would like to proceed.             Chief Complaint:   Acute cholecystitis    History is obtained from the patient, electronic health record and emergency department physician         History of Present Illness:   This patient is a 41 year old male who presents with abdominal pain on the right side both lower and upper quadrant.  After evaluation he is found to have cystitis but also severe cholecystitis.              Past Medical History:     Past Medical History:   Diagnosis Date     Anxiety      Depressive disorder      Drug abuse, IV (H)      Group A streptococcal infection 11/2014    Bacteremia/cellulitis     HCV antibody positive      HIV (human immunodeficiency virus infection) (H)      HIV (human immunodeficiency virus infection) (H)      HIV (human immunodeficiency virus infection) (H)      IVDU (intravenous drug user)      Osteomyelitis of vertebra of lumbosacral region (H) 3/2011    L3, left psoas abscess     Substance abuse (H)              Past Surgical History:     Past Surgical History:   Procedure Laterality Date     EYE SURGERY  1 year ago    pins to left eye after socket fracture     OPTICAL  TRACKING SYSTEM FUSION CERVICAL ANTERIOR ONE LEVEL Right 9/10/2017    Procedure: OPTICAL TRACKING SYSTEM FUSION CERVICAL ANTERIOR ONE LEVEL;  Stealth C6-C7 Anterior Cervical Corpectomy and C5-T1 Fusion;  Surgeon: Marv Ambrose MD;  Location: [facility] OR     ORTHOPEDIC SURGERY      Arm Surgery     PICC INSERTION Left 09/21/2017    5fr DL BioFlo PICC, 42cm (1cm external) in the L basilic vein w/ tip in the low SVC.     TRANSESOPHAGEAL ECHOCARDIOGRAM INTRAOPERATIVE N/A 3/17/2015    Procedure: TRANSESOPHAGEAL ECHOCARDIOGRAM INTRAOPERATIVE;  Surgeon: Generic Anesthesia Provider;  Location: [facility] OR     TRANSESOPHAGEAL ECHOCARDIOGRAM INTRAOPERATIVE N/A 11/18/2021    Procedure: ECHOCARDIOGRAM, TRANSESOPHAGEAL, INTRAOPERATIVE;  Surgeon: GENERIC ANESTHESIA PROVIDER;  Location:  OR     TRANSESOPHAGEAL ECHOCARDIOGRAM INTRAOPERATIVE N/A 12/2/2021    Procedure: ECHOCARDIOGRAM, TRANSESOPHAGEAL, INTRAOPERATIVE;  Surgeon: GENERIC ANESTHESIA PROVIDER;  Location:  OR               Social History:     Social History     Tobacco Use     Smoking status: Current Every Day Smoker     Packs/day: 0.25     Types: Cigarettes     Smokeless tobacco: Never Used     Tobacco comment: mostly vapes   Substance Use Topics     Alcohol use: Yes             Family History:   No family history on file.          Immunizations:     Immunization History   Administered Date(s) Administered     COVID-19,PF,Pfizer (12+ Yrs) 05/25/2021, 01/18/2022     Influenza Vaccine IM > 6 months Valent IIV4 (Alfuria,Fluzone) 09/21/2017     Tdap (Adacel,Boostrix) 07/20/2021             Allergies:     Allergies   Allergen Reactions     Bupropion      Other reaction(s): Seizures, Seizures  PN: seizure when took a double dose  seizure when took a double dose  PN: seizure when took a double dose  Other reaction(s): Seizures  PN: seizure when took a double dose       Acetaminophen Nausea and Vomiting     PN: : GI Upset  : GI Upset  PN: : GI Upset  Other reaction(s):  "Vomiting  PN: : GI Upset       Codeine Itching and Nausea and Vomiting     Other reaction(s): Abdominal Pain  PN:  weak; fatigue; vomiting   weak; fatigue; vomiting  PN:  weak; fatigue; vomiting  PN:  weak; fatigue; vomiting       Sulfa Drugs Itching             Medications:     Medications Prior to Admission   Medication Sig Dispense Refill Last Dose     abacavir-dolutegravir-LamiVUDine (TRIUMEQ) 600- MG per tablet Take 1 tablet by mouth daily Please call [phone] & make appointment for further refills. 30 tablet 0 Past Week at Unknown time     buprenorphine HCl-naloxone HCl (SUBOXONE) 8-2 MG per film Place 1 Film under the tongue 2 times daily (Patient taking differently: Place 0.5 Film under the tongue daily as needed (Patient states Suboxone has been \"worsening his anxiety\" so he has not been taking often. He states he only takes 1/3-1/2 film once in a while))   2/2/2022     levomilnacipran (FETZIMA ER) 20 MG 24 hr capsule Take 60 mg by mouth daily    Past Week at Unknown time     LORazepam (ATIVAN) 1 MG tablet Take 1 mg by mouth 2 times daily   Past Week at Unknown time     Multiple Vitamins-Minerals (MULTIVITAMIN GUMMIES ADULT PO) Take 1 chew tab by mouth daily   Past Week at Unknown time     Naphazoline-Glycerin (CLEAR EYES MAX REDNESS RELIEF) 0.03-0.5 % SOLN Place 1 drop into both eyes daily as needed   prn med     pregabalin (LYRICA) 150 MG capsule Take 150 mg by mouth daily    Past Week at Unknown time     tamsulosin (FLOMAX) 0.4 MG capsule Take 1 capsule (0.4 mg) by mouth daily 30 capsule 0 Past Week at Unknown time     testosterone cypionate (DEPOTESTOTERONE) 200 MG/ML injection Inject 0.3 mLs into the muscle once a week Inject 0.3 mL IM every 77 days   1/29/2022     aspirin 81 MG EC tablet Take 81 mg by mouth daily (Patient not taking: Reported on 1/18/2022)   12/30/2021     famotidine (PEPCID) 20 MG tablet Take 20 mg by mouth daily   has not started yet             Review of Systems:   The 5 " point Review of Systems is negative other than noted in the HPI            Physical Exam:   Vitals were reviewed  Temp: 97.2  F (36.2  C) Temp src: Oral BP: 107/67 Pulse: 85   Resp: 16 SpO2: 98 % O2 Device: None (Room air)    Constitutional:   awake, alert, cooperative, mild distress, appears stated age and normal weight     Eyes:   sclera clear     Lungs:   no increased work of breathing, good air exchange and no retractions     Abdomen:   no scars, soft, non-distended and tenderness noted in the right upper quadrant and in the right lower quadrant     Musculoskeletal:   tone is normal     Skin:    extensive scabbing and change coloration consistent with his history of IV drug use.  Otherwise normal turgor          Data:   All laboratory and imaging data in the past 24 hours reviewed

## 2022-02-07 NOTE — ANESTHESIA PROCEDURE NOTES
Airway       Patient location during procedure: OR       Procedure Start/Stop Times: 2/7/2022 3:15 PM  Staff -        CRNA: Armin Saucedo APRN CRNA       Performed By: CRNA  Consent for Airway        Urgency: elective  Indications and Patient Condition       Indications for airway management: brittany-procedural       Induction type:intravenous       Mask difficulty assessment: 1 - vent by mask    Final Airway Details       Final airway type: endotracheal airway       Successful airway: ETT - single  Endotracheal Airway Details        ETT size (mm): 8.0       Successful intubation technique: video laryngoscopy       VL Blade Size: Glidescope 4       Grade View of Cords: 1       Adjucts: stylet       Position: Right       Measured from: gums/teeth       Secured at (cm): 23       Bite block used: None    Post intubation assessment        Placement verified by: capnometry, equal breath sounds and chest rise        Number of attempts at approach: 1       Number of other approaches attempted: 0       Secured with: pink tape       Ease of procedure: easy       Dentition: Intact and Unchanged

## 2022-02-07 NOTE — PLAN OF CARE
Summary: ED admit with cholecystitis   Orientation: A & O x 4  VS/ O2/ IV: Tachycardic.VSS on RA. ROBERT NaCl @ 100 ml/hr. Zosyn x 1.  GI: Hyperactive BS. R. Abdominal tenderness. Denies nausea.   Mobility: SBA w/ GB  Pain Management: Dilaudid PO for abdominal pain with relief.   Diet: Clears, tolerating well. NPO at 0000.  Bowel/ Bladder: Watery stools. Voiding adequately, laina in color.   Labs/ BG: Serial lactic. +UTI.   Telemetry: Sinus Tach  Skin: c/d/I  CMS: Intact  Test Procedures: Echo to be completed.   Consults/ Providers: General surgery  Discharge/ Plan: General surgery to consult with pt.   History: HIV +, Hep C, hx of endocarditis and bacteremia.

## 2022-02-07 NOTE — PLAN OF CARE
Pt A/O x 4. Receiving oral dilaudid for abd pain. Still rates 8-9/10. Voiding brown/tea colored urine in the urinal. On bedrest this shift. Report given to pre-op. Alerted them of low mg and asked if oral or IV should be ordered due to NPO status, they stated they'd leave it up anesthesia. Mother updated.

## 2022-02-07 NOTE — TREATMENT PLAN
Pre-op notified of MG 1.5 result. Writer planned to order per protocol, nurse stated anesthesia to address.

## 2022-02-07 NOTE — PROGRESS NOTES
[facility]    Hospitalist Progress Note      Assessment & Plan   Bc German is a 41 year old male with complex past medical history significant for history of IV drug abuse, has been sober since November 15, 2020, MSSA/MRSA bacteremia with tricuspid valve endocarditis, severe tricuspid regurgitation due to endocarditis, history of pulmonary valve endocarditis in 2015, history of lumbar spine osteomyelitis with epidural abscess which required surgical intervention in 2017, HIV, essential hypertension, hepatitis C, history of depression and anxiety who was recently admitted to the hospital for 6 weeks from November 15 to December 30 for treatment of MSSA/MRSA bacteremia with tricuspid valve endocarditis and was discharged home, since then patient has been sober.  Patient has now developed 1 week of right-sided abdominal discomfort associated with vomiting, poor appetite he is also complaining of watery diarrhea going on for 5 days and is admitted for further evaluation and management.    Acute cholecystitis (2/6/2022) s/p lap cholecystectomy 2/7  Right-sided flank and right upper quadrant discomfort going on for 7 days. WBC count of 23.5, LA 2.0, . CT A/P: distended gallbladder with wall thickening and edgar hepatis fluid and stranding which is consistent with acute cholecystitis there is also wall thickening of the urinary bladder which may be secondary to cystitis.  Acute cystitis with hematuria (2/6/2022): Patient is denying any urinary urgency frequency or burning he has not noticed any hematuria although his urine is showing large blood, small leukocytes, WBC count of 71 and red blood cell count of more than 182. Urine cx without growth.   Ongoing diarrhea: Recent use of broad-spectrum antibiotics, patient has finished course of IV daptomycin and IV vancomycin at the end of December. C diff negative.  Sepsis: Patient is presenting with pulse of 101, elevated CRP and elevated  WBC count.  At the time of presentation patient does not have any signs and symptoms of severe sepsis or septic shock.  Considering duration of 1 week of symptoms and infection on multiple sites, blood cultures need to be followed up to rule out bacteremia.  - appreciate general surgery  - IVF at 100ml/hr. Consider swap to LR if NaCl rising  - IV zosyn continued, if all cultures negative and WBC back to normal, then will discontinue  - blood cx pending  - clear liquids per gen surg  - PRN pain meds with tylenol, dilaudid  - encourage IS, ambulation     Recent hospitalization from November 15 to December 30 with MSSA/MRSA bacteremia with tricuspid valve endocarditis: Resolved  Severe tricuspid regurgitation due to endocarditis  History of pulmonary valve endocarditis due to MSSA in 2015  History of lumbar spine L3 osteomyelitis with epidural abscess requiring surgical intervention in 2017  --Patient completed his 6-week course of IV vancomycin and IV daptomycin on December 26, was also evaluated by CV surgery who will consider valve repair after patient completes chemical dependency treatment and if he remains sober  --So far patient has been sober   --According to patient he had a repeat TTE on December 27 which showed resolution of vegetations and improvement in TR patient did not have any signs and symptoms of right heart failure at that point     History of essential hypertension:  --Patient did not require any antihypertensive medications during his last hospitalization and his blood pressure was stable during the stay.     HIV  Hepatitis C  Chronic and stable on Triumeq.   CDC >200 so no need for PJP ppx per ID.  --Follows with Dr Chavez of [facility].  --HIV-1 RNA undetectable on 12/19/21 and 12/22/21. Triumeq placed on hold on 12/27/21 due to elevated LFTs, resumed as mentioned above.  Hep C still active/viremic and will need to be on treatment at some point.     History of opiate  dependence  History of IV drug use, Sober since Nov 15 2021  Tobacco Use  Depression  Anxiety  Prior to his admission in November, patient was a started on Soma taper off.  During his last hospitalization consultation was done with Dr. Barrientos from [facility] addiction medicine.  He was switched to Suboxone twice daily on December 8 he was continued on PTA levomilnacipran extended release, was tapered off Carisoprodol during this stay.  Patient was also evaluated by psychiatry during his stay and he was a started on Lyrica 75 mg p.o. twice daily.  He was also evaluated by chemical dependency and was recommended to undergo treatment at that time patient was in the process of deciding if he would get chemical dependency treatment in [place] versus locally in [facility].  Patient was discharged to his parents house in the end of December.  Patient was also discharged on lorazepam 0.5 mg 3 times daily as needed and they did not recommend tapering him off his Ativan as he was very high risk for relapse in the picture of his recent infection.     --Currently, on reviewing his home medication patient is stating that he does not like to take his Suboxone as it worsens his anxiety so is not taking it as prescribed but uses it as needed as needed  --His Fetzmia dose is 60 mg daily  --PTA lorazepam 1 mg twice daily, ordered  --According to patient his Lyrica dose is now 150 mg p.o. daily, ordered his medications at PTA dose  - seroquel 25mg at bedtime per patient request    Hypomagnesemia  Replete per protocol.  - mag check in AM    Clinically Significant Risk Factors Present on Admission                    DVT Prophylaxis: Pneumatic Compression Devices  Code Status: Full Code  Expected Discharge: 02/09/2022   Anticipated discharge location:  Awaiting care coordination huddle  Delays:     antibiotic plan, diet and pain tolerance    Ana Watson DO  Hospitalist Service  [facility]  Securely message  with the [employer] Web Console (learn more here)  Text Page (7am - 6pm) via [facility] Paging/Directory      Interval History   Patient seen and examined post op. He is doing okay, a little loopy. Pain ongoing. Trying a popsicle, but not as good as he imagined. No chest pain, shortness of breath. He requests seroquel at bedtime.    -Data reviewed today: I reviewed all new labs and imaging results over the last 24 hours. I personally reviewed no images or EKG's today.    Physical Exam   Temp: (!) 96.5  F (35.8  C) Temp src: Temporal BP: 117/75 Pulse: 84   Resp: 12 SpO2: 97 % O2 Device: None (Room air) Oxygen Delivery: 6 LPM  Vitals:    02/06/22 1250 02/07/22 0623   Weight: 63.5 kg (140 lb) 60.5 kg (133 lb 4.8 oz)     Vital Signs with Ranges  Temp:  [96.5  F (35.8  C)-100.8  F (38.2  C)] 96.5  F (35.8  C)  Pulse:  [] 84  Resp:  [10-18] 12  BP: (103-126)/(63-88) 117/75  SpO2:  [97 %-100 %] 97 %  I/O last 3 completed shifts:  In: 1770 [P.O.:480; I.V.:1290]  Out: 1150 [Urine:1150]    Constitutional: Awake, alert, cooperative, no apparent distress, chronic ill appearance  Respiratory: Clear to auscultation bilaterally, no crackles or wheezing  Cardiovascular: Regular rate and rhythm, normal S1 and S2, and no murmur noted  GI: hypoactive bowel sounds, bandages/steri strips noted, no bleeding. Soft, not distended.  Skin/Integumen: No rashes, no cyanosis, no edema, scattered tattoos, old scabs.  Other:     Medications     sodium chloride 100 mL/hr at 02/07/22 1900       abacavir-dolutegravir-LamiVUDine  1 tablet Oral Daily     [START ON 2/8/2022] aspirin  81 mg Oral Daily     famotidine  20 mg Oral Daily     levomilnacipran  60 mg Oral Daily     LORazepam  1 mg Oral BID     magnesium sulfate  2 g Intravenous Once     piperacillin-tazobactam  4.5 g Intravenous Q6H     pregabalin  150 mg Oral Daily     QUEtiapine  25 mg Oral At Bedtime     senna-docusate  1 tablet Oral BID     simethicone  80 mg Oral 4x Daily     sodium chloride  (PF)  3 mL Intracatheter Q8H     tamsulosin  0.4 mg Oral Daily       Data   Recent Labs   Lab 02/07/22  0739 02/07/22  0729 02/06/22  1318   WBC  --  15.0* 23.5*   HGB  --  10.8* 13.3   MCV  --  93 91   PLT  --  366 639*     --  137   POTASSIUM 4.3  --  3.5   CHLORIDE 112*  --  107   CO2 19*  --  25   BUN 15  --  19   CR 0.95  --  1.04   ANIONGAP 7  --  5   SANDRA 7.8*  --  9.9   GLC 97  --  109*   ALBUMIN 1.9*  --  2.8*   PROTTOTAL 6.1*  --  8.5   BILITOTAL 0.4  --  0.4   ALKPHOS 68  --  91   ALT 34  --  45   AST 29  --  27   LIPASE  --   --  157       No results found for this or any previous visit (from the past 24 hour(s)).

## 2022-02-07 NOTE — ANESTHESIA PREPROCEDURE EVALUATION
Anesthesia Pre-Procedure Evaluation    Patient: Bc German   MRN: [id number] : 1980        Preoperative Diagnosis: Acute cholecystitis [K81.0]    Procedure : Procedure(s):  CHOLECYSTECTOMY, LAPAROSCOPIC          Past Medical History:   Diagnosis Date     Anxiety      Depressive disorder      Drug abuse, IV (H)      Group A streptococcal infection 2014    Bacteremia/cellulitis     HCV antibody positive      HIV (human immunodeficiency virus infection) (H)      HIV (human immunodeficiency virus infection) (H)      HIV (human immunodeficiency virus infection) (H)      IVDU (intravenous drug user)      Osteomyelitis of vertebra of lumbosacral region (H) 3/2011    L3, left psoas abscess     Substance abuse (H)       Past Surgical History:   Procedure Laterality Date     EYE SURGERY  1 year ago    pins to left eye after socket fracture     OPTICAL TRACKING SYSTEM FUSION CERVICAL ANTERIOR ONE LEVEL Right 9/10/2017    Procedure: OPTICAL TRACKING SYSTEM FUSION CERVICAL ANTERIOR ONE LEVEL;  Stealth C6-C7 Anterior Cervical Corpectomy and C5-T1 Fusion;  Surgeon: Marv Ambrose MD;  Location: [facility] OR     ORTHOPEDIC SURGERY      Arm Surgery     PICC INSERTION Left 2017    5fr DL BioFlo PICC, 42cm (1cm external) in the L basilic vein w/ tip in the low SVC.     TRANSESOPHAGEAL ECHOCARDIOGRAM INTRAOPERATIVE N/A 3/17/2015    Procedure: TRANSESOPHAGEAL ECHOCARDIOGRAM INTRAOPERATIVE;  Surgeon: Generic Anesthesia Provider;  Location: [facility] OR     TRANSESOPHAGEAL ECHOCARDIOGRAM INTRAOPERATIVE N/A 2021    Procedure: ECHOCARDIOGRAM, TRANSESOPHAGEAL, INTRAOPERATIVE;  Surgeon: GENERIC ANESTHESIA PROVIDER;  Location:  OR     TRANSESOPHAGEAL ECHOCARDIOGRAM INTRAOPERATIVE N/A 2021    Procedure: ECHOCARDIOGRAM, TRANSESOPHAGEAL, INTRAOPERATIVE;  Surgeon: GENERIC ANESTHESIA PROVIDER;  Location:  OR      Allergies   Allergen Reactions     Bupropion      Other reaction(s): Seizures, Seizures  PN: seizure when  took a double dose  seizure when took a double dose  PN: seizure when took a double dose  Other reaction(s): Seizures  PN: seizure when took a double dose       Acetaminophen Nausea and Vomiting     PN: : GI Upset  : GI Upset  PN: : GI Upset  Other reaction(s): Vomiting  PN: : GI Upset       Codeine Itching and Nausea and Vomiting     Other reaction(s): Abdominal Pain  PN:  weak; fatigue; vomiting   weak; fatigue; vomiting  PN:  weak; fatigue; vomiting  PN:  weak; fatigue; vomiting       Sulfa Drugs Itching      Social History     Tobacco Use     Smoking status: Current Every Day Smoker     Packs/day: 0.25     Types: Cigarettes     Smokeless tobacco: Never Used     Tobacco comment: mostly vapes   Substance Use Topics     Alcohol use: Yes      Wt Readings from Last 1 Encounters:   02/07/22 60.5 kg (133 lb 4.8 oz)        Anesthesia Evaluation            ROS/MED HX  ENT/Pulmonary:     (+) tobacco use, Current use,  (-) sleep apnea   Neurologic:       Cardiovascular:     (+) hypertension-----valvular problems/murmurs TR, RI due to endocarditis;. Previous cardiac testing   Echo: Date: 12/2021 Results:  Interpretation Summary     The left ventricle is normal in size.  Left ventricular systolic function is normal.  The visual ejection fraction is 60-65%.  Normal left ventricular wall motion  Thickened mitral valve anterior leaflet  There is trace mitral regurgitation.  Tricuspid leaflets are thickened.  There is moderate (2+) tricuspid regurgitation. The jet is eccentric, directed  toward the atrial septum, possibly originating from a perforation in the  posterior leaflet.  No valvular vegetations identified.     Compared to the previous study, the TR has decreased significantly.  Vegetations are no longer seen.  ______________________________________________________________________________  Left Ventricle  The left ventricle is normal in size. There is normal left ventricular wall  thickness. Left ventricular systolic  function is normal. The visual ejection  fraction is 60-65%. Diastolic Doppler findings (E/E' ratio and/or other  parameters) suggest left ventricular filling pressures are normal. Normal left  ventricular wall motion.     Right Ventricle  The right ventricle is normal in structure, function and size.     Atria  Normal left atrial size. Right atrial size is normal. There is no atrial shunt  seen.     Mitral Valve  Thickened mitral valve anterior leaflet. There is trace mitral regurgitation.     Tricuspid Valve  Tricuspid leaflets are thickened. Right ventricle systolic pressure estimate  normal. There is moderate (2+) tricuspid regurgitation. The right ventricular  systolic pressure is approximated at 25.6 mmHg plus the right atrial pressure.     Aortic Valve  The aortic valve is normal in structure and function. No aortic regurgitation  is present. No aortic stenosis is present.     Pulmonic Valve  The pulmonic valve is not well seen, but is grossly normal. There is trace  pulmonic valvular regurgitation.     Vessels  Normal size aorta.     Pericardium  There is no pericardial effusion.    Stress Test: Date: Results:    ECG Reviewed: Date: Results:    Cath: Date: Results:      METS/Exercise Tolerance:     Hematologic:       Musculoskeletal:       GI/Hepatic:     (+) cholecystitis/cholelithiasis, hepatitis type C,  (-) GERD   Renal/Genitourinary:       Endo:       Psychiatric/Substance Use: Comment: Hx IV drug use;    (+) psychiatric history anxiety and depression Recreational drug usage: Cannabis.    Infectious Disease:     (+) HIV,     Malignancy:       Other:            Physical Exam    Airway        Mallampati: II   TM distance: > 3 FB   Neck ROM: full   Mouth opening: > 3 cm    Respiratory Devices and Support         Dental       (+) chipped, missing and loose      Cardiovascular   cardiovascular exam normal          Pulmonary   pulmonary exam normal                OUTSIDE LABS:  CBC:   Lab Results   Component  Value Date    WBC 15.0 (H) 02/07/2022    WBC 23.5 (H) 02/06/2022    HGB 10.8 (L) 02/07/2022    HGB 13.3 02/06/2022    HCT 34.8 (L) 02/07/2022    HCT 42.8 02/06/2022     02/07/2022     (H) 02/06/2022     BMP:   Lab Results   Component Value Date     02/07/2022     02/06/2022    POTASSIUM 4.3 02/07/2022    POTASSIUM 3.5 02/06/2022    CHLORIDE 112 (H) 02/07/2022    CHLORIDE 107 02/06/2022    CO2 19 (L) 02/07/2022    CO2 25 02/06/2022    BUN 15 02/07/2022    BUN 19 02/06/2022    CR 0.95 02/07/2022    CR 1.04 02/06/2022    GLC 97 02/07/2022     (H) 02/06/2022     COAGS:   Lab Results   Component Value Date    PTT 33 05/09/2021    INR 0.96 05/09/2021     POC:   Lab Results   Component Value Date     (H) 09/12/2017     HEPATIC:   Lab Results   Component Value Date    ALBUMIN 1.9 (L) 02/07/2022    PROTTOTAL 6.1 (L) 02/07/2022    ALT 34 02/07/2022    AST 29 02/07/2022    ALKPHOS 68 02/07/2022    BILITOTAL 0.4 02/07/2022    XIN 31 05/09/2021     OTHER:   Lab Results   Component Value Date    PH 7.39 11/16/2021    LACT 0.7 02/07/2022    SANDRA 7.8 (L) 02/07/2022    PHOS 2.7 02/07/2022    MAG 1.5 (L) 02/07/2022    LIPASE 157 02/06/2022    AMYLASE 74 02/24/2017    TSH 4.21 (H) 12/04/2021    T4 0.98 12/04/2021    .0 (H) 02/06/2022    SED 74 (H) 12/17/2021       Anesthesia Plan    ASA Status:  3   NPO Status:  NPO Appropriate    Anesthesia Type: General.     - Airway: ETT   Induction: Intravenous.   Maintenance: Balanced.   Techniques and Equipment:     - Lines/Monitors: Arterial Line     Consents    Anesthesia Plan(s) and associated risks, benefits, and realistic alternatives discussed. Questions answered and patient/representative(s) expressed understanding.    - Discussed:     - Discussed with:  Patient         Postoperative Care    Pain management: IV analgesics.   PONV prophylaxis: Ondansetron (or other 5HT-3)     Comments:                ALANNAH BECKER MD

## 2022-02-07 NOTE — ANESTHESIA CARE TRANSFER NOTE
Patient: Bc German    Procedure: Procedure(s):  CHOLECYSTECTOMY, LAPAROSCOPIC       Diagnosis: Acute cholecystitis [K81.0]  Diagnosis Additional Information: No value filed.    Anesthesia Type:   General     Note:    Oropharynx: oral airway in place and spontaneously breathing  Level of Consciousness: drowsy  Oxygen Supplementation: face mask  Level of Supplemental Oxygen (L/min / FiO2): 6  Independent Airway: airway patency satisfactory and stable  Dentition: dentition unchanged S/P dental procedure  Vital Signs Stable: post-procedure vital signs reviewed and stable  Report to RN Given: handoff report given  Patient transferred to: PACU    Handoff Report: Identifed the Patient, Identified the Reponsible Provider, Reviewed the pertinent medical history, Discussed the surgical course, Reviewed Intra-OP anesthesia mangement and issues during anesthesia, Set expectations for post-procedure period and Allowed opportunity for questions and acknowledgement of understanding      Vitals:  Vitals Value Taken Time   /67 02/07/22 1724   Temp     Pulse 84 02/07/22 1725   Resp 12 02/07/22 1725   SpO2 100 % 02/07/22 1725   Vitals shown include unvalidated device data.    Electronically Signed By: MEDHAT Pierre CRNA  February 7, 2022  5:27 PM

## 2022-02-08 ENCOUNTER — APPOINTMENT (OUTPATIENT)
Dept: CARDIOLOGY | Facility: CLINIC | Age: 42
DRG: 854 | End: 2022-02-08
Attending: PHYSICIAN ASSISTANT
Payer: COMMERCIAL

## 2022-02-08 LAB
ANION GAP SERPL CALCULATED.3IONS-SCNC: 4 MMOL/L (ref 3–14)
BUN SERPL-MCNC: 11 MG/DL (ref 7–30)
CALCIUM SERPL-MCNC: 8 MG/DL (ref 8.5–10.1)
CHLORIDE BLD-SCNC: 111 MMOL/L (ref 94–109)
CO2 SERPL-SCNC: 23 MMOL/L (ref 20–32)
CREAT SERPL-MCNC: 0.88 MG/DL (ref 0.66–1.25)
ERYTHROCYTE [DISTWIDTH] IN BLOOD BY AUTOMATED COUNT: 12.9 % (ref 10–15)
GFR SERPL CREATININE-BSD FRML MDRD: >90 ML/MIN/1.73M2
GLUCOSE BLD-MCNC: 140 MG/DL (ref 70–99)
HCT VFR BLD AUTO: 31.8 % (ref 40–53)
HGB BLD-MCNC: 10.1 G/DL (ref 13.3–17.7)
LVEF ECHO: NORMAL
MAGNESIUM SERPL-MCNC: 1.9 MG/DL (ref 1.8–2.6)
MCH RBC QN AUTO: 28.7 PG (ref 26.5–33)
MCHC RBC AUTO-ENTMCNC: 31.8 G/DL (ref 31.5–36.5)
MCV RBC AUTO: 90 FL (ref 78–100)
PHOSPHATE SERPL-MCNC: 2.2 MG/DL (ref 2.5–4.5)
PLATELET # BLD AUTO: 452 10E3/UL (ref 150–450)
POTASSIUM BLD-SCNC: 3.8 MMOL/L (ref 3.4–5.3)
RBC # BLD AUTO: 3.52 10E6/UL (ref 4.4–5.9)
SODIUM SERPL-SCNC: 138 MMOL/L (ref 133–144)
WBC # BLD AUTO: 16.6 10E3/UL (ref 4–11)

## 2022-02-08 PROCEDURE — 93325 DOPPLER ECHO COLOR FLOW MAPG: CPT

## 2022-02-08 PROCEDURE — 250N000011 HC RX IP 250 OP 636: Performed by: PHYSICIAN ASSISTANT

## 2022-02-08 PROCEDURE — 250N000013 HC RX MED GY IP 250 OP 250 PS 637: Performed by: PHYSICIAN ASSISTANT

## 2022-02-08 PROCEDURE — 250N000011 HC RX IP 250 OP 636: Performed by: HOSPITALIST

## 2022-02-08 PROCEDURE — 84100 ASSAY OF PHOSPHORUS: CPT | Performed by: HOSPITALIST

## 2022-02-08 PROCEDURE — 93321 DOPPLER ECHO F-UP/LMTD STD: CPT | Mod: 26 | Performed by: INTERNAL MEDICINE

## 2022-02-08 PROCEDURE — 250N000013 HC RX MED GY IP 250 OP 250 PS 637: Performed by: HOSPITALIST

## 2022-02-08 PROCEDURE — 82310 ASSAY OF CALCIUM: CPT | Performed by: HOSPITALIST

## 2022-02-08 PROCEDURE — 93325 DOPPLER ECHO COLOR FLOW MAPG: CPT | Mod: 26 | Performed by: INTERNAL MEDICINE

## 2022-02-08 PROCEDURE — 36415 COLL VENOUS BLD VENIPUNCTURE: CPT | Performed by: HOSPITALIST

## 2022-02-08 PROCEDURE — 99232 SBSQ HOSP IP/OBS MODERATE 35: CPT | Performed by: HOSPITALIST

## 2022-02-08 PROCEDURE — 93321 DOPPLER ECHO F-UP/LMTD STD: CPT

## 2022-02-08 PROCEDURE — 85027 COMPLETE CBC AUTOMATED: CPT | Performed by: HOSPITALIST

## 2022-02-08 PROCEDURE — 93308 TTE F-UP OR LMTD: CPT | Mod: 26 | Performed by: INTERNAL MEDICINE

## 2022-02-08 PROCEDURE — 83735 ASSAY OF MAGNESIUM: CPT | Performed by: HOSPITALIST

## 2022-02-08 PROCEDURE — 120N000001 HC R&B MED SURG/OB

## 2022-02-08 RX ORDER — HYDROMORPHONE HCL IN WATER/PF 6 MG/30 ML
0.2 PATIENT CONTROLLED ANALGESIA SYRINGE INTRAVENOUS
Status: DISCONTINUED | OUTPATIENT
Start: 2022-02-08 | End: 2022-02-09

## 2022-02-08 RX ORDER — HYDROMORPHONE HCL IN WATER/PF 6 MG/30 ML
0.2 PATIENT CONTROLLED ANALGESIA SYRINGE INTRAVENOUS
Status: DISCONTINUED | OUTPATIENT
Start: 2022-02-08 | End: 2022-02-08

## 2022-02-08 RX ORDER — QUETIAPINE FUMARATE 25 MG/1
50 TABLET, FILM COATED ORAL AT BEDTIME
Status: DISCONTINUED | OUTPATIENT
Start: 2022-02-08 | End: 2022-02-10 | Stop reason: HOSPADM

## 2022-02-08 RX ORDER — OXYCODONE HYDROCHLORIDE 5 MG/1
5-10 TABLET ORAL EVERY 4 HOURS PRN
Status: DISCONTINUED | OUTPATIENT
Start: 2022-02-08 | End: 2022-02-10 | Stop reason: HOSPADM

## 2022-02-08 RX ORDER — KETOROLAC TROMETHAMINE 30 MG/ML
30 INJECTION, SOLUTION INTRAMUSCULAR; INTRAVENOUS EVERY 6 HOURS PRN
Status: DISCONTINUED | OUTPATIENT
Start: 2022-02-08 | End: 2022-02-09

## 2022-02-08 RX ADMIN — ABACAVIR SULFATE, DOLUTEGRAVIR SODIUM, LAMIVUDINE 1 TABLET: 600; 50; 300 TABLET, FILM COATED ORAL at 08:33

## 2022-02-08 RX ADMIN — OXYCODONE HYDROCHLORIDE 5 MG: 5 TABLET ORAL at 20:43

## 2022-02-08 RX ADMIN — LEVOMILNACIPRAN HYDROCHLORIDE 60 MG: 40 CAPSULE, EXTENDED RELEASE ORAL at 08:33

## 2022-02-08 RX ADMIN — PIPERACILLIN AND TAZOBACTAM 4.5 G: 4; .5 INJECTION, POWDER, FOR SOLUTION INTRAVENOUS at 05:14

## 2022-02-08 RX ADMIN — SIMETHICONE 80 MG: 80 TABLET, CHEWABLE ORAL at 15:00

## 2022-02-08 RX ADMIN — OXYCODONE HYDROCHLORIDE 5 MG: 5 TABLET ORAL at 15:00

## 2022-02-08 RX ADMIN — SENNOSIDES AND DOCUSATE SODIUM 1 TABLET: 50; 8.6 TABLET ORAL at 20:43

## 2022-02-08 RX ADMIN — PREGABALIN 150 MG: 50 CAPSULE ORAL at 08:33

## 2022-02-08 RX ADMIN — LORAZEPAM 1 MG: 1 TABLET ORAL at 20:43

## 2022-02-08 RX ADMIN — KETOROLAC TROMETHAMINE 30 MG: 30 INJECTION, SOLUTION INTRAMUSCULAR; INTRAVENOUS at 16:35

## 2022-02-08 RX ADMIN — FAMOTIDINE 20 MG: 20 TABLET ORAL at 08:33

## 2022-02-08 RX ADMIN — SIMETHICONE 80 MG: 80 TABLET, CHEWABLE ORAL at 08:34

## 2022-02-08 RX ADMIN — LORAZEPAM 1 MG: 1 TABLET ORAL at 08:34

## 2022-02-08 RX ADMIN — SIMETHICONE 80 MG: 80 TABLET, CHEWABLE ORAL at 22:51

## 2022-02-08 RX ADMIN — SENNOSIDES AND DOCUSATE SODIUM 1 TABLET: 50; 8.6 TABLET ORAL at 08:34

## 2022-02-08 RX ADMIN — PIPERACILLIN AND TAZOBACTAM 4.5 G: 4; .5 INJECTION, POWDER, FOR SOLUTION INTRAVENOUS at 00:26

## 2022-02-08 RX ADMIN — PIPERACILLIN AND TAZOBACTAM 4.5 G: 4; .5 INJECTION, POWDER, FOR SOLUTION INTRAVENOUS at 11:09

## 2022-02-08 RX ADMIN — OXYCODONE HYDROCHLORIDE 5 MG: 5 TABLET ORAL at 08:33

## 2022-02-08 RX ADMIN — KETOROLAC TROMETHAMINE 30 MG: 30 INJECTION, SOLUTION INTRAMUSCULAR; INTRAVENOUS at 08:33

## 2022-02-08 RX ADMIN — QUETIAPINE FUMARATE 50 MG: 25 TABLET ORAL at 22:51

## 2022-02-08 RX ADMIN — TAMSULOSIN HYDROCHLORIDE 0.4 MG: 0.4 CAPSULE ORAL at 08:34

## 2022-02-08 RX ADMIN — ONDANSETRON 4 MG: 4 TABLET, ORALLY DISINTEGRATING ORAL at 05:14

## 2022-02-08 RX ADMIN — SIMETHICONE 80 MG: 80 TABLET, CHEWABLE ORAL at 20:42

## 2022-02-08 RX ADMIN — HYDROMORPHONE HYDROCHLORIDE 4 MG: 2 TABLET ORAL at 05:13

## 2022-02-08 RX ADMIN — PIPERACILLIN AND TAZOBACTAM 4.5 G: 4; .5 INJECTION, POWDER, FOR SOLUTION INTRAVENOUS at 22:52

## 2022-02-08 RX ADMIN — PIPERACILLIN AND TAZOBACTAM 4.5 G: 4; .5 INJECTION, POWDER, FOR SOLUTION INTRAVENOUS at 16:35

## 2022-02-08 RX ADMIN — HYDROMORPHONE HYDROCHLORIDE 0.5 MG: 0.2 INJECTION, SOLUTION INTRAMUSCULAR; INTRAVENOUS; SUBCUTANEOUS at 01:08

## 2022-02-08 RX ADMIN — ASPIRIN 81 MG: 81 TABLET, COATED ORAL at 08:33

## 2022-02-08 ASSESSMENT — ACTIVITIES OF DAILY LIVING (ADL)
ADLS_ACUITY_SCORE: 14
ADLS_ACUITY_SCORE: 16
ADLS_ACUITY_SCORE: 16
ADLS_ACUITY_SCORE: 14
ADLS_ACUITY_SCORE: 16
ADLS_ACUITY_SCORE: 14
ADLS_ACUITY_SCORE: 14
ADLS_ACUITY_SCORE: 16
ADLS_ACUITY_SCORE: 14
ADLS_ACUITY_SCORE: 16
ADLS_ACUITY_SCORE: 16
ADLS_ACUITY_SCORE: 14
ADLS_ACUITY_SCORE: 16

## 2022-02-08 ASSESSMENT — MIFFLIN-ST. JEOR: SCORE: 1535.25

## 2022-02-08 NOTE — PROVIDER NOTIFICATION
MD Notification    Notified Person: MD    Notified Person Name: Dr Watson    Notification Date/Time: 0800 2/8/22    Notification Interaction: text page    Purpose of Notification: Consider changing pts pain regimen for longer coverage. Pt also has h/o IV drug use- should consider refraining from IV Narcotics. Message sent to Dr Watson for consideration    Orders Received: Dilaudid D/C'd. Toradol and Oxycodone added    Comments:

## 2022-02-08 NOTE — PLAN OF CARE
POD 1 lap cholecystectomy, A&O x4, VSS, RA, SBA,tolerating reg diet, abd incisions are CDI, voiding  adequately in urinal, tea colored urine, hypoactive bowl sound x 4, reported right abd pain that was managed by PRN dilaudid, on IV abx for intra-abd infection/sepsis, reports nausea that is relieved by PRN zofran. Discharge pending.

## 2022-02-08 NOTE — PROGRESS NOTES
"[facility]  GENERAL SURGERY Progress Note    Admission Date: 2022         Assessment and Plan:     Bc German is a 41 year old male s/p lap corey, POD 1.  - ADAT  - Pain control-  Oxy PRN, Toradol IV prn- transition to Ibuprofen tomorrow, also on Ativan bid  - WBC 15.0-->16.6, continue Zosyn  - Continue Pepcid  - Ambulate 4x day and encourage IS  - Med management per hospitalist, much appreciate your assistance             Interval History:     C/o abdominal pain, oxy and Toradol helpful, tolerating diet, +Flatus/-BM, urinating without difficulty. Meds reviewed.                      Physical Exam:   Blood pressure (!) 133/92, pulse 85, temperature 98.9  F (37.2  C), temperature source Oral, resp. rate 16, height 1.778 m (5' 10\"), weight 61.4 kg (135 lb 5.8 oz), SpO2 100 %.  Temperature Temp  Av.9  F (36.6  C)  Min: 96.5  F (35.8  C)  Max: 98.9  F (37.2  C)   I/O last 3 completed shifts:  In: 2600 [P.O.:1000; I.V.:1600]  Out: 375 [Urine:375]  Constitutional:  Awake and in no apparent distress.   Abdomen: Soft, non-distended, appropriately tender at incision(s).    Wound(s): Clean, dry, and intact. No erythema or drainage.    Extremities: No edema or calf tenderness. +SCDs          Data:     Recent Labs   Lab Test 22  1100 22  0729 22  1318   WBC 16.6* 15.0* 23.5*   HGB 10.1* 10.8* 13.3   HCT 31.8* 34.8* 42.8   * 366 639*      Recent Labs   Lab Test 22  0739 22  1318 22  1506    137 137   POTASSIUM 4.3 3.5 4.2   CHLORIDE 112* 107 106   CO2 19* 25 24   BUN 15 19 15   CR 0.95 1.04 0.86     ROMAIN MorilloC  [facility]  [phone]  "

## 2022-02-08 NOTE — PLAN OF CARE
POD 0 lap cholecystectomy. VSS on RA, A/O x4. 4 lap sites, CDI. Managing incisional and upper abdominal pain with PRN oxy.  Pt voiding in urinal, tea colored output. BS heard in all quadrants, hypoactive. Pt tolerating regular diet, very hungry.  Ambulating with standby assist.

## 2022-02-08 NOTE — ANESTHESIA POSTPROCEDURE EVALUATION
Patient: Bc German    Procedure: Procedure(s):  CHOLECYSTECTOMY, LAPAROSCOPIC       Diagnosis:Acute cholecystitis [K81.0]  Diagnosis Additional Information: No value filed.    Anesthesia Type:  General    Note:     Postop Pain Control: Uneventful            Sign Out: Well controlled pain   PONV: No   Neuro/Psych: Uneventful            Sign Out: Acceptable/Baseline neuro status   Airway/Respiratory: Uneventful            Sign Out: Acceptable/Baseline resp. status   CV/Hemodynamics: Uneventful            Sign Out: Acceptable CV status; No obvious hypovolemia; No obvious fluid overload   Other NRE: NONE   DID A NON-ROUTINE EVENT OCCUR? No           Last vitals:  Vitals Value Taken Time   /81 02/07/22 1820   Temp 36.7  C (98.1  F) 02/07/22 1723   Pulse 87 02/07/22 1821   Resp 12 02/07/22 1821   SpO2 100 % 02/07/22 1821   Vitals shown include unvalidated device data.    Electronically Signed By: Bc Payan MD  February 7, 2022  7:28 PM

## 2022-02-08 NOTE — OP NOTE
Surgeon: Lico Rodriguez MD  1st Assistant: Dana Bhat PA-C, The physicians assistant was medically necessary for their expertise in camera management, suctioning, suturing, and retraction.  PREOPERATIVE DIAGNOSIS: Acute cholecystitis.   POSTOPERATIVE DIAGNOSES: Same  PROCEDURE: Laparoscopic cholecystectomy (D3 due to severe inflammation.)  ANESTHESIA: General.   ESTIMATED BLOOD LOSS: Less than 50 mL.   OPERATIVE PROCEDURE: After induction of general endotracheal anesthesia, Bc German abdomen was prepped and draped in the usual sterile fashion. With the Reinier technique in an periumbilical location, the abdomen was entered and pneumoperitoneum was established. Three additional 5 mm trocars were placed along the right hypochondrium.  There was significant inflammatory process in the right upper quadrant, the omentum, transverse colon, stomach, and liver were severely at times to a very inflamed and distended gallbladder.  10 to 15 minutes were spent lysing these adhesions carefully, no injuries noted.  In order to properly grasped the gallbladder this was decompressed.  Eventually the gallbladder fundus was retracted cephalad and lateral and the infundibulum was retracted caudad and lateral. The peritoneum investing the triangle of Calot was incised with electrocautery and dissected bluntly. The critical view of a single cystic duct, single cystic artery was achieved and both structures were doubly clipped on the patient's side, singly clipped on the gallbladder side and transected sharply. The gallbladder was detached from the liver with electrocautery and blunt dissection. The specimen was removed from the patient's abdomen and sent to pathology.  A layer of Vistaseal material was applied to the dissection bed.  The gallbladder fossa was inspected. Hemostasis was secured, and no evidence of bile leakage noted. The abdomen was surveyed and no other pathology seen. The trocars were then removed under direct  visualization without evidence of bleeding. Periumbilical port was closed with multiple interrupted 0 Vicryl suture. Skin was approximated with absorbable sutures. Steri-Strips and sterile dressing applied. No immediate complications.   ELLE KWON MD

## 2022-02-08 NOTE — PROGRESS NOTES
JAREN  D: SW arrived in pt's room and inquired if he would complete an initial assessment. Pt stated he just fell asleep and wanted to be seen later.     P: SW to follow up later    HODAN Vega     [facility]

## 2022-02-08 NOTE — CONSULTS
Care Management Initial Consult    General Information  Assessment completed with: Walt Lord  Type of CM/SW Visit: Initial Assessment    Primary Care Provider verified and updated as needed: Yes   Readmission within the last 30 days: previous discharge plan unsuccessful      Reason for Consult: discharge planning  Advance Care Planning: Advance Care Planning Reviewed: other (comment) (No docs )          Communication Assessment  Patient's communication style: spoken language (English or Bilingual)             Cognitive  Cognitive/Neuro/Behavioral: WDL  Level of Consciousness: alert  Arousal Level: opens eyes spontaneously  Orientation: oriented x 4  Mood/Behavior: calm,cooperative  Best Language: 0 - No aphasia  Speech: clear    Living Environment:   People in home: other relative(s) (lives with mom too: [address]. [place], [place] )     Current living Arrangements: house (stays between facesheet address and mothers')      Able to return to prior arrangements:         Family/Social Support:  Care provided by: self  Provides care for: other (see comments) ([facility])  Marital Status: Single  Sibling(s),Other (specify) (mom, dad, son, aunts, cousins )          Description of Support System: Involved,Supportive         Current Resources:   Patient receiving home care services: No     Community Resources: Other (see comment) (Pt was set up to attend [facility] for c\d)  Equipment currently used at home:    Supplies currently used at home:      Employment/Financial:  Employment Status: unemployed        Financial Concerns: No concerns identified   Referral to Financial Counselor: No       Lifestyle & Psychosocial Needs:  Social Determinants of Health     Tobacco Use: High Risk     Smoking Tobacco Use: Current Every Day Smoker     Smokeless Tobacco Use: Never Used   Alcohol Use: Not on file   Financial Resource Strain: Not on file   Food Insecurity: Not on file   Transportation Needs: Not on  file   Physical Activity: Not on file   Stress: Not on file   Social Connections: Not on file   Intimate Partner Violence: Not on file   Depression: Not on file   Housing Stability: Not on file       Functional Status:  Prior to admission patient needed assistance:              Mental Health Status:          Chemical Dependency Status:                Values/Beliefs:  Spiritual, Cultural Beliefs, Mu-ism Practices, Values that affect care: no               Additional Information:  SW met with pt in his room.  Pt wanted to rest earlier. Pt reports he is staying at the address on the facesheet and at his mother's home at [address], [place]. Pt reports he was an IV drug user until November of last year. He states staying clean has been difficult and his only support systems are his mother, father, son, cousins, and aunts. Pt states that he would like to make more clean friends. SW asked about NA and pt states he used to attend. Pt requested info on NA groups close to his homes. Pt also stated he was set up to attend C\D treatment at [facility] in [place]. SW did call this agency and they requested SW call their intake line in [place] ([phone]) for more information on pt's placement. SW left a VM with  Heidi in intake. JAREN printed off the NA meeting schedules.         HODAN Gloria

## 2022-02-09 LAB
ANION GAP SERPL CALCULATED.3IONS-SCNC: 4 MMOL/L (ref 3–14)
BUN SERPL-MCNC: 7 MG/DL (ref 7–30)
CALCIUM SERPL-MCNC: 8.2 MG/DL (ref 8.5–10.1)
CHLORIDE BLD-SCNC: 111 MMOL/L (ref 94–109)
CO2 SERPL-SCNC: 26 MMOL/L (ref 20–32)
CREAT SERPL-MCNC: 0.82 MG/DL (ref 0.66–1.25)
ERYTHROCYTE [DISTWIDTH] IN BLOOD BY AUTOMATED COUNT: 13 % (ref 10–15)
GFR SERPL CREATININE-BSD FRML MDRD: >90 ML/MIN/1.73M2
GLUCOSE BLD-MCNC: 104 MG/DL (ref 70–99)
HCT VFR BLD AUTO: 29.2 % (ref 40–53)
HGB BLD-MCNC: 9.3 G/DL (ref 13.3–17.7)
MAGNESIUM SERPL-MCNC: 1.6 MG/DL (ref 1.8–2.6)
MCH RBC QN AUTO: 28.4 PG (ref 26.5–33)
MCHC RBC AUTO-ENTMCNC: 31.8 G/DL (ref 31.5–36.5)
MCV RBC AUTO: 89 FL (ref 78–100)
PATH REPORT.COMMENTS IMP SPEC: NORMAL
PATH REPORT.COMMENTS IMP SPEC: NORMAL
PATH REPORT.FINAL DX SPEC: NORMAL
PATH REPORT.GROSS SPEC: NORMAL
PATH REPORT.MICROSCOPIC SPEC OTHER STN: NORMAL
PATH REPORT.RELEVANT HX SPEC: NORMAL
PHOSPHATE SERPL-MCNC: 1.7 MG/DL (ref 2.5–4.5)
PHOTO IMAGE: NORMAL
PLATELET # BLD AUTO: 460 10E3/UL (ref 150–450)
POTASSIUM BLD-SCNC: 3.2 MMOL/L (ref 3.4–5.3)
RBC # BLD AUTO: 3.27 10E6/UL (ref 4.4–5.9)
SODIUM SERPL-SCNC: 141 MMOL/L (ref 133–144)
WBC # BLD AUTO: 11.6 10E3/UL (ref 4–11)

## 2022-02-09 PROCEDURE — 250N000013 HC RX MED GY IP 250 OP 250 PS 637: Performed by: PHYSICIAN ASSISTANT

## 2022-02-09 PROCEDURE — 88304 TISSUE EXAM BY PATHOLOGIST: CPT | Mod: 26 | Performed by: PATHOLOGY

## 2022-02-09 PROCEDURE — 99232 SBSQ HOSP IP/OBS MODERATE 35: CPT | Performed by: HOSPITALIST

## 2022-02-09 PROCEDURE — 250N000011 HC RX IP 250 OP 636: Performed by: PHYSICIAN ASSISTANT

## 2022-02-09 PROCEDURE — 82310 ASSAY OF CALCIUM: CPT | Performed by: HOSPITALIST

## 2022-02-09 PROCEDURE — 250N000013 HC RX MED GY IP 250 OP 250 PS 637: Performed by: HOSPITALIST

## 2022-02-09 PROCEDURE — 85027 COMPLETE CBC AUTOMATED: CPT | Performed by: HOSPITALIST

## 2022-02-09 PROCEDURE — 999N000127 HC STATISTIC PERIPHERAL IV START W US GUIDANCE

## 2022-02-09 PROCEDURE — 120N000001 HC R&B MED SURG/OB

## 2022-02-09 PROCEDURE — 84100 ASSAY OF PHOSPHORUS: CPT | Performed by: HOSPITALIST

## 2022-02-09 PROCEDURE — 83735 ASSAY OF MAGNESIUM: CPT | Performed by: HOSPITALIST

## 2022-02-09 PROCEDURE — G0463 HOSPITAL OUTPT CLINIC VISIT: HCPCS

## 2022-02-09 RX ORDER — OXYCODONE HYDROCHLORIDE 5 MG/1
5-10 TABLET ORAL EVERY 6 HOURS PRN
Qty: 15 TABLET | Refills: 0 | Status: SHIPPED | OUTPATIENT
Start: 2022-02-09 | End: 2022-02-10

## 2022-02-09 RX ORDER — POTASSIUM CHLORIDE 1500 MG/1
40 TABLET, EXTENDED RELEASE ORAL ONCE
Status: COMPLETED | OUTPATIENT
Start: 2022-02-09 | End: 2022-02-09

## 2022-02-09 RX ORDER — NALOXONE HYDROCHLORIDE 0.4 MG/ML
0.2 INJECTION, SOLUTION INTRAMUSCULAR; INTRAVENOUS; SUBCUTANEOUS
Status: DISCONTINUED | OUTPATIENT
Start: 2022-02-09 | End: 2022-02-10 | Stop reason: HOSPADM

## 2022-02-09 RX ORDER — NALOXONE HYDROCHLORIDE 0.4 MG/ML
0.4 INJECTION, SOLUTION INTRAMUSCULAR; INTRAVENOUS; SUBCUTANEOUS
Status: DISCONTINUED | OUTPATIENT
Start: 2022-02-09 | End: 2022-02-10 | Stop reason: HOSPADM

## 2022-02-09 RX ORDER — IBUPROFEN 600 MG/1
600 TABLET, FILM COATED ORAL EVERY 6 HOURS PRN
Status: DISCONTINUED | OUTPATIENT
Start: 2022-02-09 | End: 2022-02-10 | Stop reason: HOSPADM

## 2022-02-09 RX ORDER — MAGNESIUM CARB/ALUMINUM HYDROX 105-160MG
148 TABLET,CHEWABLE ORAL ONCE
Status: COMPLETED | OUTPATIENT
Start: 2022-02-09 | End: 2022-02-09

## 2022-02-09 RX ORDER — AMOXICILLIN 250 MG
1 CAPSULE ORAL 2 TIMES DAILY
Qty: 15 TABLET | Refills: 0 | Status: SHIPPED | OUTPATIENT
Start: 2022-02-09 | End: 2022-02-10

## 2022-02-09 RX ORDER — IBUPROFEN 600 MG/1
600 TABLET, FILM COATED ORAL EVERY 6 HOURS PRN
Qty: 30 TABLET | Refills: 0 | Status: ON HOLD | OUTPATIENT
Start: 2022-02-09 | End: 2022-12-12

## 2022-02-09 RX ADMIN — TAMSULOSIN HYDROCHLORIDE 0.4 MG: 0.4 CAPSULE ORAL at 08:50

## 2022-02-09 RX ADMIN — PIPERACILLIN AND TAZOBACTAM 4.5 G: 4; .5 INJECTION, POWDER, FOR SOLUTION INTRAVENOUS at 05:41

## 2022-02-09 RX ADMIN — SIMETHICONE 80 MG: 80 TABLET, CHEWABLE ORAL at 14:14

## 2022-02-09 RX ADMIN — OXYCODONE HYDROCHLORIDE 10 MG: 5 TABLET ORAL at 22:13

## 2022-02-09 RX ADMIN — QUETIAPINE FUMARATE 25 MG: 25 TABLET ORAL at 22:07

## 2022-02-09 RX ADMIN — SIMETHICONE 80 MG: 80 TABLET, CHEWABLE ORAL at 08:49

## 2022-02-09 RX ADMIN — POTASSIUM CHLORIDE 40 MEQ: 1500 TABLET, EXTENDED RELEASE ORAL at 19:34

## 2022-02-09 RX ADMIN — LEVOMILNACIPRAN HYDROCHLORIDE 60 MG: 40 CAPSULE, EXTENDED RELEASE ORAL at 08:49

## 2022-02-09 RX ADMIN — POTASSIUM & SODIUM PHOSPHATES POWDER PACK 280-160-250 MG 1 PACKET: 280-160-250 PACK at 19:35

## 2022-02-09 RX ADMIN — OXYCODONE HYDROCHLORIDE 10 MG: 5 TABLET ORAL at 08:49

## 2022-02-09 RX ADMIN — IBUPROFEN 600 MG: 600 TABLET ORAL at 16:31

## 2022-02-09 RX ADMIN — ABACAVIR SULFATE, DOLUTEGRAVIR SODIUM, LAMIVUDINE 1 TABLET: 600; 50; 300 TABLET, FILM COATED ORAL at 08:49

## 2022-02-09 RX ADMIN — SENNOSIDES AND DOCUSATE SODIUM 1 TABLET: 50; 8.6 TABLET ORAL at 08:49

## 2022-02-09 RX ADMIN — LORAZEPAM 1 MG: 1 TABLET ORAL at 20:25

## 2022-02-09 RX ADMIN — PREGABALIN 150 MG: 50 CAPSULE ORAL at 08:48

## 2022-02-09 RX ADMIN — FAMOTIDINE 20 MG: 20 TABLET ORAL at 08:49

## 2022-02-09 RX ADMIN — SIMETHICONE 80 MG: 80 TABLET, CHEWABLE ORAL at 22:05

## 2022-02-09 RX ADMIN — OXYCODONE HYDROCHLORIDE 10 MG: 5 TABLET ORAL at 14:14

## 2022-02-09 RX ADMIN — MAGNESIUM CITRATE 148 ML: 1.75 LIQUID ORAL at 20:28

## 2022-02-09 RX ADMIN — LORAZEPAM 1 MG: 1 TABLET ORAL at 08:49

## 2022-02-09 RX ADMIN — SENNOSIDES AND DOCUSATE SODIUM 1 TABLET: 50; 8.6 TABLET ORAL at 20:25

## 2022-02-09 RX ADMIN — SIMETHICONE 80 MG: 80 TABLET, CHEWABLE ORAL at 18:11

## 2022-02-09 RX ADMIN — ASPIRIN 81 MG: 81 TABLET, COATED ORAL at 08:48

## 2022-02-09 RX ADMIN — PIPERACILLIN AND TAZOBACTAM 4.5 G: 4; .5 INJECTION, POWDER, FOR SOLUTION INTRAVENOUS at 18:13

## 2022-02-09 ASSESSMENT — ACTIVITIES OF DAILY LIVING (ADL)
ADLS_ACUITY_SCORE: 16

## 2022-02-09 NOTE — PLAN OF CARE
POD #1 lap corey. VSS, A/O x4, pleasant and cooperative. Lap sites CDI. Pain managed with Oxycodone and Toradol. Good UOP- color improved to straw yellow. ABD firm slightly distended- passed flatus. Needs encouragement to move. Suggest staying to clear liquids until stomach distention improves and/or passing passing flatus-then can advance. Has scheduled Mylicon. IVF w/ int ABX. Up IND in room. Maintain contact precautions.

## 2022-02-09 NOTE — PLAN OF CARE
POD #1 lap corey. VSS, A/O x4, pleasant and cooperative. Lap sites CDI. Pain managed with Oxycodone and Toradol. Good UOP- color improved to straw yellow. ABD firm slightly distended- passed flatus. Needs encouragement to move. Suggest staying to clear liquids until stomach distention improves and/or passing passing flatus. Has scheduled Mylicon. IVF w/ int ABX. Up IND in room. Maintain contact precautions.

## 2022-02-09 NOTE — PLAN OF CARE
POD #2 lap corey: A&Ox4. VSS, refused cont pulse ox overnight. C/o mild abdominal pain, controlled w/ oxy and toradol. Lap sites CDI. Pt has wound on L thumb, WOC consult suggested. BS hypoactive, passing flatus, slightly distended. Urine output improving and becoming more clear. Up independently in room, needs encouragement to move. Diet advanced. Maintain contact precautions.

## 2022-02-09 NOTE — PROGRESS NOTES
"[facility]  GENERAL SURGERY Progress Note    Admission Date: 2022         Assessment and Plan:     Bc German is a 41 year old male s/p lap corey, POD 1.  - ADAT  - Pain control-  Oxy PRN, discontinue Toradol IV- transition to Ibuprofen, discontinue Dilaudid  - WBC 16.6-->pending, continue Zosyn for now, hoping for trend down  - Continue Pepcid  - Ambulate 4x day and encourage IS  - Med management per hospitalist, much appreciate your assistance  - Discharge when medically able  - Discharge instructions/RX in chart  - Please call with questions             Interval History:     Pain controlled, tolerating diet, +flatus/BM, urinating without difficulty, ambulating.  Meds reviewed.                      Physical Exam:   Blood pressure (!) 137/95, pulse 74, temperature 97.6  F (36.4  C), temperature source Oral, resp. rate 12, height 1.778 m (5' 10\"), weight 61.4 kg (135 lb 5.8 oz), SpO2 100 %.  Temperature Temp  Av.7  F (36.5  C)  Min: 97.6  F (36.4  C)  Max: 97.8  F (36.6  C)   I/O last 3 completed shifts:  In: 16 [P.O.:16]  Out: 380 [Urine:380]  Constitutional:  Awake and in no apparent distress.   Abdomen: Soft, non-distended, appropriately tender at incision(s).   Wound(s): Clean, dry, and intact. No erythema or drainage.    Extremities: No edema or calf tenderness. +SCDs          Data:      Recent Labs   Lab Test 22  1100 22  0729 22  1318   WBC 16.6* 15.0* 23.5*   HGB 10.1* 10.8* 13.3   HCT 31.8* 34.8* 42.8   * 366 639*      ROMAIN MorilloC  [facility]  [phone]  "

## 2022-02-09 NOTE — CONSULTS
[facility]  WO Nurse Inpatient Wound Assessment     Reason for consultation: Evaluate and treat left thumb      Assessment  Left thumb: chronic small dry wound from pt picking at area, no acute s/s infection    Treatment Plan  Left thumb wound: Daily and prn:  1.  Cleanse with wound cleanser  2.  Swab periwound with regular skin prep, let dry  3.  Apply small amount Skintegrity wound gel onto wound/scab  4.  Cover with PolyMem strip, trim as needed, secure with extra Medipore tape prn    If pt noted to be picking at skin, redirect pt and have him apply Sween 24 cream to any itching areas    Orders Written  Recommended provider order: None, at this time  WO Nurse follow-up plan: weekly  Nursing to notify the Provider(s) and re-consult the WO Nurse if wound(s) deteriorates or new skin concern.    Patient History  According to provider note(s):    Bc German is a 41 year old male with complex past medical history significant for history of IV drug abuse, has been sober since November 15, 2020, MSSA/MRSA bacteremia with tricuspid valve endocarditis, severe tricuspid regurgitation due to endocarditis, history of pulmonary valve endocarditis in 2015, history of lumbar spine osteomyelitis with epidural abscess which required surgical intervention in 2017, HIV, essential hypertension, hepatitis C, history of depression and anxiety who was recently admitted to the hospital for 6 weeks from November 15 to December 30 for treatment of MSSA/MRSA bacteremia with tricuspid valve endocarditis and was discharged home, since then patient has been sober.  Patient has now developed 1 week of right-sided abdominal discomfort associated with vomiting, poor appetite he is also complaining of watery diarrhea going on for 5 days and is admitted for further evaluation and management.    Objective Data  Containment of urine/stool: Continent of bladder and Continent of bowel    Active Diet Order:  Orders  Placed This Encounter      Regular Diet Adult    Output:   I/O last 3 completed shifts:  In: 800 [P.O.:800]  Out: -     Risk Assessment:   Sensory Perception: 4-->no impairment  Moisture: 4-->rarely moist  Activity: 3-->walks occasionally  Mobility: 3-->slightly limited  Nutrition: 3-->adequate  Friction and Shear: 3-->no apparent problem  Bari Score: 20                          Labs: Recent Labs   Lab 02/08/22  1100 02/07/22  0739 02/07/22  0729 02/06/22  1318   ALBUMIN  --  1.9*  --  2.8*   HGB 10.1*  --    < > 13.3   WBC 16.6*  --    < > 23.5*   CRP  --   --   --  130.0*    < > = values in this interval not displayed.       Physical Exam  Skin inspection: focused hands    Wound Location:  Left dorsal base of thumb  Date of last photo:  2-9-22      Wound History: pt states he has been picking at area.  States it has been improving overall.  Measurements (length x width x depth, in cm):  approx 0.7 x 0.5 x 0.2+cm   Wound Base: brown-red soft dry scabby material  Tunneling: N/A  Undermining: N/A  Palpation of the wound bed: firm   Periwound skin: intact, dry/scaly and erythema- blanchable  Color: pink  Temperature: normal   Drainage: none  Description of drainage: none  Odor: none  Pain: mild    Interventions  Current support surface: Standard  Atmos Air mattress  Current off-loading measures: Pillows  Visual inspection of wound(s) completed  Wound Care: done per plan of care  Supplies: reviewed, gathered and placed at the bedside  Education provided: plan of care, wound progress and Infection prevention   Discussed plan of care with Patient and Nurse    Paula Marroquin RN, CWOCN

## 2022-02-09 NOTE — DISCHARGE INSTRUCTIONS
[facility] - [facility]ULT[facility]  Discharge Instructions: Post-Operative Laparoscopic Cholecystectomy    ACTIVITY    Expect to feel tired after your surgery.  This will gradually resolve.      Take frequent, short walks and increase your activity gradually.      Avoid strenuous physical activity or heavy lifting greater than 15-20 lbs. for 2-3 weeks.  You may climb stairs.    You may drive without restrictions when you are not using any prescription pain medication and feel comfortable in a car.    You may return to work/school when you are comfortable without any prescription pain medication.    WOUND CARE    You may remove your outer dressing or Band-Aids and shower 48 hours after the surgery.  Pat your incisions dry and leave them open to air.  Re-apply dressing (Band-Aids or gauze/tape) as needed for comfort or drainage.    You may have steri-strips (looks like white tape) on your incision.  You may peel off the steri-strips 2 weeks after your surgery if they have not peeled off on their own.     Do not soak your incisions in a tub or pool for 2 weeks.     Do not apply any lotions, creams, or ointments to your incisions.    A ridge under your incisions is normal and will gradually resolve.    DIET    Start with liquids, then gradually resume your regular diet as tolerated.  Avoid heavy, spicy, and greasy meals for 2-3 days.    Drink plenty of fluids to stay hydrated.    It is not uncommon to experience some loose stools or diarrhea after surgery.  This is your body's way of adapting to the bile which will slowly drain into your intestine.  A low fat diet may help with this.  This should improve over 1-2 months.    PAIN    Expect some tenderness and discomfort at the incision sites.  Use the prescribed pain medication at your discretion.  Expect gradual resolution of your pain over several days.    You may take ibuprofen with food (unless you have been told not to) or acetaminophen/Tylenol instead of or  in addition to your prescribed pain medication.  However, if you are taking Norco or Percocet, do not take any additional acetaminophen/Tylenol.    Do not drink alcohol or drive while you are taking pain medications.    You may apply ice to your incisions in 20 minute intervals as needed for the next 48 hours.  After that time, consider switching to heat if you prefer.    EXPECTATIONS    Pain medications can cause constipation.  Limit use when possible.  Take an over the counter or prescribed stool softener/stimulant, such as Colace or Senna, 1-2 times a day with plenty of water.  You may take a mild over the counter laxative, such as Miralax or a suppository, as needed.  You may discontinue these medications once you are having regular bowel movements and/or are no longer taking your narcotic pain medication.      You may have shoulder or upper back discomfort due to the gas used in surgery.  This is temporary and should resolve in 48-72 hours.  Short, frequent walks may help with this.    If you are unable to urinate for 8 hours or feel as though you are not emptying your bladder adequately, we recommend you seek care at an ER or Urgent Care facility for possible catheter placement.     FOLLOW UP    Our office will contact you in approximately 2-3 weeks to check on your progress and answer any questions you may have.  If you are doing well, you will not need to return for a follow up appointment.  If any concerns are identified over the phone, we will help you make an appointment to see a provider.     If you have not received a phone call, have any questions or concerns, or would like to be seen, please call us at [phone] and ask to speak with our nurse.  We are located at [address] [place] [address].    CALL OUR OFFICE -[phone] IF YOU HAVE:     Chills or fever above 101 F.    Increased redness, warmth, or drainage at your incisions.    Significant bleeding.    Pain not  relieved by your pain medication or rest.    Increasing pain after the first 48 hours.    Any other concerns or questions.                      Revised December 2021

## 2022-02-09 NOTE — PROGRESS NOTES
[facility]    Hospitalist Progress Note      Assessment & Plan   Bc German is a 41 year old male with complex past medical history significant for history of IV drug abuse, has been sober since November 15, 2020, MSSA/MRSA bacteremia with tricuspid valve endocarditis, severe tricuspid regurgitation due to endocarditis, history of pulmonary valve endocarditis in 2015, history of lumbar spine osteomyelitis with epidural abscess which required surgical intervention in 2017, HIV, essential hypertension, hepatitis C, history of depression and anxiety who was recently admitted to the hospital for 6 weeks from November 15 to December 30 for treatment of MSSA/MRSA bacteremia with tricuspid valve endocarditis and was discharged home, since then patient has been sober.  Patient has now developed 1 week of right-sided abdominal discomfort associated with vomiting, poor appetite he is also complaining of watery diarrhea going on for 5 days and is admitted for further evaluation and management.    Acute cholecystitis (2/6/2022) s/p lap cholecystectomy 2/7  Right-sided flank and right upper quadrant discomfort going on for 7 days. WBC count of 23.5, LA 2.0, . CT A/P: distended gallbladder with wall thickening and edgar hepatis fluid and stranding which is consistent with acute cholecystitis there is also wall thickening of the urinary bladder which may be secondary to cystitis.  Acute cystitis with hematuria (2/6/2022): Patient is denying any urinary urgency frequency or burning he has not noticed any hematuria although his urine is showing large blood, small leukocytes, WBC count of 71 and red blood cell count of more than 182. Urine cx without growth.   Ongoing diarrhea: Recent use of broad-spectrum antibiotics, patient has finished course of IV daptomycin and IV vancomycin at the end of December. C diff negative.  Sepsis: Patient is presenting with pulse of 101, elevated CRP and elevated  WBC count.  At the time of presentation patient does not have any signs and symptoms of severe sepsis or septic shock.  Considering duration of 1 week of symptoms and infection on multiple sites, blood cultures need to be followed up to rule out bacteremia.  - appreciate general surgery  - IV zosyn continued, if all cultures negative and WBC back to normal, then will discontinue  - blood cx NGTD  - advance diet as tolerated  - PRN pain meds with tylenol, toradol and oxycodone  - encourage IS, ambulation     Recent hospitalization from November 15 to December 30 with MSSA/MRSA bacteremia with tricuspid valve endocarditis: Resolved  Severe tricuspid regurgitation due to endocarditis  History of pulmonary valve endocarditis due to MSSA in 2015  History of lumbar spine L3 osteomyelitis with epidural abscess requiring surgical intervention in 2017  --Patient completed his 6-week course of IV vancomycin and IV daptomycin on December 26, was also evaluated by CV surgery who will consider valve repair after patient completes chemical dependency treatment and if he remains sober  --So far patient has been sober   --According to patient he had a repeat TTE on December 27 which showed resolution of vegetations and improvement in TR patient did not have any signs and symptoms of right heart failure at that point  * echo 2/8/22: LVEF 55-60%, no regional WMA. Mild MR. Moderate severe TR. TR appears slightly increased compared to December imaging.     History of essential hypertension:  --Patient did not require any antihypertensive medications during his last hospitalization and his blood pressure was stable during the stay.     HIV  Hepatitis C  Chronic and stable on Triumeq.   CDC >200 so no need for PJP ppx per ID.  --Follows with Dr Chavez [facility]/ID.  --HIV-1 RNA undetectable on 12/19/21 and 12/22/21. Triumeq placed on hold on 12/27/21 due to elevated LFTs, resumed as mentioned above.  Hep C still  active/viremic and will need to be on treatment at some point.     History of opiate dependence  History of IV drug use, Sober since Nov 15 2021  Tobacco Use  Depression  Anxiety  Prior to his admission in November, patient was a started on Soma taper off.  During his last hospitalization consultation was done with Dr. Barrientos from [facility] addiction medicine.  He was switched to Suboxone twice daily on December 8 he was continued on PTA levomilnacipran extended release, was tapered off Carisoprodol during this stay.  Patient was also evaluated by psychiatry during his stay and he was a started on Lyrica 75 mg p.o. twice daily.  He was also evaluated by chemical dependency and was recommended to undergo treatment at that time patient was in the process of deciding if he would get chemical dependency treatment in [place] versus locally in [facility].  Patient was discharged to his parents house in the end of December.  Patient was also discharged on lorazepam 0.5 mg 3 times daily as needed and they did not recommend tapering him off his Ativan as he was very high risk for relapse in the picture of his recent infection.  --uses suboxone as needed  --His Fetzmia dose is 60 mg daily  --PTA lorazepam 1 mg twice daily, ordered  --According to patient his Lyrica dose is now 150 mg p.o. daily, ordered his medications at PTA dose  - seroquel 50mg at bedtime per patient request    Hypomagnesemia  Hypophosphatemia  Replete per protocol.  - mag check in AM    Clinically Significant Risk Factors Present on Admission                    DVT Prophylaxis: Pneumatic Compression Devices  Code Status: Full Code  Expected Discharge: 02/09/2022   Anticipated discharge location: home with family    Delays:     Lab Result Pending (enter specific test & time in comments)  Administering IV medications   antibiotic plan, diet and pain tolerance    Ana Watson DO  Hospitalist Service  [facility]  Securely message  with the [employer] Web Console (learn more here)  Text Page (7am - 6pm) via [facility] Paging/Directory      Interval History   Patient seen and examined. He has a lot of abdominal discomfort today. Encouraged ambulation with him this AM and again reiterated in the evening. Tolerating advancing diet. Passed some gas, no BM. WBC count up but could be related to OR, check again in AM    -Data reviewed today: I reviewed all new labs and imaging results over the last 24 hours. I personally reviewed no images or EKG's today.    Physical Exam   Temp: 97.7  F (36.5  C) Temp src: Oral BP: 121/82 Pulse: 87   Resp: 16 SpO2: 99 % O2 Device: None (Room air)    Vitals:    02/06/22 1250 02/07/22 0623 02/08/22 0627   Weight: 63.5 kg (140 lb) 60.5 kg (133 lb 4.8 oz) 61.4 kg (135 lb 5.8 oz)     Vital Signs with Ranges  Temp:  [96.5  F (35.8  C)-98.9  F (37.2  C)] 97.7  F (36.5  C)  Pulse:  [79-94] 87  Resp:  [11-16] 16  BP: (110-133)/(73-92) 121/82  SpO2:  [97 %-100 %] 99 %  I/O last 3 completed shifts:  In: 2616 [P.O.:1016; I.V.:1600]  Out: 755 [Urine:755]    Constitutional: Awake, alert, cooperative, no apparent distress, chronic ill appearance  Respiratory: Clear to auscultation bilaterally, no crackles or wheezing  Cardiovascular: Regular rate and rhythm, normal S1 and S2, and no murmur noted  GI: hypoactive bowel sounds, bandages/steri strips noted, no bleeding. + mild distention. Tender near incisions  Skin/Integumen: No rashes, no cyanosis, no edema, scattered tattoos, old scabs.  Other:     Medications       abacavir-dolutegravir-LamiVUDine  1 tablet Oral Daily     aspirin  81 mg Oral Daily     famotidine  20 mg Oral Daily     levomilnacipran  60 mg Oral Daily     LORazepam  1 mg Oral BID     piperacillin-tazobactam  4.5 g Intravenous Q6H     pregabalin  150 mg Oral Daily     QUEtiapine  50 mg Oral At Bedtime     senna-docusate  1 tablet Oral BID     simethicone  80 mg Oral 4x Daily     sodium chloride (PF)  3 mL Intracatheter Q8H      tamsulosin  0.4 mg Oral Daily       Data   Recent Labs   Lab 22  1100 22  0739 22  0729 22  1318   WBC 16.6*  --  15.0* 23.5*   HGB 10.1*  --  10.8* 13.3   MCV 90  --  93 91   *  --  366 639*    138  --  137   POTASSIUM 3.8 4.3  --  3.5   CHLORIDE 111* 112*  --  107   CO2 23 19*  --  25   BUN 11 15  --  19   CR 0.88 0.95  --  1.04   ANIONGAP 4 7  --  5   SANDRA 8.0* 7.8*  --  9.9   * 97  --  109*   ALBUMIN  --  1.9*  --  2.8*   PROTTOTAL  --  6.1*  --  8.5   BILITOTAL  --  0.4  --  0.4   ALKPHOS  --  68  --  91   ALT  --  34  --  45   AST  --  29  --  27   LIPASE  --   --   --  157       Recent Results (from the past 24 hour(s))   Echocardiogram Limited   Result Value    LVEF  55-60%    Narrative    [id number]  [id number]  [id number]  [id number]^SHANIA^MARICEL^MAEVE     [facility]  Echocardiography Laboratory  [address] [place], [place] [address]     Name: AMBREEN PYLE  MRN: [id number]  : 1980  Study Date: 2022 10:31 AM  Age: 41 yrs  Gender: Male  Patient Location: [facility]  Reason For Study: SOB  Ordering Physician: MARICEL JAUREGUI  Referring Physician: Khari Barrientos  Performed By: Arabella Rodríguez     BSA: 1.8 m2  Height: 70 in  Weight: 140 lb  HR: 91  BP: 133/92 mmHg  ______________________________________________________________________________  Procedure  Limited Portable Echo Adult.  ______________________________________________________________________________  Interpretation Summary     1. Limited echcoardiogram performed.  2. Left ventricular systolic function is normal. The visual ejection fraction  is 55-60%.  3. No regional wall motion abnormalities noted.  4. The right ventricle is normal in structure, function and size.  5. There is mild (1+) mitral regurgitation.  6. There is moderately severe (3+) tricuspid regurgitation.  7. In comparison with the prior study, TR appears similar to  slightly  increased.  ______________________________________________________________________________  Left Ventricle  The left ventricle is normal in size. Left ventricular systolic function is  normal. The visual ejection fraction is 55-60%. No regional wall motion  abnormalities noted.     Right Ventricle  The right ventricle is normal in structure, function and size.     Mitral Valve  The mitral valve is normal in structure and function. There is mild (1+)  mitral regurgitation.     Tricuspid Valve  The right ventricular systolic pressure is approximated at 23mmHg plus the  right atrial pressure. There is moderately severe (3+) tricuspid  regurgitation.     Aortic Valve  The aortic valve is normal in structure and function.     Pulmonic Valve  The pulmonic valve is normal in structure and function.     Vessels  IVC diameter >2.1 cm collapsing <50% with sniff suggests a high RA pressure  estimated at 15 mmHg or greater.     Rhythm  Sinus rhythm was noted.     ______________________________________________________________________________  Doppler Measurements & Calculations  TR max moises: 237.8 cm/sec  TR max P.6 mmHg     ______________________________________________________________________________  Report approved by: Mili Jonas 2022 04:10 PM

## 2022-02-09 NOTE — PROGRESS NOTES
[facility]    Hospitalist Progress Note      Assessment & Plan   Bc German is a 41 year old male with complex past medical history significant for history of IV drug abuse, has been sober since November 15, 2020, MSSA/MRSA bacteremia with tricuspid valve endocarditis, severe tricuspid regurgitation due to endocarditis, history of pulmonary valve endocarditis in 2015, history of lumbar spine osteomyelitis with epidural abscess which required surgical intervention in 2017, HIV, essential hypertension, hepatitis C, history of depression and anxiety who was recently admitted to the hospital for 6 weeks from November 15 to December 30 for treatment of MSSA/MRSA bacteremia with tricuspid valve endocarditis and was discharged home, since then patient has been sober.  Patient has now developed 1 week of right-sided abdominal discomfort associated with vomiting, poor appetite he is also complaining of watery diarrhea going on for 5 days and is admitted for further evaluation and management.    Acute cholecystitis (2/6/2022) s/p lap cholecystectomy 2/7  Right-sided flank and right upper quadrant discomfort going on for 7 days. WBC count of 23.5, LA 2.0, . CT A/P: distended gallbladder with wall thickening and edgar hepatis fluid and stranding which is consistent with acute cholecystitis there is also wall thickening of the urinary bladder which may be secondary to cystitis.  Acute cystitis with hematuria (2/6/2022): Patient is denying any urinary urgency frequency or burning he has not noticed any hematuria although his urine is showing large blood, small leukocytes, WBC count of 71 and red blood cell count of more than 182. Urine cx without growth.   Ongoing diarrhea: Recent use of broad-spectrum antibiotics, patient has finished course of IV daptomycin and IV vancomycin at the end of December. C diff negative.  Sepsis: Patient is presenting with pulse of 101, elevated CRP and elevated  WBC count.  At the time of presentation patient does not have any signs and symptoms of severe sepsis or septic shock.  Considering duration of 1 week of symptoms and infection on multiple sites, blood cultures need to be followed up to rule out bacteremia.  - appreciate general surgery  - IV zosyn continued until AM, WBC improving  - BC NGTD  - tolerating advanced diet  - PRN pain meds with tylenol, oxycodone  - discharge with 3 more doses augmentin  - encourage ambulation  - one time half dose mag citrate per patient request ordered  - follow up with PCP in one week  - follow up gen surg in 2-3 weeks     Recent hospitalization from November 15 to December 30 with MSSA/MRSA bacteremia with tricuspid valve endocarditis: Resolved  Severe tricuspid regurgitation due to endocarditis  History of pulmonary valve endocarditis due to MSSA in 2015  History of lumbar spine L3 osteomyelitis with epidural abscess requiring surgical intervention in 2017  --Patient completed his 6-week course of IV vancomycin and IV daptomycin on December 26, was also evaluated by CV surgery who will consider valve repair after patient completes chemical dependency treatment and if he remains sober  --So far patient has been sober   --According to patient he had a repeat TTE on December 27 which showed resolution of vegetations and improvement in TR patient did not have any signs and symptoms of right heart failure at that point  * echo 2/8/22: LVEF 55-60%, no regional WMA. Mild MR. Moderate severe TR. TR appears slightly increased compared to December imaging.     History of essential hypertension:  --Patient did not require any antihypertensive medications during his last hospitalization and his blood pressure was stable during the stay.     HIV  Hepatitis C  Chronic and stable on Triumeq.   CDC >200 so no need for PJP ppx per ID.  --Follows with Dr Chavez of [facility].  --HIV-1 RNA undetectable on 12/19/21 and 12/22/21.  Triumeq placed on hold on 12/27/21 due to elevated LFTs, resumed as mentioned above.  Hep C still active/viremic and will need to be on treatment at some point.     History of opiate dependence  History of IV drug use, Sober since Nov 15 2021  Tobacco Use  Depression  Anxiety  Prior to his admission in November, patient was a started on Soma taper off.  During his last hospitalization consultation was done with Dr. Barrientos from [facility] addiction medicine.  He was switched to Suboxone twice daily on December 8 he was continued on PTA levomilnacipran extended release, was tapered off Carisoprodol during this stay.  Patient was also evaluated by psychiatry during his stay and he was a started on Lyrica 75 mg p.o. twice daily.  He was also evaluated by chemical dependency and was recommended to undergo treatment at that time patient was in the process of deciding if he would get chemical dependency treatment in [place] versus locally in [facility].  Patient was discharged to his parents house in the end of December.  Patient was also discharged on lorazepam 0.5 mg 3 times daily as needed and they did not recommend tapering him off his Ativan as he was very high risk for relapse in the picture of his recent infection.  --uses suboxone as needed  --His Fetzmia dose is 60 mg daily  --PTA lorazepam 1 mg twice daily, ordered  --continue PTA lyrica  - seroquel 50mg at bedtime per patient request    Hypomagnesemia  Hypophosphatemia  Hypokalemia  Replete per protocol.    Left thumb scabbing, chronic wound without infection  Appreciate WOC    Clinically Significant Risk Factors Present on Admission                    DVT Prophylaxis: Pneumatic Compression Devices  Code Status: Full Code  Expected Discharge: 02/10/2022   Anticipated discharge location: home with family    Delays:     Lab Result Pending (enter specific test & time in comments)  Administering IV medications       Ana Watson DO  Hospitalist Service  [facility]  [facility]  Securely message with the [employer] Web Console (learn more here)  Text Page (7am - 6pm) via [facility] Paging/Directory      Interval History   Patient seen and examined. Pain is still pretty bad. He lost IV access during most of the day and it wasn't restored until late. No chest pain, shortness of breath, nausea. Tolerating diet. Got labs late, but looks to be improving. Plan for discharge tomorrow morning if remains stable.    -Data reviewed today: I reviewed all new labs and imaging results over the last 24 hours. I personally reviewed no images or EKG's today.    Physical Exam   Temp: 98.2  F (36.8  C) Temp src: Oral BP: 136/89 Pulse: 88   Resp: 14 SpO2: 98 % O2 Device: None (Room air)    Vitals:    02/06/22 1250 02/07/22 0623 02/08/22 0627   Weight: 63.5 kg (140 lb) 60.5 kg (133 lb 4.8 oz) 61.4 kg (135 lb 5.8 oz)     Vital Signs with Ranges  Temp:  [97.6  F (36.4  C)-98.2  F (36.8  C)] 98.2  F (36.8  C)  Pulse:  [74-88] 88  Resp:  [12-14] 14  BP: (117-137)/(81-95) 136/89  SpO2:  [98 %-100 %] 98 %  I/O last 3 completed shifts:  In: 800 [P.O.:800]  Out: -     Constitutional: Awake, alert, cooperative, no apparent distress, chronic ill appearance  Respiratory: Clear to auscultation bilaterally, no crackles or wheezing  Cardiovascular: Regular rate and rhythm, normal S1 and S2, and no murmur noted  GI: hypoactive bowel sounds, bandages/steri strips noted, no bleeding. + mild distention. Tender near incisions  Skin/Integumen: No rashes, no cyanosis, no edema, scattered tattoos, old scabs.  Other:     Medications       abacavir-dolutegravir-LamiVUDine  1 tablet Oral Daily     aspirin  81 mg Oral Daily     famotidine  20 mg Oral Daily     levomilnacipran  60 mg Oral Daily     LORazepam  1 mg Oral BID     magnesium citrate  148 mL Oral Once     piperacillin-tazobactam  4.5 g Intravenous Q6H     pregabalin  150 mg Oral Daily     QUEtiapine  50 mg Oral At Bedtime     senna-docusate  1 tablet Oral  BID     simethicone  80 mg Oral 4x Daily     sodium chloride (PF)  3 mL Intracatheter Q8H     tamsulosin  0.4 mg Oral Daily       Data   Recent Labs   Lab 02/09/22  1722 02/08/22  1100 02/07/22  0739 02/07/22  0729 02/06/22  1318   WBC 11.6* 16.6*  --  15.0* 23.5*   HGB 9.3* 10.1*  --  10.8* 13.3   MCV 89 90  --  93 91   * 452*  --  366 639*    138 138  --  137   POTASSIUM 3.2* 3.8 4.3  --  3.5   CHLORIDE 111* 111* 112*  --  107   CO2 26 23 19*  --  25   BUN 7 11 15  --  19   CR 0.82 0.88 0.95  --  1.04   ANIONGAP 4 4 7  --  5   SANDRA 8.2* 8.0* 7.8*  --  9.9   * 140* 97  --  109*   ALBUMIN  --   --  1.9*  --  2.8*   PROTTOTAL  --   --  6.1*  --  8.5   BILITOTAL  --   --  0.4  --  0.4   ALKPHOS  --   --  68  --  91   ALT  --   --  34  --  45   AST  --   --  29  --  27   LIPASE  --   --   --   --  157       No results found for this or any previous visit (from the past 24 hour(s)).

## 2022-02-10 VITALS
DIASTOLIC BLOOD PRESSURE: 82 MMHG | RESPIRATION RATE: 18 BRPM | OXYGEN SATURATION: 98 % | WEIGHT: 135.36 LBS | TEMPERATURE: 97.4 F | SYSTOLIC BLOOD PRESSURE: 127 MMHG | HEART RATE: 63 BPM | HEIGHT: 70 IN | BODY MASS INDEX: 19.38 KG/M2

## 2022-02-10 LAB
MAGNESIUM SERPL-MCNC: 1.6 MG/DL (ref 1.8–2.6)
PHOSPHATE SERPL-MCNC: 2.5 MG/DL (ref 2.5–4.5)
POTASSIUM BLD-SCNC: 3.6 MMOL/L (ref 3.4–5.3)

## 2022-02-10 PROCEDURE — 83735 ASSAY OF MAGNESIUM: CPT | Performed by: HOSPITALIST

## 2022-02-10 PROCEDURE — 250N000013 HC RX MED GY IP 250 OP 250 PS 637: Performed by: HOSPITALIST

## 2022-02-10 PROCEDURE — 250N000013 HC RX MED GY IP 250 OP 250 PS 637: Performed by: PHYSICIAN ASSISTANT

## 2022-02-10 PROCEDURE — 84132 ASSAY OF SERUM POTASSIUM: CPT | Performed by: HOSPITALIST

## 2022-02-10 PROCEDURE — 84100 ASSAY OF PHOSPHORUS: CPT | Performed by: HOSPITALIST

## 2022-02-10 PROCEDURE — 250N000011 HC RX IP 250 OP 636: Performed by: PHYSICIAN ASSISTANT

## 2022-02-10 PROCEDURE — 99239 HOSP IP/OBS DSCHRG MGMT >30: CPT | Performed by: HOSPITALIST

## 2022-02-10 RX ORDER — SIMETHICONE 80 MG
80 TABLET,CHEWABLE ORAL 4 TIMES DAILY
Qty: 20 TABLET | Refills: 0 | Status: SHIPPED | OUTPATIENT
Start: 2022-02-10 | End: 2024-02-29

## 2022-02-10 RX ORDER — OXYCODONE HYDROCHLORIDE 5 MG/1
5-10 TABLET ORAL EVERY 6 HOURS PRN
Qty: 8 TABLET | Refills: 0 | Status: ON HOLD | OUTPATIENT
Start: 2022-02-10 | End: 2022-12-12

## 2022-02-10 RX ORDER — AMOXICILLIN 250 MG
1 CAPSULE ORAL 2 TIMES DAILY
Qty: 5 TABLET | Refills: 0 | Status: SHIPPED | OUTPATIENT
Start: 2022-02-10 | End: 2022-04-19

## 2022-02-10 RX ORDER — ACETAMINOPHEN 325 MG/1
650 TABLET ORAL EVERY 6 HOURS PRN
COMMUNITY
Start: 2022-02-10 | End: 2024-02-29

## 2022-02-10 RX ADMIN — PIPERACILLIN AND TAZOBACTAM 4.5 G: 4; .5 INJECTION, POWDER, FOR SOLUTION INTRAVENOUS at 00:08

## 2022-02-10 RX ADMIN — POTASSIUM & SODIUM PHOSPHATES POWDER PACK 280-160-250 MG 1 PACKET: 280-160-250 PACK at 09:53

## 2022-02-10 RX ADMIN — PIPERACILLIN AND TAZOBACTAM 4.5 G: 4; .5 INJECTION, POWDER, FOR SOLUTION INTRAVENOUS at 04:34

## 2022-02-10 RX ADMIN — LORAZEPAM 1 MG: 1 TABLET ORAL at 09:51

## 2022-02-10 RX ADMIN — POTASSIUM & SODIUM PHOSPHATES POWDER PACK 280-160-250 MG 1 PACKET: 280-160-250 PACK at 12:18

## 2022-02-10 RX ADMIN — ASPIRIN 81 MG: 81 TABLET, COATED ORAL at 09:51

## 2022-02-10 RX ADMIN — SIMETHICONE 80 MG: 80 TABLET, CHEWABLE ORAL at 12:18

## 2022-02-10 RX ADMIN — OXYCODONE HYDROCHLORIDE 5 MG: 5 TABLET ORAL at 12:18

## 2022-02-10 RX ADMIN — PREGABALIN 150 MG: 50 CAPSULE ORAL at 09:52

## 2022-02-10 RX ADMIN — SIMETHICONE 80 MG: 80 TABLET, CHEWABLE ORAL at 09:51

## 2022-02-10 RX ADMIN — IBUPROFEN 600 MG: 600 TABLET ORAL at 09:10

## 2022-02-10 RX ADMIN — PIPERACILLIN AND TAZOBACTAM 4.5 G: 4; .5 INJECTION, POWDER, FOR SOLUTION INTRAVENOUS at 11:56

## 2022-02-10 RX ADMIN — FAMOTIDINE 20 MG: 20 TABLET ORAL at 09:51

## 2022-02-10 RX ADMIN — TAMSULOSIN HYDROCHLORIDE 0.4 MG: 0.4 CAPSULE ORAL at 09:51

## 2022-02-10 RX ADMIN — ABACAVIR SULFATE, DOLUTEGRAVIR SODIUM, LAMIVUDINE 1 TABLET: 600; 50; 300 TABLET, FILM COATED ORAL at 09:52

## 2022-02-10 RX ADMIN — LEVOMILNACIPRAN HYDROCHLORIDE 60 MG: 40 CAPSULE, EXTENDED RELEASE ORAL at 09:51

## 2022-02-10 ASSESSMENT — ACTIVITIES OF DAILY LIVING (ADL)
ADLS_ACUITY_SCORE: 16

## 2022-02-10 NOTE — PROGRESS NOTES
"Surgery    No complaints  Denies pain    Gen:  Awake, Alert, NAD  Blood pressure 127/82, pulse 63, temperature 97.4  F (36.3  C), temperature source Oral, resp. rate 18, height 1.778 m (5' 10\"), weight 61.4 kg (135 lb 5.8 oz), SpO2 98 %.  Resp - Non-labored    A/P s/p laparoscopic cholecystectomy on 2/7/22.    - dispo: Home today  - instructed  - no further questions  - surgery team will follow up in about 2 weeks with phone call    Melvin Young PA-C  Office: [phone]  Pager: [phone]    "

## 2022-02-10 NOTE — PLAN OF CARE
Orientation: A&O x4  VS/ O2/ IV: VSS on RA, IV SL  GI: BS present, passing flatus  Mobility: independent in room  Pain mgmt: prn oxy and ibuprofen  Diet: tolerating regular  Bowel/ Bladder: voids in bathroom  Labs/ BG: K 3.2, mag 1.6, replacements given per orders  Skin: 4 lap sites on abdomen, L thumb wound  CMS: intact  Tests/ Procedures: POD 2 lap corey  Consults/ Providers: surgery  Discharge/ Plan: discharge pending  Other: contact precautions maintained

## 2022-02-10 NOTE — PLAN OF CARE
Pt POD 3. VSS on RA, A/O x4. Abdominal pain managed with oral oxy and ibuprofen.  Lap sites CDI, bowel sound heard in all quadrants, pt tolerating regular diet, passing flatus. Pt moving independently in room. Discharge and med education given and all questions answered. Wallet returned to patient. Pt discharged home via transport in wheelchair.

## 2022-02-10 NOTE — PLAN OF CARE
POD #3 lap corey: A&Ox4. VSS. C/o mild abdominal pain, controlled w/ oxy and Ibuprofen. Lap sites CDI. Pt has wound on L thumb, wound care orders. BS hyperactive, passing flatus, slightly distended. Urine output improving, still tea colored. Up independently in room, needs encouragement to move. Tolerating regular diet. Maintain contact precautions.

## 2022-02-10 NOTE — DISCHARGE SUMMARY
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 2015  History of lumbar spine L3 osteomyelitis with epidural abscess requiring surgical intervention in 2017  History of essential hypertension:  HIV  Hepatitis C  History of opiate dependence  History of IV drug use, Sober since Nov 15 2021  Tobacco Use  Depression  Anxiety  Hypomagnesemia  Hypophosphatemia  Hypokalemia  Left thumb scabbing, chronic wound without infection    History of Present Illness   Bc German is an 41 year old male with complex past medical history significant for history of IV drug abuse, has been sober since November 15, 2020, MSSA/MRSA bacteremia with tricuspid valve endocarditis, severe tricuspid regurgitation due to endocarditis, history of pulmonary valve endocarditis in 2015, history of lumbar spine osteomyelitis with epidural abscess which required surgical intervention in 2017, HIV, essential hypertension, hepatitis C, history of depression and anxiety who was recently admitted to the hospital for 6 weeks from November 15 to December 30 for treatment of MSSA/MRSA bacteremia with tricuspid valve endocarditis and was discharged home, since then patient has been sober.  Patient has now developed 1 week of right-sided abdominal discomfort associated with vomiting, poor appetite he is also complaining of watery diarrhea going on for 5 days and is admitted for further evaluation  and management.    Hospital Course   Bc German was admitted on 2/6/2022.  The following problems were addressed during his hospitalization:    Acute cholecystitis (2/6/2022) s/p lap cholecystectomy 2/7  Right-sided flank and right upper quadrant discomfort going on for 7 days. WBC count of 23.5, LA 2.0, . CT A/P: distended gallbladder with wall thickening and edgar hepatis fluid and stranding which is consistent with acute cholecystitis there is also wall thickening of the urinary bladder which may be secondary to cystitis.  Acute cystitis with hematuria (2/6/2022): Patient is denying any urinary urgency frequency or burning he has not noticed any hematuria although his urine is showing large blood, small leukocytes, WBC count of 71 and red blood cell count of more than 182. Urine cx without growth. No UTI  Ongoing diarrhea--resolved: Recent use of broad-spectrum antibiotics, patient has finished course of IV daptomycin and IV vancomycin at the end of December. C diff negative.  Sepsis: Patient is presenting with pulse of 101, elevated CRP and elevated WBC count.  At the time of presentation patient does not have any signs and symptoms of severe sepsis or septic shock.  Considering duration of 1 week of symptoms and infection on multiple sites, blood CX NGTD  - appreciate general surgery  - IV zosyn continued inpatient, discharged with 3 more doses BID augmentin to complete course of abx  - continue regular diet  - PRN ibuprofen, tylenol for pain and oxycodone for moderate-severe pain  - follow up with PCP in one week  - follow up gen surg in 2-3 weeks, they will call for follow-up     Recent hospitalization from November 15 to December 30 with MSSA/MRSA bacteremia with tricuspid valve endocarditis: Resolved  Severe tricuspid regurgitation due to endocarditis  History of pulmonary valve endocarditis due to MSSA in 2015  History of lumbar spine L3 osteomyelitis with epidural abscess requiring surgical  intervention in 2017  --Patient completed his 6-week course of IV vancomycin and IV daptomycin on December 26, was also evaluated by CV surgery who will consider valve repair after patient completes chemical dependency treatment and if he remains sober  --So far patient has been sober   --According to patient he had a repeat TTE on December 27 which showed resolution of vegetations and improvement in TR patient did not have any signs and symptoms of right heart failure at that point  * echo 2/8/22: LVEF 55-60%, no regional WMA. Mild MR. Moderate severe TR. TR appears slightly increased compared to December imaging.     History of essential hypertension:  --Patient did not require any antihypertensive medications during his last hospitalization and his blood pressure was stable during the stay.     HIV  Hepatitis C  Chronic and stable on Triumeq.   CDC >200 so no need for PJP ppx per ID.  --Follows with Dr Chavez of [facility].  --HIV-1 RNA undetectable on 12/19/21 and 12/22/21. Triumeq placed on hold on 12/27/21 due to elevated LFTs, resumed as mentioned above.  Hep C still active/viremic and will need to be on treatment at some point.     History of opiate dependence  History of IV drug use, Sober since Nov 15 2021  Tobacco Use  Depression  Anxiety  Prior to his admission in November, patient was a started on Soma taper off.  During his last hospitalization consultation was done with Dr. Barrientos from [facility] addiction medicine.  He was switched to Suboxone twice daily on December 8 he was continued on PTA levomilnacipran extended release, was tapered off Carisoprodol during this stay.  Patient was also evaluated by psychiatry during his stay and he was a started on Lyrica 75 mg p.o. twice daily.  He was also evaluated by chemical dependency and was recommended to undergo treatment at that time patient was in the process of deciding if he would get chemical dependency treatment in [place] versus locally in  [facility].  Patient was discharged to his parents house in the end of December.  Patient was also discharged on lorazepam 0.5 mg 3 times daily as needed and they did not recommend tapering him off his Ativan as he was very high risk for relapse in the picture of his recent infection.  --uses suboxone as needed  --His Fetzmia dose is 60 mg daily  --PTA lorazepam 1 mg twice daily, ordered  --continue PTA lyrica     Hypomagnesemia   Hypophosphatemia  Hypokalemia  Repleted per protocol, stable on discharge     Left thumb scabbing, chronic wound without infection  Appreciate [facility]    Ana Watson, DO    Significant Results and Procedures   2/7 lap cholecystectomy    Pending Results   These results will be followed up by PCP  Unresulted Labs Ordered in the Past 30 Days of this Admission     Date and Time Order Name Status Description    2/10/2022  5:00 AM Magnesium In process     2/6/2022  3:15 PM Blood Culture Arm, Left Preliminary     2/6/2022  3:15 PM Blood Culture Arm, Left Preliminary           Code Status   Full Code       Primary Care Physician   Khari Barrientos    Physical Exam   Temp: 97.4  F (36.3  C) Temp src: Oral BP: 127/82 Pulse: 63   Resp: 18 SpO2: 98 % O2 Device: None (Room air)    Vitals:    02/06/22 1250 02/07/22 0623 02/08/22 0627   Weight: 63.5 kg (140 lb) 60.5 kg (133 lb 4.8 oz) 61.4 kg (135 lb 5.8 oz)     Vital Signs with Ranges  Temp:  [96.8  F (36  C)-98.2  F (36.8  C)] 97.4  F (36.3  C)  Pulse:  [63-88] 63  Resp:  [14-18] 18  BP: (119-144)/(81-98) 127/82  SpO2:  [98 %-100 %] 98 %  I/O last 3 completed shifts:  In: 800 [P.O.:800]  Out: -     Patient seen and examined on day of discharge. He feels okay overall, just very gassy. Simethicone helps. No chest pain, shortness of breath, nausea, vomiting. Tolerating diet. He does request less oxycodone to go home with than was ordered. Stable for discharge to home.    Constitutional: Awake, alert, cooperative, no apparent distress, chronic ill  appearance.  Eyes: Conjunctiva and pupils examined and normal.  HEENT: Moist mucous membranes, normal dentition.  Respiratory: Clear to auscultation bilaterally, no crackles or wheezing.  Cardiovascular: Regular rate and rhythm, normal S1 and S2, and no murmur noted.  GI: Soft, mild distention, tender near incisions, normal bowel sounds. bandages/steri strips noted  Lymph/Hematologic: No anterior cervical or supraclavicular adenopathy.  Skin: No rashes, no cyanosis, no edema.  Musculoskeletal: No joint swelling, erythema or tenderness.  Neurologic: Cranial nerves 2-12 intact, normal strength and sensation.  Psychiatric: Alert, oriented to person, place and time, no obvious anxiety or depression.    Discharge Disposition   Discharged to home  Condition at discharge: Stable    Consultations This Hospital Stay   CARE MANAGEMENT / SOCIAL WORK IP CONSULT  SURGERY GENERAL IP CONSULT  WOUND OSTOMY CONTINENCE NURSE  IP CONSULT    Time Spent on this Encounter   IAna DO, personally saw the patient today and spent greater than 30 minutes discharging this patient.    Discharge Orders      Reason for your hospital stay    Gallbladder attack requiring removal of gallbladder     Follow-up and recommended labs and tests     Follow up with primary care provider, Khari Barrientos, within 7 days for hospital follow- up.  No follow up labs or test are needed.    Surgery will call you within 2-3 weeks to check in on how you are doing     Activity    Your activity upon discharge: activity as tolerated     Discharge Instructions    1. Take 3 more doses of augmentin to finish your antibiotic course    2. Take senna (if you want) for constipation    3. Take oxycodone and ibuprofen for pain    4. Take simethicone 4x daily for gas discomfort     Diet    Follow this diet upon discharge: Regular Diet Adult     Discharge Medications   Current Discharge Medication List      START taking these medications    Details   acetaminophen  (TYLENOL) 325 MG tablet Take 2 tablets (650 mg) by mouth every 6 hours as needed for mild pain or other (and adjunct with moderate or severe pain or per patient request)    Associated Diagnoses: Postoperative pain      amoxicillin-clavulanate (AUGMENTIN) 875-125 MG tablet Take 1 tablet by mouth 2 times daily for 3 doses  Qty: 3 tablet, Refills: 0    Associated Diagnoses: Acute cholecystitis      ibuprofen (ADVIL/MOTRIN) 600 MG tablet Take 1 tablet (600 mg) by mouth every 6 hours as needed for moderate pain  Qty: 30 tablet, Refills: 0    Associated Diagnoses: Postoperative pain      oxyCODONE (ROXICODONE) 5 MG tablet Take 1-2 tablets (5-10 mg) by mouth every 6 hours as needed for moderate to severe pain  Qty: 8 tablet, Refills: 0    Associated Diagnoses: Postoperative pain      senna-docusate (SENOKOT-S/PERICOLACE) 8.6-50 MG tablet Take 1 tablet by mouth 2 times daily  Qty: 5 tablet, Refills: 0    Associated Diagnoses: Postoperative pain      simethicone (MYLICON) 80 MG chewable tablet Take 1 tablet (80 mg) by mouth 4 times daily  Qty: 20 tablet, Refills: 0    Comments: Future refills by PCP Dr. Khari Barrientos with phone number [phone].  Associated Diagnoses: Acute cholecystitis         CONTINUE these medications which have NOT CHANGED    Details   abacavir-dolutegravir-LamiVUDine (TRIUMEQ) 600- MG per tablet Take 1 tablet by mouth daily Please call [phone] & make appointment for further refills.  Qty: 30 tablet, Refills: 0    Associated Diagnoses: HIV disease (H)      buprenorphine HCl-naloxone HCl (SUBOXONE) 8-2 MG per film Place 1 Film under the tongue 2 times daily    Associated Diagnoses: Intravenous drug abuse (H)      levomilnacipran (FETZIMA ER) 20 MG 24 hr capsule Take 60 mg by mouth daily       LORazepam (ATIVAN) 1 MG tablet Take 1 mg by mouth 2 times daily      Multiple Vitamins-Minerals (MULTIVITAMIN GUMMIES ADULT PO) Take 1 chew tab by mouth daily      Naphazoline-Glycerin (CLEAR EYES MAX  REDNESS RELIEF) 0.03-0.5 % SOLN Place 1 drop into both eyes daily as needed      pregabalin (LYRICA) 150 MG capsule Take 150 mg by mouth daily       tamsulosin (FLOMAX) 0.4 MG capsule Take 1 capsule (0.4 mg) by mouth daily  Qty: 30 capsule, Refills: 0    Comments: Future refills by PCP Dr. Khari Barrientos with phone number [phone].  Associated Diagnoses: Acute retention of urine      testosterone cypionate (DEPOTESTOTERONE) 200 MG/ML injection Inject 0.3 mLs into the muscle once a week Inject 0.3 mL IM every 77 days      aspirin 81 MG EC tablet Take 81 mg by mouth daily      famotidine (PEPCID) 20 MG tablet Take 20 mg by mouth daily           Allergies   Allergies   Allergen Reactions     Bupropion      Other reaction(s): Seizures, Seizures  PN: seizure when took a double dose  seizure when took a double dose  PN: seizure when took a double dose  Other reaction(s): Seizures  PN: seizure when took a double dose       Acetaminophen Nausea and Vomiting     PN: : GI Upset  : GI Upset  PN: : GI Upset  Other reaction(s): Vomiting  PN: : GI Upset       Codeine Itching and Nausea and Vomiting     Other reaction(s): Abdominal Pain  PN:  weak; fatigue; vomiting   weak; fatigue; vomiting  PN:  weak; fatigue; vomiting  PN:  weak; fatigue; vomiting       Sulfa Drugs Itching     Data   Most Recent 3 CBC's:  Recent Labs   Lab Test 02/09/22  1722 02/08/22  1100 02/07/22  0729   WBC 11.6* 16.6* 15.0*   HGB 9.3* 10.1* 10.8*   MCV 89 90 93   * 452* 366      Most Recent 3 BMP's:  Recent Labs   Lab Test 02/10/22  0945 02/09/22  1722 02/08/22  1100 02/07/22  0739   NA  --  141 138 138   POTASSIUM 3.6 3.2* 3.8 4.3   CHLORIDE  --  111* 111* 112*   CO2  --  26 23 19*   BUN  --  7 11 15   CR  --  0.82 0.88 0.95   ANIONGAP  --  4 4 7   SANDRA  --  8.2* 8.0* 7.8*   GLC  --  104* 140* 97     Most Recent 2 LFT's:  Recent Labs   Lab Test 02/07/22  0739 02/06/22  1318   AST 29 27   ALT 34 45   ALKPHOS 68 91   BILITOTAL 0.4 0.4     Most Recent  INR's and Anticoagulation Dosing History:  Anticoagulation Dose History     Recent Dosing and Labs Latest Ref Rng & Units 11/4/2014 11/5/2014 11/6/2014 3/6/2015 3/12/2015 10/9/2017 5/9/2021    Warfarin 7.5 mg - - 15 mg - - - - -    Warfarin 10 mg - 10 mg - - - - - -    INR 0.86 - 1.14 1.70(H) 1.82(H) 2.77(H) 1.69(H) 1.92(H) 1.01 0.96        Most Recent 3 Troponin's:  Recent Labs   Lab Test 05/09/21  1450 10/09/17  2015   TROPI <0.015 <0.015     Most Recent Cholesterol Panel:No lab results found.  Most Recent 6 Bacteria Isolates From Any Culture (See EPIC Reports for Culture Details):  Recent Labs   Lab Test 05/05/18  1425 05/04/18  1655 10/10/17  1901 10/10/17  1849 10/09/17  2035 10/09/17  2015   CULT No growth No growth No growth No growth No growth Cultured on the 1st day of incubation:  Bacillus species, not anthracis nor cereus group  *  Critical Value/Significant Value, preliminary result only, called to and read back by  Tonie Phan RN @1439 10/10/17.DH.    (Note)  NEGATIVE for the following: Staphylococcus spp., Staph aureus, Staph  epidermidis, Staph lugdunensis, Streptococcus spp., Strep pneumoniae,  Strep pyogenes, Strep agalactiae, Strep anginosus group, Enterococcus  faecalis, Enterococcus faecium, and Listeria spp. by [employer]  multiplex nucleic acid test. Final identification and antimicrobial  susceptibility testing will be verified by standard methods.    Critical Value/Significant Value called to and read back by Ambika Phan RN [id number] @ 1933 10/10/17 CS         Most Recent TSH, T4 and A1c Labs:  Recent Labs   Lab Test 12/04/21  0619   TSH 4.21*   T4 0.98     Results for orders placed or performed during the hospital encounter of 02/06/22   CT Abdomen Pelvis w Contrast    Narrative    EXAM: CT ABDOMEN PELVIS W CONTRAST  LOCATION: [facility]  DATE/TIME: 2/6/2022 2:32 PM    INDICATION: RLQ abdominal pain, appendicitis suspected (Age >= 14y)  COMPARISON: 03/10/2015   TECHNIQUE:  CT scan of the abdomen and pelvis was performed following injection of IV contrast. Multiplanar reformats were obtained. Dose reduction techniques were used.  CONTRAST: 70 mL Isovue 370    FINDINGS:   LOWER CHEST: Normal.    HEPATOBILIARY: Distended gallbladder with wall thickening. There is stranding or fluid in the edgar hepatis.     PANCREAS: Normal.    SPLEEN: Normal.    ADRENAL GLANDS: Normal.    KIDNEYS/BLADDER: Simple renal cysts do not require follow-up. Multiple tiny calcifications may represent nonobstructing stones. No ureteral calculi. No hydronephrosis or hydroureter. There is wall thickening of the nondistended urinary bladder.     BOWEL: Normal appendix. No bowel obstruction.     LYMPH NODES: Normal.    VASCULATURE: Unremarkable.    PELVIC ORGANS: Normal.    MUSCULOSKELETAL: Normal.        Impression    IMPRESSION:   1.  Distended gallbladder with wall thickening and edgar hepatis fluid and stranding. This is consistent with acute cholecystitis.  2.  Wall thickening of the urinary bladder may be from underdistention. Cannot exclude cystitis on this study.  3.  Normal appendix.  4.  Multiple punctate calcifications in the kidneys. These likely represent nonobstructing renal calculi.    Abdomen US, limited (RUQ only)    Narrative    EXAM: US ABDOMEN LIMITED  LOCATION: [facility]  DATE/TIME: 2/6/2022 3:51 PM    INDICATION: Eval for cholecystitis. RUQ abd pain, vomiting.  COMPARISON: CT abdomen and pelvis dated 02/06/2022.  TECHNIQUE: Limited abdominal ultrasound.    FINDINGS:    GALLBLADDER: There is abnormal thickening of the wall of the gallbladder. There is pericholecystic fluid and there is sludge in the dependent aspect of the gallbladder. There is a positive sonographic Gutierrez's sign.    BILE DUCTS: No biliary dilatation. The common duct measures 3 mm.    LIVER: Liver is heterogeneously echoic and mildly enlarged at 19 cm in length. No definite focal intrahepatic lesion or  biliary ductal dilatation.    RIGHT KIDNEY: Superior pole simple cyst measures 1.8 cm. Medial simple cyst measures 1.0 cm. Slightly increased medullary echogenicity.    PANCREAS: The visualized portions are normal.    No ascites.      Impression    IMPRESSION:  1.  Abnormal gallbladder wall thickening, pericholecystic fluid, sludge in the gallbladder and positive sonographic Gutierrez's sign are most consistent with severe acute cholecystitis.  2.  Heterogeneously echoic liver with areas of focal hyperechogenicity. Gas in the liver is not entirely excluded although considered unlikely as it was not seen on the prior CT examination 1 hour earlier. This is otherwise of uncertain etiology and   clinical significance.  3.  Simple appearing cysts right kidney.       Echocardiogram Limited     Value    LVEF  55-60%    Narrative    [id number]  [id number]  [id number]  [id number]^SHANIA^MARICEL^MAEVE     [facility]  Echocardiography Laboratory  [address] [place] [address]     Name: AMBREEN PYLE  MRN: [id number]  : 1980  Study Date: 2022 10:31 AM  Age: 41 yrs  Gender: Male  Patient Location: [facility]  Reason For Study: SOB  Ordering Physician: MARICEL JAUREGUI  Referring Physician: Khari Barrientos  Performed By: Arabella Rodríguez     BSA: 1.8 m2  Height: 70 in  Weight: 140 lb  HR: 91  BP: 133/92 mmHg  ______________________________________________________________________________  Procedure  Limited Portable Echo Adult.  ______________________________________________________________________________  Interpretation Summary     1. Limited echcoardiogram performed.  2. Left ventricular systolic function is normal. The visual ejection fraction  is 55-60%.  3. No regional wall motion abnormalities noted.  4. The right ventricle is normal in structure, function and size.  5. There is mild (1+) mitral regurgitation.  6. There is moderately severe (3+) tricuspid regurgitation.  7. In comparison with the prior study,  TR appears similar to slightly  increased.  ______________________________________________________________________________  Left Ventricle  The left ventricle is normal in size. Left ventricular systolic function is  normal. The visual ejection fraction is 55-60%. No regional wall motion  abnormalities noted.     Right Ventricle  The right ventricle is normal in structure, function and size.     Mitral Valve  The mitral valve is normal in structure and function. There is mild (1+)  mitral regurgitation.     Tricuspid Valve  The right ventricular systolic pressure is approximated at 23mmHg plus the  right atrial pressure. There is moderately severe (3+) tricuspid  regurgitation.     Aortic Valve  The aortic valve is normal in structure and function.     Pulmonic Valve  The pulmonic valve is normal in structure and function.     Vessels  IVC diameter >2.1 cm collapsing <50% with sniff suggests a high RA pressure  estimated at 15 mmHg or greater.     Rhythm  Sinus rhythm was noted.     ______________________________________________________________________________  Doppler Measurements & Calculations  TR max moises: 237.8 cm/sec  TR max P.6 mmHg     ______________________________________________________________________________  Report approved by: Mili Jonas 2022 04:10 PM

## 2022-02-11 ENCOUNTER — PATIENT OUTREACH (OUTPATIENT)
Dept: CARE COORDINATION | Facility: CLINIC | Age: 42
End: 2022-02-11
Payer: COMMERCIAL

## 2022-02-11 DIAGNOSIS — Z71.89 OTHER SPECIFIED COUNSELING: ICD-10-CM

## 2022-02-11 LAB
BACTERIA BLD CULT: NO GROWTH
BACTERIA BLD CULT: NO GROWTH

## 2022-02-11 NOTE — PROGRESS NOTES
Clinic Care Coordination Contact  [facility]/Voicemail       Clinical Data: Care Coordinator Outreach  Outreach attempted x 1.  Left message on patient's voicemail with call back information and requested return call.  Plan:  Care Coordinator will try to reach patient again in 1-2 business days.    Migdalia Ruffin  Care Transitions Assistant  [facility]

## 2022-02-12 NOTE — PROGRESS NOTES
Clinic Care Coordination Contact  [facility]/Voicemail       Clinical Data: Care Coordinator Outreach  Outreach attempted x 2.  Left message on patient's voicemail with call back information and requested return call.    Plan: Care Coordinator will do no further outreaches at this time.    HODAN Alvarez  [phone]  [facility]

## 2022-03-04 ENCOUNTER — TELEPHONE (OUTPATIENT)
Dept: SURGERY | Facility: CLINIC | Age: 42
End: 2022-03-04
Payer: COMMERCIAL

## 2022-03-04 NOTE — TELEPHONE ENCOUNTER
[facility]  Post op call note - No Answer    Bc German was called for an update regarding his recovery.  There was no answer and a message was left instructing the patient to call the office with any questions or concerns.     Dana Bhat PA-C

## 2022-03-10 ENCOUNTER — HOSPITAL ENCOUNTER (EMERGENCY)
Facility: CLINIC | Age: 42
Discharge: HOME | End: 2022-03-11
Attending: EMERGENCY MEDICINE | Admitting: EMERGENCY MEDICINE
Payer: COMMERCIAL

## 2022-03-10 DIAGNOSIS — L03.90 CELLULITIS, UNSPECIFIED CELLULITIS SITE: ICD-10-CM

## 2022-03-10 DIAGNOSIS — T40.604A OPIATE OVERDOSE, UNDETERMINED INTENT, INITIAL ENCOUNTER (H): ICD-10-CM

## 2022-03-10 PROCEDURE — 99284 EMERGENCY DEPT VISIT MOD MDM: CPT | Mod: 25

## 2022-03-10 PROCEDURE — 96361 HYDRATE IV INFUSION ADD-ON: CPT

## 2022-03-10 PROCEDURE — 96365 THER/PROPH/DIAG IV INF INIT: CPT

## 2022-03-10 RX ORDER — SODIUM CHLORIDE 9 MG/ML
INJECTION, SOLUTION INTRAVENOUS CONTINUOUS
Status: DISCONTINUED | OUTPATIENT
Start: 2022-03-11 | End: 2022-03-11 | Stop reason: HOSPADM

## 2022-03-10 RX ORDER — CEFTRIAXONE 2 G/1
2 INJECTION, POWDER, FOR SOLUTION INTRAMUSCULAR; INTRAVENOUS ONCE
Status: COMPLETED | OUTPATIENT
Start: 2022-03-10 | End: 2022-03-11

## 2022-03-11 VITALS
DIASTOLIC BLOOD PRESSURE: 88 MMHG | RESPIRATION RATE: 12 BRPM | SYSTOLIC BLOOD PRESSURE: 121 MMHG | TEMPERATURE: 98.1 F | OXYGEN SATURATION: 95 % | HEART RATE: 87 BPM

## 2022-03-11 PROCEDURE — 250N000011 HC RX IP 250 OP 636: Performed by: EMERGENCY MEDICINE

## 2022-03-11 PROCEDURE — 258N000003 HC RX IP 258 OP 636: Performed by: EMERGENCY MEDICINE

## 2022-03-11 RX ORDER — DOXYCYCLINE 100 MG/1
100 CAPSULE ORAL 2 TIMES DAILY
Qty: 14 CAPSULE | Refills: 0 | Status: SHIPPED | OUTPATIENT
Start: 2022-03-11 | End: 2022-03-18

## 2022-03-11 RX ORDER — CEPHALEXIN 500 MG/1
500 CAPSULE ORAL 4 TIMES DAILY
Qty: 28 CAPSULE | Refills: 0 | Status: SHIPPED | OUTPATIENT
Start: 2022-03-11 | End: 2022-03-18

## 2022-03-11 RX ADMIN — SODIUM CHLORIDE: 9 INJECTION, SOLUTION INTRAVENOUS at 02:45

## 2022-03-11 RX ADMIN — SODIUM CHLORIDE 1000 ML: 9 INJECTION, SOLUTION INTRAVENOUS at 00:20

## 2022-03-11 RX ADMIN — CEFTRIAXONE SODIUM 2 G: 2 INJECTION, POWDER, FOR SOLUTION INTRAMUSCULAR; INTRAVENOUS at 00:19

## 2022-03-11 NOTE — ED NOTES
Hand off to incoming RN.  Carline Francisco RN,.......................................... 3/11/2022   3:10 AM

## 2022-03-11 NOTE — ED PROVIDER NOTES
Got narcan prehospital for obtundation, woke up. Now, drowsy, but awake now. Reassess at 0330, if no mental health issues, home with keflex for possible.    4:28 AM rechecked patient, he wakes with not noxious stimuli, endorses using what he thought was fentanyl.  He denies any homicidal or suicidal ideation.  He was able to take p.o., ambulated with a steady gait.  I will give him a Narcan rescue kit in addition to his antibiotics.  He was discharged home.  Patient is not interested in any chemical dependency evaluation.    Otf Glynn MD  [employer]  1:20 AM 03/11/22        Otf lGynn MD  03/11/22 [id number]       Otf Glynn MD  03/11/22 4105

## 2022-03-11 NOTE — ED PROVIDER NOTES
History   Chief Complaint:  Drug Overdose     The history is provided by the patient and the EMS personnel. The history is limited by the condition of the patient.      Bc German is a 41 year old male with history of IV drug abuse, HIV who presents via EMS with drug overdose. Per EMS, patient is suspected to have overdosed on Fentanyl tonight and was found unresponsive, by his mother. States that the mother performed CPR prior to [employer] fire arriving and they gave him 1 mg Narcan, which caused the patient to wake up. Patient is sleepy in the ED. Denies current meth use, but has used in the past.     Review of Systems   Reason unable to perform ROS: Altered Mental Status.     Allergies:  Bupropion  Acetaminophen  Codeine  Sulfa Drugs    Medications:  Triumeq  Suboxone  Pepcid  Ativan  Fetzima ER  Roxicodone  Lyrica  Senokot S  Mylicon  Flomax    Past Medical History:     Anxiety  Depression  IV drug abuse  HIV  Substance abuse  Sepsis  Osteomyelitis      Past Surgical History:    Cholecystectomy  PICC insertion     Social History:  Patient presents alone  Presents via EMS    Physical Exam     Patient Vitals for the past 24 hrs:   BP Temp Temp src Pulse Resp SpO2   03/11/22 0030 -- -- -- -- -- 97 %   03/11/22 0000 123/87 -- -- 81 -- 93 %   03/10/22 2320 -- -- -- -- -- 99 %   03/10/22 2319 (!) 134/96 98.1  F (36.7  C) Oral 92 12 99 %       Physical Exam  VS: Reviewed per above  HENT: Mucous membranes moist.  No external signs of head trauma.  EYES: sclera anicteric, pupils equal  CV: Rate as noted, regular rhythm.   RESP: Effort normal. Breath sounds are normal bilaterally.  GI: no tenderness/rebound/guarding, not distended.  NEURO: Sleeping but rouses to voice.  Follows commands in all 4 extremities.  MSK: No deformity of the extremities  SKIN: Warm and dry, excoriated skin over the torso and extremities with areas of surrounding erythema, worse in the bilateral lower legs.    Emergency Department Course      Procedures    Emergency Department Course:         Reviewed:  I reviewed nursing notes, vitals, past medical history, Care Everywhere and [facility]    Assessments/Consults:  2341 I obtained history and examined the patient.   0105 Patient rechecked.     Interventions:  0019 Rocephin, 2 g, IV  0020 NS, 1 L, IV    Disposition:  Care of the patient was transferred to my colleague Dr. Glynn pending sober recheck.     Impression & Plan   Medical Decision Making:  Patient presents to the ER for evaluation of possible opiate overdose.  On arrival vital signs are reassuring.  He is quite sleepy but arouses to voice and has no focal neuro deficits.  No external signs of head trauma.  Based on response to Narcan prehospital, I am suspicious for opiate overdose with lingering opiate toxidrome.  I have lower suspicion for sinister intracranial process such as stroke or bleed.  He does have significant areas of skin excoriation with surrounding cellulitic change.  He does not have fever or tachycardia or hypotension to suggest overwhelming infectious process but he was given a dose of IV Rocephin while in the ER.  Plan for initiation of antibiotics as outpatient once mental status clears.  At signout to my colleague, plan for reassessment once clinically sober.    Diagnosis:    ICD-10-CM    1. Cellulitis, unspecified cellulitis site  L03.90    2. Opiate overdose, undetermined intent, initial encounter (H)  T40.604A        Discharge Medications:  New Prescriptions    CEPHALEXIN (KEFLEX) 500 MG CAPSULE    Take 1 capsule (500 mg) by mouth 4 times daily for 7 days    DOXYCYCLINE HYCLATE (VIBRAMYCIN) 100 MG CAPSULE    Take 1 capsule (100 mg) by mouth 2 times daily for 7 days       Scribe Disclosure:  Deepa ECHOLS, am serving as a scribe at 11:19 PM on 3/10/2022 to document services personally performed by Angel Murphy MD based on my observations and the provider's statements to me.             Angel Murphy MD  03/11/22  1103

## 2022-03-11 NOTE — DISCHARGE INSTRUCTIONS
Please avoid using recreational drugs including opiates.  These are potentially lethal.  Return to the ER for fevers, worsening redness of your skin or new concerns.      Discharge Instructions  Cellulitis    Cellulitis is an infection of the skin that occurs when bacteria enter the skin.   Symptoms are generally redness, swelling, warmth and pain.  Your infection appeared to be appropriate to treat at home with antibiotics.  However, sometimes your infection may be worse than it seemed at first, or may worsen with time. If you have new or worse symptoms, you may need to be seen again in the Emergency Department or by your primary provider.    Generally, every Emergency Department visit should have a follow-up clinic visit with either a primary or a specialty clinic/provider. Please follow-up as instructed by your emergency provider today.    Return to the Emergency Department if:  The redness, pain, or swelling gets a lot worse.  If the red area was marked, return if it is red significantly beyond the marked area.  You are unable to get your antibiotics, or are vomiting (throwing up) these pills, or you cannot take them.  You are feeling more ill, weak or lightheaded.  You start to run a new fever (temperature >101 F).  Anything else about the infection worries or concerns you.  Treatment:  Start your antibiotics right away, and take them as prescribed. Be sure to finish the whole prescription, even if you are better.  Apply a heating pad, warm packs, or warm water soaks to the infected area for 15 minutes at a time, at least 3 times a day. Do not use a heating pad on your feet or legs if you have diabetes. Do not sleep with a heating pad on, since this can cause burns or skin injury.  Rest your injured area for at least 1-2 days. After that you may start using your extremity again as long as there is not too much pain.   Raise the injured area above the level of your heart as much as possible in the first 1-2  days.  Tylenol  (acetaminophen), Motrin  (ibuprofen), or Advil  (ibuprofen) may help may help reduce pain and fever and may help you feel more comfortable. Be sure to read and follow the package directions, and ask your provider if you have questions.    If you were given a prescription for medicine here today, be sure to read all of the information (including the package insert) that comes with your prescription.  This will include important information about the medicine, its side effects, and any warnings that you need to know about.  The pharmacist who fills the prescription can provide more information and answer questions you may have about the medicine.  If you have questions or concerns that the pharmacist cannot address, please call or return to the Emergency Department.     Remember that you can always come back to the Emergency Department if you are not able to see your regular provider in the amount of time listed above, if you get any new symptoms, or if there is anything that worries you.

## 2022-03-11 NOTE — ED NOTES
Bed: [facility]  Expected date:   Expected time:   Means of arrival:   Comments:  [facility] 89M drug overdose, narcan given, eta 0830

## 2022-03-11 NOTE — ED TRIAGE NOTES
"[facility]  ED Arrival Note    Arrived to ED via EMS from home where he resides with his mother. Reportedly pt's mother called 911 because pt became unresponsive. Pt's mother performed CPR on pt to try and revive pt prior to [place] fire arrival. Upon arrival Fire gave pt 1 MG intranasal Narcan and pt woke up. Pt remained responsive but lethargic en route to ED.   Here pt states that he likes to take opiates because he is a \"spaz\" and it calms him down. Pt states that he took \"something like Fentanyl\" but declines to elaborate. Pt voluntary and cooperative.  SBP's 120's.  >95% RA    Visitors during triage: None    Ambulatory: Yes    Directed to: MH/BH      "

## 2022-03-11 NOTE — ED NOTES
Called patient's mother per patient's request. Mother very upset about the discharge and would like him to be admitted. Patient not interested in treatment at this time. He is alert and oriented and able to make his own decisions. He was discharged and sent home to mother's house in a cab. Patient was given discharge instructions including 3 scripts.

## 2022-03-24 NOTE — PROGRESS NOTES
This is a recent snapshot of the patient's [facility] medical record.  For current drug dose and complete information and questions, call [phone] or In Basket pool, [facility] home infusion ([id number])  CSN Number:  [id number]

## 2022-04-19 ENCOUNTER — HOSPITAL ENCOUNTER (EMERGENCY)
Facility: CLINIC | Age: 42
Discharge: HOME | End: 2022-04-19
Attending: EMERGENCY MEDICINE | Admitting: EMERGENCY MEDICINE
Payer: COMMERCIAL

## 2022-04-19 ENCOUNTER — APPOINTMENT (OUTPATIENT)
Dept: ULTRASOUND IMAGING | Facility: CLINIC | Age: 42
End: 2022-04-19
Attending: EMERGENCY MEDICINE
Payer: COMMERCIAL

## 2022-04-19 VITALS
RESPIRATION RATE: 7 BRPM | WEIGHT: 140 LBS | SYSTOLIC BLOOD PRESSURE: 127 MMHG | HEIGHT: 70 IN | HEART RATE: 80 BPM | TEMPERATURE: 98.8 F | DIASTOLIC BLOOD PRESSURE: 89 MMHG | BODY MASS INDEX: 20.04 KG/M2 | OXYGEN SATURATION: 96 %

## 2022-04-19 DIAGNOSIS — I95.1 ORTHOSTATIC SYNCOPE: ICD-10-CM

## 2022-04-19 DIAGNOSIS — R79.89 ELEVATED LFTS: ICD-10-CM

## 2022-04-19 LAB
ALBUMIN SERPL-MCNC: 3.3 G/DL (ref 3.4–5)
ALP SERPL-CCNC: 93 U/L (ref 40–150)
ALT SERPL W P-5'-P-CCNC: 318 U/L (ref 0–70)
ANION GAP SERPL CALCULATED.3IONS-SCNC: <1 MMOL/L (ref 3–14)
APAP SERPL-MCNC: <2 MG/L (ref 10–30)
AST SERPL W P-5'-P-CCNC: 153 U/L (ref 0–45)
BASOPHILS # BLD AUTO: 0 10E3/UL (ref 0–0.2)
BASOPHILS NFR BLD AUTO: 0 %
BILIRUB SERPL-MCNC: 0.3 MG/DL (ref 0.2–1.3)
BUN SERPL-MCNC: 8 MG/DL (ref 7–30)
CALCIUM SERPL-MCNC: 9.2 MG/DL (ref 8.5–10.1)
CHLORIDE BLD-SCNC: 104 MMOL/L (ref 94–109)
CO2 SERPL-SCNC: 34 MMOL/L (ref 20–32)
CREAT SERPL-MCNC: 0.88 MG/DL (ref 0.66–1.25)
EOSINOPHIL # BLD AUTO: 0.2 10E3/UL (ref 0–0.7)
EOSINOPHIL NFR BLD AUTO: 3 %
ERYTHROCYTE [DISTWIDTH] IN BLOOD BY AUTOMATED COUNT: 15.6 % (ref 10–15)
GFR SERPL CREATININE-BSD FRML MDRD: >90 ML/MIN/1.73M2
GLUCOSE BLD-MCNC: 112 MG/DL (ref 70–99)
HCT VFR BLD AUTO: 40.4 % (ref 40–53)
HGB BLD-MCNC: 12.6 G/DL (ref 13.3–17.7)
IMM GRANULOCYTES # BLD: 0.1 10E3/UL
IMM GRANULOCYTES NFR BLD: 1 %
LYMPHOCYTES # BLD AUTO: 1.3 10E3/UL (ref 0.8–5.3)
LYMPHOCYTES NFR BLD AUTO: 17 %
MCH RBC QN AUTO: 26.3 PG (ref 26.5–33)
MCHC RBC AUTO-ENTMCNC: 31.2 G/DL (ref 31.5–36.5)
MCV RBC AUTO: 84 FL (ref 78–100)
MONOCYTES # BLD AUTO: 0.7 10E3/UL (ref 0–1.3)
MONOCYTES NFR BLD AUTO: 9 %
NEUTROPHILS # BLD AUTO: 5.6 10E3/UL (ref 1.6–8.3)
NEUTROPHILS NFR BLD AUTO: 70 %
NRBC # BLD AUTO: 0 10E3/UL
NRBC BLD AUTO-RTO: 0 /100
PLATELET # BLD AUTO: 264 10E3/UL (ref 150–450)
POTASSIUM BLD-SCNC: 3.4 MMOL/L (ref 3.4–5.3)
PROT SERPL-MCNC: 7.2 G/DL (ref 6.8–8.8)
RBC # BLD AUTO: 4.79 10E6/UL (ref 4.4–5.9)
SODIUM SERPL-SCNC: 136 MMOL/L (ref 133–144)
WBC # BLD AUTO: 7.9 10E3/UL (ref 4–11)

## 2022-04-19 PROCEDURE — 36415 COLL VENOUS BLD VENIPUNCTURE: CPT | Performed by: EMERGENCY MEDICINE

## 2022-04-19 PROCEDURE — 96360 HYDRATION IV INFUSION INIT: CPT

## 2022-04-19 PROCEDURE — 85025 COMPLETE CBC W/AUTO DIFF WBC: CPT | Performed by: EMERGENCY MEDICINE

## 2022-04-19 PROCEDURE — 76705 ECHO EXAM OF ABDOMEN: CPT

## 2022-04-19 PROCEDURE — 99285 EMERGENCY DEPT VISIT HI MDM: CPT | Mod: 25

## 2022-04-19 PROCEDURE — 258N000003 HC RX IP 258 OP 636: Performed by: EMERGENCY MEDICINE

## 2022-04-19 PROCEDURE — 80053 COMPREHEN METABOLIC PANEL: CPT | Performed by: EMERGENCY MEDICINE

## 2022-04-19 PROCEDURE — 93005 ELECTROCARDIOGRAM TRACING: CPT

## 2022-04-19 PROCEDURE — 80143 DRUG ASSAY ACETAMINOPHEN: CPT | Performed by: EMERGENCY MEDICINE

## 2022-04-19 PROCEDURE — 96361 HYDRATE IV INFUSION ADD-ON: CPT

## 2022-04-19 RX ORDER — SENNA AND DOCUSATE SODIUM 50; 8.6 MG/1; MG/1
1 TABLET, FILM COATED ORAL AT BEDTIME
Qty: 15 TABLET | Refills: 0 | Status: ON HOLD | OUTPATIENT
Start: 2022-04-19 | End: 2022-12-11

## 2022-04-19 RX ORDER — SENNA AND DOCUSATE SODIUM 50; 8.6 MG/1; MG/1
1 TABLET, FILM COATED ORAL AT BEDTIME
Qty: 15 TABLET | Refills: 0 | Status: SHIPPED | OUTPATIENT
Start: 2022-04-19 | End: 2022-04-19

## 2022-04-19 RX ORDER — POLYETHYLENE GLYCOL 3350 17 G/17G
1 POWDER, FOR SOLUTION ORAL DAILY
Qty: 527 G | Refills: 0 | Status: ON HOLD | OUTPATIENT
Start: 2022-04-19 | End: 2022-12-11

## 2022-04-19 RX ORDER — POLYETHYLENE GLYCOL 3350 17 G/17G
1 POWDER, FOR SOLUTION ORAL DAILY
Qty: 527 G | Refills: 0 | Status: SHIPPED | OUTPATIENT
Start: 2022-04-19 | End: 2022-04-19

## 2022-04-19 RX ADMIN — SODIUM CHLORIDE 1000 ML: 9 INJECTION, SOLUTION INTRAVENOUS at 14:55

## 2022-04-19 NOTE — ED NOTES
Bed: ED15  Expected date:   Expected time:   Means of arrival:   Comments:  [facility]  - 426 - 41 M syncope eta 1330

## 2022-04-19 NOTE — ED TRIAGE NOTES
Presents from home via EMS. EMS reports mother found patient passed out she administered CPR. No narcan administered. EMS reports pinpoint pupils, patient ambulated to ambulance, and is conversing. Patient states he woke up this morning went to the bathroom, had bowel movement, felt lightheaded then passed out. States since December 30th reports 4 times passing out.

## 2022-04-19 NOTE — DISCHARGE INSTRUCTIONS
You should drink plenty of fluids to stay well-hydrated, I do recommend continue to take your medications.  You should transition from sitting to standing to walking slowly.  You should follow-up with your primary doctor for recheck of your liver tests.

## 2022-04-19 NOTE — ED PROVIDER NOTES
History     Chief Complaint:  Syncope       HPI   Bc German is a 41 year old male past medical history seen for substance abuse, HIV on medications, anxiety presenting for evaluation of fainting.  He reports straining hard to go to the bathroom, since standing up because his mother knocked on the door and he passed out.  He denies hitting his head, he has no neck pain.     ROS:  Review of Systems  10 point ROS completed, negative except as indicated in the HPI.     Allergies:  Bupropion  Acetaminophen  Codeine  Sulfa Drugs     Medications:    abacavir-dolutegravir-LamiVUDine (TRIUMEQ) 600- MG per tablet  acetaminophen (TYLENOL) 325 MG tablet  aspirin 81 MG EC tablet  buprenorphine HCl-naloxone HCl (SUBOXONE) 8-2 MG per film  famotidine (PEPCID) 20 MG tablet  ibuprofen (ADVIL/MOTRIN) 600 MG tablet  levomilnacipran (FETZIMA ER) 20 MG 24 hr capsule  LORazepam (ATIVAN) 1 MG tablet  Multiple Vitamins-Minerals (MULTIVITAMIN GUMMIES ADULT PO)  naloxone (EVZIO) 0.4 MG/0.4ML auto-injector  Naphazoline-Glycerin (CLEAR EYES MAX REDNESS RELIEF) 0.03-0.5 % SOLN  oxyCODONE (ROXICODONE) 5 MG tablet  pregabalin (LYRICA) 150 MG capsule  senna-docusate (SENOKOT-S/PERICOLACE) 8.6-50 MG tablet  simethicone (MYLICON) 80 MG chewable tablet  tamsulosin (FLOMAX) 0.4 MG capsule  testosterone cypionate (DEPOTESTOTERONE) 200 MG/ML injection        Past Medical History:    Past Medical History:   Diagnosis Date     Anxiety      Depressive disorder      Drug abuse, IV (H)      Group A streptococcal infection 11/2014     HCV antibody positive      HIV (human immunodeficiency virus infection) (H)      HIV (human immunodeficiency virus infection) (H)      HIV (human immunodeficiency virus infection) (H)      IVDU (intravenous drug user)      Osteomyelitis of vertebra of lumbosacral region (H) 3/2011     Substance abuse (H)      Patient Active Problem List   Diagnosis     Cellulitis     Sepsis (H)     JASSI (generalized anxiety  disorder)     Non-compliant patient     Intravenous drug abuse (H)     Medication side effect     Asymptomatic human immunodeficiency virus (HIV) infection status (H)     Balance problem     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     Osteomyelitis (H)     Cellulitis of left arm     Drug abuse (H)     Gram-positive bacteremia     Human immunodeficiency virus (HIV) disease (H)     Acute cholecystitis     Right sided abdominal pain     Acute cystitis with hematuria     History of intravenous drug abuse (H)     Diarrhea of presumed infectious origin        Past Surgical History:    Past Surgical History:   Procedure Laterality Date     EYE SURGERY  1 year ago    pins to left eye after socket fracture     LAPAROSCOPIC CHOLECYSTECTOMY N/A 2/7/2022    Procedure: CHOLECYSTECTOMY, LAPAROSCOPIC;  Surgeon: Lico Rodriguez MD;  Location:  OR     OPTICAL TRACKING SYSTEM FUSION CERVICAL ANTERIOR ONE LEVEL Right 9/10/2017    Procedure: OPTICAL TRACKING SYSTEM FUSION CERVICAL ANTERIOR ONE LEVEL;  Stealth C6-C7 Anterior Cervical Corpectomy and C5-T1 Fusion;  Surgeon: Marv Ambrose MD;  Location: [facility] OR     ORTHOPEDIC SURGERY      Arm Surgery     PICC INSERTION Left 09/21/2017    5fr DL BioFlo PICC, 42cm (1cm external) in the L basilic vein w/ tip in the low SVC.     TRANSESOPHAGEAL ECHOCARDIOGRAM INTRAOPERATIVE N/A 3/17/2015    Procedure: TRANSESOPHAGEAL ECHOCARDIOGRAM INTRAOPERATIVE;  Surgeon: Generic Anesthesia Provider;  Location: [facility] OR     TRANSESOPHAGEAL ECHOCARDIOGRAM INTRAOPERATIVE N/A 11/18/2021    Procedure: ECHOCARDIOGRAM, TRANSESOPHAGEAL, INTRAOPERATIVE;  Surgeon: GENERIC ANESTHESIA PROVIDER;  Location:  OR     TRANSESOPHAGEAL ECHOCARDIOGRAM INTRAOPERATIVE N/A 12/2/2021    Procedure: ECHOCARDIOGRAM, TRANSESOPHAGEAL, INTRAOPERATIVE;  Surgeon: GENERIC ANESTHESIA PROVIDER;  Location:  OR        Family History:    family history is not on file.    Social History:   reports that he has been smoking  "cigarettes. He has been smoking about 0.25 packs per day. He has never used smokeless tobacco. He reports current alcohol use. He reports current drug use. Drug: Marijuana.  PCP: Khari Barrientos     Physical Exam     Patient Vitals for the past 24 hrs:   BP Temp Temp src Pulse Resp SpO2 Height Weight   04/19/22 1346 -- -- -- -- -- -- 1.778 m (5' 10\") 63.5 kg (140 lb)   04/19/22 1340 (!) 145/94 98.8  F (37.1  C) Oral (!) 124 26 97 % -- --        Physical Exam  Constitutional: Alert, attentive, GCS 15   HENT:    Nose: Nose normal.    Mouth/Throat: Oropharynx is clear, mucous membranes are moist  Eyes: EOM are normal, anicteric, conjugate gaze  CV: tachycardic; no murmurs  Chest: Effort normal and breath sounds clear without wheezing or rales, symmetric bilaterally   GI:  non tender. No distension. No guarding or rebound.    MSK: No LE edema, no tenderness to palpation of BLE.  Neurological: Alert, attentive, moving all extremities equally.   Skin: Skin is warm and dry.      Emergency Department Course   ECG (14:26:23):  Indication: Screening for cardiovascular disease.   Rate 108 bpm. WI interval 132. QRS duration 86. QT/QTc 450   Interpretation: Sinus tachycardia with nonspecific ST segment changes  Agree with computer interpretation.   No significant change compared to EKG dated 11/14/21.   Interpreted at 1435 by Dr. Glynn.       Imaging:  US Abdomen Limited   Preliminary Result   IMPRESSION:     1. Increased echogenicity of right renal pyramids, nonspecific, can be   seen with medullary nephrocalcinosis. Multiple right renal cysts are   also noted. No right hydronephrosis.   2. The gallbladder is surgically absent. No suspicious focal hepatic   mass.         Report per radiology    Laboratory:  Labs Ordered and Resulted from Time of ED Arrival to Time of ED Departure   COMPREHENSIVE METABOLIC PANEL - Abnormal       Result Value    Sodium 136      Potassium 3.4      Chloride 104      Carbon Dioxide (CO2) 34 (*)     " Anion Gap <1 (*)     Urea Nitrogen 8      Creatinine 0.88      Calcium 9.2      Glucose 112 (*)     Alkaline Phosphatase 93       (*)      (*)     Protein Total 7.2      Albumin 3.3 (*)     Bilirubin Total 0.3      GFR Estimate >90     CBC WITH PLATELETS AND DIFFERENTIAL - Abnormal    WBC Count 7.9      RBC Count 4.79      Hemoglobin 12.6 (*)     Hematocrit 40.4      MCV 84      MCH 26.3 (*)     MCHC 31.2 (*)     RDW 15.6 (*)     Platelet Count 264      % Neutrophils 70      % Lymphocytes 17      % Monocytes 9      % Eosinophils 3      % Basophils 0      % Immature Granulocytes 1      NRBCs per 100 WBC 0      Absolute Neutrophils 5.6      Absolute Lymphocytes 1.3      Absolute Monocytes 0.7      Absolute Eosinophils 0.2      Absolute Basophils 0.0      Absolute Immature Granulocytes 0.1      Absolute NRBCs 0.0     ACETAMINOPHEN LEVEL - Abnormal    Acetaminophen <2 (*)       Emergency Department Course:  Reviewed:  I reviewed nursing notes, vitals and past medical history      Interventions:  Medications   0.9% sodium chloride BOLUS (1,000 mLs Intravenous New Bag 22 1455)        Disposition:  The patient was discharged to home.     Impression & Plan      Medical Decision Makin-year-old male past medical history significant for dense substance abuse, HIV on medication presenting for what sounds like a clear orthostatic syncope.  He was straining hard to defecate when someone knocked on the door and he stood up and passed out.  He has no signs of trauma including no neck pain, denies hitting his head.  There is no reported seizure activity.  He was tachycardic here however this resolved with IV fluids.  Labs were notable for mildly elevated LFTs right upper quadrant ultrasound is negative for any overt biliary duct dilation he is status post cholecystectomy.  EKG shows sinus tachycardia, with normal intervals.  I recommend adequate hydration, slow transition from sitting to standing, will  initiate him on a bowel regimen, stressed the importance of continued sobriety and following up with his PCP for follow-up his labs    Diagnosis:    ICD-10-CM    1. Orthostatic syncope  I95.1    2. Elevated LFTs  R79.89         Discharge Medications:  New Prescriptions    POLYETHYLENE GLYCOL (MIRALAX) 17 GM/DOSE POWDER    Take 17 g (1 capful) by mouth daily    SENNA-DOCUSATE SODIUM (SENNA S) 8.6-50 MG TABLET    Take 1 tablet by mouth At Bedtime        Otf Glynn MD  [employer]  5:27 PM 04/19/22          Otf Glynn MD  04/19/22 8531

## 2022-04-21 ENCOUNTER — HOSPITAL ENCOUNTER (EMERGENCY)
Facility: CLINIC | Age: 42
Discharge: HOME | End: 2022-04-22
Attending: EMERGENCY MEDICINE | Admitting: EMERGENCY MEDICINE
Payer: COMMERCIAL

## 2022-04-21 DIAGNOSIS — R41.82 ALTERED MENTAL STATUS, UNSPECIFIED ALTERED MENTAL STATUS TYPE: ICD-10-CM

## 2022-04-21 DIAGNOSIS — T50.901A OVERDOSE, ACCIDENTAL OR UNINTENTIONAL, INITIAL ENCOUNTER: ICD-10-CM

## 2022-04-21 DIAGNOSIS — R06.89 ACUTE RESPIRATORY INSUFFICIENCY: ICD-10-CM

## 2022-04-21 LAB
ALBUMIN SERPL-MCNC: 3 G/DL (ref 3.4–5)
ALP SERPL-CCNC: 78 U/L (ref 40–150)
ALT SERPL W P-5'-P-CCNC: 232 U/L (ref 0–70)
ANION GAP SERPL CALCULATED.3IONS-SCNC: 5 MMOL/L (ref 3–14)
AST SERPL W P-5'-P-CCNC: 112 U/L (ref 0–45)
ATRIAL RATE - MUSE: 108 BPM
ATRIAL RATE - MUSE: 112 BPM
BASOPHILS # BLD AUTO: 0 10E3/UL (ref 0–0.2)
BASOPHILS NFR BLD AUTO: 1 %
BILIRUB SERPL-MCNC: 0.2 MG/DL (ref 0.2–1.3)
BUN SERPL-MCNC: 8 MG/DL (ref 7–30)
CALCIUM SERPL-MCNC: 8.9 MG/DL (ref 8.5–10.1)
CHLORIDE BLD-SCNC: 109 MMOL/L (ref 94–109)
CO2 SERPL-SCNC: 30 MMOL/L (ref 20–32)
CREAT SERPL-MCNC: 0.96 MG/DL (ref 0.66–1.25)
DIASTOLIC BLOOD PRESSURE - MUSE: NORMAL MMHG
DIASTOLIC BLOOD PRESSURE - MUSE: NORMAL MMHG
EOSINOPHIL # BLD AUTO: 0.3 10E3/UL (ref 0–0.7)
EOSINOPHIL NFR BLD AUTO: 4 %
ERYTHROCYTE [DISTWIDTH] IN BLOOD BY AUTOMATED COUNT: 16.3 % (ref 10–15)
ETHANOL SERPL-MCNC: <0.01 G/DL
GFR SERPL CREATININE-BSD FRML MDRD: >90 ML/MIN/1.73M2
GLUCOSE BLD-MCNC: 88 MG/DL (ref 70–99)
HCT VFR BLD AUTO: 32.6 % (ref 40–53)
HGB BLD-MCNC: 10.2 G/DL (ref 13.3–17.7)
HOLD SPECIMEN: NORMAL
IMM GRANULOCYTES # BLD: 0 10E3/UL
IMM GRANULOCYTES NFR BLD: 0 %
INTERPRETATION ECG - MUSE: NORMAL
INTERPRETATION ECG - MUSE: NORMAL
LYMPHOCYTES # BLD AUTO: 2.5 10E3/UL (ref 0.8–5.3)
LYMPHOCYTES NFR BLD AUTO: 37 %
MCH RBC QN AUTO: 26.4 PG (ref 26.5–33)
MCHC RBC AUTO-ENTMCNC: 31.3 G/DL (ref 31.5–36.5)
MCV RBC AUTO: 85 FL (ref 78–100)
MONOCYTES # BLD AUTO: 0.7 10E3/UL (ref 0–1.3)
MONOCYTES NFR BLD AUTO: 11 %
NEUTROPHILS # BLD AUTO: 3.2 10E3/UL (ref 1.6–8.3)
NEUTROPHILS NFR BLD AUTO: 47 %
NRBC # BLD AUTO: 0 10E3/UL
NRBC BLD AUTO-RTO: 0 /100
P AXIS - MUSE: 56 DEGREES
P AXIS - MUSE: 64 DEGREES
PLATELET # BLD AUTO: 265 10E3/UL (ref 150–450)
POTASSIUM BLD-SCNC: 3.4 MMOL/L (ref 3.4–5.3)
PR INTERVAL - MUSE: 132 MS
PR INTERVAL - MUSE: 140 MS
PROT SERPL-MCNC: 6.8 G/DL (ref 6.8–8.8)
QRS DURATION - MUSE: 86 MS
QRS DURATION - MUSE: 90 MS
QT - MUSE: 330 MS
QT - MUSE: 336 MS
QTC - MUSE: 450 MS
QTC - MUSE: 450 MS
R AXIS - MUSE: 55 DEGREES
R AXIS - MUSE: 62 DEGREES
RBC # BLD AUTO: 3.86 10E6/UL (ref 4.4–5.9)
SODIUM SERPL-SCNC: 144 MMOL/L (ref 133–144)
SYSTOLIC BLOOD PRESSURE - MUSE: NORMAL MMHG
SYSTOLIC BLOOD PRESSURE - MUSE: NORMAL MMHG
T AXIS - MUSE: 22 DEGREES
T AXIS - MUSE: 38 DEGREES
VENTRICULAR RATE- MUSE: 108 BPM
VENTRICULAR RATE- MUSE: 112 BPM
WBC # BLD AUTO: 6.8 10E3/UL (ref 4–11)

## 2022-04-21 PROCEDURE — 36415 COLL VENOUS BLD VENIPUNCTURE: CPT | Performed by: EMERGENCY MEDICINE

## 2022-04-21 PROCEDURE — 99284 EMERGENCY DEPT VISIT MOD MDM: CPT | Mod: 25 | Performed by: EMERGENCY MEDICINE

## 2022-04-21 PROCEDURE — 99291 CRITICAL CARE FIRST HOUR: CPT | Performed by: EMERGENCY MEDICINE

## 2022-04-21 PROCEDURE — 96374 THER/PROPH/DIAG INJ IV PUSH: CPT | Performed by: EMERGENCY MEDICINE

## 2022-04-21 PROCEDURE — 93005 ELECTROCARDIOGRAM TRACING: CPT | Performed by: EMERGENCY MEDICINE

## 2022-04-21 PROCEDURE — 250N000011 HC RX IP 250 OP 636: Performed by: EMERGENCY MEDICINE

## 2022-04-21 PROCEDURE — 85025 COMPLETE CBC W/AUTO DIFF WBC: CPT | Performed by: EMERGENCY MEDICINE

## 2022-04-21 PROCEDURE — 80053 COMPREHEN METABOLIC PANEL: CPT | Performed by: EMERGENCY MEDICINE

## 2022-04-21 PROCEDURE — 82077 ASSAY SPEC XCP UR&BREATH IA: CPT | Performed by: EMERGENCY MEDICINE

## 2022-04-21 RX ORDER — FLUMAZENIL 0.1 MG/ML
0.1 INJECTION, SOLUTION INTRAVENOUS ONCE
Status: DISCONTINUED | OUTPATIENT
Start: 2022-04-21 | End: 2022-04-21

## 2022-04-21 RX ORDER — NALOXONE HYDROCHLORIDE 0.4 MG/ML
0.4 INJECTION, SOLUTION INTRAMUSCULAR; INTRAVENOUS; SUBCUTANEOUS ONCE
Status: COMPLETED | OUTPATIENT
Start: 2022-04-21 | End: 2022-04-21

## 2022-04-21 RX ADMIN — NALOXONE HYDROCHLORIDE 0.4 MG: 0.4 INJECTION, SOLUTION INTRAMUSCULAR; INTRAVENOUS; SUBCUTANEOUS at 18:58

## 2022-04-21 NOTE — PROGRESS NOTES
Patient known to me.  Severe OUD, endocarditis of TV.    His father just called [facility], to report that he is in the middle of an opioid overdose.  Recommended he:  1. Give naloxone  2. Call 911  3. Request going to [facility], based on our complex endocarditis team plus addiction team.    Please consider contacting me on arrival to ED.    I would have  VERY low threshold to admit him.    Khari Barrientos MD  Internal Medicine/Pediatrics Hospitalist & Staff Physician  Addiction Medicine   of Internal Medicine and Pediatrics  [facility]  Pager: [phone]

## 2022-04-21 NOTE — ED TRIAGE NOTES
Pt arrives via EMS for possible overdose. Pt was found laying on the floor and family called EMS. Pt took one more pill of ativan then his usual prescribed dose. Pinpoint pupils upon EMS assessment. 1 mg of narcan was given and pt responded. Pt was given 4 mg of zofran and 500 bolus of NS

## 2022-04-21 NOTE — ED PROVIDER NOTES
History     Chief Complaint   Patient presents with     Drug Overdose     HPI  Bc German is a 41 year old male with PMH notable for polysubstance use disorder, IV drug use, HIV, recent ED visit 2 days ago at [facility] for syncope who presents to the ED with altered mental status.  EMS reports that the patient's father called 911 due to finding the patient minimally responsive.  He was responsive to verbal stimuli for EMS upon arrival, had low 90s room air SPO2 and slow respirations.  EMS gave 1 mg naloxone IV about 20 minutes prior to arrival, which had significant effect and making the patient more alert and brought SPO2 to 100% on room air.  Patient endorsing 4 mg lorazepam and smoking marijuana earlier today, denies any other substances.    Past Medical and Surgical History, Medications, Allergies, and Social History were reviewed in the electronic medical record. Review with the patient was attempted but limited due to altered mental status.      Review of Systems  A complete review of systems was performed with pertinent positives and negatives noted in the HPI, and all other systems negative.    Physical Exam   BP: (!) 145/99  Pulse: 114  Temp: 98.8  F (37.1  C)  Resp: 15  Weight: 63.5 kg (139 lb 15.9 oz)  SpO2: 99 %    Physical Exam  General: slow to respond, drowsy appearing. Appears stated age.   HENT: MMM, no oropharyngeal lesions  Eyes: PERRL, normal sclerae  Neck: non-tender, supple  Cardio: regular rate. Regular rhythm. Extremities well perfused  Resp: Normal work of breathing, normal respiratory rate.  Chest/Back: no visual signs of trauma, no CVA tenderness  Abdomen: no tenderness, non-distended, no rebound, no guarding  Neuro: drowsy appearing, slow to respond but answers appropriately, rouses to voice. CN II-XII grossly intact. Grossly normal strength and sensation in all extremities.   MSK: no deformities. Grossly normal ROM in extremities.   Integumentary/Skin: no rash visualized, normal  color  Psych: normal affect, normal behavior    ED Course      Procedures       Critical Care Addendum    My initial assessment, based on my review of prehospital provider report, focused history and physical exam, established that Bc German has overdose with respiratory insufficiency, which requires immediate intervention, and therefore he is critically ill.     After the initial assessment, the care team initiated multiple lab tests and initiated medication therapy with IV naloxone to provide stabilization care. Due to the critical nature of this patient, I reassessed nursing observations, vital signs, physical exam, review of cardiac rhythm monitor, interpretation of laboratory studies, mental status and respiratory status multiple times prior to his disposition.     Time also spent performing documentation, reviewing test results and coordination of care.     Critical care time (excluding teaching time and procedures): 50 minutes.        Labs Ordered and Resulted from Time of ED Arrival to Time of ED Departure   COMPREHENSIVE METABOLIC PANEL - Abnormal       Result Value    Sodium 144      Potassium 3.4      Chloride 109      Carbon Dioxide (CO2) 30      Anion Gap 5      Urea Nitrogen 8      Creatinine 0.96      Calcium 8.9      Glucose 88      Alkaline Phosphatase 78       (*)      (*)     Protein Total 6.8      Albumin 3.0 (*)     Bilirubin Total 0.2      GFR Estimate >90     CBC WITH PLATELETS AND DIFFERENTIAL - Abnormal    WBC Count 6.8      RBC Count 3.86 (*)     Hemoglobin 10.2 (*)     Hematocrit 32.6 (*)     MCV 85      MCH 26.4 (*)     MCHC 31.3 (*)     RDW 16.3 (*)     Platelet Count 265      % Neutrophils 47      % Lymphocytes 37      % Monocytes 11      % Eosinophils 4      % Basophils 1      % Immature Granulocytes 0      NRBCs per 100 WBC 0      Absolute Neutrophils 3.2      Absolute Lymphocytes 2.5      Absolute Monocytes 0.7      Absolute Eosinophils 0.3      Absolute Basophils  0.0      Absolute Immature Granulocytes 0.0      Absolute NRBCs 0.0     ETHYL ALCOHOL LEVEL - Normal    Alcohol ethyl <0.01       No orders to display          Assessments & Plan (with Medical Decision Making)   Patient presenting after minimally responsive episode that responded to naloxone given by EMS. Vitals in the ED unremarkable. Nursing notes reviewed.  Chart review notable for visit at [facility] ED 2 days ago after syncopal episode, ED visit at [facility] on 4/14 with somnolence secondary to substances.    Patient's mental and respiratory status were monitored closely in the emergency department.  Patient did have recurrence of respiratory insufficiency with slow respiratory rate and gradually rising end-tidal CO2.  Naloxone administered with prompt awakening and normalized respiratory drive.  Clinical picture consistent with accidental opioid overdose.  Alcohol level negative.    Patient monitored greater than 4 hours after naloxone administration without decrease in mental status nor respiratory drive.  Patient in favor of discharge.  He reports that he does have addiction medicine providers currently.  He has been homeless but occasionally able to stay with one of his parents.  He endorses somewhat depressed mood recently, but denies any suicidal ideation, is goal-directed.    After counseling on the diagnosis, work-up, and treatment plan, the patient was discharged.  Provided patient with substance cessation resources and homelessness resources. The patient was advised to follow-up with his addiction medicine providers within a few days. The patient was advised to return to the ED if worsening symptoms, or if there are any urgent/life-threatening concerns.     Final diagnoses:   Overdose, accidental or unintentional, initial encounter   Altered mental status, unspecified altered mental status type   Acute respiratory insufficiency     Discharge Medication List as of 4/21/2022 11:56 PM          --  Savage  NABIL Huerta MD   Emergency Medicine   [facility] EMERGENCY DEPARTMENT  4/21/2022     Savage Huerta MD  04/22/22 [id number]

## 2022-04-22 VITALS
RESPIRATION RATE: 7 BRPM | SYSTOLIC BLOOD PRESSURE: 123 MMHG | DIASTOLIC BLOOD PRESSURE: 89 MMHG | BODY MASS INDEX: 20.09 KG/M2 | OXYGEN SATURATION: 97 % | TEMPERATURE: 98.8 F | WEIGHT: 139.99 LBS | HEART RATE: 88 BPM

## 2022-04-22 NOTE — DISCHARGE INSTRUCTIONS
Please use the below resources and your primary care physician to safely cease alcohol and/or substance use.     Return to the ED if you are having any urgent/life-threatening concerns.     DISCHARGE RESOURCES:  -[facility]: [phone] (detox), [phone] (outpatient & Lodging Plus)  -[facility] Detox ([address]): [phone]  -[facility] Detox: [phone]  -[place] [facility]: [phone]  -SMART Recovery - self management for addiction recovery:  www.smartrecovery.org    -[facility] ~ A Health Crisis Resource & Support Center: [phone].  -[facility] [phone]   -Substance Abuse and Mental Health Services (www.samhsa.gov)  -Harm Reduction Coalition (www. Harmreduction.org)  -[place] Opioid Prevention Coalition: www.opioidcoalition.org  -Poison control 1-671.764.7978       Sober Support Group Information:  AA/NA & Sponsor/Support  -Alcoholics Anonymous (www.alcoholics-anonymous.org): for local information 24 hours/day  -AA Intergroup service office in [place] (http://www.aastpaul.org/) [phone]  -AA Intergroup service office in [facility]: [phone]. (http://www.a[place].org/)  -Narcotics Anonymous (www.nam[place].org) (280) 344-3220   -Sober Fun Activities: www.sober-activities.Diary.com.21viaNet/[facility]//[facility] Recovery Connection ([facility])  [facility] connects people seeking recovery to resources that help foster and sustain long-term recovery.  Whether you are seeking resources for treatment, transportation, housing, job training, education, health care or other pathways to recovery, [facility] is a great place to start.   Phone: 178.911.4428. www.[place]Jebbit.Appbyme (Great listing of all types of recovery and non-recovery related resources)      [facility] homeless [facility] ([facility])  provides housing services for people experiencing homelessness   - for single adults:   [facility] Housing Services at  [facility] [address] , [place] [address]  - Wednesdays from 8:00am-2:00pm  - Thursdays from 10:30am-2:00pm .   *These are drop-in hours and available on a first come, first serve basis. To schedule an appointment outside these times, contact [facility] Services  certified assessors: Curry ([phone]) or Beatriz ([phone]).  - for families:  Call [facility]  at [phone] for access to the family assessors.  - for victims of domestic violence:   Call Sara Alfaro at [phone] ext. [phone] at the Domestic Abuse Project.   OR   [facility] 24hr crisis hotline, 1-888.956.6020 (text option 805-079-7324)  - for youth (up to age 24):   [facility] https://ysnmn.org/#/shelter     EMERGENCY HOUSING  [facility] Hotline:  1-561.726.5627  The [facility] Hotline operates through a toll-free number, 1-866.639.2957, to link homeless callers to shelter information, 24 hours a day, seven days a week. Callers will then access specific information about shelter and transitional housing programs in the [facility] from which they are calling. The hotline helps ensure that up-to-date information is available to homeless persons quickly and easily  The [facility] Hotline gives information on specific admission requirements and where and how to receive pre-approval in their county. This helps people seeking shelter avoid multiple phone calls--and the hotline is available even during evening hours when regular offices are closed    [facility] Emergency Housing:  Adult Shelter Connect:  Individuals will need to visit the Adult Shelter Connect (ASC) for an assessment and placement at one of the five [place] shelters and referrals to other services    ASC Direct Line:  [phone]  Location:   [address] [facility]  Hours:  Monday-Friday: 9:00 AM- 5:30 PM  Saturday & Sunday: 1:00 PM- 5:30 PM  After Hours: [phone]    [facility]  Housing:  [facility] (for battered women and their children) - [phone]  [place] [facility] (single adults) - [phone]  [facility] (family shelter) - [phone]    [facility] Emergency Housing:   For Families:  Shelter space is reserved for [facility] families with minor children. To determine eligibility, call the [employer] at [phone].  For those experiencing domestic violence, [facility] may be able to help find a safe place for families while fleeing abuse. Call [facility] at 1-374.635.2624  Families who are sleeping in a place not meant for human habilitation (streets, car, camping, public transit, etc.) or staying at a domestic violence shelter and are looking for supportive housing can call [phone]  Space for [facility] homeless family emergency shelter is limited and beds are not immediately available. Completing an intake with [facility] staff is the only way to be placed on the shelter waitlist.  For Youth:  Anyone age 24 or younger can connect with a variety of resources by visiting the website. This provides UP TO DATE information:    https://ysnmn.org/#/home  For Singles:  Single adults who are 25 years or older and seeking immediate shelter can call the [employer] [place] at [phone].  Single adults who are 25 years or older, currently experiencing homelessness and not staying in a shelter, will need to complete a housing assessment to determine long-term housing options. Contact a housing  at [phone].  For Veterans:  Any  in need of housing assistance or resources can contact [facility]'s Veterans Services at [phone].    [facility] Emergency Housing:  Coordinated Entry for Homeless Households  Coordinated Entry is a new resource to help those who are homeless (or within days of eviction) access appropriate housing and services. Please keep in mind that, due to limited  resources, we may not be able to assist you in finding an immediate resource.    If you are currently receiving case management services of any kind from [facility], please start by contacting your county .    Coordinated Entry services are provided by three different agencies, depending on the household needing help: single adults, families with children, or unaccompanied youth.  Youth under age 24: Visit [facility] Network's website:  www.ysnmn.org  Families with children: Call [facility] at [phone]  Single adults/couples: Call [facility] at [phone]

## 2022-10-11 NOTE — PROVIDER NOTIFICATION
MD Notification    Notified Person: MD    Notified Person Name: Dr. Pacheco    Notification Date/Time: 12/2/21 1956    Notification Interaction: Telephone    Purpose of Notification: Pt requesting mag citrate for constipation. Per patient it is the only thing that works for him.    Orders Received:    Comments:       no

## 2022-10-28 ENCOUNTER — MEDICAL CORRESPONDENCE (OUTPATIENT)
Dept: HEALTH INFORMATION MANAGEMENT | Facility: CLINIC | Age: 42
End: 2022-10-28

## 2022-11-01 ENCOUNTER — TRANSCRIBE ORDERS (OUTPATIENT)
Dept: OTHER | Age: 42
End: 2022-11-01

## 2022-11-01 DIAGNOSIS — F11.20 OPIOID TYPE DEPENDENCE, CONTINUOUS (H): Primary | ICD-10-CM

## 2022-11-02 ENCOUNTER — TELEPHONE (OUTPATIENT)
Dept: INFECTIOUS DISEASES | Facility: CLINIC | Age: 42
End: 2022-11-02

## 2022-11-02 NOTE — TELEPHONE ENCOUNTER
[facility] Call Center    Phone Message    May a detailed message be left on voicemail: yes     Reason for Call: Other: Patient is being referred to be seen for Chronic HIV, needs to re-establish care. Also help with Hep C- would benefit from treatment. Referred by Dr Khari Barrientos with [facility]. Sending encounter to clinic for [place] for review. Please call patient to schedule     Action Taken: Message routed to:  [facility]): ID    Travel Screening: Not Applicable

## 2022-12-08 ENCOUNTER — MEDICAL CORRESPONDENCE (OUTPATIENT)
Dept: HEALTH INFORMATION MANAGEMENT | Facility: CLINIC | Age: 42
End: 2022-12-08

## 2022-12-09 ENCOUNTER — TRANSCRIBE ORDERS (OUTPATIENT)
Dept: OTHER | Age: 42
End: 2022-12-09

## 2022-12-09 DIAGNOSIS — R79.89 LOW TESTOSTERONE: Primary | ICD-10-CM

## 2022-12-11 ENCOUNTER — APPOINTMENT (OUTPATIENT)
Dept: CT IMAGING | Facility: CLINIC | Age: 42
DRG: 158 | End: 2022-12-11
Attending: EMERGENCY MEDICINE
Payer: COMMERCIAL

## 2022-12-11 ENCOUNTER — HOSPITAL ENCOUNTER (INPATIENT)
Facility: CLINIC | Age: 42
LOS: 1 days | Discharge: HOME | DRG: 158 | End: 2022-12-12
Attending: EMERGENCY MEDICINE | Admitting: SURGERY
Payer: COMMERCIAL

## 2022-12-11 DIAGNOSIS — T68.XXXA HYPOTHERMIA, INITIAL ENCOUNTER: ICD-10-CM
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
## 2022-12-11 DIAGNOSIS — T40.604A OPIATE OVERDOSE, UNDETERMINED INTENT, INITIAL ENCOUNTER (H): ICD-10-CM

## 2022-12-11 DIAGNOSIS — S02.92XA CLOSED FRACTURE OF FACIAL BONE, UNSPECIFIED FACIAL BONE, INITIAL ENCOUNTER (H): ICD-10-CM

## 2022-12-11 LAB
ALBUMIN SERPL-MCNC: 3.8 G/DL (ref 3.4–5)
ALP SERPL-CCNC: 112 U/L (ref 40–150)
ALT SERPL W P-5'-P-CCNC: 347 U/L (ref 0–70)
AMPHETAMINES UR QL SCN: ABNORMAL
ANION GAP SERPL CALCULATED.3IONS-SCNC: 4 MMOL/L (ref 3–14)
APTT PPP: 29 SECONDS (ref 22–38)
AST SERPL W P-5'-P-CCNC: 215 U/L (ref 0–45)
BARBITURATES UR QL: ABNORMAL
BASE EXCESS BLDV CALC-SCNC: 4 MMOL/L (ref -7.7–1.9)
BASOPHILS # BLD AUTO: 0 10E3/UL (ref 0–0.2)
BASOPHILS NFR BLD AUTO: 0 %
BENZODIAZ UR QL: ABNORMAL
BILIRUB SERPL-MCNC: 0.5 MG/DL (ref 0.2–1.3)
BUN SERPL-MCNC: 23 MG/DL (ref 7–30)
CALCIUM SERPL-MCNC: 9.8 MG/DL (ref 8.5–10.1)
CANNABINOIDS UR QL SCN: ABNORMAL
CHLORIDE BLD-SCNC: 104 MMOL/L (ref 94–109)
CO2 SERPL-SCNC: 31 MMOL/L (ref 20–32)
COCAINE UR QL: ABNORMAL
CREAT SERPL-MCNC: 0.65 MG/DL (ref 0.66–1.25)
EOSINOPHIL # BLD AUTO: 0.1 10E3/UL (ref 0–0.7)
EOSINOPHIL NFR BLD AUTO: 2 %
ERYTHROCYTE [DISTWIDTH] IN BLOOD BY AUTOMATED COUNT: 13.9 % (ref 10–15)
ETHANOL SERPL-MCNC: <0.01 G/DL
GFR SERPL CREATININE-BSD FRML MDRD: >90 ML/MIN/1.73M2
GLUCOSE BLD-MCNC: 112 MG/DL (ref 70–99)
GLUCOSE BLDC GLUCOMTR-MCNC: 109 MG/DL (ref 70–99)
HCO3 BLDV-SCNC: 28 MMOL/L (ref 21–28)
HCO3 BLDV-SCNC: 30 MMOL/L (ref 21–28)
HCT VFR BLD AUTO: 41 % (ref 40–53)
HGB BLD-MCNC: 13.7 G/DL (ref 13.3–17.7)
HOLD SPECIMEN: NORMAL
IMM GRANULOCYTES # BLD: 0.1 10E3/UL
IMM GRANULOCYTES NFR BLD: 1 %
INR PPP: 1 (ref 0.85–1.15)
LACTATE BLD-SCNC: 0.9 MMOL/L
LYMPHOCYTES # BLD AUTO: 1.3 10E3/UL (ref 0.8–5.3)
LYMPHOCYTES NFR BLD AUTO: 15 %
MCH RBC QN AUTO: 28.4 PG (ref 26.5–33)
MCHC RBC AUTO-ENTMCNC: 33.4 G/DL (ref 31.5–36.5)
MCV RBC AUTO: 85 FL (ref 78–100)
MONOCYTES # BLD AUTO: 0.5 10E3/UL (ref 0–1.3)
MONOCYTES NFR BLD AUTO: 6 %
NEUTROPHILS # BLD AUTO: 6.7 10E3/UL (ref 1.6–8.3)
NEUTROPHILS NFR BLD AUTO: 76 %
NRBC # BLD AUTO: 0 10E3/UL
NRBC BLD AUTO-RTO: 0 /100
O2/TOTAL GAS SETTING VFR VENT: 0 %
OPIATES UR QL SCN: ABNORMAL
PCO2 BLDV: 48 MM HG (ref 40–50)
PCO2 BLDV: 86 MM HG (ref 40–50)
PH BLDV: 7.13 [PH] (ref 7.32–7.43)
PH BLDV: 7.4 [PH] (ref 7.32–7.43)
PLATELET # BLD AUTO: 271 10E3/UL (ref 150–450)
PO2 BLDV: 53 MM HG (ref 25–47)
PO2 BLDV: 66 MM HG (ref 25–47)
POTASSIUM BLD-SCNC: 3.6 MMOL/L (ref 3.4–5.3)
PROT SERPL-MCNC: 8.2 G/DL (ref 6.8–8.8)
RBC # BLD AUTO: 4.83 10E6/UL (ref 4.4–5.9)
SAO2 % BLDV: 84 % (ref 94–100)
SODIUM SERPL-SCNC: 139 MMOL/L (ref 133–144)
WBC # BLD AUTO: 8.7 10E3/UL (ref 4–11)

## 2022-12-11 PROCEDURE — 99291 CRITICAL CARE FIRST HOUR: CPT | Performed by: EMERGENCY MEDICINE

## 2022-12-11 PROCEDURE — 250N000009 HC RX 250: Performed by: EMERGENCY MEDICINE

## 2022-12-11 PROCEDURE — 999N000127 HC STATISTIC PERIPHERAL IV START W US GUIDANCE

## 2022-12-11 PROCEDURE — 120N000003 HC R&B IMCU UMMC

## 2022-12-11 PROCEDURE — 72128 CT CHEST SPINE W/O DYE: CPT

## 2022-12-11 PROCEDURE — 83605 ASSAY OF LACTIC ACID: CPT

## 2022-12-11 PROCEDURE — 250N000013 HC RX MED GY IP 250 OP 250 PS 637: Performed by: NURSE PRACTITIONER

## 2022-12-11 PROCEDURE — 85730 THROMBOPLASTIN TIME PARTIAL: CPT | Performed by: EMERGENCY MEDICINE

## 2022-12-11 PROCEDURE — 36415 COLL VENOUS BLD VENIPUNCTURE: CPT | Performed by: NURSE PRACTITIONER

## 2022-12-11 PROCEDURE — 74177 CT ABD & PELVIS W/CONTRAST: CPT

## 2022-12-11 PROCEDURE — 72131 CT LUMBAR SPINE W/O DYE: CPT

## 2022-12-11 PROCEDURE — HZ2ZZZZ DETOXIFICATION SERVICES FOR SUBSTANCE ABUSE TREATMENT: ICD-10-PCS | Performed by: NURSE PRACTITIONER

## 2022-12-11 PROCEDURE — 85610 PROTHROMBIN TIME: CPT | Performed by: EMERGENCY MEDICINE

## 2022-12-11 PROCEDURE — 82803 BLOOD GASES ANY COMBINATION: CPT

## 2022-12-11 PROCEDURE — 82077 ASSAY SPEC XCP UR&BREATH IA: CPT | Performed by: EMERGENCY MEDICINE

## 2022-12-11 PROCEDURE — 80053 COMPREHEN METABOLIC PANEL: CPT | Performed by: EMERGENCY MEDICINE

## 2022-12-11 PROCEDURE — 36415 COLL VENOUS BLD VENIPUNCTURE: CPT | Performed by: EMERGENCY MEDICINE

## 2022-12-11 PROCEDURE — 70450 CT HEAD/BRAIN W/O DYE: CPT

## 2022-12-11 PROCEDURE — 85025 COMPLETE CBC W/AUTO DIFF WBC: CPT | Performed by: EMERGENCY MEDICINE

## 2022-12-11 PROCEDURE — 99285 EMERGENCY DEPT VISIT HI MDM: CPT | Mod: 25 | Performed by: EMERGENCY MEDICINE

## 2022-12-11 PROCEDURE — 250N000011 HC RX IP 250 OP 636: Performed by: NURSE PRACTITIONER

## 2022-12-11 PROCEDURE — 80307 DRUG TEST PRSMV CHEM ANLYZR: CPT | Performed by: INTERNAL MEDICINE

## 2022-12-11 PROCEDURE — 80307 DRUG TEST PRSMV CHEM ANLYZR: CPT | Performed by: EMERGENCY MEDICINE

## 2022-12-11 PROCEDURE — 250N000011 HC RX IP 250 OP 636

## 2022-12-11 PROCEDURE — 250N000011 HC RX IP 250 OP 636: Performed by: EMERGENCY MEDICINE

## 2022-12-11 PROCEDURE — 70486 CT MAXILLOFACIAL W/O DYE: CPT

## 2022-12-11 PROCEDURE — 72125 CT NECK SPINE W/O DYE: CPT

## 2022-12-11 PROCEDURE — 999N000176 HC STATISTIC STROKE CODE W/O ACCESS

## 2022-12-11 PROCEDURE — 82803 BLOOD GASES ANY COMBINATION: CPT | Performed by: NURSE PRACTITIONER

## 2022-12-11 PROCEDURE — 80299 QUANTITATIVE ASSAY DRUG: CPT | Performed by: INTERNAL MEDICINE

## 2022-12-11 RX ORDER — PROCHLORPERAZINE 25 MG
25 SUPPOSITORY, RECTAL RECTAL EVERY 12 HOURS PRN
Status: DISCONTINUED | OUTPATIENT
Start: 2022-12-11 | End: 2022-12-12 | Stop reason: HOSPADM

## 2022-12-11 RX ORDER — PREGABALIN 75 MG/1
150 CAPSULE ORAL DAILY
Status: DISCONTINUED | OUTPATIENT
Start: 2022-12-12 | End: 2022-12-12 | Stop reason: HOSPADM

## 2022-12-11 RX ORDER — NALOXONE HYDROCHLORIDE 0.4 MG/ML
0.2 INJECTION, SOLUTION INTRAMUSCULAR; INTRAVENOUS; SUBCUTANEOUS ONCE
Status: COMPLETED | OUTPATIENT
Start: 2022-12-11 | End: 2022-12-11

## 2022-12-11 RX ORDER — LIDOCAINE 40 MG/G
CREAM TOPICAL
Status: DISCONTINUED | OUTPATIENT
Start: 2022-12-11 | End: 2022-12-12 | Stop reason: HOSPADM

## 2022-12-11 RX ORDER — POLYETHYLENE GLYCOL 3350 17 G/17G
17 POWDER, FOR SOLUTION ORAL DAILY PRN
Status: DISCONTINUED | OUTPATIENT
Start: 2022-12-11 | End: 2022-12-12 | Stop reason: HOSPADM

## 2022-12-11 RX ORDER — FAMOTIDINE 20 MG/1
20 TABLET, FILM COATED ORAL DAILY
Status: DISCONTINUED | OUTPATIENT
Start: 2022-12-12 | End: 2022-12-12 | Stop reason: HOSPADM

## 2022-12-11 RX ORDER — LEVOMILNACIPRAN HYDROCHLORIDE 120 MG/1
120 CAPSULE, EXTENDED RELEASE ORAL DAILY
COMMUNITY
Start: 2022-10-11 | End: 2024-06-06

## 2022-12-11 RX ORDER — NALOXONE HYDROCHLORIDE 0.4 MG/ML
0.2 INJECTION, SOLUTION INTRAMUSCULAR; INTRAVENOUS; SUBCUTANEOUS ONCE
Status: DISCONTINUED | OUTPATIENT
Start: 2022-12-11 | End: 2022-12-11

## 2022-12-11 RX ORDER — ACETAMINOPHEN 325 MG/1
650 TABLET ORAL EVERY 4 HOURS PRN
Status: DISCONTINUED | OUTPATIENT
Start: 2022-12-11 | End: 2022-12-11

## 2022-12-11 RX ORDER — AMPICILLIN AND SULBACTAM 2; 1 G/1; G/1
3 INJECTION, POWDER, FOR SOLUTION INTRAMUSCULAR; INTRAVENOUS EVERY 6 HOURS
Status: DISCONTINUED | OUTPATIENT
Start: 2022-12-11 | End: 2022-12-12

## 2022-12-11 RX ORDER — ONDANSETRON 4 MG/1
4 TABLET, ORALLY DISINTEGRATING ORAL EVERY 6 HOURS PRN
Status: DISCONTINUED | OUTPATIENT
Start: 2022-12-11 | End: 2022-12-12 | Stop reason: HOSPADM

## 2022-12-11 RX ORDER — IBUPROFEN 400 MG/1
400 TABLET, FILM COATED ORAL EVERY 6 HOURS PRN
Status: DISCONTINUED | OUTPATIENT
Start: 2022-12-11 | End: 2022-12-12 | Stop reason: HOSPADM

## 2022-12-11 RX ORDER — PROCHLORPERAZINE MALEATE 5 MG
10 TABLET ORAL EVERY 6 HOURS PRN
Status: DISCONTINUED | OUTPATIENT
Start: 2022-12-11 | End: 2022-12-12 | Stop reason: HOSPADM

## 2022-12-11 RX ORDER — IOPAMIDOL 755 MG/ML
100 INJECTION, SOLUTION INTRAVASCULAR ONCE
Status: COMPLETED | OUTPATIENT
Start: 2022-12-11 | End: 2022-12-11

## 2022-12-11 RX ORDER — NALOXONE HYDROCHLORIDE 0.4 MG/ML
INJECTION, SOLUTION INTRAMUSCULAR; INTRAVENOUS; SUBCUTANEOUS
Status: COMPLETED
Start: 2022-12-11 | End: 2022-12-11

## 2022-12-11 RX ORDER — ONDANSETRON 2 MG/ML
4 INJECTION INTRAMUSCULAR; INTRAVENOUS EVERY 6 HOURS PRN
Status: DISCONTINUED | OUTPATIENT
Start: 2022-12-11 | End: 2022-12-12 | Stop reason: HOSPADM

## 2022-12-11 RX ORDER — TAMSULOSIN HYDROCHLORIDE 0.4 MG/1
0.4 CAPSULE ORAL DAILY
Status: DISCONTINUED | OUTPATIENT
Start: 2022-12-11 | End: 2022-12-12 | Stop reason: HOSPADM

## 2022-12-11 RX ADMIN — TAMSULOSIN HYDROCHLORIDE 0.4 MG: 0.4 CAPSULE ORAL at 17:01

## 2022-12-11 RX ADMIN — AMPICILLIN SODIUM AND SULBACTAM SODIUM 3 G: 2; 1 INJECTION, POWDER, FOR SOLUTION INTRAMUSCULAR; INTRAVENOUS at 17:01

## 2022-12-11 RX ADMIN — AMPICILLIN SODIUM AND SULBACTAM SODIUM 3 G: 2; 1 INJECTION, POWDER, FOR SOLUTION INTRAMUSCULAR; INTRAVENOUS at 22:57

## 2022-12-11 RX ADMIN — NALOXONE HYDROCHLORIDE 0.4 MG: 0.4 INJECTION, SOLUTION INTRAMUSCULAR; INTRAVENOUS; SUBCUTANEOUS at 09:09

## 2022-12-11 RX ADMIN — SODIUM CHLORIDE 57 ML: 9 INJECTION, SOLUTION INTRAVENOUS at 10:49

## 2022-12-11 RX ADMIN — IOPAMIDOL 68 ML: 755 INJECTION, SOLUTION INTRAVENOUS at 10:49

## 2022-12-11 RX ADMIN — ACETAMINOPHEN 650 MG: 325 TABLET, FILM COATED ORAL at 19:20

## 2022-12-11 RX ADMIN — ABACAVIR SULFATE, DOLUTEGRAVIR SODIUM, LAMIVUDINE 1 TABLET: 600; 50; 300 TABLET, FILM COATED ORAL at 17:37

## 2022-12-11 ASSESSMENT — ACTIVITIES OF DAILY LIVING (ADL)
ADLS_ACUITY_SCORE: 20
CONCENTRATING,_REMEMBERING_OR_MAKING_DECISIONS_DIFFICULTY: NO
DOING_ERRANDS_INDEPENDENTLY_DIFFICULTY: NO
DRESSING/BATHING_DIFFICULTY: NO
WEAR_GLASSES_OR_BLIND: NO
CHANGE_IN_FUNCTIONAL_STATUS_SINCE_ONSET_OF_CURRENT_ILLNESS/INJURY: NO
ADLS_ACUITY_SCORE: 35
TOILETING_ISSUES: NO
DIFFICULTY_COMMUNICATING: NO
ADLS_ACUITY_SCORE: 35
WALKING_OR_CLIMBING_STAIRS_DIFFICULTY: NO
DIFFICULTY_EATING/SWALLOWING: NO
WEAR_GLASSES_OR_BLIND: NO
ADLS_ACUITY_SCORE: 20
TOILETING_ISSUES: NO
CONCENTRATING,_REMEMBERING_OR_MAKING_DECISIONS_DIFFICULTY: NO
DRESSING/BATHING_DIFFICULTY: NO
DIFFICULTY_EATING/SWALLOWING: NO
ADLS_ACUITY_SCORE: 37
FALL_HISTORY_WITHIN_LAST_SIX_MONTHS: NO
CHANGE_IN_FUNCTIONAL_STATUS_SINCE_ONSET_OF_CURRENT_ILLNESS/INJURY: NO
ADLS_ACUITY_SCORE: 35
HEARING_DIFFICULTY_OR_DEAF: NO
ADLS_ACUITY_SCORE: 37
WALKING_OR_CLIMBING_STAIRS_DIFFICULTY: NO
DOING_ERRANDS_INDEPENDENTLY_DIFFICULTY: NO
ADLS_ACUITY_SCORE: 35

## 2022-12-11 ASSESSMENT — ENCOUNTER SYMPTOMS
WOUND: 1
CONFUSION: 1

## 2022-12-11 ASSESSMENT — LIFESTYLE VARIABLES: INTOXICATION: 1

## 2022-12-11 NOTE — ED PROVIDER NOTES
[facility] EMERGENCY DEPARTMENT ([facility])    12/11/22      ED PROVIDER NOTE        History     Chief Complaint   Patient presents with     Cold Exposure     Alcohol Intoxication     The history is provided by the EMS personnel, the patient and medical records.   Alcohol Intoxication  Associated symptoms: confusion      Bc German is a 42 year old male with a past medical history of chronic intravenous drug abuse (meth, opioids), HIV status, and depressive disorder presenting to ED via EMS for evaluation of cold exposure and intoxication. Per EMS, patient was found unconscious outside surrounded by containers of alcohol. Patient was found to have substantial trauma to head with dried blood to area. Per EMS, patient appeared to have pinpoint pupils, periorbital swelling of left eye, and bleeding in nasopharynx. He was non-responsive upon arrival but was administered 0.2 mL narcan. Patient was then able to move, open eyes, and communicate with staff. Patient in confused state upon awakening. Blood sugar was regular upon arrival.    Past Medical History  Past Medical History:   Diagnosis Date     Anxiety      Depressive disorder      Drug abuse, IV (H)      Group A streptococcal infection 11/2014    Bacteremia/cellulitis     HCV antibody positive      HIV (human immunodeficiency virus infection) (H)      HIV (human immunodeficiency virus infection) (H)      HIV (human immunodeficiency virus infection) (H)      IVDU (intravenous drug user)      Osteomyelitis of vertebra of lumbosacral region (H) 3/2011    L3, left psoas abscess     Substance abuse (H)      Past Surgical History:   Procedure Laterality Date     EYE SURGERY  1 year ago    pins to left eye after socket fracture     LAPAROSCOPIC CHOLECYSTECTOMY N/A 2/7/2022    Procedure: CHOLECYSTECTOMY, LAPAROSCOPIC;  Surgeon: Lico Rodriguez MD;  Location:  OR     OPTICAL TRACKING SYSTEM FUSION CERVICAL ANTERIOR ONE LEVEL Right 9/10/2017    Procedure:  OPTICAL TRACKING SYSTEM FUSION CERVICAL ANTERIOR ONE LEVEL;  Stealth C6-C7 Anterior Cervical Corpectomy and C5-T1 Fusion;  Surgeon: Marv Ambrose MD;  Location: [facility] OR     ORTHOPEDIC SURGERY      Arm Surgery     PICC INSERTION Left 09/21/2017    5fr DL BioFlo PICC, 42cm (1cm external) in the L basilic vein w/ tip in the low SVC.     TRANSESOPHAGEAL ECHOCARDIOGRAM INTRAOPERATIVE N/A 3/17/2015    Procedure: TRANSESOPHAGEAL ECHOCARDIOGRAM INTRAOPERATIVE;  Surgeon: Generic Anesthesia Provider;  Location: [facility] OR     TRANSESOPHAGEAL ECHOCARDIOGRAM INTRAOPERATIVE N/A 11/18/2021    Procedure: ECHOCARDIOGRAM, TRANSESOPHAGEAL, INTRAOPERATIVE;  Surgeon: GENERIC ANESTHESIA PROVIDER;  Location:  OR     TRANSESOPHAGEAL ECHOCARDIOGRAM INTRAOPERATIVE N/A 12/2/2021    Procedure: ECHOCARDIOGRAM, TRANSESOPHAGEAL, INTRAOPERATIVE;  Surgeon: GENERIC ANESTHESIA PROVIDER;  Location:  OR     abacavir-dolutegravir-LamiVUDine (TRIUMEQ) 600- MG per tablet  acetaminophen (TYLENOL) 325 MG tablet  aspirin 81 MG EC tablet  buprenorphine HCl-naloxone HCl (SUBOXONE) 8-2 MG per film  famotidine (PEPCID) 20 MG tablet  ibuprofen (ADVIL/MOTRIN) 600 MG tablet  levomilnacipran (FETZIMA ER) 20 MG 24 hr capsule  LORazepam (ATIVAN) 1 MG tablet  Multiple Vitamins-Minerals (MULTIVITAMIN GUMMIES ADULT PO)  naloxone (EVZIO) 0.4 MG/0.4ML auto-injector  Naphazoline-Glycerin (CLEAR EYES MAX REDNESS RELIEF) 0.03-0.5 % SOLN  oxyCODONE (ROXICODONE) 5 MG tablet  polyethylene glycol (MIRALAX) 17 GM/Dose powder  pregabalin (LYRICA) 150 MG capsule  SENNA-docusate sodium (SENNA S) 8.6-50 MG tablet  simethicone (MYLICON) 80 MG chewable tablet  tamsulosin (FLOMAX) 0.4 MG capsule  testosterone cypionate (DEPOTESTOTERONE) 200 MG/ML injection      Allergies   Allergen Reactions     Bupropion      Other reaction(s): Seizures, Seizures  PN: seizure when took a double dose  seizure when took a double dose  PN: seizure when took a double dose  Other reaction(s):  Seizures  PN: seizure when took a double dose       Acetaminophen Nausea and Vomiting     PN: : GI Upset  : GI Upset  PN: : GI Upset  Other reaction(s): Vomiting  PN: : GI Upset       Codeine Itching and Nausea and Vomiting     Other reaction(s): Abdominal Pain  PN:  weak; fatigue; vomiting   weak; fatigue; vomiting  PN:  weak; fatigue; vomiting  PN:  weak; fatigue; vomiting       Sulfa Drugs Itching     Family History  No family history on file.  Social History   Social History     Tobacco Use     Smoking status: Every Day     Packs/day: 0.25     Types: Cigarettes     Smokeless tobacco: Never     Tobacco comments:     mostly vapes   Substance Use Topics     Alcohol use: Yes     Drug use: Yes     Types: Marijuana     Comment: Heroine--IV, benzo's      Past medical history, past surgical history, medications, allergies, family history, and social history were reviewed with the patient. No additional pertinent items.       Review of Systems   Skin: Positive for wound.   Psychiatric/Behavioral: Positive for confusion.     A complete review of systems was performed with pertinent positives and negatives noted in the HPI, and all other systems negative.    Physical Exam   BP: (!) 134/94  Pulse: 79  Temp: 96.9  F (36.1  C)  Resp: 16  SpO2: 97 %   Physical Exam  General: Lying on the bed, minimally responsive,   Head: Hematoma present as well as dried blood all over the face, and head significant left-sided periorbital swelling, nasal swelling.   Nose: + epistaxis  Ears:  No ottorrhea  Eyes: PERRL, EOMI  Mouth: Blood in the mouth,  Neck:  C-collar in place, no midline cervical tenderness, no crepitus  Chest: atraumatic; no palpable deformity; no tenderness of the chest wall; BS are  equal bilaterally,   CV:  RRR, normal s1 s2  Abdomen: soft, nt, nd, no guarding or rebound  Back: No midline bony tenderness  Pelvis: stable; no pain on AP or lateral compression  Extremites:  AROM of all major joints without pain  Skin: no  rash or diaphoresis  Neuro: Awake alert & O x 3, face  symmetrical, moving extremities x 4, sensation intact to light touch throughout;    ED Course      Procedures       Critical Care Addendum    My initial assessment, based on my review of prehospital provider report, review of nursing observations, review of vital signs, focused history, physical exam, review of cardiac rhythm monitor and discussion with Trauma team, and Max Face Team, established that Bc German has severe traumatic injuries, respiratory insufficiency and altered mental status, which requires immediate intervention, and therefore he is critically ill.     After the initial assessment, the care team initiated multiple lab tests, initiated IV fluid administration, initiated medication therapy with narcan, consulted with trauma and Max fact teams and initiated rapid rewarming measures to provide stabilization care. Due to the critical nature of this patient, I reassessed nursing observations, vital signs, physical exam, interpretation of labs, mental status, neurologic status and respiratory status multiple times prior to his disposition.     Time also spent performing documentation, reviewing test results, discussion with consultants and coordination of care.     Critical care time (excluding teaching time and procedures): 40 minutes.      Patient was placed on cardiac monitor soon after arrival and rapidly warming techniques were started including heat packs in the axilla and groin, bearhug blanket.  Patient was fully evaluated, was minimally responsive, but pinpoint pupils so Narcan was given.  Soon after Narcan was given patient was moving all extremities and able to respond.  He stated he did not know where he was or what happened.  Consulted trauma team and recommended full pan scan.  CT of the face showed orbital, maxillary and nasal fractures.  CTs of the chest abdomen pelvis, C-spine T-spine and L-spine were unremarkable for acute  findings.  The [place] face team came to evaluate patient and recommended sinus precautions and they will follow in the hospital and also consult ophthalmology.  The recommendation the trauma team was to transfer to the [place] ER for further evaluation and care decision where patient [facility] in [facility] care.             Results for orders placed or performed during the hospital encounter of 12/11/22   CT Head w/o Contrast     Status: None    Narrative    EXAM: CT HEAD W/O CONTRAST  LOCATION: [facility]  DATE/TIME: 12/11/2022 11:24 AM    INDICATION: Head injury.  COMPARISON: CT head without contrast 11/14/2021.  TECHNIQUE: Routine CT Head without IV contrast. Multiplanar reformats. Dose reduction techniques were used.    FINDINGS:  INTRACRANIAL CONTENTS: No acute intracranial hemorrhage. No extra-axial fluid collection. No mass effect or midline shift. Mild presumed chronic small vessel ischemic changes. Mild generalized volume loss. No hydrocephalus.     VISUALIZED ORBITS/SINUSES/MASTOIDS: Left periorbital soft tissue swelling. Left-sided proptosis. There is some retrobulbar gas along the left involving the extraconal fat. Left orbital and facial fractures are further described on dedicated maxillofacial   CT. Air-fluid level in the left maxillary sinus.      Impression    IMPRESSION:  1.  No acute intracranial hemorrhage.  2.  Left periorbital soft tissue hematoma with acute fractures of the left orbit, nasal bone, and maxillary sinus walls which are described in further detail on dedicated maxillofacial CT imaging.   CT Cervical Spine w/o Contrast     Status: None    Narrative    EXAM: CT CERVICAL SPINE W/O CONTRAST  LOCATION: [facility]  DATE/TIME: 12/11/2022 12:06 PM    INDICATION: Neck pain; Trauma; Significant trauma  COMPARISON: None.  TECHNIQUE: Routine CT Cervical Spine without IV contrast.  Multiplanar reformats. Dose reduction techniques were used.    FINDINGS:  VERTEBRA: Cervical vertebral body heights are maintained. Ventral plate spans C5-T1 with C6 and C7 corpectomies and interposition graft. Surgical hardware appears intact without fracture or loosening. The cervical spinal canal is narrowed on a   developmental basis. No acute cervical spine fracture.     CANAL/FORAMINA: Mild right neural foraminal stenosis at C5-C6.    PARASPINAL: No extraspinal abnormality.      Impression    IMPRESSION:  1.  No acute cervical spine fracture.  2.  Anterior fusion hardware at C5-T1 with C6 and C7 corpectomies.   CT Thoracic Spine w/o Contrast     Status: None    Narrative    EXAM: CT THORACIC SPINE W/O CONTRAST  LOCATION: [facility]  DATE/TIME: 12/11/2022 12:07 PM    INDICATION: Back pain.  COMPARISON: None.  TECHNIQUE: Routine CT Thoracic Spine without IV contrast. Multiplanar reformats. Dose reduction techniques were used.     FINDINGS:  VERTEBRA: Partially visualized cervicothoracic anterior fusion hardware extending inferiorly to the T1 level. Gastric vertebral body heights are maintained. No acute thoracic spine fracture.     CANAL/FORAMINA: No canal or neural foraminal stenosis.    PARASPINAL: There are multiple tiny nonobstructing bilateral renal calculi.      Impression    IMPRESSION:  1.  No acute thoracic spine fracture.     CT Lumbar Spine w/o Contrast     Status: None    Narrative    EXAM: CT LUMBAR SPINE W/O CONTRAST  LOCATION: [facility]  DATE/TIME: 12/11/2022 12:07 PM    INDICATION: Low back pain.  COMPARISON: None.  TECHNIQUE: Routine CT Lumbar Spine without IV contrast. Multiplanar reformats. Dose reduction techniques were used.     FINDINGS:  VERTEBRA: Lumbar spine lordosis is maintained. Lumbar vertebral body heights are maintained. No acute lumbar spine fracture.      CANAL/FORAMINA: Moderate loss of  disc space height at L2-L3. No CT evidence of high-grade spinal canal stenosis or neural foraminal narrowing.    PARASPINAL: There are multiple tiny bilateral nonobstructing renal calculi.      Impression    IMPRESSION:  1.  No acute lumbar spine fracture.   CT Chest/Abdomen/Pelvis w Contrast     Status: None    Narrative    EXAM: CT CHEST/ABDOMEN/PELVIS W CONTRAST  LOCATION: [facility]  DATE/TIME: 12/11/2022 11:05 AM    INDICATION: trauma found down  COMPARISON: CT abdomen pelvis 02/06/2022, CT chest 03/13/2015  TECHNIQUE: CT scan of the chest, abdomen, and pelvis was performed following injection of IV contrast. Multiplanar reformats were obtained. Dose reduction techniques were used.   CONTRAST: 68mL isovue 370    FINDINGS:   LUNGS AND PLEURA: 0.5 cm nodule at the left lung apex (3/45). No pleural effusion or pneumothorax. Incidental azygos fissure.    MEDIASTINUM/AXILLAE: No lymphadenopathy. No thoracic aortic aneurysms.    CORONARY ARTERY CALCIFICATION: None.    HEPATOBILIARY: Cholecystectomy. Normal liver and bile ducts.    PANCREAS: Normal.    SPLEEN: Normal.    ADRENAL GLANDS: Normal.    KIDNEYS/BLADDER: No significant mass, stones, or hydronephrosis. There are simple or benign cysts. No follow up is needed. Subcentimeter nonobstructing calculi bilaterally.    BOWEL: No obstruction or inflammatory change.    LYMPH NODES: Normal.    VASCULATURE: Unremarkable.    PELVIC ORGANS: Normal.    MUSCULOSKELETAL: Partially imaged anterior fusion hardware in the cervical spine. No aggressive or destructive lesion. No acute fracture.      Impression    IMPRESSION:  1.  No acute or traumatic findings in the chest, abdomen, or pelvis.    2.  Incidental 0.5 cm left upper lobe nodule, which is probably postinflammatory given prior airspace opacities in the same location on CT 03/13/2015. Suggest follow-up chest CT in 12 months to document temporal stability.   CT Facial Bones  without Contrast     Status: None    Narrative    EXAM: CT FACIAL BONES WITHOUT CONTRAST  LOCATION: [facility]  DATE/TIME: 12/11/2022 11:25 AM    INDICATION: trauma found down  COMPARISON: None.  TECHNIQUE: Routine CT Maxillofacial without IV contrast. Multiplanar reformats. Dose reduction techniques were used.     FINDINGS:  OSSEOUS STRUCTURES/ORBITS/SOFT TISSUES: Left periorbital soft tissue swelling and subcutaneous gas. There is retrobulbar gas involving the extraconal fat. Acute fractures involve the left nasal bone, left medial orbital wall, left inferior orbital floor,   left medial maxillary sinus wall, anterior wall of the left maxillary sinus. Pterygoid plates appear intact. Multiple dental caries and periapical lucencies.    Prior open reduction internal fixation of the inferior orbital rim on the left and across the left zygomaticomaxillary frontal suture.    SINUSES: Mucosal thickening and air-fluid level in the left maxillary sinus. Minimal circumferential mucosal thickening in the right maxillary sinus. Moderate mucosal thickening scattered about the frontal and ethmoid sinuses.    VISUALIZED INTRACRANIAL CONTENTS: No acute abnormality.       Impression    IMPRESSION:   1.  Acute fractures involving the left medial and inferior orbital walls, anterior medial walls of the left maxillary sinus, and left nasal bone.     [place] Draw     Status: None    Narrative    The following orders were created for panel order [place] Draw.  Procedure                               Abnormality         Status                     ---------                               -----------         ------                     Extra Blue Top Tube[[id number]]                              Final result               Extra Red Top Tube[[id number]]                               Final result               Extra Green Top (Lithium...[[id number]]                      Final result                Extra Purple Top Tube[[id number]]                            Final result               Extra Green Top (Lithium...[[id number]]                      Final result                 Please view results for these tests on the individual orders.   Extra Blue Top Tube     Status: None   Result Value Ref Range    Hold Specimen JIC    Extra Red Top Tube     Status: None   Result Value Ref Range    Hold Specimen JIC    Extra Green Top (Lithium Heparin) Tube     Status: None   Result Value Ref Range    Hold Specimen JIC    Extra Purple Top Tube     Status: None   Result Value Ref Range    Hold Specimen     Extra Green Top (Lithium Heparin) ON ICE     Status: None   Result Value Ref Range    Hold Specimen JIC    Comprehensive metabolic panel     Status: Abnormal   Result Value Ref Range    Sodium 139 133 - 144 mmol/L    Potassium 3.6 3.4 - 5.3 mmol/L    Chloride 104 94 - 109 mmol/L    Carbon Dioxide (CO2) 31 20 - 32 mmol/L    Anion Gap 4 3 - 14 mmol/L    Urea Nitrogen 23 7 - 30 mg/dL    Creatinine 0.65 (L) 0.66 - 1.25 mg/dL    Calcium 9.8 8.5 - 10.1 mg/dL    Glucose 112 (H) 70 - 99 mg/dL    Alkaline Phosphatase 112 40 - 150 U/L     (H) 0 - 45 U/L     (H) 0 - 70 U/L    Protein Total 8.2 6.8 - 8.8 g/dL    Albumin 3.8 3.4 - 5.0 g/dL    Bilirubin Total 0.5 0.2 - 1.3 mg/dL    GFR Estimate >90 >60 mL/min/1.73m2   INR     Status: Normal   Result Value Ref Range    INR 1.00 0.85 - 1.15   Partial thromboplastin time     Status: Normal   Result Value Ref Range    aPTT 29 22 - 38 Seconds   Alcohol ethyl     Status: Normal   Result Value Ref Range    Alcohol ethyl <0.01 <=0.01 g/dL   CBC with platelets and differential     Status: None   Result Value Ref Range    WBC Count 8.7 4.0 - 11.0 10e3/uL    RBC Count 4.83 4.40 - 5.90 10e6/uL    Hemoglobin 13.7 13.3 - 17.7 g/dL    Hematocrit 41.0 40.0 - 53.0 %    MCV 85 78 - 100 fL    MCH 28.4 26.5 - 33.0 pg    MCHC 33.4 31.5 - 36.5 g/dL    RDW 13.9 10.0 - 15.0 %    Platelet Count 271  150 - 450 10e3/uL    % Neutrophils 76 %    % Lymphocytes 15 %    % Monocytes 6 %    % Eosinophils 2 %    % Basophils 0 %    % Immature Granulocytes 1 %    NRBCs per 100 WBC 0 <1 /100    Absolute Neutrophils 6.7 1.6 - 8.3 10e3/uL    Absolute Lymphocytes 1.3 0.8 - 5.3 10e3/uL    Absolute Monocytes 0.5 0.0 - 1.3 10e3/uL    Absolute Eosinophils 0.1 0.0 - 0.7 10e3/uL    Absolute Basophils 0.0 0.0 - 0.2 10e3/uL    Absolute Immature Granulocytes 0.1 <=0.4 10e3/uL    Absolute NRBCs 0.0 10e3/uL   iStat Gases (lactate) venous, POCT     Status: Abnormal   Result Value Ref Range    Lactic Acid POCT 0.9 <=2.0 mmol/L    Bicarbonate Venous POCT 28 21 - 28 mmol/L    O2 Sat, Venous POCT 84 (L) 94 - 100 %    pCO2V Venous POCT 86 (HH) 40 - 50 mm Hg    pH Venous POCT 7.13 (LL) 7.32 - 7.43    pO2 Venous POCT 66 (H) 25 - 47 mm Hg   Drug abuse screen 1 urine (ED)     Status: Abnormal   Result Value Ref Range    Amphetamines Urine Screen Positive (A) Screen Negative    Barbiturates Urine Screen Negative Screen Negative    Benzodiazepines Urine Screen Negative Screen Negative    Cannabinoids Urine Screen Positive (A) Screen Negative    Cocaine Urine Screen Negative Screen Negative    Opiates Urine Screen Negative Screen Negative   CBC with platelets differential     Status: None    Narrative    The following orders were created for panel order CBC with platelets differential.  Procedure                               Abnormality         Status                     ---------                               -----------         ------                     CBC with platelets and d...[[id number]]                      Final result                 Please view results for these tests on the individual orders.   Urine Drugs of Abuse Screen     Status: Abnormal    Narrative    The following orders were created for panel order Urine Drugs of Abuse Screen.  Procedure                               Abnormality         Status                     ---------                                -----------         ------                     Drug abuse screen 1 urin...[[id number]]  Abnormal            Final result                 Please view results for these tests on the individual orders.     Medications   lidocaine 1 % 0.1-1 mL (has no administration in time range)   lidocaine (LMX4) cream (has no administration in time range)   sodium chloride (PF) 0.9% PF flush 3 mL (has no administration in time range)   sodium chloride (PF) 0.9% PF flush 3 mL (has no administration in time range)   naloxone (NARCAN) injection 0.2 mg ( Intravenous Canceled Entry 12/11/22 0910)   naloxone (NARCAN) injection 0.2 mg (0.4 mg Intravenous Given 12/11/22 0909)   iopamidol (ISOVUE-370) solution 100 mL (68 mLs Intravenous Given 12/11/22 1049)   sodium chloride 100mL for CT scan flush use (57 mLs Intravenous Given 12/11/22 1049)        Assessments & Plan    Altered mental status  Hypothermia  Head and facial trauma  Orbital, nasal, maxilla fractures  Opiate overdose-respiratory insufficiency    Plan: Admit to Medicine forfurther evaluation and treatment        I have reviewed the nursing notes. I have reviewed the findings, diagnosis, plan and need for follow up with the patient.    New Prescriptions    No medications on file       Final diagnoses:   None     I, Rozina Granger, am serving as a trained medical scribe to document services personally performed by Lucy Silva MD, based on the provider's statements to me.     ILucy MD, was physically present and have reviewed and verified the accuracy of this note documented by Rozina Granger.    --  Lucy Silva  [facility] EMERGENCY DEPARTMENT  12/11/2022     Lucy Silva MD  12/11/22 1600

## 2022-12-11 NOTE — ED NOTES
Bed: VTB  Expected date: 12/11/22  Expected time: 1:30 PM  Means of arrival:   Comments:  Anibal Body  Medic Rig: Coming from [place] ED  CC: Head trauma, multiple facial fractures  Age: 42  Sex: Male  Name/MR:  Call taken by: Jaylen  Other notes: Seen in [place] ED after he was found down, history of HIV, drug use.  Was given Narcan x2, awake now.  Needs OMFS/ENT consult and trauma consult.  Likely admission to the trauma service.  Pan scan was done.          Mari York MD  12/11/22 [id number]

## 2022-12-11 NOTE — ED NOTES
Bed: ED09  Expected date: 12/11/22  Expected time: 8:23 AM  Means of arrival:   Comments:  Male ETOH 15 minutes with cold exposure   Bruno SHEPARD

## 2022-12-11 NOTE — ED TRIAGE NOTES
Pt BIBA from [facility] after pt was found on the floor  unresponsive by EMS today. CT of spine was negative, ct showed fracture of left facial bone and left nasal bone.

## 2022-12-11 NOTE — ED NOTES
BIB EMS, per report, pt found down in community, unresponsive but arousable. Pt was able to report to [facility] that he believes to have fallen, unsure how long to have been outside. Pt was found surrounded by empty alcohol bottles.     Upon arrival pt inconsistently responsive to vigorous arousal, and withdrew from pain. Pupils pin point. Extremities cold to touch, core warm to touch. Dried blood around both nares, and contusion to L eye. MD at bedside.    IV accessed-warmed bolus of 1L NS given, bear hugger warming applied as well as warm packs applied, C-collar applied. 0.4mg IV narcan administered and pt began moving limbs and body, with some incomprehensible sounds- unable to follow prompts. MD aware.

## 2022-12-11 NOTE — H&P
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 No acute lumbar spine fracture.      CANAL/FORAMINA: Moderate loss of disc space height at L2-L3. No CT evidence of high-grade spinal canal stenosis or neural foraminal narrowing.    PARASPINAL: There are multiple tiny bilateral nonobstructing renal calculi.      Impression    IMPRESSION:  1.  No acute lumbar spine fracture.   Blood gas venous   Result Value Ref Range    pH Venous 7.40 7.32 - 7.43    pCO2 Venous 48 40 - 50 mm Hg    pO2 Venous 53 (H) 25 - 47 mm Hg    Bicarbonate Venous 30 (H) 21 - 28 mmol/L    Base Excess/Deficit (+/-) 4.0 (H) -7.7 - 1.9 mmol/L    FIO2 0    [place] [facility]    Narrative    The following orders were created for panel order [place] [facility].  Procedure                               Abnormality         Status                     ---------                               -----------         ------                     Extra Blue Top Tube[[id number]]                              In process                 Extra Red Top Tube[[id number]]                                                           Extra Green Top (Lithium...[[id number]]                      In process                 Extra Purple Top Tube[[id number]]                            In process                   Please view results for these tests on the individual orders.       Studies:  CT Lumbar Spine w/o Contrast   Final Result   IMPRESSION:   1.  No acute lumbar spine fracture.      CT Thoracic Spine w/o Contrast   Final Result   IMPRESSION:   1.  No acute thoracic spine fracture.         CT Cervical Spine w/o Contrast   Final Result   IMPRESSION:   1.  No acute cervical spine fracture.   2.  Anterior fusion hardware at C5-T1 with C6 and C7 corpectomies.      CT Facial Bones without Contrast   Final Result   IMPRESSION:    1.  Acute fractures involving the left medial and inferior orbital walls, anterior medial walls of the left maxillary sinus, and left nasal bone.         CT Head w/o Contrast   Final Result   IMPRESSION:   1.  No acute intracranial hemorrhage.   2.  Left periorbital soft tissue hematoma with acute fractures of the left orbit, nasal bone, and maxillary sinus walls which are described in further detail on dedicated maxillofacial CT imaging.      CT Chest/Abdomen/Pelvis w Contrast   Final Result   IMPRESSION:   1.  No acute or traumatic findings in the chest, abdomen, or pelvis.      2.  Incidental 0.5 cm left upper lobe nodule, which is probably postinflammatory given prior airspace opacities in the same location on CT 03/13/2015. Suggest follow-up chest CT in 12 months to document temporal stability.          MEDHAT Singh CNP  Job code pager [phone] (24 hours a day)  Use [employer] to text page (not text page compatible with [email or web address]).    Dial * * * [phone] then  [phone], wait for prompt and then enter call back number.   Do NOT call numbers listed to right in Treatment Team section.

## 2022-12-11 NOTE — ED NOTES
RN w/ Pt at CT called to report that they put the pt on 8L oxygen via oxymask as the pt desated to SpO2 of 85% during scan. MD notified and put in order for narcan to be given. RN with the pt called back to state that the pt's CT was complete, the pt was now more alert, and saturation back in upper 90's% on RA. MD notified, Narcan held at this time.

## 2022-12-11 NOTE — CONSULTS
ORAL & MAXILLOFACIAL SURGERY   CONSULT  Bc German,  MRN: [id number],  : 1980        ASSESSMENT   Bc is a 42 year old male who presents to [facility] ED BIBA with multiple facial fractures (involving the left medial and inferior orbital walls, anterior medial walls of the left maxillary sinus, and left nasal bone) after being found unresponsive on the street.      PLAN  -No acute surgical intervention at this time  -Augmentin Abx therapy for 7 days  -Sinus precautions for 6 weeks  -Rec Optho consult   -Follow up in OMFS clinic in 2 weeks; clinic will contact patient    Please contact the OMFS resident on-call with questions or concerns.    Discussed with chief resident (Dr. Suzy Raza) and staff surgeon (Dr. Qing Kirby).    Jose L Zhou DDS  Oral & Maxillofacial Surgery, OMFS Intern    ____________________________________________      HPI     Bc German is a 42 year old male with pmhx significant for chronic intravenous drug abuse (meth, opioids), HIV status, and depressive disorder presenting to [facility] ED BIBA for evaluation of cold exposure and intoxication. Per EMS, patient was found unconscious outside surrounded by containers of alcohol. Patient was found to have substantial trauma to head and face with dried blood to area. First responders reported pinpoint pupils, periorbital swelling of left eye, and bleeding in nasopharynx. He was non-responsive upon arrival but was administered 0.2 mL narcan. Patient was then able to move, open eyes, and communicate with staff. Patient in confused state upon awakening. Blood sugar was regular upon arrival. During OMFS consult patient was in and out of consciousness but responded to few commands.    HISTORY  Past Medical History:   Past Medical History:   Diagnosis Date     Anxiety      Depressive disorder      Drug abuse, IV (H)      Group A streptococcal infection 2014    Bacteremia/cellulitis     HCV antibody positive      HIV (human  immunodeficiency virus infection) (H)      HIV (human immunodeficiency virus infection) (H)      HIV (human immunodeficiency virus infection) (H)      IVDU (intravenous drug user)      Osteomyelitis of vertebra of lumbosacral region (H) 3/2011    L3, left psoas abscess     Substance abuse (H)      Past Surgical History:   Past Surgical History:   Procedure Laterality Date     EYE SURGERY  1 year ago    pins to left eye after socket fracture     LAPAROSCOPIC CHOLECYSTECTOMY N/A 2/7/2022    Procedure: CHOLECYSTECTOMY, LAPAROSCOPIC;  Surgeon: Lico Rodriguez MD;  Location:  OR     OPTICAL TRACKING SYSTEM FUSION CERVICAL ANTERIOR ONE LEVEL Right 9/10/2017    Procedure: OPTICAL TRACKING SYSTEM FUSION CERVICAL ANTERIOR ONE LEVEL;  Stealth C6-C7 Anterior Cervical Corpectomy and C5-T1 Fusion;  Surgeon: Marv Ambrose MD;  Location: [facility] OR     ORTHOPEDIC SURGERY      Arm Surgery     PICC INSERTION Left 09/21/2017    5fr DL BioFlo PICC, 42cm (1cm external) in the L basilic vein w/ tip in the low SVC.     TRANSESOPHAGEAL ECHOCARDIOGRAM INTRAOPERATIVE N/A 3/17/2015    Procedure: TRANSESOPHAGEAL ECHOCARDIOGRAM INTRAOPERATIVE;  Surgeon: Generic Anesthesia Provider;  Location: [facility]     TRANSESOPHAGEAL ECHOCARDIOGRAM INTRAOPERATIVE N/A 11/18/2021    Procedure: ECHOCARDIOGRAM, TRANSESOPHAGEAL, INTRAOPERATIVE;  Surgeon: GENERIC ANESTHESIA PROVIDER;  Location:  OR     TRANSESOPHAGEAL ECHOCARDIOGRAM INTRAOPERATIVE N/A 12/2/2021    Procedure: ECHOCARDIOGRAM, TRANSESOPHAGEAL, INTRAOPERATIVE;  Surgeon: GENERIC ANESTHESIA PROVIDER;  Location:  OR     Medications:   No current facility-administered medications on file prior to encounter.  abacavir-dolutegravir-LamiVUDine (TRIUMEQ) 600- MG per tablet, Take 1 tablet by mouth daily Please call [phone] & make appointment for further refills.  acetaminophen (TYLENOL) 325 MG tablet, Take 2 tablets (650 mg) by mouth every 6 hours as needed for mild pain or other (and  "adjunct with moderate or severe pain or per patient request)  aspirin 81 MG EC tablet, Take 81 mg by mouth daily (Patient not taking: Reported on 1/18/2022)  buprenorphine HCl-naloxone HCl (SUBOXONE) 8-2 MG per film, Place 1 Film under the tongue 2 times daily (Patient taking differently: Place 0.5 Film under the tongue daily as needed (Patient states Suboxone has been \"worsening his anxiety\" so he has not been taking often. He states he only takes 1/3-1/2 film once in a while))  famotidine (PEPCID) 20 MG tablet, Take 20 mg by mouth daily  ibuprofen (ADVIL/MOTRIN) 600 MG tablet, Take 1 tablet (600 mg) by mouth every 6 hours as needed for moderate pain  levomilnacipran (FETZIMA ER) 20 MG 24 hr capsule, Take 60 mg by mouth daily   LORazepam (ATIVAN) 1 MG tablet, Take 1 mg by mouth 2 times daily  Multiple Vitamins-Minerals (MULTIVITAMIN GUMMIES ADULT PO), Take 1 chew tab by mouth daily  naloxone (EVZIO) 0.4 MG/0.4ML auto-injector, Inject 0.4 mLs (0.4 mg) into the muscle once as needed for opioid reversal every 2-3 minutes until assistance arrives  Naphazoline-Glycerin (CLEAR EYES MAX REDNESS RELIEF) 0.03-0.5 % SOLN, Place 1 drop into both eyes daily as needed  oxyCODONE (ROXICODONE) 5 MG tablet, Take 1-2 tablets (5-10 mg) by mouth every 6 hours as needed for moderate to severe pain  polyethylene glycol (MIRALAX) 17 GM/Dose powder, Take 17 g (1 capful) by mouth daily  pregabalin (LYRICA) 150 MG capsule, Take 150 mg by mouth daily   SENNA-docusate sodium (SENNA S) 8.6-50 MG tablet, Take 1 tablet by mouth At Bedtime  simethicone (MYLICON) 80 MG chewable tablet, Take 1 tablet (80 mg) by mouth 4 times daily  tamsulosin (FLOMAX) 0.4 MG capsule, Take 1 capsule (0.4 mg) by mouth daily  testosterone cypionate (DEPOTESTOTERONE) 200 MG/ML injection, Inject 0.3 mLs into the muscle once a week Inject 0.3 mL IM every 77 days         naloxone  0.2 mg Intravenous Once     sodium chloride (PF)  3 mL Intracatheter Q8H     Allergies: "   Allergies   Allergen Reactions     Bupropion      Other reaction(s): Seizures, Seizures  PN: seizure when took a double dose  seizure when took a double dose  PN: seizure when took a double dose  Other reaction(s): Seizures  PN: seizure when took a double dose       Acetaminophen Nausea and Vomiting     PN: : GI Upset  : GI Upset  PN: : GI Upset  Other reaction(s): Vomiting  PN: : GI Upset       Codeine Itching and Nausea and Vomiting     Other reaction(s): Abdominal Pain  PN:  weak; fatigue; vomiting   weak; fatigue; vomiting  PN:  weak; fatigue; vomiting  PN:  weak; fatigue; vomiting       Sulfa Drugs Itching     Social History:   Social History     Socioeconomic History     Marital status: Single     Spouse name: Not on file     Number of children: Not on file     Years of education: Not on file     Highest education level: Not on file   Occupational History     Not on file   Tobacco Use     Smoking status: Every Day     Packs/day: 0.25     Types: Cigarettes     Smokeless tobacco: Never     Tobacco comments:     mostly vapes   Substance and Sexual Activity     Alcohol use: Yes     Drug use: Yes     Types: Marijuana     Comment: Heroine--IV, benzo's     Sexual activity: Yes     Partners: Female, Male   Other Topics Concern     Parent/sibling w/ CABG, MI or angioplasty before 65F 55M? Not Asked   Social History Narrative    ** Merged History Encounter **         ** Merged History Encounter **          Social Determinants of Health     Financial Resource Strain: Not on file   Food Insecurity: Not on file   Transportation Needs: Not on file   Physical Activity: Not on file   Stress: Not on file   Social Connections: Not on file   Intimate Partner Violence: Not on file   Housing Stability: Not on file       REVIEW OF SYSTEMS  10 point ROS reviewed and negative aside from listed in HPI    PHYSICAL EXAM  Vital Signs:   Vitals:    12/11/22 1115 12/11/22 1145 12/11/22 1200 12/11/22 1300   BP: (!) 135/92 128/89 (!)  135/94 (!) 138/101   Pulse: 91 96 116 88   Resp: 12 12 15 12   Temp:       TempSrc:       SpO2: 98% 97% 92% 98%       Vitals: Blood pressure (!) 138/101, pulse 88, temperature 97.9  F (36.6  C), temperature source Oral, resp. rate 12, SpO2 98 %.  Constitutional: patient sleeping in ED. Not very responsive to commands. In and out of consciousness and confused.  HEENT: There are signs of external trauma, hematoma to left eye with significant amount of edema and small laceration over medial aspect of left eye. Epistaxis in nares bilaterally      Ears: External ears are atruamatic      Eyes: Pupils equal round and reactive to light, Extraocular eye movement intact. Exam was limited due to patient lack of cooperation. Unable to appreciate any step or palpation of hardware at inferior border of left eye due to edema and swelling.      Nose: External alae are normal, there is epistaxis bilaterally in nares, septum deviated to right slightly, no septal hematoma, +crepitus/deviation of the left nasal dorsum to the right due to nasal bone fracture. Minor depression and step appreciated upon palpation. Small abrasion to radix of nose      Mouth:   The buccal vestibules are atruamatic.  The dentition is atrauamtic.  Maxilla nonmobile on exam, bilateral compression and flexion of mandible does not elicit painful response or movement.  No blood in saliva, no loose or traumatically missing teeth. No intraoral lacerations. Oral Hygiene is very poor, Maximum interincisal opening not achieved due to poor patient response. Exam was also limited due to lack of response by patient   Neck: Soft, supple, no lymphadenopathy. Cervical collar present.  Cardiovascular: WWP  Pulmonary: patient breathing on room air without issue  Musculoskeletal: pt moves all extremities without issue.  Neurologic: AOx3, EOMI, PERRL, facial sensation/movement symmetric (V2/V3),  uvula midline, tongue midline on protrusion.  Skin: no rashes or diaphoresis            REVIEW OF LABORATORY DATA  Most Recent 3 CBC's:  Recent Labs   Lab Test 12/11/22  0845 04/21/22  1734 04/19/22  1454   WBC 8.7 6.8 7.9   HGB 13.7 10.2* 12.6*   MCV 85 85 84    265 264      Most Recent 3 BMP's:  Recent Labs   Lab Test 12/11/22  0845 04/21/22  1734 04/19/22  1454    144 136   POTASSIUM 3.6 3.4 3.4   CHLORIDE 104 109 104   CO2 31 30 34*   BUN 23 8 8   CR 0.65* 0.96 0.88   ANIONGAP 4 5 <1*   SANDRA 9.8 8.9 9.2   * 88 112*     Most Recent 2 LFT's:  Recent Labs   Lab Test 12/11/22  0845 04/21/22  1734   * 112*   * 232*   ALKPHOS 112 78   BILITOTAL 0.5 0.2     Most Recent INR's and Anticoagulation Dosing History:  Anticoagulation Dose History     Recent Dosing and Labs Latest Ref Rng & Units 11/5/2014 11/6/2014 3/6/2015 3/12/2015 10/9/2017 5/9/2021 12/11/2022    Warfarin 7.5 mg - 15 mg - - - - - -    INR 0.85 - 1.15 1.82(H) 2.77(H) 1.69(H) 1.92(H) 1.01 0.96 1.00        Most Recent 3 Troponin's:  Recent Labs   Lab Test 05/09/21  1450 10/09/17  2015   TROPI <0.015 <0.015     Most Recent Cholesterol Panel:No lab results found.  Most Recent 6 Bacteria Isolates From Any Culture (See EPIC Reports for Culture Details):  Recent Labs   Lab Test 05/05/18  1425 05/04/18  1655 10/10/17  1901 10/10/17  1849 10/09/17  2035 10/09/17  2015   CULT No growth No growth No growth No growth No growth Cultured on the 1st day of incubation:  Bacillus species, not anthracis nor cereus group  *  Critical Value/Significant Value, preliminary result only, called to and read back by  Tonie Phan, RN @2852 10/10/17.DH.    (Note)  NEGATIVE for the following: Staphylococcus spp., Staph aureus, Staph  epidermidis, Staph lugdunensis, Streptococcus spp., Strep pneumoniae,  Strep pyogenes, Strep agalactiae, Strep anginosus group, Enterococcus  faecalis, Enterococcus faecium, and Listeria spp. by [facility]  multiplex nucleic acid test. Final identification and antimicrobial  susceptibility testing will be  verified by standard methods.    Critical Value/Significant Value called to and read back by Ambika Phan RN [id number] @ 1933 10/10/17          Most Recent TSH, T4 and A1c Labs:  Recent Labs   Lab Test 12/04/21  0619   TSH 4.21*   T4 0.98       IMAGING RESULTS (Include outside hospital results)  CT FACIAL BONES WITHOUT CONTRAST    1.  Acute fractures involving the left medial and inferior orbital walls, anterior medial walls of the left maxillary sinus, and left nasal bone.

## 2022-12-11 NOTE — ED NOTES
During CT pt O2 sats briefly dropped to 85% on RA, he was placed on O2 6L/PNC with sats between 97-99%. O2 sats returned to 98% on RA with head of bed elevated. MD turcios.

## 2022-12-12 ENCOUNTER — APPOINTMENT (OUTPATIENT)
Dept: PHYSICAL THERAPY | Facility: CLINIC | Age: 42
DRG: 158 | End: 2022-12-12
Attending: NURSE PRACTITIONER
Payer: COMMERCIAL

## 2022-12-12 VITALS
HEIGHT: 70 IN | OXYGEN SATURATION: 98 % | RESPIRATION RATE: 16 BRPM | WEIGHT: 124.34 LBS | SYSTOLIC BLOOD PRESSURE: 133 MMHG | HEART RATE: 88 BPM | TEMPERATURE: 98.7 F | DIASTOLIC BLOOD PRESSURE: 94 MMHG | BODY MASS INDEX: 17.8 KG/M2

## 2022-12-12 LAB
ALBUMIN SERPL BCG-MCNC: 3.4 G/DL (ref 3.5–5.2)
ALP SERPL-CCNC: 95 U/L (ref 40–129)
ALT SERPL W P-5'-P-CCNC: 234 U/L (ref 10–50)
ANION GAP SERPL CALCULATED.3IONS-SCNC: 10 MMOL/L (ref 7–15)
AST SERPL W P-5'-P-CCNC: 140 U/L (ref 10–50)
BILIRUB DIRECT SERPL-MCNC: <0.2 MG/DL (ref 0–0.3)
BILIRUB SERPL-MCNC: 0.5 MG/DL
BUN SERPL-MCNC: 12.1 MG/DL (ref 6–20)
CALCIUM SERPL-MCNC: 9.1 MG/DL (ref 8.6–10)
CHLORIDE SERPL-SCNC: 104 MMOL/L (ref 98–107)
CREAT SERPL-MCNC: 0.69 MG/DL (ref 0.67–1.17)
CREAT UR-MCNC: 55 MG/DL
DEPRECATED HCO3 PLAS-SCNC: 27 MMOL/L (ref 22–29)
ERYTHROCYTE [DISTWIDTH] IN BLOOD BY AUTOMATED COUNT: 14.4 % (ref 10–15)
FENTANYL UR QL: ABNORMAL
GFR SERPL CREATININE-BSD FRML MDRD: >90 ML/MIN/1.73M2
GLUCOSE SERPL-MCNC: 99 MG/DL (ref 70–99)
HCT VFR BLD AUTO: 40.4 % (ref 40–53)
HGB BLD-MCNC: 13 G/DL (ref 13.3–17.7)
MCH RBC QN AUTO: 28 PG (ref 26.5–33)
MCHC RBC AUTO-ENTMCNC: 32.2 G/DL (ref 31.5–36.5)
MCV RBC AUTO: 87 FL (ref 78–100)
PLATELET # BLD AUTO: 249 10E3/UL (ref 150–450)
POTASSIUM SERPL-SCNC: 3.6 MMOL/L (ref 3.4–5.3)
PROT SERPL-MCNC: 6.5 G/DL (ref 6.4–8.3)
RBC # BLD AUTO: 4.65 10E6/UL (ref 4.4–5.9)
SODIUM SERPL-SCNC: 141 MMOL/L (ref 136–145)
WBC # BLD AUTO: 6.3 10E3/UL (ref 4–11)

## 2022-12-12 PROCEDURE — 250N000013 HC RX MED GY IP 250 OP 250 PS 637: Performed by: NURSE PRACTITIONER

## 2022-12-12 PROCEDURE — 82248 BILIRUBIN DIRECT: CPT | Performed by: NURSE PRACTITIONER

## 2022-12-12 PROCEDURE — 36415 COLL VENOUS BLD VENIPUNCTURE: CPT | Performed by: NURSE PRACTITIONER

## 2022-12-12 PROCEDURE — 97530 THERAPEUTIC ACTIVITIES: CPT | Mod: GP | Performed by: PHYSICAL THERAPIST

## 2022-12-12 PROCEDURE — 99222 1ST HOSP IP/OBS MODERATE 55: CPT | Performed by: INTERNAL MEDICINE

## 2022-12-12 PROCEDURE — 250N000011 HC RX IP 250 OP 636: Performed by: NURSE PRACTITIONER

## 2022-12-12 PROCEDURE — 85027 COMPLETE CBC AUTOMATED: CPT | Performed by: NURSE PRACTITIONER

## 2022-12-12 PROCEDURE — 97161 PT EVAL LOW COMPLEX 20 MIN: CPT | Mod: GP | Performed by: PHYSICAL THERAPIST

## 2022-12-12 RX ORDER — LORAZEPAM 1 MG/1
1 TABLET ORAL 2 TIMES DAILY PRN
Status: DISCONTINUED | OUTPATIENT
Start: 2022-12-12 | End: 2022-12-12 | Stop reason: HOSPADM

## 2022-12-12 RX ORDER — ERYTHROMYCIN 5 MG/G
OINTMENT OPHTHALMIC 4 TIMES DAILY
Status: DISCONTINUED | OUTPATIENT
Start: 2022-12-12 | End: 2022-12-12 | Stop reason: HOSPADM

## 2022-12-12 RX ORDER — IBUPROFEN 400 MG/1
400 TABLET, FILM COATED ORAL EVERY 6 HOURS PRN
Qty: 20 TABLET | Refills: 0 | Status: SHIPPED | OUTPATIENT
Start: 2022-12-12 | End: 2022-12-17

## 2022-12-12 RX ORDER — ERYTHROMYCIN 5 MG/G
OINTMENT OPHTHALMIC 4 TIMES DAILY
Qty: 3.5 G | Refills: 0 | Status: SHIPPED | OUTPATIENT
Start: 2022-12-12 | End: 2022-12-17

## 2022-12-12 RX ADMIN — LEVOMILNACIPRAN HYDROCHLORIDE 60 MG: 40 CAPSULE, EXTENDED RELEASE ORAL at 09:07

## 2022-12-12 RX ADMIN — AMPICILLIN SODIUM AND SULBACTAM SODIUM 3 G: 2; 1 INJECTION, POWDER, FOR SOLUTION INTRAMUSCULAR; INTRAVENOUS at 03:57

## 2022-12-12 RX ADMIN — TAMSULOSIN HYDROCHLORIDE 0.4 MG: 0.4 CAPSULE ORAL at 09:07

## 2022-12-12 RX ADMIN — ABACAVIR SULFATE, DOLUTEGRAVIR SODIUM, LAMIVUDINE 1 TABLET: 600; 50; 300 TABLET, FILM COATED ORAL at 09:07

## 2022-12-12 RX ADMIN — AMOXICILLIN AND CLAVULANATE POTASSIUM 1 TABLET: 875; 125 TABLET, FILM COATED ORAL at 09:05

## 2022-12-12 RX ADMIN — FAMOTIDINE 20 MG: 20 TABLET ORAL at 09:07

## 2022-12-12 RX ADMIN — PREGABALIN 150 MG: 75 CAPSULE ORAL at 09:06

## 2022-12-12 RX ADMIN — IBUPROFEN 400 MG: 400 TABLET, FILM COATED ORAL at 06:22

## 2022-12-12 ASSESSMENT — ACTIVITIES OF DAILY LIVING (ADL)
ADLS_ACUITY_SCORE: 21

## 2022-12-12 NOTE — CONSULTS
"Care Management Initial Consult    SW attempted to meet with pt in his room at bedside. SW introduced self and role. Pt reports he is \"tired\". When asked if SW could complete an assessment with pt he declined. No other questions or concerns at this time. SW available if pt changes his mind or if other needs arise.     DONELL Partida, LGSW   6B    Phone: [phone]  Pager: [phone]      "

## 2022-12-12 NOTE — DISCHARGE SUMMARY
[facility]    Discharge Summary  Trauma Surgery Service    Date of Admission:  12/11/2022  Date of Discharge:  12/12/2022  Attending Physician: Dr. Lee Montaño  Discharging Provider: Jim Zamora CNP  Date of Service (when I saw the patient): 12/12/22    Primary Provider: Khari Barrientos  Primary Care clinic: [facility] [address] [facility] [address]  Phone: [phone]  Fax number: [phone]     Discharge Diagnoses   Acute fractures involving the left medial and inferior orbital walls, anterior medial walls of the left maxillary sinus, and left nasal bone  Altered mental status    Hospital Course   Bc German is a 42 year old male with a history of HIV positive status, polysubstance abuse, anxiety and depression who was brought to the ED by EMS found down minimally responsive with signs of facial/head trauma unknown location. He was hypothermic with a body temp of 96.9F on arrival. Blood glucose WNL. Warmed NS infused, bear hugger applied, received 0.2mg narcan with improved alertness. He had no recollection of the events leading to trauma.  CT pan scanned notable for left periorbital, maxillary sinus and nasal bone fractures. He was seen by Oral Maxillofacial Surgery.    He was admitted to the trauma service overnight.     # Hx Polysubstance abuse, +UDS  # Anxiety/depression  # Encephalopathy 2/2 closed head injury without identified intracranial hemorrhage, vs possible drug toxicity vs other unknown. He received a dose of narcan in the ED with improved alertness. CT head negative for intracranial hemorrhage or other traumatic injury.  Urine Drug Screen + amphetamines and + cannabinoids, neg ETOH.   Prior to discharge he was awake and alert, able to tolerate a diet and ambulate unassisted.  - Addiction medicine consulted and he declined further evaluation.     # Left orbital wall, sinus and nasal bone fractures  He was seen and evaluated by the Oral  Maxillofacial trauma team, no planned surgical interventions. Recommended   a 5 day course of Augmentin and follow up at the clinic in 2 weeks for further evaluation.  Ophthalmology consulted, S/P dilated eye exam, recommend Erythromycin 4 times daily for 5 days and follow up at the Ophthalmology Clinic.  Pain controlled with Acetaminophen and Ibuprofen.    No changes were made to his other home medications.    Pending Results   These results will be followed up by PCP  Unresulted Labs Ordered in the Past 30 Days of this Admission     Date and Time Order Name Status Description    12/12/2022 10:47 AM Urine Drug Confirmation Panel In process     12/12/2022 10:05 AM Buprenorphine & Metabolite Screen In process         Code Status   Full Code    SUBJECTIVE: Prior to discharge Walt reported minimal to no pain. He was able to tolerate a diet and ambulate un assisted. Feels tired. States dad will pick him up.    Physical Exam   Temp: 97.7  F (36.5  C) Temp src: Oral BP: (!) 126/93 Pulse: 95   Resp: 18 SpO2: 98 % O2 Device: None (Room air)    Vitals:    12/12/22 0600   Weight: 56.4 kg (124 lb 5.4 oz)     Vital Signs with Ranges  Temp:  [97.3  F (36.3  C)-98.8  F (37.1  C)] 97.7  F (36.5  C)  Pulse:  [78-99] 95  Resp:  [14-20] 18  BP: (119-139)/() 126/93  SpO2:  [95 %-100 %] 98 %  I/O last 3 completed shifts:  In: 580 [P.O.:580]  Out: 1275 [Urine:1275]    Constitutional: Awake, alert, cooperative, no apparent distress, and appears stated age.  Eyes:Moderate left periorbital edema (though improved from a day prior), ecchymosis  ENT: Normocephalic, without obvious abnormality  Respiratory: No increased work of breathing, good air exchange, clear to auscultation bilaterally, no crackles or wheezing.  Cardiovascular: Normal apical impulse, regular rate and rhythm, normal S1 and S2  GI: soft non tender  Genitourinary:  Brittney and clear  Skin: Warm and dry, scattered scabs bilateral arms and legs  Musculoskeletal: There is  "no redness, warmth, or swelling of the joints.  Full range of motion noted.  Motor strength is 5 out of 5 all extremities bilaterally.  Tone is normal.  Neurologic: Awake, alert, oriented to name, place and time.    Neuropsychiatric: Calm, normal eye contact, alert    Discharge Disposition   Discharged to home  Condition at discharge: Stable  Discharge VS: Blood pressure (!) 126/93, pulse 95, temperature 97.7  F (36.5  C), temperature source Oral, resp. rate 18, height 1.778 m (5' 10\"), weight 56.4 kg (124 lb 5.4 oz), SpO2 98 %.    Consultations This Hospital Stay   PHYSICAL THERAPY ADULT IP CONSULT  ADDICTION SERVICE ADULT IP CONSULT FOR [place]  NURSING TO CONSULT FOR VASCULAR ACCESS CARE IP CONSULT  SOCIAL WORK IP CONSULT  OPHTHALMOLOGY IP CONSULT    Discharge Orders      Reason for your hospital stay    Facial fractures  Altered mental status     Activity    Your activity upon discharge: activity as tolerated  Sinus precautions: Avoid blowing your nose, heavy lifting     Adult [facility] Follow-up and recommended labs and tests    Follow up with your primary care provider for continued medical care and hospital follow up in 5-10 days.    Oral and Maxillofacial Surgery in 2 weeks for your facial fractures  7th Floor [facility]  [address] [place] [address]  Appointments: [phone]    Ophthalmology (Eye) Clinic in 1 to 2 weeks  [facility]  Floor 4   [address] [place] [address]   Appointments: [phone]     You have been involved in a recent trauma incident resulting in an injury.  Studies show us that people affected by trauma have higher levels of post-traumatic stress disorder (PTSD) and/or depressive symptoms during the year following an injury.     Please consider the following.  Have you:  Had migraines about the event(s) or thought about the event(s) when you didn't want to?  Tried hard not to think about the event(s) or went out of your way to " "avoid situations that reminded you of the event(s)?  Been constantly on guard, watchful, or easily startled?  Felt numb or detached from people, activities, or your surroundings?   Felt guilty or unable to stop blaming yourself or others for the event(s) or any problems the event (s) may have caused?    If you answered \"yes\" to 3 or more of these questions, or if you simply want to discuss any of your feelings further, we recommend that you talk with your Primary Care Provider or a mental health professional.      Appointments on [place] and/or [facility] (with [facility] or [facility] provider or service). Call [phone] if you haven't heard regarding these appointments within 7 days of discharge.     Diet    Follow this diet upon discharge: Regular     Discharge Medications   Current Discharge Medication List      START taking these medications    Details   amoxicillin-clavulanate (AUGMENTIN) 875-125 MG tablet Take 1 tablet by mouth every 12 hours for 6 days  Qty: 12 tablet, Refills: 0    Associated Diagnoses: Closed fracture of facial bone, unspecified facial bone, initial encounter (H)      erythromycin (ROMYCIN) 5 MG/GM ophthalmic ointment Place Into the left eye 4 times daily for 5 days  Qty: 3.5 g, Refills: 0    Associated Diagnoses: Closed fracture of facial bone, unspecified facial bone, initial encounter (H)         CONTINUE these medications which have CHANGED    Details   ibuprofen (ADVIL/MOTRIN) 400 MG tablet Take 1 tablet (400 mg) by mouth every 6 hours as needed for inflammatory pain  Qty: 20 tablet, Refills: 0    Associated Diagnoses: Closed fracture of facial bone, unspecified facial bone, initial encounter (H)         CONTINUE these medications which have NOT CHANGED    Details   buprenorphine HCl-naloxone HCl (SUBOXONE) 8-2 MG per film Place 1 Film under the tongue 2 times daily    Associated Diagnoses: Intravenous drug abuse (H)      famotidine (PEPCID) 20 MG tablet Take 20 mg by mouth daily    "   FETZIMA 120 MG 24 hr capsule Take 120 mg by mouth daily      LORazepam (ATIVAN) 1 MG tablet Take 1 mg by mouth 2 times daily      Multiple Vitamins-Minerals (MULTIVITAMIN GUMMIES ADULT PO) Take 1 chew tab by mouth daily      pregabalin (LYRICA) 150 MG capsule Take 150 mg by mouth daily       simethicone (MYLICON) 80 MG chewable tablet Take 1 tablet (80 mg) by mouth 4 times daily  Qty: 20 tablet, Refills: 0    Comments: Future refills by PCP Dr. Khari Barrientos with phone number [phone].  Associated Diagnoses: Acute cholecystitis      tamsulosin (FLOMAX) 0.4 MG capsule Take 1 capsule (0.4 mg) by mouth daily  Qty: 30 capsule, Refills: 0    Comments: Future refills by PCP Dr. Khari Barrientos with phone number [phone].  Associated Diagnoses: Acute retention of urine      testosterone cypionate (DEPOTESTOTERONE) 200 MG/ML injection Inject 0.3 mLs into the muscle once a week Inject 0.3 mL IM every 77 days      abacavir-dolutegravir-LamiVUDine (TRIUMEQ) 600- MG per tablet Take 1 tablet by mouth daily Please call [phone] & make appointment for further refills.  Qty: 30 tablet, Refills: 0    Associated Diagnoses: HIV disease (H)      acetaminophen (TYLENOL) 325 MG tablet Take 2 tablets (650 mg) by mouth every 6 hours as needed for mild pain or other (and adjunct with moderate or severe pain or per patient request)    Associated Diagnoses: Postoperative pain      naloxone (EVZIO) 0.4 MG/0.4ML auto-injector Inject 0.4 mLs (0.4 mg) into the muscle once as needed for opioid reversal every 2-3 minutes until assistance arrives  Qty: 0.8 mL, Refills: 0      Naphazoline-Glycerin (CLEAR EYES MAX REDNESS RELIEF) 0.03-0.5 % SOLN Place 1 drop into both eyes daily as needed         STOP taking these medications       aspirin 81 MG EC tablet Comments:   Reason for Stopping:         oxyCODONE (ROXICODONE) 5 MG tablet Comments:   Reason for Stopping:             Allergies   Allergies   Allergen Reactions     Bupropion       Other reaction(s): Seizures, Seizures  PN: seizure when took a double dose  seizure when took a double dose  PN: seizure when took a double dose  Other reaction(s): Seizures  PN: seizure when took a double dose       Acetaminophen Nausea and Vomiting     PN: : GI Upset  : GI Upset  PN: : GI Upset  Other reaction(s): Vomiting  PN: : GI Upset       Codeine Itching and Nausea and Vomiting     Other reaction(s): Abdominal Pain  PN:  weak; fatigue; vomiting   weak; fatigue; vomiting  PN:  weak; fatigue; vomiting  PN:  weak; fatigue; vomiting       Sulfa Drugs Itching     Data   Most Recent 3 CBC's:  Recent Labs   Lab Test 12/12/22  0738 12/11/22  0845 04/21/22  1734   WBC 6.3 8.7 6.8   HGB 13.0* 13.7 10.2*   MCV 87 85 85    271 265      Most Recent 3 BMP's:  Recent Labs   Lab Test 12/12/22  0738 12/11/22  0848 12/11/22  0845 04/21/22  1734     --  139 144   POTASSIUM 3.6  --  3.6 3.4   CHLORIDE 104  --  104 109   CO2 27  --  31 30   BUN 12.1  --  23 8   CR 0.69  --  0.65* 0.96   ANIONGAP 10  --  4 5   SANDRA 9.1  --  9.8 8.9   GLC 99 109* 112* 88     Most Recent 2 LFT's:  Recent Labs   Lab Test 12/12/22  0738 12/11/22  0845   * 215*   * 347*   ALKPHOS 95 112   BILITOTAL 0.5 0.5     Most Recent INR's and Anticoagulation Dosing History:  Anticoagulation Dose History     Recent Dosing and Labs Latest Ref Rng & Units 11/5/2014 11/6/2014 3/6/2015 3/12/2015 10/9/2017 5/9/2021 12/11/2022    Warfarin 7.5 mg - 15 mg - - - - - -    INR 0.85 - 1.15 1.82(H) 2.77(H) 1.69(H) 1.92(H) 1.01 0.96 1.00        Most Recent 3 Troponin's:  Recent Labs   Lab Test 05/09/21  1450 10/09/17  2015   TROPI <0.015 <0.015     Results for orders placed or performed during the hospital encounter of 12/11/22   CT Head w/o Contrast    Narrative    EXAM: CT HEAD W/O CONTRAST  LOCATION: [facility]  DATE/TIME: 12/11/2022 11:24 AM    INDICATION: Head injury.  COMPARISON: CT head without  contrast 11/14/2021.  TECHNIQUE: Routine CT Head without IV contrast. Multiplanar reformats. Dose reduction techniques were used.    FINDINGS:  INTRACRANIAL CONTENTS: No acute intracranial hemorrhage. No extra-axial fluid collection. No mass effect or midline shift. Mild presumed chronic small vessel ischemic changes. Mild generalized volume loss. No hydrocephalus.     VISUALIZED ORBITS/SINUSES/MASTOIDS: Left periorbital soft tissue swelling. Left-sided proptosis. There is some retrobulbar gas along the left involving the extraconal fat. Left orbital and facial fractures are further described on dedicated maxillofacial   CT. Air-fluid level in the left maxillary sinus.      Impression    IMPRESSION:  1.  No acute intracranial hemorrhage.  2.  Left periorbital soft tissue hematoma with acute fractures of the left orbit, nasal bone, and maxillary sinus walls which are described in further detail on dedicated maxillofacial CT imaging.   CT Cervical Spine w/o Contrast    Narrative    EXAM: CT CERVICAL SPINE W/O CONTRAST  LOCATION: [facility]  DATE/TIME: 12/11/2022 12:06 PM    INDICATION: Neck pain; Trauma; Significant trauma  COMPARISON: None.  TECHNIQUE: Routine CT Cervical Spine without IV contrast. Multiplanar reformats. Dose reduction techniques were used.    FINDINGS:  VERTEBRA: Cervical vertebral body heights are maintained. Ventral plate spans C5-T1 with C6 and C7 corpectomies and interposition graft. Surgical hardware appears intact without fracture or loosening. The cervical spinal canal is narrowed on a   developmental basis. No acute cervical spine fracture.     CANAL/FORAMINA: Mild right neural foraminal stenosis at C5-C6.    PARASPINAL: No extraspinal abnormality.      Impression    IMPRESSION:  1.  No acute cervical spine fracture.  2.  Anterior fusion hardware at C5-T1 with C6 and C7 corpectomies.   CT Thoracic Spine w/o Contrast    Narrative    EXAM: CT  THORACIC SPINE W/O CONTRAST  LOCATION: [facility]  DATE/TIME: 12/11/2022 12:07 PM    INDICATION: Back pain.  COMPARISON: None.  TECHNIQUE: Routine CT Thoracic Spine without IV contrast. Multiplanar reformats. Dose reduction techniques were used.     FINDINGS:  VERTEBRA: Partially visualized cervicothoracic anterior fusion hardware extending inferiorly to the T1 level. Gastric vertebral body heights are maintained. No acute thoracic spine fracture.     CANAL/FORAMINA: No canal or neural foraminal stenosis.    PARASPINAL: There are multiple tiny nonobstructing bilateral renal calculi.      Impression    IMPRESSION:  1.  No acute thoracic spine fracture.     CT Lumbar Spine w/o Contrast    Narrative    EXAM: CT LUMBAR SPINE W/O CONTRAST  LOCATION: [facility]  DATE/TIME: 12/11/2022 12:07 PM    INDICATION: Low back pain.  COMPARISON: None.  TECHNIQUE: Routine CT Lumbar Spine without IV contrast. Multiplanar reformats. Dose reduction techniques were used.     FINDINGS:  VERTEBRA: Lumbar spine lordosis is maintained. Lumbar vertebral body heights are maintained. No acute lumbar spine fracture.      CANAL/FORAMINA: Moderate loss of disc space height at L2-L3. No CT evidence of high-grade spinal canal stenosis or neural foraminal narrowing.    PARASPINAL: There are multiple tiny bilateral nonobstructing renal calculi.      Impression    IMPRESSION:  1.  No acute lumbar spine fracture.   CT Chest/Abdomen/Pelvis w Contrast    Narrative    EXAM: CT CHEST/ABDOMEN/PELVIS W CONTRAST  LOCATION: [facility]  DATE/TIME: 12/11/2022 11:05 AM    INDICATION: trauma found down  COMPARISON: CT abdomen pelvis 02/06/2022, CT chest 03/13/2015  TECHNIQUE: CT scan of the chest, abdomen, and pelvis was performed following injection of IV contrast. Multiplanar reformats were obtained. Dose reduction techniques  were used.   CONTRAST: 68mL isovue 370    FINDINGS:   LUNGS AND PLEURA: 0.5 cm nodule at the left lung apex (3/45). No pleural effusion or pneumothorax. Incidental azygos fissure.    MEDIASTINUM/AXILLAE: No lymphadenopathy. No thoracic aortic aneurysms.    CORONARY ARTERY CALCIFICATION: None.    HEPATOBILIARY: Cholecystectomy. Normal liver and bile ducts.    PANCREAS: Normal.    SPLEEN: Normal.    ADRENAL GLANDS: Normal.    KIDNEYS/BLADDER: No significant mass, stones, or hydronephrosis. There are simple or benign cysts. No follow up is needed. Subcentimeter nonobstructing calculi bilaterally.    BOWEL: No obstruction or inflammatory change.    LYMPH NODES: Normal.    VASCULATURE: Unremarkable.    PELVIC ORGANS: Normal.    MUSCULOSKELETAL: Partially imaged anterior fusion hardware in the cervical spine. No aggressive or destructive lesion. No acute fracture.      Impression    IMPRESSION:  1.  No acute or traumatic findings in the chest, abdomen, or pelvis.    2.  Incidental 0.5 cm left upper lobe nodule, which is probably postinflammatory given prior airspace opacities in the same location on CT 03/13/2015. Suggest follow-up chest CT in 12 months to document temporal stability.   CT Facial Bones without Contrast    Narrative    EXAM: CT FACIAL BONES WITHOUT CONTRAST  LOCATION: [facility]  DATE/TIME: 12/11/2022 11:25 AM    INDICATION: trauma found down  COMPARISON: None.  TECHNIQUE: Routine CT Maxillofacial without IV contrast. Multiplanar reformats. Dose reduction techniques were used.     FINDINGS:  OSSEOUS STRUCTURES/ORBITS/SOFT TISSUES: Left periorbital soft tissue swelling and subcutaneous gas. There is retrobulbar gas involving the extraconal fat. Acute fractures involve the left nasal bone, left medial orbital wall, left inferior orbital floor,   left medial maxillary sinus wall, anterior wall of the left maxillary sinus. Pterygoid plates appear intact. Multiple  dental caries and periapical lucencies.    Prior open reduction internal fixation of the inferior orbital rim on the left and across the left zygomaticomaxillary frontal suture.    SINUSES: Mucosal thickening and air-fluid level in the left maxillary sinus. Minimal circumferential mucosal thickening in the right maxillary sinus. Moderate mucosal thickening scattered about the frontal and ethmoid sinuses.    VISUALIZED INTRACRANIAL CONTENTS: No acute abnormality.       Impression    IMPRESSION:   1.  Acute fractures involving the left medial and inferior orbital walls, anterior medial walls of the left maxillary sinus, and left nasal bone.         Time Spent on this Encounter   Jim ECHOLS APRN CNP, personally saw the patient today and spent greater than 30 minutes discharging this patient.    We appreciate the opportunity to care for your patient while in the hospital.  Should you have any questions about their injuries or this discharge summary our contact information is below.    Trauma Services  [facility]   Department of Critical Care and Acute Care Surgery  [address] [place] [address] [address]  [place] [address]  Office: [phone]

## 2022-12-12 NOTE — CONSULTS
OPHTHALMOLOGY CONSULT NOTE  12/12/22    Patient: Bc German      ASSESSMENT/PLAN:     Bc German is a 42 year old male who presented with orbital fracture in the setting of polytrauma    Left orbital nasal and floor fractures  -good IOP  -EOM full and no diplopia  -globe intact (subconjunctival hemorrhage not 360 degrees, cornea clear, anterior chamber formed, pupil round, dilated exam normal, globe appears round on CT scan)  -Antibiotics per primary team  -erythromycin ophthalmic ointment  QID x 5 days   -Sinus precautions: no nose blowing, no straining, no heavy lifting, no drinking through a straw, no smoking  -ice packs 20 min q1-2 hours x 24 hours   -follow up in eye clinic in 7-10 days - patient has no cell phone and so he will call us to schedule. I gave him the clinic phone number for scheduling and detailed instructions on when we would like to see him.    It is our pleasure to participate in this patient's care and treatment. Please contact us with any further questions or concerns.    Ophthalmology will see in clinic. Please contact ophthalmologist on call with any questions or concerns. We appreciate your care of this patient.    Thank you for entrusting us with your care  Kristie Parr MD, PGY2  Ophthalmology Resident  [facility]    HISTORY OF PRESENTING ILLNESS:     Bc German is a 42 year old male with a history of HIV positive status, polysubstance abuse, anxiety and depression who was brought to the ED by EMS found down minimally responsive with signs of facial/head trauma unknown location. He was hypothermic with a body temp of 96.9F on arrival. Blood glucose WNL. Warmed NS infused, bear hugger applied, received 0.2mg narcan with improved alertness.  He was pan scanned, notable for left periorbital, maxillary sinus and nasal bone fractures. He was seen by OMFS prior to transfer. No planned surgical intervention.    Today he is alert and oriented. He does not remember  what happened. While here he was found to have periorbital fractures. He denies double vision, blurred vision.    10+ review of systems were otherwise negative except for that which has been stated above.      OCULAR/MEDICAL/SURGICAL HISTORIES:     Past Ocular History:   Previous orbital surgery for fracture 2009 left eye    Eye Drops:   Lumify    Pertinent Systemic Medications:   Levomilnacipran, triumeq    Past Medical History:  Past Medical History:   Diagnosis Date     Anxiety      Depressive disorder      Drug abuse, IV (H)      Group A streptococcal infection 11/2014    Bacteremia/cellulitis     HCV antibody positive      HIV (human immunodeficiency virus infection) (H)      HIV (human immunodeficiency virus infection) (H)      HIV (human immunodeficiency virus infection) (H)      IVDU (intravenous drug user)      Osteomyelitis of vertebra of lumbosacral region (H) 3/2011    L3, left psoas abscess     Substance abuse (H)        Past Surgical History:   Past Surgical History:   Procedure Laterality Date     EYE SURGERY  1 year ago    pins to left eye after socket fracture     LAPAROSCOPIC CHOLECYSTECTOMY N/A 2/7/2022    Procedure: CHOLECYSTECTOMY, LAPAROSCOPIC;  Surgeon: Lico Rodriguez MD;  Location:  OR     OPTICAL TRACKING SYSTEM FUSION CERVICAL ANTERIOR ONE LEVEL Right 9/10/2017    Procedure: OPTICAL TRACKING SYSTEM FUSION CERVICAL ANTERIOR ONE LEVEL;  Stealth C6-C7 Anterior Cervical Corpectomy and C5-T1 Fusion;  Surgeon: Marv Ambrose MD;  Location: [facility] OR     ORTHOPEDIC SURGERY      Arm Surgery     PICC INSERTION Left 09/21/2017    5fr DL BioFlo PICC, 42cm (1cm external) in the L basilic vein w/ tip in the low SVC.     TRANSESOPHAGEAL ECHOCARDIOGRAM INTRAOPERATIVE N/A 3/17/2015    Procedure: TRANSESOPHAGEAL ECHOCARDIOGRAM INTRAOPERATIVE;  Surgeon: Generic Anesthesia Provider;  Location: [facility] OR     TRANSESOPHAGEAL ECHOCARDIOGRAM INTRAOPERATIVE N/A 11/18/2021    Procedure: ECHOCARDIOGRAM,  TRANSESOPHAGEAL, INTRAOPERATIVE;  Surgeon: GENERIC ANESTHESIA PROVIDER;  Location:  OR     TRANSESOPHAGEAL ECHOCARDIOGRAM INTRAOPERATIVE N/A 12/2/2021    Procedure: ECHOCARDIOGRAM, TRANSESOPHAGEAL, INTRAOPERATIVE;  Surgeon: GENERIC ANESTHESIA PROVIDER;  Location:  OR       Family History:   No history of macular degeneration or glaucoma    Social History:   Daily tobacco use 1/4 pack    EXAMINATION:     Base Eye Exam     Visual Acuity (Snellen - Linear)       Right Left    Near sc 20/20 20/20          Tonometry (Tonopen, 2:34 PM)       Right Left    Pressure 17 15          Pupils       Shape React APD    Right Round Brisk None    Left Round Brisk None          Visual Fields       Left Right     Full Full          Extraocular Movement       Right Left     Full Full          Dilation     Both eyes: 1.0% Mydriacyl, 2.5% Evelio Synephrine @ 2:34 PM            Slit Lamp and Fundus Exam     External Exam       Right Left    External Normal Periorbital edema/ecchymosis, small abrasions over forehead and nose          Slit Lamp Exam       Right Left    Lids/Lashes  Periorbital edema/ecchymosis    Conjunctiva/Sclera  Temporal TALHA    Cornea  Clear    Anterior Chamber  Deep and quiet    Iris Dilated Dilated                Labs/Studies/Imaging Performed    CT head 12/11/22    IMPRESSION:  1.  No acute intracranial hemorrhage.  2.  Left periorbital soft tissue hematoma with acute fractures of the left orbit, nasal bone, and maxillary sinus walls which are described in further detail on dedicated maxillofacial CT imaging.    CT facial bones 12/11/22                                            IMPRESSION:   1.  Acute fractures involving the left medial and inferior orbital walls, anterior medial walls of the left maxillary sinus, and left nasal bone.    Thank you for entrusting us with your care  Jihan Parr MD  Resident Physician, PGY2  Department of Ophthalmology  12/12/22 2:32 PM   Pager: [phone]      Attestation:    Not seen by staff during this visit, available should need have arisen.  Plan appropriate as above.       Steffen Catalan MD   , Comprehensive   Ophthalmology   Department of Ophthalmology and Visual Neurosciences   [facility]

## 2022-12-12 NOTE — PROGRESS NOTES
[facility]   Tertiary Survey Progress Note     Date of Service (when I saw the patient): 12/12/2022  Assessment & Plan   Trauma mechanism:Unknown, found down   Time/date of injury:12/11/2022 unknown time  Known Injuries:  1. Acute fractures involving the left medial and inferior orbital walls  2. Left maxillary sinus fractureleft maxillary sinus, and   3. Left nasal bone    Procedure(s):none planned    Neuro/Pain:  # Acute on chronic pain  Reports minimal to no pain  PRN tylenol and Ibuprofen available  Has Rx for subaxone however not consistently using PTA, thus not reordered    # Hx Polysubstance abuse, +UDS  # Anxiety/depression  # Encephalopathy 2/2 closed head injury without identified ICH, vs possible unknown toxicity vs other unknown. Was found outside hypothermic with decreased mental status, ? Emesis on belongings, with signs of head/facial trauma. CT head/face + facial fractures, negative for ICH.   UDS + amphetamines + cannabis, neg ETOH.   He is awake and alert, no recollection of circumstances leading to trauma.    - Resumed [facility]  - PTA Lorazepam 2mg TID PRN per outside provider review for anxiety, ordered PRN   - Addiction medicine consult, if agreeable    EENT  # Left orbital wall, sinus and nasal bone fractures  Non op management for facial fractures per OMFS  - Augmentin x5 days  - Follow up recommended @ OMFS clinic in 5 days  - Ophthalmology consult  - HOB elevated for facial swelling, ice cold compresses   - Sinus precautions    GI/Nutrition:     regular diet    Infectious disease:    Continue antiviral, Truimeq   Social work consult  Lines/ tubes/ drains:  - PIV  Code status: Full code  General Cares:    PPI/H2 blocker: Famotidine   DVT prophylaxis: Ambulate   Bowel Regimen/Date of last stool: PTA   Pulmonary toilet: cough and deep breath   ETOH screen, reports intermittent use   Lines / drains: PIV  Expected D/C date: today vs  tomorrow  Interval History   Minimal pain, no acute issues overnight     Review Of Systems  Skin: Skin picks, scabbed  Eyes: negative  Ears/Nose/Throat: negative  Respiratory: No shortness of breath, dyspnea on exertion, cough, or hemoptysis  Cardiovascular: negative  Gastrointestinal: negative  Genitourinary: negative  Musculoskeletal: negative  Neurologic: anxiety  Psychiatric: negative  Hematologic/Lymphatic/Immunologic: HIV +status  Endocrine: negative     Physical Exam     [place] Coma Scale - Total 15/15  Eye Response (E): 4  4= spontaneous, 3= to verbal/voice, 2= to pain, 1= No response   Verbal Response (V): 5  5= Orientated, converses, 4= Confused, converses, 3= Inappropriate words, 2= Incomprehensible sounds, 1=No response   Motor Response (M): 6   6= Obeys commands, 5= Localizes to pain, 4= Withdrawal to pain, 3=Fexion to pain, 2= Extension to pain, 1= No response     Physical Exam  Vitals and nursing note reviewed.   HENT:      Nose:      Comments: Dried blood  Eyes:      Comments: Left periorbital edema and ecchymosis   Cardiovascular:      Rate and Rhythm: Normal rate.      Pulses: Normal pulses.   Pulmonary:      Effort: Pulmonary effort is normal. No respiratory distress.      Breath sounds: No stridor. No wheezing.   Abdominal:      General: Abdomen is flat. There is no distension.      Palpations: Abdomen is soft.      Tenderness: There is no abdominal tenderness.   Musculoskeletal:         General: Normal range of motion.   Skin:     General: Skin is dry.      Capillary Refill: Capillary refill takes less than 2 seconds.      Findings: Bruising present.      Comments: Scattered scabbed skin lesions all over arms and legs   Neurological:      General: No focal deficit present.      Mental Status: He is oriented to person, place, and time.   Psychiatric:         Mood and Affect: Mood normal.       Temp: 97.7  F (36.5  C) Temp src: Oral BP: (!) 134/100 Pulse: 90   Resp: 20 SpO2: 97 % O2 Device: None  (Room air)    Vitals:    12/12/22 0600   Weight: 56.4 kg (124 lb 5.4 oz)     Vital Signs with Ranges  Temp:  [97.3  F (36.3  C)-98.8  F (37.1  C)] 97.7  F (36.5  C)  Pulse:  [] 90  Resp:  [10-20] 20  BP: (119-153)/() 134/100  SpO2:  [92 %-100 %] 97 %  I/O last 3 completed shifts:  In: 480 [P.O.:480]  Out: 1025 [Urine:1025]      MEDHAT Singh CNP  To contact the trauma service use job code pager [phone],   Numeric texts or alpha text through [facility]

## 2022-12-12 NOTE — CONSULTS
[facility]   Consult Note - Addiction Service     Date of Admission:  12/11/2022    Consult Requested by: trauma team  Reason for Consult: KENA assistance    Assessment & Plan   Mr. 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      # History of Severe Opioid Use Disorder, with multiple prior injection drug related infections:  Well known to me at [facility]; intermittently taking suboxone, previously has also been on methadone.  Currently, not interested in discussing details about goals or use, but denies withdrawal symptoms or cravings.  Currently not receiving opioids.      If opioid withdrawal symptoms, consider:  --Clonidine:  0.1 to 0.3 mg every 6 to 8 hours (maximum: 1.2 mg/day)  --Gabapentin, 100 mg: Take 1-2 capsules (100-200 mg) by mouth 3 times daily  --Zofran 4 MG disintegrating tablet, take 1-2 tablets (4-8 mg) by mouth every 8 hours as needed for nausea   --Atarax 25 MG tablet, Take 1-2 tablets (25-50 mg) by mouth every 4 hours as needed for anxiety    If constipation, consider  --Constipation: miralax, senna     # methampetamine use disorder:  His main drug of choice lately.  Declined discussing use or goals today.    # Alcohol Use Disorder  -We discussed different options to maintain goal of sobriety, including medications to manage cravings, including acamprosate and naltrexone.    For alcohol withdrawal:  -Continue CIWA;  consider adding on gabapentin, 600 mg TID.  Consider prescribing at discharge if this helps with underlying anxiety.    # Mental health:  Is prescribed benzos by an outpatient psychiatrist, not at [facility].  If mental health support is needed, I would defer to  psych.    # Harm Reduction:deferred, as he was not interested in talking in much detail    # Immunization review: [facility] reviewed;  -Has had 1 hep A vaccine, will check Hep A IgG, if negative, consider hep A booster  -hep B: immune by prior exposure, per labs     # Peer Support:   -Our peer  will meet the patient if agreeable and still hospitalized on Thursday, to provide additional outpatient resources  -To contact Patricia Peer  from [facility]): call or text: [phone]    # Addiction Social Worker:   Our addiction social worker Wally Tawanda can be contacted on her pager [phone] or texted/called at [phone]      # Linkage to Care:   He has follow up with me at [facility]    The patient's care was discussed with the Bedside Nurse and Patient.    Khari Barrientos MD  [facility]   Contact information available via [facility] Paging/Directory  Please see sign in/sign out for up to date coverage information    ChAT team (Addiction Consult Team): Coverage Monday-Friday 8-4pm    Provider (Pager)  (Pager)   Mon Dr. Khari Barrientos [phone]   Tues Dr[address] Khari Barrientos [phone]   Wed Dr. Khari Barrientos [phone] None   Thurs Dr. Alec Noguera [phone]   Fri Dr[address] Chris Barajas [phone]   [facility] Psych Team- Refer to [facility].  For urgent needs, please place a  consult for psychiatry. None     ______________________________________________________________________    Chief Complaint   History of KENA    History is obtained from the patient    History of Present Illness   Mr. 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      Drug/Substance  of Choice:  Opioids, stimulants.    Opioid use history: Long history of IV OUD.  Today, he wasn't interested in discussing recent use.  Is prescribed suboxone, unclear how frequently he is taking it. Is not getting opioids in the hospital for pain, and denies withdrawal symptoms.     Alcohol use history: declines discussing recent etoh use      Prior MAT history:  If so, methadone or suboxone? Both, currently prescribed suboxone    Attempted to discuss other social supports, but he didn't want to discuss today    Review of Systems   The 10 point Review of Systems is negative other than noted in the HPI or here.      Past Medical History:   Diagnosis Date     Anxiety      Depressive disorder      Drug abuse, IV (H)      Group A streptococcal infection 11/2014    Bacteremia/cellulitis     HCV antibody positive      HIV (human immunodeficiency virus infection) (H)      HIV (human immunodeficiency virus infection) (H)      HIV (human immunodeficiency virus infection) (H)      IVDU (intravenous drug user)      Osteomyelitis of vertebra of lumbosacral region (H) 3/2011    L3, left psoas abscess     Substance abuse (H)        Past Surgical History:   Procedure Laterality Date     EYE SURGERY  1 year ago    pins to left eye after socket fracture     LAPAROSCOPIC CHOLECYSTECTOMY N/A 2/7/2022    Procedure: CHOLECYSTECTOMY, LAPAROSCOPIC;  Surgeon: Lico Rodriguez MD;  Location:  OR     OPTICAL TRACKING SYSTEM FUSION CERVICAL ANTERIOR ONE LEVEL Right 9/10/2017    Procedure: OPTICAL TRACKING SYSTEM FUSION CERVICAL ANTERIOR ONE LEVEL;  Stealth C6-C7 Anterior Cervical Corpectomy and C5-T1 Fusion;  Surgeon: Marv Ambrose MD;  Location:  OR     ORTHOPEDIC SURGERY      Arm Surgery     PICC INSERTION Left 09/21/2017    5fr DL BioFlo PICC, 42cm (1cm external) in the L basilic vein w/ tip in the low SVC.     TRANSESOPHAGEAL ECHOCARDIOGRAM INTRAOPERATIVE N/A 3/17/2015    Procedure: TRANSESOPHAGEAL ECHOCARDIOGRAM  INTRAOPERATIVE;  Surgeon: Generic Anesthesia Provider;  Location: [facility] OR     TRANSESOPHAGEAL ECHOCARDIOGRAM INTRAOPERATIVE N/A 11/18/2021    Procedure: ECHOCARDIOGRAM, TRANSESOPHAGEAL, INTRAOPERATIVE;  Surgeon: GENERIC ANESTHESIA PROVIDER;  Location:  OR     TRANSESOPHAGEAL ECHOCARDIOGRAM INTRAOPERATIVE N/A 12/2/2021    Procedure: ECHOCARDIOGRAM, TRANSESOPHAGEAL, INTRAOPERATIVE;  Surgeon: GENERIC ANESTHESIA PROVIDER;  Location:  OR       Social History   Social History     Socioeconomic History     Marital status: Single     Spouse name: Not on file     Number of children: Not on file     Years of education: Not on file     Highest education level: Not on file   Occupational History     Not on file   Tobacco Use     Smoking status: Every Day     Packs/day: 0.25     Types: Cigarettes     Smokeless tobacco: Never     Tobacco comments:     mostly vapes   Substance and Sexual Activity     Alcohol use: Yes     Drug use: Yes     Types: Marijuana     Comment: Heroine--IV, benzo's     Sexual activity: Yes     Partners: Female, Male   Other Topics Concern     Parent/sibling w/ CABG, MI or angioplasty before 65F 55M? Not Asked   Social History Narrative    ** Merged History Encounter **         ** Merged History Encounter **          Social Determinants of Health     Financial Resource Strain: Not on file   Food Insecurity: Not on file   Transportation Needs: Not on file   Physical Activity: Not on file   Stress: Not on file   Social Connections: Not on file   Intimate Partner Violence: Not on file   Housing Stability: Not on file       Family History    Reviewed      Medications   I have reviewed this patient's current medications    Allergies   No Known Allergies    Physical Exam   Temp: 97.7  F (36.5  C) Temp src: Oral BP: (!) 134/100 Pulse: 90   Resp: 20 SpO2: 97 % O2 Device: None (Room air)      Gen: NAD, left eye bruised and swollen  HEENT: EOMI, PERRL, MMM  CV: extremities warm and well perfused  Resp: breathing  comfortably on RA  : deferred  Msk: no LE edema  Skin: no rashes  Neuro: nonfocal exam      Due to regulation of Title 42 of the Code of Federal Regulations (CFR) Part 2: Confidentiality laws apply to this note and the information wherein.  Thus, this note cannot be copy and pasted into any other health care staff's note nor can it be included in general medical records sent to ANY outside agency without the patient's written consent.

## 2022-12-12 NOTE — PLAN OF CARE
Neuro: A&Ox4, calm and cooperative. Neuros intact.   Cardiac: No tele orders. -130/90s. HR 80-90s  Respiratory: Sating >95% on RA.   GI/: Adequate urine output via urinal. No BM this shift.  Diet/appetite: Tolerating regular diet, good appetite.   Activity:  Ax1/SBA, generalized weakness.  Pain: At acceptable level on current regimen.   Skin: L facial bruising/abrasions, L periorbital swelling, generalized bruising and abrasions.   LDA's: L and R PIV.    Plan: Continue with POC. Notify primary team with changes.

## 2022-12-12 NOTE — PLAN OF CARE
"Goal Outcome Evaluation:    Blood pressure (!) 133/94, pulse 88, temperature 98.7  F (37.1  C), temperature source Oral, resp. rate 16, height 1.778 m (5' 10\"), weight 56.4 kg (124 lb 5.4 oz), SpO2 98 %.    Neuro: A&Ox4.   Cardiac: SR. VSS.   Respiratory: Sating > 96% on RA.  GI/: Adequate urine output. BM X1  Diet/appetite: Tolerating regular diet. Eating well.  Activity:  Standby Assist of 1, up to chair and in halls.  Pain: At acceptable level on current regimen.   Skin: No new deficits noted.  LDA's: PIV x2    Plan: Discharge patient when dad is able to come  DISCHARGE                         No discharge date for patient encounter.  ----------------------------------------------------------------------------  Discharged to: Home  Via: private transportation  Accompanied by: Family  Discharge Instructions: diet, activity, medications, follow up appointments, when to call the MD, aftercare instructions.  Prescriptions: To be filled by [facility] discharge pharmacy; medication list reviewed & sent with pt  Follow Up Appointments: arranged; information given  Belongings: All sent with pt  IV: d/c'd  Telemetry: d/c'd  Pt exhibits understanding of above discharge instructions; all questions answered.    Discharge Paperwork: Signed, copied, and sent home with patient.       "

## 2022-12-12 NOTE — PROGRESS NOTES
12/12/22 5311   Appointment Info   Signing Clinician's Name / Credentials (PT) Qing Urbina, PT, DPT   Living Environment   Current Living Arrangements homeless   Transportation Anticipated   (pt does not have a license)   Living Environment Comments Pt planning on discharging to his father's condominium. Pt's father has a 2 bedroom condo with pt's 11 year old niece. No stairs to enter the condominium. Pt's father is retired.   Self-Care   Usual Activity Tolerance good   Current Activity Tolerance moderate   Regular Exercise No   Fall history within last six months no   Activity/Exercise/Self-Care Comment Pt was independent with moblity/ADLs prior to admission.   General Information   Onset of Illness/Injury or Date of Surgery 12/11/22   Referring Physician Jim Zamora APRN CNP   Patient/Family Therapy Goals Statement (PT) stay sober   Pertinent History of Current Problem (include personal factors and/or comorbidities that impact the POC) Pt is a 42 year old male with a history of HIV positive status, polysubstance abuse, anxiety and depression who was brought to the ED by EMS found down minimally responsive with signs of facial/head trauma unknown location. He was hypothermic with a body temp of 96.9F on arrival. Blood glucose WNL. Warmed NS infused, bear hugger applied, received 0.2mg narcan with improved alertness. He had no recollection of the events leading to trauma.   Existing Precautions/Restrictions   (sinus precautions)   General Observations Activity: Up with assist   Cognition   Affect/Mental Status (Cognition) WNL   Orientation Status (Cognition) oriented x 4   Pain Assessment   Patient Currently in Pain No   Integumentary/Edema   Integumentary/Edema Comments Scattered bruises/scabs throughout UE/LEs. L eye red and swollen shut from facial trauma.   Range of Motion (ROM)   ROM Comment BLE ROM WFL   Strength (Manual Muscle Testing)   Strength Comments BLE strength >3/5, pt reports LEs feel  weaker than normal when ambulating   Bed Mobility   Comment, (Bed Mobility) Supine>sitting with supervision   Transfers   Comment, (Transfers) Sit>stand with supervision   Gait/Stairs (Locomotion)   Comment, (Gait/Stairs) Pt ambulated 20 ft with SBA. Pt ambulates with decreased tony, fixed forward gaze.   Clinical Impression   Criteria for Skilled Therapeutic Intervention Yes, treatment indicated   PT Diagnosis (PT) impaired functional mobility   Influenced by the following impairments decreased L eye vision, generalized LE weakness   Functional limitations due to impairments difficulty with community mobility   Clinical Presentation (PT Evaluation Complexity) Stable/Uncomplicated   Clinical Presentation Rationale clinical judgement   Clinical Decision Making (Complexity) low complexity   Planned Therapy Interventions (PT) home program guidelines;gait training  (activity modification with sinus precautions)   Risk & Benefits of therapy have been explained evaluation/treatment results reviewed;care plan/treatment goals reviewed;risks/benefits reviewed;current/potential barriers reviewed;participants voiced agreement with care plan;participants included;patient   PT Total Evaluation Time   PT Eval, Low Complexity Minutes (03923) 5   Physical Therapy Goals   PT Frequency One time eval and treatment only   PT Predicted Duration/Target Date for Goal Attainment 12/12/22   PT Goals Gait;PT Goal 1   PT: Gait Independent;100 feet   PT: Goal 1 Demonstrate transfers and ADLs with adherence to sinus precautions   Total Session Time   Total Session Time (sum of timed and untimed services) 5

## 2022-12-13 ENCOUNTER — TELEPHONE (OUTPATIENT)
Dept: OPHTHALMOLOGY | Facility: CLINIC | Age: 42
End: 2022-12-13

## 2022-12-13 NOTE — TELEPHONE ENCOUNTER
Called and LVM       ED follow up with a general paula Segura Communication Facilitator on 12/13/2022 at 11:45 AM'

## 2022-12-13 NOTE — PLAN OF CARE
Physical Therapy Discharge Summary    Reason for therapy discharge:    Discharged to home.    Progress towards therapy goal(s). See goals on Care Plan in [facility] electronic health record for goal details.  Goals not met.  Barriers to achieving goals:   discharge from facility.    Therapy recommendation(s):    No further therapy is recommended.  Continue home exercise program.

## 2022-12-13 NOTE — TELEPHONE ENCOUNTER
[facility] Call Center    Phone Message    May a detailed message be left on voicemail: yes     Reason for Call: Appointment Intake    Referring Provider Name:   [facility] [address] Nestor Still Ericksononofre MEDHAT Singh CNP       Diagnosis and/or Symptoms: Ophthalmology (Eye) Clinic in 1 to 2 weeks    Per protocols, send encounter for hospital follow up    Action Taken: Message routed to:  [facility] eye    Travel Screening: Not Applicable

## 2022-12-14 ENCOUNTER — PATIENT OUTREACH (OUTPATIENT)
Dept: CARE COORDINATION | Facility: CLINIC | Age: 42
End: 2022-12-14

## 2022-12-14 LAB
BUPRENORPHINE UR-MCNC: <2 NG/ML
BUPRENORPHINE UR-MCNC: <5 NG/ML
NALOXONE UR CFM-MCNC: <100 NG/ML
NORBUPRENORPHINE UR CFM-MCNC: <5 NG/ML
NORBUPRENORPHINE UR-MCNC: <2 NG/ML

## 2022-12-14 NOTE — PROGRESS NOTES
Connected Care Resource Center Contact  [facility]/Voicemail     Clinical Data: Transitional Care Management Outreach     Outreach attempted x 2.  Left message on patient's voicemail, providing [facility]'s 24/7 scheduling and nurse triage phone number [phone] ([phone]) for questions/concerns and/or to schedule an appt with an [facility] provider, if they do not have a PCP.      Plan:  [facility] will do no further outreaches at this time.       FABIAN Green  Connected Care Resource [place], [facility]    *Connected Care Resource Team does NOT follow patient ongoing. Referrals are identified based on internal discharge reports and the outreach is to ensure patient has an understanding of their discharge instructions.

## 2022-12-16 LAB
AMPHET UR CFM-MCNC: 1160 NG/ML
AMPHET/CREAT UR: 2109 NG/MG {CREAT}
EDDP CTO UR SCN-MCNC: 2660 NG/ML
EDDP/CREAT UR: 4836 NG/MG {CREAT}
FENTANYL UR CFM-MCNC: 110 NG/ML
FENTANYL/CREAT UR: 200 NG/MG {CREAT}
LORAZEPAM UR QL CFM: PRESENT
METHADONE UR CFM-MCNC: 1440 NG/ML
METHADONE/CREAT UR: 2618 NG/MG {CREAT}
METHAMPHET UR CFM-MCNC: ABNORMAL NG/ML
METHAMPHET/CREAT UR: ABNORMAL
NALOXONE UR CFM-MCNC: 89 NG/ML
NALOXONE: 162 NG/MG {CREAT}
NORFENTANYL UR CFM-MCNC: 1400 NG/ML
NORFENTANYL/CREAT UR: 2545 NG/MG {CREAT}
PREGABALIN UR QL CFM: PRESENT

## 2023-01-11 NOTE — PLAN OF CARE
POD 2 lap cholecystectomy. VSS on RA, A/O x4. 4 lap sites, CDI. Managing incisional and  abdominal pain with PRN oxy and toradol.  New IV placed this shift. Pt voiding adequately, tea colored output. BS heard in all quadrants, hyperactive, passing flatus, but no BM. Pt tolerating full diet.  Ambulating independently.    n/a

## 2023-06-01 ENCOUNTER — LAB REQUISITION (OUTPATIENT)
Dept: LAB | Facility: CLINIC | Age: 43
End: 2023-06-01
Payer: COMMERCIAL

## 2023-06-01 DIAGNOSIS — F11.20 OPIOID DEPENDENCE, UNCOMPLICATED (H): ICD-10-CM

## 2023-06-01 PROCEDURE — 80359 METHYLENEDIOXYAMPHETAMINES: CPT | Mod: ORL | Performed by: INTERNAL MEDICINE

## 2023-06-01 PROCEDURE — 80357 KETAMINE AND NORKETAMINE: CPT | Mod: ORL | Performed by: INTERNAL MEDICINE

## 2023-06-02 LAB — CREAT UR-MCNC: 45 MG/DL

## 2023-06-04 LAB
AMPHET UR CFM-MCNC: 287 NG/ML
AMPHET/CREAT UR: 638 NG/MG {CREAT}
FENTANYL UR CFM-MCNC: 87 NG/ML
FENTANYL/CREAT UR: 193 NG/MG {CREAT}
LORAZEPAM UR QL CFM: PRESENT
METHAMPHET UR CFM-MCNC: 7860 NG/ML
METHAMPHET/CREAT UR: ABNORMAL NG/MG {CREAT}
NORFENTANYL UR CFM-MCNC: 1080 NG/ML
NORFENTANYL/CREAT UR: 2400 NG/MG {CREAT}
PREGABALIN UR QL CFM: PRESENT
TRAMADOL CTO UR CFM-MCNC: 60 NG/ML
TRAMADOL/CREAT UR: 133 NG/MG {CREAT}

## 2023-09-04 NOTE — H&P
[facility]    History and Physical : Hospitalist Service:        Date of Admission:  2/6/2022    Cumulative Summary:   Bc German is a 41 year old male with complex past medical history significant for history of IV drug abuse, has been sober since November 15, 2020, MSSA/MRSA bacteremia with tricuspid valve endocarditis, severe tricuspid regurgitation due to endocarditis, history of pulmonary valve endocarditis in 2015, history of lumbar spine osteomyelitis with epidural abscess which required surgical intervention in 2017, HIV, essential hypertension, hepatitis C, history of depression and anxiety who was recently admitted to the hospital for 6 weeks from November 15 to December 30 for treatment of MSSA/MRSA bacteremia with tricuspid valve endocarditis and was discharged home, since then patient has been sober.  Patient has now developed 1 week of right-sided abdominal discomfort associated with vomiting, poor appetite he is also complaining of watery diarrhea going on for 5 days and is admitted for further evaluation and management.    Assessment & Plan     Acute cholecystitis (2/6/2022): Patient is complaining of right-sided flank and right upper quadrant discomfort going on for 7 days, patient initially presented to urgent care who sent the patient to ER for further evaluation.  Patient was noted to have WBC count of 23.5 with absolute neutrophil count.  His lactic acid is at upper normal range of 2.0  CRP is added to the lab which came back high at 130.  CT scan of the abdomen and pelvis with contrast was done which is showing distended gallbladder with wall thickening and edgar hepatis fluid and stranding which is consistent with acute cholecystitis there is also wall thickening of the urinary bladder which may be secondary to cystitis.  Acute cystitis with hematuria (2/6/2022): Patient is denying any urinary urgency frequency or burning he has not noticed any hematuria although  his urine is showing large blood, small leukocytes, WBC count of 71 and red blood cell count of more than 182 blood and urine culture have been sent.  Ongoing diarrhea: Recent use of broad-spectrum antibiotics, patient has finished course of IV daptomycin and IV vancomycin at the end of December and is at risk for developing C. difficile infection.  Sepsis: Patient is presenting with pulse of 101, elevated CRP and elevated WBC count.  At the time of presentation patient does not have any signs and symptoms of severe sepsis or septic shock.  Considering duration of 1 week of symptoms and infection on multiple sites, blood cultures need to be followed up to rule out bacteremia.    --Admit patient to general medical floor with telemetry due to the concern for sepsis.  --Case was reviewed with general surgery from emergency room, Dr. Parkinson is recommending to monitor patient with tentative plan for taking him to the OR tomorrow morning.  --We will start patient on clear liquid diet, patient will benefit from serial abdominal exams as patient has significant tenderness in the abdomen and has required 2 doses of IV Dilaudid since arrival to the emergency room.  --We will consult general surgery so they can evaluate patient this evening if possible to assess that patient does not need surgical intervention today  --Added CRP, came back elevated.  --We will recheck serial lactic acid.  --Will start patient on normal saline at 100 mL/h for 36 hours.  --Follow-up on blood cultures and urine culture.  --Send the sample for C. difficile colitis, will recommend contact isolation till PCR is negative, patient will also need contact isolation due to the recent history of MRSA infection.  --Start patient on IV Zosyn, considering his cystitis and urinary tract infection , it should provide good coverage at Pseudomonas dose , will not add IV vancomycin at this point   --If his C. difficile PCR comes back positive, then will add oral  vancomycin.  --Start patient on clear liquid diet for tonight although patient has a very poor oral intake and has been n.p.o. since 830 this morning we will make patient n.p.o. after midnight for tentative surgery tomorrow.    Recent hospitalization from November 15 to December 30 with MSSA/MRSA bacteremia with tricuspid valve endocarditis: Resolved  Severe tricuspid regurgitation due to endocarditis  History of pulmonary valve endocarditis due to MSSA in 2015  History of lumbar spine L3 osteomyelitis with epidural abscess requiring surgical intervention in 2017  --Patient completed his 6-week course of IV vancomycin and IV daptomycin on December 26, was also evaluated by CV surgery who will consider valve repair after patient completes chemical dependency treatment and if he remains sober  --So far patient has been sober   --According to patient he had a repeat TTE on December 27 which showed resolution of vegetations and improvement in TR patient did not have any signs and symptoms of right heart failure at that point    History of essential hypertension:  --Patient did not require any antihypertensive medications during his last hospitalization and his blood pressure was stable during the stay.    HIV  Hepatitis C  Chronic and stable on Triumeq.   CDC >200 so no need for PJP ppx per ID.  --Follows with Dr Chavez of [facility]/ID.  --HIV-1 RNA undetectable on 12/19/21 and 12/22/21. Triumeq placed on hold on 12/27/21 due to elevated LFTs, resumed as mentioned above.  Hep C still active/viremic and will need to be on treatment at some point.    History of opiate dependence  History of IV drug use, Sober since Nov 15 2021  Tobacco Use  Depression  Anxiety  Prior to his admission in November, patient was a started on Soma taper off.  During his last hospitalization consultation was done with Dr. Barrientos from [facility] addiction medicine.  He was switched to Suboxone twice daily on December 8 he was continued on  PTA levomilnacipran extended release, was tapered off Carisoprodol during this stay.  Patient was also evaluated by psychiatry during his stay and he was a started on Lyrica 75 mg p.o. twice daily.  He was also evaluated by chemical dependency and was recommended to undergo treatment at that time patient was in the process of deciding if he would get chemical dependency treatment in [place] versus locally in [facility].  Patient was discharged to his parents house in the end of December.  Patient was also discharged on lorazepam 0.5 mg 3 times daily as needed and they did not recommend tapering him off his Ativan as he was very high risk for relapse in the picture of his recent infection.    --Currently, on reviewing his home medication patient is stating that he does not like to take his Suboxone as it worsens his anxiety so is not taking it as prescribed but uses it as needed as needed  --His Fetzmia dose is 60 mg daily  --His lorazepam dose is now 1 mg twice daily, ordered  --According to patient his Lyrica dose is now 150 mg p.o. daily, ordered his medications at PTA dose      Clinically Significant Risk Factors Present on Admission               # Platelet Defect: home medication list includes an antiplatelet medication       Diet:  clear liquid diet, NPO after midnight   Tirdao Catheter: Not present  DVT Prophylaxis: Pneumatic Compression Devices  Code Status: Full Code    Disposition: Expecting patient to stay more than 2 nights     The patient's care was discussed with the Patient and ER Team.    Madhuri Del Rosario MD,[facility]   ----------------------------------------------------------------------------------------------------------------------    Primary Care Physician   Khari Barrientos    Chief Complaint   Right flank pain, fever and chills, generalized weakness    History is obtained from the patient    Patient Active Problem List   Diagnosis     Cellulitis     Sepsis (H)     JASSI (generalized  anxiety disorder)     Non-compliant patient     Intravenous drug abuse (H)     Medication side effect     Asymptomatic human immunodeficiency virus (HIV) infection status (H)     Balance problem     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     Osteomyelitis (H)     Cellulitis of left arm     Drug abuse (H)     Gram-positive bacteremia     Human immunodeficiency virus (HIV) disease (H)     Acute cholecystitis     Right sided abdominal pain     Acute cystitis with hematuria     History of intravenous drug abuse (H)     Diarrhea of presumed infectious origin       History of Present Illness   Bc German is a 41 year old male who has a very complex past medical history significant for history of IV drug abuse, has been sober since December 2020, MSSA/MRSA bacteremia with tricuspid valve endocarditis, severe tricuspid regurgitation due to endocarditis, history of pulmonary valve endocarditis in 2015, history of lumbar spine osteomyelitis with epidural abscess which required surgical intervention in 2017, HIV, essential hypertension, hepatitis C, history of depression and anxiety who was recently admitted to the hospital for over 6 weeks from November 15 to December 30 and he was admitted with MSSA/MRSA bacteremia with tricuspid valve endocarditis and was discharged home currently patient is a staying at home with his mother and has been sober.    Patient has developed right-sided abdominal discomfort for 1 week associated with vomiting and poor appetite.  Patient is also complaining of watery diarrhea going on for the last 4 to 5 days, he has very poor appetite and has not taken anything significant in the last 3 days which has not affected his diarrhea.  Patient does have a history of kidney stones which required lithotripsy historically.  Patient completed his course of IV vancomycin and IV daptomycin at the end of December before discharge.  Patient initially presented to urgent care who referred him to the ER  for further evaluation and management.    In the ER, patient was noticed to have blood pressure of 134/91, temperature of 97.9, pulse of 97, later was about 101, respiratory rate of 14 and he was saturating 100% on room air.  His labs showed normal electrolytes and renal function.  Lactic acid returned about 2.0 his WBC count returned at 20 3K his platelets are elevated around 639.  His absolute neutrophil count of 19.1.  CRP and pro calcitonin are pending at the time of my evaluation which has been ordered.  His urine analysis is concerning for large blood in the urine, small leukocyte count WBC count of 71 and more than 182 red blood cell in the urine.  Blood and urine cultures were drawn.  His COVID-19 PCR came back negative.  CT scan of the abdomen and pelvis was done which showed distended gallbladder with wall thickening and edgar hepatis fluid and stranding which is consistent with acute cholecystitis.  There is also wall thickening of the urinary bladder, cystitis cannot be completely excluded there is multiple punctate calcifications of the kidneys which are representing nonobstructive renal calculi.  Gallbladder ultrasound has also been ordered and patient is requested to be admitted for further evaluation and management, patient has been started on IV Zosyn.    At the time of my evaluation, patient is lying in bed, looks chronically sick, confirm that he has been sober since his last admission to the hospital, currently is staying with his mother so he can have supportive environment.  He has been giving outpatient drug test constantly.  He is also complaining of right lower quadrant discomfort, told me that he is also having watery diarrhea going on for the last 4 to 5 days, patient is denying any improvement in diarrhea with his poor oral intake in the last 2 to 3 days.    From his recent hospitalization point of view, patient had a prolonged hospitalization from November 15 to December 30 when he was  admitted with MSSA/MRSA bacteremia with tricuspid valve endocarditis resulting into severe tricuspid regurgitation.  EDUARDO done on November 18 was notable for large tricuspid valve vegetation with severe regurgitation, patient was evaluated by ID and completed his IV antibiotic treatment from November 18-12 26.  Patient had a repeat EDUARDO on December 2 which noted a smaller vegetations than prior EDUARDO but did note that tricuspid valve leaflet is likely perforated and prolapsing leading to severe degenerative TR.  Given repeat EDUARDO findings he was reevaluated by thoracic surgery and plan were made for surgical replacement with bioprosthetic valve at some point if patient remains sober.  Dr. PRANEETH Barrientos from addiction medicine was consulted to follow-up with patient at [facility] prior to consideration for surgery.  Patient did not have any signs and symptoms of right heart failure at the time of discharge.    Patient also has a known history of essential hypertension patient also has a known history of HIV and hepatitis C for which he follows up with Dr. Cho.  Patient also has history of anxiety and depression and tobacco use in the past.  During his last hospitalization he was switched to Suboxone on December 8 per Dr. Barrientos recommendation he was also tapered off Arabella supraorbital and he was continued on Lyrica which was also restarted at lower dose once his renal functions are stabilized.    As above, patient will be admitted currently for possible acute cholecystitis, urinary tract infection and sepsis.    Review of Systems   CONSTITUTIONAL:  positive for fatigue and generalized weakness.  EYES:  negative for blurred vision and visual disturbance  HEENT:  negative for hoarseness and voice change  RESPIRATORY:  negative for cough with sputum, dyspnea, wheezing and chest pain  CARDIOVASCULAR:  negative for chest pain, palpitations, orthopnea, exertional chest pressure/discomfort  GASTROINTESTINAL: As per [place]  GENITOURINARY:  Negative for burning /urgency and frequency.  HEMATOLOGIC/LYMPHATIC:  negative  ALLERGIC/IMMUNOLOGIC:  negative for drug reactions  ENDOCRINE:  negative for diabetic symptoms including polyuria, polydipsia and weight loss  MUSCULOSKELETAL: Negative for arthralgias  NEUROLOGICAL:  negative  BEHAVIOR/PSYCH: History of depression and anxiety      Past Medical History    I have reviewed this patient's medical history and updated it with pertinent information if needed.   Past Medical History:   Diagnosis Date     Anxiety      Depressive disorder      Drug abuse, IV (H)      Group A streptococcal infection 11/2014    Bacteremia/cellulitis     HCV antibody positive      HIV (human immunodeficiency virus infection) (H)      HIV (human immunodeficiency virus infection) (H)      HIV (human immunodeficiency virus infection) (H)      IVDU (intravenous drug user)      Osteomyelitis of vertebra of lumbosacral region (H) 3/2011    L3, left psoas abscess     Substance abuse (H)        Past Surgical History   I have reviewed this patient's surgical history and updated it with pertinent information if needed.  Past Surgical History:   Procedure Laterality Date     EYE SURGERY  1 year ago    pins to left eye after socket fracture     OPTICAL TRACKING SYSTEM FUSION CERVICAL ANTERIOR ONE LEVEL Right 9/10/2017    Procedure: OPTICAL TRACKING SYSTEM FUSION CERVICAL ANTERIOR ONE LEVEL;  Stealth C6-C7 Anterior Cervical Corpectomy and C5-T1 Fusion;  Surgeon: Marv Ambrose MD;  Location: [facility] OR     ORTHOPEDIC SURGERY      Arm Surgery     PICC INSERTION Left 09/21/2017    5fr DL BioFlo PICC, 42cm (1cm external) in the L basilic vein w/ tip in the low SVC.     TRANSESOPHAGEAL ECHOCARDIOGRAM INTRAOPERATIVE N/A 3/17/2015    Procedure: TRANSESOPHAGEAL ECHOCARDIOGRAM INTRAOPERATIVE;  Surgeon: Generic Anesthesia Provider;  Location: [facility] OR     TRANSESOPHAGEAL ECHOCARDIOGRAM INTRAOPERATIVE N/A 11/18/2021    Procedure: ECHOCARDIOGRAM,  "TRANSESOPHAGEAL, INTRAOPERATIVE;  Surgeon: GENERIC ANESTHESIA PROVIDER;  Location:  OR     TRANSESOPHAGEAL ECHOCARDIOGRAM INTRAOPERATIVE N/A 12/2/2021    Procedure: ECHOCARDIOGRAM, TRANSESOPHAGEAL, INTRAOPERATIVE;  Surgeon: GENERIC ANESTHESIA PROVIDER;  Location:  OR       Prior to Admission Medications   Prior to Admission Medications   Prescriptions Last Dose Informant Patient Reported? Taking?   LORazepam (ATIVAN) 1 MG tablet Past Week at Unknown time Pharmacy Yes Yes   Sig: Take 1 mg by mouth 2 times daily   Multiple Vitamins-Minerals (MULTIVITAMIN GUMMIES ADULT PO) Past Week at Unknown time Self Yes Yes   Sig: Take 1 chew tab by mouth daily   Naphazoline-Glycerin (CLEAR EYES MAX REDNESS RELIEF) 0.03-0.5 % SOLN prn med Self Yes Yes   Sig: Place 1 drop into both eyes daily as needed   abacavir-dolutegravir-LamiVUDine (TRIUMEQ) 600- MG per tablet Past Week at Unknown time Pharmacy No Yes   Sig: Take 1 tablet by mouth daily Please call [phone] & make appointment for further refills.   aspirin 81 MG EC tablet 12/30/2021 Self Yes No   Sig: Take 81 mg by mouth daily   Patient not taking: Reported on 1/18/2022   buprenorphine HCl-naloxone HCl (SUBOXONE) 8-2 MG per film 2/2/2022 Pharmacy No Yes   Sig: Place 1 Film under the tongue 2 times daily   Patient taking differently: Place 0.5 Film under the tongue daily as needed (Patient states Suboxone has been \"worsening his anxiety\" so he has not been taking often. He states he only takes 1/3-1/2 film once in a while)   famotidine (PEPCID) 20 MG tablet has not started yet Self Yes No   Sig: Take 20 mg by mouth daily   levomilnacipran (FETZIMA ER) 20 MG 24 hr capsule Past Week at Unknown time Pharmacy Yes Yes   Sig: Take 60 mg by mouth daily    pregabalin (LYRICA) 150 MG capsule Past Week at Unknown time Pharmacy Yes Yes   Sig: Take 150 mg by mouth daily    tamsulosin (FLOMAX) 0.4 MG capsule Past Week at Unknown time Pharmacy No Yes   Sig: Take 1 capsule (0.4 " mg) by mouth daily   testosterone cypionate (DEPOTESTOTERONE) 200 MG/ML injection 1/29/2022 Pharmacy Yes Yes   Sig: Inject 0.3 mLs into the muscle once a week Inject 0.3 mL IM every 77 days      Facility-Administered Medications: None     Allergies   Allergies   Allergen Reactions     Bupropion      Other reaction(s): Seizures, Seizures  PN: seizure when took a double dose  seizure when took a double dose  PN: seizure when took a double dose  Other reaction(s): Seizures  PN: seizure when took a double dose       Acetaminophen Nausea and Vomiting     PN: : GI Upset  : GI Upset  PN: : GI Upset  Other reaction(s): Vomiting  PN: : GI Upset       Codeine Itching and Nausea and Vomiting     Other reaction(s): Abdominal Pain  PN:  weak; fatigue; vomiting   weak; fatigue; vomiting  PN:  weak; fatigue; vomiting  PN:  weak; fatigue; vomiting       Sulfa Drugs Itching       Social History   I have reviewed this patient's social history and updated it with pertinent information if needed. Bc German  reports that he has been smoking cigarettes. He has been smoking about 0.25 packs per day. He has never used smokeless tobacco. He reports current alcohol use. He reports current drug use. Drug: Marijuana.    Family History   Father has type 2 diabetes mellitus and gout, mother has thyroid issues.    Physical Exam   Temp: 97.9  F (36.6  C) Temp src: Temporal BP: (!) 137/97 Pulse: 101   Resp: 14 SpO2: 100 % O2 Device: None (Room air)    Vital Signs with Ranges  Temp:  [97.9  F (36.6  C)] 97.9  F (36.6  C)  Pulse:  [] 101  Resp:  [12-16] 14  BP: (132-139)/(91-99) 137/97  SpO2:  [100 %] 100 %  140 lbs 0 oz    Constitutional: Awake, alert,oriented to time, place and person , cooperative, no apparent distress, looks chronically sick  Eyes: Conjunctiva and pupils examined and normal.  HEENT: Moist mucous membranes, poor dentition   respiratory: Clear to auscultation bilaterally, no crackles or wheezing.  Cardiovascular:  Regular rate and rhythm, normal S1 and S2, and no murmur noted.  GI: Soft, right upper and lower quadrant pain on superficial and deep palpation, no guarding or rigidity, no rebound tenderness, hypoactive but present bowel sounds.  Lymph/Hematologic: No anterior cervical or supraclavicular adenopathy.  Skin: No rashes, no cyanosis, no edema.  Multiple old scabs and excoriation from previous IV drug use  Musculoskeletal: No joint swelling, erythema or tenderness.  Neurologic: Cranial nerves 2-12 intact, normal strength and sensation.  Psychiatric: Alert, oriented to person, place and time, no obvious anxiety or depression.    Data   Data reviewed today:  I personally reviewed CT scan of the abdomen which is showing distended gallbladder with wall thickening consistent with acute cholecystitis and wall thickening of urinary bladder.      Recent Labs   Lab 02/06/22  1318   WBC 23.5*   HGB 13.3   MCV 91   *      POTASSIUM 3.5   CHLORIDE 107   CO2 25   BUN 19   CR 1.04   ANIONGAP 5   SANDRA 9.9   *   ALBUMIN 2.8*   PROTTOTAL 8.5   BILITOTAL 0.4   ALKPHOS 91   ALT 45   AST 27   LIPASE 157       Imaging:  Recent Results (from the past 24 hour(s))   CT Abdomen Pelvis w Contrast    Narrative    EXAM: CT ABDOMEN PELVIS W CONTRAST  LOCATION: [facility]  DATE/TIME: 2/6/2022 2:32 PM    INDICATION: RLQ abdominal pain, appendicitis suspected (Age >= 14y)  COMPARISON: 03/10/2015   TECHNIQUE: CT scan of the abdomen and pelvis was performed following injection of IV contrast. Multiplanar reformats were obtained. Dose reduction techniques were used.  CONTRAST: 70 mL Isovue 370    FINDINGS:   LOWER CHEST: Normal.    HEPATOBILIARY: Distended gallbladder with wall thickening. There is stranding or fluid in the edgar hepatis.     PANCREAS: Normal.    SPLEEN: Normal.    ADRENAL GLANDS: Normal.    KIDNEYS/BLADDER: Simple renal cysts do not require follow-up. Multiple tiny calcifications may  represent nonobstructing stones. No ureteral calculi. No hydronephrosis or hydroureter. There is wall thickening of the nondistended urinary bladder.     BOWEL: Normal appendix. No bowel obstruction.     LYMPH NODES: Normal.    VASCULATURE: Unremarkable.    PELVIC ORGANS: Normal.    MUSCULOSKELETAL: Normal.        Impression    IMPRESSION:   1.  Distended gallbladder with wall thickening and edgar hepatis fluid and stranding. This is consistent with acute cholecystitis.  2.  Wall thickening of the urinary bladder may be from underdistention. Cannot exclude cystitis on this study.  3.  Normal appendix.  4.  Multiple punctate calcifications in the kidneys. These likely represent nonobstructing renal calculi.         yes

## 2024-01-12 NOTE — PLAN OF CARE
Problem: Patient Care Overview  Goal: Plan of Care/Patient Progress Review  Outcome: No Change  A&Ox4, VSS on RA. Reports pain to LUE rates 5/10, declines intervention. Declines assessment this shift. LUE redness/swelling noted, appears to be slightly improved. Continues on IV Vanco + Rocephin. IVF@75/hr. Up independently. Regular diet with adequate intake. ID following. D/C pending progress.       [phone]

## 2024-02-28 ENCOUNTER — HOSPITAL ENCOUNTER (EMERGENCY)
Facility: CLINIC | Age: 44
Discharge: HOME | End: 2024-02-29
Attending: EMERGENCY MEDICINE | Admitting: EMERGENCY MEDICINE
Payer: COMMERCIAL

## 2024-02-28 DIAGNOSIS — F11.20 UNCOMPLICATED OPIOID DEPENDENCE (H): ICD-10-CM

## 2024-02-28 LAB
ATRIAL RATE - MUSE: 96 BPM
DIASTOLIC BLOOD PRESSURE - MUSE: NORMAL MMHG
INTERPRETATION ECG - MUSE: NORMAL
P AXIS - MUSE: 61 DEGREES
PR INTERVAL - MUSE: 130 MS
QRS DURATION - MUSE: 86 MS
QT - MUSE: 342 MS
QTC - MUSE: 432 MS
R AXIS - MUSE: 66 DEGREES
SYSTOLIC BLOOD PRESSURE - MUSE: NORMAL MMHG
T AXIS - MUSE: 62 DEGREES
VENTRICULAR RATE- MUSE: 96 BPM

## 2024-02-28 PROCEDURE — 93005 ELECTROCARDIOGRAM TRACING: CPT

## 2024-02-28 PROCEDURE — 99283 EMERGENCY DEPT VISIT LOW MDM: CPT

## 2024-02-28 ASSESSMENT — ACTIVITIES OF DAILY LIVING (ADL): ADLS_ACUITY_SCORE: 38

## 2024-02-28 ASSESSMENT — COLUMBIA-SUICIDE SEVERITY RATING SCALE - C-SSRS
2. HAVE YOU ACTUALLY HAD ANY THOUGHTS OF KILLING YOURSELF IN THE PAST MONTH?: NO
6. HAVE YOU EVER DONE ANYTHING, STARTED TO DO ANYTHING, OR PREPARED TO DO ANYTHING TO END YOUR LIFE?: NO
1. IN THE PAST MONTH, HAVE YOU WISHED YOU WERE DEAD OR WISHED YOU COULD GO TO SLEEP AND NOT WAKE UP?: NO

## 2024-02-29 ENCOUNTER — OFFICE VISIT (OUTPATIENT)
Dept: BEHAVIORAL HEALTH | Facility: CLINIC | Age: 44
End: 2024-02-29
Payer: COMMERCIAL

## 2024-02-29 ENCOUNTER — TELEPHONE (OUTPATIENT)
Dept: BEHAVIORAL HEALTH | Facility: CLINIC | Age: 44
End: 2024-02-29
Payer: COMMERCIAL

## 2024-02-29 ENCOUNTER — LAB (OUTPATIENT)
Dept: LAB | Facility: CLINIC | Age: 44
End: 2024-02-29
Payer: COMMERCIAL

## 2024-02-29 ENCOUNTER — TELEPHONE (OUTPATIENT)
Dept: BEHAVIORAL HEALTH | Facility: CLINIC | Age: 44
End: 2024-02-29

## 2024-02-29 VITALS
HEART RATE: 106 BPM | SYSTOLIC BLOOD PRESSURE: 156 MMHG | WEIGHT: 134.8 LBS | DIASTOLIC BLOOD PRESSURE: 105 MMHG | BODY MASS INDEX: 19.34 KG/M2

## 2024-02-29 VITALS
TEMPERATURE: 98 F | DIASTOLIC BLOOD PRESSURE: 98 MMHG | RESPIRATION RATE: 16 BRPM | HEIGHT: 70 IN | OXYGEN SATURATION: 100 % | BODY MASS INDEX: 18.61 KG/M2 | SYSTOLIC BLOOD PRESSURE: 121 MMHG | HEART RATE: 92 BPM | WEIGHT: 130 LBS

## 2024-02-29 DIAGNOSIS — B20 HUMAN IMMUNODEFICIENCY VIRUS (HIV) DISEASE (H): ICD-10-CM

## 2024-02-29 DIAGNOSIS — Z72.51 HIGH RISK SEXUAL BEHAVIOR, UNSPECIFIED TYPE: ICD-10-CM

## 2024-02-29 DIAGNOSIS — F41.9 ANXIETY AND DEPRESSION: ICD-10-CM

## 2024-02-29 DIAGNOSIS — F15.90 STIMULANT USE DISORDER: ICD-10-CM

## 2024-02-29 DIAGNOSIS — Z51.81 ENCOUNTER FOR MONITORING OPIOID MAINTENANCE THERAPY: ICD-10-CM

## 2024-02-29 DIAGNOSIS — B18.2 CHRONIC HEPATITIS C WITHOUT HEPATIC COMA (H): ICD-10-CM

## 2024-02-29 DIAGNOSIS — F17.200 NICOTINE USE DISORDER: ICD-10-CM

## 2024-02-29 DIAGNOSIS — F11.90 OPIOID USE DISORDER: Primary | ICD-10-CM

## 2024-02-29 DIAGNOSIS — F32.A ANXIETY AND DEPRESSION: ICD-10-CM

## 2024-02-29 DIAGNOSIS — Z79.891 ENCOUNTER FOR MONITORING OPIOID MAINTENANCE THERAPY: ICD-10-CM

## 2024-02-29 DIAGNOSIS — Z79.899 CHRONIC PRESCRIPTION BENZODIAZEPINE USE: ICD-10-CM

## 2024-02-29 DIAGNOSIS — F11.93 OPIOID WITHDRAWAL (H): ICD-10-CM

## 2024-02-29 LAB
ALBUMIN SERPL BCG-MCNC: 4.9 G/DL (ref 3.5–5.2)
ALP SERPL-CCNC: 92 U/L (ref 40–150)
ALT SERPL W P-5'-P-CCNC: 122 U/L (ref 0–70)
AMPHETAMINE QUAL URINE POCT: NEGATIVE
ANION GAP SERPL CALCULATED.3IONS-SCNC: 10 MMOL/L (ref 7–15)
AST SERPL W P-5'-P-CCNC: 54 U/L (ref 0–45)
BARBITURATE QUAL URINE POCT: NEGATIVE
BASOPHILS # BLD AUTO: 0.1 10E3/UL (ref 0–0.2)
BASOPHILS NFR BLD AUTO: 1 %
BENZODIAZEPINE QUAL URINE POCT: ABNORMAL
BILIRUB SERPL-MCNC: 1 MG/DL
BUN SERPL-MCNC: 24.5 MG/DL (ref 6–20)
BUPRENORPHINE QUAL URINE POCT: NEGATIVE
CALCIUM SERPL-MCNC: 10.4 MG/DL (ref 8.6–10)
CHLORIDE SERPL-SCNC: 98 MMOL/L (ref 98–107)
COCAINE QUAL URINE POCT: ABNORMAL
CREAT SERPL-MCNC: 0.86 MG/DL (ref 0.67–1.17)
CREATININE QUAL URINE POCT: ABNORMAL
DEPRECATED HCO3 PLAS-SCNC: 30 MMOL/L (ref 22–29)
EGFRCR SERPLBLD CKD-EPI 2021: >90 ML/MIN/1.73M2
EOSINOPHIL # BLD AUTO: 0 10E3/UL (ref 0–0.7)
EOSINOPHIL NFR BLD AUTO: 0 %
ERYTHROCYTE [DISTWIDTH] IN BLOOD BY AUTOMATED COUNT: 14.5 % (ref 10–15)
FENTANYL UR QL: ABNORMAL
GLUCOSE SERPL-MCNC: 122 MG/DL (ref 70–99)
HCT VFR BLD AUTO: 51.5 % (ref 40–53)
HGB BLD-MCNC: 17.1 G/DL (ref 13.3–17.7)
IMM GRANULOCYTES # BLD: 0.1 10E3/UL
IMM GRANULOCYTES NFR BLD: 1 %
INTERNAL QC QUAL URINE POCT: ABNORMAL
LYMPHOCYTES # BLD AUTO: 2.3 10E3/UL (ref 0.8–5.3)
LYMPHOCYTES NFR BLD AUTO: 19 %
MCH RBC QN AUTO: 29.7 PG (ref 26.5–33)
MCHC RBC AUTO-ENTMCNC: 33.2 G/DL (ref 31.5–36.5)
MCV RBC AUTO: 89 FL (ref 78–100)
MDMA QUAL URINE POCT: NEGATIVE
METHADONE QUAL URINE POCT: NEGATIVE
METHAMPHETAMINE QUAL URINE POCT: ABNORMAL
MONOCYTES # BLD AUTO: 0.7 10E3/UL (ref 0–1.3)
MONOCYTES NFR BLD AUTO: 6 %
NEUTROPHILS # BLD AUTO: 9.1 10E3/UL (ref 1.6–8.3)
NEUTROPHILS NFR BLD AUTO: 73 %
NRBC # BLD AUTO: 0 10E3/UL
NRBC BLD AUTO-RTO: 0 /100
OPIATE QUAL URINE POCT: NEGATIVE
OXYCODONE QUAL URINE POCT: NEGATIVE
PH QUAL URINE POCT: ABNORMAL
PHENCYCLIDINE QUAL URINE POCT: NEGATIVE
PLATELET # BLD AUTO: 365 10E3/UL (ref 150–450)
POCT KIT EXPIRATION DATE: ABNORMAL
POCT KIT LOT NUMBER: ABNORMAL
POTASSIUM SERPL-SCNC: 4.3 MMOL/L (ref 3.4–5.3)
PROT SERPL-MCNC: 8.6 G/DL (ref 6.4–8.3)
RBC # BLD AUTO: 5.76 10E6/UL (ref 4.4–5.9)
SODIUM SERPL-SCNC: 138 MMOL/L (ref 135–145)
SPECIFIC GRAVITY POCT: 1.02
T PALLIDUM AB SER QL: NONREACTIVE
TEMPERATURE URINE POCT: ABNORMAL
THC QUAL URINE POCT: ABNORMAL
WBC # BLD AUTO: 12.4 10E3/UL (ref 4–11)

## 2024-02-29 PROCEDURE — 87536 HIV-1 QUANT&REVRSE TRNSCRPJ: CPT

## 2024-02-29 PROCEDURE — 80307 DRUG TEST PRSMV CHEM ANLYZR: CPT | Performed by: FAMILY MEDICINE

## 2024-02-29 PROCEDURE — 85048 AUTOMATED LEUKOCYTE COUNT: CPT

## 2024-02-29 PROCEDURE — 87902 NFCT AGT GNTYP ALYS HEP C: CPT

## 2024-02-29 PROCEDURE — 99205 OFFICE O/P NEW HI 60 MIN: CPT | Performed by: FAMILY MEDICINE

## 2024-02-29 PROCEDURE — 87340 HEPATITIS B SURFACE AG IA: CPT

## 2024-02-29 PROCEDURE — 99000 SPECIMEN HANDLING OFFICE-LAB: CPT | Performed by: FAMILY MEDICINE

## 2024-02-29 PROCEDURE — 87522 HEPATITIS C REVRS TRNSCRPJ: CPT

## 2024-02-29 PROCEDURE — 36415 COLL VENOUS BLD VENIPUNCTURE: CPT

## 2024-02-29 PROCEDURE — 80053 COMPREHEN METABOLIC PANEL: CPT

## 2024-02-29 PROCEDURE — 99207 PR NO CHARGE LOS: CPT

## 2024-02-29 PROCEDURE — 86780 TREPONEMA PALLIDUM: CPT

## 2024-02-29 PROCEDURE — G0480 DRUG TEST DEF 1-7 CLASSES: HCPCS | Mod: 90 | Performed by: FAMILY MEDICINE

## 2024-02-29 RX ORDER — PREGABALIN 150 MG/1
150 CAPSULE ORAL 2 TIMES DAILY
COMMUNITY
Start: 2024-02-29 | End: 2024-06-26

## 2024-02-29 RX ORDER — PROMETHAZINE HYDROCHLORIDE 25 MG/1
25 TABLET ORAL EVERY 6 HOURS PRN
Qty: 20 TABLET | Refills: 0 | Status: SHIPPED | OUTPATIENT
Start: 2024-02-29 | End: 2024-07-26

## 2024-02-29 RX ORDER — LORAZEPAM 1 MG/1
2 TABLET ORAL 3 TIMES DAILY
COMMUNITY
Start: 2024-02-29 | End: 2024-06-06

## 2024-02-29 RX ORDER — CLONIDINE HYDROCHLORIDE 0.1 MG/1
0.1 TABLET ORAL EVERY 6 HOURS PRN
Qty: 20 TABLET | Refills: 0 | Status: SHIPPED | OUTPATIENT
Start: 2024-02-29 | End: 2024-06-26

## 2024-02-29 RX ORDER — METOCLOPRAMIDE 10 MG/1
10 TABLET ORAL 3 TIMES DAILY PRN
Qty: 10 TABLET | Refills: 0 | Status: SHIPPED | OUTPATIENT
Start: 2024-02-29 | End: 2024-02-29

## 2024-02-29 RX ORDER — POLYETHYLENE GLYCOL 3350 17 G/17G
1 POWDER, FOR SOLUTION ORAL DAILY
Qty: 578 G | Refills: 11 | Status: SHIPPED | OUTPATIENT
Start: 2024-02-29 | End: 2024-07-26

## 2024-02-29 RX ORDER — CLONIDINE HYDROCHLORIDE 0.1 MG/1
0.1 TABLET ORAL ONCE
Status: COMPLETED | OUTPATIENT
Start: 2024-02-29 | End: 2024-02-29

## 2024-02-29 RX ORDER — QUETIAPINE FUMARATE 100 MG/1
100-150 TABLET, FILM COATED ORAL AT BEDTIME
COMMUNITY
Start: 2024-02-29 | End: 2024-06-26

## 2024-02-29 RX ORDER — BUPRENORPHINE 2 MG/1
2 TABLET SUBLINGUAL 2 TIMES DAILY
Qty: 20 TABLET | Refills: 0 | Status: SHIPPED | OUTPATIENT
Start: 2024-02-29 | End: 2024-06-06

## 2024-02-29 RX ORDER — CLONIDINE HYDROCHLORIDE 0.1 MG/1
0.1 TABLET ORAL 2 TIMES DAILY PRN
Qty: 10 TABLET | Refills: 0 | Status: SHIPPED | OUTPATIENT
Start: 2024-02-29 | End: 2024-02-29

## 2024-02-29 RX ORDER — BUPRENORPHINE 8 MG/1
8 TABLET SUBLINGUAL 2 TIMES DAILY
Qty: 20 TABLET | Refills: 0 | Status: SHIPPED | OUTPATIENT
Start: 2024-02-29 | End: 2024-06-06

## 2024-02-29 RX ORDER — QUETIAPINE FUMARATE 100 MG/1
100 TABLET, FILM COATED ORAL 2 TIMES DAILY
COMMUNITY
End: 2024-02-29

## 2024-02-29 RX ADMIN — CLONIDINE HYDROCHLORIDE 0.1 MG: 0.1 TABLET ORAL at 12:24

## 2024-02-29 ASSESSMENT — PATIENT HEALTH QUESTIONNAIRE - PHQ9: SUM OF ALL RESPONSES TO PHQ QUESTIONS 1-9: 13

## 2024-02-29 ASSESSMENT — ACTIVITIES OF DAILY LIVING (ADL): ADLS_ACUITY_SCORE: 38

## 2024-02-29 NOTE — DISCHARGE INSTRUCTIONS
Detox Centers [facility] the [facility]:    [facility]  [address] [place], [place] [id number] [phone]    [facility] (alcohol only) - [address] [facility]  [phone]     [facility] (alcohol only) [phone]     [facility]- Use [facility] [phone]     [facility] only provides detox for alcohol and benzodiazepines - NOT opioids.     Substance Abuse Treatment Resources:  To access substance abuse treatment you must have an assessment completed within 30 days of starting any program. Information for this to be completed and to secure funding if you have medical assistance or no insurance can be found through your County's chemical health intake line. If you have private insurance, call the customer service number on the back of your insurance card to find an in-network substance abuse assessment. The ideal provider will be a treatment facility, licensed in the [facility].     For those with [place] Medicaid you will need a Rule 25 assessment: The following are phone numbers for each [facility]. Rule 25 assessments must be completed by your current county of residence. Once approved for funding you can connect with a facility that does Rule 25 assessment.     [facility] [phone]     [facility] [phone]     [facility] [phone]     [facility] [phone]     [facility] [phone]     [facility] [phone]     [facility] [phone]     [place] - [phone]     The following facilities also offer Rule 25/chemical health assessments:     [facility]  [phone]  Mon-Friday: [address] [facility], [address]  Saturday:  [address] [facility] [address]  M-F assessments (7a-1:45p); Saturday assessments (7:45-10:45a)     [facility]  [phone]  [address] [place], [place], [address]  *by appointment only M-W; walk ins available Fridays from 10-2.     [facility]  [phone] (phone consultation  available 24/7)  In-person Assessments: [address] [facility], [place] [address]  [address] [place] [address]  [address] [place] [address]  [address] [place] [address]     [facility]  [phone]  [address] [facility] [address],  [place], [address]  *walk in and appointments available by calling     [facility]  [phone] [address] [place], [place], [address]  *by appointment only M-Th      [facility]  [phone]  [address] [place], [place], [address]  *walk ins available M-F     [facility]  [phone]  [address] [place], [place], [address]  *available by appointments only     [facility]  [phone]  [address] [place], [address]  *available by appointment only     [facility] ([facility])  [phone]  [address] [place] [address] [address], [place], [address]  *Walk in assessments also available M-F starting at 8 am.     [facility]  [phone]  [address] [place], [place], [address]  *available by appointments     [facility]  [phone]  [address] [place], [place], [address]  *walk in assessments available M-F starting at 7 am.     [facility]  [phone]  [facility]  [address], [place], [address]  *Walk in assessments availble M-F starting at 8 am OR [phone]     [facility]  [address] , [place] [address]  *Walk in assessments available M-F starting at 8 am     [facility]  [phone]  [address] [place] [address]     [facility]  [phone]  [address] [place], [place], [address]  *available by appointment only     [facility]  [phone]  [facility] [place], [address]     [facility]  [phone]  [address] [place], [address]  *available by appointment     [facility]  [phone]  [address] [facility], [place], [address]  *walk ins available M-TH from 9-3     [facility]  [phone]  [address] [place], [address]     [facility]  [phone] [address] [place], [place] [id number] [address] [address]     [facility] (Does Rule 25 Assessments)  [email or web address]  [phone]  [address] [place] [address], [address], [place] [address]     [facility]  [email or web address]  [phone], [address], [place], [place] [id number] [phone], [address], [place], [place] [address] [phone], [address] [facility], [address], [place] [address]  [phone], [address] [facility] , [address], ([facility], [place] [id number] [phone], [address] [address] [place] [address]     If you are intoxicated, you may be required to detox at a detox facility before starting treatment. The following are detox facilities that you can self present to. All detox facilities are able to help you complete an assessment/rule 25 prior to discharge if you choose:     [facility]: [address] [place], [place], [address]  [phone]     [facility]: [address] [place], [place], [address]  [phone]     [facility]: [address] [place], [place], [address] [phone]     [facility]: [address] [place], [place], [address] [phone]     [facility]: [address] [place], [place], [address] [phone]      Ways to help cope with sobriety:     Take prescribed medicines as scheduled     Keep follow-up appointments     Talk to others about your concerns     Get regular exercise     Practice deep breathing skills     Eat a healthy diet     Use community  resources, including hotline numbers, [facility] crisis and support meetings     Stay sober and avoid places/people/things associated with substance use     Maintain a daily schedule/routine     Get at least 7-8 hours of sleep per night     Create a list 10--20 healthy activities that you can do that are enjoyable and do not involve substance use     Create daily goals (approx. 1-4 goals) per day and work to achieve them throughout the day      [facility] ([facility]): [facility] connects people seeking recovery to resources that help foster and sustain long-term recovery. Whether you are seeking resources for treatment, transportation, housing, job training, education, health care or other pathways to recovery, [facility] is a great place to start. Phone: [phone]. www.[place]Operax (Great listing of all types of recovery and non-recovery related resources)     [place]: https://www.Sp[employer]ik.org/     Alcoholics Anonymous: 6-800-ALCOHOL  HTTP://WWW.AA.ORG/  [employer] (922-158-9831 or http://aaAptidata.org)  [employer] ([phone] or www.aastpaul.org)     Narcotics Anonymous: 515.895.1654  www.naminAdvantagene.us.     [facility]: [address] [place] [address], [address], [place], [place]  [phone]  Drop-in Hours: Monday-Friday 9-11:30 am. By appointment at other times.  [facility] is a drop-in center on the east side [facility] that provides a safe space for individuals who are homeless and have a history of chemical use. Sobriety is not a requirement but drugs and alcohol are not allowed on the property.  Non-clients can access drop-in services such as Recovery and Harm Reduction Groups, referrals to case management, community activities, shower facilities, and a pool table. Individuals who are homeless and have chemical health needs may be eligible for enrollment into [facility]'s case management program. Clients and  work together to access benefits,  treatment, health care, shelter, and external housing resources.     [facility] Chemical Assessment & Referral Unit: [address] [place] [address]  [phone]  Monday-Friday 8 am-5 pm  Rule 25 assessment and referral for individuals seeking treatment or counseling for chemical dependency. Must be a resident of [facility]. There is no fee for assessment. There is some funding available for treatment programs.     [facility]: [address] [facility]  [phone] or [phone]  Monday - Friday 7 am - 5 pm  Daily drop-in Rule 25 assessments and weekly mental health assessments and services. Outpatient chemical dependency treatment (with sober recovery housing), relapse prevention, case management, and employment services. Outpatient and residential treatment for women with children. Specializes in assisting individuals with a history of relapse who face multiple barriers to achieving stable recovery. No charge for most services or services can be billed through insurance. Gender-specific services and treatment for co-occurring substance abuse and mental health concerns offered.

## 2024-02-29 NOTE — NURSING NOTE
RN was notified by rooming staff that the patient had an elevated PHQ-9 score and answered greater than 0 on question 9 indicating thoughts that they would be better off dead, or hurting themself in some way. RN met with patient to complete the C-SSRS.    Patient  denies current or recent suicidal ideation or behavior    [place]-Suicide Severity Rating Scale (C-SSRS) score: low risk    RN updated the provider with C-SSRS risk level.     Current stressors/contributing factors include: Only when I am in withdrawl, it scare me as this is not something I would think about or do when I am not going through withdrawal. I don't want to die, I want to be happy.     Patient identified the following protective factors: Loves life, his son, fishing.     Patient was given the number for the National Suicide Prevention Line (988) along with a list of crisis resources and numbers.      Jennifer Claudio RN on 2/29/2024 at 11:33 AM

## 2024-02-29 NOTE — ED TRIAGE NOTES
Arrived via ems for methadone withdrawal and fentanyl use around 9 PM tonight per ems. [facility]. Patient was awake and oriented. No medication given by ems, no narcan, end tidal is 40-45 per  ems. BG is 89. Patient is not on any hold, patient is voluntarily.      Triage Assessment (Adult)       Row Name 02/28/24 5546          Triage Assessment    Airway WDL WDL        Respiratory WDL    Respiratory WDL WDL        Skin Circulation/Temperature WDL    Skin Circulation/Temperature WDL WDL        Cardiac WDL    Cardiac WDL WDL        Peripheral/Neurovascular WDL    Peripheral Neurovascular WDL WDL        Cognitive/Neuro/Behavioral WDL    Cognitive/Neuro/Behavioral WDL WDL

## 2024-02-29 NOTE — TELEPHONE ENCOUNTER
"1) RN reviewed referral to Recovery Clinic.     2) Phoned the patient to discuss recent ER visit     3) Patient reported that he has been trying to get off fentanyl and Methadone but has been unsuccessful. Does not have money to afford Methadone so has been using Fentanyl to supplement. He is very motivated to stop using methadone and Fentanyl and would like to get \"help.\"     4) RN provided resources for the recovery clinic and the patient stated he planned on coming in sometime today.       [facility]  [address], [address]   [place], [place], [address]  Phone: [phone]  Fax: [phone]    Open Monday-Friday  Closed over lunch hour  Walk in hours: 9am-11:30am and 12:30-3pm    *For [facility] providers, if you'd like to have Recovery Clinic staff reach out to a patient, enter ambulatory order Adult Mental Health  Referral #[phone] for Addiction Medicine or [facility] Referral #[phone] for Addiction Medicine, as appropriate.        MERVIN DUBOIS RN on 2/29/2024 at 9:05 AM      "

## 2024-02-29 NOTE — PATIENT INSTRUCTIONS
Low dose start of buprenorphine using 2mg/0.5mg films or tablets:      Day 1: take 0.5mg buprenorphine (1/4 film or tablet) once.  Continue use of other opioid.     Day 2: take 0.5mg buprenorphine (1/4 film or tablet) twice a day.  Continue use of other opioid.     Day 3: take 1mg buprenorphine (1/2 film or tablet) twice a day.  Continue use of other opioid.     Day 4: take 2mg buprenorphine (1 film or tablet) twice a day.  Continue use of other opioid.    Day 5: take 4mg buprenorphine (2 films or tablets) twice a day. Continue use of other opioid.    Day 6: take 4mg buprenorphine (2 films or tablets) three times a day.  Continue use of other opioid.     Day 7: take 8mg twice a day (using one of the 8mg/2mg films) and stop use of other opioid    Day 8 and forward: continue taking two 8mg/2mg films or tablets every day, or the dose discussed with your provider

## 2024-02-29 NOTE — ED PROVIDER NOTES
"  History     Chief Complaint:  Drug / Alcohol Assessment (Fentanyl use) and Withdrawal (Methadone. )       HPI   Bc German is a 43 year old male with history of substance abuse who presents to the ED via EMS for evaluation of drug/alcohol assessment with withdrawal. He reports having withdrawal from methadone and fentanyl. He states buy 150 mg of methadone and has been taking 10 mg of it everyday to be \"function.\" He reports when taking it having symptoms of not wanting to get out of bed, sweats, being uncomfortable, can't eat or drink, and not being able to go the bathroom. He report running out of methadone 2 days ago and decided to take 2 \"hits\" of fentanyl tonight. He states falling asleep in his brother's bathroom. Patient takes anxiety medications due to receiving death threats everyday. He has been in treatment 11 times but states it doesn't work for him and doesn't want to go back.     Independent Historian:    None - Patient only     Review of External Notes:  None      Medications:    Triumeq  Pepcid  Fetzima  Ativan  Lyrica  Mylicon  Flomax   Seroquel  Atarax  Suboxone  Imitrex  Catapres    Past Medical History:    Anxiety  Depressive disorder  HIV  IVDU (intravenous drug user)  Osteomyelitis of vertebra of lumbosacral region   Substance abuse   Cellulitis  Sepsis  Acute cholecystitis   PTSD  Alcohol abuse    Past Surgical History:    Eye surgery - left  Laparoscopic cholecystectomy  Optical tracking system fusion cervical anterior one level - right  Right arm orthopedic surgery  PICC insertion - left  Transesophageal echocardiogram intraoperative x3     Physical Exam   Patient Vitals for the past 24 hrs:   BP Temp Temp src Pulse Resp SpO2 Height Weight   02/29/24 0027 (!) 121/98 -- -- 92 16 100 % -- --   02/28/24 2236 -- -- -- -- -- -- 1.778 m (5' 10\") 59 kg (130 lb)   02/28/24 2235 (!) 140/108 98  F (36.7  C) Oral 101 16 99 % -- --      Physical Exam  General: Appears well-developed and " well-nourished.   Head: No signs of trauma.   Mouth/Throat: Oropharynx is clear and moist.   Eyes: Conjunctivae are normal. Pupils are equal, round, and reactive to light.   CV: Normal rate and regular rhythm.    Resp: Effort normal and breath sounds normal. No respiratory distress.   GI: Soft. There is no tenderness.  No rebound or guarding.  Normal bowel sounds.  MSK: Normal range of motion.   Neuro: The patient is alert and oriented to person, place, and time.  Strength in upper/lower extremities normal and symmetrical. Sensation normal. Speech normal.  Skin: Skin is warm and dry. No rash noted.   Psych: normal mood and affect. behavior is normal.       Emergency Department Course   ECG  ECG results from 02/28/24   EKG 12-lead, tracing only     Value    Systolic Blood Pressure     Diastolic Blood Pressure     Ventricular Rate 96    Atrial Rate 96    ID Interval 130    QRS Duration 86        QTc 432    P Axis 61    R AXIS 66    T Axis 62    Interpretation ECG      Sinus rhythm  Minimal voltage criteria for LVH, may be normal variant ( Sokolow-Baez )  Borderline ECG  When compared with ECG of 21-APR-2022 17:49,  Nonspecific T wave abnormality now evident in Lateral leads       Procedures   None    Emergency Department Course & Assessments:    Interventions:  Medications - No data to display     Independent Interpretation (X-rays, CTs, rhythm strip):  None    Assessments/Consultations/Discussion of Management or Tests:  ED Course as of 02/29/24 0547   Wed Feb 28, 2024   0055 I prepared the patient to be discharged home.    2310 I obtained history and examined the patient as noted above.    Thu Feb 29, 2024   0050 I rechecked the patient and explained findings.      Social Determinants of Health affecting care:  None     Disposition:  The patient was discharged.    Impression & Plan      Medical Decision Making:  Bc German presents due to opiate dependence.  He reports that he had been using methadone  that he would buy off the street to help with withdrawal symptoms.  Today, he got some fentanyl as he ran out of the methadone.  He had apparently fallen asleep in the bathroom and family called 911.  He was not given Narcan.  On my evaluation, the patient was alert and oriented and conversant.  He had appropriate vital signs and did not appear in active opiate withdrawal.  Patient reports that he has tried Suboxone in the past, and does not like how it makes him feel.  Ultimately I agreed to give a prescription for clonidine and Reglan to help with withdrawal symptoms, but I stressed the importance of going to treatment to manage opiate use disorder.  I did put in a referral.  Patient was discharged after an extended period of monitoring.    Diagnosis:    ICD-10-CM    1. Uncomplicated opioid dependence (H)  F11.20 [facility] Referral         Discharge Medications:  Discharge Medication List as of 2/29/2024  1:02 AM        START taking these medications    Details   cloNIDine (CATAPRES) 0.1 MG tablet Take 1 tablet (0.1 mg) by mouth 2 times daily as needed (withdrawal symptoms), Disp-10 tablet, R-0, E-Prescribe      metoclopramide (REGLAN) 10 MG tablet Take 1 tablet (10 mg) by mouth 3 times daily as needed (nausea), Disp-10 tablet, R-0, E-Prescribe      naloxone (NARCAN) 4 MG/0.1ML nasal spray Spray 1 spray (4 mg) into one nostril alternating nostrils as needed for opioid reversal every 2-3 minutes until assistance arrives, Disp-0.2 mL, R-0, E-Prescribe            Scribe Disclosure:  I, Megan Cooper, am serving as a scribe at 11:09 PM on 2/28/2024 to document services personally performed by Otf Hoffman MD based on my observations and the provider's statements to me.  2/28/2024   Otf Hoffman MD Bergenstal, John A, MD  02/29/24 8675

## 2024-02-29 NOTE — NURSING NOTE
Patient is exhibiting opioid withdrawal symptoms. Provider has ordered the following clinic administered medication (s) (CAM) to to be given:     Administrations This Visit       cloNIDine (CATAPRES) tablet 0.1 mg       Admin Date  02/29/2024 Action  $Given Dose  0.1 mg Route  Oral Site   Documented By  Jennifer Claudio RN    Ordering Provider: Savita De Los Santos MD    NDC: 34898-900-61    Lot#: [id number]    : [place]    Patient Supplied?: No    Comments: BP: 156/105  HR: 106                  Allergies include Bupropion, Acetaminophen, Codeine, and Sulfa Antibiotics.    Medication(s) were given to the patient by RN utilizing the rights of medication administration (patient, drug, dose, time, route, and documentation).     Jennifer Claudio RN on 2/29/2024 at 12:32 PM

## 2024-02-29 NOTE — ED NOTES
Bed: ED17  Expected date: 2/28/24  Expected time: 10:25 PM  Means of arrival: Ambulance  Comments:  HEMS 436 43M fentanyl problem

## 2024-02-29 NOTE — PROGRESS NOTES
[facility] Initial Visit    ASSESSMENT/PLAN                                                      1. Opioid use disorder  42 yo male with 14 yr h/o opioid use including IV use, most recently taking methadone and smoking fentanyl, last use 2/27 or 2/28/29.  Interested in starting buprenorphine using low dose initiation.  Prefers buprenorphine only product based on previous experience of nausea, headaches, fatigue with buprenorphine/naloxone product.    Reviewed directions for low dose buprenorphine initiation, titration to 16mg/day.   Stop use of full opioid agonist when buprenorphine dose reaches 16mg.  Information sheet given to pt with directions.   Pt confirms he has multiple doses of naloxone available.   Miralax prn OIC.   Pt met with CPRS to schedule KENA evaluation, encouraged him to follow recommendations  - buprenorphine (SUBUTEX) 2 MG SUBL sublingual tablet; Place 1 tablet (2 mg) under the tongue 2 times daily Or as directed  Dispense: 20 tablet; Refill: 0  - buprenorphine (SUBUTEX) 8 MG SUBL sublingual tablet; Place 1 tablet (8 mg) under the tongue 2 times daily  Dispense: 20 tablet; Refill: 0  - polyethylene glycol (MIRALAX) 17 GM/Dose powder; Take 17 g (1 Capful) by mouth daily  Dispense: 578 g; Refill: 11    2. Opioid withdrawal (H)  Pt requested Phenergan for nausea, stated odansetron does not work for him and Compazine makes him nauseous.  Ok for one time rx.    Reviewed purpose of low dose initiation is to minimize withdrawal symptoms.  Rx's as noted for use during transition to buprenorphine if needed.   Pt was given clonidine 0.1mg in clinic to address current symptoms.   - promethazine (PHENERGAN) 25 MG tablet; Take 1 tablet (25 mg) by mouth every 6 hours as needed for nausea  Dispense: 20 tablet; Refill: 0  - cloNIDine (CATAPRES) 0.1 MG tablet; Take 1 tablet (0.1 mg) by mouth every 6 hours as needed (withdrawal symptoms including hot/cold sweats, anxiety, restlessness,  sleeplessness)  Dispense: 20 tablet; Refill: 0  - cloNIDine (CATAPRES) tablet 0.1 mg    3. Encounter for monitoring opioid maintenance therapy  - Drugs of Abuse Screen Urine (POC CUPS) POCT; Standing  - Fentanyl Qualitative with Reflex to Quant Urine; Future  - Drugs of Abuse Screen Urine (POC CUPS) POCT  - Fentanyl Qualitative with Reflex to Quant Urine  - Fentanyl and Metabolite Quantitative, Urine    4. Stimulant use disorder  Pt met with CPRS to schedule KENA evaluation, encouraged him to follow recommendations    5. Nicotine use disorder  Evaluate pt's goals next visit    6. Anxiety and depression  Pt is prescribed Fetzima, Seroquel, and lorazepam by Dr. Reilly, psychiatry, at [facility].  Pt declined signing OMID for ACP today.   Continue therapy at [facility]   Pt is not actively suicidal.      7. Chronic prescription benzodiazepine use  Prescribed lorazepam 2mg tid by Dr. Reilly, psychiatry, at [facility].  Pt declined signing OMID for ACP today.     8. High risk sexual behavior, unspecified type  Pt is interested in screening for STI  - Treponema Abs w Reflex to RPR and Titer; Future  - Hepatitis B surface antigen; Future  - NEISSERIA GONORRHOEA PCR  - CHLAMYDIA TRACHOMATIS PCR    9. Chronic hepatitis C without hepatic coma (H)  Diagnosed >10 years ago, no h/o treatment.  Pt is interested in treatment.    Follow-up labs today and refer to Hepatology if HCV RNA remains detectable  - Hepatitis C RNA, Quantitative by PCR with Confirmatory Reflex to Genotyping; Future  - COMPREHENSIVE METABOLIC PANEL; Future  - CBC with Platelets & Differential; Future    10. Human immunodeficiency virus (HIV) disease (H)  Pt reports he is taking Triumeq daily  - HIV-1 RNA quantitative; Future  - CBC with Platelets & Differential; Future         Return in 1 week (on 3/7/2024).    Patient counseling completed today:  Discussed mechanism of action, potential risks/benefits/side effects of medications and other recommendations above.    Discussed  risk of precipitated withdrawal with initiation of buprenorphine in the presence of full opioid agonists.    Reviewed directions for initiation of buprenorphine to reduce risk of precipitated withdrawal and maximize efficacy.    Harm reduction counseling including never use alone, availability of naloxone, avoiding combination of opioids with benzodiazepines, alcohol, or other sedatives, safer administration.      Discussed importance of avoiding isolation, building a network of supportive relationships, avoiding people/places/things associated with past use to reduce risk of relapse; including motivational interviewing regarding psychosocial treatment for addiction.     SUBJECTIVE                                                      CC/HPI:  Bc German is a 43 year old male with PMH HIV on Triumeq, hepatitis C no treatment, IVDU, anxiety, depression, PTSD, nicotine use disorder, stimulant use disorder, and opioid use disorder who presents to the Recovery Clinic for initial visit.      Brief History:  Bc German was first seen in Recovery Clinic on 02/29/24. They were referred by [facility] ED where pt was brought 2/28/24 by ambulance after family found him asleep in the bathroom after smoking fentanyl.   Pt is interested in stopping use of methadone and fentanyl and starting buprenorphine.  He requests buprenorphine single agent product due to previous intolerance of buprenorphine/naloxone formulation when he has attempted low dose buprenorphine initiation with his PCP at [facility] in the past.      Substance Use History :  Opioids:   Age at first use: 29 years, has used rx opioids including oxycodone, fentanyl patch, hydromorphone; also heroin, illicitly manufactured fentanyl, methadone; IV use until 6/2023  Current use: substance: methadone 10-20mg/day or illicitly manufactured fentanyl, inhaled.  Last use of methadone 2/24/24, last use of fentanyl 2/27/24.      IV drug use: Yes:  6/27/24  History of overdose: in  past at least 20 times, hospital 2x  Previous residential or outpatient treatments for addiction : Yes: 2017 gisele most recent, several outpatient  Previous medication treatments for addiction: suboxone, methadone  Longest period of sobriety: 7mo  Medical complications related to substance use:   Hepatitis C: Pos; first +ab on record 2011; 12/27/21 HCV ,582 ; 2/29/24 HCV RNA pending  HIV: Pos; diagnosed 2017, reports viral levels undetectable; 2/29/24 HIV RNA pending    DSM-5 OUD criteria met:  Taken in larger amounts/greater time spent in behavior over longer period of time than intended,  Persistent desire or unsuccessful efforts to cut down or control use/behavior  A great deal of time is spent in activities necessary to obtain the substance/participate in the behavior or recover from its effects  Cravings  Recurrent use/behavior resulting in failure to fulfill major role obligations at work, school, or home  Continued use/behavior despite having persistent or recurrent social or interpersonal problems caused or exacerbated by effects of use/behavior  Important social, occupational, or recreational activities are given up or reduced because of use/behavior  Recurrent use/behavior in situations in which it is physically hazardous  Continued use/behavior despite knowledge of having a persistent or recurrent physical or psychological problem that is likely to have been caused or exacerbated by use/behavior  Tolerance  Withdrawal    Other Addiction History:  Stimulants   Methamphetamine, cocaine  Sedatives/hypnotics/anxiolytics:   Rx lorazepam  Alcohol:   Past drinking a lot, now Beers every once in a while  Nicotine:   vaping  Cannabis:   yes  Hallucinogens/Dissociatives:   Past   Eating disorder:  none  Gambling:   none        [place] Prescription Drug Monitoring Program Reviewed:  Yes;   02/27/2024 01/05/2024 1 Lorazepam 2 Mg Tablet 90.00 30 Pa Ekb [id number] [facility] (0013) 0/0 6.00 [place]ME [place]    02/12/2024 02/09/2024 1 Pregabalin 150 Mg Capsule 90.00 30 Ch Goldy [id number] [facility] (5305) 0/2 3.01 [place]   01/31/2024 01/05/2024 1 Lorazepam 2 Mg Tablet 90.00 30 Pa Ekb [id number] [facility] (5305) 0/1 6.[place]   01/26/2024 01/26/2024 1 Buprenorphine-Nalox 8-2mg Film 14.00 7 Ry Manny [id number] [facility] (5305) 0/2 16.[place] mg [place]   01/26/2024 01/26/2024 1 Buprenorphine-Nalox 2-0.5mg Fm 4.00 4 Ry Manny [id number] [facility] (5305) 0/0 2.[place] mg [place]   01/05/2024 01/05/2024 1 Lorazepam 2 Mg Tablet 90.00 30 Pa Ekb [id number] [facility] (5305) 0/2 6.[place]   01/03/2024 10/06/2023 1 Pregabalin 150 Mg Capsule 60.00 30 Ry Manny [id number] [facility] (9495) 2/2 2.01 [place]   12/22/2023 09/01/2023 1 Testosterone Cyp 200 Mg/ml 8.00 84 Ry Manny [id number] [facility] (5215) 0/5  [place]               PAST PSYCHIATRIC HISTORY:  Diagnoses- Anxiety, Major Depression  Suicide Attempts: Yes 14 years ago  , 21 years old  Hospitalizations: Yes        3/28/2017     9:47 AM   PHQ   PHQ-9 Total Score 18   Q9: Thoughts of better off dead/self-harm past 2 weeks Nearly every day         If PHQ-9 score of 15 or higher, has Recovery Clinic therapist or provider been notified? yes    Any current suicidal ideation? Yes  If yes, has [facility] therapist or provider been notified? Yes    Mental health provider: yes- Clau Sandoval at [facility] clinic, Dr. Reilly psychiatry at [facility]      Social History  Housing status: with dad  Employment status: Unemployed, not seeking work  Relationship status: Single  Children: 1 child 24 years old  Legal: none  Insurance needs: [employer]  Contact information up to date? yes    [facility] for ACP where pt is seen for psychiatry        Medications:  abacavir-dolutegravir-LamiVUDine (TRIUMEQ) 600- MG per tablet, Take 1 tablet by mouth daily Please call [phone] & make appointment for further refills.  FETZIMA 120 MG 24 hr capsule, Take 120 mg by mouth daily  LORazepam (ATIVAN)  1 MG tablet, Take 2 tablets (2 mg) by mouth 3 times daily  Multiple Vitamins-Minerals (MULTIVITAMIN GUMMIES ADULT PO), Take 1 chew tab by mouth daily  pregabalin (LYRICA) 150 MG capsule, Take 1 capsule (150 mg) by mouth 3 times daily  QUEtiapine (SEROQUEL) 100 MG tablet, Take 1 tablet (100 mg) by mouth at bedtime  testosterone cypionate (DEPOTESTOTERONE) 200 MG/ML injection, Inject 0.3 mLs into the muscle once a week Inject 0.3 mL IM every 77 days  naloxone (NARCAN) 4 MG/0.1ML nasal spray, Spray 1 spray (4 mg) into one nostril alternating nostrils as needed for opioid reversal every 2-3 minutes until assistance arrives (Patient not taking: Reported on 2/29/2024)    No current facility-administered medications on file prior to visit.      Allergies   Allergen Reactions    Bupropion      Other reaction(s): Seizures, Seizures  PN: seizure when took a double dose  seizure when took a double dose  PN: seizure when took a double dose  Other reaction(s): Seizures  PN: seizure when took a double dose      Acetaminophen Nausea and Vomiting     PN: : GI Upset  : GI Upset  PN: : GI Upset  Other reaction(s): Vomiting  PN: : GI Upset      Codeine Itching and Nausea and Vomiting     Other reaction(s): Abdominal Pain  PN:  weak; fatigue; vomiting   weak; fatigue; vomiting  PN:  weak; fatigue; vomiting  PN:  weak; fatigue; vomiting      Sulfa Antibiotics Itching       Past Medical History:   Diagnosis Date    Anxiety     Depressive disorder     Drug abuse, IV (H)     Group A streptococcal infection 11/2014    Bacteremia/cellulitis    HCV antibody positive     HIV (human immunodeficiency virus infection) (H)     HIV (human immunodeficiency virus infection) (H)     HIV (human immunodeficiency virus infection) (H)     IVDU (intravenous drug user)     Osteomyelitis of vertebra of lumbosacral region (H) 3/2011    L3, left psoas abscess    Substance abuse (H)        Past Surgical History:   Procedure Laterality Date    EYE SURGERY  1  year ago    pins to left eye after socket fracture    LAPAROSCOPIC CHOLECYSTECTOMY N/A 2/7/2022    Procedure: CHOLECYSTECTOMY, LAPAROSCOPIC;  Surgeon: Lico Rodriguez MD;  Location:  OR    OPTICAL TRACKING SYSTEM FUSION CERVICAL ANTERIOR ONE LEVEL Right 9/10/2017    Procedure: OPTICAL TRACKING SYSTEM FUSION CERVICAL ANTERIOR ONE LEVEL;  Stealth C6-C7 Anterior Cervical Corpectomy and C5-T1 Fusion;  Surgeon: Marv Ambrose MD;  Location: [facility] OR    ORTHOPEDIC SURGERY      Arm Surgery    PICC INSERTION Left 09/21/2017    5fr DL BioFlo PICC, 42cm (1cm external) in the L basilic vein w/ tip in the low SVC.    TRANSESOPHAGEAL ECHOCARDIOGRAM INTRAOPERATIVE N/A 3/17/2015    Procedure: TRANSESOPHAGEAL ECHOCARDIOGRAM INTRAOPERATIVE;  Surgeon: Generic Anesthesia Provider;  Location: [facility] OR    TRANSESOPHAGEAL ECHOCARDIOGRAM INTRAOPERATIVE N/A 11/18/2021    Procedure: ECHOCARDIOGRAM, TRANSESOPHAGEAL, INTRAOPERATIVE;  Surgeon: GENERIC ANESTHESIA PROVIDER;  Location:  OR    TRANSESOPHAGEAL ECHOCARDIOGRAM INTRAOPERATIVE N/A 12/2/2021    Procedure: ECHOCARDIOGRAM, TRANSESOPHAGEAL, INTRAOPERATIVE;  Surgeon: GENERIC ANESTHESIA PROVIDER;  Location:  OR       No family history on file.      REVIEW OF SYSTEMS:  General: Withdrawal symptoms as described below.  No recent fevers.   Eyes:  No vision concerns.  No jaundice.    Resp: No coughing, wheezing or shortness of breath  CV: No chest pains or palpitations  GI: No complaints other than as above  : No urinary frequency or dysuria, no discharge  Musculoskeletal: No significant muscle or joint pains other than as above.  No edema  Neurologic: No numbness, tingling, weakness, problems with balance or coordination  Psychiatric: No acute concerns other than as above. Denies suicidal plan or intent.   Skin: No rashes or areas of acute infection    OBJECTIVE                                                        Clinical Opioid Withdrawal Scale (COWS)    Resting Pulse Rate  2  =   "101-120   Sweating    (over past 1/2 hour) 1  =  subjective report of chills or flushing   Restlessness  3  =  frequent shifting or extraneous movements of legs/arms   Pupil size  1  =  pupils possibly larger than normal for room light   Bone or Joint Aches    (acute only) 1  =  mild diffuse discomfort   Runny nose or tearing    (unrelated to cold/allergies) 1  =  nasal stuffiness or unusually moist eyes   GI Upset    (over past 1/2 hour) 0  =  no GI symptoms   Tremor    (outstretched hands) 0  =  no tremor   Yawning    (during assessment) 0  =  no yawning   Anxiety/Irritability 2  =  patient obviously irritable or anxious   Gooseflesh skin 0  =  skin is smooth     TOTAL SCORE  Add column for score   11       BP (!) 156/105   Pulse 106   Wt 61.1 kg (134 lb 12.8 oz)   BMI 19.34 kg/m      Physical Exam  Constitutional:       General: He is not in acute distress.  HENT:      Head: Normocephalic and atraumatic.      Nose: No rhinorrhea.   Eyes:      General: No scleral icterus.     Extraocular Movements: Extraocular movements intact.      Conjunctiva/sclera: Conjunctivae normal.   Pulmonary:      Effort: Pulmonary effort is normal.   Skin:     Comments: Multiple areas of well healed venipuncture marks over B UE's   Neurological:      Mental Status: He is alert and oriented to person, place, and time.      Coordination: Coordination is intact.      Gait: Gait is intact.   Psychiatric:         Attention and Perception: Attention and perception normal.         Mood and Affect: Mood is anxious and depressed. Affect is tearful. Affect is not inappropriate.         Speech: Speech normal.         Behavior: Behavior is cooperative.         Thought Content: Thought content is not paranoid or delusional. Thought content does not include suicidal plan.      Comments: Insight and judgement are fair to good         Labs:    UDS:   No results found for: \"BUP\", \"BZO\", \"BAR\", \"LOU\", \"MAMP\", \"AMP\", \"MDMA\", \"MTD\", \"FGL496\", \"OXY\", " "\"PCP\", \"THC\", \"TEMP\", \"SGPOCT\"    *POC urine drug screen does not screen for Fentanyl    Recent Results (from the past 720 hour(s))   EKG 12-lead, tracing only    Collection Time: 02/28/24 11:02 PM   Result Value Ref Range    Systolic Blood Pressure  mmHg    Diastolic Blood Pressure  mmHg    Ventricular Rate 96 BPM    Atrial Rate 96 BPM    AL Interval 130 ms    QRS Duration 86 ms     ms    QTc 432 ms    P Axis 61 degrees    R AXIS 66 degrees    T Axis 62 degrees    Interpretation ECG       Sinus rhythm  Minimal voltage criteria for LVH, may be normal variant ( Sokolow-Baez )  Borderline ECG  When compared with ECG of 21-APR-2022 17:49,  Nonspecific T wave abnormality now evident in Lateral leads     Drugs of Abuse Screen Urine (POC CUPS) POCT    Collection Time: 02/29/24 10:27 AM   Result Value Ref Range    POCT Kit Lot Number [id number]     POCT Kit Expiration Date 2090303     Temperature Urine POCT 90 F 90 F, 92 F, 94 F, 96 F, 98 F, 100 F    Specific [place] POCT 1.025 1.005, 1.015, 1.025    pH Qual Urine POCT 5 pH 4 pH, 5 pH, 7 pH, 9 pH    Creatinine Qual Urine POCT 50 mg/dL 20 mg/dL, 50 mg/dL, 100 mg/dL, 200 mg/dL    Internal QC Qual Urine POCT Valid Valid    Amphetamine Qual Urine POCT Negative Negative    Barbiturate Qual Urine POCT Negative Negative    Buprenorphine Qual Urine POCT Negative Negative    Benzodiazepine Qual Urine POCT Screen Positive (A) Negative    Cocaine Qual Urine POCT Screen Positive (A) Negative    Methamphetamine Qual Urine POCT Screen Positive (A) Negative    MDMA Qual Urine POCT Negative Negative    Methadone Qual Urine POCT Negative Negative    Opiate Qual Urine POCT Negative Negative    Oxycodone Qual Urine POCT Negative Negative    Phencyclidine Qual Urine POCT Negative Negative    THC Qual Urine POCT Screen Positive (A) Negative   Fentanyl Qualitative with Reflex to Quant Urine    Collection Time: 02/29/24 11:13 AM   Result Value Ref Range    Fentanyl Qual Urine Screen " Positive (A) Screen Negative   Treponema Abs w Reflex to RPR and Titer    Collection Time: 02/29/24 12:53 PM   Result Value Ref Range    Treponema Antibody Total Nonreactive Nonreactive   COMPREHENSIVE METABOLIC PANEL    Collection Time: 02/29/24 12:53 PM   Result Value Ref Range    Sodium 138 135 - 145 mmol/L    Potassium 4.3 3.4 - 5.3 mmol/L    Carbon Dioxide (CO2) 30 (H) 22 - 29 mmol/L    Anion Gap 10 7 - 15 mmol/L    Urea Nitrogen 24.5 (H) 6.0 - 20.0 mg/dL    Creatinine 0.86 0.67 - 1.17 mg/dL    GFR Estimate >90 >60 mL/min/1.73m2    Calcium 10.4 (H) 8.6 - 10.0 mg/dL    Chloride 98 98 - 107 mmol/L    Glucose 122 (H) 70 - 99 mg/dL    Alkaline Phosphatase 92 40 - 150 U/L    AST 54 (H) 0 - 45 U/L     (H) 0 - 70 U/L    Protein Total 8.6 (H) 6.4 - 8.3 g/dL    Albumin 4.9 3.5 - 5.2 g/dL    Bilirubin Total 1.0 <=1.2 mg/dL   CBC with platelets and differential    Collection Time: 02/29/24 12:53 PM   Result Value Ref Range    WBC Count 12.4 (H) 4.0 - 11.0 10e3/uL    RBC Count 5.76 4.40 - 5.90 10e6/uL    Hemoglobin 17.1 13.3 - 17.7 g/dL    Hematocrit 51.5 40.0 - 53.0 %    MCV 89 78 - 100 fL    MCH 29.7 26.5 - 33.0 pg    MCHC 33.2 31.5 - 36.5 g/dL    RDW 14.5 10.0 - 15.0 %    Platelet Count 365 150 - 450 10e3/uL    % Neutrophils 73 %    % Lymphocytes 19 %    % Monocytes 6 %    % Eosinophils 0 %    % Basophils 1 %    % Immature Granulocytes 1 %    NRBCs per 100 WBC 0 <1 /100    Absolute Neutrophils 9.1 (H) 1.6 - 8.3 10e3/uL    Absolute Lymphocytes 2.3 0.8 - 5.3 10e3/uL    Absolute Monocytes 0.7 0.0 - 1.3 10e3/uL    Absolute Eosinophils 0.0 0.0 - 0.7 10e3/uL    Absolute Basophils 0.1 0.0 - 0.2 10e3/uL    Absolute Immature Granulocytes 0.1 <=0.4 10e3/uL    Absolute NRBCs 0.0 10e3/uL           At least 60 min spent on day of encounter in review of medical record,  review, obtaining histories, discussing recommendations, counseling, providing support.      Savita De Los Santos MD  Addiction Medicine  [facility]  [facility]  [address] [place] [address] [address] [place] [id number]  [phone]

## 2024-03-01 LAB — HBV SURFACE AG SERPL QL IA: NONREACTIVE

## 2024-03-01 NOTE — TELEPHONE ENCOUNTER
RN attempted to call patient again. Call again went to . No message left.     Janis Knight RN on 3/1/2024 at 4:14 PM

## 2024-03-01 NOTE — TELEPHONE ENCOUNTER
RN attempted to call patient per provider directive to assess status regarding low dose induction.     The call went to . RN left a general message to call the clinic back at [phone] and ask for a nurse.     Will try again later this afternoon.     Janis Knight RN on 3/1/2024 at 1:51 PM

## 2024-03-02 LAB
HCV RNA SERPL NAA+PROBE-ACNC: ABNORMAL IU/ML
HCV RNA SERPL NAA+PROBE-LOG IU: 7 {LOG_IU}/ML
HIV1 RNA # PLAS NAA DL=20: NOT DETECTED COPIES/ML

## 2024-03-03 LAB
FENTANYL UR-MCNC: 7.5 NG/ML
NORFENTANYL UR-MCNC: 534.6 NG/ML

## 2024-03-04 ENCOUNTER — TELEPHONE (OUTPATIENT)
Dept: BEHAVIORAL HEALTH | Facility: CLINIC | Age: 44
End: 2024-03-04
Payer: COMMERCIAL

## 2024-03-04 NOTE — TELEPHONE ENCOUNTER
RN contacted patient and he gave the following update:    He took his first dose of Suboxone on Saturday 3/2/2024. He took a full 2 mg tablet (versus 1/4 tab 0.5 mg)  On Sunday he did the same, one 2 mg tablet.  Today he had not taken any further doses.  He denies any withdrawal or adverse effects from the Suboxone but he is continuing to use fentanyl.   He states his induction instructions are somewhere but he is not sure where.   He states no one called him for his KENA eval last Friday.     RN reviewed induction instructions with patient for today and the next couple of days.     Instructions      Return in 1 week (on 3/7/2024).  Low dose start of buprenorphine using 2mg/0.5mg films or tablets:        Day 1: take 0.5mg buprenorphine (1/4 film or tablet) once.  Continue use of other opioid.      Day 2: take 0.5mg buprenorphine (1/4 film or tablet) twice a day.  Continue use of other opioid.      Day 3: take 1mg buprenorphine (1/2 film or tablet) twice a day.  Continue use of other opioid.      Day 4: take 2mg buprenorphine (1 film or tablet) twice a day.  Continue use of other opioid.     Day 5: take 4mg buprenorphine (2 films or tablets) twice a day. Continue use of other opioid.     Day 6: take 4mg buprenorphine (2 films or tablets) three times a day.  Continue use of other opioid.      Day 7: take 8mg twice a day (using one of the 8mg/2mg films) and stop use of other opioid     Day 8 and forward: continue taking two 8mg/2mg films or tablets every day, or the dose discussed with your provider               Patient plans to continue on day # 3 today and take 1/2 (1 mg) tablet BID and 2 mg BID tomorrow. He will watch for a call tomorrow from nursing to see how things are going.     RN messaged peer  and she reached out to patient and rescheduled the KENA assessment for this evening at 6:30 pm (see PRS note).      Janis Knight RN on 3/4/2024 at 2:38 PM

## 2024-03-04 NOTE — TELEPHONE ENCOUNTER
Bc reports missed his last Telephone KEAN.   set up him another appointment      Comprehensive Assessment (via Phone)  Monday, March 4, 2024  6:30pm

## 2024-03-05 NOTE — TELEPHONE ENCOUNTER
RN called patient to check in on progress with low dose buprenorphine start.     There was no answer and the call went to a generic VM. RN left a general message to call the clinic back at [phone]. RN also let patient know RN would try to call back later today.     Janis Knight RN on 3/5/2024 at 11:05 AM

## 2024-03-05 NOTE — TELEPHONE ENCOUNTER
RN called patient to assess low dose induction status. Patient reports he is on track with Day #4 and has already taken a 2 mg tablet and plans to take another 2 mg tablet this evening.     RN reviewed further dosing with patient:    Day 5: take 4mg buprenorphine (2 films or tablets) twice a day. Continue use of other opioid.     Day 6: take 4mg buprenorphine (2 films or tablets) three times a day.  Continue use of other opioid.      Day 7: take 8mg twice a day (using one of the 8mg/2mg films) and stop use of other opioid     Day 8 and forward: continue taking two 8mg/2mg films or tablets every day, or the dose discussed with your provider    Patient is accepting of a call from nursing tomorrow to check in on status.     Janis Knight RN on 3/5/2024 at 1:46 PM

## 2024-03-06 NOTE — TELEPHONE ENCOUNTER
RN called patient back. Call went to . RN left a message to call the clinic back and that RN would also try calling again tomorrow.     Janis Knight RN on 3/6/2024 at 2:35 PM

## 2024-03-06 NOTE — TELEPHONE ENCOUNTER
RN attempted to call patient back but the call went to a generic VM. RN left a message to call the clinic back at [phone] and that RN would also try again later today.     Janis Knight RN on 3/6/2024 at 1:25 PM

## 2024-03-07 NOTE — TELEPHONE ENCOUNTER
RN attempted to call patient again as he missed his 3/7/2024 appointment. Call went to .  was full.     Will attempt to reach out again later.     Janis Knight RN on 3/7/2024 at 1:49 PM

## 2024-03-07 NOTE — TELEPHONE ENCOUNTER
RN attempted to call patient to remind him of his appointment today at 1:30 pm but the mailbox was full.    Janis Knight RN on 3/7/2024 at 10:43 AM

## 2024-03-08 LAB — HCV GENTYP SERPL NAA+PROBE: NORMAL

## 2024-06-06 ENCOUNTER — APPOINTMENT (OUTPATIENT)
Dept: GENERAL RADIOLOGY | Facility: CLINIC | Age: 44
DRG: 917 | End: 2024-06-06
Attending: EMERGENCY MEDICINE
Payer: COMMERCIAL

## 2024-06-06 ENCOUNTER — APPOINTMENT (OUTPATIENT)
Dept: CT IMAGING | Facility: CLINIC | Age: 44
DRG: 917 | End: 2024-06-06
Attending: EMERGENCY MEDICINE
Payer: COMMERCIAL

## 2024-06-06 ENCOUNTER — HOSPITAL ENCOUNTER (INPATIENT)
Facility: CLINIC | Age: 44
LOS: 2 days | Discharge: HOME | DRG: 917 | End: 2024-06-08
Attending: EMERGENCY MEDICINE | Admitting: INTERNAL MEDICINE
Payer: COMMERCIAL

## 2024-06-06 DIAGNOSIS — R09.02 HYPOXIA: ICD-10-CM

## 2024-06-06 DIAGNOSIS — T40.2X1A OPIOID OVERDOSE, ACCIDENTAL OR UNINTENTIONAL, INITIAL ENCOUNTER (H): ICD-10-CM

## 2024-06-06 DIAGNOSIS — J18.9 COMMUNITY ACQUIRED PNEUMONIA, UNSPECIFIED LATERALITY: Primary | ICD-10-CM

## 2024-06-06 DIAGNOSIS — J96.01 ACUTE RESPIRATORY FAILURE WITH HYPOXIA (H): ICD-10-CM

## 2024-06-06 LAB
ANION GAP SERPL CALCULATED.3IONS-SCNC: 9 MMOL/L (ref 7–15)
ATRIAL RATE - MUSE: 121 BPM
BASE EXCESS BLDV CALC-SCNC: 6 MMOL/L (ref -3–3)
BASOPHILS # BLD AUTO: ABNORMAL 10*3/UL
BASOPHILS # BLD MANUAL: 0 10E3/UL (ref 0–0.2)
BASOPHILS NFR BLD AUTO: ABNORMAL %
BASOPHILS NFR BLD MANUAL: 0 %
BUN SERPL-MCNC: 17.1 MG/DL (ref 6–20)
CALCIUM SERPL-MCNC: 9.7 MG/DL (ref 8.6–10)
CHLORIDE SERPL-SCNC: 98 MMOL/L (ref 98–107)
CREAT SERPL-MCNC: 1.06 MG/DL (ref 0.67–1.17)
DEPRECATED HCO3 PLAS-SCNC: 33 MMOL/L (ref 22–29)
DIASTOLIC BLOOD PRESSURE - MUSE: NORMAL MMHG
EGFRCR SERPLBLD CKD-EPI 2021: 90 ML/MIN/1.73M2
EOSINOPHIL # BLD AUTO: ABNORMAL 10*3/UL
EOSINOPHIL # BLD MANUAL: 0 10E3/UL (ref 0–0.7)
EOSINOPHIL NFR BLD AUTO: ABNORMAL %
EOSINOPHIL NFR BLD MANUAL: 0 %
ERYTHROCYTE [DISTWIDTH] IN BLOOD BY AUTOMATED COUNT: 13.5 % (ref 10–15)
GLUCOSE SERPL-MCNC: 110 MG/DL (ref 70–99)
HCO3 BLDV-SCNC: 34 MMOL/L (ref 21–28)
HCT VFR BLD AUTO: 46.5 % (ref 40–53)
HGB BLD-MCNC: 15.5 G/DL (ref 13.3–17.7)
IMM GRANULOCYTES # BLD: ABNORMAL 10*3/UL
IMM GRANULOCYTES NFR BLD: ABNORMAL %
INTERPRETATION ECG - MUSE: NORMAL
LACTATE BLD-SCNC: 1.8 MMOL/L
LYMPHOCYTES # BLD AUTO: ABNORMAL 10*3/UL
LYMPHOCYTES # BLD MANUAL: 2 10E3/UL (ref 0.8–5.3)
LYMPHOCYTES NFR BLD AUTO: ABNORMAL %
LYMPHOCYTES NFR BLD MANUAL: 11 %
MCH RBC QN AUTO: 30.7 PG (ref 26.5–33)
MCHC RBC AUTO-ENTMCNC: 33.3 G/DL (ref 31.5–36.5)
MCV RBC AUTO: 92 FL (ref 78–100)
MONOCYTES # BLD AUTO: ABNORMAL 10*3/UL
MONOCYTES # BLD MANUAL: 0.5 10E3/UL (ref 0–1.3)
MONOCYTES NFR BLD AUTO: ABNORMAL %
MONOCYTES NFR BLD MANUAL: 3 %
NEUTROPHILS # BLD AUTO: ABNORMAL 10*3/UL
NEUTROPHILS # BLD MANUAL: 15.6 10E3/UL (ref 1.6–8.3)
NEUTROPHILS NFR BLD AUTO: ABNORMAL %
NEUTROPHILS NFR BLD MANUAL: 86 %
NRBC # BLD AUTO: 0 10E3/UL
NRBC BLD AUTO-RTO: 0 /100
P AXIS - MUSE: 78 DEGREES
PCO2 BLDV: 67 MM HG (ref 40–50)
PH BLDV: 7.32 [PH] (ref 7.32–7.43)
PLAT MORPH BLD: ABNORMAL
PLATELET # BLD AUTO: 278 10E3/UL (ref 150–450)
PO2 BLDV: 22 MM HG (ref 25–47)
POTASSIUM SERPL-SCNC: 3.9 MMOL/L (ref 3.4–5.3)
PR INTERVAL - MUSE: 122 MS
QRS DURATION - MUSE: 76 MS
QT - MUSE: 308 MS
QTC - MUSE: 437 MS
R AXIS - MUSE: 85 DEGREES
RBC # BLD AUTO: 5.05 10E6/UL (ref 4.4–5.9)
RBC MORPH BLD: ABNORMAL
SAO2 % BLDV: 32 % (ref 70–75)
SODIUM SERPL-SCNC: 140 MMOL/L (ref 135–145)
SYSTOLIC BLOOD PRESSURE - MUSE: NORMAL MMHG
T AXIS - MUSE: 61 DEGREES
TROPONIN T SERPL HS-MCNC: 26 NG/L
TROPONIN T SERPL HS-MCNC: 30 NG/L
VENTRICULAR RATE- MUSE: 121 BPM
WBC # BLD AUTO: 18.1 10E3/UL (ref 4–11)

## 2024-06-06 PROCEDURE — 84484 ASSAY OF TROPONIN QUANT: CPT | Performed by: EMERGENCY MEDICINE

## 2024-06-06 PROCEDURE — 85007 BL SMEAR W/DIFF WBC COUNT: CPT | Performed by: EMERGENCY MEDICINE

## 2024-06-06 PROCEDURE — 250N000011 HC RX IP 250 OP 636: Performed by: EMERGENCY MEDICINE

## 2024-06-06 PROCEDURE — 96361 HYDRATE IV INFUSION ADD-ON: CPT

## 2024-06-06 PROCEDURE — 80048 BASIC METABOLIC PNL TOTAL CA: CPT | Performed by: EMERGENCY MEDICINE

## 2024-06-06 PROCEDURE — 258N000003 HC RX IP 258 OP 636: Mod: JZ | Performed by: INTERNAL MEDICINE

## 2024-06-06 PROCEDURE — 82803 BLOOD GASES ANY COMBINATION: CPT

## 2024-06-06 PROCEDURE — 85027 COMPLETE CBC AUTOMATED: CPT | Performed by: EMERGENCY MEDICINE

## 2024-06-06 PROCEDURE — 71275 CT ANGIOGRAPHY CHEST: CPT

## 2024-06-06 PROCEDURE — 250N000013 HC RX MED GY IP 250 OP 250 PS 637: Performed by: INTERNAL MEDICINE

## 2024-06-06 PROCEDURE — 87040 BLOOD CULTURE FOR BACTERIA: CPT | Performed by: EMERGENCY MEDICINE

## 2024-06-06 PROCEDURE — 36415 COLL VENOUS BLD VENIPUNCTURE: CPT | Performed by: EMERGENCY MEDICINE

## 2024-06-06 PROCEDURE — 93005 ELECTROCARDIOGRAM TRACING: CPT

## 2024-06-06 PROCEDURE — 120N000001 HC R&B MED SURG/OB

## 2024-06-06 PROCEDURE — 96360 HYDRATION IV INFUSION INIT: CPT | Mod: 59

## 2024-06-06 PROCEDURE — 999N000157 HC STATISTIC RCP TIME EA 10 MIN

## 2024-06-06 PROCEDURE — 5A09357 ASSISTANCE WITH RESPIRATORY VENTILATION, LESS THAN 24 CONSECUTIVE HOURS, CONTINUOUS POSITIVE AIRWAY PRESSURE: ICD-10-PCS | Performed by: EMERGENCY MEDICINE

## 2024-06-06 PROCEDURE — 250N000009 HC RX 250: Performed by: EMERGENCY MEDICINE

## 2024-06-06 PROCEDURE — 250N000013 HC RX MED GY IP 250 OP 250 PS 637

## 2024-06-06 PROCEDURE — 258N000003 HC RX IP 258 OP 636: Performed by: EMERGENCY MEDICINE

## 2024-06-06 PROCEDURE — 71046 X-RAY EXAM CHEST 2 VIEWS: CPT

## 2024-06-06 PROCEDURE — 99223 1ST HOSP IP/OBS HIGH 75: CPT

## 2024-06-06 PROCEDURE — 99291 CRITICAL CARE FIRST HOUR: CPT | Mod: 25

## 2024-06-06 RX ORDER — ONDANSETRON 2 MG/ML
4 INJECTION INTRAMUSCULAR; INTRAVENOUS EVERY 6 HOURS PRN
Status: DISCONTINUED | OUTPATIENT
Start: 2024-06-06 | End: 2024-06-08 | Stop reason: HOSPADM

## 2024-06-06 RX ORDER — HYDRALAZINE HYDROCHLORIDE 10 MG/1
10 TABLET, FILM COATED ORAL EVERY 4 HOURS PRN
Status: DISCONTINUED | OUTPATIENT
Start: 2024-06-06 | End: 2024-06-08 | Stop reason: HOSPADM

## 2024-06-06 RX ORDER — MULTIPLE VITAMINS W/ MINERALS TAB 9MG-400MCG
1 TAB ORAL DAILY
Status: DISCONTINUED | OUTPATIENT
Start: 2024-06-06 | End: 2024-06-08 | Stop reason: HOSPADM

## 2024-06-06 RX ORDER — LORAZEPAM 1 MG/1
2 TABLET ORAL 3 TIMES DAILY PRN
Status: DISCONTINUED | OUTPATIENT
Start: 2024-06-06 | End: 2024-06-08 | Stop reason: HOSPADM

## 2024-06-06 RX ORDER — CEFTRIAXONE 2 G/1
2 INJECTION, POWDER, FOR SOLUTION INTRAMUSCULAR; INTRAVENOUS ONCE
Status: COMPLETED | OUTPATIENT
Start: 2024-06-06 | End: 2024-06-06

## 2024-06-06 RX ORDER — CEFTRIAXONE 2 G/1
2 INJECTION, POWDER, FOR SOLUTION INTRAMUSCULAR; INTRAVENOUS EVERY 24 HOURS
Status: DISCONTINUED | OUTPATIENT
Start: 2024-06-07 | End: 2024-06-08 | Stop reason: HOSPADM

## 2024-06-06 RX ORDER — AZITHROMYCIN 250 MG/1
250 TABLET, FILM COATED ORAL DAILY
Status: DISCONTINUED | OUTPATIENT
Start: 2024-06-07 | End: 2024-06-08 | Stop reason: HOSPADM

## 2024-06-06 RX ORDER — NALOXONE HYDROCHLORIDE 0.4 MG/ML
0.4 INJECTION, SOLUTION INTRAMUSCULAR; INTRAVENOUS; SUBCUTANEOUS
Status: DISCONTINUED | OUTPATIENT
Start: 2024-06-06 | End: 2024-06-08 | Stop reason: HOSPADM

## 2024-06-06 RX ORDER — PREGABALIN 150 MG/1
150 CAPSULE ORAL
COMMUNITY
End: 2024-07-26

## 2024-06-06 RX ORDER — QUETIAPINE FUMARATE 100 MG/1
100 TABLET, FILM COATED ORAL AT BEDTIME
Status: DISCONTINUED | OUTPATIENT
Start: 2024-06-06 | End: 2024-06-08 | Stop reason: HOSPADM

## 2024-06-06 RX ORDER — LORAZEPAM 2 MG/1
2 TABLET ORAL 3 TIMES DAILY
Status: ON HOLD | COMMUNITY
End: 2024-07-30

## 2024-06-06 RX ORDER — HYDRALAZINE HYDROCHLORIDE 20 MG/ML
10 INJECTION INTRAMUSCULAR; INTRAVENOUS EVERY 4 HOURS PRN
Status: DISCONTINUED | OUTPATIENT
Start: 2024-06-06 | End: 2024-06-08 | Stop reason: HOSPADM

## 2024-06-06 RX ORDER — LIDOCAINE 40 MG/G
CREAM TOPICAL
Status: DISCONTINUED | OUTPATIENT
Start: 2024-06-06 | End: 2024-06-08 | Stop reason: HOSPADM

## 2024-06-06 RX ORDER — ONDANSETRON 4 MG/1
4 TABLET, ORALLY DISINTEGRATING ORAL EVERY 6 HOURS PRN
Status: DISCONTINUED | OUTPATIENT
Start: 2024-06-06 | End: 2024-06-08 | Stop reason: HOSPADM

## 2024-06-06 RX ORDER — ACETAMINOPHEN 650 MG/1
650 SUPPOSITORY RECTAL EVERY 4 HOURS PRN
Status: DISCONTINUED | OUTPATIENT
Start: 2024-06-06 | End: 2024-06-08 | Stop reason: HOSPADM

## 2024-06-06 RX ORDER — IOPAMIDOL 755 MG/ML
57 INJECTION, SOLUTION INTRAVASCULAR ONCE
Status: COMPLETED | OUTPATIENT
Start: 2024-06-06 | End: 2024-06-06

## 2024-06-06 RX ORDER — AMOXICILLIN 250 MG
1 CAPSULE ORAL 2 TIMES DAILY PRN
Status: DISCONTINUED | OUTPATIENT
Start: 2024-06-06 | End: 2024-06-08 | Stop reason: HOSPADM

## 2024-06-06 RX ORDER — NALOXONE HYDROCHLORIDE 0.4 MG/ML
0.2 INJECTION, SOLUTION INTRAMUSCULAR; INTRAVENOUS; SUBCUTANEOUS
Status: DISCONTINUED | OUTPATIENT
Start: 2024-06-06 | End: 2024-06-08 | Stop reason: HOSPADM

## 2024-06-06 RX ORDER — CLONIDINE HYDROCHLORIDE 0.1 MG/1
0.1 TABLET ORAL EVERY 6 HOURS PRN
Status: CANCELLED | OUTPATIENT
Start: 2024-06-06

## 2024-06-06 RX ORDER — ACETAMINOPHEN 325 MG/1
650 TABLET ORAL EVERY 4 HOURS PRN
Status: DISCONTINUED | OUTPATIENT
Start: 2024-06-06 | End: 2024-06-08 | Stop reason: HOSPADM

## 2024-06-06 RX ORDER — AZITHROMYCIN 500 MG/1
500 INJECTION, POWDER, LYOPHILIZED, FOR SOLUTION INTRAVENOUS ONCE
Status: COMPLETED | OUTPATIENT
Start: 2024-06-06 | End: 2024-06-06

## 2024-06-06 RX ORDER — BUPRENORPHINE AND NALOXONE 8; 2 MG/1; MG/1
1 FILM, SOLUBLE BUCCAL; SUBLINGUAL 2 TIMES DAILY
COMMUNITY
End: 2024-07-26

## 2024-06-06 RX ORDER — MULTIVITAMIN
1 TABLET ORAL DAILY
Status: ON HOLD | COMMUNITY
End: 2024-07-30

## 2024-06-06 RX ORDER — SODIUM CHLORIDE 9 MG/ML
INJECTION, SOLUTION INTRAVENOUS CONTINUOUS
Status: DISCONTINUED | OUTPATIENT
Start: 2024-06-06 | End: 2024-06-07

## 2024-06-06 RX ORDER — AMOXICILLIN 250 MG
2 CAPSULE ORAL 2 TIMES DAILY PRN
Status: DISCONTINUED | OUTPATIENT
Start: 2024-06-06 | End: 2024-06-08 | Stop reason: HOSPADM

## 2024-06-06 RX ORDER — CALCIUM CARBONATE 500 MG/1
1000 TABLET, CHEWABLE ORAL 4 TIMES DAILY PRN
Status: DISCONTINUED | OUTPATIENT
Start: 2024-06-06 | End: 2024-06-08 | Stop reason: HOSPADM

## 2024-06-06 RX ORDER — SUMATRIPTAN 50 MG/1
50 TABLET, FILM COATED ORAL
Status: ON HOLD | COMMUNITY
End: 2024-07-30

## 2024-06-06 RX ORDER — FAMOTIDINE 20 MG/1
20 TABLET, FILM COATED ORAL 2 TIMES DAILY PRN
COMMUNITY
End: 2024-06-26

## 2024-06-06 RX ORDER — AZITHROMYCIN 500 MG/1
500 INJECTION, POWDER, LYOPHILIZED, FOR SOLUTION INTRAVENOUS EVERY 24 HOURS
Status: CANCELLED | OUTPATIENT
Start: 2024-06-06

## 2024-06-06 RX ORDER — IBUPROFEN 600 MG/1
600 TABLET, FILM COATED ORAL EVERY 8 HOURS PRN
Status: DISCONTINUED | OUTPATIENT
Start: 2024-06-06 | End: 2024-06-08 | Stop reason: HOSPADM

## 2024-06-06 RX ADMIN — ABACAVIR SULFATE, DOLUTEGRAVIR SODIUM, LAMIVUDINE 1 TABLET: 600; 50; 300 TABLET, FILM COATED ORAL at 17:55

## 2024-06-06 RX ADMIN — ACETAMINOPHEN 650 MG: 325 TABLET, FILM COATED ORAL at 18:11

## 2024-06-06 RX ADMIN — QUETIAPINE FUMARATE 100 MG: 100 TABLET ORAL at 21:01

## 2024-06-06 RX ADMIN — IBUPROFEN 600 MG: 600 TABLET ORAL at 21:01

## 2024-06-06 RX ADMIN — IOPAMIDOL 57 ML: 755 INJECTION, SOLUTION INTRAVENOUS at 10:11

## 2024-06-06 RX ADMIN — SODIUM CHLORIDE: 9 INJECTION, SOLUTION INTRAVENOUS at 15:08

## 2024-06-06 RX ADMIN — CEFTRIAXONE SODIUM 2 G: 2 INJECTION, POWDER, FOR SOLUTION INTRAMUSCULAR; INTRAVENOUS at 11:35

## 2024-06-06 RX ADMIN — SODIUM CHLORIDE 84 ML: 9 INJECTION, SOLUTION INTRAVENOUS at 10:11

## 2024-06-06 RX ADMIN — Medication 1 TABLET: at 15:12

## 2024-06-06 RX ADMIN — SODIUM CHLORIDE 1000 ML: 9 INJECTION, SOLUTION INTRAVENOUS at 10:38

## 2024-06-06 RX ADMIN — LEVOMILNACIPRAN HYDROCHLORIDE 120 MG: 40 CAPSULE, EXTENDED RELEASE ORAL at 17:55

## 2024-06-06 RX ADMIN — AZITHROMYCIN MONOHYDRATE 500 MG: 500 INJECTION, POWDER, LYOPHILIZED, FOR SOLUTION INTRAVENOUS at 12:31

## 2024-06-06 RX ADMIN — SODIUM CHLORIDE 1000 ML: 9 INJECTION, SOLUTION INTRAVENOUS at 05:49

## 2024-06-06 ASSESSMENT — ACTIVITIES OF DAILY LIVING (ADL)
ADLS_ACUITY_SCORE: 38
ADLS_ACUITY_SCORE: 39
ADLS_ACUITY_SCORE: 38
ADLS_ACUITY_SCORE: 39
ADLS_ACUITY_SCORE: 38
ADLS_ACUITY_SCORE: 27
ADLS_ACUITY_SCORE: 38
ADLS_ACUITY_SCORE: 39
ADLS_ACUITY_SCORE: 39
ADLS_ACUITY_SCORE: 38
ADLS_ACUITY_SCORE: 39
ADLS_ACUITY_SCORE: 38
ADLS_ACUITY_SCORE: 27
ADLS_ACUITY_SCORE: 38
ADLS_ACUITY_SCORE: 26

## 2024-06-06 ASSESSMENT — COLUMBIA-SUICIDE SEVERITY RATING SCALE - C-SSRS: IS THE PATIENT NOT ABLE TO COMPLETE C-SSRS: UNABLE TO VERBALIZE

## 2024-06-06 NOTE — ED NOTES
Pt states he took ativan and fentanyl last night - Pt denies suicidal ideation as he is the one who takes care of his father and that he loves him

## 2024-06-06 NOTE — PLAN OF CARE
Goal Outcome Evaluation:      Plan of Care Reviewed With: patient    Summary: Found unresponsive due to opioid overdose. CT scan showing multifocal bilateral opacities concerning through both lungs.    DATE & TIME: 6/6/2024, 2282-1078. Patient arrived to the floor at 1430    Cognitive Concerns/ Orientation :  A & O x 4, forgetful    BEHAVIOR & AGGRESSION TOOL COLOR: Green   CIWA SCORE: NA    ABNL VS/O2: VSS on 1 L NC- O2 95-96%, ex tachycardic   MOBILITY: SBA with GB, ind at baseline   PAIN MANAGMENT: baseline 5/10 pain to head and back, refused interventions at this time. Reports Advil being effective in the past   DIET: Regular, ordering   BOWEL/BLADDER: BS active x 4, continent  ABNL LAB/BG: WBC 18.1, needs mag checked- need a updated weight first.   DRAIN/DEVICES: x 2 right PIV, upper PIV infusing NS @ 100 mL/hr   TELEMETRY RHYTHM: applied, tachycardic   SKIN: scattered abrasions/scabs to lower extremities  TESTS/PROCEDURES: CT completed in ER.   D/C DAY/GOALS/PLACE: pending clinical improvement  OTHER IMPORTANT INFO: SW/CC following. Need admission questions to be completed. History of MRSA-contact precautions maintained.

## 2024-06-06 NOTE — ED PROVIDER NOTES
Patient care signed out to me by Dr. Epperson.  In brief patient is a 43-year-old male with a history of polysubstance abuse, IV drug use, HIV and opiate dependence who presents after concerns for drug overdose.  Patient did receive Narcan but continues to be hypoxic.  A CT scan was ordered and is pending at this time.  CT scan of the chest shows multifocal bilateral opacities concerning through both lungs.  Most extensive in the lingula and bilateral lower lobes consistent with bronchopneumonia.  Patient was started on IV antibiotics after blood cultures were obtained.  Given persistent hypoxia in the setting of sepsis and suspected pneumonia patient will be admitted to the hospitalist service for continued supplemental oxygen and treatment.  I spoke with the hospitalist service who agreed to admission.    CT Chest Pulmonary Embolism w Contrast   Final Result   IMPRESSION:   1.  No evidence of pulmonary embolism is seen to the subsegmental   level.   2.  Multifocal bilateral opacities and pulmonary nodules measuring up   to 1.4 cm as well as bronchial wall thickening and tree-in-bud   nodularity seen throughout the lungs. These findings appear most   extensive in the lingula and bilateral lower lobes. Constellation of   findings are suggestive of bronchopneumonia. Suggest short-term   follow-up to ensure resolution.   3.  Multiple enlarged mediastinal or hilar lymph nodes, nonspecific   but suspected to be reactive. Suggest attention on follow-up exam.   4.  Dilated pulmonary trunk suggestive of pulmonary hypertension.      REFERENCE:   Guidelines for Management of Incidental Pulmonary Nodules Detected on   CT Images: From the Fleischner Society 2017.    Guidelines apply to incidental nodules in patients who are 35 years or   older.   Guidelines do not apply to lung cancer screening, patients with   immunosuppression, or patients with known primary cancer.      MULTIPLE NODULES   Nodule size <6 mm   Low-risk patients:  No follow-up needed.   High-risk patients: Optional follow-up at 12 months.      Nodule size 6 mm or larger   Low-risk patients: Follow-up CT at 3-6 months, then consider CT at   18-24 months.   High-risk patients: Follow-up CT at 3-6 months, then at 18-24 months   if no change.   -Use most suspicious nodule as guide to management.      Consider referral to lung nodule clinic.      MARYJO MADRID MD            SYSTEM ID:  [id number]      Chest XR,  PA & LAT   Final Result   IMPRESSION: PA and lateral views of the chest were obtained. Cardiomediastinal silhouette is within normal limits. No suspicious focal pulmonary opacities. Incidentally noted azygos lobe. No significant pleural effusion or pneumothorax. Postsurgical    changes of lower cervical spine fusion. On the lateral view, there is thin linear/wavy lucency in the anterior aspect of the chest, likely related to patient position, recommend clinical correlation and if indicated can be further evaluated with    dedicated chest CT.                            ICD-10-CM    1. Community acquired pneumonia, unspecified laterality  J18.9       2. Opioid overdose, accidental or unintentional, initial encounter (H)  T40.2X1A       3. Hypoxia  R09.02       4. Acute respiratory failure with hypoxia (H)  J96.01              Fredy Griffin MD  06/06/24 1135

## 2024-06-06 NOTE — ED TRIAGE NOTES
Pt biba from home on cpap - found unresponsive this am from suspected opioid overdose. PD gave 4mg narcan - pt became alert, but with sats in the 70s. Oxygenation improved with cpap

## 2024-06-06 NOTE — ED NOTES
[facility]  ED Nurse Handoff Report    ED Chief complaint: Drug Overdose      ED Diagnosis:   Final diagnoses:   Opioid overdose, accidental or unintentional, initial encounter (H)   Hypoxia   Community acquired pneumonia, unspecified laterality   Acute respiratory failure with hypoxia (H)       Code Status: Refer to Hospitalist    Allergies:   Allergies   Allergen Reactions    Bupropion Other (See Comments)     Other reaction(s): seizure when took a double dose        Acetaminophen Nausea and Vomiting and GI Disturbance           Codeine Nausea and Vomiting, Itching and Fatigue           Sulfa Antibiotics Itching       Patient Story: Patient BIBA following being found unresponsive d/t opioid overdose. Prior to arrival the patient received 4 mg of narcan and was placed on oxygen due to hypoxia with sats in the 70s. Upon CT patient found to be have multifocal bilateral opacities evidence of bronchopneumonia. Patient has a PMH of IV drug use, HIV, polysubstance use.  Focused Assessment:  Airway - hypoxic on RA requiring 1-2 L O2  Cardiac - persistent tachycardia -120s  Neuros - intact      Treatments and/or interventions provided: IV abx/supp O2  Patient's response to treatments and/or interventions: relief of symptoms    To be done/followed up on inpatient unit:  IV abx    Does this patient have any cognitive concerns?: N/A    Activity level - Baseline/Home:  Independent  Activity Level - Current:   Stand with Assist    Patient's Preferred language: English   Needed?: No    Isolation: None and Contact   Infection: Not Applicable  MRSA - *patient HIV positive   Patient tested for COVID 19 prior to admission: NO  Bariatric?: No    Vital Signs:   Vitals:    06/06/24 0819 06/06/24 1100 06/06/24 1103 06/06/24 1104   BP: (!) 148/108 (!) 157/108     Pulse: 118 105 117 114   Resp:   11 12   Temp:       TempSrc:       SpO2: 92% 90% 94% 93%       Cardiac Rhythm:     Was the PSS-3 completed:    Yes  What interventions are required if any?               Family Comments: Father at bedside  OBS brochure/video discussed/provided to patient/family:               Name of person given brochure if not patient:               Relationship to patient:     For the majority of the shift this patient's behavior was Green.   Behavioral interventions performed were n/a.    ED NURSE PHONE NUMBER: [phone]

## 2024-06-06 NOTE — PHARMACY-ADMISSION MEDICATION HISTORY
"Pharmacist Admission Medication History    Admission medication history is complete. The information provided in this note is only as accurate as the sources available at the time of the update.    Information Source(s): Patient and CareEverywhere/SureScripts via in-person    Pertinent Information: see individual notes on medications below. Patient states specific doses of some but not all medications in interview.     Changes made to PTA medication list:  Added: famotidine, suboxone, sumatriptan   Deleted:   Subutex 8 mg tablet BID   Subutex 2 mg tablet BID  Both last filled 2/29/24 #10ds #20. Patient confirms no remaining supply, suboxone ordered by provider more recently. Patient states Subutex is more effective than Suboxone.   Changed:   Lorazepam 1 mg tablet --> 2 mg tablet   Multivitamin gummy --> regular tablet  Miralax scheduled --> PRN  Pregabalin TID --> BID and PRN in evening (as prescribed)   Quetiapine 100 mg --> 100-150 mg in evening   Testosterone 0.3 ml --> 1 ml (per patient, does not discard remainder as prescribed)     Allergies reviewed with patient and updates made in EHR: no    Medication History Completed By: Ceci Murray [facility] 6/6/2024 11:55 AM    PTA Med List   Medication Sig Note Last Dose    abacavir-dolutegravir-LamiVUDine (TRIUMEQ) 600- MG per tablet Take 1 tablet by mouth daily Please call [phone] & make appointment for further refills.  6/4/2024 at AM    buprenorphine HCl-naloxone HCl (SUBOXONE) 8-2 MG per film Place 1 Film under the tongue 2 times daily 6/6/2024: Last filled 5/1/24 #30ds #60 - patient states not currently using. States this \"does not work, bad drug interactions, nausea, vomiting, migraine\" and states he \"tries to take whenever I don't have Subutex\". States current home supply has been used up  Past Month at unknown time    cloNIDine (CATAPRES) 0.1 MG tablet Take 1 tablet (0.1 mg) by mouth every 6 hours as needed (withdrawal symptoms including hot/cold " "sweats, anxiety, restlessness, sleeplessness) 6/6/2024: Last filled 5/1/24 #30ds #60. Patient states at baseline taking 1 per day, does not generally take more than one dose per day Past Week at unknown time    famotidine (PEPCID) 20 MG tablet Take 20 mg by mouth 2 times daily as needed (heartburn) 6/6/2024: Last filled 5/31/24 #30ds #60 - with directions to take BID. Patient initially states taking daily instead, then later states he has not been taking recently due to lack of symptoms Past Week at PRN    levomilnacipran (FETZIMA ER) 120 MG 24 hr capsule Take 120 mg by mouth daily  6/4/2024 at AM    LORazepam (ATIVAN) 2 MG tablet Take 2 mg by mouth 3 times daily 6/6/2024: Prescribed PRN; patient takes scheduled 6/5/2024 at unknown time    multivitamin w/minerals (MULTI-VITAMIN) tablet Take 1 tablet by mouth daily  Past Week at unknown time    naloxone (NARCAN) 4 MG/0.1ML nasal spray Spray 1 spray (4 mg) into one nostril alternating nostrils as needed for opioid reversal every 2-3 minutes until assistance arrives  Unknown at PRN, has home supply    polyethylene glycol (MIRALAX) 17 GM/Dose powder Take 17 g (1 Capful) by mouth daily (Patient taking differently: Take 1 Capful by mouth daily as needed for constipation)  Unknown at PRN    pregabalin (LYRICA) 150 MG capsule Take 150 mg by mouth nightly as needed 6/6/2024: Last filled 5/2/24 #30ds #90 to take BID and PRN in evening. Patient states most days taking 3 doses per day, will sometimes \"forget\" third dose of the day Unknown at PRN    pregabalin (LYRICA) 150 MG capsule Take 150 mg by mouth 2 times daily 6/6/2024: Last filled 5/2/24 #30ds #90 to take BID and PRN in evening. Patient states most days taking 3 doses per day, will sometimes \"forget\" third dose of the day Past Week at unknown time    promethazine (PHENERGAN) 25 MG tablet Take 1 tablet (25 mg) by mouth every 6 hours as needed for nausea 6/6/2024: Last filled 5/31/24 #5ds - patient states this is the most " "effective anti-nausea pill for him to take, but is also \"afraid to take\" - references he \"heard bad things about it\" Unknown at PRN    QUEtiapine (SEROQUEL) 100 MG tablet Take 100-150 mg by mouth at bedtime At baseline will take 100 mg; if trouble sleeping will take extra half tablet dose 6/6/2024: Last filled 5/1/24 #30ds #60 - with instructions to take BID - patient confirms only taking at bedtime 6/4/2024 at PM    SUMAtriptan (IMITREX) 50 MG tablet Take 50 mg by mouth at onset of headache for migraine (may take repeat dose in 2 hours)  Past Month at PRN, doesn't like to take    testosterone cypionate (DEPOTESTOTERONE) 200 MG/ML injection Inject 200 mg into the muscle Injects 1 ml every 7-10 days 6/6/2024: Last filled 3/26/24 #84ds. Chart notes and fill history indicate dose of 0.3 ml (60 mg) weekly; patient states multiple times in interview taking full 1 ml (200 mg) per dose. Patient states he used to be on set weekly schedule; now \"can feel when I am lacking\" and will take doses roughly every 7-10 days. States he has been taking 200 mg per dose since 2017, and it was \"prescribed wrong\" initially.  Past Week at unknown time       "

## 2024-06-06 NOTE — ED PROVIDER NOTES
Emergency Department Note      History of Present Illness     Chief Complaint  Drug Overdose    HPI  Bc German is a 43 year old male with history of polysubstance abuse, intravenous drug user, opioid dependence, and HIV who presents to the ED for a drug overdose. EMS reports the patient is experiencing an opioid overdose. They state PD administered 4 mg of narcan. They note the patient woke up breathing on his own but with low oxygen saturations so was put on high flow oxygen. They add in the ambulance the patient had an oxygen saturation of the 70s in the ambulance. They reports the patient had a blood pressure around 179 and a heart rate around 130. They state patient is now experiencing difficulty breathing. They note the patient had B-line bilaterally but more emphasized on the right side. The patient denies chest pain, shortness of breath, or abdominal pain.     Independent Historian  EMS as detailed above.    Review of External Notes  4/7/24:  note reviewed  Past Medical History   Medical History and Problem List  Generalized anxiety disorder   Depressive disorder  Drug abuse, IV   Group A streptococcal infection  HCV antibody positive  HIV (human immunodeficiency virus infection)  IVDU (intravenous drug user)  Osteomyelitis of vertebra of lumbosacral region   Substance abuse   Severe episode of recurrent major depressive disorder, without psychotic features (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   Panic disorder without agoraphobia   Chronic low back pain   Opioid abuse with intoxication   Hypochondriacal disorders   Polysubstance dependence, non-opioid, in remission   Uncomplicated alcohol abuse   Sepsis   Cellulitis   Staph infection     Medications  Naloxone  Levomilnacipran  Sennosides-docusate  sodium   Simethicone  Modafinil  Fetzima   Famotidine  Clonidine  Sumatriptan succinate   Quetiapine   Baclofen  Hydroxyzine HCL   Buprenorphine-naloxone   Testosterone cypionate   Ondansetron HCL  Nicotine patch   Pregabalin  Abacavir-dolutegravir-lamivud     Surgical History   Laparoscopic cholecystectomy   Transesophageal echocardiogram intraoperative x3  Picc insertion (Left)   Optical tracking system fusion cervical anterior one level (Right)   Eye surgery - pins to left eye after socket fracture  Orthopedic surgery - arm     Physical Exam   Patient Vitals for the past 24 hrs:   BP Temp Temp src Pulse Resp SpO2   06/06/24 0819 (!) 148/108 -- -- 118 -- 92 %   06/06/24 0813 -- -- -- (!) 124 16 --   06/06/24 0758 (!) 163/111 -- -- (!) 124 17 93 %   06/06/24 0755 -- -- -- -- -- 95 %   06/06/24 0740 -- -- -- (!) 128 (!) 8 93 %   06/06/24 0730 (!) 141/99 -- -- (!) 126 12 93 %   06/06/24 0720 -- -- -- 114 (!) 8 94 %   06/06/24 0710 -- -- -- 115 (!) 8 96 %   06/06/24 0700 (!) 121/99 -- -- 117 10 95 %   06/06/24 0650 133/89 -- -- 114 11 99 %   06/06/24 0649 133/89 -- -- 115 12 98 %   06/06/24 0548 -- 100.2  F (37.9  C) Oral -- -- --   06/06/24 0546 (!) 146/89 -- -- (!) 125 -- 98 %   06/06/24 0535 (!) 140/122 -- -- (!) 133 18 95 %     Physical Exam  General: No acute distress  Head: No obvious trauma to head.  Ears, Nose, Throat:  External ears normal.  Nose normal.  No pharyngeal erythema, swelling or exudate.  Midline uvula. Moist mucus membranes.  Eyes:  Conjunctivae clear. Pupils 3mm, equal round, and reactive.   Neck: Normal range of motion.  Neck supple.   CV: Regular rate and rhythm.  No murmurs.      Respiratory: Effort normal and breath sounds normal.  No wheezing or crackles.   Gastrointestinal: Soft.  No distension. There is no tenderness.  There is no rigidity, no rebound and no guarding.   Musculoskeletal: Normal range of motion.  Non tender extremities to palpations. No lower extremity edema  Neuro: Alert.  Moving all extremities appropriately.  Normal speech.    Skin: Skin is warm and diaphoretic.  No rash noted.   Psych: Normal mood and affect. Behavior is normal.       Diagnostics   Lab Results   Labs Ordered and Resulted from Time of ED Arrival to Time of ED Departure   BASIC METABOLIC PANEL - Abnormal       Result Value    Sodium 140      Potassium 3.9      Chloride 98      Carbon Dioxide (CO2) 33 (*)     Anion Gap 9      Urea Nitrogen 17.1      Creatinine 1.06      GFR Estimate 90      Calcium 9.7      Glucose 110 (*)    CBC WITH PLATELETS AND DIFFERENTIAL - Abnormal    WBC Count 18.1 (*)     RBC Count 5.05      Hemoglobin 15.5      Hematocrit 46.5      MCV 92      MCH 30.7      MCHC 33.3      RDW 13.5      Platelet Count 278      % Neutrophils        % Lymphocytes        % Monocytes        % Eosinophils        % Basophils        % Immature Granulocytes        NRBCs per 100 WBC 0      Absolute Neutrophils        Absolute Lymphocytes        Absolute Monocytes        Absolute Eosinophils        Absolute Basophils        Absolute Immature Granulocytes        Absolute NRBCs 0.0     ISTAT GASES LACTATE VENOUS POCT - Abnormal    Lactic Acid POCT 1.8      Bicarbonate Venous POCT 34 (*)     O2 Sat, Venous POCT 32 (*)     pCO2 Venous POCT 67 (*)     pH Venous POCT 7.32      pO2 Venous POCT 22 (*)     Base Excess/Deficit (+/-) POCT 6.0 (*)    DIFFERENTIAL - Abnormal    % Neutrophils 86      % Lymphocytes 11      % Monocytes 3      % Eosinophils 0      % Basophils 0      Absolute Neutrophils 15.6 (*)     Absolute Lymphocytes 2.0      Absolute Monocytes 0.5      Absolute Eosinophils 0.0      Absolute Basophils 0.0      RBC Morphology Confirmed RBC Indices      Platelet Assessment        Value: Automated Count Confirmed. Platelet morphology is normal.   TROPONIN T, HIGH SENSITIVITY - Abnormal    Troponin T, High Sensitivity 26 (*)    TROPONIN T, HIGH SENSITIVITY       Imaging  Chest XR,  PA & LAT   Final Result   IMPRESSION:  PA and lateral views of the chest were obtained. Cardiomediastinal silhouette is within normal limits. No suspicious focal pulmonary opacities. Incidentally noted azygos lobe. No significant pleural effusion or pneumothorax. Postsurgical    changes of lower cervical spine fusion. On the lateral view, there is thin linear/wavy lucency in the anterior aspect of the chest, likely related to patient position, recommend clinical correlation and if indicated can be further evaluated with    dedicated chest CT.                      CT Chest Pulmonary Embolism w Contrast    (Results Pending)       EKG   ECG results from 06/06/24   EKG 12 lead     Value    Systolic Blood Pressure     Diastolic Blood Pressure     Ventricular Rate 121    Atrial Rate 121    MO Interval 122    QRS Duration 76        QTc 437    P Axis 78    R AXIS 85    T Axis 61    Interpretation ECG      Sinus tachycardia  Otherwise normal ECG  When compared with ECG of 28-FEB-2024 23:02,  Rate increased by 25  ECG taken at 0556, ECG read at 0600         Independent Interpretation  CXR: No pneumothorax, infiltrate, pleural effusion, or pulmonary edema.  ED Course    Medications Administered  Medications   sodium chloride 0.9% BOLUS 1,000 mL (has no administration in time range)   sodium chloride 0.9% BOLUS 1,000 mL (1,000 mLs Intravenous $New Bag 6/6/24 0539)       Procedures  Procedures     Discussion of Management  None    Social Determinants of Health adding to complexity of care  None    ED Course  ED Course as of 06/06/24 0836   Th Jun 06, 2024   0532 I obtained history and examined the patient as noted above.      Medical Decision Making / Diagnosis   CMS Diagnoses: None    MIPS    CT for PE was ordered because the patient is high risk for pulmonary embolism.    VIC Nicoleerinn German is a 43 year old male presenting via EMS after an unintentional opioid overdose.  PD administered 4 mg of Narcan in the patient improved.  He arrives on positive  "pressure ventilation.  He denies any shortness of breath and we are able to successfully wean him to 2 L via nasal cannula.  Chest x-ray is unremarkable.  He arrives significantly tachycardic.  This slightly improved with IV fluids, however he does continue to remain tachycardic.  Lactate is not elevated.  EKG shows no ischemic changes.  Troponin is slightly elevated.  Repeat troponin is currently in process.  He does have a leukocytosis.  He is afebrile.  He denies intentional overdose, and denies suicidal ideation.  He states \"my life sucks, but I do not want to end it.\"    Considering he is tachycardic and continues to require supplemental oxygen, a CT scan is ordered to further evaluate for pulmonary embolism.  This scan is currently pending at time of signout to my oncoming colleague.  If the CT scan is negative and he is successfully able to be weaned off of supplemental oxygen, he will be medically cleared to discharge home.        Disposition  Care of the patient was transferred to my colleague Dr. Griffin pending CT results.     ICD-10 Codes:    ICD-10-CM    1. Opioid overdose, accidental or unintentional, initial encounter (H)  T40.2X1A       2. Hypoxia  R09.02            Discharge Medications  New Prescriptions    No medications on file         Scribe Disclosure:  I, Priya Covington, am serving as a scribe at 5:53 AM on 6/6/2024 to document services personally performed by John Epperson MD based on my observations and the provider's statements to me.        John Epperson MD  06/06/24 0842    "

## 2024-06-06 NOTE — H&P
[facility]    History and Physical - Hospitalist Service       Date of Admission:  6/6/2024    Assessment & Plan      Bc German is a 43 year old male admitted on 6/6/2024 for an opioid overdose. He has a PMH of polysubstance abuse, intravenous drug user, opioid dependence, and HIV.     Unintentional Acute Opioid overdose  The patient was found unresponsive in his home. He has a long standing history of polysubstance abuse and opioid dependence. EMS was called and he was given 4mg of narcan and woke up. However, he was found to be hypoxic with oxygen saturation in the 70s. He was placed on C-pap en route to the ED. Upon arrival, he was able to be weaned off of the c-pap, but still required 2l of supplemental O2 via nasal canula. Chest x-ray showed bilateral infiltrates suggestive of pneumonia. Also noted to have a WBC of 18.1.   *Troponin 26-->30  *VBG 7.32 PCO2 67 PO2 22 Bicarb 34  -Admit to inpatient  -Telemetry monitoring  -Continuous pulse ox  -Hold all sedating medications  -Hold PTA lorazepam  -Hold PTA Lyrica  -PRN narcan    Pneumonia  Noted to have bilateral infiltrates and WBC of 18.1. Suspect aspiration pneumonia. Requiring supplemental oxygen.  *CT PE: No evidence of pulmonary embolism is seen to the subsegmental level.Multifocal bilateral opacities and pulmonary nodules measuring up to 1.4 cm as well as bronchial wall thickening and tree-in-bud nodularity seen throughout the lungs. These findings appear most extensive in the lingula and bilateral lower lobes. Constellation of findings are suggestive of bronchopneumonia. Suggest short-term follow-up to ensure resolution. Multiple enlarged mediastinal or hilar lymph nodes, nonspecific but suspected to be reactive. Suggest attention on follow-up exam. Dilated pulmonary trunk suggestive of pulmonary hypertension.  *WBC 18.1  *Lactic Acid 1.8  -IV Ceftriaxone  -IV azithromycin    HIV  Diagnosed in 2017  -Resume PTA TRIUMEQ       Depression and Anxiety  -Resume PTA Levomilnacipran  -Resume PTA Seroquel    Hypertension  *-163  -Resume PTA Clonidine  -PRN Hydralazine for SBP greater than 170     Diet:  Regular  DVT Prophylaxis: Pneumatic Compression Devices  Tirado Catheter: Not present  Lines: None     Cardiac Monitoring: None  Code Status:  Full    Clinically Significant Risk Factors Present on Admission                  # Hypertension: Home medication list includes antihypertensive(s)      Disposition Plan   Medically Ready for Discharge: Anticipated in 2-4 Days  The patient's care was discussed with the Attending Physician, Dr. Xuan Devine .    Bc Resendiz NP  Hospitalist Service  [facility]  Securely message with [employer] (more info)  Text page via [facility] Paging/Directory   ______________________________________________  Chief Complaint   Opioid overdose    History is obtained from the patient    History of Present Illness   Bc German is a 43 year old male a long standing history of polysubstance abuse and opioid dependence who presents with an unintentional opioid overdose. Apparently he took what he thought was heroin over night 6/5-6/6, but this likely contained fentanyl as well. In the morning of 6/6, his father, with whom he resides, found him to be unresponsive. EMS was called and he was given 4mg of narcan after which he woke up. However, he was found to be hypoxic with oxygen saturation in the 70s, so was placed on C-pap en route to the ED. Upon arrival, he was able to be weaned off of the c-pap, but still required 2l of supplemental O2 via nasal canula. Chest x-ray showed bilateral infiltrates suggestive of pneumonia. Also noted to have a WBC of 18.1, suspect secondary to aspiration.  He was given a dose of ceftriaxone and azithromycin, which will be continued while he is in the hospital. He is still lethargic, but protecting his airway, opening his eyes spontaneously with  oriented verbal responses. He reports that the overdose was unintentional.  At current, he denies the presence of any chest pain, shortness of breath, abdominal pain, nausea, vomiting diarrhea or constipation. No recent fever, chills or known sick contacts.   Plan to admit for further monitoring of overdose sequela and treat pneumonia.       Past Medical History    Past Medical History:   Diagnosis Date    Anxiety     Depressive disorder     Drug abuse, IV (H)     Group A streptococcal infection 11/2014    Bacteremia/cellulitis    HCV antibody positive     HIV (human immunodeficiency virus infection) (H)     HIV (human immunodeficiency virus infection) (H)     HIV (human immunodeficiency virus infection) (H)     IVDU (intravenous drug user)     Osteomyelitis of vertebra of lumbosacral region (H) 3/2011    L3, left psoas abscess    Substance abuse (H)      Past Surgical History   Past Surgical History:   Procedure Laterality Date    EYE SURGERY  1 year ago    pins to left eye after socket fracture    LAPAROSCOPIC CHOLECYSTECTOMY N/A 2/7/2022    Procedure: CHOLECYSTECTOMY, LAPAROSCOPIC;  Surgeon: Lico Rodriguez MD;  Location:  OR    OPTICAL TRACKING SYSTEM FUSION CERVICAL ANTERIOR ONE LEVEL Right 9/10/2017    Procedure: OPTICAL TRACKING SYSTEM FUSION CERVICAL ANTERIOR ONE LEVEL;  Stealth C6-C7 Anterior Cervical Corpectomy and C5-T1 Fusion;  Surgeon: Marv Ambrose MD;  Location: [facility] OR    ORTHOPEDIC SURGERY      Arm Surgery    PICC INSERTION Left 09/21/2017    5fr DL BioFlo PICC, 42cm (1cm external) in the L basilic vein w/ tip in the low SVC.    TRANSESOPHAGEAL ECHOCARDIOGRAM INTRAOPERATIVE N/A 3/17/2015    Procedure: TRANSESOPHAGEAL ECHOCARDIOGRAM INTRAOPERATIVE;  Surgeon: Generic Anesthesia Provider;  [facility]: [facility]    TRANSESOPHAGEAL ECHOCARDIOGRAM INTRAOPERATIVE N/A 11/18/2021    Procedure: ECHOCARDIOGRAM, TRANSESOPHAGEAL, INTRAOPERATIVE;  Surgeon: GENERIC ANESTHESIA PROVIDER;  Location:  OR     TRANSESOPHAGEAL ECHOCARDIOGRAM INTRAOPERATIVE N/A 12/2/2021    Procedure: ECHOCARDIOGRAM, TRANSESOPHAGEAL, INTRAOPERATIVE;  Surgeon: GENERIC ANESTHESIA PROVIDER;  Location:  OR     Prior to Admission Medications   Prior to Admission Medications   Prescriptions Last Dose Informant Patient Reported? Taking?   FETZIMA 120 MG 24 hr capsule   Yes No   Sig: Take 120 mg by mouth daily   LORazepam (ATIVAN) 1 MG tablet   Yes No   Sig: Take 2 tablets (2 mg) by mouth 3 times daily   Multiple Vitamins-Minerals (MULTIVITAMIN GUMMIES ADULT PO)  Self Yes No   Sig: Take 1 chew tab by mouth daily   QUEtiapine (SEROQUEL) 100 MG tablet   Yes No   Sig: Take 1 tablet (100 mg) by mouth at bedtime   abacavir-dolutegravir-LamiVUDine (TRIUMEQ) 600- MG per tablet  Pharmacy No No   Sig: Take 1 tablet by mouth daily Please call [phone] & make appointment for further refills.   buprenorphine (SUBUTEX) 2 MG SUBL sublingual tablet   No No   Sig: Place 1 tablet (2 mg) under the tongue 2 times daily Or as directed   buprenorphine (SUBUTEX) 8 MG SUBL sublingual tablet   No No   Sig: Place 1 tablet (8 mg) under the tongue 2 times daily   cloNIDine (CATAPRES) 0.1 MG tablet   No No   Sig: Take 1 tablet (0.1 mg) by mouth every 6 hours as needed (withdrawal symptoms including hot/cold sweats, anxiety, restlessness, sleeplessness)   naloxone (NARCAN) 4 MG/0.1ML nasal spray   No No   Sig: Spray 1 spray (4 mg) into one nostril alternating nostrils as needed for opioid reversal every 2-3 minutes until assistance arrives   Patient not taking: Reported on 2/29/2024   polyethylene glycol (MIRALAX) 17 GM/Dose powder   No No   Sig: Take 17 g (1 Capful) by mouth daily   pregabalin (LYRICA) 150 MG capsule   Yes No   Sig: Take 1 capsule (150 mg) by mouth 3 times daily   promethazine (PHENERGAN) 25 MG tablet   No No   Sig: Take 1 tablet (25 mg) by mouth every 6 hours as needed for nausea   testosterone cypionate (DEPOTESTOTERONE) 200 MG/ML injection   Pharmacy Yes No   Sig: Inject 0.3 mLs into the muscle once a week Inject 0.3 mL IM every 77 days      Facility-Administered Medications: None      Physical Exam   Vital Signs: Temp: 100.2  F (37.9  C) Temp src: Oral BP: (!) 157/108 Pulse: 114   Resp: 12 SpO2: 93 % O2 Device: Nasal cannula Oxygen Delivery: 2 LPM  Weight: 0 lbs 0 oz  Physical Exam  Vitals and nursing note reviewed.   Constitutional:       General: He is not in acute distress.  Eyes:      Pupils: Pupils are equal, round, and reactive to light.   Cardiovascular:      Rate and Rhythm: Regular rhythm. Tachycardia present.      Pulses: Normal pulses.      Heart sounds: Normal heart sounds.   Pulmonary:      Effort: Pulmonary effort is normal.      Breath sounds: Normal breath sounds and air entry.   Abdominal:      General: Abdomen is flat. Bowel sounds are decreased.      Palpations: Abdomen is soft.      Tenderness: There is no abdominal tenderness.   Skin:     General: Skin is warm and dry.   Neurological:      General: No focal deficit present.      Mental Status: He is lethargic.      GCS: GCS eye subscore is 4. GCS verbal subscore is 5. GCS motor subscore is 6.   Psychiatric:         Attention and Perception: Attention normal.         Mood and Affect: Mood normal.         Speech: Speech normal.         Behavior: Behavior normal. Behavior is cooperative.         Thought Content: Thought content normal.         Cognition and Memory: Cognition normal.         Judgment: Judgment normal.     Medical Decision Making   75 MINUTES SPENT BY ME on the date of service doing chart review, history, exam, documentation & further activities per the note.  Data     I have personally reviewed the following data over the past 24 hrs:    18.1 (H)  \   15.5   / 278     140 98 17.1 /  110 (H)   3.9 33 (H) 1.06 \     Trop: 30 (H) BNP: N/A     Procal: N/A CRP: N/A Lactic Acid: 1.8         Imaging results reviewed over the past 24 hrs:   Recent Results (from the past 24  hour(s))   Chest XR,  PA & LAT    Narrative    EXAM: XR CHEST 2 VIEWS  LOCATION: [facility]  DATE/TIME: 6/6/2024 6:34 AM CDT    INDICATION: Overdose, shortness of breath  COMPARISON: Chest x-ray on 11/16/2021 and CT chest, abdomen and pelvis on 12/11/2022.      Impression    IMPRESSION: PA and lateral views of the chest were obtained. Cardiomediastinal silhouette is within normal limits. No suspicious focal pulmonary opacities. Incidentally noted azygos lobe. No significant pleural effusion or pneumothorax. Postsurgical   changes of lower cervical spine fusion. On the lateral view, there is thin linear/wavy lucency in the anterior aspect of the chest, likely related to patient position, recommend clinical correlation and if indicated can be further evaluated with   dedicated chest CT.              CT Chest Pulmonary Embolism w Contrast    Narrative    CT CHEST PULMONARY EMBOLISM W CONTRAST 6/6/2024 10:27 AM    CLINICAL HISTORY: s/p narcan, hypoxia, tachy; Male sex; No prior  imaging in the last 24 hours; Pulmonary Embolism Rule-Out Criteria  (PERC) score > 0; Revised Milton Score (RGS) not >= 11; No D-dimer  result available; D-dimer not ordered  TECHNIQUE: CT angiogram chest during arterial phase injection IV  contrast. 2D and 3D MIP reconstructions were performed by the CT  technologist. Dose reduction techniques were used.     CONTRAST: 57 mL of Isovue-370    COMPARISON: None.    FINDINGS:  ANGIOGRAM CHEST: Pulmonary trunk appears mildly dilated measuring up  to 3.4 cm in diameter suggestive of pulmonary hypertension. No  evidence of pulmonary embolism is seen to the subsegmental level.  Thoracic aorta is negative for dissection. No CT evidence of right  heart strain.    LUNGS AND PLEURA: No pleural effusion or pneumothorax is seen. Azygos  lobe is seen in the right upper lung. Multifocal groundglass opacities  and pulmonary nodules are seen throughout the lungs. One of the  largest  nodules for example measures up to 1.4 cm in the right lower  lobe (series 7, image 173). Areas of bronchial wall thickening, mucous  plugging and tree-in-bud nodularity are also seen, most extensively  along the lingula and bilateral lower lobes.    MEDIASTINUM/AXILLAE: Multiple enlarged mediastinal and hilar lymph  nodes are present, including a 1.3 cm subcarinal lymph node (series 5,  image 137) as well as a 1.1 cm right hilar lymph node (series 5, image  124).    UPPER ABDOMEN: Bilateral renal cysts are partially seen.    MUSCULOSKELETAL: No suspicious osseous lesions. Partially seen  cervical fusion hardware.      Impression    IMPRESSION:  1.  No evidence of pulmonary embolism is seen to the subsegmental  level.  2.  Multifocal bilateral opacities and pulmonary nodules measuring up  to 1.4 cm as well as bronchial wall thickening and tree-in-bud  nodularity seen throughout the lungs. These findings appear most  extensive in the lingula and bilateral lower lobes. Constellation of  findings are suggestive of bronchopneumonia. Suggest short-term  follow-up to ensure resolution.  3.  Multiple enlarged mediastinal or hilar lymph nodes, nonspecific  but suspected to be reactive. Suggest attention on follow-up exam.  4.  Dilated pulmonary trunk suggestive of pulmonary hypertension.    REFERENCE:  Guidelines for Management of Incidental Pulmonary Nodules Detected on  CT Images: From the Fleischner Society 2017.   Guidelines apply to incidental nodules in patients who are 35 years or  older.  Guidelines do not apply to lung cancer screening, patients with  immunosuppression, or patients with known primary cancer.    MULTIPLE NODULES  Nodule size <6 mm  Low-risk patients: No follow-up needed.  High-risk patients: Optional follow-up at 12 months.    Nodule size 6 mm or larger  Low-risk patients: Follow-up CT at 3-6 months, then consider CT at  18-24 months.  High-risk patients: Follow-up CT at 3-6 months, then at 18-24  months  if no change.  -Use most suspicious nodule as guide to management.    Consider referral to lung nodule clinic.    MARYJO MADRID MD         SYSTEM ID:  [id number]

## 2024-06-07 LAB
MAGNESIUM SERPL-MCNC: 1.8 MG/DL (ref 1.7–2.3)
PROCALCITONIN SERPL IA-MCNC: 0.77 NG/ML

## 2024-06-07 PROCEDURE — 99233 SBSQ HOSP IP/OBS HIGH 50: CPT | Performed by: INTERNAL MEDICINE

## 2024-06-07 PROCEDURE — 258N000003 HC RX IP 258 OP 636: Mod: JZ | Performed by: INTERNAL MEDICINE

## 2024-06-07 PROCEDURE — 250N000009 HC RX 250: Performed by: INTERNAL MEDICINE

## 2024-06-07 PROCEDURE — 250N000011 HC RX IP 250 OP 636: Mod: JZ | Performed by: INTERNAL MEDICINE

## 2024-06-07 PROCEDURE — 250N000013 HC RX MED GY IP 250 OP 250 PS 637

## 2024-06-07 PROCEDURE — 120N000001 HC R&B MED SURG/OB

## 2024-06-07 PROCEDURE — 36415 COLL VENOUS BLD VENIPUNCTURE: CPT

## 2024-06-07 PROCEDURE — 83735 ASSAY OF MAGNESIUM: CPT

## 2024-06-07 PROCEDURE — 84145 PROCALCITONIN (PCT): CPT | Performed by: INTERNAL MEDICINE

## 2024-06-07 PROCEDURE — 250N000013 HC RX MED GY IP 250 OP 250 PS 637: Performed by: INTERNAL MEDICINE

## 2024-06-07 RX ORDER — PREGABALIN 150 MG/1
150 CAPSULE ORAL
Status: DISCONTINUED | OUTPATIENT
Start: 2024-06-07 | End: 2024-06-08 | Stop reason: HOSPADM

## 2024-06-07 RX ORDER — PREGABALIN 150 MG/1
150 CAPSULE ORAL 2 TIMES DAILY
Status: DISCONTINUED | OUTPATIENT
Start: 2024-06-07 | End: 2024-06-08 | Stop reason: HOSPADM

## 2024-06-07 RX ADMIN — CEFTRIAXONE SODIUM 2 G: 2 INJECTION, POWDER, FOR SOLUTION INTRAMUSCULAR; INTRAVENOUS at 11:03

## 2024-06-07 RX ADMIN — SODIUM CHLORIDE: 9 INJECTION, SOLUTION INTRAVENOUS at 11:06

## 2024-06-07 RX ADMIN — SODIUM CHLORIDE: 9 INJECTION, SOLUTION INTRAVENOUS at 01:31

## 2024-06-07 RX ADMIN — ABACAVIR SULFATE, DOLUTEGRAVIR SODIUM, LAMIVUDINE 1 TABLET: 600; 50; 300 TABLET, FILM COATED ORAL at 08:40

## 2024-06-07 RX ADMIN — QUETIAPINE FUMARATE 100 MG: 100 TABLET ORAL at 21:15

## 2024-06-07 RX ADMIN — AZITHROMYCIN DIHYDRATE 250 MG: 250 TABLET ORAL at 08:40

## 2024-06-07 RX ADMIN — Medication 1 TABLET: at 08:40

## 2024-06-07 RX ADMIN — LEVOMILNACIPRAN HYDROCHLORIDE 120 MG: 40 CAPSULE, EXTENDED RELEASE ORAL at 08:40

## 2024-06-07 RX ADMIN — PREGABALIN 150 MG: 150 CAPSULE ORAL at 21:15

## 2024-06-07 ASSESSMENT — ACTIVITIES OF DAILY LIVING (ADL)
DEPENDENT_IADLS:: INDEPENDENT
ADLS_ACUITY_SCORE: 27
ADLS_ACUITY_SCORE: 26
ADLS_ACUITY_SCORE: 27
ADLS_ACUITY_SCORE: 26
ADLS_ACUITY_SCORE: 27

## 2024-06-07 NOTE — PLAN OF CARE
Summary: Found unresponsive due to opioid overdose. CT scan showing multifocal bilateral opacities concerning through both lungs.    DATE & TIME: 6/6/2024, [id number]   Cognitive Concerns/ Orientation :  A & O x 4,   BEHAVIOR & AGGRESSION TOOL COLOR: Green   CIWA SCORE: NA    ABNL VS/O2: VSS. Weaned to RA (sating 94-98%) ex tachy at times (low 90s to low 100s)  MOBILITY: Ind   PAIN MANAGMENT: c/o chronic back pain and headache, given PRN tylenol x1 and PRN ibuprofen x1 - states ibuprofen works better for him  DIET: Regular, good appetite  BOWEL/BLADDER: BS active x 4, continent  ABNL LAB/BG: WBC 18.1, troponin 30, Mg lab to be collected  DRAIN/DEVICES: PIV infusing NS @ 100 mL/hr   TELEMETRY RHYTHM: SR to ST  SKIN: scattered abrasions/scabs to lower extremities  TESTS/PROCEDURES: CT completed in ER.   D/C DAY/GOALS/PLACE: pending clinical improvement  OTHER IMPORTANT INFO: SW/CC following. History of MRSA-contact precautions maintained. Pt would like to change code status - to be addressed by hospitalist, on q24 IV rocephin and IV azithromycin

## 2024-06-07 NOTE — PLAN OF CARE
Summary: Found unresponsive due to opioid overdose. CT scan showing multifocal bilateral opacities concerning through both lungs.      DATE & TIME: 6/6/2024, 8830-1724    Cognitive Concerns/ Orientation :  A & O x 4,   BEHAVIOR & AGGRESSION TOOL COLOR: Green         ABNL VS/O2: Heart rate 112 otherwise VSS, room air  MOBILITY: Ind   PAIN MANAGMENT: Denies pain this shift, allergic to tylenol  DIET: Regular, snacking on crackers  BOWEL/BLADDER: Continent, no BM  ABNL LAB/BG: WBC 18.1, troponin 30, Mg 1.8  DRAIN/DEVICES: R PIV infusing Normal saline at 100 mL/hr   TELEMETRY RHYTHM: Sinus tachycardia  SKIN: scattered abrasions/scabs to lower extremities  TESTS/PROCEDURES: CT completed in ER.   D/C DAY/GOALS/PLACE: pending clinical improvement  OTHER IMPORTANT INFO: SW/CC following. History of MRSA (11/2021 blood)-contact precautions maintained. Pt would like to change code status - to be addressed by hospitalist, on q24 IV rocephin and oral  azithromycin

## 2024-06-07 NOTE — UTILIZATION REVIEW
Admission Status; Secondary Review Determination         Under the authority of the Utilization Management Committee, the utilization review process indicated a secondary review on the above patient.  The review outcome is based on review of the medical records, discussions with staff, and applying clinical experience noted on the date of the review.        (x)      Inpatient Status Appropriate - This patient's medical care is consistent with medical management for inpatient care and reasonable inpatient medical practice.     RATIONALE FOR DETERMINATION   The patient is a 43-year-old male admitted on 6/6/2024.  Patient was brought to the ED by EMS after suffering a overdose and becoming hypoxic Wes unresponsive in his home.  He did respond to 4 mg of Narcan and woke up but continued to be hypoxic requiring continued O2 with O2 sats in the 70s.  CT scan of the lungs was done due to prolonged hypoxia and showed multifocal bilateral opacities and pulmonary nodules measuring up to 1.4 cm as well as bronchial wall thickening and tree-in-bud nodularity throughout the lungs.  These abnormalities were most extensive in the lingula and bilateral lower lobes suggestive of bronchopneumonia.  The patient was started on broad-spectrum antibiotics.  Troponins were also elevated between 26 and 30.  Estimated admission length is 2 to 4 days due to pulmonary issues.  The patient does have a longstanding history of polysubstance abuse and opioid dependence with unintentional opioid overdoses in the past.  Patient thought he was using heroin and likely end up being fentanyl in addition to.  Due to pulmonary issues and also prolonged recovery with hypoxia, agree with current inpatient status.      The severity of illness, intensity of service provided, expected LOS and risk for adverse outcome make the care complex, high risk and appropriate for hospital admission.        The information on this document is developed by the  utilization review team in order for the business office to ensure compliance.  This only denotes the appropriateness of proper admission status and does not reflect the quality of care rendered.         The definitions of Inpatient Status and Observation Status used in making the determination above are those provided in the CMS Coverage Manual, Chapter 1 and Chapter 6, section 70.4.      Sincerely,     Gadiel Carrion MD  Physician Advisor  Utilization Review/ Case Management  [facility].

## 2024-06-07 NOTE — CONSULTS
Care Management Initial Consult    General Information  Assessment completed with: Patient,    Type of CM/SW Visit: Initial Assessment    Primary Care Provider verified and updated as needed: Yes   Readmission within the last 30 days: no previous admission in last 30 days      Reason for Consult: discharge planning  Advance Care Planning: Advance Care Planning Reviewed: no concerns identified          Communication Assessment  Patient's communication style: spoken language (English or Bilingual)    Hearing Difficulty or Deaf: no   Wear Glasses or Blind: no    Cognitive  Cognitive/Neuro/Behavioral: WDL                      Living Environment:   People in home: parent(s)     Current living Arrangements: [facility] home      Able to return to prior arrangements: yes       Family/Social Support:  Care provided by: self  Provides care for: no one  Marital Status: Single  Parent(s)          Description of Support System: Supportive         Current Resources:   Patient receiving home care services: No     Community Resources:    Equipment currently used at home: none  Supplies currently used at home:      Employment/Financial:  Employment Status: unemployed        Financial Concerns:             Does the patient's insurance plan have a 3 day qualifying hospital stay waiver?  No    Lifestyle & Psychosocial Needs:  Social Determinants of Health     Food Insecurity: Not on File (2019)    Received from [facility]     Food Insecurity     Food: 0   Depression: Not at risk (2024)    PHQ-2     PHQ-2 Score: 2   Housing Stability: Not on File (2019)    Received from [facility]     Housing Stability     Housin   Tobacco Use: High Risk (2024)    Received from [facility]    Patient History     Smoking Tobacco Use: Every Day     Smokeless Tobacco Use: Never     Passive Exposure: Not on file   Financial Resource Strain: Not on File (2019)    Received from [facility]     Financial Resource Strain      Financial Resource Strain: 0   Alcohol Use: Not on file   Transportation Needs: Not on File (11/8/2019)    Received from [facility]     Transportation Needs     Transportation: 0   Physical Activity: Not on File (11/8/2019)    Received from [facility]     Physical Activity     Physical Activity: 0   Interpersonal Safety: Unknown (3/12/2024)    Received from [facility]    Humiliation, Afraid, Rape, and Kick questionnaire     Fear of Current or Ex-Partner: Not on file     Emotionally Abused: Not on file     Physically Abused: Patient unable to answer     Sexually Abused: Patient unable to answer   Stress: Not on File (11/8/2019)    Received from [facility]     Stress     Stress: 0   Social Connections: Not on File (11/8/2019)    Received from [facility]     Social Connections     Social Connections and Isolation: 0   Health Literacy: Not on file       Functional Status:  Prior to admission patient needed assistance:   Dependent ADLs:: Independent  Dependent IADLs:: Independent  Assesssment of Functional Status: At functional baseline    Mental Health Status:  Mental Health Status: Past Concern  Mental Health Management: Psychiatrist    Chemical Dependency Status:  Chemical Dependency Status: Current Concern  Chemical Dependency Management: Previous treatment          Values/Beliefs:  Spiritual, Cultural Beliefs, Nondenominational Practices, Values that affect care: no               Additional Information:  Met with patient in room to discuss plan of care and discharge plans.    Patient well known to the medical team. Patient stating he lives with his father now in a town home ,independent with ADLS and IADLs,patient does not work,gets financial assistance from [facility] and his father provides housing.     Patient states the present admission was an unintentional overdose. Patient states he was downtown and purchased Fentanyl ,states he saw a  following him and decided to swallow the contents of the bag.  Patient states he has been through treatment several times and has not been successful.  He uses fentanyl occasionally. He says he does not shoot up heroin in his veins any more after his last admission at [facility].     Patient has a psychiatrist that he follows. Patient states he did not have any housing the last few months which triggered the drug use.  He is now able to stay with his father and feels he has stable housing and will not use any more.     Follow up with his PCP has been scheduled for  6/20 at 1:40 with Devin Recinos.   [phone] [address] [facility] [address]   Patients PCP did not have any opening.     Care coordinator will cont to follow for discharge needs.     Emmanuel Garcia RN  -[phone]

## 2024-06-07 NOTE — PLAN OF CARE
"Summary: Found unresponsive due to fentanyl overdose. CT scan showing multifocal bilateral opacities concerning through both lungs.       DATE & TIME: 6/7/2024, AM shift  Cognitive Concerns/ Orientation :  A & O x 4, flat effect. Cooperative.   BEHAVIOR & AGGRESSION TOOL COLOR: Green   ABNL VS/O2: VSS on RA  MOBILITY: SBA, encouraged to sit in a chair or ambulate/or take a shower-pt response \"I just want to sleep\" \"I am tired\"  PAIN MANAGMENT: Denies pain   DIET: Regular, drinking juice and water, has not order any solid food yet-has no appetite per pt  BOWEL/BLADDER: Continent, no BM  ABNL LAB/BG: refused labs this morning; lab contacted later this shift and asked to come and draw as pt is in agreement now.   DRAIN/DEVICES: R PIV infusing Normal saline at 100 mL/hr; interm IV abx  TELEMETRY RHYTHM: SR  SKIN: scattered abrasions/scabs to lower extremities  TESTS/PROCEDURES: n/a  D/C DAY/GOALS/PLACE: pending, once eating and tolerating activity. Lives with his dad.   OTHER IMPORTANT INFO: CC is following.      "

## 2024-06-07 NOTE — PROGRESS NOTES
[facility]    Medicine Progress Note - Hospitalist Service    Date of Admission:  6/6/2024    Assessment & Plan      Bc German is a 43 year old male admitted on 6/6/2024 for an opioid overdose. He has a PMH of polysubstance abuse, intravenous drug user, opioid dependence, and HIV.     Unintentional Acute Opioid overdose  The patient was found unresponsive in his home. He has a long standing history of polysubstance abuse and opioid dependence. EMS was called and he was given 4mg of narcan and woke up. However, he was found to be hypoxic with oxygen saturation in the 70s. He was placed on C-pap en route to the ED. Upon arrival, he was able to be weaned off of the c-pap, but still required 2l of supplemental O2 via nasal canula. Chest x-ray showed bilateral infiltrates suggestive of pneumonia. Also noted to have a WBC of 18.1.   *Troponin 26-->30  *VBG 7.32 PCO2 67 PO2 22 Bicarb 34  Admit to inpatient  Telemetry monitoring  Weaned off supplemental oxygen; oxygen saturations are now 100% on room air  Resume PTA lorazepam, reduce risk of seizure in patient with chronic dependence on benzodiazepine  Resume PTA Lyrica  PRN narcan    Pneumonia  Noted to have bilateral infiltrates and WBC of 18.1. Suspect aspiration pneumonia. Requiring supplemental oxygen.  *CT PE: No evidence of pulmonary embolism is seen to the subsegmental level.Multifocal bilateral opacities and pulmonary nodules measuring up to 1.4 cm as well as bronchial wall thickening and tree-in-bud nodularity seen throughout the lungs. These findings appear most extensive in the lingula and bilateral lower lobes. Constellation of findings are suggestive of bronchopneumonia. Suggest short-term follow-up to ensure resolution. Multiple enlarged mediastinal or hilar lymph nodes, nonspecific but suspected to be reactive. Suggest attention on follow-up exam. Dilated pulmonary trunk suggestive of pulmonary hypertension.  *WBC  "18.1  *Lactic Acid 1.8  Checked procalcitonin, it is just above the upper limit of normal  IV Ceftriaxone  IV azithromycin    HIV  Diagnosed in 2017  Resume PTA TRIUMEQ      Depression and Anxiety  Resume PTA Levomilnacipran  Resume PTA Seroquel    Hypertension  *-163  Resume PTA Clonidine  PRN Hydralazine for SBP greater than 170     Diet:  Regular  DVT Prophylaxis: Pneumatic Compression Devices  Tirado Catheter: Not present  Lines: None     Cardiac Monitoring: None  Code Status:  Full          Diet: Combination Diet Regular Diet Adult    DVT Prophylaxis: Pneumatic Compression Devices  Tirado Catheter: Not present  Lines: None     Cardiac Monitoring: ACTIVE order. Indication: Syncope- low cardiac risk (24 hours)  Code Status: Full Code      Clinically Significant Risk Factors Present on Admission                  # Hypertension: Home medication list includes antihypertensive(s)    Disposition Plan     Medically Ready for Discharge: Anticipated Tomorrow      Hien Wright MD  Hospitalist Service  [facility]  Securely message with [email or web address] (more info)  Text page via [employer] Paging/Directory   ______________________________________________________________________    Interval History   \"I feel like s---.\"  Patient says he has bodyaches, feels fatigued, generally feels bad.  He denies feeling short of breath at rest.  He says he was admitted here for sepsis at some point in the past and stopped injecting drugs at that point.  He says he snorts drugs, \"harm reduction.\"  He laments that he has lost all of his teeth.    Physical Exam   Vital Signs: Temp: 97.6  F (36.4  C) Temp src: Oral BP: 99/67 Pulse: 84   Resp: 16 SpO2: 96 % O2 Device: None (Room air) Oxygen Delivery: 1 LPM  Weight: 149 lbs 6.4 oz    Constitutional: Awake, alert, cooperative, no apparent distress  Respiratory: Clear to auscultation bilaterally  Cardiovascular: Regular rate and rhythm  GI: Normal bowel sounds, soft, " non-distended, non-tender  Skin/Integumen:  No lower extremity edema  Other: Mood is upbeat      Medical Decision Making       35 MINUTES SPENT BY ME on the date of service doing chart review, history, exam, documentation & further activities per the note.      Data         Imaging results reviewed over the past 24 hrs:   Recent Results (from the past 24 hour(s))   CT Chest Pulmonary Embolism w Contrast    Narrative    CT CHEST PULMONARY EMBOLISM W CONTRAST 6/6/2024 10:27 AM    CLINICAL HISTORY: s/p narcan, hypoxia, tachy; Male sex; No prior  imaging in the last 24 hours; Pulmonary Embolism Rule-Out Criteria  (PERC) score > 0; Revised Reeder Score (RGS) not >= 11; No D-dimer  result available; D-dimer not ordered  TECHNIQUE: CT angiogram chest during arterial phase injection IV  contrast. 2D and 3D MIP reconstructions were performed by the CT  technologist. Dose reduction techniques were used.     CONTRAST: 57 mL of Isovue-370    COMPARISON: None.    FINDINGS:  ANGIOGRAM CHEST: Pulmonary trunk appears mildly dilated measuring up  to 3.4 cm in diameter suggestive of pulmonary hypertension. No  evidence of pulmonary embolism is seen to the subsegmental level.  Thoracic aorta is negative for dissection. No CT evidence of right  heart strain.    LUNGS AND PLEURA: No pleural effusion or pneumothorax is seen. Azygos  lobe is seen in the right upper lung. Multifocal groundglass opacities  and pulmonary nodules are seen throughout the lungs. One of the  largest nodules for example measures up to 1.4 cm in the right lower  lobe (series 7, image 173). Areas of bronchial wall thickening, mucous  plugging and tree-in-bud nodularity are also seen, most extensively  along the lingula and bilateral lower lobes.    MEDIASTINUM/AXILLAE: Multiple enlarged mediastinal and hilar lymph  nodes are present, including a 1.3 cm subcarinal lymph node (series 5,  image 137) as well as a 1.1 cm right hilar lymph node (series 5,  image  124).    UPPER ABDOMEN: Bilateral renal cysts are partially seen.    MUSCULOSKELETAL: No suspicious osseous lesions. Partially seen  cervical fusion hardware.      Impression    IMPRESSION:  1.  No evidence of pulmonary embolism is seen to the subsegmental  level.  2.  Multifocal bilateral opacities and pulmonary nodules measuring up  to 1.4 cm as well as bronchial wall thickening and tree-in-bud  nodularity seen throughout the lungs. These findings appear most  extensive in the lingula and bilateral lower lobes. Constellation of  findings are suggestive of bronchopneumonia. Suggest short-term  follow-up to ensure resolution.  3.  Multiple enlarged mediastinal or hilar lymph nodes, nonspecific  but suspected to be reactive. Suggest attention on follow-up exam.  4.  Dilated pulmonary trunk suggestive of pulmonary hypertension.    REFERENCE:  Guidelines for Management of Incidental Pulmonary Nodules Detected on  CT Images: From the Fleischner Society 2017.   Guidelines apply to incidental nodules in patients who are 35 years or  older.  Guidelines do not apply to lung cancer screening, patients with  immunosuppression, or patients with known primary cancer.    MULTIPLE NODULES  Nodule size <6 mm  Low-risk patients: No follow-up needed.  High-risk patients: Optional follow-up at 12 months.    Nodule size 6 mm or larger  Low-risk patients: Follow-up CT at 3-6 months, then consider CT at  18-24 months.  High-risk patients: Follow-up CT at 3-6 months, then at 18-24 months  if no change.  -Use most suspicious nodule as guide to management.    Consider referral to lung nodule clinic.    MARYJO MADRID MD         SYSTEM ID:  [id number]

## 2024-06-08 VITALS
TEMPERATURE: 98.3 F | HEART RATE: 85 BPM | HEIGHT: 70 IN | SYSTOLIC BLOOD PRESSURE: 131 MMHG | BODY MASS INDEX: 21.39 KG/M2 | OXYGEN SATURATION: 99 % | RESPIRATION RATE: 16 BRPM | DIASTOLIC BLOOD PRESSURE: 89 MMHG | WEIGHT: 149.4 LBS

## 2024-06-08 PROCEDURE — 250N000009 HC RX 250: Performed by: INTERNAL MEDICINE

## 2024-06-08 PROCEDURE — 99239 HOSP IP/OBS DSCHRG MGMT >30: CPT | Performed by: INTERNAL MEDICINE

## 2024-06-08 PROCEDURE — 250N000013 HC RX MED GY IP 250 OP 250 PS 637

## 2024-06-08 PROCEDURE — 250N000011 HC RX IP 250 OP 636: Mod: JZ | Performed by: INTERNAL MEDICINE

## 2024-06-08 PROCEDURE — 250N000013 HC RX MED GY IP 250 OP 250 PS 637: Performed by: INTERNAL MEDICINE

## 2024-06-08 RX ADMIN — ABACAVIR SULFATE, DOLUTEGRAVIR SODIUM, LAMIVUDINE 1 TABLET: 600; 50; 300 TABLET, FILM COATED ORAL at 08:55

## 2024-06-08 RX ADMIN — LEVOMILNACIPRAN HYDROCHLORIDE 120 MG: 40 CAPSULE, EXTENDED RELEASE ORAL at 08:55

## 2024-06-08 RX ADMIN — AZITHROMYCIN DIHYDRATE 250 MG: 250 TABLET ORAL at 08:55

## 2024-06-08 RX ADMIN — Medication 1 TABLET: at 08:55

## 2024-06-08 RX ADMIN — CEFTRIAXONE SODIUM 2 G: 2 INJECTION, POWDER, FOR SOLUTION INTRAMUSCULAR; INTRAVENOUS at 10:48

## 2024-06-08 RX ADMIN — LORAZEPAM 2 MG: 1 TABLET ORAL at 09:00

## 2024-06-08 RX ADMIN — PREGABALIN 150 MG: 150 CAPSULE ORAL at 08:55

## 2024-06-08 ASSESSMENT — ACTIVITIES OF DAILY LIVING (ADL)
ADLS_ACUITY_SCORE: 26

## 2024-06-08 NOTE — PLAN OF CARE
Goal Outcome Evaluation:    Summary: Found unresponsive due to opioid overdose. CT scan showing multifocal bilateral opacities concerning through both lungs.      DATE & TIME: 6/7/2024, 9036-4413    Cognitive Concerns/ Orientation :  A & O x 4,   BEHAVIOR & AGGRESSION TOOL COLOR: Green         ABNL VS/O2: VSS@RA, slight HTN and tachy at time to mid 120s.  MOBILITY: Ind   PAIN MANAGMENT: Denies pain this shift, allergic to tylenol  DIET: Regular, snacking on crackers  BOWEL/BLADDER: Continent,  ABNL LAB/BG: mag pending. Labs called multiple times.  DRAIN/DEVICES: R PIV SL  TELEMETRY RHYTHM: Sinus tachycardia  SKIN: scattered abrasions/scabs to lower extremities  TESTS/PROCEDURES: CT completed in ER.   D/C DAY/GOALS/PLACE: pending clinical improvement,   OTHER IMPORTANT INFO: SW/CC following. History of MRSA (11/2021 blood)-contact precautions maintained. Pt would like to change code status - to be addressed by hospitalist, sticky note for MD.

## 2024-06-08 NOTE — DISCHARGE SUMMARY
[facility]  Hospitalist Discharge Summary      Date of Admission:  6/6/2024  Date of Discharge:  6/8/2024  Discharging Provider: Hien Wright MD  Discharge Service: Hospitalist Service    Discharge Diagnoses   Probable unintentional acute opioid overdose  Aspiration pneumonia  HIV  Depression and anxiety  Hypertension    Clinically Significant Risk Factors          Follow-ups Needed After Discharge   Follow-up Appointments     Follow-up and recommended labs and tests       Follow up with primary care provider, Khari Barrientos, within 7 days to   evaluate medication change and for hospital follow- up.  The following   labs/tests are recommended: Consider repeat CXR versus chest CT in 8 to 12   weeks to assess for resolution of changes..            Unresulted Labs Ordered in the Past 30 Days of this Admission       Date and Time Order Name Status Description    6/6/2024 10:55 AM Blood Culture Peripheral Blood Preliminary     6/6/2024 10:55 AM Blood Culture Peripheral Blood Preliminary         These results will be followed up by primary MD    Discharge Disposition   Discharged to home  Condition at discharge: Stable    Hospital Course   Bc German is a 43 year old male admitted on 6/6/2024 for an opioid overdose. He has a PMH of polysubstance abuse, intravenous drug user, opioid dependence, and HIV.     Unintentional Acute Opioid overdose  The patient was found unresponsive in his home. He has a long standing history of polysubstance abuse and opioid dependence. EMS was called and he was given 4mg of narcan and woke up. However, he was found to be hypoxic with oxygen saturation in the 70s. He was placed on C-pap en route to the ED. Upon arrival, he was able to be weaned off of the c-pap, but still required 2l of supplemental O2 via nasal canula. Chest x-ray showed bilateral infiltrates suggestive of pneumonia. Also noted to have a WBC of 18.1.   *Troponin 26-->30  *VBG 7.32 PCO2 67 PO2 22  "Bicarb 34  Alertness improved  Weaned off supplemental oxygen; oxygen saturations are now 100% on room air  Resumed PTA lorazepam, reduce risk of seizure in patient with chronic dependence on benzodiazepine  Resumed PTA Lyrica  PRN narcan    Pneumonia  Noted to have bilateral infiltrates and WBC of 18.1. Suspect aspiration pneumonia. Required supplemental oxygen.  *CT PE: No evidence of pulmonary embolism is seen to the subsegmental level.Multifocal bilateral opacities and pulmonary nodules measuring up to 1.4 cm as well as bronchial wall thickening and tree-in-bud nodularity seen throughout the lungs. These findings appear most extensive in the lingula and bilateral lower lobes. Constellation of findings are suggestive of bronchopneumonia. Suggest short-term follow-up to ensure resolution. Multiple enlarged mediastinal or hilar lymph nodes, nonspecific but suspected to be reactive. Suggest attention on follow-up exam. Dilated pulmonary trunk suggestive of pulmonary hypertension.  *WBC 18.1  *Lactic Acid 1.8  Checked procalcitonin, it is just above the upper limit of normal  IV Ceftriaxone  IV azithromycin  Transition to Augmentin at discharge    HIV  Diagnosed in 2017  Resume PTA TRIUMEQ      Depression and Anxiety  Patient denies suicidal ideation, \"I want to live.\"  He expressed varying thoughts about CODE STATUS, initially said he desired DNR, later said he changed his mind and reiterated how he wants to live.  He does say he is \"tired of addiction\" and that he has \"put my body through a lot, I lived on the streets.\"  Resume PTA Levomilnacipran  Resume PTA Seroquel    Hypertension  *-163  Resume PTA Clonidine  PRN Hydralazine for SBP greater than 170     Diet:  Regular  DVT Prophylaxis: Pneumatic Compression Devices  Tirado Catheter: Not present  Lines: None     Cardiac Monitoring: None  Code Status:  Full    Consultations This Hospital Stay   CARE MANAGEMENT / SOCIAL WORK IP CONSULT    Code Status   Full " Code    Time Spent on this Encounter   I, Hien Wright MD, personally saw the patient today and spent greater than 30 minutes discharging this patient.       Hien Wright MD  [place] [address] [facility]  [address] [id number]  Phone: [phone]  ______________________________________________________________________    Physical Exam   Vital Signs: Temp: 98.4  F (36.9  C) Temp src: Oral BP: (!) 137/92 Pulse: 84   Resp: 16 SpO2: 98 % O2 Device: None (Room air)    Weight: 149 lbs 6.4 oz  I saw and examined the patient on the date of discharge.         Primary Care Physician   Khari Barrientos    Discharge Orders      Reason for your hospital stay    You were unresponsive, probably due to a drug overdose.     Follow-up and recommended labs and tests     Follow up with primary care provider, Khari Barrientos, within 7 days to evaluate medication change and for hospital follow- up.  The following labs/tests are recommended: Consider repeat CXR versus chest CT in 8 to 12 weeks to assess for resolution of changes..     Activity    Your activity upon discharge: activity as tolerated     Diet    Follow this diet upon discharge: Orders Placed This Encounter      Combination Diet Regular Diet Adult       Significant Results and Procedures   Most Recent 3 CBC's:  Recent Labs   Lab Test 06/06/24  0542 02/29/24  1253 12/12/22  0738   WBC 18.1* 12.4* 6.3   HGB 15.5 17.1 13.0*   MCV 92 89 87    365 249     Most Recent 3 BMP's:  Recent Labs   Lab Test 06/06/24  0542 02/29/24  1253 12/12/22  0738    138 141   POTASSIUM 3.9 4.3 3.6   CHLORIDE 98 98 104   CO2 33* 30* 27   BUN 17.1 24.5* 12.1   CR 1.06 0.86 0.69   ANIONGAP 9 10 10   SANDRA 9.7 10.4* 9.1   * 122* 99     7-Day Micro Results       Collected Updated Procedure Result Status      06/06/2024 1100 06/08/2024 1531 Blood Culture Peripheral Blood [[id number]]   Peripheral Blood    Preliminary result Component Value    Culture No growth after 2 days  [P]                06/06/2024 1100 06/08/2024 1531 Blood Culture Peripheral Blood [[id number]]   Peripheral Blood    Preliminary result Component Value   Culture No growth after 2 days  [P]                    ,   Results for orders placed or performed during the hospital encounter of 06/06/24   Chest XR,  PA & LAT    Narrative    EXAM: XR CHEST 2 VIEWS  LOCATION: [facility]  DATE/TIME: 6/6/2024 6:34 AM CDT    INDICATION: Overdose, shortness of breath  COMPARISON: Chest x-ray on 11/16/2021 and CT chest, abdomen and pelvis on 12/11/2022.      Impression    IMPRESSION: PA and lateral views of the chest were obtained. Cardiomediastinal silhouette is within normal limits. No suspicious focal pulmonary opacities. Incidentally noted azygos lobe. No significant pleural effusion or pneumothorax. Postsurgical   changes of lower cervical spine fusion. On the lateral view, there is thin linear/wavy lucency in the anterior aspect of the chest, likely related to patient position, recommend clinical correlation and if indicated can be further evaluated with   dedicated chest CT.              CT Chest Pulmonary Embolism w Contrast    Narrative    CT CHEST PULMONARY EMBOLISM W CONTRAST 6/6/2024 10:27 AM    CLINICAL HISTORY: s/p narcan, hypoxia, tachy; Male sex; No prior  imaging in the last 24 hours; Pulmonary Embolism Rule-Out Criteria  (PERC) score > 0; Revised [place] Score (RGS) not >= 11; No D-dimer  result available; D-dimer not ordered  TECHNIQUE: CT angiogram chest during arterial phase injection IV  contrast. 2D and 3D MIP reconstructions were performed by the CT  technologist. Dose reduction techniques were used.     CONTRAST: 57 mL of Isovue-370    COMPARISON: None.    FINDINGS:  ANGIOGRAM CHEST: Pulmonary trunk appears mildly dilated measuring up  to 3.4 cm in diameter suggestive of pulmonary hypertension. No  evidence of pulmonary embolism is seen to the  subsegmental level.  Thoracic aorta is negative for dissection. No CT evidence of right  heart strain.    LUNGS AND PLEURA: No pleural effusion or pneumothorax is seen. Azygos  lobe is seen in the right upper lung. Multifocal groundglass opacities  and pulmonary nodules are seen throughout the lungs. One of the  largest nodules for example measures up to 1.4 cm in the right lower  lobe (series 7, image 173). Areas of bronchial wall thickening, mucous  plugging and tree-in-bud nodularity are also seen, most extensively  along the lingula and bilateral lower lobes.    MEDIASTINUM/AXILLAE: Multiple enlarged mediastinal and hilar lymph  nodes are present, including a 1.3 cm subcarinal lymph node (series 5,  image 137) as well as a 1.1 cm right hilar lymph node (series 5, image  124).    UPPER ABDOMEN: Bilateral renal cysts are partially seen.    MUSCULOSKELETAL: No suspicious osseous lesions. Partially seen  cervical fusion hardware.      Impression    IMPRESSION:  1.  No evidence of pulmonary embolism is seen to the subsegmental  level.  2.  Multifocal bilateral opacities and pulmonary nodules measuring up  to 1.4 cm as well as bronchial wall thickening and tree-in-bud  nodularity seen throughout the lungs. These findings appear most  extensive in the lingula and bilateral lower lobes. Constellation of  findings are suggestive of bronchopneumonia. Suggest short-term  follow-up to ensure resolution.  3.  Multiple enlarged mediastinal or hilar lymph nodes, nonspecific  but suspected to be reactive. Suggest attention on follow-up exam.  4.  Dilated pulmonary trunk suggestive of pulmonary hypertension.    REFERENCE:  Guidelines for Management of Incidental Pulmonary Nodules Detected on  CT Images: From the Fleischner Society 2017.   Guidelines apply to incidental nodules in patients who are 35 years or  older.  Guidelines do not apply to lung cancer screening, patients with  immunosuppression, or patients with known  primary cancer.    MULTIPLE NODULES  Nodule size <6 mm  Low-risk patients: No follow-up needed.  High-risk patients: Optional follow-up at 12 months.    Nodule size 6 mm or larger  Low-risk patients: Follow-up CT at 3-6 months, then consider CT at  18-24 months.  High-risk patients: Follow-up CT at 3-6 months, then at 18-24 months  if no change.  -Use most suspicious nodule as guide to management.    Consider referral to lung nodule clinic.    MARYJO MADRID MD         SYSTEM ID:  [id number]       Discharge Medications   Current Discharge Medication List        START taking these medications    Details   amoxicillin-clavulanate (AUGMENTIN) 875-125 MG tablet Take 1 tablet by mouth 2 times daily for 5 days  Qty: 10 tablet, Refills: 0    Associated Diagnoses: Community acquired pneumonia, unspecified laterality           CONTINUE these medications which have NOT CHANGED    Details   abacavir-dolutegravir-LamiVUDine (TRIUMEQ) 600- MG per tablet Take 1 tablet by mouth daily Please call [phone] & make appointment for further refills.  Qty: 30 tablet, Refills: 0    Associated Diagnoses: HIV disease (H)      buprenorphine HCl-naloxone HCl (SUBOXONE) 8-2 MG per film Place 1 Film under the tongue 2 times daily      cloNIDine (CATAPRES) 0.1 MG tablet Take 1 tablet (0.1 mg) by mouth every 6 hours as needed (withdrawal symptoms including hot/cold sweats, anxiety, restlessness, sleeplessness)  Qty: 20 tablet, Refills: 0    Associated Diagnoses: Opioid withdrawal (H)      famotidine (PEPCID) 20 MG tablet Take 20 mg by mouth 2 times daily as needed (heartburn)      levomilnacipran (FETZIMA ER) 120 MG 24 hr capsule Take 120 mg by mouth daily      LORazepam (ATIVAN) 2 MG tablet Take 2 mg by mouth 3 times daily      multivitamin w/minerals (MULTI-VITAMIN) tablet Take 1 tablet by mouth daily      naloxone (NARCAN) 4 MG/0.1ML nasal spray Spray 1 spray (4 mg) into one nostril alternating nostrils as needed for opioid reversal  every 2-3 minutes until assistance arrives  Qty: 0.2 mL, Refills: 0      polyethylene glycol (MIRALAX) 17 GM/Dose powder Take 17 g (1 Capful) by mouth daily  Qty: 578 g, Refills: 11    Associated Diagnoses: Opioid use disorder      !! pregabalin (LYRICA) 150 MG capsule Take 150 mg by mouth nightly as needed      !! pregabalin (LYRICA) 150 MG capsule Take 150 mg by mouth 2 times daily      promethazine (PHENERGAN) 25 MG tablet Take 1 tablet (25 mg) by mouth every 6 hours as needed for nausea  Qty: 20 tablet, Refills: 0    Associated Diagnoses: Opioid withdrawal (H)      QUEtiapine (SEROQUEL) 100 MG tablet Take 100-150 mg by mouth at bedtime At baseline will take 100 mg; if trouble sleeping will take extra half tablet dose      SUMAtriptan (IMITREX) 50 MG tablet Take 50 mg by mouth at onset of headache for migraine (may take repeat dose in 2 hours)      testosterone cypionate (DEPOTESTOTERONE) 200 MG/ML injection Inject 200 mg into the muscle Injects 1 ml every 7-10 days       !! - Potential duplicate medications found. Please discuss with provider.        Allergies   Allergies   Allergen Reactions    Bupropion Other (See Comments)     Other reaction(s): seizure when took a double dose        Acetaminophen Nausea and Vomiting and GI Disturbance       If multiple uses per pt report    Codeine Nausea and Vomiting, Itching and Fatigue           Sulfa Antibiotics Itching

## 2024-06-08 NOTE — PLAN OF CARE
Goal Outcome Evaluation:       Summary: Found unresponsive due to opioid overdose. CT scan showing multifocal bilateral opacities concerning through both lungs.      DATE & TIME: 6/7/2024 to 6/8/24 6312-6164  Cognitive Concerns/ Orientation :  A & O x 4,   BEHAVIOR & AGGRESSION TOOL COLOR: Green         ABNL VS/O2: VSS@RA, slight HTN and tachy at time to mid 120s. Refused VS.  MOBILITY: Ind   PAIN MANAGMENT: Denies pain this shift, allergic to tylenol  DIET: Regular,   BOWEL/BLADDER: Continent,  ABNL LAB/BG: mag pending.   DRAIN/DEVICES: R PIV SL  TELEMETRY RHYTHM: Sinus tachycardia  SKIN: scattered abrasions/scabs to lower extremities  TESTS/PROCEDURES: None  D/C DAY/GOALS/PLACE: pending clinical improvement,   OTHER IMPORTANT INFO: SW/CC following. History of MRSA (11/2021 blood)-contact precautions maintained. Pt would like to change code status - to be addressed by hospitalist, sticky note for MD. Refused to be awaken at night. Assessment was not done.

## 2024-06-08 NOTE — PROGRESS NOTES
Discharge    Patient discharged to home via Uber.  AVS reviewed with patient. Medication supplied from discharge pharmacy. IV's removed. Patient was alert and oriented, ambulating independently.    Listed belongings gathered and given to patient (including from security/pharmacy). Yes  Care Plan and Patient education resolved: Yes  Prescriptions if needed, hard copies sent with patient  NA  Medication Bin checked and emptied on discharge Yes  SW/care coordinator/charge RN aware of discharge: Yes

## 2024-06-11 ENCOUNTER — HOSPITAL ENCOUNTER (EMERGENCY)
Facility: CLINIC | Age: 44
Discharge: HOME | End: 2024-06-12
Attending: EMERGENCY MEDICINE | Admitting: EMERGENCY MEDICINE
Payer: COMMERCIAL

## 2024-06-11 ENCOUNTER — PATIENT OUTREACH (OUTPATIENT)
Dept: CARE COORDINATION | Facility: CLINIC | Age: 44
End: 2024-06-11
Payer: COMMERCIAL

## 2024-06-11 DIAGNOSIS — T40.411A ACCIDENTAL FENTANYL OVERDOSE, INITIAL ENCOUNTER (H): ICD-10-CM

## 2024-06-11 LAB
BACTERIA BLD CULT: NO GROWTH
BACTERIA BLD CULT: NO GROWTH

## 2024-06-11 PROCEDURE — 99285 EMERGENCY DEPT VISIT HI MDM: CPT

## 2024-06-11 ASSESSMENT — ACTIVITIES OF DAILY LIVING (ADL)
ADLS_ACUITY_SCORE: 37

## 2024-06-11 ASSESSMENT — COLUMBIA-SUICIDE SEVERITY RATING SCALE - C-SSRS
6. HAVE YOU EVER DONE ANYTHING, STARTED TO DO ANYTHING, OR PREPARED TO DO ANYTHING TO END YOUR LIFE?: NO
1. IN THE PAST MONTH, HAVE YOU WISHED YOU WERE DEAD OR WISHED YOU COULD GO TO SLEEP AND NOT WAKE UP?: NO
2. HAVE YOU ACTUALLY HAD ANY THOUGHTS OF KILLING YOURSELF IN THE PAST MONTH?: NO

## 2024-06-11 NOTE — PROGRESS NOTES
[facility] Contact  [facility]/Voicemail     Clinical Data: Post-Discharge Outreach     Outreach attempted x 2.  Left message on patient's voicemail, providing [facility]'s central phone number of [phone] ([phone]) for questions/concerns and/or to schedule an appt with an [facility] provider, if they do not have a PCP.      Plan:  [facility] will do no further outreaches at this time.       FABIAN Kevin  [phone]  [facility]

## 2024-06-12 VITALS
HEART RATE: 99 BPM | SYSTOLIC BLOOD PRESSURE: 133 MMHG | RESPIRATION RATE: 22 BRPM | OXYGEN SATURATION: 96 % | DIASTOLIC BLOOD PRESSURE: 100 MMHG | HEIGHT: 71 IN | TEMPERATURE: 98.9 F | WEIGHT: 147 LBS | BODY MASS INDEX: 20.58 KG/M2

## 2024-06-12 NOTE — ED NOTES
Bed: ED16  Expected date:   Expected time:   Means of arrival:   Comments:  EMS 43m fentanyl OD, narcan given, alert now

## 2024-06-12 NOTE — ED PROVIDER NOTES
"  Emergency Department Note      History of Present Illness     Chief Complaint  Drug Overdose    HPI  Bc German is a 43 year old male with history of opioid overdose who presents to the ED via EMS for evaluation of drug overdose. EMS reports patient was with his father, patient went to use the bathroom and after a few minutes, his father found him on the floor unresponsive. His dad gave patient 8 mg of nasal narcan. This has happened many times. Patient smoked 2 \"hits\" of fentanyl. Patient has been in treatment 11 times and is not interested to going back due to past experience.     Independent Historian  EMS as detailed above.    Review of External Notes  None  Past Medical History   Medical History and Problem List  Anxiety  Depressive disorder  Drug abuse  HIV  IVDU  Osteomyelitis of vertebra of lumbosacral region   Substance abuse  Acute cholecystitis  Hypoxia  Acute respiratory failure  Opioid overdose   PTSD  Alcohol abuse     Medications  Triumeq  Augmentin  Catapres  Pepcid  Fetzima  Ativan  Lyrica  Phenergan  Seroquel  Imitrex  Zofran    Surgical History   Eye surgery  Laparoscopic cholecystectomy  Optical tracking system fusion cervical anterior one level - right  Arm surgery  PICC insertion   Transesophageal echocardiogram intraoperative x3    Physical Exam   Patient Vitals for the past 24 hrs:   BP Temp Temp src Pulse Resp SpO2 Height Weight   06/11/24 2300 133/89 -- -- 105 -- 95 % -- --   06/11/24 2245 132/88 -- -- 109 -- 94 % -- --   06/11/24 2230 128/85 -- -- 112 -- 93 % -- --   06/11/24 2145 (!) 146/103 -- -- 118 -- 98 % -- --   06/11/24 2130 (!) 134/91 -- -- 114 -- 98 % -- --   06/11/24 2115 (!) 129/100 -- -- 119 -- 97 % -- --   06/11/24 2100 (!) 135/100 -- -- 120 -- 99 % -- --   06/11/24 2056 (!) 157/116 98.9  F (37.2  C) Temporal 118 22 99 % 1.803 m (5' 11\") 66.7 kg (147 lb)   06/11/24 2055 -- 98.8  F (37.1  C) Temporal -- -- -- -- --     Physical Exam  Nursing note and vitals " reviewed.  Constitutional:  Appears well-developed and well-nourished.   HENT:   Head:    Atraumatic.   Mouth/Throat:   Oropharynx is clear and moist. No oropharyngeal exudate.   Eyes:    Pupils are dilated but reactive.  Neck:    Normal range of motion. Neck supple.      No tracheal deviation present. No thyromegaly present.   Cardiovascular:  Normal rate, regular rhythm, no murmur   Pulmonary/Chest: Breath sounds are clear and equal without wheezes or crackles.  Abdominal:   Soft. Bowel sounds are normal. Exhibits no distension and      no mass. There is no tenderness.      There is no rebound and no guarding.   Musculoskeletal:  Exhibits no edema.   Lymphadenopathy:  No cervical adenopathy.   Neurological:   Alert and oriented to person, place, and time.   Skin:    Skin is warm and dry. No rash noted. No pallor.   Diagnostics   Lab Results   Labs Ordered and Resulted from Time of ED Arrival to Time of ED Departure - No data to display    Independent Interpretation  None  ED Course    Medications Administered  Medications - No data to display    Procedures  Procedures     Discussion of Management  None    Social Determinants of Health adding to complexity of care  Fentanyl abuse  Healthcare Access/compliance    ED Course  ED Course as of 06/12/24 0038   Tue Jun 11, 2024 2139 I obtained history and examined the patient as noted above.    2319 I spoke with Poison Control regarding the patient.    Wed Jun 12, 2024 0038 I rechecked the patient and explained findings. I prepared the patient to be discharged home.      Medical Decision Making / Diagnosis   CMS Diagnoses: None    MIPS     None    MDM  This patient arrived due to an unintentional fentanyl overdose from smoking fentanyl.  He was given 8 mg intranasal Narcan 4 hours ago and remains awake and alert.  I consulted poison control who feels that he can be safely discharged after 4 hours post Narcan since he is awake and alert he was discharged home.  He does  not have any signs of head injury, infection or bleeding at this time.  He does not want chemical dependency treatment at this time.  He was discharged in the care of his father.  He was discharged with a prescription for nasal Narcan.  He was instructed follow-up with his primary care doctor this week.  He was given substance abuse resources.    Disposition  The patient was discharged.     ICD-10 Codes:    ICD-10-CM    1. Accidental fentanyl overdose, initial encounter (H)  T40.411A          Discharge Medications  New Prescriptions    NALOXONE (NARCAN) 4 MG/0.1ML NASAL SPRAY    Spray 1 spray (4 mg) into one nostril alternating nostrils as needed for opioid reversal every 2-3 minutes until assistance arrives     Scribe Disclosure:  I, Megan Cooepr, am serving as a scribe at 10:41 PM on 6/11/2024 to document services personally performed by Maricel Blue MD based on my observations and the provider's statements to me.      Maricel Blue MD  06/12/24 0107

## 2024-06-12 NOTE — ED TRIAGE NOTES
"Patient BIBA for a drug overdose at home. Patient was at home with father, and went to use the restroom. Father was gone for \"minutes\" per Ems and when he returned, patient was found unresponsive on the floor of the bathroom. Dad administered 8mg Narcan (patient's prescription) and patient has been alert and cooperative ever since. Denies hitting his head, denies SI/HI. Patients states he would \"never\" try to kill himself but rather this was \"another accidental\" overdose.       Triage Assessment (Adult)       Row Name 06/11/24 2416          Triage Assessment    Airway WDL WDL        Respiratory WDL    Respiratory WDL WDL        Skin Circulation/Temperature WDL    Skin Circulation/Temperature WDL WDL        Cardiac WDL    Cardiac WDL X;rhythm     Pulse Rate & Regularity tachycardic        Peripheral/Neurovascular WDL    Peripheral Neurovascular WDL WDL        Cognitive/Neuro/Behavioral WDL    Cognitive/Neuro/Behavioral WDL WDL        [place] Coma Scale    Best Eye Response 4-->(E4) spontaneous     Best Motor Response 6-->(M6) obeys commands     Best Verbal Response 5-->(V5) oriented     [place] Coma Scale Score 15                     "

## 2024-06-26 ENCOUNTER — LAB REQUISITION (OUTPATIENT)
Dept: LAB | Facility: CLINIC | Age: 44
End: 2024-06-26
Payer: COMMERCIAL

## 2024-06-26 ENCOUNTER — HOSPITAL ENCOUNTER (EMERGENCY)
Facility: CLINIC | Age: 44
Discharge: HOME | End: 2024-06-26
Attending: EMERGENCY MEDICINE
Payer: COMMERCIAL

## 2024-06-26 VITALS
HEART RATE: 112 BPM | DIASTOLIC BLOOD PRESSURE: 73 MMHG | RESPIRATION RATE: 16 BRPM | TEMPERATURE: 97.9 F | OXYGEN SATURATION: 97 % | WEIGHT: 136.69 LBS | BODY MASS INDEX: 21.45 KG/M2 | SYSTOLIC BLOOD PRESSURE: 128 MMHG | HEIGHT: 67 IN

## 2024-06-26 DIAGNOSIS — B20 HIV DISEASE (H): ICD-10-CM

## 2024-06-26 DIAGNOSIS — F11.93 OPIOID WITHDRAWAL (H): ICD-10-CM

## 2024-06-26 DIAGNOSIS — F11.20 OPIOID DEPENDENCE, UNCOMPLICATED (H): ICD-10-CM

## 2024-06-26 DIAGNOSIS — F11.20 OPIOID TYPE DEPENDENCE, CONTINUOUS (H): Primary | ICD-10-CM

## 2024-06-26 DIAGNOSIS — F11.20 UNCOMPLICATED OPIOID DEPENDENCE (H): ICD-10-CM

## 2024-06-26 DIAGNOSIS — Z76.0 ENCOUNTER FOR MEDICATION REFILL: ICD-10-CM

## 2024-06-26 LAB
ALCOHOL BREATH TEST: 0 (ref 0–0.01)
AMPHETAMINES UR QL SCN: ABNORMAL
BARBITURATES UR QL SCN: ABNORMAL
BENZODIAZ UR QL SCN: ABNORMAL
BZE UR QL SCN: ABNORMAL
CANNABINOIDS UR QL SCN: ABNORMAL
FENTANYL UR QL: ABNORMAL
OPIATES UR QL SCN: ABNORMAL
PCP QUAL URINE (ROCHE): ABNORMAL

## 2024-06-26 PROCEDURE — 80307 DRUG TEST PRSMV CHEM ANLYZR: CPT | Performed by: EMERGENCY MEDICINE

## 2024-06-26 PROCEDURE — 82075 ASSAY OF BREATH ETHANOL: CPT | Performed by: EMERGENCY MEDICINE

## 2024-06-26 PROCEDURE — 80076 HEPATIC FUNCTION PANEL: CPT | Mod: ORL | Performed by: INTERNAL MEDICINE

## 2024-06-26 PROCEDURE — 99284 EMERGENCY DEPT VISIT MOD MDM: CPT | Performed by: EMERGENCY MEDICINE

## 2024-06-26 PROCEDURE — G2213 INITIAT MED ASSIST TX IN ER: HCPCS | Performed by: EMERGENCY MEDICINE

## 2024-06-26 PROCEDURE — 83735 ASSAY OF MAGNESIUM: CPT | Mod: ORL | Performed by: INTERNAL MEDICINE

## 2024-06-26 PROCEDURE — 96372 THER/PROPH/DIAG INJ SC/IM: CPT | Performed by: EMERGENCY MEDICINE

## 2024-06-26 PROCEDURE — 250N000011 HC RX IP 250 OP 636: Mod: JZ | Performed by: EMERGENCY MEDICINE

## 2024-06-26 RX ORDER — ABACAVIR SULFATE, DOLUTEGRAVIR SODIUM, LAMIVUDINE 600; 50; 300 MG/1; MG/1; MG/1
1 TABLET, FILM COATED ORAL DAILY
Qty: 30 TABLET | Refills: 0 | Status: ON HOLD | OUTPATIENT
Start: 2024-06-26 | End: 2024-07-30

## 2024-06-26 RX ORDER — AMOXICILLIN 500 MG/1
500 CAPSULE ORAL 3 TIMES DAILY
COMMUNITY
Start: 2024-06-21 | End: 2024-06-28

## 2024-06-26 RX ORDER — QUETIAPINE FUMARATE 100 MG/1
100 TABLET, FILM COATED ORAL AT BEDTIME
Qty: 30 TABLET | Refills: 0 | Status: ON HOLD | OUTPATIENT
Start: 2024-06-26 | End: 2024-07-30

## 2024-06-26 RX ORDER — FAMOTIDINE 20 MG/1
20 TABLET, FILM COATED ORAL DAILY
Qty: 30 TABLET | Refills: 0 | Status: SHIPPED | OUTPATIENT
Start: 2024-06-26 | End: 2024-07-26

## 2024-06-26 RX ORDER — PREGABALIN 150 MG/1
150 CAPSULE ORAL 2 TIMES DAILY
Qty: 60 CAPSULE | Refills: 0 | Status: ON HOLD | OUTPATIENT
Start: 2024-06-26 | End: 2024-07-30

## 2024-06-26 RX ORDER — CLONIDINE HYDROCHLORIDE 0.1 MG/1
0.1 TABLET ORAL EVERY 6 HOURS PRN
Qty: 20 TABLET | Refills: 0 | Status: SHIPPED | OUTPATIENT
Start: 2024-06-26 | End: 2024-07-26

## 2024-06-26 RX ADMIN — BUPRENORPHINE 24 MG: 24 INJECTION SUBCUTANEOUS at 17:10

## 2024-06-26 ASSESSMENT — COLUMBIA-SUICIDE SEVERITY RATING SCALE - C-SSRS
1. IN THE PAST MONTH, HAVE YOU WISHED YOU WERE DEAD OR WISHED YOU COULD GO TO SLEEP AND NOT WAKE UP?: NO
2. HAVE YOU ACTUALLY HAD ANY THOUGHTS OF KILLING YOURSELF IN THE PAST MONTH?: NO
6. HAVE YOU EVER DONE ANYTHING, STARTED TO DO ANYTHING, OR PREPARED TO DO ANYTHING TO END YOUR LIFE?: NO

## 2024-06-26 ASSESSMENT — ACTIVITIES OF DAILY LIVING (ADL)
ADLS_ACUITY_SCORE: 37
ADLS_ACUITY_SCORE: 37
ADLS_ACUITY_SCORE: 35
ADLS_ACUITY_SCORE: 37

## 2024-06-26 ASSESSMENT — LIFESTYLE VARIABLES: TOTAL_SCORE: 6

## 2024-06-26 NOTE — PROGRESS NOTES
"First Step  Program - Initial SUN Consult Note:    Demographics:  Patient name Bc German   /Age 1980, 43 year old   Gender/Ethnicity male   The patient's reported race is: White   Chief Complaint Addiction Problem     Language of Care English    No     Writer was unit's assigned Substance Use Navigator (SUN) for the shift and was notified by Charge RN at 2PM that Bc German presented seeking Mx for addiction treatment in the context of chronic opioid use. Pt approached in ED bed hallway B for SUN consult. Pt endorses using Multiple, including: Fentanyl, heroin, meth, weed, cocaine, alcohol (1/2 to 1 gram of Fentanyl each use] amount of times of use per week vary]. Most recent use identified as approximately 2PM on [24]. Current withdrawal symptoms indicated to be [mild/moderate/severe] and noteworthy for the following, per Pt: Hot/cold sensation, Body aches, Stomach/abdominal cramps, Diarrhea, Nasal congestion, Restlessness, and Agitation     ED provider and primary RN notified of SUN consult and made aware of Pt's current condition/symptoms.    Other information:  Pt reports other substance use notable for: Alcohol, Cannabis, other THC, Methamphetamine (\"Meth\"), Benzodiazepines (Valium, Xanax, Ativan, Klonopin), Cocaine, crack, and Acid (LSD)    Comfort items/interventions provided to Pt by LOUISA following initial consult: Warm blanket, Food/snacks, Water/ice chips, and Bathroom    Pt [does] have a history of utilizing medication-assisted treatment (MAT) for their opioid use.    Pt wants to pursue treatment options following ED stay: [YES]   Pt presents from, or with plan to attend, treatment facility: [NO]    Pt expressed his use of drugs since age 12-- weed, alcohol, meth etc. Pt reports he began using Fentanyl in . Pt reports \"I am tired of using, I want to stop but I don't know what to do next.\" Pt reports to have tried suboxone and buprenorphine in the past with " minimal result with withdrawal symptom control. Pt may be interested in new treatment and higher dosage      Pt contact for follow-up:

## 2024-06-26 NOTE — PROGRESS NOTES
See [facility] note for details.    In brief, being sent to [facility] ED for labs, with goal of [facility] detox for: meth, opioids, alcohol, and benzos.    A/P from my note from earlier today:  Assessment and Plan:43 year old year old male well known to me, with depression, PTSD, anxiety, severe active long term Opioid Use Disorder (not on MOUD), active methamphetamine use disorder, benzodiazepine dependence, HIV (on medications), multiple complications from injection drug use (endocarditis, osteomyelitis, multiple overdoses (including 3 in the past few mo) here for follow-up.    #Care Plan  Very high risk of death. Would benefit from detox, evaluation by mental health, and addiction support. Ideally, would get linked to chem dep treatment after detox.    Severe OUD:  Would be a great candidate for brixadi, but any method of MOUD would work. If he is interested in subutex, we would continue this at [facility].    Severe meth use disorder:  As above, would benefit from detox.    Severe benzo dependence:  Has a carmen time psychiatrist who prescribes benzos. He is likely at risk of withdrawal. I would defer to psychiatry if benzodiazepines are the best option for him, but since he has a long term psych provider, would recommend other treatment providers with concerns to consider connecting with his sychiatrst (See PDMP)    Currently he is interested in linkage to [facility] detox. Called dad, who will pick him up.    Labs per detox request that are able to be resulted now:  Tox screen, BMP, CBC  LFTs and magnesium not able to get back today; discussed with detox, and they request he go to [facility] ED first, and then be transitioned to detox.    I will give warm handoff to ED once he is on the way    MD Khari Armas MD  Director, Hospital Medicine Addiction Service  Internal Medicine/Pediatrics Hospitalist & Staff Physician   of Internal Medicine and Pediatrics  [facility]  [facility]  Pager: [phone]     Addendum:  Received first dose of Brixadi (week long buprenorphine subcutaneous).  His goal is sublocade    - I have reviewed recommendations for comprehensive treatment plan with the patient  - I have reviewed the patient's medications comprehensively  and provided education to the patient on risks associated with concurrent use of benzodiazepines, alcohol, other sedatives with opioids  - I have recommended concomitant psychosocial support  - I have complied with all aspects of REMS program for Sublocade. [facility] where Sublocade will be administered is in compliance with all aspects of REMS program.   - Patient meets DSM-5 criteria for moderate or severe opioid use disorder  - Patient has been prescribed buprenorphine 8-24mg/day for at least one 1 week, demonstrating control of cravings and withdrawal symptoms as well as tolerance of buprenorphine.   - Patient will discontinue sublingual buprenorphine upon initiation of Sublocade  - Patient does not have evidence of tampering or attempts to remove the depot at the injection site.   - Patient does not have severe hepatic impairment.   - Patient does not have adrenal insufficiency.   - Patient does not have a history of long QT syndrome  - Patient does not take any antiarrhythmic medications or other medications known to significantly prolong QT interval    - Urine Drug Screen on  was positive for buprenorphine  - Patient will not be receiving methadone while on Sublocade  - Patient will not be receiving any other long acting buprenorphine products or long acting naltrexone while on Sublocade    - [place]  reflecting buprenorphine prescriptions.  Of note, the dose of Brixadi was given in the emergency department on 6/26.

## 2024-06-26 NOTE — DISCHARGE INSTRUCTIONS
Please follow up with our Recovery [facility] in 1 week.  They are located in the hallway behind the emergency department in the [facility].  Walk-in hours are Monday through Friday from 9-3.  Phone [phone].    Please return to the ER for any concerns.

## 2024-06-26 NOTE — ED TRIAGE NOTES
Patient reports he has overdose of fentanyl many times, if it wasn't narcan he would bee dead. Patient would like to get help to help with the craving and withdrawal sx.      Triage Assessment (Adult)       Row Name 06/26/24 0214          Triage Assessment    Airway WDL WDL        Respiratory WDL    Respiratory WDL WDL        Skin Circulation/Temperature WDL    Skin Circulation/Temperature WDL WDL        Cardiac WDL    Cardiac WDL WDL        Peripheral/Neurovascular WDL    Peripheral Neurovascular WDL WDL        Cognitive/Neuro/Behavioral WDL    Cognitive/Neuro/Behavioral WDL WDL

## 2024-06-26 NOTE — ED PROVIDER NOTES
ED Provider Note  [facility]      History     Chief Complaint   Patient presents with    Addiction Problem     Here for detox from fentanyl , last used 2 days ago     HPI  Bc German is a 43-year-old male who was sent from the [facility] clinic for evaluation of his polysubstance abuse and possible Brixadi administration to supplement to detox stay.  The patient states that he has been using intermittent doses of fentanyl along with some benzos, and then drank some alcohol last night, but patient states that he does not usually drink alcohol.  Patient states that he uses a half a gram of fentanyl daily, sometimes a gram.  The patient states he is motivated to take care of his substance abuse problem and would like to follow-up with recovery clinic.  The patient states he is not currently suicidal, not homicidal, and states that he ran out of his medications for his HIV and other chronic medications.    The patient's physician and care team at the [facility] clinic called expressing concerns that they had discussed a treatment plan with the patient and his father regarding detox admission here at [facility] and that he was sent here primarily for liver enzymes to be done so that he would qualify for detox here.    The patient states that he does not want our detox because he does not plan on stopping benzodiazepines and he does not want [address] [place] [address] for opiate or meth detox either.  Patient states he is willing to follow-up with our recovery clinic and try the Brixati to help him get off opiates.    This part of the medical record was transcribed by Joe De Los Santos, Medical Scribe, from a dictation done by Hi Ellis MD.     Past Medical History  Past Medical History:   Diagnosis Date    Anxiety     Depressive disorder     Drug abuse, IV (H)     Group A streptococcal infection 11/2014    Bacteremia/cellulitis    HCV antibody positive     HIV (human immunodeficiency virus infection) (H)      HIV (human immunodeficiency virus infection) (H)     HIV (human immunodeficiency virus infection) (H)     IVDU (intravenous drug user)     Osteomyelitis of vertebra of lumbosacral region (H) 3/2011    L3, left psoas abscess    Substance abuse (H)      Past Surgical History:   Procedure Laterality Date    EYE SURGERY  1 year ago    pins to left eye after socket fracture    LAPAROSCOPIC CHOLECYSTECTOMY N/A 2/7/2022    Procedure: CHOLECYSTECTOMY, LAPAROSCOPIC;  Surgeon: Lico Rodriguez MD;  Location:  OR    OPTICAL TRACKING SYSTEM FUSION CERVICAL ANTERIOR ONE LEVEL Right 9/10/2017    Procedure: OPTICAL TRACKING SYSTEM FUSION CERVICAL ANTERIOR ONE LEVEL;  Stealth C6-C7 Anterior Cervical Corpectomy and C5-T1 Fusion;  Surgeon: Marv Ambrose MD;  Location: [facility] OR    ORTHOPEDIC SURGERY      Arm Surgery    PICC INSERTION Left 09/21/2017    5fr DL BioFlo PICC, 42cm (1cm external) in the L basilic vein w/ tip in the low SVC.    TRANSESOPHAGEAL ECHOCARDIOGRAM INTRAOPERATIVE N/A 3/17/2015    Procedure: TRANSESOPHAGEAL ECHOCARDIOGRAM INTRAOPERATIVE;  Surgeon: Generic Anesthesia Provider;  Location: [facility]    TRANSESOPHAGEAL ECHOCARDIOGRAM INTRAOPERATIVE N/A 11/18/2021    Procedure: ECHOCARDIOGRAM, TRANSESOPHAGEAL, INTRAOPERATIVE;  Surgeon: GENERIC ANESTHESIA PROVIDER;  Location:  OR    TRANSESOPHAGEAL ECHOCARDIOGRAM INTRAOPERATIVE N/A 12/2/2021    Procedure: ECHOCARDIOGRAM, TRANSESOPHAGEAL, INTRAOPERATIVE;  Surgeon: GENERIC ANESTHESIA PROVIDER;  Location:  OR     amoxicillin (AMOXIL) 500 MG capsule  levomilnacipran (FETZIMA ER) 120 MG 24 hr capsule  abacavir-dolutegravir-LamiVUDine (TRIUMEQ) 600- MG per tablet  buprenorphine HCl-naloxone HCl (SUBOXONE) 8-2 MG per film  cloNIDine (CATAPRES) 0.1 MG tablet  famotidine (PEPCID) 20 MG tablet  LORazepam (ATIVAN) 2 MG tablet  multivitamin w/minerals (MULTI-VITAMIN) tablet  naloxone (NARCAN) 4 MG/0.1ML nasal spray  naloxone (NARCAN) 4 MG/0.1ML nasal  "spray  polyethylene glycol (MIRALAX) 17 GM/Dose powder  pregabalin (LYRICA) 150 MG capsule  pregabalin (LYRICA) 150 MG capsule  promethazine (PHENERGAN) 25 MG tablet  QUEtiapine (SEROQUEL) 100 MG tablet  SUMAtriptan (IMITREX) 50 MG tablet  testosterone cypionate (DEPOTESTOTERONE) 200 MG/ML injection      Allergies   Allergen Reactions    Bupropion Other (See Comments)     Other reaction(s): seizure when took a double dose        Acetaminophen Nausea and Vomiting and GI Disturbance       If multiple uses per pt report    Codeine Nausea and Vomiting, Itching and Fatigue           Sulfa Antibiotics Itching     Family History  No family history on file.    Social History   Social History     Tobacco Use    Smoking status: Every Day     Current packs/day: 0.25     Types: Cigarettes    Smokeless tobacco: Never    Tobacco comments:     mostly vapes   Substance Use Topics    Alcohol use: Yes     Comment: occ    Drug use: Yes     Types: Marijuana, Opiates, Methamphetamines     Comment: Heroine--IV, benzo's      A medically appropriate review of systems was performed with pertinent positives and negatives noted in the HPI, and all other systems negative.    Physical Exam   BP: 128/73  Pulse: 112  Temp: 97.9  F (36.6  C)  Resp: 16  Height: 170.2 cm (5' 7\")  Weight: 62 kg (136 lb 11 oz)  SpO2: 97 %  Physical Exam  Vitals and nursing note reviewed.   Constitutional:       Comments: Conversant and cooperative   HENT:      Head: Atraumatic.   Eyes:      Extraocular Movements: Extraocular movements intact.      Pupils: Pupils are equal, round, and reactive to light.   Cardiovascular:      Rate and Rhythm: Regular rhythm.   Pulmonary:      Breath sounds: Normal breath sounds.   Musculoskeletal:         General: No deformity.      Cervical back: Neck supple.   Neurological:      General: No focal deficit present.      Mental Status: He is alert and oriented to person, place, and time.   Psychiatric:         Mood and Affect: Mood " normal.      Comments: Good eye contact           ED Course, Procedures, & Data      Procedures          Results for orders placed or performed during the hospital encounter of 06/26/24   Urine Drug Screen Panel     Status: Abnormal   Result Value Ref Range    Amphetamines Urine Screen Positive (A) Screen Negative    Barbituates Urine Screen Negative Screen Negative    Benzodiazepine Urine Screen Negative Screen Negative    Cannabinoids Urine Screen Positive (A) Screen Negative    Cocaine Urine Screen Positive (A) Screen Negative    Fentanyl Qual Urine Screen Positive (A) Screen Negative    Opiates Urine Screen Negative Screen Negative    PCP Urine Screen Negative Screen Negative   Alcohol breath test POCT     Status: Normal   Result Value Ref Range    Alcohol Breath Test 0.00 0.00 - 0.01   Urine Drug Screen     Status: Abnormal    Narrative    The following orders were created for panel order Urine Drug Screen.  Procedure                               Abnormality         Status                     ---------                               -----------         ------                     Urine Drug Screen Panel[[id number]]      Abnormal            Final result                 Please view results for these tests on the individual orders.     Medications   buprenorphine ER (BRIXADI WEEKLY) 24 MG/0.48ML subcutaneous injection 24 mg (has no administration in time range)       Critical care was not performed.     Medical Decision Making  The patient's presentation was of moderate complexity (a chronic illness mild to moderate exacerbation, progression, or side effect of treatment).    The patient's evaluation involved:  ordering and/or review of 2 test(s) in this encounter (see above)  discussion of management or test interpretation with another health professional (chemical dependency and his primary care team)    The patient's management necessitated moderate risk (prescription drug management including medications given in  the ED).    Assessment & Plan      I have reviewed the nursing notes.     This case was discussed with chemical dependency and with the patient's primary care team and with our detox unit.  The patient did have a bed placed on hold earlier by the primary care team in our detox unit however at this time the patient is refusing our detox or 1800 as stated above.  The patient is not currently suicidal does have a plan to get off his opiates and follow-up with our recovery clinic.    I have reviewed the findings, diagnosis, plan and need for follow up with the patient.       Review of your medicines        UNREVIEWED medicines. Ask your doctor about these medicines        Dose / Directions   amoxicillin 500 MG capsule  Commonly known as: AMOXIL      Dose: 500 mg  Take 500 mg by mouth 3 times daily  Refills: 0     buprenorphine HCl-naloxone HCl 8-2 MG per film  Commonly known as: SUBOXONE      Dose: 1 Film  Place 1 Film under the tongue 2 times daily  Refills: 0     LORazepam 2 MG tablet  Commonly known as: ATIVAN      Dose: 2 mg  Take 2 mg by mouth 3 times daily  Refills: 0     Multi-vitamin per tablet  Generic drug: multivitamin w/minerals      Dose: 1 tablet  Take 1 tablet by mouth daily  Refills: 0     * naloxone 4 MG/0.1ML nasal spray  Commonly known as: NARCAN      Dose: 4 mg  Spray 1 spray (4 mg) into one nostril alternating nostrils as needed for opioid reversal every 2-3 minutes until assistance arrives  Quantity: 0.2 mL  Refills: 0     * naloxone 4 MG/0.1ML nasal spray  Commonly known as: NARCAN      Dose: 4 mg  Spray 1 spray (4 mg) into one nostril alternating nostrils as needed for opioid reversal every 2-3 minutes until assistance arrives  Quantity: 0.2 mL  Refills: 0     polyethylene glycol 17 GM/Dose powder  Commonly known as: MIRALAX  Used for: Opioid use disorder      Dose: 1 Capful  Take 17 g (1 Capful) by mouth daily  Quantity: 578 g  Refills: 11     promethazine 25 MG tablet  Commonly known as:  PHENERGAN  Used for: Opioid withdrawal (H)      Dose: 25 mg  Take 1 tablet (25 mg) by mouth every 6 hours as needed for nausea  Quantity: 20 tablet  Refills: 0     SUMAtriptan 50 MG tablet  Commonly known as: IMITREX      Dose: 50 mg  Take 50 mg by mouth at onset of headache for migraine (may take repeat dose in 2 hours)  Refills: 0     testosterone cypionate 200 MG/ML injection  Commonly known as: DEPOTESTOSTERONE      Dose: 200 mg  Inject 200 mg into the muscle Injects 1 ml every 7-10 days  Refills: 0           * This list has 2 medication(s) that are the same as other medications prescribed for you. Read the directions carefully, and ask your doctor or other care provider to review them with you.                CONTINUE these medicines which may have CHANGED, or have new prescriptions. If we are uncertain of the size of tablets/capsules you have at home, strength may be listed as something that might have changed.        Dose / Directions   famotidine 20 MG tablet  Commonly known as: PEPCID  This may have changed:   when to take this  reasons to take this      Dose: 20 mg  Take 1 tablet (20 mg) by mouth daily for 30 days  Quantity: 30 tablet  Refills: 0     QUEtiapine 100 MG tablet  Commonly known as: SEROquel  This may have changed:   how much to take  additional instructions      Dose: 100 mg  Take 1 tablet (100 mg) by mouth at bedtime for 30 days  Quantity: 30 tablet  Refills: 0            CONTINUE these medicines which have NOT CHANGED        Dose / Directions   cloNIDine 0.1 MG tablet  Commonly known as: CATAPRES  Used for: Opioid withdrawal (H)      Dose: 0.1 mg  Take 1 tablet (0.1 mg) by mouth every 6 hours as needed (withdrawal symptoms including hot/cold sweats, anxiety, restlessness, sleeplessness)  Quantity: 20 tablet  Refills: 0     levomilnacipran 120 MG 24 hr capsule  Commonly known as: FETZIMA ER      Dose: 120 mg  Take 1 capsule (120 mg) by mouth daily for 30 days  Quantity: 30 capsule  Refills:  0     * pregabalin 150 MG capsule  Commonly known as: LYRICA      Dose: 150 mg  Take 1 capsule (150 mg) by mouth 2 times daily for 30 days  Quantity: 60 capsule  Refills: 0     * pregabalin 150 MG capsule  Commonly known as: LYRICA      Dose: 150 mg  Take 150 mg by mouth nightly as needed  Refills: 0     Triumeq 600- MG per tablet  Used for: HIV disease (H)  Generic drug: abacavir-dolutegravir-LamiVUDine      Dose: 1 tablet  Take 1 tablet by mouth daily Please call [phone] & make appointment for further refills.  Quantity: 30 tablet  Refills: 0           * This list has 2 medication(s) that are the same as other medications prescribed for you. Read the directions carefully, and ask your doctor or other care provider to review them with you.                   Where to get your medicines        These medications were sent to [facility] [place], [place] - [address]  [address] [address]      Phone: [phone]   cloNIDine 0.1 MG tablet  famotidine 20 MG tablet  levomilnacipran 120 MG 24 hr capsule  pregabalin 150 MG capsule  QUEtiapine 100 MG tablet  Triumeq 600- MG per tablet           Final diagnoses:   Uncomplicated opioid dependence (H) - with polysubstance use   Encounter for medication refill     Please follow up with our Recovery Clinic in 1 week.  They are located in the hallway behind the emergency department in the [facility].  Walk-in hours are Monday through Friday from 9-3.  Phone [phone].    Please return to the ER for any concerns.    Hi Ellsi Md  [facility] EMERGENCY DEPARTMENT  6/26/2024     Hi Ellis MD  06/26/24 [id number]

## 2024-06-27 PROBLEM — F11.20 OPIOID TYPE DEPENDENCE, CONTINUOUS (H): Status: ACTIVE | Noted: 2024-06-27

## 2024-06-27 LAB
ALBUMIN SERPL BCG-MCNC: 4.9 G/DL (ref 3.5–5.2)
ALP SERPL-CCNC: 92 U/L (ref 40–150)
ALT SERPL W P-5'-P-CCNC: 255 U/L (ref 0–70)
AST SERPL W P-5'-P-CCNC: 153 U/L (ref 0–45)
BILIRUB DIRECT SERPL-MCNC: <0.2 MG/DL (ref 0–0.3)
BILIRUB SERPL-MCNC: 0.4 MG/DL
MAGNESIUM SERPL-MCNC: 2 MG/DL (ref 1.7–2.3)
PROT SERPL-MCNC: 8.9 G/DL (ref 6.4–8.3)

## 2024-06-27 RX ORDER — MEPERIDINE HYDROCHLORIDE 25 MG/ML
25 INJECTION INTRAMUSCULAR; INTRAVENOUS; SUBCUTANEOUS EVERY 30 MIN PRN
Status: CANCELLED | OUTPATIENT
Start: 2024-07-04

## 2024-06-27 RX ORDER — EPINEPHRINE 1 MG/ML
0.3 INJECTION, SOLUTION, CONCENTRATE INTRAVENOUS EVERY 5 MIN PRN
Status: CANCELLED | OUTPATIENT
Start: 2024-07-04

## 2024-06-27 RX ORDER — METHYLPREDNISOLONE SODIUM SUCCINATE 125 MG/2ML
125 INJECTION, POWDER, LYOPHILIZED, FOR SOLUTION INTRAMUSCULAR; INTRAVENOUS
Status: CANCELLED
Start: 2024-07-04

## 2024-06-27 RX ORDER — ALBUTEROL SULFATE 90 UG/1
1-2 AEROSOL, METERED RESPIRATORY (INHALATION)
Status: CANCELLED
Start: 2024-07-04

## 2024-06-27 RX ORDER — ALBUTEROL SULFATE 0.83 MG/ML
2.5 SOLUTION RESPIRATORY (INHALATION)
Status: CANCELLED | OUTPATIENT
Start: 2024-07-04

## 2024-06-27 RX ORDER — LIDOCAINE HYDROCHLORIDE 10 MG/ML
2 INJECTION, SOLUTION EPIDURAL; INFILTRATION; INTRACAUDAL; PERINEURAL ONCE
Status: CANCELLED | OUTPATIENT
Start: 2024-07-04 | End: 2024-07-04

## 2024-06-27 RX ORDER — DIPHENHYDRAMINE HYDROCHLORIDE 50 MG/ML
50 INJECTION INTRAMUSCULAR; INTRAVENOUS
Status: CANCELLED
Start: 2024-07-04

## 2024-07-01 ENCOUNTER — TELEPHONE (OUTPATIENT)
Dept: BEHAVIORAL HEALTH | Facility: CLINIC | Age: 44
End: 2024-07-01
Payer: COMMERCIAL

## 2024-07-01 ENCOUNTER — TRANSFERRED RECORDS (OUTPATIENT)
Dept: HEALTH INFORMATION MANAGEMENT | Facility: CLINIC | Age: 44
End: 2024-07-01
Payer: COMMERCIAL

## 2024-07-01 NOTE — TELEPHONE ENCOUNTER
Patient was in the [facility] ED 6/26/24 for polysubstance use. Received Brixadi injection for opioid use disorder. Per ED note, patient mentioned following up at the [facility].    Patient is scheduled for Sublocade 7/3/24.    Attempted to call patient to offer Recovery Clinic services, including walk-in hours. No answer; LVM to call clinic. [employer] message sent.    [facility]  [facility], [address]   [place], [address]  Phone: [phone]  Fax: [phone]    Open Monday-Friday  Closed over lunch hour  Walk in hours: 9am-11:30am and 12:30-3pm    Juani Peguero RN on 7/1/2024 at 11:44 AM

## 2024-07-03 ENCOUNTER — OFFICE VISIT (OUTPATIENT)
Dept: BEHAVIORAL HEALTH | Facility: CLINIC | Age: 44
End: 2024-07-03
Payer: COMMERCIAL

## 2024-07-03 ENCOUNTER — INFUSION THERAPY VISIT (OUTPATIENT)
Dept: INFUSION THERAPY | Facility: CLINIC | Age: 44
End: 2024-07-03
Attending: INTERNAL MEDICINE
Payer: COMMERCIAL

## 2024-07-03 VITALS — OXYGEN SATURATION: 98 % | HEART RATE: 78 BPM | DIASTOLIC BLOOD PRESSURE: 82 MMHG | SYSTOLIC BLOOD PRESSURE: 118 MMHG

## 2024-07-03 DIAGNOSIS — F33.1 MAJOR DEPRESSIVE DISORDER, RECURRENT EPISODE, MODERATE (H): Primary | ICD-10-CM

## 2024-07-03 DIAGNOSIS — F11.20 OPIOID TYPE DEPENDENCE, CONTINUOUS (H): Primary | ICD-10-CM

## 2024-07-03 DIAGNOSIS — F19.10 POLYDRUG ABUSE (H): ICD-10-CM

## 2024-07-03 PROCEDURE — 99207 PR NO CHARGE BEHAVIORAL WARM HANDOFF: CPT | Performed by: SOCIAL WORKER

## 2024-07-03 PROCEDURE — 250N000009 HC RX 250: Performed by: INTERNAL MEDICINE

## 2024-07-03 PROCEDURE — 250N000011 HC RX IP 250 OP 636: Performed by: INTERNAL MEDICINE

## 2024-07-03 PROCEDURE — 96372 THER/PROPH/DIAG INJ SC/IM: CPT | Performed by: INTERNAL MEDICINE

## 2024-07-03 RX ORDER — LIDOCAINE HYDROCHLORIDE 10 MG/ML
2 INJECTION, SOLUTION EPIDURAL; INFILTRATION; INTRACAUDAL; PERINEURAL ONCE
Status: CANCELLED | OUTPATIENT
Start: 2024-07-31 | End: 2024-07-31

## 2024-07-03 RX ORDER — DIPHENHYDRAMINE HYDROCHLORIDE 50 MG/ML
50 INJECTION INTRAMUSCULAR; INTRAVENOUS
Status: CANCELLED
Start: 2024-07-31

## 2024-07-03 RX ORDER — METHYLPREDNISOLONE SODIUM SUCCINATE 125 MG/2ML
125 INJECTION, POWDER, LYOPHILIZED, FOR SOLUTION INTRAMUSCULAR; INTRAVENOUS
Status: CANCELLED
Start: 2024-07-31

## 2024-07-03 RX ORDER — LIDOCAINE HYDROCHLORIDE 10 MG/ML
2 INJECTION, SOLUTION EPIDURAL; INFILTRATION; INTRACAUDAL; PERINEURAL ONCE
Status: COMPLETED | OUTPATIENT
Start: 2024-07-03 | End: 2024-07-03

## 2024-07-03 RX ORDER — ALBUTEROL SULFATE 90 UG/1
1-2 AEROSOL, METERED RESPIRATORY (INHALATION)
Status: CANCELLED
Start: 2024-07-31

## 2024-07-03 RX ORDER — MEPERIDINE HYDROCHLORIDE 25 MG/ML
25 INJECTION INTRAMUSCULAR; INTRAVENOUS; SUBCUTANEOUS EVERY 30 MIN PRN
Status: CANCELLED | OUTPATIENT
Start: 2024-07-31

## 2024-07-03 RX ORDER — ALBUTEROL SULFATE 0.83 MG/ML
2.5 SOLUTION RESPIRATORY (INHALATION)
Status: CANCELLED | OUTPATIENT
Start: 2024-07-31

## 2024-07-03 RX ORDER — EPINEPHRINE 1 MG/ML
0.3 INJECTION, SOLUTION, CONCENTRATE INTRAVENOUS EVERY 5 MIN PRN
Status: CANCELLED | OUTPATIENT
Start: 2024-07-31

## 2024-07-03 RX ADMIN — LIDOCAINE HYDROCHLORIDE 2 ML: 10 INJECTION, SOLUTION EPIDURAL; INFILTRATION; INTRACAUDAL; PERINEURAL at 14:03

## 2024-07-03 RX ADMIN — BUPRENORPHINE 300 MG: 300 SOLUTION SUBCUTANEOUS at 14:08

## 2024-07-03 ASSESSMENT — PATIENT HEALTH QUESTIONNAIRE - PHQ9: SUM OF ALL RESPONSES TO PHQ QUESTIONS 1-9: 6

## 2024-07-03 NOTE — PROGRESS NOTES
The patient stated having passive suicidal thoughts (you should just go hang yourself)-he stated that he has no plan to kills self and no intent to kills self-he has reasons to live and he was given the 988 number and he was given the instructions.

## 2024-07-03 NOTE — TELEPHONE ENCOUNTER
Patient presented to the Recovery Clinic today after leaving the [facility] due to Sublocade not being approved.     While writer was attempting to see if patient can receive another dose of Brixadi in the ED today writer received notification that patient was okay to proceed with Sublocade.     Patient filled out an OMID for his father. Patient was escorted to the [facility] to receive Sublocade injection.     Jennifer Claudio RN on 7/3/2024 at 4:59 PM

## 2024-07-03 NOTE — PROGRESS NOTES
Infusion Nursing Note:  Bc German presents today for sublocade 300mg.    Patient seen by provider today: Yes: RC   present during visit today: Not Applicable.    Note: Pt here for first dose sublocade. Was seen in recover clinic prior to injection. Pt's speech is slow and pt appears slightly obtunded. Writer reviewd medication and administration. Pt given wallet card. Lidocaine and sublocade given RLQ.       Intravenous Access:  No Intravenous access/labs at this visit.    Treatment Conditions:  Not Applicable.      Post Infusion Assessment:  Patient tolerated injection without incident.       Discharge Plan:   Discharge instructions reviewed with: Patient.  Patient discharged in stable condition accompanied by: self.  Departure Mode: Ambulatory.      Korin Sow RN

## 2024-07-18 ENCOUNTER — TELEPHONE (OUTPATIENT)
Dept: BEHAVIORAL HEALTH | Facility: CLINIC | Age: 44
End: 2024-07-18
Payer: COMMERCIAL

## 2024-07-18 NOTE — TELEPHONE ENCOUNTER
Received incoming fax from [facility] with an OMID and lab results. Documents stickered and sent to scanning. Not yet in the chart.     Will monitor for records to be viewable in the chart and then close encounter.     Janis Knight RN on 7/18/2024 at 12:59 PM

## 2024-07-19 NOTE — TELEPHONE ENCOUNTER
RN reviewed chart and lab results are viewable in the chart.     Closing encounter.     Janis Knight RN on 7/19/2024 at 10:12 AM

## 2024-07-26 ENCOUNTER — HOSPITAL ENCOUNTER (INPATIENT)
Facility: CLINIC | Age: 44
LOS: 5 days | Discharge: PSYCHIATRIC OR SUBSTANCE USE FACILITY | DRG: 897 | End: 2024-07-31
Attending: EMERGENCY MEDICINE | Admitting: PSYCHIATRY & NEUROLOGY
Payer: COMMERCIAL

## 2024-07-26 ENCOUNTER — TELEPHONE (OUTPATIENT)
Dept: BEHAVIORAL HEALTH | Facility: CLINIC | Age: 44
End: 2024-07-26
Payer: COMMERCIAL

## 2024-07-26 DIAGNOSIS — B18.2 CHRONIC HEPATITIS C WITHOUT HEPATIC COMA (H): ICD-10-CM

## 2024-07-26 DIAGNOSIS — R00.0 TACHYCARDIA, UNSPECIFIED: ICD-10-CM

## 2024-07-26 DIAGNOSIS — F13.20 BENZODIAZEPINE DEPENDENCE (H): ICD-10-CM

## 2024-07-26 DIAGNOSIS — F11.20 OPIOID TYPE DEPENDENCE, CONTINUOUS (H): ICD-10-CM

## 2024-07-26 DIAGNOSIS — G06.1 ABSCESS IN EPIDURAL SPACE OF CERVICAL SPINE: ICD-10-CM

## 2024-07-26 DIAGNOSIS — F41.1 GAD (GENERALIZED ANXIETY DISORDER): ICD-10-CM

## 2024-07-26 DIAGNOSIS — B20 HIV DISEASE (H): ICD-10-CM

## 2024-07-26 DIAGNOSIS — Z79.899 ENCOUNTER FOR LONG-TERM (CURRENT) USE OF MEDICATIONS: Primary | ICD-10-CM

## 2024-07-26 DIAGNOSIS — T40.2X1A OPIOID OVERDOSE, ACCIDENTAL OR UNINTENTIONAL, INITIAL ENCOUNTER (H): ICD-10-CM

## 2024-07-26 DIAGNOSIS — F19.10 DRUG ABUSE (H): ICD-10-CM

## 2024-07-26 LAB
ALBUMIN SERPL BCG-MCNC: 4.5 G/DL (ref 3.5–5.2)
ALP SERPL-CCNC: 90 U/L (ref 40–150)
ALT SERPL W P-5'-P-CCNC: 388 U/L (ref 0–70)
AMPHETAMINES UR QL SCN: NORMAL
ANION GAP SERPL CALCULATED.3IONS-SCNC: 11 MMOL/L (ref 7–15)
AST SERPL W P-5'-P-CCNC: ABNORMAL U/L
BARBITURATES UR QL SCN: NORMAL
BASOPHILS # BLD AUTO: 0.1 10E3/UL (ref 0–0.2)
BASOPHILS NFR BLD AUTO: 1 %
BENZODIAZ UR QL SCN: NORMAL
BILIRUB SERPL-MCNC: 0.4 MG/DL
BUN SERPL-MCNC: 29.4 MG/DL (ref 6–20)
BZE UR QL SCN: NORMAL
CALCIUM SERPL-MCNC: 10.4 MG/DL (ref 8.8–10.4)
CANNABINOIDS UR QL SCN: NORMAL
CHLORIDE SERPL-SCNC: 97 MMOL/L (ref 98–107)
CREAT SERPL-MCNC: 1.08 MG/DL (ref 0.67–1.17)
EGFRCR SERPLBLD CKD-EPI 2021: 88 ML/MIN/1.73M2
EOSINOPHIL # BLD AUTO: 0.3 10E3/UL (ref 0–0.7)
EOSINOPHIL NFR BLD AUTO: 3 %
ERYTHROCYTE [DISTWIDTH] IN BLOOD BY AUTOMATED COUNT: 13.2 % (ref 10–15)
FENTANYL UR QL: NORMAL
GLUCOSE SERPL-MCNC: 102 MG/DL (ref 70–99)
HCO3 SERPL-SCNC: 29 MMOL/L (ref 22–29)
HCT VFR BLD AUTO: 49.4 % (ref 40–53)
HGB BLD-MCNC: 16.7 G/DL (ref 13.3–17.7)
IMM GRANULOCYTES # BLD: 0.2 10E3/UL
IMM GRANULOCYTES NFR BLD: 2 %
LIPASE SERPL-CCNC: 30 U/L (ref 13–60)
LYMPHOCYTES # BLD AUTO: 2.4 10E3/UL (ref 0.8–5.3)
LYMPHOCYTES NFR BLD AUTO: 27 %
MAGNESIUM SERPL-MCNC: 1.9 MG/DL (ref 1.7–2.3)
MCH RBC QN AUTO: 31 PG (ref 26.5–33)
MCHC RBC AUTO-ENTMCNC: 33.8 G/DL (ref 31.5–36.5)
MCV RBC AUTO: 92 FL (ref 78–100)
MONOCYTES # BLD AUTO: 0.9 10E3/UL (ref 0–1.3)
MONOCYTES NFR BLD AUTO: 10 %
NEUTROPHILS # BLD AUTO: 4.9 10E3/UL (ref 1.6–8.3)
NEUTROPHILS NFR BLD AUTO: 57 %
NRBC # BLD AUTO: 0 10E3/UL
NRBC BLD AUTO-RTO: 0 /100
OPIATES UR QL SCN: NORMAL
PCP QUAL URINE (ROCHE): NORMAL
PLATELET # BLD AUTO: 294 10E3/UL (ref 150–450)
POTASSIUM SERPL-SCNC: 5.2 MMOL/L (ref 3.4–5.3)
PROT SERPL-MCNC: 8.2 G/DL (ref 6.4–8.3)
RBC # BLD AUTO: 5.39 10E6/UL (ref 4.4–5.9)
SODIUM SERPL-SCNC: 137 MMOL/L (ref 135–145)
WBC # BLD AUTO: 8.7 10E3/UL (ref 4–11)

## 2024-07-26 PROCEDURE — 250N000013 HC RX MED GY IP 250 OP 250 PS 637

## 2024-07-26 PROCEDURE — 99285 EMERGENCY DEPT VISIT HI MDM: CPT | Performed by: EMERGENCY MEDICINE

## 2024-07-26 PROCEDURE — 250N000013 HC RX MED GY IP 250 OP 250 PS 637: Performed by: EMERGENCY MEDICINE

## 2024-07-26 PROCEDURE — 80307 DRUG TEST PRSMV CHEM ANLYZR: CPT | Performed by: EMERGENCY MEDICINE

## 2024-07-26 PROCEDURE — 128N000004 HC R&B CD ADULT

## 2024-07-26 PROCEDURE — 83735 ASSAY OF MAGNESIUM: CPT | Performed by: EMERGENCY MEDICINE

## 2024-07-26 PROCEDURE — 96361 HYDRATE IV INFUSION ADD-ON: CPT | Performed by: EMERGENCY MEDICINE

## 2024-07-26 PROCEDURE — 99285 EMERGENCY DEPT VISIT HI MDM: CPT | Mod: 25 | Performed by: EMERGENCY MEDICINE

## 2024-07-26 PROCEDURE — HZ2ZZZZ DETOXIFICATION SERVICES FOR SUBSTANCE ABUSE TREATMENT: ICD-10-PCS | Performed by: PSYCHIATRY & NEUROLOGY

## 2024-07-26 PROCEDURE — 83690 ASSAY OF LIPASE: CPT | Performed by: EMERGENCY MEDICINE

## 2024-07-26 PROCEDURE — 258N000003 HC RX IP 258 OP 636: Performed by: EMERGENCY MEDICINE

## 2024-07-26 PROCEDURE — 85025 COMPLETE CBC W/AUTO DIFF WBC: CPT | Performed by: EMERGENCY MEDICINE

## 2024-07-26 PROCEDURE — 96360 HYDRATION IV INFUSION INIT: CPT | Performed by: EMERGENCY MEDICINE

## 2024-07-26 PROCEDURE — 84155 ASSAY OF PROTEIN SERUM: CPT | Performed by: EMERGENCY MEDICINE

## 2024-07-26 PROCEDURE — 84460 ALANINE AMINO (ALT) (SGPT): CPT | Performed by: EMERGENCY MEDICINE

## 2024-07-26 PROCEDURE — 36415 COLL VENOUS BLD VENIPUNCTURE: CPT | Performed by: EMERGENCY MEDICINE

## 2024-07-26 RX ORDER — NICOTINE 21 MG/24HR
1 PATCH, TRANSDERMAL 24 HOURS TRANSDERMAL DAILY
Status: DISCONTINUED | OUTPATIENT
Start: 2024-07-26 | End: 2024-07-31 | Stop reason: HOSPADM

## 2024-07-26 RX ORDER — ATENOLOL 50 MG/1
50 TABLET ORAL DAILY PRN
Status: DISCONTINUED | OUTPATIENT
Start: 2024-07-26 | End: 2024-07-27

## 2024-07-26 RX ORDER — AMOXICILLIN 250 MG
1 CAPSULE ORAL 2 TIMES DAILY PRN
Status: DISCONTINUED | OUTPATIENT
Start: 2024-07-26 | End: 2024-07-31 | Stop reason: HOSPADM

## 2024-07-26 RX ORDER — LOPERAMIDE HCL 2 MG
2 CAPSULE ORAL 4 TIMES DAILY PRN
Status: DISCONTINUED | OUTPATIENT
Start: 2024-07-26 | End: 2024-07-31 | Stop reason: HOSPADM

## 2024-07-26 RX ORDER — PHENOBARBITAL 64.8 MG/1
64.8 TABLET ORAL
Status: DISCONTINUED | OUTPATIENT
Start: 2024-07-26 | End: 2024-07-28 | Stop reason: ALTCHOICE

## 2024-07-26 RX ORDER — ONDANSETRON 4 MG/1
4 TABLET, FILM COATED ORAL EVERY 6 HOURS PRN
Status: DISCONTINUED | OUTPATIENT
Start: 2024-07-26 | End: 2024-07-31 | Stop reason: HOSPADM

## 2024-07-26 RX ORDER — NICOTINE 21 MG/24HR
1 PATCH, TRANSDERMAL 24 HOURS TRANSDERMAL EVERY 24 HOURS
Status: ON HOLD | COMMUNITY
End: 2024-07-30

## 2024-07-26 RX ORDER — MAGNESIUM HYDROXIDE/ALUMINUM HYDROXICE/SIMETHICONE 120; 1200; 1200 MG/30ML; MG/30ML; MG/30ML
30 SUSPENSION ORAL EVERY 4 HOURS PRN
Status: DISCONTINUED | OUTPATIENT
Start: 2024-07-26 | End: 2024-07-31 | Stop reason: HOSPADM

## 2024-07-26 RX ORDER — LORAZEPAM 1 MG/1
1-4 TABLET ORAL EVERY 30 MIN PRN
Status: DISCONTINUED | OUTPATIENT
Start: 2024-07-26 | End: 2024-07-26

## 2024-07-26 RX ORDER — PROMETHAZINE HYDROCHLORIDE 25 MG/1
50 TABLET ORAL
Status: ON HOLD | COMMUNITY
End: 2024-07-30

## 2024-07-26 RX ORDER — PREGABALIN 75 MG/1
150 CAPSULE ORAL ONCE
Status: COMPLETED | OUTPATIENT
Start: 2024-07-26 | End: 2024-07-26

## 2024-07-26 RX ORDER — CLONIDINE HYDROCHLORIDE 0.1 MG/1
0.1 TABLET ORAL DAILY
Status: ON HOLD | COMMUNITY
End: 2024-07-30

## 2024-07-26 RX ORDER — MULTIPLE VITAMINS W/ MINERALS TAB 9MG-400MCG
1 TAB ORAL DAILY
Status: DISCONTINUED | OUTPATIENT
Start: 2024-07-26 | End: 2024-07-31 | Stop reason: HOSPADM

## 2024-07-26 RX ORDER — TRAZODONE HYDROCHLORIDE 50 MG/1
50 TABLET, FILM COATED ORAL
Status: DISCONTINUED | OUTPATIENT
Start: 2024-07-26 | End: 2024-07-31 | Stop reason: HOSPADM

## 2024-07-26 RX ORDER — LORAZEPAM 1 MG/1
1-4 TABLET ORAL EVERY 30 MIN PRN
Status: DISCONTINUED | OUTPATIENT
Start: 2024-07-26 | End: 2024-07-27

## 2024-07-26 RX ORDER — ONDANSETRON 4 MG/1
4 TABLET, FILM COATED ORAL EVERY 6 HOURS PRN
Status: DISCONTINUED | OUTPATIENT
Start: 2024-07-26 | End: 2024-07-26

## 2024-07-26 RX ORDER — OLANZAPINE 10 MG/2ML
10 INJECTION, POWDER, FOR SOLUTION INTRAMUSCULAR 3 TIMES DAILY PRN
Status: DISCONTINUED | OUTPATIENT
Start: 2024-07-26 | End: 2024-07-31 | Stop reason: HOSPADM

## 2024-07-26 RX ORDER — ACETAMINOPHEN 325 MG/1
650 TABLET ORAL EVERY 4 HOURS PRN
Status: DISCONTINUED | OUTPATIENT
Start: 2024-07-26 | End: 2024-07-31 | Stop reason: HOSPADM

## 2024-07-26 RX ORDER — OLANZAPINE 10 MG/1
10 TABLET ORAL 3 TIMES DAILY PRN
Status: DISCONTINUED | OUTPATIENT
Start: 2024-07-26 | End: 2024-07-31 | Stop reason: HOSPADM

## 2024-07-26 RX ORDER — HYDROXYZINE HYDROCHLORIDE 25 MG/1
25 TABLET, FILM COATED ORAL EVERY 4 HOURS PRN
Status: DISCONTINUED | OUTPATIENT
Start: 2024-07-26 | End: 2024-07-31 | Stop reason: HOSPADM

## 2024-07-26 RX ORDER — FOLIC ACID 1 MG/1
1 TABLET ORAL DAILY
Status: DISCONTINUED | OUTPATIENT
Start: 2024-07-26 | End: 2024-07-31 | Stop reason: HOSPADM

## 2024-07-26 RX ADMIN — Medication 1 TABLET: at 20:21

## 2024-07-26 RX ADMIN — NICOTINE 1 PATCH: 14 PATCH, EXTENDED RELEASE TRANSDERMAL at 10:45

## 2024-07-26 RX ADMIN — LORAZEPAM 1 MG: 1 TABLET ORAL at 15:21

## 2024-07-26 RX ADMIN — THIAMINE HCL TAB 100 MG 100 MG: 100 TAB at 20:22

## 2024-07-26 RX ADMIN — SODIUM CHLORIDE, POTASSIUM CHLORIDE, SODIUM LACTATE AND CALCIUM CHLORIDE 1000 ML: 600; 310; 30; 20 INJECTION, SOLUTION INTRAVENOUS at 10:45

## 2024-07-26 RX ADMIN — PHENOBARBITAL 64.8 MG: 64.8 TABLET ORAL at 22:05

## 2024-07-26 RX ADMIN — PREGABALIN 150 MG: 75 CAPSULE ORAL at 10:44

## 2024-07-26 RX ADMIN — FOLIC ACID 1 MG: 1 TABLET ORAL at 20:22

## 2024-07-26 RX ADMIN — LORAZEPAM 1 MG: 1 TABLET ORAL at 10:44

## 2024-07-26 ASSESSMENT — ACTIVITIES OF DAILY LIVING (ADL)
ADLS_ACUITY_SCORE: 57
ADLS_ACUITY_SCORE: 37
ADLS_ACUITY_SCORE: 37
ADLS_ACUITY_SCORE: 57
ADLS_ACUITY_SCORE: 37
ADLS_ACUITY_SCORE: 37
ADLS_ACUITY_SCORE: 57
ADLS_ACUITY_SCORE: 37
ADLS_ACUITY_SCORE: 37
ADLS_ACUITY_SCORE: 57
ADLS_ACUITY_SCORE: 37
ADLS_ACUITY_SCORE: 37

## 2024-07-26 ASSESSMENT — COLUMBIA-SUICIDE SEVERITY RATING SCALE - C-SSRS
2. HAVE YOU ACTUALLY HAD ANY THOUGHTS OF KILLING YOURSELF IN THE PAST MONTH?: NO
1. IN THE PAST MONTH, HAVE YOU WISHED YOU WERE DEAD OR WISHED YOU COULD GO TO SLEEP AND NOT WAKE UP?: NO
6. HAVE YOU EVER DONE ANYTHING, STARTED TO DO ANYTHING, OR PREPARED TO DO ANYTHING TO END YOUR LIFE?: NO

## 2024-07-26 NOTE — PROGRESS NOTES
07/26/24 4952   Patient Belongings   Did you bring any home meds/supplements to the hospital?  Yes   Disposition of meds  Other (see comment)  (med room bin)   Patient Belongings other (see comments)  (med room bin, storage bin, security)   Patient Belongings Put in Hospital Secure Location (Security or Locker, etc.) other (see comments)   Belongings Search Yes   Clothing Search Yes   Second Staff Diamante     Storage bin: shoes, belt, backpack, toiletries bag, cigarettes, lighter, vape, charging cord  Med room bin: cell phone, wallet, env # [id number]: meds from home  Security: env #[id number]: $83 cash, visa, mastercard, EBT  A               Admission:  I am responsible for any personal items that are not sent to the safe or pharmacy.  [facility] is not responsible for loss, theft or damage of any property in my possession.    Signature:  _________________________________ Date: _______  Time: _____                                              Staff Signature:  ____________________________ Date: ________  Time: _____      2nd Staff person, if patient is unable/unwilling to sign:    Signature: ________________________________ Date: ________  Time: _____     Discharge:  [facility] has returned all of my personal belongings:    Signature: _________________________________ Date: ________  Time: _____                                          Staff Signature:  ____________________________ Date: ________  Time: _____

## 2024-07-26 NOTE — TELEPHONE ENCOUNTER
S: [facility]  provider Rik calling at 11:58 AM with clinical on a 44 year old/Male presenting for benzodiazapine detox.     B: Pt presents for benzodiazapine detox. Currently reports using 6 MG of ativan a day.    Patient reports last use was 6 AM today.  Pt endorsing following symptoms of withdrawal: Tachycardic  Pt  denies hx of seizures.   Pt denies acute mental health or medical concerns.   Pt denies other drug use: NA       Pt has a detox care plan in [facility]? No    Does pt present with specific needs, assistive devices, or exclusionary criteria? None      A: Pt meets criteria to be presented for IP detox admission. Patient is voluntary    COVID Symptoms: No  If yes, COVID test required   Utox: Not ordered, intake to request lab   Magnesium: WNL  CMP: Abnormalities: urea nitrogen: 29.4; chloride: 97; glucose: 102; ALT: 388  CBC: WNL  HCG: N/A     R: Patient cleared and ready for behavioral bed placement: Yes    Pt is meeting criteria for presentation to 3A/CD    Does Patient need a Transfer Center request created? No, Pt is located within [facility] ED, [facility] ED, or [facility]      12:12 PM Paged Rosa Isela to review for 3A/[facility].  12:54 PM 2nd page to Rosa Isela.   1:34 PM Rosa Isela accepts pt for admission to 3A/VINOD/[facility].   1:41 PM Informed SHERRI Pino of pt in queue.   1:42 PM Provided ED with placement info.       FRANCA/VINOD/[facility]

## 2024-07-26 NOTE — ED TRIAGE NOTES
Seeking detox off of benzos (ativan) takes 6mgs a day. Pt last used meth July 4th, last used fentanyl June 25th. Pt got Brixadi on June 26th. Pt got sublicade on July 3rd.

## 2024-07-26 NOTE — ED PROVIDER NOTES
[facility] EMERGENCY DEPARTMENT ([facility])    7/26/24      ED PROVIDER NOTE   Emily VIERA   History     Chief Complaint   Patient presents with    Drug / Alcohol Assessment     Seeking detox off of benzos (ativan) takes 6mgs a day.      The history is provided by the patient and medical records.     Bc German is a 44 year old male with polysubstance use disorder who was treated with injectable buprenorphine for his opiate use disorder on June 26 and then received long-acting Sublocade approximately 3 weeks ago which has kept patient off of opioids. Patient has also stopped using methamphetamines and today seeks help for benzodiazepine use. He has been taking up to 6 mg of Ativan in a 24-hour period. Patient's last use was 0.5 mg of Ativan this morning at 6 AM. Uses Lyrica 150 mg twice daily and smokes cigarettes, otherwise denies other drug use in the past month. Denies acute mental health issues including SI, HI, or hallucinations.        This part of the document was transcribed by Nae Shea, Medical Scribe.      Physical Exam   BP: (!) 144/96  Pulse: 117  Temp: 98  F (36.7  C)  Resp: 16  Weight: 61.2 kg (135 lb)  SpO2: 98 %  Physical Exam  GENERAL: Calm, skin dry, pupils equal and reactive to light.   HEART: pulse borderline tachycardic without tremulousness     ED Course, Procedures, & Data     ED Course as of 07/26/24 1200 Fri Jul 26, 2024   0958 Called mental health intake, bed is held     Procedures          Results for orders placed or performed during the hospital encounter of 07/26/24   Comprehensive metabolic panel     Status: Abnormal   Result Value Ref Range    Sodium 137 135 - 145 mmol/L    Potassium 5.2 3.4 - 5.3 mmol/L    Carbon Dioxide (CO2) 29 22 - 29 mmol/L    Anion Gap 11 7 - 15 mmol/L    Urea Nitrogen 29.4 (H) 6.0 - 20.0 mg/dL    Creatinine 1.08 0.67 - 1.17 mg/dL    GFR Estimate 88 >60 mL/min/1.73m2    Calcium 10.4 8.8 - 10.4 mg/dL    Chloride 97 (L) 98 - 107 mmol/L     Glucose 102 (H) 70 - 99 mg/dL    Alkaline Phosphatase 90 40 - 150 U/L    AST       (H) 0 - 70 U/L    Protein Total 8.2 6.4 - 8.3 g/dL    Albumin 4.5 3.5 - 5.2 g/dL    Bilirubin Total 0.4 <=1.2 mg/dL   Lipase     Status: Normal   Result Value Ref Range    Lipase 30 13 - 60 U/L   Magnesium     Status: Normal   Result Value Ref Range    Magnesium 1.9 1.7 - 2.3 mg/dL   CBC with platelets and differential     Status: None   Result Value Ref Range    WBC Count 8.7 4.0 - 11.0 10e3/uL    RBC Count 5.39 4.40 - 5.90 10e6/uL    Hemoglobin 16.7 13.3 - 17.7 g/dL    Hematocrit 49.4 40.0 - 53.0 %    MCV 92 78 - 100 fL    MCH 31.0 26.5 - 33.0 pg    MCHC 33.8 31.5 - 36.5 g/dL    RDW 13.2 10.0 - 15.0 %    Platelet Count 294 150 - 450 10e3/uL    % Neutrophils 57 %    % Lymphocytes 27 %    % Monocytes 10 %    % Eosinophils 3 %    % Basophils 1 %    % Immature Granulocytes 2 %    NRBCs per 100 WBC 0 <1 /100    Absolute Neutrophils 4.9 1.6 - 8.3 10e3/uL    Absolute Lymphocytes 2.4 0.8 - 5.3 10e3/uL    Absolute Monocytes 0.9 0.0 - 1.3 10e3/uL    Absolute Eosinophils 0.3 0.0 - 0.7 10e3/uL    Absolute Basophils 0.1 0.0 - 0.2 10e3/uL    Absolute Immature Granulocytes 0.2 <=0.4 10e3/uL    Absolute NRBCs 0.0 10e3/uL   CBC with platelets differential     Status: None    Narrative    The following orders were created for panel order CBC with platelets differential.  Procedure                               Abnormality         Status                     ---------                               -----------         ------                     CBC with platelets and d...[[id number]]                      Final result                 Please view results for these tests on the individual orders.     Medications   LORazepam (ATIVAN) tablet 1-4 mg (1 mg Oral $Given 7/26/24 1044)   nicotine (NICODERM CQ) 14 MG/24HR 24 hr patch 1 patch (1 patch Transdermal $Patch/Med Applied 7/26/24 0818)   lactated ringers BOLUS 1,000 mL (1,000 mLs Intravenous $New Bag  7/26/24 1045)   pregabalin (LYRICA) capsule 150 mg (150 mg Oral $Given 7/26/24 1044)     Labs Ordered and Resulted from Time of ED Arrival to Time of ED Departure   COMPREHENSIVE METABOLIC PANEL - Abnormal       Result Value    Sodium 137      Potassium 5.2      Carbon Dioxide (CO2) 29      Anion Gap 11      Urea Nitrogen 29.4 (*)     Creatinine 1.08      GFR Estimate 88      Calcium 10.4      Chloride 97 (*)     Glucose 102 (*)     Alkaline Phosphatase 90      AST         (*)     Protein Total 8.2      Albumin 4.5      Bilirubin Total 0.4     LIPASE - Normal    Lipase 30     MAGNESIUM - Normal    Magnesium 1.9     CBC WITH PLATELETS AND DIFFERENTIAL    WBC Count 8.7      RBC Count 5.39      Hemoglobin 16.7      Hematocrit 49.4      MCV 92      MCH 31.0      MCHC 33.8      RDW 13.2      Platelet Count 294      % Neutrophils 57      % Lymphocytes 27      % Monocytes 10      % Eosinophils 3      % Basophils 1      % Immature Granulocytes 2      NRBCs per 100 WBC 0      Absolute Neutrophils 4.9      Absolute Lymphocytes 2.4      Absolute Monocytes 0.9      Absolute Eosinophils 0.3      Absolute Basophils 0.1      Absolute Immature Granulocytes 0.2      Absolute NRBCs 0.0       No orders to display          Critical care was not performed.     Medical Decision Making  The patient's presentation was of high complexity (a chronic illness severe exacerbation, progression, or side effect of treatment).    The patient's evaluation involved:  ordering and/or review of 2 test(s) in this encounter (see separate area of note for details)  discussion of management or test interpretation with another health professional (see separate area of note for details)    The patient's management necessitated high risk (a decision regarding hospitalization).    Assessment & Plan    Moderate to severe benzodiazepine use and potential early withdrawal. Will provide patient with MSSA protocol, rule out electrolyte disturbances or organ  dysfunction, admit to inpatient medical detox for benzo diazepam use. This represents a successful treatment of opioid use disorder with injectable long-acting buprenorphine.    12 noon -Labs show no signs of organ failure or electrolyte abnormality.  Will collect urine for urine drug screen.  I discussed this case with mental health patient placement who accepts the patient for admission.      I have reviewed the nursing notes. I have reviewed the findings, diagnosis, plan and need for follow up with the patient.    New Prescriptions    No medications on file       Final diagnoses:   Benzodiazepine dependence (H)   Tachycardia, unspecified       Marco Jolly MD  [facility] EMERGENCY DEPARTMENT  7/26/2024     Marco Jolly MD  07/26/24 4238

## 2024-07-26 NOTE — PHARMACY-ADMISSION MEDICATION HISTORY
"Pharmacist Admission Medication History    Admission medication history is complete. The information provided in this note is only as accurate as the sources available at the time of the update.    Information Source(s): Patient, Clinic records, and Hospital records via in-person    Pertinent Information: Patient states the following:  Rarely misses Triumeq   Has been trying to ween himself off of lorazepam at home but cutting up his tablets but this lead to seizures  Last Brixadi injection 6/26/2024 for insurance coverage purposes, but the plan is to continue Sublocade (last dose given 7/3)  Last took testosterone \"around 3 days ago\" but unsure of exact date.   Testosterone ordered to take every 7-10 days and most often will do 10 days between doses  Nicotine patch currently on  Sublocade last given 7/3  Of note, patient states he was short of breath and coughing significantly after both Sublocade and Brixadi     Changes made to PTA medication list:  Added:   Nicotine patch   Sublocade  Deleted:   Suboxone  Famotidine   Duplicate narcan   PEG   Changed:   Clonidine - 0.1 mg q6h prn changed to 0.1 mg every morning   Lyrica - 150 mg BID plus QHS changed to 150 mg twice daily   Promethazine - 25 mg q6h prn changed to 50 mg QHS as needed     Allergies reviewed with patient and updates made in EHR: yes    Medication History Completed By: Ines Oviedo RPH 7/26/2024 3:26 PM    PTA Med List   Medication Sig Last Dose    abacavir-dolutegravir-LamiVUDine (TRIUMEQ) 600- MG per tablet Take 1 tablet by mouth daily Please call [phone] & make appointment for further refills. 7/25/2024    buprenorphine ER (SUBLOCADE) 300 MG/1.5ML prefilled syringe Inject 300 mg subcutaneously every 30 days 7/3/2024    cloNIDine (CATAPRES) 0.1 MG tablet Take 0.1 mg by mouth daily 7/26/2024    levomilnacipran (FETZIMA ER) 120 MG 24 hr capsule Take 1 capsule (120 mg) by mouth daily for 30 days 7/26/2024    LORazepam (ATIVAN) 2 MG " tablet Take 2 mg by mouth 3 times daily 7/26/2024    multivitamin w/minerals (MULTI-VITAMIN) tablet Take 1 tablet by mouth daily Past Month    naloxone (NARCAN) 4 MG/0.1ML nasal spray Spray 1 spray (4 mg) into one nostril alternating nostrils as needed for opioid reversal every 2-3 minutes until assistance arrives Unknown    nicotine (NICODERM CQ) 14 MG/24HR 24 hr patch Place 1 patch onto the skin every 24 hours 7/26/2024    pregabalin (LYRICA) 150 MG capsule Take 1 capsule (150 mg) by mouth 2 times daily for 30 days 7/25/2024    promethazine (PHENERGAN) 25 MG tablet Take 50 mg by mouth nightly as needed for nausea or vomiting Past Week    QUEtiapine (SEROQUEL) 100 MG tablet Take 1 tablet (100 mg) by mouth at bedtime for 30 days 7/25/2024    SUMAtriptan (IMITREX) 50 MG tablet Take 50 mg by mouth at onset of headache for migraine (may take repeat dose in 2 hours) Past Month    testosterone cypionate (DEPOTESTOTERONE) 200 MG/ML injection Inject 200 mg into the muscle Injects 1 ml every 7-10 days 7/23/2024

## 2024-07-27 LAB
ALBUMIN SERPL BCG-MCNC: 4.7 G/DL (ref 3.5–5.2)
ALP SERPL-CCNC: 90 U/L (ref 40–150)
ALT SERPL W P-5'-P-CCNC: 415 U/L (ref 0–70)
ANION GAP SERPL CALCULATED.3IONS-SCNC: 10 MMOL/L (ref 7–15)
AST SERPL W P-5'-P-CCNC: 221 U/L (ref 0–45)
BILIRUB SERPL-MCNC: 0.4 MG/DL
BUN SERPL-MCNC: 20.2 MG/DL (ref 6–20)
CALCIUM SERPL-MCNC: 10.8 MG/DL (ref 8.8–10.4)
CD3 CELLS # BLD: 2292 CELLS/UL (ref 603–2990)
CD3 CELLS NFR BLD: 73 % (ref 49–84)
CD3+CD4+ CELLS # BLD: 1153 CELLS/UL (ref 441–2156)
CD3+CD4+ CELLS NFR BLD: 37 % (ref 28–63)
CD3+CD4+ CELLS/CD3+CD8+ CLL BLD: 1.11 % (ref 1.4–2.6)
CD3+CD8+ CELLS # BLD: 1042 CELLS/UL (ref 125–1312)
CD3+CD8+ CELLS NFR BLD: 33 % (ref 10–40)
CHLORIDE SERPL-SCNC: 100 MMOL/L (ref 98–107)
CREAT SERPL-MCNC: 1 MG/DL (ref 0.67–1.17)
EGFRCR SERPLBLD CKD-EPI 2021: >90 ML/MIN/1.73M2
GLUCOSE SERPL-MCNC: 88 MG/DL (ref 70–99)
HBV CORE AB SERPL QL IA: REACTIVE
HBV CORE IGM SERPL QL IA: NONREACTIVE
HBV SURFACE AB SERPL IA-ACNC: 10.8 M[IU]/ML
HBV SURFACE AB SERPL IA-ACNC: NORMAL M[IU]/ML
HBV SURFACE AG SERPL QL IA: NONREACTIVE
HCO3 SERPL-SCNC: 33 MMOL/L (ref 22–29)
HCV AB SERPL QL IA: REACTIVE
IRON BINDING CAPACITY (ROCHE): 426 UG/DL (ref 240–430)
IRON SATN MFR SERPL: 63 % (ref 15–46)
IRON SERPL-MCNC: 268 UG/DL (ref 61–157)
POTASSIUM SERPL-SCNC: 4.8 MMOL/L (ref 3.4–5.3)
PROT SERPL-MCNC: 8.4 G/DL (ref 6.4–8.3)
SODIUM SERPL-SCNC: 143 MMOL/L (ref 135–145)
T CELL COMMENT: ABNORMAL
TSH SERPL DL<=0.005 MIU/L-ACNC: 3.31 UIU/ML (ref 0.3–4.2)

## 2024-07-27 PROCEDURE — 84443 ASSAY THYROID STIM HORMONE: CPT | Performed by: PHYSICIAN ASSISTANT

## 2024-07-27 PROCEDURE — 86360 T CELL ABSOLUTE COUNT/RATIO: CPT | Performed by: PHYSICIAN ASSISTANT

## 2024-07-27 PROCEDURE — 250N000013 HC RX MED GY IP 250 OP 250 PS 637: Performed by: PSYCHIATRY & NEUROLOGY

## 2024-07-27 PROCEDURE — 99222 1ST HOSP IP/OBS MODERATE 55: CPT | Performed by: PHYSICIAN ASSISTANT

## 2024-07-27 PROCEDURE — 86015 ACTIN ANTIBODY EACH: CPT | Performed by: PHYSICIAN ASSISTANT

## 2024-07-27 PROCEDURE — 86704 HEP B CORE ANTIBODY TOTAL: CPT | Performed by: PHYSICIAN ASSISTANT

## 2024-07-27 PROCEDURE — 86706 HEP B SURFACE ANTIBODY: CPT | Performed by: PHYSICIAN ASSISTANT

## 2024-07-27 PROCEDURE — 86705 HEP B CORE ANTIBODY IGM: CPT | Performed by: PHYSICIAN ASSISTANT

## 2024-07-27 PROCEDURE — 82104 ALPHA-1-ANTITRYPSIN PHENO: CPT | Performed by: PHYSICIAN ASSISTANT

## 2024-07-27 PROCEDURE — 86359 T CELLS TOTAL COUNT: CPT | Performed by: PHYSICIAN ASSISTANT

## 2024-07-27 PROCEDURE — 86803 HEPATITIS C AB TEST: CPT | Performed by: PHYSICIAN ASSISTANT

## 2024-07-27 PROCEDURE — 86038 ANTINUCLEAR ANTIBODIES: CPT | Performed by: PHYSICIAN ASSISTANT

## 2024-07-27 PROCEDURE — 87522 HEPATITIS C REVRS TRNSCRPJ: CPT | Performed by: PHYSICIAN ASSISTANT

## 2024-07-27 PROCEDURE — 128N000004 HC R&B CD ADULT

## 2024-07-27 PROCEDURE — 36415 COLL VENOUS BLD VENIPUNCTURE: CPT | Performed by: PHYSICIAN ASSISTANT

## 2024-07-27 PROCEDURE — 86376 MICROSOMAL ANTIBODY EACH: CPT | Performed by: PHYSICIAN ASSISTANT

## 2024-07-27 PROCEDURE — 83550 IRON BINDING TEST: CPT | Performed by: PHYSICIAN ASSISTANT

## 2024-07-27 PROCEDURE — 82040 ASSAY OF SERUM ALBUMIN: CPT | Performed by: PHYSICIAN ASSISTANT

## 2024-07-27 PROCEDURE — 82390 ASSAY OF CERULOPLASMIN: CPT | Performed by: PHYSICIAN ASSISTANT

## 2024-07-27 PROCEDURE — 86364 TISS TRNSGLTMNASE EA IG CLAS: CPT | Performed by: PHYSICIAN ASSISTANT

## 2024-07-27 PROCEDURE — 82374 ASSAY BLOOD CARBON DIOXIDE: CPT | Performed by: PHYSICIAN ASSISTANT

## 2024-07-27 PROCEDURE — 250N000013 HC RX MED GY IP 250 OP 250 PS 637

## 2024-07-27 PROCEDURE — 250N000013 HC RX MED GY IP 250 OP 250 PS 637: Performed by: PHYSICIAN ASSISTANT

## 2024-07-27 PROCEDURE — 87340 HEPATITIS B SURFACE AG IA: CPT | Performed by: PHYSICIAN ASSISTANT

## 2024-07-27 PROCEDURE — 99222 1ST HOSP IP/OBS MODERATE 55: CPT | Performed by: PSYCHIATRY & NEUROLOGY

## 2024-07-27 RX ORDER — DIAZEPAM 5 MG
10 TABLET ORAL 2 TIMES DAILY
Status: COMPLETED | OUTPATIENT
Start: 2024-07-27 | End: 2024-07-28

## 2024-07-27 RX ORDER — CLONIDINE HYDROCHLORIDE 0.1 MG/1
0.1 TABLET ORAL DAILY
Status: DISCONTINUED | OUTPATIENT
Start: 2024-07-27 | End: 2024-07-31 | Stop reason: HOSPADM

## 2024-07-27 RX ORDER — DIAZEPAM 5 MG
10 TABLET ORAL 2 TIMES DAILY
Status: DISCONTINUED | OUTPATIENT
Start: 2024-07-27 | End: 2024-07-27

## 2024-07-27 RX ORDER — QUETIAPINE FUMARATE 100 MG/1
100 TABLET, FILM COATED ORAL AT BEDTIME
Status: DISCONTINUED | OUTPATIENT
Start: 2024-07-27 | End: 2024-07-31 | Stop reason: HOSPADM

## 2024-07-27 RX ADMIN — DIAZEPAM 10 MG: 5 TABLET ORAL at 20:09

## 2024-07-27 RX ADMIN — DIAZEPAM 10 MG: 5 TABLET ORAL at 08:43

## 2024-07-27 RX ADMIN — CLONIDINE HYDROCHLORIDE 0.1 MG: 0.1 TABLET ORAL at 08:43

## 2024-07-27 RX ADMIN — ABACAVIR SULFATE, DOLUTEGRAVIR SODIUM, LAMIVUDINE 1 TABLET: 600; 50; 300 TABLET, FILM COATED ORAL at 11:44

## 2024-07-27 RX ADMIN — Medication 1 TABLET: at 08:43

## 2024-07-27 RX ADMIN — NICOTINE 1 PATCH: 14 PATCH, EXTENDED RELEASE TRANSDERMAL at 08:51

## 2024-07-27 RX ADMIN — THIAMINE HCL TAB 100 MG 100 MG: 100 TAB at 08:43

## 2024-07-27 RX ADMIN — FOLIC ACID 1 MG: 1 TABLET ORAL at 08:43

## 2024-07-27 RX ADMIN — LEVOMILNACIPRAN HYDROCHLORIDE 120 MG: 80 CAPSULE, EXTENDED RELEASE ORAL at 11:21

## 2024-07-27 RX ADMIN — QUETIAPINE FUMARATE 100 MG: 100 TABLET ORAL at 20:09

## 2024-07-27 ASSESSMENT — ACTIVITIES OF DAILY LIVING (ADL)
ADLS_ACUITY_SCORE: 40
ADLS_ACUITY_SCORE: 40
ORAL_HYGIENE: INDEPENDENT
ADLS_ACUITY_SCORE: 57
ADLS_ACUITY_SCORE: 40
ADLS_ACUITY_SCORE: 57
ADLS_ACUITY_SCORE: 57
ADLS_ACUITY_SCORE: 40
HYGIENE/GROOMING: INDEPENDENT
HYGIENE/GROOMING: INDEPENDENT
ADLS_ACUITY_SCORE: 40
DRESS: INDEPENDENT
ORAL_HYGIENE: INDEPENDENT
ADLS_ACUITY_SCORE: 40
DRESS: INDEPENDENT
ADLS_ACUITY_SCORE: 40

## 2024-07-27 NOTE — DISCHARGE INSTRUCTIONS
Behavioral Discharge Planning and Instructions  THANK YOU FOR CHOOSING [address]  [phone]    Summary: You were admitted to [facility] on 07/26/2024 for detoxification from alcohol.  A medical exam was performed that included lab work. You have met with a  and opted to attend residential treatment.  Please take care and make your recovery a daily priority, Bc!  It was a pleasure working with you and the entire treatment team here wishes you the very best in your recovery!     Recommendation:  Your KENA Referrals have been sent to [facility] and [facility] as your requested. Please contact the CD treatment of your choice upon discharge from  to schedule your intake and follow any and all recommendations.   [facility]  [address] [place], [place] [id number] [phone]    [facility]  [email or web address]  [facility]  [address] [place] [address] [phone]    [facility]  [address], [place] [address] [phone]    [facility]   [address] [place] [address]  [phone]            Main Diagnoses:  Per Dr. Ronal Fonseca MD;  Benzodiazepine use disorder, severe, dependence  Opiate use disorder, on Sublocade  History of stimulant use disorder  MDD, recurrent, moderate  Nicotine dependence with current use     Major Treatments, Procedures and Findings:  You were treated for benzo detoxification using phenobarbital. As an outpatient you will be prescribed medication, please take this medication as prescribed until it is gone. You completed  a chemical dependency assessment. You had labs drawn and those results were reviewed with you. Please take a copy of your lab work with you to your next primary care provider appointment.    Symptoms to Report:  If you experience more anxiety, confusion, sleeplessness, deep sadness or thoughts of suicide, notify your treatment team or notify your primary care provider.  IF ANY OF THE SYMPTOMS YOU ARE EXPERIENCING ARE A MEDICAL EMERGENCY CALL 911 IMMEDIATELY.     Lifestyle Adjustment: Adjust your lifestyle to get enough sleep, relaxation, exercise and good nutrition. Continue to develop healthy coping skills to decrease stress and promote a sober living environment. Do not use mood altering substances including alcohol, illegal drugs or addictive medications other than what is currently prescribed.   Health Action Plan:  1.Create a daily schedule  2. Eat Healthy  3. Plan Enjoyable Sober Activities  4. Use Problem Solving Skills and Deal with Issues as they Arise.   5. Be Physically Active  6. Take your medications as prescribed  7. Get enough restful sleep  8. Practice Relaxation  9. Spend time with Supportive People  10. No use of alcohol, illegal drugs or addictive medications other than what is currently prescribed.   11.AA, NA Sponsor are excellent resources for support  12. Explore how  you can utilize spirituality in your recovery     Disposition: Residential Treatment     Facts about COVID19 at www.cdc.gov/COVID19 and www.[place].gov/covid19    Keeping hands clean is one of the most important steps we can take to avoid getting sick and spreading germs to others.  Please wash your hands frequently and lather with soap for at least 20 seconds!    Follow-up Appointment:   Appointment Date/Time: 7/31/24 @ 1230 PM    Psychiatrist/Primary Care Giver: [facility]; Referred by Khari Barrientos MD     Address: [facility]   Phone Number:       Opiate use disorder, on Sublocade  History of stimulant use disorder  MDD, recurrent, moderate  Nicotine dependence with current use           Recovery apps for your phone for educational purposes and to locate in person and zoom recovery meetings  Everything AA - educational purpose and libby is a great resource  12 Step Toolkit - educational purpose learning about the 12 steps to recovery  [place] Cloud - meeting libby  ANNAMARIA   - meeting libby  Meeting guide - meeting libby  Quick NA meeting - meeting libby  Gianabrenda- has various apps    Resources:  Some AA/NA meetings are being held online however most have returned to in-person or a hybrid combination please check online to verify*  Need Support Now? If you or someone you know is struggling or in crisis, help is available. Call or text 041 or chat [employer].org  AA meetings search for them at: https://aa-intergroup.org (worldwide meeting listings)  AA meetings for [place] area can be found online at: https://a[place].org (click local online meetings listings)  NA meetings for [place] area can be found online at: https://www.nam[place].org  (click find a meeting)  AA and NA Sponsors are excellent resources for support and you can find one at any support group meeting.   Alcoholics Anonymous (https://aa.org/): for information 24 hours/day  AA Intergroup service office in [place] (http://www.aastpaul.org/) 565.905.8364  AA Intergroup service office in [facility]: 483.129.4288. (http://www.Encover.org/)  Narcotics Anonymous (www.nam[place].org) (195) 820-8869  https://aafairviewriverside.org/meetings  SMART Recovery - self management for addiction recovery:  www.smartrecovery.org  Pathways ~ A Health Crisis Resource & Support Center:  604.259.5374.  https://prescribetoprevent.org/patient-education/videos/  http://www.harmreduction.org  Crisis Text Line  Text 692060  You will be connected with a trained live crisis counselor to provide support. Por espanol, texto  CONNER a 974871 o texto a 442-AYUDAME en [facility] Hope Line  1.800.SUICIDE [4175661]  [facility] [phone]  Support Group:  AA/NA and Sponsor/support.  Fast Tracker  Linking people to mental health and substance use disorder resources  [employer].org   [place] Mental [facility] Warm Line  Peer to peer support  Monday thru Saturday, 12 pm to 10 pm  725.772.9152 or 1.651.749.8706  " Text \"Support\" to 01173  National [place] on Mental Illness (MATTHEW)  598.728.8210 or 1.888.MATTHEW.HELPS  Alcoholics Anonymous (www.alcoholics-anonymous.org): Check your phone book for your local chapter.  Suicide Awareness Voices of Education (SAVE) (www.save.org): 899-035-SIVX (7283)  National Suicide Prevention Line (www.mentalhealthmn.org): 338-299-CNLN (3364)  Mental Health Consumer/Survivor Network of [place] (www.mhcsn.net): 702.405.6094 or 410-258-5264  Mental Health Association of [place] (www.mentalhealth.org): 602.435.8304 or 621-838-8194   Substance Abuse and Mental Health Services (www.samhsa.gov)  [place] Opioid Prevention Coalition: www.opioidcoalition.org    [place] Recovery Connection ([facility])  [facility] connects people seeking recovery to resources that help foster and sustain long-term recovery.  Whether you are seeking resources for treatment, transportation, housing, job training, education, health care or other pathways to recovery, [facility] is a great place to start.  688.745.2641.  www.minnesotarecOverblogy.org    Great Pod casts for nutrition and wellness  Listen on Apple Podcasts  [employer]sha[employer]   Nutrition       Understand the connection between what you eat and how you feel. Hosted by licensed nutritionists and dietitians from [employer] we share practical, real-life solutions for healthier living through nutrition.     General Medication Instructions:   See your medication sheet(s) for instructions.   Take all medications as prescribed.  Make no changes unless your primary care provider suggests them.   Go to all your primary care provider visits.  Be sure to have all your required lab tests. This way, your medicines can be refilled on time.  Do not use any forms of alcohol.    Please Note:  If you have any questions at anytime after you are discharged please call [facility] detox unit [facility] at [phone].  [facility], Behavioral " Intake [phone]  Medical Records call [phone]  Outpatient Behavioral Intake call [phone]  LP+ Wait List/Bed Availability call [phone]    Please remember to take all of your behavioral discharge planning and lab paperwork to any follow up appointments, it contains your lab results, diagnosis, medication list and discharge recommendations.      THANK YOU FOR CHOOSING [facility]

## 2024-07-27 NOTE — PLAN OF CARE
Goal Outcome Evaluation:    Plan of Care Reviewed With: patient        Problem: Substance Withdrawal  Goal: Substance Withdrawal  Description: Signs and symptoms of listed problems will be absent or manageable.  Outcome: Progressing   Pt awake and alert all day.  He mostly reads in his room and comes out for meals and programming.  Pt has expressed that he has a paranoid reaction to phenobarbital--he has been put on a valium withdrawal instead.  Pt is agreeable to this option.  Pt is eating well and drinking adequate fluids.  Pt's affect is flat, his mood is calm and he is cooperative.  Pt denies SI, SIB.   [facility] and then [facility] to [facility] program.  Will continue to monitor withdrawal and assist with discharge planning.

## 2024-07-27 NOTE — PROGRESS NOTES
[address]     Case Management Encounter: Pt reports he has been struggling with the use of opiates and benzo's since 2017 and has came to detox to get help with benzo withdrawal so that he can got to treatment. Pt reports 2 previous treatment attempts at [facility] and shared that he did not get enough time and help with his sobriety before graduating and now would like to attend a long term program. Pt reported he would like to go to [facility] for residential and then stepdown to attend [facility] with [facility] and attend their 13 month long program.     Insurance: [employer]     Legal Status: Voluntary     SUDs Assessment Status: Pt working on paperwork.     ROIs on file: None at this time.      Living Situation: Unknown at this time.     Current Plan/Referral Status: Residential treatment at [facility].     RN updated.    KEVIN Garcia  [address] - Adult Inpatient Addiction Psychiatry Unit

## 2024-07-27 NOTE — PLAN OF CARE
Problem: Substance Withdrawal  Goal: Substance Withdrawal  Description: Signs and symptoms of listed problems will be absent or manageable.  Outcome: Progressing   Goal Outcome Evaluation:    Pt is in detox for benzo. Slept well with no issue of concern.   No c/o benzo withdrawal. Up for a snack x1, back to bed.

## 2024-07-27 NOTE — PLAN OF CARE
"  Problem: Alcohol Withdrawal  Goal: Alcohol Withdrawal Symptom Control  Outcome: Progressing   Goal Outcome Evaluation:    Pt was admitted this evening for benzo detox.   Pt is denying withdrawal symptoms stating he feels just fine. Initially declined phenobarb because it makes him feel \"funny.\"  After education, he was able to take it and go to bed.     MSSA : 5. No ativan given except for what he got in the ED.   B/P: 125/76, T: 97.9, P: 80, R: 16   "

## 2024-07-27 NOTE — H&P
"[facility], [place]   Psychiatric History and Physical  Admission date: 7/26/2024        Chief Complaint:   \"I need to get off the Ativan.\"         HPI:     The patient is a 45yo male with a history of benzodiazepine use disorder, opiate use disorder, stimulant use disorder, depression and anxiety who was admitted to detoxify from Ativan. He reports that he was using up to 6mg daily for the past 5 years. Has been self-tapering and has been down to 0.5mg daily for the past two days. Does not want to use phenobarbital as he reports it \"makes me suicidal.\" Discussed options for lower dose and patient is adamant to not use it. Really wants \"one more dose\" of Ativan but agreeable to use Valium taper. Says that his withdrawal seizure \"was from stopping Xanax not Ativan.\" Doing well on Sublocade. Says that he did use \"IV meth\" on July 4th but hasn't used any substances other than prescribed Ativan since then. Hopeful for CD treatment. Reports recent \"panic attacks\" but denies SI. Says that he has also been paranoid but does report some real world scenarios where he has needed to be concerned about others following him. Has been taking his psychotropics regularly. Does admit that when he was here in 2011, a visitor \"dropped off heroin\" and he used on the unit. Feels guilt related to this and is really wanting to get clean. Says that \"my head is clear for the first time in a long time.\"      Per ER:  Bc German is a 44 year old male with polysubstance use disorder who was treated with injectable buprenorphine for his opiate use disorder on June 26 and then received long-acting Sublocade approximately 3 weeks ago which has kept patient off of opioids. Patient has also stopped using methamphetamines and today seeks help for benzodiazepine use. He has been taking up to 6 mg of Ativan in a 24-hour period. Patient's last use was 0.5 mg of Ativan this morning at 6 AM. Uses Lyrica 150 mg twice " daily and smokes cigarettes, otherwise denies other drug use in the past month. Denies acute mental health issues including SI, HI, or hallucinations.         Past Psychiatric History:     MDD. On Fetzima, clonidine and Seroquel. Reports recently starting therapy.         Substance Use and History:     Benzodiazepine use disorder. Does have a history of withdrawal seizures.   Opiate use disorder. On Sublocade.   History of stimulant abuse (last used 7/4/24)  Nicotine use disorder        Past Medical History:   PAST MEDICAL HISTORY:   Past Medical History:   Diagnosis Date    Anxiety     Depressive disorder     Drug abuse, IV (H)     Group A streptococcal infection 11/2014    Bacteremia/cellulitis    HCV antibody positive     HIV (human immunodeficiency virus infection) (H)     HIV (human immunodeficiency virus infection) (H)     HIV (human immunodeficiency virus infection) (H)     IVDU (intravenous drug user)     Osteomyelitis of vertebra of lumbosacral region (H) 3/2011    L3, left psoas abscess    Substance abuse (H)        PAST SURGICAL HISTORY:   Past Surgical History:   Procedure Laterality Date    EYE SURGERY  1 year ago    pins to left eye after socket fracture    LAPAROSCOPIC CHOLECYSTECTOMY N/A 2/7/2022    Procedure: CHOLECYSTECTOMY, LAPAROSCOPIC;  Surgeon: iLco Rodriguez MD;  Location:  OR    OPTICAL TRACKING SYSTEM FUSION CERVICAL ANTERIOR ONE LEVEL Right 9/10/2017    Procedure: OPTICAL TRACKING SYSTEM FUSION CERVICAL ANTERIOR ONE LEVEL;  Stealth C6-C7 Anterior Cervical Corpectomy and C5-T1 Fusion;  Surgeon: Marv Ambrose MD;  Location:  OR    ORTHOPEDIC SURGERY      Arm Surgery    PICC INSERTION Left 09/21/2017    5fr DL BioFlo PICC, 42cm (1cm external) in the L basilic vein w/ tip in the low SVC.    TRANSESOPHAGEAL ECHOCARDIOGRAM INTRAOPERATIVE N/A 3/17/2015    Procedure: TRANSESOPHAGEAL ECHOCARDIOGRAM INTRAOPERATIVE;  Surgeon: Generic Anesthesia Provider;  Location:  OR     TRANSESOPHAGEAL ECHOCARDIOGRAM INTRAOPERATIVE N/A 2021    Procedure: ECHOCARDIOGRAM, TRANSESOPHAGEAL, INTRAOPERATIVE;  Surgeon: GENERIC ANESTHESIA PROVIDER;  Location: [facility] OR    TRANSESOPHAGEAL ECHOCARDIOGRAM INTRAOPERATIVE N/A 2021    Procedure: ECHOCARDIOGRAM, TRANSESOPHAGEAL, INTRAOPERATIVE;  Surgeon: GENERIC ANESTHESIA PROVIDER;  Location: [facility] OR             Family History:   FAMILY HISTORY: Denies MI or CD issues in his family per psych consult.         Social History:   Please see the full psychosocial profile from the clinical treatment coordinator.   SOCIAL HISTORY:   Social History     Tobacco Use    Smoking status: Every Day     Current packs/day: 0.25     Types: Cigarettes    Smokeless tobacco: Never    Tobacco comments:     mostly vapes   Substance Use Topics    Alcohol use: Not Currently     Comment: occ            Physical ROS:   The patient endorsed some fatigue. The remainder of 10-point review of systems was negative except as noted in HPI.         PTA Medications:     Medications Prior to Admission   Medication Sig Dispense Refill Last Dose    abacavir-dolutegravir-LamiVUDine (TRIUMEQ) 600- MG per tablet Take 1 tablet by mouth daily Please call [phone] & make appointment for further refills. 30 tablet 0 2024    buprenorphine ER (SUBLOCADE) 300 MG/1.5ML prefilled syringe Inject 300 mg subcutaneously every 30 days   7/3/2024    cloNIDine (CATAPRES) 0.1 MG tablet Take 0.1 mg by mouth daily   2024    [] levomilnacipran (FETZIMA ER) 120 MG 24 hr capsule Take 1 capsule (120 mg) by mouth daily for 30 days 30 capsule 0 2024    LORazepam (ATIVAN) 2 MG tablet Take 2 mg by mouth 3 times daily   2024    multivitamin w/minerals (MULTI-VITAMIN) tablet Take 1 tablet by mouth daily   Past Month    naloxone (NARCAN) 4 MG/0.1ML nasal spray Spray 1 spray (4 mg) into one nostril alternating nostrils as needed for opioid reversal every 2-3 minutes until assistance arrives  0.2 mL 0 Unknown    nicotine (NICODERM CQ) 14 MG/24HR 24 hr patch Place 1 patch onto the skin every 24 hours   7/26/2024    pregabalin (LYRICA) 150 MG capsule Take 1 capsule (150 mg) by mouth 2 times daily for 30 days 60 capsule 0 7/25/2024    promethazine (PHENERGAN) 25 MG tablet Take 50 mg by mouth nightly as needed for nausea or vomiting   Past Week    QUEtiapine (SEROQUEL) 100 MG tablet Take 1 tablet (100 mg) by mouth at bedtime for 30 days 30 tablet 0 7/25/2024    SUMAtriptan (IMITREX) 50 MG tablet Take 50 mg by mouth at onset of headache for migraine (may take repeat dose in 2 hours)   Past Month    testosterone cypionate (DEPOTESTOTERONE) 200 MG/ML injection Inject 200 mg into the muscle Injects 1 ml every 7-10 days   7/23/2024          Allergies:     Allergies   Allergen Reactions    Bupropion Other (See Comments)     Other reaction(s): seizure when took a double dose        Acetaminophen Nausea and Vomiting and GI Disturbance       If multiple uses per pt report    Codeine Nausea and Vomiting, Itching and Fatigue           Sulfa Antibiotics Itching          Labs:     Recent Results (from the past 48 hour(s))   Comprehensive metabolic panel    Collection Time: 07/26/24 10:17 AM   Result Value Ref Range    Sodium 137 135 - 145 mmol/L    Potassium 5.2 3.4 - 5.3 mmol/L    Carbon Dioxide (CO2) 29 22 - 29 mmol/L    Anion Gap 11 7 - 15 mmol/L    Urea Nitrogen 29.4 (H) 6.0 - 20.0 mg/dL    Creatinine 1.08 0.67 - 1.17 mg/dL    GFR Estimate 88 >60 mL/min/1.73m2    Calcium 10.4 8.8 - 10.4 mg/dL    Chloride 97 (L) 98 - 107 mmol/L    Glucose 102 (H) 70 - 99 mg/dL    Alkaline Phosphatase 90 40 - 150 U/L    AST       (H) 0 - 70 U/L    Protein Total 8.2 6.4 - 8.3 g/dL    Albumin 4.5 3.5 - 5.2 g/dL    Bilirubin Total 0.4 <=1.2 mg/dL   Lipase    Collection Time: 07/26/24 10:17 AM   Result Value Ref Range    Lipase 30 13 - 60 U/L   Magnesium    Collection Time: 07/26/24 10:17 AM   Result Value Ref Range    Magnesium 1.9  "1.7 - 2.3 mg/dL   CBC with platelets and differential    Collection Time: 07/26/24 10:17 AM   Result Value Ref Range    WBC Count 8.7 4.0 - 11.0 10e3/uL    RBC Count 5.39 4.40 - 5.90 10e6/uL    Hemoglobin 16.7 13.3 - 17.7 g/dL    Hematocrit 49.4 40.0 - 53.0 %    MCV 92 78 - 100 fL    MCH 31.0 26.5 - 33.0 pg    MCHC 33.8 31.5 - 36.5 g/dL    RDW 13.2 10.0 - 15.0 %    Platelet Count 294 150 - 450 10e3/uL    % Neutrophils 57 %    % Lymphocytes 27 %    % Monocytes 10 %    % Eosinophils 3 %    % Basophils 1 %    % Immature Granulocytes 2 %    NRBCs per 100 WBC 0 <1 /100    Absolute Neutrophils 4.9 1.6 - 8.3 10e3/uL    Absolute Lymphocytes 2.4 0.8 - 5.3 10e3/uL    Absolute Monocytes 0.9 0.0 - 1.3 10e3/uL    Absolute Eosinophils 0.3 0.0 - 0.7 10e3/uL    Absolute Basophils 0.1 0.0 - 0.2 10e3/uL    Absolute Immature Granulocytes 0.2 <=0.4 10e3/uL    Absolute NRBCs 0.0 10e3/uL   Urine Drug Screen Panel    Collection Time: 07/26/24 12:16 PM   Result Value Ref Range    Amphetamines Urine Screen Negative Screen Negative    Barbituates Urine Screen Negative Screen Negative    Benzodiazepine Urine Screen Negative Screen Negative    Cannabinoids Urine Screen Negative Screen Negative    Cocaine Urine Screen Negative Screen Negative    Fentanyl Qual Urine Screen Negative Screen Negative    Opiates Urine Screen Negative Screen Negative    PCP Urine Screen Negative Screen Negative          Physical and Psychiatric Examination:     /76 (BP Location: Left arm)   Pulse 80   Temp 97.9  F (36.6  C) (Oral)   Resp 16   Ht 1.778 m (5' 10\")   Wt 65.8 kg (145 lb)   SpO2 98%   BMI 20.81 kg/m    Weight is 145 lbs 0 oz  Body mass index is 20.81 kg/m .    Physical Exam:  I have reviewed the physical exam as documented by the medical team and agree with findings and assessment and have no additional findings to add at this time.    Mental Status Exam:  Appearance: awake, alert and adequately groomed  Attitude:  somewhat cooperative  Eye " Contact:  good  Mood:  anxious  Affect:  mood congruent  Speech:  rambling  Language: fluent and intact in English  Psychomotor, Gait, Musculoskeletal:  no evidence of tardive dyskinesia, dystonia, or tics  Thought Process:  goal oriented  Associations:  no loose associations  Thought Content:  no evidence of suicidal ideation or homicidal ideation and obsessions present  Insight:  fair  Judgement:  fair  Oriented to:  time, person, and place  Attention Span and Concentration:  fair  Recent and Remote Memory:  fair  Fund of Knowledge:  appropriate         Admission Diagnoses:      Benzodiazepine use disorder, severe, dependence  Opiate use disorder, on Sublocade  History of stimulant use disorder  MDD, recurrent, moderate  Nicotine dependence with current use     Clinically Significant Risk Factors Present on Admission                    # Hypertension: Home medication list includes antihypertensive(s)                                Assessment & Plan:     1) Patient refusing phenobarbital. Will use Valium taper to get patient off last of Ativan due to history of withdrawal seizure. 10mg BID today and 10mg tomorrow and then stop.   2) Continue Fetzima, Clonidine and Seroquel.   3) Nicotine replacement available.   4) Patient on Sublocade.   5) Patient open to CD treatment.     Disposition Plan   Reason for ongoing admission: requires detoxification from substance that poses a risk of bodily harm during withdrawal period  Discharge location: [facility] dependency treatment facility  Discharge Medications: not ordered  Follow-up Appointments: not scheduled  Legal Status: voluntary    Entered by: Ronal Fonseca MD on 7/27/2024 at 5:31 AM

## 2024-07-27 NOTE — PLAN OF CARE
Problem: Substance Withdrawal  Goal: Substance Withdrawal  Description: Signs and symptoms of listed problems will be absent or manageable.  Outcome: Progressing   Goal Outcome Evaluation:    Pt continues to be monitored for benzo withdrawal symptoms. Pt is on valium 10mg twice daily.  He is eating and hydrating well. Spent his time reading in bed at the beginning of the shift.   Came out for dinner and remained out. Denied withdrawal symptoms. Did not take phenobarb as he reports a bad reaction from it.  On valium taper for 10mg twice today, will take 10mg x1 tomorrow.     Pt has orders for add on labs. Will need to be NPO 8 hours prior to abd ultrasound in AM. Pt is aware.     MSSA :3/2.  B/P: 138/89, T: 97.4, P: 76, R: 16

## 2024-07-28 ENCOUNTER — APPOINTMENT (OUTPATIENT)
Dept: ULTRASOUND IMAGING | Facility: CLINIC | Age: 44
End: 2024-07-28
Attending: PHYSICIAN ASSISTANT
Payer: COMMERCIAL

## 2024-07-28 LAB
ALBUMIN SERPL BCG-MCNC: 4.1 G/DL (ref 3.5–5.2)
ALP SERPL-CCNC: 81 U/L (ref 40–150)
ALT SERPL W P-5'-P-CCNC: 364 U/L (ref 0–70)
ANION GAP SERPL CALCULATED.3IONS-SCNC: 7 MMOL/L (ref 7–15)
AST SERPL W P-5'-P-CCNC: 189 U/L (ref 0–45)
BILIRUB SERPL-MCNC: 0.3 MG/DL
BUN SERPL-MCNC: 18 MG/DL (ref 6–20)
CALCIUM SERPL-MCNC: 9.8 MG/DL (ref 8.8–10.4)
CHLORIDE SERPL-SCNC: 103 MMOL/L (ref 98–107)
CREAT SERPL-MCNC: 0.99 MG/DL (ref 0.67–1.17)
EGFRCR SERPLBLD CKD-EPI 2021: >90 ML/MIN/1.73M2
FERRITIN SERPL-MCNC: 56 NG/ML (ref 31–409)
GLUCOSE SERPL-MCNC: 94 MG/DL (ref 70–99)
HAV AB SER QL IA: NONREACTIVE
HCO3 SERPL-SCNC: 30 MMOL/L (ref 22–29)
HOLD SPECIMEN: NORMAL
INR PPP: 0.96 (ref 0.85–1.15)
POTASSIUM SERPL-SCNC: 4.4 MMOL/L (ref 3.4–5.3)
PROT SERPL-MCNC: 7.5 G/DL (ref 6.4–8.3)
SODIUM SERPL-SCNC: 140 MMOL/L (ref 135–145)

## 2024-07-28 PROCEDURE — 36415 COLL VENOUS BLD VENIPUNCTURE: CPT | Performed by: PHYSICIAN ASSISTANT

## 2024-07-28 PROCEDURE — 86708 HEPATITIS A ANTIBODY: CPT | Performed by: PHYSICIAN ASSISTANT

## 2024-07-28 PROCEDURE — 250N000013 HC RX MED GY IP 250 OP 250 PS 637: Performed by: PSYCHIATRY & NEUROLOGY

## 2024-07-28 PROCEDURE — 82728 ASSAY OF FERRITIN: CPT | Performed by: PHYSICIAN ASSISTANT

## 2024-07-28 PROCEDURE — 82040 ASSAY OF SERUM ALBUMIN: CPT | Performed by: PHYSICIAN ASSISTANT

## 2024-07-28 PROCEDURE — 87522 HEPATITIS C REVRS TRNSCRPJ: CPT | Performed by: PHYSICIAN ASSISTANT

## 2024-07-28 PROCEDURE — 128N000004 HC R&B CD ADULT

## 2024-07-28 PROCEDURE — 76705 ECHO EXAM OF ABDOMEN: CPT

## 2024-07-28 PROCEDURE — 85610 PROTHROMBIN TIME: CPT | Performed by: PHYSICIAN ASSISTANT

## 2024-07-28 PROCEDURE — 87902 NFCT AGT GNTYP ALYS HEP C: CPT | Performed by: PHYSICIAN ASSISTANT

## 2024-07-28 PROCEDURE — 250N000013 HC RX MED GY IP 250 OP 250 PS 637: Performed by: PHYSICIAN ASSISTANT

## 2024-07-28 PROCEDURE — 76705 ECHO EXAM OF ABDOMEN: CPT | Mod: 26 | Performed by: RADIOLOGY

## 2024-07-28 PROCEDURE — 250N000013 HC RX MED GY IP 250 OP 250 PS 637

## 2024-07-28 RX ADMIN — DIAZEPAM 10 MG: 5 TABLET ORAL at 09:06

## 2024-07-28 RX ADMIN — THIAMINE HCL TAB 100 MG 100 MG: 100 TAB at 09:06

## 2024-07-28 RX ADMIN — QUETIAPINE FUMARATE 100 MG: 100 TABLET ORAL at 21:28

## 2024-07-28 RX ADMIN — NICOTINE 1 PATCH: 14 PATCH, EXTENDED RELEASE TRANSDERMAL at 09:06

## 2024-07-28 RX ADMIN — LEVOMILNACIPRAN HYDROCHLORIDE 120 MG: 80 CAPSULE, EXTENDED RELEASE ORAL at 09:08

## 2024-07-28 RX ADMIN — Medication 1 TABLET: at 09:06

## 2024-07-28 RX ADMIN — CLONIDINE HYDROCHLORIDE 0.1 MG: 0.1 TABLET ORAL at 09:06

## 2024-07-28 RX ADMIN — FOLIC ACID 1 MG: 1 TABLET ORAL at 09:07

## 2024-07-28 RX ADMIN — ABACAVIR SULFATE, DOLUTEGRAVIR SODIUM, LAMIVUDINE 1 TABLET: 600; 50; 300 TABLET, FILM COATED ORAL at 09:08

## 2024-07-28 ASSESSMENT — ANXIETY QUESTIONNAIRES
3. WORRYING TOO MUCH ABOUT DIFFERENT THINGS: NEARLY EVERY DAY
GAD7 TOTAL SCORE: 19
6. BECOMING EASILY ANNOYED OR IRRITABLE: NEARLY EVERY DAY
IF YOU CHECKED OFF ANY PROBLEMS ON THIS QUESTIONNAIRE, HOW DIFFICULT HAVE THESE PROBLEMS MADE IT FOR YOU TO DO YOUR WORK, TAKE CARE OF THINGS AT HOME, OR GET ALONG WITH OTHER PEOPLE: SOMEWHAT DIFFICULT
5. BEING SO RESTLESS THAT IT IS HARD TO SIT STILL: MORE THAN HALF THE DAYS
7. FEELING AFRAID AS IF SOMETHING AWFUL MIGHT HAPPEN: NEARLY EVERY DAY
2. NOT BEING ABLE TO STOP OR CONTROL WORRYING: NEARLY EVERY DAY
4. TROUBLE RELAXING: MORE THAN HALF THE DAYS
1. FEELING NERVOUS, ANXIOUS, OR ON EDGE: NEARLY EVERY DAY
GAD7 TOTAL SCORE: 19

## 2024-07-28 ASSESSMENT — ACTIVITIES OF DAILY LIVING (ADL)
ADLS_ACUITY_SCORE: 40
DRESS: INDEPENDENT
ADLS_ACUITY_SCORE: 40
ORAL_HYGIENE: INDEPENDENT
ADLS_ACUITY_SCORE: 40
HYGIENE/GROOMING: INDEPENDENT
HYGIENE/GROOMING: INDEPENDENT
ADLS_ACUITY_SCORE: 40
ADLS_ACUITY_SCORE: 40

## 2024-07-28 ASSESSMENT — PATIENT HEALTH QUESTIONNAIRE - PHQ9: SUM OF ALL RESPONSES TO PHQ QUESTIONS 1-9: 7

## 2024-07-28 NOTE — PLAN OF CARE
Problem: Substance Withdrawal  Goal: Substance Withdrawal  Description: Signs and symptoms of listed problems will be absent or manageable.  Outcome: Progressing   Goal Outcome Evaluation:    Plan of Care Reviewed With: patient        Patient has spent most of his day in his room sleeping or reading.  He reports that he was awake most of the night--being woke by staff doing accountability checks--so patient turned the head of his bed to the other side of the room so he could be seen breathing from the door.  Patient had an ultrasound of his liver done today and Hep C with reflex was redrawn due to the first sample not having enough blood.  Neither result is available at the time of this writing.  Patient has been pleasant and cooperative.  He is eating well and drinking adequate fluids.  Pt received his last dose of valium for the benzodiazepine taper today.  Will continue to monitor MSSA to evaluate for the resumption of benzodiazepine withdrawal symptoms.  Psychiatry to evaluate in am for readiness for next level of care.  Pt's gait is steady, his speech is clear and his thoughts follow.  He continues to be positive about seeking recovery beginning with treatment in [facility].  Will continue to assist with discharge planning

## 2024-07-28 NOTE — PROGRESS NOTES
"    [facility] (3a)      PATIENT'S NAME: Bc German  PREFERRED NAME: Bc  PRONOUNS: He / His   MRN: [id number]  : 1980  ADDRESS: [address]  [place] [address]  [facility]  [id number]  DATE OF SERVICE: 24  START TIME: 9:19 AM   END TIME: 10:19 AM  PREFERRED PHONE: [phone]  May we leave a program related message: Yes  EMAIL: [email or web address]   EMERGENCY CONTACT: was not obtained .  SERVICE MODALITY:  In-person    UNIVERSAL ADULT Substance Use Disorder DIAGNOSTIC ASSESSMENT    Identifying Information:  Patient is a 44 year old,    individual.  Patient was referred for an assessment by self .  Patient attended the session alone.    Chief Complaint:   The reason for seeking services at this time is: \" I have been off of opiates and I'm on the sublocade shot and want to go to residential treatment \"   The problem(s) began 15. Patient has attempted to resolve these concerns in the past through attending treatment .  Patient does not appear to be in severe withdrawal, an imminent safety risk to self or others, or requiring immediate medical attention and may proceed with the assessment interview.    Social/Family History:  Patient reported they grew up in [place], [place].  They were raised by his mother.  Patient reported that their childhood was \"always walking on eggshells.\"  Patient describes current relationships with family of origin as positive.      The patient describes their cultural background as White French/Vincentian raised [employer].  Cultural influences and impact on patient's life structure, values, norms, and healthcare:  none identified .  Contextual influences on patient's health include: Contextual Factors: Individual Factors substance use .  Patient identified their preferred language to be English. Patient reported they do not need the assistance of an  or other support involved in therapy.  Patient reports " they are not involved in [employer] activities.  They reports spirituality impacts recovery in the following ways:  None.     Patient reported had no significant delays in developmental tasks.   Patient's highest education level was high school graduate and some college. Patient identified the following learning problems: attention and concentration.  Patient reports they are  able to understand written materials.    Patient's current relationship status is single.   Patient identified their sexual orientation as bi-sexual.  Patient reported having one child(kassi).     Patient's current living/housing situation involves staying in own home/apartment.  They live with his father and they report that housing is stable. Patient identified parents and friends as part of their support system.  Patient identified the quality of these relationships as stable and meaningful.      Patient reports engaging in the following recreational/leisure activities: Fishing, reading, going for walks.  Patient reports the following people are supportive of recovery: Father and two sober friends.  Patient is currently unemployed.  Patient reports their income is obtained through [employer] and [employer].  Patient does identify finances as a current stressor.  Cultural and socioeconomic factors do not affect the patient's access to services.      Patient reports the following substance related arrests or legal issues: was arrested on July 4th and has pending legal charges related to multiple felonies.  Patient denies being on probation / parole / under the jurisdiction of the court.    Patient's Strengths and Limitations:  Patient identified the following strengths or resources that will help them succeed in treatment: commitment to health and well being, family support, and sober support group / recovery support . Things that may interfere with the patient's success in treatment include: financial hardship and legal issues .      Assessments:  The following assessments were completed by patient for this visit:  PHQ9:       3/28/2017     9:47 AM 2/29/2024    10:00 AM 7/3/2024     1:52 PM 7/28/2024    10:19 AM   PHQ-9 SCORE   PHQ-9 Total Score 18 13 6 7     GAD7:       7/28/2024    10:19 AM   JASSI-7 SCORE   Total Score 19     PROMIS 10-Global Health (all questions and answers displayed):       7/28/2024    10:00 AM   PROMIS 10   In general, would you say your health is: 2   In general, would you say your quality of life is: 1   In general, how would you rate your physical health? 1   In general, how would you rate your mental health, including your mood and your ability to think? 2   In general, how would you rate your satisfaction with your social activities and relationships? 1   In general, please rate how well you carry out your usual social activities and roles. (This includes activities at home, at work and in your community, and responsibilities as a parent, child, spouse, employee, friend, etc.) 2   To what extent are you able to carry out your everyday physical activities such as walking, climbing stairs, carrying groceries, or moving a chair? 3   In the past 7 days, how often have you been bothered by emotional problems such as feeling anxious, depressed, or irritable? 5   In the past 7 days, how would you rate your fatigue on average? 3   In the past 7 days, how would you rate your pain on average, where 0 means no pain, and 10 means worst imaginable pain? 6   Global Mental Health Score 5   Global Physical Health Score 10   PROMIS TOTAL - SUBSCORES 15     GAIN-sliding scale:      11/29/2021    10:00 AM 7/28/2024    10:00 AM   When was the last time that you had significant problems...   with feeling very trapped, lonely, sad, blue, depressed or hopeless about the future? Past month Past month   with sleep trouble, such as bad dreams, sleeping restlessly, or falling asleep during the day? Past Month Past Month   with feeling very  anxious, nervous, tense, scared, panicked or like something bad was going to happen? Past month Past month   with becoming very distressed & upset when something reminded you of the past? Past month Past month   with thinking about ending your life or committing suicide? 1+ years ago Never          11/29/2021    10:00 AM 7/28/2024    10:00 AM   When was the last time that you did the following things 2 or more times?   Lied or conned to get things you wanted or to avoid having to do something? Past month 1+ years ago   Had a hard time paying attention at school, work or home? Past month Past month   Had a hard time listening to instructions at school, work or home? Past month Past month   Were a bully or threatened other people? Never Never   Started physical fights with other people? Never Never       Personal and Family Medical History:  Patient did not report a family history of mental health concerns.  Patient reports family history is not on file..      Patient reported the following previous mental health diagnoses: Panic disorder, Agoraphobia.  Patient reports their primary mental health symptoms include:  anxiety and these do impact his ability to function.   Patient has received mental health services in the past.  Psychiatric Hospitalizations:  Yes .  Patient denies a history of civil commitment.  Current mental health services/providers include:  Has therapy and psychiatry services in place.    Patient has had a physical exam to rule out medical causes for current symptoms.  Date of last physical exam was within the past year. Client was encouraged to follow up with PCP if symptoms were to develop.  Patient reports the following current medical concerns: chronic pain and past injuries .  Patient reports pain concerns including chronic pain due lumbar rupture and past injuries.  Patient does want help addressing pain concerns.  There are not significant appetite / nutritional concerns / weight changes.   Patient does not report a history of an eating disorder.  Patient does report a history of head injury / trauma / cognitive impairment. Patient goes to [facility] for his primary care and sees Dr. Khari Barrientos.     Patient reports current meds as:   Current Facility-Administered Medications   Medication Dose Route Frequency Provider Last Rate Last Admin    abacavir-dolutegravir-LamiVUDine (TRIUMEQ) 600- MG per tablet 1 tablet  1 tablet Oral Daily Chris Mark PA-C   1 tablet at 07/28/24 0908    acetaminophen (TYLENOL) tablet 650 mg  650 mg Oral Q4H PRN Rosa Isela Coates APRN CNP        alum & mag hydroxide-simethicone (MAALOX) suspension 30 mL  30 mL Oral Q4H PRN Rosa Isela Coates APRN CNP        cloNIDine (CATAPRES) tablet 0.1 mg  0.1 mg Oral Daily Ronal Fonseca MD   0.1 mg at 07/28/24 0906    folic acid (FOLVITE) tablet 1 mg  1 mg Oral Daily Rosa Isela Coates APRN CNP   1 mg at 07/28/24 0907    hydrOXYzine HCl (ATARAX) tablet 25 mg  25 mg Oral Q4H PRN Rosa Isela Coates APRN CNP        levomilnacipran (FETZIMA ER) 24 hr capsule 120 mg  120 mg Oral Daily Ronal Fonseca MD   120 mg at 07/28/24 0908    loperamide (IMODIUM) capsule 2 mg  2 mg Oral 4x Daily PRN Rosa Isela Coates APRN CNP        multivitamin w/minerals (THERA-VIT-M) tablet 1 tablet  1 tablet Oral Daily Rosa Isela Coates APRN CNP   1 tablet at 07/28/24 0906    nicotine (NICODERM CQ) 14 MG/24HR 24 hr patch 1 patch  1 patch Transdermal Daily Ronal Fonseca MD   1 patch at 07/28/24 0906    OLANZapine (zyPREXA) tablet 10 mg  10 mg Oral TID PRN Rosa Isela Coates APRN CNP        Or    OLANZapine (zyPREXA) injection 10 mg  10 mg Intramuscular TID PRN Rosa Isela Coates APRN CNP        ondansetron (ZOFRAN) tablet 4 mg  4 mg Oral Q6H PRN Rosa Isela Coates APRN CNP        PHENobarbital tablet 64.8 mg  64.8 mg Oral 3 times per day Rosa Isela Coates APRN CNP   64.8 mg at 07/26/24 2205    QUEtiapine (SEROquel) tablet 100 mg  100 mg Oral At Bedtime  Ronal Fonseca MD   100 mg at 07/27/24 2009    senna-docusate (SENOKOT-S/PERICOLACE) 8.6-50 MG per tablet 1 tablet  1 tablet Oral BID PRN Rosa Isela Coates APRN CNP        thiamine (B-1) tablet 100 mg  100 mg Oral Daily Rosa Isela Coates APRN CNP   100 mg at 07/28/24 0906    traZODone (DESYREL) tablet 50 mg  50 mg Oral At Bedtime PRN Rosa Isela Coates APRN CNP           Medication Adherence:  Patient reports taking prescribed medications as prescribed.  Patient is able to self-administer medications.      Patient Allergies:    Allergies   Allergen Reactions    Bupropion Other (See Comments)     Other reaction(s): seizure when took a double dose        Acetaminophen Nausea and Vomiting and GI Disturbance       If multiple uses per pt report    Codeine Nausea and Vomiting, Itching and Fatigue           Sulfa Antibiotics Itching       Medical History:    Past Medical History:   Diagnosis Date    Anxiety     Depressive disorder     Drug abuse, IV (H)     Group A streptococcal infection 11/2014    Bacteremia/cellulitis    HCV antibody positive     HIV (human immunodeficiency virus infection) (H)     HIV (human immunodeficiency virus infection) (H)     HIV (human immunodeficiency virus infection) (H)     IVDU (intravenous drug user)     Osteomyelitis of vertebra of lumbosacral region (H) 3/2011    L3, left psoas abscess    Substance abuse (H)          Substance Use:   Patient reported no family history of chemical health issues.  Patient has received substance use disorder and/or gambling treatment in the past.  Patient reports the following dates and locations of treatment services:  [facility], [place], [place] .  Patient has ever been to detox.  Patient is not currently receiving any chemical dependency treatment. Patient reports they have attended the following support groups: AA and NA in the past.        Substance Age of first use Pattern and duration of use (include amounts and frequency) Date of last use      "Withdrawal potential Route of administration   has used Alcohol 14 None recently, occasional use 6/25/24 No oral   has used Marijuana   15 None recently, occasional use  7/4/24 No smoked     has used Amphetamines   19 None recently, occasional use 7/14/24 No IV - injected   has not used Cocaine/crack           has not used Hallucinogens        has not used Inhalants        has used Heroin        has not used Other Opiates 29 None recently, Fentanyl about 1/2 gm 3 times a week.  6/25/24 No smoked   has used Benzodiazepine   19 Prescribed 6 mg ativan daily 7/28/24 Yes oral   has not used Barbiturates        has not used Over the counter meds.        has not use Caffeine        has used Nicotine  15 Daily vapes and smokes cigarettes about 3 per day  7/26/24 Yes smoked   has not used other substances not listed above:  Identify:             Patient reported the following problems as a result of their substance use: academic, DUI, family problems, financial problems, legal issues, and relationship problems.  Patient is concerned about substance use. Patient reports their recovery goals is \"I just want to stay clean.\"     Patient reports experiencing the following withdrawal symptoms within the past 12 months: sweating, shaky/jittery/tremors, unable to sleep, agitation, headache, fatigue, sad/depressed feeling, muscle aches, vivid/unpleasant dreams, irritability, sensitivity to noise, high blood pressure, nausea/vomiting, dizziness, diminished appetite, hallucinations, unable to eat, fever, and anxiety/worry and the following within the past 30 days: sweating, shaky/jittery/tremors, unable to sleep, agitation, headache, fatigue, sad/depressed feeling, muscle aches, vivid/unpleasant dreams, irritability, sensitivity to noise, high blood pressure, nausea/vomiting, dizziness, diminished appetite, unable to eat, psychosis, and anxiety/worry.   Patients reports urges to use Alcohol and Other Opiates Synthetic.  Patient reports " he has used more Alcohol and Other Opiates Synthetic than intended and over a longer period of time than intended. Patient reports he has had unsuccessful attempts to cut down or control use of Alcohol and Other Opiates Synthetic.  Patient reports longest period of abstinence was 60 days and return to use was due to physical injury. Patient reports he has needed to use more Alcohol and Other Opiates Synthetic to achieve the same effect.  Patient does  report diminished effect with use of same amount of Alcohol and Other Opiates Synthetic.     Patient does  report a great deal of time is spent in activities necessary to obtain, use, or recover from Alcohol and Other Opiates Synthetic effects.  Patient does  report important social, occupational, or recreational activities are given up or reduced because of Alcohol and Other Opiates Synthetic use.  Alcohol and Other Opiates Synthetic use is continued despite knowledge of having a persistent or recurrent physical or psychological problem that is likely to have caused or exacerbated by use.     Patient reports substance use has ever impacted their ability to function in a school setting. Patient reports substance use has ever impacted their ability to function in a work setting.  Patients demographics and history impact their recovery in the following ways:  none identified.  Patient reports engaging in the following recreation/leisure activities while using:  none reported.  Patient reports the following people are supportive of recovery: father and two sober friends.    Patient does not have a history of gambling concerns and/or treatment.  Patient does  have other addictive behaviors he is concerned about such as sex addiction.      Significant Losses / Trauma / Abuse / Neglect Issues:   Patient   has not serve in the .  There are indications or report of significant loss, trauma, abuse or neglect issues related to: death of a friend when patient was 15 and  job loss   .  Concerns for possible neglect are not present.     Safety Assessment:   Patient denies current homicidal ideation and behaviors.  Patient denies current self-injurious ideation and behaviors.    Patient denied risk behaviors associated with substance use.   Patient denies any high risk behaviors associated with mental health symptoms.  Patient reports the following current concerns for their personal safety: None.  Patient reports there   firearms in the house.    The firearms are secured in a locked space.    History of Safety Concerns:  Patient denied a history of homicidal ideation.     Patient reported a history of personal safety concerns: bullying: to drug related and owning money  Patient denied a history of assaultive behaviors.    Patient denied a history of sexual assault behaviors.     Patient  reported using dirty needles  associated with substance use.  Patient denies any history of high risk behaviors associated with mental health symptoms.  Patient reports the following protective factors:      Risk Plan:  See Recommendations for Safety and Risk Management Plan    Review of Symptoms per patient report:   Substance Use:  other substance related medical issue chronic pain, daily use, family relationship problems due to substance use, social problems related to substance use, and riding with someone under the influence     Diagnostic Criteria:  Substance Use Disorder Substance is often taken in larger amounts or over a longer period than was intended.  Met for:  Alcohol and Opiates There is persistent desire or unsuccessful efforts to cut down or control use of the substance.  Met for:  Alcohol and Opiates  A great deal of time is spent in activities necessary to obtain the substance, use the substance, or recover from its effects.  Met for:  Alcohol and Opiates Craving, or a strong desire or urge to use the substance.  Met for:  Alcohol and Opiates Recurrent use of the substance resulting  in a failure to fulfill major role obligations at work, school, or home.  Met for:  Alcohol and Opiates Continued use of the substance despite having persistent or recurrent social or interpersonal problems caused or exacerbated by the effects of its use.  Met for:  Alcohol and Opiates Important social, occupational, or recreational activities are given up or reduced because of the substance.  Met for:  Alcohol and Opiates Recurrent use of the substance in which it is physically hazardous.  Met for:  Alcohol and Opiates Use of the substance is continued despite knowledge of having a persistent or recurrent physical or psychological problem that is likely to have been cause or exacerbated by the substance.  Met for:  Alcohol and Opiates Tolerance:  either a need for markedly increased amounts of the substance to achieve the desired effect or a markedly diminished effect with continued use of the same amount of the substance.  Met for:  Alcohol and Opiates Withdrawal:  either patient endorses characteristic withdrawal syndrome for the substance or the substance (or closely related substance) is taken to relieve or avoid withdrawal symptoms.  Met for:  Alcohol and Opiates    Collateral Contact Summary:   Collateral contacts contributing to this assessment:  None      Information in this assessment was obtained from the medical record and provided by patient who is a good historian.    Patient will have open access to their mental health medical record.    Diagnostic Criteria: 11.) Withdrawal, as manifested by either of the following: The characteristic withdrawal syndrome for alcohol/drug (refer to Criteria A and B of the criteria set for alcohol/drug withdrawal). and Alcohol/drug (or a closely related substance, such as a benzodiazepine) is taken to relieve or avoid withdrawal symptoms.. Met for Alcohol and Opiates.     As evidenced by self report and criteria, client meets the following DSM5 Diagnoses:   (Sustained  by DSM5 Criteria Listed Above)  Opioid Use Disorder, Specify if:  In a controlled environment with a severity of:  304.00 (F11.20) Severe.    Recommendations:     1. Plan for Safety and Risk Management:  Recommended that patient call 911 or go to the local ED should there be a change in any of these risk factors..      Report to child / adult protection services was NA.     2. [facility] Referrals:   Recommendations: Patient meets criteria for the following levels of care based on ASAM Criteria:  Withdrawal Management -  Treatment/Recovery Services - 3.5 Clinically Managed Medium and High Intensity Residential Services.  Patient's placement align's with the assessment and placement level of care recommendation based on current ASAM Dimension scale ratings.  Patient reports they are willing to follow these recommendations.  Patient would like the following family or other support people involved in their treatment:  None. Patient has a history of opiate use and was give treatment options, including Medication Assisted Treatment, and information on the risks of opiod use disorder including recognizing and responding to opiod overdose.    Referral: [facility] or Similar     3. Mental Health Referrals     4. Clinical Substantiation/medical necessity for the above recommendations:    Dimension Scale Ratings:    Dimension 1: 1 Client can tolerate and cope with withdrawal discomfort. The client displays mild to moderate intoxication or signs and symptoms interfering with daily functioning but does not immediately endanger self or others. Client poses minimal risk of severe withdrawal.     Dimension 2: 1 Client tolerates and ankur with physical discomfort and is able to get the services that the client needs.    Dimension 3: 1 Client has impulse control and coping skills. Client presents a mild to moderate risk of harm to self or others or displays symptoms of emotional, behavioral or cognitive problems. Client has a mental  health diagnosis and is stable. Client functions adequately in significant life areas.    Dimension 4: 2 Client displays verbal compliance, but lacks consistent behaviors; has low motivation for change; and is passively involved in treatment.    Dimension 5: 4 No awareness of the negative impact of mental health problems or substance abuse. No coping skills to arrest mental health or addiction illnesses, or prevent relapse.    Dimension 6: 4 Client has (A)  living environment, family, peer group or long-term criminal justice involvement that is harmful to recovery or treatment progress, or (B) Client has an actively antagonistic significant other, family, work, or living environment with immediate threat to the client's safety and well-being.  Summary to support rating: Patient reports he lives with his father and reports housing is stable. Patient reports he is unemployed.  Patient may lack social sober support although he reports having two sober friends. Patient may lack structure, routine, and engagement into positive activities.     5. Patient's identified mental health and substance use concerns with a cultural influence will be addressed by attending residential treatment and following up with established mental health providers .     6. Recommendations for treatment focus:   Anxiety -    Alcohol / Substance Use -   .     7.  Interactive Complexity: No    8. Safety Plan:  NA        Provider Name/ Credentials:  Betsy Lyles, FERNANDEZ, [facility]   July 28, 2024

## 2024-07-28 NOTE — PROGRESS NOTES
Writer received phone call from [facility] this morning stating pt's Hepatitis C positive however there was not enough sample--will need Hepatitis C reflex.  Writer was instructed to call [employer] to explain this.  Writer called [employer] to relay this info.  This info passed on to day shift as writer was eventually told that pt will need a new Drs order for Hepatitis C reflex.

## 2024-07-28 NOTE — PROGRESS NOTES
Bc German is a 44 year old male with PMHx significant for alcohol use disorder and IV drug use presents for detox.      # Elevated LFT's  # Hx of Hepatitis C   # Elevated T sat   - has baseline LFT elevation however on admission ALT up to 400's which is a bit worse than normal   - he reports not drinking alcohol  - Medications reviewed. Drug induced liver injury from Triumeq, Seroquel, is possible but are  - possibly related to Hep C but will rule out other causes  - hepatitis B core ab reactive. Surface antigen negative. Surface antibody indeterminant. Could be prior infection.   - T sat elevated to 63 but ferritin normal so not likely hemachromatosis. Elevated iron stores can be seen in hep C.   Plan  - Trend LFT daily for now   - follow hep C viral load  - follow up TTG, ceruloplasmin, liver/kidney ab, smooth muscle ab, ady and hep A, and alpha 1 antitripsan   - follow up RUQ ultrasound  - consider GI pending trend and above workup    # Benzodiazapine dependence  - protocol per psych     # Hx of IVDU  # Polysbustance use disorder  - sober from fentanyl since June, on sublicade  - sober from IV meth since July  - No current evidence of IVDU complication such as abscess, endocarditis      # HIV  - on triumec (see below)   - FUCD4 count    # Hx of anxiety and depression  - per psych     Medicine will follow

## 2024-07-28 NOTE — PLAN OF CARE
"  Problem: Substance Withdrawal  Goal: Substance Withdrawal  Description: Signs and symptoms of listed problems will be absent or manageable.  Outcome: Progressing   Goal Outcome Evaluation:    Plan of Care Reviewed With: patient      Pt is in detox for benzo withdrawal. Completed valium taper today on the AM shift.   Pt spent the first part of the shift in bed napping.. \" I don't feel don't feel good. It must be the valium making me feel this way.   Came out for dinner, watched some TV with peers.   At bedtime, pt reported having PTSD from incident going on on the unit. Took his medications and went to bed. Declined any interventions at that time.     MSSA :2,6  "

## 2024-07-28 NOTE — PLAN OF CARE
Problem: Substance Withdrawal  Goal: Substance Withdrawal  Description: Signs and symptoms of listed problems will be absent or manageable.  Outcome: Progressing   Goal Outcome Evaluation: ongoing    Pt appears to have slept through the night.  Monitoring for benzo w/d however pt appeared to sleep without incident.    Pt currently NPO for abdominal ultrasound this AM.  Writer received phone call from [employer] lab (see info in previous note by writer--that pt will need order for Hepatitis C reflex as there was not enough sample).  Pt on w/d precautions.  Status 15.  Will continue to monitor and support plan of care.

## 2024-07-28 NOTE — PROGRESS NOTES
[address]     Case Management Encounter: Pt reports he has been struggling with the use of opiates and benzo's since 2017 and has came to detox to get help with benzo withdrawal so that he can got to treatment. Pt reports 2 previous treatment attempts at [facility] and shared that he did not get enough time and help with his sobriety before graduating and now would like to attend a long term program. Pt reported he would like to go to [facility] for residential and then stepdown to attend [facility] with [facility] and attend their 13 month long program.     - Writer met with patient to complete CD assessment today. However, assessment could not be completed due to this writer not being able to locate Pt's PMI number to complete DAANES. CM to follow up on 7/29 on PMI information.     Insurance: [employer]     Legal Status: Voluntary     SUDs Assessment Status: Unable to complete due to missing PMI which is needed for DAANES    ROIs on file: None at this time.      Living Situation: Unknown at this time.     Current Plan/Referral Status: Residential treatment at [facility].     RN updated.    Betsy Lyles, [facility], LADC   [facility] Adult Inpatient Addiction Psychiatry Unit

## 2024-07-29 ENCOUNTER — TELEPHONE (OUTPATIENT)
Dept: BEHAVIORAL HEALTH | Facility: CLINIC | Age: 44
End: 2024-07-29
Payer: COMMERCIAL

## 2024-07-29 LAB
ALBUMIN SERPL BCG-MCNC: 4.4 G/DL (ref 3.5–5.2)
ALP SERPL-CCNC: 93 U/L (ref 40–150)
ALT SERPL W P-5'-P-CCNC: 375 U/L (ref 0–70)
ANA SER QL IF: NEGATIVE
ANION GAP SERPL CALCULATED.3IONS-SCNC: 7 MMOL/L (ref 7–15)
AST SERPL W P-5'-P-CCNC: 187 U/L (ref 0–45)
BILIRUB SERPL-MCNC: 0.3 MG/DL
BUN SERPL-MCNC: 15.6 MG/DL (ref 6–20)
CALCIUM SERPL-MCNC: 10 MG/DL (ref 8.8–10.4)
CERULOPLASMIN SERPL-MCNC: 40 MG/DL (ref 20–60)
CHLORIDE SERPL-SCNC: 98 MMOL/L (ref 98–107)
CREAT SERPL-MCNC: 1.04 MG/DL (ref 0.67–1.17)
EGFRCR SERPLBLD CKD-EPI 2021: >90 ML/MIN/1.73M2
GLUCOSE SERPL-MCNC: 53 MG/DL (ref 70–99)
HCO3 SERPL-SCNC: 33 MMOL/L (ref 22–29)
HOLD SPECIMEN: NORMAL
POTASSIUM SERPL-SCNC: 4.5 MMOL/L (ref 3.4–5.3)
PROT SERPL-MCNC: 8.1 G/DL (ref 6.4–8.3)
SODIUM SERPL-SCNC: 138 MMOL/L (ref 135–145)

## 2024-07-29 PROCEDURE — 128N000004 HC R&B CD ADULT

## 2024-07-29 PROCEDURE — 86015 ACTIN ANTIBODY EACH: CPT | Performed by: PHYSICIAN ASSISTANT

## 2024-07-29 PROCEDURE — 80053 COMPREHEN METABOLIC PANEL: CPT | Performed by: PHYSICIAN ASSISTANT

## 2024-07-29 PROCEDURE — 250N000013 HC RX MED GY IP 250 OP 250 PS 637

## 2024-07-29 PROCEDURE — 99232 SBSQ HOSP IP/OBS MODERATE 35: CPT | Performed by: PHYSICIAN ASSISTANT

## 2024-07-29 PROCEDURE — 86376 MICROSOMAL ANTIBODY EACH: CPT | Performed by: PHYSICIAN ASSISTANT

## 2024-07-29 PROCEDURE — 250N000013 HC RX MED GY IP 250 OP 250 PS 637: Performed by: PHYSICIAN ASSISTANT

## 2024-07-29 PROCEDURE — 250N000013 HC RX MED GY IP 250 OP 250 PS 637: Performed by: PSYCHIATRY & NEUROLOGY

## 2024-07-29 PROCEDURE — 36415 COLL VENOUS BLD VENIPUNCTURE: CPT | Performed by: PHYSICIAN ASSISTANT

## 2024-07-29 PROCEDURE — 99232 SBSQ HOSP IP/OBS MODERATE 35: CPT | Performed by: PSYCHIATRY & NEUROLOGY

## 2024-07-29 RX ORDER — PREGABALIN 25 MG/1
75 CAPSULE ORAL 2 TIMES DAILY
Status: DISCONTINUED | OUTPATIENT
Start: 2024-07-29 | End: 2024-07-31 | Stop reason: HOSPADM

## 2024-07-29 RX ADMIN — LEVOMILNACIPRAN HYDROCHLORIDE 120 MG: 80 CAPSULE, EXTENDED RELEASE ORAL at 08:14

## 2024-07-29 RX ADMIN — FOLIC ACID 1 MG: 1 TABLET ORAL at 08:13

## 2024-07-29 RX ADMIN — Medication 1 TABLET: at 08:14

## 2024-07-29 RX ADMIN — THIAMINE HCL TAB 100 MG 100 MG: 100 TAB at 08:13

## 2024-07-29 RX ADMIN — NICOTINE 1 PATCH: 14 PATCH, EXTENDED RELEASE TRANSDERMAL at 08:13

## 2024-07-29 RX ADMIN — ABACAVIR SULFATE, DOLUTEGRAVIR SODIUM, LAMIVUDINE 1 TABLET: 600; 50; 300 TABLET, FILM COATED ORAL at 08:14

## 2024-07-29 RX ADMIN — CLONIDINE HYDROCHLORIDE 0.1 MG: 0.1 TABLET ORAL at 08:13

## 2024-07-29 ASSESSMENT — ACTIVITIES OF DAILY LIVING (ADL)
ADLS_ACUITY_SCORE: 40
ADLS_ACUITY_SCORE: 40
ORAL_HYGIENE: INDEPENDENT
ADLS_ACUITY_SCORE: 40
HYGIENE/GROOMING: INDEPENDENT
ADLS_ACUITY_SCORE: 40
DRESS: INDEPENDENT
ADLS_ACUITY_SCORE: 40

## 2024-07-29 NOTE — PLAN OF CARE
Problem: Adult Inpatient Plan of Care  Goal: Plan of Care Review    Outcome: Progressing   Goal Outcome Evaluation:    Plan of Care Reviewed With: patient       Patient has been awake and alert all day--he reads when in his room.  He spends most of his time in his room with short visits to the lounge--he reports feeling overstimulated easily.  Pt's affect is blunted, he is pleasant and cooperative.  He is eating well and drinking adequate fluids.  Pt denies SI, SIB.  He expressed frustration that he won't be able to attend [facility] because of his insurance and mentioned going home to wait for another treatment program to accept him.  Pt informed that if he leaves the resources and case management available to him will be limited.  Pt decided to think about his options before deciding to leave.  Case management is seeking an alternative for treatment.  Patient has tolerated the benzodiazepine withdrawal using a short valium taper.  His MSSA scores have all been less than 6 for the last 24 hours--so detox withdrawal monitoring has been discontinued with psychiatry approval.  Will continue to evaluate patient's physical status and assist with discharge planning.

## 2024-07-29 NOTE — PROGRESS NOTES
"Brief Hospital Medicine Note:     Medicine team following up on US Ab and pending lab work for elevated LFTs. Nursing notes, vitals, and labs reviewed.     US Ab with no acute pathology. Did note a 2.5cm complex right renal cyst, which can be further characterized as OP with MRI per PCP. 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 Given down trend, will hold off on further rechecks unless patient were to develop new symptoms or change in clinical picture. Hep A negative. Ceruloplasmin wnl.     - hold off on further LFT checks here for now given down trend, recommend recheck as OP with PCP to continue to monitor   - follow hep C viral load  - follow up TTG, liver/kidney ab, smooth muscle ab, ady and alpha 1 antitripsan     Medicine will follow up pending labs     Genny Cross PA-C  Hospitalist Service  Contact information available via [facility] Paging/Directory    "

## 2024-07-29 NOTE — TELEPHONE ENCOUNTER
----- Message from Jeremie Chun sent at 7/28/2024 12:09 PM CDT -----  Regarding: Bc MACKENZIE  Please verify benefits for this pt for [facility]. Thank you!

## 2024-07-29 NOTE — PROGRESS NOTES
Writer has emailed pt's referral for residential treatment to [facility].     KEVIN Garcia

## 2024-07-29 NOTE — PLAN OF CARE
Problem: Sleep Disturbance  Goal: Adequate Sleep/Rest  Outcome: Progressing   Goal Outcome Evaluation:    Plan of Care Reviewed With: patient      Pt is in detox for benzo withdrawal. Valium taper completed yesterday.   Up for a snack and back to bed. Pt slept well with no issues of concern.

## 2024-07-29 NOTE — PROGRESS NOTES
[facility], [place]   Psychiatric Progress Note        Interim History:   The patient's care was discussed with the treatment team during the daily team meeting and/or staff's chart notes were reviewed.  Staff report patient is doing better on the unit. Tolerated Valium taper well. Medicine is following LFTs.     The patient reports that he is doing well. Mood is better. Sleeping and eating well. Denies SI. Does want to continue his Lyrica as this wasn't continued on admission. Has poor dentition and asks about having Ensure available as he drinks up to 5 a day at home. Looking forward to CD treatment.          Medications:     Current Facility-Administered Medications   Medication Dose Route Frequency Provider Last Rate Last Admin    abacavir-dolutegravir-LamiVUDine (TRIUMEQ) 600- MG per tablet 1 tablet  1 tablet Oral Daily Chris Mark PA-C   1 tablet at 07/29/24 0814    cloNIDine (CATAPRES) tablet 0.1 mg  0.1 mg Oral Daily Ronal Fonseca MD   0.1 mg at 07/29/24 0813    folic acid (FOLVITE) tablet 1 mg  1 mg Oral Daily Rosa Isela Coates APRN CNP   1 mg at 07/29/24 0813    levomilnacipran (FETZIMA ER) 24 hr capsule 120 mg  120 mg Oral Daily Ronal Fonseca MD   120 mg at 07/29/24 0814    multivitamin w/minerals (THERA-VIT-M) tablet 1 tablet  1 tablet Oral Daily Rosa Isela Coates APRN CNP   1 tablet at 07/29/24 0814    nicotine (NICODERM CQ) 14 MG/24HR 24 hr patch 1 patch  1 patch Transdermal Daily Ronal Fonseca MD   1 patch at 07/29/24 0813    pregabalin (LYRICA) capsule 75 mg  75 mg Oral BID Ronal Fonseca MD        QUEtiapine (SEROquel) tablet 100 mg  100 mg Oral At Bedtime Ronal Fonseca MD   100 mg at 07/28/24 2128    thiamine (B-1) tablet 100 mg  100 mg Oral Daily Rosa Isela Coates APRN CNP   100 mg at 07/29/24 0813          Allergies:     Allergies   Allergen Reactions    Bupropion Other (See Comments)     Other reaction(s): seizure  "when took a double dose        Acetaminophen Nausea and Vomiting and GI Disturbance       If multiple uses per pt report    Codeine Nausea and Vomiting, Itching and Fatigue           Sulfa Antibiotics Itching          Labs:     Recent Results (from the past 24 hour(s))   Comprehensive metabolic panel    Collection Time: 07/29/24  9:40 AM   Result Value Ref Range    Sodium 138 135 - 145 mmol/L    Potassium 4.5 3.4 - 5.3 mmol/L    Carbon Dioxide (CO2) 33 (H) 22 - 29 mmol/L    Anion Gap 7 7 - 15 mmol/L    Urea Nitrogen 15.6 6.0 - 20.0 mg/dL    Creatinine 1.04 0.67 - 1.17 mg/dL    GFR Estimate >90 >60 mL/min/1.73m2    Calcium 10.0 8.8 - 10.4 mg/dL    Chloride 98 98 - 107 mmol/L    Glucose 53 (L) 70 - 99 mg/dL    Alkaline Phosphatase 93 40 - 150 U/L     (H) 0 - 45 U/L     (H) 0 - 70 U/L    Protein Total 8.1 6.4 - 8.3 g/dL    Albumin 4.4 3.5 - 5.2 g/dL    Bilirubin Total 0.3 <=1.2 mg/dL   Extra Purple Top Tube    Collection Time: 07/29/24  9:40 AM   Result Value Ref Range    Hold Specimen JIC           Psychiatric Examination:     BP (!) 144/93   Pulse 83   Temp 97.4  F (36.3  C) (Temporal)   Resp 16   Ht 1.778 m (5' 10\")   Wt 65.8 kg (145 lb)   SpO2 100%   BMI 20.81 kg/m    Weight is 145 lbs 0 oz  Body mass index is 20.81 kg/m .  Orthostatic Vitals       None              Appearance: awake, alert and adequately groomed  Attitude:  cooperative  Eye Contact:  good  Mood:  better  Affect:  mood congruent  Speech:  clear, coherent  Psychomotor Behavior:  no evidence of tardive dyskinesia, dystonia, or tics  Thought Process:  goal oriented  Associations:  no loose associations  Thought Content:  no evidence of suicidal ideation or homicidal ideation and no evidence of psychotic thought  Insight:  fair  Judgement:  fair  Oriented to:  time, person, and place  Attention Span and Concentration:  intact  Recent and Remote Memory:  fair           Precautions:     Behavioral Orders   Procedures    Code 1 - " Restrict to Unit    Code 2    Routine Programming     As clinically indicated    Status 15     Every 15 minutes.    Withdrawal precautions          DIagnoses:     Benzodiazepine use disorder, severe, dependence  Opiate use disorder, on Sublocade  History of stimulant use disorder  MDD, recurrent, moderate  Nicotine dependence with current use               Plan:     1) Patient detoxified from benzodiazepines with Valium taper.   2) Continue Sublocade - next injection on 7/31.   3) Restart Lyrica at 75mg Qday. LFTs stable.   4) Continue psychotropic regimen.   5) Ensure with meals.   6) Patient to CD treatment. [facility].       Disposition Plan   Reason for ongoing admission: requires detoxification from substance that poses a risk of bodily harm during withdrawal period  Discharge location: Chemical dependency treatment facility  Discharge Medications: not ordered  Follow-up Appointments: not scheduled  Legal Status: voluntary    Entered by: Ronal Fonseca MD on July 29, 2024 at 12:59 PM

## 2024-07-29 NOTE — CONSULTS
Behavioral Team Discussion: (7/29/2024)    Continued Stay Criteria/Rationale: Patient admitted for Chemical Use Issues.  Plan: The following services will be provided to the patient; psychiatric assessment, medication management, therapeutic milieu, individual and group support, and skills groups.   Participants: 3A Provider: Dr. Ronal Fonseca MD; 3A RN: Lorenza Ellis RN; 3A CM's:  KEVIN Garcia .  Summary/Recommendation: Providers will assess today for treatment recommendations, discharge planning, and aftercare plans. CM will meet with pt for discharge planning.   Medical/Physical: No new medical concerns reported.   Precautions:   Behavioral Orders   Procedures    Code 1 - Restrict to Unit    Code 2    Routine Programming     As clinically indicated    Status 15     Every 15 minutes.    Withdrawal precautions     Rationale for change in precautions or plan: N/A  Progress: Initial.    ASAM Dimension Scale Ratings:  Dimension 1: 1 Client can tolerate and cope with withdrawal discomfort. The client displays mild to moderate intoxication or signs and symptoms interfering with daily functioning but does not immediately endanger self or others. Client poses minimal risk of severe withdrawal.  Dimension 2: 1 Client tolerates and ankur with physical discomfort and is able to get the services that the client needs.  Dimension 3: 2 Client has difficulty with impulse control and lacks coping skills. Client has thoughts of suicide or harm to others without means; however, the thoughts may interfere with participation in some treatment activities. Client has difficulty functioning in significant life areas. Client has moderate symptoms of emotional, behavioral, or cognitive problems. Client is able to participate in most treatment activities.  Dimension 4: 2 Client displays verbal compliance, but lacks consistent behaviors; has low motivation for change; and is passively involved in treatment.  Dimension 5: 4 No awareness  of the negative impact of mental health problems or substance abuse. No coping skills to arrest mental health or addiction illnesses, or prevent relapse.  Dimension 6: 4 Client has (A) Chronically antagonistic significant other, living environment, family, peer group or long-term criminal justice involvement that is harmful to recovery or treatment progress, or (B) Client has an actively antagonistic significant other, family, work, or living environment with immediate threat to the client's safety and well-being.

## 2024-07-30 LAB
HCV RNA SERPL NAA+PROBE-ACNC: ABNORMAL IU/ML
HCV RNA SERPL NAA+PROBE-LOG IU: 7.1 {LOG_IU}/ML
TTG IGA SER-ACNC: 0.5 U/ML
TTG IGG SER-ACNC: 2 U/ML

## 2024-07-30 PROCEDURE — 250N000013 HC RX MED GY IP 250 OP 250 PS 637

## 2024-07-30 PROCEDURE — 250N000013 HC RX MED GY IP 250 OP 250 PS 637: Performed by: PHYSICIAN ASSISTANT

## 2024-07-30 PROCEDURE — 250N000013 HC RX MED GY IP 250 OP 250 PS 637: Performed by: PSYCHIATRY & NEUROLOGY

## 2024-07-30 PROCEDURE — 128N000004 HC R&B CD ADULT

## 2024-07-30 PROCEDURE — 99232 SBSQ HOSP IP/OBS MODERATE 35: CPT | Performed by: PSYCHIATRY & NEUROLOGY

## 2024-07-30 PROCEDURE — 99232 SBSQ HOSP IP/OBS MODERATE 35: CPT | Performed by: PHYSICIAN ASSISTANT

## 2024-07-30 RX ORDER — PREGABALIN 150 MG/1
150 CAPSULE ORAL 2 TIMES DAILY
Qty: 60 CAPSULE | Refills: 0 | Status: SHIPPED | OUTPATIENT
Start: 2024-07-30

## 2024-07-30 RX ORDER — MULTIPLE VITAMINS W/ MINERALS TAB 9MG-400MCG
1 TAB ORAL DAILY
Qty: 30 TABLET | Refills: 1 | Status: SHIPPED | OUTPATIENT
Start: 2024-07-30

## 2024-07-30 RX ORDER — ABACAVIR SULFATE, DOLUTEGRAVIR SODIUM, LAMIVUDINE 600; 50; 300 MG/1; MG/1; MG/1
1 TABLET, FILM COATED ORAL DAILY
Qty: 30 TABLET | Refills: 0 | Status: SHIPPED | OUTPATIENT
Start: 2024-07-30

## 2024-07-30 RX ORDER — LANOLIN ALCOHOL/MO/W.PET/CERES
100 CREAM (GRAM) TOPICAL DAILY
Qty: 30 TABLET | Refills: 0 | Status: SHIPPED | OUTPATIENT
Start: 2024-07-30

## 2024-07-30 RX ORDER — QUETIAPINE FUMARATE 100 MG/1
100 TABLET, FILM COATED ORAL AT BEDTIME
Qty: 30 TABLET | Refills: 1 | Status: SHIPPED | OUTPATIENT
Start: 2024-07-30

## 2024-07-30 RX ORDER — FOLIC ACID 1 MG/1
1 TABLET ORAL DAILY
Qty: 30 TABLET | Refills: 1 | Status: SHIPPED | OUTPATIENT
Start: 2024-07-30

## 2024-07-30 RX ORDER — CLONIDINE HYDROCHLORIDE 0.1 MG/1
0.1 TABLET ORAL DAILY
Qty: 30 TABLET | Refills: 1 | Status: SHIPPED | OUTPATIENT
Start: 2024-07-30

## 2024-07-30 RX ORDER — NICOTINE 21 MG/24HR
1 PATCH, TRANSDERMAL 24 HOURS TRANSDERMAL EVERY 24 HOURS
Qty: 28 PATCH | Refills: 1 | Status: SHIPPED | OUTPATIENT
Start: 2024-07-30

## 2024-07-30 RX ADMIN — FOLIC ACID 1 MG: 1 TABLET ORAL at 08:06

## 2024-07-30 RX ADMIN — HYDROXYZINE HYDROCHLORIDE 25 MG: 25 TABLET ORAL at 05:15

## 2024-07-30 RX ADMIN — THIAMINE HCL TAB 100 MG 100 MG: 100 TAB at 08:06

## 2024-07-30 RX ADMIN — ABACAVIR SULFATE, DOLUTEGRAVIR SODIUM, LAMIVUDINE 1 TABLET: 600; 50; 300 TABLET, FILM COATED ORAL at 10:08

## 2024-07-30 RX ADMIN — QUETIAPINE FUMARATE 100 MG: 100 TABLET ORAL at 20:48

## 2024-07-30 RX ADMIN — LEVOMILNACIPRAN HYDROCHLORIDE 120 MG: 80 CAPSULE, EXTENDED RELEASE ORAL at 08:05

## 2024-07-30 RX ADMIN — PREGABALIN 75 MG: 25 CAPSULE ORAL at 08:05

## 2024-07-30 RX ADMIN — Medication 1 TABLET: at 08:06

## 2024-07-30 RX ADMIN — CLONIDINE HYDROCHLORIDE 0.1 MG: 0.1 TABLET ORAL at 08:06

## 2024-07-30 RX ADMIN — PREGABALIN 75 MG: 25 CAPSULE ORAL at 20:47

## 2024-07-30 ASSESSMENT — ACTIVITIES OF DAILY LIVING (ADL)
ADLS_ACUITY_SCORE: 40

## 2024-07-30 NOTE — PLAN OF CARE
Problem: Substance Withdrawal  Goal: Substance Withdrawal  Description: Signs and symptoms of listed problems will be absent or manageable.  Outcome: Progressing   Goal Outcome Evaluation:    Pt out of benzo detox.   Pt was awake reading in bed all night declined any prn sleep meds initially including lyrica at bedtime.  Pt expressed concern about his discharge plan. States he rather leave today because of not being able to get into the program at [facility]. Believes his provider and nurse at the [facility] clinic know him well and will be able to find him a place.   At about 0500 took hydroxyzine prn with encouragement from the charge RN.

## 2024-07-30 NOTE — PLAN OF CARE
"  Problem: Adult Inpatient Plan of Care  Goal: Absence of Hospital-Acquired Illness or Injury  Outcome: Progressing     Problem: Adult Behavioral Health Plan of Care  Goal: Adheres to Safety Considerations for Self and Others  Outcome: Progressing   Goal Outcome Evaluation:    Patent is fully alert, and oriented, and able to communicate needs well. Patient is presented with flat, and blunted affect, withdrawn, and isolative to room. Patient complained of pain that he rated at 6/10 but did not want any pharmacological intervention. Patient stated that \"I have this pain always, and Lyrica is ok for me right now\". Patient remains calm, and cooperative with assessments, staff, and peers.  Denied depression, anxiety, and all other mental illness symptoms. Patient  stated that he's ready to go home, and is out of detox. Tentative discharge is 7/30/24. All other vitals are stable, and no concerns at this time. Team will continue to monitor for needs.    Blood pressure (!) 144/96, pulse 83, temperature 97.3  F (36.3  C), temperature source Temporal, resp. rate 16, height 1.778 m (5' 10\"), weight 65.8 kg (145 lb), SpO2 99%.    "

## 2024-07-30 NOTE — PROGRESS NOTES
"[facility], [place]   Psychiatric Progress Note        Interim History:   The patient's care was discussed with the treatment team during the daily team meeting and/or staff's chart notes were reviewed.  Staff report patient is doing well. Denies withdrawal symptoms.     The patient reports that he is \"good.\" Mood is good. Denies SI. Sleeping and eating better. Wonders if he should discharge \"and just work with Dr. Barrientos.\" Says that he has \"things to work on for drug court.\" Discussed giving it one more day to work on getting treatment set up and then he could discharge to his Sublocade appointment tomorrow. Patient agreeable.          Medications:     Current Facility-Administered Medications   Medication Dose Route Frequency Provider Last Rate Last Admin    abacavir-dolutegravir-LamiVUDine (TRIUMEQ) 600- MG per tablet 1 tablet  1 tablet Oral Daily Chris Mark PA-C   1 tablet at 07/30/24 1008    cloNIDine (CATAPRES) tablet 0.1 mg  0.1 mg Oral Daily Ronal Fonseca MD   0.1 mg at 07/30/24 0806    folic acid (FOLVITE) tablet 1 mg  1 mg Oral Daily Rosa Isela Coates APRN CNP   1 mg at 07/30/24 0806    levomilnacipran (FETZIMA ER) 24 hr capsule 120 mg  120 mg Oral Daily Ronal Fonseca MD   120 mg at 07/30/24 0805    multivitamin w/minerals (THERA-VIT-M) tablet 1 tablet  1 tablet Oral Daily Rosa Isela Coates APRN CNP   1 tablet at 07/30/24 0806    nicotine (NICODERM CQ) 14 MG/24HR 24 hr patch 1 patch  1 patch Transdermal Daily Ronal Fonseca MD   1 patch at 07/29/24 0813    pregabalin (LYRICA) capsule 75 mg  75 mg Oral BID Ronal Fonseca MD   75 mg at 07/30/24 0805    QUEtiapine (SEROquel) tablet 100 mg  100 mg Oral At Bedtime Ronal Fonseca MD   100 mg at 07/28/24 2128    thiamine (B-1) tablet 100 mg  100 mg Oral Daily Rosa Isela Coates APRN CNP   100 mg at 07/30/24 0806          Allergies:     Allergies   Allergen Reactions    Bupropion Other " "(See Comments)     Other reaction(s): seizure when took a double dose        Acetaminophen Nausea and Vomiting and GI Disturbance       If multiple uses per pt report    Codeine Nausea and Vomiting, Itching and Fatigue           Sulfa Antibiotics Itching          Labs:     No results found for this or any previous visit (from the past 24 hour(s)).         Psychiatric Examination:     BP (!) 144/96   Pulse 83   Temp 97.3  F (36.3  C) (Temporal)   Resp 16   Ht 1.778 m (5' 10\")   Wt 65.8 kg (145 lb)   SpO2 99%   BMI 20.81 kg/m    Weight is 145 lbs 0 oz  Body mass index is 20.81 kg/m .  Orthostatic Vitals       None              Appearance: awake, alert and adequately groomed  Attitude:  cooperative  Eye Contact:  good  Mood:  good  Affect:  mood congruent  Speech:  clear, coherent  Psychomotor Behavior:  no evidence of tardive dyskinesia, dystonia, or tics  Thought Process:  goal oriented  Associations:  no loose associations  Thought Content:  no evidence of suicidal ideation or homicidal ideation and no evidence of psychotic thought  Insight:  fair  Judgement:  fair  Oriented to:  time, person, and place  Attention Span and Concentration:  intact  Recent and Remote Memory:  fair           Precautions:     Behavioral Orders   Procedures    Code 1 - Restrict to Unit    Code 2    Routine Programming     As clinically indicated    Status 15     Every 15 minutes.    Withdrawal precautions          DIagnoses:     Benzodiazepine use disorder, severe, dependence  Opiate use disorder, on Sublocade  History of stimulant use disorder  MDD, recurrent, moderate  Nicotine dependence with current use               Plan:     1) Patient detoxified from benzodiazepines with Valium taper.   2) Continue Sublocade - next injection on 7/31.   3) Restarted Lyrica at 75mg BID. LFTs stable.   4) Continue psychotropic regimen.   5) Ensure with meals.   6) Patient to CD treatment.      Disposition Plan   Reason for ongoing admission: " requires detoxification from substance that poses a risk of bodily harm during withdrawal period  Discharge location: Chemical dependency treatment facility  Discharge Medications: ordered.  Follow-up Appointments: not scheduled  Legal Status: voluntary    Entered by: Ronal Fonseca MD on July 30, 2024 at 1:49 PM

## 2024-07-30 NOTE — PLAN OF CARE
Problem: Adult Behavioral Health Plan of Care  Goal: Plan of Care Review  Outcome: Progressing   Goal Outcome Evaluation:           Pt is out of benzo detox with discharge plans in progress.   Pt seems to be withdrawn today keeping to himself when out of his room or in room reading.  Flat flat, blunted affect. Quiet for the most part.    Denied SI. Appetite fair. States he has no teeth to chew. Getting ensure with meals.

## 2024-07-30 NOTE — PROGRESS NOTES
Brief Hospital Medicine Note:     Medicine team following up on pending testing for LFT elevation. Hep C RNA positive as it has been previously. Nursing notes, vitals, and labs reviewed.     Per tristen review, patient was diagnosed ~10 years ago and recent check in 02/2024 also showed detectable RNA. He will need to follow up with Gi as OP to seek treatment. Have placed referral (doesn't appear one has been placed previously). Genotype pending. ADAN negative.     -Follow up genotype  -Follow up TTG, Liver/Kidney ab, smooth muscle ab, and alpha 1 antitrypsin   -Referral for GI placed       Medicine will follow up the above pending labs     Genny Cross PA-C  Hospitalist Service  Contact information available via [facility] Paging/Directory

## 2024-07-30 NOTE — PLAN OF CARE
Problem: Substance Withdrawal  Goal: Substance Withdrawal  Description: Signs and symptoms of listed problems will be absent or manageable.  Outcome: Progressing   Goal Outcome Evaluation:    Pt completed benzo detox on valium taper.   Pt spent the evening out in the milieu either on the phone or watching TV.   Asked at the beginning of the shift if he could leave because he is out of detox.   Pt was reminded of his discharge plan, he is agreeable/willing to following his plan.   Pt refused bedtime medications, including PTA lyrica, stating he doesn't want to take any medications this evening.    B/P: 117/79, T: 97.7, P: 93, R: 16

## 2024-07-31 ENCOUNTER — TELEPHONE (OUTPATIENT)
Dept: BEHAVIORAL HEALTH | Facility: CLINIC | Age: 44
End: 2024-07-31

## 2024-07-31 VITALS
DIASTOLIC BLOOD PRESSURE: 86 MMHG | WEIGHT: 145 LBS | HEART RATE: 80 BPM | OXYGEN SATURATION: 100 % | BODY MASS INDEX: 20.76 KG/M2 | TEMPERATURE: 97.4 F | SYSTOLIC BLOOD PRESSURE: 127 MMHG | HEIGHT: 70 IN | RESPIRATION RATE: 16 BRPM

## 2024-07-31 LAB
A1AT PHENOTYP SERPL-IMP: NORMAL
A1AT SERPL-MCNC: 118 MG/DL

## 2024-07-31 PROCEDURE — 250N000013 HC RX MED GY IP 250 OP 250 PS 637: Performed by: PSYCHIATRY & NEUROLOGY

## 2024-07-31 PROCEDURE — 99239 HOSP IP/OBS DSCHRG MGMT >30: CPT | Performed by: PSYCHIATRY & NEUROLOGY

## 2024-07-31 PROCEDURE — 250N000013 HC RX MED GY IP 250 OP 250 PS 637: Performed by: PHYSICIAN ASSISTANT

## 2024-07-31 PROCEDURE — 250N000013 HC RX MED GY IP 250 OP 250 PS 637

## 2024-07-31 PROCEDURE — 99232 SBSQ HOSP IP/OBS MODERATE 35: CPT | Performed by: PHYSICIAN ASSISTANT

## 2024-07-31 RX ADMIN — Medication 1 TABLET: at 07:58

## 2024-07-31 RX ADMIN — CLONIDINE HYDROCHLORIDE 0.1 MG: 0.1 TABLET ORAL at 07:58

## 2024-07-31 RX ADMIN — FOLIC ACID 1 MG: 1 TABLET ORAL at 07:58

## 2024-07-31 RX ADMIN — PREGABALIN 75 MG: 25 CAPSULE ORAL at 07:58

## 2024-07-31 RX ADMIN — ABACAVIR SULFATE, DOLUTEGRAVIR SODIUM, LAMIVUDINE 1 TABLET: 600; 50; 300 TABLET, FILM COATED ORAL at 07:57

## 2024-07-31 RX ADMIN — THIAMINE HCL TAB 100 MG 100 MG: 100 TAB at 07:59

## 2024-07-31 RX ADMIN — LEVOMILNACIPRAN HYDROCHLORIDE 120 MG: 80 CAPSULE, EXTENDED RELEASE ORAL at 07:58

## 2024-07-31 RX ADMIN — HYDROXYZINE HYDROCHLORIDE 25 MG: 25 TABLET ORAL at 07:59

## 2024-07-31 ASSESSMENT — ACTIVITIES OF DAILY LIVING (ADL)
ADLS_ACUITY_SCORE: 40
DRESS: STREET CLOTHES
ADLS_ACUITY_SCORE: 40
HYGIENE/GROOMING: INDEPENDENT
ADLS_ACUITY_SCORE: 40
LAUNDRY: WITH SUPERVISION
ADLS_ACUITY_SCORE: 40
ADLS_ACUITY_SCORE: 40
ORAL_HYGIENE: INDEPENDENT
ADLS_ACUITY_SCORE: 40

## 2024-07-31 NOTE — DISCHARGE SUMMARY
"Psychiatric Discharge Summary    Bc German MRN# [id number]   Age: 44 year old YOB: 1980     Date of Admission:  7/26/2024  Date of Discharge:  7/31/2024 12:51 PM  Admitting Physician:  Ronal Fonseca MD  Discharge Physician:  Ronal Fonseca MD          Event Leading to Hospitalization:   The patient is a 45yo male with a history of benzodiazepine use disorder, opiate use disorder, stimulant use disorder, depression and anxiety who was admitted to detoxify from Ativan. He reports that he was using up to 6mg daily for the past 5 years. Has been self-tapering and has been down to 0.5mg daily for the past two days. Does not want to use phenobarbital as he reports it \"makes me suicidal.\" Discussed options for lower dose and patient is adamant to not use it. Really wants \"one more dose\" of Ativan but agreeable to use Valium taper. Says that his withdrawal seizure \"was from stopping Xanax not Ativan.\" Doing well on Sublocade. Says that he did use \"IV meth\" on July 4th but hasn't used any substances other than prescribed Ativan since then. Hopeful for CD treatment. Reports recent \"panic attacks\" but denies SI. Says that he has also been paranoid but does report some real world scenarios where he has needed to be concerned about others following him. Has been taking his psychotropics regularly. Does admit that when he was here in 2011, a visitor \"dropped off heroin\" and he used on the unit. Feels guilt related to this and is really wanting to get clean. Says that \"my head is clear for the first time in a long time.\"         See Admission note by Ronal Fonseca MD for additional details.          Diagnoses:     Benzodiazepine use disorder, severe, dependence  Opiate use disorder, on Sublocade  History of stimulant use disorder  MDD, recurrent, moderate  Nicotine dependence with current use            Labs:        Lab Results   Component Value Date     07/29/2024     " 05/09/2021    Lab Results   Component Value Date    CHLORIDE 98 07/29/2024    CHLORIDE 104 12/11/2022    CHLORIDE 100 05/09/2021    Lab Results   Component Value Date    BUN 15.6 07/29/2024    BUN 23 12/11/2022    BUN 12 05/09/2021      Lab Results   Component Value Date    POTASSIUM 4.5 07/29/2024    POTASSIUM 3.6 12/11/2022    POTASSIUM 3.6 05/09/2021    Lab Results   Component Value Date    CO2 33 07/29/2024    CO2 31 12/11/2022    CO2 35 05/09/2021    Lab Results   Component Value Date    CR 1.04 07/29/2024    CR 0.67 05/09/2021          Lab Results   Component Value Date    WBC 8.7 07/26/2024    HGB 16.7 07/26/2024    HCT 49.4 07/26/2024    MCV 92 07/26/2024     07/26/2024     Lab Results   Component Value Date     (H) 07/29/2024     (H) 07/29/2024    ALKPHOS 93 07/29/2024    BILITOTAL 0.3 07/29/2024    BILICONJ 0.0 03/02/2011    XIN 31 05/09/2021     Lab Results   Component Value Date    TSH 3.31 07/27/2024            Consults:   Consultation during this admission received from internal medicine:    Bc German is a 44 year old male with PMHx significant for alcohol use disorder and IV drug use presents for detox.      # Benzodiazapine dependence  - wants to taper off ativan 2 mg tid  - per psych     # Hx of IVDU  # Polysbustance use disorder  - sober from fentanyl since June, on sublicade  - sober from IV meth since July  - No current evidence of IVDU complication such as abscess, endocarditis      # HIV  - on triumec (see below)   - check CD4 count     # Elevated LFT's  # Hx of Hepatitis C   -  on admission   - Trend   - hepatitis C titer  Addendum: LFT's remain fairly elevated. Will order RUQ ultrasound. Consider medications contributing though Triumeq carries low risk for transamintis. This may be related to hep C?     # Hx of anxiety and depression  - per psych         Hospital Course:   Bc German was admitted to [facility] with attending Ronal Fonseca MD as  a voluntary patient. The patient was placed under status 15 (15 minute checks) to ensure patient safety.   CBC, BMP and utox obtained.    All outpatient medications were continued with the exception of Ativan.  The patient was detoxified from benzodiazepines using a phenobarbital and Valium taper.      Bc German did participate in groups and was visible in the milieu.     The patient's symptoms of withdrawal improved.     Bc German was released to  home and then CD treatment . At the time of discharge Bc German was determined to not be a danger to himself or others. At the current time of discharge, the patient does not meet criteria for involuntary hospitalization. On the day of discharge, the patient reports that they do not have suicidal or homicidal ideation and would never hurt themselves or others. Steps taken to minimize risk include: assessing patient s behavior and thought process daily during hospital stay, discharging patient with adequate plan for follow up for mental and physical health and discussing safety plan of returning to the hospital should the patient ever have thoughts of harming themselves or others. Therefore, based on all available evidence including the factors cited above, the patient does not appear to be at imminent risk for self-harm, and is appropriate for outpatient level of care.           Discharge Medications:     Current Discharge Medication List        START taking these medications    Details   folic acid (FOLVITE) 1 MG tablet Take 1 tablet (1 mg) by mouth daily  Qty: 30 tablet, Refills: 1    Associated Diagnoses: Benzodiazepine dependence (H)      multivitamin w/minerals (THERA-VIT-M) tablet Take 1 tablet by mouth daily  Qty: 30 tablet, Refills: 1    Associated Diagnoses: Benzodiazepine dependence (H)      thiamine (B-1) 100 MG tablet Take 1 tablet (100 mg) by mouth daily  Qty: 30 tablet, Refills: 0    Associated Diagnoses: Benzodiazepine dependence (H)            CONTINUE these medications which have CHANGED    Details   abacavir-dolutegravir-LamiVUDine (TRIUMEQ) 600- MG per tablet Take 1 tablet by mouth daily Please call [phone] & make appointment for further refills.  Qty: 30 tablet, Refills: 0    Associated Diagnoses: HIV disease (H)      cloNIDine (CATAPRES) 0.1 MG tablet Take 1 tablet (0.1 mg) by mouth daily  Qty: 30 tablet, Refills: 1    Associated Diagnoses: JASSI (generalized anxiety disorder)      levomilnacipran (FETZIMA ER) 120 MG 24 hr capsule Take 1 capsule (120 mg) by mouth daily  Qty: 30 capsule, Refills: 1    Associated Diagnoses: JASSI (generalized anxiety disorder)      naloxone (NARCAN) 4 MG/0.1ML nasal spray Spray 1 spray (4 mg) into one nostril alternating nostrils as needed for opioid reversal every 2-3 minutes until assistance arrives  Qty: 0.2 mL, Refills: 1    Associated Diagnoses: Opioid overdose, accidental or unintentional, initial encounter (H)      nicotine (NICODERM CQ) 14 MG/24HR 24 hr patch Place 1 patch onto the skin every 24 hours  Qty: 28 patch, Refills: 1    Associated Diagnoses: Drug abuse (H)      pregabalin (LYRICA) 150 MG capsule Take 1 capsule (150 mg) by mouth 2 times daily  Qty: 60 capsule, Refills: 0    Associated Diagnoses: 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      QUEtiapine (SEROQUEL) 100 MG tablet Take 1 tablet (100 mg) by mouth at bedtime  Qty: 30 tablet, Refills: 1    Associated Diagnoses: JASSI (generalized anxiety disorder)           CONTINUE these medications which have NOT CHANGED    Details   buprenorphine ER (SUBLOCADE) 300 MG/1.5ML prefilled syringe Inject 300 mg subcutaneously every 30 days      testosterone cypionate (DEPOTESTOTERONE) 200 MG/ML injection Inject 200 mg into the muscle Injects 1 ml every 7-10 days           STOP taking these medications       LORazepam (ATIVAN) 2 MG tablet Comments:   Reason for Stopping:         multivitamin w/minerals (MULTI-VITAMIN) tablet Comments:   Reason for  Stopping:         promethazine (PHENERGAN) 25 MG tablet Comments:   Reason for Stopping:         SUMAtriptan (IMITREX) 50 MG tablet Comments:   Reason for Stopping:                    Psychiatric Examination:     The patient was not seen on the day of discharge.           Discharge Plan:   Continue medications as above.     Recommendation:  Your [facility] Referrals have been sent to [facility] and [facility] as your [facility]. Please contact the CD treatment of your choice upon discharge from  to schedule your intake and follow any and all recommendations.   [facility]  [address] [place], [place] [id number] [phone]     [facility]  [email or web address]  [facility]  [address] [facility], [place] [address] [phone]     [facility]  [address], [place] [address] [phone]     [facility]   [address] [place] [address]  [phone]        Major Treatments, Procedures and Findings:  You were treated for benzo detoxification using phenobarbital. As an outpatient you will be prescribed medication, please take this medication as prescribed until it is gone. You completed  a chemical dependency assessment. You had labs drawn and those results were reviewed with you. Please take a copy of your lab work with you to your next primary care provider appointment.    Attestation:    I spent more than 30 minutes on discharge day activities. Ronal Fonseca MD

## 2024-07-31 NOTE — PROGRESS NOTES
Pt reported to staff this morning that he no longer wanted to attend a [place] program. Pt was aware that he was scheduled for his sublocade injection at 1230 and would be discharged. Pt agreed he was aware and asked writer to fax his referrals to [facility] and [facility]. And that he would be able to follow up with these programs on his own to schedule intake. He also shared his dad would be able to pick him up from the hospital and assist him with getting to treatment hopefully today, if not pt would be able to stay at his home. Writer confirmed this with his father. Pt reported he will go to treatment sharing that he is currently out on bond and has 5 felony drug chares pending with a court date of 8/13/24. Pt reported he he wants to get and stay sober.     KEVIN Garcia

## 2024-07-31 NOTE — PROGRESS NOTES
Writer met with pt to let him know that I am still waiting to hear back from [facility] programs for an approval and intake date. Pt also had questions about IOP program with sober housing and I assured him that the residential treatment he attends will assist him with that prior to him being discharged from residential.    Nyasia Franco, ALEXANDER

## 2024-07-31 NOTE — PROGRESS NOTES
Brief Hospital Medicine Note:     Medicine team following up on on pending testing for LFT elevation. Nursing notes, vitals, and labs reviewed.     TTG negative. Remainder of work in process and patient has been discharged by primary team.     -Recommend follow up with PCP for ongoing monitoring of LFTs  -Follow up with GI as OP (referral already placed.     Genny Cross PA-C  Hospitalist Service  Contact information available via [facility] Paging/Directory

## 2024-07-31 NOTE — PLAN OF CARE
Goal Outcome Evaluation:    Plan of Care Reviewed With: patient      Patient discharged to home, would be going to infusion appointment after discharge.  Discharge instructions and medications explained to patient with no question asked.  Patient verbalized understanding of the discharge instructions.  Medications and belongings sent with patient upon discharge.

## 2024-07-31 NOTE — PLAN OF CARE
Problem: Alcohol Withdrawal  Goal: Optimal Neurologic Function  Outcome: Progressing   Goal Outcome Evaluation:Ongoing  Patient appears to be asleep most of the night. No s/s of pain nor discomfort noted, intermittent body movements and even unlabored respirations were observed during the 15 minute safety checks throughout the night. He is OOD. Patient slept for 5.5 hrs at night. No safety concerns noted.

## 2024-07-31 NOTE — TELEPHONE ENCOUNTER
Patient was discharged from inpatient today. Presented to the [facility] for Sublocade injection and was told to follow up at the [facility] first.    Due to insurance guidelines, patient can not receive inpatient and outpatient services the same day. Sublocade and [facility] Clinic appointment rescheduled for tomorrow. Patient reports he has Suboxone at home if needed.    [facility]  [facility], [address]   [place], [place], [address]  Phone: [phone]  Fax: [phone]    Open Monday-Friday  Closed over lunch hour  Walk in hours: 9am-11:30am and 12:30-3pm    Juani Peguero RN on 7/31/2024 at 3:15 PM

## 2024-08-01 ENCOUNTER — INFUSION THERAPY VISIT (OUTPATIENT)
Dept: INFUSION THERAPY | Facility: CLINIC | Age: 44
End: 2024-08-01
Attending: INTERNAL MEDICINE
Payer: COMMERCIAL

## 2024-08-01 ENCOUNTER — TELEPHONE (OUTPATIENT)
Dept: BEHAVIORAL HEALTH | Facility: CLINIC | Age: 44
End: 2024-08-01

## 2024-08-01 ENCOUNTER — OFFICE VISIT (OUTPATIENT)
Dept: BEHAVIORAL HEALTH | Facility: CLINIC | Age: 44
End: 2024-08-01
Payer: COMMERCIAL

## 2024-08-01 VITALS — HEART RATE: 81 BPM | SYSTOLIC BLOOD PRESSURE: 129 MMHG | DIASTOLIC BLOOD PRESSURE: 89 MMHG

## 2024-08-01 DIAGNOSIS — F11.20 OPIOID TYPE DEPENDENCE, CONTINUOUS (H): Primary | ICD-10-CM

## 2024-08-01 DIAGNOSIS — F15.90 STIMULANT USE DISORDER: ICD-10-CM

## 2024-08-01 DIAGNOSIS — R79.89 LOW TESTOSTERONE IN MALE: ICD-10-CM

## 2024-08-01 DIAGNOSIS — B18.2 CHRONIC HEPATITIS C WITHOUT HEPATIC COMA (H): ICD-10-CM

## 2024-08-01 DIAGNOSIS — Z51.81 ENCOUNTER FOR MONITORING OPIOID MAINTENANCE THERAPY: ICD-10-CM

## 2024-08-01 DIAGNOSIS — Z79.891 ENCOUNTER FOR MONITORING OPIOID MAINTENANCE THERAPY: ICD-10-CM

## 2024-08-01 DIAGNOSIS — F11.90 OPIOID USE DISORDER: Primary | ICD-10-CM

## 2024-08-01 DIAGNOSIS — Z79.899 CHRONIC PRESCRIPTION BENZODIAZEPINE USE: ICD-10-CM

## 2024-08-01 LAB
AMPHETAMINE QUAL URINE POCT: NEGATIVE
BARBITURATE QUAL URINE POCT: NEGATIVE
BENZODIAZEPINE QUAL URINE POCT: ABNORMAL
BUPRENORPHINE QUAL URINE POCT: ABNORMAL
COCAINE QUAL URINE POCT: NEGATIVE
CREATININE QUAL URINE POCT: ABNORMAL
INTERNAL QC QUAL URINE POCT: ABNORMAL
LKM AB TITR SER IF: NORMAL {TITER}
MDMA QUAL URINE POCT: NEGATIVE
METHADONE QUAL URINE POCT: NEGATIVE
METHAMPHETAMINE QUAL URINE POCT: NEGATIVE
OPIATE QUAL URINE POCT: NEGATIVE
OXYCODONE QUAL URINE POCT: NEGATIVE
PH QUAL URINE POCT: ABNORMAL
PHENCYCLIDINE QUAL URINE POCT: NEGATIVE
POCT KIT EXPIRATION DATE: ABNORMAL
POCT KIT LOT NUMBER: ABNORMAL
SMA IGG SER-ACNC: NORMAL UNITS
SPECIFIC GRAVITY POCT: 1.02
TEMPERATURE URINE POCT: ABNORMAL
THC QUAL URINE POCT: NEGATIVE

## 2024-08-01 PROCEDURE — 99215 OFFICE O/P EST HI 40 MIN: CPT | Performed by: NURSE PRACTITIONER

## 2024-08-01 PROCEDURE — 250N000009 HC RX 250: Performed by: INTERNAL MEDICINE

## 2024-08-01 PROCEDURE — 250N000011 HC RX IP 250 OP 636: Performed by: INTERNAL MEDICINE

## 2024-08-01 PROCEDURE — 99417 PROLNG OP E/M EACH 15 MIN: CPT | Performed by: NURSE PRACTITIONER

## 2024-08-01 PROCEDURE — 80305 DRUG TEST PRSMV DIR OPT OBS: CPT | Performed by: NURSE PRACTITIONER

## 2024-08-01 PROCEDURE — 96372 THER/PROPH/DIAG INJ SC/IM: CPT | Performed by: INTERNAL MEDICINE

## 2024-08-01 RX ORDER — MEPERIDINE HYDROCHLORIDE 25 MG/ML
25 INJECTION INTRAMUSCULAR; INTRAVENOUS; SUBCUTANEOUS EVERY 30 MIN PRN
Status: CANCELLED | OUTPATIENT
Start: 2024-08-27

## 2024-08-01 RX ORDER — LIDOCAINE HYDROCHLORIDE 10 MG/ML
2 INJECTION, SOLUTION EPIDURAL; INFILTRATION; INTRACAUDAL; PERINEURAL ONCE
Status: CANCELLED | OUTPATIENT
Start: 2024-08-27 | End: 2024-08-27

## 2024-08-01 RX ORDER — ALBUTEROL SULFATE 0.83 MG/ML
2.5 SOLUTION RESPIRATORY (INHALATION)
Status: CANCELLED | OUTPATIENT
Start: 2024-08-27

## 2024-08-01 RX ORDER — DIPHENHYDRAMINE HYDROCHLORIDE 50 MG/ML
50 INJECTION INTRAMUSCULAR; INTRAVENOUS
Status: CANCELLED
Start: 2024-08-27

## 2024-08-01 RX ORDER — LIDOCAINE HYDROCHLORIDE 10 MG/ML
2 INJECTION, SOLUTION EPIDURAL; INFILTRATION; INTRACAUDAL; PERINEURAL ONCE
Status: COMPLETED | OUTPATIENT
Start: 2024-08-01 | End: 2024-08-01

## 2024-08-01 RX ORDER — ALBUTEROL SULFATE 90 UG/1
1-2 AEROSOL, METERED RESPIRATORY (INHALATION)
Status: CANCELLED
Start: 2024-08-27

## 2024-08-01 RX ORDER — EPINEPHRINE 1 MG/ML
0.3 INJECTION, SOLUTION, CONCENTRATE INTRAVENOUS EVERY 5 MIN PRN
Status: CANCELLED | OUTPATIENT
Start: 2024-08-27

## 2024-08-01 RX ORDER — METHYLPREDNISOLONE SODIUM SUCCINATE 125 MG/2ML
125 INJECTION, POWDER, LYOPHILIZED, FOR SOLUTION INTRAMUSCULAR; INTRAVENOUS
Status: CANCELLED
Start: 2024-08-27

## 2024-08-01 RX ORDER — BUPRENORPHINE 8 MG/1
4-8 TABLET SUBLINGUAL DAILY PRN
Qty: 15 TABLET | Refills: 0 | Status: SHIPPED | OUTPATIENT
Start: 2024-08-01 | End: 2024-09-10

## 2024-08-01 RX ADMIN — LIDOCAINE HYDROCHLORIDE 2 ML: 10 INJECTION, SOLUTION EPIDURAL; INFILTRATION; INTRACAUDAL; PERINEURAL at 12:31

## 2024-08-01 RX ADMIN — BUPRENORPHINE 300 MG: 300 SOLUTION SUBCUTANEOUS at 12:32

## 2024-08-01 ASSESSMENT — PATIENT HEALTH QUESTIONNAIRE - PHQ9: SUM OF ALL RESPONSES TO PHQ QUESTIONS 1-9: 12

## 2024-08-01 NOTE — PATIENT INSTRUCTIONS
Call [phone] to schedule injection, due 8/29.     Call and schedule follow up DR Barrientos and your therapist.     Continue to taper off lorazepam.

## 2024-08-01 NOTE — TELEPHONE ENCOUNTER
----- Message from Vincent Rowan sent at 8/1/2024 12:58 PM CDT -----  Regarding: RE: DENIED IN NETWORK BENEFITS  Thank you for this information! I called pt's father and relayed this information to him. He was appreciative for the information you provided from [employer]. He does not want to appeal at this time.     Thanks for all your work on this!  Tom  ----- Message -----  From: Dieter Dow [facility]  Sent: 8/1/2024  11:14 AM CDT  To: [facility]  Subject: DENIED IN NETWORK BENEFITS                       Received call from Vic from [facility] with [employer] at [phone]. Per Vic the in network request for [facility] was staffed today and denied as there are other various in network programs with immediate openings. We Can appear this denial by calling [phone] if pt wants us to appeal.     Per Vic, if pt wants, he can utilize his OUT OF NETWORK benefits and this was authorized for 28 days of programming. If pt chooses to go this route, PLEASE remind he he will be paying out of network cost and does need the financial review and well due to commercial insurance.       Per Vic, there are three in network programs that he identified that have immediately availability. Pt can call behavioral health navigators at [phone] for assistance with this. Vic also noted the facilities as [facility]; [phone] and  [facility] [phone].      Please let me know if patient was me to appeal the denial of in-network care, utilize the out of network benefits and come to [facility] or if pt wants to seek out care elsewhere and I close referral.     Thank you, dieter

## 2024-08-01 NOTE — NURSING NOTE
[facility]    REQUEST SUBUTEX, PT STATES NALOXONE MAKES HIM FEEL SICK  Rooming information:    Approximate last use of full opioid agonist: 6/25/24  Taking buprenorphine? Yes: SUBLOCADE  much per day? MONTHLY  Number of buprenorphine films/tablets remaining currently: 1-2  Side effects related to buprenorphine (constipation, dry mouth, sedation?) No TO SUBLOCADE, YES TO SUBOXONE  Narcan currently available: Yes  Other recent substance use:    Cannabis  and Methamphetamine   NICOTINE-Yes:   If using nicotine, ready to quit? Yes: WORKING ON IT    Point of care urine drug screen positive for:  Lab Results   Component Value Date    BUP Screen Positive (A) 08/01/2024    BZO Screen Positive (A) 08/01/2024    BAR Negative 08/01/2024    LOU Negative 08/01/2024    MAMP Negative 08/01/2024    AMP Negative 08/01/2024    MDMA Negative 08/01/2024    MTD Negative 08/01/2024    FKE513 Negative 08/01/2024    OXY Negative 08/01/2024    PCP Negative 08/01/2024    THC Negative 08/01/2024    TEMP 90 F 08/01/2024    SGPOCT 1.025 08/01/2024       *POC urine drug screen does not screen for Fentanyl    Depression Response    Patient completed the PHQ-9 assessment for depression and scored >9? Yes  Question 9 on the PHQ-9 was positive for suicidality? No  Does patient have current mental health provider? Yes, SANJU ALFRED AT [facility]    Is this a virtual visit? No    I personally notified the following: belem          8/1/2024    12:00 PM   PHQ Assesment Total Score(s)   PHQ-9 Score 12         Housing needs: STABLE-AT FATHERS HOUSE UNTIL GO INTO TREATMENT    Insurance: ACTIVE    Current legal issues: NONE    Contact information up to date? YES    3rd Party Involvement NO TODAY (please obtain OMID if pt would like to include)    Radha Rodriguez MA  August 1, 2024  12:47 PM

## 2024-08-01 NOTE — PROGRESS NOTES
"[facility] Follow Up    ASSESSMENT/PLAN                                                        1. Opioid use disorder  Reporting symptoms have been well controlled since starting sublocade  last month. Received #2 sublcoade 300 mg today. Has not needed additional sl buprenorphine to treat symptoms but expressed concerns about his dose of sublocade reducing to 100 mg next month.   Continue monthly sublocade.   Providing sl buprenorphine to take as needed.   Hoping to start residential programming at [facility], encouraged plans.   Confirms narcan access.   Planning to follow up with DR Khari Barrientos at [facility],will need to clarify if he will continue care with Dr Barrientos or if he should return to .   - buprenorphine (SUBUTEX) 8 MG SUBL sublingual tablet; Place 0.5-1 tablets (4-8 mg) under the tongue daily as needed for pain  Dispense: 15 tablet; Refill: 0    2. Stimulant use disorder  Reports last use was IV meth use 7/4/2024. Denies cravings  Encouraged plans to start CD treatment.     3. Encounter for monitoring opioid maintenance therapy  - Drugs of Abuse Screen Urine (POC CUPS) POCT    4. Chronic prescription benzodiazepine use  Patient was discharged from [facility] inpatient detox for benzodiazepins after completing valium taper yesterday. He resumed lorazepam today, stating he was seeking inpatient treatment and thoughts he would be able to access treatment at  quicker by going to detox for benzodiazepines. He has been working with his provider on a slow taper. He states he has episodes of \"panic\" causing him to be unable to leave home and needs his medication.   Encouraged to schedule follow up with his provider and continue to taper off.   Reviewed risk of concurrent use of BDZ and opioids causing respiratory depression and overdose.   Continue weekly therapy  Continue to follow up with his PCP  Encouraged treatment plans.     5. Low testosterone in male  He is on weekly " testosterone replacement, and needs to be reevaluated, requested referral for endocrinology.   - Adult Endocrinology  Referral; Future    6. Chronic hepatitis C without hepatic coma (H)  Hx of Chronic Hep C without treatment. Labs were updated while inpatient. Hep C Log 7.1, RNA 13,700,000.   , . Already has hepatology referral, assisted with scheduling appointment 10/8 in [place] location.            Return for Follow up with DR Barrientos at [facility] or  in 2 weeks.       Patient counseling completed today:  Discussed mechanism of action, potential risks/benefits/side effects of medications and other recommendations above.     Discussed risk of precipitated withdrawal with initiation of buprenorphine in the presence of full opioid agonists.    Reviewed directions for initiation of buprenorphine to reduce risk of precipitated withdrawal and maximize efficacy.    Harm reduction counseling including never use alone, availability of naloxone, risks associated with concurrent use of opioids and benzodiazepines, alcohol, or other sedatives, safer administration as applicable.  Discussed importance of avoiding isolation, building a network of supportive relationships, avoiding people/places/things associated with past use to reduce risk of relapse; including motivational interviewing regarding psychosocial interventions.   SUBJECTIVE                                                          CC/HPI:  Bc German is a 43 year old male with PMH HIV on Triumeq, hepatitis C no treatment, IVDU, anxiety, depression, PTSD, nicotine use disorder, stimulant use disorder, and opioid use disorder who presents to the Recovery Clinic for initial visit.       Brief History:  Bc German was first seen in Recovery Clinic on 02/29/24. They were referred by [facility] ED where pt was brought 2/28/24 by ambulance after family found him asleep in the bathroom after smoking fentanyl.   Pt is interested in stopping use of  methadone and fentanyl and starting buprenorphine.  He requests buprenorphine single agent product due to previous intolerance of buprenorphine/naloxone formulation when he has attempted low dose buprenorphine initiation with his PCP at [facility] in the past.      Recommendations last visit:   1. Opioid use disorder  42 yo male with 14 yr h/o opioid use including IV use, most recently taking methadone and smoking fentanyl, last use 2/27 or 2/28/29.  Interested in starting buprenorphine using low dose initiation.  Prefers buprenorphine only product based on previous experience of nausea, headaches, fatigue with buprenorphine/naloxone product.    Reviewed directions for low dose buprenorphine initiation, titration to 16mg/day.   Stop use of full opioid agonist when buprenorphine dose reaches 16mg.  Information sheet given to pt with directions.   Pt confirms he has multiple doses of naloxone available.   Miralax prn OIC.   Pt met with [facility] to schedule KENA evaluation, encouraged him to follow recommendations  - buprenorphine (SUBUTEX) 2 MG SUBL sublingual tablet; Place 1 tablet (2 mg) under the tongue 2 times daily Or as directed  Dispense: 20 tablet; Refill: 0  - buprenorphine (SUBUTEX) 8 MG SUBL sublingual tablet; Place 1 tablet (8 mg) under the tongue 2 times daily  Dispense: 20 tablet; Refill: 0  - polyethylene glycol (MIRALAX) 17 GM/Dose powder; Take 17 g (1 Capful) by mouth daily  Dispense: 578 g; Refill: 11     2. Opioid withdrawal (H)  Pt requested Phenergan for nausea, stated odansetron does not work for him and Compazine makes him nauseous.  Ok for one time rx.    Reviewed purpose of low dose initiation is to minimize withdrawal symptoms.  Rx's as noted for use during transition to buprenorphine if needed.   Pt was given clonidine 0.1mg in clinic to address current symptoms.   - promethazine (PHENERGAN) 25 MG tablet; Take 1 tablet (25 mg) by mouth every 6 hours as needed for nausea  Dispense: 20 tablet; Refill:  0  - cloNIDine (CATAPRES) 0.1 MG tablet; Take 1 tablet (0.1 mg) by mouth every 6 hours as needed (withdrawal symptoms including hot/cold sweats, anxiety, restlessness, sleeplessness)  Dispense: 20 tablet; Refill: 0  - cloNIDine (CATAPRES) tablet 0.1 mg     3. Encounter for monitoring opioid maintenance therapy  - Drugs of Abuse Screen Urine (POC CUPS) POCT; Standing  - Fentanyl Qualitative with Reflex to Quant Urine; Future  - Drugs of Abuse Screen Urine (POC CUPS) POCT  - Fentanyl Qualitative with Reflex to Quant Urine  - Fentanyl and Metabolite Quantitative, Urine     4. Stimulant use disorder  Pt met with [facility] to schedule KENA evaluation, encouraged him to follow recommendations     5. Nicotine use disorder  Evaluate pt's goals next visit     6. Anxiety and depression  Pt is prescribed Fetzima, Seroquel, and lorazepam by Dr. Reilly, psychiatry, at [facility].  Pt declined signing OMID for ACP today.   Continue therapy at [facility]   Pt is not actively suicidal.       7. Chronic prescription benzodiazepine use  Prescribed lorazepam 2mg tid by Dr. Reilly, psychiatry, at [facility].  Pt declined signing OMID for ACP today.      8. High risk sexual behavior, unspecified type  Pt is interested in screening for STI  - Treponema Abs w Reflex to RPR and Titer; Future  - Hepatitis B surface antigen; Future  - NEISSERIA GONORRHOEA PCR  - CHLAMYDIA TRACHOMATIS PCR     9. Chronic hepatitis C without hepatic coma (H)  Diagnosed >10 years ago, no h/o treatment.  Pt is interested in treatment.    Follow-up labs today and refer to Hepatology if HCV RNA remains detectable  - Hepatitis C RNA, Quantitative by PCR with Confirmatory Reflex to Genotyping; Future  - COMPREHENSIVE METABOLIC PANEL; Future  - CBC with Platelets & Differential; Future     10. Human immunodeficiency virus (HIV) disease (H)  Pt reports he is taking Triumeq daily  - HIV-1 RNA quantitative; Future  - CBC with Platelets & Differential; Future       6/26/24 Was seen by DR Barrientos at  "[facility] clinic who recommended patient go to ED and start Brixaldi and transfer to sublcoade. He presented to ED on 6/26 and received Brixaldi.   7/3/2024 #1 sublocade injection  7/26-7/31/24 detox admission from benzodiazepines. Received #2 sublocade today.     08/01/24 HPI:  Patient completed inpatient detox at [facility] yesterday for benzodiazepines.   He completed valium taper while inpatient. He was discharged yesterday and states he is resumed lorazepam today. He did not intend to stop taking lorazepam, he asked to go to inpatient detox for BDZ withdrawals so that he could get his foot in the door to start treatment at . He has plans to slowly taper off of BDZ with his psychiatry provider.   He has panic disorder and \"fears for his life\" does not like to leave the house.   He received his second sublcoade today, reports all symptoms are controlled and feels great. Need for sl buprenorphine x1 since his last injection.  Reports last used fentanyl 6/25, and meth 7/4/24. Was arrested for meth possession in [place] after his friend overdosed while driving a vehicle. Is doing weekly Urines for [facility].  Is interested in CD treatment, states he now has plans to start treatment at [facility], is waiting for them to call back.            Substance Use History :  Opioids:   Age at first use: 29 years, has used rx opioids including oxycodone, fentanyl patch, hydromorphone; also heroin, illicitly manufactured fentanyl, methadone; IV use until 6/2023  Current use: substance: methadone 10-20mg/day or illicitly manufactured fentanyl, inhaled.  Last use of methadone 2/24/24, last use of fentanyl 2/27/24.                 IV drug use: Yes:  6/27/24  History of overdose: in past at least 20 times, hospital 2x  Previous residential or outpatient treatments for addiction : Yes: [facility] most recent, several outpatient  Previous medication treatments for addiction: suboxone, methadone  Longest period of sobriety: " 7mo  Medical complications related to substance use:   Hepatitis C: Pos; first +ab on record 2011; 12/27/21 HCV ,582 ; 2/29/24 HCV RNA pending  HIV: Pos; diagnosed 2017, reports viral levels undetectable; 2/29/24 HIV RNA pending          Other Addiction History:  Stimulants   Methamphetamine, cocaine  Sedatives/hypnotics/anxiolytics:   Rx lorazepam  Alcohol:   Past drinking a lot, now Beers every once in a while  Nicotine:   vaping  Cannabis:   yes  Hallucinogens/Dissociatives:   Past   Eating disorder:  none  Gambling:              None    PAST PSYCHIATRIC HISTORY:  Diagnoses- Anxiety, Major Depression  Suicide Attempts: Yes 14 years ago  , 21 years old  Hospitalizations: Yes        7/3/2024     1:52 PM 7/28/2024    10:19 AM 8/1/2024    12:00 PM   PHQ   PHQ-9 Total Score 6 7 12   Q9: Thoughts of better off dead/self-harm past 2 weeks Several days Not at all Not at all       Social History  Housing status: with dad  Employment status: Unemployed, not seeking work  Relationship status: Single  Children: 1 child 24 years old  Legal: none  Insurance needs: [employer]  Contact information up to date? yes      Labs discussed with patient?  Yes      [place] Prescription Drug Monitoring Program Reviewed:  Yes      Medications:  Current Outpatient Medications   Medication Sig Dispense Refill    abacavir-dolutegravir-LamiVUDine (TRIUMEQ) 600- MG per tablet Take 1 tablet by mouth daily Please call [phone] & make appointment for further refills. 30 tablet 0    buprenorphine (SUBUTEX) 8 MG SUBL sublingual tablet Place 0.5-1 tablets (4-8 mg) under the tongue daily as needed for pain 15 tablet 0    buprenorphine ER (SUBLOCADE) 300 MG/1.5ML prefilled syringe Inject 300 mg subcutaneously every 30 days      cloNIDine (CATAPRES) 0.1 MG tablet Take 1 tablet (0.1 mg) by mouth daily 30 tablet 1    folic acid (FOLVITE) 1 MG tablet Take 1 tablet (1 mg) by mouth daily 30 tablet 1    levomilnacipran (FETZIMA ER) 120  MG 24 hr capsule Take 1 capsule (120 mg) by mouth daily 30 capsule 1    multivitamin w/minerals (THERA-VIT-M) tablet Take 1 tablet by mouth daily 30 tablet 1    pregabalin (LYRICA) 150 MG capsule Take 1 capsule (150 mg) by mouth 2 times daily 60 capsule 0    QUEtiapine (SEROQUEL) 100 MG tablet Take 1 tablet (100 mg) by mouth at bedtime 30 tablet 1    testosterone cypionate (DEPOTESTOTERONE) 200 MG/ML injection Inject 200 mg into the muscle Injects 1 ml every 7-10 days      thiamine (B-1) 100 MG tablet Take 1 tablet (100 mg) by mouth daily 30 tablet 0    naloxone (NARCAN) 4 MG/0.1ML nasal spray Spray 1 spray (4 mg) into one nostril alternating nostrils as needed for opioid reversal every 2-3 minutes until assistance arrives (Patient not taking: Reported on 8/1/2024) 0.2 mL 1    nicotine (NICODERM CQ) 14 MG/24HR 24 hr patch Place 1 patch onto the skin every 24 hours (Patient not taking: Reported on 8/1/2024) 28 patch 1     No current facility-administered medications for this visit.       Allergies   Allergen Reactions    Bupropion Other (See Comments)     Other reaction(s): seizure when took a double dose        Acetaminophen Nausea and Vomiting and GI Disturbance       If multiple uses per pt report    Codeine Nausea and Vomiting, Itching and Fatigue           Sulfa Antibiotics Itching       PMH, PSH, FamHx reviewed      OBJECTIVE                                                      /89   Pulse 81     Physical Exam  Cardiovascular:      Rate and Rhythm: Normal rate.   Pulmonary:      Effort: Pulmonary effort is normal. No respiratory distress.   Neurological:      General: No focal deficit present.      Mental Status: He is alert and oriented to person, place, and time.   Psychiatric:         Attention and Perception: Attention normal.         Mood and Affect: Mood is anxious.         Speech: Speech normal.         Behavior: Behavior is cooperative.         Thought Content: Thought content normal. Thought  content does not include suicidal ideation.         Judgment: Judgment normal.         Labs:    UDS:    Lab Results   Component Value Date    BUP Screen Positive (A) 08/01/2024    BZO Screen Positive (A) 08/01/2024    BAR Negative 08/01/2024    LOU Negative 08/01/2024    MAMP Negative 08/01/2024    AMP Negative 08/01/2024    MDMA Negative 08/01/2024    MTD Negative 08/01/2024    YIM210 Negative 08/01/2024    OXY Negative 08/01/2024    PCP Negative 08/01/2024    THC Negative 08/01/2024    TEMP 90 F 08/01/2024    SGPOCT 1.025 08/01/2024     *POC urine drug screen does not screen for Fentanyl      Recent Results (from the past 240 hour(s))   Comprehensive metabolic panel    Collection Time: 07/26/24 10:17 AM   Result Value Ref Range    Sodium 137 135 - 145 mmol/L    Potassium 5.2 3.4 - 5.3 mmol/L    Carbon Dioxide (CO2) 29 22 - 29 mmol/L    Anion Gap 11 7 - 15 mmol/L    Urea Nitrogen 29.4 (H) 6.0 - 20.0 mg/dL    Creatinine 1.08 0.67 - 1.17 mg/dL    GFR Estimate 88 >60 mL/min/1.73m2    Calcium 10.4 8.8 - 10.4 mg/dL    Chloride 97 (L) 98 - 107 mmol/L    Glucose 102 (H) 70 - 99 mg/dL    Alkaline Phosphatase 90 40 - 150 U/L    AST       (H) 0 - 70 U/L    Protein Total 8.2 6.4 - 8.3 g/dL    Albumin 4.5 3.5 - 5.2 g/dL    Bilirubin Total 0.4 <=1.2 mg/dL   Lipase    Collection Time: 07/26/24 10:17 AM   Result Value Ref Range    Lipase 30 13 - 60 U/L   Magnesium    Collection Time: 07/26/24 10:17 AM   Result Value Ref Range    Magnesium 1.9 1.7 - 2.3 mg/dL   CBC with platelets and differential    Collection Time: 07/26/24 10:17 AM   Result Value Ref Range    WBC Count 8.7 4.0 - 11.0 10e3/uL    RBC Count 5.39 4.40 - 5.90 10e6/uL    Hemoglobin 16.7 13.3 - 17.7 g/dL    Hematocrit 49.4 40.0 - 53.0 %    MCV 92 78 - 100 fL    MCH 31.0 26.5 - 33.0 pg    MCHC 33.8 31.5 - 36.5 g/dL    RDW 13.2 10.0 - 15.0 %    Platelet Count 294 150 - 450 10e3/uL    % Neutrophils 57 %    % Lymphocytes 27 %    % Monocytes 10 %    % Eosinophils 3 %     % Basophils 1 %    % Immature Granulocytes 2 %    NRBCs per 100 WBC 0 <1 /100    Absolute Neutrophils 4.9 1.6 - 8.3 10e3/uL    Absolute Lymphocytes 2.4 0.8 - 5.3 10e3/uL    Absolute Monocytes 0.9 0.0 - 1.3 10e3/uL    Absolute Eosinophils 0.3 0.0 - 0.7 10e3/uL    Absolute Basophils 0.1 0.0 - 0.2 10e3/uL    Absolute Immature Granulocytes 0.2 <=0.4 10e3/uL    Absolute NRBCs 0.0 10e3/uL   Urine Drug Screen Panel    Collection Time: 07/26/24 12:16 PM   Result Value Ref Range    Amphetamines Urine Screen Negative Screen Negative    Barbituates Urine Screen Negative Screen Negative    Benzodiazepine Urine Screen Negative Screen Negative    Cannabinoids Urine Screen Negative Screen Negative    Cocaine Urine Screen Negative Screen Negative    Fentanyl Qual Urine Screen Negative Screen Negative    Opiates Urine Screen Negative Screen Negative    PCP Urine Screen Negative Screen Negative   Hepatitis C Screen Reflex to HCV RNA Quant and Genotype    Collection Time: 07/27/24 11:52 AM   Result Value Ref Range    Hepatitis C Antibody Reactive (A) Nonreactive   Comprehensive metabolic panel    Collection Time: 07/27/24 11:52 AM   Result Value Ref Range    Sodium 143 135 - 145 mmol/L    Potassium 4.8 3.4 - 5.3 mmol/L    Carbon Dioxide (CO2) 33 (H) 22 - 29 mmol/L    Anion Gap 10 7 - 15 mmol/L    Urea Nitrogen 20.2 (H) 6.0 - 20.0 mg/dL    Creatinine 1.00 0.67 - 1.17 mg/dL    GFR Estimate >90 >60 mL/min/1.73m2    Calcium 10.8 (H) 8.8 - 10.4 mg/dL    Chloride 100 98 - 107 mmol/L    Glucose 88 70 - 99 mg/dL    Alkaline Phosphatase 90 40 - 150 U/L     (H) 0 - 45 U/L     (H) 0 - 70 U/L    Protein Total 8.4 (H) 6.4 - 8.3 g/dL    Albumin 4.7 3.5 - 5.2 g/dL    Bilirubin Total 0.4 <=1.2 mg/dL   T cell subset profile    Collection Time: 07/27/24 11:52 AM   Result Value Ref Range    CD3% Total T Cells 73 49 - 84 %    Absolute CD3, Total T Cells 2,292 603 - 2,990 cells/uL    CD4% [place] T Cells 37 28 - 63 %    Absolute CD4,  [place] T Cells 1,153 441 - 2,156 cells/uL    CD8% Suppressor T Cells 33 10 - 40 %    Absolute CD8, Suppressor T Cells 1,042 125 - 1,312 cells/uL    CD4:CD8 Ratio 1.11 (L) 1.40 - 2.60    T Cell Comment     Anti Nuclear Isela IgG by IFA with Reflex    Collection Time: 07/27/24 11:52 AM   Result Value Ref Range    ADAN interpretation Negative Negative   Ceruloplasmin    Collection Time: 07/27/24 11:52 AM   Result Value Ref Range    Ceruloplasmin 40 20 - 60 mg/dL   Tissue transglutaminase isela IgA and IgG    Collection Time: 07/27/24 11:52 AM   Result Value Ref Range    Tissue Transglutaminase Antibody IgA 0.5 <7.0 U/mL    Tissue Transglutaminase Antibody IgG 2.0 <7.0 U/mL   TSH    Collection Time: 07/27/24 11:52 AM   Result Value Ref Range    TSH 3.31 0.30 - 4.20 uIU/mL   Iron and iron binding capacity    Collection Time: 07/27/24 11:52 AM   Result Value Ref Range    Iron 268 (H) 61 - 157 ug/dL    Iron Binding Capacity 426 240 - 430 ug/dL    Iron Sat Index 63 (H) 15 - 46 %   Hepatitis B core antibody    Collection Time: 07/27/24 11:52 AM   Result Value Ref Range    Hepatitis B Core Antibody Total Reactive (A) Nonreactive   Hepatitis B surface antigen    Collection Time: 07/27/24 11:52 AM   Result Value Ref Range    Hepatitis B Surface Antigen Nonreactive Nonreactive   Hepatitis B core antibody IgM    Collection Time: 07/27/24 11:52 AM   Result Value Ref Range    Hepatitis B Core Antibody IgM Nonreactive Nonreactive   Hepatitis B Surface Antibody    Collection Time: 07/27/24 11:52 AM   Result Value Ref Range    Hepatitis B Surface Antibody Indeterminate     Hepatitis B Surface Antibody Instrument Value 10.80 <8.5 m[IU]/mL   Alpha 1 Antitrypsin    Collection Time: 07/27/24 10:17 PM   Result Value Ref Range    A1A Phenotype MS     Alpha-1-Antitrypsin 118 90 - 200 mg/dL   Hepatitis C RNA, Quantitative by PCR with Confirmatory Reflex to Genotyping    Collection Time: 07/28/24  7:54 AM   Result Value Ref Range    Hepatitis C log  7.1     Hepatitis C RNA IU/mL, Instrument 13,700,000 (H) Not Detected IU/mL   Comprehensive metabolic panel    Collection Time: 07/28/24  7:54 AM   Result Value Ref Range    Sodium 140 135 - 145 mmol/L    Potassium 4.4 3.4 - 5.3 mmol/L    Carbon Dioxide (CO2) 30 (H) 22 - 29 mmol/L    Anion Gap 7 7 - 15 mmol/L    Urea Nitrogen 18.0 6.0 - 20.0 mg/dL    Creatinine 0.99 0.67 - 1.17 mg/dL    GFR Estimate >90 >60 mL/min/1.73m2    Calcium 9.8 8.8 - 10.4 mg/dL    Chloride 103 98 - 107 mmol/L    Glucose 94 70 - 99 mg/dL    Alkaline Phosphatase 81 40 - 150 U/L     (H) 0 - 45 U/L     (H) 0 - 70 U/L    Protein Total 7.5 6.4 - 8.3 g/dL    Albumin 4.1 3.5 - 5.2 g/dL    Bilirubin Total 0.3 <=1.2 mg/dL   INR    Collection Time: 07/28/24  7:54 AM   Result Value Ref Range    INR 0.96 0.85 - 1.15   Extra Purple Top Tube    Collection Time: 07/28/24  7:54 AM   Result Value Ref Range    Hold Specimen JIC    Ferritin    Collection Time: 07/28/24  7:54 AM   Result Value Ref Range    Ferritin 56 31 - 409 ng/mL   Hepatitis A Antibody Total    Collection Time: 07/28/24  7:54 AM   Result Value Ref Range    Hepatitis A Antibody Total Nonreactive    F Actin EIA with reflex    Collection Time: 07/29/24  9:40 AM   Result Value Ref Range    F-Actin (Smooth Muscle) Ab, IgG by SABINA See Scanned Report Units   Liver kidney microsomal antibody IgG    Collection Time: 07/29/24  9:40 AM   Result Value Ref Range    Liver-Kidney Micro Antibody See Scanned Report    Comprehensive metabolic panel    Collection Time: 07/29/24  9:40 AM   Result Value Ref Range    Sodium 138 135 - 145 mmol/L    Potassium 4.5 3.4 - 5.3 mmol/L    Carbon Dioxide (CO2) 33 (H) 22 - 29 mmol/L    Anion Gap 7 7 - 15 mmol/L    Urea Nitrogen 15.6 6.0 - 20.0 mg/dL    Creatinine 1.04 0.67 - 1.17 mg/dL    GFR Estimate >90 >60 mL/min/1.73m2    Calcium 10.0 8.8 - 10.4 mg/dL    Chloride 98 98 - 107 mmol/L    Glucose 53 (L) 70 - 99 mg/dL    Alkaline Phosphatase 93 40 - 150 U/L      (H) 0 - 45 U/L     (H) 0 - 70 U/L    Protein Total 8.1 6.4 - 8.3 g/dL    Albumin 4.4 3.5 - 5.2 g/dL    Bilirubin Total 0.3 <=1.2 mg/dL   Extra Purple Top Tube    Collection Time: 07/29/24  9:40 AM   Result Value Ref Range    Hold Specimen [facility]    Drugs of Abuse Screen Urine (POC CUPS) POCT    Collection Time: 08/01/24 12:54 PM   Result Value Ref Range    POCT Kit Lot Number [id number]     POCT Kit Expiration Date [id number]     Temperature Urine POCT 90 F 90 F, 92 F, 94 F, 96 F, 98 F, 100 F    Specific [place] POCT 1.025 1.005, 1.015, 1.025    pH Qual Urine POCT 5 pH 4 pH, 5 pH, 7 pH, 9 pH    Creatinine Qual Urine POCT 50 mg/dL 20 mg/dL, 50 mg/dL, 100 mg/dL, 200 mg/dL    Internal QC Qual Urine POCT Valid Valid    Amphetamine Qual Urine POCT Negative Negative    Barbiturate Qual Urine POCT Negative Negative    Buprenorphine Qual Urine POCT Screen Positive (A) Negative    Benzodiazepine Qual Urine POCT Screen Positive (A) Negative    Cocaine Qual Urine POCT Negative Negative    Methamphetamine Qual Urine POCT Negative Negative    MDMA Qual Urine POCT Negative Negative    Methadone Qual Urine POCT Negative Negative    Opiate Qual Urine POCT Negative Negative    Oxycodone Qual Urine POCT Negative Negative    Phencyclidine Qual Urine POCT Negative Negative    THC Qual Urine POCT Negative Negative           At least 60 min spent on day of encounter in review of medical record,  review, obtaining histories, discussing recommendations, counseling/coordination of care    Lilibeth Renteria, [place] [id number] [address] [address]  [phone]

## 2024-08-01 NOTE — PROGRESS NOTES
Infusion Nursing Note:  Bc M Bode presents today for sublocade.    Patient seen by provider today: Yes, appointment with Dr. Renteria after injection.   present during visit today: Not Applicable.    Note: Patient denies withdrawal symptoms at this time.      Intravenous Access:  No Intravenous access/labs at this visit.    Treatment Conditions:  Patient denies relapse within the last 7 days. Patient's previous injection site is free of signs and symptoms of tampering and infection.      Post Infusion Assessment:  Patient tolerated injection without incident.  No evidence of extravasations.       Discharge Plan:   Discharge instructions reviewed with: Patient.  Patient and/or family verbalized understanding of discharge instructions and all questions answered.  Patient discharged in stable condition accompanied by: self.  Departure Mode: Ambulatory.      Letty Edwards RN

## 2024-08-05 NOTE — TELEPHONE ENCOUNTER
Routing comment from Lilibeth Renteria:   Bc was seen for follow up in  today after receiving his second injection. He is planning on following up with Khari Barrientos over at [facility], can we clarify with [facility] if they agree with this plan or if he should plan to return to  for follow up. If he needs to be seen at , please contact him and have him come back in 2 weeks.     Per Dr. Khari Barrientos, the plan is the patient will follow up at [facility] where he is well established.     Routing to Lilibeth Renteria as STEPHANIE.    Juani Peguero RN on 8/5/2024 at 2:46 PM

## 2024-08-07 LAB — HCV GENTYP SERPL NAA+PROBE: NORMAL

## 2024-09-10 ENCOUNTER — INFUSION THERAPY VISIT (OUTPATIENT)
Dept: INFUSION THERAPY | Facility: CLINIC | Age: 44
End: 2024-09-10
Attending: INTERNAL MEDICINE
Payer: COMMERCIAL

## 2024-09-10 ENCOUNTER — OFFICE VISIT (OUTPATIENT)
Dept: BEHAVIORAL HEALTH | Facility: CLINIC | Age: 44
End: 2024-09-10
Payer: COMMERCIAL

## 2024-09-10 VITALS — SYSTOLIC BLOOD PRESSURE: 129 MMHG | OXYGEN SATURATION: 99 % | DIASTOLIC BLOOD PRESSURE: 82 MMHG | HEART RATE: 112 BPM

## 2024-09-10 DIAGNOSIS — F11.90 OPIOID USE DISORDER: Primary | ICD-10-CM

## 2024-09-10 DIAGNOSIS — F15.90 STIMULANT USE DISORDER: ICD-10-CM

## 2024-09-10 DIAGNOSIS — Z51.81 ENCOUNTER FOR MONITORING OPIOID MAINTENANCE THERAPY: ICD-10-CM

## 2024-09-10 DIAGNOSIS — Z79.891 ENCOUNTER FOR MONITORING OPIOID MAINTENANCE THERAPY: ICD-10-CM

## 2024-09-10 DIAGNOSIS — B18.2 CHRONIC HEPATITIS C WITHOUT HEPATIC COMA (H): ICD-10-CM

## 2024-09-10 DIAGNOSIS — F41.1 GAD (GENERALIZED ANXIETY DISORDER): ICD-10-CM

## 2024-09-10 DIAGNOSIS — F11.20 OPIOID TYPE DEPENDENCE, CONTINUOUS (H): Primary | ICD-10-CM

## 2024-09-10 DIAGNOSIS — Z79.899 CHRONIC PRESCRIPTION BENZODIAZEPINE USE: ICD-10-CM

## 2024-09-10 LAB
AMPHETAMINE QUAL URINE POCT: NEGATIVE
BARBITURATE QUAL URINE POCT: NEGATIVE
BENZODIAZEPINE QUAL URINE POCT: ABNORMAL
BUPRENORPHINE QUAL URINE POCT: ABNORMAL
COCAINE QUAL URINE POCT: NEGATIVE
CREATININE QUAL URINE POCT: ABNORMAL
INTERNAL QC QUAL URINE POCT: ABNORMAL
MDMA QUAL URINE POCT: NEGATIVE
METHADONE QUAL URINE POCT: NEGATIVE
METHAMPHETAMINE QUAL URINE POCT: NEGATIVE
OPIATE QUAL URINE POCT: NEGATIVE
OXYCODONE QUAL URINE POCT: NEGATIVE
PH QUAL URINE POCT: ABNORMAL
PHENCYCLIDINE QUAL URINE POCT: NEGATIVE
POCT KIT EXPIRATION DATE: ABNORMAL
POCT KIT LOT NUMBER: ABNORMAL
SPECIFIC GRAVITY POCT: 1.01
TEMPERATURE URINE POCT: ABNORMAL
THC QUAL URINE POCT: NEGATIVE

## 2024-09-10 PROCEDURE — 99214 OFFICE O/P EST MOD 30 MIN: CPT | Performed by: NURSE PRACTITIONER

## 2024-09-10 PROCEDURE — 80305 DRUG TEST PRSMV DIR OPT OBS: CPT | Performed by: NURSE PRACTITIONER

## 2024-09-10 PROCEDURE — 96372 THER/PROPH/DIAG INJ SC/IM: CPT | Performed by: INTERNAL MEDICINE

## 2024-09-10 PROCEDURE — 250N000011 HC RX IP 250 OP 636: Performed by: INTERNAL MEDICINE

## 2024-09-10 RX ORDER — DIPHENHYDRAMINE HYDROCHLORIDE 50 MG/ML
50 INJECTION INTRAMUSCULAR; INTRAVENOUS
Start: 2024-09-24

## 2024-09-10 RX ORDER — LORAZEPAM 2 MG/1
2 TABLET ORAL 3 TIMES DAILY
COMMUNITY

## 2024-09-10 RX ORDER — LIDOCAINE HYDROCHLORIDE 10 MG/ML
2 INJECTION, SOLUTION EPIDURAL; INFILTRATION; INTRACAUDAL; PERINEURAL ONCE
OUTPATIENT
Start: 2024-09-24 | End: 2024-09-24

## 2024-09-10 RX ORDER — BUPRENORPHINE 8 MG/1
4-8 TABLET SUBLINGUAL DAILY PRN
Qty: 15 TABLET | Refills: 0 | Status: SHIPPED | OUTPATIENT
Start: 2024-09-10

## 2024-09-10 RX ORDER — MEPERIDINE HYDROCHLORIDE 25 MG/ML
25 INJECTION INTRAMUSCULAR; INTRAVENOUS; SUBCUTANEOUS EVERY 30 MIN PRN
OUTPATIENT
Start: 2024-09-24

## 2024-09-10 RX ORDER — EPINEPHRINE 1 MG/ML
0.3 INJECTION, SOLUTION, CONCENTRATE INTRAVENOUS EVERY 5 MIN PRN
OUTPATIENT
Start: 2024-09-24

## 2024-09-10 RX ORDER — ALBUTEROL SULFATE 90 UG/1
1-2 AEROSOL, METERED RESPIRATORY (INHALATION)
Start: 2024-09-24

## 2024-09-10 RX ORDER — ALBUTEROL SULFATE 0.83 MG/ML
2.5 SOLUTION RESPIRATORY (INHALATION)
OUTPATIENT
Start: 2024-09-24

## 2024-09-10 RX ORDER — METHYLPREDNISOLONE SODIUM SUCCINATE 125 MG/2ML
125 INJECTION, POWDER, LYOPHILIZED, FOR SOLUTION INTRAMUSCULAR; INTRAVENOUS
Start: 2024-09-24

## 2024-09-10 RX ORDER — LIDOCAINE HYDROCHLORIDE 10 MG/ML
2 INJECTION, SOLUTION EPIDURAL; INFILTRATION; INTRACAUDAL; PERINEURAL ONCE
Status: COMPLETED | OUTPATIENT
Start: 2024-09-10 | End: 2024-09-10

## 2024-09-10 RX ADMIN — BUPRENORPHINE 100 MG: 100 SOLUTION SUBCUTANEOUS at 15:30

## 2024-09-10 ASSESSMENT — PATIENT HEALTH QUESTIONNAIRE - PHQ9: SUM OF ALL RESPONSES TO PHQ QUESTIONS 1-9: 8

## 2024-09-10 ASSESSMENT — PAIN SCALES - GENERAL: PAINLEVEL: NO PAIN (0)

## 2024-09-10 NOTE — PROGRESS NOTES
"[facility] Follow Up    ASSESSMENT/PLAN                                                      1. Opioid use disorder  Receiving sublocade # 3 100 mg today. Reports cravings at end of the month when the effects of injection are \"wearing off\". Was unable to take additional buprenorphine while at [facility], and they did not give him his prescription when he left treatment 9/3.   Continue sublocade as ordered for now. Encouraged to monitor his symptoms and is need for additional buprenorphine and follow up with his provider at [facility] in 3 weeks .   Continue buprenorphine 4-8 mg daily as needed to treat symptoms.   Encouraged plans to start IOP at .   Meetings encouraged  Confirms narcan access.   - buprenorphine (SUBUTEX) 8 MG SUBL sublingual tablet; Place 0.5-1 tablets (4-8 mg) under the tongue daily as needed for pain.  Dispense: 15 tablet; Refill: 0    2. Encounter for monitoring opioid maintenance therapy  - Drugs of Abuse Screen Urine (POC CUPS) POCT    3. Stimulant use disorder  Denies cravings or return to use    4. Chronic prescription benzodiazepine use  5. JASSI (generalized anxiety disorder)  Is prescribed lorazepam 2 mg TID from an outside provider. Is aware of risks of concurrent use of multiple CNS depressants such as BDZ, opioids including suboxone, ETOH.   Encouraged to schedule follow up with his provider at [facility]  Continue weekly therapy    6. Chronic hepatitis C without hepatic coma (H)  Is scheduled for appointment with [facility] 10/8 to start treatment.            Return in about 3 weeks (around 10/1/2024) for Follow up with Khari Barrientos at [facility].      Patient counseling completed today:  Discussed mechanism of action, potential risks/benefits/side effects of medications and other recommendations above.     Discussed risk of precipitated withdrawal with initiation of buprenorphine in the presence of full opioid agonists.    Reviewed directions for initiation of " buprenorphine to reduce risk of precipitated withdrawal and maximize efficacy.    Harm reduction counseling including never use alone, availability of naloxone, risks associated with concurrent use of opioids and benzodiazepines, alcohol, or other sedatives, safer administration as applicable.  Discussed importance of avoiding isolation, building a network of supportive relationships, avoiding people/places/things associated with past use to reduce risk of relapse; including motivational interviewing regarding psychosocial interventions.   SUBJECTIVE                                                        CC/HPI:  Bc German is a 43 year old male with PMH HIV on Triumeq, hepatitis C no treatment, IVDU, anxiety, depression, PTSD, nicotine use disorder, stimulant use disorder, and opioid use disorder who presents to the Recovery Clinic for initial visit.       Brief History:  Bc German was first seen in Recovery Clinic on 02/29/24. They were referred by [facility] ED where pt was brought 2/28/24 by ambulance after family found him asleep in the bathroom after smoking fentanyl.   Pt is interested in stopping use of methadone and fentanyl and starting buprenorphine.  He requests buprenorphine single agent product due to previous intolerance of buprenorphine/naloxone formulation when he has attempted low dose buprenorphine initiation with his PCP at [facility] in the past.      Recommendations last visit: 8/1/2024  1. Opioid use disorder  Reporting symptoms have been well controlled since starting sublocade  last month. Received #2 sublcoade 300 mg today. Has not needed additional sl buprenorphine to treat symptoms but expressed concerns about his dose of sublocade reducing to 100 mg next month.   Continue monthly sublocade.   Providing sl buprenorphine to take as needed.   Hoping to start residential programming at [facility], encouraged plans.   Confirms narcan access.   Planning to follow up with DR Lucia  "Iliana at [facility],will need to clarify if he will continue care with Dr Barrientos or if he should return to .   - buprenorphine (SUBUTEX) 8 MG SUBL sublingual tablet; Place 0.5-1 tablets (4-8 mg) under the tongue daily as needed for pain  Dispense: 15 tablet; Refill: 0     2. Stimulant use disorder  Reports last use was IV meth use 7/4/2024. Denies cravings  Encouraged plans to start CD treatment.      3. Encounter for monitoring opioid maintenance therapy  - Drugs of Abuse Screen Urine (POC CUPS) POCT     4. Chronic prescription benzodiazepine use  Patient was discharged from [facility] inpatient detox for benzodiazepins after completing valium taper yesterday. He resumed lorazepam today, stating he was seeking inpatient treatment and thoughts he would be able to access treatment at  quicker by going to detox for benzodiazepines. He has been working with his provider on a slow taper. He states he has episodes of \"panic\" causing him to be unable to leave home and needs his medication.   Encouraged to schedule follow up with his provider and continue to taper off.   Reviewed risk of concurrent use of BDZ and opioids causing respiratory depression and overdose.   Continue weekly therapy  Continue to follow up with his PCP  Encouraged treatment plans.      5. Low testosterone in male  He is on weekly testosterone replacement, and needs to be reevaluated, requested referral for endocrinology.   - Adult Endocrinology  Referral; Future     6. Chronic hepatitis C without hepatic coma (H)  Hx of Chronic Hep C without treatment. Labs were updated while inpatient. Hep C Log 7.1, RNA 13,700,000.   , . Already has hepatology referral, assisted with scheduling appointment 10/8 in [place] location.     09/10/24 HPI:     Graduated from [employer] 9/3/24. Is living with his dad and trying to get his outpatient at "[facility] [place].   TC did not give him his Rx of subutex that he received on 8/1. He experiences " intermittent  cravings at the end of the month before he gets injection. Has dreams about use, and would like a refill of his subutex today. Prefers 2 mg tablets. TC would not allow him to take it while in [facility] and  Dis not take his quetiapine while in [facility], send started having auditory hallucinations, which has resolved since he was discharged from , relieved with phenergan.   Has rx of quetiapine available at pharmacy for him to .   Sees psych at [facility]. Needs                       Return for Follow up with DR Barrientos at [facility] or  in 2 weeks.        Substance Use History :  Opioids:   Age at first use: 29 years, has used rx opioids including oxycodone, fentanyl patch, hydromorphone; also heroin, illicitly manufactured fentanyl, methadone; IV use until 6/2023  Current use: substance: methadone 10-20mg/day or illicitly manufactured fentanyl, inhaled.  Last use of methadone 2/24/24, last use of fentanyl 2/27/24.                 IV drug use: Yes:  6/27/24  History of overdose: in past at least 20 times, hospital 2x  Previous residential or outpatient treatments for addiction : Yes: 2017 [facility] most recent, several outpatient  Previous medication treatments for addiction: suboxone, methadone  Longest period of sobriety: 7mo  Medical complications related to substance use:   Hepatitis C: Pos; first +ab on record 2011; 12/27/21 HCV ,582 ; 2/29/24 HCV RNA pending  HIV: Pos; diagnosed 2017, reports viral levels undetectable; 2/29/24 HIV RNA pending           Other Addiction History:  Stimulants   Methamphetamine, cocaine  Sedatives/hypnotics/anxiolytics:   Rx lorazepam  Alcohol:   Past drinking a lot, now Beers every once in a while  Nicotine:   vaping  Cannabis:   yes  Hallucinogens/Dissociatives:   Past   Eating disorder:  none  Gambling:              None     PAST PSYCHIATRIC HISTORY:  Diagnoses- Anxiety, Major Depression  Suicide Attempts: Yes 14 years ago  , 21 years old      7/28/2024    10:19 AM 8/1/2024     12:00 PM 9/10/2024     2:00 PM   PHQ   PHQ-9 Total Score 7 12 8   Q9: Thoughts of better off dead/self-harm past 2 weeks Not at all Not at all Not at all       Social History  Housing status: with dad  Employment status: Unemployed, not seeking work  Relationship status: Single  Children: 1 child 24 years old  Legal: none  Insurance needs: [employer]  Contact information up to date? yes         Labs discussed with patient?  Yes      [place] Prescription Drug Monitoring Program Reviewed:  Yes  08/29/2024 06/13/2024 3 Pregabalin 150 Mg Capsule 60.00 30 Ry Manny [id number] Gen (0831) 0/1 2.01 LME [place]   08/08/2024 08/08/2024 3 Diazepam 10 Mg Tablet 30.00 10 Me Was [id number] Gen (0831) 0/0 3.00 [place]   08/01/2024 08/01/2024 1 Buprenorphine 8 Mg Tablet Sl 15.00 15 He Bat [id number]            Medications:  Current Outpatient Medications   Medication Sig Dispense Refill    abacavir-dolutegravir-LamiVUDine (TRIUMEQ) 600- MG per tablet Take 1 tablet by mouth daily Please call [phone] & make appointment for further refills. 30 tablet 0    buprenorphine (SUBUTEX) 8 MG SUBL sublingual tablet Place 0.5-1 tablets (4-8 mg) under the tongue daily as needed for pain. 15 tablet 0    buprenorphine ER (SUBLOCADE) 300 MG/1.5ML prefilled syringe Inject 300 mg subcutaneously every 30 days      cloNIDine (CATAPRES) 0.1 MG tablet Take 1 tablet (0.1 mg) by mouth daily 30 tablet 1    levomilnacipran (FETZIMA ER) 120 MG 24 hr capsule Take 1 capsule (120 mg) by mouth daily 30 capsule 1    LORazepam (ATIVAN) 2 MG tablet Take 2 mg by mouth 3 times daily.      multivitamin w/minerals (THERA-VIT-M) tablet Take 1 tablet by mouth daily 30 tablet 1    pregabalin (LYRICA) 150 MG capsule Take 1 capsule (150 mg) by mouth 2 times daily 60 capsule 0    testosterone cypionate (DEPOTESTOTERONE) 200 MG/ML injection Inject 200 mg into the muscle Injects 1 ml every 7-10 days      folic acid (FOLVITE) 1 MG tablet Take 1 tablet (1 mg)  by mouth daily (Patient not taking: Reported on 9/10/2024) 30 tablet 1    naloxone (NARCAN) 4 MG/0.1ML nasal spray Spray 1 spray (4 mg) into one nostril alternating nostrils as needed for opioid reversal every 2-3 minutes until assistance arrives (Patient not taking: Reported on 8/1/2024) 0.2 mL 1    nicotine (NICODERM CQ) 14 MG/24HR 24 hr patch Place 1 patch onto the skin every 24 hours (Patient not taking: Reported on 8/1/2024) 28 patch 1    QUEtiapine (SEROQUEL) 100 MG tablet Take 1 tablet (100 mg) by mouth at bedtime (Patient not taking: Reported on 9/10/2024) 30 tablet 1    thiamine (B-1) 100 MG tablet Take 1 tablet (100 mg) by mouth daily (Patient not taking: Reported on 9/10/2024) 30 tablet 0     No current facility-administered medications for this visit.       Allergies   Allergen Reactions    Bupropion Other (See Comments)     Other reaction(s): seizure when took a double dose        Acetaminophen Nausea and Vomiting and GI Disturbance       If multiple uses per pt report    Codeine Nausea and Vomiting, Itching and Fatigue           Sulfa Antibiotics Itching       PMH, PSH, FamHx reviewed      OBJECTIVE                                                      /82   Pulse 112   SpO2 99%     Physical Exam    Labs:    UDS:    Lab Results   Component Value Date    BUP Screen Positive (A) 09/10/2024    BZO Screen Positive (A) 09/10/2024    BAR Negative 09/10/2024    LOU Negative 09/10/2024    MAMP Negative 09/10/2024    AMP Negative 09/10/2024    MDMA Negative 09/10/2024    MTD Negative 09/10/2024    [id number] Negative 09/10/2024    OXY Negative 09/10/2024    PCP Negative 09/10/2024    THC Negative 09/10/2024    TEMP 90 F 09/10/2024    SGPOCT 1.015 09/10/2024     *POC urine drug screen does not screen for Fentanyl      Recent Results (from the past 240 hour(s))   Drugs of Abuse Screen Urine (POC CUPS) POCT    Collection Time: 09/10/24  2:12 PM   Result Value Ref Range    POCT Kit Lot Number [id number]     POCT  Kit Expiration Date 2026-05-15     Temperature Urine POCT 90 F 90 F, 92 F, 94 F, 96 F, 98 F, 100 F    Specific [place] POCT 1.015 1.005, 1.015, 1.025    pH Qual Urine POCT 7 pH 4 pH, 5 pH, 7 pH, 9 pH    Creatinine Qual Urine POCT 50 mg/dL 20 mg/dL, 50 mg/dL, 100 mg/dL, 200 mg/dL    Internal QC Qual Urine POCT Valid Valid    Amphetamine Qual Urine POCT Negative Negative    Barbiturate Qual Urine POCT Negative Negative    Buprenorphine Qual Urine POCT Screen Positive (A) Negative    Benzodiazepine Qual Urine POCT Screen Positive (A) Negative    Cocaine Qual Urine POCT Negative Negative    Methamphetamine Qual Urine POCT Negative Negative    MDMA Qual Urine POCT Negative Negative    Methadone Qual Urine POCT Negative Negative    Opiate Qual Urine POCT Negative Negative    Oxycodone Qual Urine POCT Negative Negative    Phencyclidine Qual Urine POCT Negative Negative    THC Qual Urine POCT Negative Negative               Lilibeth Renteria, CNP    [place] [id number] [address] [id number] [phone]

## 2024-09-10 NOTE — NURSING NOTE
[facility]      Rooming information:    Wanted to talk with geetha because [facility] (8/1/24) got rid of his Subutex. On Sublocade has injection today at 3:00 pm.     Approximate last use of full opioid agonist: June 25th, 2024   Taking buprenorphine? Yes: Sublocade    Number of buprenorphine films/tablets remaining currently: 0  Side effects related to buprenorphine (constipation, dry mouth, sedation?) Yes: feels anxious and meyers after the injection   Narcan currently available: Yes  Other recent substance use:    Denies  NICOTINE-Yes: vaping   If using nicotine, ready to quit? Yes: Has luzmaria lozenges and patches if needed already     Point of care urine drug screen positive for:  Lab Results   Component Value Date    BUP Screen Positive (A) 09/10/2024    BZO Screen Positive (A) 09/10/2024    BAR Negative 09/10/2024    LOU Negative 09/10/2024    MAMP Negative 09/10/2024    AMP Negative 09/10/2024    MDMA Negative 09/10/2024    MTD Negative 09/10/2024    [id number] Negative 09/10/2024    OXY Negative 09/10/2024    PCP Negative 09/10/2024    THC Negative 09/10/2024    TEMP 90 F 09/10/2024    SGPOCT 1.015 09/10/2024       *POC urine drug screen does not screen for Fentanyl    Depression Response    Patient completed the PHQ-9 assessment for depression and scored >9? No  Question 9 on the PHQ-9 was positive for suicidality? No  Does patient have current mental health provider? Yes.[facility]  C-SSRS screener risk level.       Is this a virtual visit? No          9/10/2024     2:00 PM   PHQ Assesment Total Score(s)   PHQ-9 Score 8         Housing needs: Yes    Insurance: Active    Current legal issues: Court October 7th     Contact information up to date? Yes    3rd Party Involvement: None today  (please obtain OMID if pt would like to include)    Janis Knight RN  September 10, 2024  2:09 PM

## 2024-09-10 NOTE — PROGRESS NOTES
Infusion Nursing Note:  Bc German presents today for sublocade 100 mg injection.    Patient seen by provider today: Yes: Dexter   present during visit today: Not Applicable.    Note: Declined xylocaine after it was drawn up.      Intravenous Access:  No Intravenous access/labs at this visit.    Treatment Conditions:  Denies relapse.  No S/S infection or tampering at previous injection sites.      Post Infusion Assessment:  Patient tolerated injection without incident.  Declined xylocaine.  Administered in RLQ.       Discharge Plan:   Patient discharged in stable condition accompanied by: father.  Departure Mode: Ambulatory.  States his primary provider would like to him to receive the sublocade at the [facility] and plans to follow up with them regarding next injection.      Janet Hurtado

## 2024-09-10 NOTE — PATIENT INSTRUCTIONS
Needs to follow up with DR Barrientos at [facility] in 3 weeks.Monitor cravings and withdrawal symptoms, how often you are needing to take additional buprenorphine to treat symptoms.     Schedule follow up with [facility].

## 2024-09-20 ENCOUNTER — TELEPHONE (OUTPATIENT)
Dept: BEHAVIORAL HEALTH | Facility: CLINIC | Age: 44
End: 2024-09-20
Payer: COMMERCIAL

## 2024-09-20 ENCOUNTER — MEDICAL CORRESPONDENCE (OUTPATIENT)
Dept: HEALTH INFORMATION MANAGEMENT | Facility: CLINIC | Age: 44
End: 2024-09-20
Payer: COMMERCIAL

## 2024-09-20 NOTE — TELEPHONE ENCOUNTER
Incoming message from Dr. PRANEETH Barrientos who reports he is seeing patient today in clinic. Per Dr. Barrientos patient is not doing well with the decrease dose of 100 mg Sublocade on 09/10/2024 (Treatment #3). Dr. Barrientos would like patient to be able to increase the dose back to 300 mg for the October 2024 injection.     Per Dr. Barrientos, he will continue to see patient for primary care and patient will continue care with [facility] for OUD/Sublocade.     Routing to Dr. De Los Santos to further assist.     Jennifer Claudio RN on 9/20/2024 at 4:28 PM

## 2024-09-20 NOTE — TELEPHONE ENCOUNTER
Changed dosing to 300mg Sublocade monthly given uncontrolled symptoms with 100mg dose.       Please contact pt to remind him to schedule his next injection.

## 2024-09-23 DIAGNOSIS — B19.20 HEPATITIS C VIRUS INFECTION, UNSPECIFIED CHRONICITY: ICD-10-CM

## 2024-09-23 DIAGNOSIS — R79.89 ABNORMAL LFTS: Primary | ICD-10-CM

## 2024-09-24 NOTE — TELEPHONE ENCOUNTER
Spoke with patient. Informed him of Sublocade dose change and encouraged him to schedule with the [facility].     Juani Peguero RN on 9/24/2024 at 2:18 PM

## 2024-10-04 ENCOUNTER — LAB (OUTPATIENT)
Dept: LAB | Facility: CLINIC | Age: 44
End: 2024-10-04
Payer: COMMERCIAL

## 2024-10-04 DIAGNOSIS — B19.20 HEPATITIS C VIRUS INFECTION, UNSPECIFIED CHRONICITY: ICD-10-CM

## 2024-10-04 DIAGNOSIS — R79.89 ABNORMAL LFTS: ICD-10-CM

## 2024-11-04 ENCOUNTER — INFUSION THERAPY VISIT (OUTPATIENT)
Dept: INFUSION THERAPY | Facility: CLINIC | Age: 44
End: 2024-11-04
Attending: INTERNAL MEDICINE
Payer: COMMERCIAL

## 2024-11-04 ENCOUNTER — OFFICE VISIT (OUTPATIENT)
Dept: BEHAVIORAL HEALTH | Facility: CLINIC | Age: 44
End: 2024-11-04
Payer: COMMERCIAL

## 2024-11-04 VITALS — OXYGEN SATURATION: 95 % | HEART RATE: 91 BPM | SYSTOLIC BLOOD PRESSURE: 144 MMHG | DIASTOLIC BLOOD PRESSURE: 91 MMHG

## 2024-11-04 VITALS — SYSTOLIC BLOOD PRESSURE: 158 MMHG | DIASTOLIC BLOOD PRESSURE: 100 MMHG

## 2024-11-04 DIAGNOSIS — Z79.891 ENCOUNTER FOR MONITORING OPIOID MAINTENANCE THERAPY: ICD-10-CM

## 2024-11-04 DIAGNOSIS — F41.1 GAD (GENERALIZED ANXIETY DISORDER): Primary | ICD-10-CM

## 2024-11-04 DIAGNOSIS — F10.90 ALCOHOL USE DISORDER: ICD-10-CM

## 2024-11-04 DIAGNOSIS — B18.2 CHRONIC HEPATITIS C WITHOUT HEPATIC COMA (H): ICD-10-CM

## 2024-11-04 DIAGNOSIS — F15.20 SEVERE STIMULANT USE DISORDER (H): ICD-10-CM

## 2024-11-04 DIAGNOSIS — F11.20 OPIOID TYPE DEPENDENCE, CONTINUOUS (H): Primary | ICD-10-CM

## 2024-11-04 DIAGNOSIS — F11.90 OPIOID USE DISORDER: ICD-10-CM

## 2024-11-04 DIAGNOSIS — Z51.81 ENCOUNTER FOR MONITORING OPIOID MAINTENANCE THERAPY: ICD-10-CM

## 2024-11-04 DIAGNOSIS — F13.20 BENZODIAZEPINE DEPENDENCE (H): ICD-10-CM

## 2024-11-04 DIAGNOSIS — F11.21 OPIOID USE DISORDER, SEVERE, IN EARLY REMISSION, DEPENDENCE (H): ICD-10-CM

## 2024-11-04 DIAGNOSIS — F17.200 NICOTINE USE DISORDER: ICD-10-CM

## 2024-11-04 PROBLEM — R00.0 TACHYCARDIA, UNSPECIFIED: Status: RESOLVED | Noted: 2024-07-26 | Resolved: 2024-11-04

## 2024-11-04 PROBLEM — J18.9 COMMUNITY ACQUIRED PNEUMONIA, UNSPECIFIED LATERALITY: Status: RESOLVED | Noted: 2024-06-06 | Resolved: 2024-11-04

## 2024-11-04 PROBLEM — K81.0 ACUTE CHOLECYSTITIS: Status: RESOLVED | Noted: 2022-02-06 | Resolved: 2024-11-04

## 2024-11-04 PROBLEM — T40.2X1A OPIOID OVERDOSE, ACCIDENTAL OR UNINTENTIONAL, INITIAL ENCOUNTER (H): Status: RESOLVED | Noted: 2024-06-06 | Resolved: 2024-11-04

## 2024-11-04 PROBLEM — R79.89 LOW TESTOSTERONE: Status: RESOLVED | Noted: 2021-09-23 | Resolved: 2024-11-04

## 2024-11-04 PROBLEM — Z59.19 HOUSING UNSATISFACTORY: Status: RESOLVED | Noted: 2018-10-25 | Resolved: 2024-11-04

## 2024-11-04 PROBLEM — G06.1 ABSCESS IN EPIDURAL SPACE OF CERVICAL SPINE: Status: RESOLVED | Noted: 2017-09-11 | Resolved: 2024-11-04

## 2024-11-04 PROBLEM — K80.20 CHOLELITHIASIS: Status: RESOLVED | Noted: 2017-10-03 | Resolved: 2024-11-04

## 2024-11-04 PROBLEM — R09.02 HYPOXIA: Status: RESOLVED | Noted: 2024-06-06 | Resolved: 2024-11-04

## 2024-11-04 PROBLEM — Z59.19 HOUSING UNSATISFACTORY: Status: ACTIVE | Noted: 2018-10-25

## 2024-11-04 PROBLEM — F41.9 ANXIETY: Status: ACTIVE | Noted: 2017-11-08

## 2024-11-04 PROBLEM — S02.92XA FACIAL FRACTURE (H): Status: RESOLVED | Noted: 2022-12-11 | Resolved: 2024-11-04

## 2024-11-04 PROBLEM — N30.01 ACUTE CYSTITIS WITH HEMATURIA: Status: RESOLVED | Noted: 2022-02-06 | Resolved: 2024-11-04

## 2024-11-04 PROBLEM — Z86.79 HISTORY OF ENDOCARDITIS: Status: RESOLVED | Noted: 2023-07-19 | Resolved: 2024-11-04

## 2024-11-04 PROBLEM — R10.9 RIGHT SIDED ABDOMINAL PAIN: Status: RESOLVED | Noted: 2022-02-06 | Resolved: 2024-11-04

## 2024-11-04 PROBLEM — M86.9 OSTEOMYELITIS (H): Status: RESOLVED | Noted: 2017-10-10 | Resolved: 2024-11-04

## 2024-11-04 PROBLEM — S02.92XA CLOSED FRACTURE OF FACIAL BONE, UNSPECIFIED FACIAL BONE, INITIAL ENCOUNTER (H): Status: RESOLVED | Noted: 2022-12-11 | Resolved: 2024-11-04

## 2024-11-04 PROBLEM — B20 HUMAN IMMUNODEFICIENCY VIRUS (HIV) DISEASE (H): Status: ACTIVE | Noted: 2017-02-28

## 2024-11-04 PROBLEM — Z87.898 HISTORY OF INTRAVENOUS DRUG ABUSE: Status: RESOLVED | Noted: 2022-02-06 | Resolved: 2024-11-04

## 2024-11-04 PROBLEM — F33.2 SEVERE EPISODE OF RECURRENT MAJOR DEPRESSIVE DISORDER, WITHOUT PSYCHOTIC FEATURES (H): Chronic | Status: ACTIVE | Noted: 2024-01-10

## 2024-11-04 PROBLEM — F43.10 POSTTRAUMATIC STRESS DISORDER: Status: ACTIVE | Noted: 2017-11-07

## 2024-11-04 PROBLEM — J96.01 ACUTE RESPIRATORY FAILURE WITH HYPOXIA (H): Status: RESOLVED | Noted: 2024-06-06 | Resolved: 2024-11-04

## 2024-11-04 PROBLEM — R19.7 DIARRHEA OF PRESUMED INFECTIOUS ORIGIN: Status: RESOLVED | Noted: 2022-02-06 | Resolved: 2024-11-04

## 2024-11-04 PROBLEM — L03.114 CELLULITIS OF LEFT ARM: Status: RESOLVED | Noted: 2018-05-04 | Resolved: 2024-11-04

## 2024-11-04 PROBLEM — R78.81 GRAM-POSITIVE BACTEREMIA: Status: RESOLVED | Noted: 2021-11-16 | Resolved: 2024-11-04

## 2024-11-04 PROCEDURE — 80305 DRUG TEST PRSMV DIR OPT OBS: CPT | Performed by: FAMILY MEDICINE

## 2024-11-04 PROCEDURE — 250N000011 HC RX IP 250 OP 636: Mod: JZ | Performed by: FAMILY MEDICINE

## 2024-11-04 PROCEDURE — G2211 COMPLEX E/M VISIT ADD ON: HCPCS | Performed by: FAMILY MEDICINE

## 2024-11-04 PROCEDURE — 99214 OFFICE O/P EST MOD 30 MIN: CPT | Mod: GC | Performed by: FAMILY MEDICINE

## 2024-11-04 PROCEDURE — 96372 THER/PROPH/DIAG INJ SC/IM: CPT | Performed by: FAMILY MEDICINE

## 2024-11-04 RX ORDER — SUMATRIPTAN 50 MG/1
50 TABLET, FILM COATED ORAL
COMMUNITY

## 2024-11-04 RX ORDER — LIDOCAINE HYDROCHLORIDE 10 MG/ML
2 INJECTION, SOLUTION EPIDURAL; INFILTRATION; INTRACAUDAL; PERINEURAL ONCE
OUTPATIENT
Start: 2024-11-05 | End: 2024-11-05

## 2024-11-04 RX ORDER — BUPRENORPHINE 8 MG/1
4-8 TABLET SUBLINGUAL DAILY PRN
Qty: 15 TABLET | Refills: 0 | Status: SHIPPED | OUTPATIENT
Start: 2024-11-04

## 2024-11-04 RX ORDER — PRAZOSIN HYDROCHLORIDE 2 MG/1
2 CAPSULE ORAL AT BEDTIME
COMMUNITY
Start: 2024-10-24

## 2024-11-04 RX ORDER — ALBUTEROL SULFATE 0.83 MG/ML
2.5 SOLUTION RESPIRATORY (INHALATION)
OUTPATIENT
Start: 2024-11-05

## 2024-11-04 RX ORDER — ALBUTEROL SULFATE 90 UG/1
1-2 INHALANT RESPIRATORY (INHALATION)
Start: 2024-11-05

## 2024-11-04 RX ORDER — PROMETHAZINE HCL 50 MG
50 TABLET ORAL EVERY 6 HOURS PRN
COMMUNITY
Start: 2024-10-02

## 2024-11-04 RX ORDER — METHYLPREDNISOLONE SODIUM SUCCINATE 125 MG/2ML
125 INJECTION INTRAMUSCULAR; INTRAVENOUS
Start: 2024-11-05

## 2024-11-04 RX ORDER — BUPRENORPHINE AND NALOXONE 2; .5 MG/1; MG/1
1 FILM, SOLUBLE BUCCAL; SUBLINGUAL DAILY
COMMUNITY
Start: 2024-01-26 | End: 2024-11-04

## 2024-11-04 RX ORDER — DIAZEPAM 10 MG/1
10 TABLET ORAL EVERY 6 HOURS PRN
COMMUNITY
Start: 2024-08-08 | End: 2024-11-04

## 2024-11-04 RX ORDER — EPINEPHRINE 1 MG/ML
0.3 INJECTION, SOLUTION, CONCENTRATE INTRAVENOUS EVERY 5 MIN PRN
OUTPATIENT
Start: 2024-11-05

## 2024-11-04 RX ORDER — DIPHENHYDRAMINE HYDROCHLORIDE 50 MG/ML
50 INJECTION INTRAMUSCULAR; INTRAVENOUS
Start: 2024-11-05

## 2024-11-04 RX ORDER — LIDOCAINE HYDROCHLORIDE 10 MG/ML
2 INJECTION, SOLUTION EPIDURAL; INFILTRATION; INTRACAUDAL; PERINEURAL ONCE
Status: COMPLETED | OUTPATIENT
Start: 2024-11-04 | End: 2024-11-04

## 2024-11-04 RX ORDER — MEPERIDINE HYDROCHLORIDE 25 MG/ML
25 INJECTION INTRAMUSCULAR; INTRAVENOUS; SUBCUTANEOUS EVERY 30 MIN PRN
OUTPATIENT
Start: 2024-11-05

## 2024-11-04 RX ADMIN — BUPRENORPHINE 300 MG: 300 SOLUTION SUBCUTANEOUS at 12:47

## 2024-11-04 ASSESSMENT — PATIENT HEALTH QUESTIONNAIRE - PHQ9: SUM OF ALL RESPONSES TO PHQ QUESTIONS 1-9: 7

## 2024-11-04 NOTE — PROGRESS NOTES
Infusion Nursing Note:  Bc German presents today for sublocade injection.    Patient seen by provider today: No   present during visit today: Not Applicable.    Note: Patient's blood pressure noted to be high during visit. Patient denies chest pain, shortness of breath, headache, and vision changes. Patient agrees to be seen in the ER should he experience any of these symptoms. Patient states he has a history of higher blood pressures. Patient states he did not take his clonidine today.    Patient refused lidocaine administration today.      Intravenous Access:  No Intravenous access/labs at this visit.    Treatment Conditions:  Patient denies opioid use within the last 7 days. Patient denies withdrawal symptoms at this time. Patient's previous injection site is free of signs and symptoms of infection and tampering.      Post Infusion Assessment:  Patient tolerated injection without incident.  No evidence of extravasations.       Discharge Plan:   Discharge instructions reviewed with: Patient.  Patient and/or family verbalized understanding of discharge instructions and all questions answered.  Patient discharged in stable condition accompanied by: self.  Departure Mode: Ambulatory.      Letty Edwards RN

## 2024-11-04 NOTE — NURSING NOTE
[facility]    Prefers white tablet Subutex and not the orange tablets-give him heartburn/acid reflux.       Rooming information:    Approximate last use of full opioid agonist: 06/25/2024  Taking buprenorphine? Yes:  How much per day? Sublocade 300 mg monthly and Subutex 8 mg EOD, some days he take 16 mg, some days he has withdrawal sytmptoms-runny nose and backache.   Number of buprenorphine films/tablets remaining currently: 4  Side effects related to buprenorphine (constipation, dry mouth, sedation?) Yes: Constipation and nausea.  Narcan currently available: Yes  Other recent substance use:    Denies  NICOTINE-Yes: Vape  If using nicotine, ready to quit? Yes: Would like 7 mg Nicotine patch, has nightmares on the 14 mg patch.     Point of care urine drug screen positive for:  Lab Results   Component Value Date    BUP Screen Positive (A) 11/04/2024    BZO Screen Positive (A) 11/04/2024    BAR Negative 11/04/2024    LOU Negative 11/04/2024    MAMP Negative 11/04/2024    AMP Negative 11/04/2024    MDMA Negative 11/04/2024    MTD Negative 11/04/2024    [id number] Negative 11/04/2024    OXY Negative 11/04/2024    PCP Negative 11/04/2024    THC Negative 11/04/2024    TEMP 90 F 11/04/2024    SGPOCT 1.015 11/04/2024       *POC urine drug screen does not screen for Fentanyl      Depression Response    Patient completed the PHQ-9 assessment for depression and scored >9? No  Question 9 on the PHQ-9 was positive for suicidality? No  Does patient have current mental health provider? Yes  C-SSRS screener risk level. N/A      Is this a virtual visit? No        11/4/2024     1:00 PM   PHQ Assesment Total Score(s)   PHQ-9 Score 7         Housing needs: Stable    Insurance: Active    Current legal issues: Yes    Contact information up to date? Yes    3rd Party Involvement None today (please obtain OMID if pt would like to include)    Jennifer Claudio RN  November 4, 2024  1:06 PM

## 2024-11-04 NOTE — PROGRESS NOTES
[facility] Recovery [facility] Follow Up    ASSESSMENT/PLAN                                                      1. Opioid use disorder  Receiving sublocade # 5, 300 mg today.  Denied having cravings during the last month, and skipped 3 weeks for shot due to not feeling like it was needed.  Patient's ultimate goal is to be discontinued off of buprenorphine but he is understanding that given this is only been  abstinent from substances for the past few months would be more time before considering a formal taper.  -Encouraged plans to continue IOP at .   -Meetings encouraged, encouraged exploration of Confucianist believes as this is a strong motivator for him for sobriety.  -Confirms narcan access.   -refilled buprenorphine (SUBUTEX) 8 MG SUBL sublingual tablet; Place 0.5-1 tablets (4-8 mg) under the tongue daily as needed for pain.  Dispense: 15 tablet; Refill: 0 for breakthrough cravings    2. Stimulant use disorder, in short-term remission  Denies cravings or return to use with history of IV drug use.  Drug use has been complicated by dental erosions.  - Continue sobriety    3. Chronic prescription benzodiazepine use  4. JASSI (generalized anxiety disorder)  Is prescribed lorazepam 2 mg TID from an outside provider. Is aware of risks of concurrent use of multiple CNS depressants such as BDZ, opioids including suboxone, ETOH.  Patient has not been drinking alcohol so risk is slowly decreasing.  -Encouraged to schedule follow up with his provider at [facility]  -Continue weekly therapy, set up referral for recovery clinic therapy.     6. Chronic hepatitis C without hepatic coma (H)  Had a partial visit with hepatitis clinic.  Left due to inability to draw blood.  Patient has labs pending and should have them be filled before following up for treatment of hepatitis C.  -Appreciate primary and GI's cares.    7.  Nicotine use disorder  Patient interested in quitting as recently switched from nicotine cigarettes to vaping.   He is bothered by the amount that he vapes.  Discussed harm reduction techniques such as using less pleasurable flavors, is okay with quitting partners.  -External quit partners referral  -Consider use of NRT/nicotine patch at next visit    Return in about 4 weeks (around 12/2/2024) for with any available provider, in person.    Patient counseling completed today:  Discussed mechanism of action, potential risks/benefits/side effects of medications and other recommendations above.     Discussed risk of precipitated withdrawal with initiation of buprenorphine in the presence of full opioid agonists.    Reviewed directions for initiation of buprenorphine to reduce risk of precipitated withdrawal and maximize efficacy.    Harm reduction counseling including never use alone, availability of naloxone, risks associated with concurrent use of opioids and benzodiazepines, alcohol, or other sedatives, safer administration as applicable.  Discussed importance of avoiding isolation, building a network of supportive relationships, avoiding people/places/things associated with past use to reduce risk of relapse; including motivational interviewing regarding psychosocial interventions.   SUBJECTIVE                                                        CC/HPI:  Bc German is a 43 year old male with PMH HIV on Triumeq, hepatitis C no treatment, IVDU, anxiety, depression, PTSD, nicotine use disorder, stimulant use disorder, and opioid use disorder who presents to the [facility] for initial visit.       Brief History:  Bc German was first seen in Recovery Clinic on 02/29/24. They were referred by [facility] ED where pt was brought 2/28/24 by ambulance after family found him asleep in the bathroom after smoking fentanyl.  Patient now in short-term remission since June after starting on Sublocade.    Recommendations last visit: 9/10/24  1. Opioid use disorder  Receiving sublocade # 5 300 mg today. Reports cravings at end of  "the month when the effects of injection are \"wearing off\". Was unable to take additional buprenorphine while at [facility], and they did not give him his prescription when he left treatment 9/3.   Continue sublocade as ordered for now. Encouraged to monitor his symptoms and is need for additional buprenorphine and follow up with his provider at [facility] in 3 weeks .   Continue buprenorphine 4-8 mg daily as needed to treat symptoms.   Encouraged plans to start IOP at .   Meetings encouraged  Confirms narcan access.   - buprenorphine (SUBUTEX) 8 MG SUBL sublingual tablet; Place 0.5-1 tablets (4-8 mg) under the tongue daily as needed for pain.  Dispense: 15 tablet; Refill: 0    2. Encounter for monitoring opioid maintenance therapy  - Drugs of Abuse Screen Urine (POC CUPS) POCT    3. Stimulant use disorder, in short-term remission  Denies cravings or return to use with history of IV drug use.  Drug use has been complicated by dental erosions.  - Continue sobriety    4. Chronic prescription benzodiazepine use  5. JASSI (generalized anxiety disorder)  Is prescribed lorazepam 2 mg TID from an outside provider. Is aware of risks of concurrent use of multiple CNS depressants such as BDZ, opioids including suboxone, ETOH.  Patient has not been drinking alcohol so risk is slowly decreasing.  -Encouraged to schedule follow up with his provider at [facility]  -Continue weekly therapy, set up referral for recovery clinic therapy.     6. Chronic hepatitis C without hepatic coma (H)  Had a partial visit with hepatitis clinic.  Left due to inability to draw blood.  Patient has labs pending and should have them be filled before following up for treatment of hepatitis C.  -Appreciate primary and GI's cares.    11/3/24 HPI:  Last Date of Use of opioids, EtOH, 6/26 & stimulants 7/4. Graduated from [facility] 9/3/24. Is living with his dad and trying to get his outpatient at [facility]. This was his 11th treatment " facility/teeth challenge. Patient felt he had a Jain experience there and has been doing much better since.has been using Spiritual workbooks for fun. Denies triggers at the moment. Only drug use is smoking. He switched from smoking to vaping, wanting to cut down. He didn't come to his October shot because he felt so good with his current dosing that he wanted to stretch it out because he wants to stop using it Sublicade in the long term.      Substance Use History :  Opioids:   Age at first use: 29 years, has used rx opioids including oxycodone, fentanyl patch, hydromorphone; also heroin, illicitly manufactured fentanyl, methadone; IV use until 6/2023  Current use: Nicotine. Previous substances: methadone 10-20mg/day or illicitly manufactured fentanyl, inhaled.  Last use of methadone 2/24/24, last use of fentanyl 2/27/24.                  IV drug use: Yes:  6/27/24  History of overdose: in past at least 20 times, hospital 2x  Previous residential or outpatient treatments for addiction : Yes: 2017 formerly [facility] most recent, several outpatient total 11 treatments  Previous medication treatments for addiction: suboxone, methadone  Longest period of sobriety: 7mo  Medical complications related to substance use:   Hepatitis C: Pos; first +ab on record 2011; 12/27/21 HCV ,582 ; 2/29/24 HCV RNA pending  HIV: Pos; diagnosed 2017, reports viral levels undetectable; 2/29/24 HIV RNA pending  Other Addiction History:  Stimulants   Methamphetamine, cocaine last 6/27/2024  Sedatives/hypnotics/anxiolytics:   Rx lorazepam PDMP reviewed with feeling appropriately as of 11/04/24\  Alcohol:   Past drinking a lot.  Last drink 7/4/2024  Nicotine:   vaping  Cannabis:   Yes last 7/4/2024   Hallucinogens/Dissociatives:   Past   Eating disorder:  none  Gambling:              None     PAST PSYCHIATRIC HISTORY:  Diagnoses- Anxiety, Major Depression  Suicide Attempts: Yes 14 years ago  , 21 years old      8/1/2024    12:00 PM 9/10/2024      2:00 PM 11/4/2024     1:00 PM   PHQ   PHQ-9 Total Score 12 8 7   Q9: Thoughts of better off dead/self-harm past 2 weeks Not at all Not at all Not at all       Social History  Housing status: with dad  Employment status: Unemployed, not seeking work  Relationship status: Single  Children: 1 child 24 years old  Legal: none  Insurance needs: [employer]  Contact information up to date? yes       Labs discussed with patient?  Yes      Medications:  Current Outpatient Medications   Medication Sig Dispense Refill    abacavir-dolutegravir-LamiVUDine (TRIUMEQ) 600- MG per tablet Take 1 tablet by mouth daily Please call [phone] & make appointment for further refills. 30 tablet 0    buprenorphine (SUBUTEX) 8 MG SUBL sublingual tablet Place 0.5-1 tablets (4-8 mg) under the tongue daily as needed for pain. 15 tablet 0    buprenorphine ER (SUBLOCADE) 300 MG/1.5ML prefilled syringe Inject 300 mg subcutaneously every 30 days      cloNIDine (CATAPRES) 0.1 MG tablet Take 1 tablet (0.1 mg) by mouth daily 30 tablet 1    folic acid (FOLVITE) 1 MG tablet Take 1 tablet (1 mg) by mouth daily 30 tablet 1    levomilnacipran (FETZIMA ER) 120 MG 24 hr capsule Take 1 capsule (120 mg) by mouth daily 30 capsule 1    LORazepam (ATIVAN) 2 MG tablet Take 2 mg by mouth 3 times daily.      multivitamin w/minerals (THERA-VIT-M) tablet Take 1 tablet by mouth daily 30 tablet 1    naloxone (NARCAN) 4 MG/0.1ML nasal spray Spray 1 spray (4 mg) into one nostril alternating nostrils as needed for opioid reversal every 2-3 minutes until assistance arrives 0.2 mL 1    nicotine (NICODERM CQ) 14 MG/24HR 24 hr patch Place 1 patch onto the skin every 24 hours 28 patch 1    prazosin (MINIPRESS) 2 MG capsule Take 2 mg by mouth at bedtime.      pregabalin (LYRICA) 150 MG capsule Take 1 capsule (150 mg) by mouth 2 times daily 60 capsule 0    promethazine HCl (PHENERGAN) 50 MG tablet Take 50 mg by mouth every 6 hours as needed.      QUEtiapine  (SEROQUEL) 100 MG tablet Take 1 tablet (100 mg) by mouth at bedtime 30 tablet 1    SUMAtriptan (IMITREX) 50 MG tablet Take 50 mg by mouth at onset of headache.      testosterone cypionate (DEPOTESTOTERONE) 200 MG/ML injection Inject 200 mg into the muscle Injects 1 ml every 7-10 days       No current facility-administered medications for this visit.       Allergies   Allergen Reactions    Bupropion Other (See Comments)     Other reaction(s): seizure when took a double dose        Acetaminophen Nausea and Vomiting and GI Disturbance       If multiple uses per pt report    Codeine Nausea and Vomiting, Itching and Fatigue           Sulfa Antibiotics Itching       PMH, PSH, FamHx reviewed      OBJECTIVE                                                      BP (!) 144/91 (BP Location: Left arm, Patient Position: Sitting)   Pulse 91   SpO2 95%     Physical Exam  Vitals reviewed.   Constitutional:       Appearance: Normal appearance. He is normal weight.   Musculoskeletal:         General: Normal range of motion.   Skin:     General: Skin is warm.   Neurological:      Mental Status: He is alert and oriented to person, place, and time. Mental status is at baseline.      Comments: Fine tremor at baseline.   Psychiatric:         Mood and Affect: Mood normal.         Thought Content: Thought content normal.         Judgment: Judgment normal.      Comments: Flat Affect, Poor eye contact. Occasionally Tangential thought process but easily redirectable.         Labs:    UDS:    Lab Results   Component Value Date    BUP Screen Positive (A) 11/04/2024    BZO Screen Positive (A) 11/04/2024    BAR Negative 11/04/2024    LOU Negative 11/04/2024    MAMP Negative 11/04/2024    AMP Negative 11/04/2024    MDMA Negative 11/04/2024    MTD Negative 11/04/2024    YXM369 Negative 11/04/2024    OXY Negative 11/04/2024    PCP Negative 11/04/2024    THC Negative 11/04/2024    TEMP 90 F 11/04/2024    SGPOCT 1.015 11/04/2024     *POC urine drug  screen does not screen for Fentanyl      Recent Results (from the past 240 hours)   Drugs of Abuse Screen Urine (POC CUPS) POCT    Collection Time: 11/04/24  1:23 PM   Result Value Ref Range    POCT Kit Lot Number [id number]     POCT Kit Expiration Date 05/15/2026     Temperature Urine POCT 90 F 90 F, 92 F, 94 F, 96 F, 98 F, 100 F    Specific [place] POCT 1.015 1.005, 1.015, 1.025    pH Qual Urine POCT 5 pH 4 pH, 5 pH, 7 pH, 9 pH    Creatinine Qual Urine POCT 50 mg/dL 20 mg/dL, 50 mg/dL, 100 mg/dL, 200 mg/dL    Internal QC Qual Urine POCT Valid Valid    Amphetamine Qual Urine POCT Negative Negative    Barbiturate Qual Urine POCT Negative Negative    Buprenorphine Qual Urine POCT Screen Positive (A) Negative    Benzodiazepine Qual Urine POCT Screen Positive (A) Negative    Cocaine Qual Urine POCT Negative Negative    Methamphetamine Qual Urine POCT Negative Negative    MDMA Qual Urine POCT Negative Negative    Methadone Qual Urine POCT Negative Negative    Opiate Qual Urine POCT Negative Negative    Oxycodone Qual Urine POCT Negative Negative    Phencyclidine Qual Urine POCT Negative Negative    THC Qual Urine POCT Negative Negative      reviewed.  Pregabalin, Lorazepam, prescribed and filled appropriately without significant gaps .   The longitudinal plan of care for the diagnosis(es)/condition(s) as documented were addressed during this visit. Due to the added complexity in care, I will continue to support Bc in the subsequent management and with ongoing continuity of care.      Jonathan Oscar MD    Staffed with Dr. George GUDINO [address] [address] [phone]

## 2025-01-10 ENCOUNTER — LAB REQUISITION (OUTPATIENT)
Dept: LAB | Facility: CLINIC | Age: 45
End: 2025-01-10
Payer: COMMERCIAL

## 2025-01-10 DIAGNOSIS — B18.2 CHRONIC VIRAL HEPATITIS C (H): ICD-10-CM

## 2025-01-10 PROCEDURE — 87522 HEPATITIS C REVRS TRNSCRPJ: CPT | Mod: ORL | Performed by: INTERNAL MEDICINE

## 2025-01-10 PROCEDURE — 87902 NFCT AGT GNTYP ALYS HEP C: CPT | Mod: ORL | Performed by: INTERNAL MEDICINE

## 2025-01-10 PROCEDURE — 80053 COMPREHEN METABOLIC PANEL: CPT | Mod: ORL | Performed by: INTERNAL MEDICINE

## 2025-01-11 LAB
ALBUMIN SERPL BCG-MCNC: 4.4 G/DL (ref 3.5–5.2)
ALP SERPL-CCNC: 81 U/L (ref 40–150)
ALT SERPL W P-5'-P-CCNC: 208 U/L (ref 0–70)
ANION GAP SERPL CALCULATED.3IONS-SCNC: 13 MMOL/L (ref 7–15)
AST SERPL W P-5'-P-CCNC: 114 U/L (ref 0–45)
BILIRUB SERPL-MCNC: 0.5 MG/DL
BUN SERPL-MCNC: 20.3 MG/DL (ref 6–20)
CALCIUM SERPL-MCNC: 9.6 MG/DL (ref 8.8–10.4)
CHLORIDE SERPL-SCNC: 100 MMOL/L (ref 98–107)
CREAT SERPL-MCNC: 1.18 MG/DL (ref 0.67–1.17)
EGFRCR SERPLBLD CKD-EPI 2021: 79 ML/MIN/1.73M2
GLUCOSE SERPL-MCNC: 102 MG/DL (ref 70–99)
HCO3 SERPL-SCNC: 23 MMOL/L (ref 22–29)
HCV RNA SERPL NAA+PROBE-ACNC: ABNORMAL IU/ML
HCV RNA SERPL NAA+PROBE-LOG IU: 7.4 {LOG_IU}/ML
POTASSIUM SERPL-SCNC: 4.7 MMOL/L (ref 3.4–5.3)
PROT SERPL-MCNC: 8.2 G/DL (ref 6.4–8.3)
SODIUM SERPL-SCNC: 136 MMOL/L (ref 135–145)

## 2025-01-15 LAB — HCV GENTYP SERPL NAA+PROBE: NORMAL

## 2025-02-25 ENCOUNTER — TELEPHONE (OUTPATIENT)
Dept: BEHAVIORAL HEALTH | Facility: CLINIC | Age: 45
End: 2025-02-25
Payer: COMMERCIAL

## 2025-02-25 NOTE — TELEPHONE ENCOUNTER
----- Message from Jesse STEIN sent at 2/25/2025  8:08 AM CST -----  Regarding: RE: Sublocade  Hi Amanda,     Can you please assist in discontinuing the sublocase therapy plan.    Thank you,   Jesse Muñoz  [facility] [place]   [phone]  ----- Message -----  From: Khari Barrientos MD  Sent: 2/24/2025   6:07 PM CST  To: Jesse Muñoz  Subject: RE: Sublocade                                    Hi, how do I discontinue the plan? thanks  ----- Message -----  From: Jesse Muñoz  Sent: 2/14/2025   9:24 AM CST  To: Khari Barrientos MD  Subject: Sublocade                                        [place]      Bc canceled his sublocade appointment on 1/16 and we have attempted to reach out to them to reschedule their Sublocade Injection at least 3 times with no success. Please advise on a plan for this pt. Please discontinue the sublocade therapy plan if the medication will not be pursued.    Thank you,  Jesse Muñoz  [facility] [place]  [phone]

## 2025-08-29 ENCOUNTER — LAB REQUISITION (OUTPATIENT)
Dept: LAB | Facility: CLINIC | Age: 45
End: 2025-08-29
Payer: COMMERCIAL

## 2025-08-29 DIAGNOSIS — Z21 ASYMPTOMATIC HUMAN IMMUNODEFICIENCY VIRUS (HIV) INFECTION STATUS (H): ICD-10-CM

## 2025-08-29 DIAGNOSIS — B18.2 CHRONIC VIRAL HEPATITIS C (H): ICD-10-CM

## 2025-08-30 LAB
CD3 CELLS # BLD: 1994 CELLS/UL (ref 603–2990)
CD3 CELLS NFR BLD: 60 % (ref 49–84)
CD3+CD4+ CELLS # BLD: 1057 CELLS/UL (ref 441–2156)
CD3+CD4+ CELLS NFR BLD: 32 % (ref 28–63)
CD3+CD4+ CELLS/CD3+CD8+ CLL BLD: 1.18 % (ref 1.4–2.6)
CD3+CD8+ CELLS # BLD: 899 CELLS/UL (ref 125–1312)
CD3+CD8+ CELLS NFR BLD: 27 % (ref 10–40)
HCV RNA SERPL NAA+PROBE-ACNC: NOT DETECTED IU/ML
T CELL COMMENT: ABNORMAL

## (undated) DEVICE — PREP CHLORAPREP CLEAR 3ML 260400

## (undated) DEVICE — GLOVE PROTEXIS BLUE W/NEU-THERA 7.5  2D73EB75

## (undated) DEVICE — ESU GROUND PAD ADULT W/CORD E7507

## (undated) DEVICE — CLIP APPLIER ENDO 5MM M/L LIGAMAX EL5ML

## (undated) DEVICE — CATH TRAY FOLEY SURESTEP 16FR W/URNE MTR STLK LATEX A303316A

## (undated) DEVICE — SUCTION CANISTER MEDIVAC LINER 3000ML W/LID 65651-530

## (undated) DEVICE — EVAC SYSTEM CLEAR FLOW SC082500

## (undated) DEVICE — LINEN TOWEL PACK X30 5481

## (undated) DEVICE — ENDO TROCAR SLEEVE KII Z-THREADED 05X100MM CTS02

## (undated) DEVICE — Device

## (undated) DEVICE — ESU GROUND PAD UNIVERSAL W/O CORD

## (undated) DEVICE — DRAPE C-ARM W/STRAPS 42X72" 07-CA104

## (undated) DEVICE — LINEN GOWN PACK X3

## (undated) DEVICE — SU MONOCRYL 4-0 PS-2 27" UND Y426H

## (undated) DEVICE — PACK LAP CHOLE SLC15LCFSD

## (undated) DEVICE — SUCTION IRR STRYKERFLOW II W/TIP 250-070-520

## (undated) DEVICE — SU VICRYL 0 UR-6 27" J603H

## (undated) DEVICE — SPONGE COTTONOID 1/2X1/2" 20-04S

## (undated) DEVICE — SU VICRYL 3-0 SH 8X18" UND J864D

## (undated) DEVICE — PACK NEURO MINOR UMMC SNE32MNMU4

## (undated) DEVICE — NDL BLUNT 18GA 1" W/O FILTER 305181

## (undated) DEVICE — ENDO TROCAR BLUNT TIP KII BALLOON 12X100MM C0R47

## (undated) DEVICE — NDL 30GA 0.5" 305106

## (undated) DEVICE — DRILL BIT

## (undated) DEVICE — APPLICATOR COTTON TIP 6"X2 STERILE LF 6012

## (undated) DEVICE — NDL SPINAL 18GA 3.5" 405184

## (undated) DEVICE — SU SILK 2-0 TIE 12X30" A305H

## (undated) DEVICE — SYR EAR BULB 3OZ 0035830

## (undated) DEVICE — ENDO POUCH UNIV RETRIEVAL SYSTEM INZII 10MM CD001

## (undated) DEVICE — WIPES FOLEY CARE SURESTEP PROVON DFC100

## (undated) DEVICE — GLOVE PROTEXIS MICRO 7.5  2D73PM75

## (undated) DEVICE — SOL WATER IRRIG 1000ML BOTTLE 2F7114

## (undated) DEVICE — DRSG TELFA 3X8" 1238

## (undated) DEVICE — BUR MATCHSTICK 3MM ANSPACH L-8NS-G1

## (undated) DEVICE — ESU ELEC BLADE 2.75" COATED/INSULATED E1455

## (undated) DEVICE — PAD CHUX UNDERPAD 23X24" 7136

## (undated) DEVICE — SU MONOCRYL 4-0 PS-2 18" UND Y496G

## (undated) DEVICE — TEST TUBE W/SCREW CAP 17361

## (undated) DEVICE — APPLICATOR VISTASEAL RIGID TIP 35CM VSTL35

## (undated) DEVICE — ENDO SCOPE WARMER LF TM500

## (undated) DEVICE — SOL NACL 0.9% IRRIG 1000ML BOTTLE 07138-09

## (undated) DEVICE — SOL NACL 0.9% IRRIG 1000ML BOTTLE 2F7124

## (undated) DEVICE — SPONGE KITTNER 30-101

## (undated) DEVICE — SOL NACL 0.9% 10ML VIAL 0409-4888-02

## (undated) DEVICE — SYR 10ML LL W/O NDL

## (undated) DEVICE — PREP SKIN SCRUB TRAY 4461A

## (undated) DEVICE — DRAPE IOBAN INCISE 13X13" 6640EZ

## (undated) DEVICE — SU DERMABOND ADVANCED .7ML DNX12

## (undated) DEVICE — DECANTER VIAL 2006S

## (undated) DEVICE — RX VISTASEAL FIBRIN SEALANT W/THROMBIN 10ML VST10

## (undated) DEVICE — ESU HOLDER LAP INST DISP PURPLE LONG 330MM H-PRO-330

## (undated) DEVICE — SYR 10ML FINGER CONTROL W/O NDL 309695

## (undated) DEVICE — LINEN TOWEL PACK X5 5464

## (undated) DEVICE — TAPE CLOTH 3" CARDINAL 3TRCL03

## (undated) DEVICE — PREP CHLORAPREP 26ML TINTED ORANGE  260815

## (undated) DEVICE — PACK GOWN 3/PK DISP XL SBA32GPFCB

## (undated) DEVICE — SOL NACL 0.9% INJ 1000ML BAG 2B1324X

## (undated) DEVICE — ENDO TROCAR FIRST ENTRY KII FIOS Z-THRD 05X100MM CTF03

## (undated) DEVICE — SPONGE SURGIFOAM 100 1974

## (undated) DEVICE — SURGICEL HEMOSTAT 4X8" 1952

## (undated) DEVICE — DRAPE SHEET MED 44X70" 9355

## (undated) DEVICE — LINEN GOWN XLG 5407

## (undated) DEVICE — DRAPE MAYO STAND 23X54 8337

## (undated) DEVICE — DRAPE MICROSCOPE LEICA 54X150" AR8033650

## (undated) DEVICE — PREP DURAPREP 26ML APL 8630

## (undated) RX ORDER — PROPOFOL 10 MG/ML
INJECTION, EMULSION INTRAVENOUS
Status: DISPENSED
Start: 2017-09-10

## (undated) RX ORDER — GLYCOPYRROLATE 0.2 MG/ML
INJECTION, SOLUTION INTRAMUSCULAR; INTRAVENOUS
Status: DISPENSED
Start: 2021-11-18

## (undated) RX ORDER — REMIFENTANIL HYDROCHLORIDE 1 MG/ML
INJECTION, POWDER, LYOPHILIZED, FOR SOLUTION INTRAVENOUS
Status: DISPENSED
Start: 2017-09-10

## (undated) RX ORDER — FENTANYL CITRATE 50 UG/ML
INJECTION, SOLUTION INTRAMUSCULAR; INTRAVENOUS
Status: DISPENSED
Start: 2017-09-11

## (undated) RX ORDER — LIDOCAINE HYDROCHLORIDE 20 MG/ML
INJECTION, SOLUTION EPIDURAL; INFILTRATION; INTRACAUDAL; PERINEURAL
Status: DISPENSED
Start: 2017-09-10

## (undated) RX ORDER — ROCURONIUM BROMIDE 50 MG/5 ML
SYRINGE (ML) INTRAVENOUS
Status: DISPENSED
Start: 2017-09-10

## (undated) RX ORDER — LIDOCAINE HYDROCHLORIDE AND EPINEPHRINE 10; 10 MG/ML; UG/ML
INJECTION, SOLUTION INFILTRATION; PERINEURAL
Status: DISPENSED
Start: 2017-09-10

## (undated) RX ORDER — PHENYLEPHRINE HCL IN 0.9% NACL 1 MG/10 ML
SYRINGE (ML) INTRAVENOUS
Status: DISPENSED
Start: 2017-09-10

## (undated) RX ORDER — LIDOCAINE HYDROCHLORIDE 10 MG/ML
INJECTION, SOLUTION EPIDURAL; INFILTRATION; INTRACAUDAL; PERINEURAL
Status: DISPENSED
Start: 2024-11-04

## (undated) RX ORDER — PROPOFOL 10 MG/ML
INJECTION, EMULSION INTRAVENOUS
Status: DISPENSED
Start: 2021-12-02

## (undated) RX ORDER — DEXAMETHASONE SODIUM PHOSPHATE 4 MG/ML
INJECTION, SOLUTION INTRA-ARTICULAR; INTRALESIONAL; INTRAMUSCULAR; INTRAVENOUS; SOFT TISSUE
Status: DISPENSED
Start: 2021-12-02

## (undated) RX ORDER — LIDOCAINE HYDROCHLORIDE 10 MG/ML
INJECTION, SOLUTION EPIDURAL; INFILTRATION; INTRACAUDAL; PERINEURAL
Status: DISPENSED
Start: 2024-07-03

## (undated) RX ORDER — LIDOCAINE HYDROCHLORIDE 20 MG/ML
INJECTION, SOLUTION EPIDURAL; INFILTRATION; INTRACAUDAL; PERINEURAL
Status: DISPENSED
Start: 2021-12-02

## (undated) RX ORDER — CEFAZOLIN SODIUM 1 G/3ML
INJECTION, POWDER, FOR SOLUTION INTRAMUSCULAR; INTRAVENOUS
Status: DISPENSED
Start: 2017-09-10

## (undated) RX ORDER — HYDROMORPHONE HYDROCHLORIDE 1 MG/ML
INJECTION, SOLUTION INTRAMUSCULAR; INTRAVENOUS; SUBCUTANEOUS
Status: DISPENSED
Start: 2017-09-11

## (undated) RX ORDER — FENTANYL CITRATE 50 UG/ML
INJECTION, SOLUTION INTRAMUSCULAR; INTRAVENOUS
Status: DISPENSED
Start: 2017-09-10

## (undated) RX ORDER — FENTANYL CITRATE 50 UG/ML
INJECTION, SOLUTION INTRAMUSCULAR; INTRAVENOUS
Status: DISPENSED
Start: 2022-02-07

## (undated) RX ORDER — GLYCOPYRROLATE 0.2 MG/ML
INJECTION, SOLUTION INTRAMUSCULAR; INTRAVENOUS
Status: DISPENSED
Start: 2017-09-10

## (undated) RX ORDER — LIDOCAINE HYDROCHLORIDE 10 MG/ML
INJECTION, SOLUTION EPIDURAL; INFILTRATION; INTRACAUDAL; PERINEURAL
Status: DISPENSED
Start: 2017-10-07

## (undated) RX ORDER — NEOSTIGMINE METHYLSULFATE 1 MG/ML
VIAL (ML) INJECTION
Status: DISPENSED
Start: 2022-02-07

## (undated) RX ORDER — LIDOCAINE HYDROCHLORIDE 10 MG/ML
INJECTION, SOLUTION EPIDURAL; INFILTRATION; INTRACAUDAL; PERINEURAL
Status: DISPENSED
Start: 2024-08-01

## (undated) RX ORDER — GLYCOPYRROLATE 0.2 MG/ML
INJECTION, SOLUTION INTRAMUSCULAR; INTRAVENOUS
Status: DISPENSED
Start: 2021-12-02

## (undated) RX ORDER — FENTANYL CITRATE 50 UG/ML
INJECTION, SOLUTION INTRAMUSCULAR; INTRAVENOUS
Status: DISPENSED
Start: 2021-11-18

## (undated) RX ORDER — EPHEDRINE SULFATE 50 MG/ML
INJECTION, SOLUTION INTRAMUSCULAR; INTRAVENOUS; SUBCUTANEOUS
Status: DISPENSED
Start: 2017-09-10

## (undated) RX ORDER — FENTANYL CITRATE 50 UG/ML
INJECTION, SOLUTION INTRAMUSCULAR; INTRAVENOUS
Status: DISPENSED
Start: 2021-12-02

## (undated) RX ORDER — ONDANSETRON 2 MG/ML
INJECTION INTRAMUSCULAR; INTRAVENOUS
Status: DISPENSED
Start: 2017-09-10

## (undated) RX ORDER — SODIUM CHLORIDE 9 MG/ML
INJECTION, SOLUTION INTRAVENOUS
Status: DISPENSED
Start: 2017-09-11

## (undated) RX ORDER — ONDANSETRON 2 MG/ML
INJECTION INTRAMUSCULAR; INTRAVENOUS
Status: DISPENSED
Start: 2021-12-02

## (undated) RX ORDER — GLYCOPYRROLATE 0.2 MG/ML
INJECTION, SOLUTION INTRAMUSCULAR; INTRAVENOUS
Status: DISPENSED
Start: 2022-02-07

## (undated) RX ORDER — LIDOCAINE HYDROCHLORIDE 10 MG/ML
INJECTION, SOLUTION EPIDURAL; INFILTRATION; INTRACAUDAL; PERINEURAL
Status: DISPENSED
Start: 2024-09-10